# Patient Record
Sex: MALE | Race: ASIAN | NOT HISPANIC OR LATINO | Employment: UNEMPLOYED | URBAN - METROPOLITAN AREA
[De-identification: names, ages, dates, MRNs, and addresses within clinical notes are randomized per-mention and may not be internally consistent; named-entity substitution may affect disease eponyms.]

---

## 2018-09-13 ENCOUNTER — TRANSFERRED RECORDS (OUTPATIENT)
Dept: HEALTH INFORMATION MANAGEMENT | Facility: CLINIC | Age: 58
End: 2018-09-13

## 2018-10-31 ENCOUNTER — RECORDS - HEALTHEAST (OUTPATIENT)
Dept: LAB | Facility: CLINIC | Age: 58
End: 2018-10-31

## 2018-10-31 LAB
ANION GAP SERPL CALCULATED.3IONS-SCNC: 11 MMOL/L (ref 5–18)
BUN SERPL-MCNC: 20 MG/DL (ref 8–22)
CALCIUM SERPL-MCNC: 9.9 MG/DL (ref 8.5–10.5)
CHLORIDE BLD-SCNC: 105 MMOL/L (ref 98–107)
CO2 SERPL-SCNC: 24 MMOL/L (ref 22–31)
CREAT SERPL-MCNC: 0.9 MG/DL (ref 0.7–1.3)
GFR SERPL CREATININE-BSD FRML MDRD: >60 ML/MIN/1.73M2
GLUCOSE BLD-MCNC: 115 MG/DL (ref 70–125)
POTASSIUM BLD-SCNC: 3.8 MMOL/L (ref 3.5–5)
SODIUM SERPL-SCNC: 140 MMOL/L (ref 136–145)
URATE SERPL-MCNC: 8.9 MG/DL (ref 3–8)

## 2019-01-07 ENCOUNTER — RECORDS - HEALTHEAST (OUTPATIENT)
Dept: ADMINISTRATIVE | Facility: OTHER | Age: 59
End: 2019-01-07

## 2019-01-07 ENCOUNTER — AMBULATORY - HEALTHEAST (OUTPATIENT)
Dept: CARDIOLOGY | Facility: CLINIC | Age: 59
End: 2019-01-07

## 2019-01-08 ENCOUNTER — RECORDS - HEALTHEAST (OUTPATIENT)
Dept: ADMINISTRATIVE | Facility: OTHER | Age: 59
End: 2019-01-08

## 2019-01-11 ENCOUNTER — OFFICE VISIT - HEALTHEAST (OUTPATIENT)
Dept: CARDIOLOGY | Facility: CLINIC | Age: 59
End: 2019-01-11

## 2019-01-11 DIAGNOSIS — R94.39 ABNORMAL CARDIOVASCULAR STRESS TEST: ICD-10-CM

## 2019-01-11 DIAGNOSIS — R07.2 PRECORDIAL PAIN: ICD-10-CM

## 2019-01-11 DIAGNOSIS — I71.21 THORACIC ASCENDING AORTIC ANEURYSM (H): ICD-10-CM

## 2019-01-11 DIAGNOSIS — I10 ESSENTIAL HYPERTENSION, BENIGN: ICD-10-CM

## 2019-01-11 ASSESSMENT — MIFFLIN-ST. JEOR: SCORE: 1495.06

## 2019-01-18 ENCOUNTER — HOSPITAL ENCOUNTER (OUTPATIENT)
Dept: NUCLEAR MEDICINE | Facility: HOSPITAL | Age: 59
Discharge: HOME | End: 2019-01-18
Attending: INTERNAL MEDICINE

## 2019-01-18 ENCOUNTER — HOSPITAL ENCOUNTER (OUTPATIENT)
Dept: CARDIOLOGY | Facility: HOSPITAL | Age: 59
Discharge: HOME | End: 2019-01-18
Attending: INTERNAL MEDICINE

## 2019-01-18 DIAGNOSIS — R94.39 ABNORMAL CARDIOVASCULAR STRESS TEST: ICD-10-CM

## 2019-01-18 DIAGNOSIS — R07.2 PRECORDIAL PAIN: ICD-10-CM

## 2019-01-18 LAB
CV STRESS CURRENT BP HE: NORMAL
CV STRESS CURRENT HR HE: 63
CV STRESS CURRENT HR HE: 63
CV STRESS CURRENT HR HE: 64
CV STRESS CURRENT HR HE: 70
CV STRESS CURRENT HR HE: 80
CV STRESS CURRENT HR HE: 86
CV STRESS CURRENT HR HE: 87
CV STRESS CURRENT HR HE: 88
CV STRESS CURRENT HR HE: 88
CV STRESS CURRENT HR HE: 90
CV STRESS CURRENT HR HE: 92
CV STRESS CURRENT HR HE: 92
CV STRESS DEVIATION TIME HE: NORMAL
CV STRESS ECHO PERCENT HR HE: NORMAL
CV STRESS EXERCISE STAGE HE: NORMAL
CV STRESS FINAL RESTING BP HE: NORMAL
CV STRESS FINAL RESTING HR HE: 86
CV STRESS MAX HR HE: 93
CV STRESS MAX TREADMILL GRADE HE: 0
CV STRESS MAX TREADMILL SPEED HE: 0
CV STRESS PEAK DIA BP HE: NORMAL
CV STRESS PEAK SYS BP HE: NORMAL
CV STRESS PHASE HE: NORMAL
CV STRESS PROTOCOL HE: NORMAL
CV STRESS RESTING PT POSITION HE: NORMAL
CV STRESS ST DEVIATION AMOUNT HE: NORMAL
CV STRESS ST DEVIATION ELEVATION HE: NORMAL
CV STRESS ST EVELATION AMOUNT HE: NORMAL
CV STRESS TEST TYPE HE: NORMAL
CV STRESS TOTAL STAGE TIME MIN 1 HE: NORMAL
NUC STRESS EJECTION FRACTION: 75 %
STRESS ECHO BASELINE BP: NORMAL
STRESS ECHO BASELINE HR: 63
STRESS ECHO CALCULATED PERCENT HR: 57 %
STRESS ECHO LAST STRESS BP: NORMAL
STRESS ECHO LAST STRESS HR: 92

## 2019-01-21 ENCOUNTER — COMMUNICATION - HEALTHEAST (OUTPATIENT)
Dept: CARDIOLOGY | Facility: CLINIC | Age: 59
End: 2019-01-21

## 2019-02-11 ENCOUNTER — RECORDS - HEALTHEAST (OUTPATIENT)
Dept: LAB | Facility: CLINIC | Age: 59
End: 2019-02-11

## 2019-02-13 LAB — BACTERIA SPEC CULT: NORMAL

## 2019-02-22 ENCOUNTER — AMBULATORY - HEALTHEAST (OUTPATIENT)
Dept: CARDIOLOGY | Facility: CLINIC | Age: 59
End: 2019-02-22

## 2019-02-22 ENCOUNTER — RECORDS - HEALTHEAST (OUTPATIENT)
Dept: ADMINISTRATIVE | Facility: OTHER | Age: 59
End: 2019-02-22

## 2019-03-01 ENCOUNTER — RECORDS - HEALTHEAST (OUTPATIENT)
Dept: ADMINISTRATIVE | Facility: OTHER | Age: 59
End: 2019-03-01

## 2019-03-05 ENCOUNTER — AMBULATORY - HEALTHEAST (OUTPATIENT)
Dept: CARDIOLOGY | Facility: CLINIC | Age: 59
End: 2019-03-05

## 2019-03-06 ENCOUNTER — AMBULATORY - HEALTHEAST (OUTPATIENT)
Dept: CARDIOLOGY | Facility: CLINIC | Age: 59
End: 2019-03-06

## 2019-03-06 ENCOUNTER — SURGERY - HEALTHEAST (OUTPATIENT)
Dept: CARDIOLOGY | Facility: CLINIC | Age: 59
End: 2019-03-06

## 2019-03-06 ENCOUNTER — OFFICE VISIT - HEALTHEAST (OUTPATIENT)
Dept: CARDIOLOGY | Facility: CLINIC | Age: 59
End: 2019-03-06

## 2019-03-06 DIAGNOSIS — I20.0 UNSTABLE ANGINA (H): ICD-10-CM

## 2019-03-06 DIAGNOSIS — R94.39 ABNORMAL CARDIOVASCULAR STRESS TEST: ICD-10-CM

## 2019-03-06 ASSESSMENT — MIFFLIN-ST. JEOR: SCORE: 1509.12

## 2019-03-12 ENCOUNTER — SURGERY - HEALTHEAST (OUTPATIENT)
Dept: CARDIOLOGY | Facility: CLINIC | Age: 59
End: 2019-03-12

## 2019-03-12 ASSESSMENT — MIFFLIN-ST. JEOR: SCORE: 1516.32

## 2019-04-11 ENCOUNTER — OFFICE VISIT - HEALTHEAST (OUTPATIENT)
Dept: CARDIOLOGY | Facility: CLINIC | Age: 59
End: 2019-04-11

## 2019-04-11 DIAGNOSIS — I25.118 CORONARY ARTERY DISEASE OF NATIVE HEART WITH STABLE ANGINA PECTORIS, UNSPECIFIED VESSEL OR LESION TYPE (H): ICD-10-CM

## 2019-04-11 DIAGNOSIS — I71.21 ASCENDING AORTIC ANEURYSM (H): ICD-10-CM

## 2019-04-11 ASSESSMENT — MIFFLIN-ST. JEOR: SCORE: 1532.47

## 2019-04-19 ENCOUNTER — SURGERY - HEALTHEAST (OUTPATIENT)
Dept: CARDIOLOGY | Facility: CLINIC | Age: 59
End: 2019-04-19

## 2019-04-19 DIAGNOSIS — I71.21 ASCENDING AORTIC ANEURYSM (H): ICD-10-CM

## 2019-04-22 ENCOUNTER — HOSPITAL ENCOUNTER (OUTPATIENT)
Dept: RADIOLOGY | Facility: CLINIC | Age: 59
Discharge: HOME | End: 2019-04-22

## 2019-04-22 ENCOUNTER — HOSPITAL ENCOUNTER (OUTPATIENT)
Dept: SURGERY | Facility: CLINIC | Age: 59
Discharge: HOME | End: 2019-04-22
Admitting: REGISTERED NURSE

## 2019-04-22 ENCOUNTER — ANESTHESIA - HEALTHEAST (OUTPATIENT)
Dept: SURGERY | Facility: CLINIC | Age: 59
End: 2019-04-22

## 2019-04-22 DIAGNOSIS — I25.10 CORONARY ARTERY DISEASE INVOLVING NATIVE CORONARY ARTERY OF NATIVE HEART WITHOUT ANGINA PECTORIS: ICD-10-CM

## 2019-04-22 DIAGNOSIS — I71.21 THORACIC ASCENDING AORTIC ANEURYSM (H): ICD-10-CM

## 2019-04-22 DIAGNOSIS — I71.21 ASCENDING AORTIC ANEURYSM (H): ICD-10-CM

## 2019-04-22 LAB
ABO/RH(D): NORMAL
ALBUMIN UR-MCNC: NEGATIVE MG/DL
ANION GAP SERPL CALCULATED.3IONS-SCNC: 8 MMOL/L (ref 5–18)
ANTIBODY SCREEN: NEGATIVE
APPEARANCE UR: CLEAR
ATRIAL RATE - MUSE: 53 BPM
BILIRUB UR QL STRIP: NEGATIVE
BUN SERPL-MCNC: 12 MG/DL (ref 8–22)
CALCIUM SERPL-MCNC: 9.3 MG/DL (ref 8.5–10.5)
CHLORIDE BLD-SCNC: 106 MMOL/L (ref 98–107)
CO2 SERPL-SCNC: 27 MMOL/L (ref 22–31)
COLOR UR AUTO: YELLOW
CREAT SERPL-MCNC: 1.02 MG/DL (ref 0.7–1.3)
DIASTOLIC BLOOD PRESSURE - MUSE: NORMAL MMHG
ERYTHROCYTE [DISTWIDTH] IN BLOOD BY AUTOMATED COUNT: 11.9 % (ref 11–14.5)
GFR SERPL CREATININE-BSD FRML MDRD: >60 ML/MIN/1.73M2
GLUCOSE BLD-MCNC: 188 MG/DL (ref 70–125)
GLUCOSE UR STRIP-MCNC: NEGATIVE MG/DL
HCT VFR BLD AUTO: 40.8 % (ref 40–54)
HGB BLD-MCNC: 14.6 G/DL (ref 14–18)
HGB UR QL STRIP: NEGATIVE
INR PPP: 1.07 (ref 0.9–1.1)
INTERPRETATION ECG - MUSE: NORMAL
KETONES UR STRIP-MCNC: NEGATIVE MG/DL
LEUKOCYTE ESTERASE UR QL STRIP: NEGATIVE
MAGNESIUM SERPL-MCNC: 1.8 MG/DL (ref 1.8–2.6)
MCH RBC QN AUTO: 30.9 PG (ref 27–34)
MCHC RBC AUTO-ENTMCNC: 35.8 G/DL (ref 32–36)
MCV RBC AUTO: 86 FL (ref 80–100)
NITRATE UR QL: NEGATIVE
P AXIS - MUSE: 77 DEGREES
PH UR STRIP: 6 [PH] (ref 4.5–8)
PLATELET # BLD AUTO: 168 THOU/UL (ref 140–440)
PMV BLD AUTO: 9.1 FL (ref 8.5–12.5)
POTASSIUM BLD-SCNC: 3.5 MMOL/L (ref 3.5–5)
PR INTERVAL - MUSE: 246 MS
PREALB SERPL-MCNC: 27.1 MG/DL (ref 19–38)
QRS DURATION - MUSE: 102 MS
QT - MUSE: 440 MS
QTC - MUSE: 412 MS
R AXIS - MUSE: 18 DEGREES
RBC # BLD AUTO: 4.72 MILL/UL (ref 4.4–6.2)
SODIUM SERPL-SCNC: 141 MMOL/L (ref 136–145)
SP GR UR STRIP: 1.01 (ref 1–1.03)
SYSTOLIC BLOOD PRESSURE - MUSE: NORMAL MMHG
T AXIS - MUSE: 68 DEGREES
UROBILINOGEN UR STRIP-ACNC: NORMAL
VENTRICULAR RATE- MUSE: 53 BPM
WBC: 5.3 THOU/UL (ref 4–11)

## 2019-04-22 ASSESSMENT — MIFFLIN-ST. JEOR: SCORE: 1499.82

## 2019-04-23 ENCOUNTER — HOSPITAL ENCOUNTER (INPATIENT)
Dept: INTENSIVE CARE | Facility: CLINIC | Age: 59
Discharge: HOME HEALTH | End: 2019-04-30
Attending: THORACIC SURGERY (CARDIOTHORACIC VASCULAR SURGERY) | Admitting: THORACIC SURGERY (CARDIOTHORACIC VASCULAR SURGERY)
Payer: COMMERCIAL

## 2019-04-23 ENCOUNTER — SURGERY - HEALTHEAST (OUTPATIENT)
Dept: SURGERY | Facility: CLINIC | Age: 59
End: 2019-04-23

## 2019-04-23 DIAGNOSIS — Z95.1 S/P CABG (CORONARY ARTERY BYPASS GRAFT): ICD-10-CM

## 2019-04-23 LAB
ANION GAP SERPL CALCULATED.3IONS-SCNC: 9 MMOL/L (ref 5–18)
APTT PPP: 72 SECONDS (ref 24–37)
ATRIAL RATE - MUSE: 77 BPM
BACTERIA SPEC CULT: NORMAL
BASE EXCESS BLDA CALC-SCNC: -1.8 MMOL/L
BASE EXCESS BLDA CALC-SCNC: 0.5 MMOL/L
BASE EXCESS BLDA CALC-SCNC: 0.5 MMOL/L
BASE EXCESS BLDV CALC-SCNC: -2 MMOL/L
BASE EXCESS BLDV CALC-SCNC: -2 MMOL/L
BASE EXCESS BLDV CALC-SCNC: 0 MMOL/L
BASE EXCESS BLDV CALC-SCNC: 0 MMOL/L
BASE EXCESS BLDV CALC-SCNC: 5 MMOL/L
BASE EXCESS BLDV CALC-SCNC: 6 MMOL/L
BASOPHILS # BLD AUTO: 0 THOU/UL (ref 0–0.2)
BASOPHILS NFR BLD AUTO: 0 % (ref 0–2)
BUN SERPL-MCNC: 14 MG/DL (ref 8–22)
CA-I BLD-MCNC: 0.92 MMOL/L (ref 1.11–1.3)
CA-I BLD-MCNC: 0.95 MMOL/L (ref 1.11–1.3)
CA-I BLD-MCNC: 0.95 MMOL/L (ref 1.11–1.3)
CA-I BLD-MCNC: 1.18 MMOL/L (ref 1.11–1.3)
CALCIUM SERPL-MCNC: 7.9 MG/DL (ref 8.5–10.5)
CALCIUM, IONIZED MEASURED: 0.96 MMOL/L (ref 1.11–1.3)
CHLORIDE BLD-SCNC: 111 MMOL/L (ref 98–107)
CO2 BLD-SCNC: 24 MMOL/L
CO2 BLD-SCNC: 26 MMOL/L
CO2 BLD-SCNC: 27 MMOL/L
CO2 BLD-SCNC: 29 MMOL/L
CO2 BLD-SCNC: 31 MMOL/L
CO2 BLD-SCNC: 32 MMOL/L
CO2 SERPL-SCNC: 25 MMOL/L (ref 22–31)
COHGB MFR BLD: 99.3 % (ref 96–97)
COHGB MFR BLD: 99.8 % (ref 96–97)
COHGB MFR BLD: 99.9 % (ref 96–97)
CREAT SERPL-MCNC: 0.96 MG/DL (ref 0.7–1.3)
DIASTOLIC BLOOD PRESSURE - MUSE: NORMAL MMHG
EOSINOPHIL # BLD AUTO: 0 THOU/UL (ref 0–0.4)
EOSINOPHIL NFR BLD AUTO: 0 % (ref 0–6)
ERYTHROCYTE [DISTWIDTH] IN BLOOD BY AUTOMATED COUNT: 11.8 % (ref 11–14.5)
ERYTHROCYTE [DISTWIDTH] IN BLOOD BY AUTOMATED COUNT: 11.9 % (ref 11–14.5)
FIBRINOGEN PPP-MCNC: 157 MG/DL (ref 170–410)
GFR SERPL CREATININE-BSD FRML MDRD: >60 ML/MIN/1.73M2
GLUCOSE BLD-MCNC: 112 MG/DL (ref 70–125)
GLUCOSE BLD-MCNC: 124 MG/DL (ref 70–125)
GLUCOSE BLD-MCNC: 130 MG/DL (ref 70–125)
GLUCOSE BLD-MCNC: 135 MG/DL (ref 70–125)
GLUCOSE BLD-MCNC: 138 MG/DL (ref 70–125)
GLUCOSE BLD-MCNC: 151 MG/DL (ref 70–125)
GLUCOSE BLD-MCNC: 166 MG/DL (ref 70–125)
GLUCOSE BLDC GLUCOMTR-MCNC: 105 MG/DL (ref 70–139)
GLUCOSE BLDC GLUCOMTR-MCNC: 113 MG/DL (ref 70–139)
GLUCOSE BLDC GLUCOMTR-MCNC: 114 MG/DL (ref 70–139)
GLUCOSE BLDC GLUCOMTR-MCNC: 116 MG/DL (ref 70–139)
GLUCOSE BLDC GLUCOMTR-MCNC: 131 MG/DL (ref 70–139)
GLUCOSE BLDC GLUCOMTR-MCNC: 139 MG/DL (ref 70–139)
GLUCOSE BLDC GLUCOMTR-MCNC: 139 MG/DL (ref 70–139)
GLUCOSE BLDC GLUCOMTR-MCNC: 94 MG/DL (ref 70–139)
HCO3 BLDA-SCNC: 22.6 MMOL/L (ref 23–29)
HCO3 BLDA-SCNC: 24.7 MMOL/L (ref 23–29)
HCO3 BLDA-SCNC: 25.8 MMOL/L (ref 23–29)
HCO3 BLDA-SCNC: 29.5 MMOL/L (ref 23–29)
HCO3 BLDA-SCNC: 30.5 MMOL/L (ref 23–29)
HCO3 BLDV-SCNC: 27.2 MMOL/L (ref 24–30)
HCO3, ARTERIAL CALC - HISTORICAL: 23.5 MMOL/L (ref 23–29)
HCO3, ARTERIAL CALC - HISTORICAL: 25.3 MMOL/L (ref 23–29)
HCO3, ARTERIAL CALC - HISTORICAL: 25.3 MMOL/L (ref 23–29)
HCT VFR BLD AUTO: 27.1 % (ref 40–54)
HCT VFR BLD AUTO: 32.2 % (ref 40–54)
HCT VFR BLD CALC: 27 % (ref 40–54)
HCT VFR BLD CALC: 28 % (ref 40–54)
HCT VFR BLD CALC: 30 % (ref 40–54)
HCT VFR BLD CALC: 33 % (ref 40–54)
HCT VFR BLD CALC: 40 % (ref 40–54)
HCT VFR BLD CALC: 43 % (ref 40–54)
HGB BLD-MCNC: 10.2 G/DL (ref 14–18)
HGB BLD-MCNC: 11.2 G/DL (ref 14–18)
HGB BLD-MCNC: 11.8 G/DL (ref 14–18)
HGB BLD-MCNC: 13.6 G/DL (ref 14–18)
HGB BLD-MCNC: 14.6 G/DL (ref 14–18)
HGB BLD-MCNC: 9.2 G/DL (ref 14–18)
HGB BLD-MCNC: 9.5 G/DL (ref 14–18)
HGB BLD-MCNC: 9.8 G/DL (ref 14–18)
INR PPP: 1.47 (ref 0.9–1.1)
INR PPP: 1.61 (ref 0.9–1.1)
INTERPRETATION ECG - MUSE: NORMAL
ION CA PH 7.4: 0.96 MMOL/L (ref 1.11–1.3)
LYMPHOCYTES # BLD AUTO: 1.2 THOU/UL (ref 0.8–4.4)
LYMPHOCYTES NFR BLD AUTO: 15 % (ref 20–40)
MAGNESIUM SERPL-MCNC: 3.9 MG/DL (ref 1.8–2.6)
MCH RBC QN AUTO: 31 PG (ref 27–34)
MCH RBC QN AUTO: 31.1 PG (ref 27–34)
MCHC RBC AUTO-ENTMCNC: 36.2 G/DL (ref 32–36)
MCHC RBC AUTO-ENTMCNC: 36.6 G/DL (ref 32–36)
MCV RBC AUTO: 85 FL (ref 80–100)
MCV RBC AUTO: 86 FL (ref 80–100)
MONOCYTES # BLD AUTO: 0.3 THOU/UL (ref 0–0.9)
MONOCYTES NFR BLD AUTO: 4 % (ref 2–10)
NEUTROPHILS # BLD AUTO: 6.4 THOU/UL (ref 2–7.7)
NEUTROPHILS NFR BLD AUTO: 80 % (ref 50–70)
O2/TOTAL GAS SETTING VFR VENT: 0.35 %
O2/TOTAL GAS SETTING VFR VENT: 100 %
O2/TOTAL GAS SETTING VFR VENT: 30 %
OXYHEMOGLOBIN - HISTORICAL: 96.4 % (ref 96–97)
OXYHEMOGLOBIN - HISTORICAL: 96.6 % (ref 96–97)
OXYHEMOGLOBIN - HISTORICAL: 96.8 % (ref 96–97)
P AXIS - MUSE: 62 DEGREES
PCO2 BLD: 36 MM HG (ref 35–45)
PCO2 BLD: 39 MM HG (ref 35–45)
PCO2 BLD: 41 MM HG (ref 35–45)
PCO2 BLDA: 35.6 MMHG (ref 35–45)
PCO2 BLDA: 45.5 MMHG (ref 35–45)
PCO2 BLDA: 47.7 MMHG (ref 35–45)
PCO2 BLDA: 49.9 MMHG (ref 35–45)
PCO2 BLDA: 53.8 MMHG (ref 35–45)
PCO2 BLDV: 58.6 MMHG (ref 35–50)
PEEP: 5 CM H2O
PH BLD: 7.38 [PH] (ref 7.37–7.44)
PH BLD: 7.4 [PH] (ref 7.37–7.44)
PH BLD: 7.44 [PH] (ref 7.37–7.44)
PH BLDA: 7.27 [PH] (ref 7.37–7.44)
PH BLDA: 7.34 [PH] (ref 7.37–7.44)
PH BLDA: 7.39 [PH] (ref 7.37–7.44)
PH BLDA: 7.41 [PH] (ref 7.37–7.44)
PH BLDA: 7.42 [PH] (ref 7.37–7.44)
PH BLDV: 7.27 [PH] (ref 7.35–7.45)
PH: 7.4 (ref 7.35–7.45)
PLATELET # BLD AUTO: 93 THOU/UL (ref 140–440)
PLATELET # BLD AUTO: 98 THOU/UL (ref 140–440)
PMV BLD AUTO: 9.2 FL (ref 8.5–12.5)
PMV BLD AUTO: 9.5 FL (ref 8.5–12.5)
PO2 BLD: 115 MM HG (ref 80–90)
PO2 BLD: 291 MM HG (ref 80–90)
PO2 BLD: 96 MM HG (ref 80–90)
PO2 BLDA: 119 MMHG (ref 80–90)
PO2 BLDA: 119 MMHG (ref 80–90)
PO2 BLDA: 182 MMHG (ref 80–90)
PO2 BLDA: 311 MMHG (ref 80–90)
PO2 BLDA: 385 MMHG (ref 80–90)
PO2 BLDV: 47 MMHG (ref 25–47)
POTASSIUM BLD-SCNC: 3.4 MMOL/L (ref 3.5–5)
POTASSIUM BLD-SCNC: 4.3 MMOL/L (ref 3.5–5)
POTASSIUM BLD-SCNC: 4.4 MMOL/L (ref 3.5–5)
POTASSIUM BLD-SCNC: 5.1 MMOL/L (ref 3.5–5)
POTASSIUM BLD-SCNC: 5.2 MMOL/L (ref 3.5–5)
POTASSIUM BLD-SCNC: 5.5 MMOL/L (ref 3.5–5)
POTASSIUM BLD-SCNC: 6.1 MMOL/L (ref 3.5–5)
PR INTERVAL - MUSE: 190 MS
QRS DURATION - MUSE: 90 MS
QT - MUSE: 444 MS
QTC - MUSE: 502 MS
R AXIS - MUSE: 64 DEGREES
RATE: 16 RR/MIN
RBC # BLD AUTO: 3.16 MILL/UL (ref 4.4–6.2)
RBC # BLD AUTO: 3.8 MILL/UL (ref 4.4–6.2)
SAO2 % BLDV: 76 % (ref 70–75)
SAT POCT - HISTORICAL: 100 % (ref 96–97)
SAT POCT - HISTORICAL: 98 % (ref 96–97)
SAT POCT - HISTORICAL: 99 % (ref 96–97)
SODIUM BLD-SCNC: 137 MMOL/L (ref 136–145)
SODIUM BLD-SCNC: 137 MMOL/L (ref 136–145)
SODIUM BLD-SCNC: 138 MMOL/L (ref 136–145)
SODIUM BLD-SCNC: 139 MMOL/L (ref 136–145)
SODIUM BLD-SCNC: 140 MMOL/L (ref 136–145)
SODIUM BLD-SCNC: 141 MMOL/L (ref 136–145)
SODIUM SERPL-SCNC: 145 MMOL/L (ref 136–145)
SYSTOLIC BLOOD PRESSURE - MUSE: NORMAL MMHG
T AXIS - MUSE: 70 DEGREES
TEMPERATURE: 37 DEGREES C
VENTILATION MODE: ABNORMAL
VENTILATOR TIDAL VOLUME: 450 ML
VENTRICULAR RATE- MUSE: 77 BPM
WBC: 10.5 THOU/UL (ref 4–11)
WBC: 8.1 THOU/UL (ref 4–11)

## 2019-04-24 LAB
ANION GAP SERPL CALCULATED.3IONS-SCNC: 10 MMOL/L (ref 5–18)
ATRIAL RATE - MUSE: 73 BPM
BASE EXCESS BLDA CALC-SCNC: 1.2 MMOL/L
BASE EXCESS BLDA CALC-SCNC: 2.5 MMOL/L
BASE EXCESS BLDA CALC-SCNC: 2.6 MMOL/L
BASE EXCESS BLDV CALC-SCNC: 1.7 MMOL/L
BASOPHILS # BLD AUTO: 0 THOU/UL (ref 0–0.2)
BASOPHILS NFR BLD AUTO: 0 % (ref 0–2)
BUN SERPL-MCNC: 18 MG/DL (ref 8–22)
CALCIUM SERPL-MCNC: 8.1 MG/DL (ref 8.5–10.5)
CALCIUM, IONIZED MEASURED: 0.98 MMOL/L (ref 1.11–1.3)
CHLORIDE BLD-SCNC: 112 MMOL/L (ref 98–107)
CO2 SERPL-SCNC: 23 MMOL/L (ref 22–31)
COHGB MFR BLD: 100 % (ref 96–97)
COHGB MFR BLD: 99.7 % (ref 96–97)
COHGB MFR BLD: 99.8 % (ref 96–97)
COMPONENT (HISTORICAL CONVERSION): NORMAL
CREAT SERPL-MCNC: 1.11 MG/DL (ref 0.7–1.3)
DIASTOLIC BLOOD PRESSURE - MUSE: NORMAL MMHG
EOSINOPHIL # BLD AUTO: 0 THOU/UL (ref 0–0.4)
EOSINOPHIL NFR BLD AUTO: 0 % (ref 0–6)
ERYTHROCYTE [DISTWIDTH] IN BLOOD BY AUTOMATED COUNT: 12.5 % (ref 11–14.5)
FLOW: 3 LPM
GFR SERPL CREATININE-BSD FRML MDRD: >60 ML/MIN/1.73M2
GLUCOSE BLD-MCNC: 146 MG/DL (ref 70–125)
GLUCOSE BLDC GLUCOMTR-MCNC: 124 MG/DL (ref 70–139)
GLUCOSE BLDC GLUCOMTR-MCNC: 130 MG/DL (ref 70–139)
GLUCOSE BLDC GLUCOMTR-MCNC: 138 MG/DL (ref 70–139)
GLUCOSE BLDC GLUCOMTR-MCNC: 139 MG/DL (ref 70–139)
GLUCOSE BLDC GLUCOMTR-MCNC: 155 MG/DL (ref 70–139)
HCO3, ARTERIAL CALC - HISTORICAL: 25.9 MMOL/L (ref 23–29)
HCO3, ARTERIAL CALC - HISTORICAL: 26.9 MMOL/L (ref 23–29)
HCO3, ARTERIAL CALC - HISTORICAL: 27 MMOL/L (ref 23–29)
HCO3, VENOUS, CALC - HISTORICAL: 25.8 MMOL/L (ref 24–30)
HCT VFR BLD AUTO: 25.5 % (ref 40–54)
HGB BLD-MCNC: 9 G/DL (ref 14–18)
INR PPP: 1.4 (ref 0.9–1.1)
INTERPRETATION ECG - MUSE: NORMAL
ION CA PH 7.4: 1.01 MMOL/L (ref 1.11–1.3)
LAB AP CHARGES (HE HISTORICAL CONVERSION): NORMAL
LYMPHOCYTES # BLD AUTO: 1.6 THOU/UL (ref 0.8–4.4)
LYMPHOCYTES NFR BLD AUTO: 16 % (ref 20–40)
MAGNESIUM SERPL-MCNC: 2.6 MG/DL (ref 1.8–2.6)
MCH RBC QN AUTO: 30.6 PG (ref 27–34)
MCHC RBC AUTO-ENTMCNC: 35.3 G/DL (ref 32–36)
MCV RBC AUTO: 87 FL (ref 80–100)
MONOCYTES # BLD AUTO: 1.3 THOU/UL (ref 0–0.9)
MONOCYTES NFR BLD AUTO: 13 % (ref 2–10)
NEUTROPHILS # BLD AUTO: 6.9 THOU/UL (ref 2–7.7)
NEUTROPHILS NFR BLD AUTO: 70 % (ref 50–70)
O2/TOTAL GAS SETTING VFR VENT: 30 %
O2/TOTAL GAS SETTING VFR VENT: 30 %
OXYHEMOGLOBIN (VBG) - HISTORICAL: 70.7 % (ref 70–75)
OXYHEMOGLOBIN - HISTORICAL: 96.7 % (ref 96–97)
OXYHEMOGLOBIN - HISTORICAL: 96.8 % (ref 96–97)
OXYHEMOGLOBIN - HISTORICAL: 97 % (ref 96–97)
OXYHGB MFR BLDV: 73.2 % (ref 70–75)
P AXIS - MUSE: 31 DEGREES
PATH REPORT.COMMENTS IMP SPEC: NORMAL
PATH REPORT.FINAL DX SPEC: NORMAL
PATH REPORT.GROSS SPEC: NORMAL
PATH REPORT.MICROSCOPIC SPEC OTHER STN: NORMAL
PATH REPORT.RELEVANT HX SPEC: NORMAL
PCO2 BLD: 34 MM HG (ref 35–45)
PCO2 BLD: 42 MM HG (ref 35–45)
PCO2 BLD: 45 MM HG (ref 35–45)
PCO2 BLDV: 46 MM HG (ref 35–50)
PEEP: 5 CM H2O
PEEP: 5 CM H2O
PH BLD: 7.38 [PH] (ref 7.37–7.44)
PH BLD: 7.42 [PH] (ref 7.37–7.44)
PH BLD: 7.49 [PH] (ref 7.37–7.44)
PH BLDV: 7.38 [PH] (ref 7.35–7.45)
PH: 7.47 (ref 7.35–7.45)
PLATELET # BLD AUTO: 108 THOU/UL (ref 140–440)
PMV BLD AUTO: 9.2 FL (ref 8.5–12.5)
PO2 BLD: 114 MM HG (ref 80–90)
PO2 BLD: 116 MM HG (ref 80–90)
PO2 BLD: 163 MM HG (ref 80–90)
PO2 BLDV: 37 MM HG (ref 25–47)
POTASSIUM BLD-SCNC: 4 MMOL/L (ref 3.5–5)
PR INTERVAL - MUSE: 176 MS
PSV (LAB BLOOD GAS): 5 CM H2O
QRS DURATION - MUSE: 90 MS
QT - MUSE: 460 MS
QTC - MUSE: 506 MS
R AXIS - MUSE: 44 DEGREES
RATE: 16 RR/MIN
RBC # BLD AUTO: 2.94 MILL/UL (ref 4.4–6.2)
RESULT FLAG (HE HISTORICAL CONVERSION): NORMAL
SODIUM SERPL-SCNC: 145 MMOL/L (ref 136–145)
STATUS (HISTORICAL CONVERSION): NORMAL
SYSTOLIC BLOOD PRESSURE - MUSE: NORMAL MMHG
T AXIS - MUSE: 49 DEGREES
TEMPERATURE: 37 DEGREES C
VENTILATION MODE: ABNORMAL
VENTILATOR TIDAL VOLUME: 450 ML
VENTRICULAR RATE- MUSE: 73 BPM
WBC: 9.9 THOU/UL (ref 4–11)

## 2019-04-24 ASSESSMENT — MIFFLIN-ST. JEOR
SCORE: 1498.13
SCORE: 1498.13

## 2019-04-25 LAB
ANION GAP SERPL CALCULATED.3IONS-SCNC: 8 MMOL/L (ref 5–18)
BUN SERPL-MCNC: 21 MG/DL (ref 8–22)
CALCIUM SERPL-MCNC: 9 MG/DL (ref 8.5–10.5)
CHLORIDE BLD-SCNC: 105 MMOL/L (ref 98–107)
CO2 SERPL-SCNC: 26 MMOL/L (ref 22–31)
CREAT SERPL-MCNC: 1.23 MG/DL (ref 0.7–1.3)
GFR SERPL CREATININE-BSD FRML MDRD: 60 ML/MIN/1.73M2
GLUCOSE BLD-MCNC: 221 MG/DL (ref 70–125)
GLUCOSE BLDC GLUCOMTR-MCNC: 124 MG/DL (ref 70–139)
GLUCOSE BLDC GLUCOMTR-MCNC: 141 MG/DL (ref 70–139)
GLUCOSE BLDC GLUCOMTR-MCNC: 141 MG/DL (ref 70–139)
GLUCOSE BLDC GLUCOMTR-MCNC: 152 MG/DL (ref 70–139)
GLUCOSE BLDC GLUCOMTR-MCNC: 161 MG/DL (ref 70–139)
HBA1C MFR BLD: 5.2 % (ref 4.2–6.1)
HGB BLD-MCNC: 8.8 G/DL (ref 14–18)
MAGNESIUM SERPL-MCNC: 2.2 MG/DL (ref 1.8–2.6)
PLATELET # BLD AUTO: 109 THOU/UL (ref 140–440)
POTASSIUM BLD-SCNC: 3.7 MMOL/L (ref 3.5–5)
POTASSIUM BLD-SCNC: 4 MMOL/L (ref 3.5–5)
PROCALCITONIN SERPL-MCNC: 0.63 NG/ML (ref 0–0.49)
SODIUM SERPL-SCNC: 139 MMOL/L (ref 136–145)
WBC: 11.2 THOU/UL (ref 4–11)

## 2019-04-25 ASSESSMENT — MIFFLIN-ST. JEOR
SCORE: 1504.35
SCORE: 1504.35
SCORE: 1499.82
SCORE: 1499.82

## 2019-04-26 LAB
ANION GAP SERPL CALCULATED.3IONS-SCNC: 7 MMOL/L (ref 5–18)
BASOPHILS # BLD AUTO: 0.1 THOU/UL (ref 0–0.2)
BASOPHILS NFR BLD AUTO: 0 % (ref 0–2)
BLD PROD TYP BPU: NORMAL
BLD PROD TYP BPU: NORMAL
BLOOD EXPIRATION DATE: NORMAL
BLOOD EXPIRATION DATE: NORMAL
BLOOD TYPE: 6200
BLOOD TYPE: 6200
BUN SERPL-MCNC: 22 MG/DL (ref 8–22)
CALCIUM SERPL-MCNC: 9.3 MG/DL (ref 8.5–10.5)
CHLORIDE BLD-SCNC: 101 MMOL/L (ref 98–107)
CO2 SERPL-SCNC: 33 MMOL/L (ref 22–31)
CODING SYSTEM: NORMAL
CODING SYSTEM: NORMAL
COMPONENT (HISTORICAL CONVERSION): NORMAL
COMPONENT (HISTORICAL CONVERSION): NORMAL
CREAT SERPL-MCNC: 1.14 MG/DL (ref 0.7–1.3)
CROSSMATCH: NORMAL
CROSSMATCH: NORMAL
EOSINOPHIL # BLD AUTO: 0.1 THOU/UL (ref 0–0.4)
EOSINOPHIL NFR BLD AUTO: 1 % (ref 0–6)
ERYTHROCYTE [DISTWIDTH] IN BLOOD BY AUTOMATED COUNT: 12.5 % (ref 11–14.5)
GFR SERPL CREATININE-BSD FRML MDRD: >60 ML/MIN/1.73M2
GLUCOSE BLD-MCNC: 115 MG/DL (ref 70–125)
GLUCOSE BLDC GLUCOMTR-MCNC: 106 MG/DL (ref 70–139)
GLUCOSE BLDC GLUCOMTR-MCNC: 123 MG/DL (ref 70–139)
GLUCOSE BLDC GLUCOMTR-MCNC: 136 MG/DL (ref 70–139)
GLUCOSE BLDC GLUCOMTR-MCNC: 167 MG/DL (ref 70–139)
HCT VFR BLD AUTO: 25.1 % (ref 40–54)
HGB BLD-MCNC: 8.3 G/DL (ref 14–18)
LYMPHOCYTES # BLD AUTO: 2.4 THOU/UL (ref 0.8–4.4)
LYMPHOCYTES NFR BLD AUTO: 21 % (ref 20–40)
MAGNESIUM SERPL-MCNC: 2.1 MG/DL (ref 1.8–2.6)
MCH RBC QN AUTO: 30.5 PG (ref 27–34)
MCHC RBC AUTO-ENTMCNC: 33.1 G/DL (ref 32–36)
MCV RBC AUTO: 92 FL (ref 80–100)
MONOCYTES # BLD AUTO: 1.2 THOU/UL (ref 0–0.9)
MONOCYTES NFR BLD AUTO: 11 % (ref 2–10)
NEUTROPHILS # BLD AUTO: 7.8 THOU/UL (ref 2–7.7)
NEUTROPHILS NFR BLD AUTO: 67 % (ref 50–70)
PLATELET # BLD AUTO: 122 THOU/UL (ref 140–440)
PMV BLD AUTO: 9.7 FL (ref 8.5–12.5)
POTASSIUM BLD-SCNC: 3.8 MMOL/L (ref 3.5–5)
RBC # BLD AUTO: 2.72 MILL/UL (ref 4.4–6.2)
SODIUM SERPL-SCNC: 141 MMOL/L (ref 136–145)
STATUS (HISTORICAL CONVERSION): NORMAL
STATUS (HISTORICAL CONVERSION): NORMAL
UNIT ABO/RH (HISTORICAL CONVERSION): NORMAL
UNIT ABO/RH (HISTORICAL CONVERSION): NORMAL
UNIT NUMBER: NORMAL
UNIT NUMBER: NORMAL
WBC: 11.7 THOU/UL (ref 4–11)

## 2019-04-26 ASSESSMENT — MIFFLIN-ST. JEOR
SCORE: 1502.08
SCORE: 1502.08

## 2019-04-27 LAB
BACTERIA SPEC CULT: NORMAL
GLUCOSE BLDC GLUCOMTR-MCNC: 119 MG/DL (ref 70–139)
GLUCOSE BLDC GLUCOMTR-MCNC: 126 MG/DL (ref 70–139)
GLUCOSE BLDC GLUCOMTR-MCNC: 131 MG/DL (ref 70–139)
GLUCOSE BLDC GLUCOMTR-MCNC: 154 MG/DL (ref 70–139)
GRAM STAIN RESULT: NORMAL
MAGNESIUM SERPL-MCNC: 2.1 MG/DL (ref 1.8–2.6)
PLATELET # BLD AUTO: 170 THOU/UL (ref 140–440)
POTASSIUM BLD-SCNC: 3.6 MMOL/L (ref 3.5–5)

## 2019-04-28 LAB
GLUCOSE BLDC GLUCOMTR-MCNC: 118 MG/DL (ref 70–139)
GLUCOSE BLDC GLUCOMTR-MCNC: 126 MG/DL (ref 70–139)
GLUCOSE BLDC GLUCOMTR-MCNC: 137 MG/DL (ref 70–139)
GLUCOSE BLDC GLUCOMTR-MCNC: 184 MG/DL (ref 70–139)
MAGNESIUM SERPL-MCNC: 2.1 MG/DL (ref 1.8–2.6)
POTASSIUM BLD-SCNC: 3.5 MMOL/L (ref 3.5–5)

## 2019-04-28 ASSESSMENT — MIFFLIN-ST. JEOR
SCORE: 1507.53
SCORE: 1507.53

## 2019-04-29 LAB
ANION GAP SERPL CALCULATED.3IONS-SCNC: 11 MMOL/L (ref 5–18)
BUN SERPL-MCNC: 29 MG/DL (ref 8–22)
CALCIUM SERPL-MCNC: 9.7 MG/DL (ref 8.5–10.5)
CHLORIDE BLD-SCNC: 101 MMOL/L (ref 98–107)
CO2 SERPL-SCNC: 26 MMOL/L (ref 22–31)
CREAT SERPL-MCNC: 1.06 MG/DL (ref 0.7–1.3)
ERYTHROCYTE [DISTWIDTH] IN BLOOD BY AUTOMATED COUNT: 12.9 % (ref 11–14.5)
GFR SERPL CREATININE-BSD FRML MDRD: >60 ML/MIN/1.73M2
GLUCOSE BLD-MCNC: 108 MG/DL (ref 70–125)
GLUCOSE BLDC GLUCOMTR-MCNC: 107 MG/DL (ref 70–139)
GLUCOSE BLDC GLUCOMTR-MCNC: 119 MG/DL (ref 70–139)
GLUCOSE BLDC GLUCOMTR-MCNC: 161 MG/DL (ref 70–139)
GLUCOSE BLDC GLUCOMTR-MCNC: 173 MG/DL (ref 70–139)
HCT VFR BLD AUTO: 28.2 % (ref 40–54)
HGB BLD-MCNC: 9.5 G/DL (ref 14–18)
MAGNESIUM SERPL-MCNC: 2 MG/DL (ref 1.8–2.6)
MCH RBC QN AUTO: 30.7 PG (ref 27–34)
MCHC RBC AUTO-ENTMCNC: 33.7 G/DL (ref 32–36)
MCV RBC AUTO: 91 FL (ref 80–100)
PLATELET # BLD AUTO: 252 THOU/UL (ref 140–440)
PMV BLD AUTO: 9.1 FL (ref 8.5–12.5)
POTASSIUM BLD-SCNC: 3.7 MMOL/L (ref 3.5–5)
RBC # BLD AUTO: 3.09 MILL/UL (ref 4.4–6.2)
SODIUM SERPL-SCNC: 138 MMOL/L (ref 136–145)
WBC: 12.2 THOU/UL (ref 4–11)

## 2019-04-29 ASSESSMENT — MIFFLIN-ST. JEOR
SCORE: 1462.17
SCORE: 1462.17

## 2019-04-30 ENCOUNTER — HOME CARE/HOSPICE - HEALTHEAST (OUTPATIENT)
Dept: HOME HEALTH SERVICES | Facility: HOME HEALTH | Age: 59
End: 2019-04-30

## 2019-04-30 LAB
GLUCOSE BLDC GLUCOMTR-MCNC: 102 MG/DL (ref 70–139)
GLUCOSE BLDC GLUCOMTR-MCNC: 149 MG/DL (ref 70–139)
GLUCOSE BLDC GLUCOMTR-MCNC: 176 MG/DL (ref 70–139)
GLUCOSE BLDC GLUCOMTR-MCNC: 184 MG/DL (ref 70–139)
POTASSIUM BLD-SCNC: 3.8 MMOL/L (ref 3.5–5)

## 2019-04-30 ASSESSMENT — MIFFLIN-ST. JEOR
SCORE: 1473.96
SCORE: 1473.96

## 2019-05-02 ENCOUNTER — HOME CARE/HOSPICE - HEALTHEAST (OUTPATIENT)
Dept: HOME HEALTH SERVICES | Facility: HOME HEALTH | Age: 59
End: 2019-05-02

## 2019-05-04 ENCOUNTER — RECORDS - HEALTHEAST (OUTPATIENT)
Dept: ADMINISTRATIVE | Facility: OTHER | Age: 59
End: 2019-05-04

## 2019-05-04 ENCOUNTER — HOME CARE/HOSPICE - HEALTHEAST (OUTPATIENT)
Dept: HOME HEALTH SERVICES | Facility: HOME HEALTH | Age: 59
End: 2019-05-04

## 2019-05-06 ENCOUNTER — HOME CARE/HOSPICE - HEALTHEAST (OUTPATIENT)
Dept: HOME HEALTH SERVICES | Facility: HOME HEALTH | Age: 59
End: 2019-05-06

## 2019-05-07 ENCOUNTER — HOME CARE/HOSPICE - HEALTHEAST (OUTPATIENT)
Dept: HOME HEALTH SERVICES | Facility: HOME HEALTH | Age: 59
End: 2019-05-07

## 2019-05-08 ENCOUNTER — HOME CARE/HOSPICE - HEALTHEAST (OUTPATIENT)
Dept: HOME HEALTH SERVICES | Facility: HOME HEALTH | Age: 59
End: 2019-05-08

## 2019-05-08 ENCOUNTER — RECORDS - HEALTHEAST (OUTPATIENT)
Dept: LAB | Facility: CLINIC | Age: 59
End: 2019-05-08

## 2019-05-08 LAB
ERYTHROCYTE [DISTWIDTH] IN BLOOD BY AUTOMATED COUNT: 14.1 % (ref 11–14.5)
HCT VFR BLD AUTO: 35.1 % (ref 40–54)
HGB BLD-MCNC: 11.5 G/DL (ref 14–18)
MCH RBC QN AUTO: 29.9 PG (ref 27–34)
MCHC RBC AUTO-ENTMCNC: 32.8 G/DL (ref 32–36)
MCV RBC AUTO: 91 FL (ref 80–100)
PLATELET # BLD AUTO: 419 THOU/UL (ref 140–440)
PMV BLD AUTO: 9.1 FL (ref 8.5–12.5)
RBC # BLD AUTO: 3.85 MILL/UL (ref 4.4–6.2)
WBC: 10.2 THOU/UL (ref 4–11)

## 2019-05-10 ENCOUNTER — HOME CARE/HOSPICE - HEALTHEAST (OUTPATIENT)
Dept: HOME HEALTH SERVICES | Facility: HOME HEALTH | Age: 59
End: 2019-05-10

## 2019-05-14 ENCOUNTER — HOME CARE/HOSPICE - HEALTHEAST (OUTPATIENT)
Dept: HOME HEALTH SERVICES | Facility: HOME HEALTH | Age: 59
End: 2019-05-14

## 2019-05-14 ENCOUNTER — ALLIED HEALTH/NURSE VISIT (OUTPATIENT)
Dept: PHARMACY | Facility: PHYSICIAN GROUP | Age: 59
End: 2019-05-14
Payer: COMMERCIAL

## 2019-05-14 DIAGNOSIS — I10 BENIGN ESSENTIAL HYPERTENSION: ICD-10-CM

## 2019-05-14 DIAGNOSIS — I20.89 STABLE ANGINA (H): ICD-10-CM

## 2019-05-14 DIAGNOSIS — M10.9 GOUT, UNSPECIFIED CAUSE, UNSPECIFIED CHRONICITY, UNSPECIFIED SITE: ICD-10-CM

## 2019-05-14 DIAGNOSIS — E78.5 HYPERLIPIDEMIA LDL GOAL <100: ICD-10-CM

## 2019-05-14 DIAGNOSIS — F32.A DEPRESSION, UNSPECIFIED DEPRESSION TYPE: ICD-10-CM

## 2019-05-14 DIAGNOSIS — I82.4Y9 DEEP VEIN THROMBOSIS (DVT) OF PROXIMAL LOWER EXTREMITY, UNSPECIFIED CHRONICITY, UNSPECIFIED LATERALITY (H): ICD-10-CM

## 2019-05-14 DIAGNOSIS — G47.00 INSOMNIA, UNSPECIFIED TYPE: ICD-10-CM

## 2019-05-14 DIAGNOSIS — I26.99 OTHER PULMONARY EMBOLISM WITHOUT ACUTE COR PULMONALE, UNSPECIFIED CHRONICITY (H): Primary | ICD-10-CM

## 2019-05-14 PROCEDURE — 99605 MTMS BY PHARM NP 15 MIN: CPT | Performed by: PHARMACIST

## 2019-05-14 RX ORDER — FLUOXETINE 40 MG/1
40 CAPSULE ORAL DAILY
COMMUNITY
End: 2023-09-13

## 2019-05-14 RX ORDER — ALLOPURINOL 300 MG/1
300 TABLET ORAL DAILY
Status: ON HOLD | COMMUNITY
End: 2023-12-04

## 2019-05-14 RX ORDER — ASPIRIN 81 MG/1
81 TABLET ORAL DAILY
Status: ON HOLD | COMMUNITY
End: 2023-09-14

## 2019-05-14 RX ORDER — TRAZODONE HYDROCHLORIDE 50 MG/1
50 TABLET, FILM COATED ORAL
COMMUNITY
End: 2022-06-17

## 2019-05-14 RX ORDER — METOPROLOL TARTRATE 25 MG/1
12.5 TABLET, FILM COATED ORAL 2 TIMES DAILY
COMMUNITY
End: 2022-06-17

## 2019-05-14 RX ORDER — NITROGLYCERIN 0.4 MG/1
0.4 TABLET SUBLINGUAL EVERY 5 MIN PRN
COMMUNITY
End: 2022-06-17

## 2019-05-14 RX ORDER — PREDNISONE 20 MG/1
20 TABLET ORAL DAILY PRN
COMMUNITY
End: 2022-06-17

## 2019-05-14 RX ORDER — ATORVASTATIN CALCIUM 80 MG/1
80 TABLET, FILM COATED ORAL DAILY
COMMUNITY
End: 2021-07-20

## 2019-05-14 RX ORDER — ACETAMINOPHEN 500 MG
1000 TABLET ORAL 2 TIMES DAILY PRN
Status: ON HOLD | COMMUNITY
End: 2023-09-15

## 2019-05-14 NOTE — PROGRESS NOTES
SUBJECTIVE/OBJECTIVE:                Douglas Herrera is a 59 year old male called for a transitions of care visit.  He was discharged from [facility] on 5/10 for PE/DVT.     PCP:  Nafisa Mitchell, has f/u apt 5/16    Chief Complaint:  Medication review.    Allergies/ADRs: None  Tobacco: No tobacco use   Alcohol: not currently using  Caffeine: no caffeine    Medication Adherence/Access:  no issues reported    PE/DVT:    Patient currently taking Xarelto 15mg BID, and after 21 days will decrease to 20 mg daily.  Patient denies bruising/bleeding.  Is still having some leg pain/stiffness, but says it is improving.  Has Tramadol 50mg, TID PRN from discharge, but pt wasn't sure which pain med he was using when we spoke (tramadol or APAP).    Hypertension/Stable Angina:   Current medications include Metoprolol tart 12.5mg BID.  Patient does not self-monitor BP.  Patient denies orthostasis.  He also has nitroglycerin 0.4mg tablets, but hasn't needed recently.  Last Vitals (5/8/19):  /64 mmHg  HR 74    Hyperlipidemia:   Current therapy includes Atorvastatin 80mg once daily.  Pt reports no significant myalgias or other side effects.      Gout:   Currently taking Allopurinol 300mg daily, and Prednisone 20mg PRN for pain. Pt reports increased pain -  big toe and foot. Says he has gout pain that will last 2-3 days, it comes and goes.  Pt is experiencing the following medication side effects: none.     URIC ACID - 10/31/2018      NAME VALUE REFERENCE RANGE    URIC ACID 8.9 H 3.0-8.0 (mg/dL)       Depression/Insomnia:    Current medications include: Fluoxetine 40mg once daily. Pt reports that depression symptoms are well controlled.  Denies depressive symptoms.  He takes trazodone 50 mg HS PRN, and finds this effective.  He isn't using frequently.      BASIC METABOLIC PROFILE - 10/31/2018      NAME VALUE REFERENCE RANGE    SODIUM 140 136-145 (mmol/L)    POTASSIUM 3.8 3.5-5.0 (mmol/L)    CHLORIDE 105  (mmol/L)    CO2 24 22-31  (mmol/L)    ANION GAP, CALCULATION 11 5-18 (mmol/L)    GLUCOSE 115  (mg/dL)    CALCIUM 9.9 8.5-10.5 (mg/dL)    BLOOD UREA NITROGEN 20 8-22 (mg/dL)    CREATININE 0.90 0.70-1.30 (mg/dL)    GFR MDRD AF AMER >60 >60 (mL/min/1.73m2)    GFR MDRD NON AF AMER >60 >60 (mL/min/1.73m2)       CELLDYN-CBC withOUT DIFF - 05/08/2019      NAME VALUE REFERENCE RANGE LAB   F WBC 11.4 H 4.0-11.0 (K/uL) 3   F RBC 3.76 L 4.40-6.20 (M/uL) 3   F  3.81 L 4.40-6.20 (M/uL) 3   F HGB 11.7 L 14.0-18.0 (g/dL) 3   F  11.6 L 14.0-18.0 (g/dL) 3   F HCT 34.5 L 40.0-54.0 (%) 3   F  34.9 L 40.0-54.0 (%) 3   F MCV 91.8 80.0-100.0 (fL) 3   F  91.6 80.0-100.0 (fL) 3   F MCH 31.1 27.0-34.0 (pg) 3   F  30.4 27.0-34.0 (pg) 3   F MCHC 33.9 32.0-36.0 (g/dL) 3   F  33.2 32.0-36.0 (g/dL) 3   F RDW 14.2 11.0-14.5 (%) 3   F  13.1 11.0-14.5 (%) 3   F .0 H 140.0-440.0 (K/uL) 3   F  471.0 H 140.0-440.0 (K/uL) 3         Today's Vitals: There were no vitals taken for this visit. - telephone enocunter.    ASSESSMENT:                 Current medications were reviewed today.      Medication Adherence: good, no issues identified    PE/DVT:   Improving.  On recommended dose of Xarelto.    Hypertension/Stable Angina:   Stable. Patient is meeting BP goal of < 140/90mmHg.     Hyperlipidemia:   Stable. Pt is on high intensity statin which is indicated based on 2013 ACC/AHA guidelines for lipid management.      Gout:   Needs improvement.  Pt is at goal of uric acid <6mg/dl, from labs done in October 2018  .He may benefit from rechecking uric acid levels, and consider increasing allopurinol.  Could discuss at follow up with PCP.        Depression/Insomnia:    Stable    PLAN:                  Post Discharge Medication Reconciliation Status: discharge medications reconciled, continue medications without change.    1)  Continue current medications.    2) Consider checking uric acid levels again, and increasing allopurinol if needed.    I spent 20 minutes with this  patient today. I offer these suggestions for consideration by Nafisa Mitchell. A copy of the visit note was provided to the patient's primary care provider.    No follow up needed with [facility] pharmacist.    The patient declined a summary of these recommendations as an after visit summary.    Nikki Donahue PharmD  Medication Therapy Management Pharmacist  Pager: [phone]

## 2019-05-16 ENCOUNTER — RECORDS - HEALTHEAST (OUTPATIENT)
Dept: LAB | Facility: CLINIC | Age: 59
End: 2019-05-16

## 2019-05-16 LAB
ANION GAP SERPL CALCULATED.3IONS-SCNC: 11 MMOL/L (ref 5–18)
BUN SERPL-MCNC: 13 MG/DL (ref 8–22)
CALCIUM SERPL-MCNC: 9.4 MG/DL (ref 8.5–10.5)
CHLORIDE BLD-SCNC: 104 MMOL/L (ref 98–107)
CO2 SERPL-SCNC: 23 MMOL/L (ref 22–31)
CREAT SERPL-MCNC: 0.91 MG/DL (ref 0.7–1.3)
GFR SERPL CREATININE-BSD FRML MDRD: >60 ML/MIN/1.73M2
GLUCOSE BLD-MCNC: 119 MG/DL (ref 70–125)
POTASSIUM BLD-SCNC: 3.8 MMOL/L (ref 3.5–5)
SODIUM SERPL-SCNC: 138 MMOL/L (ref 136–145)
URATE SERPL-MCNC: 7.8 MG/DL (ref 3–8)

## 2019-05-17 ENCOUNTER — HOME CARE/HOSPICE - HEALTHEAST (OUTPATIENT)
Dept: HOME HEALTH SERVICES | Facility: HOME HEALTH | Age: 59
End: 2019-05-17

## 2019-05-20 ENCOUNTER — HOME CARE/HOSPICE - HEALTHEAST (OUTPATIENT)
Dept: HOME HEALTH SERVICES | Facility: HOME HEALTH | Age: 59
End: 2019-05-20

## 2019-05-21 ENCOUNTER — OFFICE VISIT - HEALTHEAST (OUTPATIENT)
Dept: CARDIOLOGY | Facility: CLINIC | Age: 59
End: 2019-05-21

## 2019-05-21 ENCOUNTER — COMMUNICATION - HEALTHEAST (OUTPATIENT)
Dept: HOME HEALTH SERVICES | Facility: HOME HEALTH | Age: 59
End: 2019-05-21

## 2019-05-21 DIAGNOSIS — Z86.79 S/P ASCENDING AORTIC ANEURYSM REPAIR: ICD-10-CM

## 2019-05-21 DIAGNOSIS — Z98.890 S/P ASCENDING AORTIC ANEURYSM REPAIR: ICD-10-CM

## 2019-05-21 DIAGNOSIS — L03.818 CELLULITIS OF OTHER SPECIFIED SITE: ICD-10-CM

## 2019-05-21 DIAGNOSIS — Z95.1 S/P CABG X 3: ICD-10-CM

## 2019-05-21 ASSESSMENT — MIFFLIN-ST. JEOR: SCORE: 1456.72

## 2019-05-22 ENCOUNTER — HOME CARE/HOSPICE - HEALTHEAST (OUTPATIENT)
Dept: HOME HEALTH SERVICES | Facility: HOME HEALTH | Age: 59
End: 2019-05-22

## 2019-05-23 ENCOUNTER — HOME CARE/HOSPICE - HEALTHEAST (OUTPATIENT)
Dept: HOME HEALTH SERVICES | Facility: HOME HEALTH | Age: 59
End: 2019-05-23

## 2019-05-23 LAB
BACTERIA SPEC CULT: NORMAL
GRAM STAIN RESULT: NORMAL
GRAM STAIN RESULT: NORMAL

## 2019-05-27 ENCOUNTER — HOME CARE/HOSPICE - HEALTHEAST (OUTPATIENT)
Dept: HOME HEALTH SERVICES | Facility: HOME HEALTH | Age: 59
End: 2019-05-27

## 2019-05-31 ENCOUNTER — AMBULATORY - HEALTHEAST (OUTPATIENT)
Dept: CARDIAC REHAB | Facility: HOSPITAL | Age: 59
End: 2019-05-31

## 2019-05-31 DIAGNOSIS — Z95.1 S/P CABG (CORONARY ARTERY BYPASS GRAFT): ICD-10-CM

## 2019-06-03 ENCOUNTER — AMBULATORY - HEALTHEAST (OUTPATIENT)
Dept: CARDIAC REHAB | Facility: HOSPITAL | Age: 59
End: 2019-06-03

## 2019-06-03 DIAGNOSIS — Z95.1 S/P CABG (CORONARY ARTERY BYPASS GRAFT): ICD-10-CM

## 2019-06-05 ENCOUNTER — AMBULATORY - HEALTHEAST (OUTPATIENT)
Dept: CARDIOLOGY | Facility: CLINIC | Age: 59
End: 2019-06-05

## 2019-06-05 ENCOUNTER — RECORDS - HEALTHEAST (OUTPATIENT)
Dept: ADMINISTRATIVE | Facility: OTHER | Age: 59
End: 2019-06-05

## 2019-06-07 ENCOUNTER — AMBULATORY - HEALTHEAST (OUTPATIENT)
Dept: CARDIAC REHAB | Facility: HOSPITAL | Age: 59
End: 2019-06-07

## 2019-06-07 ENCOUNTER — OFFICE VISIT - HEALTHEAST (OUTPATIENT)
Dept: CARDIOLOGY | Facility: CLINIC | Age: 59
End: 2019-06-07

## 2019-06-07 DIAGNOSIS — Z95.1 S/P CABG (CORONARY ARTERY BYPASS GRAFT): ICD-10-CM

## 2019-06-07 DIAGNOSIS — I25.10 CORONARY ARTERY DISEASE INVOLVING NATIVE CORONARY ARTERY: ICD-10-CM

## 2019-06-07 ASSESSMENT — MIFFLIN-ST. JEOR: SCORE: 1450.83

## 2019-06-12 ENCOUNTER — AMBULATORY - HEALTHEAST (OUTPATIENT)
Dept: CARDIAC REHAB | Facility: HOSPITAL | Age: 59
End: 2019-06-12

## 2019-06-12 DIAGNOSIS — Z95.1 S/P CABG (CORONARY ARTERY BYPASS GRAFT): ICD-10-CM

## 2019-06-13 ENCOUNTER — COMMUNICATION - HEALTHEAST (OUTPATIENT)
Dept: CARDIOLOGY | Facility: CLINIC | Age: 59
End: 2019-06-13

## 2019-06-24 ENCOUNTER — AMBULATORY - HEALTHEAST (OUTPATIENT)
Dept: CARDIAC REHAB | Facility: HOSPITAL | Age: 59
End: 2019-06-24

## 2019-06-24 DIAGNOSIS — Z95.1 S/P CABG (CORONARY ARTERY BYPASS GRAFT): ICD-10-CM

## 2019-06-28 ENCOUNTER — AMBULATORY - HEALTHEAST (OUTPATIENT)
Dept: CARDIAC REHAB | Facility: HOSPITAL | Age: 59
End: 2019-06-28

## 2019-06-28 DIAGNOSIS — Z95.1 S/P CABG (CORONARY ARTERY BYPASS GRAFT): ICD-10-CM

## 2019-07-01 ENCOUNTER — AMBULATORY - HEALTHEAST (OUTPATIENT)
Dept: CARDIAC REHAB | Facility: HOSPITAL | Age: 59
End: 2019-07-01

## 2019-07-01 DIAGNOSIS — Z95.1 S/P CABG (CORONARY ARTERY BYPASS GRAFT): ICD-10-CM

## 2019-07-02 ENCOUNTER — AMBULATORY - HEALTHEAST (OUTPATIENT)
Dept: CARDIAC REHAB | Facility: HOSPITAL | Age: 59
End: 2019-07-02

## 2019-07-02 DIAGNOSIS — Z95.1 S/P CABG (CORONARY ARTERY BYPASS GRAFT): ICD-10-CM

## 2019-07-22 ENCOUNTER — AMBULATORY - HEALTHEAST (OUTPATIENT)
Dept: CARDIAC REHAB | Facility: HOSPITAL | Age: 59
End: 2019-07-22

## 2019-07-22 DIAGNOSIS — Z95.1 S/P CABG (CORONARY ARTERY BYPASS GRAFT): ICD-10-CM

## 2019-07-24 ENCOUNTER — AMBULATORY - HEALTHEAST (OUTPATIENT)
Dept: CARDIAC REHAB | Facility: HOSPITAL | Age: 59
End: 2019-07-24

## 2019-07-24 DIAGNOSIS — Z95.1 S/P CABG (CORONARY ARTERY BYPASS GRAFT): ICD-10-CM

## 2019-07-29 ENCOUNTER — AMBULATORY - HEALTHEAST (OUTPATIENT)
Dept: CARDIAC REHAB | Facility: HOSPITAL | Age: 59
End: 2019-07-29

## 2019-07-29 DIAGNOSIS — Z95.1 S/P CABG (CORONARY ARTERY BYPASS GRAFT): ICD-10-CM

## 2019-07-31 ENCOUNTER — AMBULATORY - HEALTHEAST (OUTPATIENT)
Dept: CARDIAC REHAB | Facility: HOSPITAL | Age: 59
End: 2019-07-31

## 2019-07-31 DIAGNOSIS — Z95.1 S/P CABG (CORONARY ARTERY BYPASS GRAFT): ICD-10-CM

## 2019-08-05 ENCOUNTER — AMBULATORY - HEALTHEAST (OUTPATIENT)
Dept: CARDIAC REHAB | Facility: HOSPITAL | Age: 59
End: 2019-08-05

## 2019-08-05 DIAGNOSIS — Z95.1 S/P CABG (CORONARY ARTERY BYPASS GRAFT): ICD-10-CM

## 2019-08-07 ENCOUNTER — AMBULATORY - HEALTHEAST (OUTPATIENT)
Dept: CARDIAC REHAB | Facility: HOSPITAL | Age: 59
End: 2019-08-07

## 2019-08-07 DIAGNOSIS — Z95.1 S/P CABG X 3: ICD-10-CM

## 2019-08-14 ENCOUNTER — AMBULATORY - HEALTHEAST (OUTPATIENT)
Dept: CARDIAC REHAB | Facility: HOSPITAL | Age: 59
End: 2019-08-14

## 2019-08-14 DIAGNOSIS — Z95.1 S/P CABG X 3: ICD-10-CM

## 2019-08-15 ENCOUNTER — AMBULATORY - HEALTHEAST (OUTPATIENT)
Dept: CARDIAC REHAB | Facility: HOSPITAL | Age: 59
End: 2019-08-15

## 2019-08-15 DIAGNOSIS — Z95.1 S/P CABG X 3: ICD-10-CM

## 2019-08-19 ENCOUNTER — AMBULATORY - HEALTHEAST (OUTPATIENT)
Dept: CARDIAC REHAB | Facility: HOSPITAL | Age: 59
End: 2019-08-19

## 2019-08-19 DIAGNOSIS — Z95.1 S/P CABG X 3: ICD-10-CM

## 2019-08-21 ENCOUNTER — AMBULATORY - HEALTHEAST (OUTPATIENT)
Dept: CARDIAC REHAB | Facility: HOSPITAL | Age: 59
End: 2019-08-21

## 2019-08-21 DIAGNOSIS — Z95.1 S/P CABG X 3: ICD-10-CM

## 2020-03-04 ENCOUNTER — COMMUNICATION - HEALTHEAST (OUTPATIENT)
Dept: CARDIOLOGY | Facility: CLINIC | Age: 60
End: 2020-03-04

## 2020-03-11 ENCOUNTER — COMMUNICATION - HEALTHEAST (OUTPATIENT)
Dept: CARDIOLOGY | Facility: CLINIC | Age: 60
End: 2020-03-11

## 2020-03-11 DIAGNOSIS — I20.0 UNSTABLE ANGINA (H): ICD-10-CM

## 2020-05-29 ENCOUNTER — RECORDS - HEALTHEAST (OUTPATIENT)
Dept: ADMINISTRATIVE | Facility: OTHER | Age: 60
End: 2020-05-29

## 2020-06-01 ENCOUNTER — OFFICE VISIT - HEALTHEAST (OUTPATIENT)
Dept: CARDIOLOGY | Facility: CLINIC | Age: 60
End: 2020-06-01

## 2020-06-01 DIAGNOSIS — Z95.1 S/P CABG (CORONARY ARTERY BYPASS GRAFT): ICD-10-CM

## 2020-06-01 DIAGNOSIS — I10 IDIOPATHIC HYPERTENSION: ICD-10-CM

## 2020-06-01 DIAGNOSIS — E78.2 MIXED HYPERLIPIDEMIA: ICD-10-CM

## 2020-06-01 DIAGNOSIS — I25.10 CORONARY ARTERY DISEASE INVOLVING NATIVE CORONARY ARTERY: ICD-10-CM

## 2020-06-08 ENCOUNTER — COMMUNICATION - HEALTHEAST (OUTPATIENT)
Dept: CARDIOLOGY | Facility: CLINIC | Age: 60
End: 2020-06-08

## 2020-06-09 ENCOUNTER — AMBULATORY - HEALTHEAST (OUTPATIENT)
Dept: CARDIOLOGY | Facility: CLINIC | Age: 60
End: 2020-06-09

## 2020-06-09 DIAGNOSIS — E78.2 MIXED HYPERLIPIDEMIA: ICD-10-CM

## 2020-06-09 DIAGNOSIS — Z95.1 S/P CABG (CORONARY ARTERY BYPASS GRAFT): ICD-10-CM

## 2020-06-09 LAB
CHOLEST SERPL-MCNC: 102 MG/DL
FASTING STATUS PATIENT QL REPORTED: YES
HDLC SERPL-MCNC: 34 MG/DL
LDLC SERPL CALC-MCNC: 48 MG/DL
TRIGL SERPL-MCNC: 101 MG/DL

## 2020-09-10 ENCOUNTER — COMMUNICATION - HEALTHEAST (OUTPATIENT)
Dept: CARDIOLOGY | Facility: CLINIC | Age: 60
End: 2020-09-10

## 2020-09-24 ENCOUNTER — COMMUNICATION - HEALTHEAST (OUTPATIENT)
Dept: CARDIOLOGY | Facility: CLINIC | Age: 60
End: 2020-09-24

## 2020-09-24 DIAGNOSIS — I20.0 UNSTABLE ANGINA (H): ICD-10-CM

## 2020-09-28 ENCOUNTER — COMMUNICATION - HEALTHEAST (OUTPATIENT)
Dept: CARDIOLOGY | Facility: CLINIC | Age: 60
End: 2020-09-28

## 2020-11-06 ENCOUNTER — TRANSFERRED RECORDS (OUTPATIENT)
Dept: HEALTH INFORMATION MANAGEMENT | Facility: CLINIC | Age: 60
End: 2020-11-06

## 2020-11-12 ENCOUNTER — TRANSFERRED RECORDS (OUTPATIENT)
Dept: HEALTH INFORMATION MANAGEMENT | Facility: CLINIC | Age: 60
End: 2020-11-12

## 2021-04-13 ENCOUNTER — COMMUNICATION - HEALTHEAST (OUTPATIENT)
Dept: CARDIOLOGY | Facility: CLINIC | Age: 61
End: 2021-04-13

## 2021-04-13 DIAGNOSIS — I20.0 UNSTABLE ANGINA (H): ICD-10-CM

## 2021-05-03 ENCOUNTER — TRANSFERRED RECORDS (OUTPATIENT)
Dept: HEALTH INFORMATION MANAGEMENT | Facility: CLINIC | Age: 61
End: 2021-05-03

## 2021-05-12 ENCOUNTER — RECORDS - HEALTHEAST (OUTPATIENT)
Dept: ADMINISTRATIVE | Facility: OTHER | Age: 61
End: 2021-05-12

## 2021-05-14 ENCOUNTER — OFFICE VISIT - HEALTHEAST (OUTPATIENT)
Dept: CARDIOLOGY | Facility: CLINIC | Age: 61
End: 2021-05-14

## 2021-05-14 DIAGNOSIS — Z95.1 S/P CABG (CORONARY ARTERY BYPASS GRAFT): ICD-10-CM

## 2021-05-14 DIAGNOSIS — I10 IDIOPATHIC HYPERTENSION: ICD-10-CM

## 2021-05-14 DIAGNOSIS — E78.2 MIXED HYPERLIPIDEMIA: ICD-10-CM

## 2021-05-14 ASSESSMENT — MIFFLIN-ST. JEOR: SCORE: 1478.04

## 2021-05-27 VITALS — DIASTOLIC BLOOD PRESSURE: 70 MMHG | SYSTOLIC BLOOD PRESSURE: 135 MMHG

## 2021-05-27 VITALS
HEIGHT: 64 IN | HEART RATE: 53 BPM | BODY MASS INDEX: 28.68 KG/M2 | RESPIRATION RATE: 10 BRPM | WEIGHT: 168 LBS | DIASTOLIC BLOOD PRESSURE: 70 MMHG | SYSTOLIC BLOOD PRESSURE: 128 MMHG

## 2021-05-27 NOTE — CONSULTS
DATE OF SERVICE: 04/11/2019    REQUESTING PHYSICIAN AND REASON FOR CONSULTATION:  Asked to evaluate this patient  for coronary artery bypass grafting by Dr. Sheba Garcia.    HISTORY OF PRESENT ILLNESS:  Mr. Herrera is a 58-year-old gentleman who has  hyperlipidemia and a dilated ascending aorta.  He was seen by Dr. Hernandez in January  for symptoms of chest burning discomfort and dyspnea on exertion.  A nuclear stress  test showed a large area of anterior ischemia with a preserved left ventricular  function.  Subsequent coronary angiography was performed which revealed severe  triple vessel coronary artery disease.    PAST SURGICAL HISTORY:  No surgical procedures.      PAST MEDICAL HISTORY:  1.  Enlarged ascending aortic aneurysm.  2.  Hyperlipidemia.  3.  Coronary artery disease.    ALLERGIES:  NONE KNOWN.    CURRENT MEDICATIONS:  See chart.    FAMILY HISTORY:  Positive for a stroke in the mother.  There is no family history of  premature coronary disease.    SOCIAL HISTORY:  The patient is retired.  He travels broadly.  He is accompanied by  his wife and son who speaks excellent English.  He has never smoked and he does not  ingest alcohol.    REVIEW OF SYSTEMS:  A 10-system review of systems was performed and is negative  other than the above stated.    PHYSICAL EXAMINATION:  APPEARANCE:  Stocky, middle-aged gentleman in no acute distress.    VITAL SIGNS:  Height is 5 feet 4 inches, weight is 80 kg.  VITAL SIGNS:  Temperature afebrile, respiratory rate 16, heart rate 72, blood  pressure 100/60.  SKIN:  No malignant appearing lesions.  LYMPH NODES:  No palpable lymphadenopathy.  HEENT:  Normocephalic.  PERRL.  EOMs intact.  ENT unremarkable.  NECK:  Supple, without carotid bruits.  CHEST:  Without deformity.  LUNGS:  Clear to auscultation.  HEART:  Regular rate and rhythm without murmur, gallops or rubs.  Pulses 2+ and  symmetrical.  ABDOMEN:  Soft, nontender, without palpable organomegaly.  Normoactive bowel  sounds.  GENITOURINARY AND RECTAL:  Deferred.  NEUROLOGIC:  Grossly intact.  BACK:  Without deformity.  EXTREMITIES:  Full range of motion without clubbing, cyanosis or edema.    LABORATORY:  Remarkable for a creatinine of 0.9.    ASSESSMENT AND RECOMMENDATIONS:  This gentleman has an enlarged ascending aorta.  It  measures 4.8 cm in its greatest dimension.  Therefore, I believe he would benefit  from having a segment of his ascending aorta resected and replaced with a Dacron  tube graft.  Additionally, he has 3-vessel coronary artery disease and would benefit  from a coronary artery bypass grafting procedure.  For conduit, we will use the left  internal mammary artery and reversed saphenous vein graft.  He understands that the  risks for these 2 procedures include: bleeding, infection, stroke, sternal  dehiscence, myocardial infarction, arrhythmias, the need for a pacemaker, prolonged  ventilation, pneumonia, liver/renal failure, aortic dissection, and an operative  mortality of 2 to 4%.  He accepts these risks and has been given a tentative  surgical date of 04/23.      Thank you very much for this referral.      ALPHONSO ROB MD  ln  D 04/11/2019 13:15:00  T 04/11/2019 13:38:54  R 04/11/2019 13:38:54  [id number]        cc: ALPHONSO ROB MD

## 2021-05-28 NOTE — PROGRESS NOTES
Spiritual Care Note    Spiritual Assessment:  made a post surgery follow up visit this morning; patient remains intubated and is currently weaning. Patient's son and wife are present. Son shares he is seeing his father for the first time and is hoping that things are going well.  offered reassurance and encouraged family to ask patient's nurse as many questions as he needs to that will provide comfort for him.  notes no immediate concerns.     Care Provided: Listening and support provided.     Plan of Care: Spiritual care will continue to follow as part of patient's care team.    CINDY Menjivar, BCC

## 2021-05-28 NOTE — PROGRESS NOTES
"  RESPIRATORY CARE NOTE   /57   Pulse 92   Temp (!) 101.1  F (38.4  C) (Oral)   Resp 18   Ht 5' 3\" (1.6 m)   Wt 175 lb 14.8 oz (79.8 kg)   SpO2 97%   BMI 31.16 kg/m      BS clear/diminished, gave flutter valve treatment x1. Pt achieved 500 ml on IS. Patient tolerated well. Will continue to monitor.       TONYA Irving      "

## 2021-05-28 NOTE — PROGRESS NOTES
Patient to be discharged at this time. Incisions CDI without signs of infection. He showered on day shift per report. Stable at time of discharge. All paperwork was gone through with the patient and his family member using the language line. They did not state they had any questions and stated understanding of all paperwork. Tramadol supply given to the patient from [facility]. Stable at time of discharge.

## 2021-05-28 NOTE — PROGRESS NOTES
Hospitalist Progress Note    Assessment/Plan    A: Patient is a 59 y/o man who has hypertension and hyperlipidemia. Patient has undergone CABG for coronary artery disease as well as surgical intervention on an ascending aortic aneurysm. Patient has some pain in both feet that appears to be from acute gouty arthropathy.     P:  1.) Coronary artery disease s/p CABG: Patient on aspirin, metoprolol and atorvastatin for secondary prevention.  2.) Ascending aortic aneurysm s/p surgical intervention: Monitoring for additional symptoms.  3.) Hypoxemia, improved: Discussed with Cardiothoracic Surgery. Will stop antibiotics.  4.) Acute gouty arthropathy, great toe of right foot and both ankles: Will stop prednisone. Trial of colchicine. Will hold allopurinol for now.   5.) Hypertension: Controlled on current dose of metoprolol. Monitoring for changes.           Principal Problem:    S/P CABG (coronary artery bypass graft)  Active Problems:    ARF (acute respiratory failure) (H)    Coronary artery disease involving native coronary artery    Ascending aorta dilatation (H)    Anxiety    Hospital-acquired pneumonia    Hypoxemia    Acute idiopathic gout of right foot    Idiopathic hypertension        Subjective  Patient was communicated with through a [facility] .     Patient notes still having pain in his right ankle. Patient also reportedly had pain in his left knee. Patient notes no dyspnea today.    Objective    Vital signs in last 24 hours  Temp:  [97.7  F (36.5  C)-99.4  F (37.4  C)] 97.8  F (36.6  C)  Heart Rate:  [70-81] 72  Resp:  [16-18] 16  BP: (100-117)/(60-71) 104/66 90% O2 Device: None (Room air) O2 Flow Rate (L/min): 2 L/min  Weight:   164 lb 8 oz (74.6 kg) Weight change: -10 lb (-4.536 kg)    Intake/Output last 3 shifts  I/O last 3 completed shifts:  In: 480 [P.O.:480]  Out: 1850 [Urine:1850]  Body mass index is 28.24 kg/m .    Physical Exam    General:     Patient comfortable, NAD.   HEENT:     No scleral  icterus or conjunctival injection.   Heart:    RRR, S1 S2 w/o murmurs.   Lungs:     Breath sounds present. No overt crackles/wheezes heard.   Abdomen:  Extremities:   Soft, nontender.    Left knee appears slightly swollen but there is no erythema,   no warmth and no tenderness to palpation.     Pertinent Labs   Lab Results: personally reviewed.   Results from last 7 days   Lab Units 04/29/19  0614 04/28/19  0547 04/27/19  0637 04/26/19  0600 04/25/19  0944   LN-SODIUM mmol/L 138  --   --  141 139   LN-POTASSIUM mmol/L 3.7 3.5 3.6 3.8 3.7   LN-CHLORIDE mmol/L 101  --   --  101 105   LN-CO2 mmol/L 26  --   --  33* 26   LN-BLOOD UREA NITROGEN mg/dL 29*  --   --  22 21   LN-CREATININE mg/dL 1.06  --   --  1.14 1.23   LN-CALCIUM mg/dL 9.7  --   --  9.3 9.0   LN-MAGNESIUM mg/dL 2.0 2.1 2.1 2.1  --      Results from last 7 days   Lab Units 04/29/19  0614 04/27/19  0637 04/26/19  0600 04/25/19  0944  04/24/19  0410 04/23/19  1238   LN-WHITE BLOOD CELL COUNT thou/uL 12.2*  --  11.7* 11.2*  --  9.9 8.1   LN-HEMOGLOBIN g/dL 9.5*  --  8.3* 8.8*  --  9.0* 11.8*   LN-HEMATOCRIT % 28.2*  --  25.1*  --   --  25.5* 32.2*   LN-PLATELET COUNT thou/uL 252 170 122*  --    < > 108* 98*   LN-NEUTROPHILS RELATIVE PERCENT %  --   --  67  --   --  70 80*   LN-MONOCYTES RELATIVE PERCENT %  --   --  11*  --   --  13* 4    < > = values in this interval not displayed.         Results from last 7 days   Lab Units 04/29/19  0736 04/28/19  2019 04/28/19  1641 04/28/19  1111 04/28/19  0825 04/27/19  2035 04/27/19  1706 04/27/19  1117 04/27/19  0818 04/26/19  2130   LN-POC GLUCOSE FINGERSTICK- HE mg/dL 107 126 118 184* 137 126 131 154* 119 123       Medications  Scheduled Meds:    acetaminophen  650 mg Oral Q6H    Or     acetaminophen  650 mg Rectal Q6H     aspirin  81 mg Oral DAILY     atorvastatin  80 mg Oral QHS     colchicine  0.6 mg Oral BID     docusate sodium  100 mg Oral BID     FLUoxetine  40 mg Oral DAILY     furosemide  40 mg Intravenous  BID - diuretic     heparin (PF)  5,000 Units Subcutaneous Q8H FIXED TIMES     insulin aspart (NovoLOG) injection   Subcutaneous TID with meals     insulin aspart (NovoLOG) injection   Subcutaneous QHS     magnesium hydroxide  30 mL Oral DAILY     melatonin  3 mg Oral QHS     metoprolol tartrate  12.5 mg Oral BID     omeprazole  20 mg Oral QAM AC     piperacillin-tazobactam  3.375 g Intravenous Q8H     polyethylene glycol  17 g Oral DAILY     Continuous Infusions:  PRN Meds:.acetaminophen, aluminum-magnesium hydroxide-simethicone, benzocaine-menthol, bisacodyl, bisacodyl, dextrose 50 % (D50W), glucagon (human recombinant), magnesium hydroxide, naloxone **OR** naloxone, ondansetron, sodium chloride, sodium phosphates 133 mL, traMADol, traZODone

## 2021-05-28 NOTE — PROGRESS NOTES
CVTS Daily Progress Note   4/29/2019   POD#6 s/p CABGx3, ascending aorta replacement   Attending: Prerna   LOS: 6 days     SUBJECTIVE/INTERVAL EVENTS:    No acute events overnight. Gout pain improving. Normotensive. On room air. Ambulating with therapy. Tolerating diet without nausea. +BM. No chest pain. On Zosyn for presumed PNA. UOP adequate. Pain well controlled. Patient has no questions today.     OBJECTIVE:    Temp:  [97.7  F (36.5  C)-99.4  F (37.4  C)] 98.4  F (36.9  C)  Heart Rate:  [70-84] 84  Resp:  [16-18] 16  BP: (100-122)/(60-71) 122/60  Wt Readings from Last 2 Encounters:   04/29/19 0613 164 lb 8 oz (74.6 kg)   04/28/19 0359 174 lb 8 oz (79.2 kg)   04/26/19 0642 173 lb 4.8 oz (78.6 kg)   04/25/19 1643 173 lb 12.8 oz (78.8 kg)   04/25/19 0600 176 lb 4.8 oz (80 kg)   04/24/19 0400 175 lb 14.8 oz (79.8 kg)   04/23/19 0600 172 lb 6.4 oz (78.2 kg)   04/22/19 0951 176 lb 4.8 oz (80 kg)       Current Medications:    Scheduled Meds:    acetaminophen  650 mg Oral Q6H    Or     acetaminophen  650 mg Rectal Q6H     aspirin  81 mg Oral DAILY     atorvastatin  80 mg Oral QHS     colchicine  0.6 mg Oral BID     docusate sodium  100 mg Oral BID     FLUoxetine  40 mg Oral DAILY     furosemide  40 mg Intravenous BID - diuretic     heparin (PF)  5,000 Units Subcutaneous Q8H FIXED TIMES     insulin aspart (NovoLOG) injection   Subcutaneous TID with meals     insulin aspart (NovoLOG) injection   Subcutaneous QHS     magnesium hydroxide  30 mL Oral DAILY     melatonin  3 mg Oral QHS     metoprolol tartrate  12.5 mg Oral BID     omeprazole  20 mg Oral QAM AC     polyethylene glycol  17 g Oral DAILY     Continuous Infusions:    PRN Meds:.acetaminophen, aluminum-magnesium hydroxide-simethicone, benzocaine-menthol, bisacodyl, bisacodyl, dextrose 50 % (D50W), glucagon (human recombinant), magnesium hydroxide, naloxone **OR** naloxone, ondansetron, sodium chloride, sodium phosphates 133 mL, traMADol,  traZODone    Cardiographics:    Telemetry monitoring demonstrates NSR with rates in the 70s per my personal review.    Imaging:    No results found.    Lab Results (most recent, reviewed):    Hemoglobin (g/dL)   Date Value   04/29/2019 9.5 (L)     WBC (thou/uL)   Date Value   04/29/2019 12.2 (H)     Potassium (mmol/L)   Date Value   04/29/2019 3.7     Creatinine (mg/dL)   Date Value   04/29/2019 1.06     Hemoglobin A1c (%)   Date Value   04/25/2019 5.2     Magnesium (mg/dL)   Date Value   04/29/2019 2.0     Calcium (mg/dL)   Date Value   04/29/2019 9.7       I/O:    Intake/Output Summary (Last 24 hours) at 4/29/2019 1123  Last data filed at 4/29/2019 0904  Gross per 24 hour   Intake 560 ml   Output 1700 ml   Net -1140 ml          Physical Exam:    General: Patient seen up in chair. NAD.   CV: RRR on monitor. 2+ peripheral pulses in all extremities. No appreciable edema.   Pulm: Non-labored effort on room air. Incision and previous chest tube sites C/D/I.  Abd: Soft, NT, ND  Ext: Mild pedal edema, SCDs in place, warm, distal pulses intact  Neuro: CNs grossly intact.    ASSESSMENT/PLAN:    Douglas Herrera is a 58 y.o. male with a history of CAD and ascending aortic aneurysm who is POD#6 s/p CABGx3 and ascending aorta replacement.    Principal Problem:    S/P CABG (coronary artery bypass graft)  Active Problems:    ARF (acute respiratory failure) (H)    Coronary artery disease involving native coronary artery    Ascending aorta dilatation (H)    Anxiety    Hospital-acquired pneumonia    Hypoxemia    Acute idiopathic gout of right foot    Idiopathic hypertension        NEURO:   - Scheduled Tylenol and PRN Tramadol for pain  - PTA Prozac  - Melatonin QHS    CV:   - Normotensive  - Metoprolol 12.5mg two times a day  - ASA 81mg  - Atorvastatin 80mg daily    PULM:   - Maintaining oxygen saturations on room air  - Encourage pulmonary toilet     FEN/GI:  - Continue electrolyte replacement protocol  - Diet: Cardiac, ADAT  - Bowel  regimen    RENAL:  - Adequate UOP/hr. Continue to monitor  - Diuresis with 40 mg IV Lasix two times a day     HEME:  - Acute blood loss anemia post-op.   - No bleeding concerns. Hep SQ, [id number]    ID:  - Tejal op ppx complete, afebrile.   - Zosyn for presumed PNA. D/C today    ENDO:   - SSI   - Colchicine for gout    PPx:   - DVT: SCDs, SQ heparin TID  - GI: Omeprazole     DISPO:   - General telemetry floor; discharge to TCU when bed available.          _______  Carrol Saravia PA-C  Cardiothoracic Surgery  [phone]

## 2021-05-28 NOTE — PLAN OF CARE
Problem: Pain  Goal: Patient's pain/discomfort is manageable  Outcome: Progressing     Problem: Safety  Goal: Patient will be injury free during hospitalization  Outcome: Progressing     Problem: Daily Care  Goal: Daily care needs are met  Outcome: Progressing     Problem: Psychosocial Needs  Goal: Demonstrates ability to cope with hospitalization/illness  Outcome: Progressing  Goal: Collaborate with patient/family/caregiver to identify patient specific goals for this hospitalization  Outcome: Progressing     Problem: Discharge Barriers  Goal: Patient's discharge needs are met  Outcome: Progressing     Problem: Inadequate Airway Clearance  Goal: Patient will maintain patent airway  Outcome: Progressing     Problem: Knowledge Deficit  Goal: Patient/family/caregiver demonstrates understanding of disease process, treatment plan, medications, and discharge instructions  Outcome: Progressing     Problem: Potential for Falls  Goal: Patient will remain free of falls  Outcome: Progressing     Problem: Risk of Injury Due to Unsafe Behavior  Goal: Patient will remain safe while in restraint; physical/psychological needs will be met  Outcome: Progressing  Goal: Alternatives to restraint will be continually assessed with use of least restrictive device and discontinuation as soon as possible  Outcome: Progressing  Goal: Patient/Family will be able to communicate reason for restraint and steps for restraint application and removal  Outcome: Progressing     Problem: Potential for Compromised Skin Integrity  Goal: Skin integrity is maintained or improved  Outcome: Progressing  Goal: Nutritional status is improving  Outcome: Progressing     Problem: Urinary Incontinence  Goal: Perineal skin integrity is maintained or improved  Outcome: Progressing     Problem: Urinary tract infection  Goal: Increased Knowledge  Outcome: Progressing  Goal: Improve urinary elimination patterns as evidenced by reduction in frequency, urgency,  burning, and hesitancy  Outcome: Progressing  Goal: Prevention of catheter associated urinary tract infection (CAUTI)  Outcome: Progressing     Problem: Glucose Imbalance  Goal: Achieve optimal glucose control  Outcome: Progressing     Problem: POD 3  Goal: Patient/Family/Caregiver demonstrates understanding of incisional care and daily weights  Outcome: Progressing  Goal: Continued pain/discomfort management  Outcome: Progressing  Goal: POD 3 Mobility/activity advancement  Outcome: Progressing  Goal: Patients nutritional intake continues to be adequate  Outcome: Progressing  Goal: POD 3 Patient will understand meds  Outcome: Progressing     Problem: POD 4  Goal: Patient/Family/Caregiver demonstrates understanding of discharge plan of care  Outcome: Progressing  Goal: Discharge pain/discomfort management  Outcome: Progressing  Goal: Post discharge mobility/activity  Outcome: Progressing  Goal: Patients nutritional intake is adequate upon discharge  Outcome: Progressing  Goal: Patient will understand discharge meds  Outcome: Progressing      Patient Name: Douglas Herrera   MRN: [id number]   Date of Admission: 4/23/2019    Procedure: CORONARY ARTERY BYPASS x 3, LEFT LEG ENDOSCOPIC VEIN HARVEST, LEFT INTERNAL MAMMARY, EPIAORTIC ULTRASOUND, ANESTHESIA TRANSESOPHAGEAL ECHOCARDIOGRAM, WITH ASCENDING AORTA REPLACEMENT    Post Op day #:3    Subjective (Patient focus/Primary Problem for shift): pain management, right ankle gout flare up          Pain Goal 3 Pain Rating 4           Pain Medication/ Regime effective to reduce patient pain Scheduled tylenol, ice, and lavender oil    Objective (Physical assessment):           Rhythm: normal sinus rhythm            Bowel Activity: yes if Yes indicate when: 4/26/19          Bowel Medications: yes            Incision: healing well          Incentive Spirometry Q 1-2 hour when awake:  yes Volume: 750          Epicardial Pacing Wires:  no            Patient Activity:           Up to chair  for meals: yes          Ambulation with RN x2 (Not including CR): no            Is patient in home clothes:no             Chest Tubes   Pleural: no Draining: not applicable               Suction: not applicable              Mediastinal: not applicable Draining: not applicable               Suction: no   Dressing Change Daily:no If No, why? Tubes pulled this morning, dressing will be changed tomorrow.                      Urinary Catheter: no           Preventative WOC consult (need MD order): no       Assessment (Nursing primary shift focus): Pt weaned off O2 successfully. He is sating at 92-93% on room air. Does get [place]. Pt also has quite a bit of ankle pain in the right ankle due to a gout flare-up. This is inhibiting his ability to ambulate, although he has been up to BR frequently today with loose stools.     Plan (Patient Care Plan/focus): Continue to encourage IS use, continue CABg education, continue to encourage activity as he is able to tolerate.       Patti Quach   4/26/2019   6:56 PM

## 2021-05-28 NOTE — PROGRESS NOTES
Pt insturucted through  on Resp Therapys role in recovery. IS, heart pillow, aerobika, BIPAP.  All questions answered

## 2021-05-28 NOTE — PROGRESS NOTES
CVTS Daily Progress Note   4/25/2019   POD#2 s/p CABGx3, ascending aorta replacement   Attending: Prerna   LOS: 2 days     SUBJECTIVE/INTERVAL EVENTS:    No acute events overnight. Patient extubated and weaned from pressors yesterday. Febrile yesterday and started on Zosyn for presumed PNA. Maintaining oxygen saturations on 2L NC. Tolerating diet without nausea. -BM/flatus. UOP adequate. Chest tube output appropriate. Pain well controlled. Patient has no questions today.     OBJECTIVE:    Temp:  [99.9  F (37.7  C)-101.7  F (38.7  C)] 99.9  F (37.7  C)  Heart Rate:  [] 90  Resp:  [18-30] 18  BP: ()/(55-72) 109/62  Arterial Line BP: ()/(47-59) 108/51  FiO2 (%):  [30 %] 30 %  Wt Readings from Last 2 Encounters:   04/25/19 0600 176 lb 4.8 oz (80 kg)   04/24/19 0400 175 lb 14.8 oz (79.8 kg)   04/23/19 0600 172 lb 6.4 oz (78.2 kg)   04/22/19 0951 176 lb 4.8 oz (80 kg)       Current Medications:    Scheduled Meds:    acetaminophen  650 mg Oral Q6H    Or     acetaminophen  650 mg Rectal Q6H     aspirin  81 mg Oral DAILY     atorvastatin  80 mg Oral QHS     [START ON 4/26/2019] bisacodyl  10 mg Rectal Once     docusate sodium  100 mg Oral BID     FLUoxetine  40 mg Oral DAILY     furosemide  20 mg Intravenous TID     heparin (PF)  5,000 Units Subcutaneous Q8H FIXED TIMES     insulin aspart (NovoLOG) injection   Subcutaneous TID with meals     insulin aspart (NovoLOG) injection   Subcutaneous QHS     magnesium hydroxide  30 mL Oral DAILY     melatonin  3 mg Oral QHS     metoprolol tartrate  12.5 mg Oral BID     omeprazole  20 mg Oral QAM AC     piperacillin-tazobactam  3.375 g Intravenous Q8H     polyethylene glycol  17 g Oral DAILY     Continuous Infusions:    DOPamine       epinephrine       niCARdipine Stopped (04/23/19 1736)     norepinephrine IV infusion in D5W       phenylephrine IV infusion in NS 15 mcg/min (04/24/19 1013)     sodium chloride 0.9% 50 mL/hr (04/23/19 1300)     sodium chloride 0.9% 25  mL/hr (04/23/19 1300)     vasopressin       PRN Meds:.acetaminophen, acetaminophen, albumin human, aluminum-magnesium hydroxide-simethicone, bisacodyl, [START ON 4/26/2019] bisacodyl, dextrose 50 % (D50W), DOPamine, epinephrine, glucagon (human recombinant), HYDROmorphone, magnesium hydroxide, naloxone **OR** naloxone, niCARdipine, norepinephrine IV infusion in D5W, ondansetron, oxyCODONE, sodium chloride, sodium phosphates 133 mL, traZODone, vasopressin    Cardiographics:    Telemetry monitoring demonstrates NSR with rates in the 90s per my personal review.    Imaging:    Xr Chest 1 View Portable    Result Date: 4/25/2019  EXAM: XR CHEST 1 VIEW PORTABLE LOCATION: [facility] DATE/TIME: 4/25/2019 12:20 AM INDICATION: shortness of breath COMPARISON: 04/24/2019 FINDINGS: ET tube and central line removed. Other tubes unchanged. Remainder stable.]      Lab Results (most recent, reviewed):    Hemoglobin (g/dL)   Date Value   04/24/2019 9.0 (L)     WBC (thou/uL)   Date Value   04/24/2019 9.9     Potassium (mmol/L)   Date Value   04/25/2019 4.0     Creatinine (mg/dL)   Date Value   04/24/2019 1.11     No results found for: HGBA1C  Magnesium (mg/dL)   Date Value   04/25/2019 2.2     Calcium (mg/dL)   Date Value   04/24/2019 8.1 (L)       I/O:    Intake/Output Summary (Last 24 hours) at 4/25/2019 0802  Last data filed at 4/25/2019 0700  Gross per 24 hour   Intake 835 ml   Output 1460 ml   Net -625 ml        UOP: 1.1L    CT: 400cc    Physical Exam:    General: Patient seen uo in chair. NAD.   CV: RRR on monitor. 2+ peripheral pulses in all extremities. Mild edema.   Pulm: Non-labored effort on nasal cannula. Chest tubes in place, serosanguinous output, no air leak. Incision  C/D/I.  Abd: Soft, NT, ND  : Rico with jorge urine  Ext: Mild pedal edema, SCDs in place, warm, distal pulses intact  Neuro: CNs grossly intact.    ASSESSMENT/PLAN:    Douglas Herrera is a 58 y.o. male with a history of CAD and ascending aortic  aneurysm who is POD#2 s/p CABGx3 and ascending aorta replacement.    Principal Problem:    S/P CABG (coronary artery bypass graft)  Active Problems:    ARF (acute respiratory failure) (H)    Coronary artery disease involving native coronary artery    Ascending aorta dilatation (H)    Anxiety    Hospital-acquired pneumonia        NEURO:   - Scheduled Tylenol and PRN oxycodone/dilaudid for pain  - Melatonin QHS    CV:   - Normotensive  - Metoprolol 12.5mg two times a day to begin today  - ASA 81mg  - Atorvastatin 80mg daily  - Chest tubes to remain today    PULM:   - Maintaining oxygen saturations on room air  - Encourage pulmonary toilet     FEN/GI:  - Continue electrolyte replacement protocol  - Diet: Cardiac, ADAT  - Bowel regimen    RENAL:  - Adequate UOP/hr. Continue to monitor closely via cruz.   - D/C Cruz  - Diuresis with 20 mg IV Lasix three times a day     HEME:  - Acute blood loss anemia post-op.   - No bleeding concerns. Hep SQ, [id number]    ID:  - Tejal op ppx complete, afebrile.   - Zosyn started yesterday for presumed PNA. Procalcitonin pending today. Cultures pending.     ENDO:   - SSI    PPx:   - DVT: SCDs, SQ heparin TID  - GI: Omeprazole     DISPO:   - Transfer to general telemetry floor.      Patient discussed with staff.      _______  Carrol Saravia PA-C  Cardiothoracic Surgery  [phone]

## 2021-05-28 NOTE — PLAN OF CARE
Problem: Inadequate Airway Clearance  Goal: Patient will maintain patent airway  Outcome: Progressing     Problem: Mechanical Ventilation  Goal: Patient will maintain patent airway  Outcome: Progressing  Goal: Respiratory status - ventilation  Description  Movement of air in and out of the lungs.    Liberate from ventilator  Outcome: Progressing  Goal: ET tube will be managed safely  Outcome: Progressing     TONYA Irving

## 2021-05-28 NOTE — ANESTHESIA PROCEDURE NOTES
DESTINY    Patient location during procedure: OR  Start time: 4/23/2019 7:40 AM  Staffing:  Performing  Anesthesiologist: Avani Borges MD  DESTINY:  Type/Reason: Diagnostic DESTINY probe placement only  Technique: blind insertion  Difficulty: easy

## 2021-05-28 NOTE — PROGRESS NOTES
Patient arrived to ICU from OR and placed on vent without incident.  Vent Mode: VCV  FiO2 (%):  [100 %] 100 %  S RR:  [16] 16  S VT:  [450 mL] 450 mL  PEEP/CPAP (cm H2O):  [5 cm H2O] 5 cm H2O  Minute Ventilation (L/min):  [8.3 L/min] 8.3 L/min  PIP:  [31 cm H2O] 31 cm H2O  MAP (cm H2O):  [8] 8

## 2021-05-28 NOTE — PLAN OF CARE
Patient Name: Douglas Herrera   MRN: [id number]   Date of Admission: 4/23/2019    Procedure: CORONARY ARTERY BYPASS x 3, LEFT LEG ENDOSCOPIC VEIN HARVEST, LEFT INTERNAL MAMMARY, EPIAORTIC ULTRASOUND, ANESTHESIA TRANSESOPHAGEAL ECHOCARDIOGRAM, WITH ASCENDING AORTA REPLACEMENT    Post Op day #:5    Subjective (Patient focus/Primary Problem for shift): pain           Pain Goal 0 Pain Rating  3          Pain Medication/ Regime effective to reduce patient pain - scheduled tylenol- c/o R ankle pain d/to gout- affecting mobility. Started colchicine today.    Objective (Physical assessment):           Rhythm: normal sinus rhythm            Bowel Activity: yes if Yes indicate when: 4/26/19          Bowel Medications: yes            Incision: healing well          Incentive Spirometry Q 1-2 hour when awake:  yes Volume: 750          Epicardial Pacing Wires:  no            Patient Activity:           Up to chair for meals: yes          Ambulation with RN x2 (Not including CR): yes            Is patient in home clothes:no             Chest Tubes   Pleural: no Draining: no               Suction: no              Mediastinal: no Draining: no               Suction: no   Dressing Change Daily:no If No, why?ROLY                     Urinary Catheter: no           Preventative WOC consult (need MD order): no       Assessment (Nursing primary shift focus):  Pain management, pulmonary toileting, and increase activity.    Plan (Patient Care Plan/focus): Continue IS use. Continue scheduled and prn medications. Continue encouraging activity.      Carol De   4/28/2019   6:18 PM

## 2021-05-28 NOTE — PROGRESS NOTES
CVTS Daily Progress Note   4/24/2019   POD#1 s/p CABGx3, ascending aorta replacement   Attending: Prerna   LOS: 1 day     SUBJECTIVE/INTERVAL EVENTS:    Patient arrived to ICU from OR yesterday afternoon. He remains intubated on minimal settings; has failed pressure support trials due to tachypnea. Remains on small amount matthew. CI 2.7 Moves all extremities. NAD. UOP adequate. Chest tube output appropriate. Hgb 9.0. Cr 1.11.    OBJECTIVE:    Temp:  [96.1  F (35.6  C)-102.6  F (39.2  C)] 101.1  F (38.4  C)  Heart Rate:  [63-95] 67  Resp:  [16-24] 21  BP: ()/(50-68) 100/57  Arterial Line BP: ()/(45-60) 111/50  FiO2 (%):  [30 %-100 %] 30 %  Wt Readings from Last 2 Encounters:   04/24/19 0400 175 lb 14.8 oz (79.8 kg)   04/23/19 0600 172 lb 6.4 oz (78.2 kg)   04/22/19 0951 176 lb 4.8 oz (80 kg)       Current Medications:    Scheduled Meds:    acetaminophen  650 mg Oral Q6H    Or     acetaminophen  650 mg Rectal Q6H     aspirin  81 mg Oral DAILY     atorvastatin  80 mg Oral QHS     bisacodyl  10 mg Oral Once     [START ON 4/26/2019] bisacodyl  10 mg Rectal Once     chlorhexidine  15 mL Topical Q12H     docusate sodium  100 mg Oral BID     FLUoxetine  40 mg Oral DAILY     heparin (PF)  5,000 Units Subcutaneous Q8H FIXED TIMES     [START ON 4/25/2019] magnesium hydroxide  30 mL Oral DAILY     melatonin  3 mg Oral QHS     omeprazole  20 mg Oral QAM AC     polyethylene glycol  17 g Oral DAILY     Continuous Infusions:    dexmedetomidine 400 mcg/100 mL in NS (PRECEDEX) (4mcg/mL) Stopped (04/23/19 1506)     DOPamine       epinephrine       insulin infusion (1 unit/mL) Stopped (04/23/19 1640)     niCARdipine Stopped (04/23/19 1736)     norepinephrine IV infusion in D5W       phenylephrine IV infusion in NS 15 mcg/min (04/24/19 0750)     sodium chloride 0.9% 50 mL/hr (04/23/19 1300)     sodium chloride 0.9% 25 mL/hr (04/23/19 1300)     vasopressin       PRN Meds:.acetaminophen, acetaminophen, albumin human,  aluminum-magnesium hydroxide-simethicone, [START ON 4/25/2019] bisacodyl, [START ON 4/26/2019] bisacodyl, dexmedetomidine 400 mcg/100 mL in NS (PRECEDEX) (4mcg/mL), dextrose 50 % (D50W), DOPamine, epinephrine, glucagon (human recombinant), HYDROmorphone, [START ON 4/25/2019] magnesium hydroxide, naloxone **OR** naloxone, niCARdipine, norepinephrine IV infusion in D5W, ondansetron, oxyCODONE, sodium chloride, sodium phosphates 133 mL, traZODone, vasopressin    Cardiographics:    Telemetry monitoring demonstrates NSR with rates in the 60s per my personal review.    Imaging:    Xr Chest 1 View Portable    Result Date: 4/24/2019  EXAM: XR CHEST 1 VIEW PORTABLE LOCATION: [facility] DATE/TIME: 04/24/2019, 5:22 AM INDICATION: ICU patient, stable with no clinical status changes, postop cardiac surgery. COMPARISON: 04/23/2019, 04/22/2019 and 03/12/2019. FINDINGS: Sternotomy. Mediastinal clips. Mediastinal drain and left chest tube. Endotracheal tube tip in good position. Right IJ [place]-B[place]i catheter tip main right pulmonary artery. Epicardial pacer leads. Minimal atelectasis basilar segments left lower lobe. Heart size stable. Chest otherwise negative.     Xr Chest 1 View Portable    Result Date: 4/23/2019  EXAM: XR CHEST 1 VIEW PORTABLE LOCATION: [facility] DATE/TIME: 4/23/2019 1:28 PM INDICATION: ICU pt, stable with no clinical status changes post op cardiac surgery. COMPARISON: 04/22/2019. FINDINGS: Interval sternotomy. Mediastinal clips. Mediastinal drain and left chest tube. Endotracheal tube tip in good position. Right IJ [place]-Bambi catheter tip main right pulmonary artery. Epicardial pacer leads. Minimal atelectasis basilar segments left lower lobe. Chest otherwise negative.      Lab Results (most recent, reviewed):    Hemoglobin (g/dL)   Date Value   04/24/2019 9.0 (L)     WBC (thou/uL)   Date Value   04/24/2019 9.9     Potassium (mmol/L)   Date Value   04/24/2019 4.0     Creatinine (mg/dL)   Date  Value   04/24/2019 1.11     No results found for: HGBA1C  Magnesium (mg/dL)   Date Value   04/24/2019 2.6     Calcium (mg/dL)   Date Value   04/24/2019 8.1 (L)       I/O:    Intake/Output Summary (Last 24 hours) at 4/24/2019 0900  Last data filed at 4/24/2019 0700  Gross per 24 hour   Intake 5497.9 ml   Output 5796 ml   Net -298.1 ml        UOP: 4.7L    CT: 610cc    Physical Exam:    General: Patient seen in bed. Intubated/sedated, NAD. Moves all extremities and follows commands.  CV: RRR on monitor. 2+ peripheral pulses in all extremities. Mild edema.   Pulm: Non-labored effort on CMV. Chest tubes in place, serosanguinous output, no air leak. Incision  C/D/I.  Abd: Soft, NT, ND  : Cruz with jorge urine  Ext: Mild pedal edema, SCDs in place, warm, distal pulses intact  Neuro: CNs grossly intact.    ASSESSMENT/PLAN:    Douglas Herrera is a 58 y.o. male with a history of CAD and ascending aortic aneurysm who is POD#1 s/p CABGx3 and ascending aorta replacement.    Principal Problem:    S/P CABG (coronary artery bypass graft)  Active Problems:    ARF (acute respiratory failure) (H)    Coronary artery disease involving native coronary artery    Ascending aorta dilatation (H)        NEURO:   - Scheduled Tylenol and PRN oxycodone/dilaudid for pain  - Add Precedex to assist with extubation  - Melatonin QHS    CV:   - Normotensive on matthew  - ASA 81mg  - Atorvastatin 80mg daily  - Chest tubes to remain today  - Beta blocker later if weaned from pressors    PULM:   - Vent weaning as able, hopeful to extubate later  - Maintaining oxygen saturations on CMV  - Encourage pulmonary toilet when extubated    FEN/GI:  - NPO, MIVF while intubated  - Continue electrolyte replacement protocol  - Diet: NPO while intubated  - Bowel regimen    RENAL:  - Adequate UOP/hr. Continue to monitor closely via cruz.   - Cr 1.11  - Cruz to remain in for close monitoring of I/O and during period of diuresis/relative immobility.   - Diuresis pending  weaning from pressors.    HEME:  - Acute blood loss anemia post-op.   - Hgb stable, no bleeding concerns. Hep SQ, [id number]    ID:  - Tejal op ppx complete, afebrile. No concerns for infection    ENDO:   - Transition to SSI    PPx:   - DVT: SCDs, SQ heparin TID  - GI: Omeprazole     DISPO:   - Continue critical care in ICU      Patient discussed with staff.      _______  Carrol Saravia PA-C  Cardiothoracic Surgery  [phone]

## 2021-05-28 NOTE — PLAN OF CARE
Problem: Pain  Goal: Patient's pain/discomfort is manageable  4/27/2019 1712 by Dia Hendricks RN  Outcome: Progressing  4/27/2019 1249 by Dia Hendricks RN  Outcome: Progressing     Problem: Safety  Goal: Patient will be injury free during hospitalization  4/27/2019 1712 by Dia Hendricks RN  Outcome: Progressing  4/27/2019 1249 by Dia Hendricks RN  Outcome: Progressing     Problem: Daily Care  Goal: Daily care needs are met  4/27/2019 1712 by Dia Hendricks RN  Outcome: Progressing  4/27/2019 1249 by Dia Hendricks RN  Outcome: Progressing     Problem: Psychosocial Needs  Goal: Demonstrates ability to cope with hospitalization/illness  4/27/2019 1712 by Dia Hendricks RN  Outcome: Progressing  4/27/2019 1249 by Dia Hendricks RN  Outcome: Progressing  Goal: Collaborate with patient/family/caregiver to identify patient specific goals for this hospitalization  4/27/2019 1712 by Dia Hendricks RN  Outcome: Progressing  4/27/2019 1249 by Dia Hendricks RN  Outcome: Progressing     Problem: Discharge Barriers  Goal: Patient's discharge needs are met  4/27/2019 1712 by Dia Hendricks RN  Outcome: Progressing  4/27/2019 1249 by Dia Hendricks RN  Outcome: Progressing     Problem: Inadequate Airway Clearance  Goal: Patient will maintain patent airway  4/27/2019 1712 by Dia Hendricks RN  Outcome: Progressing  4/27/2019 1249 by Dia Hendricks RN  Outcome: Progressing     Problem: Knowledge Deficit  Goal: Patient/family/caregiver demonstrates understanding of disease process, treatment plan, medications, and discharge instructions  4/27/2019 1712 by Dia Hendricks RN  Outcome: Progressing  4/27/2019 1249 by Dia Hendricks RN  Outcome: Progressing     Problem: Potential for Falls  Goal: Patient will remain free of falls  4/27/2019 1712 by Dia Hendricks RN  Outcome: Progressing  4/27/2019 1249 by Dia Hendricks  RN  Outcome: Progressing     Problem: Risk of Injury Due to Unsafe Behavior  Goal: Patient will remain safe while in restraint; physical/psychological needs will be met  4/27/2019 1712 by Dia Hendricks RN  Outcome: Progressing  4/27/2019 1249 by Dia Hendricks RN  Outcome: Progressing  Goal: Alternatives to restraint will be continually assessed with use of least restrictive device and discontinuation as soon as possible  4/27/2019 1712 by Dia Hendricks RN  Outcome: Progressing  4/27/2019 1249 by Dia Hendricks RN  Outcome: Progressing  Goal: Patient/Family will be able to communicate reason for restraint and steps for restraint application and removal  4/27/2019 1712 by Dia Hendricks RN  Outcome: Progressing  4/27/2019 1249 by Dia Hendricks RN  Outcome: Progressing     Problem: Potential for Compromised Skin Integrity  Goal: Skin integrity is maintained or improved  4/27/2019 1712 by Dia Hendricks RN  Outcome: Progressing  4/27/2019 1249 by Dia Hendricks RN  Outcome: Progressing  Goal: Nutritional status is improving  4/27/2019 1712 by Dia Hendricks RN  Outcome: Progressing  4/27/2019 1249 by Dia Hendricks RN  Outcome: Progressing     Problem: Urinary Incontinence  Goal: Perineal skin integrity is maintained or improved  4/27/2019 1712 by Dia Hendricks RN  Outcome: Progressing  4/27/2019 1249 by Dia Hendricks RN  Outcome: Progressing     Problem: Urinary tract infection  Goal: Increased Knowledge  4/27/2019 1712 by Dia Hendricks RN  Outcome: Progressing  4/27/2019 1249 by Dia Hendricks RN  Outcome: Progressing  Goal: Improve urinary elimination patterns as evidenced by reduction in frequency, urgency, burning, and hesitancy  4/27/2019 1712 by Dia Hendricks RN  Outcome: Progressing  4/27/2019 1249 by Dia Hendricks RN  Outcome: Progressing  Goal: Prevention of catheter associated urinary tract infection  (CAUTI)  4/27/2019 1712 by Dia Hendricks RN  Outcome: Progressing  4/27/2019 1249 by Dia Hendricks RN  Outcome: Progressing     Problem: Glucose Imbalance  Goal: Achieve optimal glucose control  4/27/2019 1712 by Dia Hendricks RN  Outcome: Progressing  4/27/2019 1249 by Dia Hendricks RN  Outcome: Progressing     Problem: POD 3  Goal: Patient/Family/Caregiver demonstrates understanding of incisional care and daily weights  4/27/2019 1712 by Dia Hendricks RN  Outcome: Progressing  4/27/2019 1249 by Dia Hendricks RN  Outcome: Progressing  Goal: Continued pain/discomfort management  4/27/2019 1712 by Dia Hendricks RN  Outcome: Progressing  4/27/2019 1249 by Dia Hendricks RN  Outcome: Progressing  Goal: POD 3 Mobility/activity advancement  4/27/2019 1712 by Dia Hendricks RN  Outcome: Progressing  4/27/2019 1249 by Dia Hendricks RN  Outcome: Progressing  Goal: Patients nutritional intake continues to be adequate  4/27/2019 1712 by Dia Hendricks RN  Outcome: Progressing  4/27/2019 1249 by Dia Hendricks RN  Outcome: Progressing  Goal: POD 3 Patient will understand meds  4/27/2019 1712 by Dia Hendricks RN  Outcome: Progressing  4/27/2019 1249 by Dia Hendricks RN  Outcome: Progressing     Problem: POD 4  Goal: Patient/Family/Caregiver demonstrates understanding of discharge plan of care  4/27/2019 1712 by Dia Hendricks RN  Outcome: Progressing  4/27/2019 1249 by Dia Hendricks RN  Outcome: Progressing  Goal: Discharge pain/discomfort management  4/27/2019 1712 by Dia Hendricks RN  Outcome: Progressing  4/27/2019 1249 by Dia Hendricks RN  Outcome: Progressing  Goal: Post discharge mobility/activity  4/27/2019 1712 by Dia Hendricks RN  Outcome: Progressing  4/27/2019 1249 by Dia Hendricks, RN  Outcome: Progressing  Goal: Patients nutritional intake is adequate upon discharge  4/27/2019 4847  by Dia Hendricks RN  Outcome: Progressing  4/27/2019 1249 by Dia Hendricks RN  Outcome: Progressing  Goal: Patient will understand discharge meds  4/27/2019 1712 by Dia Hendricks RN  Outcome: Progressing  4/27/2019 1249 by Dia Hendricks RN  Outcome: Progressing

## 2021-05-28 NOTE — ANESTHESIA CARE TRANSFER NOTE
TX 5140 on monitor with ambu report to RN attached to vent.     Last vitals:   Vitals:    04/23/19 1245   BP: 110/68   Pulse: 80   Resp: 16   Temp:    SpO2: 100%     Patient's level of consciousness is unresponsive  Spontaneous respirations: no: vented  Maintains airway independently: no: vented  Dentition unchanged: yes  Oropharynx: oropharynx clear of all foreign objects    QCDR Measures:  ASA# 20 - Surgical Safety Checklist: WHO surgical safety checklist completed prior to induction    PQRS# 430 - Adult PONV Prevention: NA - Not adult patient, not GA or 3 or more risk factors NOT present  ASA# 8 - Peds PONV Prevention: NA - Not pediatric patient, not GA or 2 or more risk factors NOT present  PQRS# 424 - Tejal-op Temp Management: 4559F - At least one body temp DOCUMENTED => 35.5C or 95.9F within required timeframe  PQRS# 426 - PACU Transfer Protocol: - Transfer of care checklist used  ASA# 14 - Acute Post-op Pain: ASA14B - Patient did NOT experience pain >= 7 out of 10

## 2021-05-28 NOTE — PROGRESS NOTES
Patient extubated at 1126, placed on 3 lpm nasal cannula. BBS clear. No complications at this time.   Will monitor.    SAMUEL ValenzuelaT

## 2021-05-28 NOTE — PROGRESS NOTES
04/23/19 1540   Weaning Assessment    Wean Start Time  1536   Wean End Time 1540   Vent Wean Time Calculation (min) 4 min   Total RSBI 222   RSBI <105 N     1606: First wean attempted, patient started breathing in the high 30's and taking shallow breaths (100-200mL's). RSBI increased to 222. Will attempt another wean shortly.    Update at 1710: Patient just weaned for the second time for 11 mins. Wean ended again for increased RSBI (250), shallow breaths and high rate. NIF= -6. Tried coaching patient through the wean without success.    RT following.    SAMUEL BarrosT

## 2021-05-28 NOTE — PROGRESS NOTES
Hospitalist Progress Note    Assessment/Plan    A: Patient is a 57 y/o man who has hypertension and hyperlipidemia. Patient has undergone CABG for coronary artery disease as well as surgical intervention on an ascending aortic aneurysm. Patient has some pain in both feet that appears to be from acute gouty arthropathy.    P:  1.) Coronary artery disease s/p CABG: Patient on aspirin, metoprolol and atorvastatin for secondary prevention.  2.) Ascending aortic aneurysm s/p surgical intervention: Monitoring for additional symptoms.  3.) Hypoxemia: Patient on zosyn empirically for possible pnuemonia.  4.) Acute gouty arthropathy, great toe of right foot and both ankles: Will stop prednisone. Trial of colchicine. Will hold allopurinol for now.   5.) Hypertension: Controlled on current dose of metoprolol. Monitoring for changes.       Principal Problem:    S/P CABG (coronary artery bypass graft)  Active Problems:    ARF (acute respiratory failure) (H)    Coronary artery disease involving native coronary artery    Ascending aorta dilatation (H)    Anxiety    Hospital-acquired pneumonia      Subjective  Patient notes pain in great toe of right foot and pain in both ankles. Patient notes no other problems at this point in time,    Objective    Vital signs in last 24 hours  Temp:  [97.8  F (36.6  C)-99.5  F (37.5  C)] 97.8  F (36.6  C)  Heart Rate:  [68-78] 73  Resp:  [16-24] 16  BP: ()/(55-63) 105/63 93% O2 Device: None (Room air) O2 Flow Rate (L/min): 2 L/min  Weight:   174 lb 8 oz (79.2 kg) Weight change:     Intake/Output last 3 shifts  I/O last 3 completed shifts:  In: 560 [P.O.:560]  Out: 2025 [Urine:2025]  Body mass index is 29.95 kg/m .    Physical Exam    General:     Patient comfortable, NAD.   HEENT:     No scleral icterus or conjunctival injection.   Heart:    RRR, S1 S2 w/o murmurs.   Lungs:     Breath sounds present. No crackles/wheezes heard.   Abdomen:  Extremities:   Soft, nontender.    Slight erythema  and warmth at base of great toe of right foot.     Discomfort with palpation of both ankles.     Pertinent Labs   Lab Results: personally reviewed.   Results from last 7 days   Lab Units 04/28/19  0547 04/27/19  0637 04/26/19 0600 04/25/19  0944  04/24/19  0410   LN-SODIUM mmol/L  --   --  141 139  --  145   LN-POTASSIUM mmol/L 3.5 3.6 3.8 3.7   < > 4.0   LN-CHLORIDE mmol/L  --   --  101 105  --  112*   LN-CO2 mmol/L  --   --  33* 26  --  23   LN-BLOOD UREA NITROGEN mg/dL  --   --  22 21  --  18   LN-CREATININE mg/dL  --   --  1.14 1.23  --  1.11   LN-CALCIUM mg/dL  --   --  9.3 9.0  --  8.1*   LN-MAGNESIUM mg/dL 2.1 2.1 2.1  --    < > 2.6    < > = values in this interval not displayed.     Results from last 7 days   Lab Units 04/27/19  0637 04/26/19  0600 04/25/19  0944 04/25/19  0423 04/24/19  0410 04/23/19  1238   LN-WHITE BLOOD CELL COUNT thou/uL  --  11.7* 11.2*  --  9.9 8.1   LN-HEMOGLOBIN g/dL  --  8.3* 8.8*  --  9.0* 11.8*   LN-HEMATOCRIT %  --  25.1*  --   --  25.5* 32.2*   LN-PLATELET COUNT thou/uL 170 122*  --  109* 108* 98*   LN-NEUTROPHILS RELATIVE PERCENT %  --  67  --   --  70 80*   LN-MONOCYTES RELATIVE PERCENT %  --  11*  --   --  13* 4         Results from last 7 days   Lab Units 04/28/19  0825 04/27/19 2035 04/27/19  1706 04/27/19  1117 04/27/19  0818 04/26/19  2130 04/26/19  1653 04/26/19  1209 04/26/19  0800 04/25/19 2057   LN-POC GLUCOSE FINGERSTICK- HE mg/dL 137 126 131 154* 119 123 106 167* 136 124       Medications  Scheduled Meds:    acetaminophen  650 mg Oral Q6H    Or     acetaminophen  650 mg Rectal Q6H     aspirin  81 mg Oral DAILY     atorvastatin  80 mg Oral QHS     bisacodyl  10 mg Rectal Once     colchicine  0.6 mg Oral Once     [START ON 4/29/2019] colchicine  0.6 mg Oral BID     colchicine  1.2 mg Oral Once     docusate sodium  100 mg Oral BID     FLUoxetine  40 mg Oral DAILY     furosemide  40 mg Intravenous BID - diuretic     heparin (PF)  5,000 Units Subcutaneous Q8H FIXED  TIMES     insulin aspart (NovoLOG) injection   Subcutaneous TID with meals     insulin aspart (NovoLOG) injection   Subcutaneous QHS     magnesium hydroxide  30 mL Oral DAILY     melatonin  3 mg Oral QHS     metoprolol tartrate  12.5 mg Oral BID     omeprazole  20 mg Oral QAM AC     piperacillin-tazobactam  3.375 g Intravenous Q8H     polyethylene glycol  17 g Oral DAILY     potassium chloride ER  30 mEq Oral Q4H     Continuous Infusions:  PRN Meds:.acetaminophen, aluminum-magnesium hydroxide-simethicone, benzocaine-menthol, bisacodyl, bisacodyl, dextrose 50 % (D50W), glucagon (human recombinant), magnesium hydroxide, naloxone **OR** naloxone, ondansetron, sodium chloride, sodium phosphates 133 mL, traMADol, traZODone    Imaging:    CXR: Left chest tube has been removed. No pneumothorax. Left pleural reaction. Left lower lobe atelectasis. Right lung clear. Heart size upper limits of normal. Postop median sternotomy.

## 2021-05-28 NOTE — CONSULTS
PULMONARY / CRITICAL CARE CONSULTATION NOTE      Consultation - Pulmonary/Critical Care Medicine  Douglas Herrera,  1960, MRN [id number]  Date / Time of Admission:  2019  5:34 AM    Admitting Dx: CAD (coronary artery disease) [I25.10]  Ascending aortic aneurysm (H) [I71.2]    PCP: Mira Leung, CNP, [phone]  Consulting physician:  Darlene Graff MD   Code status:  Full Code       Extended Emergency Contact Information  Primary Emergency Contact: PRECIOUS HERRERA  Mobile Phone: [phone]  Relation: Child  Secondary Emergency Contact: PRISCILA HERRERA  Mobile Phone: [phone]  Relation: Child       ID:  Douglas Herrera is a 58 y.o. male with HLD, dilated ascending Aorta, severe multivessel CAD who was admitted today for OHS by DR Graff     ASSESSMENT   1. Acute respiratory failure secondary to GA for today's surgery.  To ICU on full ventilator support  2. Severe multivessel CAD.  S/p CABG x 3 today bu Dr Graff  3. Dilated ascending aorta.  S/p Ascending aorta replacement today  4. Acute blood lossanemia.   ml  5. intraop hyperglycemia.  On insulin drip on arrival from OR     PLAN   Systems to Assess:     Pulmonary: Wean supplemental O2 as tolerated; goal O2 sat > 92%.  HOB > 30 degrees to limit aspiration risk.      Check ABG and adjust support as indicated; avoid acidotic state.     Check CXR    Once hemodynamically stable, plan to proceed to wake, wean, and extubate, per CVS pathway     Continuous EtCO2 while intubated    Ok for fast track SBT if ABGs ok    Cardiovascular: Cardiac monitoring.     SBP goal < 110mmHg,    Goal CI 2-3     Goal PAD teens    Vasoactive gtts per CV-surgery.     Temporary pacing wires present; will use in setting of symptomatic arrhythmia.     Neurological: Neuro checks per ICU protocol.     Sedate with precedex until ready to wean and extubate.     Pain control: PRN    GI/:     NPO until extubated and fully awake.     Rico catheter in place for accurate  measurement of I/O.     GI prophylaxis:  Omeprazole    Renal: Monitor I/O's.  Electrolyte repletion PRN.  Avoid/limit nephrotoxic agents.     IVFs: per CV-surgery    Heme/Coag: Monitor H/H.     Transfuse per CV-surgery.     Monitor chest tube output hourly; notify CV-surgery for excessive output or abrupt stop in output.     DVT prophylaxis:  SubQ heparin    Infectious disease: General precautions.     Remove lines and drains once no longer required.     Endocrine:     RHI gtt per ICU protocol.     Musculoskeletal:     Bedrest; initiate mobility protocol.     Lines: A-line, Day# 1, Central line, Day# 1 or [place] Bambi, Day# 1      Code Status:  full    Thank you for this interesting consultation.  Please call if any questions or concerns.      This patient is considered critically ill and requires ICU level of care due to immediate post CV-surgical procedure. High risk for acute hemodynamic collapse and death.     Total Critical Care time, not including separate billable procedure time:  40 minutes.    Laly Small [facility] Pulmonary/Critical Care      Chief Complaint Severe multivessel CAD, dilated ascending  Aorta       HPI    Douglas Herrera is a 58 y.o. male who had CABG x 3 and ascending aortic replacement by Dr Graff today     History is provided by: chart review and bedside handoff by Dr Graff and [facility].  Intubation was easy, Methadone 20 mg given preop, EF 60%, PAPs in the 30s pre op. No issues coming off extracorporeal circulation. V wires present, not paced presently..  Goal SBP < 110 per Dr Graff.  To ICU on full ventilator support, on precedex, insulin and nicardipine drips     Review of Systems   Review of systems not obtained due to inability to communicate with the patient.      Active Problem List   Patient Active Problem List    Diagnosis Date Noted     S/P CABG (coronary artery bypass graft) 04/23/2019     Unstable angina (H) 03/06/2019     Abnormal cardiovascular stress test 01/11/2019      Thoracic ascending aortic aneurysm (H) 01/11/2019     Precordial pain 01/11/2019         Medical/Surgical History   Past Medical History:   Diagnosis Date     Ascending aortic aneurysm (H)      Coronary artery disease      Depression      Gout      History of anesthesia complications      Hyperlipemia      Hypertension      PONV (postoperative nausea and vomiting)      Past Surgical History:   Procedure Laterality Date     CV CORONARY ANGIOGRAM N/A 3/12/2019    Procedure: Coronary Angiogram;  Surgeon: Hamilton Lucero MD;  Location: [facility] Cath Lab;  Service: Cardiology     GALLBLADDER SURGERY           Allergies   No Known Allergies      Medications:  OUTpatient medications   Prior to Admission medications    Medication Sig Start Date End Date Taking? Authorizing Provider   allopurinol (ZYLOPRIM) 300 MG tablet Take 300 mg by mouth daily as needed.        11/29/18  Yes PROVIDER, HISTORICAL   amLODIPine (NORVASC) 5 MG tablet Take 1 tablet by mouth daily. 3/17/19  Yes PROVIDER, HISTORICAL   ASPIR-LOW 81 mg EC tablet Take 2 tablets (162 mg total) by mouth daily. 3/12/19  Yes Hamilton Lucero MD   atorvastatin (LIPITOR) 80 MG tablet Take 1 tablet (80 mg total) by mouth at bedtime. 3/12/19 3/11/20 Yes Hamilton Lucero MD   celecoxib (CELEBREX) 200 MG capsule Take 200 mg by mouth daily as needed. 8/7/18  Yes PROVIDER, HISTORICAL   FLUoxetine (PROZAC) 40 MG capsule Take 40 mg by mouth daily.   Yes PROVIDER, HISTORICAL   indomethacin (INDOCIN) 50 MG capsule Take 50 mg by mouth 2 (two) times a day as needed (gout pain).   Yes PROVIDER, HISTORICAL   meloxicam (MOBIC) 15 MG tablet Take 15 mg by mouth daily as needed. 1/8/19  Yes PROVIDER, HISTORICAL   metoprolol tartrate (LOPRESSOR) 25 MG tablet Take 1.5 tablets (37.5 mg total) by mouth 2 (two) times a day. 3/12/19 3/11/20 Yes Hamilton Lucero MD   naproxen (NAPROSYN) 500 MG tablet Take 500 mg by mouth 2 (two) times a day as needed.  11/29/18  Yes PROVIDER, HISTORICAL   nitroglycerin (NITROSTAT) 0.4 MG SL tablet Place 1 tablet (0.4 mg total) under the tongue every 5 (five) minutes as needed for chest pain. 3/6/19  Yes Sheba Garcia MD   prazosin (MINIPRESS) 1 MG capsule Take 1 mg by mouth at bedtime.        3/4/19  Yes PROVIDER, HISTORICAL   predniSONE (DELTASONE) 20 MG tablet Take 20 mg by mouth daily as needed (for gout flares).   Yes PROVIDER, HISTORICAL   traZODone (DESYREL) 50 MG tablet Take 50 mg by mouth at bedtime.        1/8/19  Yes PROVIDER, HISTORICAL   metoprolol tartrate (LOPRESSOR) 25 MG tablet Take 1 tablet (25 mg total) by mouth 2 (two) times a day. 4/22/19 4/23/19  Patrick Olea PA-C           Medications:  INpatient medications     acetaminophen  650 mg Oral Q6H    Or     acetaminophen  650 mg Rectal Q6H     [START ON 4/24/2019] aspirin  81 mg Oral DAILY     atorvastatin  80 mg Oral QHS     [START ON 4/24/2019] bisacodyl  10 mg Oral Once     [START ON 4/26/2019] bisacodyl  10 mg Rectal Once     ceFAZolin (ANCEF) IV  1 g Intravenous Q8H     chlorhexidine  15 mL Topical Q12H     [START ON 4/24/2019] docusate sodium  100 mg Oral BID     FLUoxetine  40 mg Oral DAILY     [START ON 4/24/2019] heparin (PF)  5,000 Units Subcutaneous Q8H FIXED TIMES     [START ON 4/25/2019] magnesium hydroxide  30 mL Oral DAILY     melatonin  3 mg Oral QHS     [START ON 4/24/2019] omeprazole  20 mg Oral QAM AC     [START ON 4/24/2019] polyethylene glycol  17 g Oral DAILY           Family History  Social History     Reviewed  Family History   Problem Relation Age of Onset     Stroke Mother 90     Acute Myocardial Infarction Neg Hx        Reviewed  Social History     Socioeconomic History     Marital status:      Spouse name: Not on file     Number of children: Not on file     Years of education: Not on file     Highest education level: Not on file   Occupational History     Not on file   Social Needs     Financial resource strain: Not on  file     Food insecurity:     Worry: Not on file     Inability: Not on file     Transportation needs:     Medical: Not on file     Non-medical: Not on file   Tobacco Use     Smoking status: Former Smoker     Types: Cigarettes     Smokeless tobacco: Never Used     Tobacco comment: every 3-4 months, rare   Substance and Sexual Activity     Alcohol use: Not Currently     Frequency: Never     Drug use: No     Sexual activity: Not on file   Lifestyle     Physical activity:     Days per week: Not on file     Minutes per session: Not on file     Stress: Not on file   Relationships     Social connections:     Talks on phone: Not on file     Gets together: Not on file     Attends Gnosticist service: Not on file     Active member of club or organization: Not on file     Attends meetings of clubs or organizations: Not on file     Relationship status: Not on file     Intimate partner violence:     Fear of current or ex partner: Not on file     Emotionally abused: Not on file     Physically abused: Not on file     Forced sexual activity: Not on file   Other Topics Concern     Not on file   Social History Narrative     Not on file      Psychosocial Needs  Social History     Social History Narrative     Not on file     Additional psychosocial needs reviewed per nursing assessment.      Physical Exam   VITALS:  /84   Pulse 60   Temp 97.5  F (36.4  C) (Oral)   Resp 20   Wt 172 lb 6.4 oz (78.2 kg)   SpO2 95%   BMI 30.54 kg/m    Temp:  [97.5  F (36.4  C)] 97.5  F (36.4  C)  Heart Rate:  [60] 60  Resp:  [20] 20  BP: (133)/(84) 133/84  SpO2:  [95 %] 95 %    PHYSICAL EXAM:   GEN: in bed, sedated on full vent support  HEENT:  NC PERRL OETT  NECK: Supple.  Trachea midlone  PULM:  CTA, CTs w/o air leak  CVS:   RRR  ABDOMEN:   soft  EXTREMITIES/SKIN:    No e/c/c  Left leg wrapped in ace wrap  NEURO:  .  Unknown, pt sedated and on full vent support    I&O:      Intake/Output Summary (Last 24 hours) at 4/23/2019 1243  Last data  filed at 4/23/2019 1203  Gross per 24 hour   Intake 1310 ml   Output 2900 ml   Net -1590 ml        Pertinent Labs   Lab Results: personally reviewed.     Serum Glucose range:No results for input(s): POCGLU in the last 72 hours.    No results for input(s): PHART, [id number], PO2ART, OXYHB, BEARTCALC, CARBOXYHGB, METHGB, POCPEEP, TEMP, 72707, POCRATE, POCFLOW, PSV in the last 72 hours.    Invalid input(s): [id number], 02SAT, VENTTIVOL  CBC:  Results from last 7 days   Lab Units 04/23/19  1130 04/22/19  0914   LN-WHITE BLOOD CELL COUNT thou/uL 10.5 5.3   LN-HEMOGLOBIN g/dL 9.8* 14.6   LN-HEMATOCRIT % 27.1* 40.8   LN-PLATELET COUNT thou/uL 93* 168     Chemistry:  Results from last 7 days   Lab Units 04/22/19  0914   LN-SODIUM mmol/L 141   LN-POTASSIUM mmol/L 3.5   LN-CHLORIDE mmol/L 106   LN-CO2 mmol/L 27   LN-BLOOD UREA NITROGEN mg/dL 12   LN-CREATININE mg/dL 1.02   LN-CALCIUM mg/dL 9.3   LN-MAGNESIUM mg/dL 1.8     Coags:  Lab Results   Component Value Date    INR 1.61 (H) 04/23/2019    INR 1.07 04/22/2019     Cardiac Markers:        Microbiology:    NA     Cardiac/Radiology Studies   Cardiac:  EKG:   Postop pending  Radiology:    Chest X-Ray: postop pending       PROVIDER RESTRAINT FOR NON-VIOLENT BEHAVIOR FACE TO FACE EVALUATION    Patient's Immediate Situation:  Patient demonstrated the following behaviors: Pulling/tugging at invasive lines or tubes and does not respond to verbal/non-verbal redirection    Patient's Reaction to the intervention:  Does patient understand the reason for restraint/seclusion? Unable to Express    Medical Condition:  Is there any evidence of compromise of Skin integrity, Respiratory, Cardiovascular, Musculoskeletal, Hydration? No    Behavioral Condition:  In consultation with the RN, is there a need to continue this restraint or seclusion? Yes    See Restraint Flowsheet for complete restraint documentation and assessment.    Laly Small, CNP

## 2021-05-28 NOTE — H&P (VIEW-ONLY)
DATE OF SERVICE: 04/11/2019    REQUESTING PHYSICIAN AND REASON FOR CONSULTATION:  Asked to evaluate this patient  for coronary artery bypass grafting by Dr. Sheba Garcia.    HISTORY OF PRESENT ILLNESS:  Mr. Herrera is a 58-year-old gentleman who has  hyperlipidemia and a dilated ascending aorta.  He was seen by Dr. Hernandez in January  for symptoms of chest burning discomfort and dyspnea on exertion.  A nuclear stress  test showed a large area of anterior ischemia with a preserved left ventricular  function.  Subsequent coronary angiography was performed which revealed severe  triple vessel coronary artery disease.    PAST SURGICAL HISTORY:  No surgical procedures.      PAST MEDICAL HISTORY:  1.  Enlarged ascending aortic aneurysm.  2.  Hyperlipidemia.  3.  Coronary artery disease.    ALLERGIES:  NONE KNOWN.    CURRENT MEDICATIONS:  See chart.    FAMILY HISTORY:  Positive for a stroke in the mother.  There is no family history of  premature coronary disease.    SOCIAL HISTORY:  The patient is retired.  He travels broadly.  He is accompanied by  his wife and son who speaks excellent English.  He has never smoked and he does not  ingest alcohol.    REVIEW OF SYSTEMS:  A 10-system review of systems was performed and is negative  other than the above stated.    PHYSICAL EXAMINATION:  APPEARANCE:  Stocky, middle-aged gentleman in no acute distress.    VITAL SIGNS:  Height is 5 feet 4 inches, weight is 80 kg.  VITAL SIGNS:  Temperature afebrile, respiratory rate 16, heart rate 72, blood  pressure 100/60.  SKIN:  No malignant appearing lesions.  LYMPH NODES:  No palpable lymphadenopathy.  HEENT:  Normocephalic.  PERRL.  EOMs intact.  ENT unremarkable.  NECK:  Supple, without carotid bruits.  CHEST:  Without deformity.  LUNGS:  Clear to auscultation.  HEART:  Regular rate and rhythm without murmur, gallops or rubs.  Pulses 2+ and  symmetrical.  ABDOMEN:  Soft, nontender, without palpable organomegaly.  Normoactive bowel  sounds.  GENITOURINARY AND RECTAL:  Deferred.  NEUROLOGIC:  Grossly intact.  BACK:  Without deformity.  EXTREMITIES:  Full range of motion without clubbing, cyanosis or edema.    LABORATORY:  Remarkable for a creatinine of 0.9.    ASSESSMENT AND RECOMMENDATIONS:  This gentleman has an enlarged ascending aorta.  It  measures 4.8 cm in its greatest dimension.  Therefore, I believe he would benefit  from having a segment of his ascending aorta resected and replaced with a Dacron  tube graft.  Additionally, he has 3-vessel coronary artery disease and would benefit  from a coronary artery bypass grafting procedure.  For conduit, we will use the left  internal mammary artery and reversed saphenous vein graft.  He understands that the  risks for these 2 procedures include: bleeding, infection, stroke, sternal  dehiscence, myocardial infarction, arrhythmias, the need for a pacemaker, prolonged  ventilation, pneumonia, liver/renal failure, aortic dissection, and an operative  mortality of 2 to 4%.  He accepts these risks and has been given a tentative  surgical date of 04/23.      Thank you very much for this referral.      ALPHONSO ROB MD  ln  D 04/11/2019 13:15:00  T 04/11/2019 13:38:54  R 04/11/2019 13:38:54  [id number]        cc: ALPHONSO ROB MD

## 2021-05-28 NOTE — PLAN OF CARE
Stanley titrated to keep SBP , MAP greater than 60 per verbal perimeters. CI 2.5-3.5 throughout shift. Pt failed 3 wean attempts overnight, will wait to reattempt until next shift per intensivist. Needs encouragement to take pain medication, IV dilaudid administered q 2 hours. Will hold off giving 0600 dose unless pt requesting to see if that allows for better weaning later. Scheduled and PRN Tylenol administered as ordered. Repositioned q 2 hours and PRN. Average 20-30 mL output from chest tubes each hour. Adequate urine output, cruz catheter cares completed q 8 hours. Spouse present at bedside throughout night. Oral cares completed q 2 hours and PRN. Will continue to monitor.

## 2021-05-28 NOTE — PROGRESS NOTES
"Critical Care Progress Note     Admit Date: 4/23/2019  ICU Day: 1     Code Status: full code    CC: severe CAD    HPI: 58 y.o. male who had CABG x 3 and ascending aortic replacement by Dr Graff on 4/23/19     Major events over the last 24 hours: failed SBT x 3 due to anxiety, tachypnea-these were done off sedation-precedex was stopped    Subjective: in bed now on low dose precedex, calm, following commands  ROS: unable to do    Drips: precedex, matthew    Ventilator: Mechanical Ventilation Day: 2        Settings: 16/450/30    BP 91/55   Pulse 67   Temp (!) 101.1  F (38.4  C) (Bladder)   Resp 21   Ht 5' 3\" (1.6 m)   Wt 175 lb 14.8 oz (79.8 kg)   SpO2 99%   BMI 31.16 kg/m    PAP: (23-41)/(7-17) 23/7  CVP:  [3 mmHg-17 mmHg] 7 mmHg  I/O last 3 completed shifts:  In: 5497.9 [I.V.:2427.9; Blood:560; IV Piggyback:2510]  Out: 5796 [Urine:4736; Blood:450; Chest Tube:610]  Weight change: 3 lb 8.4 oz (1.6 kg)  Vent Mode: VCV  FiO2 (%):  [30 %-100 %] 30 %  S RR:  [16] 16  S VT:  [450 mL] 450 mL  PEEP/CPAP (cm H2O):  [5 cm H2O-8 cm H2O] 5 cm H2O  Minute Ventilation (L/min):  [5.6 L/min-12.1 L/min] 11.3 L/min  PIP:  [10 cm H2O-38 cm H2O] 33 cm H2O  WI SUP:  [5 cm H20-10 cm H20] 10 cm H20  MAP (cm H2O):  [6-14] 12      EXAM:   Mental status: in bed lightly sedated  HEENT: NC PERRL OETT  Resp: cta  Cardiovascular: RRR  Abdominal: soft  Extremeties: no e/c/c  Neurology: lauren    Labs Personally reviewed: yes  Results from last 7 days   Lab Units 04/24/19  0410 04/23/19  1238 04/23/19  1130   LN-WHITE BLOOD CELL COUNT thou/uL 9.9 8.1 10.5   LN-HEMOGLOBIN g/dL 9.0* 11.8* 9.8*   LN-HEMATOCRIT % 25.5* 32.2* 27.1*   LN-PLATELET COUNT thou/uL 108* 98* 93*   LN-NEUTROPHILS RELATIVE PERCENT % 70 80*  --    LN-MONOCYTES RELATIVE PERCENT % 13* 4  --      Results from last 7 days   Lab Units 04/24/19  0410 04/23/19  1238 04/22/19  0914   LN-SODIUM mmol/L 145 145 141   LN-POTASSIUM mmol/L 4.0 4.4 3.5   LN-CHLORIDE mmol/L 112* 111* 106 "   LN-CO2 mmol/L 23 25 27   LN-BLOOD UREA NITROGEN mg/dL 18 14 12   LN-CREATININE mg/dL 1.11 0.96 1.02   LN-CALCIUM mg/dL 8.1* 7.9* 9.3     Results from last 7 days   Lab Units 04/24/19  0410 04/23/19  1238 04/23/19  1130   LN-INR  1.40* 1.47* 1.61*   LN-PARTIAL THROMBOPLASTIN TIME seconds  --   --  72*       Last ABG:  PH 7.49  PCO2 34 PAO2 114 Bicarb 27 SAT 96%    Microbiology: NA  Imaging (all imaging is personally reviewed):     PCXR 4/24/19-Sternotomy. Mediastinal clips. Mediastinal drain and left chest tube. Endotracheal tube tip in good position. Right IJ [place]-Bambi catheter tip main right pulmonary artery. Epicardial pacer leads. Minimal atelectasis basilar segments left lower lobe. Heart size stable. Chest otherwise negative.    Impression:  1. Acute hypoxic respiratory failure  2. Severe multivessel CAD.    POD# 1  CABG x 3   3. Dilated ascending aorta.          POD# 1 S/p Ascending aorta replacement   4. Acute blood lossanemia.     ml  5. intraop hyperglycemia.          On insulin drip on arrival from OR  6. Anxiety     PLAN:   1. Add precedex drip  2. Do another SBT on precedex  3. CV Surgery managing pressors  4. folllow H/H     ICU DAILY CHECKLIST                           Can patient transfer out of ICU? no    FAST HUG:    Feeding:  Feeding: No.  Patient is receiving NPO    Rico:Yes  Analgesia/Sedation:Yes precedex  Thromboembolic prophylaxis: yes; Mode:  SCDs  HOB>30:  Yes  Stress Ulcer Protocol Active: yes; Mode: PPI  Glycemic Control: Any glucose > 180 yes; Mode of Insulin Therapy: Insulin gtt    INTUBATED:  Can patient have daily waking:  yes  Can patient have spontaneous breathing trial:  yes    Restraints? yes    PHYSICAL THERAPY AND MOBILITY:  Can patient have PT and mobility trial: no  Activity: Bed Rest    transfer/discharge plans: stays in ICU    The patient is critically ill with impairment in organ system and high risk of life threatening deterioration.     Total CCT spent 35 minutes  thus far today.       Laly FOWLER, [phone]  [facility]   negative...

## 2021-05-28 NOTE — PLAN OF CARE
Patient Name: Douglas Herrera   MRN: [id number]   Date of Admission: 4/23/2019    Procedure: CORONARY ARTERY BYPASS x 3, LEFT LEG ENDOSCOPIC VEIN HARVEST, LEFT INTERNAL MAMMARY, EPIAORTIC ULTRASOUND, ANESTHESIA TRANSESOPHAGEAL ECHOCARDIOGRAM, WITH ASCENDING AORTA REPLACEMENT    Post Op day #: 4    Subjective (Patient focus/Primary Problem for shift): C/o headache, Gout pain, and sore throat.  Remains in bed throughout shift as Gout pain to great for wgt bearing.          Pain Goal 3 Pain Rating 0-8/10, Gout, Headache, throat, and incision site pain.            Pain Medication/ Regime effective to reduce patient pain.  Scheduled and PRN Tylenol.    Objective (Physical assessment):           Rhythm: normal sinus rhythm and BBB with occasional prolonged QT interval.             Bowel Activity: no if Yes indicate when: NA.          Bowel Medications: no            Incision: healing well          Incentive Spirometry Q 1-2 hour when awake:  yes Volume: 750          Epicardial Pacing Wires:  not applicable            Patient Activity:           Up to chair for meals: yes          Ambulation with RN x2 (Not including CR): No, Gout pain too great for wgt bearing.             Is patient in home clothes:no            Preventative WOC consult (need MD order): no       Assessment (Nursing primary shift focus):  VSS.  Remains in bed throughout HS/NOC shift, citing excessive Gout pain.  Wound sites CDI.  Resp status wnl.    Plan (Patient Care Plan/focus):  POD #4 care plan.       Cesar Vaughn   4/27/2019   5:38 AM

## 2021-05-28 NOTE — DISCHARGE SUMMARY
Cardiovascular Surgery Discharge Summary    Primary Care Physician:  Mira Leung CNP  Discharge Provider: Carrol Saravia   Admission Date: 4/23/2019  Admission Diagnoses: CAD (coronary artery disease) [I25.10]  Ascending aortic aneurysm (H) [I71.2]  Discharge Date: 4/30/2019  Disposition: Home  Condition at Discharge: Good  Code Status: Full Code     Principal Diagnosis:   S/P CABG (coronary artery bypass graft)x3    Discharge Diagnoses:    Principal Problem:    S/P CABG (coronary artery bypass graft)  Active Problems:      Patient Active Problem List   Diagnosis     Abnormal cardiovascular stress test     Thoracic ascending aortic aneurysm (H)     Precordial pain     Unstable angina (H)     S/P CABG (coronary artery bypass graft)     ARF (acute respiratory failure) (H)     Coronary artery disease involving native coronary artery     Ascending aorta dilatation (H)     Anxiety     Hospital-acquired pneumonia     Hypoxemia     Acute idiopathic gout of right foot     Idiopathic hypertension         Consult/s: Dietary, critical care medicine    Surgery:   4/23/3019 with Dr. Graff  1.  Replacement of an ascending aortic aneurysm using a 30 mm Gelweave interposition  tube graft.  2.  Triple vessel coronary artery bypass grafting procedures; left internal mammary  artery to left anterior descending coronary artery and separate reversed saphenous  vein grafts to the left anterior descending diagonal branch and the right posterior  descending coronary arteries.  3.  Endoscopic vein procurement from the left lower extremity.      Discharge Medications:      Medication List      START taking these medications    acetaminophen 325 MG tablet  Commonly known as:  TYLENOL  Take 2 tablets (650 mg total) by mouth every 4 (four) hours as needed.     docusate sodium 100 MG capsule  Commonly known as:  COLACE  Take 1 capsule (100 mg total) by mouth 2 (two) times a day as needed for constipation.     traMADol 50 mg tablet  Commonly  known as:  ULTRAM  Take 0.5-1 tablets (25-50 mg total) by mouth every 6 (six) hours as needed.        CHANGE how you take these medications    metoprolol tartrate 25 MG tablet  Commonly known as:  LOPRESSOR  Take 0.5 tablets (12.5 mg total) by mouth 2 (two) times a day.  What changed:  how much to take        CONTINUE taking these medications    allopurinol 300 MG tablet  Commonly known as:  ZYLOPRIM  Take 300 mg by mouth daily as needed.      ASPIR-LOW 81 MG EC tablet  Generic drug:  aspirin  Take 2 tablets (162 mg total) by mouth daily.     atorvastatin 80 MG tablet  Commonly known as:  LIPITOR  Take 1 tablet (80 mg total) by mouth at bedtime.     FLUoxetine 40 MG capsule  Commonly known as:  PROzac  Take 40 mg by mouth daily.     nitroglycerin 0.4 MG SL tablet  Commonly known as:  NITROSTAT  Place 1 tablet (0.4 mg total) under the tongue every 5 (five) minutes as needed for chest pain.     predniSONE 20 MG tablet  Commonly known as:  DELTASONE  Take 20 mg by mouth daily as needed (for gout flares).     traZODone 50 MG tablet  Commonly known as:  DESYREL  Take 50 mg by mouth at bedtime.         STOP taking these medications    amLODIPine 5 MG tablet  Commonly known as:  NORVASC     celecoxib 200 MG capsule  Commonly known as:  CeleBREX     indomethacin 50 MG capsule  Commonly known as:  INDOCIN     meloxicam 15 MG tablet  Commonly known as:  MOBIC     naproxen 500 MG tablet  Commonly known as:  NAPROSYN     prazosin 1 MG capsule  Commonly known as:  MINIPRESS            Discharge Instructions:    Follow up appointment with Primary Care Physician: Mira Leung CNP within 7 days of discharge.  Follow up appointment with Specialist:    Follow with CV Surgery as scheduled.   Follow-up with cardiology as scheduled    Diet: Cardiac    Activity/Restrictions: As tolerated with sternal precautions in mind (see below). No driving for 4 weeks or while on pain medication.     - Shower and wash your incisions daily with  "soap and water. No tub baths/hot tubs for 4 weeks. An antibacterial soap such as Dial or Safeguard is recommended.    - Check your incisions every day. If you notice any redness, drainage, or anything unusual, please call the surgeons office.    - No driving for 4 weeks after surgery or while on pain medication     - Do not lift anything more than 10 pounds for 6 weeks after surgery. After 6 weeks, advance lifting is tolerated.    - You may have watery drainage from your chest tube site for 2-3 weeks after surgery. Your may cover with a Band-Aid to protect your clothing. Remove the Band-Aid every day and wash the site.    - If you have a leg lesion, you may have swelling for 2-3 months. Elevate your leg any time you are not walking.    - If you feel any \"popping\" or \"clicking\" sensations in your chest, your arms are out too far or you are putting too much weight into arm movements. Do not reach over your head or out to the side to pull something. Do not do any arm exercises or use any exercise equipment that involves arm movement. If you feel your sternum moving, call the surgeon's office.    - Increase your daily activity as explained by Cardiac Rehab. You are encouraged to enroll in an Outpatient Cardiac Rehab Program.    - No active sports using your upper arms for 3 months. This includes fishing, hunting, bowling, swimming, tennis or golf.    - No physical activity such as cutting the grass, raking, vacuuming, changing sheets on your bed, snow shoveling, or using a  for 3 months.    - Use incentive spirometer 6-8 times per day for 2 weeks.       Hospital Summary:   Douglas Herrera is a 58 y.o. male who was admitted to [facility] on 4/23/2019 following coronary angiogram demonstrated severe multi-vessel coronary artery disease. Patient also had ascending aortic aneurysm (4.8cm). He was referred to CV surgery for evaluation for possible coronary artery revascularization.     Patient was deemed a " candidate for above procedure and was taken to the operating room on 4/23/2019. Surgery was uneventful and patient was brought to the ICU post-operatively. He was extubated on POD#1 and weaned from pressors. He was treated for presumed pneumonia with Zosyn for 5 days. Patient was awake and alert, afebrile, and with stable vitals. Insulin drip was discontinued and he was transitioned to a sliding scale. He was transferred to the general telemetry floor on POD#2 where patient has had return of bowel function, is maintaining oxygen saturations on room air, had his chest tubes removed, and has no complaints of chest pain or shortness of breath. On 04/30/19, patient was stable enough to be discharged to home.    Of note, patient had gout flare in the hospital which has been treated with allopurinol, prednisone, and colchicine. This is improving at discharge but did hamper his rehab early post-op.    Vital Signs in last 24 hours:    Temp:  [98.1  F (36.7  C)-99.3  F (37.4  C)] 98.5  F (36.9  C)  Heart Rate:  [73-84] 75  Resp:  [16-18] 18  BP: (103-124)/(62-74) 115/71  SpO2:  [94 %-97 %] 97 %    Physical Exam:    Pertinent exam findings on day of discharge include:  Gen: Seen up in chair. NAD. Pleasant and conversant.  CV: RRR on monitor. No edema.  Pulm: Non-labored breathing on room air.  Abd: Soft, non-tender, non-distended  Neuro: CNs grossly intact  Inc: C/D/I    _______  Carrol Saravia PA-C  Cardiothoracic Surgery  [phone]

## 2021-05-28 NOTE — PROGRESS NOTES
Patient Name: Douglas Herrera   MRN: [id number]   Date of Admission: 4/23/2019    Procedure: CORONARY ARTERY BYPASS x 3, LEFT LEG ENDOSCOPIC VEIN HARVEST, LEFT INTERNAL MAMMARY, EPIAORTIC ULTRASOUND, ANESTHESIA TRANSESOPHAGEAL ECHOCARDIOGRAM, WITH ASCENDING AORTA REPLACEMENT    Post Op day #: 6    Subjective (Patient focus/Primary Problem for shift): Pain with ambulation          Pain Goal3 Pain Rating 7/10 with weight bearin           Pain Medication/ Regime effective to reduce patient pain - tylenol scheduled, colchicine for gout    Objective (Physical assessment):           Rhythm: normal sinus rhythm            Bowel Activity: yes if Yes indicate when: 4/29/19          Bowel Medications: yes            Incision: healing well          Incentive Spirometry Q 1-2 hour when awake:  yes Volume: 750          Epicardial Pacing Wires:  no            Patient Activity:           Up to chair for meals: yes          Ambulation with RN x2 (Not including CR): yes            Is patient in home clothes:no             Chest Tubes   Pleural: no Draining: no               Suction: no              Mediastinal: no Draining: not applicable               Suction: not applicable   Dressing Change Daily:not applicable If No, why? ROLY                     Urinary Catheter: no           Preventative WOC consult (need MD order): no       Assessment (Nursing primary shift focus): Patient continues to have pain of right ankle with ambulation, otherwise states no pain at rest. He is improving gradually. He needs encouragement to ambulate and to perform pulmonary toileting. Using IS and reaching 750. Surgical incisions CDI. Had BM today, voiding well. Lung sounds clear, maintaining 02 sats in the upper 90's.    Plan (Patient Care Plan/focus): Continue encouraging activity, IS use while awake, monitor for pain management and treat with pain meds as needed.         Carol De   4/29/2019   6:13 PM

## 2021-05-28 NOTE — PROGRESS NOTES
Patient Name: Douglas Herrera   MRN: [id number]   Date of Admission: 4/23/2019    Procedure: CORONARY ARTERY BYPASS x 3, LEFT LEG ENDOSCOPIC VEIN HARVEST, LEFT INTERNAL MAMMARY, EPIAORTIC ULTRASOUND, ANESTHESIA TRANSESOPHAGEAL ECHOCARDIOGRAM, WITH ASCENDING AORTA REPLACEMENT    Post Op day #:7    Subjective (Patient focus/Primary Problem for shift): Encourage/Improve Respiratory          Pain Goal0 Pain Rating denies           Pain Medication/ Regime effective to reduce patient pain scheduled tylenol and colchicine    Objective (Physical assessment):           Rhythm: normal sinus rhythm            Bowel Activity: yes if Yes indicate when: 4/29 afternoon          Bowel Medications: no            Incision: healing well          Incentive Spirometry Q 1-2 hour when awake:  yes Volume: 750          Epicardial Pacing Wires:  no            Patient Activity:           Up to chair for meals: yes          Ambulation with RN x2 (Not including CR): not applicable            Is patient in home clothes:no             Chest Tubes   Pleural: no Draining: not applicable               Suction: not applicable              Mediastinal: no Draining: not applicable               Suction: not applicable   Dressing Change Daily:no If No, why? Open to air                     Urinary Catheter: no           Preventative WOC consult (need MD order): no       Assessment (Nursing primary shift focus): Improve pt's breathing and encourage IS/flutter valve use    Plan (Patient Care Plan/focus): Continue to encourage pt to use IS and Flutter valve and other therapies.      Jeremy Ruiz   4/30/2019   5:53 AM

## 2021-05-28 NOTE — ANESTHESIA PROCEDURE NOTES
Central line    Start time: 4/23/2019 7:11 AM  End time: 4/23/2019 7:27 AMIndications: central pressure monitoring and vascular access  Performing Anesthesiologist: Avani Borges MD  Pre-procedure Checklist  Completed: patient identified, site marked, risks, benefits, and alternatives discussed, timeout performed, consent obtained, hand hygiene performed, all elements of maximal sterile barriers used including cap, mask, gown, sterile gloves, and large sheet and skin prep agent completely dried prior to procedure    Procedure Details:  Preparation: 2% chlorhexidine  Location details: right internal jugular  Catheter type: Introducer with [place]-Bambi  Introducer type: MAC  Lumens:quad lumenNumber of attempts: 1  Ultrasound evaluation of access site: yes  Vessel patent by US exam  Concurrent real time visualization of needle entry  ultrasound permanent image saved    Post-procedure:   line sutured and Antimicrobial disks with CHG applied  Assessment: blood return through all ports and free fluid flow  Complications: none

## 2021-05-28 NOTE — PROGRESS NOTES
RESPIRATORY CARE NOTE      Arterial Blood Gas result:  pH 7.44; pCO2 36; pO2 115; HCO3 25.3, %O2 Sat 99.8.    Vent Mode: VCV  FiO2 (%):  [30 %-100 %] 30 %  S RR:  [16] 16  S VT:  [450 mL] 450 mL  PEEP/CPAP (cm H2O):  [5 cm H2O-8 cm H2O] 5 cm H2O  Minute Ventilation (L/min):  [5.6 L/min-12.1 L/min] 12.1 L/min  PIP:  [10 cm H2O-38 cm H2O] 31 cm H2O  IL SUP:  [5 cm H20-8 cm H20] 8 cm H20  MAP (cm H2O):  [6-14] 12    Weaned for 6 and 8 min on 8/5, changed back to full support due to increased RR, RSBI, etCO2 and low volumes. Spoke with MD, will let pt rest and try again in the am. BS coarse, sxn x1 for small amount of thick yellow secretions. Pt tolerated well. Will continue to follow.       TONYA Irving

## 2021-05-28 NOTE — PROGRESS NOTES
Patient declined IS and Flutter Valve stating that he does them both on his own several times/day by himself. Will check back with patient latter.     Domenic Trivedi, LRT

## 2021-05-28 NOTE — PROGRESS NOTES
Pharmacy Note - Admission Medication History  Pertinent Provider Information: He has been only taking his metoprolol once daily, not two times a day as prescribed. States he fill start taking two times a day moving forward.  Aspirin increased this past month from 81 mg daily to 162 mg daily.   ______________________________________________________________________  Prior To Admission (PTA) med list completed and updated in EMR.     PTA Med List   Medication Sig Note Last Dose     allopurinol (ZYLOPRIM) 300 MG tablet Take 300 mg by mouth daily as needed.         Past Week     amLODIPine (NORVASC) 5 MG tablet Take 1 tablet by mouth daily.  4/22/2019     ASPIR-LOW 81 mg EC tablet Take 2 tablets (162 mg total) by mouth daily. 4/22/2019: Increased to 2 tablets daily on April 12th 2019 4/22/2019     atorvastatin (LIPITOR) 80 MG tablet Take 1 tablet (80 mg total) by mouth at bedtime.  4/21/2019     celecoxib (CELEBREX) 200 MG capsule Take 200 mg by mouth daily as needed.  More than a month     FLUoxetine (PROZAC) 40 MG capsule Take 40 mg by mouth daily.  4/22/2019     indomethacin (INDOCIN) 50 MG capsule Take 50 mg by mouth 2 (two) times a day as needed (gout pain).  Past Week     meloxicam (MOBIC) 15 MG tablet Take 15 mg by mouth daily as needed.  More than a month     metoprolol tartrate (LOPRESSOR) 25 MG tablet Take 1.5 tablets (37.5 mg total) by mouth 2 (two) times a day. 4/22/2019: Has only been taking once daily 4/22/2019     naproxen (NAPROSYN) 500 MG tablet Take 500 mg by mouth 2 (two) times a day as needed.  More than a month     nitroglycerin (NITROSTAT) 0.4 MG SL tablet Place 1 tablet (0.4 mg total) under the tongue every 5 (five) minutes as needed for chest pain.  Not Taking     prazosin (MINIPRESS) 1 MG capsule Take 1 mg by mouth at bedtime.         4/21/2019     predniSONE (DELTASONE) 20 MG tablet Take 20 mg by mouth daily as needed (for gout flares).  More than a month     traZODone (DESYREL) 50 MG tablet  Take 50 mg by mouth at bedtime.         4/21/2019       Information source(s): Patient and Prescription bottles  Patient was asked about OTC/herbal products specifically.  PTA med list reflects this.  Based on the pharmacist s assessment, the PTA med list information appears reliable  Allergies were reviewed, assessed, and updated with the patient.    Patient does not use any multi-dose medications prior to admission.   Thank you for the opportunity to participate in the care of this patient.    Jeremy Ballard, PharmD     4/22/2019     10:08 AM

## 2021-05-28 NOTE — OP NOTE
DATE OF SERVICE: 04/23/2019    OPERATING AREA:  Room 2    TITLE OF PROCEDURES:  1.  Replacement of an ascending aortic aneurysm using a 30 mm Gelweave interposition  tube graft.  2.  Triple vessel coronary artery bypass grafting procedures; left internal mammary  artery to left anterior descending coronary artery and separate reversed saphenous  vein grafts to the left anterior descending diagonal branch and the right posterior  descending coronary arteries.  3.  Endoscopic vein procurement from the left lower extremity.    ATTENDING SURGEON:  Darlene Graff MD    FIRST ASSISTANT:  ST VITALIY Eckert.    RESIDENT SURGEON:  CLEMENCIA Dawn.    PHYSICIAN'S ASSISTANT:  Carrol Saravia PA-C.    ANESTHESIA:  General endotracheal.    SKIN PREP:  Betadine and DuraPrep.    INCISIONS:  Median sternotomy and a skip incision along the course of the greater  saphenous vein, left upper leg.    DRAINS:  One 32-[place] mediastinal, one 24-Croatian Roman, left chest.    CULTURE:  None.    SPECIMEN:  Segment of the ascending aorta.    CLOSURE:  Routine.    PREOPERATIVE DIAGNOSES:  1.  Unstable angina.  2.  Severe double vessel coronary artery disease.  3.  Ascending aortic aneurysm measuring 4.8 cm.  4.  Left ventricular hypertrophy.    POSTOPERATIVE DIAGNOSES:  1.  Unstable angina.  2.  Severe double vessel coronary artery disease.  3.  Ascending aortic aneurysm measuring 4.8 centimeters.  4.  Left ventricular hypertrophy.    BRIEF HISTORY:  Mr. Herrera is a 58-year-old gentleman who has had progressive angina  and was judged to be unstable at the time of his workup.  A coronary angiography  revealed severe double vessel coronary artery disease and the patient also has had a  known ascending aortic aneurysm measuring 4.8 cm.  The above procedure was planned.    FINDINGS AT OPERATION:  The patient's ascending aortic aneurysm tapered nicely at  approximately 1 cm distal to the left coronary ostium and again distally it  tapered  approximately 2 cm proximal to the takeoff of the innominate artery.  It measured  approximately 4.8 cm in its greatest dimension.  It was remarkably free of  atherosclerosis.  The patient's aortic valve was competent, and the left and right  coronary ostia were well visualized.  The tapered segments of the ascending aorta  measured to a 30 mm Gelweave interposition graft.  The patient's left internal  mammary artery was a 2 mm conduit with excellent flow.  The reversed saphenous vein  graft measured 4 mm in diameter and was of good quality.  The left anterior  descending coronary artery had plaque present in it, but it was soft and it was   a 2 mm target vessel, an excellent vessel for bypass grafting.  The left anterior  descending diagonal branch was a 1 mm target vessel, only a fair vessel for bypass  grafting and the right posterior descending coronary artery in its distal half was a  2 mm target vessel, an excellent vessel for bypass grafting.  The patient did  have left ventricular hypertrophy.  His pulmonary artery pressures were normal.    PROCEDURE:  The patient arrived in the operating room and an arterial line was  placed.  Satisfactory general endotracheal anesthesia was induced.  An OG tube was  inserted followed by a transesophageal echo probe, a [place]-Bambi catheter and a Rico  catheter.  The patient's neck, chest, abdomen, both groins and lower extremities  were prepped and draped in a standard sterile fashion.  A complete median sternotomy  was made.  Using the Rultract retractor, the left internal mammary artery was taken  down from the subclavian vein to the xiphoid process.  At the same time, a skip  incision was made along the course of the greater saphenous vein in the left upper  leg by Carrol Saravia.  Approximately 7 cm of the vein was exposed circumferentially.   The endoscope was then passed proximally and distally along the course of the  greater saphenous vein.  The vein was  mobilized circumferentially, its branches were  clipped or cauterized, and it was clipped proximally and distally and extracted.  It  was  then cannulated and distended, its branches controlled with Ligaclips.  The leg  wound was rendered hemostatic and then closed in layers using running 2-0 and 3-0  Vicryl.    Heparin was administered.  The ascending aorta was mobilized circumferentially.   Pursestring sutures were placed in the transverse arch and right atrium for  cannulation.  When the ACT was appropriate, cannulation was performed.  A retrograde  cardioplegia catheter was inserted via the right atrium into the coronary sinus.   Cardiopulmonary bypass was established.  The patient was cooled to 32 degrees  centigrade.  A pulmonary artery vent was inserted.  The aorta was cross clamped,  flush with the takeoff of the innominate artery.  Then, 700 mL of cold blood  cardioplegic solution was administered antegrade and an additional 500 mL of cold  blood cardioplegic solution was administered retrograde.  A good arrest was  achieved.  Cold topical saline and slush were applied to the heart intermittently  during the period of aortic crossclamp.    Continuous CO2 was administered into the pericardial space.  The aortic aneurysm was  then resected with a 2 cm cuff proximal to the aortic crossclamp and the proximal  portion of the resection was 1 cm distal to the left coronary ostium.  The aorta  measured to a 30 mm Gelweave interposition graft.  This was brought on to the  operative field and appropriately trimmed.  Both ends of the aorta were reinforced  with bovine pericardium placed outside the aorta and secured in place using 4  horizontal mattress sutures of 4-0 Prolene.  The proximal anastomosis was then  performed end-to-end using running 4-0 Prolene and then the graft was measured to  the distal extent of the aortic resection.  The distal anastomosis was then  performed end-to-end again using running 4-0  Prolene.  Once the graft was in place,  a root vent was placed in the graft itself and secured in place with pledgeted 4-0  Prolene.    Attention was then turned to performing the coronary artery bypass grafting portion  of the procedure.  Throughout this period of time, approximately every 20 minutes,  the patient received a bolus dose of cold blood cardioplegia in a retrograde  fashion.  The right posterior descending coronary artery was visualized and the  disease in its midportion noted.  The vessel was dissected out more distally for a  distance of 1 cm and then opened for a distance of 8 mm.  It was bypassed in an  end-to-side fashion using reversed saphenous vein graft and running 7-0 Prolene.   Upon completion of this 1st distal anastomosis, the vein graft was flushed with cold  blood cardioplegic solution and sized to the graft and an additional 300  mL of cold blood cardioplegic solution was administered in a retrograde fashion.   Next, attention was turned to the left anterior descending coronary artery in the  interventricular groove.  This vessel was dissected out at the junction between its  mid and distal thirds for a distance of 1 cm and then opened for a distance of 1 cm.   Soft plaque was present in this area of the left anterior descending coronary  artery.  Then, attention was turned to the left anterior descending diagonal branch.   The one diagonal that could be located was smaller than anticipated.  It was  dissected out for a distance of 1 cm and then opened for a distance of 8 mm.  It was  bypassed in an end-to-side fashion using reversed saphenous vein graft and running  7-0 Prolene.  Upon completion of this 2nd distal anastomosis, the vein graft was  flushed with cold blood cardioplegic solution and sized to the ascending aorta and  an additional 350 mL of cold blood cardioplegic solution was administered in a  retrograde fashion.  Finally, a pleural rent was created for passage of the  left  internal mammary artery and then the 3rd and last distal anastomosis was performed  between the left internal mammary artery and the left anterior descending coronary  artery in an end-to-side fashion using running 7-0 Prolene.  As this 3rd and last  distal anastomosis was being performed, gentle warming was begun.  The grey occluder  was released from the left internal mammary artery and good flow was seen down the  distribution of the left anterior descending coronary artery.  The grey occluder was  once more applied to the left internal mammary artery and the patient received a  final dose of cold blood cardioplegia in a retrograde fashion.  It had been decided  to perform the 2 proximal graft saphenous anastomoses with the aortic crossclamp in  place; therefore, 2 defects were created in the graft using eye cautery.  The 2 vein  grafts were then sewn to the graft in an end-to-side fashion using running 6-0  Prolene.  The grey occluder was released from the left internal mammary artery and a  hot shot was delivered in a retrograde fashion.  De-airing maneuvers were performed and  the aortic crossclamp was released. The aortic crossclamp time was an hour and 55 minutes.  The patient did not require defibrillation.  The proximal and distal anastomoses were examined and were  hemostatic except for the proximal to the right posterior descending coronary  artery.  This required additional 6-0 Prolenes to render it hemostatic.  The aortic  suture lines also required a few pledgeted 4-0 Prolene sutures to render them  hemostatic.  The retrograde cardioplegic catheter was removed and that site repaired  with two 4-0 Prolene sutures.  The pulmonary artery vent was removed and that site  repaired in 2 layers using running 4-0 Prolene.  A period of reperfusion and  deairing ensued.  Ventricular pacing wires were applied and for a while the patient  was ventricularly paced at a rate of 90.  The left pleural space and  pericardial  spaces were drained of any accumulated blood and gentle ventilation was once more  begun.  After a while, it was clear that adequate de-airing had been performed and  after a while, hemostasis was satisfactory.  The root vent was removed and that site  repaired with 2 sets of plegetted 4-0 prolene.   The patient was placed on  low-dose Cardene.  When he was warm, the heart was allowed to fill and eject and the  patient  from cardiopulmonary bypass without difficulty.  Total time on  cardiopulmonary bypass was 2 hours and 19 minutes.  The patient was decannulated and  the cannulation sites oversewn with 4-0 Prolene.  Protamine was administered to  reverse the heparin.  Hemostasis was satisfactory.  The mediastinum was drained with  a 32-[place] chest tube and the left pleural space was drained with a  24-Tel[place] Roman drain.  The sternum was approximated with a combination of #6  stainless steel sternal wires as well as double wires.  The sternotomy wound was  irrigated with warm antibiotic containing solution.  It was closed in 4 layers using  2 layers of 0 Vicryl, an additional layer of 2-0 Vicryl and a subcuticular stitch of  4-0 Monocryl.  Dermabond was applied to the skin.  The sponge, needle and instrument  counts were reported as correct.  The estimated blood loss was 500 mL.  Mr. Herrera was  brought to the intensive care unit in critical but stable condition.      ALPHONSO ROB MD  nn  D 04/23/2019 14:27:30  T 04/23/2019 15:29:39  R 04/23/2019 15:29:39  [id number]        cc: ALPHONSO ROB MD

## 2021-05-28 NOTE — PROGRESS NOTES
Patient seen by my colleague earlier in the day.  Full note to follow tomorrow.    Brief history: 58-year-old man with history of coronary artery disease status post CABG and ascending aorta replacement on April 23 with postoperative complication of respiratory failure with intubation and medicine was consulted for fevers.    Subjective: Patient was seen sitting up in bed in no apparent distress.  Provides no current complaints.  He has had no more fevers since midnight.  Denies any shortness of breath.    Objective:   Vitals reviewed  General: Well-developed, obese man seen resting in bed comfortably no apparent distress.  Respiratory: Coarse breath sounds bilaterally.  No wheezing.  No cough.  On nasal cannula, saturating well.  Cardio: Regular rate and rhythm.  No murmur.  GI: Soft, nontender.  Bowel sounds present.  Neuro: Alert and oriented.  Cranial nerves II through XII grossly intact.    Assessment and plan:  58M w/pmhx of CAD s/p CABG and ascendig aorta replacement on 4/23/19 with suspected VAP. Leukocytosis, fever, Procalcitonin elevated with possible left lower lobe infiltrate on CXR. Will continue treatment for VAP, patient does not have any clear risk factors for MDR will treat with zosyn q8. F/u sputum culture. Would draw blood cultures if febrile again, unfortunately they were not drawn during his febrile episode yesterday.

## 2021-05-28 NOTE — PLAN OF CARE
Problem: Pain  Goal: Patient's pain/discomfort is manageable  Outcome: Progressing     Problem: Safety  Goal: Patient will be injury free during hospitalization  Outcome: Progressing     Problem: Daily Care  Goal: Daily care needs are met  Outcome: Progressing     Problem: Knowledge Deficit  Goal: Patient/family/caregiver demonstrates understanding of disease process, treatment plan, medications, and discharge instructions  Outcome: Progressing     Problem: Potential for Falls  Goal: Patient will remain free of falls  Outcome: Progressing   A/oX4. C/o incisional pain while coughing, scheduled pain meds given, declined PRN. VSS, tele NSR. Lung sounds diminished, few crackles in the right lung field, O2 sat mid 90s on 2L of O2, pt gets IS to 500 mL. Incision site CDI, healing well, chest tube site covered, CT to suction, dressing CDI, pacer wires are capped. Voiding, no BM since after surgery, passing gas, bowl medications given. Refused to ambulate. Call light within reach, calls appropriately. Will continue to monitor.

## 2021-05-28 NOTE — PROGRESS NOTES
Respiratory Care Note    Pt currently on RA, sats 95%, BBS diminished. Pulls 600 ml on IS and is able to utilize flutter valve therapy fairly well. Pt explains that he is having pain from surgery. RT encouraged use of the heart pillow when coughing and during deep breathing exercises. Cough is weak and non-productive. RT will continue to follow and re-eval in the morning.     SAMUEL LizamaT

## 2021-05-28 NOTE — PROGRESS NOTES
"Progress Note    Assessment/Plan  S/P CABG X 3/Ascending Aorta replacement POD # 4  Awake and alert, not in bed at time of evaluation, with no apparent distress  AVSS, normal sinus rhythm per monitor  Patient has a history of gouty arthritis which is flared up.,  Started on prednisone therapy yesterday  Postop respiratory insufficiency, requiring supplemental oxygen 2 L nasal cannula, saturation 97%  Poor inspiratory effort, I S 750, encouraged to increase effort  Acute postop blood loss anemia, last Hgb 8.3, started iron therapy and will monitor closely  Postop thrombocytopenia resolved, Plt 170 up from 122 yesterday  Incisions are clean dry intact, lungs sound diminished in the bases  Urine output marginal to adequate, adequate BM  With 78.6 kg, preop weight 78.2 kg  Lasix 20 mg twice a day  Mobilize and ambulate in the hallway 3-4 times daily as tolerated  Continue pulmonary toilet  Discharge disposition is tentatively home in 24 to 48 hours unless unable due to gouty arthritis flareup      Subjective  Denies incisional chest pain but complains of lower extremity guarding arthritis  Objective  Awake and alert, sitting up in bed at time of evaluation, with no apparent distress  Vital signs in last 24 hours  Temp:  [98.1  F (36.7  C)-100.4  F (38  C)] 98.6  F (37  C)  Heart Rate:  [75-90] 75  Resp:  [16-20] 16  BP: ()/(59-63) 109/63  Weight:   173 lb 4.8 oz (78.6 kg)    Intake/Output last 3 shifts  I/O last 3 completed shifts:  In: 1740 [P.O.:1740]  Out: 701 [Urine:700; Stool:1]  Intake/Output this shift:  No intake/output data recorded.    Review of Systems   A 12 point comprehensive review of systems was negative except as noted.    Physical Exam  /63 (Patient Position: Semi-trevino)   Pulse 75   Temp 98.6  F (37  C) (Oral)   Resp 16   Ht 5' 4\" (1.626 m)   Wt 173 lb 4.8 oz (78.6 kg)   SpO2 97%   BMI 29.75 kg/m    HRR, lungs sound diminished in the bases left worse than right, incisions are clean " dry intact  Abdomen soft and nontender  Mild bilateral lower extremity edema    Pertinent Labs   Lab Results: personally reviewed.   Lab Results   Component Value Date     04/26/2019    K 3.6 04/27/2019     04/26/2019    CO2 33 (H) 04/26/2019    BUN 22 04/26/2019    CREATININE 1.14 04/26/2019    CALCIUM 9.3 04/26/2019     Lab Results   Component Value Date    WBC 11.7 (H) 04/26/2019    HGB 8.3 (L) 04/26/2019    HCT 25.1 (L) 04/26/2019    MCV 92 04/26/2019     04/27/2019       Pertinent Radiology   Radiology Results: Personally reviewed image/s, Personally reviewed impression/s and With persistent mild pulmonary congestion and left basilar atelectasis  EKG Results: personally reviewed.  and Sinus rhythm per monitor    Patrick Olea

## 2021-05-28 NOTE — PROGRESS NOTES
RESPIRATORY CARE NOTE  BS clear but decreased, flutter valve and IS done x2 with patient.  He achieves 700-790 on his IS, he has remained steady at this.  Dont' expect to improve until able to move around regularly.  Has good cough, most often dry.  Will continue to follow and RCAT per protocol.    Salma Melgar, LRT

## 2021-05-28 NOTE — PLAN OF CARE
Patient Name: Douglas Herrera   MRN: [id number]   Date of Admission: 4/23/2019    Procedure: CORONARY ARTERY BYPASS x 3, LEFT LEG ENDOSCOPIC VEIN HARVEST, LEFT INTERNAL MAMMARY, EPIAORTIC ULTRASOUND, ANESTHESIA TRANSESOPHAGEAL ECHOCARDIOGRAM, WITH ASCENDING AORTA REPLACEMENT    Post Op day #: 3    Subjective (Patient focus/Primary Problem for shift): Pain control, mobility and breathing exercises          Pain Goal 0 Pain Rating 0          Pain Medication/ Regime effective to reduce patient pain Scheduled tylenol.    Objective (Physical assessment):           Rhythm: NSR            Bowel Activity: No if Yes indicate when: NA          Bowel Medications: NA            Incision: Clean, dry approximated          Incentive Spirometry Q 1-2 hour when awake:  Yes Volume: *500          Epicardial Pacing Wires:  Yes, capped            Patient Activity:           Up to chair for meals: Yes          Ambulation with RN x2 (Not including CR): NA           Is patient in home clothes: No            Chest Tubes   Pleural: Yes Draining: Yes               Suction: Yes              Mediastinal: Yes Draining: Yes               Suction: Yes   Dressing Change Daily:NA  If No, why?                     Urinary Catheter: No           Preventative WOC consult (need MD order): NA      Assessment (Nursing primary shift focus): All incisions clean, dry and approximated.  2L NC.  CLD.  Voiding well into urinal. Mild pain when coughing.    Plan (Patient Care Plan/focus): Continue to encourage breathing exercises and activity.  Monitor chest tube output.  Assist of 2 and implementing sternal precautions.  Will continue to monitor.      Dulce Maria Jacobsen   4/26/2019   3:16 AM

## 2021-05-28 NOTE — PROGRESS NOTES
Hospitalist Progress Note    Assessment/Plan    A: Patient is a 59 y/o man who has hypertension and hyperlipidemia. Patient has undergone CABG for coronary artery disease as well as surgical intervention on an ascending aortic aneurysm. Patient has some pain in both feet that appears to be from acute gouty arthropathy. This is resolving.     P:  1.) Coronary artery disease s/p CABG: Patient on aspirin, metoprolol and atorvastatin for secondary prevention.  2.) Ascending aortic aneurysm s/p surgical intervention: Monitoring for additional symptoms.  3.) Hypoxemia, resolved: Monitoring for recurrence.  4.) Acute gouty arthropathy, great toe of right foot and both ankles, resolving: Colchicine for 1 more day. Anticipate that allopurinol will be resumed in a few weeks time.   5.) Hypertension: Controlled on current dose of metoprolol. Monitoring for changes.        Principal Problem:    S/P CABG (coronary artery bypass graft)  Active Problems:    ARF (acute respiratory failure) (H)    Coronary artery disease involving native coronary artery    Ascending aorta dilatation (H)    Anxiety    Hospital-acquired pneumonia    Hypoxemia    Acute idiopathic gout of right foot    Idiopathic hypertension      Barriers to Discharge:  Placement      Subjective  Patient was communicated with through a Electron Database .     Patient indicated feeling well, notes ankle and knee pain improved, notes no new problems.    Objective    Vital signs in last 24 hours  Temp:  [98.1  F (36.7  C)-99.3  F (37.4  C)] 98.3  F (36.8  C)  Heart Rate:  [73-84] 84  Resp:  [16-18] 18  BP: (103-124)/(60-74) 124/74 95% O2 Device: None (Room air) O2 Flow Rate (L/min): 2 L/min  Weight:   167 lb 1.6 oz (75.8 kg) Weight change: 2 lb 9.6 oz (1.179 kg)    Intake/Output last 3 shifts  I/O last 3 completed shifts:  In: 692 [P.O.:692]  Out: 750 [Urine:750]  Body mass index is 28.68 kg/m .    Physical Exam    General:     Patient comfortable, NAD.   HEENT:     No  scleral icterus or conjunctival injection.   Heart:    RRR, S1 S2 w/o murmurs.   Lungs:     Breath sounds present. No crackles/wheezeing heard.    Abdomen:   Soft, nontender.     Pertinent Labs   Lab Results: personally reviewed.   Results from last 7 days   Lab Units 04/30/19  0535 04/29/19  0614 04/28/19  0547 04/27/19  0637 04/26/19  0600 04/25/19  0944   LN-SODIUM mmol/L  --  138  --   --  141 139   LN-POTASSIUM mmol/L 3.8 3.7 3.5 3.6 3.8 3.7   LN-CHLORIDE mmol/L  --  101  --   --  101 105   LN-CO2 mmol/L  --  26  --   --  33* 26   LN-BLOOD UREA NITROGEN mg/dL  --  29*  --   --  22 21   LN-CREATININE mg/dL  --  1.06  --   --  1.14 1.23   LN-CALCIUM mg/dL  --  9.7  --   --  9.3 9.0   LN-MAGNESIUM mg/dL  --  2.0 2.1 2.1 2.1  --      Results from last 7 days   Lab Units 04/29/19  0614 04/27/19  0637 04/26/19  0600 04/25/19  0944  04/24/19  0410 04/23/19  1238   LN-WHITE BLOOD CELL COUNT thou/uL 12.2*  --  11.7* 11.2*  --  9.9 8.1   LN-HEMOGLOBIN g/dL 9.5*  --  8.3* 8.8*  --  9.0* 11.8*   LN-HEMATOCRIT % 28.2*  --  25.1*  --   --  25.5* 32.2*   LN-PLATELET COUNT thou/uL 252 170 122*  --    < > 108* 98*   LN-NEUTROPHILS RELATIVE PERCENT %  --   --  67  --   --  70 80*   LN-MONOCYTES RELATIVE PERCENT %  --   --  11*  --   --  13* 4    < > = values in this interval not displayed.         Results from last 7 days   Lab Units 04/30/19  0814 04/30/19  0751 04/29/19  2103 04/29/19  1651 04/29/19  1149 04/29/19  0736 04/28/19  2019 04/28/19  1641 04/28/19  1111 04/28/19  0825   LN-POC GLUCOSE FINGERSTICK- HE mg/dL 176* 149* 161* 119 173* 107 126 118 184* 137       Medications  Scheduled Meds:    acetaminophen  650 mg Oral Q6H    Or     acetaminophen  650 mg Rectal Q6H     aspirin  81 mg Oral DAILY     atorvastatin  80 mg Oral QHS     colchicine  0.6 mg Oral BID     docusate sodium  100 mg Oral BID     FLUoxetine  40 mg Oral DAILY     furosemide  40 mg Intravenous BID - diuretic     heparin (PF)  5,000 Units Subcutaneous Q8H  FIXED TIMES     insulin aspart (NovoLOG) injection   Subcutaneous TID with meals     insulin aspart (NovoLOG) injection   Subcutaneous QHS     magnesium hydroxide  30 mL Oral DAILY     melatonin  3 mg Oral QHS     metoprolol tartrate  12.5 mg Oral BID     omeprazole  20 mg Oral QAM AC     polyethylene glycol  17 g Oral DAILY     Continuous Infusions:  PRN Meds:.acetaminophen, aluminum-magnesium hydroxide-simethicone, benzocaine-menthol, bisacodyl, bisacodyl, dextrose 50 % (D50W), glucagon (human recombinant), magnesium hydroxide, naloxone **OR** naloxone, ondansetron, sodium chloride, sodium phosphates 133 mL, traMADol, traZODone

## 2021-05-28 NOTE — PLAN OF CARE
Problem: Pain  Goal: Patient's pain/discomfort is manageable  Outcome: Progressing     Problem: Inadequate Airway Clearance  Goal: Patient will maintain patent airway  Outcome: Progressing     Problem: Potential for Falls  Goal: Patient will remain free of falls  Outcome: Progressing

## 2021-05-28 NOTE — TREATMENT PLAN
RCAT Treatment Plan      Patient Score: 8  Patient Acutiy: 2    Clinical Indication for Therapy: atelectasis    Therapy Ordered: flutter valve and IS prn    Assessment Summary: pt on room air, sats 94%, BBS diminished/clear. Pulls 1000 ml on IS, utilizes flutter valve therapy well. He is independent with both devices. Family is supportive in pts recovery. Cough is strong and non-productive. RT encouraged use of heart pillow when performing volume expansion exercises and bronchial hygiene therapy. No concerns at this time. RT will follow as needed.          04/29/19 1535   Reason for Assessment (RCAT)   RCAT Assesment Re-eval   Assessment Reason  Thoracic surgery   $ RCAT Eval Time 15 min.  Yes   Chart Assessment (RCAT)   Pulmonary Status  0   Surgical Status  3   Chest Xray  2   Patient Assessment (RCAT)    Respiratory Pattern  0   Mental Status  0   Breath Sounds  2   Cough Effectiveness  0   Level of Activity  1   O2 Required SpO2 >= 92%  0   Chart + Pt. Assessment Total Points  8   RCAT Acuity Score 8 (Acuity 2)   Clinical Indications (RCAT)   RCAT Order Placed  TONYA Munoz

## 2021-05-28 NOTE — ANESTHESIA PROCEDURE NOTES
Arterial Line  Reason for Procedure: hemodynamic monitoring and multiple ABGs  Patient location during procedure: OR pre-induction  Start time: 4/23/2019 6:54 AM  End time: 4/23/2019 7:04 AM  Staffing:  Performing  Anesthesiologist: Avani Borges MD  Performing CRNA: Arie Reed CRNA  Sterile Precautions:  sterile barriers used during insertion: cap, mask, sterile gloves, large sheet, and hand hygiene used.  Arterial Line:   Immediately prior to procedure a time out was called to verify the correct patient, procedure, equipment, support staff and site/side marked as required  Laterality: right  Location: brachial  Prepped with: ChloroPrep    Needle gauge: 20 G  Number of Attempts: 1  Secured with: tape, transparent dressing and pressure dressing  Flushed with: saline  1% lidocaine local anesthesia used for skin prep.   See MAR for additional medications given.

## 2021-05-28 NOTE — PLAN OF CARE
Problem: Discharge Barriers  Goal: Patient s discharge needs will be met  Outcome: Progressing  Care Plan-Care Management    Care Management Goals of the Day:   Progression of Care    Care Progression to be reviewed with MD and RN CM: Updates to be provided to Charge RN, CMSW, HUC,therapy and other disciplines as indicated.    Barriers to Discharge:  Medical Management, POD 4 CABG x 3, IV antibiotics, IV lasix    Discharge Disposition:   Home     Expected Discharge Date:  4/29/19    Transportation:  Family     Care Coordination Narrative:   Pt from home with spouse. Per previous note pt needed some assistance at home r/t gout in his foot. Pt will likely need some assistance at d/c. Families goal is for pt to return home. Therapies support a home discharge with family support. . Family can likely transport. CM to follow.

## 2021-05-28 NOTE — PLAN OF CARE
Problem: Discharge Barriers  Goal: Patient's discharge needs are met  Outcome: Progressing     Care Plan-Care Management    Care Management Goals of the Day: Progression of care, DC planning    Care Progression Reviewed With: Charge RN, MD, HUC, RNCM, CMSW    Barriers to Discharge: ongoing post CABG care    Discharge Disposition: home with OP CR    Expected Discharge Date: 4/29/19    Transportation: family    Care Coordination Narrative: Pt is from home with family. Plan is DC back to home with family; OP CR. CM to f/u with therapy rec closer to DC date. CM to follow.       Angela Gentile RN CM

## 2021-05-28 NOTE — PROGRESS NOTES
"  Patient Name: Douglas Herrera   MRN: [id number]   Date of Admission: 4/23/2019    Procedure: CORONARY ARTERY BYPASS x 3, LEFT LEG ENDOSCOPIC VEIN HARVEST, LEFT INTERNAL MAMMARY, EPIAORTIC ULTRASOUND, ANESTHESIA TRANSESOPHAGEAL ECHOCARDIOGRAM, WITH ASCENDING AORTA REPLACEMENT    Post Op day #:2    Subjective (Patient focus/Primary Problem for shift): \" I'm tired, I want to rest\"          Pain Goal0 Pain Rating0           Pain Medication/ Regime effective to reduce patient pain Scheduled pain meds given, c/o incisional discomfort     Objective (Physical assessment):           Rhythm: normal sinus rhythm            Bowel Activity: no if Yes indicate when: NA           Bowel Medications: yes            Incision: healing well          Incentive Spirometry Q 1-2 hour when awake:  yes Volume: 500          Epicardial Pacing Wires:  yes            Patient Activity:           Up to chair for meals: pt refused          Ambulation with RN x2 (Not including CR): Pt refused            Is patient in home clothes:no             Chest Tubes   Pleural: yes Draining: yes               Suction: yes              Mediastinal: yes Draining: yes               Suction: yes   Dressing Change Daily:no If No, why? Changed in ICU                     Urinary Catheter: no           Preventative WOC consult (need MD order): no       Assessment (Nursing primary shift focus):   A/oX4. C/o incisional pain while coughing, scheduled pain meds given, declined PRN. VSS, tele NSR. Lung sounds diminished, few crackles in the right lung field, O2 sat mid 90s on 2L of O2, pt gets IS to 500 mL. Incision site CDI, healing well, chest tube site covered, CT to suction, dressing CDI, pacer wires are capped. Voiding, no BM since after surgery, passing gas, bowl medications given. Refused to ambulate. Call light within reach, calls appropriately. Will continue to monitor.                       Plan (Patient Care Plan/focus): rest, increase activity, encourage IS use, " monitor VS      Dia Hendricks   4/25/2019   10:33 PM

## 2021-05-28 NOTE — BRIEF OP NOTE
[facility]    Brief Operative Note    Pre-operative diagnosis: CAD (coronary artery disease) [I25.10]  Ascending aortic aneurysm (H) [I71.2]  Post-operative diagnosis Same  Procedure: CORONARY ARTERY BYPASS x 3, LEFT LEG ENDOSCOPIC VEIN HARVEST, LEFT INTERNAL MAMMARY, EPIAORTIC ULTRASOUND, ANESTHESIA TRANSESOPHAGEAL ECHOCARDIOGRAM, WITH ASCENDING AORTA REPLACEMENT  Surgeon: Surgeon(s) and Role:     * Darlene Graff MD - Primary   CLEMENCIA Dawn - Fellow   Eliza Zepeda - Surg Tech/First Assist   Carrol Garrett PA-C  Anesthesia: General   Estimated blood loss: 450 mL from 4/23/2019  6:51 AM to 4/23/2019 12:10 PM  Drains:     Chest Tube 1 Left Pleural 24 Fr. (Active)       Chest Tube 2 Mediastinal 28 Fr. (Active)       Urethral Catheter Non-latex;Temperature probe 16 Fr. (Active)     Specimens:   ID Type Source Tests Collected by Time   A : Ascending Aorta Tissue Aorta SURGICAL PATHOLOGY EXAM Darlene Graff MD 4/23/2019 0927     Findings:   LIMA to LAD, GSV to Diagonal and RPDA, Ascending aorta replaced with 30mm Gelweave tube graft.  Complications: None.  Implants:           Implant Name Type Inv. Item Serial No.  Lot No. LRB No. Used Action   LEAD ARTIRIAL PACING TEMPORARY 53CM [id number] - [id number] Lead LEAD ARTIRIAL PACING TEMPORARY 53CM [id number]  [employer] [id number] N/A 2 Implanted   PLEDGET PTFE SOFT PREMIPATCH 9X5MM [id number] - [id number] Graft PLEDGET PTFE SOFT PREMIPATCH 9X5MM [id number]  [employer] [id number] N/A 3 Implanted   MAYO STERNAL WIRE SINGLE #6 046-032 - [id number] Wire MAYO STERNAL WIRE SINGLE #6 046-032  [employer] [id number] N/A 1 Implanted   GUARD ROMEO 8X14 HEARTS ONLY [id number] - [id number] Graft GUARD ROMEO 8X14 HEARTS ONLY [id number]  [employer] [id number] N/A 1 Implanted   GRAFT VASCULAR GEL WEAVE ST 30MM - [id number] Graft GRAFT VASCULAR GEL WEAVE ST 30MM [id number] VASCUTEK [id number] N/A 1 Implanted   MAYO MYOSTERNAL WIRE - [id number]  Wire MAYO MYOSTERNAL WIRE  [employer] [id number] N/A 1 Implanted

## 2021-05-28 NOTE — PLAN OF CARE
Problem: Occupational Therapy  Goal: OT Goals  Description  Patient will demonstrate the following by 4/30/2019, in order to maximize independence with ADL/IADL performance:   -Be able to get in/out of bed and up/down from chairs/toilet with sup assist while adhering to sternal precautions.   -Be able to complete functional mobility in halls for a distance of 600ft without symptoms in order to return to previous living situation or transfer to appropriate next level of care, safely.   -Be able to complete 4-8 stairs in order to do the same at home without symptoms independently.   -Be able to maintain vitals within normal limits during activity to ensure safe discharge.   -Be able to verbalize his/her understanding of the importance of daily home exercise program and be given the appropriate walking program given their performance to encourage daily exercise at home before discharge.   -Be able to verbalize and demonstrate understanding of precautions with ADL/home activity before discharge.   -Be able to verbalize understanding of cardiac warning signs and symptoms and emergency plan before discharge.   Goals entered on 4/25/2019 by ERI Edgar/L at 12:08pm     Patient will demonstrate the following by 5/3/2019, in order to maximize independence with ADL/IADL performance:   -Be able to get in/out of bed and up/down from chairs/toilet with sup assist while adhering to sternal precautions.   -Be able to complete functional mobility in halls for a distance of 400ft without symptoms in order to return to previous living situation or transfer to appropriate next level of care, safely.   -Be able to complete 4-8 stairs in order to do the same at home without symptoms independently.   -Be able to maintain vitals within normal limits during activity to ensure safe discharge.   -Be able to verbalize his/her understanding of the importance of daily home exercise program and be given the appropriate walking  program given their performance to encourage daily exercise at home before discharge.   -Be able to verbalize and demonstrate understanding of precautions with ADL/home activity before discharge.   -Be able to verbalize understanding of cardiac warning signs and symptoms and emergency plan before discharge.   Goals entered on 4/30/2019 by Luz Ruiz OTR/L at 9:55am      Outcome: Completed  Occupational Therapy Discharge Summary    Date of OT Discharge: 4/30/2019  Refer to daily doc flowsheet for equipment issued and current functional status.  Discharge Destination: Home  Discharge Comments: Phase II [facility]      4/30/2019 by Luz Ruiz, OT

## 2021-05-28 NOTE — PLAN OF CARE
Problem: Pain  Goal: Patient's pain/discomfort is manageable  4/25/2019 0738 by Lavern Ayoub, RN  Outcome: Progressing  4/25/2019 0735 by Lavern Ayoub, RN  Outcome: Progressing     Problem: Inadequate Airway Clearance  Goal: Patient will maintain patent airway  Outcome: Progressing     Problem: Glucose Imbalance  Goal: Achieve optimal glucose control  Outcome: Progressing

## 2021-05-28 NOTE — PROGRESS NOTES
"Clinical Nutrition Therapy Reassessment Note     Subjective: Met with patient and wife. Unfortunately, no  present, but both patient and wife do speak some English. Pt in a fairly significant amount of pain d/t gout flare up. Pt's wife also notes that their sons speak and read English and will assist them in reading through some of the materials provided. Plan to follow up on Monday and also at cardiac rehab with use of .     Education: Brief intro of overall heart healthy diet including limiting of saturated fats, sodium, and incorporation of fruits, vegetables. Pt's wife notes she likes salty foods, but pt does not.    Current Nutrition Prescription:   Diet: Cardiac, no caffeine (advanced this morning from clear liquids)      Current Nutrition Intake:  Patient's diet advanced this morning. Has not been meeting needs since he's been on clear liquids. Consumed 100% of clear liquids this morning.     Anthropometrics:  Height: 5' 4\" (1.626 m)  Admission weight: 172 lb  Most recent weight: 173 lb 4.8 oz (78.6 kg)  BMI:Body mass index is 29.75 kg/m .  BMI indication: 25-29.9 overweight  Ideal body weight: 130 lb +/- 10%  Weight History:   Wt Readings from Last 10 Encounters:   04/26/19 173 lb 4.8 oz (78.6 kg)   04/22/19 176 lb 4.8 oz (80 kg)   04/11/19 180 lb (81.6 kg)   03/12/19 176 lb 7 oz (80 kg)   03/06/19 177 lb (80.3 kg)   01/11/19 177 lb (80.3 kg)       RD Nutrition Focused Physical Exam:  The patient has the following physical signs which could indicate malnutrition: assessed 4/24, no s/s of muscle/fat wasting noted     GI Status/Output:   GI symptoms include:Constipation per nurse  Bowel Sounds present per nurse  Last BM: 4/21/19 per nurse      Skin/Wound:  Gurinder score: 20    Surgical incisions s/p CABG noted    Medications:  Lipitor  Dulcolax  Colace  Lasix  SSI  MOM  Prilosec  Zosyn  Miralax  K+ protocol  Prednisone    Labs:  Results from last 7 days   Lab Units 04/26/19  0600 "   LN-SODIUM mmol/L 141   LN-POTASSIUM mmol/L 3.8   LN-CHLORIDE mmol/L 101   LN-CO2 mmol/L 33*   LN-BLOOD UREA NITROGEN mg/dL 22   LN-CREATININE mg/dL 1.14   LN-CALCIUM mg/dL 9.3         Results from last 7 days   Lab Units 04/26/19  0600   LN-MAGNESIUM mg/dL 2.1         Lab Results   Component Value Date    POCGLUFGR 167 (H) 04/26/2019    POCGLUFGR 136 04/26/2019    POCGLUFGR 124 04/25/2019    POCGLUFGR 152 (H) 04/25/2019    POCGLUFGR 141 (H) 04/25/2019    POCGLUFGR 161 (H) 04/25/2019    POCGLUFGR 141 (H) 04/25/2019    POCGLUFGR 155 (H) 04/24/2019    POCGLUFGR 138 04/24/2019    POCGLUFGR 130 04/24/2019      Hemoglobin A1c   Date Value Ref Range Status   04/25/2019 5.2 4.2 - 6.1 % Final       Assessed Nutritional Needs:  Assessment weight is 59 kg, with a weight source of ideal.    Estimated Energy Needs: 9022-4516 kcals (25-30 kcal/kg)    Estimated Protein Needs: 71-89 gm (1.2-1.5 gm/kg)    Estimated Fluid Needs: 2599-7560 mls (25-30 mls/kg)    Malnutrition: Not noted     Nutrition Risk Level: low risk     Nutrition DX: Nutrition knowledge deficit r/t new DX evidenced by surgery     Goals:  Participate in diet ed- progressing     Education needs:  Cardiac diet     Plan:  Diet ed before d/c     Monitor:   Diet advance, education readiness    See Care Plan for further Problems, Goals, and Interventions    Electronically signed by:  Claudia Pardo RD

## 2021-05-28 NOTE — PROGRESS NOTES
RCAT Treatment Plan      Patient Score: 9  Patient Acutiy: 2    Clinical Indication for Therapy: thoracic surgery    Therapy Ordered: continue current therapy    Assessment Summary: patient is on 2 lpm 02 sating 95%, breath sounds diminished. Patient is reaching 600 ml on IS and ding 3-5 minutes with flutter valve. He does have pain when doing this, instructed patient to continue to squeeze his heart pillow when doing these exercises, patient understood.      (     04/26/19 0817   Reason for Assessment (RCAT)   RCAT Assesment Re-eval   Assessment Reason  Thoracic surgery   $ RCAT Eval Time 15 min.  Yes   Vitals (RCAT)   Heart Rate 83   Resp 18   SpO2 95 %   Chart Assessment (RCAT)   Pulmonary Status  0   Surgical Status  3   Chest Xray  2   Patient Assessment (RCAT)    Respiratory Pattern  0   Mental Status  0   Breath Sounds  2   Cough Effectiveness  0   Level of Activity  1   O2 Required SpO2 >= 92%  1   Chart + Pt. Assessment Total Points  9   RCAT Acuity Score 9 (Acuity 2)   Clinical Indications (RCAT)   Aerosol Hygiene RCAT protocol   RCAT Order Placed  Continue current therapy    insert data here)      SAMUEL CruzT

## 2021-05-28 NOTE — PROGRESS NOTES
Hospitalist Progress Note    Overnight Events/Subjective:    Pain in ankle improved today but right great toe still painful.  Thinks his left knee might be bothering a little bit more today.   No chest pain or shortness of breath.    Assessment/Plan    #Gouty flare -prednisone 20 mg daily x5 days. Would not resume allopurinol setting of an acute flare.  Hold off on NSAID post CABG.  #CAD status post CABG and replacement of ascending aorta -aspirin, statin  #Postoperative fever -fever curve improving possible pneumonia.  On Zosyn.  Plan to transition to Augmentin.      Objective    Vital signs in last 24 hours  Temp:  [98.1  F (36.7  C)-100.4  F (38  C)] 98.6  F (37  C)  Heart Rate:  [75-90] 75  Resp:  [16-20] 16  BP: ()/(58-63) 96/58 93% O2 Device: Nasal cannula O2 Flow Rate (L/min): 2 L/min  Weight:   173 lb 4.8 oz (78.6 kg) Weight change:     Intake/Output last 3 shifts  I/O last 3 completed shifts:  In: 1740 [P.O.:1740]  Out: 701 [Urine:700; Stool:1]  Body mass index is 29.75 kg/m .    Physical Exam  General:  Alert, cooperative, no distress,  Appears stated age  Neurologic:  Oriented, fluent speech, facial symmetry preserved, spontaneous moving extremities  HEENT:  Anicteric, MMM  CV: RRR no MRG, normal S1 and S2, no edema.  Sternal incision clean dry intact  Lungs: CTAB.  Easy respirations  Abd: soft, NT, normoactive BS,  Extremities: Right ankle and great toe mild tenderness to palpation.  Left knee without significant effusion.        Labs: Labs reviewed and pertinent findings discussed in A/P  Medications: medication list reviewed.  Pertinent changes discussed in A/P    Discussed with: Cardiac Sx.  present.    Dahiana Hicks MD  Internal Medicine Hospitalist  4/27/2019

## 2021-05-28 NOTE — PLAN OF CARE
Problem: Inadequate Airway Clearance  Goal: Patient will maintain patent airway  Outcome: Progressing     TONYA Lizama

## 2021-05-28 NOTE — PROGRESS NOTES
Spiritual Care Note    Spiritual Assessment:   made a follow up visit with patient this morning. Patient up in his chair; friend and interpretor are present. Patient seems tired and admits his recovery as been difficult for him.  validates his struggle and reminded him of the big surgery he had. Patient welcomed prayer from the .     Care Provided: Listening, support, and prayer provided.     Plan of Care: Spiritual care will continue to follow as part of patient's care team.     CINDY Hendricks, BCC

## 2021-05-28 NOTE — PLAN OF CARE
"  Problem: Pain  Goal: Patient's pain/discomfort is manageable  Outcome: Progressing     Problem: Safety  Goal: Patient will be injury free during hospitalization  Outcome: Progressing     Problem: Daily Care  Goal: Daily care needs are met  Outcome: Progressing     Problem: Psychosocial Needs  Goal: Demonstrates ability to cope with hospitalization/illness  Outcome: Progressing  Goal: Collaborate with patient/family/caregiver to identify patient specific goals for this hospitalization  Outcome: Progressing     Problem: Discharge Barriers  Goal: Patient's discharge needs are met  Outcome: Progressing     Problem: Inadequate Airway Clearance  Goal: Patient will maintain patent airway  Outcome: Progressing     Problem: Knowledge Deficit  Goal: Patient/family/caregiver demonstrates understanding of disease process, treatment plan, medications, and discharge instructions  Outcome: Progressing     Problem: Potential for Falls  Goal: Patient will remain free of falls  Outcome: Progressing     /66 (Patient Position: Lying)   Pulse 73   Temp 98.5  F (36.9  C) (Oral)   Resp 16   Ht 5' 4\" (1.626 m)   Wt 164 lb 8 oz (74.6 kg)   SpO2 96%   BMI 28.24 kg/m      NSR on tele and on RA  CABGx3 POD7, incisions c/d/I  IS-750    Jeremy Ruiz RN  5:59 AM  4/30/2019    "

## 2021-05-28 NOTE — TREATMENT PLAN
" RCAT Treatment Plan      Patient Score: 9  Patient Acutiy: 2    Clinical Indication for Therapy: Post CAB    Therapy Ordered: IS and Flutter valve QID    Assessment Summary: /64   Pulse 85   Temp (!) 100.6  F (38.1  C) (Bladder)   Resp 20   Ht 5' 3\" (1.6 m)   Wt 175 lb 14.8 oz (79.8 kg)   SpO2 93%   BMI 31.16 kg/m    Patient is on 1 LPM via nasal cannula. Breath sounds clear.  No pulmonary hx. Wife states that he has never smoked. Patient still has chest tubes in. Pain issues are making it difficult for him to do well on Incentive spirometer.  He is achieving 500 on IS.  Will continue to  with IS and flutter valve. Re-eval RCAT ordered for tomorrow.         04/24/19 1546   Reason for Assessment (RCAT)   RCAT Assesment Initial   Assessment Reason  Thoracic surgery   $ RCAT Eval Time 30 min.  Yes   Vitals (RCAT)   Heart Rate 85   Resp 20   SpO2 93 %   FiO2 (%)   (1 LPM)   Chart Assessment (RCAT)   Pulmonary Status  0   Surgical Status  3   Chest Xray  2   Patient Assessment (RCAT)    Respiratory Pattern  0   Mental Status  0   Breath Sounds  0   Cough Effectiveness  2   Level of Activity  1   O2 Required SpO2 >= 92%  1   Chart + Pt. Assessment Total Points  9   RCAT Acuity Score 9 (Acuity 2)   Clinical Indications (RCAT)   Aerosol Hygiene   (no hx bronchspasm)   Broncho-Pulmonary Therapy Pt. unable to deep breath and cough spontaneously  (pain issues post cab, flutter valve order)   Volume Expansion Therapy Prevent atelectasis  (IS)   RCAT Order Placed  Yes;Continue current therapy         SAMUEL NagyT    "

## 2021-05-28 NOTE — PLAN OF CARE
"  Problem: Psychosocial Needs  Goal: Demonstrates ability to cope with hospitalization/illness  Outcome: Not Progressing     Problem: Pain  Goal: Patient's pain/discomfort is manageable  Outcome: Not Progressing   /63 (Patient Position: Lying)   Pulse 68   Temp 98.7  F (37.1  C) (Oral)   Resp 16   Ht 5' 4\" (1.626 m)   Wt 173 lb 4.8 oz (78.6 kg)   SpO2 93%   BMI 29.75 kg/m        Patient Name: Douglas Herrera   MRN: [id number]   Date of Admission: 4/23/2019    Procedure: CORONARY ARTERY BYPASS x 3, LEFT LEG ENDOSCOPIC VEIN HARVEST, LEFT INTERNAL MAMMARY, EPIAORTIC ULTRASOUND, ANESTHESIA TRANSESOPHAGEAL ECHOCARDIOGRAM, WITH ASCENDING AORTA REPLACEMENT    Post Op day #:5    Subjective (Patient focus/Primary Problem for shift): States feeling \"better than 24 hrs ago.\"   Says pain is lower.  However, non-weight bearing on affected leg (Gout).  States not doing IS as much as instructed (coached on frequency and goal of IS).          Pain Goal 3  Pain Rating 0          Pain Medication/ Regime effective to reduce patient pain.  Scheduled Tylenol.    Objective (Physical assessment):           Rhythm: normal sinus rhythm            Bowel Activity: no if Yes indicate when:            Bowel Medications: no            Incision: healing well          Incentive Spirometry Q 1-2 hour when awake:  yes Volume: 750            Epicardial Pacing Wires:  no            Patient Activity:           Up to chair for meals: yes          Ambulation with RN x2 (Not including CR): no            Is patient in home clothes:no            Preventative WOC consult (need MD order): no       Assessment (Nursing primary shift focus):  VSS.  Wound site CDI.  R LE remains swollen and inflamed.    Plan (Patient Care Plan/focus):  POD #5.      Cesar Vaughn   4/28/2019   5:26 AM              "

## 2021-05-28 NOTE — CONSULTS
"  Clinical Nutrition Therapy Assessment Note      Reason for Assessment:   Douglas Herrera is a 58 y.o. male assessed by the RD for consult and protocol/pathway order.    Subjective:  Pt extubated today.  Met pt, pt's son and other family members.  Chart reviewed.    Nutrition History:  Information from patient, family/caregiver and chart.  Diet prior to admission:  general.  Recent food/fluid intake: good.   Cultural/Hoahaoism food needs or preferences: ong  Food allergies or intolerances: nkfa    Nutrition Prescription:   Diet: NPO.  To start CL, diabetic SCIP today.  IV dextrose or Fluids:  DOPamine    epinephrine    niCARdipine Last Rate: Stopped (04/23/19 1736)   norepinephrine IV infusion in D5W    phenylephrine IV infusion in NS Last Rate: 15 mcg/min (04/24/19 1013)   sodium chloride 0.9% Last Rate: 50 mL/hr (04/23/19 1300)   sodium chloride 0.9% Last Rate: 25 mL/hr (04/23/19 1300)   vasopressin        Current Nutrition Intake:  Minimal intake since surgery    Anthropometrics:  Height: 5' 3\" (160 cm)  Admission weight: 172#  Weight: 175 lb 14.8 oz (79.8 kg)  BMI (Calculated): 31.2  BMI indication: 30-34.9 obesity (class 1)  Ideal body weight 124#+-10%  Weight History:    Wt Readings from Last 4 Encounters:   04/24/19 175 lb 14.8 oz (79.8 kg)   04/22/19 176 lb 4.8 oz (80 kg)   04/11/19 180 lb (81.6 kg)   03/12/19 176 lb 7 oz (80 kg)   177# 3/6/19.    Physical Findings:  Not found     GI Status/Output:   GI symptoms per nursing:   Last BM recorded: n/a    Skin/Wound:   Gurinder Scale Score: 14    Medications:  Medications reviewed.    Labs:  Glucose 146  Labs reviewed    Estimated Nutrition Needs:  Using ideal weight of 56.5 kg.    Energy Needs: 2169-5625 kcals daily per 25-30 kcal/kg   Protein Needs: 68-85 g daily, 1.2-1.5 g/kg.    Malnutrition: Not noted    Nutrition Risk Level: low risk    Nutrition DX: Nutrition knowledge deficit r/t new DX evidenced by surgery    Goals:  Participate in diet ed    Education " needs:  Cardiac diet    Plan:  Diet ed before d/c    Monitor:   Diet advance, education readiness    See Care Plan for Problems, Goals, and Interventions.

## 2021-05-28 NOTE — INTERVAL H&P NOTE
I have performed an assessment and examined the patient, as necessary, to update the patient's current status that may have changed since the prior History and Physical.    There has been no interval change in history or physical examination.  The patient is ready to undergo CABG and replacement of a segment of his ascending aorta that is  Aneurysmal.  Informed consent has been obtained.

## 2021-05-28 NOTE — PLAN OF CARE
Problem: Inadequate Airway Clearance  Goal: Patient will maintain patent airway  Outcome: Progressing     Problem: Mechanical Ventilation  Goal: Patient will maintain patent airway  Outcome: Completed  Goal: Respiratory status - ventilation  Description  Movement of air in and out of the lungs.    Liberate from ventilator  Outcome: Completed  Goal: ET tube will be managed safely  Outcome: Completed     TONYA Irving

## 2021-05-28 NOTE — PLAN OF CARE
Initiated cardiac diet education. Patient will need follow up with  to answer more complex questions. Pt not ready for full education at this time (in pain d/t gout flare, no  present).

## 2021-05-28 NOTE — PROGRESS NOTES
Vent Mode: VCV  FiO2 (%):  [30 %-100 %] 30 %  S RR:  [16] 16  S VT:  [450 mL] 450 mL  PEEP/CPAP (cm H2O):  [5 cm H2O] 5 cm H2O  Minute Ventilation (L/min):  [5.7 L/min-11.5 L/min] 11.5 L/min  PIP:  [10 cm H2O-38 cm H2O] 33 cm H2O  MS SUP:  [5 cm H20] 5 cm H20  MAP (cm H2O):  [6-14] 12    Patient remains on full vent support with settings listed above. Breath sounds remain clear. Three weans were attempted this shift lasting 3,11 and 11 minutes. As stated before, patient becomes very tachypneic and breathes in the high 30's low 40's. VT's 100-250mL during the wean. I will pass on in report that we need to do another weaning trial. NIF -6 and -8. RT will continue to follow.    SAMUEL BarrosT

## 2021-05-28 NOTE — PROGRESS NOTES
"Pt was seen by Cardiac Rehab Staff for Pre-Op orientation to Inpatient Cardiac Rehab. Reviewed sternal precautions and functional mobility techniques for post-op recovery.   Zoltan Ahumada present and assisted with social history gathering and education.  Plans Phase II at [facility].  Brought to RN's attention patient mentioned he is depressed x3 within pre op.  Reports is seeing a psychologist x 2 per week.  Reports psychologist was looking into some type of supervised living situation like a \"TCU\"  Reports he cooked but his family prefers he does not cook as has left the stove on.  Patient also states was getting outpatient PT from weakness related to a \"cholesterol med.\"  Given this, had patient demonstrate ability to get on/off chair of which he was able to do without using his arms.  Informed patient of benefits of TCU stay after CABG and states he has looked into PCA services at home.  Luz Ruiz, OTR/L 4/22/2019  At 11:11am    "

## 2021-05-28 NOTE — PROGRESS NOTES
RCAT Treatment Plan    Patient Score: 8    Patient Acuity: 2    Clinical Indication for Therapy: Atelectasis    Therapy Ordered:  Flutter valve BID    Assessment Summary: Pt is on room air. Achieving 750 ml on IS. Tolerating flutter valve well. RT will continue Flutter valve two times a day per protocol.      Brigid Gonzalez, LRT  4/28/2019

## 2021-05-28 NOTE — ANESTHESIA PREPROCEDURE EVALUATION
Anesthesia Evaluation      Patient summary reviewed   History of anesthetic complications (ponv)     Airway   Mallampati: III  Neck ROM: full   Pulmonary - normal exam    breath sounds clear to auscultation  (+) a smoker                         Cardiovascular - normal exam  Exercise tolerance: > or = 4 METS  (+) hypertension, angina, , hypercholesterolemia, PVD    ECG reviewed  Rhythm: regular  Rate: normal,         Neuro/Psych    (+) depression,     Endo/Other - negative ROS      GI/Hepatic/Renal - negative ROS           Dental    (+) poor dentition and chipped            echo    Normal left ventricular size with mildly increased wall thickness.    Left ventricle ejection fraction is normal. The calculated left ventricular ejection fraction is 66%.    Normal right ventricular size and systolic function.    No hemodynamically significant valvular heart abnormalities.    No previous study for comparison.       Cath  Angiography via left radial  LM normal  LAD prox 100% with collaterals via PDA; Circ collaterals to diagonal  Circ OM1 mild dz  RCA mid PDA 60-70% with collaterals to LAD     Ascending aorta 4.8cm      Imp/plan  1. Severe 2 vessel dz   2. Ascending aortic aneurysm - 4.8cm      CT surgery consult, beta blocker, statin, carotid US and echocardiogram.      Stress test    The pharmacologic nuclear stress test is abnormal.    Stress nuclear images demonstrate a medium to large area of moderate ischemia involving the mid to distal anteroseptal, anterior and apical walls    The left ventricular ejection fraction is 75% with distal anterior and apical hypokinesis.    There is no prior study available.    The patient is at an intermediate risk of future cardiac ischemic events.    The findings of this examination were communicated to Dr. Hernandez.           Anesthesia Plan  Planned anesthetic: general endotracheal  Slightly higher risk of anesthesia awareness compared to general population discussed with  patient.    Induction:  Slow controlled induction with etomidate, esmolol, shana and methadone.  Will optimize myocardial oxygen demands.        Monitors:  Standard ASA, Arterial line, PAC, Transesophageal echo for monitoring    Lines: Central line with PAC, PIVx2, arterial line    Labs:  Reviewed.  Type and screen done    Postop:  D/w patient about routine post op ventillator support with sedation  ASA 4   Induction: intravenous   Anesthetic plan and risks discussed with: patient and  services used  Anesthesia plan special considerations: antiemetics, CVP line, arterial catheterization, pulmonary artery catheterization, IV therapy two IVs, dexmedetomidine  Post-op plan: extended intubation/vent support and routine recovery

## 2021-05-28 NOTE — PROGRESS NOTES
ICU RN reported no void post cruz removal, obtained bladder protocol, but pt stated he voided in ICU the aid in ICU documented >800 mL, bladder scan showed 500, pt voided 500 and PVR was 500, will rescan bladder in a couple hours if pt does not void.

## 2021-05-28 NOTE — PROGRESS NOTES
"Progress Note    Assessment/Plan  S/P CABG X 3/Ascending Aorta replacement POD # 5  Awake and alert, not in bed at time of evaluation, with no apparent distress  AVSS, normal sinus rhythm per monitor  Patient has a history of gouty arthritis which is flared up, started on prednisone and allopurinol therapy yesterday  Having difficulties bearing weight on the right foot  Postop respiratory insufficiency, requiring supplemental oxygen 2 L nasal cannula, saturation 93%  Poor inspiratory effort, I S 750, encouraged to increase effort  Acute postop blood loss anemia, last Hgb 8.3, started iron therapy and will monitor closely  Postop thrombocytopenia resolved, Plt 170  Incisions are clean dry intact, lungs sound diminished in the bases  Urine output marginal to adequate, adequate BM  Weight 79.2 kg, preop weight 78.2 kg  Lasix 40 mg twice a day  Mobilize and ambulate in the hallway 3-4 times daily as tolerated  Continue pulmonary toilet  Discharge disposition is tentatively home in 24 to 48 hours unless unable due to gouty arthritis flareup  May require TCU convalescence at discharge      Subjective  Denies incisional chest pain and or shortness of breath, but complaint of right heel pain  Objective  Awake and alert, sitting up in the bed at time of evaluation with no apparent distress  Vital signs in last 24 hours  Temp:  [97.8  F (36.6  C)-99.5  F (37.5  C)] 97.8  F (36.6  C)  Heart Rate:  [68-78] 73  Resp:  [16-24] 16  BP: ()/(55-63) 105/63  Weight:   174 lb 8 oz (79.2 kg)  Preop with 75.7 kg  Intake/Output last 3 shifts  I/O last 3 completed shifts:  In: 560 [P.O.:560]  Out: 2025 [Urine:2025]  Intake/Output this shift:  No intake/output data recorded.    Review of Systems   A 12 point comprehensive review of systems was negative except as noted.    Physical Exam  /63 (Patient Position: Semi-trevino)   Pulse 73   Temp 97.8  F (36.6  C) (Oral)   Resp 16   Ht 5' 4\" (1.626 m)   Wt 174 lb 8 oz (79.2 kg)   " SpO2 93%   BMI 29.95 kg/m    HRR, incisions are clean dry and intact, lung sounds diminished bilaterally  Abdomen is soft and nontender  No lower extremity edema appreciated    Pertinent Labs   Lab Results: personally reviewed.   Lab Results   Component Value Date     04/26/2019    K 3.5 04/28/2019     04/26/2019    CO2 33 (H) 04/26/2019    BUN 22 04/26/2019    CREATININE 1.14 04/26/2019    CALCIUM 9.3 04/26/2019     Lab Results   Component Value Date    WBC 11.7 (H) 04/26/2019    HGB 8.3 (L) 04/26/2019    HCT 25.1 (L) 04/26/2019    MCV 92 04/26/2019     04/27/2019       Pertinent Radiology   Radiology Results: Personally reviewed image/s, Personally reviewed impression/s and Persistent basilar atelectasis and pulmonary congestion  EKG Results: personally reviewed.  and Sinus rhythm per monitor    Patrick Olea

## 2021-05-28 NOTE — PLAN OF CARE
Problem: Discharge Barriers  Goal: Patient s discharge needs will be met  Outcome: Progressing  Care Plan-Care Management    Care Management Goals of the Day:   Progression of Care    Care Progression to be reviewed with MD and RN CM: Updates to be provided to Charge RN, CMSW, HUC,therapy and other disciplines as indicated.    Barriers to Discharge:  Medical Management, POD 2 CABG x 3, IV antibiotics, IV lasix    Discharge Disposition:   Home pending     Expected Discharge Date:  4/29/19    Transportation:  Family     Care Coordination Narrative:   Pt from home with spouse. Per previous note pt needed some assistance at home r/t gout in his foot. Pt will likely need some assistance at d/c. Families goal is for pt to return home. Will watch for therapy recommendations. Family can likely transport. CM to follow.    Oma Guillen RNCM  04/25/19

## 2021-05-28 NOTE — ANESTHESIA POSTPROCEDURE EVALUATION
Patient: Douglas Herrera  CORONARY ARTERY BYPASS x 3, LEFT LEG ENDOSCOPIC VEIN HARVEST, LEFT INTERNAL MAMMARY, EPIAORTIC ULTRASOUND, ANESTHESIA TRANSESOPHAGEAL ECHOCARDIOGRAM, WITH ASCENDING AORTA REPLACEMENT  Anesthesia type: general    Patient location: cvicu  Last vitals:   Vitals Value Taken Time   /68 4/23/2019 12:45 PM   Temp 38.9  C (102  F) 4/23/2019  6:00 PM   Pulse 79 4/23/2019  6:38 PM   Resp 16 4/23/2019 12:45 PM   SpO2 98 % 4/23/2019  6:38 PM   Vitals shown include unvalidated device data.  Post vital signs: stable  Level of consciousness: sedated on vent  Post-anesthesia pain: pain controlled  Post-anesthesia nausea and vomiting: no  Pulmonary: sedated on vent  Cardiovascular: stable and blood pressure at baseline  Hydration: adequate  Anesthetic events: no    QCDR Measures:  ASA# 11 - Tejal-op Cardiac Arrest: ASA11B - Patient did NOT experience unanticipated cardiac arrest  ASA# 12 - Tejal-op Mortality Rate: ASA12B - Patient did NOT die  ASA# 13 - PACU Re-Intubation Rate: ASA13X - Exclusion: organ donor or direct ICU transfer  ASA# 10 - Composite Anes Safety: ASA10A - No serious adverse event    Additional Notes:  Patient off all gtts.  But slow to wake.  Slow and controlled vent wean per icu team

## 2021-05-28 NOTE — PROGRESS NOTES
Pt state he feels like he is having a gout flare up in his right ankle. He takes allopurinol at home. Text sent to Dr. Hicks to request medication. Pain meds given as well.     Lalo Stephens RN

## 2021-05-28 NOTE — PLAN OF CARE
CV surgery education completed with patient and family.  Additional written materials in hmong provided in binder

## 2021-05-28 NOTE — CONSULTS
Consultation - Hospital Medicine service  Douglas Herrera,  1960, MRN [id number]    [facility]  CAD (coronary artery disease) [I25.10]  Ascending aortic aneurysm (H) [I71.2]    PCP: Mira Leung CNP, [phone]   Code status:  Full Code       Extended Emergency Contact Information  Primary Emergency Contact: PRECIOUS HERRERA  Mobile Phone: [phone]  Relation: Child  Secondary Emergency Contact: PRISCILA HERRERA  Mobile Phone: [phone]  Relation: Child       Assessment and Plan   Principal Problem:    S/P CABG (coronary artery bypass graft)  Active Problems:    ARF (acute respiratory failure) (H)    Coronary artery disease involving native coronary artery    Ascending aorta dilatation (H)    Anxiety    Hospital-acquired pneumonia      1. Acute hypoxic respiratory failure  2.  Presumed HAP/VAP  Status post extubation .  He continues to require oxygen support currently at 3 L per nasal cannula.  Chest x-ray this morning shows left lower lobe infiltrate overall unchanged.  The patient has developed fevers with T-max of 101.7.  Given recent ventilator use over the past 48 hours at risk for potential associated pneumonia.  He now has increased oral secretions, sputum production and fever will start him on Zosyn.  -Started on penicillin-tazobactam(zosyn) for presumed ventilator associated pneumonia.  -Obtain deep sputum cultures  -Obtain blood cultures  -Bronchodilators duo nebs as needed    2.Severe multivessel CAD.  POD#status post 2 CABG x 3.  -Continue aspirin, atorvastatin.  -CV surgery following the patient.    3. Dilated ascending aorta.   POD# 2 S/p Ascending aorta replacement   -Postoperative cares per CV surgery.    4. Acute blood loss Anemia.   ml, Hgb down from baseline 14-- down to--->9, continue to monitor no indication for transfusion.  -Monitor Hgb  -Transfusion thresholds if Hgb < 7.0    5. Anxiety   -Continue Prozac       Chief Complaint  Fever       Day of Service:  04/25/2019      [facility]   Hospital medicine service have been requested by primary team to evaluate Douglas Herrera for fever who is a 58 y.o. year old male coronary disease status post CABG x3 and ascending aortic replacement by Dr. Graff on 4/23/2019, postoperatively complicated by postoperative failure intubated status post extubation.    Hospital medicine service consulted today the patient noted to have increased oral secretions, fever and cough.  The patient reports shortness of breath evaluation and has been requiring more oxygen throughout the night.  He denies any abdominal pain, nausea or emesis.  No reports of aspiration events.  He denies any chest pain at the time of evaluation.  He denies any diarrhea or loose stools.  Chest x-ray shows left lower lobe infiltrate.  Patient has chest tube and mediastinal tubes in place.  The patient was extubated in the night of 4/24/19.  Last ABG 7.38/45/116/25.9.    Vitals remarkable for temperature with T-max 101.7, blood pressure stable and he is on 3 L of oxygen per nasal cannula.       Medical History  Active Ambulatory (Non-Hospital) Problems    Diagnosis     Unstable angina (H)     Abnormal cardiovascular stress test     Thoracic ascending aortic aneurysm (H)     Precordial pain     Past Medical History:   Diagnosis Date     Ascending aortic aneurysm (H)      Coronary artery disease      Depression      Gout      History of anesthesia complications      Hyperlipemia      Hypertension      PONV (postoperative nausea and vomiting)     Surgical History  He  has a past surgical history that includes Gallbladder surgery; Coronary Angiogram (N/A, 3/12/2019); and Aorta surgery (N/A, 4/23/2019).   Social History  Reviewed, and he  reports that he has quit smoking. His smoking use included cigarettes. He has never used smokeless tobacco. He reports that he drank alcohol. He reports that he does not use drugs.   Allergies  No Known Allergies Family History  Reviewed, and family  history includes Stroke (age of onset: 90) in his mother.   Psychosocial Needs  Social History     Social History Narrative     Not on file     Additional psychosocial needs reviewed per nursing assessment.     Prior to Admission Medications   Medications Prior to Admission   Medication Sig Dispense Refill Last Dose     allopurinol (ZYLOPRIM) 300 MG tablet Take 300 mg by mouth daily as needed.         3 Past Week     amLODIPine (NORVASC) 5 MG tablet Take 1 tablet by mouth daily.  3 4/22/2019     ASPIR-LOW 81 mg EC tablet Take 2 tablets (162 mg total) by mouth daily.  4 4/22/2019 (81 mg) at 2300     atorvastatin (LIPITOR) 80 MG tablet Take 1 tablet (80 mg total) by mouth at bedtime. 30 tablet 11 4/22/2019     celecoxib (CELEBREX) 200 MG capsule Take 200 mg by mouth daily as needed.  0 More than a month     FLUoxetine (PROZAC) 40 MG capsule Take 40 mg by mouth daily.   4/22/2019     indomethacin (INDOCIN) 50 MG capsule Take 50 mg by mouth 2 (two) times a day as needed (gout pain).   Past Week     meloxicam (MOBIC) 15 MG tablet Take 15 mg by mouth daily as needed.  0 More than a month     metoprolol tartrate (LOPRESSOR) 25 MG tablet Take 1.5 tablets (37.5 mg total) by mouth 2 (two) times a day. 60 tablet 11 4/22/2019 at 2300     naproxen (NAPROSYN) 500 MG tablet Take 500 mg by mouth 2 (two) times a day as needed.  0 More than a month     nitroglycerin (NITROSTAT) 0.4 MG SL tablet Place 1 tablet (0.4 mg total) under the tongue every 5 (five) minutes as needed for chest pain. 25 tablet 1 Not Taking     prazosin (MINIPRESS) 1 MG capsule Take 1 mg by mouth at bedtime.         1 4/21/2019     predniSONE (DELTASONE) 20 MG tablet Take 20 mg by mouth daily as needed (for gout flares).   More than a month     traZODone (DESYREL) 50 MG tablet Take 50 mg by mouth at bedtime.         1 4/21/2019          Review of Systems:  Pertinent items are noted in HPI. Physical Exam:  Temp:  [99.9  F (37.7  C)-101.7  F (38.7  C)] 99.9  F  "(37.7  C)  Heart Rate:  [] 100  Resp:  [16-30] 18  BP: ()/(55-72) 126/60  Arterial Line BP: ()/(47-59) 108/51  FiO2 (%):  [30 %] 30 %    /60   Pulse 100   Temp 99.9  F (37.7  C) (Oral)   Resp 18   Ht 5' 3\" (1.6 m)   Wt 175 lb 14.8 oz (79.8 kg)   SpO2 95%   BMI 31.16 kg/m      General Appearance:  57 y/o male appears drowsy and tired.   Head:    Normocephalic, without obvious abnormality, atraumatic   Eyes:    PERRL, conjunctiva/corneas clear, EOM's intact, fundi     benign, both eyes        Ears:    Normal TM's and external ear canals, both ears   Nose:   Nares normal, septum midline, mucosa normal, no drainage    or sinus tenderness   Throat:   Lips, mucosa, and tongue normal; teeth and gums normal   Neck:   Supple, symmetrical, trachea midline, no adenopathy;        thyroid:  No enlargement/tenderness/nodules; no carotid    bruit or JVD   Chest wall:    Midline sternal incision, mediastinal tubes and chest tube in place.   Respiratory:    Lungs with coarse breath sounds, respirations unlabored   Chest wall:    No tenderness or deformity   CVS:    Regular rate and rhythm, S1 and S2 normal, no murmur, rub   or gallop   GI:    Abdomen is soft, non-tender, bowel sounds active all four quadrants, no masses, no organomegaly   Extremities:   Extremities normal, atraumatic, no cyanosis or edema   Pulses:   2+ and symmetric all extremities   Skin:   Skin color, texture, turgor normal, no rashes or lesions   Lymph nodes:   Cervical, supraclavicular, and axillary nodes normal   Neurologic:   CNII-XII intact. Normal strength, sensation and reflexes       throughout          Pertinent Labs  Lab Results: personally reviewed.   Lab Results   Component Value Date     04/24/2019    K 4.0 04/25/2019     (H) 04/24/2019    CO2 23 04/24/2019    BUN 18 04/24/2019    CREATININE 1.11 04/24/2019    CALCIUM 8.1 (L) 04/24/2019     Lab Results   Component Value Date    WBC 9.9 04/24/2019    HGB 9.0 " (L) 04/24/2019    HCT 25.5 (L) 04/24/2019    MCV 87 04/24/2019     (L) 04/25/2019        Pertinent Radiology  Radiology Results: Personally reviewed image/s and Personally reviewed impression/s  EKG Results: personally reviewed.        Jesse Conway DO  Internal Medicine  [facility]

## 2021-05-28 NOTE — PLAN OF CARE
Problem: Discharge Barriers  Goal: Patient s discharge needs will be met  Outcome: Progressing  Care Plan-Care Management    Care Management Goals of the Day:   Progression of Care, DC planning    Care Progression Reviewed With:  RNNICOLE, Hospitalist, RN, CV surgery NP, Charge RN, JUDY      Barriers to Discharge:  Medical clearance S/p CABG x3 4/23/19, increased mobility with pain management for gout, (TCU bed)    Discharge Disposition:   Home (changed to TCU)    Expected Discharge Date:  4/29 or 4/30/19    Transportation:  Family     Care Coordination Narrative: 4/28/19 CM note:  Pt from home with spouse. Per previous note pt needed some assistance at home r/t gout in his foot. Pt will likely need some assistance at d/c. Families goal is for pt to return home. Therapies support a home discharge with family support.     4/29/19 Per RN, patient complaining he's not ready to go home. Chart reviewed and spoke with cardiac rehab. Patient is requiring assist of 1 with walker, sternal precautions, and only walking short distances (less than 50 feet) due to fatigue and pain in foot. Chart reviewed- patient has been participating in therapy so may qualify for TCU. Patient has [employer] [place].     1120 Lengthy discussion with patient and his wife, family via  re: DC plan. Patient in agreement with plan to go to TCU and would like somewhere near [place]. Brought list of TCUs that accept [employer] [place]. After pulling up a map of TCUs near [place], wife requesting referrals be sent to [facility], [facility] and [facility] (done). Explained TCU referral process and the fact, although we aim to meet patient's preferences, TCU bed/ acceptance are based on availability so will come talk with them when CM has news.    Received call from NEAL [facility]ham- patient ready for DC today although patient's family was under impression patient would stay in the hospital until he was better. Explained had just met  with , patient and his family about DC plan and goal is to find TCU bed, either today or tomorrow. Will update NP if find TCU bed for today.    Called and spoke with Suly in admissions @ [facility] re: TCU bed for today. Will review and call back.  Requested return call to [phone].     11:36 AM Called and spoke with eliezer in admissions @ [facility] re: TCU bed for today.  Requested return call to [phone]    11:37 AM Called and spoke with Precious in admissions @ [facility] re: TCU bed for today.  Requested return call to [phone].        CM to follow.

## 2021-05-28 NOTE — PLAN OF CARE
Problem: Discharge Barriers  Goal: Patient s discharge needs will be met  Outcome: Progressing  Care Plan-Care Management    Care Management Goals of the Day:   Progression of Care    Care Progression to be reviewed with MD and RN CM: Updates to be provided to Charge RN, CMSW, HUC,therapy and other disciplines as indicated.    Barriers to Discharge:  Medical Management, gouty flare, pain management    Discharge Disposition:   Home     Expected Discharge Date:  4/29/19    Transportation:  Family     Care Coordination Narrative:   Pt from home with spouse. Per previous note pt needed some assistance at home r/t gout in his foot. Pt will likely need some assistance at d/c. Families goal is for pt to return home. Therapies support a home discharge with family support. . Family can likely transport. CM to follow.

## 2021-05-28 NOTE — PROGRESS NOTES
Pt continuously coughing with desaturation down to 70s. On auscultation, Left lung coarse and diminished. Pt also febrile with last temp 100.0. On 2L NC,  was given tylenol and repositioned with HOB 30 . Hospitalist updated. Chest X-ray ordered.

## 2021-05-28 NOTE — PLAN OF CARE
Problem: Inadequate Airway Clearance  Goal: Patient will maintain patent airway  Outcome: Progressing     Problem: Mechanical Ventilation  Goal: Patient will maintain patent airway  Outcome: Progressing  Goal: Respiratory status - ventilation  Description  Movement of air in and out of the lungs.    Liberate from ventilator  Outcome: Progressing  Goal: ET tube will be managed safely  Outcome: Progressing

## 2021-05-28 NOTE — PROGRESS NOTES
CVTS Daily Progress Note   4/30/2019   POD#7 s/p CABGx3, ascending aorta replacement   Attending: Prerna   LOS: 7 days     SUBJECTIVE/INTERVAL EVENTS:    No acute events overnight. Gout pain improving. Normotensive. On room air. Ambulating with therapy. Tolerating diet without nausea. +BM. No chest pain. UOP adequate. Pain well controlled. Patient has no questions today.     OBJECTIVE:    Temp:  [98.1  F (36.7  C)-99.3  F (37.4  C)] 98.5  F (36.9  C)  Heart Rate:  [73-84] 75  Resp:  [16-18] 18  BP: (103-124)/(62-74) 115/71  Wt Readings from Last 2 Encounters:   04/30/19 0700 167 lb 1.6 oz (75.8 kg)   04/29/19 0613 164 lb 8 oz (74.6 kg)   04/28/19 0359 174 lb 8 oz (79.2 kg)   04/26/19 0642 173 lb 4.8 oz (78.6 kg)   04/25/19 1643 173 lb 12.8 oz (78.8 kg)   04/25/19 0600 176 lb 4.8 oz (80 kg)   04/24/19 0400 175 lb 14.8 oz (79.8 kg)   04/23/19 0600 172 lb 6.4 oz (78.2 kg)   04/22/19 0951 176 lb 4.8 oz (80 kg)       Current Medications:    Scheduled Meds:    acetaminophen  650 mg Oral Q6H    Or     acetaminophen  650 mg Rectal Q6H     aspirin  81 mg Oral DAILY     atorvastatin  80 mg Oral QHS     colchicine  0.6 mg Oral BID     docusate sodium  100 mg Oral BID     FLUoxetine  40 mg Oral DAILY     furosemide  40 mg Intravenous BID - diuretic     heparin (PF)  5,000 Units Subcutaneous Q8H FIXED TIMES     insulin aspart (NovoLOG) injection   Subcutaneous TID with meals     insulin aspart (NovoLOG) injection   Subcutaneous QHS     magnesium hydroxide  30 mL Oral DAILY     melatonin  3 mg Oral QHS     metoprolol tartrate  12.5 mg Oral BID     omeprazole  20 mg Oral QAM AC     polyethylene glycol  17 g Oral DAILY     Continuous Infusions:    PRN Meds:.acetaminophen, aluminum-magnesium hydroxide-simethicone, benzocaine-menthol, bisacodyl, bisacodyl, dextrose 50 % (D50W), glucagon (human recombinant), magnesium hydroxide, naloxone **OR** naloxone, ondansetron, sodium chloride, sodium phosphates 133 mL, traMADol,  traZODone    Cardiographics:    Telemetry monitoring demonstrates NSR with rates in the 70s per my personal review.    Imaging:    No results found.    Lab Results (most recent, reviewed):    Hemoglobin (g/dL)   Date Value   04/29/2019 9.5 (L)     WBC (thou/uL)   Date Value   04/29/2019 12.2 (H)     Potassium (mmol/L)   Date Value   04/30/2019 3.8     Creatinine (mg/dL)   Date Value   04/29/2019 1.06     Hemoglobin A1c (%)   Date Value   04/25/2019 5.2     Magnesium (mg/dL)   Date Value   04/29/2019 2.0     Calcium (mg/dL)   Date Value   04/29/2019 9.7       I/O:    Intake/Output Summary (Last 24 hours) at 4/30/2019 1227  Last data filed at 4/30/2019 0700  Gross per 24 hour   Intake 372 ml   Output 300 ml   Net 72 ml          Physical Exam:    General: Patient seen up in chair. NAD.   CV: RRR on monitor. 2+ peripheral pulses in all extremities. No appreciable edema.   Pulm: Non-labored effort on room air. Incision and previous chest tube sites C/D/I.  Abd: Soft, NT, ND  Ext: No pedal edema, SCDs in place, warm, distal pulses intact  Neuro: CNs grossly intact.    ASSESSMENT/PLAN:    Douglas Herrera is a 58 y.o. male with a history of CAD and ascending aortic aneurysm who is POD#7 s/p CABGx3 and ascending aorta replacement.    Principal Problem:    S/P CABG (coronary artery bypass graft)  Active Problems:    ARF (acute respiratory failure) (H)    Coronary artery disease involving native coronary artery    Ascending aorta dilatation (H)    Anxiety    Hospital-acquired pneumonia    Hypoxemia    Acute idiopathic gout of right foot    Idiopathic hypertension        NEURO:   - Scheduled Tylenol and PRN Tramadol for pain  - PTA Prozac  - Melatonin QHS    CV:   - Normotensive  - Metoprolol 12.5mg two times a day  - ASA 81mg  - Atorvastatin 80mg daily    PULM:   - Maintaining oxygen saturations on room air  - Encourage pulmonary toilet     FEN/GI:  - Continue electrolyte replacement protocol  - Diet: Cardiac, ADAT  - Bowel  regimen    RENAL:  - Adequate UOP/hr. Continue to monitor  - No need for further diuresis    HEME:  - Acute blood loss anemia post-op.   - No bleeding concerns. Hep SQ, [id number]    ID:  - Tejal op ppx complete, afebrile.   - Zosyn for presumed PNA. D/C 4/29    ENDO:   - SSI   - Colchicine for gout    PPx:   - DVT: SCDs, SQ heparin TID  - GI: Omeprazole     DISPO:   - General telemetry floor; discharge to TCU when bed available.          _______  KATHY EscobarC  Cardiothoracic Surgery  [phone]

## 2021-05-28 NOTE — PLAN OF CARE
Problem: Pain  Goal: Patient's pain/discomfort is manageable  Outcome: Progressing     Problem: Safety  Goal: Patient will be injury free during hospitalization  Outcome: Progressing     Problem: Daily Care  Goal: Daily care needs are met  Outcome: Progressing     Problem: Psychosocial Needs  Goal: Demonstrates ability to cope with hospitalization/illness  Outcome: Progressing  Goal: Collaborate with patient/family/caregiver to identify patient specific goals for this hospitalization  Outcome: Progressing     Problem: Discharge Barriers  Goal: Patient's discharge needs are met  Outcome: Progressing     Problem: Inadequate Airway Clearance  Goal: Patient will maintain patent airway  Outcome: Progressing     Problem: Knowledge Deficit  Goal: Patient/family/caregiver demonstrates understanding of disease process, treatment plan, medications, and discharge instructions  Outcome: Progressing     Problem: Potential for Falls  Goal: Patient will remain free of falls  Outcome: Progressing     Problem: Risk of Injury Due to Unsafe Behavior  Goal: Patient will remain safe while in restraint; physical/psychological needs will be met  Outcome: Progressing  Goal: Alternatives to restraint will be continually assessed with use of least restrictive device and discontinuation as soon as possible  Outcome: Progressing  Goal: Patient/Family will be able to communicate reason for restraint and steps for restraint application and removal  Outcome: Progressing     Problem: Potential for Compromised Skin Integrity  Goal: Skin integrity is maintained or improved  Outcome: Progressing  Goal: Nutritional status is improving  Outcome: Progressing     Problem: Urinary Incontinence  Goal: Perineal skin integrity is maintained or improved  Outcome: Progressing     Problem: Urinary tract infection  Goal: Increased Knowledge  Outcome: Progressing  Goal: Improve urinary elimination patterns as evidenced by reduction in frequency, urgency,  burning, and hesitancy  Outcome: Progressing  Goal: Prevention of catheter associated urinary tract infection (CAUTI)  Outcome: Progressing     Problem: Glucose Imbalance  Goal: Achieve optimal glucose control  Outcome: Progressing       Patient Name: Douglas Herrera   MRN: [id number]   Date of Admission: 4/23/2019    Procedure: CORONARY ARTERY BYPASS x 3, LEFT LEG ENDOSCOPIC VEIN HARVEST, LEFT INTERNAL MAMMARY, EPIAORTIC ULTRASOUND, ANESTHESIA TRANSESOPHAGEAL ECHOCARDIOGRAM, WITH ASCENDING AORTA REPLACEMENT    Post Op day #:3    Subjective (Patient focus/Primary Problem for shift): Increasing ambulation as tolerated due to right ankle pain due to gout flare up.          Pain Goal 0 Pain Rating  8          Pain Medication/ Regime effective to reduce patient pain  Pt receiving scheduled tylenol, prn oxycodone, ice pack for ankle, and lavender essential oil.    Objective (Physical assessment):           Rhythm: normal sinus rhythm            Bowel Activity: yes if Yes indicate when 4/26/19          Bowel Medications: yes            Incision: healing well          Incentive Spirometry Q 1-2 hour when awake:  yes Volume: 750          Epicardial Pacing Wires:  no            Patient Activity:           Up to chair for meals: yes          Ambulation with RN x2 (Not including CR): No, patient refusing to ambulate due to gout flare up in right foot.            Is patient in home clothes:no             Chest Tubes   Pleural: no Draining: not applicable               Suction: not applicable              Mediastinal: not applicable Draining: not applicable               Suction: not applicable   Dressing Change Daily:yes                      Urinary Catheter: no           Preventative WOC consult (need MD order): no       Assessment (Nursing primary shift focus): Weaned pt to 1L NC, is currently 94%, crackles noted in lower right lobe.  Pt having loose stools following bowel meds.  Pt doing minimal work with cardiac rehab due to  gout flare, is partially weight bearing on right ankle.  Diet advanced to cardiac, pt tolerating well.    Plan (Patient Care Plan/focus): Continue managing pain, scheduled tylenol, prn oxycodone, and added steroids for gout.  Encourage activity as tolerated.  Encourage IS use.  Continue CABG education.      aLlo Stephens   4/26/2019   2:53 PM

## 2021-05-28 NOTE — PLAN OF CARE
Problem: Discharge Barriers  Goal: Patient s discharge needs will be met  Outcome: Progressing  Care Plan-Care Management    Care Management Goals of the Day:   Progression of Care, DC planning    Care Progression Reviewed With:  RNCM, Hospitalist, RN, CV surgery NP, Charge RN, JUDY      Barriers to Discharge:  Medical clearance S/p CABG x3 4/23/19, increased mobility with pain management for gout (met), TCU bed (met)- then cancelled-see below)    Discharge Disposition:    TCU (home with home care)    Expected Discharge Date:  4/30/19    Transportation:  Family     Care Coordination Narrative: 4/28/19 CM note:  Pt from home with spouse.     4/29/19 Plan changed to TCU (see CM note 4/29/19)    4/30/19 1002 Explained via , awaiting call back from TCU admissions re: bed for today. No further questions.    1348 PAS completed. Updated Charge and HUC.    Spoke with patient and his wife via -  patient would like to go home and have outpatient cardiac rehab at [facility]. Wife in agreement with plan. Explained would contact CV surgery. Son would give ride home after 1700 today, most likely between 1730 and 1800.    Placed call and updated DIA[facility] for CV surgery re: patient's change in DC disposition. Plan to discuss with CV surgeon and call this writer back.    Per Carrol, in agreement with Dc plan to home and will order home care RN, PT and OT. Charge, Hospitalist, HUC and nurse updated.     2:48 PM Called and spoke with Igor in admissions @ [facility] re: patient changed his mind about TCU.      3:03 PM [facility] re: new referral made by Carrol CV surgery. Discharging today to home.

## 2021-05-28 NOTE — PROGRESS NOTES
Hospitalist Progress Note    Overnight Events/Subjective:    Gouty flare in right ankle and right toe.  Limiting his ability to work with PT  No chest pain or shortness of breath.    Assessment/Plan    #Gouty flare -prednisone 20 mg daily x5 days.  Would not resume allopurinol setting of an acute flare.  #CAD status post CABG and replacement of ascending aorta -aspirin, statin  #Postoperative fever -fever curve improving possible pneumonia.  On Zosyn.  Plan to transition to Augmentin.      Objective    Vital signs in last 24 hours  Temp:  [97.9  F (36.6  C)-99.8  F (37.7  C)] 98.4  F (36.9  C)  Heart Rate:  [78-89] 78  Resp:  [18-20] 18  BP: (100-130)/(60-74) 100/62 95% O2 Device: Nasal cannula O2 Flow Rate (L/min): 1 L/min  Weight:   173 lb 4.8 oz (78.6 kg) Weight change: -2 lb 8 oz (-1.134 kg)    Intake/Output last 3 shifts  I/O last 3 completed shifts:  In: 770 [P.O.:720; IV Piggyback:50]  Out: 2416 [Urine:2346; Chest Tube:70]  Body mass index is 29.75 kg/m .    Physical Exam  General:  Alert, cooperative, no distress,  Appears stated age  Neurologic:  Oriented, fluent speech, facial symmetry preserved, spontaneous moving extremities  HEENT:  Anicteric, MMM  CV: RRR no MRG, normal S1 and S2, no edema.  Sternal incision clean dry intact  Lungs: CTAB.  Easy respirations  Abd: soft, NT, normoactive BS,  Extremities: Right ankle and great toe mild swelling with tenderness to palpation.      Labs: Labs reviewed and pertinent findings discussed in A/P  Medications: medication list reviewed.  Pertinent changes discussed in A/P    Discussed with: Cardiac Sx    Dahiana Hicks MD  Internal Medicine Hospitalist  4/26/2019

## 2021-05-28 NOTE — PROGRESS NOTES
Spiritual Care Note    Spiritual Assessment:  referred to patient on his heart testing day; interpretor is present for support with communication. Patient seems to be doing well as he prepares for surgery. Patient shares he is feeling nervous about surgery but feels ready and is trusting that everything will be ok. Patient shares he will have several family members present on day of surgery and that his parish  will be coming by his home tonight to offer blessings prior to surgery. Patient seems ready for surgery and welcomed prayer from the . No concerns noted.     Care Provided:   shared prayer with patient, offered encouragement and support and provided information on  availability.     Plan of Care: Spiritual care will continue to follow as part of patient's care team.    CINDY Menjivar, BCC

## 2021-05-29 NOTE — TELEPHONE ENCOUNTER
Hi.  I saw Mr Herrera yesterday at home.  I just wanted to let you know about a concern I had.   His lung sounds are diminished on the left side.  He is totally asymptomatic with sats at 98%, HR is 64, no shortness of breath of pain.  He just has some edema.  I noticed he isn't on a diuretic and his wt has been pretty stable with 161 to 162 from Sun to Mon..      Please let me know if there are any changes after his visit today.      Thanks!  Ana Borja RN Case Manager, [facility]

## 2021-05-29 NOTE — TELEPHONE ENCOUNTER
----- Message from Lisset Meyer sent at 6/13/2019  8:14 AM CDT -----  Contact: Patient  General phone call:    Caller: Patient  Primary cardiologist: Dr Hernandez  Detailed reason for call: Patient had bypass 6 weeks ago 4/23/19.  He has gout in his right foot and is wondering if it is ok to use crutches.    New or active symptoms? Gout and use of crutches  Best phone number: [phone]  Best time to contact: Anytime he is waiting for call  Ok to leave a detailed message? yes  Device? no    Additional Info:

## 2021-05-29 NOTE — PROGRESS NOTES
ITP ASSESSMENT   Assessment Day: 30 Day    Session Number: 5  Precautions: Sternal, falls    Diagnosis: CAB    Risk Stratification: High    Referring Provider: Mira Leung CNP  EXERCISE  Exercise Assessment: Reassessment       Tolerating 2.9 METs of exercise in rehab, exercise progress is limited by gout. Instructed pt to increase home walking as tolerates                         Exercise Plan  Goals Next 30 days  ADL'S: Grocery shopping with son    Leisure: Walk 2-3x/day 5-10 min    Work: Attend rehab 2x/week consistently      Education Goals: All goals in this section met    Education Goals Met: Patient can state cardiac s/s and appropriate emergency response.;Has system for taking medication.;Medication review.                          Goals Met  Initial ADL's goals met: Pt is not doing cleaning/taking out trash yet - reports he is trying to limit wt on foot (gout) and chest. Reviewed sternal precautions    Initial Leisure goals met: Pt is walking a little as tolerates    Intial Work goals met: Pt is attending CR 1x/week, enc to increase to 2 days per week    Initial Progression: progress is somewhat limited by gout pain      Exercise Prescription  Exercise Mode: Treadmill;Bike;Nustep;Arm Erg.    Frequency: 2x/week    Duration: 40 minutes    Intensity / THR: 20-30 beats above resting heart rate    RPE 11-14  Progression / Met level: 2.9-3.4    Resistive Training?: Yes      Current Exercise (mins/week): 60      Interventions  Home Exercise:  Mode: Walk    Frequency: 2-3x/day    Duration: 5-10 minutes      Education Material : Educational videos;Provide written material;Individual education and counseling;Offer educational classes      Education Completed  Exercise Education Completed: Cardiac Anatomy;Signs and Symptoms;RPE;Medication review;Emergency Plan;Home Exercise;Warm up/cool down;FITT Principles;BP/HR Reponse to exercise;Benefits of Exercise;End point of exercise              Exercise  "Follow-up/Discharge  Follow up/Discharge: Pt's walking is limited by gout. Enc to walk several short bouts daily     NUTRITION  Nutrition Assessment: Reassessment      Nutrition Risk Factors:  Nutrition Risk Factors: Dyslipidemia;Overweight  Cholesterol: Not available   LDL: Not available.  HDL: Not available  Triglycerides: Not available      Nutrition Plan  Interventions  Diet Consult: Completed    Other Nutrition Intervention: Diet Class;Therapist/Pt Discussion;Educational Videos;Provide with Written Material    Initial Rate Your Plate Score: 66      Education Completed  Nutrition Education Completed: Low Saturated fat diet;Risk factor overview;Low sodium diet;Weight management      Goals  Nutrition Goals (Next 30 days): Patient will follow a low sodium diet      Goals Met  Nutrition Goals Met: Patient knows appropriate portion size;Patient can identify their risk factors for CAD;Completed Nutritional Risk Screen;Provided Rate your Plate Survey;Reviewed Dietitian schedule      Height, Weight, and  BMI  Weight: 157 lb (71.2 kg)  Height: 5' 4\" (1.626 m)  BMI: 26.94      Nutrition Follow-up  Follow-up/Discharge: Encouraged pt ask RD questions as needed       Other Risk Factors  Other Risk Factor Assessment: Reassessment      HTN Risk Factor: Hypertension      Pre Exercise BP: 108/66  Post Exercise BP: 108/60      Hypertension Plan  Goals  HTN Goals: Follow low sodium diet;Exercises regularly      Goals Met  HTN Goals Met: Take medication as prescribed      HTN Interventions  HTN Interventions: Diet consult;Therapist/patient discussion;Provide written material;Offer educational videos;Offer educational classes      HTN Education Completed  HTN Education Completed: Low sodium diet;Medication review;Risk factor overview      Tobacco Risk Factor: NA    Risk Factor Follow-up   Follow-up/Discharge: Reviewed plan for risk factor modification     PSYCHOSOCIAL  Psychosocial Assessment: Reassessment       Psychosocial Risk " Factor: Stress      Psychosocial Plan  Interventions  Interventions: Offer Spiritual Care consult;Offer educational videos and classes;Provide written material;Individual education and counseling      Education Completed  Education Completed: Relaxation/Coping Techniques;S/S of depression;Effects of stress on body      Goals  Goals (Next 30 days): Identify stressors;Practicing stress management skills      Goals Met  Goals Met: Identified Support system;Oriented to stress management classes      Psychosocial Follow-up  Follow-up/Discharge: Expresses frustration with healing process, feels he should be further along. Enc pt he is progressing slowly, but still progressing and enc to continue to increase activity as tolerates           Patient involved in Goal setting?: Yes      Signature: _____________________________________________________________    Date: __________________    Time: __________________

## 2021-05-29 NOTE — PROGRESS NOTES
Cardiac Rehab  Phase II Assessment    Assessment Date: 5-31-19    Diagnosis: Severe multivessel disease and ascending aortic aneursym    Procedure: CABG x 3 and replacement of ascending aortic   Date of Onset: 4-23-19  ICD/Pacemaker: No   Post-op Complications: Pneumonia, Anemia, Anxiety, PE's and DVT's on 5-8-19   ECG History: NSR EF%:60-65%  Past Medical History:   Patient Active Problem List   Diagnosis     Abnormal cardiovascular stress test     Thoracic ascending aortic aneurysm (H)     Precordial pain     Unstable angina (H)     S/P CABG (coronary artery bypass graft)     ARF (acute respiratory failure) (H)     Coronary artery disease involving native coronary artery     Ascending aorta dilatation (H)     Anxiety     Hospital-acquired pneumonia     Hypoxemia     Acute idiopathic gout of right foot     Idiopathic hypertension     PE (pulmonary thromboembolism) (H)     Past Medical History:   Diagnosis Date     Ascending aortic aneurysm (H)      Coronary artery disease      Depression      Gout      History of anesthesia complications      Hyperlipemia      Hypertension      PONV (postoperative nausea and vomiting)      Past Surgical History:   Procedure Laterality Date     AORTA SURGERY N/A 4/23/2019    Procedure: WITH ASCENDING AORTA REPLACEMENT;  Surgeon: Darlene Graff MD;  Location: [facility] Main OR;  Service: Cardiovascular     CARDIAC SURGERY       CORONARY ARTERY BYPASS GRAFT       CV CORONARY ANGIOGRAM N/A 3/12/2019    Procedure: Coronary Angiogram;  Surgeon: Hamilton Lucero MD;  Location: [facility] Cath Lab;  Service: Cardiology     GALLBLADDER SURGERY         Physical Assessment  Precautions/ Physical Limitations: Sternal, Falls, Recent PE's and DVT's, Gout  Oxygen: No  O2 Sats: 96-97% Lung Sounds: Diminished Right base- Reviewed deep breathing Edema: +1 lower right leg- Pt is wearing compression stockings  Incisions: Healing, no redness, no drainage  Sleeping Pattern: fair   Appetite:  "fair   Nutrition Risk Screen: no risk at this time    Pain  Location: Gout pain in right ankle(4/10) and \"slight incisional  ' discomfort (2/10) Reviewed medications for pain control.      Psychosocial/ Emotional Health  1. In the past 12 months, have you been in a relationship where you have been abused physically, emotionally, sexually or financially? No  notified: NA  2. Who do you turn to for emotional support?: Children and \"I see a therapist\"  3. Do you have cultural or spiritual needs? No  4. Have there been any major life changes in the past 12 months? Yes \"my depression and this surgery\"    Referral Information  Primary Physician: Mira Leung CNP  Cardiologist: David  Surgeon: Prerna Latham exercise/Equipment: none    Patient's long-term goal(s): none    1. Living Accommodations: Home Steps: Yes      Support people at home: Lives with children   2. Marital Status:   3. Family is able to assist with cares      [employer]ian/Community involvement: yse  4. Recreation/Hobbies: \"I have no interest in hobbies or recreation\"        See Doc Flowsheet  "

## 2021-05-29 NOTE — PROGRESS NOTES
ITP ASSESSMENT   Assessment Day: Initial    Session Number: 1  Precautions: Sternal- Gout- Recent PE and DVT- Falls    Diagnosis: CAB    Risk Stratification: High    Referring Provider: Prerna  EXERCISE  Exercise Assessment: Initial  Pt tolerated 15 min of low level exercise without sx's. Met level was 1.7                          Exercise Plan  Goals Next 30 days  ADL'S: Resume some light cleaning, and taking trash out.    Leisure: Start a walking program    Work: Attend CR x 2 week        Education Goals: All goals in this section met    Education Goals Met: Patient can state cardiac s/s and appropriate emergency response.;Has system for taking medication.;Medication review.      Exercise Prescription  Exercise Mode: Treadmill;Bike;Nustep;Arm Erg.    Frequency: 2 x week    Duration: 30-40 min    Intensity / THR: 20-30 beats above resting heart rate    RPE 11-14  Progression / Met level: 2.3-2.6    Resistive Training?: Yes (When sternal restriction lifted.)      Current Exercise (mins/week): 5      Interventions  Home Exercise:  Mode: Walking    Frequency: Daily    Duration: 5-10 min, repeat 2-3 x's       Education Material : Educational videos;Provide written material;Individual education and counseling;Offer educational classes      Education Completed  Exercise Education Completed: Cardiac Anatomy;Signs and Symptoms;RPE;Medication review;Emergency Plan;Home Exercise;Warm up/cool down;FITT Principles;BP/HR Reponse to exercise;Benefits of Exercise;End point of exercise              Exercise Follow-up/Discharge  Follow up/Discharge: Discussed importance of home walking and how to start a home walking program.   NUTRITION  Nutrition Assessment: Initial      Nutrition Risk Factors:  Nutrition Risk Factors: Dyslipidemia;Overweight  Cholesterol: Not available   LDL: Not available.  HDL: Not available  Triglycerides: Not available      Nutrition Plan  Interventions  Other Nutrition Intervention: Diet Class;Therapist/Pt  "Discussion;Educational Videos;Provide with Written Material      Education Completed  Nutrition Education Completed: Risk factor overview      Goals  Nutrition Goals (Next 30 days): Patient will follow a low sodium diet;Patient will follow a low saturated fat diet;Patient knows appropriate portion size;Patient will lose weight      Goals Met  Nutrition Goals Met: Completed Nutritional Risk Screen;Provided Rate your Plate Survey;Reviewed Dietitian schedule      Height, Weight, and  BMI  Weight: 163 lb (73.9 kg)  Height: 5' 4\" (1.626 m)  BMI: 27.97      Nutrition Follow-up  Follow-up/Discharge: Enc pt to return diet survey and consult with the dietitian         Other Risk Factors  Other Risk Factor Assessment: Initial      HTN Risk Factor: Hypertension      Pre Exercise BP: 108/71  Post Exercise BP: 100/64      Hypertension Plan  Goals  HTN Goals: Follow low sodium diet;Take medication as prescribed;Exercises regularly      Goals Met  HTN Goals Met: Take medication as prescribed      HTN Interventions  HTN Interventions: Diet consult;Therapist/patient discussion;Provide written material;Offer educational videos;Offer educational classes      HTN Education Completed  HTN Education Completed: Medication review;Risk factor overview      Tobacco Risk Factor: NA      Risk Factor Follow-up   Follow-up/Discharge: Reviewed risk factors and enc educational classes     PSYCHOSOCIAL  Psychosocial Assessment: Initial         Psychosocial Risk Factor: Stress      Psychosocial Plan  Interventions    Interventions: Offer Spiritual Care consult;Offer educational videos and classes;Provide written material;Individual education and counseling       Education Completed  Education Completed: Relaxation/Coping Techniques;S/S of depression;Effects of stress on body      Goals  Goals (Next 30 days): Identify stressors;Practicing stress management skills      Goals Met  Goals Met: Identified Support system;Oriented to stress management " classes      Psychosocial Follow-up  Follow-up/Discharge: Pt states he has depression since 2015, and is seeing a therapist and medicated for this.             Patient involved in Goal setting?: Yes      Signature: _____________________________________________________________    Date: __________________    Time: __________________See Doc Flowsheet

## 2021-05-29 NOTE — PROGRESS NOTES
"  Assessment:       1. S/P CABG x 3 with LIMA-LAD, rSVG-Diag, rSVG-PDA, EVH from LLE on 04/23/2019  Wound culture   2. S/P ascending aortic aneurysm repair with 30 mm Gelweave intrapositional graft on 04/23/2019  Wound culture   3. Cellulitis of other specified site  ciprofloxacin HCl (CIPRO) 500 MG tablet        Plan:   S/P CABG x 3 with LIMA-LAD, rSVG-Diag, rSVG-PDA, EVH from LLE on 04/23/2019  S/P ascending aortic aneurysm repair with 30 mm Gelweave intrapositional graft on 04/23/2019  Postop hospital stay complicated with acute on chronic gout exacerbation  Discharged to home on 04/30/2019, readmitted on 05/08/2019 with LLE DVT and right lung segmental PE, currently on Xarelto  Presents today for postop CV surgery follow up with his significant other and    Still has some residual incisional chest pain and no shortness of breath  Incisions are healing well except for an open spot on the top of the sternal incision that is open with no tunneling, lung sounds clear and no sternal instability is appreciated  Appetite is good, bowel movement is regular and voiding without difficulties  Hasn't started outpatient cardiac rehab yet but will start once discharge from home physical therapy but will do outpatient cardiac rehab at [facility]  Saw his PCP, Mira Leung, CNP on 05/16/2019  Scheduled to see Dr Hernandez on 06/07/2019  Should be okay to start driving by 05/23/2019  Suspected cellulitis of the superior sternal wound and given foreign body implant, will start on antibiotics therapy while awaiting wound culture  Cipro 500 mg daily X 7 days  Otherwise doing well and will not need any further CV surgery follow up unless the cellulitis gets worst    BP 98/62 (Patient Site: Left Arm, Patient Position: Sitting, Cuff Size: Adult Regular)   Pulse 72   Temp 98.4  F (36.9  C) (Oral)   Resp 16   Ht 5' 4\" (1.626 m)   Wt 163 lb 4.8 oz (74.1 kg)   BMI 28.03 kg/m      Current Outpatient Medications "   Medication Sig     acetaminophen (TYLENOL) 325 MG tablet Take 2 tablets (650 mg total) by mouth every 4 (four) hours as needed.     allopurinol (ZYLOPRIM) 300 MG tablet Take 300 mg by mouth daily as needed.            aspirin (ASPIR-LOW) 81 MG EC tablet Take 1 tablet (81 mg total) by mouth daily.     atorvastatin (LIPITOR) 80 MG tablet Take 1 tablet (80 mg total) by mouth at bedtime.     docusate sodium (COLACE) 100 MG capsule Take 1 capsule (100 mg total) by mouth 2 (two) times a day as needed for constipation.     FLUoxetine (PROZAC) 40 MG capsule Take 40 mg by mouth daily.     metoprolol tartrate (LOPRESSOR) 25 MG tablet Take 0.5 tablets (12.5 mg total) by mouth 2 (two) times a day.     nitroglycerin (NITROSTAT) 0.4 MG SL tablet Place 1 tablet (0.4 mg total) under the tongue every 5 (five) minutes as needed for chest pain.     predniSONE (DELTASONE) 20 MG tablet Take 20 mg by mouth daily as needed (for gout flares).     rivaroxaban 15 mg (42)- 20 mg (9) DsPk Take 1 tablet (15mg) po bid for 21 days then take 1 tablet (20mg) daily     traMADol (ULTRAM) 50 mg tablet Take 0.5-1 tablets (25-50 mg total) by mouth every 6 (six) hours as needed.     traZODone (DESYREL) 50 MG tablet Take 50 mg by mouth at bedtime.            ciprofloxacin HCl (CIPRO) 500 MG tablet Take 1 tablet (500 mg total) by mouth daily for 7 days.           Subjective:        Patient ID: Douglas Herrera is a 59 y.o. male.    Chief Complaint:  HPI  The following portions of the patient's history were reviewed and updated as appropriate: allergies, current medications, past family history, past medical history, past social history, past surgical history and problem list.  Mr Herrera is a 59 years old male who underwent CABG x 3 with LIMA-LAD, rSVG-Diag, rSVG-PDA, EVH from E, ascending aortic aneurysm repair with 30 mm Gelweave intrapositional graft on 04/23/2019 at [facility] by JAMI Sainz MD, assisted by cardiothoracic surgery fellow  Dr. Good Muñiz MD.    Postop hospital stay was complicated with acute on chronic gout exacerbation. He was discharged to home on 04/30/2019, readmitted on 05/08/2019 with LLE DVT and right lung segmental PE, currently on Xarelto.  Patient presents today for postop CV surgery follow up with his significant other and .   He stated that he still has some residual incisional chest pain and no shortness of breath.  Incisions are healing well except for an open spot on the top of the sternal incision that is open with no tunneling, lung sounds clear and no sternal instability is appreciated.  Patient stated that his appetite is good, bowel movement is regular and voiding without difficulties.  He hasn't started outpatient cardiac rehab yet but will start once discharge from home physical therapy but will do outpatient cardiac rehab at [facility]. He saw his PCP, Mira Leung CNP on 05/16/2019. Scheduled to see Dr Hernandez on 06/07/2019. Should be okay to start driving by 05/23/2019.  Suspected cellulitis of the superior sternal wound and given foreign body implant, will start on antibiotics therapy while awaiting wound culture  Cipro 500 mg daily X 7 days.  Otherwise doing well and will not need any further CV surgery follow up unless the cellulitis gets worst.    Review of Systems   Constitution: Negative.   HENT: Negative.    Eyes: Negative.    Cardiovascular: Negative.    Respiratory: Negative.    Hematologic/Lymphatic: Negative.    Skin: Negative.    Musculoskeletal: Negative.    Gastrointestinal: Negative.    Genitourinary: Negative.    Neurological: Negative.    Psychiatric/Behavioral: Negative.           Objective:     Physical Exam   Constitutional: He appears healthy. No distress.   HENT:   Nose: Nose normal.   Mouth/Throat: Dentition is normal. Oropharynx is clear.   Eyes: Pupils are equal, round, and reactive to light. Conjunctivae are normal.   Neck: Normal range of motion and thyroid  normal. Neck supple.   Pulmonary/Chest: Effort normal and breath sounds normal. He has no wheezes. He has no rales. He exhibits no tenderness.   Abdominal: Soft. Bowel sounds are normal.   Musculoskeletal: Normal range of motion.   Neurological: He is alert and oriented to person, place, and time. He has normal motor skills, normal reflexes and intact cranial nerves. Gait normal.   Skin: Skin is warm and dry.

## 2021-05-29 NOTE — PROGRESS NOTES
[facility] Office Note    Assessment / Plan:    1.  Coronary artery disease, status post bypass revascularization x3.  Should resume aspirin 81 mg daily after he completes his course of Xarelto for pulmonary embolism  2.  Pulmonary embolism, currently treated with Xarelto  3.  Dyslipidemia on treatment  4.  Hypertension with lower blood pressure today.  With normal ejection fraction noted on recent echocardiogram, will discontinue metoprolol.      Follow-up 1 year  ______________________________________________________________________    Subjective:    I had the opportunity to see Douglas Herrera at the [facility]. Douglas Herrera is a 59 y.o. male with a history of coronary artery disease and a sending aortic aneurysm.  I met him earlier this year subsequent evaluation revealed multivessel coronary disease.  He  underwent three-vessel pass revascularization along with ascending thoracic aortic aneurysm repair 4/2019. He developed pulmonary emboli for which he maintains treatment with Xarelto.  He returns today for routine follow-up, accompanied by his wife and a professional .  There seems to be a great deal of difficulty with interpretation today.    He states that since the bypass surgery he is attending cardiac rehab.  His ability to walk is limited by foot pain which he associates with gout, along with left leg pain at sites of vein graft harvest.  He notes continued sternal tenderness which also limits his activities.  He has had no more of the burning sensation he was experiencing prior to bypass surgery.    Currently, he has been instructed to take both Xarelto and aspirin.  We discussed that the aspirin is not necessary, while he is taking the Xarelto.  He should resume the aspirin after stopping Xarelto.    Mr. Herrera denies more than an occasional cigarette used.  He has no history of hypertension or diabetes mellitus.  There is no family history of premature coronary  atherosclerosis    ______________________________________________________________________    Problem List:  Patient Active Problem List   Diagnosis     Abnormal cardiovascular stress test     Thoracic ascending aortic aneurysm (H)     Precordial pain     Unstable angina (H)     S/P CABG (coronary artery bypass graft)     ARF (acute respiratory failure) (H)     Coronary artery disease involving native coronary artery     Ascending aorta dilatation (H)     Anxiety     Hospital-acquired pneumonia     Hypoxemia     Acute idiopathic gout of right foot     Idiopathic hypertension     PE (pulmonary thromboembolism) (H)     Medical History:  Past Medical History:   Diagnosis Date     Ascending aortic aneurysm (H)      Coronary artery disease      Depression      Gout      History of anesthesia complications      Hyperlipemia      Hypertension      PONV (postoperative nausea and vomiting)      Surgical History:  Past Surgical History:   Procedure Laterality Date     AORTA SURGERY N/A 4/23/2019    Procedure: WITH ASCENDING AORTA REPLACEMENT;  Surgeon: Darlene Graff MD;  Location: [facility] OR;  Service: Cardiovascular     CARDIAC SURGERY       CORONARY ARTERY BYPASS GRAFT       CV CORONARY ANGIOGRAM N/A 3/12/2019    Procedure: Coronary Angiogram;  Surgeon: Hamilton Lucero MD;  Location: [facility] Cath Lab;  Service: Cardiology     GALLBLADDER SURGERY       Social History:  Social History     Socioeconomic History     Marital status:      Spouse name: Not on file     Number of children: Not on file     Years of education: Not on file     Highest education level: Not on file   Occupational History     Not on file   Social Needs     Financial resource strain: Not on file     Food insecurity:     Worry: Not on file     Inability: Not on file     Transportation needs:     Medical: Not on file     Non-medical: Not on file   Tobacco Use     Smoking status: Former Smoker     Types: Cigarettes     Smokeless  tobacco: Never Used     Tobacco comment: every 3-4 months, rare   Substance and Sexual Activity     Alcohol use: Not Currently     Frequency: Never     Drug use: No     Sexual activity: Not on file   Lifestyle     Physical activity:     Days per week: Not on file     Minutes per session: Not on file     Stress: Not on file   Relationships     Social connections:     Talks on phone: Not on file     Gets together: Not on file     Attends Jewish service: Not on file     Active member of club or organization: Not on file     Attends meetings of clubs or organizations: Not on file     Relationship status: Not on file     Intimate partner violence:     Fear of current or ex partner: Not on file     Emotionally abused: Not on file     Physically abused: Not on file     Forced sexual activity: Not on file   Other Topics Concern     Not on file   Social History Narrative     Not on file       Exercise History:  Attending cardiac rehab.  Reports pain in his leg after bypass in his foot related to gout limit his activities at this point    Review of Systems:   General: WNL  Eyes: WNL  Ears/Nose/Throat: Nosebleeds  Lungs: Cough, Snoring, Wheezing  Heart: Chest Pain, Leg Swelling  Stomach: WNL  Bladder: WNL  Muscle/Joints: WNL  Skin: WNL  Nervous System: Daytime Sleepiness  Mental Health: Depression, Anxiety     Blood: WNL          Family History:  Family History   Problem Relation Age of Onset     Stroke Mother 90     Acute Myocardial Infarction Neg Hx          Allergies:  No Known Allergies  Medications:  Current Outpatient Medications   Medication Sig Dispense Refill     acetaminophen (TYLENOL) 325 MG tablet Take 2 tablets (650 mg total) by mouth every 4 (four) hours as needed.  0     allopurinol (ZYLOPRIM) 300 MG tablet Take 300 mg by mouth daily as needed.         3     aspirin (ASPIR-LOW) 81 MG EC tablet Take 1 tablet (81 mg total) by mouth daily.  0     atorvastatin (LIPITOR) 80 MG tablet Take 1 tablet (80 mg total) by  "mouth at bedtime. 30 tablet 11     docusate sodium (COLACE) 100 MG capsule Take 1 capsule (100 mg total) by mouth 2 (two) times a day as needed for constipation.  0     FLUoxetine (PROZAC) 40 MG capsule Take 40 mg by mouth daily.       metoprolol tartrate (LOPRESSOR) 25 MG tablet Take 0.5 tablets (12.5 mg total) by mouth 2 (two) times a day. (Patient taking differently: Take 25 mg by mouth 2 (two) times a day.       ) 30 tablet 2     nitroglycerin (NITROSTAT) 0.4 MG SL tablet Place 1 tablet (0.4 mg total) under the tongue every 5 (five) minutes as needed for chest pain. 25 tablet 1     prazosin (MINIPRESS) 1 MG capsule Take 1 capsule by mouth at bedtime.  3     predniSONE (DELTASONE) 20 MG tablet Take 20 mg by mouth daily as needed (for gout flares).       rivaroxaban 15 mg (42)- 20 mg (9) DsPk Take 1 tablet (15mg) po bid for 21 days then take 1 tablet (20mg) daily 51 tablet 0     traMADol (ULTRAM) 50 mg tablet Take 0.5-1 tablets (25-50 mg total) by mouth every 6 (six) hours as needed. 15 tablet 0     traZODone (DESYREL) 50 MG tablet Take 50 mg by mouth at bedtime.         1     No current facility-administered medications for this visit.        Objective:   Wt Readings from Last 3 Encounters:   06/07/19 162 lb (73.5 kg)   06/03/19 165 lb (74.8 kg)   05/31/19 163 lb (73.9 kg)     Vital signs:  /72 (Patient Site: Left Arm, Patient Position: Sitting, Cuff Size: Adult Regular)   Pulse 60   Resp 14   Ht 5' 4\" (1.626 m)   Wt 162 lb (73.5 kg)   BMI 27.81 kg/m        Physical Exam:    GENERAL APPEARANCE: Alert, cooperative and in no acute distress.  Anxious appearing  HEENT: Conjunctivae not injected.  No scleral icterus. No Xanthelasma. Oral mucous membranes pink and moist.  NECK: No JVD.  No Hepatojugular reflux. Thyroid not enlarged.  CHEST: clear to auscultation.  Well-healed midline sternal incision without evidence of instability.  Mild superficial tenderness  CARDIOVASCULAR: Regular S1, S2 without murmur " ,clicks or rubs. Brachial, radial and posterior tibial pulses are intact and symmetric. No carotid bruits noted.  ABDOMEN: Nontender. BS+. No bruits.  EXTREMITIES: No cyanosis, clubbing or edema.  SKIN:  No rash, bruising    Lab Results:  Echocardiogram 5/9/2019:  1. Normal left ventricular size and systolic performance with a visually estimated ejection fraction of 60-65%.   2. No significant valvular heart disease is identified on this study.   3. There is mild right ventricular enlargement with mildly reduced right ventricular systolic performance.  4. There is mild right atrial enlargement.   5. There is mild aortic root enlargement.   6. Right ventricular systolic pressure cannot be directly estimated from TR velocities due to insufficient tricuspid regurgitation.     Coronary angiography 3/2019:  LM normal  LAD prox 100% with collaterals via PDA; Circ collaterals to diagonal  Circ OM1 mild dz  RCA mid PDA 60-70% with collaterals to LAD   Ascending aorta 4.8cm           BRENDA BEAULIEU MD [facility]  [phone]    This note created using Dragon voice recognition software.  Sound alike errors may have escaped editing.

## 2021-05-29 NOTE — PATIENT INSTRUCTIONS - HE
1. Stop metoprolol  2. While you are taking Xarelto, do not take aspirin 81 mg daily.  3. When Xarelto is completed, resume aspirin 81 mg daily  4. Continue to gradually increase your activities and resume full activities without limitations.  Cardiac rehab may help you to achieve this goal.  5. Return in 1 year

## 2021-05-30 NOTE — PROGRESS NOTES
ITP ASSESSMENT   Assessment Day: 60 Day    Session Number: 9  Precautions: Sternal    Diagnosis: CAB    Risk Stratification: High    Referring Provider: Mira Leung CNP  EXERCISE  Exercise Assessment: Reassessment    Pt has been cancelling and no showing for his outpatient cardiac rehab appts. He has been having issues with gout.                           Exercise Plan  Goals Next 30 days  ADL'S: Pt will help around the house.    Leisure: Pt will walk on off cardiac rehab days, 5-10 minutes    Work: Pt will be more consistent with outpatient cardiac rehab attendance.      Education Goals: All goals in this section met    Education Goals Met: Patient can state cardiac s/s and appropriate emergency response.;Has system for taking medication.;Medication review.                          Goals Met  Initial ADL's goals met: not met, he is not grocery shopping nor doing much of anything around the house.    Initial Leisure goals met: Not met, he is not doing much walking.    Intial Work goals met: Not met. he has had very inconsistent attendance in cardiac rehab.    Initial Progression: Progress is slow due to issues with gout, inability to walk and it also appears that he is not very interested in home exercise.      Exercise Prescription  Exercise Mode: Treadmill;Bike;Nustep;Arm Erg.    Frequency: 2x/week    Duration: 40 minutes    Intensity / THR: 20-30 beats above resting heart rate    RPE 11-14  Progression / Met level: 3-4    Resistive Training?: Yes      Current Exercise (mins/week): 0      Interventions  Home Exercise:  Mode: walk    Frequency: 2-3x/week    Duration: 5-10 minutes      Education Material : Educational videos;Provide written material;Individual education and counseling;Offer educational classes      Education Completed  Exercise Education Completed: Cardiac Anatomy;Signs and Symptoms;RPE;Medication review;Emergency Plan;Home Exercise;Warm up/cool down;FITT Principles;BP/HR Reponse to  "exercise;Benefits of Exercise;End point of exercise              Exercise Follow-up/Discharge  Follow up/Discharge: Pt reports that he does very minimal walking.   NUTRITION  Nutrition Assessment: Reassessment      Nutrition Risk Factors:  Nutrition Risk Factors: Dyslipidemia;Overweight  Cholesterol: labs not available.  LDL: labs not available  HDL: labs not available.  Triglycerides: labs not available.      Nutrition Plan  Interventions  Diet Consult: Completed    Other Nutrition Intervention: Diet Class;Therapist/Pt Discussion;Provide with Written Material    Initial Rate Your Plate Score: 66    Education Completed  Nutrition Education Completed: Low Saturated fat diet;Risk factor overview;Low sodium diet;Weight management      Goals  Nutrition Goals (Next 30 days): Patient will follow a low sodium diet      Goals Met  Nutrition Goals Met: Patient knows appropriate portion size;Patient can identify their risk factors for CAD;Completed Nutritional Risk Screen;Provided Rate your Plate Survey;Reviewed Dietitian schedule      Height, Weight, and  BMI  Weight: 165 lb (74.8 kg)  Height: 5' 4\" (1.626 m)  BMI: 28.31      Nutrition Follow-up  Follow-up/Discharge: Pt has met with dietician.          Other Risk Factors  Other Risk Factor Assessment: Reassessment      HTN Risk Factor: Hypertension      Pre Exercise BP: 102/60  Post Exercise BP: 106/60      Hypertension Plan  Goals  HTN Goals: Follow low sodium diet;Take medication as prescribed;Exercises regularly      Goals Met  HTN Goals Met: Take medication as prescribed      HTN Interventions  HTN Interventions: Diet consult;Therapist/patient discussion;Provide written material;Offer educational videos;Offer educational classes      HTN Education Completed  HTN Education Completed: Low sodium diet;Medication review;Risk factor overview      Tobacco Risk Factor: NA        Risk Factor Follow-up   Follow-up/Discharge: discussed his weight gain. He reports increased " appetite, however encouraged more home exercise.     PSYCHOSOCIAL  Psychosocial Assessment: Reassessment    Psychosocial Risk Factor: Stress      Psychosocial Plan  Interventions  Interventions: Offer Spiritual Care consult;Offer educational videos and classes;Provide written material;Individual education and counseling      Education Completed  Education Completed: Relaxation/Coping Techniques;S/S of depression;Effects of stress on body      Goals  Goals (Next 30 days): Identify stressors;Practicing stress management skills      Goals Met  Goals Met: Identified Support system;Oriented to stress management classes      Psychosocial Follow-up  Follow-up/Discharge: Pt still appears a bit frusrated at progress, encouraged more home exercise and activity. He reports not doing much but sit at home.  (Pt not talkative or engaging, even with interpretor. )             Patient involved in Goal setting?: Yes      Signature: _____________________________________________________________    Date: __________________    Time: __________________See Doc Flowsheet

## 2021-05-31 NOTE — PATIENT INSTRUCTIONS - HE
[facility] Home Exercise Program    Exercise Goal:  Modality Duration Intensity   Warm-up 5 Minutes Slow   Stretching As Tolerated    Walk 20-40 Minutes RPE 11-13   Bike 20-40 Minutes RPE 11-13   Cool Down 5 Minutes Slow   Strength As Tolerated RPE 11-13     Target Heart Rate:  BPM    Perceived exertion  (RPE)  Caity Scale   6  7      Very, very light  8  9      Very light  10  11    Fairly light  12  13    Somewhat hard  14  15    Hard  16  17    Very hard  18  19    Very, very hard  20     Special Comments/ Recommendations:  -Lipid Profile with Physician  -Heart Healthy Diet - Low Sodium Diet  -Continue Medications and Exercise as prescribed.    Stop Exercise!!! If any of the following occur:    Angina/chest pain    Dizziness    Excessive perspiration/cold sweats    Abnormal shortness of breath    Changes in heart rate (slow, fast, irregular)    Sudden fatigue or numbness    Nausea   Also...    Avoid extreme temperatures - exercise indoors if necessary    Wait at least 1 hour after a meal before strenuous activity    Do not exercise if you have a fever or are ill    Wear comfortable, supportive athletic clothing

## 2021-05-31 NOTE — PROGRESS NOTES
ITP ASSESSMENT   Assessment Day: 90 Day    Session Number: 16  Precautions: Sternal     Diagnosis: CAB    Risk Stratification: High    Referring Provider: Mira Leung CNP  EXERCISE  Exercise Assessment: Reassessment  Patient is tolerating 40 minutes of exercise in cardiac rehab, 2 times per week at MET level of 3.3-4.1. Encouraged patient to start walking for longer periods of time at home to meet his goal of walking 3 miles each day.                          Exercise Plan  Goals Next 30 days  ADL'S: Patient will help around the house with daily chores.    Leisure: Patient will walk on off CR days for 15-20 minutes     Work: Patient will continue to attend CR 2x/week for another month, then possibly discharge,       Education Goals: All goals in this section met    Education Goals Met: Patient can state cardiac s/s and appropriate emergency response.;Has system for taking medication.;Medication review.                          Goals Met  60 day ADL'S goals met: Goal not met- Patient has not had the strength to help around the house yet.     60 day Leisure goals met: Goal met- Patient is walking on days he is not in CR. Encouraged more walking.    60 day Work goals met: Goal met- Patient has been attending CR at least 2x/week.     60 Day Progression: Patient is making progress. Set new goals for next 30 days.       Initial ADL's goals met: not met, he is not grocery shopping nor doing much of anything around the house.    Initial Leisure goals met: Not met, he is not doing much walking.    Intial Work goals met: Not met. he has had very inconsistent attendance in cardiac rehab.    Initial Progression: Progress is slow due to issues with gout, inability to walk and it also appears that he is not very interested in home exercise.      Exercise Prescription  Exercise Mode: Treadmill;Bike;Nustep;Hallway Walking    Frequency: 2x/week    Duration: 40 minutes    Intensity / THR: 20-30 beats above resting heart  "rate    RPE 11-14  Progression / Met level: 3.3-4.1    Resistive Training?: Yes      Current Exercise (mins/week): 80      Interventions  Home Exercise:  Mode: Walk     Frequency: 2-3days/week    Duration: 15-20 minutes      Education Material : Educational videos;Provide written material;Individual education and counseling;Offer educational classes      Education Completed  Exercise Education Completed: Cardiac Anatomy;Signs and Symptoms;RPE;Medication review;Emergency Plan;Home Exercise;Warm up/cool down;FITT Principles;BP/HR Reponse to exercise;Benefits of Exercise;End point of exercise;Stretching;Strength training              Exercise Follow-up/Discharge  Follow up/Discharge: Patient would like to build more strength. We are doing strength training today and encouraged him to continue with it at home with light weights.    NUTRITION  Nutrition Assessment: Reassessment      Nutrition Risk Factors:  Nutrition Risk Factors: Dyslipidemia;Overweight  Cholesterol: NA  LDL: NA  HDL: NA  Triglycerides: NA      Nutrition Plan  Interventions  Diet Consult: Completed    Other Nutrition Intervention: Diet Class;Therapist/Pt Discussion;Educational Videos;Provide with Written Material    Initial Rate Your Plate Score: 66      Education Completed  Nutrition Education Completed: Low Saturated fat diet;Risk factor overview;Low sodium diet;Weight management      Goals  Nutrition Goals (Next 30 days): Patient will follow a low sodium diet      Goals Met  Nutrition Goals Met: Patient knows appropriate portion size;Patient can identify their risk factors for CAD;Completed Nutritional Risk Screen;Provided Rate your Plate Survey;Reviewed Dietitian schedule      Height, Weight, and  BMI  Weight: 160 lb (72.6 kg)  Height: 5' 4\" (1.626 m)  BMI: 27.45      Nutrition Follow-up  Follow-up/Discharge: Patient met with dietitian. Encouraged him to continue to ask questions as needed.          Other Risk Factors  Other Risk Factor Assessment: " Reassessment      HTN Risk Factor: Hypertension      Pre Exercise BP: 100/62  Post Exercise BP: 104/68      Hypertension Plan  Goals  HTN Goals: Exercises regularly      Goals Met  HTN Goals Met: Follow low sodium diet;Take medication as prescribed      HTN Interventions  HTN Interventions: Diet consult;Therapist/patient discussion;Provide written material;Offer educational videos;Offer educational classes      HTN Education Completed  HTN Education Completed: Low sodium diet;Medication review;Risk factor overview      Tobacco Risk Factor: NA      Risk Factor Follow-up   Follow-up/Discharge: Reviewed risk factors. Encouraged patient to walk longer at home to meet his goal of walking 3 miles/day. Also encouraged him to attend education classes.      PSYCHOSOCIAL  Psychosocial Assessment: Reassessment       Psychosocial Risk Factor: Stress      Psychosocial Plan  Interventions  Interventions: Offer Spiritual Care consult;Offer educational videos and classes;Provide written material;Individual education and counseling      Education Completed  Education Completed: Relaxation/Coping Techniques;S/S of depression;Effects of stress on body      Goals  Goals (Next 30 days): Identify stressors;Practicing stress management skills      Goals Met  Goals Met: Identified Support system;Oriented to stress management classes      Psychosocial Follow-up  Follow-up/Discharge: Patient reports he has a lot of stress at home. He has been meeting with a psychologist 2x/week. He reports that helps. Encourgaed him to attend stress management classes.              Patient involved in Goal setting?: Yes      Signature: _____________________________________________________________    Date: __________________    Time: __________________See Doc Flowsheet

## 2021-05-31 NOTE — PROGRESS NOTES
ITP ASSESSMENT   Assessment Day: 120 Day    Session Number: 18  Precautions: Sternal    Diagnosis: CAB    Risk Stratification: High    Referring Provider: Mira Leung CNP   ITP: Dr. Case  EXERCISE  Exercise Assessment: Discharge    Pt tolerates 40 minutes of exercise at 4.3 mets without any cardiac symptoms. Pt requested to be discharged today.                         Exercise Plan  Goals Next 30 days  ADL'S: Patient will help around the house with daily chores.    Leisure: Patient will walk on off CR days for 15-20 minutes     Work: Patient will continue to attend CR 2x/week for another month, then possibly discharge,     Education Goals: All goals in this section met    Education Goals Met: Patient can state cardiac s/s and appropriate emergency response.;Has system for taking medication.;Medication review.                        Goals Met  90 day ADL'S goals met: Goal Met     90 day Leisure goals met: Goal Met    90 day Work goals met: Goal Met    90 Day Progress: Pt requested to be discharged today.    60 day ADL'S goals met: Goal not met- Patient has not had the strength to help around the house yet.     60 day Leisure goals met: Goal met- Patient is walking on days he is not in CR. Encouraged more walking.    60 day Work goals met: Goal met- Patient has been attending CR at least 2x/week.     60 Day Progression: Patient is making progress. Set new goals for next 30 days.     Initial ADL's goals met: not met, he is not grocery shopping nor doing much of anything around the house.    Initial Leisure goals met: Not met, he is not doing much walking.    Intial Work goals met: Not met. he has had very inconsistent attendance in cardiac rehab.    Initial Progression: Progress is slow due to issues with gout, inability to walk and it also appears that he is not very interested in home exercise.    Exercise Prescription  Exercise Mode: Treadmill;Bike;Nustep;Hallway Walking    Frequency: 2x/week    Duration: 40  "Mintues    Intensity / THR: 20-30 beats above resting heart rate    RPE 11-14  Progression / Met level: 3.3-4.1    Resistive Training?: Yes    Current Exercise (mins/week): 120    Interventions  Home Exercise:  Mode: Walk    Frequency: 4-7x/week    Duration: 30-40 Minutes    Education Material : Educational videos;Provide written material;Individual education and counseling;Offer educational classes    Education Completed  Exercise Education Completed: Cardiac Anatomy;Signs and Symptoms;RPE;Medication review;Emergency Plan;Home Exercise;Warm up/cool down;FITT Principles;BP/HR Reponse to exercise;Benefits of Exercise;End point of exercise;Stretching;Strength training            Exercise Follow-up/Discharge  Follow up/Discharge: Reviewed home exercise program. Encouraged to increase as tolerated.   NUTRITION  Nutrition Assessment: Discharge    Nutrition Risk Factors:  Nutrition Risk Factors: Dyslipidemia;Overweight  Cholesterol: NA  LDL: NA  HDL: NA  Triglycerides: NSA    Nutrition Plan  Interventions  Diet Consult: Completed    Other Nutrition Intervention: Diet Class;Therapist/Pt Discussion;Educational Videos;Provide with Written Material    Initial Rate Your Plate Score: 66    Education Completed  Nutrition Education Completed: Low Saturated fat diet;Risk factor overview;Low sodium diet;Weight management    Goals  Nutrition Goals (Next 30 days): Patient will follow a low sodium diet    Goals Met  Nutrition Goals Met: Patient knows appropriate portion size;Patient can identify their risk factors for CAD;Completed Nutritional Risk Screen;Provided Rate your Plate Survey;Reviewed Dietitian schedule    Height, Weight, and  BMI  Weight: 162 lb (73.5 kg)  Height: 5' 4\" (1.626 m)  BMI: 27.79    Nutrition Follow-up  Follow-up/Discharge: Patient met with dietitian. Encouraged him to continue to ask questions as needed.          Other Risk Factors  Other Risk Factor Assessment: Discharge    HTN Risk Factor: " Hypertension    Pre Exercise BP: 110/64  Post Exercise BP: 104/60    Hypertension Plan  Goals  HTN Goals: Patient demonstrates understanding of HTN, no goals identified for the next 30 days    Goals Met  HTN Goals Met: Follow low sodium diet;Take medication as prescribed;Exercises regularly    HTN Interventions  HTN Interventions: Diet consult;Therapist/patient discussion;Provide written material;Offer educational videos;Offer educational classes    HTN Education Completed  HTN Education Completed: Low sodium diet;Medication review;Risk factor overview    Tobacco Risk Factor: NA    Risk Factor Follow-up   Follow-up/Discharge: Pt states understanding of risk factors.   PSYCHOSOCIAL  Psychosocial Assessment: Discharge    Psychosocial Risk Factor: Stress    Psychosocial Plan  Interventions  Interventions: Offer Spiritual Care consult;Offer educational videos and classes;Provide written material;Individual education and counseling    Education Completed  Education Completed: Relaxation/Coping Techniques;S/S of depression;Effects of stress on body    Goals  Goals (Next 30 days): Identify stressors;Practicing stress management skills    Goals Met  Goals Met: Identified Support system;Oriented to stress management classes    Psychosocial Follow-up  Follow-up/Discharge: Pt states he is working on his stress level.              Patient involved in Goal setting?: Yes      Signature: _____________________________________________________________    Date: __________________    Time: __________________

## 2021-06-02 VITALS — WEIGHT: 176.44 LBS | HEIGHT: 64 IN | BODY MASS INDEX: 30.12 KG/M2

## 2021-06-02 VITALS — HEIGHT: 63 IN | WEIGHT: 176.3 LBS | BODY MASS INDEX: 31.24 KG/M2

## 2021-06-02 VITALS — BODY MASS INDEX: 30.73 KG/M2 | HEIGHT: 64 IN | WEIGHT: 180 LBS

## 2021-06-02 VITALS — HEIGHT: 63 IN | BODY MASS INDEX: 31.36 KG/M2 | WEIGHT: 177 LBS

## 2021-06-02 VITALS — WEIGHT: 177 LBS | HEIGHT: 63 IN | BODY MASS INDEX: 31.36 KG/M2

## 2021-06-03 VITALS — WEIGHT: 165 LBS | BODY MASS INDEX: 28.32 KG/M2

## 2021-06-03 VITALS — BODY MASS INDEX: 27.29 KG/M2 | WEIGHT: 159 LBS

## 2021-06-03 VITALS — WEIGHT: 163 LBS | BODY MASS INDEX: 27.98 KG/M2

## 2021-06-03 VITALS — BODY MASS INDEX: 27.98 KG/M2 | HEIGHT: 64 IN | WEIGHT: 162 LBS | WEIGHT: 163 LBS | BODY MASS INDEX: 27.66 KG/M2

## 2021-06-03 VITALS — BODY MASS INDEX: 26.95 KG/M2 | WEIGHT: 157 LBS

## 2021-06-03 VITALS — BODY MASS INDEX: 27.64 KG/M2 | WEIGHT: 161 LBS

## 2021-06-03 VITALS — BODY MASS INDEX: 27.81 KG/M2 | WEIGHT: 162 LBS

## 2021-06-03 VITALS
HEIGHT: 64 IN | BODY MASS INDEX: 28.53 KG/M2 | BODY MASS INDEX: 28.53 KG/M2 | HEIGHT: 64 IN | WEIGHT: 167.1 LBS | WEIGHT: 167.1 LBS

## 2021-06-03 VITALS — WEIGHT: 162 LBS | BODY MASS INDEX: 27.81 KG/M2

## 2021-06-03 VITALS — BODY MASS INDEX: 27.98 KG/M2 | WEIGHT: 163 LBS

## 2021-06-03 VITALS — WEIGHT: 161 LBS | BODY MASS INDEX: 27.64 KG/M2

## 2021-06-03 VITALS — WEIGHT: 158 LBS | BODY MASS INDEX: 27.12 KG/M2

## 2021-06-03 VITALS — BODY MASS INDEX: 28.32 KG/M2 | WEIGHT: 165 LBS

## 2021-06-03 VITALS — WEIGHT: 161.4 LBS | BODY MASS INDEX: 27.7 KG/M2 | HEIGHT: 64 IN | WEIGHT: 163.3 LBS | BODY MASS INDEX: 27.88 KG/M2

## 2021-06-03 VITALS — BODY MASS INDEX: 27.46 KG/M2 | WEIGHT: 160 LBS

## 2021-06-03 VITALS — WEIGHT: 157 LBS | BODY MASS INDEX: 26.95 KG/M2

## 2021-06-06 NOTE — TELEPHONE ENCOUNTER
Dr. Hernandez,  [facility] is requesting that you please sign a Medical Clearance For Dental Treatment Form for:  Cleaning, filling crowns, bridges, extractions, root canal therapy, local anesthesia with epinephrine  Please advise of any special considerations.    The form is in the  nurses office for you to sign.    Thank you,  Mayela     Follow up planned 6/1/2020.  ----------------------------    Ludmila and/or Graciela Barrett or Lilia was to drop off the form in your office for Dr. Gagnon to sign.  Please let me know if you have not received it.  Thank you!

## 2021-06-08 NOTE — PATIENT INSTRUCTIONS - HE
1. Start taking metoprolol XL 25 mg daily  2. Try to walk for 20 to 30 minutes almost every day  3. Work on cutting back your rice intake to help with weight control  4. We will check a fasting lipid profile test to see if any adjustment in your cholesterol medicine is needed.  5. I look forward to seeing you back in 1 year.

## 2021-06-11 NOTE — TELEPHONE ENCOUNTER
RN cannot approve Refill Request    RN can NOT refill this medication med is not covered by policy/route to provider. Last office visit: Visit date not found Last Physical: Visit date not found Last MTM visit: Visit date not found Last visit same specialty: 6/7/2019 Qamar Hernandez MD.  Next visit within 3 mo: Visit date not found  Next physical within 3 mo: Visit date not found      Corine Arteaga, [facility] Triage/Med Refill 9/27/2020    Requested Prescriptions   Pending Prescriptions Disp Refills     atorvastatin (LIPITOR) 80 MG tablet [Pharmacy Med Name: ATORVASTATIN CALCIUM 80 MG 80 Tablet] 30 tablet 1     Sig: TAKE 1 TABLET (80 MG TOTAL) BY MOUTH AT BEDTIME/ TXHUA HMO NOJ 1 LUB TSHUAJ THAUM MUS PW PAB ZOO NTSHAV MUAJ ROJ       There is no refill protocol information for this order

## 2021-06-11 NOTE — TELEPHONE ENCOUNTER
Reached out to patient with results and recommendations. Patient verbalized understanding and will call with any further questions or concerns. Follow up planned in June 2021 - recall in place.    ----------------------------------------------   Qamar Hernandez MD   6/18/2020  3:15 PM       Results reviewed. No changes in plan. Continue present treatment.  Lipids appear well controlled. cmc         Contains abnormal data  Lipid Profile   Order: [id number]   Status:  Final result   Visible to patient:  No (not released) Next appt:  None Dx:  Mixed hyperlipidemia; S/P CABG (coron...     Ref Range & Units  6/9/20 0856     Triglycerides  <=149 mg/dL  101       Cholesterol  <=199 mg/dL  102       LDL Calculated  <=129 mg/dL  48       HDL Cholesterol  >=40 mg/dL  34Low         Patient Fasting > 8hrs?   Yes     Resulting Agency   [facility]          Specimen Collected: 06/09/20 08:56  Last Resulted: 06/09/20 17:20  Lab Flowsheet Order Details View Encounter Lab and Collection Details Routing Result History          Result Communications

## 2021-06-11 NOTE — TELEPHONE ENCOUNTER
----- Message from Isaias Garner sent at 9/10/2020  9:14 AM CDT -----  Regarding: [facility] PT / CHOLESTEROL BLOODWORK RESULT AND RANGE  General phone call:    Caller: Douglas Sheehan     Primary cardiologist: [facility]     Detailed reason for call: Pt states he completed his cholesterol blood work in June, but has not heard back regarding the results nor where his cholesterol range should be. Please return call.     Best phone number: [phone]    Best time to contact: Anytime     Ok to leave a detailed message? Yes     Device? No     Additional Info:

## 2021-06-16 PROBLEM — I20.0 UNSTABLE ANGINA (H): Status: ACTIVE | Noted: 2019-03-06

## 2021-06-16 PROBLEM — I26.99 PE (PULMONARY THROMBOEMBOLISM) (H): Status: ACTIVE | Noted: 2019-05-08

## 2021-06-16 PROBLEM — E78.2 MIXED HYPERLIPIDEMIA: Status: ACTIVE | Noted: 2020-06-01

## 2021-06-16 PROBLEM — I71.21 THORACIC ASCENDING AORTIC ANEURYSM (H): Status: ACTIVE | Noted: 2019-01-11

## 2021-06-16 PROBLEM — J18.9 HOSPITAL-ACQUIRED PNEUMONIA: Status: ACTIVE | Noted: 2019-04-25

## 2021-06-16 PROBLEM — Z95.1 S/P CABG (CORONARY ARTERY BYPASS GRAFT): Status: ACTIVE | Noted: 2019-04-23

## 2021-06-16 PROBLEM — J96.00 ARF (ACUTE RESPIRATORY FAILURE) (H): Status: ACTIVE | Noted: 2019-04-23

## 2021-06-16 PROBLEM — R94.39 ABNORMAL CARDIOVASCULAR STRESS TEST: Status: ACTIVE | Noted: 2019-01-11

## 2021-06-16 PROBLEM — R07.2 PRECORDIAL PAIN: Status: ACTIVE | Noted: 2019-01-11

## 2021-06-16 PROBLEM — I25.10 CORONARY ARTERY DISEASE INVOLVING NATIVE CORONARY ARTERY: Status: ACTIVE | Noted: 2019-04-23

## 2021-06-16 PROBLEM — I77.810 ASCENDING AORTA DILATATION (H): Status: ACTIVE | Noted: 2019-04-23
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
## 2021-06-17 NOTE — PATIENT INSTRUCTIONS - HE
1. Decrease metoprolol to 12.5 mg daily (1/2 tablet)  2. Continue your walking program.  There is no substitute!  3. I look forward to seeing you back in 1 year.

## 2021-06-23 NOTE — PROGRESS NOTES
Stress test is significantly abnormal.  Please call patient and schedule coronary angiography, left heart cath with possible PCI.  If patient prefers, Please schedule follow-up visit to discuss results.  I tried calling the number listwed; left message to call [employer] answering machine.  [facility]

## 2021-06-23 NOTE — TELEPHONE ENCOUNTER
Results and recommendations reviewed with patient. Patient wishes to discuss in clinic with Dr. Hernandez and [facility]  for medical terminology understanding. Call transferred to  to set up an appointment to discuss result and recommendations.   No further questions or concerns at this time.

## 2021-06-23 NOTE — PATIENT INSTRUCTIONS - HE
"1. Begin amlodipine 5 mg daily to help with blood pressure control  2. We will schedule you for a \"chemical stress test\" to look for evidence of heart artery blockages causing your chest pains  3. Fast for 12 hours prior to the stress test so that we can check your cholesterol.  "

## 2021-06-23 NOTE — TELEPHONE ENCOUNTER
----- Message from Qamar Hernandez MD sent at 1/18/2019  4:47 PM CST -----  Stress test is significantly abnormal.  Please call patient and schedule coronary angiography, left heart cath with possible PCI.  If patient prefers, Please schedule follow-up visit to discuss results.  I tried calling the number listwed; left message to call [employer] answering machine.  [facility]

## 2021-06-23 NOTE — PROGRESS NOTES
"CARDIOLOGY CLINIC CONSULT NOTE     Assessment/Plan:   1.  Chest discomfort.  Occurring with exertion, concerning for angina.  Nondiagnostic stress echo did not reproduce his symptoms though his exercise capacity was limited.  I would favor proceeding with a pharmacologic nuclear stress test to look for evidence of significant coronary disease it could be causing his symptoms.  2.  A sending thoracic aortic aneurysm.  We discussed aggressive control of blood pressure and lipid-lowering to help slow the rate of growth of this aneurysm.  We also discussed the need to monitor closely for enlargement which may warrant proceeding to surgical intervention with a likely recheck in 6 months time.  At this point we will check a fasting lipid profile at the time of his stress test with plans to initiate long-term lipid-lowering therapy  3.  Hypertension.  Borderline elevated today.  Will initiate treatment given his aneurysm with 5 mg of amlodipine daily.    Follow-up dependent upon test results.     History of Present Illness:     It is my pleasure to see Douglas Herrera at the [facility] clinic for evaluation of chest discomfort.    Douglas Herrera is a 58 y.o. [place] man with a past medical history of hyperlipidemia for which he received treatment for a couple of years then reportedly was told he could stop treatment.  Over the last year he has had a \"deep, Pepper burn\" sensation in the substernal region occurring 2-3 times a day lasting 2-3 minutes with spontaneous resolution.  This is not provoked by meals and does not occur at night but can occur with walking around his yard.  There is commonly shortness of breath associated with this.  He has had no prolonged bouts of discomfort.  For evaluation of this discomfort he was set up for a stress echocardiogram which was completed elsewhere.  This was a nondiagnostic study due to failure to achieve 85% of predicted maximal heart rate, but no evidence of ischemia could be " detected by ECG or echo criteria.  He also reportedly had no symptoms with this test.  An incidental finding was dilatation of the proximal ascending aorta.  A follow-up CT scan at an outside facility reportedly showed 5.2 cm dilatation of the ascending aorta.    Mr. Herrera denies more than an occasional cigarette used.  He has no history of hypertension or diabetes mellitus.  There is no family history of premature coronary atherosclerosis    Past Medical History:     Patient Active Problem List   Diagnosis     Abnormal cardiovascular stress test     Thoracic ascending aortic aneurysm (H)     Precordial pain       Past Surgical History:   History reviewed. No pertinent surgical history.    Family History:     Family History   Problem Relation Age of Onset     Stroke Mother 90     Acute Myocardial Infarction Neg Hx      Family history reviewed and is not pertinent to the chief complaint or presenting problem    Social History:    reports that he has been smoking cigarettes.  he has never used smokeless tobacco.    Exercise: Jogging until 2 years ago.  Now notes substernal burning sensation walking around his backyard    Sleep: Restorative    Meds:     Current Outpatient Medications   Medication Sig Dispense Refill     acetaminophen (TYLENOL) 500 MG tablet Take 500 mg by mouth 2 (two) times a day as needed.  0     allopurinol (ZYLOPRIM) 300 MG tablet Take 300 mg by mouth daily.  3     ASPIR-LOW 81 mg EC tablet Take 81 mg by mouth daily.  4     celecoxib (CELEBREX) 200 MG capsule Take 200 mg by mouth daily as needed.  0     FLUoxetine (PROZAC) 20 MG capsule Take 20 mg by mouth daily.  1     indomethacin (INDOCIN) 50 MG capsule Take 50 mg by mouth 3 (three) times a day as needed.  1     meloxicam (MOBIC) 15 MG tablet Take 15 mg by mouth daily as needed.  0     naproxen (NAPROSYN) 500 MG tablet Take 500 mg by mouth 2 (two) times a day as needed.  0     traZODone (DESYREL) 50 MG tablet Take 50 mg by mouth at bedtime as  "needed.  1     amLODIPine (NORVASC) 5 MG tablet Take 1 tablet (5 mg total) by mouth daily. 90 tablet 3     No current facility-administered medications for this visit.        Allergies:   Patient has no known allergies.    Review of Systems:     General: WNL  Eyes: WNL  Ears/Nose/Throat: WNL  Lungs: WNL  Heart: Arm Pain  Stomach: WNL  Bladder: WNL  Muscle/Joints: Muscle Pain  Skin: WNL  Nervous System: WNL  Mental Health: Depression, Confusion     Blood: WNL        Objective:      Physical Exam  177 lb (80.3 kg)  5' 2.5\" (1.588 m)  Body mass index is 31.86 kg/m .  /82 (Patient Site: Left Arm, Patient Position: Sitting, Cuff Size: Adult Regular)   Pulse 60   Resp 16   Ht 5' 2.5\" (1.588 m)   Wt 177 lb (80.3 kg)   BMI 31.86 kg/m        General Appearance : Awake, Alert, No acute distress  HEENT: No Scleral icterus; the mucous membranes were pink and moist.  Conjunctivae not injected  Neck:  No cervical bruits, jugular venous distention, or thyromegaly   Chest: The spine was straight  Lungs: Respirations unlabored; the lungs are clear to auscultation.  No wheezing   Cardiovascular:   Normal point of maximal impulse.  Auscultation reveals normal first and second heart sounds with no murmurs, rubs, or gallops.  Carotid, radial, and dorsalis pedal pulses are intact and symmetric.  Abdomen: No organomegaly, masses, bruits, or tenderness. Bowels sounds are present  Extremities: No edema  Skin: No xanthelasma. Warm, Dry.  Musculoskeletal: No tenderness.  Neurologic: Alert and oriented ×3.      EKG (personally reviewed):  1/7/2019: Sinus rhythm with first-degree AV block at 62 bpm    Cardiac Imaging Studies:  Outside stress echocardiogram was reported to show no evidence of stress-induced ischemia after 6 minutes on a Brijesh protocol achieving 67% of predicted maximal heart rate.  Proximal ascending aorta was reported to be enlarged at 4.8 cm.    Lab Review   Lab Results   Component Value Date     10/31/2018    " K 3.8 10/31/2018     10/31/2018    CO2 24 10/31/2018    BUN 20 10/31/2018    CREATININE 0.90 10/31/2018    CALCIUM 9.9 10/31/2018       Qamar Hernandez MD [facility]    His note created using Dragon voice recognition software. Sound alike errors may have escaped editing.

## 2021-06-24 NOTE — PROGRESS NOTES
[facility]  [address]  [place] [address]  [phone] (home)     Primary cardiologist:  Dr. Hernandez  PCP:  Mira Leung CNP  Procedure:  Cor angio poss PCI  H&P completed by:  Dr. Garcia 3/6/19  Case MD:  Dr. Lucero or Dr. Oden  Admit date and time:  3/12/19  Case start time:  0700  Ordering MD:  Dr. Garcia   Diagnosis:  Abnormal stress test 1/11/2019, chest pain  Anticoagulation: None  CPAP: No  Bypass Grafts: No  Renal Issues: No  Allergies: NKA  Diabetic?: No  Device?: No      Angiogram Teaching    Reason for Visit:  Patient seen for pre-procedure education in preparation for:3/6/2019      Procedure Prep:  EKG results obtained, dated: on admission  Pertinent test results obtained - Viewable in Epic, dated: no previous angiogram or CABG  Hemogram results obtained: on admission  Basic Metabolic Panel results obtained: on admission  Lipid Profile results obtained: 1/11/19    Patient Education  Explained indications/risks for diagnostic evaluation, including one or more of the following:  coronary angiogram  Explained indications/risks for therapeutic interventions, including one or more of the following: PTCA, artherectomy, stent, intravascular ultrasound, valvuloplasty, intraaortic balloon pump, 2% or less risk of MI, less than 1% risk of CVA, emergency heart surgery, death, less than 4 % risk of vascular injury requiring surgical repair or blood transfusion, 20-30% risk of restonosis with a bare metal stent, less than 10% risk of restonosis with a drug-eluting stent, 0.5-1% chance of stent thrombosis and may need clopidogrel (Plavix) form greater than 1 year.  These risks are in addition to baseline risks associated with a Diagnostic Evaluation.  Patient states understanding of procedure and risks and agrees to proceed.    Pre-procedure instructions  Patient instructed to be NPO after midnight.  Patient instructed to arrange for transportation home following procedure.  Patient instructed  to have a responsible adult with them for 24 hours post-procedure.  Post-procedure follow up process.  Conscious sedation discussed.  The patient was sent the pre-procedure letter (If requested) given 3/6    Pre-procedure medication instructions  Patient instructed on antiplatelet medication.  Continue medications as scheduled, with a small amount of water on the day of the procedure unless indicated.  Patient instructed to take 325 mg of Aspirin am of procedure: Yes  Other medication: instructed to take metoprolol, 325 mg aspirin, and prozac the morning of the procedure with sips of water.    *PATIENTS RECORDS AVAILABLE IN [facility] UNLESS OTHERWISE INDICATED*    *Order set was entered on this date: 3/6      Patient Active Problem List   Diagnosis     Abnormal cardiovascular stress test     Thoracic ascending aortic aneurysm (H)     Precordial pain     Unstable angina (H)       Current Outpatient Medications   Medication Sig Dispense Refill     acetaminophen (TYLENOL) 500 MG tablet Take 500 mg by mouth 2 (two) times a day as needed.  0     allopurinol (ZYLOPRIM) 300 MG tablet Take 300 mg by mouth daily.  3     ASPIR-LOW 81 mg EC tablet Take 81 mg by mouth daily.  4     atorvastatin (LIPITOR) 40 MG tablet Take 1 tablet (40 mg total) by mouth at bedtime. 30 tablet 11     celecoxib (CELEBREX) 200 MG capsule Take 200 mg by mouth daily as needed.  0     FLUoxetine (PROZAC) 20 MG capsule Take 20 mg by mouth daily.  1     meloxicam (MOBIC) 15 MG tablet Take 15 mg by mouth daily as needed.  0     metoprolol tartrate (LOPRESSOR) 25 MG tablet Take 1 tablet (25 mg total) by mouth 2 (two) times a day. 60 tablet 11     naproxen (NAPROSYN) 500 MG tablet Take 500 mg by mouth 2 (two) times a day as needed.  0     nitroglycerin (NITROSTAT) 0.4 MG SL tablet Place 1 tablet (0.4 mg total) under the tongue every 5 (five) minutes as needed for chest pain. 25 tablet 1     prazosin (MINIPRESS) 1 MG capsule Take 1 mg by mouth daily.  1      traZODone (DESYREL) 50 MG tablet Take 50 mg by mouth at bedtime as needed.  1     No current facility-administered medications for this visit.        No Known Allergies    Plan  Pt's plans to have one of his kids drive and stay with him during procedure  Patient ready for procedure    RN Nena Cm

## 2021-06-24 NOTE — PROGRESS NOTES
Thank you for asking the [facility] team to see Douglas Herrera in [facility]     Assessment/Plan:   Angina and high risk stress test - we discussed coronary angiography and it's risks and benefits. We discussed possible PCI as well as possible CABG. Will start atorvastatin 40mg daily, continue aspirin 81mg daily and switch amlodipine to metoprolol 25mg two times a day. SL NTG prescribed today as well. When to call 911 was reviewed.     Will work on getting him set up for the next available angiogram.    Plan f/u with Dr. Hernandez post procedure     Current History:   Douglas Herrera is a 58 y.o. overweight man with hyperlipidemia and a dilated ascending aorta who saw Dr. Hernandez in January for symptoms of chest burning discomfort and dyspnea concerning for angina. A subsequent nuclear stress test showed a large area of anterior ischemia with a normal ejection fraction. Dr. Hernandez recommended coronary angiogram with possible intervention but Douglas wanted to talk about it first.     His symptoms are unchanged. He says that he needs to talk to his grown children to decide what to do.     Past Medical History:     Past Medical History:   Diagnosis Date     Ascending aortic aneurysm (H)      Hyperlipemia        Past Surgical History:   History reviewed. No pertinent surgical history.    Family History:     Family History   Problem Relation Age of Onset     Stroke Mother 90     Acute Myocardial Infarction Neg Hx        Social History:    reports that  has never smoked. he has never used smokeless tobacco.    Meds:     Current Outpatient Medications   Medication Sig     acetaminophen (TYLENOL) 500 MG tablet Take 500 mg by mouth 2 (two) times a day as needed.     allopurinol (ZYLOPRIM) 300 MG tablet Take 300 mg by mouth daily.     ASPIR-LOW 81 mg EC tablet Take 81 mg by mouth daily.     celecoxib (CELEBREX) 200 MG capsule Take 200 mg by mouth daily as needed.     FLUoxetine (PROZAC) 20 MG capsule Take 20 mg by mouth daily.      "meloxicam (MOBIC) 15 MG tablet Take 15 mg by mouth daily as needed.     naproxen (NAPROSYN) 500 MG tablet Take 500 mg by mouth 2 (two) times a day as needed.     prazosin (MINIPRESS) 1 MG capsule Take 1 mg by mouth daily.     traZODone (DESYREL) 50 MG tablet Take 50 mg by mouth at bedtime as needed.     atorvastatin (LIPITOR) 40 MG tablet Take 1 tablet (40 mg total) by mouth at bedtime.     metoprolol tartrate (LOPRESSOR) 25 MG tablet Take 1 tablet (25 mg total) by mouth 2 (two) times a day.     nitroglycerin (NITROSTAT) 0.4 MG SL tablet Place 1 tablet (0.4 mg total) under the tongue every 5 (five) minutes as needed for chest pain.       Allergies:   Patient has no known allergies.    Review of Systems:   Review of Systems:   General: WNL  Eyes: WNL  Ears/Nose/Throat: WNL  Lungs: WNL  Heart: WNL  Stomach: WNL  Bladder: WNL  Muscle/Joints: WNL  Skin: WNL  Nervous System: WNL  Mental Health: WNL     Blood: WNL       Objective:      Physical Exam  @LASTENCWT:3@  5' 3.39\" (1.61 m)  @BMI:3@  /66 (Patient Site: Left Arm, Patient Position: Sitting, Cuff Size: Adult Regular)   Pulse 72   Resp 16   Ht 5' 3.39\" (1.61 m)   Wt 177 lb (80.3 kg)   BMI 30.97 kg/m      General Appearance:   Alert, cooperative and in no acute distress.   HEENT:  No scleral icterus; the mucous membranes were pink and moist.   Neck: JVP flat. No thyromegaly. No HJR   Chest: The spine was straight. The chest was symmetric.   Lungs:   Respirations unlabored; the lungs are clear to auscultation.   Cardiovascular:   S1 and S2 normal and without murmur. No clicks or rubs. No carotid bruits noted. Right DP, PT, and radial pulses 2+. Left DP, PT, and radial pulses 2+.   Abdomen:  No organomegaly, masses, bruits, or tenderness. Bowels sounds are present   Extremities: No cyanosis, clubbing, or edema.   Skin: No xanthelasma.   Neurologic: Mood and affect are appropriate.         Lab Review   Lab Results   Component Value Date     10/31/2018    " K 3.8 10/31/2018     10/31/2018    CO2 24 10/31/2018    BUN 20 10/31/2018    CREATININE 0.90 10/31/2018    CALCIUM 9.9 10/31/2018     No results found for: WBC, HGB, HCT, MCV, PLT  No results found for: CHOL, TRIG, HDL, LDL, LDLDIRECT  No results found for: BNP      Sheba Garcia M.D.

## 2021-06-24 NOTE — PATIENT INSTRUCTIONS - HE
- Start taking atorvastatin 40mg daily for cholesterol and to prevent your blockages from getting worse    - Switch amlodipine to metoprolol as it is better for your heart    - Use nitroglycerin as needed for chest pain    - You will need an angiogram to look for blockages and potentially treat them

## 2021-06-29 NOTE — PROGRESS NOTES
"Progress Notes by Qamar Hernandez MD at 6/1/2020  1:50 PM     Author: Qamar Hernandez MD Service: -- Author Type: Physician    Filed: 6/1/2020  2:21 PM Encounter Date: 6/1/2020 Status: Signed    : Qamar Hernandez MD (Physician)       The patient has been notified of following:     \"This telephone visit will be conducted via a call between you and your physician/provider. We have found that certain health care needs can be provided without the need for a physical exam.  This service lets us provide the care you need with a phone conversation.  If a prescription is necessary we can send it directly to your pharmacy.  If lab work is needed we can place an order for that and you can then stop by our lab to have the test done at a later time. If during the course of the call the physician/provider feels a telephone visit is not appropriate, you will not be charged for this service.\"     \"The patient has chosen to have the visit conducted as a telephone visit, to reduce risk of exposure given the current status of Coronavirus in our community. This telephone visit is being conducted via a call between the patient and physician/provider. Health care needs are being provided without a physical exam.\"       HEART CARE PHONE ENCOUNTER        Appointment is being conducted as a telephone visit, to reduce risk of exposure given the current status of Coronovirus in our community. This telephone visit is being conducted via a call between the patient and physician/provider. Health care needs are being provided without a physical exam.     Assessment/Recommendations   Assessment/Plan:    1. Coronary artery disease.  Status post bypass revascularization 1 year ago.  No recurrent anginal symptoms.  Discussed the need for regular, moderate exercise to help with weight control, blood pressure control.  2. Essential hypertension.  Will add metoprolol XL 25 mg daily to his current medical regimen.  3. Hyperlipidemia.  " Will check fasting lipid profile on 80 mg of atorvastatin discuss assess for adequacy of lipid control.      Follow Up Plan: Follow up in 1 year  I have reviewed the note as documented.  This accurately captures the substance of my conversation with the patient.    Total time of call between patient and provider was 11 minutes        History of Present Illness/Subjective    Douglas Herrera is a 60 y.o. male who is being evaluated via a billable telephone visit.      He has a history of coronary artery disease and ascending aortic aneurysm.  He  underwent three-vessel pass revascularization along with ascending thoracic aortic aneurysm repair 4/2019. He developed pulmonary emboli requiring a course of treatment with Xarelto.    He was scheduled for routine follow-up today.  Today's visit was accomplished without an .    Since I saw him last year, he reports feeling well.  He feels that his stamina is significantly improved, though he is still not exercising regularly.  He did complete cardiac rehab and occasionally walks, but mostly exercises doing yard work now.  He has not experienced any recurrent leg pain or swelling and is no longer taking Xarelto for his pulmonary embolism.  His blood pressure has been trending higher.  He does not smoke.  He has had no chest pain.  He reports sleeping well, awakening refreshed.  His weight is climbing, as he reports noticing that when his intake of rice is higher his blood pressure seems to be higher as well.    A brother underwent bypass surgery, also at a young age, and has been switched from a statin to another cholesterol medicine.  He wonders if his cholesterol medicine is adequate.  He has had no recent lipid testing.         I have reviewed and updated the patient's Past Medical History, Social History, Family History and Medication List.     Physical Examination not perform given phone encounter Review of Systems                                                 Medical History  Surgical History Family History Social History   Past Medical History:   Diagnosis Date   ? Ascending aortic aneurysm (H)    ? Coronary artery disease    ? Depression    ? Gout    ? History of anesthesia complications    ? Hyperlipemia    ? Hypertension    ? PONV (postoperative nausea and vomiting)     Past Surgical History:   Procedure Laterality Date   ? AORTA SURGERY N/A 4/23/2019    Procedure: WITH ASCENDING AORTA REPLACEMENT;  Surgeon: Darlene Graff MD;  Location: [facility] Main OR;  Service: Cardiovascular   ? CARDIAC SURGERY     ? CORONARY ARTERY BYPASS GRAFT     ? CV CORONARY ANGIOGRAM N/A 3/12/2019    Procedure: Coronary Angiogram;  Surgeon: Hamilton Lucero MD;  Location: [facility] Cath Lab;  Service: Cardiology   ? GALLBLADDER SURGERY      Family History   Problem Relation Age of Onset   ? Stroke Mother 90   ? Acute Myocardial Infarction Neg Hx     Social History     Socioeconomic History   ? Marital status:      Spouse name: Not on file   ? Number of children: Not on file   ? Years of education: Not on file   ? Highest education level: Not on file   Occupational History   ? Not on file   Social Needs   ? Financial resource strain: Not on file   ? Food insecurity     Worry: Not on file     Inability: Not on file   ? Transportation needs     Medical: Not on file     Non-medical: Not on file   Tobacco Use   ? Smoking status: Former Smoker     Types: Cigarettes   ? Smokeless tobacco: Never Used   ? Tobacco comment: every 3-4 months, rare   Substance and Sexual Activity   ? Alcohol use: Not Currently     Frequency: Never   ? Drug use: No   ? Sexual activity: Not on file   Lifestyle   ? Physical activity     Days per week: Not on file     Minutes per session: Not on file   ? Stress: Not on file   Relationships   ? Social connections     Talks on phone: Not on file     Gets together: Not on file     Attends Sabianist service: Not on file     Active member of club or  organization: Not on file     Attends meetings of clubs or organizations: Not on file     Relationship status: Not on file   ? Intimate partner violence     Fear of current or ex partner: Not on file     Emotionally abused: Not on file     Physically abused: Not on file     Forced sexual activity: Not on file   Other Topics Concern   ? Not on file   Social History Narrative   ? Not on file        Sleep History: Restorative    Exercise History: Yard work, occasional 2 or 3 mile walks without a problem    Medications  Allergies   Current Outpatient Medications   Medication Sig Dispense Refill   ? aspirin (ASPIR-LOW) 81 MG EC tablet Take 1 tablet (81 mg total) by mouth daily.  0   ? atorvastatin (LIPITOR) 80 MG tablet TAKE 1 TABLET (80 MG TOTAL) BY MOUTH AT BEDTIME/ TXHUA HMO NOJ 1 LUB TSHUAJ THAUM MUS PW PAB ZOO NTSHAV MUAJ ROJ 90 tablet 1   ? FLUoxetine (PROZAC) 40 MG capsule Take 40 mg by mouth daily.     ? nitroglycerin (NITROSTAT) 0.4 MG SL tablet Place 1 tablet (0.4 mg total) under the tongue every 5 (five) minutes as needed for chest pain. 25 tablet 1   ? acetaminophen (TYLENOL) 325 MG tablet Take 2 tablets (650 mg total) by mouth every 4 (four) hours as needed.  0   ? allopurinol (ZYLOPRIM) 300 MG tablet Take 300 mg by mouth daily as needed.         3   ? docusate sodium (COLACE) 100 MG capsule Take 1 capsule (100 mg total) by mouth 2 (two) times a day as needed for constipation.  0   ? prazosin (MINIPRESS) 1 MG capsule Take 1 capsule by mouth at bedtime.  3   ? predniSONE (DELTASONE) 20 MG tablet Take 20 mg by mouth daily as needed (for gout flares).     ? rivaroxaban 15 mg (42)- 20 mg (9) DsPk Take 1 tablet (15mg) po bid for 21 days then take 1 tablet (20mg) daily 51 tablet 0   ? traMADol (ULTRAM) 50 mg tablet Take 0.5-1 tablets (25-50 mg total) by mouth every 6 (six) hours as needed. 15 tablet 0   ? traZODone (DESYREL) 50 MG tablet Take 50 mg by mouth at bedtime.         1     No current  facility-administered medications for this visit.     No Known Allergies      Lab Results    Chemistry/lipid CBC Cardiac Enzymes/BNP/TSH/INR   Lab Results   Component Value Date    CREATININE 0.91 05/16/2019    BUN 13 05/16/2019    K 3.8 05/16/2019     05/16/2019     05/16/2019    CO2 23 05/16/2019    Lab Results   Component Value Date    WBC 8.6 05/08/2019    HGB 10.8 (L) 05/08/2019    HCT 32.6 (L) 05/08/2019    MCV 91 05/08/2019     05/08/2019    Lab Results   Component Value Date    TROPONINI 0.01 05/08/2019     (H) 05/08/2019    INR 1.40 (H) 04/24/2019        Qamar Hernandez MD, FACC

## 2021-06-30 NOTE — PROGRESS NOTES
"Progress Notes by Qamar Hernandez MD at 5/14/2021  2:50 PM     Author: Qamar Hernandez MD Service: -- Author Type: Physician    Filed: 5/14/2021  3:37 PM Encounter Date: 5/14/2021 Status: Signed    : Qamar Hernandez MD (Physician)         Cardiology Clinic Office Note    Assessment / Plan:    1.  Coronary artery disease, status post three-vessel bypass revascularization 2019.  Doing well with no anginal symptoms.  Encouraged continued regular exercise.  2.  Hypertension.  Blood pressure well controlled but with slow heart rate will decrease metoprolol to 12.5 mg daily.  3.  Hyper lipidemia tolerating treatment.    Follow-up 1 year    ______________________________________________________________________    Subjective:    I had the opportunity to see Douglas Herrera at the [facility]. Douglas Herrera is a 61 y.o. male with a history of coronary artery disease and ascending aortic aneurysm.  He  underwent three-vessel pass revascularization along with ascending thoracic aortic aneurysm repair 4/2019. He developed pulmonary emboli requiring a course of treatment with Xarelto.    He was scheduled for routine follow-up today.  Today's visit was accomplished without an .    Over the last year, he has been walking fairly regularly.  He walks 3 to 6 miles 1-3 times a week.  He notes that his stamina is gradually improving with this.  He does have some itching along the incision and at times it feels like there is a \"rock inside\" this does not bother him particularly when he walks.  He describes it as not painful and unlike what he was experiencing prior to his bypass surgery.  His weight is up 5 pounds over the last 2 years.  He reports that his sleep is restorative.  He otherwise offers no complaints today.  He denies dizziness or lightheadedness, or palpitations.      ______________________________________________________________________    Problem List:  Patient " Active Problem List   Diagnosis   ? Abnormal cardiovascular stress test   ? Thoracic ascending aortic aneurysm (H)   ? Precordial pain   ? Unstable angina (H)   ? S/P CABG (coronary artery bypass graft)   ? ARF (acute respiratory failure) (H)   ? Coronary artery disease involving native coronary artery   ? Ascending aorta dilatation (H)   ? Anxiety   ? Hospital-acquired pneumonia   ? Hypoxemia   ? Acute idiopathic gout of right foot   ? Idiopathic hypertension   ? PE (pulmonary thromboembolism) (H)   ? Mixed hyperlipidemia     Medical History:  Past Medical History:   Diagnosis Date   ? Ascending aortic aneurysm (H)    ? Coronary artery disease    ? Depression    ? Gout    ? History of anesthesia complications    ? Hyperlipemia    ? Hypertension    ? PONV (postoperative nausea and vomiting)      Surgical History:  Past Surgical History:   Procedure Laterality Date   ? AORTA SURGERY N/A 4/23/2019    Procedure: WITH ASCENDING AORTA REPLACEMENT;  Surgeon: Darlene Graff MD;  Location: [facility] Main OR;  Service: Cardiovascular   ? CARDIAC SURGERY     ? CORONARY ARTERY BYPASS GRAFT     ? CV CORONARY ANGIOGRAM N/A 3/12/2019    Procedure: Coronary Angiogram;  Surgeon: Hamilton Lucero MD;  Location: [facility] Cath Lab;  Service: Cardiology   ? GALLBLADDER SURGERY       Social History:  Social History     Socioeconomic History   ? Marital status:      Spouse name: Not on file   ? Number of children: Not on file   ? Years of education: Not on file   ? Highest education level: Not on file   Occupational History   ? Not on file   Social Needs   ? Financial resource strain: Not on file   ? Food insecurity     Worry: Not on file     Inability: Not on file   ? Transportation needs     Medical: Not on file     Non-medical: Not on file   Tobacco Use   ? Smoking status: Former Smoker     Types: Cigarettes   ? Smokeless tobacco: Never Used   ? Tobacco comment: every 3-4 months, rare   Substance and Sexual  Activity   ? Alcohol use: Not Currently     Frequency: Never   ? Drug use: No   ? Sexual activity: Not on file   Lifestyle   ? Physical activity     Days per week: Not on file     Minutes per session: Not on file   ? Stress: Not on file   Relationships   ? Social connections     Talks on phone: Not on file     Gets together: Not on file     Attends Sikhism service: Not on file     Active member of club or organization: Not on file     Attends meetings of clubs or organizations: Not on file     Relationship status: Not on file   ? Intimate partner violence     Fear of current or ex partner: Not on file     Emotionally abused: Not on file     Physically abused: Not on file     Forced sexual activity: Not on file   Other Topics Concern   ? Not on file   Social History Narrative   ? Not on file     Sleep History:  Restorative  Exercise History:  Walks 3 to 6 miles 1-3 times a week with stable stamina    Review of Systems:   General: WNL  Eyes: WNL  Ears/Nose/Throat: WNL  Lungs: WNL  Heart: WNL  Stomach: WNL  Bladder: WNL  Muscle/Joints: WNL  Skin: WNL  Nervous System: WNL  Mental Health: WNL     Blood: WNL          Family History:  Family History   Problem Relation Age of Onset   ? Stroke Mother 90   ? Acute Myocardial Infarction Neg Hx          Allergies:  No Known Allergies  Medications:  Current Outpatient Medications   Medication Sig Dispense Refill   ? allopurinol (ZYLOPRIM) 300 MG tablet Take 300 mg by mouth daily as needed.         3   ? aspirin (ASPIR-LOW) 81 MG EC tablet Take 1 tablet (81 mg total) by mouth daily.  0   ? atorvastatin (LIPITOR) 80 MG tablet TAKE 1 TABLET (80 MG TOTAL) BY MOUTH AT BEDTIME/ TXHUA O NOJ 1 LUB TSHUAJ THAUM MUS PW PAB ZOO NTSHAV MUAJ ROJ 90 tablet 0   ? FLUoxetine (PROZAC) 40 MG capsule Take 40 mg by mouth daily.     ? metoprolol succinate (TOPROL-XL) 25 MG Take 1 tablet (25 mg total) by mouth daily. 90 tablet 3   ? nitroglycerin (NITROSTAT) 0.4 MG SL tablet Place 1 tablet (0.4  "mg total) under the tongue every 5 (five) minutes as needed for chest pain. 25 tablet 1   ? prazosin (MINIPRESS) 1 MG capsule Take 1 capsule by mouth at bedtime.  3   ? traZODone (DESYREL) 50 MG tablet Take 50 mg by mouth at bedtime.         1   ? acetaminophen (TYLENOL) 325 MG tablet Take 2 tablets (650 mg total) by mouth every 4 (four) hours as needed.  0   ? docusate sodium (COLACE) 100 MG capsule Take 1 capsule (100 mg total) by mouth 2 (two) times a day as needed for constipation.  0   ? predniSONE (DELTASONE) 20 MG tablet Take 20 mg by mouth daily as needed (for gout flares).     ? traMADol (ULTRAM) 50 mg tablet Take 0.5-1 tablets (25-50 mg total) by mouth every 6 (six) hours as needed. 15 tablet 0     No current facility-administered medications for this visit.        Objective:   Wt Readings from Last 3 Encounters:   05/14/21 168 lb (76.2 kg)   08/21/19 162 lb (73.5 kg)   08/19/19 163 lb (73.9 kg)     Vital signs:  /70 (Patient Site: Left Arm, Patient Position: Sitting, Cuff Size: Adult Large)   Pulse (!) 53   Resp 10   Ht 5' 4\" (1.626 m)   Wt 168 lb (76.2 kg)   BMI 28.84 kg/m        Physical Exam:    General Appearance : Awake, Alert, No acute distress  HEENT: No Scleral icterus; the mucous membranes were pink and moist.  Conjunctivae not injected  Neck:  No cervical bruits, jugular venous distention, or thyromegaly   Chest: The spine was straight. Chest wall symmetric.  Midline incision well-healed with hypertrophic scar, no instability  Lungs: Respirations unlabored; the lungs are clear to auscultation.  No wheezing   Cardiovascular:   Normal point of maximal impulse.  Auscultation reveals regular, slow first and second heart sounds with no murmurs, rubs, or gallops.  Carotid, radial, and dorsalis pedal pulses are intact and symmetric.  Abdomen: Rounded.  No organomegaly, masses, bruits, or tenderness. Bowels sounds are present  Extremities:  No clubbing, cyanosis.  No edema  Skin: No rash, " bruising  Musculoskeletal: No tenderness.  Neurologic: Alert and oriented ×3. Speech is fluent.    Lab Results:  LIPIDS:  Lab Results   Component Value Date    CHOL 102 06/09/2020     Lab Results   Component Value Date    HDL 34 (L) 06/09/2020     Lab Results   Component Value Date    LDLCALC 48 06/09/2020     Lab Results   Component Value Date    TRIG 101 06/09/2020       Echocardiogram 5/2019:  1. Normal left ventricular size and systolic performance with a visually estimated ejection fraction of 60-65%.   2. No significant valvular heart disease is identified on this study.   3. There is mild right ventricular enlargement with mildly reduced right ventricular systolic performance.  4. There is mild right atrial enlargement.   5. There is mild aortic root enlargement.   6. Right ventricular systolic pressure cannot be directly estimated from TR velocities due to insufficient tricuspid regurgitation.             BRENDA BEAULIEU MD [facility]  [phone]    This note created using Dragon voice recognition software.  Sound alike errors may have escaped editing.

## 2021-09-05 NOTE — PATIENT INSTRUCTIONS - HE
S/P CABG x 3 with LIMA-LAD, rSVG-Diag, rSVG-PDA, EVH from LLE on 04/23/2019  S/P ascending aortic aneurysm repair with 30 mm Gelweave intrapositional graft on 04/23/2019  Postop hospital stay complicated with acute on chronic gout exacerbation  Discharged to home on 04/30/2019, readmitted on 05/08/2019 with LLE DVT and right lung segmental PE, currently on Xarelto  Presents today for postop CV surgery follow up with his significant other and    Still has some residual incisional chest pain and no shortness of breath  Incisions are healing well except for an open spot on the top of the sternal incision that is open with no tunneling, lung sounds clear and no sternal instability is appreciated  Appetite is good, bowel movement is regular and voiding without difficulties  Hasn't started outpatient cardiac rehab yet but will start once discharge from home physical therapy but will do outpatient cardiac rehab at [facility]  Saw his PCP, Mira Leung, CNP on 05/16/2019  Scheduled to see Dr Hernandez on 06/07/2019  Should be okay to start driving by 05/23/2019  Suspected cellulitis of the superior sternal wound and given foreign body implant, will start on antibiotics therapy while awaiting wound culture  Cipro 500 mg daily X 7 days  Otherwise doing well and will not need any further CV surgery follow up unless the cellulitis gets worst        
Simple: Patient demonstrates quick and easy understanding

## 2021-10-18 ENCOUNTER — TRANSFERRED RECORDS (OUTPATIENT)
Dept: HEALTH INFORMATION MANAGEMENT | Facility: CLINIC | Age: 61
End: 2021-10-18

## 2021-11-16 ENCOUNTER — LAB REQUISITION (OUTPATIENT)
Dept: LAB | Facility: CLINIC | Age: 61
End: 2021-11-16

## 2021-11-16 DIAGNOSIS — I25.118 ATHEROSCLEROTIC HEART DISEASE OF NATIVE CORONARY ARTERY WITH OTHER FORMS OF ANGINA PECTORIS (H): ICD-10-CM

## 2021-11-16 DIAGNOSIS — M1A.0790 IDIOPATHIC CHRONIC GOUT, UNSPECIFIED ANKLE AND FOOT, WITHOUT TOPHUS (TOPHI): ICD-10-CM

## 2021-11-16 LAB
ALBUMIN SERPL-MCNC: 3.8 G/DL (ref 3.5–5)
ALP SERPL-CCNC: 55 U/L (ref 45–120)
ALT SERPL W P-5'-P-CCNC: 38 U/L (ref 0–45)
ANION GAP SERPL CALCULATED.3IONS-SCNC: 14 MMOL/L (ref 5–18)
AST SERPL W P-5'-P-CCNC: 28 U/L (ref 0–40)
BILIRUB SERPL-MCNC: 1.2 MG/DL (ref 0–1)
BUN SERPL-MCNC: 12 MG/DL (ref 8–22)
CALCIUM SERPL-MCNC: 9.7 MG/DL (ref 8.5–10.5)
CHLORIDE BLD-SCNC: 107 MMOL/L (ref 98–107)
CHOLEST SERPL-MCNC: 104 MG/DL
CO2 SERPL-SCNC: 22 MMOL/L (ref 22–31)
CREAT SERPL-MCNC: 1.03 MG/DL (ref 0.7–1.3)
FASTING STATUS PATIENT QL REPORTED: ABNORMAL
GFR SERPL CREATININE-BSD FRML MDRD: 78 ML/MIN/1.73M2
GLUCOSE BLD-MCNC: 87 MG/DL (ref 70–125)
HDLC SERPL-MCNC: 34 MG/DL
LDLC SERPL CALC-MCNC: 42 MG/DL
POTASSIUM BLD-SCNC: 4.2 MMOL/L (ref 3.5–5)
PROT SERPL-MCNC: 7 G/DL (ref 6–8)
SODIUM SERPL-SCNC: 143 MMOL/L (ref 136–145)
TRIGL SERPL-MCNC: 139 MG/DL
URATE SERPL-MCNC: 7.7 MG/DL (ref 3–8)

## 2021-11-16 PROCEDURE — 80061 LIPID PANEL: CPT | Performed by: NURSE PRACTITIONER

## 2021-11-16 PROCEDURE — 84550 ASSAY OF BLOOD/URIC ACID: CPT | Performed by: NURSE PRACTITIONER

## 2021-11-16 PROCEDURE — 82040 ASSAY OF SERUM ALBUMIN: CPT | Performed by: NURSE PRACTITIONER

## 2022-05-04 ENCOUNTER — TRANSFERRED RECORDS (OUTPATIENT)
Dept: HEALTH INFORMATION MANAGEMENT | Facility: CLINIC | Age: 62
End: 2022-05-04

## 2022-05-13 ENCOUNTER — LAB REQUISITION (OUTPATIENT)
Dept: LAB | Facility: CLINIC | Age: 62
End: 2022-05-13

## 2022-05-13 DIAGNOSIS — E78.2 MIXED HYPERLIPIDEMIA: ICD-10-CM

## 2022-05-13 LAB
ALBUMIN SERPL-MCNC: 3.8 G/DL (ref 3.5–5)
ALP SERPL-CCNC: 44 U/L (ref 45–120)
ALT SERPL W P-5'-P-CCNC: 29 U/L (ref 0–45)
ANION GAP SERPL CALCULATED.3IONS-SCNC: 7 MMOL/L (ref 5–18)
AST SERPL W P-5'-P-CCNC: 26 U/L (ref 0–40)
BILIRUB SERPL-MCNC: 1.1 MG/DL (ref 0–1)
BUN SERPL-MCNC: 17 MG/DL (ref 8–22)
CALCIUM SERPL-MCNC: 9.3 MG/DL (ref 8.5–10.5)
CHLORIDE BLD-SCNC: 106 MMOL/L (ref 98–107)
CHOLEST SERPL-MCNC: 101 MG/DL
CO2 SERPL-SCNC: 28 MMOL/L (ref 22–31)
CREAT SERPL-MCNC: 1.01 MG/DL (ref 0.7–1.3)
FASTING STATUS PATIENT QL REPORTED: ABNORMAL
GFR SERPL CREATININE-BSD FRML MDRD: 84 ML/MIN/1.73M2
GLUCOSE BLD-MCNC: 81 MG/DL (ref 70–125)
HDLC SERPL-MCNC: 32 MG/DL
LDLC SERPL CALC-MCNC: 33 MG/DL
POTASSIUM BLD-SCNC: 4.4 MMOL/L (ref 3.5–5)
PROT SERPL-MCNC: 6.9 G/DL (ref 6–8)
SODIUM SERPL-SCNC: 141 MMOL/L (ref 136–145)
TRIGL SERPL-MCNC: 180 MG/DL

## 2022-05-13 PROCEDURE — 82310 ASSAY OF CALCIUM: CPT | Performed by: NURSE PRACTITIONER

## 2022-05-13 PROCEDURE — 80061 LIPID PANEL: CPT | Performed by: NURSE PRACTITIONER

## 2022-06-17 ENCOUNTER — OFFICE VISIT (OUTPATIENT)
Dept: CARDIOLOGY | Facility: CLINIC | Age: 62
End: 2022-06-17
Payer: COMMERCIAL

## 2022-06-17 VITALS
BODY MASS INDEX: 28.51 KG/M2 | WEIGHT: 167 LBS | HEIGHT: 64 IN | DIASTOLIC BLOOD PRESSURE: 62 MMHG | SYSTOLIC BLOOD PRESSURE: 114 MMHG | HEART RATE: 66 BPM | RESPIRATION RATE: 16 BRPM

## 2022-06-17 DIAGNOSIS — Z95.1 S/P CABG (CORONARY ARTERY BYPASS GRAFT): Primary | ICD-10-CM

## 2022-06-17 DIAGNOSIS — I10 IDIOPATHIC HYPERTENSION: ICD-10-CM

## 2022-06-17 PROCEDURE — 99213 OFFICE O/P EST LOW 20 MIN: CPT | Performed by: INTERNAL MEDICINE

## 2022-06-17 RX ORDER — AMLODIPINE BESYLATE 5 MG/1
5 TABLET ORAL EVERY MORNING
Status: ON HOLD | COMMUNITY
End: 2023-12-04

## 2022-06-17 NOTE — LETTER
6/17/2022    Mira Leung NP  [place] [address] [address] [facility] [id number]    RE: Douglas Herrera       Dear Colleague,     I had the pleasure of seeing Douglas Herrera in the [facility].    Cardiology Clinic Office Note    Assessment / Plan:    1.  Coronary artery disease, status post three-vessel bypass vascularization 2019.  Stable with no anginal discomfort  2.  Essential hypertension well-controlled on present medical regimen  3.  Mixed hyperlipidemia on treatment.    Follow-up 2 years    ______________________________________________________________________    Subjective:    I had the opportunity to see Douglas Herrera at the [facility]. Douglas Herrera is a 62 year old male with a history of coronary artery disease and ascending aortic aneurysm.  He  underwent three-vessel pass revascularization along with ascending thoracic aortic aneurysm repair 4/2019. He developed pulmonary emboli requiring a course of treatment with Xarelto.  He is also treated for hypertension, hyperlipidemia, and gout.  He returns today for routine follow-up.    He has had some occasional twinges in his chest unrelated to activities.  He does exercise walking or bicycling on a daily basis.  His activity tolerance is stable.  He has had no awareness of palpitations.  He has noticed that his stamina is improving.  Continues to get some incisional soreness.  We discussed that the constant contact of his necklace on his scar may be contributing to this irritation.    He is considering traveling overseas possibly to [facility] for a couple of years.  I discussed that I find no cardiac reason for him not to  ______________________________________________________________________    Problem List:  Patient Active Problem List   Diagnosis     Abnormal cardiovascular stress test     Thoracic ascending aortic aneurysm (H)     Precordial pain     Unstable angina (H)     S/P  CABG (coronary artery bypass graft)     ARF (acute respiratory failure) (H)     Coronary artery disease involving native coronary artery     Ascending aorta dilatation (H)     Anxiety     Hospital-acquired pneumonia     Hypoxemia     Acute idiopathic gout of right foot     Idiopathic hypertension     PE (pulmonary thromboembolism) (H)     Mixed hyperlipidemia     Medical History:  Past Medical History:   Diagnosis Date     Ascending aortic aneurysm (H)      Coronary artery disease      Depression      Gout      History of anesthesia complications      Hyperlipemia      Hypertension      PONV (postoperative nausea and vomiting)      Surgical History:  Past Surgical History:   Procedure Laterality Date     AORTA SURGERY N/A 4/23/2019    Procedure: WITH ASCENDING AORTA REPLACEMENT;  Surgeon: Darlene Graff MD;  Location: [facility] Main OR;  Service: Cardiovascular     BYPASS GRAFT ARTERY CORONARY       CARDIAC SURGERY       CV CORONARY ANGIOGRAM N/A 3/12/2019    Procedure: Coronary Angiogram;  Surgeon: Hamilton Lucero MD;  Location: [facility] Cath Lab;  Service: Cardiology     GALLBLADDER SURGERY       Social History:  Social History     Socioeconomic History     Marital status:      Spouse name: Not on file     Number of children: Not on file     Years of education: Not on file     Highest education level: Not on file   Occupational History     Not on file   Tobacco Use     Smoking status: Former Smoker     Types: Cigarettes     Smokeless tobacco: Never Used     Tobacco comment: every 3-4 months, rare   Substance and Sexual Activity     Alcohol use: Not Currently     Drug use: No     Sexual activity: Not on file   Other Topics Concern     Not on file   Social History Narrative     Not on file     Social Determinants of Health     Financial Resource Strain: Not on file   Food Insecurity: Not on file   Transportation Needs: Not on file   Physical Activity: Not on file   Stress: Not on file   Social  Connections: Not on file   Intimate Partner Violence: Not on file   Housing Stability: Not on file     Sleep History:  Restart  Exercise History:  Walking, bicycling several days a week    Review of Systems:      12 point review of systems otherwise negative     Please refer above for cardiac ROS details.         Family History:  Family History   Problem Relation Age of Onset     Cerebrovascular Disease Mother 90.00     Acute Myocardial Infarction No family hx of          Allergies:  No Known Allergies  Medications:  Current Outpatient Medications   Medication Sig Dispense Refill     amLODIPine (NORVASC) 5 MG tablet Take 5 mg by mouth daily       aspirin 81 MG EC tablet Take 81 mg by mouth daily       atorvastatin (LIPITOR) 80 MG tablet TAKE 1 TABLET (80 MG TOTAL) BY MOUTH AT BEDTIME/ TXHUA HMO NOJ 1 LUB TSHUAJ THAUM MUS PW PAB ZOO NTSHAV MUAJ ROJ 90 tablet 3     acetaminophen (TYLENOL) 500 MG tablet Take 1,000 mg by mouth 2 times daily as needed for mild pain       allopurinol (ZYLOPRIM) 300 MG tablet Take 300 mg by mouth daily       FLUoxetine (PROZAC) 40 MG capsule Take 40 mg by mouth daily       metoprolol tartrate (LOPRESSOR) 25 MG tablet Take 12.5 mg by mouth 2 times daily       nitroGLYcerin (NITROSTAT) 0.4 MG sublingual tablet Place 0.4 mg under the tongue every 5 minutes as needed for chest pain For chest pain place 1 tablet under the tongue every 5 minutes for 3 doses. If symptoms persist 5 minutes after 1st dose call 911.       predniSONE (DELTASONE) 20 MG tablet Take 20 mg by mouth daily as needed.       rivaroxaban ANTICOAGULANT (XARELTO) 15 MG TABS tablet Take 15 mg by mouth 2 times daily (with meals)       traZODone (DESYREL) 50 MG tablet Take 50 mg by mouth nightly as needed for sleep         Objective:   Wt Readings from Last 3 Encounters:   06/17/22 75.8 kg (167 lb)   05/14/21 76.2 kg (168 lb)   08/21/19 73.5 kg (162 lb)     Vital signs:  /62 (BP Location: Left arm, Patient Position: Sitting,  "Cuff Size: Adult Regular)   Pulse 66   Resp 16   Ht 1.626 m (5' 4\")   Wt 75.8 kg (167 lb)   BMI 28.67 kg/m        Physical Exam:    General Appearance : Awake, Alert, No acute distress  HEENT: No Scleral icterus; the mucous membranes were pink and moist.  Conjunctivae not injected  Neck:  No cervical bruits, jugular venous distention, or thyromegaly   Chest: The spine was straight. Chest wall incision with erythema over a portion of the scar  Lungs: Respirations unlabored; the lungs are clear to auscultation.  No wheezing   Cardiovascular:   Normal point of maximal impulse.  Auscultation reveals normal first and second heart sounds with no murmurs, rubs, or gallops.  Carotid, radial, and dorsalis pedal pulses are intact and symmetric.    Abdomen: No organomegaly, masses, bruits, or tenderness. Bowels sounds are present  Extremities:  No clubbing, cyanosis.  No edema  Skin: No rash, bruising  Musculoskeletal: No tenderness.  Neurologic: Alert and oriented ×3. Speech is fluent.    Lab Results:  LIPIDS:  Lab Results   Component Value Date    CHOL 101 05/13/2022    CHOL 104 11/16/2021    CHOL 102 06/09/2020     Lab Results   Component Value Date    HDL 32 (L) 05/13/2022    HDL 34 (L) 11/16/2021    HDL 34 (L) 06/09/2020     Lab Results   Component Value Date    LDL 33 05/13/2022    LDL 42 11/16/2021    LDL 48 06/09/2020     Lab Results   Component Value Date    TRIG 180 (H) 05/13/2022    TRIG 139 11/16/2021    TRIG 101 06/09/2020       Echocardiogram 5/2019:  1. Normal left ventricular size and systolic performance with a visually estimated ejection fraction of 60-65%.   2. No significant valvular heart disease is identified on this study.   3. There is mild right ventricular enlargement with mildly reduced right ventricular systolic performance.  4. There is mild right atrial enlargement.   5. There is mild aortic root enlargement.   6. Right ventricular systolic pressure cannot be directly estimated from TR " velocities due to insufficient tricuspid regurgitation.               BRENDA BEAULIEU MD [facility]  [phone]    This note created using Dragon voice recognition software.  Sound alike errors may have escaped editing.    Thank you for allowing me to participate in the care of your patient.      Sincerely,     Brenda Beaulieu MD     [facility]  cc:   No referring provider defined for this encounter.

## 2022-06-17 NOTE — PROGRESS NOTES
Cardiology Clinic Office Note    Assessment / Plan:    1.  Coronary artery disease, status post three-vessel bypass vascularization 2019.  Stable with no anginal discomfort  2.  Essential hypertension well-controlled on present medical regimen  3.  Mixed hyperlipidemia on treatment.    Follow-up 2 years    ______________________________________________________________________    Subjective:    I had the opportunity to see Douglas Herrera at the [facility]. Douglas Herrera is a 62 year old male with a history of coronary artery disease and ascending aortic aneurysm.  He  underwent three-vessel pass revascularization along with ascending thoracic aortic aneurysm repair 4/2019. He developed pulmonary emboli requiring a course of treatment with Xarelto.  He is also treated for hypertension, hyperlipidemia, and gout.  He returns today for routine follow-up.    He has had some occasional twinges in his chest unrelated to activities.  He does exercise walking or bicycling on a daily basis.  His activity tolerance is stable.  He has had no awareness of palpitations.  He has noticed that his stamina is improving.  Continues to get some incisional soreness.  We discussed that the constant contact of his necklace on his scar may be contributing to this irritation.    He is considering traveling overseas possibly to [facility] for a couple of years.  I discussed that I find no cardiac reason for him not to  ______________________________________________________________________    Problem List:  Patient Active Problem List   Diagnosis     Abnormal cardiovascular stress test     Thoracic ascending aortic aneurysm (H)     Precordial pain     Unstable angina (H)     S/P CABG (coronary artery bypass graft)     ARF (acute respiratory failure) (H)     Coronary artery disease involving native coronary artery     Ascending aorta dilatation (H)     Anxiety     Hospital-acquired pneumonia     Hypoxemia      Acute idiopathic gout of right foot     Idiopathic hypertension     PE (pulmonary thromboembolism) (H)     Mixed hyperlipidemia     Medical History:  Past Medical History:   Diagnosis Date     Ascending aortic aneurysm (H)      Coronary artery disease      Depression      Gout      History of anesthesia complications      Hyperlipemia      Hypertension      PONV (postoperative nausea and vomiting)      Surgical History:  Past Surgical History:   Procedure Laterality Date     AORTA SURGERY N/A 4/23/2019    Procedure: WITH ASCENDING AORTA REPLACEMENT;  Surgeon: Darlene Graff MD;  Location: [facility] OR;  Service: Cardiovascular     BYPASS GRAFT ARTERY CORONARY       CARDIAC SURGERY       CV CORONARY ANGIOGRAM N/A 3/12/2019    Procedure: Coronary Angiogram;  Surgeon: Hamilton Lucero MD;  Location: [facility] Lab;  Service: Cardiology     GALLBLADDER SURGERY       Social History:  Social History     Socioeconomic History     Marital status:      Spouse name: Not on file     Number of children: Not on file     Years of education: Not on file     Highest education level: Not on file   Occupational History     Not on file   Tobacco Use     Smoking status: Former Smoker     Types: Cigarettes     Smokeless tobacco: Never Used     Tobacco comment: every 3-4 months, rare   Substance and Sexual Activity     Alcohol use: Not Currently     Drug use: No     Sexual activity: Not on file   Other Topics Concern     Not on file   Social History Narrative     Not on file     Social Determinants of Health     Financial Resource Strain: Not on file   Food Insecurity: Not on file   Transportation Needs: Not on file   Physical Activity: Not on file   Stress: Not on file   Social Connections: Not on file   Intimate Partner Violence: Not on file   Housing Stability: Not on file     Sleep History:  Restart  Exercise History:  Walking, bicycling several days a week    Review of Systems:      12 point review of  "systems otherwise negative     Please refer above for cardiac ROS details.         Family History:  Family History   Problem Relation Age of Onset     Cerebrovascular Disease Mother 90.00     Acute Myocardial Infarction No family hx of          Allergies:  No Known Allergies  Medications:  Current Outpatient Medications   Medication Sig Dispense Refill     amLODIPine (NORVASC) 5 MG tablet Take 5 mg by mouth daily       aspirin 81 MG EC tablet Take 81 mg by mouth daily       atorvastatin (LIPITOR) 80 MG tablet TAKE 1 TABLET (80 MG TOTAL) BY MOUTH AT BEDTIME/ TXHUA HMO NOJ 1 LUB TSHUAJ THAUM MUS PW PAB ZOO NTSHAV MUAJ ROJ 90 tablet 3     acetaminophen (TYLENOL) 500 MG tablet Take 1,000 mg by mouth 2 times daily as needed for mild pain       allopurinol (ZYLOPRIM) 300 MG tablet Take 300 mg by mouth daily       FLUoxetine (PROZAC) 40 MG capsule Take 40 mg by mouth daily       metoprolol tartrate (LOPRESSOR) 25 MG tablet Take 12.5 mg by mouth 2 times daily       nitroGLYcerin (NITROSTAT) 0.4 MG sublingual tablet Place 0.4 mg under the tongue every 5 minutes as needed for chest pain For chest pain place 1 tablet under the tongue every 5 minutes for 3 doses. If symptoms persist 5 minutes after 1st dose call 911.       predniSONE (DELTASONE) 20 MG tablet Take 20 mg by mouth daily as needed.       rivaroxaban ANTICOAGULANT (XARELTO) 15 MG TABS tablet Take 15 mg by mouth 2 times daily (with meals)       traZODone (DESYREL) 50 MG tablet Take 50 mg by mouth nightly as needed for sleep         Objective:   Wt Readings from Last 3 Encounters:   06/17/22 75.8 kg (167 lb)   05/14/21 76.2 kg (168 lb)   08/21/19 73.5 kg (162 lb)     Vital signs:  /62 (BP Location: Left arm, Patient Position: Sitting, Cuff Size: Adult Regular)   Pulse 66   Resp 16   Ht 1.626 m (5' 4\")   Wt 75.8 kg (167 lb)   BMI 28.67 kg/m        Physical Exam:    General Appearance : Awake, Alert, No acute distress  HEENT: No Scleral icterus; the mucous " membranes were pink and moist.  Conjunctivae not injected  Neck:  No cervical bruits, jugular venous distention, or thyromegaly   Chest: The spine was straight. Chest wall incision with erythema over a portion of the scar  Lungs: Respirations unlabored; the lungs are clear to auscultation.  No wheezing   Cardiovascular:   Normal point of maximal impulse.  Auscultation reveals normal first and second heart sounds with no murmurs, rubs, or gallops.  Carotid, radial, and dorsalis pedal pulses are intact and symmetric.    Abdomen: No organomegaly, masses, bruits, or tenderness. Bowels sounds are present  Extremities:  No clubbing, cyanosis.  No edema  Skin: No rash, bruising  Musculoskeletal: No tenderness.  Neurologic: Alert and oriented ×3. Speech is fluent.    Lab Results:  LIPIDS:  Lab Results   Component Value Date    CHOL 101 05/13/2022    CHOL 104 11/16/2021    CHOL 102 06/09/2020     Lab Results   Component Value Date    HDL 32 (L) 05/13/2022    HDL 34 (L) 11/16/2021    HDL 34 (L) 06/09/2020     Lab Results   Component Value Date    LDL 33 05/13/2022    LDL 42 11/16/2021    LDL 48 06/09/2020     Lab Results   Component Value Date    TRIG 180 (H) 05/13/2022    TRIG 139 11/16/2021    TRIG 101 06/09/2020       Echocardiogram 5/2019:  1. Normal left ventricular size and systolic performance with a visually estimated ejection fraction of 60-65%.   2. No significant valvular heart disease is identified on this study.   3. There is mild right ventricular enlargement with mildly reduced right ventricular systolic performance.  4. There is mild right atrial enlargement.   5. There is mild aortic root enlargement.   6. Right ventricular systolic pressure cannot be directly estimated from TR velocities due to insufficient tricuspid regurgitation.               BRENDA BEAULIEU MD [facility]  [phone]    This note created using Dragon voice recognition software.  Sound alike errors may have escaped editing.

## 2022-06-17 NOTE — PATIENT INSTRUCTIONS
No medication changes today.  Continue your regular exercise regimen.  There is no substitute!  Look forward to seeing you back in 2 years

## 2022-07-07 NOTE — TELEPHONE ENCOUNTER
Wellness Screening Tool  Symptom Screening:  Do you have one of the following NEW symptoms:    Fever (subjective or >100.0)?  No    A new cough?  No    Shortness of breath?  No     Chills? No     New loss of taste or smell? No     Generalized body aches? No     New persistent headache? No     New sore throat? No     Nausea, vomiting, or diarrhea?  No    Within the past 3 weeks, have you been exposed to someone with a known positive illness below:    COVID-19 (known or suspected)?  No    Chicken pox?  No    Mealses?  No    Pertussis?  No    Patient notified of visitor restrictions: Yes  Pt informed to wear a mask: Yes  Pt notified if they develop any symptoms listed above, prior to their appointment, they are to call the clinic directly at [phone] for further instructions.  Yes  Patient's appointment status: Patient will be seen in clinic as scheduled on 6/9/20       
yes

## 2022-08-26 NOTE — TELEPHONE ENCOUNTER
Patient reports foot pain and is unable to bare weight. He has a history of gout and states this is the same pain. He has an appointment today to see his PCP for foot pain. Encourage him to ask for alternatives to crutches such as a walking boot or knee scooter.  Patient was advised against using crutches as discharge instructions 4/30/19 state:  Minimal overhead reaching and no extreme arm  movements for 2 months.  No active sports using your upper arms for 3 months. This  includes fishing, hunting, bowling, swimming, tennis or golf.  No physical activity such as mowing the lawn, raking,  vacuuming, changing sheets on your bed, snow shoveling, or  using a  for 3 months.    Patient encouraged to keep his appointment today with PCP to evaluate foot pain.    Patient verbalized understanding and has no further questions at this time.   26-Aug-2022 10:37

## 2022-12-30 ENCOUNTER — TRANSFERRED RECORDS (OUTPATIENT)
Dept: HEALTH INFORMATION MANAGEMENT | Facility: CLINIC | Age: 62
End: 2022-12-30

## 2023-01-05 ENCOUNTER — TRANSFERRED RECORDS (OUTPATIENT)
Dept: HEALTH INFORMATION MANAGEMENT | Facility: CLINIC | Age: 63
End: 2023-01-05

## 2023-05-19 ENCOUNTER — NURSE TRIAGE (OUTPATIENT)
Dept: CARDIOLOGY | Facility: CLINIC | Age: 63
End: 2023-05-19
Payer: COMMERCIAL

## 2023-05-19 ENCOUNTER — LAB REQUISITION (OUTPATIENT)
Dept: LAB | Facility: CLINIC | Age: 63
End: 2023-05-19
Payer: MEDICARE

## 2023-05-19 ENCOUNTER — TELEPHONE (OUTPATIENT)
Dept: CARDIOLOGY | Facility: CLINIC | Age: 63
End: 2023-05-19
Payer: COMMERCIAL

## 2023-05-19 PROCEDURE — 87635 SARS-COV-2 COVID-19 AMP PRB: CPT | Mod: ORL | Performed by: PHYSICIAN ASSISTANT

## 2023-05-19 PROCEDURE — 87635 SARS-COV-2 COVID-19 AMP PRB: CPT | Performed by: PHYSICIAN ASSISTANT

## 2023-05-19 NOTE — TELEPHONE ENCOUNTER
"  Reason for Disposition    Information only question and nurse able to answer    Additional Information    Negative: Nursing judgment    Negative: Nursing judgment    Negative: Nursing judgment    Negative: Nursing judgment    Answer Assessment - Initial Assessment Questions  1. REASON FOR CALL or QUESTION: \"What is your reason for calling today?\" or \"How can I best help you?\" or \"What question do you have that I can help answer?\"      Patient saw his family doctor for what he thinks is allergies. He said his nose is plugged on the left side and at night he has trouble breathing. He has another appointment with his family doctor today regarding this because it hasn't improved since he was seen this past week. I asked him if it is pressure in his lungs or if it is chest pain/pressure. He said he thinks it's his breathing. He is not SOB. He does not have any symptoms of chest pain or angina. I told him I think he is correct to follow up with his family doctor for this. It does  Not sound cardiac. He agreed.    Protocols used: INFORMATION ONLY CALL - NO TRIAGE-A-OH    "

## 2023-05-20 LAB — SARS-COV-2 RNA RESP QL NAA+PROBE: NEGATIVE

## 2023-05-30 ENCOUNTER — TRANSFERRED RECORDS (OUTPATIENT)
Dept: HEALTH INFORMATION MANAGEMENT | Facility: CLINIC | Age: 63
End: 2023-05-30

## 2023-06-12 ENCOUNTER — TRANSFERRED RECORDS (OUTPATIENT)
Dept: HEALTH INFORMATION MANAGEMENT | Facility: CLINIC | Age: 63
End: 2023-06-12

## 2023-08-09 ENCOUNTER — TRANSFERRED RECORDS (OUTPATIENT)
Dept: HEALTH INFORMATION MANAGEMENT | Facility: CLINIC | Age: 63
End: 2023-08-09
Payer: COMMERCIAL

## 2023-08-18 ENCOUNTER — TRANSFERRED RECORDS (OUTPATIENT)
Dept: HEALTH INFORMATION MANAGEMENT | Facility: CLINIC | Age: 63
End: 2023-08-18

## 2023-08-23 ENCOUNTER — TRANSFERRED RECORDS (OUTPATIENT)
Dept: HEALTH INFORMATION MANAGEMENT | Facility: CLINIC | Age: 63
End: 2023-08-23
Payer: COMMERCIAL

## 2023-08-29 ENCOUNTER — TRANSFERRED RECORDS (OUTPATIENT)
Dept: HEALTH INFORMATION MANAGEMENT | Facility: CLINIC | Age: 63
End: 2023-08-29
Payer: COMMERCIAL

## 2023-08-30 ENCOUNTER — TRANSCRIBE ORDERS (OUTPATIENT)
Dept: OTHER | Age: 63
End: 2023-08-30

## 2023-08-30 DIAGNOSIS — J34.89 NASAL OBSTRUCTION: Primary | ICD-10-CM

## 2023-09-11 ENCOUNTER — TELEPHONE (OUTPATIENT)
Dept: OTOLARYNGOLOGY | Facility: CLINIC | Age: 63
End: 2023-09-11
Payer: COMMERCIAL

## 2023-09-11 NOTE — TELEPHONE ENCOUNTER
FUTURE VISIT INFORMATION:      FUTURE VISIT INFORMATION:  Date: 10/12/23  Time: 3 pm  Location: [facility]  REFERRAL INFORMATION:  Referring provider: Ana Rodriges MD   Referring providers clinic: [facility]  Reason for visit/diagnosis:  Nasal obstruction [J34.89], Ana Rodriges MD in GENERIC EXTERNAL DATA DEPARTMENT     RECORDS REQUESTED FROM:       Clinic name Comments Records Status Imaging Status   [facility] 23 OV note- Ana Rodriges MD     Image:  CT Sinus 23   USPS Trackin    *additional records received and send to scan on 23 Scanned in [facility] Re 23    Disc received 23   [facility]   [facility] MRN: [id number] MR Maxillofacial 23   Scanned in Epic Req 23  PACS                  2023 at 10:51 AM - called [facility] for MR image pushed over, image will be pushed over shortly. Called [facility] for CT sinus report and faxed USPS label for imaging disc -Ladan  2023 at 7:24 AM - Received imaging disc from [facility] and dropped off at [facility] to upload to PACS -Ladan  2023 at 2:06 PM - image resolved in PACs from [facility]. Reach out to [facility] for imaging report for 23 CT done in house. CB number left -Ladan  2023 at 12:17 PM - Called [facility] again and  x2 for 23 CT imaging report. CB left again -Ladan  *Deepa from [facility] called back, there should be 32 pages send. Informed Deepa that we do not received all the pages. Deepa will resend records. -Ladan  *received missing records and 23 CT imaging report and send to scanning -Ladan

## 2023-09-11 NOTE — TELEPHONE ENCOUNTER
[facility] Call Center    Phone Message    May a detailed message be left on voicemail: yes     Reason for Call: Appointment Intake  Pt current appointment for 10/12/23 Please review file and reach out to pt or his son pedro to schedule.   Referring Provider Name: Ana Rodriges MD in GENERIC EXTERNAL DATA DEPARTMENT  Diagnosis and/or Symptoms: Nasal obstruction [J34.89    Action Taken: Message routed to:  [facility]    Travel Screening: Not Applicable

## 2023-09-12 ENCOUNTER — HOSPITAL ENCOUNTER (OUTPATIENT)
Facility: HOSPITAL | Age: 63
Setting detail: OBSERVATION
Discharge: HOME | End: 2023-09-15
Attending: STUDENT IN AN ORGANIZED HEALTH CARE EDUCATION/TRAINING PROGRAM | Admitting: RADIOLOGY
Payer: COMMERCIAL

## 2023-09-12 DIAGNOSIS — J34.89 MASS OF SINUS: Primary | ICD-10-CM

## 2023-09-12 DIAGNOSIS — R04.0 EPISTAXIS: ICD-10-CM

## 2023-09-12 LAB
ANION GAP SERPL CALCULATED.3IONS-SCNC: 9 MMOL/L (ref 7–15)
APTT PPP: 29 SECONDS (ref 22–38)
BUN SERPL-MCNC: 16.8 MG/DL (ref 8–23)
CALCIUM SERPL-MCNC: 9.8 MG/DL (ref 8.8–10.2)
CHLORIDE SERPL-SCNC: 100 MMOL/L (ref 98–107)
CREAT SERPL-MCNC: 0.92 MG/DL (ref 0.67–1.17)
DEPRECATED HCO3 PLAS-SCNC: 26 MMOL/L (ref 22–29)
EGFRCR SERPLBLD CKD-EPI 2021: >90 ML/MIN/1.73M2
GLUCOSE SERPL-MCNC: 113 MG/DL (ref 70–99)
INR PPP: 1.01 (ref 0.85–1.15)
POTASSIUM SERPL-SCNC: 4.4 MMOL/L (ref 3.4–5.3)
SODIUM SERPL-SCNC: 135 MMOL/L (ref 136–145)

## 2023-09-12 PROCEDURE — 36415 COLL VENOUS BLD VENIPUNCTURE: CPT | Performed by: STUDENT IN AN ORGANIZED HEALTH CARE EDUCATION/TRAINING PROGRAM

## 2023-09-12 PROCEDURE — 85018 HEMOGLOBIN: CPT | Performed by: STUDENT IN AN ORGANIZED HEALTH CARE EDUCATION/TRAINING PROGRAM

## 2023-09-12 PROCEDURE — 80048 BASIC METABOLIC PNL TOTAL CA: CPT | Performed by: STUDENT IN AN ORGANIZED HEALTH CARE EDUCATION/TRAINING PROGRAM

## 2023-09-12 PROCEDURE — 85730 THROMBOPLASTIN TIME PARTIAL: CPT | Performed by: STUDENT IN AN ORGANIZED HEALTH CARE EDUCATION/TRAINING PROGRAM

## 2023-09-12 PROCEDURE — 85610 PROTHROMBIN TIME: CPT | Performed by: STUDENT IN AN ORGANIZED HEALTH CARE EDUCATION/TRAINING PROGRAM

## 2023-09-12 PROCEDURE — 99285 EMERGENCY DEPT VISIT HI MDM: CPT | Mod: 25

## 2023-09-12 PROCEDURE — 30905 CONTROL OF NOSEBLEED: CPT | Mod: LT

## 2023-09-12 ASSESSMENT — ACTIVITIES OF DAILY LIVING (ADL)
ADLS_ACUITY_SCORE: 33
ADLS_ACUITY_SCORE: 35

## 2023-09-13 ENCOUNTER — TELEPHONE (OUTPATIENT)
Dept: OTOLARYNGOLOGY | Facility: CLINIC | Age: 63
End: 2023-09-13

## 2023-09-13 ENCOUNTER — APPOINTMENT (OUTPATIENT)
Dept: INTERVENTIONAL RADIOLOGY/VASCULAR | Facility: HOSPITAL | Age: 63
End: 2023-09-13
Attending: NURSE PRACTITIONER
Payer: COMMERCIAL

## 2023-09-13 ENCOUNTER — APPOINTMENT (OUTPATIENT)
Dept: CT IMAGING | Facility: HOSPITAL | Age: 63
End: 2023-09-13
Attending: STUDENT IN AN ORGANIZED HEALTH CARE EDUCATION/TRAINING PROGRAM
Payer: COMMERCIAL

## 2023-09-13 PROBLEM — R04.0 EPISTAXIS: Status: ACTIVE | Noted: 2023-09-13

## 2023-09-13 PROBLEM — J34.89 MASS OF SINUS: Status: ACTIVE | Noted: 2023-09-13

## 2023-09-13 LAB
BASOPHILS # BLD AUTO: 0 10E3/UL (ref 0–0.2)
BASOPHILS NFR BLD AUTO: 0 %
EOSINOPHIL # BLD AUTO: 0.1 10E3/UL (ref 0–0.7)
EOSINOPHIL NFR BLD AUTO: 1 %
ERYTHROCYTE [DISTWIDTH] IN BLOOD BY AUTOMATED COUNT: 12.2 % (ref 10–15)
ERYTHROCYTE [DISTWIDTH] IN BLOOD BY AUTOMATED COUNT: 12.2 % (ref 10–15)
ERYTHROCYTE [DISTWIDTH] IN BLOOD BY AUTOMATED COUNT: 12.3 % (ref 10–15)
ERYTHROCYTE [DISTWIDTH] IN BLOOD BY AUTOMATED COUNT: 12.4 % (ref 10–15)
ERYTHROCYTE [DISTWIDTH] IN BLOOD BY AUTOMATED COUNT: 12.4 % (ref 10–15)
HCT VFR BLD AUTO: 39.7 % (ref 40–53)
HCT VFR BLD AUTO: 41.6 % (ref 40–53)
HCT VFR BLD AUTO: 42.4 % (ref 40–53)
HCT VFR BLD AUTO: 43.3 % (ref 40–53)
HCT VFR BLD AUTO: 43.9 % (ref 40–53)
HGB BLD-MCNC: 13.7 G/DL (ref 13.3–17.7)
HGB BLD-MCNC: 14 G/DL (ref 13.3–17.7)
HGB BLD-MCNC: 14.3 G/DL (ref 13.3–17.7)
HGB BLD-MCNC: 14.5 G/DL (ref 13.3–17.7)
HGB BLD-MCNC: 15.1 G/DL (ref 13.3–17.7)
IMM GRANULOCYTES # BLD: 0.1 10E3/UL
IMM GRANULOCYTES NFR BLD: 1 %
LYMPHOCYTES # BLD AUTO: 3.2 10E3/UL (ref 0.8–5.3)
LYMPHOCYTES NFR BLD AUTO: 35 %
MCH RBC QN AUTO: 28.6 PG (ref 26.5–33)
MCH RBC QN AUTO: 28.9 PG (ref 26.5–33)
MCH RBC QN AUTO: 28.9 PG (ref 26.5–33)
MCH RBC QN AUTO: 29.2 PG (ref 26.5–33)
MCH RBC QN AUTO: 29.4 PG (ref 26.5–33)
MCHC RBC AUTO-ENTMCNC: 33.5 G/DL (ref 31.5–36.5)
MCHC RBC AUTO-ENTMCNC: 33.7 G/DL (ref 31.5–36.5)
MCHC RBC AUTO-ENTMCNC: 33.7 G/DL (ref 31.5–36.5)
MCHC RBC AUTO-ENTMCNC: 34.4 G/DL (ref 31.5–36.5)
MCHC RBC AUTO-ENTMCNC: 34.5 G/DL (ref 31.5–36.5)
MCV RBC AUTO: 85 FL (ref 78–100)
MCV RBC AUTO: 86 FL (ref 78–100)
MCV RBC AUTO: 86 FL (ref 78–100)
MONOCYTES # BLD AUTO: 0.9 10E3/UL (ref 0–1.3)
MONOCYTES NFR BLD AUTO: 10 %
NEUTROPHILS # BLD AUTO: 4.8 10E3/UL (ref 1.6–8.3)
NEUTROPHILS NFR BLD AUTO: 53 %
NRBC # BLD AUTO: 0 10E3/UL
NRBC BLD AUTO-RTO: 0 /100
PLATELET # BLD AUTO: 206 10E3/UL (ref 150–450)
PLATELET # BLD AUTO: 213 10E3/UL (ref 150–450)
PLATELET # BLD AUTO: 222 10E3/UL (ref 150–450)
PLATELET # BLD AUTO: 223 10E3/UL (ref 150–450)
PLATELET # BLD AUTO: 225 10E3/UL (ref 150–450)
RBC # BLD AUTO: 4.69 10E6/UL (ref 4.4–5.9)
RBC # BLD AUTO: 4.89 10E6/UL (ref 4.4–5.9)
RBC # BLD AUTO: 4.94 10E6/UL (ref 4.4–5.9)
RBC # BLD AUTO: 5.01 10E6/UL (ref 4.4–5.9)
RBC # BLD AUTO: 5.14 10E6/UL (ref 4.4–5.9)
WBC # BLD AUTO: 10 10E3/UL (ref 4–11)
WBC # BLD AUTO: 10.5 10E3/UL (ref 4–11)
WBC # BLD AUTO: 11.7 10E3/UL (ref 4–11)
WBC # BLD AUTO: 9 10E3/UL (ref 4–11)
WBC # BLD AUTO: 9.2 10E3/UL (ref 4–11)

## 2023-09-13 PROCEDURE — 96374 THER/PROPH/DIAG INJ IV PUSH: CPT | Mod: 59

## 2023-09-13 PROCEDURE — 96361 HYDRATE IV INFUSION ADD-ON: CPT

## 2023-09-13 PROCEDURE — 99223 1ST HOSP IP/OBS HIGH 75: CPT | Performed by: INTERNAL MEDICINE

## 2023-09-13 PROCEDURE — 99152 MOD SED SAME PHYS/QHP 5/>YRS: CPT

## 2023-09-13 PROCEDURE — 75894 X-RAYS TRANSCATH THERAPY: CPT

## 2023-09-13 PROCEDURE — 85027 COMPLETE CBC AUTOMATED: CPT | Performed by: INTERNAL MEDICINE

## 2023-09-13 PROCEDURE — 96376 TX/PRO/DX INJ SAME DRUG ADON: CPT

## 2023-09-13 PROCEDURE — 75898 FOLLOW-UP ANGIOGRAPHY: CPT

## 2023-09-13 PROCEDURE — 36415 COLL VENOUS BLD VENIPUNCTURE: CPT | Performed by: INTERNAL MEDICINE

## 2023-09-13 PROCEDURE — G0378 HOSPITAL OBSERVATION PER HR: HCPCS

## 2023-09-13 PROCEDURE — 70498 CT ANGIOGRAPHY NECK: CPT

## 2023-09-13 PROCEDURE — 70496 CT ANGIOGRAPHY HEAD: CPT

## 2023-09-13 PROCEDURE — 250N000013 HC RX MED GY IP 250 OP 250 PS 637: Performed by: INTERNAL MEDICINE

## 2023-09-13 PROCEDURE — C1769 GUIDE WIRE: HCPCS

## 2023-09-13 PROCEDURE — 250N000011 HC RX IP 250 OP 636: Mod: JZ | Performed by: RADIOLOGY

## 2023-09-13 PROCEDURE — 258N000003 HC RX IP 258 OP 636: Performed by: INTERNAL MEDICINE

## 2023-09-13 PROCEDURE — C1889 IMPLANT/INSERT DEVICE, NOC: HCPCS

## 2023-09-13 PROCEDURE — C1760 CLOSURE DEV, VASC: HCPCS

## 2023-09-13 PROCEDURE — 272N000500 HC NEEDLE CR2

## 2023-09-13 PROCEDURE — 272N000566 HC SHEATH CR3

## 2023-09-13 PROCEDURE — 61626 TCAT PERM OCCLS/EMBOL NONCNS: CPT

## 2023-09-13 PROCEDURE — 96375 TX/PRO/DX INJ NEW DRUG ADDON: CPT

## 2023-09-13 PROCEDURE — 36223 PLACE CATH CAROTID/INOM ART: CPT | Mod: XS

## 2023-09-13 PROCEDURE — 99207 PR APP CREDIT; MD BILLING SHARED VISIT: CPT | Performed by: INTERNAL MEDICINE

## 2023-09-13 PROCEDURE — 250N000009 HC RX 250: Performed by: STUDENT IN AN ORGANIZED HEALTH CARE EDUCATION/TRAINING PROGRAM

## 2023-09-13 PROCEDURE — 36227 PLACE CATH XTRNL CAROTID: CPT | Mod: XS

## 2023-09-13 PROCEDURE — 250N000011 HC RX IP 250 OP 636: Mod: JZ | Performed by: STUDENT IN AN ORGANIZED HEALTH CARE EDUCATION/TRAINING PROGRAM

## 2023-09-13 PROCEDURE — 255N000002 HC RX 255 OP 636: Performed by: RADIOLOGY

## 2023-09-13 RX ORDER — FLUMAZENIL 0.1 MG/ML
0.2 INJECTION, SOLUTION INTRAVENOUS
Status: DISCONTINUED | OUTPATIENT
Start: 2023-09-13 | End: 2023-09-15 | Stop reason: HOSPADM

## 2023-09-13 RX ORDER — NALOXONE HYDROCHLORIDE 0.4 MG/ML
0.4 INJECTION, SOLUTION INTRAMUSCULAR; INTRAVENOUS; SUBCUTANEOUS
Status: DISCONTINUED | OUTPATIENT
Start: 2023-09-13 | End: 2023-09-15 | Stop reason: HOSPADM

## 2023-09-13 RX ORDER — SODIUM CHLORIDE 9 MG/ML
INJECTION, SOLUTION INTRAVENOUS CONTINUOUS
Status: DISCONTINUED | OUTPATIENT
Start: 2023-09-13 | End: 2023-09-13

## 2023-09-13 RX ORDER — ONDANSETRON 2 MG/ML
4 INJECTION INTRAMUSCULAR; INTRAVENOUS EVERY 6 HOURS PRN
Status: DISCONTINUED | OUTPATIENT
Start: 2023-09-13 | End: 2023-09-15 | Stop reason: HOSPADM

## 2023-09-13 RX ORDER — AMOXICILLIN 250 MG
2 CAPSULE ORAL 2 TIMES DAILY PRN
Status: DISCONTINUED | OUTPATIENT
Start: 2023-09-13 | End: 2023-09-15 | Stop reason: HOSPADM

## 2023-09-13 RX ORDER — NALOXONE HYDROCHLORIDE 0.4 MG/ML
0.2 INJECTION, SOLUTION INTRAMUSCULAR; INTRAVENOUS; SUBCUTANEOUS
Status: DISCONTINUED | OUTPATIENT
Start: 2023-09-13 | End: 2023-09-15 | Stop reason: HOSPADM

## 2023-09-13 RX ORDER — IOPAMIDOL 755 MG/ML
75 INJECTION, SOLUTION INTRAVASCULAR ONCE
Status: COMPLETED | OUTPATIENT
Start: 2023-09-13 | End: 2023-09-13

## 2023-09-13 RX ORDER — ENTECAVIR 0.5 MG/1
0.5 TABLET, FILM COATED ORAL EVERY MORNING
COMMUNITY

## 2023-09-13 RX ORDER — ENTECAVIR 0.5 MG/1
0.5 TABLET, FILM COATED ORAL
Status: DISCONTINUED | OUTPATIENT
Start: 2023-09-13 | End: 2023-09-15 | Stop reason: HOSPADM

## 2023-09-13 RX ORDER — FENTANYL CITRATE 50 UG/ML
25-50 INJECTION, SOLUTION INTRAMUSCULAR; INTRAVENOUS EVERY 5 MIN PRN
Status: DISCONTINUED | OUTPATIENT
Start: 2023-09-13 | End: 2023-09-13

## 2023-09-13 RX ORDER — ATORVASTATIN CALCIUM 40 MG/1
80 TABLET, FILM COATED ORAL EVERY EVENING
Status: DISCONTINUED | OUTPATIENT
Start: 2023-09-13 | End: 2023-09-15 | Stop reason: HOSPADM

## 2023-09-13 RX ORDER — AMOXICILLIN 250 MG
1 CAPSULE ORAL 2 TIMES DAILY PRN
Status: DISCONTINUED | OUTPATIENT
Start: 2023-09-13 | End: 2023-09-15 | Stop reason: HOSPADM

## 2023-09-13 RX ORDER — ACETAMINOPHEN 325 MG/1
975 TABLET ORAL EVERY 8 HOURS PRN
Status: DISCONTINUED | OUTPATIENT
Start: 2023-09-13 | End: 2023-09-14

## 2023-09-13 RX ORDER — OXYMETAZOLINE HYDROCHLORIDE 0.05 G/100ML
2 SPRAY NASAL ONCE
Status: COMPLETED | OUTPATIENT
Start: 2023-09-13 | End: 2023-09-13

## 2023-09-13 RX ORDER — ONDANSETRON 4 MG/1
4 TABLET, ORALLY DISINTEGRATING ORAL EVERY 6 HOURS PRN
Status: DISCONTINUED | OUTPATIENT
Start: 2023-09-13 | End: 2023-09-15 | Stop reason: HOSPADM

## 2023-09-13 RX ORDER — METOPROLOL SUCCINATE 25 MG/1
12.5 TABLET, EXTENDED RELEASE ORAL EVERY MORNING
Status: ON HOLD | COMMUNITY
End: 2023-12-04

## 2023-09-13 RX ORDER — AMLODIPINE BESYLATE 5 MG/1
5 TABLET ORAL DAILY
Status: DISCONTINUED | OUTPATIENT
Start: 2023-09-13 | End: 2023-09-15 | Stop reason: HOSPADM

## 2023-09-13 RX ADMIN — IOHEXOL 75 ML: 350 INJECTION, SOLUTION INTRAVENOUS at 13:59

## 2023-09-13 RX ADMIN — MIDAZOLAM HYDROCHLORIDE 1 MG: 1 INJECTION, SOLUTION INTRAMUSCULAR; INTRAVENOUS at 13:25

## 2023-09-13 RX ADMIN — FENTANYL CITRATE 50 MCG: 50 INJECTION, SOLUTION INTRAMUSCULAR; INTRAVENOUS at 13:08

## 2023-09-13 RX ADMIN — ATORVASTATIN CALCIUM 80 MG: 40 TABLET, FILM COATED ORAL at 20:07

## 2023-09-13 RX ADMIN — AMLODIPINE BESYLATE 5 MG: 5 TABLET ORAL at 08:49

## 2023-09-13 RX ADMIN — MIDAZOLAM HYDROCHLORIDE 1 MG: 1 INJECTION, SOLUTION INTRAMUSCULAR; INTRAVENOUS at 13:04

## 2023-09-13 RX ADMIN — OXYMETAZOLINE HYDROCHLORIDE 2 SPRAY: 0.05 SPRAY NASAL at 02:12

## 2023-09-13 RX ADMIN — SODIUM CHLORIDE: 9 INJECTION, SOLUTION INTRAVENOUS at 07:11

## 2023-09-13 RX ADMIN — ACETAMINOPHEN 975 MG: 325 TABLET ORAL at 20:07

## 2023-09-13 RX ADMIN — FENTANYL CITRATE 50 MCG: 50 INJECTION, SOLUTION INTRAMUSCULAR; INTRAVENOUS at 13:42

## 2023-09-13 RX ADMIN — ENTECAVIR 0.5 MG: 0.5 TABLET ORAL at 20:08

## 2023-09-13 RX ADMIN — IOPAMIDOL 75 ML: 755 INJECTION, SOLUTION INTRAVENOUS at 02:36

## 2023-09-13 ASSESSMENT — ACTIVITIES OF DAILY LIVING (ADL)
ADLS_ACUITY_SCORE: 35
ADLS_ACUITY_SCORE: 35
ADLS_ACUITY_SCORE: 31
ADLS_ACUITY_SCORE: 35
ADLS_ACUITY_SCORE: 31
ADLS_ACUITY_SCORE: 35
ADLS_ACUITY_SCORE: 35
ADLS_ACUITY_SCORE: 31
ADLS_ACUITY_SCORE: 35
DEPENDENT_IADLS:: CLEANING;COOKING;LAUNDRY;SHOPPING;MEAL PREPARATION;MEDICATION MANAGEMENT
ADLS_ACUITY_SCORE: 35

## 2023-09-13 NOTE — PHARMACY-ADMISSION MEDICATION HISTORY
"Pharmacist Admission Medication History    Admission medication history is complete. The information provided in this note is only as accurate as the sources available at the time of the update.    Medication reconciliation/reorder completed by provider prior to medication history? Yes    Information Source(s): Patient and CareEverywhere/SureScripts via in-person, previously completed med hx by medication scribe this AM    Pertinent Information: re-interviewed patient as pharmacist noticed fill hx within past 90 days for metoprolol succinate and entecavir (not previously on med list). Writer performed brief interview and patient reported taking the metoprolol by name and \"an antiviral\" 1 hr before meals. Added to PTA med list per discussion.     Changes made to PTA medication list:  Added:   Metoprolol succinate 25 mg tablet, take one-half tab (12.5 mg) daily  Entecavir 0.5 mg daily, take 1 hr before a meal or 2 hours after a meal (patient usually takes 1 hr before breakfast)  Deleted: None  Changed: None    Medication Affordability:  Not including over the counter (OTC) medications, was there a time in the past 3 months when you did not take your medications as prescribed because of cost?: No    Allergies reviewed with patient and updates made in EHR: yes    Medication History Completed By: Nicollette McMann, [facility] 9/13/2023 11:43 AM    Prior to Admission medications    Medication Sig Last Dose Taking? Auth Provider Long Term End Date   acetaminophen (TYLENOL) 500 MG tablet Take 1,000 mg by mouth 2 times daily as needed for mild pain Past Week at prn Yes Reported, Patient     allopurinol (ZYLOPRIM) 300 MG tablet Take 300 mg by mouth daily Unknown at Unsure Yes Reported, Patient     amLODIPine (NORVASC) 5 MG tablet Take 5 mg by mouth daily 9/12/2023 at am Yes Reported, Patient Yes    aspirin 81 MG EC tablet Take 81 mg by mouth daily 9/12/2023 at am Yes Reported, Patient     entecavir (BARACLUDE) 0.5 MG tablet Take 0.5 " mg by mouth daily Take 1 hour before a meal or 2 hours after a meal. 9/11/2023 Yes Unknown, Entered By History Yes    metoprolol succinate ER (TOPROL XL) 25 MG 24 hr tablet Take 12.5 mg by mouth daily 9/10/2023 Yes Unknown, Entered By History Yes    atorvastatin (LIPITOR) 80 MG tablet TAKE 1 TABLET (80 MG TOTAL) BY MOUTH AT BEDTIME/ TXHUA O NOJ 1 LUB TSHUAJ THAUM MUS PW PAB ZOO NTSHAV MUAJ ROJ 9/11/2023 at pm  Hamilton Lucero MD Yes

## 2023-09-13 NOTE — ED PROVIDER NOTES
EMERGENCY DEPARTMENT ENCOUNTER       ED Course & Medical Decision Making     9:15PM I met with the patient to gather history and to perform my initial exam. We discussed plans for the ED course, including diagnostic testing and treatment.   9:56 PM I spoke with Dr. Claros, ENT provider.   11:41 PM I attempted to place a rhino rocket in patient's nose but it pushed out immediately.   11:59 PM I spoke with Dr. Schultz from [facility] ENT provider.   1:55 AM I spoke with Dr. Martinez, [facility] ENT provider. Recommended CTA to evaluate for possible embolization.   4:50 AM I spoke with Dr. Jimenez from Neuro IR  4:56 AM I spoke with Dr. Childs, hospitalist, she will talk to ENT to confirm that they are okay accepting patient here    Final Impression  63 year old male presents for evaluation of epistaxis that began at about 8 PM this evening.  They report he has had intermittent spotting and dribbling of blood periodically over the last few days, though began more consistently at about 8 PM tonight.  No nasal trauma.  Patient not on any blood thinners.  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  Labs fairly unremarkable, normal hemoglobin of 14.5, coags normal.  Spoke with local ENT, they recommended trialing a Rhino rocket and talking with [facility] ENT. Attempted placing a Rhino Rocket, though due to the size and nature of the mass this Rhino Rocket was just pushed straight out and could not remain seated despite multiple attempts.  Spoke with [facility] ENT, reviewed case, agreed that for patient's definitive care and planned tumor excision would likely need to be done at [facility], though she felt that temporizing measures for epistaxis could be adequately managed by community/local ENT, though also recommended we reach out to IR after getting a CTA to see if they can potentially do an embolization to limit the bleeding.  CT obtained, reviewed with IR on-call, they will review  images and likely see patient in the morning to see if there is a good candidate for embolization.  Will admit here for further management of epistaxis.  Did trial some Afrin which provided some relief, though given the friable nature of his tissue this is a very short-term solution, patient will likely need embolization or ENT nasal packing before he is stable for discharge for more definitive management by [facility] ENT.    Spoke with hospitalist here to discuss the case, she will reach out to ENT to confirm that they are comfortable keeping patient here.      Medical Decision Making    History:  Supplemental history from: Family member at bedside (son)  External Record(s) reviewed as documented below;  8/23/2023, [facility] radiology MRI max facial with and without contrast report reviewed; left maxillary sinus completely filled with a complex heterogeneously enhancing, destructive soft tissue mass exhibiting some cerebriform components inferiorly and posteriorly.  Erosion to the medial, posterior and lateral maxillary sinus walls with inferior extension into and obstruction of the posterior left maxillary alveolus and pterygoid processes.Left nasal passage completely occluded by enhancing tissue, extension through the left melissa.  Complete effacement of the left nasopharynx, partial effacement of the right nasopharynx.  Superiorly, the lesion remains confined to the ethmoid air cells with no convincing extension into the frontal sinuses.  No invasion of the left orbit.  Work Up:  Chart documentation includes differential considered and any EKGs or imaging independently interpreted by provider, where specified.    External consultation:  Discussion of management with another provider: ENT, Interventional radiology, hospitalist    Complicating factors:  Care impacted by chronic illness: Anxiety, CAD s/p CABG, ascending aortic aneurysm  Care affected by social determinants of health: N/A    Disposition considerations:  Admit.      Medications   oxymetazoline (AFRIN) 0.05 % spray 2 spray (2 sprays Nasal $Given 9/13/23 0212)   iopamidol (ISOVUE-370) solution 75 mL (75 mLs Intravenous $Given 9/13/23 0236)       Final Impression     1. Epistaxis        Chief Complaint     Chief Complaint   Patient presents with    Epistaxis     Pt here with son with uncontrolled nose bleed. Pt states it has been bleeding on and off for 2 days but over the past hour he hasn't been able to control the bleeding. Large clots seen in emesis bag. Bleeding not controlled in triage. Pt reports he has a tumor in his nose that was found on MRI Aug 23 - could not get in with ENT till october HPI     Douglas Herrera is a 63 year old male who presents for evaluation of epistaxis.     Per chart review, patient had an MRI on 8/23/2023. Imaging showed left maxillary sinus completely filled with a complex heterogeneously enhancing, destructive soft tissue mass exhibiting some cerebriform components inferiorly and posteriorly.  Erosion to the medial, posterior and lateral maxillary sinus walls with inferior extension into and obstruction of the posterior left maxillary alveolus and pterygoid processes.Left nasal passage completely occluded by enhancing tissue, extension through the left melissa.  Complete effacement of the left nasopharynx, partial effacement of the right nasopharynx. Superiorly, the lesion remains confined to the ethmoid air cells with no convincing extension into the frontal sinuses.  No invasion of the left orbit.    Per patient's son, patient had a sudden onset of epistaxis at 8:00PM this evening. Blood is primarily coming from the left nostril. Patient and son attempted to pack the nostril but are having trouble controlling the bleeding. Son notes patient often experiences a small amount of intermittent blood when wiping nose. No history of medical packing for epistaxis. No use of blood thinners.     Per patient, he is scheduled for ENT procedure  10/12/23 to evaluate recent finding of his tumor. Of note, patient had a recent skin cancer procedure. No other concerns or complaints.     I, Oma Cristina am serving as a scribe to document services personally performed by Dr. Gary Simmons MD, based on my observation and the provider's statements to me. I, Dr. Gary Simmons MD attest that Oma Cristina is acting in a scribe capacity, has observed my performance of the services and has documented them in accordance with my direction.    Physical Exam     /78   Pulse 63   Temp 98.8  F (37.1  C) (Temporal)   Resp 18   SpO2 97%   Constitutional: Awake, alert, in no acute distress.  Head: Normocephalic, atraumatic.  ENT: Mucous membranes moist.  Right nostril relatively clear appearing.  Left nostril with large visible mass and mild amount of active bleeding visible from this friable mass.  Eyes: Conjunctiva normal.  Respiratory: Respirations even, unlabored, in no acute respiratory distress.  Cardiovascular: Regular rate and rhythm. Good peripheral perfusion.  GI: Abdomen soft, non-tender.  Musculoskeletal: Moves all 4 extremities equally.  Integument: Warm, dry.  Neurologic: Alert & oriented x 3. Normal speech. Grossly normal motor and sensory function. No focal deficits noted.  Psychiatric: Normal mood    Labs & Imaging     Results for orders placed or performed during the hospital encounter of 09/12/23   CTA Head Neck with Contrast    Impression    IMPRESSION:   HEAD CT:  1.  No acute intracranial process.  2.  Large heterogeneously enhancing mass centered in the left maxillary sinus with erosion through the medial and lateral walls of the left maxillary sinus, into the left maxillary alveolus and pterygoid musculature and pterygoid plates, into the left   infratemporal fossa, left nasal cavity and left ethmoid air cells. No definite orbital invasion.    HEAD CTA:   1.  Normal CTA [place].    NECK CTA:  1.  Hypertrophied left  maxillary artery which courses towards the left pterygopalatine fossa and a cluster of small arteries within the posterior/inferior aspect of the above-described mass lesion.  2.  Otherwise unremarkable.   Basic metabolic panel   Result Value Ref Range    Sodium 135 (L) 136 - 145 mmol/L    Potassium 4.4 3.4 - 5.3 mmol/L    Chloride 100 98 - 107 mmol/L    Carbon Dioxide (CO2) 26 22 - 29 mmol/L    Anion Gap 9 7 - 15 mmol/L    Urea Nitrogen 16.8 8.0 - 23.0 mg/dL    Creatinine 0.92 0.67 - 1.17 mg/dL    Calcium 9.8 8.8 - 10.2 mg/dL    Glucose 113 (H) 70 - 99 mg/dL    GFR Estimate >90 >60 mL/min/1.73m2   Result Value Ref Range    INR 1.01 0.85 - 1.15   Partial thromboplastin time   Result Value Ref Range    aPTT 29 22 - 38 Seconds   CBC with platelets and differential   Result Value Ref Range    WBC Count 9.0 4.0 - 11.0 10e3/uL    RBC Count 5.01 4.40 - 5.90 10e6/uL    Hemoglobin 14.5 13.3 - 17.7 g/dL    Hematocrit 43.3 40.0 - 53.0 %    MCV 86 78 - 100 fL    MCH 28.9 26.5 - 33.0 pg    MCHC 33.5 31.5 - 36.5 g/dL    RDW 12.2 10.0 - 15.0 %    Platelet Count 222 150 - 450 10e3/uL    % Neutrophils 53 %    % Lymphocytes 35 %    % Monocytes 10 %    % Eosinophils 1 %    % Basophils 0 %    % Immature Granulocytes 1 %    NRBCs per 100 WBC 0 <1 /100    Absolute Neutrophils 4.8 1.6 - 8.3 10e3/uL    Absolute Lymphocytes 3.2 0.8 - 5.3 10e3/uL    Absolute Monocytes 0.9 0.0 - 1.3 10e3/uL    Absolute Eosinophils 0.1 0.0 - 0.7 10e3/uL    Absolute Basophils 0.0 0.0 - 0.2 10e3/uL    Absolute Immature Granulocytes 0.1 <=0.4 10e3/uL    Absolute NRBCs 0.0 10e3/uL        Gary Simmons MD  09/13/23 0512

## 2023-09-13 NOTE — CONSULTS
Chief Complaint   Patient presents with    Epistaxis     History of Present Illness  Douglas Herrera is a 63 year old male admitted for epistaxis. (See note below).      HOSPITALIST NOTE    History of Present Illness  Douglas Herrera is a 63 year old male with comorbid medical conditions as listed presents to the ER with complaints of epistaxis.  Patient endorses pressure around the left eye also complains of a burning sensation on the left cheek left maxillary area.  He denies any shortness of breath or difficulty breathing.  Patient followed by ENT at the [facility] secondary to this mass.  He is scheduled for surgery however he had started bleeding and hence presentation to the ER.  ER provider had reached out to ENT here and recommendation was to speak with the ENT at the [facility], who had recommended that temporizing measures be set in place to control the bleeding.  ER provider had tried to place a Rhino Rocket unsuccessfully.  A CTA was done per [facility] ENT recommendation for evaluation for possible embolization.  IR service was called from the ER for possible embolization however, ENT here at our facility was not notified that ENT at the [facility] had declined the patient.  Admitting MD reached out to ENT who recommended that the patient would be better served at the [facility]   hospitalist admitting patient for evaluation by IR for possible embolization.    Past Medical History  Patient Active Problem List   Diagnosis    Abnormal cardiovascular stress test    Thoracic ascending aortic aneurysm (H)    Precordial pain    Unstable angina (H)    S/P CABG (coronary artery bypass graft)    ARF (acute respiratory failure) (H)    Coronary artery disease involving native coronary artery    Ascending aorta dilatation (H)    Anxiety    Hospital-acquired pneumonia    Hypoxemia    Acute idiopathic gout of right foot    Idiopathic hypertension    PE (pulmonary thromboembolism)  (H)    Mixed hyperlipidemia    Epistaxis     Current Medications    Current Facility-Administered Medications:     acetaminophen (TYLENOL) tablet 975 mg, 975 mg, Oral, Q8H PRN, Aneudy Childs MD    amLODIPine (NORVASC) tablet 5 mg, 5 mg, Oral, Daily, Carito Morales MD, 5 mg at 09/13/23 0849    atorvastatin (LIPITOR) tablet 80 mg, 80 mg, Oral, QPM, Carito Morales MD    melatonin tablet 1 mg, 1 mg, Oral, At Bedtime PRN, Aneudy Childs MD    ondansetron (ZOFRAN ODT) ODT tab 4 mg, 4 mg, Oral, Q6H PRN **OR** ondansetron (ZOFRAN) injection 4 mg, 4 mg, Intravenous, Q6H PRN, Aneudy Childs MD    senna-docusate (SENOKOT-S/PERICOLACE) 8.6-50 MG per tablet 1 tablet, 1 tablet, Oral, BID PRN **OR** senna-docusate (SENOKOT-S/PERICOLACE) 8.6-50 MG per tablet 2 tablet, 2 tablet, Oral, BID PRN, Aneudy Childs MD    sodium chloride 0.9% infusion, , Intravenous, Continuous, Aneudy Childs MD, Last Rate: 75 mL/hr at 09/13/23 0711, New Bag at 09/13/23 0711    Current Outpatient Medications:     acetaminophen (TYLENOL) 500 MG tablet, Take 1,000 mg by mouth 2 times daily as needed for mild pain, Disp: , Rfl:     allopurinol (ZYLOPRIM) 300 MG tablet, Take 300 mg by mouth daily, Disp: , Rfl:     amLODIPine (NORVASC) 5 MG tablet, Take 5 mg by mouth daily, Disp: , Rfl:     aspirin 81 MG EC tablet, Take 81 mg by mouth daily, Disp: , Rfl:     atorvastatin (LIPITOR) 80 MG tablet, TAKE 1 TABLET (80 MG TOTAL) BY MOUTH AT BEDTIME/ TXHUA O NOJ 1 LUB TSHUAJ THAUM MUS PW PAB ZOO NTSHAV MUAJ ROJ, Disp: 90 tablet, Rfl: 3    Allergies  No Known Allergies    Social History  Social History     Socioeconomic History    Marital status:    Tobacco Use    Smoking status: Former     Types: Cigarettes    Smokeless tobacco: Never    Tobacco comments:     every 3-4 months, rare   Substance and Sexual Activity    Alcohol use: Not Currently    Drug use: No       Family History  Family History   Problem Relation Age of Onset     Cerebrovascular Disease Mother 90.00    Acute Myocardial Infarction No family hx of        Review of Systems  As per HPI and PMHx, otherwise 10 system review including the head and neck, constitutional, eyes, respiratory, GI, skin, neurologic, lymphatic, endocrine, and allergy systems is negative.    Physical Exam  /78   Pulse 69   Temp 97.8  F (36.6  C) (Oral)   Resp 18   SpO2 96%       INDINGS:   NONCONTRAST HEAD CT:   INTRACRANIAL CONTENTS: No intracranial hemorrhage, extraaxial collection, or mass effect.  No CT evidence of acute infarct. Normal parenchymal attenuation. Mild generalized volume loss. No hydrocephalus.      VISUALIZED ORBITS/SINUSES/MASTOIDS: There is a soft tissue mass centered in the left maxillary sinus with heterogeneous enhancement. This mass erodes through the medial and posterior/lateral wall the left maxillary sinus. It erodes to the floor of the   left maxillary sinus where it involves the left maxillary alveolar ridge. There is erosion into the posterior left maxillary alveolus and pterygoid processes. There is extension into the anterior aspect of the left infratemporal fossa and left pterygoid   musculature. There is extension into the left nasal cavity that is opacified as well as extension into the left ethmoid air cells. The left frontal sinus is nearly opacified. There is complete opacification of the left sphenoid locule. No definite   orbital extension. Partial opacification left mastoid air cells.     BONES/SOFT TISSUES: As above.     HEAD CTA:  ANTERIOR CIRCULATION: No stenosis/occlusion, aneurysm, or high flow vascular malformation. Standard [place] anatomy.     POSTERIOR CIRCULATION: No stenosis/occlusion, aneurysm, or high flow vascular malformation. Balanced vertebral arteries supply a normal basilar artery.      DURAL VENOUS SINUSES: Expected enhancement of the major dural venous sinuses.     NECK CTA:  RIGHT CAROTID: No measurable stenosis or  dissection.     LEFT CAROTID: No measurable stenosis or dissection. Prominent maxillary branch off the left EAC extends towards the left pterygopalatine fossa and into a cluster of small arteries in the region of the mass/maxillary alveolus.     VERTEBRAL ARTERIES: No focal stenosis or dissection. Balanced vertebral arteries.     AORTIC ARCH: Classic aortic arch anatomy with no significant stenosis at the origin of the great vessels.     NONVASCULAR STRUCTURES: Unremarkable.                                                                      IMPRESSION:   HEAD CT:  1.  No acute intracranial process.  2.  Large heterogeneously enhancing mass centered in the left maxillary sinus with erosion through the medial and lateral walls of the left maxillary sinus, into the left maxillary alveolus and pterygoid musculature and pterygoid plates, into the left   infratemporal fossa, left nasal cavity and left ethmoid air cells. No definite orbital invasion.     HEAD CTA:   1.  Normal CTA [place] [facility] [place].     NECK CTA:  1.  Hypertrophied left maxillary artery which courses towards the left pterygopalatine fossa and a cluster of small arteries within the posterior/inferior aspect of the above-described mass lesion.  2.  Otherwise unremarkable.      Assessment and Plan     63 year old male admitted overnight with epistaxis and CT finding of large left maxillary sinus neoplasm eroding into the left pterygoid musculature, plates and left infratemporal fossa with no orbital invasion.   Per chart review the left nasal cavity has now been packed.     Agree with consult to intervention radiology. He will need to follow up at the [facility] (which is already scheduled)  for further evaluation and management.     Federico Claros MD  [facility]

## 2023-09-13 NOTE — H&P
[facility]    History and Physical - Hospitalist Service       Date of Admission:  9/12/2023    Assessment & Plan      Douglas Herrera is a 63 year old male admitted on 9/12/2023. He is admitted with left maxillary sinus friable mass and epistaxis.    Epistaxis  -- Keep NPO   -- IR consult  -- Hold aspirin  -- ENT consult  -- Left nares currently packed, no significant bleeding at the time of interview  -- Trend H&H  -- Hemoglobin goal will be 8.  No acute indication for transfusion at this time      Left maxillary mass   -- ENT consulted      History hypertension   -- Monitor blood pressure      history of CAD  -- Holding aspirin       Diet: NPO for Medical/Clinical Reasons Except for: Meds  DVT Prophylaxis: Holding for now   Rico Catheter: Not present  Lines: None     Cardiac Monitoring: ACTIVE order. Indication: Procedural area  Code Status: Full Code    Clinically Significant Risk Factors Present on Admission                  # Drug Induced Platelet Defect: home medication list includes an antiplatelet medication     # Hypertension: Noted on problem list           # History of CABG: noted on surgical history       Disposition Plan      Expected Discharge Date: 09/14/2023                  Aneudy Childs MD  Hospitalist Service  [facility]  Securely message with Vocera (more info)  Text page via [employer] Paging/Directory     ______________________________________________________________________    Chief Complaint   Epistaxis      History is obtained from the patient    History of Present Illness   Douglas Herrera is a 63 year old male with comorbid medical conditions as listed presents to the ER with complaints of epistaxis.  Patient endorses pressure around the left eye also complains of a burning sensation on the left cheek left maxillary area.  He denies any shortness of breath or difficulty breathing.  Patient followed by ENT at the [facility] secondary to  this mass.  He is scheduled for surgery however he had started bleeding and hence presentation to the ER.  ER provider had reached out to ENT here and recommendation was to speak with the ENT at the [facility], who had recommended that temporizing measures be set in place to control the bleeding.  ER provider had tried to place a Rhino Rocket unsuccessfully.  A CTA was done per [facility] ENT recommendation for evaluation for possible embolization.  IR service was called from the ER for possible embolization however, ENT here at our facility was not notified that ENT at the [facility] had declined the patient.  Admitting MD reached out to ENT who recommended that the patient would be better served at the [facility]   hospitalist admitting patient for evaluation by IR for possible embolization.      Past Medical History    Past Medical History:   Diagnosis Date    Ascending aortic aneurysm (H)     Coronary artery disease     Depression     Gout     History of anesthesia complications     Hyperlipemia     Hypertension     PONV (postoperative nausea and vomiting)        Past Surgical History   Past Surgical History:   Procedure Laterality Date    AORTA SURGERY N/A 4/23/2019    Procedure: WITH ASCENDING AORTA REPLACEMENT;  Surgeon: Darlene Graff MD;  Location: [facility] OR;  Service: Cardiovascular    BYPASS GRAFT ARTERY CORONARY      CARDIAC SURGERY      CV CORONARY ANGIOGRAM N/A 3/12/2019    Procedure: Coronary Angiogram;  Surgeon: Hamilton Lucero MD;  Location: [facility];  Service: Cardiology    GALLBLADDER SURGERY         Prior to Admission Medications   Prior to Admission Medications   Prescriptions Last Dose Informant Patient Reported? Taking?   FLUoxetine (PROZAC) 40 MG capsule   Yes No   Sig: Take 40 mg by mouth daily   acetaminophen (TYLENOL) 500 MG tablet   Yes No   Sig: Take 1,000 mg by mouth 2 times daily as needed for mild pain   allopurinol  (ZYLOPRIM) 300 MG tablet   Yes No   Sig: Take 300 mg by mouth daily   amLODIPine (NORVASC) 5 MG tablet   Yes No   Sig: Take 5 mg by mouth daily   aspirin 81 MG EC tablet   Yes No   Sig: Take 81 mg by mouth daily   atorvastatin (LIPITOR) 80 MG tablet   No No   Sig: TAKE 1 TABLET (80 MG TOTAL) BY MOUTH AT BEDTIME/ TXHUA O NOJ 1 LUB TSHUAJ THAUM MUS PW PAB ZOO NTSHAV MUAJ ROJ      Facility-Administered Medications: None        Social History   I have reviewed this patient's social history and updated it with pertinent information if needed.  Social History     Tobacco Use    Smoking status: Former     Types: Cigarettes    Smokeless tobacco: Never    Tobacco comments:     every 3-4 months, rare   Substance Use Topics    Alcohol use: Not Currently    Drug use: No         Family History   I have reviewed this patient's family history and updated it with pertinent information if needed.  Family History   Problem Relation Age of Onset    Cerebrovascular Disease Mother 90.00    Acute Myocardial Infarction No family hx of          Allergies   No Known Allergies     Physical Exam   Vital Signs: Temp: 98.8  F (37.1  C) Temp src: Temporal BP: 133/78 Pulse: 63   Resp: 18 SpO2: 97 % O2 Device: None (Room air)    Weight: 0 lbs 0 oz      General: Awake and alert,   Eyes: Pupils reactive to light  HENT: Atraumatic, oral mucosa moist, packing in the left nares stained by blood  Neck: No masses, or swelling  Pulmonary: Good air entry, clear to auscultation  CVS: Heart sounds 1 and 2 present, regular rhythm, rate, no murmur  GI/ Abdomen: Soft , not tender , not distended, bowel sounds ++  : No cruz   ALEX: Normal inspection, no muscle spasm  Skin: No rash, skin intact  Extremities: Edema none  Neuro: Alert and oriented, follows command, speech normal, no focal weakness  Psych: Normal mood and affect      Medical Decision Making       75 MINUTES SPENT BY ME on the date of service doing chart review, history, exam, documentation &  further activities per the note.      Data     I have personally reviewed the following data over the past 24 hrs:    9.0  \   14.5   / 222     135 (L) 100 16.8 /  113 (H)   4.4 26 0.92 \     INR:  1.01 PTT:  29   D-dimer:  N/A Fibrinogen:  N/A     Imaging results reviewed over the past 24 hrs:   Recent Results (from the past 24 hour(s))   CTA Head Neck with Contrast    Narrative    EXAM: CTA HEAD NECK W CONTRAST  LOCATION: [facility]  DATE: 9/13/2023    INDICATION: Maxillary sinus mass, continued bleeding, looking for an embolization target.  COMPARISON: None.  CONTRAST: ISOVUE-370, 75 mL.  TECHNIQUE: Head and neck CT angiogram with IV contrast. Noncontrast head CT followed by axial helical CT images of the head and neck vessels obtained during the arterial phase of intravenous contrast administration. Axial 2D reconstructed images and   multiplanar 3D MIP reconstructed images of the head and neck vessels were performed by the technologist. Dose reduction techniques were used. All stenosis measurements made according to NASCET criteria unless otherwise specified.    FINDINGS:   NONCONTRAST HEAD CT:   INTRACRANIAL CONTENTS: No intracranial hemorrhage, extraaxial collection, or mass effect.  No CT evidence of acute infarct. Normal parenchymal attenuation. Mild generalized volume loss. No hydrocephalus.     VISUALIZED ORBITS/SINUSES/MASTOIDS: There is a soft tissue mass centered in the left maxillary sinus with heterogeneous enhancement. This mass erodes through the medial and posterior/lateral wall the left maxillary sinus. It erodes to the floor of the   left maxillary sinus where it involves the left maxillary alveolar ridge. There is erosion into the posterior left maxillary alveolus and pterygoid processes. There is extension into the anterior aspect of the left infratemporal fossa and left pterygoid   musculature. There is extension into the left nasal cavity that is opacified as well as  extension into the left ethmoid air cells. The left frontal sinus is nearly opacified. There is complete opacification of the left sphenoid locule. No definite   orbital extension. Partial opacification left mastoid air cells.    BONES/SOFT TISSUES: As above.    HEAD CTA:  ANTERIOR CIRCULATION: No stenosis/occlusion, aneurysm, or high flow vascular malformation. Standard [place] anatomy.    POSTERIOR CIRCULATION: No stenosis/occlusion, aneurysm, or high flow vascular malformation. Balanced vertebral arteries supply a normal basilar artery.     DURAL VENOUS SINUSES: Expected enhancement of the major dural venous sinuses.    NECK CTA:  RIGHT CAROTID: No measurable stenosis or dissection.    LEFT CAROTID: No measurable stenosis or dissection. Prominent maxillary branch off the left EAC extends towards the left pterygopalatine fossa and into a cluster of small arteries in the region of the mass/maxillary alveolus.    VERTEBRAL ARTERIES: No focal stenosis or dissection. Balanced vertebral arteries.    AORTIC ARCH: Classic aortic arch anatomy with no significant stenosis at the origin of the great vessels.    NONVASCULAR STRUCTURES: Unremarkable.      Impression    IMPRESSION:   HEAD CT:  1.  No acute intracranial process.  2.  Large heterogeneously enhancing mass centered in the left maxillary sinus with erosion through the medial and lateral walls of the left maxillary sinus, into the left maxillary alveolus and pterygoid musculature and pterygoid plates, into the left   infratemporal fossa, left nasal cavity and left ethmoid air cells. No definite orbital invasion.    HEAD CTA:   1.  Normal CTA [place].    NECK CTA:  1.  Hypertrophied left maxillary artery which courses towards the left pterygopalatine fossa and a cluster of small arteries within the posterior/inferior aspect of the above-described mass lesion.  2.  Otherwise unremarkable.

## 2023-09-13 NOTE — ED TRIAGE NOTES
Pt here with son with uncontrolled nose bleed. Pt states it has been bleeding on and off for 2 days but over the past hour he hasn't been able to control the bleeding. Large clots seen in emesis bag. Bleeding not controlled in triage. Pt reports he has a tumor in his nose that was found on MRI Aug 23 - could not get in with ENT till october     Triage Assessment       Row Name 09/12/23 2041       Triage Assessment (Adult)    Airway WDL WDL       Respiratory WDL    Respiratory WDL WDL       Cardiac WDL    Cardiac WDL WDL

## 2023-09-13 NOTE — CONSULTS
"Care Management Initial Consult    General Information  Assessment completed with: Douglas Hahn  Type of CM/SW Visit: Initial Assessment    Primary Care Provider verified and updated as needed: Yes   Readmission within the last 30 days: no previous admission in last 30 days      Reason for Consult: discharge planning  Advance Care Planning: Advance Care Planning Reviewed: no concerns identified          Communication Assessment  Patient's communication style: spoken language (English or Bilingual) (speaks English and Hmong)             Cognitive  Cognitive/Neuro/Behavioral: WDL                      Living Environment:   People in home: child(justice), adult (\"with son\")     Current living Arrangements: house      Able to return to prior arrangements: yes       Family/Social Support:  Care provided by: self  Provides care for: no one     Children          Description of Support System: Supportive, Involved    Support Assessment: Adequate family and caregiver support, Adequate social supports, Patient communicates needs well met    Current Resources:   Patient receiving home care services: No     Community Resources: PCA (\"son is PCA for 3.5hr/day\")  Equipment currently used at home: cane, straight, walker, standard (\"I use the cane most of the time. I also have a walker if needed\".)  Supplies currently used at home: Other (\"glasses\")    Employment/Financial:  Employment Status: disabled     Employment/ Comments: \"no  history\"  Financial Concerns:     Referral to Financial Worker: No       Does the patient's insurance plan have a 3 day qualifying hospital stay waiver?  No    Lifestyle & Psychosocial Needs:  Social Determinants of Health     Tobacco Use: Medium Risk (6/17/2022)    Patient History     Smoking Tobacco Use: Former     Smokeless Tobacco Use: Never     Passive Exposure: Not on file   Alcohol Use: Not on file   Financial Resource Strain: Not on file   Food Insecurity: Not on file " "  Transportation Needs: Not on file   Physical Activity: Not on file   Stress: Not on file   Social Connections: Not on file   Intimate Partner Violence: Not on file   Depression: Not on file   Housing Stability: Not on file       Functional Status:  Prior to admission patient needed assistance:   Dependent ADLs:: Ambulation-cane, Ambulation-walker, Bathing, Dressing, Toileting  Dependent IADLs:: Cleaning, Cooking, Laundry, Shopping, Meal Preparation, Medication Management (\"I do drive some and my family can also help\")  Assesssment of Functional Status: At functional baseline    Mental Health Status:          Chemical Dependency Status:                Values/Beliefs:  Spiritual, Cultural Beliefs, Methodist Practices, Values that affect care:                 Additional Information:  Douglas lives in a house with his son. His son is his PCA for 3.5hr/day for some ADLs assistance and all IADLs. \"I do drive some and my family can also help. I use the cane most of the time. I also have a walker if needed\".    Likely home with no new needs.     Family to transport at discharge.    CM to follow for medical progression of care, discharge recommendations, and final discharge plan.    Ashley Mondragon RN    "

## 2023-09-13 NOTE — TELEPHONE ENCOUNTER
Telephone Call    I was called by [facility] provider, Dr. Simmons regarding this patient. He came in with epistaxis and imaging was performed showing a large maxillary sinus mass with bony erosion. The on-call ENT for [facility] was called who recommended the on-call [place] [facility] provider should be contacted. Per Dr. Simmons, a rhino rocket was attempted to be placed but there is continued oozing as the packing just pushed the mass. The bleeding is a slow drip, not a large volume bleed. My recommendation would be for a CTA to see if the mass is eroding any large vessel that needs to be embolized and Afrin topically to slow the oozing. Without direct visualization of the mass it is difficult to know whether cautery could be performed to slow the oozing and I would defer that to the ENT provider covering that hospital. This patient has an appointment with Dr. Iglesias next month for evaluation of the mass.

## 2023-09-13 NOTE — PROGRESS NOTES
[facility]    Medicine Progress Note - Hospitalist Service    Date of Admission:  9/12/2023    Assessment & Plan      Douglas Herrera is a 63 year old male admitted on 9/12/2023. He is admitted with left maxillary sinus friable mass and epistaxis.    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  No acute indication for transfusion at this time    History hypertension   -Monitor blood pressure  -Continue PTA amlodipine, metoprolol    history of CAD  -Holding aspirin  -Continue atorvastatin    Chronic hepatitis B  -Continue PTA Entecavir     Diet: NPO for Medical/Clinical Reasons Except for: Meds    DVT Prophylaxis: Pneumatic Compression Devices  Rico Catheter: Not present  Lines: None     Cardiac Monitoring: ACTIVE order. Indication: Procedural area  Code Status: Full Code      Clinically Significant Risk Factors Present on Admission                # Drug Induced Platelet Defect: home medication list includes an antiplatelet medication   # Hypertension: Noted on problem list           # History of CABG: noted on surgical history       Disposition Plan      Expected Discharge Date: 09/14/2023      Destination: home with family;home with help/services            Carito Morales MD  Hospitalist Service  [facility]  Securely message with [employer] (more info)  Text page via [employer] Paging/Directory   ______________________________________________________________________    Interval History   Mr. Herrera expressed to me that he has been having symptoms since May after  he went on a cruise and noticed that his nasal passage on the left is blocked.  He went to his primary care provider twice and was given medications that did not work.  He then had follow-up with the ENT which was not scheduled until 10/12.  They did come to see him today and want him to continue with that appointment and will not see him sooner.  IR plans to take for embolization today and then monitor overnight.  He is not having any bleeding at this time but does have pressure in his face in the nasal region and in the cheek.    Physical Exam   Vital Signs: Temp: 97.7  F (36.5  C) Temp src: Oral BP: 124/74 Pulse: 64   Resp: 16 SpO2: 96 % O2 Device: None (Room air)    Weight: 0 lbs 0 oz    General Appearance: Awake, alert, in no acute distress  Respiratory: CTAB, no wheeze  Cardiovascular: RRR, no murmur noted  GI: soft, nontender, non distended, normal bowel sounds  Skin: no jaundice, no rash      Medical Decision Making       50 MINUTES SPENT BY ME on the date of service doing chart review, history, exam, documentation & further activities per the note.      Data     I have personally reviewed the following data over the past 24 hrs:    10.0  \   15.1   / 223     135 (L) 100 16.8 /  113 (H)   4.4 26 0.92 \     INR:  1.01 PTT:  29   D-dimer:  N/A Fibrinogen:  N/A       Imaging results reviewed over the past 24 hrs:   Recent Results (from the past 24 hour(s))   CTA Head Neck with Contrast    Narrative    EXAM: CTA HEAD NECK W CONTRAST  LOCATION: [facility]  DATE: 9/13/2023    INDICATION: Maxillary sinus mass, continued bleeding, looking for an embolization target.  COMPARISON: None.  CONTRAST: ISOVUE-370, 75 mL.  TECHNIQUE: Head and neck CT angiogram with IV contrast. Noncontrast head CT followed by axial helical CT images of the head and neck vessels obtained during the arterial phase of intravenous contrast administration. Axial 2D reconstructed images and   multiplanar 3D MIP  reconstructed images of the head and neck vessels were performed by the technologist. Dose reduction techniques were used. All stenosis measurements made according to NASCET criteria unless otherwise specified.    FINDINGS:   NONCONTRAST HEAD CT:   INTRACRANIAL CONTENTS: No intracranial hemorrhage, extraaxial collection, or mass effect.  No CT evidence of acute infarct. Normal parenchymal attenuation. Mild generalized volume loss. No hydrocephalus.     VISUALIZED ORBITS/SINUSES/MASTOIDS: There is a soft tissue mass centered in the left maxillary sinus with heterogeneous enhancement. This mass erodes through the medial and posterior/lateral wall the left maxillary sinus. It erodes to the floor of the   left maxillary sinus where it involves the left maxillary alveolar ridge. There is erosion into the posterior left maxillary alveolus and pterygoid processes. There is extension into the anterior aspect of the left infratemporal fossa and left pterygoid   musculature. There is extension into the left nasal cavity that is opacified as well as extension into the left ethmoid air cells. The left frontal sinus is nearly opacified. There is complete opacification of the left sphenoid locule. No definite   orbital extension. Partial opacification left mastoid air cells.    BONES/SOFT TISSUES: As above.    HEAD CTA:  ANTERIOR CIRCULATION: No stenosis/occlusion, aneurysm, or high flow vascular malformation. Standard [place] anatomy.    POSTERIOR CIRCULATION: No stenosis/occlusion, aneurysm, or high flow vascular malformation. Balanced vertebral arteries supply a normal basilar artery.     DURAL VENOUS SINUSES: Expected enhancement of the major dural venous sinuses.    NECK CTA:  RIGHT CAROTID: No measurable stenosis or dissection.    LEFT CAROTID: No measurable stenosis or dissection. Prominent maxillary branch off the left EAC extends towards the left pterygopalatine fossa and into a cluster of small arteries in the  region of the mass/maxillary alveolus.    VERTEBRAL ARTERIES: No focal stenosis or dissection. Balanced vertebral arteries.    AORTIC ARCH: Classic aortic arch anatomy with no significant stenosis at the origin of the great vessels.    NONVASCULAR STRUCTURES: Unremarkable.      Impression    IMPRESSION:   HEAD CT:  1.  No acute intracranial process.  2.  Large heterogeneously enhancing mass centered in the left maxillary sinus with erosion through the medial and lateral walls of the left maxillary sinus, into the left maxillary alveolus and pterygoid musculature and pterygoid plates, into the left   infratemporal fossa, left nasal cavity and left ethmoid air cells. No definite orbital invasion.    HEAD CTA:   1.  Normal CTA [place].    NECK CTA:  1.  Hypertrophied left maxillary artery which courses towards the left pterygopalatine fossa and a cluster of small arteries within the posterior/inferior aspect of the above-described mass lesion.  2.  Otherwise unremarkable.

## 2023-09-13 NOTE — PROVIDER NOTIFICATION
Dr Lino has returned call regarding patient's removal of packing and dietary direction: This RN discussed stable VS after endovascular embolization procedure and pt's A&O x4 status.  Per Dr Lino, we would not have wanted to remove packing in L nostril, however now that pt has removed it, we will not re-pack.  Pt should not experience bleeding after embolization.  Will continue to monitor.  Patient may resume regular diet at this time.  Attending updated with this information.  Oma Allred RN

## 2023-09-13 NOTE — CONSULTS
[facility]:  Brief/Remote consult note ~    We were asked to provide consultation for endovascular embolization on this 63 year old male with refractory epistaxis.    Imaging:  CTA head and neck ~  HEAD CT:  1.  No acute intracranial process.  2.  Large heterogeneously enhancing mass centered in the left maxillary sinus with erosion through the medial and lateral walls of the left maxillary sinus, into the left maxillary alveolus and pterygoid musculature and pterygoid plates, into the left   infratemporal fossa, left nasal cavity and left ethmoid air cells. No definite orbital invasion.  HEAD CTA:   1.  Normal CTA [place].  NECK CTA:  1.  Hypertrophied left maxillary artery which courses towards the left pterygopalatine fossa and a cluster of small arteries within the posterior/inferior aspect of the above-described mass lesion.  2.  Otherwise unremarkable.    ALLERGIES: No Known Allergies    LABS: Hemoglobin 14.0, Platelet count 213, INR 1.01, Creatinine 0.92    IMPRESSION: Dr. AYANA Lino has reviewed imaging -    This is a 63 year old male with refractory epistaxis in the setting of a large left maxillary sinus neoplasm eroding into the left pterygoid musculature, plates and left infratemporal fossa with no orbital invasion. We were consulted for epistaxis embolization. The patient appears medically stable and appropriately NPO for procedure as planned this afternoon.    PLAN:  Endovascular embolization for recurrent epistaxis with moderate IV sedation today  - keep NPO except for medications  - pt will need to spend one overnight in the hospital s/p embolization    The procedure benefits and risks will be explained to the patient by Dr. Lino prior to procedure for informed consent.    -Thank you for asking us to be involved in the care of this patient.  -Please call our office with questions or concerns [phone]    MALCOLM Villavicencio CNP on 9/13/2023 at 11:19 AM

## 2023-09-13 NOTE — PLAN OF CARE
"PRIMARY DIAGNOSIS: \"GENERIC\" NURSING  OUTPATIENT/OBSERVATION GOALS TO BE MET BEFORE DISCHARGE:  ADLs back to baseline: Yes    Activity and level of assistance: Ambulating independently.    Pain status: Pain free.    Return to near baseline physical activity: Yes     Discharge Planner Nurse   Safe discharge environment identified: Yes  Barriers to discharge: Yes       Entered by: Tomasa Diggs RN 09/13/2023 11:31 AM     Please review provider order for any additional goals.   Nurse to notify provider when observation goals have been met and patient is ready for discharge.Goal Outcome Evaluation:  VSS. Patient alert and orientated, independent in room. Small bleeding from left nose, guaze and tissue used to apply pressure. Patient denies pain, states that the pressure is uncomfortable.                      "

## 2023-09-13 NOTE — PROVIDER NOTIFICATION
"Attending contacted looking for direction re: packing in L nostril and diet order post IR procedure.  Most importantly, patient has removed packing that was in L nostril from ED.  Patient continues to manipulate tissues into his nostril and \"pulling back\" to attempt to clear L side.  Attending reports no direction at this time post angiogram. Advised to contact IR for direction.  JUDY has paged IR at this time.  Waiting for direction, pt is stable   VSS  A&O x4  Patient has repeatedly been directed to NOT manipulate anything inside his L nostril.  Oma Allred RN   "

## 2023-09-13 NOTE — MEDICATION SCRIBE - ADMISSION MEDICATION HISTORY
Medication Scribe Admission Medication History    Admission medication history is complete. The information provided in this note is only as accurate as the sources available at the time of the update.    Medication reconciliation/reorder completed by provider prior to medication history? No    Information Source(s): Patient via in-person    Pertinent Information: None    Changes made to PTA medication list:  Added: None  Deleted: Prozac, Allopurinol  Changed: None    Medication Affordability:  Not including over the counter (OTC) medications, was there a time in the past 3 months when you did not take your medications as prescribed because of cost?: No    Allergies reviewed with patient and updates made in EHR: yes    Medication History Completed By: Twyla Parsons 9/13/2023 6:49 AM    Prior to Admission medications    Medication Sig Last Dose Taking? Auth Provider Long Term End Date   acetaminophen (TYLENOL) 500 MG tablet Take 1,000 mg by mouth 2 times daily as needed for mild pain Past Week at prn Yes Reported, Patient     allopurinol (ZYLOPRIM) 300 MG tablet Take 300 mg by mouth daily Unknown at Unsure Yes Reported, Patient     amLODIPine (NORVASC) 5 MG tablet Take 5 mg by mouth daily 9/12/2023 at am Yes Reported, Patient Yes    aspirin 81 MG EC tablet Take 81 mg by mouth daily 9/12/2023 at am Yes Reported, Patient     atorvastatin (LIPITOR) 80 MG tablet TAKE 1 TABLET (80 MG TOTAL) BY MOUTH AT BEDTIME/ TXHUA O NOJ 1 LUB TSHUAJ THAUM MUS PW PAB MARY KAYO NTSHAV MUAJ ROJ 9/11/2023 at pm  Hamilton Lucero MD Yes

## 2023-09-14 LAB
ANION GAP SERPL CALCULATED.3IONS-SCNC: 9 MMOL/L (ref 7–15)
BUN SERPL-MCNC: 15.7 MG/DL (ref 8–23)
CALCIUM SERPL-MCNC: 9.6 MG/DL (ref 8.8–10.2)
CHLORIDE SERPL-SCNC: 98 MMOL/L (ref 98–107)
CREAT SERPL-MCNC: 0.86 MG/DL (ref 0.67–1.17)
DEPRECATED HCO3 PLAS-SCNC: 27 MMOL/L (ref 22–29)
EGFRCR SERPLBLD CKD-EPI 2021: >90 ML/MIN/1.73M2
ERYTHROCYTE [DISTWIDTH] IN BLOOD BY AUTOMATED COUNT: 12.2 % (ref 10–15)
GLUCOSE SERPL-MCNC: 113 MG/DL (ref 70–99)
HCT VFR BLD AUTO: 42.2 % (ref 40–53)
HGB BLD-MCNC: 14.3 G/DL (ref 13.3–17.7)
MCH RBC QN AUTO: 28.8 PG (ref 26.5–33)
MCHC RBC AUTO-ENTMCNC: 33.9 G/DL (ref 31.5–36.5)
MCV RBC AUTO: 85 FL (ref 78–100)
PLATELET # BLD AUTO: 231 10E3/UL (ref 150–450)
POTASSIUM SERPL-SCNC: 4.2 MMOL/L (ref 3.4–5.3)
RBC # BLD AUTO: 4.97 10E6/UL (ref 4.4–5.9)
SODIUM SERPL-SCNC: 134 MMOL/L (ref 136–145)
WBC # BLD AUTO: 10.2 10E3/UL (ref 4–11)

## 2023-09-14 PROCEDURE — 85027 COMPLETE CBC AUTOMATED: CPT | Performed by: INTERNAL MEDICINE

## 2023-09-14 PROCEDURE — 250N000013 HC RX MED GY IP 250 OP 250 PS 637: Performed by: INTERNAL MEDICINE

## 2023-09-14 PROCEDURE — 250N000013 HC RX MED GY IP 250 OP 250 PS 637: Performed by: CLINICAL NURSE SPECIALIST

## 2023-09-14 PROCEDURE — 36415 COLL VENOUS BLD VENIPUNCTURE: CPT | Performed by: INTERNAL MEDICINE

## 2023-09-14 PROCEDURE — 80048 BASIC METABOLIC PNL TOTAL CA: CPT | Performed by: INTERNAL MEDICINE

## 2023-09-14 PROCEDURE — G0378 HOSPITAL OBSERVATION PER HR: HCPCS

## 2023-09-14 PROCEDURE — 99222 1ST HOSP IP/OBS MODERATE 55: CPT | Performed by: CLINICAL NURSE SPECIALIST

## 2023-09-14 PROCEDURE — 250N000009 HC RX 250: Performed by: INTERNAL MEDICINE

## 2023-09-14 PROCEDURE — 99232 SBSQ HOSP IP/OBS MODERATE 35: CPT | Performed by: INTERNAL MEDICINE

## 2023-09-14 RX ORDER — TRAMADOL HYDROCHLORIDE 50 MG/1
50 TABLET ORAL EVERY 6 HOURS PRN
Status: DISCONTINUED | OUTPATIENT
Start: 2023-09-14 | End: 2023-09-14

## 2023-09-14 RX ORDER — TRAMADOL HYDROCHLORIDE 50 MG/1
50 TABLET ORAL EVERY 6 HOURS PRN
Status: DISCONTINUED | OUTPATIENT
Start: 2023-09-14 | End: 2023-09-15 | Stop reason: HOSPADM

## 2023-09-14 RX ORDER — SENNOSIDES 8.6 MG
2 TABLET ORAL 2 TIMES DAILY
Status: DISCONTINUED | OUTPATIENT
Start: 2023-09-14 | End: 2023-09-15 | Stop reason: HOSPADM

## 2023-09-14 RX ORDER — ACETAMINOPHEN 325 MG/1
975 TABLET ORAL EVERY 8 HOURS
Status: DISCONTINUED | OUTPATIENT
Start: 2023-09-14 | End: 2023-09-15 | Stop reason: HOSPADM

## 2023-09-14 RX ORDER — OXYCODONE HYDROCHLORIDE 5 MG/1
5-10 TABLET ORAL EVERY 4 HOURS PRN
Status: DISCONTINUED | OUTPATIENT
Start: 2023-09-14 | End: 2023-09-14

## 2023-09-14 RX ORDER — OXYMETAZOLINE HYDROCHLORIDE 0.05 G/100ML
2 SPRAY NASAL 3 TIMES DAILY
Status: DISCONTINUED | OUTPATIENT
Start: 2023-09-14 | End: 2023-09-15 | Stop reason: HOSPADM

## 2023-09-14 RX ORDER — GABAPENTIN 300 MG/1
300 CAPSULE ORAL 2 TIMES DAILY
Status: DISCONTINUED | OUTPATIENT
Start: 2023-09-14 | End: 2023-09-15

## 2023-09-14 RX ADMIN — SENNOSIDES 2 TABLET: 8.6 TABLET, FILM COATED ORAL at 20:22

## 2023-09-14 RX ADMIN — GABAPENTIN 300 MG: 300 CAPSULE ORAL at 20:23

## 2023-09-14 RX ADMIN — ATORVASTATIN CALCIUM 80 MG: 40 TABLET, FILM COATED ORAL at 20:23

## 2023-09-14 RX ADMIN — AMLODIPINE BESYLATE 5 MG: 5 TABLET ORAL at 08:35

## 2023-09-14 RX ADMIN — ACETAMINOPHEN 975 MG: 325 TABLET ORAL at 22:20

## 2023-09-14 RX ADMIN — METOPROLOL SUCCINATE 12.5 MG: 25 TABLET, EXTENDED RELEASE ORAL at 08:35

## 2023-09-14 RX ADMIN — ACETAMINOPHEN 975 MG: 325 TABLET ORAL at 14:14

## 2023-09-14 RX ADMIN — TRAMADOL HYDROCHLORIDE 50 MG: 50 TABLET ORAL at 14:55

## 2023-09-14 ASSESSMENT — ACTIVITIES OF DAILY LIVING (ADL)
ADLS_ACUITY_SCORE: 31

## 2023-09-14 NOTE — PLAN OF CARE
PRIMARY DIAGNOSIS: Epistaxis  OUTPATIENT/OBSERVATION GOALS TO BE MET BEFORE DISCHARGE:  ADLs back to baseline: Yes    Activity and level of assistance: Ambulating independently.    Pain status: Pain free.    Return to near baseline physical activity: Yes     Discharge Planner Nurse   Safe discharge environment identified: Yes  Barriers to discharge: No       Entered by: Issac Page RN 09/14/2023 1:48 AM     Please review provider order for any additional goals.   Nurse to notify provider when observation goals have been met and patient is ready for discharge.  Goal Outcome Evaluation:    Patient resting well, no additional bleeding at this time. Will continue to monitor.

## 2023-09-14 NOTE — PLAN OF CARE
PRIMARY DIAGNOSIS: Epistaxis   OUTPATIENT/OBSERVATION GOALS TO BE MET BEFORE DISCHARGE  1. Orthostatic performed: N/A    2. Tolerating PO medications: Yes    3. Return to near baseline physical activity: Yes    4. Cleared for discharge by consultants (if involved): Yes    Discharge Planner Nurse   Safe discharge environment identified: Yes  Barriers to discharge: No       Entered by: Oma Allred RN 09/13/2023 11:26 PM     Please review provider order for any additional goals.   Nurse to notify provider when observation goals have been met and patient is ready for discharge.Goal Outcome Evaluation:       /74 (BP Location: Right arm, Patient Position: Semi-Nielsen's)   Pulse 65   Temp 98  F (36.7  C) (Oral)   Resp 18   SpO2 97%   A&O x4  VSS  R hip procedural site soft without bleeding. Independent in his room, has tolerated regular diet and hydration well.  PRN tylenol given for HA.  Pt reported relief from HA.  See notes on L nostril packing.  Pt has been without bleeding and performed hygiene ADLs on his own before he went to sleep.  Pt is sleeping with his head elevated appearing comfortable at shift change. Oma Allred RN

## 2023-09-14 NOTE — CONSULTS
"[facility] ACUTE PAIN SERVICE    ([facility], [facility], [facility])   Consult Note    Date of Admission:  9/12/2023  Date of Consult: 09/14/23    Physician requesting consult: Singh Meadows DO   Reason for consult: Pain related to sinus mass, hesitant to try pain medications      Assessment/Plan:     Douglas Herrera is a 63 year old male who was admitted on 9/12/2023.   Pain Service is asked to see the patient for pain related to sinus mass, patient hesitant to try pain medications. Admitted for evaluation of epitaxis with known left maxillary sinus friable mass. (Had been bleeding on and off for past 2 days) Patient with known left nasal sinus mass.  MRI 8/23/23. Per chart review patient is scheduled for ENT procedure 10/12/23.  Underwent left IMAX embolization per IR.  Medical history otherwise significant for Ascending aortic aneurysm, CAD, Depression, Gout, Hyperlipidemia, Hypertension, Post operative nausea and vomiting, History of anesthesia complications.   The patient quit smoking, no longer drinks alcohol and denies drug use no chemical dependency history.     Pain worsened over the past 2 weeks, a lot of pressure, and burning pain around eyes, ears feel full.   Currently rates it at a \"7-8\"    No BM for a couple of days, is passing gas.    Difficult to chew mostly eats soft foods.    PLAN:   1) Pain is consistent with acute pain due to nasal sinus mass.     2)Multimodal Medication Therapy  Topical: consider hold for now  NSAID'S: avoid with bleeding risks  CrtCl is 83.7  Muscle Relaxants: not indicated  Adjuvants: tylenol 975 mg tid  Antidepressants/anxiolytics: none  Opioids: oxycodone 5-10 mg every four hours prn   IV Pain medication: none  3)Non-medication interventions  Pharmacy consult- appreciate recommendations   Acupuncture consult- agreeable to acupuncture  Integrative consult - please offer  4)Constipation Prophylaxis  Senna docusate prn   5) Follow up   -Discharge " "Recommendations - We recommend prescribing the following at the time of discharge: Tramadol 50 mg every 6 hours prn  Tylenol 1000 mg tid, gabapentin 300 mg bid            History of Present Illness (HPI):       Douglas Herrera is a 63 year old male with acute pain.  The pain is reported to be acute pain associated with nasal mass.  Denies history of chronic Current pain is rated at \"7\"/10. The patient is anxious to have the surgery to remove the mass.      MN  pulled from system on 09/14/23. Last refill on 6/23/23. This indicated one opioid prescription over this past year.    Oxycodone 10 mg tablets 5 for 2 days.         Medical History  Patient Active Problem List    Diagnosis Date Noted    Epistaxis 09/13/2023     Priority: Medium    Mass of sinus 09/13/2023     Priority: Medium    Mixed hyperlipidemia 06/01/2020     Priority: Medium    PE (pulmonary thromboembolism) (H) 05/08/2019     Priority: Medium    Hypoxemia      Priority: Medium    Acute idiopathic gout of right foot      Priority: Medium    Idiopathic hypertension      Priority: Medium    Hospital-acquired pneumonia 04/25/2019     Priority: Medium    Anxiety      Priority: Medium    S/P CABG (coronary artery bypass graft) 04/23/2019     Priority: Medium    ARF (acute respiratory failure) (H) 04/23/2019     Priority: Medium    Coronary artery disease involving native coronary artery 04/23/2019     Priority: Medium    Ascending aorta dilatation (H) 04/23/2019     Priority: Medium    Unstable angina (H) 03/06/2019     Priority: Medium     Added automatically from request for surgery [id number]        Abnormal cardiovascular stress test 01/11/2019     Priority: Medium    Thoracic ascending aortic aneurysm (H) 01/11/2019     Priority: Medium    Precordial pain 01/11/2019     Priority: Medium        Surgical History  He  has a past surgical history that includes Gallbladder surgery; Cv Coronary Angiogram (N/A, 3/12/2019); Aorta Surgery (N/A, 4/23/2019); Cardiac " surgery; and Bypass graft artery coronary.     Past Surgical History:   Procedure Laterality Date    AORTA SURGERY N/A 4/23/2019    Procedure: WITH ASCENDING AORTA REPLACEMENT;  Surgeon: Darlene Graff MD;  Location: [facility] OR;  Service: Cardiovascular    BYPASS GRAFT ARTERY CORONARY      CARDIAC SURGERY      CV CORONARY ANGIOGRAM N/A 3/12/2019    Procedure: Coronary Angiogram;  Surgeon: Hamilton uLcero MD;  Location: [facility];  Service: Cardiology    GALLBLADDER SURGERY         Allergies  No Known Allergies    Prior to Admission Medications   Medications Prior to Admission   Medication Sig Dispense Refill Last Dose    acetaminophen (TYLENOL) 500 MG tablet Take 1,000 mg by mouth 2 times daily as needed for mild pain   Past Week at prn    allopurinol (ZYLOPRIM) 300 MG tablet Take 300 mg by mouth daily   Unknown at Unsure    amLODIPine (NORVASC) 5 MG tablet Take 5 mg by mouth daily   9/12/2023 at am    entecavir (BARACLUDE) 0.5 MG tablet Take 0.5 mg by mouth daily Take 1 hour before a meal or 2 hours after a meal.   9/11/2023    metoprolol succinate ER (TOPROL XL) 25 MG 24 hr tablet Take 12.5 mg by mouth daily   9/10/2023    atorvastatin (LIPITOR) 80 MG tablet TAKE 1 TABLET (80 MG TOTAL) BY MOUTH AT BEDTIME/ TXHUA HMO NOJ 1 LUB TSHUAJ THAUM MUS PW PAB ZOO NTSHAV MUAJ ROJ 90 tablet 3 9/11/2023 at pm    [DISCONTINUED] aspirin 81 MG EC tablet Take 81 mg by mouth daily   9/12/2023 at am       Social History  Reviewed, and he  reports that he has quit smoking. His smoking use included cigarettes. He has never used smokeless tobacco. He reports that he does not currently use alcohol. He reports that he does not use drugs.  Social History     Tobacco Use    Smoking status: Former     Types: Cigarettes    Smokeless tobacco: Never    Tobacco comments:     every 3-4 months, rare   Substance Use Topics    Alcohol use: Not Currently       Family History  Reviewed, and family history includes  "Cerebrovascular Disease (age of onset: 90.00) in his mother.    Review of Systems  Complete ROS reviewed, unless noted, all other systems reviewed and found to be negative.        Objective:     Physical Exam:  /72 (BP Location: Left arm)   Pulse 78   Temp 98.4  F (36.9  C) (Oral)   Resp 18   Ht 1.626 m (5' 4\")   Wt 67.3 kg (148 lb 6.4 oz)   SpO2 95%   BMI 25.47 kg/m    Weight:   Weight change:   Body mass index is 25.47 kg/m .      General Appearance:  Alert, cooperative, sitting up in bed   Head:  Normocephalic, without obvious abnormality, atraumatic   Eyes:  PERRL, conjunctiva/corneas clear, EOM's intact   ENT/Throat: Swelling below left eye, upper cheek, tender, serous drainage and some clotted blood from left nare   Lymph/Neck: Supple, symmetrical, trachea midline   Lungs:   respirations unlabored   Abdomen:   Soft, non-tender, bowel sounds active all four quadrants,  no masses, no organomegaly   Musculoskeletal: Spine and extremities normal, atraumatic   Skin: no rashes or lesions   Neurologic: coordinated movement   Alert and oriented X 3       Psych: Affect is appropriate to circumstance            Imaging Reviewed   CTA Head Neck with Contrast    Result Date: 9/13/2023  EXAM: CTA HEAD NECK W CONTRAST LOCATION: [facility] DATE: 9/13/2023 INDICATION: Maxillary sinus mass, continued bleeding, looking for an embolization target. COMPARISON: None.    IMPRESSION: HEAD CT: 1.  No acute intracranial process. 2.  Large heterogeneously enhancing mass centered in the left maxillary sinus with erosion through the medial and lateral walls of the left maxillary sinus, into the left maxillary alveolus and pterygoid musculature and pterygoid plates, into the left infratemporal fossa, left nasal cavity and left ethmoid air cells. No definite orbital invasion. HEAD CTA: 1.  Normal CTA [place]. NECK CTA: 1.  Hypertrophied left maxillary artery which courses towards the left " pterygopalatine fossa and a cluster of small arteries within the posterior/inferior aspect of the above-described mass lesion. 2.  Otherwise unremarkable.      Labs Reviewed        MANAGEMENT DISCUSSED with the following over the past 24 hours: RN and MD   NOTE(S)/MEDICAL RECORDS REVIEWED over the past 24 hours: IR, Nursing, Hospital medicine  Medical complexity over the past 24 hours:  - Prescription DRUG MANAGEMENT performed        Thank you for this consultation.      Criselda Cosby APRN, CNS-BC, DNP  Acute Care Pain Management Program  [facility] (Jacob SANDOVAL JNs)   Preference if for [employer] Paging - Alea  Click HERE to page Brynn

## 2023-09-14 NOTE — PLAN OF CARE
"PRIMARY DIAGNOSIS: Epistaxis    OUTPATIENT/OBSERVATION GOALS TO BE MET BEFORE DISCHARGE  1. Orthostatic performed: N/A    2. Tolerating PO medications: Yes    3. Return to near baseline physical activity: No    4. Cleared for discharge by consultants (if involved): N/A    Discharge Planner Nurse   Safe discharge environment identified: No  Barriers to discharge: Yes       Entered by: Norma Lambert RN 09/14/2023 10:28 AM     Please review provider order for any additional goals.   Nurse to notify provider when observation goals have been met and patient is ready for discharge.Goal Outcome Evaluation:         Pt expresses sig pressure on left side of nose that extends under his eye and to his teeth. Left side of face has visible swelling. Pt cont to have bleeding mixed with mucous. Pt states it's because nothing can get past the \"tumor\". Denies having blood that he is swallowing. Pt declined pain medications after explaining the purpose multiple times. States that the pain usually gets worse when he goes to bed and \"maybe I'll take it around 8pm\". Belongings brought up by ED.                "

## 2023-09-14 NOTE — PLAN OF CARE
PRIMARY DIAGNOSIS: epistaxis   OUTPATIENT/OBSERVATION GOALS TO BE MET BEFORE DISCHARGE  1. Orthostatic performed: No    2. Tolerating PO medications: Yes    3. Return to near baseline physical activity: Yes    4. Cleared for discharge by consultants (if involved): Yes    Discharge Planner Nurse   Safe discharge environment identified: Yes  Barriers to discharge: No       Entered by: Oma Allred RN 09/13/2023 11:32 PM     Please review provider order for any additional goals.   Nurse to notify provider when observation goals have been met and patient is ready for discharge.Goal Outcome Evaluation:             Has arrived on P1 A&O x4  VSS  will continue to monitor per post procedural protocol. Oma Allred RN

## 2023-09-14 NOTE — PROGRESS NOTES
"[facility]    Medicine Progress Note - Hospitalist Service    Date of Admission:  9/12/2023    Assessment & Plan   63-year-old male with history of hypertension, CAD, chronic hep B and left maxillary sinus mass presents with epistaxis      Epistaxis: Secondary to left maxillary sinus mass that has eroded through the left maxillary sinus  Epistaxis improved after IR embolization of the left internal maxillary artery 9/13/2023  Patient removed his own packing overnight and has scant amount of ongoing epistaxis from the left nare  -- ENT recommends Afrin spray  -- Patient ultimately needs definitive surgery however this cannot be accomplished as inpatient.  I spoke with [facility] regarding transfer for surgery and ENT stated that inpatient surgical intervention is not warranted.  Patient has already scheduled outpatient ENT follow-up in October  -- Consult pain team to assist with pain management since patient is having ongoing issues with reluctance he to take pain medications  -- Hold home aspirin indefinitely      CAD:  -- Holding home aspirin as above  -- Continue statin and metoprolol      Hypertension:  -- Continue amlodipine and metoprolol      Chronic hep B: Continue Entecavir       Diet: Regular Diet Adult  Room Service  Diet    DVT Prophylaxis: Low Risk/Ambulatory with no VTE prophylaxis indicated  Rico Catheter: Not present  Lines: None     Cardiac Monitoring: None  Code Status: Full Code      Clinically Significant Risk Factors Present on Admission                # Drug Induced Platelet Defect: home medication list includes an antiplatelet medication   # Hypertension: Noted on problem list      # Overweight: Estimated body mass index is 25.47 kg/m  as calculated from the following:    Height as of this encounter: 1.626 m (5' 4\").    Weight as of this encounter: 67.3 kg (148 lb 6.4 oz).        # History of CABG: noted on surgical history       Disposition Plan      Expected Discharge " Date: 09/15/2023,  3:00 PM    Destination: home with family;home with help/services  Discharge Comments: ?[facility]          Singh Meaodws,   Hospitalist Service  [facility]  Securely message with [employer] (more info)  Text page via [employer] Paging/Directory   ______________________________________________________________________    Interval History   NAD. Worried about discharge due to pain and blood loss but also refusing pain meds and afrin for scant bleeding from left nare    Physical Exam   Vital Signs: Temp: 98.4  F (36.9  C) Temp src: Oral BP: 120/72 Pulse: 78   Resp: 18 SpO2: 95 % O2 Device: None (Room air)    Weight: 148 lbs 6.4 oz  General: NAD  RESPIRATORY: Breathing nonlabored  CARDIOVASCULAR: No le edema bilat.   NEUROLOGIC: Motor and sensory intact, speech clear        Medical Decision Making       >45 MINUTES SPENT BY ME on the date of service doing chart review, history, exam, documentation & further activities per the note.      Data

## 2023-09-14 NOTE — PLAN OF CARE
PRIMARY DIAGNOSIS: Epistaxis     OUTPATIENT/OBSERVATION GOALS TO BE MET BEFORE DISCHARGE  1. Orthostatic performed: N/A     2. Tolerating PO medications: Yes     3. Return to near baseline physical activity: No     4. Cleared for discharge by consultants (if involved): Pt states Dr Meadows is going to talk with ENT     Discharge Planner Nurse   Safe discharge environment identified: No  Barriers to discharge: Yes       Entered by: Norma Lambert RN  AM  Please review provider order for any additional goals.   Nurse to notify provider when observation goals have been met and patient is ready for discharge.Goal Outcome Evaluation:        Pt very worried about leaving today. States he has only been able to drink and eat soft foods and the pressure he experiences at night has made it impossible for him to sleep. Drinking protein drink. Continues to have bloody mucous drainage out of his left nares. Pt states it's worse when he's up to the bathroom.

## 2023-09-14 NOTE — PLAN OF CARE
PRIMARY DIAGNOSIS: Epistaxis  OUTPATIENT/OBSERVATION GOALS TO BE MET BEFORE DISCHARGE:  ADLs back to baseline: Yes    Activity and level of assistance: Ambulating independently.    Pain status: Pain free.    Return to near baseline physical activity: Yes     Discharge Planner Nurse   Safe discharge environment identified: Yes  Barriers to discharge: Yes       Entered by: Issac Page RN 09/14/2023 5:19 AM     Please review provider order for any additional goals.   Nurse to notify provider when observation goals have been met and patient is ready for discharge.  Goal Outcome Evaluation:    Patient alert and oriented with VSS on RA. Denies pain, some minor thin bleeding from left nostril. No new concerns.

## 2023-09-14 NOTE — PROGRESS NOTES
Charge [place] [facility] [place] belongings not brought up with pt may be in IR area that cannot be accessed at this time.  In the AM, please contact IR procedure to locate pt belongings.  Oma Allred RN

## 2023-09-14 NOTE — UTILIZATION REVIEW
Admission Status; Secondary Review Determination     Under the authority of the Utilization Management Committee, the utilization review process indicated a secondary review on the above patient.  The review outcome is based on review of the medical records, discussions with staff, and applying clinical experience noted on the date of the review.       (x) Observation Status Appropriate      RATIONALE FOR DETERMINATION/SUMMARY:  63-year-old male with history of hypertension, CAD, chronic hep B and left maxillary sinus mass presents with epistaxis.  Bleeding was secondary to left maxillary sinus mass that has eroded through the left maxillary sinus.  Epistaxis improved after IR embolization of the left internal maxillary artery 9/13/2023.  Patient needs further surgery but it is felt an outpatient procedure and ENT has not recommended transfer to the [place].  Patient improved and probable discharge tomorrow per recent notes if no major new bleeding.      The severity of illness, intensity of service provided, expected LOS and risk for adverse outcome make the care appropriate for observation.     The information on this document is developed by the utilization review team in order for the business office to ensure compliance.  This only denotes the appropriateness of proper admission status and does not reflect the quality of care rendered.       The definitions of Inpatient Status and Observation Status used in making the determination above are those provided in the CMS Coverage Manual, Chapter 1 and Chapter 6, section 70.4.     Sincerely,    Wilmar Ramsey DO, [facility]  Utilization Review  Physician Advisor

## 2023-09-15 VITALS
SYSTOLIC BLOOD PRESSURE: 130 MMHG | HEART RATE: 70 BPM | DIASTOLIC BLOOD PRESSURE: 72 MMHG | HEIGHT: 64 IN | OXYGEN SATURATION: 97 % | RESPIRATION RATE: 18 BRPM | WEIGHT: 148.4 LBS | BODY MASS INDEX: 25.33 KG/M2 | TEMPERATURE: 98 F

## 2023-09-15 PROCEDURE — 250N000013 HC RX MED GY IP 250 OP 250 PS 637: Performed by: INTERNAL MEDICINE

## 2023-09-15 PROCEDURE — 99239 HOSP IP/OBS DSCHRG MGMT >30: CPT | Performed by: INTERNAL MEDICINE

## 2023-09-15 PROCEDURE — 250N000013 HC RX MED GY IP 250 OP 250 PS 637: Performed by: CLINICAL NURSE SPECIALIST

## 2023-09-15 PROCEDURE — G0378 HOSPITAL OBSERVATION PER HR: HCPCS

## 2023-09-15 RX ORDER — GABAPENTIN 300 MG/1
300 CAPSULE ORAL AT BEDTIME
Qty: 30 CAPSULE | Refills: 0 | Status: SHIPPED | OUTPATIENT
Start: 2023-09-15 | End: 2024-01-08

## 2023-09-15 RX ORDER — GABAPENTIN 300 MG/1
300 CAPSULE ORAL AT BEDTIME
Status: DISCONTINUED | OUTPATIENT
Start: 2023-09-15 | End: 2023-09-15 | Stop reason: HOSPADM

## 2023-09-15 RX ORDER — SENNOSIDES 8.6 MG
2 TABLET ORAL 2 TIMES DAILY PRN
Status: ON HOLD | COMMUNITY
Start: 2023-09-15 | End: 2023-12-04

## 2023-09-15 RX ORDER — TRAMADOL HYDROCHLORIDE 50 MG/1
50 TABLET ORAL EVERY 6 HOURS PRN
Qty: 30 TABLET | Refills: 0 | Status: ON HOLD | OUTPATIENT
Start: 2023-09-15 | End: 2023-12-04

## 2023-09-15 RX ORDER — ACETAMINOPHEN 500 MG
1000 TABLET ORAL 3 TIMES DAILY
Status: ON HOLD | COMMUNITY
Start: 2023-09-15 | End: 2023-12-04

## 2023-09-15 RX ADMIN — ENTECAVIR 0.5 MG: 0.5 TABLET ORAL at 09:36

## 2023-09-15 RX ADMIN — METOPROLOL SUCCINATE 12.5 MG: 25 TABLET, EXTENDED RELEASE ORAL at 09:34

## 2023-09-15 RX ADMIN — SENNOSIDES 2 TABLET: 8.6 TABLET, FILM COATED ORAL at 09:31

## 2023-09-15 RX ADMIN — ACETAMINOPHEN 975 MG: 325 TABLET ORAL at 05:17

## 2023-09-15 RX ADMIN — ACETAMINOPHEN 975 MG: 325 TABLET ORAL at 13:41

## 2023-09-15 RX ADMIN — AMLODIPINE BESYLATE 5 MG: 5 TABLET ORAL at 09:32

## 2023-09-15 ASSESSMENT — ACTIVITIES OF DAILY LIVING (ADL)
ADLS_ACUITY_SCORE: 31

## 2023-09-15 NOTE — PLAN OF CARE
"PRIMARY DIAGNOSIS: \"GENERIC\" NURSING  OUTPATIENT/OBSERVATION GOALS TO BE MET BEFORE DISCHARGE:  ADLs back to baseline: Yes    Activity and level of assistance: Ambulating independently.    Pain status: Improved-controlled with oral pain medications.    Return to near baseline physical activity: Yes     Discharge Planner Nurse   Safe discharge environment identified: Yes  Barriers to discharge: Yes       Entered by: Kandi Miranda RN 09/14/2023 11:21 PM     Please review provider order for any additional goals.   Nurse to notify provider when observation goals have been met and patient is ready for discharge.Goal Outcome Evaluation:       Pt continues to have slight bleeding. He reports that pain is adequately managed with the scheduled and prn meds. Independent in room. Able to eat dinner with no problem. Call light and items placed within reach.                 "

## 2023-09-15 NOTE — DISCHARGE SUMMARY
[facility]  Hospitalist Discharge Summary      Date of Admission:  9/12/2023  Date of Discharge:  9/15/2023  Discharging Provider: Singh Meadows,   Discharge Service: Hospitalist Service        Follow-ups Needed After Discharge   Follow-up Appointments     Follow-up and recommended labs and tests       Follow up with primary care provider, Mira Leung, within 7 days for   hospital follow- up.  The following labs/tests are recommended: BMP.  Follow-up with ENT [facility] as directed            Unresulted Labs Ordered in the Past 30 Days of this Admission       No orders found from 8/13/2023 to 9/13/2023.        These results will be followed up by Hospitalist results pool    Discharge Disposition   Discharged to home  Condition at discharge: Stable      Hospital Course   63-year-old male with history of hypertension, CAD, chronic hep B and left maxillary sinus mass presents with epistaxis        Epistaxis: Secondary to left maxillary sinus mass that has eroded through the left maxillary sinus  Epistaxis improved after IR embolization of the left internal maxillary artery 9/13/2023  Patient removed his own packint and has scant amount of ongoing epistaxis from the left nare that ENT re-assures is to be expecte  -- ENT recommends Afrin spray however patient refusing  -- Patient ultimately needs definitive surgery however this cannot be accomplished as inpatient.  I spoke with [facility] regarding transfer for surgery and ENT at [facility] stated that inpatient surgical intervention is not warranted.  -- Patient has already scheduled outpatient ENT follow-up in October however, [facility] is working on closer ENT outpatient follow up to expedite surgical intervention in 1-2 weeks. They will contact the patient with the plan   -- Consulted pain team to assist with pain management since patient is having ongoing issues with reluctance he to take pain medications. He has had improvement with tylenol,  gabapentin and tramadol and these medications will be continued after discharge as ordered below  -- Hold home aspirin indefinitely        CAD:  -- Holding home aspirin as above  -- Continue statin and metoprolol        Hypertension:  -- Continue amlodipine and metoprolol        Chronic hep B: Continue Entecavir           Consultations This Hospital Stay   ENT IP CONSULT  INTERVENTIONAL RADIOLOGY ADULT/PEDS IP CONSULT  CARE MANAGEMENT / SOCIAL WORK IP CONSULT  PAIN MANAGEMENT ADULT IP CONSULT  ACUPUNCTURE IP CONSULT    Code Status   Full Code    Time Spent on this Encounter   I, Singh Meadows DO, personally saw the patient today and spent greater than 30 minutes discharging this patient.       Singh Meadows DO  [address] [address]  Phone: [phone]  Fax: [phone]  ______________________________________________________________________    Physical Exam   Vital Signs: Temp: 97.7  F (36.5  C) Temp src: Oral BP: 138/80 Pulse: 76   Resp: 18 SpO2: 95 % O2 Device: None (Room air)    Weight: 148 lbs 6.4 oz    General: NAD  RESPIRATORY: Respirations nonlabored  CARDIOVASCULAR: No le edema bilat.  NEUROLOGIC: No focal arm or leg weakness, speech is clear    Primary Care Physician   Mira Leung    Discharge Orders      Reason for your hospital stay    Nose bleed     Activity    Your activity upon discharge: activity as tolerated     Follow-up and recommended labs and tests     Follow up with primary care provider, Mira Leung, within 7 days for hospital follow- up.  The following labs/tests are recommended: BMP.  Follow-up with [facility] as directed     Diet    Follow this diet upon discharge: Orders Placed This Encounter      Room Service      Regular Diet Adult     \    Discharge Medications   Current Discharge Medication List        START taking these medications    Details   gabapentin (NEURONTIN) 300 MG capsule Take 1 capsule (300 mg)  by mouth At Bedtime  Qty: 30 capsule, Refills: 0    Associated Diagnoses: Mass of sinus      sennosides (SENOKOT) 8.6 MG tablet Take 2 tablets by mouth 2 times daily as needed for constipation    Associated Diagnoses: Mass of sinus      traMADol (ULTRAM) 50 MG tablet Take 1 tablet (50 mg) by mouth every 6 hours as needed for severe pain or moderate pain  Qty: 30 tablet, Refills: 0    Associated Diagnoses: Mass of sinus           CONTINUE these medications which have CHANGED    Details   acetaminophen (TYLENOL) 500 MG tablet Take 2 tablets (1,000 mg) by mouth 3 times daily    Associated Diagnoses: Mass of sinus           CONTINUE these medications which have NOT CHANGED    Details   allopurinol (ZYLOPRIM) 300 MG tablet Take 300 mg by mouth daily      amLODIPine (NORVASC) 5 MG tablet Take 5 mg by mouth daily      entecavir (BARACLUDE) 0.5 MG tablet Take 0.5 mg by mouth daily Take 1 hour before a meal or 2 hours after a meal.      metoprolol succinate ER (TOPROL XL) 25 MG 24 hr tablet Take 12.5 mg by mouth daily      atorvastatin (LIPITOR) 80 MG tablet TAKE 1 TABLET (80 MG TOTAL) BY MOUTH AT BEDTIME/ TXHUA O NOJ 1 LUB TSHUAJ THAUM MUS PW PAB ZOO NTSHAV MUAJ ROJ  Qty: 90 tablet, Refills: 3    Associated Diagnoses: Hyperlipidemia           STOP taking these medications       aspirin 81 MG EC tablet Comments:   Reason for Stopping:             Allergies   No Known Allergies

## 2023-09-15 NOTE — PLAN OF CARE
"PRIMARY DIAGNOSIS: Epistaxis     OUTPATIENT/OBSERVATION GOALS TO BE MET BEFORE DISCHARGE  1. Orthostatic performed: N/A    2. Tolerating PO medications: Yes    3. Return to near baseline physical activity: Yes    4. Cleared for discharge by consultants (if involved): Yes    Discharge Planner Nurse   Safe discharge environment identified: Yes  Barriers to discharge: No       Entered by: Oma Allred RN 09/15/2023 2:11 PM     Please review provider order for any additional goals.   Nurse to notify provider when observation goals have been met and patient is ready for discharge.Goal Outcome Evaluation:       /72 (BP Location: Left arm, Patient Position: Semi-Nielsen's, Cuff Size: Adult Regular)   Pulse 70   Temp 98  F (36.7  C) (Oral)   Resp 18   Ht 1.626 m (5' 4\")   Wt 67.3 kg (148 lb 6.4 oz)   SpO2 97%   BMI 25.47 kg/m    Patient is set to discharge / has been cleared by Attending.  Anticipating 1400 discharge to home, transportation provided by Family.  All belongings remained with patient, pharmacy of record confirmed to fill new medications.  Pt has no further questions after new pain medications discussed / questions answered.  Oma Allred RN                  "

## 2023-09-15 NOTE — PROGRESS NOTES
Will not see today:  PAIN MANAGEMENT SERVICE CHART CHECK    This patient's chart has been reviewed by the Pain Service. Reviewed with RN.  Patient pain appears under good control with current pain management plan.  Received gabapentin 300 mg last PM and one tramadol 50 mg yesterday afternoon.  He is also receiving scheduled tylenol.  Which patient reports is helping with pain.  Anticipating patient will dismiss from hospital today.    Would recommend continuing the gabapentin at 300 mg every hs  Tylenol 1000 mg tid and tramadol 50 mg tid prn.      Thank you!    Criselda FOWLER, CNS-BC, DNP  Acute Care Pain Management Program  [facility] (Jacob SANDOVAL, Sammi)   Preference if for [facility]  Click HERE to page Brynn      Statement Selected

## 2023-09-15 NOTE — PLAN OF CARE
"PRIMARY DIAGNOSIS: \"GENERIC\" NURSING  OUTPATIENT/OBSERVATION GOALS TO BE MET BEFORE DISCHARGE:  ADLs back to baseline: Yes    Activity and level of assistance: Ambulating independently.    Pain status: Improved-controlled with oral pain medications.    Return to near baseline physical activity: Yes     Discharge Planner Nurse   Safe discharge environment identified: Yes  Barriers to discharge: Yes       Entered by: Ramiro Prajapati RN 09/15/2023 4:47 AM     Please review provider order for any additional goals.   Nurse to notify provider when observation goals have been met and patient is ready for discharge.Goal Outcome Evaluation:                        "

## 2023-09-15 NOTE — PLAN OF CARE
"PRIMARY DIAGNOSIS: \"GENERIC\" NURSING  OUTPATIENT/OBSERVATION GOALS TO BE MET BEFORE DISCHARGE:  ADLs back to baseline: Yes    Activity and level of assistance: Ambulating independently.    Pain status: Improved-controlled with oral pain medications.    Return to near baseline physical activity: Yes     Discharge Planner Nurse   Safe discharge environment identified: Yes  Barriers to discharge: Yes       Entered by: Kandi Miranda RN 09/14/2023 11:25 PM     Please review provider order for any additional goals.   Nurse to notify provider when observation goals have been met and patient is ready for discharge.Goal Outcome Evaluation:       Pt continues to have slight bleeding. He reports that pain is adequately managed with the scheduled and prn meds. Independent in room. Able to eat dinner with no problem. Call light and items placed within reach.                 "

## 2023-09-15 NOTE — PLAN OF CARE
"PRIMARY DIAGNOSIS: Epistaxis      OUTPATIENT/OBSERVATION GOALS TO BE MET BEFORE DISCHARGE  1. Orthostatic performed: N/A    2. Tolerating PO medications: Yes    3. Return to near baseline physical activity: Yes    4. Cleared for discharge by consultants (if involved): Yes    Discharge Planner Nurse   Safe discharge environment identified: Yes  Barriers to discharge: No  Patient is scheduled for discharge today         Entered by: Oma Allred RN 09/15/2023 10:44 AM     Please review provider order for any additional goals.   Nurse to notify provider when observation goals have been met and patient is ready for discharge.Goal Outcome Evaluation:         /80   Pulse 76   Temp 97.7  F (36.5  C) (Oral)   Resp 18   Ht 1.626 m (5' 4\")   Wt 67.3 kg (148 lb 6.4 oz)   SpO2 95%   BMI 25.47 kg/m                   "

## 2023-09-15 NOTE — PLAN OF CARE
"Goal Outcome Evaluation:                      PRIMARY DIAGNOSIS: \"GENERIC\" NURSING  OUTPATIENT/OBSERVATION GOALS TO BE MET BEFORE DISCHARGE:  ADLs back to baseline: Yes    Activity and level of assistance: Ambulating independently.    Pain status: Improved-controlled with oral pain medications.    Return to near baseline physical activity: Yes     Discharge Planner Nurse   Safe discharge environment identified: Yes  Barriers to discharge: Yes       Entered by: Ramiro Prajapati RN 09/15/2023 1:42 AM     Please review provider order for any additional goals.   Nurse to notify provider when observation goals have been met and patient is ready for discharge.  "

## 2023-09-21 ENCOUNTER — LAB REQUISITION (OUTPATIENT)
Dept: LAB | Facility: CLINIC | Age: 63
End: 2023-09-21

## 2023-09-21 LAB
ANION GAP SERPL CALCULATED.3IONS-SCNC: 13 MMOL/L (ref 7–15)
BUN SERPL-MCNC: 15.8 MG/DL (ref 8–23)
CALCIUM SERPL-MCNC: 9.4 MG/DL (ref 8.8–10.2)
CHLORIDE SERPL-SCNC: 104 MMOL/L (ref 98–107)
CREAT SERPL-MCNC: 1.1 MG/DL (ref 0.67–1.17)
DEPRECATED HCO3 PLAS-SCNC: 24 MMOL/L (ref 22–29)
EGFRCR SERPLBLD CKD-EPI 2021: 75 ML/MIN/1.73M2
GLUCOSE SERPL-MCNC: 91 MG/DL (ref 70–99)
POTASSIUM SERPL-SCNC: 4.1 MMOL/L (ref 3.4–5.3)
SODIUM SERPL-SCNC: 141 MMOL/L (ref 136–145)

## 2023-09-21 PROCEDURE — 80048 BASIC METABOLIC PNL TOTAL CA: CPT | Performed by: NURSE PRACTITIONER

## 2023-09-22 ENCOUNTER — APPOINTMENT (OUTPATIENT)
Dept: INTERPRETER SERVICES | Facility: CLINIC | Age: 63
End: 2023-09-22
Payer: COMMERCIAL

## 2023-09-22 ENCOUNTER — TUMOR CONFERENCE (OUTPATIENT)
Dept: ONCOLOGY | Facility: CLINIC | Age: 63
End: 2023-09-22
Payer: COMMERCIAL

## 2023-09-22 NOTE — TUMOR CONFERENCE
Head & Neck Tumor Conference Note   9/22/2023    Status: New  Staff: Dr. Guzman    Tumor Site: Maxillary sinus  Tumor Pathology: Unknown  Tumor Stage: Unknown  Tumor Treatment: TBD    Reason for Review: Review imaging, path, and POC    Brief History: This is a 63 year old male who presented to an outside ED with epistaxis and was found to have a maxillary sinus tumor with bony erosion. Patient not yet evaluated at [facility] but we are discussing to determine possible treatment options.    Pertinent PMH:   Past Medical History:   Diagnosis Date    Ascending aortic aneurysm (H)     Coronary artery disease     Depression     Gout     History of anesthesia complications     Hyperlipemia     Hypertension     PONV (postoperative nausea and vomiting)         Smoking Hx:   Social History     Tobacco Use    Smoking status: Former     Types: Cigarettes    Smokeless tobacco: Never    Tobacco comments:     every 3-4 months, rare   Substance Use Topics    Alcohol use: Not Currently    Drug use: No     Imaging:   IMPRESSION:   HEAD CT:  1.  No acute intracranial process.  2.  Large heterogeneously enhancing mass centered in the left maxillary sinus with erosion through the medial and lateral walls of the left maxillary sinus, into the left maxillary alveolus and pterygoid musculature and pterygoid plates, into the left   infratemporal fossa, left nasal cavity and left ethmoid air cells. No definite orbital invasion.     HEAD CTA:   1.  Normal CTA [place] [facility] [place].     NECK CTA:  1.  Hypertrophied left maxillary artery which courses towards the left pterygopalatine fossa and a cluster of small arteries within the posterior/inferior aspect of the above-described mass lesion.  2.  Otherwise unremarkable.    Pathology:   None available.    Tumor Board Recommendation:   Discussion: In this patient with a new maxillary sinus mass, imaging was discussed. CT demonstrates left maxillary sinus tumor extending into the pterygopalatine fossa and  pterygoid muscles, with bony erosion including complete destruction of the pterygoid plate. The tumor is very vascular, and there is some extent also into the ethmoids (accompanied by post-obstructive changes).    Plan:   - biopsy  - re-evaluate at tumor conference to determine management    Alanis Joya MD   Otolaryngology-Head & Neck Surgery PGY3  Please contact ENT on call with questions    Documentation / Disclaimer Cancer Tumor Board Note  Cancer tumor board recommendations do not override what is determined to be reasonable care and treatment, which is dependent on the circumstances of a patient's case; the patient's medical, social, and personal concerns; and the clinical judgment of the oncologist [physician].

## 2023-09-22 NOTE — TUMOR CONFERENCE
Called patient and son. Reviewed scheduled appointments with Dr. Guzman. Reviewed appointment details. Patient will follow up as scheduled.

## 2023-10-03 NOTE — PROGRESS NOTES
Dear Dr. Claros:    I had the pleasure of meeting Douglas Herrera in consultation today at the [facility] at your request.     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    8/9/2023: Seen by [facility] for nasal congestion. He was found to have a left nasal polyp. Recommended for MRI.    8/9/2023: CT sinus showing opacification of left maxillary and ethmoid sinuses, erosion of the posterior maxillary sinus wall.     8/24/2023: MRI sinus showing left maxillary sinus enhancing mass with destruction of the medial/posterior lateral maxillary sinus wall, partial effacement of let retroantral fat, opacification of left side of the nose and nasopharynx; retained secretions in the frontal and sphenoid sinuses.    9/12/2023: local ER for epistaxis    9/13/2023: CTA head/neck showing soft tissue mass in the left maxillary sinus with erosion of medial/posterior/lateral wall with involvement of left maxillary alveolar ridge, left infratemporal fossa, erosion of pterygoids, left pterygoid musculature; hypertrophied left maxillary artery within the posterior/inferior aspect of the mass    9/13/2023: IR embolization of left IMAX locally for management of epistaxis    9/22/2023: Discussion at tumor board- concern for resectability given the posterior extent of disease into the pterygoids      He says his nose is still plugged and congested on the left side. He is taking some pain medication. He has not had bleeding from the nose since leaving the hospital. He is off his aspirin. He does not think he can smell on the left side. He is eating better recently. He lost about 15-20 lbs since before the hospitalization, gain some back with improved activity. He has some jaw pain, is improved some since the hospitalization. The pain medication helps with the pain considerably. His vision is unchanged. He has no numbness of the face.  Hearing is down on the left side.         He is accompanied by his son who helps translate.       Past medical history: CAD s/p 3 vessel bypass, hypertension, hyperlipidemia, chronic hepatitis B    Past surgical history: CABG, right temple Mohs for SCC    Social history: He is not a smoker. No chewing tobacco. No alcohol currently, quit about 10-15 years ago. Lives with 5 children. On disability. Emigrated from [place] in 80s.     Family history: No H&N cancer history.    MEDICATIONS:     Current Outpatient Medications   Medication Sig Dispense Refill    acetaminophen (TYLENOL) 500 MG tablet Take 2 tablets (1,000 mg) by mouth 3 times daily      allopurinol (ZYLOPRIM) 300 MG tablet Take 300 mg by mouth daily      amLODIPine (NORVASC) 5 MG tablet Take 5 mg by mouth daily      atorvastatin (LIPITOR) 80 MG tablet TAKE 1 TABLET (80 MG TOTAL) BY MOUTH AT BEDTIME/ TXHUA O NOJ 1 LUB TSHUAJ THAUM MUS PW PAB ZOO NTSHAV MUAJ ROJ 90 tablet 3    entecavir (BARACLUDE) 0.5 MG tablet Take 0.5 mg by mouth daily Take 1 hour before a meal or 2 hours after a meal.      gabapentin (NEURONTIN) 300 MG capsule Take 1 capsule (300 mg) by mouth At Bedtime 30 capsule 0    metoprolol succinate ER (TOPROL XL) 25 MG 24 hr tablet Take 12.5 mg by mouth daily      sennosides (SENOKOT) 8.6 MG tablet Take 2 tablets by mouth 2 times daily as needed for constipation      traMADol (ULTRAM) 50 MG tablet Take 1 tablet (50 mg) by mouth every 6 hours as needed for severe pain or moderate pain 30 tablet 0       ALLERGIES:  No Known Allergies    HABITS/SOCIAL HISTORY:   He is not a smoker. No chewing tobacco. No alcohol currently, quit about 10-15 years ago.   Lives with 5 children.   On disability.   Emigrated from [place] in 80s.     Social History     Socioeconomic History    Marital status:      Spouse name: Not on file    Number of children: Not on file    Years of education: Not on file    Highest education level: Not on file   Occupational History     Not on file   Tobacco Use    Smoking status: Former     Types: Cigarettes    Smokeless tobacco: Never    Tobacco comments:     every 3-4 months, rare   Substance and Sexual Activity    Alcohol use: Not Currently    Drug use: No    Sexual activity: Not on file   Other Topics Concern    Not on file   Social History Narrative    Not on file     Social Determinants of Health     Financial Resource Strain: Not on file   Food Insecurity: Not on file   Transportation Needs: Not on file   Physical Activity: Not on file   Stress: Not on file   Social Connections: Not on file   Interpersonal Safety: Not on file   Housing Stability: Not on file       PAST MEDICAL HISTORY:   Past Medical History:   Diagnosis Date    Ascending aortic aneurysm (H)     Coronary artery disease     Depression     Gout     History of anesthesia complications     Hyperlipemia     Hypertension     PONV (postoperative nausea and vomiting)         PAST SURGICAL HISTORY:   Past Surgical History:   Procedure Laterality Date    AORTA SURGERY N/A 4/23/2019    Procedure: WITH ASCENDING AORTA REPLACEMENT;  Surgeon: Darlene Graff MD;  Location: [facility] Main OR;  Service: Cardiovascular    BYPASS GRAFT ARTERY CORONARY      CARDIAC SURGERY      CV CORONARY ANGIOGRAM N/A 3/12/2019    Procedure: Coronary Angiogram;  Surgeon: Hamilton Lucero MD;  Location: [facility] Cath Lab;  Service: Cardiology    GALLBLADDER SURGERY      IR FACIAL EMBOLIZATION LEFT  9/13/2023       FAMILY HISTORY:    Family History   Problem Relation Age of Onset    Cerebrovascular Disease Mother 90.00    Acute Myocardial Infarction No family hx of        REVIEW OF SYSTEMS:  12 point ROS was negative other than the symptoms noted above in the HPI.  Patient Supplied Answers to Review of Systems       No data to display                  PHYSICAL EXAMINATION:   BP (!) 134/92 (BP Location: Right arm, Patient Position: Sitting, Cuff Size: Adult Regular)   Pulse 76   Ht 1.626 m  "(5' 4\")   Wt 69.2 kg (152 lb 8 oz)   SpO2 97%   BMI 26.18 kg/m    Appearance:   normal; NAD, age-appropriate appearance, well-developed, normal habitus   Communication:   normal; communicates verbally, normal voice quality   Head/Face:   inspection -  scar in right temporal region from previous skin cancer excision   Palpation - decreased sensation of right V1   Salivary glands -  Normal size, no tenderness, swelling, or palpable masses   Facial strength -  Normal and symmetric bilaterally with exception of right frontal paralysis secondary to previous skin cancer removal   Skin:  normal, no rash   Ocular Motility:  normal occular movements   Ears:  auricle (AD) -  normal  EAC (AD) -  normal  TM (AD) -  Normal, no effusion  auricle (AS) -  normal  EAC (AS) -  normal  TM (AS) -  effusion  Normal clinical speech reception   Nose:  Ext. inspection -  Normal  Internal Inspection -  polypoid tumor filling the left nasal cavity, right nasal cavity clear   Oral Cavity:  lips -  Normal mucosa, oral competence, and stoma size   Some missing teeth    Left maxillary tuberosity with mucosal changes, no barak exophytic tumor, on palpation the left maxillary tuberosity is soft consistent with bony erosion   Tongue - normal movement, no lesions   Oropharynx:  mucosa -  Normal, no lesions  soft palate -  Normal, no lesions, no asymmetry, normal elevation   Neck: No visible mass or asymmetry   Normal palpation  Normal range of motion   Lymphatic:  no abnormal nodes   Cardiovascular:  warm, pink, well-perfused extremities without swelling, tenderness, or edema   Respiratory:  Normal respiratory effort, no stridor   Neuro/Psych.:  mood/affect -  normal  mental status -  normal       PROCEDURES:   Flexible nasal endoscopy: Scope exam was indicated due to maxillary sinus tumor. Verbal consent was obtained. The nasal cavity was prepped with an aerosolized solution of topical anesthetic and vasoconstrictive agent. The scope was passed " through the anterior nasal cavity and advanced. The right side of the nasal cavity was uninvolved with tumor. The left side of the nasal cavity was filled with tumor with some surrounding secretions, with inability to advance the scope.    Nasal biopsy: The tumor in the left side of the nose was injected with 1% lidocaine with 1:100,000 epinephrine. A true cut was used to take several biopsies of the polypoid mass. Specimen was placed in formalin. Hemostasis was achieved with silver nitrate. Patient tolerated the procedure well with no immediate complications.    RESULTS REVIEWED:   I reviewed CTA neck report, tumor board note, MRI report, note from [facility], ER note, discharge summary    I independently reviewed CT neck images      IMPRESSION AND PLAN:   Douglas Herrera is a 63 year old man with a T4 maxillary sinus tumor.    I explained to the patient and his son that based on the presentation and imaging this is certainly a malignancy.     I discussed with the patient and his son that he needs a tissue diagnosis with a biopsy. A biopsy was performed in the clinic today. We discussed that certainly we may need more tissue obtained depending on the results from the biopsy.    We discussed that following the biopsy we will proceed with a PET scan to complete his staging workup.    His case was reviewed at tumor board recently. Due to concerns for ability to get a negative margin in the pterygoids, recommended against upfront surgical therapy. I explained to them today the concerns about ability to get negative margins with surgery. We discussed that potentially chemotherapy +/- radiation may be used based on PET scan results and re-discussion at tumor board.     Radiation and medical oncology referrals will be placed once final plan made.      Family requests that results be communicated to his son Berhane at [phone].    Appropriate follow-up will be determined pending rest of workup.    Thank you very much for  the opportunity to participate in the care of your patient.      Karoline Guzman MD  Otolaryngology- Head & Neck Surgery      This note was dictated with voice recognition software and then edited. Please excuse any unintentional errors.

## 2023-10-04 ENCOUNTER — OFFICE VISIT (OUTPATIENT)
Dept: OTOLARYNGOLOGY | Facility: CLINIC | Age: 63
End: 2023-10-04
Payer: COMMERCIAL

## 2023-10-04 VITALS
HEART RATE: 76 BPM | SYSTOLIC BLOOD PRESSURE: 134 MMHG | DIASTOLIC BLOOD PRESSURE: 92 MMHG | WEIGHT: 152.5 LBS | HEIGHT: 64 IN | BODY MASS INDEX: 26.03 KG/M2 | OXYGEN SATURATION: 97 %

## 2023-10-04 DIAGNOSIS — C31.0 CARCINOMA OF MAXILLARY SINUS (H): Primary | ICD-10-CM

## 2023-10-04 PROCEDURE — 88305 TISSUE EXAM BY PATHOLOGIST: CPT | Mod: 26 | Performed by: STUDENT IN AN ORGANIZED HEALTH CARE EDUCATION/TRAINING PROGRAM

## 2023-10-04 PROCEDURE — 30100 INTRANASAL BIOPSY: CPT | Mod: 51 | Performed by: OTOLARYNGOLOGY

## 2023-10-04 PROCEDURE — 99204 OFFICE O/P NEW MOD 45 MIN: CPT | Mod: 25 | Performed by: OTOLARYNGOLOGY

## 2023-10-04 PROCEDURE — 31231 NASAL ENDOSCOPY DX: CPT | Performed by: OTOLARYNGOLOGY

## 2023-10-04 PROCEDURE — 88305 TISSUE EXAM BY PATHOLOGIST: CPT | Mod: TC | Performed by: OTOLARYNGOLOGY

## 2023-10-04 ASSESSMENT — PAIN SCALES - GENERAL: PAINLEVEL: EXTREME PAIN (8)

## 2023-10-04 NOTE — LETTER
10/4/2023       RE: Douglas Herrera  [address]  [place] [address]     Dear Colleague,    Thank you for referring your patient, Douglas Herrera, to the [facility] [place] at [facility]. Please see a copy of my visit note below.    Dear Dr. Claros:    I had the pleasure of meeting Douglas Herrera in consultation today at the [facility] at your request.     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    8/9/2023: Seen by [facility] for nasal congestion. He was found to have a left nasal polyp. Recommended for MRI.    8/9/2023: CT sinus showing opacification of left maxillary and ethmoid sinuses, erosion of the posterior maxillary sinus wall.     8/24/2023: MRI sinus showing left maxillary sinus enhancing mass with destruction of the medial/posterior lateral maxillary sinus wall, partial effacement of let retroantral fat, opacification of left side of the nose and nasopharynx; retained secretions in the frontal and sphenoid sinuses.    9/12/2023: local ER for epistaxis    9/13/2023: CTA head/neck showing soft tissue mass in the left maxillary sinus with erosion of medial/posterior/lateral wall with involvement of left maxillary alveolar ridge, left infratemporal fossa, erosion of pterygoids, left pterygoid musculature; hypertrophied left maxillary artery within the posterior/inferior aspect of the mass    9/13/2023: IR embolization of left IMAX locally for management of epistaxis    9/22/2023: Discussion at tumor board- concern for resectability given the posterior extent of disease into the pterygoids      He says his nose is still plugged and congested on the left side. He is taking some pain medication. He has not had bleeding from the nose since leaving the hospital. He is off his aspirin. He does not think he can smell on the  left side. He is eating better recently. He lost about 15-20 lbs since before the hospitalization, gain some back with improved activity. He has some jaw pain, is improved some since the hospitalization. The pain medication helps with the pain considerably. His vision is unchanged. He has no numbness of the face. Hearing is down on the left side.         He is accompanied by his son who helps translate.       Past medical history: CAD s/p 3 vessel bypass, hypertension, hyperlipidemia, chronic hepatitis B    Past surgical history: CABG, right temple Mohs for SCC    Social history: He is not a smoker. No chewing tobacco. No alcohol currently, quit about 10-15 years ago. Lives with 5 children. On disability. Emigrated from [facility] in 80s.     Family history: No H&N cancer history.    MEDICATIONS:     Current Outpatient Medications   Medication Sig Dispense Refill    acetaminophen (TYLENOL) 500 MG tablet Take 2 tablets (1,000 mg) by mouth 3 times daily      allopurinol (ZYLOPRIM) 300 MG tablet Take 300 mg by mouth daily      amLODIPine (NORVASC) 5 MG tablet Take 5 mg by mouth daily      atorvastatin (LIPITOR) 80 MG tablet TAKE 1 TABLET (80 MG TOTAL) BY MOUTH AT BEDTIME/ TXHUA HMO NOJ 1 LUB TSHUAJ THAUM MUS PW PAB ZOO NTSHAV MUAJ ROJ 90 tablet 3    entecavir (BARACLUDE) 0.5 MG tablet Take 0.5 mg by mouth daily Take 1 hour before a meal or 2 hours after a meal.      gabapentin (NEURONTIN) 300 MG capsule Take 1 capsule (300 mg) by mouth At Bedtime 30 capsule 0    metoprolol succinate ER (TOPROL XL) 25 MG 24 hr tablet Take 12.5 mg by mouth daily      sennosides (SENOKOT) 8.6 MG tablet Take 2 tablets by mouth 2 times daily as needed for constipation      traMADol (ULTRAM) 50 MG tablet Take 1 tablet (50 mg) by mouth every 6 hours as needed for severe pain or moderate pain 30 tablet 0       ALLERGIES:  No Known Allergies    HABITS/SOCIAL HISTORY:   He is not a smoker. No chewing tobacco. No alcohol currently, quit about 10-15  years ago.   Lives with 5 children.   On disability.   Emigrated from [employer] [facility] in 80s.     Social History     Socioeconomic History    Marital status:      Spouse name: Not on file    Number of children: Not on file    Years of education: Not on file    Highest education level: Not on file   Occupational History    Not on file   Tobacco Use    Smoking status: Former     Types: Cigarettes    Smokeless tobacco: Never    Tobacco comments:     every 3-4 months, rare   Substance and Sexual Activity    Alcohol use: Not Currently    Drug use: No    Sexual activity: Not on file   Other Topics Concern    Not on file   Social History Narrative    Not on file     Social Determinants of Health     Financial Resource Strain: Not on file   Food Insecurity: Not on file   Transportation Needs: Not on file   Physical Activity: Not on file   Stress: Not on file   Social Connections: Not on file   Interpersonal Safety: Not on file   Housing Stability: Not on file       PAST MEDICAL HISTORY:   Past Medical History:   Diagnosis Date    Ascending aortic aneurysm (H)     Coronary artery disease     Depression     Gout     History of anesthesia complications     Hyperlipemia     Hypertension     PONV (postoperative nausea and vomiting)         PAST SURGICAL HISTORY:   Past Surgical History:   Procedure Laterality Date    AORTA SURGERY N/A 4/23/2019    Procedure: WITH ASCENDING AORTA REPLACEMENT;  Surgeon: Darlene Graff MD;  Location: [facility] Main OR;  Service: Cardiovascular    BYPASS GRAFT ARTERY CORONARY      CARDIAC SURGERY      CV CORONARY ANGIOGRAM N/A 3/12/2019    Procedure: Coronary Angiogram;  Surgeon: Hamilton Lucero MD;  Location: [facility] Cath Lab;  Service: Cardiology    GALLBLADDER SURGERY      IR FACIAL EMBOLIZATION LEFT  9/13/2023       FAMILY HISTORY:    Family History   Problem Relation Age of Onset    Cerebrovascular Disease Mother 90.00    Acute Myocardial Infarction No family hx of        REVIEW OF  "SYSTEMS:  12 point ROS was negative other than the symptoms noted above in the HPI.  Patient Supplied Answers to Review of Systems       No data to display                  PHYSICAL EXAMINATION:   BP (!) 134/92 (BP Location: Right arm, Patient Position: Sitting, Cuff Size: Adult Regular)   Pulse 76   Ht 1.626 m (5' 4\")   Wt 69.2 kg (152 lb 8 oz)   SpO2 97%   BMI 26.18 kg/m    Appearance:   normal; NAD, age-appropriate appearance, well-developed, normal habitus   Communication:   normal; communicates verbally, normal voice quality   Head/Face:   inspection -  scar in right temporal region from previous skin cancer excision   Palpation - decreased sensation of right V1   Salivary glands -  Normal size, no tenderness, swelling, or palpable masses   Facial strength -  Normal and symmetric bilaterally with exception of right frontal paralysis secondary to previous skin cancer removal   Skin:  normal, no rash   Ocular Motility:  normal occular movements   Ears:  auricle (AD) -  normal  EAC (AD) -  normal  TM (AD) -  Normal, no effusion  auricle (AS) -  normal  EAC (AS) -  normal  TM (AS) -  effusion  Normal clinical speech reception   Nose:  Ext. inspection -  Normal  Internal Inspection -  polypoid tumor filling the left nasal cavity, right nasal cavity clear   Oral Cavity:  lips -  Normal mucosa, oral competence, and stoma size   Some missing teeth    Left maxillary tuberosity with mucosal changes, no barak exophytic tumor, on palpation the left maxillary tuberosity is soft consistent with bony erosion   Tongue - normal movement, no lesions   Oropharynx:  mucosa -  Normal, no lesions  soft palate -  Normal, no lesions, no asymmetry, normal elevation   Neck: No visible mass or asymmetry   Normal palpation  Normal range of motion   Lymphatic:  no abnormal nodes   Cardiovascular:  warm, pink, well-perfused extremities without swelling, tenderness, or edema   Respiratory:  Normal respiratory effort, no stridor "   Neuro/Psych.:  mood/affect -  normal  mental status -  normal       PROCEDURES:   Flexible nasal endoscopy: Scope exam was indicated due to maxillary sinus tumor. Verbal consent was obtained. The nasal cavity was prepped with an aerosolized solution of topical anesthetic and vasoconstrictive agent. The scope was passed through the anterior nasal cavity and advanced. The right side of the nasal cavity was uninvolved with tumor. The left side of the nasal cavity was filled with tumor with some surrounding secretions, with inability to advance the scope.    Nasal biopsy: The tumor in the left side of the nose was injected with 1% lidocaine with 1:100,000 epinephrine. A true cut was used to take several biopsies of the polypoid mass. Specimen was placed in formalin. Hemostasis was achieved with silver nitrate. Patient tolerated the procedure well with no immediate complications.    RESULTS REVIEWED:   I reviewed CTA neck report, tumor board note, MRI report, note from [facility], ER note, discharge summary    I independently reviewed CT neck images      IMPRESSION AND PLAN:   Douglas Herrera is a 63 year old man with a T4 maxillary sinus tumor.    I explained to the patient and his son that based on the presentation and imaging this is certainly a malignancy.     I discussed with the patient and his son that he needs a tissue diagnosis with a biopsy. A biopsy was performed in the clinic today. We discussed that certainly we may need more tissue obtained depending on the results from the biopsy.    We discussed that following the biopsy we will proceed with a PET scan to complete his staging workup.    His case was reviewed at tumor board recently. Due to concerns for ability to get a negative margin in the pterygoids, recommended against upfront surgical therapy. I explained to them today the concerns about ability to get negative margins with surgery. We discussed that potentially chemotherapy +/- radiation may be used  based on PET scan results and re-discussion at tumor board.     Radiation and medical oncology referrals will be placed once final plan made.      Family requests that results be communicated to his son Berhane at [phone].    Appropriate follow-up will be determined pending rest of workup.    Thank you very much for the opportunity to participate in the care of your patient.      Karoline Guzman MD  Otolaryngology- Head & Neck Surgery      This note was dictated with voice recognition software and then edited. Please excuse any unintentional errors.

## 2023-10-05 LAB
PATH REPORT.COMMENTS IMP SPEC: NORMAL
PATH REPORT.COMMENTS IMP SPEC: NORMAL
PATH REPORT.FINAL DX SPEC: NORMAL
PATH REPORT.GROSS SPEC: NORMAL
PATH REPORT.MICROSCOPIC SPEC OTHER STN: NORMAL
PATH REPORT.RELEVANT HX SPEC: NORMAL
PHOTO IMAGE: NORMAL

## 2023-10-08 ENCOUNTER — HEALTH MAINTENANCE LETTER (OUTPATIENT)
Age: 63
End: 2023-10-08

## 2023-10-11 NOTE — PATIENT INSTRUCTIONS
1. Plan for another biopsy in the OR.   2. Please call the ENT clinic with any questions,concerns, new or worsening symptoms.    -Clinic number is [phone]   - [facility] direct line (Dr. Iglesias's nurse) [phone]

## 2023-10-12 ENCOUNTER — PRE VISIT (OUTPATIENT)
Dept: OTOLARYNGOLOGY | Facility: CLINIC | Age: 63
End: 2023-10-12
Payer: COMMERCIAL

## 2023-10-12 ENCOUNTER — OFFICE VISIT (OUTPATIENT)
Dept: OTOLARYNGOLOGY | Facility: CLINIC | Age: 63
End: 2023-10-12
Attending: OTOLARYNGOLOGY
Payer: COMMERCIAL

## 2023-10-12 VITALS
TEMPERATURE: 99.7 F | OXYGEN SATURATION: 96 % | DIASTOLIC BLOOD PRESSURE: 75 MMHG | HEART RATE: 72 BPM | WEIGHT: 153 LBS | HEIGHT: 64 IN | BODY MASS INDEX: 26.12 KG/M2 | SYSTOLIC BLOOD PRESSURE: 126 MMHG

## 2023-10-12 DIAGNOSIS — J34.89 NASAL OBSTRUCTION: ICD-10-CM

## 2023-10-12 DIAGNOSIS — C31.0 CARCINOMA OF MAXILLARY SINUS (H): Primary | ICD-10-CM

## 2023-10-12 PROCEDURE — 99214 OFFICE O/P EST MOD 30 MIN: CPT | Mod: 25 | Performed by: OTOLARYNGOLOGY

## 2023-10-12 PROCEDURE — 31231 NASAL ENDOSCOPY DX: CPT | Performed by: OTOLARYNGOLOGY

## 2023-10-12 ASSESSMENT — PAIN SCALES - GENERAL: PAINLEVEL: NO PAIN (1)

## 2023-10-12 NOTE — PROGRESS NOTES
Dx: Left sinonasal mass    Path:  Final Diagnosis   A. 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  The patient states that for the last 4 months he has had nasal congestion nasal obstruction on the left.  He had multiple episodes of epistaxis.  He was embolized in an outside hospital because of severe left-sided epistaxis.  They did embolize the left sphenopalatine artery.  The patient denies any visual changes, no facial numbness.  He was experiencing sinonasal pain and pressure but this has improved after starting gabapentin.  Dr. Karoline Victorja the need endonasal biopsy with the patient was admitted for epistaxis.  The biopsy results show inverted papilloma with high-grade dysplasia and no evidence of invasive carcinoma.    MEDICATIONS:     Current Outpatient Medications   Medication Sig Dispense Refill    acetaminophen (TYLENOL) 500 MG tablet Take 2 tablets (1,000 mg) by mouth 3 times daily      allopurinol (ZYLOPRIM) 300 MG tablet Take 300 mg by mouth daily      amLODIPine (NORVASC) 5 MG tablet Take 5 mg by mouth daily      atorvastatin (LIPITOR) 80 MG tablet TAKE 1 TABLET (80 MG TOTAL) BY MOUTH AT BEDTIME/ TXHUA HMO NOJ 1 LUB TSHUAJ THAUM MUS PW PAB ZOO NTSHAV MUAJ ROJ 90 tablet 3    entecavir (BARACLUDE) 0.5 MG tablet Take 0.5 mg by mouth daily Take 1 hour before a meal or 2 hours after a meal.      gabapentin (NEURONTIN) 300 MG capsule Take 1 capsule (300 mg) by mouth At Bedtime 30 capsule 0    metoprolol succinate ER (TOPROL XL) 25 MG 24 hr tablet Take 12.5 mg by mouth daily      sennosides (SENOKOT) 8.6 MG tablet Take 2 tablets by mouth 2 times daily as needed for constipation      traMADol (ULTRAM) 50 MG tablet Take 1 tablet (50 mg) by mouth every 6 hours as needed for severe pain or moderate pain 30 tablet 0       ALLERGIES:  No Known  Allergies    HABITS/SOCIAL HISTORY: The patient is disabled after having open heart surgery.  He does not smoke does not drink.  He used to work as a .    PAST MEDICAL HISTORY:   Past Medical History:   Diagnosis Date    Ascending aortic aneurysm (H24)     Coronary artery disease     Depression     Gout     History of anesthesia complications     Hyperlipemia     Hypertension     PONV (postoperative nausea and vomiting)         FAMILY HISTORY:    Family History   Problem Relation Age of Onset    Cerebrovascular Disease Mother 90.00    Acute Myocardial Infarction No family hx of        REVIEW OF SYSTEMS:  12 point ROS was negative other than the symptoms noted above in the HPI.    PHYSICAL EXAMINATION:      Constitutional:  The patient was unaccompanied, well-groomed, and in no acute distress.     Skin: Normal:  warm and pink without rash   Neurologic: Alert and oriented x 3.  CN's III-XII within normal limits.  Voice normal.    Psychiatric: The patient's affect was calm, cooperative, and appropriate.     Communication:  Normal; communicates verbally, normal voice quality.   Respiratory: Breathing comfortably without stridor or exertion of accessory muscles.    Head/Face:  Normocephalic and atraumatic.  No lesions or scars. No sinus tenderness.    Salivary glands -  Normal size, no tenderness, swelling, or palpable masses   Eyes: Pupils were equal and reactive.  Extraocular movement intact.         Nose: Sinuses were non-tender.  Anterior rhinoscopy revealed midline septum and absence of purulence or polyps.     Oral Cavity: Normal tongue, floor of mouth, buccal mucosa, and palate.  No lesions or masses on inspection or palpation.     Oropharynx: Normal mucosa, palate symmetric with normal elevation. No abnormal lymph tissue in the oropharynx.             Neck: Supple with normal laryngeal and tracheal landmarks.  The parotid beds were without masses.  No palpable thyroid.  Normal range of motion    Lymphatic: There is no palpable lymphadenopathy in the neck.          Nasal endoscopy consent for nasal endoscopy was obtained.  I confirmed correctness of the procedure and identity of the patient.  Nasal endoscopy was indicated due to left sinonasal mass.  The nose was topically decongested and anesthetized.  The nasal endoscope was passed under endoscopic vision.  On the right side the inferior middle turbinate are within normal limits.  The septum is in the midline.  On the left side septum is in the midline inferior turbinate is normal middle turbinate appears to be normal.  I do see a mass emanating from the middle meatus.         IMPRESSION AND PLAN: 63-year-old male with left-sided sinonasal mass biopsy shows inverted papilloma with high-grade dysplasia no evidence of invasive carcinoma.  I am concerned that the images do not correspond to the findings on the pathology.  The MRI and CT shows quite a bit of bony destruction.  I am going to take the patient to the operating room to do a large biopsy in order to rule out malignancy.  I did explain this to the patient as well as his son over the phone and they agreed with the plan.    Thank you very much for the opportunity to participate in the care of your patient.      Damon Regan MD, M.D.  Otolaryngology- Head & Neck Surgery  [phone]

## 2023-10-12 NOTE — LETTER
10/12/2023       RE: Douglas Herrera  [address] [place] [address]     Dear Colleague,    Thank you for referring your patient, Douglas Herrera, to the [facility]SE [facility] THROAT [facility]IC [place] at [facility]. Please see a copy of my visit note below.    Dx: Left sinonasal mass    Path:  Final Diagnosis   A. 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  The patient states that for the last 4 months he has had nasal congestion nasal obstruction on the left.  He had multiple episodes of epistaxis.  He was embolized in an outside hospital because of severe left-sided epistaxis.  They did embolize the left sphenopalatine artery.  The patient denies any visual changes, no facial numbness.  He was experiencing sinonasal pain and pressure but this has improved after starting gabapentin.  Dr. Karoline Victorja the need endonasal biopsy with the patient was admitted for epistaxis.  The biopsy results show inverted papilloma with high-grade dysplasia and no evidence of invasive carcinoma.    MEDICATIONS:     Current Outpatient Medications   Medication Sig Dispense Refill     acetaminophen (TYLENOL) 500 MG tablet Take 2 tablets (1,000 mg) by mouth 3 times daily       allopurinol (ZYLOPRIM) 300 MG tablet Take 300 mg by mouth daily       amLODIPine (NORVASC) 5 MG tablet Take 5 mg by mouth daily       atorvastatin (LIPITOR) 80 MG tablet TAKE 1 TABLET (80 MG TOTAL) BY MOUTH AT BEDTIME/ TXHUA O NOJ 1 LUB TSHUAJ THAUM MUS PW PAB ZOO NTSHAV MUAJ ROJ 90 tablet 3     entecavir (BARACLUDE) 0.5 MG tablet Take 0.5 mg by mouth daily Take 1 hour before a meal or 2 hours after a meal.       gabapentin (NEURONTIN) 300 MG capsule Take 1 capsule (300 mg) by mouth At Bedtime 30 capsule 0     metoprolol succinate ER  (TOPROL XL) 25 MG 24 hr tablet Take 12.5 mg by mouth daily       sennosides (SENOKOT) 8.6 MG tablet Take 2 tablets by mouth 2 times daily as needed for constipation       traMADol (ULTRAM) 50 MG tablet Take 1 tablet (50 mg) by mouth every 6 hours as needed for severe pain or moderate pain 30 tablet 0       ALLERGIES:  No Known Allergies    HABITS/SOCIAL HISTORY: The patient is disabled after having open heart surgery.  He does not smoke does not drink.  He used to work as a .    PAST MEDICAL HISTORY:   Past Medical History:   Diagnosis Date     Ascending aortic aneurysm (H24)      Coronary artery disease      Depression      Gout      History of anesthesia complications      Hyperlipemia      Hypertension      PONV (postoperative nausea and vomiting)         FAMILY HISTORY:    Family History   Problem Relation Age of Onset     Cerebrovascular Disease Mother 90.00     Acute Myocardial Infarction No family hx of        REVIEW OF SYSTEMS:  12 point ROS was negative other than the symptoms noted above in the HPI.    PHYSICAL EXAMINATION:      Constitutional:  The patient was unaccompanied, well-groomed, and in no acute distress.     Skin: Normal:  warm and pink without rash   Neurologic: Alert and oriented x 3.  CN's III-XII within normal limits.  Voice normal.    Psychiatric: The patient's affect was calm, cooperative, and appropriate.     Communication:  Normal; communicates verbally, normal voice quality.   Respiratory: Breathing comfortably without stridor or exertion of accessory muscles.    Head/Face:  Normocephalic and atraumatic.  No lesions or scars. No sinus tenderness.    Salivary glands -  Normal size, no tenderness, swelling, or palpable masses   Eyes: Pupils were equal and reactive.  Extraocular movement intact.         Nose: Sinuses were non-tender.  Anterior rhinoscopy revealed midline septum and absence of purulence or polyps.     Oral Cavity: Normal tongue, floor of mouth, buccal mucosa, and  palate.  No lesions or masses on inspection or palpation.     Oropharynx: Normal mucosa, palate symmetric with normal elevation. No abnormal lymph tissue in the oropharynx.             Neck: Supple with normal laryngeal and tracheal landmarks.  The parotid beds were without masses.  No palpable thyroid.  Normal range of motion   Lymphatic: There is no palpable lymphadenopathy in the neck.          Nasal endoscopy consent for nasal endoscopy was obtained.  I confirmed correctness of the procedure and identity of the patient.  Nasal endoscopy was indicated due to left sinonasal mass.  The nose was topically decongested and anesthetized.  The nasal endoscope was passed under endoscopic vision.  On the right side the inferior middle turbinate are within normal limits.  The septum is in the midline.  On the left side septum is in the midline inferior turbinate is normal middle turbinate appears to be normal.  I do see a mass emanating from the middle meatus.         IMPRESSION AND PLAN: 63-year-old male with left-sided sinonasal mass biopsy shows inverted papilloma with high-grade dysplasia no evidence of invasive carcinoma.  I am concerned that the images do not correspond to the findings on the pathology.  The MRI and CT shows quite a bit of bony destruction.  I am going to take the patient to the operating room to do a large biopsy in order to rule out malignancy.  I did explain this to the patient as well as his son over the phone and they agreed with the plan.    Thank you very much for the opportunity to participate in the care of your patient.      Damon Regan MD, M.D.  Otolaryngology- Head & Neck Surgery  [phone]           Again, thank you for allowing me to participate in the care of your patient.      Sincerely,    Damon Regan MD

## 2023-10-12 NOTE — NURSING NOTE
"Chief Complaint   Patient presents with    Consult     Nasal obstruction     Blood pressure 126/75, pulse 72, temperature 99.7  F (37.6  C), height 1.626 m (5' 4\"), weight 69.4 kg (153 lb), SpO2 96%.  Jovanny Phipps LPN    "

## 2023-10-13 ENCOUNTER — PREP FOR PROCEDURE (OUTPATIENT)
Dept: OTOLARYNGOLOGY | Facility: CLINIC | Age: 63
End: 2023-10-13
Payer: COMMERCIAL

## 2023-10-13 DIAGNOSIS — C30.0 CARCINOMA OF NASAL CAVITY (H): Primary | ICD-10-CM

## 2023-10-13 RX ORDER — DEXAMETHASONE SODIUM PHOSPHATE 4 MG/ML
10 INJECTION, SOLUTION INTRA-ARTICULAR; INTRALESIONAL; INTRAMUSCULAR; INTRAVENOUS; SOFT TISSUE ONCE
Status: CANCELLED | OUTPATIENT
Start: 2023-10-13 | End: 2023-10-13

## 2023-10-20 ENCOUNTER — TELEPHONE (OUTPATIENT)
Dept: OTOLARYNGOLOGY | Facility: CLINIC | Age: 63
End: 2023-10-20
Payer: COMMERCIAL

## 2023-10-24 NOTE — TELEPHONE ENCOUNTER
Patient is scheduled for surgery with Dr. Iglesias.     Spoke with: Patient    Date of Surgery: 11/10/23    Location: [facility] OR     Informed patient they will need an adult : Yes, patient made aware they are able to drive themselves to appointment but following appointment another  is required to be present.     Pre op with Provider: PIETRO     H&P: Patient will schedule pre op at [facility] on [address]. Informed patient pre op will need to be done within 30 days of surgery date.     Additional imaging/appointments: Per scheduling instructions, patient is not required to schedule post op. Patient will be called with biopsy results.     Surgery packet: Will mail packet, confirmed with patient address in chart works best. Patient made aware arrival time for surgery will not be listed within packet.     Additional comments: Writer informed patient a pre op nurse will call a few days prior to surgery to go over further details/give arrival time.         Patti Tafoya on 10/24/2023 at 11:39 AM

## 2023-11-09 ENCOUNTER — ANESTHESIA EVENT (OUTPATIENT)
Dept: SURGERY | Facility: CLINIC | Age: 63
End: 2023-11-09
Payer: COMMERCIAL

## 2023-11-09 ASSESSMENT — ENCOUNTER SYMPTOMS: DYSRHYTHMIAS: 1

## 2023-11-09 NOTE — ANESTHESIA PREPROCEDURE EVALUATION
Anesthesia Pre-Procedure Evaluation    Patient: Douglas Herrera   MRN: [id number] : 1960        Procedure : Procedure(s):  Transnasal endoscopic approach to biopsy left nasal mass          Past Medical History:   Diagnosis Date    Ascending aortic aneurysm (H24)     Coronary artery disease     Depression     Gout     History of anesthesia complications     Hyperlipemia     Hypertension     PONV (postoperative nausea and vomiting)       Past Surgical History:   Procedure Laterality Date    AORTA SURGERY N/A 2019    Procedure: WITH ASCENDING AORTA REPLACEMENT;  Surgeon: Darlene Graff MD;  Location: [facility] OR;  Service: Cardiovascular    BYPASS GRAFT ARTERY CORONARY      CARDIAC SURGERY      CV CORONARY ANGIOGRAM N/A 3/12/2019    Procedure: Coronary Angiogram;  Surgeon: Hamilton Lucero MD;  Location: [facility] Cath Lab;  Service: Cardiology    GALLBLADDER SURGERY      IR FACIAL EMBOLIZATION LEFT  2023      No Known Allergies   Social History     Tobacco Use    Smoking status: Former     Types: Cigarettes    Smokeless tobacco: Never    Tobacco comments:     every 3-4 months, rare   Substance Use Topics    Alcohol use: Not Currently      Wt Readings from Last 1 Encounters:   10/12/23 69.4 kg (153 lb)        Anesthesia Evaluation   Pt has had prior anesthetic.     History of anesthetic complications  - PONV.      ROS/MED HX  ENT/Pulmonary:     (+)     VIVEK risk factors,  hypertension,                                Neurologic:       Cardiovascular: Comment: Ascending aortic aneurysm     (+)  hypertension- -  CAD angina-  CABG-date: . -                        dysrhythmias, 1st Deg Heart Block,             METS/Exercise Tolerance:     Hematologic:       Musculoskeletal: Comment: Idiopathic gout      GI/Hepatic:     (+)           hepatitis type B,        Renal/Genitourinary:       Endo:  - neg endo ROS     Psychiatric/Substance Use:     (+) psychiatric history depression      "  Infectious Disease:  - neg infectious disease ROS     Malignancy:  - neg malignancy ROS     Other:            Physical Exam    Airway        Mallampati: II   TM distance: > 3 FB   Neck ROM: full   Mouth opening: > 3 cm    Respiratory Devices and Support         Dental       (+) Minor Abnormalities - some fillings, tiny chips      Cardiovascular   cardiovascular exam normal          Pulmonary   pulmonary exam normal                OUTSIDE LABS:  CBC:   Lab Results   Component Value Date    WBC 10.2 09/14/2023    WBC 10.5 09/13/2023    HGB 14.3 09/14/2023    HGB 13.7 09/13/2023    HCT 42.2 09/14/2023    HCT 39.7 (L) 09/13/2023     09/14/2023     09/13/2023     BMP:   Lab Results   Component Value Date     09/21/2023     (L) 09/14/2023    POTASSIUM 4.1 09/21/2023    POTASSIUM 4.2 09/14/2023    CHLORIDE 104 09/21/2023    CHLORIDE 98 09/14/2023    CO2 24 09/21/2023    CO2 27 09/14/2023    BUN 15.8 09/21/2023    BUN 15.7 09/14/2023    CR 1.10 09/21/2023    CR 0.86 09/14/2023    GLC 91 09/21/2023     (H) 09/14/2023     COAGS:   Lab Results   Component Value Date    PTT 29 09/12/2023    INR 1.01 09/12/2023    FIBR 157 (L) 04/23/2019     POC: No results found for: \"BGM\", \"HCG\", \"HCGS\"  HEPATIC:   Lab Results   Component Value Date    ALBUMIN 3.8 05/13/2022    PROTTOTAL 6.9 05/13/2022    ALT 29 05/13/2022    AST 26 05/13/2022    ALKPHOS 44 (L) 05/13/2022    BILITOTAL 1.1 (H) 05/13/2022     OTHER:   Lab Results   Component Value Date    PH 7.38 04/24/2019    A1C 5.2 04/25/2019    FLORENCE 9.4 09/21/2023    MAG 1.8 05/08/2019       Anesthesia Plan    ASA Status:  3    NPO Status:  Will be NPO Appropriate at ... 11/10/2023 5:00 PM   Anesthesia Type: General.     - Airway: ETT   Induction: Intravenous, Propofol.   Maintenance: Balanced.   Techniques and Equipment:     - Lines/Monitors: 2nd IV, BIS     Consents    Anesthesia Plan(s) and associated risks, benefits, and realistic alternatives discussed. " Questions answered and patient/representative(s) expressed understanding.     - Discussed:     - Discussed with:  Patient      - Extended Intubation/Ventilatory Support Discussed: No.      - Patient is DNR/DNI Status: No     Use of blood products discussed: No .     Postoperative Care    Pain management: IV analgesics, Oral pain medications.        Comments:    Other Comments: Patient speaks no english. Interview was conducted via an  of Pashto.            Rosita Davidson MD

## 2023-11-10 ENCOUNTER — HOSPITAL ENCOUNTER (OUTPATIENT)
Facility: CLINIC | Age: 63
Discharge: HOME | End: 2023-11-11
Attending: OTOLARYNGOLOGY | Admitting: OTOLARYNGOLOGY
Payer: COMMERCIAL

## 2023-11-10 ENCOUNTER — ANESTHESIA (OUTPATIENT)
Dept: SURGERY | Facility: CLINIC | Age: 63
End: 2023-11-10
Payer: COMMERCIAL

## 2023-11-10 ENCOUNTER — APPOINTMENT (OUTPATIENT)
Dept: INTERPRETER SERVICES | Facility: CLINIC | Age: 63
End: 2023-11-10
Attending: OTOLARYNGOLOGY
Payer: COMMERCIAL

## 2023-11-10 DIAGNOSIS — J34.89 MASS OF SINUS: Primary | ICD-10-CM

## 2023-11-10 PROCEDURE — 710N000010 HC RECOVERY PHASE 1, LEVEL 2, PER MIN: Performed by: OTOLARYNGOLOGY

## 2023-11-10 PROCEDURE — 272N000001 HC OR GENERAL SUPPLY STERILE: Performed by: OTOLARYNGOLOGY

## 2023-11-10 PROCEDURE — 258N000003 HC RX IP 258 OP 636: Performed by: STUDENT IN AN ORGANIZED HEALTH CARE EDUCATION/TRAINING PROGRAM

## 2023-11-10 PROCEDURE — 250N000009 HC RX 250: Performed by: STUDENT IN AN ORGANIZED HEALTH CARE EDUCATION/TRAINING PROGRAM

## 2023-11-10 PROCEDURE — 31237 NSL/SINS NDSC SURG BX POLYPC: CPT | Mod: LT | Performed by: OTOLARYNGOLOGY

## 2023-11-10 PROCEDURE — 88305 TISSUE EXAM BY PATHOLOGIST: CPT | Mod: TC | Performed by: OTOLARYNGOLOGY

## 2023-11-10 PROCEDURE — 250N000025 HC SEVOFLURANE, PER MIN: Performed by: OTOLARYNGOLOGY

## 2023-11-10 PROCEDURE — 999N000141 HC STATISTIC PRE-PROCEDURE NURSING ASSESSMENT: Performed by: OTOLARYNGOLOGY

## 2023-11-10 PROCEDURE — 250N000013 HC RX MED GY IP 250 OP 250 PS 637: Performed by: STUDENT IN AN ORGANIZED HEALTH CARE EDUCATION/TRAINING PROGRAM

## 2023-11-10 PROCEDURE — 250N000009 HC RX 250: Performed by: OTOLARYNGOLOGY

## 2023-11-10 PROCEDURE — 250N000011 HC RX IP 250 OP 636: Performed by: STUDENT IN AN ORGANIZED HEALTH CARE EDUCATION/TRAINING PROGRAM

## 2023-11-10 PROCEDURE — 360N000076 HC SURGERY LEVEL 3, PER MIN: Performed by: OTOLARYNGOLOGY

## 2023-11-10 PROCEDURE — 88305 TISSUE EXAM BY PATHOLOGIST: CPT | Mod: 26 | Performed by: PATHOLOGY

## 2023-11-10 PROCEDURE — 370N000017 HC ANESTHESIA TECHNICAL FEE, PER MIN: Performed by: OTOLARYNGOLOGY

## 2023-11-10 PROCEDURE — 258N000003 HC RX IP 258 OP 636: Performed by: ANESTHESIOLOGY

## 2023-11-10 RX ORDER — FENTANYL CITRATE 50 UG/ML
INJECTION, SOLUTION INTRAMUSCULAR; INTRAVENOUS PRN
Status: DISCONTINUED | OUTPATIENT
Start: 2023-11-10 | End: 2023-11-10

## 2023-11-10 RX ORDER — OXYCODONE HYDROCHLORIDE 10 MG/1
10 TABLET ORAL
Status: CANCELLED | OUTPATIENT
Start: 2023-11-10

## 2023-11-10 RX ORDER — ONDANSETRON 4 MG/1
4 TABLET, ORALLY DISINTEGRATING ORAL EVERY 30 MIN PRN
Status: CANCELLED | OUTPATIENT
Start: 2023-11-10

## 2023-11-10 RX ORDER — OXYMETAZOLINE HYDROCHLORIDE 0.05 G/100ML
2 SPRAY NASAL 2 TIMES DAILY PRN
Qty: 15 ML | Refills: 0 | Status: SHIPPED | OUTPATIENT
Start: 2023-11-10 | End: 2024-01-08

## 2023-11-10 RX ORDER — SODIUM CHLORIDE, SODIUM LACTATE, POTASSIUM CHLORIDE, CALCIUM CHLORIDE 600; 310; 30; 20 MG/100ML; MG/100ML; MG/100ML; MG/100ML
INJECTION, SOLUTION INTRAVENOUS CONTINUOUS PRN
Status: DISCONTINUED | OUTPATIENT
Start: 2023-11-10 | End: 2023-11-10

## 2023-11-10 RX ORDER — DEXAMETHASONE SODIUM PHOSPHATE 4 MG/ML
INJECTION, SOLUTION INTRA-ARTICULAR; INTRALESIONAL; INTRAMUSCULAR; INTRAVENOUS; SOFT TISSUE PRN
Status: DISCONTINUED | OUTPATIENT
Start: 2023-11-10 | End: 2023-11-10

## 2023-11-10 RX ORDER — SODIUM CHLORIDE, SODIUM LACTATE, POTASSIUM CHLORIDE, CALCIUM CHLORIDE 600; 310; 30; 20 MG/100ML; MG/100ML; MG/100ML; MG/100ML
INJECTION, SOLUTION INTRAVENOUS CONTINUOUS
Status: DISCONTINUED | OUTPATIENT
Start: 2023-11-10 | End: 2023-11-10 | Stop reason: HOSPADM

## 2023-11-10 RX ORDER — PROPOFOL 10 MG/ML
INJECTION, EMULSION INTRAVENOUS PRN
Status: DISCONTINUED | OUTPATIENT
Start: 2023-11-10 | End: 2023-11-10

## 2023-11-10 RX ORDER — ONDANSETRON 2 MG/ML
4 INJECTION INTRAMUSCULAR; INTRAVENOUS EVERY 30 MIN PRN
Status: DISCONTINUED | OUTPATIENT
Start: 2023-11-10 | End: 2023-11-10 | Stop reason: HOSPADM

## 2023-11-10 RX ORDER — FENTANYL CITRATE 50 UG/ML
50 INJECTION, SOLUTION INTRAMUSCULAR; INTRAVENOUS EVERY 5 MIN PRN
Status: DISCONTINUED | OUTPATIENT
Start: 2023-11-10 | End: 2023-11-10 | Stop reason: HOSPADM

## 2023-11-10 RX ORDER — OXYCODONE HYDROCHLORIDE 5 MG/1
5 TABLET ORAL EVERY 6 HOURS PRN
Qty: 6 TABLET | Refills: 0 | Status: SHIPPED | OUTPATIENT
Start: 2023-11-10 | End: 2024-05-30

## 2023-11-10 RX ORDER — ONDANSETRON 2 MG/ML
4 INJECTION INTRAMUSCULAR; INTRAVENOUS EVERY 30 MIN PRN
Status: CANCELLED | OUTPATIENT
Start: 2023-11-10

## 2023-11-10 RX ORDER — GABAPENTIN 300 MG/1
300 CAPSULE ORAL AT BEDTIME
Status: DISCONTINUED | OUTPATIENT
Start: 2023-11-10 | End: 2023-11-11 | Stop reason: HOSPADM

## 2023-11-10 RX ORDER — LIDOCAINE 40 MG/G
CREAM TOPICAL
Status: DISCONTINUED | OUTPATIENT
Start: 2023-11-10 | End: 2023-11-10 | Stop reason: HOSPADM

## 2023-11-10 RX ORDER — DEXAMETHASONE SODIUM PHOSPHATE 10 MG/ML
10 INJECTION, SOLUTION INTRAMUSCULAR; INTRAVENOUS ONCE
Status: DISCONTINUED | OUTPATIENT
Start: 2023-11-10 | End: 2023-11-10 | Stop reason: HOSPADM

## 2023-11-10 RX ORDER — HYDROMORPHONE HCL IN WATER/PF 6 MG/30 ML
0.2 PATIENT CONTROLLED ANALGESIA SYRINGE INTRAVENOUS EVERY 5 MIN PRN
Status: DISCONTINUED | OUTPATIENT
Start: 2023-11-10 | End: 2023-11-10 | Stop reason: HOSPADM

## 2023-11-10 RX ORDER — ONDANSETRON 4 MG/1
4 TABLET, ORALLY DISINTEGRATING ORAL EVERY 30 MIN PRN
Status: DISCONTINUED | OUTPATIENT
Start: 2023-11-10 | End: 2023-11-10 | Stop reason: HOSPADM

## 2023-11-10 RX ORDER — OXYMETAZOLINE HYDROCHLORIDE 0.05 G/100ML
2 SPRAY NASAL 3 TIMES DAILY PRN
Status: DISCONTINUED | OUTPATIENT
Start: 2023-11-10 | End: 2023-11-10 | Stop reason: HOSPADM

## 2023-11-10 RX ORDER — AMOXICILLIN 500 MG/1
500 CAPSULE ORAL EVERY 12 HOURS SCHEDULED
Status: DISCONTINUED | OUTPATIENT
Start: 2023-11-10 | End: 2023-11-10 | Stop reason: HOSPADM

## 2023-11-10 RX ORDER — FENTANYL CITRATE 50 UG/ML
25 INJECTION, SOLUTION INTRAMUSCULAR; INTRAVENOUS EVERY 5 MIN PRN
Status: DISCONTINUED | OUTPATIENT
Start: 2023-11-10 | End: 2023-11-10 | Stop reason: HOSPADM

## 2023-11-10 RX ORDER — OXYCODONE HYDROCHLORIDE 5 MG/1
5 TABLET ORAL
Status: CANCELLED | OUTPATIENT
Start: 2023-11-10

## 2023-11-10 RX ORDER — ACETAMINOPHEN 325 MG/1
975 TABLET ORAL EVERY 8 HOURS
Status: DISCONTINUED | OUTPATIENT
Start: 2023-11-10 | End: 2023-11-11 | Stop reason: HOSPADM

## 2023-11-10 RX ORDER — HYDROMORPHONE HCL IN WATER/PF 6 MG/30 ML
0.4 PATIENT CONTROLLED ANALGESIA SYRINGE INTRAVENOUS EVERY 5 MIN PRN
Status: DISCONTINUED | OUTPATIENT
Start: 2023-11-10 | End: 2023-11-10 | Stop reason: HOSPADM

## 2023-11-10 RX ORDER — ONDANSETRON 2 MG/ML
INJECTION INTRAMUSCULAR; INTRAVENOUS PRN
Status: DISCONTINUED | OUTPATIENT
Start: 2023-11-10 | End: 2023-11-10

## 2023-11-10 RX ORDER — ECHINACEA PURPUREA EXTRACT 125 MG
TABLET ORAL
Qty: 60 ML | Refills: 1 | Status: SHIPPED | OUTPATIENT
Start: 2023-11-10 | End: 2024-02-23

## 2023-11-10 RX ORDER — OXYMETAZOLINE HYDROCHLORIDE 0.05 G/100ML
SPRAY NASAL PRN
Status: DISCONTINUED | OUTPATIENT
Start: 2023-11-10 | End: 2023-11-10 | Stop reason: HOSPADM

## 2023-11-10 RX ADMIN — DEXAMETHASONE SODIUM PHOSPHATE 8 MG: 4 INJECTION, SOLUTION INTRA-ARTICULAR; INTRALESIONAL; INTRAMUSCULAR; INTRAVENOUS; SOFT TISSUE at 18:26

## 2023-11-10 RX ADMIN — Medication 50 MG: at 17:39

## 2023-11-10 RX ADMIN — ONDANSETRON 4 MG: 2 INJECTION INTRAMUSCULAR; INTRAVENOUS at 18:34

## 2023-11-10 RX ADMIN — OXYMETAZOLINE HYDROCHLORIDE 2 SPRAY: 0.05 SPRAY NASAL at 20:45

## 2023-11-10 RX ADMIN — Medication 10 MG: at 18:07

## 2023-11-10 RX ADMIN — SODIUM CHLORIDE, POTASSIUM CHLORIDE, SODIUM LACTATE AND CALCIUM CHLORIDE: 600; 310; 30; 20 INJECTION, SOLUTION INTRAVENOUS at 17:20

## 2023-11-10 RX ADMIN — SUGAMMADEX 200 MG: 100 INJECTION, SOLUTION INTRAVENOUS at 18:45

## 2023-11-10 RX ADMIN — ACETAMINOPHEN 975 MG: 325 TABLET, FILM COATED ORAL at 20:58

## 2023-11-10 RX ADMIN — FENTANYL CITRATE 50 MCG: 50 INJECTION INTRAMUSCULAR; INTRAVENOUS at 18:08

## 2023-11-10 RX ADMIN — PROPOFOL 70 MG: 10 INJECTION, EMULSION INTRAVENOUS at 18:08

## 2023-11-10 RX ADMIN — FENTANYL CITRATE 50 MCG: 50 INJECTION INTRAMUSCULAR; INTRAVENOUS at 17:58

## 2023-11-10 RX ADMIN — PHENYLEPHRINE HYDROCHLORIDE 100 MCG: 10 INJECTION INTRAVENOUS at 18:14

## 2023-11-10 RX ADMIN — GABAPENTIN 300 MG: 300 CAPSULE ORAL at 22:11

## 2023-11-10 RX ADMIN — FENTANYL CITRATE 25 MCG: 50 INJECTION INTRAMUSCULAR; INTRAVENOUS at 18:29

## 2023-11-10 RX ADMIN — PROPOFOL 100 MG: 10 INJECTION, EMULSION INTRAVENOUS at 17:39

## 2023-11-10 RX ADMIN — AMOXICILLIN 500 MG: 500 CAPSULE ORAL at 20:45

## 2023-11-10 RX ADMIN — SUGAMMADEX 200 MG: 100 INJECTION, SOLUTION INTRAVENOUS at 18:36

## 2023-11-10 RX ADMIN — SODIUM CHLORIDE, POTASSIUM CHLORIDE, SODIUM LACTATE AND CALCIUM CHLORIDE: 600; 310; 30; 20 INJECTION, SOLUTION INTRAVENOUS at 20:00

## 2023-11-10 RX ADMIN — SODIUM CHLORIDE, POTASSIUM CHLORIDE, SODIUM LACTATE AND CALCIUM CHLORIDE: 600; 310; 30; 20 INJECTION, SOLUTION INTRAVENOUS at 18:30

## 2023-11-10 RX ADMIN — Medication 20 MG: at 17:58

## 2023-11-10 ASSESSMENT — ACTIVITIES OF DAILY LIVING (ADL)
ADLS_ACUITY_SCORE: 35
ADLS_ACUITY_SCORE: 35
ADLS_ACUITY_SCORE: 22
ADLS_ACUITY_SCORE: 35
ADLS_ACUITY_SCORE: 35

## 2023-11-10 ASSESSMENT — COLUMBIA-SUICIDE SEVERITY RATING SCALE - C-SSRS
6. HAVE YOU EVER DONE ANYTHING, STARTED TO DO ANYTHING, OR PREPARED TO DO ANYTHING TO END YOUR LIFE?: NO
2. HAVE YOU ACTUALLY HAD ANY THOUGHTS OF KILLING YOURSELF IN THE PAST MONTH?: NO
1. IN THE PAST MONTH, HAVE YOU WISHED YOU WERE DEAD OR WISHED YOU COULD GO TO SLEEP AND NOT WAKE UP?: NO

## 2023-11-11 VITALS
DIASTOLIC BLOOD PRESSURE: 70 MMHG | HEIGHT: 64 IN | OXYGEN SATURATION: 98 % | TEMPERATURE: 98.1 F | RESPIRATION RATE: 16 BRPM | SYSTOLIC BLOOD PRESSURE: 119 MMHG | BODY MASS INDEX: 24.55 KG/M2 | HEART RATE: 67 BPM | WEIGHT: 143.8 LBS

## 2023-11-11 LAB
ERYTHROCYTE [DISTWIDTH] IN BLOOD BY AUTOMATED COUNT: 12.6 % (ref 10–15)
HCT VFR BLD AUTO: 39.1 % (ref 40–53)
HGB BLD-MCNC: 13.1 G/DL (ref 13.3–17.7)
MCH RBC QN AUTO: 29 PG (ref 26.5–33)
MCHC RBC AUTO-ENTMCNC: 33.5 G/DL (ref 31.5–36.5)
MCV RBC AUTO: 87 FL (ref 78–100)
PLATELET # BLD AUTO: 234 10E3/UL (ref 150–450)
RBC # BLD AUTO: 4.52 10E6/UL (ref 4.4–5.9)
WBC # BLD AUTO: 6.5 10E3/UL (ref 4–11)

## 2023-11-11 PROCEDURE — 85027 COMPLETE CBC AUTOMATED: CPT | Performed by: OTOLARYNGOLOGY

## 2023-11-11 PROCEDURE — 250N000013 HC RX MED GY IP 250 OP 250 PS 637: Performed by: STUDENT IN AN ORGANIZED HEALTH CARE EDUCATION/TRAINING PROGRAM

## 2023-11-11 PROCEDURE — 36415 COLL VENOUS BLD VENIPUNCTURE: CPT | Performed by: OTOLARYNGOLOGY

## 2023-11-11 RX ORDER — LIDOCAINE 40 MG/G
CREAM TOPICAL
Status: DISCONTINUED | OUTPATIENT
Start: 2023-11-11 | End: 2023-11-11 | Stop reason: HOSPADM

## 2023-11-11 RX ORDER — NALOXONE HYDROCHLORIDE 0.4 MG/ML
0.4 INJECTION, SOLUTION INTRAMUSCULAR; INTRAVENOUS; SUBCUTANEOUS
Status: DISCONTINUED | OUTPATIENT
Start: 2023-11-11 | End: 2023-11-11 | Stop reason: HOSPADM

## 2023-11-11 RX ORDER — BISACODYL 10 MG
10 SUPPOSITORY, RECTAL RECTAL DAILY PRN
Status: DISCONTINUED | OUTPATIENT
Start: 2023-11-11 | End: 2023-11-11 | Stop reason: HOSPADM

## 2023-11-11 RX ORDER — ACETAMINOPHEN 325 MG/1
650 TABLET ORAL EVERY 4 HOURS PRN
Status: DISCONTINUED | OUTPATIENT
Start: 2023-11-13 | End: 2023-11-11 | Stop reason: HOSPADM

## 2023-11-11 RX ORDER — OXYCODONE HYDROCHLORIDE 5 MG/1
5 TABLET ORAL EVERY 4 HOURS PRN
Status: DISCONTINUED | OUTPATIENT
Start: 2023-11-11 | End: 2023-11-11 | Stop reason: HOSPADM

## 2023-11-11 RX ORDER — ONDANSETRON 4 MG/1
4 TABLET, ORALLY DISINTEGRATING ORAL EVERY 6 HOURS PRN
Status: DISCONTINUED | OUTPATIENT
Start: 2023-11-11 | End: 2023-11-11 | Stop reason: HOSPADM

## 2023-11-11 RX ORDER — POLYETHYLENE GLYCOL 3350 17 G/17G
17 POWDER, FOR SOLUTION ORAL DAILY
Status: DISCONTINUED | OUTPATIENT
Start: 2023-11-11 | End: 2023-11-11 | Stop reason: HOSPADM

## 2023-11-11 RX ORDER — ONDANSETRON 2 MG/ML
4 INJECTION INTRAMUSCULAR; INTRAVENOUS EVERY 6 HOURS PRN
Status: DISCONTINUED | OUTPATIENT
Start: 2023-11-11 | End: 2023-11-11 | Stop reason: HOSPADM

## 2023-11-11 RX ORDER — PROCHLORPERAZINE MALEATE 5 MG
10 TABLET ORAL EVERY 6 HOURS PRN
Status: DISCONTINUED | OUTPATIENT
Start: 2023-11-11 | End: 2023-11-11 | Stop reason: HOSPADM

## 2023-11-11 RX ORDER — NALOXONE HYDROCHLORIDE 0.4 MG/ML
0.2 INJECTION, SOLUTION INTRAMUSCULAR; INTRAVENOUS; SUBCUTANEOUS
Status: DISCONTINUED | OUTPATIENT
Start: 2023-11-11 | End: 2023-11-11 | Stop reason: HOSPADM

## 2023-11-11 RX ORDER — OXYCODONE HYDROCHLORIDE 10 MG/1
10 TABLET ORAL EVERY 4 HOURS PRN
Status: DISCONTINUED | OUTPATIENT
Start: 2023-11-11 | End: 2023-11-11 | Stop reason: HOSPADM

## 2023-11-11 RX ORDER — AMOXICILLIN 250 MG
1 CAPSULE ORAL 2 TIMES DAILY
Status: DISCONTINUED | OUTPATIENT
Start: 2023-11-11 | End: 2023-11-11 | Stop reason: HOSPADM

## 2023-11-11 RX ORDER — AMOXICILLIN 250 MG
1 CAPSULE ORAL 2 TIMES DAILY
Qty: 10 TABLET | Refills: 0 | Status: SHIPPED | OUTPATIENT
Start: 2023-11-11 | End: 2024-01-08

## 2023-11-11 RX ADMIN — ACETAMINOPHEN 975 MG: 325 TABLET, FILM COATED ORAL at 05:06

## 2023-11-11 RX ADMIN — SENNOSIDES AND DOCUSATE SODIUM 1 TABLET: 8.6; 5 TABLET ORAL at 09:15

## 2023-11-11 RX ADMIN — POLYETHYLENE GLYCOL 3350 17 G: 17 POWDER, FOR SOLUTION ORAL at 09:15

## 2023-11-11 ASSESSMENT — ACTIVITIES OF DAILY LIVING (ADL)
ADLS_ACUITY_SCORE: 26

## 2023-11-11 NOTE — PROGRESS NOTES
Observation Goals:     Minimal nasal bleeding overnight: Met. Pt able to change dressing independently

## 2023-11-11 NOTE — PROGRESS NOTES
"Otolaryngology Progress Note    S: No acute events overnight. Pain well-controlled. No nausea/vomiting/diarrhea. No acute questions or concerns. Minimal bleeding from the left nostril.     O: /65 (BP Location: Right arm)   Pulse 65   Temp 98.1  F (36.7  C) (Oral)   Resp 16   Ht 1.626 m (5' 4\")   Wt 65.2 kg (143 lb 12.8 oz)   SpO2 97%   BMI 24.68 kg/m    General: Alert and oriented x 3, No acute distress  HEENT: EOMI. No edema or facial swelling. Minimal bloody drainage from the left nostril. No present active bleeding.   Pulmonary: Breathing non-labored, no stridor, no accessory muscle use.      Intake/Output Summary (Last 24 hours) at 11/11/2023 0851  Last data filed at 11/11/2023 0751  Gross per 24 hour   Intake 1540 ml   Output 1200 ml   Net 340 ml       CBC pending    A/P: Douglas Herrera is a 63 w/ HTN, CAD, MDD, PE, gout, Hep B with sinonasal mass s/p bx 11/10. Extremely vascular, admitted overnight for monitoring.     NEURO  Tylenol, oxy prn    HEENT  Nasal saline prn, afrin prn    C/V  Can resume PTA atorva and amlodipine at home  Resume PTA metop    PULM  IS    FEN/GI  Regular diet, zofran, bowel reg prn    ENDO  Can resume PTA allopurinol at home    HEME/ID  CBC POD1  Can resume PTA entecavir at home    Ppx  SCDs    CONSULTS  None    DISPO  Likely home today    -- Patient and above plan discussed with Dr. Kelsie Rodriguez MD  Otolaryngology-Head & Neck Surgery PGY1  Please contact ENT with questions by dialing [phone] and entering job code [phone] when prompted.   "

## 2023-11-11 NOTE — OR NURSING
OR Pharmacy called by this author to request HS medications: Afrin spray, OCEAN spray, AMOX, metoprolol.    Kevin Hunt RN on 11/10/2023 at 8:03 PM

## 2023-11-11 NOTE — BRIEF OP NOTE
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  Complications: None.  Implants: * No implants in log *    ASSESSMENT  Mr. Herrera is a 63 w/ HTN, CAD, MDD, PE, gout, Hep B with sinonasal mass s/p bx 11/10. Extremely vascular, admitted overnight for monitoring.    PLAN  NEURO  Tylenol, oxy prn  HEENT  Nasal saline prn, afrin prn  C/V  Can resume PTA atorva and amlodipine at home  Resume PTA metop  PULM  IS  FEN/GI  Regular diet, zofran, bowel reg prn  ENDO  Can resume PTA allopurinol at home  HEME/ID  CBC POD1  Can resume PTA entecavir at home  Ppx  SCDs  CONSULTS  None  DISPO  Home in AM if no bleeding    Kevin Jack MD

## 2023-11-11 NOTE — PLAN OF CARE
"/58 (BP Location: Right arm)   Pulse 69   Temp 98.5  F (36.9  C) (Oral)   Resp 20   Ht 1.626 m (5' 4\")   Wt 66.4 kg (146 lb 6.2 oz)   SpO2 95%   BMI 25.13 kg/m     Time: 8539-2320   Reason for admission: Mass of sinus  Activity: assist of one person.   Pain: denied pain or discomfort.   Neuro: alert and oriented.   Cardiac: WDL  Resp: denied SOB, patient is on 2L oxygen, lung sounds clear bilaterally, L nasal biopsy site, small amount of drainage, 2X2, dressing changed x1.   GI/: regular diet, voids without difficulties, bowel sounds present x four quadrants.    Lines: PIV, saline locked.   Skin: WDL X  Labs/Imaging: no lab or imaging this shift.   New changes to shift: no change.   Plan: possible discharge today.                         "

## 2023-11-11 NOTE — OR NURSING
ENT paged re: pt's son reports new course of AMOX started this morning for dental infection. Pt would prefer to not miss a dose.     Kevin Hunt RN on 11/10/2023 at 7:34 PM

## 2023-11-11 NOTE — PLAN OF CARE
Admitted/transferred from: PACU  2 RN full  skin assessment completed by Javy Pulliam, ЕКАТЕРИНА and Landen RN  Skin assessment finding: Old incision site on his chest, LE dry skin, and Biopsy of L nose with small amount of bloody drain.   Interventions/actions: Monitor   Will continue to monitor.

## 2023-11-11 NOTE — ANESTHESIA POSTPROCEDURE EVALUATION
Patient: Douglas Herrera    Procedure: Procedure(s):  Transnasal endoscopic approach to biopsy left nasal mass       Anesthesia Type:  General    Note:  Disposition: Admission   Postop Pain Control: Uneventful            Sign Out: Well controlled pain   PONV: No   Neuro/Psych: Uneventful            Sign Out: Acceptable/Baseline neuro status   Airway/Respiratory: Uneventful            Sign Out: Acceptable/Baseline resp. status   CV/Hemodynamics: Uneventful            Sign Out: Acceptable CV status; No obvious hypovolemia; No obvious fluid overload   Other NRE: NONE   DID A NON-ROUTINE EVENT OCCUR? No           Last vitals:  Vitals Value Taken Time   /69 11/10/23 2000   Temp 36.7  C (98  F) 11/10/23 1945   Pulse 72 11/10/23 2045   Resp 23 11/10/23 1945   SpO2 96 % 11/10/23 2045   Vitals shown include unfiled device data.    Electronically Signed By: Marvin Love MD  November 10, 2023  8:47 PM

## 2023-11-11 NOTE — ANESTHESIA CARE TRANSFER NOTE
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    Anesthesia Type:   General     Note:    Oropharynx: oropharynx clear of all foreign objects and spontaneously breathing  Level of Consciousness: drowsy  Oxygen Supplementation: face mask  Level of Supplemental Oxygen (L/min / FiO2): 6  Independent Airway: airway patency satisfactory and stable  Dentition: dentition unchanged  Vital Signs Stable: post-procedure vital signs reviewed and stable  Report to RN Given: handoff report given  Patient transferred to: PACU    Handoff Report: Identifed the Patient, Identified the Reponsible Provider, Reviewed the pertinent medical history, Discussed the surgical course, Reviewed Intra-OP anesthesia mangement and issues during anesthesia, Set expectations for post-procedure period and Allowed opportunity for questions and acknowledgement of understanding      Vitals:  Vitals Value Taken Time   /77 11/10/23 1852   Temp 37.0    Pulse 74 11/10/23 1857   Resp 20    SpO2 100 % 11/10/23 1857   Vitals shown include unfiled device data.    Electronically Signed By: SAMIA MARLEY MD  November 10, 2023  6:58 PM   Patient Instructions by ZACARIAS Figueroa at 06/29/17 09:19 AM     Author:  ZACARIAS Figueroa Service:  (none) Author Type:  Nurse Practitioner     Filed:  06/29/17 09:19 AM Encounter Date:  6/29/2017 Status:  Signed     :  ZACARIAS Figueroa (Nurse Practitioner)              PATIENT INFORMATION  Follow-Up  - Return for your yearly well child visit. 89 years old Health and Safety Tips - The following hyperlinks are available to access via [employer] 98 Years Old Parent Education  [employer] 98 Years Old Child Education    [employer] 7 a 8 aÃ±os de edad (EducaciÃ³n para los padres) - [place]  [employer] 7 a 8 aÃ±os de edad (EducaciÃ³n para los niÃ±os) - [place]    Additional Educational Resources: For additional resources regarding your symptoms, diagnosis, or further health information, please visit the Health Resources section on [email or web address] or the Online Health Resources section in [email or web address].       Revision History        User Key Date/Time User Provider Type Action    > [N/A] 06/29/17 09:19 AM Osiris Torres APN Nurse Practitioner Sign

## 2023-11-11 NOTE — PLAN OF CARE
Pt discharge paperwork printed, reviewed, and signed by pt and bedside RN. Paperwork also reviewed with pt's son. Questions answered. Supplies given to pt found wounds cares. Medications available for pick-up at discharge pharmacy. PIV removed. Pt driven home by son, brought downstairs to the lobby.

## 2023-11-11 NOTE — ANESTHESIA PROCEDURE NOTES
Airway       Patient location during procedure: OR       Procedure Start/Stop Times: 11/10/2023 5:39 PM  Staff -        Anesthesiologist:  Marvin Love MD       Resident/Fellow: Shirley Flores MD       Performed By: resident  Consent for Airway        Urgency: elective  Indications and Patient Condition       Indications for airway management: morgan-procedural       Induction type:intravenous       Mask difficulty assessment: 1 - vent by mask    Final Airway Details       Final airway type: endotracheal airway       Successful airway: Oral and ETT - single  Endotracheal Airway Details        ETT size (mm): 7.0       Cuffed: yes       Successful intubation technique: direct laryngoscopy and video laryngoscopy       DL Blade Type: MAC 4       VL Blade Size: Glidescope 4       Grade View of Cords: 1 (G1V with VL, G2b with DL)       Adjucts: stylet       Position: Right       Measured from: lips       Secured at (cm): 23       Bite block used: None    Post intubation assessment        Placement verified by: capnometry, equal breath sounds and chest rise        Number of attempts at approach: 2 (First DL attempt - esophageal switched to VL)       Secured with: silk tape (with mastisol and tegaderm to reinforce)       Ease of procedure: easy       Dentition: Intact and Unchanged    Medication(s) Administered   Medication Administration Time: 11/10/2023 5:39 PM    Additional Comments       DL attempt with G2b view - with esophageal intubation, switched to VL with G1V and successful intubation. Suctioned stomach via OG tube post intubation.

## 2023-11-11 NOTE — DISCHARGE SUMMARY
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 It was recommended that he undergo operative biopsy of the nasal mass and the patient consented to the above procedure after detailed explanation of the risks and benefits of said procedure. The mass was extremely vascular, and he was admitted overnight for monitoring.     Hospital Course: The patient was admitted to the hospital and underwent the above mentioned procedure. He tolerated the procedure without any intra- or morgan-operative complications. Please see the operative report for full details of the procedure. The patient was admitted for post-operative monitoring due to the vascular nature of the mass and post operating monitoring for bleeding.  His postoperative course was uneventful. At discharge, the patient's pain was well controlled, the patient was voiding on his own, and he was ambulating and tolerating a regular diet. There was minimal bloody discharge.     Discharge Exam:  Vitals:    11/10/23 2100 11/10/23 2136 11/11/23 0507 11/11/23 0754   BP: 119/71 111/58 104/70 104/65   BP Location:  Right arm Right arm Right arm   Pulse: 74 69 64 65   Resp: 20 20 16 16   Temp:  98.5  F (36.9  C) 97.8  F (36.6  C) 98.1  F (36.7  C)   TempSrc:  Oral Oral Oral   SpO2: 97% 95% 98% 97%   Weight:    65.2 kg (143 lb 12.8 oz)   Height:           General: Alert and oriented x 3, No acute distress  HEENT: EOMI. No edema or facial swelling. Minimal bloody drainage from the left nostril. No present active bleeding.   Pulmonary: Breathing non-labored, no stridor, no accessory muscle use.    Discharge  Medications:     Medication List        Started      oxyCODONE 5 MG tablet  Commonly known as: ROXICODONE  5 mg, Oral, EVERY 6 HOURS PRN     oxymetazoline 0.05 % nasal spray  Commonly known as: AFRIN  2 sprays, Both Nostrils, 2 TIMES DAILY PRN     senna-docusate 8.6-50 MG tablet  Commonly known as: SENOKOT-S/PERICOLACE  1 tablet, Oral, 2 TIMES DAILY     sodium chloride 0.65 % nasal spray  Commonly known as: OCEAN  2 sprays each nostril four times a day after surgery until followup              Discharge Procedure Orders   Diet Instructions   Order Comments: Resume pre procedure diet     Discharge Instructions   Order Comments: Patient to follow up with surgeon. Clinic will reach out to schedule     No blowing nose     Wound care   Order Comments: If having nosebleed, spray afrin (oxymetazoline).   Apply pressure to nostrils. Repeat as needed for nosebleed     Reason for your hospital stay   Order Comments: Postoperative monitoring for nasal bleeding after biopsy     Activity   Order Comments: Your activity upon discharge: activity as tolerated     Order Specific Question Answer Comments   Is discharge order? Yes      Adult [facility] Follow-up and recommended labs and tests   Order Comments: There is no current follow up scheduled. Once the results are back from the biopsy we will call you with the next steps.     Diet   Order Comments: Follow this diet upon discharge: Orders Placed This Encounter      Regular Diet Adult     Order Specific Question Answer Comments   Is discharge order? Yes        Dispo: To home in good condition. All of the patient's questions/concerns have been addressed at this time.     Jaci Rodriguez MD   Department of Otolaryngology - Head and Neck Surgery, PGY 1  Please page ENT with questions

## 2023-11-13 ENCOUNTER — PREP FOR PROCEDURE (OUTPATIENT)
Dept: OTOLARYNGOLOGY | Facility: CLINIC | Age: 63
End: 2023-11-13
Payer: COMMERCIAL

## 2023-11-13 DIAGNOSIS — J34.89 MASS OF SINUS: Primary | ICD-10-CM

## 2023-11-13 DIAGNOSIS — J34.89 MASS OF NASAL SINUS: Primary | ICD-10-CM

## 2023-11-13 DIAGNOSIS — C30.0 CARCINOMA OF NASAL CAVITY (H): ICD-10-CM

## 2023-11-13 RX ORDER — CEFTRIAXONE 2 G/1
2 INJECTION, POWDER, FOR SOLUTION INTRAMUSCULAR; INTRAVENOUS
Status: CANCELLED | OUTPATIENT
Start: 2023-11-13

## 2023-11-13 RX ORDER — DEXAMETHASONE SODIUM PHOSPHATE 4 MG/ML
10 INJECTION, SOLUTION INTRA-ARTICULAR; INTRALESIONAL; INTRAMUSCULAR; INTRAVENOUS; SOFT TISSUE ONCE
Status: CANCELLED | OUTPATIENT
Start: 2023-11-13 | End: 2023-11-13

## 2023-11-13 NOTE — OP NOTE
Otolaryngology Operative Report    Procedure Date: November 10, 2023      SURGEON: Daomn Regan MD  ASSISTANT:  Jarad Redd MD     PREOPERATIVE DIAGNOSIS:  Left sinonasal mass  POSTOPERATIVE DIAGNOSIS:  Same     PROCEDURE:    1.  Nasal endoscopy and biopsy    FINDINGS:   Highly vascular, polypoid mass filling the left nasal passage, nasopharynx bilaterally    ANESTHESIA:  General.  ESTIMATED BLOOD LOSS:  100 mL  SPECIMEN:  Left sinonasal mass.  COMPLICATIONS:  None     INDICATIONS FOR PROCEDURE: Douglas Herrera is a 63 year old male with a past medical history of a left sinonasal mass which was preiously biopsied and demonstrating inverting papilloma. Given extensive growth on imaging concerning for malignancy, we opted for an intra-operative biopsy for more tissue in a controlled fashion. Thus he was indicated for the above stated procedures. After a full discussion of risks and benefits of the procedure, informed consent was obtained.     OPERATIVE COURSE:  The patient was brought to the operating room and placed on the operating table supine.  General endotracheal anesthesia was induced.  The patient was prepped and draped in standard fashion.  A timeout was performed to identify the patient and correctness of the procedure. A zero degree endoscope was used to view the mass. It appeared to be a polypoid-appearing lesion filling the left nasal passage, present anteriorly almost to the nasal vestibule. It was also present in the right nasopharynx which was visualized transnasally on the right. A pituitary forceps was used to take several specimens. The lesion was highly vascular and friable, bleeding significantly even at the touch of a suction. Hemostasis was eventually achieved using afrin-soaked pledgets, floseal, and gelfoam. The patient was then turned back to anesthesia where he was awoken without complication and brought to the PACU in stable condition. The patient was admitted post-operatively to  monitor for bleeding.       Dr. Regan was present for the entire case    Jarad Redd MD  Otolaryngology - Head and Neck Surgery    I, Damon Regan MD, was present during the entire procedure between opening and closing.

## 2023-11-14 NOTE — PATIENT INSTRUCTIONS
1. Plan for imaging and surgery   2. Please call the ENT clinic with any questions,concerns, new or worsening symptoms.    -Clinic number is [phone]   - [facility] direct line (Dr. Iglesias's nurse) [phone]

## 2023-11-16 ENCOUNTER — OFFICE VISIT (OUTPATIENT)
Dept: OTOLARYNGOLOGY | Facility: CLINIC | Age: 63
End: 2023-11-16
Payer: COMMERCIAL

## 2023-11-16 VITALS
OXYGEN SATURATION: 95 % | HEIGHT: 64 IN | HEART RATE: 77 BPM | WEIGHT: 143 LBS | BODY MASS INDEX: 24.41 KG/M2 | SYSTOLIC BLOOD PRESSURE: 118 MMHG | TEMPERATURE: 96.9 F | DIASTOLIC BLOOD PRESSURE: 74 MMHG

## 2023-11-16 DIAGNOSIS — J34.89 MASS OF NASAL SINUS: Primary | ICD-10-CM

## 2023-11-16 PROCEDURE — 99212 OFFICE O/P EST SF 10 MIN: CPT | Performed by: OTOLARYNGOLOGY

## 2023-11-16 ASSESSMENT — PAIN SCALES - GENERAL: PAINLEVEL: NO PAIN (0)

## 2023-11-16 NOTE — NURSING NOTE
"Chief Complaint   Patient presents with    RECHECK     1 week post op      .Blood pressure 118/74, pulse 77, temperature 96.9  F (36.1  C), height 1.626 m (5' 4\"), weight 64.9 kg (143 lb), SpO2 95%.    Jovanny Phipps LPN    "

## 2023-11-16 NOTE — PROGRESS NOTES
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  -No definite evidence of invasive malignancy identified       History of Present Illness: Mr. Herrera is a 63-year-old gentleman.  The patient is back to the otolaryngology clinic today for his first postoperative visit after sinonasal biopsy in the operating room.  He did have substantial bleeding during the biopsy.  He is telling me that he is doing well.  He did not have any more bleeding after he left the hospital after the surgery.  He denies any new symptoms.    MEDICATIONS:     Current Outpatient Medications   Medication Sig Dispense Refill     acetaminophen (TYLENOL) 500 MG tablet Take 2 tablets (1,000 mg) by mouth 3 times daily       allopurinol (ZYLOPRIM) 300 MG tablet Take 300 mg by mouth daily       amLODIPine (NORVASC) 5 MG tablet Take 5 mg by mouth daily       atorvastatin (LIPITOR) 80 MG tablet TAKE 1 TABLET (80 MG TOTAL) BY MOUTH AT BEDTIME/ TXHUA HMO NOJ 1 LUB TSHUAJ THAUM MUS PW PAB ZOO NTSHAV MUAJ ROJ 90 tablet 3     entecavir (BARACLUDE) 0.5 MG tablet Take 0.5 mg by mouth daily Take 1 hour before a meal or 2 hours after a meal.       gabapentin (NEURONTIN) 300 MG capsule Take 1 capsule (300 mg) by mouth At Bedtime 30 capsule 0     metoprolol succinate ER (TOPROL XL) 25 MG 24 hr tablet Take 12.5 mg by mouth daily       oxymetazoline (AFRIN) 0.05 % nasal spray Spray 2 sprays into both nostrils 2 times daily as needed for congestion 15 mL 0     senna-docusate (SENOKOT-S/PERICOLACE) 8.6-50 MG tablet Take 1 tablet by mouth 2 times daily 10 tablet 0     sennosides (SENOKOT) 8.6 MG tablet Take 2 tablets by mouth 2 times daily as needed for constipation       sodium chloride (OCEAN) 0.65 % nasal spray 2 sprays each nostril four times a day after surgery until followup 60 mL 1     traMADol (ULTRAM) 50 MG tablet Take 1 tablet (50 mg) by mouth every 6 hours as needed for severe pain or  moderate pain 30 tablet 0       ALLERGIES:  No Known Allergies    PAST MEDICAL HISTORY:   Past Medical History:   Diagnosis Date     Ascending aortic aneurysm (H24)      Coronary artery disease      Depression      Gout      History of anesthesia complications      Hyperlipemia      Hypertension      PONV (postoperative nausea and vomiting)         FAMILY HISTORY:    Family History   Problem Relation Age of Onset     Cerebrovascular Disease Mother 90.00     Acute Myocardial Infarction No family hx of        REVIEW OF SYSTEMS:  12 point ROS was negative other than the symptoms noted above in the HPI.        IMPRESSION AND PLAN: Today we have spent the entirety of the visit discussing the plan for Mr. Herrera.  His son was with him today so they both understand the diagnosis as well as the implications of these tumor.  We are planning to obtain an updated MRI and a CT a Stealth.  I already talked to my colleague Dr. Idris Harper from the neurosurgery department for a preoperative embolization.  We are going to schedule him for transnasal endoscopic resection of these left-sided sinonasal mass in the next few weeks.         Damon Regan MD, M.D.  Otolaryngology- Head & Neck Surgery  [phone]

## 2023-11-16 NOTE — LETTER
11/16/2023       RE: Douglas Herrera  [address]  [place] [address]     Dear Colleague,    Thank you for referring your patient, Douglas Herrera, to the [facility] [place] at [facility]. Please see a copy of my visit note below.        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  -No definite evidence of invasive malignancy identified       History of Present Illness: Mr. Herrera is a 63-year-old gentleman.  The patient is back to the otolaryngology clinic today for his first postoperative visit after sinonasal biopsy in the operating room.  He did have substantial bleeding during the biopsy.  He is telling me that he is doing well.  He did not have any more bleeding after he left the hospital after the surgery.  He denies any new symptoms.    MEDICATIONS:     Current Outpatient Medications   Medication Sig Dispense Refill    acetaminophen (TYLENOL) 500 MG tablet Take 2 tablets (1,000 mg) by mouth 3 times daily      allopurinol (ZYLOPRIM) 300 MG tablet Take 300 mg by mouth daily      amLODIPine (NORVASC) 5 MG tablet Take 5 mg by mouth daily      atorvastatin (LIPITOR) 80 MG tablet TAKE 1 TABLET (80 MG TOTAL) BY MOUTH AT BEDTIME/ TXHUA HMO NOJ 1 LUB TSHUAJ THAUM MUS PW PAB ZOO NTSHAV MUAJ ROJ 90 tablet 3    entecavir (BARACLUDE) 0.5 MG tablet Take 0.5 mg by mouth daily Take 1 hour before a meal or 2 hours after a meal.      gabapentin (NEURONTIN) 300 MG capsule Take 1 capsule (300 mg) by mouth At Bedtime 30 capsule 0    metoprolol succinate ER (TOPROL XL) 25 MG 24 hr tablet Take 12.5 mg by mouth daily      oxymetazoline (AFRIN) 0.05 % nasal spray Spray 2 sprays into both nostrils 2 times daily as needed for congestion 15 mL 0    senna-docusate (SENOKOT-S/PERICOLACE) 8.6-50 MG tablet Take 1 tablet by mouth 2 times daily 10 tablet 0    sennosides (SENOKOT) 8.6 MG  tablet Take 2 tablets by mouth 2 times daily as needed for constipation      sodium chloride (OCEAN) 0.65 % nasal spray 2 sprays each nostril four times a day after surgery until followup 60 mL 1    traMADol (ULTRAM) 50 MG tablet Take 1 tablet (50 mg) by mouth every 6 hours as needed for severe pain or moderate pain 30 tablet 0       ALLERGIES:  No Known Allergies    PAST MEDICAL HISTORY:   Past Medical History:   Diagnosis Date    Ascending aortic aneurysm (H24)     Coronary artery disease     Depression     Gout     History of anesthesia complications     Hyperlipemia     Hypertension     PONV (postoperative nausea and vomiting)         FAMILY HISTORY:    Family History   Problem Relation Age of Onset    Cerebrovascular Disease Mother 90.00    Acute Myocardial Infarction No family hx of        REVIEW OF SYSTEMS:  12 point ROS was negative other than the symptoms noted above in the HPI.        IMPRESSION AND PLAN: Today we have spent the entirety of the visit discussing the plan for Mr. Herrera.  His son was with him today so they both understand the diagnosis as well as the implications of these tumor.  We are planning to obtain an updated MRI and a CT a Stealth.  I already talked to my colleague Dr. Idris Harper from the neurosurgery department for a preoperative embolization.  We are going to schedule him for transnasal endoscopic resection of these left-sided sinonasal mass in the next few weeks.         Damon Regan MD, M.D.  Otolaryngology- Head & Neck Surgery  [phone]

## 2023-11-17 ENCOUNTER — HOSPITAL ENCOUNTER (OUTPATIENT)
Dept: CT IMAGING | Facility: CLINIC | Age: 63
Discharge: HOME | End: 2023-11-17
Attending: OTOLARYNGOLOGY
Payer: COMMERCIAL

## 2023-11-17 ENCOUNTER — HOSPITAL ENCOUNTER (OUTPATIENT)
Dept: MRI IMAGING | Facility: CLINIC | Age: 63
Discharge: HOME | End: 2023-11-17
Attending: OTOLARYNGOLOGY
Payer: COMMERCIAL

## 2023-11-17 DIAGNOSIS — C30.0 CARCINOMA OF NASAL CAVITY (H): ICD-10-CM

## 2023-11-17 DIAGNOSIS — J34.89 MASS OF NASAL SINUS: ICD-10-CM

## 2023-11-17 PROCEDURE — 70543 MRI ORBT/FAC/NCK W/O &W/DYE: CPT | Mod: 26 | Performed by: RADIOLOGY

## 2023-11-17 PROCEDURE — 70486 CT MAXILLOFACIAL W/O DYE: CPT | Mod: 26 | Performed by: STUDENT IN AN ORGANIZED HEALTH CARE EDUCATION/TRAINING PROGRAM

## 2023-11-17 PROCEDURE — 255N000002 HC RX 255 OP 636: Mod: JZ | Performed by: OTOLARYNGOLOGY

## 2023-11-17 PROCEDURE — A9585 GADOBUTROL INJECTION: HCPCS | Mod: JZ | Performed by: OTOLARYNGOLOGY

## 2023-11-17 PROCEDURE — 70486 CT MAXILLOFACIAL W/O DYE: CPT

## 2023-11-17 PROCEDURE — 70543 MRI ORBT/FAC/NCK W/O &W/DYE: CPT

## 2023-11-17 RX ORDER — GADOBUTROL 604.72 MG/ML
7.5 INJECTION INTRAVENOUS ONCE
Status: COMPLETED | OUTPATIENT
Start: 2023-11-17 | End: 2023-11-17

## 2023-11-17 RX ADMIN — GADOBUTROL 7.5 ML: 604.72 INJECTION INTRAVENOUS at 12:11

## 2023-11-21 ENCOUNTER — TELEPHONE (OUTPATIENT)
Dept: OTOLARYNGOLOGY | Facility: CLINIC | Age: 63
End: 2023-11-21
Payer: COMMERCIAL

## 2023-11-21 ENCOUNTER — HOSPITAL ENCOUNTER (INPATIENT)
Facility: CLINIC | Age: 63
Setting detail: SURGERY ADMIT
End: 2023-11-21
Attending: OTOLARYNGOLOGY | Admitting: OTOLARYNGOLOGY
Payer: COMMERCIAL

## 2023-11-21 ENCOUNTER — TELEPHONE (OUTPATIENT)
Dept: NEUROSURGERY | Facility: CLINIC | Age: 63
End: 2023-11-21
Payer: COMMERCIAL

## 2023-11-21 ENCOUNTER — PATIENT OUTREACH (OUTPATIENT)
Dept: NEUROLOGY | Facility: CLINIC | Age: 63
End: 2023-11-21
Payer: COMMERCIAL

## 2023-11-21 NOTE — PATIENT INSTRUCTIONS
You are scheduled for embolization with Dr. Harper on 11/30/23. Arrival Time: 9:00 am. Procedure Time: 11:00 am.    Please follow these instructions:    * You will need a pre-op physical within 30 days prior to your procedure. You are scheduled for an in person appointment on 11/28/23 at 8:00 am (7:45 arrival) with the Pre-Operative Assessment Center (PAC), located at [facility] at [address] [place] [address]. The PAC phone number is [phone].    * Check in at the Surgery Admission Unit (3C), on the third floor, at the [facility]. The address is [address] [place] [address]. The phone number is [phone].     * Nothing to eat for 8 hours prior to arrival time. You may drink clear liquids (includes water, Jell-O, clear broth, apple juice or any non-carbonated beverage that you can see through) up until 2 hours prior to arrival time.     * You may take your medications with a sip of water the morning of the procedure.     * You will stay overnight at the hospital for observation after the procedure and are scheduled for surgery with Dr. Iglesias on 12/1/23.     PLEASE NOTE our COVID-19 visitors policy: For the protection of our patients and visitors, patients are allowed two consistent visitors, 5 years of age or older, per adult patient in the hospital.     All discharge instructions will be given to the  or volunteer. Documentation for the post-operative plan will be given to the patient and . Patients are required to have someone to stay with them for 24 hours after their procedure.    If you have questions regarding your procedure, please contact me at [phone], option 1.    If you need to cancel, reschedule or have procedure scheduling related questions, please call Ayleen at [phone].    Thank you,  Mackenzie Kilgore, RN, CNRN, SCRN  Audrey Grewal, RN, BSN  Stroke & Neuroendovascular Care Coordinator

## 2023-11-21 NOTE — TELEPHONE ENCOUNTER
Patient is scheduled for surgery with Dr. Harper & Kelsie     Spoke with: Son    Date of Surgery: 11/30/23    Location: [facility]    Pre op with Provider: PAC    Additional comments: Patient is aware of date and time of the procedure.        Ayleen Peoples MA on 11/21/2023 at 3:04 PM

## 2023-11-21 NOTE — TELEPHONE ENCOUNTER
Scheduled surgery with Dr. Iglesias on 12/1    Spoke with:  Tulio Herrera (Son)    Surgery is located at [facility]    Patient will be seen for their H&P by:    PAC on 11/28 at 8am - Provided address & floor number. Patient is aware that they will receive their start time for surgery at this appointment    Anesthesia type: General      Requested Imaging required for surgery: CT prior to surgery - had imaging done on 11/17    Patient is scheduled for their 2 week post op on 12/14 at 240p    Patient will receive their packet via [employer] per their preference    Patient is aware they need a  to & from surgery    Additional comments: Tulio will call back if they have any questions or concerns.    Tish Guerrero on 11/21/2023 at 12:09 PM

## 2023-11-22 NOTE — PROGRESS NOTES
KALYAN for patient via [employer] .    Spoke with son, Tulio. Informed of procedure instructions. Confirmed date and time. Verbalized understanding. All questions answered. Patient instructions sent via "[employer],".     Contact information provided and encouraged to call with questions/concerns.    Mackenzie Kilgore RN 11/22/2023 10:39 AM

## 2023-11-22 NOTE — TELEPHONE ENCOUNTER
FUTURE VISIT INFORMATION      SURGERY INFORMATION:  Date: 23  Location: [facility]  Surgeon:  Kenia Harper MD   Anesthesia Type:  general  Procedure: ANESTHESIA OUT OF OR CAROTID CEREBRAL ANGIOGRAM BILATERAL PRESURGICAL EMBOLIZATION@1100     RECORDS REQUESTED FROM:       Primary Care Provider:  Mira Leung NP     Pertinent Medical History: ARF, CAD, Ascending aorta dilation, PE    Most recent EKG+ Tracin/10/23    Most recent ECHO: 19    Most recent Coronary Angiogram: 3/12/19

## 2023-11-28 ENCOUNTER — PRE VISIT (OUTPATIENT)
Dept: SURGERY | Facility: CLINIC | Age: 63
End: 2023-11-28
Payer: COMMERCIAL

## 2023-11-28 ENCOUNTER — LAB (OUTPATIENT)
Dept: LAB | Facility: CLINIC | Age: 63
End: 2023-11-28
Payer: COMMERCIAL

## 2023-11-28 ENCOUNTER — OFFICE VISIT (OUTPATIENT)
Dept: SURGERY | Facility: CLINIC | Age: 63
End: 2023-11-28
Payer: COMMERCIAL

## 2023-11-28 ENCOUNTER — ANESTHESIA EVENT (OUTPATIENT)
Dept: SURGERY | Facility: CLINIC | Age: 63
DRG: 135 | End: 2023-11-28
Payer: COMMERCIAL

## 2023-11-28 VITALS
SYSTOLIC BLOOD PRESSURE: 128 MMHG | DIASTOLIC BLOOD PRESSURE: 83 MMHG | WEIGHT: 140.8 LBS | HEART RATE: 77 BPM | OXYGEN SATURATION: 96 % | HEIGHT: 64 IN | RESPIRATION RATE: 16 BRPM | TEMPERATURE: 97.5 F | BODY MASS INDEX: 24.04 KG/M2

## 2023-11-28 DIAGNOSIS — Z01.818 PRE-OP EVALUATION: ICD-10-CM

## 2023-11-28 DIAGNOSIS — Z01.818 PRE-OP EVALUATION: Primary | ICD-10-CM

## 2023-11-28 LAB
ANION GAP SERPL CALCULATED.3IONS-SCNC: 9 MMOL/L (ref 7–15)
BUN SERPL-MCNC: 13.2 MG/DL (ref 8–23)
CALCIUM SERPL-MCNC: 12.8 MG/DL (ref 8.8–10.2)
CHLORIDE SERPL-SCNC: 103 MMOL/L (ref 98–107)
CREAT SERPL-MCNC: 0.92 MG/DL (ref 0.67–1.17)
DEPRECATED HCO3 PLAS-SCNC: 28 MMOL/L (ref 22–29)
EGFRCR SERPLBLD CKD-EPI 2021: >90 ML/MIN/1.73M2
ERYTHROCYTE [DISTWIDTH] IN BLOOD BY AUTOMATED COUNT: 13 % (ref 10–15)
GLUCOSE SERPL-MCNC: 109 MG/DL (ref 70–99)
HCT VFR BLD AUTO: 41.4 % (ref 40–53)
HGB BLD-MCNC: 13.5 G/DL (ref 13.3–17.7)
MCH RBC QN AUTO: 27.7 PG (ref 26.5–33)
MCHC RBC AUTO-ENTMCNC: 32.6 G/DL (ref 31.5–36.5)
MCV RBC AUTO: 85 FL (ref 78–100)
PLATELET # BLD AUTO: 249 10E3/UL (ref 150–450)
POTASSIUM SERPL-SCNC: 4.5 MMOL/L (ref 3.4–5.3)
RBC # BLD AUTO: 4.87 10E6/UL (ref 4.4–5.9)
SODIUM SERPL-SCNC: 140 MMOL/L (ref 135–145)
WBC # BLD AUTO: 8.7 10E3/UL (ref 4–11)

## 2023-11-28 PROCEDURE — 99205 OFFICE O/P NEW HI 60 MIN: CPT | Performed by: NURSE PRACTITIONER

## 2023-11-28 PROCEDURE — 80048 BASIC METABOLIC PNL TOTAL CA: CPT | Performed by: PATHOLOGY

## 2023-11-28 PROCEDURE — 36415 COLL VENOUS BLD VENIPUNCTURE: CPT | Performed by: PATHOLOGY

## 2023-11-28 PROCEDURE — 85027 COMPLETE CBC AUTOMATED: CPT | Performed by: PATHOLOGY

## 2023-11-28 RX ORDER — NITROGLYCERIN 0.4 MG/1
0.4 TABLET SUBLINGUAL EVERY 5 MIN PRN
COMMUNITY

## 2023-11-28 RX ORDER — TRAZODONE HYDROCHLORIDE 50 MG/1
50 TABLET, FILM COATED ORAL
COMMUNITY

## 2023-11-28 ASSESSMENT — ENCOUNTER SYMPTOMS: ORTHOPNEA: 0

## 2023-11-28 ASSESSMENT — LIFESTYLE VARIABLES: TOBACCO_USE: 1

## 2023-11-28 ASSESSMENT — PAIN SCALES - GENERAL: PAINLEVEL: NO PAIN (0)

## 2023-11-28 NOTE — PATIENT INSTRUCTIONS
Preparing for Your Surgery      Name:  Douglas Herrera   MRN:  [id number]   :  1960   Today's Date:  2023       Arriving for surgery:  Surgery date:  23  Arrival time:  9:00 am  Surgery time: 11:00 am    Please come to:     Please come to:      [facility] [address]  [address] [place]  [address]      The [employer] [facility] Patient /Visitor Ramp is located at [address]. Patients and visitors who self-park will receive the reduced hospital parking rate. If the Patient /Visitor Ramp is full, please follow the signs to the  parking located at the main hospital entrance.     parking is available ( 24 hours/ 7 days a week)    Discounted parking pass options are available for patients and visitors. They can be purchased at the "[employer] desk at the main hospital entrance.    -    Stop at the security desk and they will direct surgery patients to the 3rd floor Surgery Waiting Room. [phone] 3C     -  If you are in need of directions, wheelchair or escort please stop at the Information/security desk in the lobby.       What can I eat or drink?  -  You may eat and drink normally up to 8 hours prior to arrival time. (Until 1:00 am on 23)  -  You may have clear liquids until 2 hours prior to arrival time. (Until 7:00 am on 23)    Examples of clear liquids:  Water  Clear broth  Juices (apple, white grape, white cranberry  and cider) without pulp  Noncarbonated, powder based beverages  (lemonade and Clyde-Aid)  Sodas (Sprite, 7-Up, ginger ale and seltzer)  Coffee or tea (without milk or cream)  Gatorade    -  No Alcohol or cannabis products for at least 24 hours before surgery.     Which medicines can I take?      Hold Ibuprofen (Advil, Motrin) for 1 day(s) before surgery--unless otherwise directed by surgeon.  Hold Naproxen (Aleve) for 4 days before surgery.      -  PLEASE TAKE these medications the  day of surgery:   Acetaminophen (tylenol) as needed    Entecavir (Baraclude)   Gabapentin (Neurontin)   Metoprolol succinate   Afrin nasal spray as needed   Ocean spray as needed   Tramadol (ultram) as needed    How do I prepare myself?  - Please take 2 showers (one the night prior to surgery and one the morning of surgery) using Scrubcare or Hibiclens soap.    Use this soap only from the neck to your toes.     Leave the soap on your skin for one minute--then rinse thoroughly.      You may use your own shampoo and conditioner. No other hair products.   - Please remove all jewelry and body piercings.  - No lotions, deodorants or fragrance.  - Bring your ID and insurance card.    -If you use a CPAP machine, please bring the CPAP machine, tubing, and mask to hospital.    -If you have a Deep Brain Stimulator, Spinal Cord Stimulator, or any Neuro Stimulator device---you must bring the remote control to the hospital.        Covid testing policy as of 12/06/2022  Your surgeon will notify and schedule you for a COVID test if one is needed before surgery--please direct any questions or COVID symptoms to your surgeon      Questions or Concerns:    - For any questions regarding the day of surgery or your hospital stay, please contact the Pre Admission Nursing Office at [phone].       - If you have health changes between today and your surgery, please call your surgeon.       - For questions after surgery, please call your surgeons office.           Current Visitor Guidelines    You may have 2 visitors in the pre op area.    Visiting hours: 8 a.m. to 8:30 p.m.    You may have four visitors during your inpatient hospital stay.    Patients confirmed or suspected to have symptoms of COVID 19 or flu:     No visitors allowed for adult patients.   Children (under age 18) can have 1 named visitor.     People who are sick or showing symptoms of COVID 19 or flu:    Are not allowed to visit patients--we can only make exceptions in  special situations.       Please follow these guidelines for your visit:          Please maintain social distance          Masking is optional--however at times you may be asked to wear a mask for the safety of yourself and others     Clean your hands with alcohol hand . Do this when you arrive at and leave the building and patient room,    And again after you touch your mask or anything in the room.     Go directly to and from the room you are visiting.     Stay in the patient s room during your visit. Limit going to other places in the hospital as much as possible     Leave bags and jackets at home or in the car.     For everyone s health, please don t come and go during your visit. That includes for smoking   during your visit.

## 2023-11-28 NOTE — H&P
Pre-Operative H & P     CC:  Preoperative exam to assess for increased cardiopulmonary risk while undergoing surgery and anesthesia.    Date of Encounter: 11/28/2023  Primary Care Physician:  Mira Leung     Reason for visit: Pre-operative evaluation      HPI  Douglas Herrera is a 63 year old male who presents for pre-operative H & P in preparation for  Procedure Information       Date/Time: 11/30/2023 11:00am     Procedure:   ANESTHESIA OUT OF OR CAROTID CEREBRAL ANGIOGRAM BILATERAL PRESURGICAL EMBOLIZATION@1100   Bilateral      Anesthesia type: General    Pre-op diagnosis: Mass of sinus    Location: [facility]    Providers: Kenia Harper MD               Douglas Herrera is a 63 year old male with PMH significant for, HL, CAD  s/p CABGx3 ,AAA repair (2019), GOUT, chronic Hep B, SCC  and depression. 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 He did have substantial bleeding during the biopsy. He presents today in preparation for the above procedure with Dr. Harper. This is in preparation for his scheduled Stealth assisted transnasal endoscopic approach to excise left sinonasal mass on 12/1/2023 with Dr. Regan.    He has met with Dr. Landrum and presents today in preparation for the above procedure.     He is also undergoing bilateral carotid cerebral angiogram for pre-operative embolization with Dr. Harper on 11/30/2023.     History is obtained from the patient and chart review    Hx of abnormal bleeding or anti-platelet use: no      Past Medical History  Past Medical History:   Diagnosis Date    Ascending aortic aneurysm (H24)     Coronary artery disease     Depression     Gout     History of anesthesia complications     Hyperlipemia     Hypertension     PONV (postoperative nausea and vomiting)        Past Surgical History  Past Surgical  History:   Procedure Laterality Date    AORTA SURGERY N/A 4/23/2019    Procedure: WITH ASCENDING AORTA REPLACEMENT;  Surgeon: Darlene Graff MD;  Location: [facility] OR;  Service: Cardiovascular    BYPASS GRAFT ARTERY CORONARY      CARDIAC SURGERY      CV CORONARY ANGIOGRAM N/A 3/12/2019    Procedure: Coronary Angiogram;  Surgeon: Hamilton Lucero MD;  Location: [facility] Cath Lab;  Service: Cardiology    ENDOSCOPIC SINUS SURGERY Left 11/10/2023    Procedure: Transnasal endoscopic approach to biopsy left nasal mass;  Surgeon: Damon Regan MD;  Location: [facility] OR    GALLBLADDER SURGERY      IR FACIAL EMBOLIZATION LEFT  9/13/2023       Prior to Admission Medications  Current Outpatient Medications   Medication Sig Dispense Refill    acetaminophen (TYLENOL) 500 MG tablet Take 2 tablets (1,000 mg) by mouth 3 times daily (Patient taking differently: Take 1,000 mg by mouth every 6 hours as needed)      amLODIPine (NORVASC) 5 MG tablet Take 5 mg by mouth every morning      atorvastatin (LIPITOR) 80 MG tablet TAKE 1 TABLET (80 MG TOTAL) BY MOUTH AT BEDTIME/ TXHUA HMO NOJ 1 LUB TSHUAJ THAUM MUS PW PAB ZOO NTSHAV MUAJ ROJ 90 tablet 3    entecavir (BARACLUDE) 0.5 MG tablet Take 0.5 mg by mouth every morning Take 1 hour before a meal or 2 hours after a meal.      gabapentin (NEURONTIN) 300 MG capsule Take 1 capsule (300 mg) by mouth At Bedtime (Patient taking differently: Take 300 mg by mouth as needed) 30 capsule 0    metoprolol succinate ER (TOPROL XL) 25 MG 24 hr tablet Take 12.5 mg by mouth every morning      nitroGLYcerin (NITROSTAT) 0.4 MG sublingual tablet Place 0.4 mg under the tongue every 5 minutes as needed      oxymetazoline (AFRIN) 0.05 % nasal spray Spray 2 sprays into both nostrils 2 times daily as needed for congestion 15 mL 0    sodium chloride (OCEAN) 0.65 % nasal spray 2 sprays each nostril four times a day after surgery until followup (Patient taking differently: Spray 2  sprays in nostril daily as needed 2 sprays each nostril four times a day after surgery until followup) 60 mL 1    traMADol (ULTRAM) 50 MG tablet Take 1 tablet (50 mg) by mouth every 6 hours as needed for severe pain or moderate pain 30 tablet 0    traZODone (DESYREL) 50 MG tablet Take 50 mg by mouth nightly as needed      allopurinol (ZYLOPRIM) 300 MG tablet Take 300 mg by mouth daily (Patient not taking: Reported on 11/28/2023)      senna-docusate (SENOKOT-S/PERICOLACE) 8.6-50 MG tablet Take 1 tablet by mouth 2 times daily (Patient not taking: Reported on 11/28/2023) 10 tablet 0    sennosides (SENOKOT) 8.6 MG tablet Take 2 tablets by mouth 2 times daily as needed for constipation (Patient not taking: Reported on 11/28/2023)         Allergies  No Known Allergies    Social History  Social History     Socioeconomic History    Marital status:      Spouse name: Not on file    Number of children: Not on file    Years of education: Not on file    Highest education level: Not on file   Occupational History    Not on file   Tobacco Use    Smoking status: Former     Types: Cigarettes    Smokeless tobacco: Never    Tobacco comments:     every 3-4 months, rare   Substance and Sexual Activity    Alcohol use: Yes     Comment: Occasionally    Drug use: No    Sexual activity: Not on file   Other Topics Concern    Not on file   Social History Narrative    Not on file     Social Determinants of Health     Financial Resource Strain: Not on file   Food Insecurity: Not on file   Transportation Needs: Not on file   Physical Activity: Not on file   Stress: Not on file   Social Connections: Not on file   Interpersonal Safety: Not on file   Housing Stability: Not on file       Family History  Family History   Problem Relation Age of Onset    Cerebrovascular Disease Mother 90.00    Acute Myocardial Infarction No family hx of        Review of Systems  The complete review of systems is negative other than noted in the HPI or here.    Anesthesia Evaluation   Pt has had prior anesthetic. Type: General and MAC.    History of anesthetic complications  - PONV.  small oral opening - left facial tenderness.    ROS/MED HX  ENT/Pulmonary:     (+)     VIVEK risk factors,  hypertension,         tobacco use, Past use,                      Neurologic:  - neg neurologic ROS     Cardiovascular: Comment: AAA repair 2019    (+) Dyslipidemia hypertension- -  CAD -  CABG-date: 2019. -                                 Previous cardiac testing   Echo: Date: 2019 Results:    1. Normal left ventricular size and systolic performance with a visually estimated ejection fraction of 60-65%.  2. No significant valvular heart disease is identified on this study.  3. There is mild right ventricular enlargement with mildly reduced right ventricular systolic performance.  4. There is mild right atrial enlargement.  5. There is mild aortic root enlargement.  6. Right ventricular systolic pressure cannot be directly estimated from TR velocities due to insufficient tricuspid regurgitation.     Stress Test:  Date: Results:    ECG Reviewed:  Date: Results:    Cath:  Date: Results:   (-) angina, taking anticoagulants/antiplatelets, [place], orthopnea/PND and angina   METS/Exercise Tolerance: 4 - Raking leaves, gardening    Hematologic: Comments: He did have substantial bleeding during the biopsy 10/2023  PE post-operative ( cabg 2019)     (+) History of blood clots,    pt is not anticoagulated,           Musculoskeletal:  - neg musculoskeletal ROS     GI/Hepatic:     (+)           hepatitis type B, liver disease,       Renal/Genitourinary:  - neg Renal ROS     Endo: Comment: GOUT      Psychiatric/Substance Use:  - neg psychiatric ROS     Infectious Disease:  - neg infectious disease ROS     Malignancy:   (+) Malignancy, History of Skin.Skin CA Remission status post Surgery.      Other: Comment: Sinus mass           /83 (BP Location: Right arm, Patient Position: Sitting, Cuff Size:  "Adult Regular)   Pulse 77   Temp 97.5  F (36.4  C) (Oral)   Resp 16   Ht 1.626 m (5' 4\")   Wt 63.9 kg (140 lb 12.8 oz)   SpO2 96%   BMI 24.17 kg/m      Physical Exam   Constitutional: Awake, alert, cooperative, no apparent distress, and appears stated age.  Eyes: Pupils equal, round and reactive to light, extra ocular muscles intact, sclera clear, conjunctiva normal.  HENT: Normocephalic, oral pharynx with moist mucus membranes,  No goiter appreciated. Left facial tenderness - small oral opening  Respiratory: Clear to auscultation bilaterally, no crackles or wheezing.  Cardiovascular: Regular rate and rhythm, normal S1 and S2, and no murmur noted.  Carotids +2, no bruits. No edema. Palpable pulses to radial  DP and PT arteries.   GI: Normal bowel sounds, soft, non-distended, non-tender, no masses palpated, no hepatosplenomegaly.  Surgical scars: healed  Lymph/Hematologic: No cervical lymphadenopathy and no supraclavicular lymphadenopathy.  Genitourinary:  deferred  Skin: Warm and dry.  No rashes at anticipated surgical site.   Musculoskeletal: Full ROM of neck. There is no redness, warmth, or swelling of the joints. Gross motor strength is normal.    Neurologic: Awake, alert, oriented to name, place and time. Cranial nerves II-XII are grossly intact. Gait is normal.   Neuropsychiatric: Calm, cooperative. Normal affect.     Prior Labs/Diagnostic Studies   All labs and imaging personally reviewed   Lab Results   Component Value Date    WBC 6.5 11/11/2023     Lab Results   Component Value Date    RBC 4.52 11/11/2023     Lab Results   Component Value Date    HGB 13.1 11/11/2023     Lab Results   Component Value Date    HCT 39.1 11/11/2023     No components found for: \"MCT\"  Lab Results   Component Value Date    MCV 87 11/11/2023     Lab Results   Component Value Date    MCH 29.0 11/11/2023     Lab Results   Component Value Date    MCHC 33.5 11/11/2023     Lab Results   Component Value Date    RDW 12.6 11/11/2023 " "    Lab Results   Component Value Date     11/11/2023         EKG/ stress test - if available please see in ROS above   Echo result w/o MOPS:        The patient's records and results personally reviewed by this provider.     Outside records reviewed from: Care Everywhere    LAB/DIAGNOSTIC STUDIES TODAY:    Lab Results   Component Value Date    WBC 8.7 11/28/2023     Lab Results   Component Value Date    RBC 4.87 11/28/2023     Lab Results   Component Value Date    HGB 13.5 11/28/2023     Lab Results   Component Value Date    HCT 41.4 11/28/2023     No components found for: \"MCT\"  Lab Results   Component Value Date    MCV 85 11/28/2023     Lab Results   Component Value Date    MCH 27.7 11/28/2023     Lab Results   Component Value Date    MCHC 32.6 11/28/2023     Lab Results   Component Value Date    RDW 13.0 11/28/2023     Lab Results   Component Value Date     11/28/2023     Last Comprehensive Metabolic Panel:  Lab Results   Component Value Date     11/28/2023    POTASSIUM 4.5 11/28/2023    CHLORIDE 103 11/28/2023    CO2 28 11/28/2023    ANIONGAP 9 11/28/2023     (H) 11/28/2023    BUN 13.2 11/28/2023    CR 0.92 11/28/2023    GFRESTIMATED >90 11/28/2023    FLORENCE 12.8 (H) 11/28/2023           Assessment    Douglas No MI Sharon is a 63 year old male seen as a PAC referral for risk assessment and optimization for anesthesia.    Plan/Recommendations  Pt will be optimized for the proposed procedure.  See below for details on the assessment, risk, and preoperative recommendations    NEUROLOGY  - No history of TIA, CVA or seizure  -Post Op delirium risk factors:  No risk identified    HEENT  - small oral opening  - left facial tenderness to touch - limiting his ability to open his mouth-  Mallampati: IV  TM: > 3    Sinus Mass- Procedures scheduled as above.      CARDIAC  - No history of Afib  - CAD- CABG x3 and Ascending Thoracic aortic aneurysm repair ( 2019)  C/B PE treated with course of Xarelto. " "  Aspirin on hold x 2 months due to recurrent epistaxis. He did have substantial bleeding during the biopsy 10/2023      Well controlled on home regimen  - Hypertension- taking metoprolol   States he is not taking amlodipine.   Well controlled  - Hyperlipidemia on statins  Well controlled on home regimen  - METS (Metabolic Equivalents)  Patient performs 4 or more METS exercise without symptoms            Total Score: 0      RCRI-Very low risk: Class 1 0.4% complication rate            Total Score: 0        PULMONARY    VIVEK Medium Risk            Total Score: 3    VIVEK: Hypertension    VIVEK: Over 50 ys old    VIVEK: Male      - Denies asthma or inhaler use  - Tobacco History    History   Smoking Status    Former    Types: Cigarettes   Smokeless Tobacco    Never       GI    PONV High Risk  Total Score: 3           1 AN PONV: Patient is not a current smoker    1 AN PONV: Patient has history of PONV    1 AN PONV: Intended Post Op Opioids      Chronic Hep B    /RENAL  - Baseline Creatinine  WNL    ENDOCRINE    - BMI: Estimated body mass index is 24.17 kg/m  as calculated from the following:    Height as of this encounter: 1.626 m (5' 4\").    Weight as of this encounter: 63.9 kg (140 lb 12.8 oz).    - No history of Diabetes Mellitus    HEME/ONC  VTE Medium Risk 1.8%            Total Score: 6    VTE: Greater than 59 yrs old    VTE: Male    VTE: Pt history of VTE          Of note: per Dr. Regan note - He did have substantial bleeding during the biopsy.  Has been holding aspirin since October.     SCC- s/p Mohs procedure.     MSK  Patient is NOT Frail            Total Score: 0              Different anesthesia methods/types have been discussed with the patient, but they are aware that the final plan will be decided by the assigned anesthesia provider on the date of service.    The patient is optimized for their procedure. AVS with information on surgery time/arrival time, meds and NPO status given by nursing staff. " No further diagnostic testing indicated.      On the day of service:     Prep time: 20 minutes  Visit time: 25 minutes  Documentation time: 15 minutes  ------------------------------------------  Total time: 60 minutes      MALCOLM Veliz CNP  Preoperative Assessment Center  [facility]  Clinic and [facility] Center  Phone: [phone]  Fax: [phone]

## 2023-11-30 ENCOUNTER — HOSPITAL ENCOUNTER (INPATIENT)
Facility: CLINIC | Age: 63
LOS: 4 days | Discharge: HOME | DRG: 135 | End: 2023-12-04
Attending: NEUROLOGICAL SURGERY | Admitting: OTOLARYNGOLOGY
Payer: COMMERCIAL

## 2023-11-30 ENCOUNTER — ANESTHESIA (OUTPATIENT)
Dept: SURGERY | Facility: CLINIC | Age: 63
DRG: 135 | End: 2023-11-30
Payer: COMMERCIAL

## 2023-11-30 ENCOUNTER — APPOINTMENT (OUTPATIENT)
Dept: GENERAL RADIOLOGY | Facility: CLINIC | Age: 63
DRG: 135 | End: 2023-11-30
Attending: STUDENT IN AN ORGANIZED HEALTH CARE EDUCATION/TRAINING PROGRAM
Payer: COMMERCIAL

## 2023-11-30 ENCOUNTER — APPOINTMENT (OUTPATIENT)
Dept: INTERVENTIONAL RADIOLOGY/VASCULAR | Facility: CLINIC | Age: 63
DRG: 135 | End: 2023-11-30
Attending: NEUROLOGICAL SURGERY
Payer: COMMERCIAL

## 2023-11-30 ENCOUNTER — ANESTHESIA EVENT (OUTPATIENT)
Dept: SURGERY | Facility: CLINIC | Age: 63
DRG: 135 | End: 2023-11-30
Payer: COMMERCIAL

## 2023-11-30 DIAGNOSIS — J34.89 MASS OF SINUS: ICD-10-CM

## 2023-11-30 LAB
ABO/RH(D): NORMAL
ALBUMIN UR-MCNC: NEGATIVE MG/DL
ANTIBODY SCREEN: NEGATIVE
APPEARANCE UR: CLEAR
BASOPHILS # BLD AUTO: 0 10E3/UL (ref 0–0.2)
BASOPHILS NFR BLD AUTO: 0 %
BILIRUB UR QL STRIP: NEGATIVE
COLOR UR AUTO: ABNORMAL
EOSINOPHIL # BLD AUTO: 0 10E3/UL (ref 0–0.7)
EOSINOPHIL NFR BLD AUTO: 0 %
ERYTHROCYTE [DISTWIDTH] IN BLOOD BY AUTOMATED COUNT: 13 % (ref 10–15)
GLUCOSE BLDC GLUCOMTR-MCNC: 102 MG/DL (ref 70–99)
GLUCOSE BLDC GLUCOMTR-MCNC: 126 MG/DL (ref 70–99)
GLUCOSE BLDC GLUCOMTR-MCNC: 93 MG/DL (ref 70–99)
GLUCOSE UR STRIP-MCNC: NEGATIVE MG/DL
HCT VFR BLD AUTO: 36.7 % (ref 40–53)
HGB BLD-MCNC: 12.3 G/DL (ref 13.3–17.7)
HGB UR QL STRIP: NEGATIVE
IMM GRANULOCYTES # BLD: 0.1 10E3/UL
IMM GRANULOCYTES NFR BLD: 1 %
KETONES UR STRIP-MCNC: NEGATIVE MG/DL
LEUKOCYTE ESTERASE UR QL STRIP: NEGATIVE
LYMPHOCYTES # BLD AUTO: 1.3 10E3/UL (ref 0.8–5.3)
LYMPHOCYTES NFR BLD AUTO: 13 %
MCH RBC QN AUTO: 28.7 PG (ref 26.5–33)
MCHC RBC AUTO-ENTMCNC: 33.5 G/DL (ref 31.5–36.5)
MCV RBC AUTO: 86 FL (ref 78–100)
MONOCYTES # BLD AUTO: 0.6 10E3/UL (ref 0–1.3)
MONOCYTES NFR BLD AUTO: 7 %
MUCOUS THREADS #/AREA URNS LPF: PRESENT /LPF
NEUTROPHILS # BLD AUTO: 7.7 10E3/UL (ref 1.6–8.3)
NEUTROPHILS NFR BLD AUTO: 79 %
NITRATE UR QL: NEGATIVE
NRBC # BLD AUTO: 0 10E3/UL
NRBC BLD AUTO-RTO: 0 /100
PH UR STRIP: 5.5 [PH] (ref 5–7)
PLATELET # BLD AUTO: 182 10E3/UL (ref 150–450)
RBC # BLD AUTO: 4.28 10E6/UL (ref 4.4–5.9)
RBC URINE: 1 /HPF
SP GR UR STRIP: 1.01 (ref 1–1.03)
SPECIMEN EXPIRATION DATE: NORMAL
UROBILINOGEN UR STRIP-MCNC: NORMAL MG/DL
WBC # BLD AUTO: 9.7 10E3/UL (ref 4–11)
WBC URINE: 1 /HPF

## 2023-11-30 PROCEDURE — 710N000011 HC RECOVERY PHASE 1, LEVEL 3, PER MIN

## 2023-11-30 PROCEDURE — 36224 PLACE CATH CAROTD ART: CPT | Mod: LT | Performed by: NEUROLOGICAL SURGERY

## 2023-11-30 PROCEDURE — 76937 US GUIDE VASCULAR ACCESS: CPT | Mod: 26 | Performed by: NEUROLOGICAL SURGERY

## 2023-11-30 PROCEDURE — 03LG3DZ OCCLUSION OF INTRACRANIAL ARTERY WITH INTRALUMINAL DEVICE, PERCUTANEOUS APPROACH: ICD-10-PCS | Performed by: STUDENT IN AN ORGANIZED HEALTH CARE EDUCATION/TRAINING PROGRAM

## 2023-11-30 PROCEDURE — C1889 IMPLANT/INSERT DEVICE, NOC: HCPCS

## 2023-11-30 PROCEDURE — 36226 PLACE CATH VERTEBRAL ART: CPT

## 2023-11-30 PROCEDURE — 258N000003 HC RX IP 258 OP 636: Performed by: NURSE ANESTHETIST, CERTIFIED REGISTERED

## 2023-11-30 PROCEDURE — 36415 COLL VENOUS BLD VENIPUNCTURE: CPT | Performed by: STUDENT IN AN ORGANIZED HEALTH CARE EDUCATION/TRAINING PROGRAM

## 2023-11-30 PROCEDURE — 81003 URINALYSIS AUTO W/O SCOPE: CPT | Performed by: STUDENT IN AN ORGANIZED HEALTH CARE EDUCATION/TRAINING PROGRAM

## 2023-11-30 PROCEDURE — 255N000002 HC RX 255 OP 636: Performed by: NEUROLOGICAL SURGERY

## 2023-11-30 PROCEDURE — 36415 COLL VENOUS BLD VENIPUNCTURE: CPT

## 2023-11-30 PROCEDURE — 272N000506 HC NEEDLE CR6

## 2023-11-30 PROCEDURE — 76937 US GUIDE VASCULAR ACCESS: CPT

## 2023-11-30 PROCEDURE — 272N000566 HC SHEATH CR3

## 2023-11-30 PROCEDURE — 250N000011 HC RX IP 250 OP 636: Mod: JZ | Performed by: STUDENT IN AN ORGANIZED HEALTH CARE EDUCATION/TRAINING PROGRAM

## 2023-11-30 PROCEDURE — 250N000011 HC RX IP 250 OP 636: Performed by: NURSE ANESTHETIST, CERTIFIED REGISTERED

## 2023-11-30 PROCEDURE — 36224 PLACE CATH CAROTD ART: CPT

## 2023-11-30 PROCEDURE — C1887 CATHETER, GUIDING: HCPCS

## 2023-11-30 PROCEDURE — 71046 X-RAY EXAM CHEST 2 VIEWS: CPT

## 2023-11-30 PROCEDURE — 250N000025 HC SEVOFLURANE, PER MIN

## 2023-11-30 PROCEDURE — 36227 PLACE CATH XTRNL CAROTID: CPT | Mod: LT | Performed by: NEUROLOGICAL SURGERY

## 2023-11-30 PROCEDURE — 86923 COMPATIBILITY TEST ELECTRIC: CPT

## 2023-11-30 PROCEDURE — 75894 X-RAYS TRANSCATH THERAPY: CPT

## 2023-11-30 PROCEDURE — 71046 X-RAY EXAM CHEST 2 VIEWS: CPT | Mod: 26 | Performed by: STUDENT IN AN ORGANIZED HEALTH CARE EDUCATION/TRAINING PROGRAM

## 2023-11-30 PROCEDURE — 85025 COMPLETE CBC W/AUTO DIFF WBC: CPT | Performed by: STUDENT IN AN ORGANIZED HEALTH CARE EDUCATION/TRAINING PROGRAM

## 2023-11-30 PROCEDURE — C1769 GUIDE WIRE: HCPCS

## 2023-11-30 PROCEDURE — 75898 FOLLOW-UP ANGIOGRAPHY: CPT

## 2023-11-30 PROCEDURE — 86900 BLOOD TYPING SEROLOGIC ABO: CPT

## 2023-11-30 PROCEDURE — 250N000009 HC RX 250: Performed by: STUDENT IN AN ORGANIZED HEALTH CARE EDUCATION/TRAINING PROGRAM

## 2023-11-30 PROCEDURE — 75898 FOLLOW-UP ANGIOGRAPHY: CPT | Mod: 26 | Performed by: NEUROLOGICAL SURGERY

## 2023-11-30 PROCEDURE — 200N000002 HC R&B ICU UMMC

## 2023-11-30 PROCEDURE — 61626 TCAT PERM OCCLS/EMBOL NONCNS: CPT | Mod: GC | Performed by: NEUROLOGICAL SURGERY

## 2023-11-30 PROCEDURE — 75894 X-RAYS TRANSCATH THERAPY: CPT | Mod: 26 | Performed by: NEUROLOGICAL SURGERY

## 2023-11-30 PROCEDURE — 250N000013 HC RX MED GY IP 250 OP 250 PS 637

## 2023-11-30 PROCEDURE — 370N000017 HC ANESTHESIA TECHNICAL FEE, PER MIN

## 2023-11-30 PROCEDURE — 999N000141 HC STATISTIC PRE-PROCEDURE NURSING ASSESSMENT

## 2023-11-30 PROCEDURE — 250N000009 HC RX 250: Performed by: NURSE ANESTHETIST, CERTIFIED REGISTERED

## 2023-11-30 PROCEDURE — 250N000013 HC RX MED GY IP 250 OP 250 PS 637: Performed by: STUDENT IN AN ORGANIZED HEALTH CARE EDUCATION/TRAINING PROGRAM

## 2023-11-30 PROCEDURE — 272N000192 HC ACCESSORY CR2

## 2023-11-30 PROCEDURE — C1760 CLOSURE DEV, VASC: HCPCS

## 2023-11-30 PROCEDURE — 61626 TCAT PERM OCCLS/EMBOL NONCNS: CPT

## 2023-11-30 RX ORDER — IODIXANOL 320 MG/ML
150 INJECTION, SOLUTION INTRAVASCULAR ONCE
Status: COMPLETED | OUTPATIENT
Start: 2023-11-30 | End: 2023-11-30

## 2023-11-30 RX ORDER — NALOXONE HYDROCHLORIDE 0.4 MG/ML
0.4 INJECTION, SOLUTION INTRAMUSCULAR; INTRAVENOUS; SUBCUTANEOUS
Status: DISCONTINUED | OUTPATIENT
Start: 2023-11-30 | End: 2023-12-04 | Stop reason: HOSPADM

## 2023-11-30 RX ORDER — ACETAMINOPHEN 325 MG/1
975 TABLET ORAL ONCE
Status: COMPLETED | OUTPATIENT
Start: 2023-11-30 | End: 2023-11-30

## 2023-11-30 RX ORDER — ONDANSETRON 2 MG/ML
4 INJECTION INTRAMUSCULAR; INTRAVENOUS EVERY 30 MIN PRN
Status: CANCELLED | OUTPATIENT
Start: 2023-11-30

## 2023-11-30 RX ORDER — ATORVASTATIN CALCIUM 80 MG/1
80 TABLET, FILM COATED ORAL EVERY EVENING
Status: DISCONTINUED | OUTPATIENT
Start: 2023-11-30 | End: 2023-12-04 | Stop reason: HOSPADM

## 2023-11-30 RX ORDER — NALOXONE HYDROCHLORIDE 0.4 MG/ML
0.2 INJECTION, SOLUTION INTRAMUSCULAR; INTRAVENOUS; SUBCUTANEOUS
Status: DISCONTINUED | OUTPATIENT
Start: 2023-11-30 | End: 2023-12-04 | Stop reason: HOSPADM

## 2023-11-30 RX ORDER — OXYCODONE HYDROCHLORIDE 10 MG/1
10 TABLET ORAL
Status: DISCONTINUED | OUTPATIENT
Start: 2023-11-30 | End: 2023-11-30 | Stop reason: HOSPADM

## 2023-11-30 RX ORDER — LIDOCAINE HYDROCHLORIDE 10 MG/ML
1-30 INJECTION, SOLUTION EPIDURAL; INFILTRATION; INTRACAUDAL; PERINEURAL
Status: COMPLETED | OUTPATIENT
Start: 2023-11-30 | End: 2023-11-30

## 2023-11-30 RX ORDER — OXYCODONE HYDROCHLORIDE 5 MG/1
5 TABLET ORAL
Status: DISCONTINUED | OUTPATIENT
Start: 2023-11-30 | End: 2023-11-30 | Stop reason: HOSPADM

## 2023-11-30 RX ORDER — FENTANYL CITRATE 50 UG/ML
50 INJECTION, SOLUTION INTRAMUSCULAR; INTRAVENOUS EVERY 5 MIN PRN
Status: CANCELLED | OUTPATIENT
Start: 2023-11-30

## 2023-11-30 RX ORDER — HEPARIN SODIUM 200 [USP'U]/100ML
1 INJECTION, SOLUTION INTRAVENOUS CONTINUOUS PRN
Status: DISCONTINUED | OUTPATIENT
Start: 2023-11-30 | End: 2023-11-30 | Stop reason: HOSPADM

## 2023-11-30 RX ORDER — CEFAZOLIN SODIUM/WATER 2 G/20 ML
SYRINGE (ML) INTRAVENOUS PRN
Status: DISCONTINUED | OUTPATIENT
Start: 2023-11-30 | End: 2023-11-30

## 2023-11-30 RX ORDER — NICOTINE POLACRILEX 4 MG
15-30 LOZENGE BUCCAL
Status: DISCONTINUED | OUTPATIENT
Start: 2023-11-30 | End: 2023-12-04 | Stop reason: HOSPADM

## 2023-11-30 RX ORDER — HYDROMORPHONE HYDROCHLORIDE 1 MG/ML
0.4 INJECTION, SOLUTION INTRAMUSCULAR; INTRAVENOUS; SUBCUTANEOUS EVERY 5 MIN PRN
Status: CANCELLED | OUTPATIENT
Start: 2023-11-30

## 2023-11-30 RX ORDER — LIDOCAINE 40 MG/G
CREAM TOPICAL
Status: DISCONTINUED | OUTPATIENT
Start: 2023-11-30 | End: 2023-11-30 | Stop reason: HOSPADM

## 2023-11-30 RX ORDER — DEXTROSE MONOHYDRATE 25 G/50ML
25-50 INJECTION, SOLUTION INTRAVENOUS
Status: DISCONTINUED | OUTPATIENT
Start: 2023-11-30 | End: 2023-12-04 | Stop reason: HOSPADM

## 2023-11-30 RX ORDER — ONDANSETRON 4 MG/1
4 TABLET, ORALLY DISINTEGRATING ORAL EVERY 30 MIN PRN
Status: CANCELLED | OUTPATIENT
Start: 2023-11-30

## 2023-11-30 RX ORDER — LIDOCAINE HYDROCHLORIDE 20 MG/ML
INJECTION, SOLUTION INFILTRATION; PERINEURAL PRN
Status: DISCONTINUED | OUTPATIENT
Start: 2023-11-30 | End: 2023-11-30

## 2023-11-30 RX ORDER — ONDANSETRON 4 MG/1
4 TABLET, ORALLY DISINTEGRATING ORAL EVERY 30 MIN PRN
Status: DISCONTINUED | OUTPATIENT
Start: 2023-11-30 | End: 2023-11-30 | Stop reason: HOSPADM

## 2023-11-30 RX ORDER — FENTANYL CITRATE 50 UG/ML
50 INJECTION, SOLUTION INTRAMUSCULAR; INTRAVENOUS EVERY 5 MIN PRN
Status: DISCONTINUED | OUTPATIENT
Start: 2023-11-30 | End: 2023-11-30 | Stop reason: HOSPADM

## 2023-11-30 RX ORDER — HYDROMORPHONE HCL IN WATER/PF 6 MG/30 ML
0.2 PATIENT CONTROLLED ANALGESIA SYRINGE INTRAVENOUS EVERY 5 MIN PRN
Status: DISCONTINUED | OUTPATIENT
Start: 2023-11-30 | End: 2023-11-30 | Stop reason: HOSPADM

## 2023-11-30 RX ORDER — FENTANYL CITRATE 50 UG/ML
25 INJECTION, SOLUTION INTRAMUSCULAR; INTRAVENOUS EVERY 5 MIN PRN
Status: CANCELLED | OUTPATIENT
Start: 2023-11-30

## 2023-11-30 RX ORDER — FENTANYL CITRATE 50 UG/ML
25 INJECTION, SOLUTION INTRAMUSCULAR; INTRAVENOUS EVERY 5 MIN PRN
Status: DISCONTINUED | OUTPATIENT
Start: 2023-11-30 | End: 2023-11-30 | Stop reason: HOSPADM

## 2023-11-30 RX ORDER — SODIUM CHLORIDE, SODIUM LACTATE, POTASSIUM CHLORIDE, CALCIUM CHLORIDE 600; 310; 30; 20 MG/100ML; MG/100ML; MG/100ML; MG/100ML
INJECTION, SOLUTION INTRAVENOUS CONTINUOUS PRN
Status: DISCONTINUED | OUTPATIENT
Start: 2023-11-30 | End: 2023-11-30

## 2023-11-30 RX ORDER — OXYCODONE HYDROCHLORIDE 10 MG/1
10 TABLET ORAL
Status: CANCELLED | OUTPATIENT
Start: 2023-11-30

## 2023-11-30 RX ORDER — ENTECAVIR 0.5 MG/1
0.5 TABLET, FILM COATED ORAL EVERY MORNING
Status: DISCONTINUED | OUTPATIENT
Start: 2023-12-01 | End: 2023-12-04 | Stop reason: HOSPADM

## 2023-11-30 RX ORDER — SCOLOPAMINE TRANSDERMAL SYSTEM 1 MG/1
1 PATCH, EXTENDED RELEASE TRANSDERMAL ONCE
Status: CANCELLED | OUTPATIENT
Start: 2023-11-30 | End: 2023-11-30

## 2023-11-30 RX ORDER — SODIUM CHLORIDE, SODIUM LACTATE, POTASSIUM CHLORIDE, CALCIUM CHLORIDE 600; 310; 30; 20 MG/100ML; MG/100ML; MG/100ML; MG/100ML
INJECTION, SOLUTION INTRAVENOUS CONTINUOUS
Status: DISCONTINUED | OUTPATIENT
Start: 2023-11-30 | End: 2023-11-30 | Stop reason: HOSPADM

## 2023-11-30 RX ORDER — ONDANSETRON 2 MG/ML
4 INJECTION INTRAMUSCULAR; INTRAVENOUS EVERY 30 MIN PRN
Status: DISCONTINUED | OUTPATIENT
Start: 2023-11-30 | End: 2023-11-30 | Stop reason: HOSPADM

## 2023-11-30 RX ORDER — SODIUM CHLORIDE, SODIUM LACTATE, POTASSIUM CHLORIDE, CALCIUM CHLORIDE 600; 310; 30; 20 MG/100ML; MG/100ML; MG/100ML; MG/100ML
INJECTION, SOLUTION INTRAVENOUS CONTINUOUS
Status: CANCELLED | OUTPATIENT
Start: 2023-11-30

## 2023-11-30 RX ORDER — PROPOFOL 10 MG/ML
INJECTION, EMULSION INTRAVENOUS PRN
Status: DISCONTINUED | OUTPATIENT
Start: 2023-11-30 | End: 2023-11-30

## 2023-11-30 RX ORDER — SODIUM CHLORIDE 9 MG/ML
INJECTION, SOLUTION INTRAVENOUS CONTINUOUS
Status: DISCONTINUED | OUTPATIENT
Start: 2023-11-30 | End: 2023-11-30 | Stop reason: HOSPADM

## 2023-11-30 RX ORDER — TRAZODONE HYDROCHLORIDE 50 MG/1
50 TABLET, FILM COATED ORAL
Status: DISCONTINUED | OUTPATIENT
Start: 2023-11-30 | End: 2023-12-04 | Stop reason: HOSPADM

## 2023-11-30 RX ORDER — OXYMETAZOLINE HYDROCHLORIDE 0.05 G/100ML
2 SPRAY NASAL 2 TIMES DAILY PRN
Status: DISCONTINUED | OUTPATIENT
Start: 2023-11-30 | End: 2023-12-01

## 2023-11-30 RX ORDER — ACETAMINOPHEN 500 MG
1000 TABLET ORAL EVERY 6 HOURS PRN
Status: DISCONTINUED | OUTPATIENT
Start: 2023-11-30 | End: 2023-12-04 | Stop reason: HOSPADM

## 2023-11-30 RX ORDER — HYDROMORPHONE HYDROCHLORIDE 1 MG/ML
0.2 INJECTION, SOLUTION INTRAMUSCULAR; INTRAVENOUS; SUBCUTANEOUS EVERY 5 MIN PRN
Status: CANCELLED | OUTPATIENT
Start: 2023-11-30

## 2023-11-30 RX ORDER — OXYCODONE HYDROCHLORIDE 5 MG/1
5 TABLET ORAL
Status: CANCELLED | OUTPATIENT
Start: 2023-11-30

## 2023-11-30 RX ORDER — ONDANSETRON 2 MG/ML
INJECTION INTRAMUSCULAR; INTRAVENOUS PRN
Status: DISCONTINUED | OUTPATIENT
Start: 2023-11-30 | End: 2023-11-30

## 2023-11-30 RX ORDER — FENTANYL CITRATE 50 UG/ML
INJECTION, SOLUTION INTRAMUSCULAR; INTRAVENOUS PRN
Status: DISCONTINUED | OUTPATIENT
Start: 2023-11-30 | End: 2023-11-30

## 2023-11-30 RX ORDER — HEPARIN SODIUM 1000 [USP'U]/ML
INJECTION, SOLUTION INTRAVENOUS; SUBCUTANEOUS PRN
Status: DISCONTINUED | OUTPATIENT
Start: 2023-11-30 | End: 2023-11-30

## 2023-11-30 RX ORDER — HYDROMORPHONE HCL IN WATER/PF 6 MG/30 ML
0.4 PATIENT CONTROLLED ANALGESIA SYRINGE INTRAVENOUS EVERY 5 MIN PRN
Status: DISCONTINUED | OUTPATIENT
Start: 2023-11-30 | End: 2023-11-30 | Stop reason: HOSPADM

## 2023-11-30 RX ADMIN — LIDOCAINE HYDROCHLORIDE 15 ML: 10 INJECTION, SOLUTION EPIDURAL; INFILTRATION; INTRACAUDAL; PERINEURAL at 12:11

## 2023-11-30 RX ADMIN — HEPARIN SODIUM 5 BAG: 200 INJECTION, SOLUTION INTRAVENOUS at 12:27

## 2023-11-30 RX ADMIN — ACETAMINOPHEN 1000 MG: 500 TABLET ORAL at 21:38

## 2023-11-30 RX ADMIN — LIDOCAINE HYDROCHLORIDE 100 MG: 20 INJECTION, SOLUTION INFILTRATION; PERINEURAL at 11:35

## 2023-11-30 RX ADMIN — SODIUM CHLORIDE, POTASSIUM CHLORIDE, SODIUM LACTATE AND CALCIUM CHLORIDE: 600; 310; 30; 20 INJECTION, SOLUTION INTRAVENOUS at 15:25

## 2023-11-30 RX ADMIN — ACETAMINOPHEN 975 MG: 325 TABLET, FILM COATED ORAL at 16:19

## 2023-11-30 RX ADMIN — ONDANSETRON 4 MG: 2 INJECTION INTRAMUSCULAR; INTRAVENOUS at 11:35

## 2023-11-30 RX ADMIN — SODIUM CHLORIDE, POTASSIUM CHLORIDE, SODIUM LACTATE AND CALCIUM CHLORIDE: 600; 310; 30; 20 INJECTION, SOLUTION INTRAVENOUS at 11:27

## 2023-11-30 RX ADMIN — Medication 20 MG: at 13:44

## 2023-11-30 RX ADMIN — TRAMADOL HYDROCHLORIDE 50 MG: 50 TABLET, COATED ORAL at 21:39

## 2023-11-30 RX ADMIN — PHENYLEPHRINE HYDROCHLORIDE 100 MCG: 10 INJECTION INTRAVENOUS at 12:42

## 2023-11-30 RX ADMIN — Medication 50 MG: at 11:35

## 2023-11-30 RX ADMIN — PROPOFOL 50 MG: 10 INJECTION, EMULSION INTRAVENOUS at 11:35

## 2023-11-30 RX ADMIN — FENTANYL CITRATE 50 MCG: 50 INJECTION INTRAMUSCULAR; INTRAVENOUS at 13:41

## 2023-11-30 RX ADMIN — PHENYLEPHRINE HYDROCHLORIDE 100 MCG: 10 INJECTION INTRAVENOUS at 12:19

## 2023-11-30 RX ADMIN — FENTANYL CITRATE 50 MCG: 50 INJECTION INTRAMUSCULAR; INTRAVENOUS at 14:45

## 2023-11-30 RX ADMIN — HEPARIN SODIUM 1000 UNITS: 1000 INJECTION INTRAVENOUS; SUBCUTANEOUS at 13:15

## 2023-11-30 RX ADMIN — HEPARIN SODIUM 1000 UNITS: 1000 INJECTION INTRAVENOUS; SUBCUTANEOUS at 14:13

## 2023-11-30 RX ADMIN — FENTANYL CITRATE 100 MCG: 50 INJECTION INTRAMUSCULAR; INTRAVENOUS at 11:35

## 2023-11-30 RX ADMIN — Medication 2 G: at 11:35

## 2023-11-30 RX ADMIN — ATORVASTATIN CALCIUM 80 MG: 80 TABLET, FILM COATED ORAL at 19:50

## 2023-11-30 RX ADMIN — SUGAMMADEX 200 MG: 100 INJECTION, SOLUTION INTRAVENOUS at 15:02

## 2023-11-30 RX ADMIN — HEPARIN SODIUM 3000 UNITS: 1000 INJECTION INTRAVENOUS; SUBCUTANEOUS at 12:15

## 2023-11-30 RX ADMIN — IODIXANOL 185 ML: 320 INJECTION, SOLUTION INTRAVASCULAR at 15:05

## 2023-11-30 RX ADMIN — MIDAZOLAM 2 MG: 1 INJECTION INTRAMUSCULAR; INTRAVENOUS at 11:27

## 2023-11-30 ASSESSMENT — ACTIVITIES OF DAILY LIVING (ADL)
ADLS_ACUITY_SCORE: 22
DOING_ERRANDS_INDEPENDENTLY_DIFFICULTY: NO
WALKING_OR_CLIMBING_STAIRS_DIFFICULTY: NO
DIFFICULTY_EATING/SWALLOWING: NO
DIFFICULTY_COMMUNICATING: NO
FALL_HISTORY_WITHIN_LAST_SIX_MONTHS: NO
HEARING_DIFFICULTY_OR_DEAF: NO
ADLS_ACUITY_SCORE: 20
CONCENTRATING,_REMEMBERING_OR_MAKING_DECISIONS_DIFFICULTY: NO
TOILETING_ISSUES: NO
DRESSING/BATHING_DIFFICULTY: NO
EQUIPMENT_CURRENTLY_USED_AT_HOME: CANE, STRAIGHT
CHANGE_IN_FUNCTIONAL_STATUS_SINCE_ONSET_OF_CURRENT_ILLNESS/INJURY: NO
ADLS_ACUITY_SCORE: 20
WEAR_GLASSES_OR_BLIND: YES
ADLS_ACUITY_SCORE: 22

## 2023-11-30 NOTE — PROGRESS NOTES
Consent completed on paper with provider at bedside, along with a [facility] [place]  on the phone. Son present for consent completion as well.

## 2023-11-30 NOTE — IR NOTE
Patient Name: Douglas Herrera  Medical Record Number: [id number]  Today's Date: 11/30/2023    Procedure: cervico-cerebral angiogram with facial tumor embolization  Proceduralist: MD ARNULFO Garcia, MD Zach Hauser   Pathology present: N/A  Brief completed: yes - AYANA Pederson ARRTBRITTA CRNA    Procedure Start: 1210  Procedure end: 1510  Sedation medications administered: general anesthesia (BRITTA Craig CRNA)    Report given to: PACU RN  : Xiomara for consent    Femoral site accessed at right groin. Femoral site de-accessed at 1510 using angio-seal (ref [id number]) for closure - flat bedrest for 2 hours, till 1710 11/30/23.    Other Notes: Pt arrived to IR room 03 from . Consent reviewed. Pt denies any questions or concerns regarding procedure. Pt positioned supine and monitored per protocol. 16 Nepalese cruz placed per verbal order by MD ARNULFO Garcia. Pt tolerated procedure without any noted complications. Pt transferred back to PACU.

## 2023-11-30 NOTE — PROGRESS NOTES
[facility]     Endovascular Surgical Neuroradiology Pre-Procedure Note      HPI:  Douglas Herrera is a 63 year old man with history of left sinonasal papilloma with severe dysplasia and history of intractable epistaxis s/p transarterial facial artery embolization who presents for pre-operative embolization prior to his transnasal endoscopic resection tomorrow.    Medical History:  Reviewed    Surgical History:  Reviewed    Family History:  Reviewed    Social History:  Reviewed    Allergies:  No Known Allergies    Is there a contrast allergy?  No    Medications:  I have reviewed this patient's current medications.    ROS:  The 10 point Review of Systems is negative other than noted in the HPI or here.     PHYSICAL EXAMINATION  Vital Signs:  B/P: 122/76,  T: 99.7,  P: 88,  R: 18    Cardio:  RRR  Pulmonary:  no respiratory distress  Abdomen:  soft, non-tender, non-distended    Neurologic  Mental Status:  fully alert, attentive and oriented, follows commands, speech clear and fluent, examined with the assistance of a ong interpretor  Cranial Nerves:   VFF, PERRL, EOMI, left facial fullness, left ear hearing loss, right ear intact, sensation intact, dysphonic  Motor:  strength 5/5 throughout upper and lower extremities  Sensory:  intact/symmetric to light touch and pin prick throughout upper and lower extremities  Coordination:  FNF and HS intact without dysmetria    Pre-procedure National Institutes of Health Stroke Scale:   Not applicable    LABS  (most recent Cr, BUN, GFR, PLT, INR, PTT within the past 7 days):  Recent Labs   Lab 11/28/23  0952   CR 0.92   BUN 13.2   GFRESTIMATED >90           Platelet Function P2Y12 (PRU):  Not applicable      ASSESSMENT: Left sinonasal papilloma    PLAN: Cervicocerebral angiogram, percutaneous and possible transarterial embolization of the left sinonasal papilloma prior to resection tomorrow        PRE-PROCEDURE SEDATION  ASSESSMENT     Per Anesthesia    Patient was discussed with the Attending, Dr. Harper, who agrees with the plan.    Amanda Garcia MD   Pager: [phone]    I have reviewed the history and agree with the fellow's assessment and plan. 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 Preoperative embolization has been requested because of the tumor's high vascularity. Our team discussed the risks of the procedure including death, stroke, blindness, sinonasal mucosal necrosis, need for additional procedures, and he agreed to proceed.  ALIN Harper MD

## 2023-11-30 NOTE — OR NURSING
Notified Dr. Winslow anesthesia resident and Dr. Garcia interventional neurology of patient's temperature of 102.3. Anesthesia and team at bedside to assess patient. See MAR for medications given.

## 2023-11-30 NOTE — ANESTHESIA POSTPROCEDURE EVALUATION
Patient: Douglas Herrera    Procedure: Procedure(s):  ANESTHESIA OUT OF OR CAROTID CEREBRAL ANGIOGRAM BILATERAL PRESURGICAL EMBOLIZATION@1100       Anesthesia Type:  General    Note:  Disposition: Admission   Postop Pain Control: Uneventful            Sign Out: Well controlled pain   PONV: No   Neuro/Psych: Uneventful            Sign Out: Acceptable/Baseline neuro status   Airway/Respiratory: Uneventful            Sign Out: Acceptable/Baseline resp. status   CV/Hemodynamics: Uneventful            Sign Out: Acceptable CV status; No obvious hypovolemia; No obvious fluid overload   Other NRE: NONE   DID A NON-ROUTINE EVENT OCCUR? No    Event details/Postop Comments:  No complications.           Last vitals:  Vitals Value Taken Time   /78 11/30/23 1529   Temp     Pulse 97 11/30/23 1539   Resp 24 11/30/23 1539   SpO2 93 % 11/30/23 1539   Vitals shown include unfiled device data.    Electronically Signed By: Marvin Guerrero MD  November 30, 2023  3:41 PM

## 2023-11-30 NOTE — ANESTHESIA PROCEDURE NOTES
Airway       Patient location during procedure: OR       Procedure Start/Stop Times: 11/30/2023 11:39 AM  Staff -        CRNA: Sabrina Craig APRN CRNA       Performed By: CRNA  Consent for Airway        Urgency: elective  Indications and Patient Condition       Indications for airway management: morgan-procedural       Induction type:intravenous       Mask difficulty assessment: 1 - vent by mask    Final Airway Details       Final airway type: endotracheal airway       Successful airway: ETT - single  Endotracheal Airway Details        Cuffed: yes       Successful intubation technique: direct laryngoscopy       DL Blade Type: MAC 3       Grade View of Cords: 1       Adjucts: bougie       Secured at (cm): 21    Post intubation assessment        Placement verified by: capnometry, equal breath sounds and chest rise        Number of attempts at approach: 2       Ease of procedure: easy       Dentition: Intact    Medication(s) Administered   Medication Administration Time: 11/30/2023 11:39 AM

## 2023-11-30 NOTE — PROCEDURES
[facility]     Endovascular Surgical Neuroradiology Post-Procedure Note    Pre-Procedure Diagnosis:  Left sinonasal papilloma  Post-Procedure Diagnosis:  Left sinonasal papilloma    Procedure(s):   Intra-arterial tumor embolization  Percutaneous tumor embolization    Findings:  Successful 25% n-BCA glue embolization of the left sinonasal papilloma via transnasal percutaneous, transoral percutaneous, and transarterial left internal maxillary artery routes with residual tumor blush in the posterior/inferior/left lateral aspects    Plan:  2 hours flat bedrest, admit to ICU overnight, to OR with ENT tomorrow    Primary Surgeon:  Dr. Kenia Harper  Secondary Surgeon:  Not applicable  Secondary Surgeon Review:  None  Fellow:  Zach Garcia  Additional Assistants:  none    Prior to the start of the procedure and with procedural staff participation, I verbally confirmed: the patient s identity using two indicators, relevant allergies, that the procedure was appropriate and matched the consent or emergent situation, and that the correct equipment/implants were available. Immediately prior to starting the procedure I conducted the Time Out with the procedural staff and re-confirmed the patient s name, procedure, and site/side. (The Joint Commission universal protocol was followed.)  Yes    PRU value: Not applicable    Anesthesia:  Performed by Anesthesia  Medications:   lidocaine 1% 10 ml intradermal, heparin 5000 units IV   Puncture site:  Right Femoral Artery    Fluoroscopy time (minutes):  95.1  Radiation dose (mGy):   5420     Contrast amount (mL):   185      Estimated blood loss (mL):  20    Closure:  Device    Disposition:  Will be followed in hospital by the Neuro Endovascular team.        Sedation Post-Procedure Summary    Per Anesthesia    Amanda Garcia MD  Pager:  [phone]

## 2023-11-30 NOTE — ANESTHESIA CARE TRANSFER NOTE
Patient: Douglas Herrera    Procedure: Procedure(s):  ANESTHESIA OUT OF OR CAROTID CEREBRAL ANGIOGRAM BILATERAL PRESURGICAL EMBOLIZATION@1100       Diagnosis: Mass of sinus [J34.89]  Diagnosis Additional Information: No value filed.    Anesthesia Type:   General     Note:    Oropharynx: spontaneously breathing  Level of Consciousness: drowsy  Oxygen Supplementation: face mask    Independent Airway: airway patency satisfactory and stable  Dentition: dentition unchanged  Vital Signs Stable: post-procedure vital signs reviewed and stable  Report to RN Given: handoff report given  Patient transferred to: PACU    Handoff Report: Identifed the Patient, Identified the Reponsible Provider, Reviewed the pertinent medical history, Discussed the surgical course, Reviewed Intra-OP anesthesia mangement and issues during anesthesia, Set expectations for post-procedure period and Allowed opportunity for questions and acknowledgement of understanding      Vitals:  Vitals Value Taken Time   /78 11/30/23 1529   Temp     Pulse 97 11/30/23 1534   Resp 26 11/30/23 1534   SpO2 99 % 11/30/23 1534   Vitals shown include unfiled device data.    Electronically Signed By: MALCOLM Servin CRNA  November 30, 2023  3:35 PM

## 2023-11-30 NOTE — ANESTHESIA PREPROCEDURE EVALUATION
Anesthesia Pre-Procedure Evaluation    Patient: Douglas Herrera   MRN: [id number] : 1960        Procedure : Procedure(s):  ANESTHESIA OUT OF OR CAROTID CEREBRAL ANGIOGRAM BILATERAL PRESURGICAL EMBOLIZATION@1100          Past Medical History:   Diagnosis Date    Ascending aortic aneurysm (H24)     Coronary artery disease     Depression     Gout     History of anesthesia complications     Hyperlipemia     Hypertension     PONV (postoperative nausea and vomiting)       Past Surgical History:   Procedure Laterality Date    AORTA SURGERY N/A 2019    Procedure: WITH ASCENDING AORTA REPLACEMENT;  Surgeon: Darlene Graff MD;  Location: [facility] OR;  Service: Cardiovascular    BYPASS GRAFT ARTERY CORONARY      CARDIAC SURGERY      CV CORONARY ANGIOGRAM N/A 3/12/2019    Procedure: Coronary Angiogram;  Surgeon: Hamilton Lucero MD;  Location: [facility] Cath Lab;  Service: Cardiology    ENDOSCOPIC SINUS SURGERY Left 11/10/2023    Procedure: Transnasal endoscopic approach to biopsy left nasal mass;  Surgeon: Damon Regan MD;  Location: [facility] OR    GALLBLADDER SURGERY      IR FACIAL EMBOLIZATION LEFT  2023      No Known Allergies   Social History     Tobacco Use    Smoking status: Former     Types: Cigarettes    Smokeless tobacco: Never    Tobacco comments:     every 3-4 months, rare   Substance Use Topics    Alcohol use: Yes     Comment: Occasionally      Wt Readings from Last 1 Encounters:   23 64.1 kg (141 lb 5 oz)        Anesthesia Evaluation   Pt has had prior anesthetic. Type: MAC and General.        ROS/MED HX  ENT/Pulmonary: Comment: Epistaxis.      Neurologic:  - neg neurologic ROS     Cardiovascular:     (+)  hypertension- -  CAD -  CABG-date: patient report in . -                                      METS/Exercise Tolerance:     Hematologic:  - neg hematologic  ROS     Musculoskeletal:  - neg musculoskeletal ROS     GI/Hepatic:  - neg GI/hepatic ROS    "  Renal/Genitourinary:  - neg Renal ROS     Endo:  - neg endo ROS     Psychiatric/Substance Use:  - neg psychiatric ROS     Infectious Disease:  - neg infectious disease ROS     Malignancy:  - neg malignancy ROS     Other:            Physical Exam    Airway        Mallampati: I   TM distance: > 3 FB   Neck ROM: full   Mouth opening: < 3 cm    Respiratory Devices and Support         Dental       (+) Minor Abnormalities - some fillings, tiny chips      Cardiovascular   cardiovascular exam normal       Rhythm and rate: regular and normal     Pulmonary   pulmonary exam normal        breath sounds clear to auscultation           OUTSIDE LABS:  CBC:   Lab Results   Component Value Date    WBC 8.7 11/28/2023    WBC 6.5 11/11/2023    HGB 13.5 11/28/2023    HGB 13.1 (L) 11/11/2023    HCT 41.4 11/28/2023    HCT 39.1 (L) 11/11/2023     11/28/2023     11/11/2023     BMP:   Lab Results   Component Value Date     11/28/2023     09/21/2023    POTASSIUM 4.5 11/28/2023    POTASSIUM 4.1 09/21/2023    CHLORIDE 103 11/28/2023    CHLORIDE 104 09/21/2023    CO2 28 11/28/2023    CO2 24 09/21/2023    BUN 13.2 11/28/2023    BUN 15.8 09/21/2023    CR 0.92 11/28/2023    CR 1.10 09/21/2023     (H) 11/28/2023    GLC 91 09/21/2023     COAGS:   Lab Results   Component Value Date    PTT 29 09/12/2023    INR 1.01 09/12/2023    FIBR 157 (L) 04/23/2019     POC: No results found for: \"BGM\", \"HCG\", \"HCGS\"  HEPATIC:   Lab Results   Component Value Date    ALBUMIN 3.8 05/13/2022    PROTTOTAL 6.9 05/13/2022    ALT 29 05/13/2022    AST 26 05/13/2022    ALKPHOS 44 (L) 05/13/2022    BILITOTAL 1.1 (H) 05/13/2022     OTHER:   Lab Results   Component Value Date    PH 7.38 04/24/2019    A1C 5.2 04/25/2019    FLORENCE 12.8 (H) 11/28/2023    MAG 1.8 05/08/2019       Anesthesia Plan    ASA Status:  3    NPO Status:  NPO Appropriate    Anesthesia Type: General.     - Airway: ETT   Induction: Intravenous.   Maintenance: Inhalation.    "     Consents    Anesthesia Plan(s) and associated risks, benefits, and realistic alternatives discussed. Questions answered and patient/representative(s) expressed understanding.     - Discussed: Risks, Benefits and Alternatives for BOTH SEDATION and the PROCEDURE were discussed     - Discussed with:  Patient      - Extended Intubation/Ventilatory Support Discussed: Yes.      - Patient is DNR/DNI Status: No     Use of blood products discussed: Yes.     - Discussed with: Patient.     Postoperative Care    Pain management: IV analgesics.   PONV prophylaxis: Ondansetron (or other 5HT-3), Dexamethasone or Solumedrol     Comments:    Other Comments: The material risks, benefits, and alternatives were discussed in detail.  The patient agrees to proceed.  The patient has no other complaints at this time.           Marvin Guerrero MD    I have reviewed the pertinent notes and labs in the chart from the past 30 days and (re)examined the patient.  Any updates or changes from those notes are reflected in this note.      # Hypercalcemia: Highest Ca = 12.8 mg/dL in last 2 days, will monitor as appropriate

## 2023-11-30 NOTE — ANESTHESIA PREPROCEDURE EVALUATION
Anesthesia Pre-Procedure Evaluation    Patient: Douglas Herrera   MRN: [id number] : 1960        Procedure : Procedure(s):  Stealth assisted transnasal endoscopic approach to excise left sinonasal mass          Past Medical History:   Diagnosis Date     Ascending aortic aneurysm (H24)      Coronary artery disease      Depression      Gout      History of anesthesia complications      Hyperlipemia      Hypertension      PONV (postoperative nausea and vomiting)       Past Surgical History:   Procedure Laterality Date     AORTA SURGERY N/A 2019    Procedure: WITH ASCENDING AORTA REPLACEMENT;  Surgeon: Darlene Graff MD;  Location: [facility] OR;  Service: Cardiovascular     BYPASS GRAFT ARTERY CORONARY       CARDIAC SURGERY       CV CORONARY ANGIOGRAM N/A 3/12/2019    Procedure: Coronary Angiogram;  Surgeon: Hamilton Lucero MD;  Location: [facility] Cath Lab;  Service: Cardiology     ENDOSCOPIC SINUS SURGERY Left 11/10/2023    Procedure: Transnasal endoscopic approach to biopsy left nasal mass;  Surgeon: Damon Regan MD;  Location: [facility] OR     GALLBLADDER SURGERY       IR FACIAL EMBOLIZATION LEFT  2023      No Known Allergies   Social History     Tobacco Use     Smoking status: Former     Types: Cigarettes     Smokeless tobacco: Never     Tobacco comments:     every 3-4 months, rare   Substance Use Topics     Alcohol use: Yes     Comment: Occasionally      Wt Readings from Last 1 Encounters:   23 64.1 kg (141 lb 5 oz)        Anesthesia Evaluation   Pt has had prior anesthetic.     History of anesthetic complications  - PONV.      ROS/MED HX  ENT/Pulmonary: Comment: Acute resp failure    (+)                             recent URI,          Neurologic: Comment: Sinonasal mass      Cardiovascular: Comment: S/p CABG  Last Echo 2019:  Narrative & Impression  1. Normal left ventricular size and systolic performance with a visually estimated ejection fraction of 60-65%.  "  2. No significant valvular heart disease is identified on this study.   3. There is mild right ventricular enlargement with mildly reduced right ventricular systolic performance.  4. There is mild right atrial enlargement.   5. There is mild aortic root enlargement.   6. Right ventricular systolic pressure cannot be directly estimated from TR velocities due to insufficient tricuspid regurgitation.         (+)  hypertension- -  CAD angina-  - -                                      METS/Exercise Tolerance:     Hematologic:     (+) History of blood clots,               Musculoskeletal:       GI/Hepatic:     (+)           hepatitis type B,        Renal/Genitourinary:       Endo:       Psychiatric/Substance Use:       Infectious Disease:       Malignancy:       Other:            Physical Exam    Airway   unable to assess      TM distance: > 3 FB   Neck ROM: full   Mouth opening: < 3 cm    Respiratory Devices and Support         Dental    unable to assess        Cardiovascular   cardiovascular exam normal       Rhythm and rate: regular and normal     Pulmonary   pulmonary exam normal        breath sounds clear to auscultation         OUTSIDE LABS:  CBC:   Lab Results   Component Value Date    WBC 8.7 11/28/2023    WBC 6.5 11/11/2023    HGB 13.5 11/28/2023    HGB 13.1 (L) 11/11/2023    HCT 41.4 11/28/2023    HCT 39.1 (L) 11/11/2023     11/28/2023     11/11/2023     BMP:   Lab Results   Component Value Date     11/28/2023     09/21/2023    POTASSIUM 4.5 11/28/2023    POTASSIUM 4.1 09/21/2023    CHLORIDE 103 11/28/2023    CHLORIDE 104 09/21/2023    CO2 28 11/28/2023    CO2 24 09/21/2023    BUN 13.2 11/28/2023    BUN 15.8 09/21/2023    CR 0.92 11/28/2023    CR 1.10 09/21/2023    GLC 93 11/30/2023     (H) 11/28/2023     COAGS:   Lab Results   Component Value Date    PTT 29 09/12/2023    INR 1.01 09/12/2023    FIBR 157 (L) 04/23/2019     POC: No results found for: \"BGM\", \"HCG\", " "\"HCGS\"  HEPATIC:   Lab Results   Component Value Date    ALBUMIN 3.8 05/13/2022    PROTTOTAL 6.9 05/13/2022    ALT 29 05/13/2022    AST 26 05/13/2022    ALKPHOS 44 (L) 05/13/2022    BILITOTAL 1.1 (H) 05/13/2022     OTHER:   Lab Results   Component Value Date    PH 7.38 04/24/2019    A1C 5.2 04/25/2019    FLORENCE 12.8 (H) 11/28/2023    MAG 1.8 05/08/2019       Anesthesia Plan    ASA Status:  3    NPO Status:  NPO Appropriate    Anesthesia Type: General.     - Airway: ETT   Induction: Intravenous.   Maintenance: Balanced.   Techniques and Equipment:     - Airway: Video-Laryngoscope     - Lines/Monitors: BIS, 2nd IV, Arterial Line     Consents    Anesthesia Plan(s) and associated risks, benefits, and realistic alternatives discussed. Questions answered and patient/representative(s) expressed understanding.     - Discussed: Risks, Benefits and Alternatives for the PROCEDURE were discussed     - Discussed with:  Patient,       - Specific Concerns: blood to be present in the room prior to induction of anethesia. Discussed with patient through  the possibility of blood transfusion, usage of pressors to maintain hemodynamic stability perioperatively, ICU admission, need for aretrial line, complications such as dental injury, difficult intubation given limited mouth opening. All questions answered and patient admits understaning through . Allowable blood loss is 1.3 L.     - Extended Intubation/Ventilatory Support Discussed: Yes.      - Patient is DNR/DNI Status: No     Use of blood products discussed: Yes.     - Discussed with: Patient (through ).     Postoperative Care    Pain management: IV analgesics, Oral pain medications, Multi-modal analgesia.   PONV prophylaxis: Ondansetron (or other 5HT-3), Promethazine or metoclopramide, Dexamethasone or Solumedrol     Comments:               Ash Lowery MD    I have reviewed the pertinent notes and labs in the chart from the past 30 days and " (re)examined the patient.  Any updates or changes from those notes are reflected in this note.      # Hypercalcemia: Highest Ca = 12.8 mg/dL in last 30 days, will monitor as appropriate

## 2023-12-01 ENCOUNTER — ANESTHESIA (OUTPATIENT)
Dept: SURGERY | Facility: CLINIC | Age: 63
DRG: 135 | End: 2023-12-01
Payer: COMMERCIAL

## 2023-12-01 ENCOUNTER — APPOINTMENT (OUTPATIENT)
Dept: INTERPRETER SERVICES | Facility: CLINIC | Age: 63
End: 2023-12-01
Payer: COMMERCIAL

## 2023-12-01 ENCOUNTER — APPOINTMENT (OUTPATIENT)
Dept: GENERAL RADIOLOGY | Facility: CLINIC | Age: 63
DRG: 135 | End: 2023-12-01
Attending: NEUROLOGICAL SURGERY
Payer: COMMERCIAL

## 2023-12-01 LAB
ANION GAP SERPL CALCULATED.3IONS-SCNC: 10 MMOL/L (ref 7–15)
BASE EXCESS BLDA CALC-SCNC: 2.1 MMOL/L (ref -9.6–2)
BASE EXCESS BLDA CALC-SCNC: 2.4 MMOL/L (ref -9.6–2)
BASE EXCESS BLDA CALC-SCNC: 2.7 MMOL/L (ref -9.6–2)
BASE EXCESS BLDA CALC-SCNC: 2.9 MMOL/L (ref -9.6–2)
BLD PROD TYP BPU: NORMAL
BLD PROD TYP BPU: NORMAL
BLOOD COMPONENT TYPE: NORMAL
BLOOD COMPONENT TYPE: NORMAL
BUN SERPL-MCNC: 12.3 MG/DL (ref 8–23)
CA-I BLD-MCNC: 4.5 MG/DL (ref 4.4–5.2)
CA-I BLD-MCNC: 4.6 MG/DL (ref 4.4–5.2)
CA-I BLD-MCNC: 4.8 MG/DL (ref 4.4–5.2)
CA-I BLD-MCNC: 5.1 MG/DL (ref 4.4–5.2)
CALCIUM SERPL-MCNC: 8.1 MG/DL (ref 8.8–10.2)
CHLORIDE SERPL-SCNC: 102 MMOL/L (ref 98–107)
CODING SYSTEM: NORMAL
CODING SYSTEM: NORMAL
CREAT SERPL-MCNC: 0.72 MG/DL (ref 0.67–1.17)
CROSSMATCH: NORMAL
CROSSMATCH: NORMAL
DEPRECATED HCO3 PLAS-SCNC: 24 MMOL/L (ref 22–29)
EGFRCR SERPLBLD CKD-EPI 2021: >90 ML/MIN/1.73M2
ERYTHROCYTE [DISTWIDTH] IN BLOOD BY AUTOMATED COUNT: 13.5 % (ref 10–15)
GLUCOSE BLD-MCNC: 115 MG/DL (ref 70–99)
GLUCOSE BLD-MCNC: 123 MG/DL (ref 70–99)
GLUCOSE BLD-MCNC: 142 MG/DL (ref 70–99)
GLUCOSE BLD-MCNC: 152 MG/DL (ref 70–99)
GLUCOSE BLDC GLUCOMTR-MCNC: 120 MG/DL (ref 70–99)
GLUCOSE BLDC GLUCOMTR-MCNC: 130 MG/DL (ref 70–99)
GLUCOSE BLDC GLUCOMTR-MCNC: 160 MG/DL (ref 70–99)
GLUCOSE BLDC GLUCOMTR-MCNC: 170 MG/DL (ref 70–99)
GLUCOSE BLDC GLUCOMTR-MCNC: 193 MG/DL (ref 70–99)
GLUCOSE SERPL-MCNC: 170 MG/DL (ref 70–99)
HCO3 BLDA-SCNC: 27 MMOL/L (ref 21–28)
HCT VFR BLD AUTO: 25.8 % (ref 40–53)
HGB BLD-MCNC: 10.1 G/DL (ref 13.3–17.7)
HGB BLD-MCNC: 11.4 G/DL (ref 13.3–17.7)
HGB BLD-MCNC: 7.1 G/DL (ref 13.3–17.7)
HGB BLD-MCNC: 8.5 G/DL (ref 13.3–17.7)
HGB BLD-MCNC: 8.8 G/DL (ref 13.3–17.7)
ISSUE DATE AND TIME: NORMAL
ISSUE DATE AND TIME: NORMAL
LACTATE BLD-SCNC: 1.1 MMOL/L
LACTATE BLD-SCNC: 1.1 MMOL/L
LACTATE BLD-SCNC: 1.3 MMOL/L
LACTATE BLD-SCNC: 1.3 MMOL/L
MCH RBC QN AUTO: 28.3 PG (ref 26.5–33)
MCHC RBC AUTO-ENTMCNC: 34.1 G/DL (ref 31.5–36.5)
MCV RBC AUTO: 83 FL (ref 78–100)
O2/TOTAL GAS SETTING VFR VENT: 42 %
PCO2 BLDA: 39 MM HG (ref 35–45)
PCO2 BLDA: 41 MM HG (ref 35–45)
PCO2 BLDA: 41 MM HG (ref 35–45)
PCO2 BLDA: 43 MM HG (ref 35–45)
PH BLDA: 7.41 [PH] (ref 7.35–7.45)
PH BLDA: 7.43 [PH] (ref 7.35–7.45)
PH BLDA: 7.43 [PH] (ref 7.35–7.45)
PH BLDA: 7.45 [PH] (ref 7.35–7.45)
PLATELET # BLD AUTO: 143 10E3/UL (ref 150–450)
PO2 BLDA: 179 MM HG (ref 80–105)
PO2 BLDA: 188 MM HG (ref 80–105)
PO2 BLDA: 200 MM HG (ref 80–105)
PO2 BLDA: 200 MM HG (ref 80–105)
POTASSIUM BLD-SCNC: 3.6 MMOL/L (ref 3.5–5)
POTASSIUM BLD-SCNC: 3.8 MMOL/L (ref 3.5–5)
POTASSIUM BLD-SCNC: 4 MMOL/L (ref 3.5–5)
POTASSIUM BLD-SCNC: 4.3 MMOL/L (ref 3.5–5)
POTASSIUM SERPL-SCNC: 4.3 MMOL/L (ref 3.4–5.3)
RBC # BLD AUTO: 3.11 10E6/UL (ref 4.4–5.9)
SODIUM BLD-SCNC: 136 MMOL/L (ref 135–145)
SODIUM BLD-SCNC: 137 MMOL/L (ref 135–145)
SODIUM BLD-SCNC: 137 MMOL/L (ref 135–145)
SODIUM BLD-SCNC: 138 MMOL/L (ref 135–145)
SODIUM SERPL-SCNC: 136 MMOL/L (ref 135–145)
UNIT ABO/RH: NORMAL
UNIT ABO/RH: NORMAL
UNIT NUMBER: NORMAL
UNIT NUMBER: NORMAL
UNIT STATUS: NORMAL
UNIT STATUS: NORMAL
UNIT TYPE ISBT: 6200
UNIT TYPE ISBT: 6200
WBC # BLD AUTO: 10 10E3/UL (ref 4–11)

## 2023-12-01 PROCEDURE — 88311 DECALCIFY TISSUE: CPT | Mod: 26 | Performed by: STUDENT IN AN ORGANIZED HEALTH CARE EDUCATION/TRAINING PROGRAM

## 2023-12-01 PROCEDURE — 09BX8ZZ EXCISION OF LEFT SPHENOID SINUS, VIA NATURAL OR ARTIFICIAL OPENING ENDOSCOPIC: ICD-10-PCS | Performed by: OTOLARYNGOLOGY

## 2023-12-01 PROCEDURE — 31257 NSL/SINS NDSC TOT W/SPHENDT: CPT | Mod: LT | Performed by: OTOLARYNGOLOGY

## 2023-12-01 PROCEDURE — 250N000009 HC RX 250: Performed by: OTOLARYNGOLOGY

## 2023-12-01 PROCEDURE — 85027 COMPLETE CBC AUTOMATED: CPT | Performed by: STUDENT IN AN ORGANIZED HEALTH CARE EDUCATION/TRAINING PROGRAM

## 2023-12-01 PROCEDURE — 250N000013 HC RX MED GY IP 250 OP 250 PS 637: Performed by: STUDENT IN AN ORGANIZED HEALTH CARE EDUCATION/TRAINING PROGRAM

## 2023-12-01 PROCEDURE — 30999 UNLISTED PROCEDURE NOSE: CPT | Mod: GC | Performed by: OTOLARYNGOLOGY

## 2023-12-01 PROCEDURE — 88331 PATH CONSLTJ SURG 1 BLK 1SPC: CPT | Mod: 26 | Performed by: STUDENT IN AN ORGANIZED HEALTH CARE EDUCATION/TRAINING PROGRAM

## 2023-12-01 PROCEDURE — 70250 X-RAY EXAM OF SKULL: CPT | Mod: 26 | Performed by: STUDENT IN AN ORGANIZED HEALTH CARE EDUCATION/TRAINING PROGRAM

## 2023-12-01 PROCEDURE — 360N000079 HC SURGERY LEVEL 6, PER MIN: Performed by: OTOLARYNGOLOGY

## 2023-12-01 PROCEDURE — 120N000002 HC R&B MED SURG/OB UMMC

## 2023-12-01 PROCEDURE — P9045 ALBUMIN (HUMAN), 5%, 250 ML: HCPCS | Mod: JZ | Performed by: NURSE ANESTHETIST, CERTIFIED REGISTERED

## 2023-12-01 PROCEDURE — 09BR8ZZ EXCISION OF LEFT MAXILLARY SINUS, VIA NATURAL OR ARTIFICIAL OPENING ENDOSCOPIC: ICD-10-PCS | Performed by: OTOLARYNGOLOGY

## 2023-12-01 PROCEDURE — 61782 SCAN PROC CRANIAL EXTRA: CPT | Mod: GC | Performed by: OTOLARYNGOLOGY

## 2023-12-01 PROCEDURE — 09BV8ZZ EXCISION OF LEFT ETHMOID SINUS, VIA NATURAL OR ARTIFICIAL OPENING ENDOSCOPIC: ICD-10-PCS | Performed by: OTOLARYNGOLOGY

## 2023-12-01 PROCEDURE — 258N000001 HC RX 258: Performed by: STUDENT IN AN ORGANIZED HEALTH CARE EDUCATION/TRAINING PROGRAM

## 2023-12-01 PROCEDURE — 88331 PATH CONSLTJ SURG 1 BLK 1SPC: CPT | Mod: TC | Performed by: OTOLARYNGOLOGY

## 2023-12-01 PROCEDURE — 258N000003 HC RX IP 258 OP 636: Performed by: NURSE ANESTHETIST, CERTIFIED REGISTERED

## 2023-12-01 PROCEDURE — 250N000011 HC RX IP 250 OP 636: Performed by: NURSE ANESTHETIST, CERTIFIED REGISTERED

## 2023-12-01 PROCEDURE — 258N000003 HC RX IP 258 OP 636: Performed by: ANESTHESIOLOGY

## 2023-12-01 PROCEDURE — 250N000009 HC RX 250: Performed by: ANESTHESIOLOGY

## 2023-12-01 PROCEDURE — P9016 RBC LEUKOCYTES REDUCED: HCPCS

## 2023-12-01 PROCEDURE — 999N000141 HC STATISTIC PRE-PROCEDURE NURSING ASSESSMENT: Performed by: OTOLARYNGOLOGY

## 2023-12-01 PROCEDURE — 250N000025 HC SEVOFLURANE, PER MIN: Performed by: OTOLARYNGOLOGY

## 2023-12-01 PROCEDURE — 09BK8ZZ EXCISION OF NASAL MUCOSA AND SOFT TISSUE, VIA NATURAL OR ARTIFICIAL OPENING ENDOSCOPIC: ICD-10-PCS | Performed by: OTOLARYNGOLOGY

## 2023-12-01 PROCEDURE — 88307 TISSUE EXAM BY PATHOLOGIST: CPT | Mod: 26 | Performed by: STUDENT IN AN ORGANIZED HEALTH CARE EDUCATION/TRAINING PROGRAM

## 2023-12-01 PROCEDURE — 82803 BLOOD GASES ANY COMBINATION: CPT

## 2023-12-01 PROCEDURE — 84295 ASSAY OF SERUM SODIUM: CPT

## 2023-12-01 PROCEDURE — 999N000063 XR SKULL PORT 1/3 VIEWS

## 2023-12-01 PROCEDURE — 710N000010 HC RECOVERY PHASE 1, LEVEL 2, PER MIN: Performed by: OTOLARYNGOLOGY

## 2023-12-01 PROCEDURE — 88305 TISSUE EXAM BY PATHOLOGIST: CPT | Mod: 26 | Performed by: STUDENT IN AN ORGANIZED HEALTH CARE EDUCATION/TRAINING PROGRAM

## 2023-12-01 PROCEDURE — 84295 ASSAY OF SERUM SODIUM: CPT | Performed by: STUDENT IN AN ORGANIZED HEALTH CARE EDUCATION/TRAINING PROGRAM

## 2023-12-01 PROCEDURE — 272N000001 HC OR GENERAL SUPPLY STERILE: Performed by: OTOLARYNGOLOGY

## 2023-12-01 PROCEDURE — 250N000009 HC RX 250: Performed by: NURSE ANESTHETIST, CERTIFIED REGISTERED

## 2023-12-01 PROCEDURE — 258N000003 HC RX IP 258 OP 636

## 2023-12-01 PROCEDURE — 250N000011 HC RX IP 250 OP 636: Mod: JZ | Performed by: OTOLARYNGOLOGY

## 2023-12-01 PROCEDURE — 370N000017 HC ANESTHESIA TECHNICAL FEE, PER MIN: Performed by: OTOLARYNGOLOGY

## 2023-12-01 PROCEDURE — 8E09XBZ COMPUTER ASSISTED PROCEDURE OF HEAD AND NECK REGION: ICD-10-PCS | Performed by: OTOLARYNGOLOGY

## 2023-12-01 RX ORDER — HYDROMORPHONE HYDROCHLORIDE 1 MG/ML
0.4 INJECTION, SOLUTION INTRAMUSCULAR; INTRAVENOUS; SUBCUTANEOUS
Status: DISCONTINUED | OUTPATIENT
Start: 2023-12-01 | End: 2023-12-03

## 2023-12-01 RX ORDER — SODIUM CHLORIDE, SODIUM LACTATE, POTASSIUM CHLORIDE, CALCIUM CHLORIDE 600; 310; 30; 20 MG/100ML; MG/100ML; MG/100ML; MG/100ML
INJECTION, SOLUTION INTRAVENOUS CONTINUOUS
Status: DISCONTINUED | OUTPATIENT
Start: 2023-12-01 | End: 2023-12-01 | Stop reason: HOSPADM

## 2023-12-01 RX ORDER — LIDOCAINE 40 MG/G
CREAM TOPICAL
Status: DISCONTINUED | OUTPATIENT
Start: 2023-12-01 | End: 2023-12-04 | Stop reason: HOSPADM

## 2023-12-01 RX ORDER — PROCHLORPERAZINE MALEATE 10 MG
10 TABLET ORAL EVERY 6 HOURS PRN
Status: DISCONTINUED | OUTPATIENT
Start: 2023-12-01 | End: 2023-12-04 | Stop reason: HOSPADM

## 2023-12-01 RX ORDER — SODIUM CHLORIDE, SODIUM LACTATE, POTASSIUM CHLORIDE, CALCIUM CHLORIDE 600; 310; 30; 20 MG/100ML; MG/100ML; MG/100ML; MG/100ML
INJECTION, SOLUTION INTRAVENOUS CONTINUOUS PRN
Status: DISCONTINUED | OUTPATIENT
Start: 2023-12-01 | End: 2023-12-01

## 2023-12-01 RX ORDER — AMOXICILLIN 250 MG
1 CAPSULE ORAL 2 TIMES DAILY
Status: DISCONTINUED | OUTPATIENT
Start: 2023-12-01 | End: 2023-12-04 | Stop reason: HOSPADM

## 2023-12-01 RX ORDER — ACETAMINOPHEN 325 MG/1
650 TABLET ORAL EVERY 4 HOURS PRN
Status: DISCONTINUED | OUTPATIENT
Start: 2023-12-04 | End: 2023-12-04 | Stop reason: HOSPADM

## 2023-12-01 RX ORDER — SCOLOPAMINE TRANSDERMAL SYSTEM 1 MG/1
1 PATCH, EXTENDED RELEASE TRANSDERMAL ONCE
Status: COMPLETED | OUTPATIENT
Start: 2023-12-01 | End: 2023-12-02

## 2023-12-01 RX ORDER — FENTANYL CITRATE 50 UG/ML
50 INJECTION, SOLUTION INTRAMUSCULAR; INTRAVENOUS EVERY 5 MIN PRN
Status: DISCONTINUED | OUTPATIENT
Start: 2023-12-01 | End: 2023-12-01 | Stop reason: HOSPADM

## 2023-12-01 RX ORDER — LIDOCAINE HYDROCHLORIDE AND EPINEPHRINE 10; 10 MG/ML; UG/ML
INJECTION, SOLUTION INFILTRATION; PERINEURAL PRN
Status: DISCONTINUED | OUTPATIENT
Start: 2023-12-01 | End: 2023-12-01 | Stop reason: HOSPADM

## 2023-12-01 RX ORDER — DEXAMETHASONE SODIUM PHOSPHATE 4 MG/ML
INJECTION, SOLUTION INTRA-ARTICULAR; INTRALESIONAL; INTRAMUSCULAR; INTRAVENOUS; SOFT TISSUE PRN
Status: DISCONTINUED | OUTPATIENT
Start: 2023-12-01 | End: 2023-12-01

## 2023-12-01 RX ORDER — ONDANSETRON 2 MG/ML
4 INJECTION INTRAMUSCULAR; INTRAVENOUS EVERY 6 HOURS PRN
Status: DISCONTINUED | OUTPATIENT
Start: 2023-12-01 | End: 2023-12-04 | Stop reason: HOSPADM

## 2023-12-01 RX ORDER — HYDROMORPHONE HCL IN WATER/PF 6 MG/30 ML
0.2 PATIENT CONTROLLED ANALGESIA SYRINGE INTRAVENOUS EVERY 5 MIN PRN
Status: DISCONTINUED | OUTPATIENT
Start: 2023-12-01 | End: 2023-12-01 | Stop reason: HOSPADM

## 2023-12-01 RX ORDER — ONDANSETRON 2 MG/ML
INJECTION INTRAMUSCULAR; INTRAVENOUS PRN
Status: DISCONTINUED | OUTPATIENT
Start: 2023-12-01 | End: 2023-12-01

## 2023-12-01 RX ORDER — FENTANYL CITRATE 50 UG/ML
25 INJECTION, SOLUTION INTRAMUSCULAR; INTRAVENOUS EVERY 5 MIN PRN
Status: DISCONTINUED | OUTPATIENT
Start: 2023-12-01 | End: 2023-12-01 | Stop reason: HOSPADM

## 2023-12-01 RX ORDER — ONDANSETRON 4 MG/1
4 TABLET, ORALLY DISINTEGRATING ORAL EVERY 6 HOURS PRN
Status: DISCONTINUED | OUTPATIENT
Start: 2023-12-01 | End: 2023-12-04 | Stop reason: HOSPADM

## 2023-12-01 RX ORDER — FENTANYL CITRATE 50 UG/ML
INJECTION, SOLUTION INTRAMUSCULAR; INTRAVENOUS PRN
Status: DISCONTINUED | OUTPATIENT
Start: 2023-12-01 | End: 2023-12-01

## 2023-12-01 RX ORDER — ONDANSETRON 4 MG/1
4 TABLET, ORALLY DISINTEGRATING ORAL EVERY 30 MIN PRN
Status: DISCONTINUED | OUTPATIENT
Start: 2023-12-01 | End: 2023-12-01 | Stop reason: HOSPADM

## 2023-12-01 RX ORDER — DEXAMETHASONE SODIUM PHOSPHATE 10 MG/ML
10 INJECTION, SOLUTION INTRAMUSCULAR; INTRAVENOUS ONCE
Status: DISCONTINUED | OUTPATIENT
Start: 2023-12-01 | End: 2023-12-01 | Stop reason: HOSPADM

## 2023-12-01 RX ORDER — SODIUM CHLORIDE, SODIUM GLUCONATE, SODIUM ACETATE, POTASSIUM CHLORIDE AND MAGNESIUM CHLORIDE 526; 502; 368; 37; 30 MG/100ML; MG/100ML; MG/100ML; MG/100ML; MG/100ML
INJECTION, SOLUTION INTRAVENOUS CONTINUOUS PRN
Status: DISCONTINUED | OUTPATIENT
Start: 2023-12-01 | End: 2023-12-01

## 2023-12-01 RX ORDER — DEXTROSE MONOHYDRATE, SODIUM CHLORIDE, AND POTASSIUM CHLORIDE 50; 1.49; 9 G/1000ML; G/1000ML; G/1000ML
INJECTION, SOLUTION INTRAVENOUS CONTINUOUS
Status: DISCONTINUED | OUTPATIENT
Start: 2023-12-01 | End: 2023-12-02

## 2023-12-01 RX ORDER — VASOPRESSIN IN 0.9 % NACL 2 UNIT/2ML
SYRINGE (ML) INTRAVENOUS PRN
Status: DISCONTINUED | OUTPATIENT
Start: 2023-12-01 | End: 2023-12-01

## 2023-12-01 RX ORDER — POLYETHYLENE GLYCOL 3350 17 G/17G
17 POWDER, FOR SOLUTION ORAL DAILY
Status: DISCONTINUED | OUTPATIENT
Start: 2023-12-02 | End: 2023-12-04 | Stop reason: HOSPADM

## 2023-12-01 RX ORDER — PROPOFOL 10 MG/ML
INJECTION, EMULSION INTRAVENOUS PRN
Status: DISCONTINUED | OUTPATIENT
Start: 2023-12-01 | End: 2023-12-01

## 2023-12-01 RX ORDER — HYDROMORPHONE HYDROCHLORIDE 1 MG/ML
0.2 INJECTION, SOLUTION INTRAMUSCULAR; INTRAVENOUS; SUBCUTANEOUS
Status: DISCONTINUED | OUTPATIENT
Start: 2023-12-01 | End: 2023-12-03

## 2023-12-01 RX ORDER — BISACODYL 10 MG
10 SUPPOSITORY, RECTAL RECTAL DAILY PRN
Status: DISCONTINUED | OUTPATIENT
Start: 2023-12-01 | End: 2023-12-04 | Stop reason: HOSPADM

## 2023-12-01 RX ORDER — LIDOCAINE HYDROCHLORIDE 20 MG/ML
INJECTION, SOLUTION INFILTRATION; PERINEURAL PRN
Status: DISCONTINUED | OUTPATIENT
Start: 2023-12-01 | End: 2023-12-01

## 2023-12-01 RX ORDER — OXYMETAZOLINE HYDROCHLORIDE 0.05 G/100ML
2 SPRAY NASAL 3 TIMES DAILY PRN
Status: DISCONTINUED | OUTPATIENT
Start: 2023-12-01 | End: 2023-12-04 | Stop reason: HOSPADM

## 2023-12-01 RX ORDER — ACETAMINOPHEN 325 MG/1
975 TABLET ORAL EVERY 8 HOURS
Qty: 27 TABLET | Refills: 0 | Status: DISCONTINUED | OUTPATIENT
Start: 2023-12-01 | End: 2023-12-04 | Stop reason: HOSPADM

## 2023-12-01 RX ORDER — CEFTRIAXONE 2 G/1
2 INJECTION, POWDER, FOR SOLUTION INTRAMUSCULAR; INTRAVENOUS
Status: COMPLETED | OUTPATIENT
Start: 2023-12-01 | End: 2023-12-01

## 2023-12-01 RX ORDER — ONDANSETRON 2 MG/ML
4 INJECTION INTRAMUSCULAR; INTRAVENOUS EVERY 30 MIN PRN
Status: DISCONTINUED | OUTPATIENT
Start: 2023-12-01 | End: 2023-12-01 | Stop reason: HOSPADM

## 2023-12-01 RX ORDER — HYDROMORPHONE HCL IN WATER/PF 6 MG/30 ML
0.4 PATIENT CONTROLLED ANALGESIA SYRINGE INTRAVENOUS EVERY 5 MIN PRN
Status: DISCONTINUED | OUTPATIENT
Start: 2023-12-01 | End: 2023-12-01 | Stop reason: HOSPADM

## 2023-12-01 RX ORDER — OXYMETAZOLINE HYDROCHLORIDE 0.05 G/100ML
SPRAY NASAL PRN
Status: DISCONTINUED | OUTPATIENT
Start: 2023-12-01 | End: 2023-12-01 | Stop reason: HOSPADM

## 2023-12-01 RX ORDER — OXYCODONE HYDROCHLORIDE 10 MG/1
10 TABLET ORAL EVERY 4 HOURS PRN
Status: DISCONTINUED | OUTPATIENT
Start: 2023-12-01 | End: 2023-12-02

## 2023-12-01 RX ORDER — OXYCODONE HYDROCHLORIDE 5 MG/1
5 TABLET ORAL EVERY 4 HOURS PRN
Status: DISCONTINUED | OUTPATIENT
Start: 2023-12-01 | End: 2023-12-02

## 2023-12-01 RX ADMIN — Medication 10 MG: at 10:03

## 2023-12-01 RX ADMIN — SODIUM CHLORIDE, SODIUM GLUCONATE, SODIUM ACETATE, POTASSIUM CHLORIDE AND MAGNESIUM CHLORIDE: 526; 502; 368; 37; 30 INJECTION, SOLUTION INTRAVENOUS at 12:12

## 2023-12-01 RX ADMIN — ALBUMIN (HUMAN): 12.5 SOLUTION INTRAVENOUS at 11:11

## 2023-12-01 RX ADMIN — MIDAZOLAM 2 MG: 1 INJECTION INTRAMUSCULAR; INTRAVENOUS at 07:39

## 2023-12-01 RX ADMIN — POTASSIUM CHLORIDE, DEXTROSE MONOHYDRATE AND SODIUM CHLORIDE: 150; 5; 900 INJECTION, SOLUTION INTRAVENOUS at 22:38

## 2023-12-01 RX ADMIN — POTASSIUM CHLORIDE, DEXTROSE MONOHYDRATE AND SODIUM CHLORIDE: 150; 5; 900 INJECTION, SOLUTION INTRAVENOUS at 13:00

## 2023-12-01 RX ADMIN — SODIUM CHLORIDE, SODIUM GLUCONATE, SODIUM ACETATE, POTASSIUM CHLORIDE AND MAGNESIUM CHLORIDE: 526; 502; 368; 37; 30 INJECTION, SOLUTION INTRAVENOUS at 11:12

## 2023-12-01 RX ADMIN — FENTANYL CITRATE 50 MCG: 50 INJECTION INTRAMUSCULAR; INTRAVENOUS at 09:14

## 2023-12-01 RX ADMIN — CEFTRIAXONE SODIUM 2 G: 2 INJECTION, POWDER, FOR SOLUTION INTRAMUSCULAR; INTRAVENOUS at 08:00

## 2023-12-01 RX ADMIN — PROPOFOL 130 MG: 10 INJECTION, EMULSION INTRAVENOUS at 07:50

## 2023-12-01 RX ADMIN — ACETAMINOPHEN 1000 MG: 500 TABLET ORAL at 04:54

## 2023-12-01 RX ADMIN — SODIUM CHLORIDE, POTASSIUM CHLORIDE, SODIUM LACTATE AND CALCIUM CHLORIDE 500 ML: 600; 310; 30; 20 INJECTION, SOLUTION INTRAVENOUS at 15:15

## 2023-12-01 RX ADMIN — PHENYLEPHRINE HYDROCHLORIDE 100 MCG: 10 INJECTION INTRAVENOUS at 09:52

## 2023-12-01 RX ADMIN — PHENYLEPHRINE HYDROCHLORIDE 0.3 MCG/KG/MIN: 10 INJECTION INTRAVENOUS at 07:58

## 2023-12-01 RX ADMIN — FENTANYL CITRATE 50 MCG: 50 INJECTION INTRAMUSCULAR; INTRAVENOUS at 08:56

## 2023-12-01 RX ADMIN — Medication 50 MG: at 08:02

## 2023-12-01 RX ADMIN — NOREPINEPHRINE BITARTRATE 12.8 MCG: 1 INJECTION, SOLUTION, CONCENTRATE INTRAVENOUS at 07:55

## 2023-12-01 RX ADMIN — SODIUM CHLORIDE, SODIUM GLUCONATE, SODIUM ACETATE, POTASSIUM CHLORIDE AND MAGNESIUM CHLORIDE: 526; 502; 368; 37; 30 INJECTION, SOLUTION INTRAVENOUS at 08:30

## 2023-12-01 RX ADMIN — ENTECAVIR 0.5 MG: 0.5 TABLET ORAL at 04:54

## 2023-12-01 RX ADMIN — SODIUM CHLORIDE, POTASSIUM CHLORIDE, SODIUM LACTATE AND CALCIUM CHLORIDE 500 ML: 600; 310; 30; 20 INJECTION, SOLUTION INTRAVENOUS at 13:49

## 2023-12-01 RX ADMIN — Medication 0.5 UNITS: at 11:58

## 2023-12-01 RX ADMIN — PHENYLEPHRINE HYDROCHLORIDE 50 MCG: 10 INJECTION INTRAVENOUS at 10:12

## 2023-12-01 RX ADMIN — ACETAMINOPHEN 975 MG: 325 TABLET, FILM COATED ORAL at 21:27

## 2023-12-01 RX ADMIN — Medication 0.5 UNITS: at 11:44

## 2023-12-01 RX ADMIN — SUCCINYLCHOLINE CHLORIDE 70 MG: 20 INJECTION, SOLUTION INTRAMUSCULAR; INTRAVENOUS; PARENTERAL at 07:50

## 2023-12-01 RX ADMIN — FENTANYL CITRATE 50 MCG: 50 INJECTION INTRAMUSCULAR; INTRAVENOUS at 09:36

## 2023-12-01 RX ADMIN — PHENYLEPHRINE HYDROCHLORIDE 50 MCG: 10 INJECTION INTRAVENOUS at 10:18

## 2023-12-01 RX ADMIN — NOREPINEPHRINE BITARTRATE 6.4 MCG: 1 INJECTION, SOLUTION, CONCENTRATE INTRAVENOUS at 11:37

## 2023-12-01 RX ADMIN — LIDOCAINE HYDROCHLORIDE 80 MG: 20 INJECTION, SOLUTION INFILTRATION; PERINEURAL at 07:50

## 2023-12-01 RX ADMIN — SODIUM CHLORIDE, POTASSIUM CHLORIDE, SODIUM LACTATE AND CALCIUM CHLORIDE: 600; 310; 30; 20 INJECTION, SOLUTION INTRAVENOUS at 07:39

## 2023-12-01 RX ADMIN — ALBUMIN (HUMAN): 12.5 SOLUTION INTRAVENOUS at 09:43

## 2023-12-01 RX ADMIN — Medication 12.5 MG: at 04:57

## 2023-12-01 RX ADMIN — SENNOSIDES AND DOCUSATE SODIUM 1 TABLET: 8.6; 5 TABLET ORAL at 21:35

## 2023-12-01 RX ADMIN — Medication 20 MG: at 10:50

## 2023-12-01 RX ADMIN — SCOPALAMINE 1 PATCH: 1 PATCH, EXTENDED RELEASE TRANSDERMAL at 06:25

## 2023-12-01 RX ADMIN — PHENYLEPHRINE HYDROCHLORIDE 200 MCG: 10 INJECTION INTRAVENOUS at 07:52

## 2023-12-01 RX ADMIN — FENTANYL CITRATE 50 MCG: 50 INJECTION INTRAMUSCULAR; INTRAVENOUS at 07:50

## 2023-12-01 RX ADMIN — ONDANSETRON 4 MG: 2 INJECTION INTRAMUSCULAR; INTRAVENOUS at 12:12

## 2023-12-01 RX ADMIN — FENTANYL CITRATE 50 MCG: 50 INJECTION INTRAMUSCULAR; INTRAVENOUS at 07:39

## 2023-12-01 RX ADMIN — PHENYLEPHRINE HYDROCHLORIDE 100 MCG: 10 INJECTION INTRAVENOUS at 11:11

## 2023-12-01 RX ADMIN — SUGAMMADEX 200 MG: 100 INJECTION, SOLUTION INTRAVENOUS at 12:32

## 2023-12-01 RX ADMIN — Medication 20 MG: at 09:14

## 2023-12-01 RX ADMIN — DEXAMETHASONE SODIUM PHOSPHATE 10 MG: 4 INJECTION, SOLUTION INTRA-ARTICULAR; INTRALESIONAL; INTRAMUSCULAR; INTRAVENOUS; SOFT TISSUE at 08:41

## 2023-12-01 ASSESSMENT — ACTIVITIES OF DAILY LIVING (ADL)
ADLS_ACUITY_SCORE: 24
ADLS_ACUITY_SCORE: 28
ADLS_ACUITY_SCORE: 24

## 2023-12-01 NOTE — ANESTHESIA PROCEDURE NOTES
Arterial Line Procedure Note    Pre-Procedure   Staff -        Anesthesiologist:  Ash Lowery MD       Performed By: anesthesiologist       Location: OR       Pre-Anesthestic Checklist: patient identified, IV checked, risks and benefits discussed, informed consent, monitors and equipment checked, pre-op evaluation and at physician/surgeon's request  Timeout:       Correct Patient: Yes        Correct Procedure: Yes        Correct Site: Yes        Correct Position: Yes   Line Placement:   This line was placed Post Induction starting at 12/1/2023 7:48 AM and ending at 12/1/2023 8:01 AM  Procedure   Procedure: arterial line       Laterality: right       Insertion Site: radial.  Sterile Prep        Standard elements of sterile barrier followed       Skin prep: Chloraprep  Insertion/Injection        Technique: ultrasound guided and Seldinger Technique        1. Ultrasound was used to evaluate the access site.       2. Artery evaluated via ultrasound for patency/adequacy.       3. Using real-time ultrasound the needle/catheter was observed entering the artery/vein.  Narrative        Tegaderm and Biopatch dressing used.       Complications: None apparent,        Arterial waveform: Yes        IBP within 10% of NIBP: Yes

## 2023-12-01 NOTE — PLAN OF CARE
Major Shift Events:    Neuro: Intact. Able to make needs known. Speech garbled from nasal sling and guaze.   Cards: Sinus rhythm. BP WDL. Tmax 102.4, UA collected and labs drawn.   Pulm: RA/2L oxymask. Good cough  GI/: cruz patent with good UOP. No BM. BG checks. Clear liquid diet.   Plan: NPO at midnight, surgery in the morning. Q2hr neuro checks.   For vital signs and complete assessments, please see documentation flowsheets.

## 2023-12-01 NOTE — BRIEF OP NOTE
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]  Post-operative diagnosis Same as pre-operative diagnosis    Procedure: Stealth assisted transnasal endoscopic approach to excise left sinonasal mass, Left - Head    Surgeon: Surgeon(s) and Role:     * Damon Regan MD - Primary     * Kevin Jack MD - Resident - Assisting  Anesthesia: General   Estimated Blood Loss: 1L    Drains: None  Specimens:   ID Type Source Tests Collected by Time Destination   1 : Left Sinonasal Mass Tissue Other SURGICAL PATHOLOGY EXAM Damon Regan MD 12/1/2023  9:15 AM    2 : left inferior turbinate Tissue Other SURGICAL PATHOLOGY EXAM Damon Regan MD 12/1/2023  9:31 AM    3 : left maxillary sinus Tissue Other SURGICAL PATHOLOGY EXAM Damon Regan MD 12/1/2023  9:40 AM    4 : left sinonasal mass Tissue Other SURGICAL PATHOLOGY EXAM Damon Regan MD 12/1/2023 10:26 AM    5 : left anterior ethmoid Tissue Other SURGICAL PATHOLOGY EXAM Damon Regan MD 12/1/2023 10:26 AM    6 : left posterior ethmoid Tissue Other SURGICAL PATHOLOGY EXAM Damon Regan MD 12/1/2023 10:28 AM    7 : left maxilla bone Tissue Other SURGICAL PATHOLOGY EXAM Damon Regan MD 12/1/2023 10:58 AM    8 : sinonasal contents Tissue Other SURGICAL PATHOLOGY EXAM Damon Regan MD 12/1/2023 12:15 PM      Findings:   Large amt of tumor removed. Nasal airway restored  Residual tumor in the infratemporal fossa.  Nasal packing w/ surgicel, surgiflo, and nasopore  Complications: None.  Implants: * No implants in log *    PLAN  NEURO  Tylenol and oxy, dilaudid prn  HEENT  Afrin, nasal saline prn  C/V  PTA amlodipine held  PTA atorvastatin continued  PTA metoprolol continued  PULM  IS  FEN/GI  D51/2NS+Kcl @ 125  BMP POD0 and  1  Regular diet  Zofran, bowel reg prn  HEME/ID  POD0 CBC, s/p 1u pRBC in OR  PTA entecavir  ENDO  LIZETTE  G/U  LIZETTE  CONSULTS  None  Ppx  SCDs  DISPO  6A, possible discharge 12/2    Kevin Jack MD  ENT resident

## 2023-12-01 NOTE — OR NURSING
Dr. Lowery at bedside after notifying him about blood pressure being 80s/40s via art line and 80s/50s via blood pressure cuff. Order received for a 500mL bolus of LR, srr MAR for administration. Order to use the blood pressure cuff for readings and to keep MAP greater than 65.

## 2023-12-01 NOTE — SUMMARY OF CARE
Admitted/transferred from: PACU  Reason for admission/transfer: ICU care  2 RN skin assessment: completed by ЕКАТЕРИНА Wolfe and ЕКАТЕРИНА العراقي  Result of skin assessment and interventions/actions: None  Height, weight, drug calc weight: Done  Patient belongings (see Flowsheet)  MDRO education added to care planYes  ?

## 2023-12-01 NOTE — ANESTHESIA POSTPROCEDURE EVALUATION
Patient: Douglas Herrera    Procedure: Procedure(s):  Stealth assisted transnasal endoscopic approach to excise left sinonasal mass       Anesthesia Type:  General    Note:  Disposition: Admission   Postop Pain Control: Uneventful            Sign Out: Well controlled pain   PONV: No   Neuro/Psych: Uneventful            Sign Out: Acceptable/Baseline neuro status   Airway/Respiratory: Uneventful            Sign Out: Acceptable/Baseline resp. status   CV/Hemodynamics: Uneventful            Sign Out: Acceptable CV status; No obvious hypovolemia; No obvious fluid overload   Other NRE: NONE   DID A NON-ROUTINE EVENT OCCUR? No           Last vitals:  Vitals Value Taken Time   BP 90/56 12/01/23 1300   Temp     Pulse 76 12/01/23 1303   Resp 19 12/01/23 1303   SpO2 95 % 12/01/23 1303   Vitals shown include unfiled device data.    Electronically Signed By: Ash Lowery MD  December 1, 2023  1:04 PM

## 2023-12-01 NOTE — ADDENDUM NOTE
Addendum  created 12/01/23 1524 by Ash Lowery MD    Attestation recorded in Intraprocedure, Child order released for a procedure order, Clinical Note Signed, Flowsheet accepted, Intraprocedure Attestations filed, Intraprocedure Blocks edited, SmartForm saved

## 2023-12-01 NOTE — OP NOTE
NAME: Douglas Herrera    MEDICAL RECORD NUMBER: [id number]    YOB: 1960    DATE OF SURGERY: December 1, 2023    SURGEON: Damon Regan MD    ASSISTANT SURGEON: Kevin Jack MD    PREOPERATIVE DIAGNOSIS: Mass of nasal sinus [J34.89]     POSTOPERATIVE DIAGNOSIS: Mass of nasal sinus [J34.89]     PROCEDURE: Stealth assisted transnasal endoscopic approach to excise left sinonasal mass, Left - Head     INDICATIONS: Patient is a 63-year-old male with left-sided sinonasal mass.  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  However clinical and radiologic exam demonstrate significant progression of this mass very concerning for invasive carcinoma.  However, there has been no tissue biopsy to demonstrate this.  Preoperative CT and MRI demonstrated invasion and erosion of the left hard palate as well as the pterygoid plates.  Therefore, complete surgical removal of this mass would be very difficult if not impossible.  Prior biopsy was notable for extreme vascularity of this mass.  Therefore, the patient underwent preoperative embolization by the neurointerventional team the day preceding surgery.  After detailed discussion of the risks, benefits, and alternatives to surgery, patient elected to proceed with the aforementioned procedures.    ANESTHESIA: General; endotracheal intubation    FINDINGS: Large amt of tumor removed. Nasal airway restored  Residual tumor in the infratemporal fossa.  Nasal packing w/ surgicel, surgiflo, and nasopore    EBL: 1000 cc    SPECIMENS:   ID Type Source Tests Collected by Time Destination   1 : Left Sinonasal Mass Tissue Other SURGICAL PATHOLOGY EXAM Damon Regan MD 12/1/2023  9:15 AM     2 : left inferior turbinate Tissue Other SURGICAL PATHOLOGY EXAM Damon Regan MD 12/1/2023  9:31 AM     3 : left maxillary sinus Tissue Other SURGICAL PATHOLOGY EXAM Damon Regan MD 12/1/2023  9:40 AM     4 : left sinonasal  mass Tissue Other SURGICAL PATHOLOGY EXAM Damon Regan MD 12/1/2023 10:26 AM     5 : left anterior ethmoid Tissue Other SURGICAL PATHOLOGY EXAM Damon Regan MD 12/1/2023 10:26 AM     6 : left posterior ethmoid Tissue Other SURGICAL PATHOLOGY EXAM Damon Regan MD 12/1/2023 10:28 AM     7 : left maxilla bone Tissue Other SURGICAL PATHOLOGY EXAM Damon Regan MD 12/1/2023 10:58 AM     8 : sinonasal contents Tissue Other SURGICAL PATHOLOGY EXAM Damon Regan MD 12/1/2023 12:15 PM            COMPLICATIONS: None    DESCRIPTION OF PROCEDURE: The patient was taken the operating room and positioned supine on the operating table.  The patient was induced under general anesthesia and orotracheally intubated.  The bed was turned 180 degrees with the anesthesia team.  The anesthesia team placed intravenous lines and an arterial line.  The arms were tucked.  Afrin pledgets were placed in bilateral nasal cavities.  The patient was prepped and draped in usual sterile fashion.  The image guidance system was registered to a high degree of accuracy.  This was used throughout the case.  The 0 degree rigid nasal endoscope was used for direct vision.  The pledgets were removed and the scope was used to inspect both nasal cavities.  The left nasal cavity was completely obstructed by tumor.  The right was patent except for the tumor extending around the nasopharynx.  The image guided sinus shaver was used to debride the tumor of the left nasal cavity.  This continued posteriorly until the nasopharynx was reached.  Blakesley forceps were used to remove tumor from the right nasopharynx.  This was passed off as frozen section pathology.  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  Debridement continued in the left nasal cavity.  The left middle turbinate was identified.  The left inferior turbinate was taken down with sinus scissors to  provide better visualization.  The left maxillary sinus was identified and cleared of tumor.  Ultimately we were dissecting past the lateral wall of the left maxillary sinus into the infratemporal fossa.  There was clearly still tumor in this area and it was extremely bloody.  Therefore dissection in this area was stopped.  Similarly, there was clearly still tumor inferior to the floor of the left maxillary sinus, extending and eroding the left hard palate.  In order to avoid creating an oronasal fistula, dissection was stopped also in this area.  The orbit did not appear violated by the tumor fortunately.  Overall, the amount of vascularity at this tumor was significantly less than initial biopsies.  However, there was still a large amount of bleeding and likely a total blood loss from the surgery and about 1 L.  An anterior and posterior ethmoidectomy and sphenoidotomy was completed on the left side in order to ensure that there was no tumor in this area.  There was not, however there was significant postobstructive and inflamed sinus cavities.  The frontal sinus outflow tract was also dissected of this area and patent.  Hemostasis was ensured in all areas of dissection with the endoscopic bipolar.  The nasal cavities were suctioned and the stomach was decompressed with an orogastric tube.  Surgicel and Surgiflo were placed in the ethmoid and maxillary sinus cavities.  A NasoPore dressing was placed in these cavities as well.  The drapes were taken down and the patient was turned back towards the anesthesia team.  He was awoken and extubated without issue.  He was taken to PACU in stable condition.    DISPOSITION: Hospital floor    Attending surgeon was present for entire case and completed all critical portions.    Kevin Jack MD  ENT resident

## 2023-12-01 NOTE — PLAN OF CARE
Major Shift Events: Patient A&Ox4. Garbled speech. Makes needs known, uses call light appropriately. Sinus rhythm, afebrile. Pedal pulses 3+, radial pulses 2+. Right Groin site WDL and unchanged. Patient on room air, lung sounds clear bilaterally. Rico in place with adequate UOP, no BM. NPO since midnight. 1 PIV in left wrist. Went down to OR at 0600    Tramadol given x1 at 2140    Plan: Procedure at 0730.     For vital signs and complete assessments, please see documentation flowsheets.

## 2023-12-01 NOTE — ANESTHESIA PROCEDURE NOTES
Airway       Patient location during procedure: OR       Procedure Start/Stop Times: 12/1/2023 7:52 AM  Staff -        CRNA: Prasanth Paez APRN CRNA       Performed By: CRNA  Consent for Airway        Urgency: elective  Indications and Patient Condition       Indications for airway management: morgan-procedural       Induction type:intravenous       Mask difficulty assessment: 0 - not attempted (rsi)    Final Airway Details       Final airway type: endotracheal airway       Successful airway: ETT - single  Endotracheal Airway Details        ETT size (mm): 7.0       Cuffed: yes       Successful intubation technique: video laryngoscopy       VL Blade Size: Glidescope 3 (for rsi)       Grade View of Cords: 1 (with glidescope (previous easy intubation without glidescope))       Adjucts: stylet       Position: Right       Measured from: lips       Secured at (cm): 21       Bite block used: None    Post intubation assessment        Placement verified by: capnometry, equal breath sounds and chest rise        Number of attempts at approach: 1       Number of other approaches attempted: 0       Secured with: tape       Ease of procedure: easy       Dentition: Intact and Unchanged    Medication(s) Administered   Medication Administration Time: 12/1/2023 7:52 AM

## 2023-12-01 NOTE — ANESTHESIA CARE TRANSFER NOTE
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    Anesthesia Type:   General     Note:    Oropharynx: oropharynx clear of all foreign objects  Level of Consciousness: drowsy  Oxygen Supplementation: face mask  Level of Supplemental Oxygen (L/min / FiO2): 6  Independent Airway: airway patency satisfactory and stable  Dentition: dentition unchanged  Vital Signs Stable: post-procedure vital signs reviewed and stable    Patient transferred to: PACU    Handoff Report: Identifed the Patient, Identified the Reponsible Provider, Reviewed the pertinent medical history, Discussed the surgical course, Reviewed Intra-OP anesthesia mangement and issues during anesthesia, Set expectations for post-procedure period and Allowed opportunity for questions and acknowledgement of understanding      Vitals:  Vitals Value Taken Time   BP     Temp     Pulse     Resp     SpO2         Electronically Signed By: MALCOLM Cabrera CRNA  December 1, 2023  12:49 PM

## 2023-12-02 ENCOUNTER — APPOINTMENT (OUTPATIENT)
Dept: GENERAL RADIOLOGY | Facility: CLINIC | Age: 63
DRG: 135 | End: 2023-12-02
Attending: STUDENT IN AN ORGANIZED HEALTH CARE EDUCATION/TRAINING PROGRAM
Payer: COMMERCIAL

## 2023-12-02 LAB
ALBUMIN SERPL BCG-MCNC: 2.4 G/DL (ref 3.5–5.2)
ALBUMIN UR-MCNC: NEGATIVE MG/DL
ANION GAP SERPL CALCULATED.3IONS-SCNC: 4 MMOL/L (ref 7–15)
APPEARANCE UR: CLEAR
BILIRUB UR QL STRIP: NEGATIVE
BLD PROD TYP BPU: NORMAL
BLD PROD TYP BPU: NORMAL
BLOOD COMPONENT TYPE: NORMAL
BLOOD COMPONENT TYPE: NORMAL
BUN SERPL-MCNC: 9.9 MG/DL (ref 8–23)
CALCIUM SERPL-MCNC: 7.8 MG/DL (ref 8.8–10.2)
CHLORIDE SERPL-SCNC: 109 MMOL/L (ref 98–107)
CODING SYSTEM: NORMAL
CODING SYSTEM: NORMAL
COLOR UR AUTO: ABNORMAL
CORTIS SERPL-MCNC: 0.8 UG/DL
CREAT SERPL-MCNC: 0.76 MG/DL (ref 0.67–1.17)
CROSSMATCH: NORMAL
CROSSMATCH: NORMAL
DEPRECATED HCO3 PLAS-SCNC: 24 MMOL/L (ref 22–29)
EGFRCR SERPLBLD CKD-EPI 2021: >90 ML/MIN/1.73M2
ERYTHROCYTE [DISTWIDTH] IN BLOOD BY AUTOMATED COUNT: 13.8 % (ref 10–15)
GLUCOSE BLDC GLUCOMTR-MCNC: 102 MG/DL (ref 70–99)
GLUCOSE BLDC GLUCOMTR-MCNC: 113 MG/DL (ref 70–99)
GLUCOSE BLDC GLUCOMTR-MCNC: 168 MG/DL (ref 70–99)
GLUCOSE BLDC GLUCOMTR-MCNC: 169 MG/DL (ref 70–99)
GLUCOSE SERPL-MCNC: 228 MG/DL (ref 70–99)
GLUCOSE UR STRIP-MCNC: NEGATIVE MG/DL
HCT VFR BLD AUTO: 19.8 % (ref 40–53)
HGB BLD-MCNC: 6.4 G/DL (ref 13.3–17.7)
HGB BLD-MCNC: 9.4 G/DL (ref 13.3–17.7)
HGB UR QL STRIP: ABNORMAL
ISSUE DATE AND TIME: NORMAL
ISSUE DATE AND TIME: NORMAL
KETONES UR STRIP-MCNC: NEGATIVE MG/DL
LEUKOCYTE ESTERASE UR QL STRIP: NEGATIVE
MCH RBC QN AUTO: 28.2 PG (ref 26.5–33)
MCHC RBC AUTO-ENTMCNC: 32.3 G/DL (ref 31.5–36.5)
MCV RBC AUTO: 87 FL (ref 78–100)
MUCOUS THREADS #/AREA URNS LPF: PRESENT /LPF
NITRATE UR QL: NEGATIVE
PH UR STRIP: 6.5 [PH] (ref 5–7)
PLATELET # BLD AUTO: 135 10E3/UL (ref 150–450)
POTASSIUM SERPL-SCNC: 4.4 MMOL/L (ref 3.4–5.3)
PROCALCITONIN SERPL IA-MCNC: 0.2 NG/ML
RBC # BLD AUTO: 2.27 10E6/UL (ref 4.4–5.9)
RBC URINE: 1 /HPF
SODIUM SERPL-SCNC: 137 MMOL/L (ref 135–145)
SP GR UR STRIP: 1.01 (ref 1–1.03)
SQUAMOUS EPITHELIAL: <1 /HPF
TROPONIN T SERPL HS-MCNC: 12 NG/L
TROPONIN T SERPL HS-MCNC: 12 NG/L
TROPONIN T SERPL HS-MCNC: 14 NG/L
TROPONIN T SERPL HS-MCNC: 15 NG/L
UNIT ABO/RH: NORMAL
UNIT ABO/RH: NORMAL
UNIT NUMBER: NORMAL
UNIT NUMBER: NORMAL
UNIT STATUS: NORMAL
UNIT STATUS: NORMAL
UNIT TYPE ISBT: 6200
UNIT TYPE ISBT: 6200
UROBILINOGEN UR STRIP-MCNC: NORMAL MG/DL
WBC # BLD AUTO: 9.4 10E3/UL (ref 4–11)
WBC URINE: 1 /HPF

## 2023-12-02 PROCEDURE — 71046 X-RAY EXAM CHEST 2 VIEWS: CPT | Mod: 26 | Performed by: RADIOLOGY

## 2023-12-02 PROCEDURE — 120N000002 HC R&B MED SURG/OB UMMC

## 2023-12-02 PROCEDURE — 93010 ELECTROCARDIOGRAM REPORT: CPT | Performed by: INTERNAL MEDICINE

## 2023-12-02 PROCEDURE — 85018 HEMOGLOBIN: CPT | Performed by: OTOLARYNGOLOGY

## 2023-12-02 PROCEDURE — 85027 COMPLETE CBC AUTOMATED: CPT | Performed by: STUDENT IN AN ORGANIZED HEALTH CARE EDUCATION/TRAINING PROGRAM

## 2023-12-02 PROCEDURE — 82533 TOTAL CORTISOL: CPT | Performed by: STUDENT IN AN ORGANIZED HEALTH CARE EDUCATION/TRAINING PROGRAM

## 2023-12-02 PROCEDURE — 84484 ASSAY OF TROPONIN QUANT: CPT | Performed by: OTOLARYNGOLOGY

## 2023-12-02 PROCEDURE — P9016 RBC LEUKOCYTES REDUCED: HCPCS

## 2023-12-02 PROCEDURE — 84145 PROCALCITONIN (PCT): CPT | Performed by: STUDENT IN AN ORGANIZED HEALTH CARE EDUCATION/TRAINING PROGRAM

## 2023-12-02 PROCEDURE — 258N000003 HC RX IP 258 OP 636: Performed by: STUDENT IN AN ORGANIZED HEALTH CARE EDUCATION/TRAINING PROGRAM

## 2023-12-02 PROCEDURE — 81003 URINALYSIS AUTO W/O SCOPE: CPT | Performed by: STUDENT IN AN ORGANIZED HEALTH CARE EDUCATION/TRAINING PROGRAM

## 2023-12-02 PROCEDURE — 36415 COLL VENOUS BLD VENIPUNCTURE: CPT | Performed by: STUDENT IN AN ORGANIZED HEALTH CARE EDUCATION/TRAINING PROGRAM

## 2023-12-02 PROCEDURE — 87103 BLOOD FUNGUS CULTURE: CPT | Performed by: STUDENT IN AN ORGANIZED HEALTH CARE EDUCATION/TRAINING PROGRAM

## 2023-12-02 PROCEDURE — 84484 ASSAY OF TROPONIN QUANT: CPT

## 2023-12-02 PROCEDURE — 71046 X-RAY EXAM CHEST 2 VIEWS: CPT

## 2023-12-02 PROCEDURE — 250N000013 HC RX MED GY IP 250 OP 250 PS 637: Performed by: STUDENT IN AN ORGANIZED HEALTH CARE EDUCATION/TRAINING PROGRAM

## 2023-12-02 PROCEDURE — 36415 COLL VENOUS BLD VENIPUNCTURE: CPT | Performed by: OTOLARYNGOLOGY

## 2023-12-02 PROCEDURE — 87449 NOS EACH ORGANISM AG IA: CPT | Performed by: STUDENT IN AN ORGANIZED HEALTH CARE EDUCATION/TRAINING PROGRAM

## 2023-12-02 PROCEDURE — 258N000001 HC RX 258: Performed by: STUDENT IN AN ORGANIZED HEALTH CARE EDUCATION/TRAINING PROGRAM

## 2023-12-02 PROCEDURE — 80048 BASIC METABOLIC PNL TOTAL CA: CPT | Performed by: STUDENT IN AN ORGANIZED HEALTH CARE EDUCATION/TRAINING PROGRAM

## 2023-12-02 PROCEDURE — 82040 ASSAY OF SERUM ALBUMIN: CPT

## 2023-12-02 PROCEDURE — 93005 ELECTROCARDIOGRAM TRACING: CPT

## 2023-12-02 RX ADMIN — OXYCODONE HYDROCHLORIDE 5 MG: 5 TABLET ORAL at 17:30

## 2023-12-02 RX ADMIN — POLYETHYLENE GLYCOL 3350 17 G: 17 POWDER, FOR SOLUTION ORAL at 07:55

## 2023-12-02 RX ADMIN — OXYCODONE HYDROCHLORIDE 5 MG: 5 TABLET ORAL at 22:20

## 2023-12-02 RX ADMIN — SENNOSIDES AND DOCUSATE SODIUM 1 TABLET: 8.6; 5 TABLET ORAL at 07:55

## 2023-12-02 RX ADMIN — POTASSIUM CHLORIDE, DEXTROSE MONOHYDRATE AND SODIUM CHLORIDE: 150; 5; 900 INJECTION, SOLUTION INTRAVENOUS at 06:06

## 2023-12-02 RX ADMIN — SODIUM CHLORIDE, POTASSIUM CHLORIDE, SODIUM LACTATE AND CALCIUM CHLORIDE 500 ML: 600; 310; 30; 20 INJECTION, SOLUTION INTRAVENOUS at 00:11

## 2023-12-02 RX ADMIN — ENTECAVIR 0.5 MG: 0.5 TABLET ORAL at 07:55

## 2023-12-02 RX ADMIN — ACETAMINOPHEN 1000 MG: 500 TABLET ORAL at 15:41

## 2023-12-02 RX ADMIN — ATORVASTATIN CALCIUM 80 MG: 80 TABLET, FILM COATED ORAL at 20:11

## 2023-12-02 RX ADMIN — SENNOSIDES AND DOCUSATE SODIUM 1 TABLET: 8.6; 5 TABLET ORAL at 20:11

## 2023-12-02 RX ADMIN — ACETAMINOPHEN 975 MG: 325 TABLET, FILM COATED ORAL at 04:31

## 2023-12-02 ASSESSMENT — ACTIVITIES OF DAILY LIVING (ADL)
ADLS_ACUITY_SCORE: 28

## 2023-12-02 NOTE — PROVIDER NOTIFICATION
12/02/23 0700   Call Information   Date of Call 12/02/23   Time of Call 0734   Name of person requesting the team Domenic PIMENTEL   Title of person requesting team RN   RRT Arrival time 0735   Time RRT ended 0801   Reason for call   Type of RRT Adult   Primary reason for call Cardiovascular   Cardiovascular SBP less than 90   Was patient transferred from the ED, ICU, or PACU within last 24 hours prior to RRT call? 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 Denies dizziness, lightheadedness. Denies CP, states he is feeling pretty good. Hgb 6.4   Interventions Other (describe)  (2 units PRBC)   Patient Outcome   Patient Outcome Stabilized on unit   RRT Team   Attending/Primary/Covering Physician ENT   Date Attending Physician notified 12/02/23   Time Attending Physician notified 0734   Physician(s) Claudia Sandra, NEAL   Lead RN Dulce Maria Sheth V   RT N/A   Post RRT Intervention Assessment   Post RRT Assessment Stable/Improved   Date Follow Up Done 12/02/23   Time Follow Up Done 1045  (Pt recieving first unit PRBC, SBP 80s. Serial ECGs and labs ordered.)

## 2023-12-02 NOTE — PROGRESS NOTES
DATE/TIME OF CALL RECEIVED FROM LAB:  12/02/23 at 8:03 AM   LAB TEST:  Hemoglobin   LAB VALUE:  6.4  PROVIDER NOTIFIED?: Yes  PROVIDER NAME: Jarad Redd   DATE/TIME LAB VALUE REPORTED TO PROVIDER: 0805  MECHANISM OF PROVIDER NOTIFICATION: Phone Call  PROVIDER RESPONSE: called back and stated they were coming to bedside

## 2023-12-02 NOTE — SIGNIFICANT EVENT
Significant Event Note    Time of event: 10:42 AM December 2, 2023    Description of event:  Was approached by primary team regarding asymptomatic hypotension, Patient did have a rapid earlier, suspect post surgical hypovolemic shock likely, Patient receiving prbc by primary, Will assess for infection with chest x ray and urinalysis, Echocardiogram to assess for reduction in ef or wall motion abnormality, cortisol levels to assess for adrenal insufficiency, no signs of neurogenic shock or obstructive shock currently, Primary team to assess for coronary event as well with serial troponin's and EKG, Would advise to resume aspirin asap    Plan:  Advised primary team to either transfer patient to IMC for further care if blood pressure labile or transfer to ICU if blood pressure of concerns and needing SICU level care     Discussed with: Primary team     Trevor Doty MD

## 2023-12-02 NOTE — PLAN OF CARE
Status: S/p transnasal endoscopic approach for excision of L sinonasal mass. S/p carotid bilateral presurgical embolization. Hx of CAD, CABG x3 2019, gout, chronic Hep B, depression. Nasal precautions maintained.  Vitals: Hypotensive-see note regarding rapid. OVSS on RA  Neuros: A&Ox4. [place] L ear per pt, no hearing aids. Speech is hoarse. BLE 4/5.   IV: 2 PIV. Sl'd at this time.  Labs/Electrolytes: BG checks Q4hrs-team aware has been somewhat elevated.  Resp/trach: WNL  Diet: Regular  Bowel status: LBM PTA, BS+, passing flatus.   : voiding.  Skin: Scarring from CABG on sternum. 2 scars on lower abdomen. Embolization site on R femoral covered with transparent tegaderm. Peeling on bilateral feet.   Pain: C/o pain and discomfort in throat from intubation. Pt uses yanker at bedside for oral secretions.   Activity: Ambulated to bathroom with assist of two.  Plan: Continue with current POC.

## 2023-12-02 NOTE — PROVIDER NOTIFICATION
6A 210 ALIN Herrera pt BP is 84/49. Ze [place] [phone]    Per Dr. Henderson, monitor for now and report any new changes.

## 2023-12-02 NOTE — PROVIDER NOTIFICATION
Paged ENT at [facility]    6210, WONG Herrera- Pt BP 89/56 HR 66- asypt    [facility] [phone]

## 2023-12-02 NOTE — PROGRESS NOTES
Rapid response called 0734    BP 77/52, continuing since overnight.     Serg nurse and teams at bedside, wanted MAP goals.     HGB came back at 6.4. Admin blood at this time. HGB to be rechecked at 1600.     UA sent. CA low. Med consult placed. Pt needing xray two view at this time, will need to go down to have this complete. Serg 3 available once pt done transfusing blood.    Pt has remained asymptomatic throughout the day from hypotensive symptoms. BP now 100s/60s. Bradycardia persists, HR 50-60. 2 units transfused at this time.     Pt up in bed eating. PVR was 206 after cruz removal. Has voided again since. Using urinal. Pt cooperative with cares.

## 2023-12-02 NOTE — PLAN OF CARE
Arrived from: PACU  Belongings/meds: Clothes, phone, no meds  2 RN Skin Assessment Completed by: Theresa MCRAE and Wagner BACK   Non-intact findings documented (yes/no/NA): R femoral embolization incision.    Status: POD#0 transnasal endoscopic approach for excision of L sinonasal mass. POD #1 carotid bilateral presurgical embolization. Hx of CAD, CABGx3 2019, Gout, Chronic Hep B, depression.   Vitals: Soft BPS, MD notified and params adjusted. All other VSS stable. On RA.  Neuros: Aox4. PERRLA. [place] L ear per pt, no hearing aids. Speech is hoarse. 5/5 upper. 4/5 lowers. Denies N/T.   IV: L PIV SL. R  ml/hr 20 meq potassium NS and dextrose.   Labs/Electrolytes: Hgb dropped since yesterday. Glucose monitoring q 4 x48 hours.   Resp/trach: On RA satting well.   Diet: Meant to be on reg   Bowel status: LBM PTA. BS+, passing gas.  : Cruz in, good ouput. To be removed in am.  Skin: Scarring from CABG on sternum. 2x scars on lower abdo form laparoscopy?. Embolization site on R femoral covered with transparent tegaderm. Peeling on feet bilat.   Pain: No nasal pain. C/O pain on throat from intubation. Scheduled PO Tylenol given. Pt uses Yanker at bedside for spit he does not want to follow.   Activity: Did not get OOB. Would say GB SBA.  Social: No family present.   Plan: Remove cruz 06h00  Updates this shift: Continue POC.       Goal Outcome Evaluation:      Plan of Care Reviewed With: patient    Overall Patient Progress: improvingOverall Patient Progress: improving    Outcome Evaluation: Alert, pain well managed

## 2023-12-02 NOTE — PROVIDER NOTIFICATION
Note per ЕКАТЕРИНА Omalley:    ENT resident paged @ [phone]     Douglas prajapati, first trop just collected, just got a call from lab that trop must be collected q4 or q6, not q2. Also, pt has L facial droop that's not noted, just wanted to make sure this was known? thanks! [phone] [address]     Addendum per MD: No facial droop noted. Known right eyebrow movement deficit per patient after a skin cancer excision years ago. Clarified with RN, no acute concerns, no other stroke concerns. Face symmetric on evaluation. Will continue to monitor closely.    Caitlyn Quijano MD  Otolaryngology-Head & Neck Surgery PGY-2

## 2023-12-02 NOTE — PROGRESS NOTES
Notified patient had SBP <90. He has not been tachycardic. He has had adequate urine output. Is seen and evaluated at bedside. He is sitting up comfortably in bed. Reports no lightheadedness or dizziness. Reports no chest pain or shortness of breath. No epistaxis or nasal drainage.     Most recent Echo from 2019 reviewed with normal EF of 60-65%. 500 ml bolus ordered.     MAPs remained >60.

## 2023-12-02 NOTE — CODE/RAPID RESPONSE
Rapid Response Team Note    Assessment   A rapid response was called on Douglas Herrera due to hypotension. Patient has had lower than his baseline blood pressures since surgery. Assessed by primary team overnight. He is s/p 500 ml bolus X1 in PACU and X2 since being on 6a. He denies chest pain, dizziness or any new symptoms. Blood pressure current 94/49.    Discussed with bedside nurse and primary team.    No additional interventions by RRT. Management per primary team    MALCOLM Mandujano CNP  [facility] RRT [facility] Job Code Contact #[phone]  [facility] Paging/Directory    Hospital Course   Brief Summary of events leading to rapid response:   POD 1 Stealth assisted transnasal endoscopic approach to excise left sinonasal mass     Admission Diagnosis:   Mass of sinus [J34.89]    Physical Exam   Temp: 98.7  F (37.1  C) Temp  Min: 98.1  F (36.7  C)  Max: 99.2  F (37.3  C)  Resp: 16 Resp  Min: 12  Max: 22  SpO2: 97 % SpO2  Min: 94 %  Max: 100 %  Pulse: 62 Pulse  Min: 61  Max: 78    No data recorded  BP: 94/49 Systolic (24hrs), Av , Min:77 , Max:104   Diastolic (24hrs), Av, Min:43, Max:65     I/Os: I/O last 3 completed shifts:  In: 6987.92 [P.O.:210; I.V.:5977.92]  Out: 2850 [Urine:1850; Blood:1000]     Exam:   General: in no acute distress  Mental Status: AAOx4.  CV : regular rate and rhythm  Resp: non labored    Significant Results and Procedures   Lactic Acid:   Recent Labs   Lab Test 23  1202 23  1120 23  1002   LACT 1.3 1.1 1.1     CBC:   Recent Labs   Lab Test 23  0658 23  1309 23  1202 23  0904 23  1754   WBC 9.4 10.0  --   --  9.7   HGB 6.4* 8.8* 7.1*   < > 12.3*   HCT 19.8* 25.8*  --   --  36.7*   * 143*  --   --  182    < > = values in this interval not displayed.

## 2023-12-02 NOTE — PLAN OF CARE
Status: S/p transnasal endoscopic approach for excision of L sinonasal mass. S/p carotid bilateral presurgical embolization. Hx of CAD, CABG x3 2019, gout, chronic Hep B, depression.   Vitals: Hypotensive, OVSS on RA  Neuros: A&Ox4. [place] L ear per pt, no hearing aids. Speech is hoarse. BLE 4/5.   IV: 2 PIV, 1 infusing D5 1/2 NS 20 mEq KCL @ 125 mL/hr.   Labs/Electrolytes: BG checks Q4hrs.   Resp/trach: WNL  Diet: Regular  Bowel status: LBM PTA, BS+, passing flatus.   : Removed cruz this morning @ 0615, due to void by 1015.  Skin: Scarring from CABG on sternum. 2 scars on lower abdomen. Embolization site on R femoral covered with transparent tegaderm. Peeling on bilateral feet.   Pain: C/o pain and discomfort in throat from intubation. Pt uses yanker at bedside for oral secretions.   Activity: Not OOB this shift.   Plan: Continue with current POC.

## 2023-12-02 NOTE — PROGRESS NOTES
"Otolaryngology Progress Note  December 2, 2023    S: RRT called on patient following hypotensive episode. Hypotensive this AM. 80s/50s. HR 60s. Hgb low at 6.4 this AM. 2 units PRBC ordered, EKG ordered. Patient denies any SOB or chest pain. Denies any nasal bleeding.     O: BP 94/49   Pulse 62   Temp 98.7  F (37.1  C) (Oral)   Resp 16   Ht 1.626 m (5' 4\")   Wt 60.7 kg (133 lb 13.1 oz)   SpO2 97%   BMI 22.97 kg/m     General: Appears uncomfortable and clammy   HEENT: EOMI. V1-V3 intact. No blood at anterior nares or in oropharynx.    Pulmonary: Breathing non-labored, no stridor, no accessory muscle use.    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    Plan:   - Consulted medicine and discussed case extensively. Plan to gather more information (serial troponins and EKGs). Additionally we will follow up transfusion of 2 units with a repeat Hgb check. The etiology of his hypotension is not fully clear, may be cardiac versus post-procedural fluid shifting. Based on this information, patient may need to be transferred to an IMC bed versus SICU if pressors are needed.   - Holding mIVF for now pending medicine evaluation and recommendations  - Follow up EKGs, troponins, 2 view CXR, UA, medicine formal recs    NEURO  Tylenol and oxy, dilaudid prn  HEENT  Afrin, nasal saline prn  C/V  PTA amlodipine held  PTA atorvastatin continued  PTA metoprolol held  PULM  IS  FEN/GI  Regular diet  Zofran, bowel reg prn  Fluids pending  HEME/ID  Anemia, transfusing 2u PRBC, not currently bleeding, will follow up Hgb  PTA " entecavir  ENDO  LIZETTE  G/U  LIZETTE  CONSULTS  Medicine  Ppx  SCDs  DISPO  Pending convalescence of hypovolemia    -- Patient and above plan discussed with Dr. Kelsie Redd MD  Otolaryngology - Head and Neck Surgery PGY-3

## 2023-12-03 ENCOUNTER — APPOINTMENT (OUTPATIENT)
Dept: PHYSICAL THERAPY | Facility: CLINIC | Age: 63
DRG: 135 | End: 2023-12-03
Attending: NEUROLOGICAL SURGERY
Payer: COMMERCIAL

## 2023-12-03 ENCOUNTER — APPOINTMENT (OUTPATIENT)
Dept: CARDIOLOGY | Facility: CLINIC | Age: 63
DRG: 135 | End: 2023-12-03
Attending: STUDENT IN AN ORGANIZED HEALTH CARE EDUCATION/TRAINING PROGRAM
Payer: COMMERCIAL

## 2023-12-03 LAB
ANION GAP SERPL CALCULATED.3IONS-SCNC: 7 MMOL/L (ref 7–15)
BUN SERPL-MCNC: 9.6 MG/DL (ref 8–23)
CALCIUM SERPL-MCNC: 8.3 MG/DL (ref 8.8–10.2)
CHLORIDE SERPL-SCNC: 107 MMOL/L (ref 98–107)
CREAT SERPL-MCNC: 0.83 MG/DL (ref 0.67–1.17)
DEPRECATED HCO3 PLAS-SCNC: 25 MMOL/L (ref 22–29)
EGFRCR SERPLBLD CKD-EPI 2021: >90 ML/MIN/1.73M2
ERYTHROCYTE [DISTWIDTH] IN BLOOD BY AUTOMATED COUNT: 14.7 % (ref 10–15)
GLUCOSE BLDC GLUCOMTR-MCNC: 90 MG/DL (ref 70–99)
GLUCOSE BLDC GLUCOMTR-MCNC: 95 MG/DL (ref 70–99)
GLUCOSE SERPL-MCNC: 90 MG/DL (ref 70–99)
HCT VFR BLD AUTO: 26 % (ref 40–53)
HGB BLD-MCNC: 8.5 G/DL (ref 13.3–17.7)
LVEF ECHO: NORMAL
MCH RBC QN AUTO: 28.1 PG (ref 26.5–33)
MCHC RBC AUTO-ENTMCNC: 32.7 G/DL (ref 31.5–36.5)
MCV RBC AUTO: 86 FL (ref 78–100)
PLATELET # BLD AUTO: 141 10E3/UL (ref 150–450)
POTASSIUM SERPL-SCNC: 3.6 MMOL/L (ref 3.4–5.3)
RBC # BLD AUTO: 3.02 10E6/UL (ref 4.4–5.9)
SODIUM SERPL-SCNC: 139 MMOL/L (ref 135–145)
WBC # BLD AUTO: 9.5 10E3/UL (ref 4–11)

## 2023-12-03 PROCEDURE — 80048 BASIC METABOLIC PNL TOTAL CA: CPT

## 2023-12-03 PROCEDURE — 250N000013 HC RX MED GY IP 250 OP 250 PS 637

## 2023-12-03 PROCEDURE — 120N000002 HC R&B MED SURG/OB UMMC

## 2023-12-03 PROCEDURE — 85027 COMPLETE CBC AUTOMATED: CPT

## 2023-12-03 PROCEDURE — 97161 PT EVAL LOW COMPLEX 20 MIN: CPT | Mod: GP

## 2023-12-03 PROCEDURE — 93306 TTE W/DOPPLER COMPLETE: CPT | Mod: 26 | Performed by: INTERNAL MEDICINE

## 2023-12-03 PROCEDURE — 93306 TTE W/DOPPLER COMPLETE: CPT

## 2023-12-03 PROCEDURE — 36415 COLL VENOUS BLD VENIPUNCTURE: CPT

## 2023-12-03 PROCEDURE — 250N000013 HC RX MED GY IP 250 OP 250 PS 637: Performed by: STUDENT IN AN ORGANIZED HEALTH CARE EDUCATION/TRAINING PROGRAM

## 2023-12-03 PROCEDURE — 97116 GAIT TRAINING THERAPY: CPT | Mod: GP

## 2023-12-03 RX ORDER — ASPIRIN 81 MG/1
81 TABLET ORAL DAILY
Status: DISCONTINUED | OUTPATIENT
Start: 2023-12-03 | End: 2023-12-04 | Stop reason: HOSPADM

## 2023-12-03 RX ADMIN — ACETAMINOPHEN 975 MG: 325 TABLET, FILM COATED ORAL at 12:57

## 2023-12-03 RX ADMIN — SENNOSIDES AND DOCUSATE SODIUM 1 TABLET: 8.6; 5 TABLET ORAL at 07:57

## 2023-12-03 RX ADMIN — ACETAMINOPHEN 975 MG: 325 TABLET, FILM COATED ORAL at 04:10

## 2023-12-03 RX ADMIN — ATORVASTATIN CALCIUM 80 MG: 80 TABLET, FILM COATED ORAL at 20:29

## 2023-12-03 RX ADMIN — ACETAMINOPHEN 975 MG: 325 TABLET, FILM COATED ORAL at 20:28

## 2023-12-03 RX ADMIN — ENTECAVIR 0.5 MG: 0.5 TABLET ORAL at 07:58

## 2023-12-03 RX ADMIN — ASPIRIN 81 MG: 81 TABLET ORAL at 10:08

## 2023-12-03 RX ADMIN — TRAMADOL HYDROCHLORIDE 50 MG: 50 TABLET, COATED ORAL at 20:29

## 2023-12-03 RX ADMIN — TRAMADOL HYDROCHLORIDE 50 MG: 50 TABLET, COATED ORAL at 12:57

## 2023-12-03 RX ADMIN — SENNOSIDES AND DOCUSATE SODIUM 1 TABLET: 8.6; 5 TABLET ORAL at 20:29

## 2023-12-03 RX ADMIN — TRAMADOL HYDROCHLORIDE 50 MG: 50 TABLET, COATED ORAL at 04:30

## 2023-12-03 ASSESSMENT — ACTIVITIES OF DAILY LIVING (ADL)
ADLS_ACUITY_SCORE: 28

## 2023-12-03 NOTE — PLAN OF CARE
Goal Outcome Evaluation:      Plan of Care Reviewed With: patient    Overall Patient Progress: improvingOverall Patient Progress: improving           Status: Mass of Sinus  Activity: Assist of 1 w/ gait belt.  Neuro: WDL, A&O x4 5/5 throughout  Cardiac: Bradycardic and soft hypotensive BPs  Respiratory: WDL on RA  GI/: Last BM PTA, voiding w/d  Diet: Regular  Skin: (R) groin site w/ Tegaderm, scant red drainage at site.  Lines/Drains: x2 PIV  Pain/Nausea: 4/10 pain controlled w/ PRN Oxy, Tramadol and Tylenol. Denies nausea.  Changes:

## 2023-12-03 NOTE — PROGRESS NOTES
"   12/03/23 1223   Appointment Info   Signing Clinician's Name / Credentials (PT) Nadine Mata, PT, DPT   Living Environment   People in Home child(justice), adult   Current Living Arrangements house   Home Accessibility stairs to enter home;stairs within home   Number of Stairs, Main Entrance 2   Stair Railings, Main Entrance railings safe and in good condition   Number of Stairs, Within Home, Primary two   Stair Railings, Within Home, Primary railings safe and in good condition   Transportation Anticipated family or friend will provide   Living Environment Comments Patient reports he lives in a house with his son who is able to provide assistance as needed. 2 stairs to enter, 2 to upper and 2 to lower floors.   Self-Care   Usual Activity Tolerance good   Current Activity Tolerance good   Equipment Currently Used at Home cane, straight  (Reports he uses it outside of the home)   Fall history within last six months no   Activity/Exercise/Self-Care Comment Patient reports previous modified independence with mobility and ADLs. Son is PCA, assists with cooking, cleaning, and laundry.   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.\"   Existing Precautions/Restrictions no known precautions/restrictions   Cognition   Affect/Mental Status (Cognition) WNL   Orientation Status (Cognition) oriented x 3   Follows Commands (Cognition) WNL   Range of Motion (ROM)   Range of Motion ROM is WFL   Strength (Manual Muscle Testing)   Strength (Manual Muscle Testing) strength is WFL   Bed Mobility   Bed Mobility no " deficits identified   Comment, (Bed Mobility) Independent   Transfers   Transfers sit-stand transfer   Sit-Stand Transfer   Sit-Stand [place] (Transfers) supervision   Assistive Device (Sit-Stand Transfers) other (see comments)  (None)   Gait/Stairs (Locomotion)   [place] Level (Gait) supervision;1 person assist   Assistive Device (Gait) other (see comments)  (None)   Distance in Feet (Gait) 10   Pattern (Gait) step-through   Deviations/Abnormal Patterns (Gait) gait speed decreased;kunal decreased   Negotiation (Stairs) stairs independence;handrail location;number of steps;ascending technique;descending technique   [place] Level (Stairs) supervision;1 person assist   Handrail Location (Stairs) right side (ascending);right side (descending)   Number of Steps (Stairs) 6   Ascending Technique (Stairs) step-over-step   Descending Technique (Stairs) step-over-step   Balance   Balance Comments Generally steady overall during gait   Clinical Impression   Criteria for Skilled Therapeutic Intervention Yes, treatment indicated   PT Diagnosis (PT) Impaired functional mobility   Influenced by the following impairments Deconditioning   Functional limitations due to impairments Gait, stairs   Clinical Presentation (PT Evaluation Complexity) stable   Clinical Presentation Rationale Patient presents as medically diagnosed   Clinical Decision Making (Complexity) low complexity   Planned Therapy Interventions (PT) gait training;transfer training   Risk & Benefits of therapy have been explained evaluation/treatment results reviewed;care plan/treatment goals reviewed;participants voiced agreement with care plan;participants included;patient   PT Total Evaluation Time   PT Eval, Low Complexity Minutes (17218) 8   Physical Therapy Goals   PT Frequency Daily   PT Predicted Duration/Target Date for Goal Attainment 12/11/23   PT Goals Bed Mobility;Transfers;Gait;Stairs   PT: Bed Mobility Independent;Goal Met   PT: Transfers  Modified independent;Sit to/from stand;Bed to/from chair;Assistive device   PT: Gait Modified independent;Assistive device;Greater than 200 feet   PT: Stairs 4 stairs;Supervision/stand-by assist   Interventions   Interventions Quick Adds Gait Training   Gait Training   Gait Training Minutes (24155) 8   Symptoms Noted During/After Treatment (Gait Training) fatigue   Treatment Detail/Skilled Intervention Patient ambulated in hallway and room with close SBA x 1 and no AD. Generally steady, minor LOB noted. Discussed with patient using SEC next session, patient agreeable. Patient supine in bed with call light within reach at end of session.   Distance in Feet 175   [place] Level (Gait Training) stand-by assist   Physical Assistance Level (Gait Training) 1 person assist   PT Discharge Planning   PT Plan 1 more session for gait with SEC and review stairs   PT Discharge Recommendation (DC Rec) home;home with assist   PT Rationale for DC Rec Patient currently is independent with bed mobility, SBA for transfers, gait and stairs. He lives in a house with his son who is his PCA and able to provide assistance as needed. He has 2 stairs to enter his home and 2 stairs to reach both the upper and lower levels of his home; at this time he is able to safely navigate 6 stairs with SBA x 1. Anticipate patient will be safe to discharge home with assist from son once medically cleared to do so.   PT Brief overview of current status SBA for transfers and gait; able to ambulate with nursing in hallway   Total Session Time   Timed Code Treatment Minutes 8   Total Session Time (sum of timed and untimed services) 16     Nadine Mata, PT, DPT

## 2023-12-03 NOTE — PLAN OF CARE
Goal Outcome Evaluation:      Plan of Care Reviewed With: patient    Overall Patient Progress: improvingOverall Patient Progress: improving    Outcome Evaluation: Pain Managed. Hgb 8.5    Status: S/p endoscopic approach for  L sinonasal mass resection 12/1, bilat carotid embolization 11/30  Vitals: VSS  Neuros: A&Ox4, L sided facial swelling (unable to raise R eyebrow baseline), L ear [place]  IV: PIV SL x2  Labs/Electrolytes: Hgb redraw 8.5 this morning   Resp/trach: RA  Diet: Regular  Bowel status: LBM 12/2 per patient   : Voiding  Skin: R groin site covered with tegaderm, dried blood on R side of head. Old surgical scars  Pain: L facial and throat pain controlled with PRN tylenol and tramadol x1   Activity: Up with A1/GB  Plan: Continue POC. Asprin restarted. Metoprolol restarted. Potential discharge tomorrow.   Updates this shift: Orthostatics completed. Worked with therapies.

## 2023-12-03 NOTE — PROGRESS NOTES
"Otolaryngology Progress Note  December 2, 2023    S: No acute events overnight. Patient reports he is feeling better this morning. States he has been ambulating to the bathroom but hasn't walked around on the floor much yet. States he would like to stay in the hospital another night.  Minimal bleeding overnight.     O: /69 (BP Location: Right arm)   Pulse 54   Temp 97.8  F (36.6  C) (Oral)   Resp 14   Ht 1.626 m (5' 4\")   Wt 60.7 kg (133 lb 13.1 oz)   SpO2 100%   BMI 22.97 kg/m     General: Well appearing, laying in bed.    HEENT: EOMI. No movement of the right eyebrow, otherwise facial nerve intact. No blood at anterior nares or in oropharynx.    Pulmonary: Breathing non-labored, no stridor, no accessory muscle use.    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    Plan:   - Restart metoprolol   - Restart aspirin  - PT consult placed for today, encourage ambulation    NEURO  Tylenol and oxy    HEENT  Afrin, nasal saline prn    C/V  PTA amlodipine held  PTA atorvastatin continued  PTA metoprolol restarted  PTA aspirin restarted    PULM  IS    FEN/GI  Regular diet  Zofran, bowel reg prn  Fluids pending    HEME/ID  Anemia - hgb at 8.5 this morning   PTA entecavir  Mild thrombocytopenia noted, consistent with prior lab values - will have him follow up with PCP    ENDO  LIZETTE  G/U  LIZETTE    CONSULTS  Medicine    Ppx  SCDs    DISPO  Likely tomorrow 12/4    -- Patient and above plan discussed with Dr. Kelsie Rodriguez MD  Otolaryngology - Head and Neck Surgery PGY-1  "

## 2023-12-03 NOTE — PLAN OF CARE
Status: S/p endoscopic approach for  L sinonasal mass resection 12/1, bilat carotid embolization 11/30  Vitals: VSS  Neuros: A&Ox4, L sided facial swelling (unable to raise R eyebrow baseline), L ear [place]  IV: PIV SL x2  Labs/Electrolytes: Hgb redraw 9.4  Resp/trach: Clear lungs, RA  Diet: Regular  Bowel status: LBM PTA, took senna  : Voiding  Skin: R groin site covered with tegaderm, dried blood on R side of head. Old surgical scars  Pain: L facial and throat pain controlled with PRN tylenol, oxy x2  Activity: Up with 1, GB  Social: No visitors  Plan/updates: Oxy discontinued and tramadol ordered per pt preference. Continue POC

## 2023-12-04 ENCOUNTER — APPOINTMENT (OUTPATIENT)
Dept: INTERPRETER SERVICES | Facility: CLINIC | Age: 63
DRG: 135 | End: 2023-12-04
Attending: NEUROLOGICAL SURGERY
Payer: COMMERCIAL

## 2023-12-04 ENCOUNTER — APPOINTMENT (OUTPATIENT)
Dept: PHYSICAL THERAPY | Facility: CLINIC | Age: 63
DRG: 135 | End: 2023-12-04
Attending: NEUROLOGICAL SURGERY
Payer: COMMERCIAL

## 2023-12-04 VITALS
WEIGHT: 133.82 LBS | HEIGHT: 64 IN | OXYGEN SATURATION: 100 % | RESPIRATION RATE: 16 BRPM | SYSTOLIC BLOOD PRESSURE: 124 MMHG | BODY MASS INDEX: 22.85 KG/M2 | DIASTOLIC BLOOD PRESSURE: 71 MMHG | HEART RATE: 58 BPM | TEMPERATURE: 97.8 F

## 2023-12-04 LAB
1,3 BETA GLUCAN SER-MCNC: <31 PG/ML
ATRIAL RATE - MUSE: 56 BPM
ATRIAL RATE - MUSE: 59 BPM
CORTIS SERPL-MCNC: 8.3 UG/DL
DIASTOLIC BLOOD PRESSURE - MUSE: NORMAL MMHG
DIASTOLIC BLOOD PRESSURE - MUSE: NORMAL MMHG
ERYTHROCYTE [DISTWIDTH] IN BLOOD BY AUTOMATED COUNT: 14.1 % (ref 10–15)
HCT VFR BLD AUTO: 26.3 % (ref 40–53)
HGB BLD-MCNC: 8.7 G/DL (ref 13.3–17.7)
HOLD SPECIMEN: NORMAL
INTERPRETATION ECG - MUSE: NORMAL
INTERPRETATION ECG - MUSE: NORMAL
MCH RBC QN AUTO: 28.6 PG (ref 26.5–33)
MCHC RBC AUTO-ENTMCNC: 33.1 G/DL (ref 31.5–36.5)
MCV RBC AUTO: 87 FL (ref 78–100)
OBSERVATION IMP: NEGATIVE
P AXIS - MUSE: 65 DEGREES
P AXIS - MUSE: 9 DEGREES
PLATELET # BLD AUTO: 173 10E3/UL (ref 150–450)
PR INTERVAL - MUSE: 160 MS
PR INTERVAL - MUSE: 204 MS
QRS DURATION - MUSE: 90 MS
QRS DURATION - MUSE: 96 MS
QT - MUSE: 434 MS
QT - MUSE: 462 MS
QTC - MUSE: 429 MS
QTC - MUSE: 445 MS
R AXIS - MUSE: 18 DEGREES
R AXIS - MUSE: 22 DEGREES
RBC # BLD AUTO: 3.04 10E6/UL (ref 4.4–5.9)
SYSTOLIC BLOOD PRESSURE - MUSE: NORMAL MMHG
SYSTOLIC BLOOD PRESSURE - MUSE: NORMAL MMHG
T AXIS - MUSE: 45 DEGREES
T AXIS - MUSE: 61 DEGREES
VENTRICULAR RATE- MUSE: 56 BPM
VENTRICULAR RATE- MUSE: 59 BPM
WBC # BLD AUTO: 6.6 10E3/UL (ref 4–11)

## 2023-12-04 PROCEDURE — 97116 GAIT TRAINING THERAPY: CPT | Mod: GP

## 2023-12-04 PROCEDURE — 250N000013 HC RX MED GY IP 250 OP 250 PS 637: Performed by: STUDENT IN AN ORGANIZED HEALTH CARE EDUCATION/TRAINING PROGRAM

## 2023-12-04 PROCEDURE — 85027 COMPLETE CBC AUTOMATED: CPT

## 2023-12-04 PROCEDURE — 36415 COLL VENOUS BLD VENIPUNCTURE: CPT

## 2023-12-04 PROCEDURE — 99254 IP/OBS CNSLTJ NEW/EST MOD 60: CPT | Performed by: PHYSICIAN ASSISTANT

## 2023-12-04 PROCEDURE — 90686 IIV4 VACC NO PRSV 0.5 ML IM: CPT | Performed by: OTOLARYNGOLOGY

## 2023-12-04 PROCEDURE — 250N000011 HC RX IP 250 OP 636: Performed by: OTOLARYNGOLOGY

## 2023-12-04 PROCEDURE — 97530 THERAPEUTIC ACTIVITIES: CPT | Mod: GP

## 2023-12-04 PROCEDURE — 82533 TOTAL CORTISOL: CPT | Performed by: PHYSICIAN ASSISTANT

## 2023-12-04 PROCEDURE — G0008 ADMIN INFLUENZA VIRUS VAC: HCPCS | Performed by: OTOLARYNGOLOGY

## 2023-12-04 PROCEDURE — 250N000013 HC RX MED GY IP 250 OP 250 PS 637

## 2023-12-04 RX ORDER — ASPIRIN 81 MG/1
81 TABLET ORAL DAILY
Qty: 30 TABLET | Refills: 0 | Status: SHIPPED | OUTPATIENT
Start: 2023-12-05 | End: 2024-01-08

## 2023-12-04 RX ORDER — AMOXICILLIN 250 MG
1 CAPSULE ORAL 2 TIMES DAILY PRN
Qty: 30 TABLET | Refills: 0 | Status: SHIPPED | OUTPATIENT
Start: 2023-12-04 | End: 2024-01-23

## 2023-12-04 RX ORDER — ACETAMINOPHEN 325 MG/1
650 TABLET ORAL EVERY 4 HOURS PRN
Qty: 50 TABLET | Refills: 0 | Status: SHIPPED | OUTPATIENT
Start: 2023-12-04

## 2023-12-04 RX ORDER — TRAMADOL HYDROCHLORIDE 50 MG/1
50 TABLET ORAL EVERY 6 HOURS PRN
Qty: 20 TABLET | Refills: 0 | Status: SHIPPED | OUTPATIENT
Start: 2023-12-04 | End: 2024-02-15

## 2023-12-04 RX ADMIN — INFLUENZA A VIRUS A/VICTORIA/4897/2022 IVR-238 (H1N1) ANTIGEN (FORMALDEHYDE INACTIVATED), INFLUENZA A VIRUS A/DARWIN/9/2021 SAN-010 (H3N2) ANTIGEN (FORMALDEHYDE INACTIVATED), INFLUENZA B VIRUS B/PHUKET/3073/2013 ANTIGEN (FORMALDEHYDE INACTIVATED), AND INFLUENZA B VIRUS B/MICHIGAN/01/2021 ANTIGEN (FORMALDEHYDE INACTIVATED) 0.5 ML: 15; 15; 15; 15 INJECTION, SUSPENSION INTRAMUSCULAR at 10:19

## 2023-12-04 RX ADMIN — ACETAMINOPHEN 975 MG: 325 TABLET, FILM COATED ORAL at 04:12

## 2023-12-04 RX ADMIN — ENTECAVIR 0.5 MG: 0.5 TABLET ORAL at 08:51

## 2023-12-04 RX ADMIN — SENNOSIDES AND DOCUSATE SODIUM 1 TABLET: 8.6; 5 TABLET ORAL at 08:50

## 2023-12-04 RX ADMIN — ASPIRIN 81 MG: 81 TABLET ORAL at 08:49

## 2023-12-04 ASSESSMENT — ACTIVITIES OF DAILY LIVING (ADL)
ADLS_ACUITY_SCORE: 28
ADLS_ACUITY_SCORE: 28
ADLS_ACUITY_SCORE: 25
ADLS_ACUITY_SCORE: 28

## 2023-12-04 NOTE — CONSULTS
[facility]  Consult Note - Hospitalist Service  Date of Admission:  11/30/2023  Consult Requested by: ENT  Reason for Consult: Asymptomatic hypotension    Assessment & Plan   Douglas Herrera is a 63 year old male with history of HTN, CAD s/p CABG x3 (2019), ascending aortic aneurysm repair (2019), gout, depression, and recently diagnosed left sinonasal mass who was admitted to ENT service s/p stealth-assisted transnasal endoscopic excision. Medicine was consulted for post-op hypotension, which has now resolved.     # Asymptomatic hypotension:  Systolic BP 80's post-op. Hgb 6.2. Possibly hypovolemic shock from acute blood loss, further complicated by anesthesia. Improved with fluid bolus (1500 mL total) and transfusion PRBC x2. Less likely cardiogenic - EKG and echo negative. No evidence of infectious process to suspect sepsis. Random cortisol 0.8, therefore could be component of adrenal insufficiency (no risk factors), however repeat AM cortisol 8.3 today (normal). BP remains stable at time of discharge off PTA meds.   - Recommend holding PTA amlodipine and metoprolol on discharge  - Follow up with PCP for blood pressure check, at which time can decide on whether to restart anti-hypertnesives    # Sinus bradycardia:  HR 50-60's. Beta blocker held.     # Left sinonasal mass s/p excision (11/30/23):  Management per ENT.    # Acute blood loss anemia:  Baseline Hgb 13.5, down to 6.4 post-op. Received 2 units PRBC. Hgb stable at 8.7 at time of discharge.   - Recommend CBC in 1 week to monitor     # Hx HTN  # Hx CAD s/p CABG (2019):    # Hx Ascending aortic aneurysm repair (2019):    No acute concerns. Hypotensive post-op (see above). Holding BP meds on discharge. Continue ASA if cleared by ENT.      The patient's care was discussed with the Primary team.    Clinically Significant Risk Factors              # Hypoalbuminemia: Lowest albumin = 2.4 g/dL at 12/2/2023  6:58 AM, will  monitor as appropriate     # Hypertension: Noted on problem list             # History of CABG: noted on surgical history       Severino Ellison PA-C  Hospitalist Service  Securely message with [employer] (more info)  Text page via [facility] Paging/Directory   ______________________________________________________________________    Chief Complaint   Hypotension 12/2    History is obtained from the electronic health record    History of Present Illness   Douglas Herrera is a 63 year old male who was admitted to ENT service on 12/1/23 following scheduled stealth-assisted transnasal endoscopic excision of left sinonasal mass by Dr. Regan. He developed asymptomatic hypotension post-op with systolic 80's. Hgb down to 6.2. Responded to IV bolus of 1500 mL and 2 units PRBC.     Medicine contacted and suggestions given to further evaluate. EKG normal. Troponin normal. Echo LVEF 60-65%, normal wall motion, normal diastolic function, normal RV function, no significant valvular disease.  Procal 0.20. Sepsis not suspected. Low serum cortisol 0.8 at 1530 (normal 3-17). Repeat AM cortisol 8.3 today (normal).     ENT planning to discharge home today. /71 this morning. Case discussed with primary team however patient was discharged before medicine team able to physically see patient.     Coronary angio 3/12/19 showed severe 2 vessel disease with 100% stenosis of proximal LAD and 65% stenosis of RPDA; good collaterals noted. CABG x3 4/2019. Ascending aortic repair. PE post-op      Past Medical History    Past Medical History:   Diagnosis Date    Ascending aortic aneurysm (H24)     Coronary artery disease     Depression     Gout     History of anesthesia complications     Hyperlipemia     Hypertension     PONV (postoperative nausea and vomiting)        Past Surgical History   Past Surgical History:   Procedure Laterality Date    AORTA SURGERY N/A 4/23/2019    Procedure: WITH ASCENDING AORTA REPLACEMENT;  Surgeon: Darlene Graff  MD NIKOLE;  Location: [facility] OR;  Service: Cardiovascular    BYPASS GRAFT ARTERY CORONARY      CARDIAC SURGERY      CV CORONARY ANGIOGRAM N/A 3/12/2019    Procedure: Coronary Angiogram;  Surgeon: Hamilton Lucero MD;  Location: [facility] Cath Lab;  Service: Cardiology    ENDOSCOPIC SINUS SURGERY Left 11/10/2023    Procedure: Transnasal endoscopic approach to biopsy left nasal mass;  Surgeon: Damon Regan MD;  Location: [facility] OR    GALLBLADDER SURGERY      IR FACIAL EMBOLIZATION LEFT  9/13/2023       Medications   I have reviewed this patient's current medications       Physical Exam   Vital Signs: Temp: 97.8  F (36.6  C) Temp src: Oral BP: 124/71 Pulse: 58   Resp: 16 SpO2: 100 % O2 Device: None (Room air)    Weight: 133 lbs 13.11 oz      Medical Decision Making       30 MINUTES SPENT BY ME on the date of service doing chart review, history, exam, documentation & further activities per the note.      Data     I have personally reviewed the following data over the past 24 hrs:    6.6  \   8.7 (L)   / 173     N/A N/A N/A /  N/A   N/A N/A N/A \       Imaging results reviewed over the past 24 hrs:   No results found for this or any previous visit (from the past 24 hour(s)).

## 2023-12-04 NOTE — PLAN OF CARE
Status: S/p endoscopic approach for  L sinonasal mass resection 12/1, bilat carotid embolization 11/30  Vitals: VSS  Neuros: A&Ox4, L sided facial swelling (unable to raise R eyebrow baseline), L ear [place]  IV: PIV SL   Labs/Electrolytes: Hgb 8.5  Resp/trach: Clear lungs, RA  Diet: Regular, fair PO  Bowel status: LBM 12/2 per pt, took senna  : Voiding  Skin: R groin site covered with tegaderm, dried blood on R side of head. Old surgical scars. Scant bloody drainage from nose  Pain: L facial and throat pain controlled with tramdol x1, scheduled tylenol  Activity: SBA  Social: No visitors  Plan/updates: Pt walked halls x1. Continue POC

## 2023-12-04 NOTE — DISCHARGE SUMMARY
Discharge Summary  Douglas Herrera  [id number]  1960    Date of Admission: 11/30/2023  Date of Discharge: 12/4/2023    Admission Diagnosis: Mass of sinus [J34.89]  Discharge Diagnosis: Same    Procedures:  Date: 12/1/2023  Procedure(s):  Stealth assisted transnasal endoscopic approach to excise left sinonasal mass    Pathology: pending    HPI: Douglas Herrera is a 63 year old male with history of  left-sided sinonasal mass.  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  However clinical and radiologic exam demonstrate significant progression of this mass very concerning for invasive carcinoma.  However, there has been no tissue biopsy to demonstrate this.  Preoperative CT and MRI demonstrated invasion and erosion of the left hard palate as well as the pterygoid plates. It was recommended that he undergo operative intervention and the patient consented to the above procedure after detailed explanation of the risks and benefits of said procedure.    Hospital Course: The patient was admitted to the hospital and underwent the above mentioned procedure. He tolerated the procedure without any intra- or morgan-operative complications. Please see the operative report for full details of the procedure. The patient was admitted for post-operative monitoring. His postoperative course was complicated by hypotension. The workup - including chest xray, EKG, troponin, UA, which all came back negative as well as a medicine consult were completed. He had acute blood loss anemia. He was given 2 units of pRBC. His hypotension resolved and he was feeling much better on POD2. At discharge, the patient's pain was well controlled, the patient was voiding on his own, and he was ambulating and tolerating a regular diet.     Discharge Exam:  Vitals:    12/03/23 1517 12/03/23 2305 12/04/23 0457 12/04/23 0848   BP: 135/73 109/69  124/71   BP Location: Right arm Left arm  Right arm   Pulse: 59 66 57 58   Resp: 16 15  16   Temp: 98.4  F (36.9   C) 98.7  F (37.1  C)  97.8  F (36.6  C)   TempSrc: Oral Oral  Oral   SpO2: 99% 97% 97% 100%   Weight:       Height:         General: Well appearing, laying in bed.   HEENT: EOMI. No movement of the right eyebrow, otherwise facial nerve intact. No blood at anterior nares or in oropharynx.   Pulmonary: Breathing non-labored, no stridor, no accessory muscle use.    Discharge Medications:     Medication List        Started      aspirin 81 MG EC tablet  81 mg, Oral, DAILY  Start taking on: December 5, 2023            Modified      acetaminophen 325 MG tablet  Commonly known as: TYLENOL  650 mg, Oral, EVERY 4 HOURS PRN  What changed:   medication strength  how much to take  when to take this  reasons to take this     * sodium chloride 0.65 % nasal spray  Commonly known as: OCEAN  2 sprays each nostril four times a day after surgery until followup  What changed:   how much to take  how to take this  when to take this  reasons to take this     * sodium chloride 0.65 % nasal spray  Commonly known as: OCEAN  2 sprays, Nasal, DAILY PRN  What changed: You were already taking a medication with the same name, and this prescription was added. Make sure you understand how and when to take each.     traMADol 50 MG tablet  Commonly known as: ULTRAM  50 mg, Oral, EVERY 6 HOURS PRN  What changed: reasons to take this           * This list has 2 medication(s) that are the same as other medications prescribed for you. Read the directions carefully, and ask your doctor or other care provider to review them with you.                Discontinued      allopurinol 300 MG tablet  Commonly known as: ZYLOPRIM     amLODIPine 5 MG tablet  Commonly known as: NORVASC     metoprolol succinate ER 25 MG 24 hr tablet  Commonly known as: TOPROL XL     sennosides 8.6 MG tablet  Commonly known as: SENOKOT              Discharge Procedure Orders   Reason for your hospital stay   Order Comments: Post-operative care     Activity   Order Comments: Your  "activity upon discharge: No heavy lifting greater than 10 lbs and no strenuous exercise for 2 weeks or until follow up appointment. No driving while taking narcotic pain medications.     Order Specific Question Answer Comments   Is discharge order? Yes      When to contact your care team   Order Comments: Please notify your doctor if you experience wound breakdown, sustained bleeding from the wound site, or increasing redness, swelling, and/or purulent malorodorous discharge from the wound site which may indicate infection. If you feel it is acute, or experience sudden changes in breathing, chest pain, or excessive sleepiness/somnolence please return to the emergency department or call 911. If you have questions or concerns during the day please call ENT clinic and [phone]. If at night you can call [facility] at [phone] and ask for the \"ENT resident on call\".     [facility] Follow-up and recommended labs and tests   Order Comments: Follow up with your primary care provider within 1 week for blood pressure check and management. Continue to hold your amlodipine and metoprolol until follow up.    Follow up in ENT clinic with Dr. Iglesias on 12/14 at 2:40pm. Please call the clinic with questions/concerns: [phone].    Otolaryngology/ENT Clinic:  [facility]  [address] [place] [address]      Appointments on [place] and/or [facility] (with [facility] or [facility] provider or service). Call [phone] if you haven't heard regarding these appointments within 7 days of discharge.     Diet   Order Comments: Follow this diet upon discharge: Regular diet     Order Specific Question Answer Comments   Is discharge order? Yes        Dispo: To home in good condition. All of the patient's questions/concerns have been addressed at this time.     Jaci Rodriguez MD   Department of Otolaryngology - Head and Neck Surgery, PGY 1  Please page " ENT with questions

## 2023-12-04 NOTE — PROGRESS NOTES
"Otolaryngology Progress Note  December 2, 2023    S: No acute events overnight. Patient is doing well, ready to go home today. Minimal bleeding overnight.     O: /69 (BP Location: Left arm)   Pulse 57   Temp 98.7  F (37.1  C) (Oral)   Resp 15   Ht 1.626 m (5' 4\")   Wt 60.7 kg (133 lb 13.1 oz)   SpO2 97%   BMI 22.97 kg/m     General: Well appearing, laying in bed.    HEENT: EOMI. No movement of the right eyebrow, otherwise facial nerve intact. No blood at anterior nares or in oropharynx.    Pulmonary: Breathing non-labored, no stridor, no accessory muscle use.    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    Plan:   - Discharge today    NEURO  Tylenol and oxy    HEENT  Afrin, nasal saline prn    C/V  PTA amlodipine held  PTA atorvastatin continued  PTA metoprolol restarted  PTA aspirin restarted    PULM  IS    FEN/GI  Regular diet  Zofran, bowel reg prn  Fluids pending    HEME/ID  Anemia - hgb at 8.7 (8.5) this morning : Acute blood loss anemia (treated with transfusion of 2 pRBC)  PTA entecavir  Mild thrombocytopenia noted, consistent with prior lab values - will have him follow up with PCP    ENDO  LIZETTE  G/U  LIZETTE    CONSULTS  Medicine    Ppx  SCDs    DISPO  Likely discharge today    -- Patient and above plan discussed with Dr. Kelsie Rodriguez MD  Otolaryngology - Head and Neck Surgery PGY-1  "

## 2023-12-04 NOTE — PLAN OF CARE
Status: S/p endoscopic approach for  L sinonasal mass resection 12/1, bilat carotid embolization 11/30  Vitals: VSS on RA  Neuros: A&Ox4.Unable to raise R eyebrow baseline, L ear [place]  IV: PIV removed  Labs/Electrolytes: WNL  Resp/trach: Scant serosang drainage from L nare; Dried bloody drainage in L. Nostril. Sinus precautions maintained  Diet: Regular, fair PO  Bowel status: LBM 12/3 per pt. Passing gas, scheduled senna given  : Voiding spontaneously w/out difficulty  Skin: R groin site CDI, CMS +  Pain: Denies  Activity: SBA  Plan/updates: Pt discharged to home, see below    Discharge time/date: 1200   12/4/23   Walked or Wheelchair: Wheelchair  PIV removed: Yes  Reviewed AVS with patient: Yes  Medication due times added to AVS in EPIC: Yes  Verbalized understanding of discharge with teachback: Yes  Medications retrieved from pharmacy: Yes, by staff, given to pt  Supplies sent home: None  Belongings from security with patient: N/A

## 2023-12-04 NOTE — PLAN OF CARE
Status: S/p endoscopic approach for  L sinonasal mass resection 12/1, bilat carotid embolization 11/30  Vitals: VSS on RA  Neuros: A&Ox4.Unable to raise R eyebrow baseline, L ear [place]  IV: PIV SL   Labs/Electrolytes: Hgb 8.5  Resp/trach: Continuous pulse ox in mid to high 90's. Dried bloody drainage in L. Nostril. Sinus precautions maintained  Diet: Regular, fair PO  Bowel status: LBM 12/2 per pt. Has scheduled senna and miralax. Passing gas  : Voiding spontaneously via bathroom/urinal   Skin: R groin site covered with tegaderm  Pain: L facial and throat pain controlled with scheduled tylenol   Activity: SBA  Plan/updates: Restart aspirin. PT recommending home with assist, likely today. Continue POC

## 2023-12-05 LAB
ATRIAL RATE - MUSE: 53 BPM
ATRIAL RATE - MUSE: 61 BPM
DIASTOLIC BLOOD PRESSURE - MUSE: NORMAL MMHG
DIASTOLIC BLOOD PRESSURE - MUSE: NORMAL MMHG
INTERPRETATION ECG - MUSE: NORMAL
INTERPRETATION ECG - MUSE: NORMAL
P AXIS - MUSE: 24 DEGREES
P AXIS - MUSE: 77 DEGREES
PR INTERVAL - MUSE: 186 MS
PR INTERVAL - MUSE: 200 MS
QRS DURATION - MUSE: 90 MS
QRS DURATION - MUSE: 98 MS
QT - MUSE: 450 MS
QT - MUSE: 482 MS
QTC - MUSE: 452 MS
QTC - MUSE: 453 MS
R AXIS - MUSE: 10 DEGREES
R AXIS - MUSE: 21 DEGREES
SYSTOLIC BLOOD PRESSURE - MUSE: NORMAL MMHG
SYSTOLIC BLOOD PRESSURE - MUSE: NORMAL MMHG
T AXIS - MUSE: 46 DEGREES
T AXIS - MUSE: 55 DEGREES
VENTRICULAR RATE- MUSE: 53 BPM
VENTRICULAR RATE- MUSE: 61 BPM

## 2023-12-06 ENCOUNTER — PATIENT OUTREACH (OUTPATIENT)
Dept: ONCOLOGY | Facility: CLINIC | Age: 63
End: 2023-12-06
Payer: COMMERCIAL

## 2023-12-06 DIAGNOSIS — C30.0 MALIGNANT NEOPLASM OF NASAL CAVITIES (H): Primary | ICD-10-CM

## 2023-12-07 ENCOUNTER — TELEPHONE (OUTPATIENT)
Dept: OTOLARYNGOLOGY | Facility: CLINIC | Age: 63
End: 2023-12-07
Payer: COMMERCIAL

## 2023-12-07 LAB
PATH REPORT.COMMENTS IMP SPEC: ABNORMAL
PATH REPORT.COMMENTS IMP SPEC: ABNORMAL
PATH REPORT.COMMENTS IMP SPEC: YES
PATH REPORT.FINAL DX SPEC: ABNORMAL
PATH REPORT.GROSS SPEC: ABNORMAL
PATH REPORT.INTRAOP OBS SPEC DOC: ABNORMAL
PATH REPORT.MICROSCOPIC SPEC OTHER STN: ABNORMAL
PATH REPORT.MICROSCOPIC SPEC OTHER STN: ABNORMAL
PATH REPORT.RELEVANT HX SPEC: ABNORMAL
PHOTO IMAGE: ABNORMAL

## 2023-12-07 NOTE — TELEPHONE ENCOUNTER
Author spoke with patients son and confirmed he will complete PET CT on Saturday.    Kia Poole RN on 12/7/2023 at 11:24 AM

## 2023-12-08 ENCOUNTER — TRANSFERRED RECORDS (OUTPATIENT)
Dept: HEALTH INFORMATION MANAGEMENT | Facility: CLINIC | Age: 63
End: 2023-12-08

## 2023-12-08 ENCOUNTER — PATIENT OUTREACH (OUTPATIENT)
Dept: CARE COORDINATION | Facility: CLINIC | Age: 63
End: 2023-12-08
Payer: COMMERCIAL

## 2023-12-08 NOTE — PROGRESS NOTES
Clinic Care Coordination Contact  Care Team Conversations    Patient identified for care management outreach, however Atilio RN staff has already followed up with patient to ensure they are following up with PCP and have needs and resources met. Clinic RN will refer back to JASEN SHANE if needed/appropriate.    Marly Lyman, [facility]  Social Work Care Coordinator - [facility]  Care Coordination  [email or web address]  Cell Phone: [phone]  Gender pronouns: she/her  Employed by [employer]

## 2023-12-09 ENCOUNTER — HOSPITAL ENCOUNTER (OUTPATIENT)
Dept: PET IMAGING | Facility: CLINIC | Age: 63
Discharge: HOME | End: 2023-12-09
Attending: OTOLARYNGOLOGY
Payer: COMMERCIAL

## 2023-12-09 DIAGNOSIS — C30.0 MALIGNANT NEOPLASM OF NASAL CAVITIES (H): ICD-10-CM

## 2023-12-09 PROCEDURE — 78816 PET IMAGE W/CT FULL BODY: CPT | Mod: PI

## 2023-12-09 PROCEDURE — 250N000011 HC RX IP 250 OP 636: Performed by: OTOLARYNGOLOGY

## 2023-12-09 PROCEDURE — 343N000001 HC RX 343: Performed by: OTOLARYNGOLOGY

## 2023-12-09 PROCEDURE — 74177 CT ABD & PELVIS W/CONTRAST: CPT | Mod: 26 | Performed by: RADIOLOGY

## 2023-12-09 PROCEDURE — 70491 CT SOFT TISSUE NECK W/DYE: CPT | Mod: 26 | Performed by: RADIOLOGY

## 2023-12-09 PROCEDURE — 74177 CT ABD & PELVIS W/CONTRAST: CPT

## 2023-12-09 PROCEDURE — 71260 CT THORAX DX C+: CPT | Mod: 26 | Performed by: RADIOLOGY

## 2023-12-09 PROCEDURE — 70491 CT SOFT TISSUE NECK W/DYE: CPT

## 2023-12-09 PROCEDURE — A9552 F18 FDG: HCPCS | Performed by: OTOLARYNGOLOGY

## 2023-12-09 PROCEDURE — 78816 PET IMAGE W/CT FULL BODY: CPT | Mod: 26 | Performed by: RADIOLOGY

## 2023-12-09 RX ORDER — IOPAMIDOL 755 MG/ML
10-135 INJECTION, SOLUTION INTRAVASCULAR ONCE
Status: COMPLETED | OUTPATIENT
Start: 2023-12-09 | End: 2023-12-09

## 2023-12-09 RX ADMIN — IOPAMIDOL 81 ML: 755 INJECTION, SOLUTION INTRAVENOUS at 12:44

## 2023-12-09 RX ADMIN — FLUDEOXYGLUCOSE F-18 10.1 MILLICURIE: 500 INJECTION, SOLUTION INTRAVENOUS at 11:48

## 2023-12-11 ENCOUNTER — TRANSFERRED RECORDS (OUTPATIENT)
Dept: HEALTH INFORMATION MANAGEMENT | Facility: CLINIC | Age: 63
End: 2023-12-11
Payer: COMMERCIAL

## 2023-12-11 NOTE — TELEPHONE ENCOUNTER
RECORDS STATUS - ALL OTHER DIAGNOSIS      RECORDS RECEIVED FROM: Epic   DATE RECEIVED:    NOTES STATUS DETAILS   OFFICE NOTE from referring provider Epic 11/16/23: Dr. Damon Regan   OFFICE NOTE from other specialist [facility] 10/04/23: Dr. Karoline Guzman   DISCHARGE SUMMARY from hospital [facility] 11/30/23, 11/10/23: [facility]  09/12/23: [facility]   OPERATIVE REPORT [facility] 12/01/23: Stealth assisted transnasal endoscopic approach to excise left sinonasal mass     11/30/23: ANESTHESIA OUT OF OR CAROTID CEREBRAL ANGIOGRAM BILATERAL PRESURGICAL EMBOLIZATION@1100     11/10/23: Transnasal endoscopic approach to biopsy left nasal mass    MEDICATION LIST [facility]    LABS     PATHOLOGY REPORTS Reports in Epic 12/01/23: [id number]  11/10/23: [id number]  10/04/23: [id number]   ANYTHING RELATED TO DIAGNOSIS Epic Most recent 12/04/23   IMAGING (NEED IMAGES & REPORT)     CT SCANS PACS 12/09/23: CT CAP  12/09/23: CT Soft Tissue Neck  11/17/23: CT Facial Bones  09/13/23: CTA Head Neck  08/09/23: CT Sinus   MRI PACS 11/17/23: MR Sinonasal  08/23/23: MR Maxillofacal   PET PACS 12/0923: PET Onc Whole Body

## 2023-12-12 ENCOUNTER — TRANSFERRED RECORDS (OUTPATIENT)
Dept: HEALTH INFORMATION MANAGEMENT | Facility: CLINIC | Age: 63
End: 2023-12-12
Payer: COMMERCIAL

## 2023-12-12 NOTE — PATIENT INSTRUCTIONS
1. Please follow-up in clinic once you complete radiation and chemotherapy.    Start NeilMed sinus rinses to keep the sinus cavity clean.  2. Please call the ENT clinic with any questions,concerns, new or worsening symptoms.    -Clinic number is [phone]   - Kia's direct line (Dr. Iglesias's nurse) [phone]

## 2023-12-14 ENCOUNTER — ONCOLOGY VISIT (OUTPATIENT)
Dept: ONCOLOGY | Facility: CLINIC | Age: 63
End: 2023-12-14
Attending: OTOLARYNGOLOGY
Payer: COMMERCIAL

## 2023-12-14 ENCOUNTER — PATIENT OUTREACH (OUTPATIENT)
Dept: ONCOLOGY | Facility: CLINIC | Age: 63
End: 2023-12-14
Payer: COMMERCIAL

## 2023-12-14 ENCOUNTER — PRE VISIT (OUTPATIENT)
Dept: ONCOLOGY | Facility: CLINIC | Age: 63
End: 2023-12-14
Payer: COMMERCIAL

## 2023-12-14 ENCOUNTER — OFFICE VISIT (OUTPATIENT)
Dept: OTOLARYNGOLOGY | Facility: CLINIC | Age: 63
End: 2023-12-14
Payer: COMMERCIAL

## 2023-12-14 VITALS
HEART RATE: 85 BPM | TEMPERATURE: 98.2 F | BODY MASS INDEX: 25.52 KG/M2 | SYSTOLIC BLOOD PRESSURE: 104 MMHG | OXYGEN SATURATION: 97 % | DIASTOLIC BLOOD PRESSURE: 68 MMHG | RESPIRATION RATE: 16 BRPM | HEIGHT: 62 IN | WEIGHT: 138.7 LBS

## 2023-12-14 VITALS — BODY MASS INDEX: 25.23 KG/M2 | WEIGHT: 138 LBS

## 2023-12-14 DIAGNOSIS — Z13.29 SCREENING FOR HYPOTHYROIDISM: ICD-10-CM

## 2023-12-14 DIAGNOSIS — E44.0 MODERATE PROTEIN-CALORIE MALNUTRITION (H): ICD-10-CM

## 2023-12-14 DIAGNOSIS — C30.0 MALIGNANT NEOPLASM OF NASAL CAVITIES (H): ICD-10-CM

## 2023-12-14 DIAGNOSIS — E83.42 HYPOMAGNESEMIA: ICD-10-CM

## 2023-12-14 DIAGNOSIS — H90.3 ASYMMETRICAL SENSORINEURAL HEARING LOSS: ICD-10-CM

## 2023-12-14 DIAGNOSIS — C31.0 SQUAMOUS CELL CARCINOMA OF MAXILLARY SINUS (H): Primary | ICD-10-CM

## 2023-12-14 DIAGNOSIS — C30.0 MALIGNANT NEOPLASM OF NASAL CAVITIES (H): Primary | ICD-10-CM

## 2023-12-14 DIAGNOSIS — B19.10 HEPATITIS B INFECTION WITHOUT DELTA AGENT WITHOUT HEPATIC COMA, UNSPECIFIED CHRONICITY: ICD-10-CM

## 2023-12-14 DIAGNOSIS — Z11.59 NEED FOR HEPATITIS C SCREENING TEST: ICD-10-CM

## 2023-12-14 DIAGNOSIS — Z11.1 SCREENING EXAMINATION FOR PULMONARY TUBERCULOSIS: ICD-10-CM

## 2023-12-14 PROCEDURE — 31237 NSL/SINS NDSC SURG BX POLYPC: CPT | Mod: LT | Performed by: OTOLARYNGOLOGY

## 2023-12-14 PROCEDURE — 99417 PROLNG OP E/M EACH 15 MIN: CPT | Performed by: INTERNAL MEDICINE

## 2023-12-14 PROCEDURE — G0463 HOSPITAL OUTPT CLINIC VISIT: HCPCS | Performed by: INTERNAL MEDICINE

## 2023-12-14 PROCEDURE — 99205 OFFICE O/P NEW HI 60 MIN: CPT | Performed by: INTERNAL MEDICINE

## 2023-12-14 ASSESSMENT — PAIN SCALES - GENERAL: PAINLEVEL: MILD PAIN (2)

## 2023-12-14 NOTE — PROGRESS NOTES
[id number]  INVASIVE NON-KERATINIZING SQUAMOUS CELL CARCINOMA   SQUAMOUS CELL CARCINOMA INVOLVING BONE TISSUE       PROCEDURE: Stealth assisted transnasal endoscopic approach to excise left sinonasal mass on 12/1/2023    History of Present Illness: 63-year-old male here in the otolaryngology clinic for his first postoperative follow-up after transnasal endoscopic excision of sinonasal mass.  The patient he is doing well he is very happy because now he can breathe through his nose with no difficulty.  He has not had any epistaxis since the surgery.  He is eating well.  He continues to lose weight.  The patient was evaluated by our oncologist Dr. Post.  He will be presented in our tumor board for tomorrow.    MEDICATIONS:     Current Outpatient Medications   Medication Sig Dispense Refill     acetaminophen (TYLENOL) 325 MG tablet Take 2 tablets (650 mg) by mouth every 4 hours as needed for other or pain 50 tablet 0     aspirin 81 MG EC tablet Take 1 tablet (81 mg) by mouth daily 30 tablet 0     atorvastatin (LIPITOR) 80 MG tablet TAKE 1 TABLET (80 MG TOTAL) BY MOUTH AT BEDTIME/ TXHUA HMO NOJ 1 LUB TSHUAJ THAUM MUS PW PAB ZOO NTSHAV MUAJ ROJ 90 tablet 3     entecavir (BARACLUDE) 0.5 MG tablet Take 0.5 mg by mouth every morning Take 1 hour before a meal or 2 hours after a meal.       gabapentin (NEURONTIN) 300 MG capsule Take 1 capsule (300 mg) by mouth At Bedtime (Patient taking differently: Take 300 mg by mouth as needed) 30 capsule 0     nitroGLYcerin (NITROSTAT) 0.4 MG sublingual tablet Place 0.4 mg under the tongue every 5 minutes as needed       oxymetazoline (AFRIN) 0.05 % nasal spray Spray 2 sprays into both nostrils 2 times daily as needed for congestion 15 mL 0     senna-docusate (SENOKOT-S/PERICOLACE) 8.6-50 MG tablet Take 1 tablet by mouth 2 times daily as needed for constipation 30 tablet 0     senna-docusate (SENOKOT-S/PERICOLACE) 8.6-50 MG tablet Take 1 tablet by mouth 2 times daily 10 tablet 0     sodium  chloride (OCEAN) 0.65 % nasal spray Spray 2 sprays in nostril daily as needed for congestion 88 mL 3     sodium chloride (OCEAN) 0.65 % nasal spray 2 sprays each nostril four times a day after surgery until followup (Patient taking differently: Spray 2 sprays in nostril daily as needed 2 sprays each nostril four times a day after surgery until followup) 60 mL 1     traMADol (ULTRAM) 50 MG tablet Take 1 tablet (50 mg) by mouth every 6 hours as needed for severe pain 20 tablet 0     traZODone (DESYREL) 50 MG tablet Take 50 mg by mouth nightly as needed         ALLERGIES:  No Known Allergies    PAST MEDICAL HISTORY:   Past Medical History:   Diagnosis Date     Ascending aortic aneurysm (H24)      Coronary artery disease      Depression      Gout      History of anesthesia complications      Hyperlipemia      Hypertension      PONV (postoperative nausea and vomiting)         FAMILY HISTORY:    Family History   Problem Relation Age of Onset     Cerebrovascular Disease Mother 90.00     Acute Myocardial Infarction No family hx of        REVIEW OF SYSTEMS:  12 point ROS was negative other than the symptoms noted above in the HPI.      Nasal endoscopy: Consent for nasal endoscopy was obtained.  I confirmed correctness of the procedure and identity of the patient.  Nasal endoscopy was indicated due to recent transnasal endoscopic approach and resection of sinonasal tumor.  The nose was topically decongested and anesthetized.  The nasal endoscope was passed under endoscopic vision.  On the left side I remove abundant sinonasal crust.  After removing the crust the anterior posterior ethmoid and sphenoid are free of tumor.  The nasopharynx is clean.  There is tumor in the maxillary sinus.  I am quite surprised because after surgery the maxillary sinus was almost empty now there is tumor filling almost the entire maxillary sinus.      IMPRESSION AND PLAN: Discussed with the patient and his son Tulio about the nature of these  disease.  Unfortunately this tumor is unresectable.  He is going to be evaluated by oncology and radiation oncology to initiate either induction chemotherapy versus concurrent chemoradiation therapy.  I will see the patient back in about 2 months when he is finishing radiation therapy.      Damon Regan MD, M.D.  Otolaryngology- Head & Neck Surgery  [phone]

## 2023-12-14 NOTE — LETTER
12/14/2023       RE: Douglas Herrera  [address] [place] [address]     Dear Colleague,    Thank you for referring your patient, Douglas Herrera, to the [facility] EAR NOSE AND THROAT CLINIC [place] at [facility]. Please see a copy of my visit note below.    [id number]  INVASIVE NON-KERATINIZING SQUAMOUS CELL CARCINOMA   SQUAMOUS CELL CARCINOMA INVOLVING BONE TISSUE       PROCEDURE: Stealth assisted transnasal endoscopic approach to excise left sinonasal mass on 12/1/2023    History of Present Illness: 63-year-old male here in the otolaryngology clinic for his first postoperative follow-up after transnasal endoscopic excision of sinonasal mass.  The patient he is doing well he is very happy because now he can breathe through his nose with no difficulty.  He has not had any epistaxis since the surgery.  He is eating well.  He continues to lose weight.  The patient was evaluated by our oncologist Dr. Post.  He will be presented in our tumor board for tomorrow.    MEDICATIONS:     Current Outpatient Medications   Medication Sig Dispense Refill    acetaminophen (TYLENOL) 325 MG tablet Take 2 tablets (650 mg) by mouth every 4 hours as needed for other or pain 50 tablet 0    aspirin 81 MG EC tablet Take 1 tablet (81 mg) by mouth daily 30 tablet 0    atorvastatin (LIPITOR) 80 MG tablet TAKE 1 TABLET (80 MG TOTAL) BY MOUTH AT BEDTIME/ TXHUA O NOJ 1 LUB TSHUAJ THAUM MUS PW PAB ZOO NTSHAV MUAJ ROJ 90 tablet 3    entecavir (BARACLUDE) 0.5 MG tablet Take 0.5 mg by mouth every morning Take 1 hour before a meal or 2 hours after a meal.      gabapentin (NEURONTIN) 300 MG capsule Take 1 capsule (300 mg) by mouth At Bedtime (Patient taking differently: Take 300 mg by mouth as needed) 30 capsule 0    nitroGLYcerin (NITROSTAT) 0.4 MG sublingual tablet Place 0.4 mg under the tongue every 5 minutes as needed      oxymetazoline (AFRIN) 0.05 % nasal spray Spray 2 sprays into both  nostrils 2 times daily as needed for congestion 15 mL 0    senna-docusate (SENOKOT-S/PERICOLACE) 8.6-50 MG tablet Take 1 tablet by mouth 2 times daily as needed for constipation 30 tablet 0    senna-docusate (SENOKOT-S/PERICOLACE) 8.6-50 MG tablet Take 1 tablet by mouth 2 times daily 10 tablet 0    sodium chloride (OCEAN) 0.65 % nasal spray Spray 2 sprays in nostril daily as needed for congestion 88 mL 3    sodium chloride (OCEAN) 0.65 % nasal spray 2 sprays each nostril four times a day after surgery until followup (Patient taking differently: Spray 2 sprays in nostril daily as needed 2 sprays each nostril four times a day after surgery until followup) 60 mL 1    traMADol (ULTRAM) 50 MG tablet Take 1 tablet (50 mg) by mouth every 6 hours as needed for severe pain 20 tablet 0    traZODone (DESYREL) 50 MG tablet Take 50 mg by mouth nightly as needed         ALLERGIES:  No Known Allergies    PAST MEDICAL HISTORY:   Past Medical History:   Diagnosis Date    Ascending aortic aneurysm (H24)     Coronary artery disease     Depression     Gout     History of anesthesia complications     Hyperlipemia     Hypertension     PONV (postoperative nausea and vomiting)         FAMILY HISTORY:    Family History   Problem Relation Age of Onset    Cerebrovascular Disease Mother 90.00    Acute Myocardial Infarction No family hx of        REVIEW OF SYSTEMS:  12 point ROS was negative other than the symptoms noted above in the HPI.      Nasal endoscopy: Consent for nasal endoscopy was obtained.  I confirmed correctness of the procedure and identity of the patient.  Nasal endoscopy was indicated due to recent transnasal endoscopic approach and resection of sinonasal tumor.  The nose was topically decongested and anesthetized.  The nasal endoscope was passed under endoscopic vision.  On the left side I remove abundant sinonasal crust.  After removing the crust the anterior posterior ethmoid and sphenoid are free of tumor.  The nasopharynx  is clean.  There is tumor in the maxillary sinus.  I am quite surprised because after surgery the maxillary sinus was almost empty now there is tumor filling almost the entire maxillary sinus.      IMPRESSION AND PLAN: Discussed with the patient and his son Tulio about the nature of these disease.  Unfortunately this tumor is unresectable.  He is going to be evaluated by oncology and radiation oncology to initiate either induction chemotherapy versus concurrent chemoradiation therapy.  I will see the patient back in about 2 months when he is finishing radiation therapy.      Damon Regan MD, M.D.  Otolaryngology- Head & Neck Surgery  [phone]

## 2023-12-14 NOTE — PROGRESS NOTES
[facility] CANCER [facility]    PATIENT NAME: Douglas Herrera  MRN # [id number]   DATE OF VISIT: December 13, 2023  YOB: 1960     [facility]   Radiation Oncology: Dr. Tomasa Akers  Cardiology: Dr. Qamar Hernandez  PCP: Dr. العراقي   Hepatologist: [facility]     CANCER TYPE: SCC sinonasal   STAGE: [id number] (IVB)  ECOG PS: 1    PD-L1:  NGS: internal panel pending     SUMMARY    8/9/23 CT sinus.   8/24/23 MRI sinus. L maxillary sinus mass  9/12/23 ED for epistaxis.   9/13/23 CTA and embolization of L IMAX   10/4/23 Nasal bx. Path: Inverted papilloma with high grade dysplasia  11/10/23 Nasal endoscopy and bx in the OR. C/b severe bleeding. Path: Inverted sinonasal papilloma with severe dysplasia.  11/17/23 MRI. 7.4 x 4.6 x 6.6 cm L sinonasal mass, destruction of nasal turbinates, L maxillary sinus, hard palate, invasion of L  space, involvement of medial and lateral pterygoids and temporalis muscles. Effacement and invasion ipsilateral pterygopalatine fossa, extends and effaces the nasopharynx.   11/30/23 Carotid angiogram. Direct endonasal and transoral embolization L sinonasal tumor, transaterial embolization of the L sphenopalatine artery feeders to the L sinonasal artery tumor   12/1/23 Stealth assisted transnasal endoscopic approach to excise L sinonasal mass. Pre-operative embolization. Path: SCC involving L maxilla.    12/9/23 PET. Hypermetabolic mass centered along the L maxilla, extending superiorly through the floor of the L maxillary sinus, posterior/laterally to the  space, invasion of the pterygoid muscles, extending cranially along the pterygopalatine fossa and pterygoid plates to the skull base level. Erosion of involved bones including L maxilla, maxillary sinus wall and pterygoid plates. Extension medially to the L lateral nasopharyngeal wall and soft palate. 1 mm fat place between carotid and mass. ~4.9 x 4.0 x 5.4 cm (SUV 24.3). Partially  resected since 11/17/23 MRI. 8 mm L 2A node (SUV 5.9), 7 mm L level 2 node (SUV 5.5)    ASSESSMENT AND PLAN  SCC L maxillary sinus, [id number] (IVB): Reviewed PET results with them. They see Dr. Iglesias later today. I do not think he can tolerate TPF induction, therefore discussed chemoradiation. He meets with Dr. Akers in the coming week or so. Outlined the logistics of 7 weeks of radiation and use of chemotherapy as a radiosensitizer with curative intent. Unlikely to be a candidate for HD cisplatin due to hearing loss and comorbidities (cardiac primarily). Discussed weekly chemo, ideally cisplatin but carboplatin paclitaxel as an alternative should his hearing be poorer than it appears clinically today. Will likely need Gtube. Will discuss tomorrow at tumor board. Anticipate starting second week of Jan.     Definitive vs adjuvant: Definitive  Chemo: Weekly cisplatin  Goal: curative  Dental: Discussed, asked him to see his dentist asap  Speech Path: Video swallow 1/4  Nutrition: Will be consulted  Audiology: ordered. ADDENUDUM: Results reviewed - fairly significant hearing loss. Due to advanced stage of disease, will start weekly cisplatin but monitor very closely for clinical deterioration and would switch to carbo paclitaxel instead.  Labs: prior labs adequate  Gtube: discuss at tumor board tomorrow, discussed briefly today, will likely need with weight loss, difficulty chewing due to palatal involvement  Port: Trudy Shelton will discuss today  Start date: TBD, aiming for second week of Jan, pending timing of rad onc consult     Hepatitis B: Says he met with his Hepatologist earlier this week and went through testing and screening. Doesn't recall his doc's name but is at [facility]. Will get records. On etecavir. If records not avail, etc., will check hepatitis B PCR, etc.     H/o PE/DVT: 5/8/2019 CTA report scanned into [employer], reviewed. Small PEs. Also had LLE DVT, acute, occlusive. Report in EPic and reviewed.  Presented with CP and LLE swelling. Was on rivaroxaban, completed course.     H/o undefined hypothyroidism: Check TFTs next visit.     Screening for TB: Quantiferon gold - I can't find any results in Epic    90 minutes spent by me on the date of the encounter doing extensive chart review, history and exam, documentation and further activities per the note     Kayy Post MD  Associate Professor of Medicine  Hematology, Oncology and Transplantation      SUBJECTIVE  Mr. 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   Accompanied by his son Tulio. Professional  used today. Mr. Herrera speaks and understands conversational english.   Tired. No nosebleeds  Eating is difficult. Soft foods and liquid. Teeth aren't lining up. Hurts  Breathing ok  Weight loss - about 20# in the last few months  Pain - more like pressure. Tramadol didn't do anything. Taking acetaminophen - has been limited by his hepatologist  Hasn't tried oxycodone. Day time is ok - but has trouble sleeping at night due to the pain  No chest pain/pressure  Vision ok  No numbness/tingling hands or feet  Hearing seems to be ok  BMs - some constipation  Urinating ok    PAST MEDICAL HISTORY  Sinonasal carcinoma as above  HTN  ASCVD. CABG x 3 2019  PE and LLE DVT after CABG . Treated with rivaroxaban  Dyslipidemia  Hepatitis B   SCC R temple s/p MOHS  Ascending aortic aneurysm repair 2019  Hearing loss L   Gout - feet  Depression  Cholecystectomy    Son Berhane at [phone]    CURRENT OUTPATIENT MEDICATIONS  Reviewed    ALLERGIES  No Known Allergies     SOCIAL HISTORY: Non smoker. No current ETOH. Lives with his children. Immigrated from [place] in the 1980s. Not working. On disability. Used to be a . Lived in [place]. One daughter and 5 sons. Two grandchildren.      FAMILY HISTORY:   Family History   Problem Relation Age of Onset    Cerebrovascular Disease  "Mother 90.00    Acute Myocardial Infarction No family hx of      PHYSICAL EXAM  /68 (BP Location: Right arm, Patient Position: Sitting, Cuff Size: Adult Regular)   Pulse 85   Temp 98.2  F (36.8  C) (Oral)   Resp 16   Ht 1.575 m (5' 2.01\")   Wt 62.9 kg (138 lb 11.2 oz)   SpO2 97%   BMI 25.36 kg/m    GEN: NAD  HEENT: EOMI, no icterus, injection or pallor. Oropharynx - mild trismus.   EXT: no edema  NEURO: alert    LABORATORY AND IMAGING STUDIES    Labs were independently reviewed and interpreted by me -   12/4 cortisol WNL  12/3/23 BMP - unremarkable, Cr 0.83, ranging from 0.7 to 1.1  12/2/23 Fungitell and fungal blood culture negative  11/28/23 CBC ok hemoglobin 13.5, MCV 85    All path reports reviewed       Imaging was personally reviewed and interpreted by me - MRIs and PET  I don't see any pulm nodules  Tumor crosses midline       "

## 2023-12-14 NOTE — PROGRESS NOTES
"[facility]: Cancer Care Initial Note                                    Discussion with Patient:                                                      Met with Douglas to discuss chemotherapy and resources available at the [facility]. Provided Douglas with \"My Cancer Guidebook\" and [facility] printout on cisplatin. Reviewed administration, side effects and ongoing symptom management by APPs in clinic.  Highlighted steps to expect when getting treatment (check in, labs, KENNEDI visit, pre-medications, infusion). Reviewed chemotherapy safety guidelines.    Reviewed when to contact triage team and provided info sheet on \"When to Call For Help\", as well as triage contact information. Request Douglas not send symptomatic [employer] messages.     Douglas signed Authorization to Discuss paperwork. He additionally signed TOI form for us to obtain records from [facility].    Plan for weekly cisplatin + RT, pending tumor board discussion on Friday, audiology exam, etc. Douglas would like to forgo port placement and proceed with PIVs. He also would like to be seen by our dental clinic for pre-chemoradiation clearance/prep - referral placed.     Answered all Douglas's questions. Encouraged him to reach out with any follow up questions or concerns.      Goals          General     Other (pt-stated)      Notes - Note created  12/14/2023  2:18 PM by Trudy Shelton, RN     Goal Statement: I will use my clinic and care team resources as directed.  Date Goal set: 12/14/2023  Barriers: disease burden  Strengths: support, coping, motivation, health awareness, and involvement with care team  Date to Achieve By: ongoing  Patient expressed understanding of goal: Yes  Action steps to achieve this goal:  I will contact triage with new, worsening or uncontrolled symptoms.   I will contact triage with temperature over 100.4  I will call with difficulties of scheduling and/or transportation.   I will request needed " refills when there are 7 days of medication remaining.   I will not send urgent or symptomatic messages through [employer].   I will contact scheduling to arrange or make changes in my appointments.          Assessment:                                                      Initial  Current living arrangement:: I live in a private home  Type of residence:: Private home - stairs  Informal Support system:: Family;Children  Equipment Currently Used at Home: none  Bed or wheelchair confined:: No  Mobility Status: Independent  Transportation means:: Regular car  Medication adherence problem (GOAL):: No  Knowledgeable about how to use meds:: Yes  Medication side effects suspected:: No  Advanced Care Plans/Directives on file:: No  Patient Reported Pain?: Yes, is currently well-managed  Pain Score: Mild Pain (2)  RASS (Cid Agitation-Sedation Scale): 0-->alert and calm    Plan of Care Education Review:   Assessment completed with:: Patient    Plan of Care Education   Yearly learning assessment completed?: Yes (see Education tab)  Diagnosis:: SCC sinonasal  Does patient understand diagnosis?: Yes  Tx plan/regimen:: likely weekly cisplatin + RT pending tumor board discussion, audiology, etc.  Does patient understand treatment plan/regimen?: Yes  Preparing for treatment:: Reviewed treatment preparation information with patient (vascular access, day of chemo, visitor policy, what to bring, etc.)  Vascular access education provided for:: Peripheral IV  Side effect education:: Diarrhea/Constipation;Fatigue;Infection;Lab value monitoring (anemia, neutropenia, thrombocytopenia);Nausea/Vomiting;Neuropathy  Safety/self care at home reviewed with patient:: Yes  Coping - concerns/fears reviewed with patient:: Yes  Plan of Care:: Lab appointment;KENNEDI follow-up appointment;Imaging;MD follow-up appointment;Treatment schedule  When to call provider:: Bleeding;Uncontrolled diarrhea/constipation;Increased shortness of breath;Uncontrolled  nausea/vomiting;New/worsening pain;Shaking chills;Temperature >100.4F  Reasons for deferring treatment reviewed with patient:: Yes    Evaluation of Learning  Patient Education Provided: Yes  Readiness:: Acceptance  Method:: Literature;Booklet/Handout;Explanation  Response:: Verbalizes understanding         Intervention/Education provided during outreach:                                                       Plan  Tumor board discussion Friday. Likely weekly cisplatin + RT  Dental referral   Audiology referral  Patient to follow up as scheduled at next appt  Patient to call/[employer] message with updates  Medication Change: Discussed medication change with patient  Confirmed patient has clinic and triage numbers    Trudy Shelton, RN, BSN  RN Care Coordinator  [facility]

## 2023-12-14 NOTE — NURSING NOTE
"Oncology Rooming Note    December 14, 2023 12:48 PM   Douglas Herrera is a 63 year old male who presents for:    Chief Complaint   Patient presents with    Oncology Clinic Visit     Squamous cell carcinoma of maxillary sinus; malignant neoplasm of nasal cavities     Initial Vitals: /68 (BP Location: Right arm, Patient Position: Sitting, Cuff Size: Adult Regular)   Pulse 85   Temp 98.2  F (36.8  C) (Oral)   Resp 16   Ht 1.575 m (5' 2.01\")   Wt 62.9 kg (138 lb 11.2 oz)   SpO2 97%   BMI 25.36 kg/m   Estimated body mass index is 25.36 kg/m  as calculated from the following:    Height as of this encounter: 1.575 m (5' 2.01\").    Weight as of this encounter: 62.9 kg (138 lb 11.2 oz). Body surface area is 1.66 meters squared.  Mild Pain (2) Comment: Data Unavailable   No LMP for male patient.  Allergies reviewed: Yes  Medications reviewed: Yes    Medications: Medication refills not needed today.  Pharmacy name entered into [facility]: [facility] - [place], [place] - [facility] [address]    Clinical concerns: None       Deneen Garcia"

## 2023-12-14 NOTE — LETTER
12/14/2023         RE: Douglas Herrera  [address] [place] [address]        Dear Colleague,    Thank you for referring your patient, Douglas Herrera, to the [facility]. Please see a copy of my visit note below.       [facility]    PATIENT NAME: Douglas Herrera  MRN # [id number]   DATE OF VISIT: December 13, 2023  YOB: 1960     [facility]   Radiation Oncology: Dr. Tomasa Akers  Cardiology: Dr. Qamar Hernandez  PCP: Dr. العراقي   Hepatologist: [facility]     CANCER TYPE: SCC sinonasal   STAGE: [id number] (IVB)  ECOG PS: 1    PD-L1:  NGS: internal panel pending     SUMMARY    8/9/23 CT sinus.   8/24/23 MRI sinus. L maxillary sinus mass  9/12/23 ED for epistaxis.   9/13/23 CTA and embolization of L IMAX   10/4/23 Nasal bx. Path: Inverted papilloma with high grade dysplasia  11/10/23 Nasal endoscopy and bx in the OR. C/b severe bleeding. Path: Inverted sinonasal papilloma with severe dysplasia.  11/17/23 MRI. 7.4 x 4.6 x 6.6 cm L sinonasal mass, destruction of nasal turbinates, L maxillary sinus, hard palate, invasion of L  space, involvement of medial and lateral pterygoids and temporalis muscles. Effacement and invasion ipsilateral pterygopalatine fossa, extends and effaces the nasopharynx.   11/30/23 Carotid angiogram. Direct endonasal and transoral embolization L sinonasal tumor, transaterial embolization of the L sphenopalatine artery feeders to the L sinonasal artery tumor   12/1/23 Stealth assisted transnasal endoscopic approach to excise L sinonasal mass. Pre-operative embolization. Path: SCC involving L maxilla.    12/9/23 PET. Hypermetabolic mass centered along the L maxilla, extending superiorly through the floor of the L maxillary sinus, posterior/laterally to the  space, invasion of the pterygoid muscles, extending cranially along the pterygopalatine fossa and pterygoid plates to the skull base level.  Erosion of involved bones including L maxilla, maxillary sinus wall and pterygoid plates. Extension medially to the L lateral nasopharyngeal wall and soft palate. 1 mm fat place between carotid and mass. ~4.9 x 4.0 x 5.4 cm (SUV 24.3). Partially resected since 11/17/23 MRI. 8 mm L 2A node (SUV 5.9), 7 mm L level 2 node (SUV 5.5)    ASSESSMENT AND PLAN  SCC L maxillary sinus, [id number] (IVB): Reviewed PET results with them. They see Dr. Iglesias later today. I do not think he can tolerate TPF induction, therefore discussed chemoradiation. He meets with Dr. Akers in the coming week or so. Outlined the logistics of 7 weeks of radiation and use of chemotherapy as a radiosensitizer with curative intent. Unlikely to be a candidate for HD cisplatin due to hearing loss and comorbidities (cardiac primarily). Discussed weekly chemo, ideally cisplatin but carboplatin paclitaxel as an alternative should his hearing be poorer than it appears clinically today. Will likely need Gtube. Will discuss tomorrow at tumor board. Anticipate starting second week of Jan.     Definitive vs adjuvant: Definitive  Chemo: Weekly cisplatin  Goal: curative  Dental: Discussed, asked him to see his dentist asap  Speech Path: Video swallow 1/4  Nutrition: Will be consulted  Audiology: ordered. ADDENUDUM: Results reviewed - fairly significant hearing loss. Due to advanced stage of disease, will start weekly cisplatin but monitor very closely for clinical deterioration and would switch to carbo paclitaxel instead.  Labs: prior labs adequate  Gtube: discuss at tumor board tomorrow, discussed briefly today, will likely need with weight loss, difficulty chewing due to palatal involvement  Port: Trudy Shelton will discuss today  Start date: TBD, aiming for second week of Jan, pending timing of rad onc consult     Hepatitis B: Says he met with his Hepatologist earlier this week and went through testing and screening. Doesn't recall his doc's name but is at  [facility]. Will get records. On etecavir. If records not avail, etc., will check hepatitis B PCR, etc.     H/o PE/DVT: 5/8/2019 CTA report scanned into [facility], reviewed. Small PEs. Also had LLE DVT, acute, occlusive. Report in EPic and reviewed. Presented with CP and LLE swelling. Was on rivaroxaban, completed course.     H/o undefined hypothyroidism: Check TFTs next visit.     Screening for TB: Quantiferon gold - I can't find any results in Epic    90 minutes spent by me on the date of the encounter doing extensive chart review, history and exam, documentation and further activities per the note     Kayy Post MD  Associate Professor of Medicine  Hematology, Oncology and Transplantation      SUBJECTIVE  Mr. Herrera is a 62 yo male who is here for post-operative management of sinonasal carcinoma in the setting of inverted papilloma.   Accompanied by his son Tulio. Professional  used today. Mr. Herrera speaks and understands conversational english.   Tired. No nosebleeds  Eating is difficult. Soft foods and liquid. Teeth aren't lining up. Hurts  Breathing ok  Weight loss - about 20# in the last few months  Pain - more like pressure. Tramadol didn't do anything. Taking acetaminophen - has been limited by his hepatologist  Hasn't tried oxycodone. Day time is ok - but has trouble sleeping at night due to the pain  No chest pain/pressure  Vision ok  No numbness/tingling hands or feet  Hearing seems to be ok  BMs - some constipation  Urinating ok    PAST MEDICAL HISTORY  Sinonasal carcinoma as above  HTN  ASCVD. CABG x 3 2019  PE and LLE DVT after CABG . Treated with rivaroxaban  Dyslipidemia  Hepatitis B   SCC R temple s/p MOHS  Ascending aortic aneurysm repair 2019  Hearing loss L   Gout - feet  Depression  Cholecystectomy    Son Berhane at [phone]    CURRENT OUTPATIENT MEDICATIONS  Reviewed    ALLERGIES  No Known Allergies     SOCIAL HISTORY: Non smoker. No current ETOH. Lives with his children. Immigrated from  "[place] in the 1980s. Not working. On disability. Used to be a . Lived in [facility]. One daughter and 5 sons. Two grandchildren.      FAMILY HISTORY:   Family History   Problem Relation Age of Onset    Cerebrovascular Disease Mother 90.00    Acute Myocardial Infarction No family hx of      PHYSICAL EXAM  /68 (BP Location: Right arm, Patient Position: Sitting, Cuff Size: Adult Regular)   Pulse 85   Temp 98.2  F (36.8  C) (Oral)   Resp 16   Ht 1.575 m (5' 2.01\")   Wt 62.9 kg (138 lb 11.2 oz)   SpO2 97%   BMI 25.36 kg/m    GEN: NAD  HEENT: EOMI, no icterus, injection or pallor. Oropharynx - mild trismus.   EXT: no edema  NEURO: alert    LABORATORY AND IMAGING STUDIES    Labs were independently reviewed and interpreted by me -   12/4 cortisol WNL  12/3/23 BMP - unremarkable, Cr 0.83, ranging from 0.7 to 1.1  12/2/23 Fungitell and fungal blood culture negative  11/28/23 CBC ok hemoglobin 13.5, MCV 85    All path reports reviewed       Imaging was personally reviewed and interpreted by me - MRIs and PET  I don't see any pulm nodules  Tumor crosses midline           Kayy Post MD  "

## 2023-12-15 ENCOUNTER — TUMOR CONFERENCE (OUTPATIENT)
Dept: ONCOLOGY | Facility: CLINIC | Age: 63
End: 2023-12-15
Payer: COMMERCIAL

## 2023-12-15 ENCOUNTER — PATIENT OUTREACH (OUTPATIENT)
Dept: ONCOLOGY | Facility: CLINIC | Age: 63
End: 2023-12-15
Payer: COMMERCIAL

## 2023-12-15 DIAGNOSIS — C31.9 MALIGNANT NEOPLASM OF PARANASAL SINUS (H): Primary | ICD-10-CM

## 2023-12-15 DIAGNOSIS — C31.9 MALIGNANT NEOPLASM OF PARANASAL SINUS (H): ICD-10-CM

## 2023-12-15 DIAGNOSIS — R13.10 DYSPHAGIA: Primary | ICD-10-CM

## 2023-12-15 LAB
INTERPRETATION: NORMAL
LAB DIRECTOR COMMENTS: NORMAL
LAB DIRECTOR DISCLAIMER: NORMAL
LAB DIRECTOR INTERPRETATION: NORMAL
LAB DIRECTOR METHODOLOGY: NORMAL
LAB DIRECTOR RESULTS: NORMAL
SIGNIFICANT RESULTS: NORMAL
SPECIMEN DESCRIPTION: NORMAL
SPECIMEN DESCRIPTION: NORMAL
TEST DETAILS, MDL: NORMAL

## 2023-12-15 PROCEDURE — G0452 MOLECULAR PATHOLOGY INTERPR: HCPCS | Mod: 26 | Performed by: STUDENT IN AN ORGANIZED HEALTH CARE EDUCATION/TRAINING PROGRAM

## 2023-12-15 PROCEDURE — 81456 SO/HL 51/>GSAP RNA ALYS: CPT | Mod: XU | Performed by: OTOLARYNGOLOGY

## 2023-12-15 PROCEDURE — 81455 SO/HL 51/>GSAP DNA/DNA&RNA: CPT | Performed by: OTOLARYNGOLOGY

## 2023-12-15 NOTE — TUMOR CONFERENCE
Head & Neck Tumor Conference Note   Status: Established    Staff: Dr. Regan    Tumor Site: Left maxillary sinus/sinonasal cavity  Tumor Pathology: Squamous cell carcinoma  Tumor Stage: cT4N?    Tumor Treatment:   - Pending (3 previous biopsies, 10/4, 11/20, 12/1 along with pre-op embolization and extensive debridement)    Reason for Review: Review imaging, path, and POC    Brief History: This is a 63 year old male with a history of a left sinonasal carcinoma. 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 He has had two biopsies prior, the first showing inverted papilloma, the second demonstrating inverted papilloma as well. He was most recently taken back to the OR a third time for a larger resection and to establish a definitive diagnosis. Pre-operative embolization was performed to aid in control of hemostasis. We would now like to discuss his pathology and a plan of care.     Pertinent PMH:   Past Medical History:   Diagnosis Date     Ascending aortic aneurysm (H24)      Coronary artery disease      Depression      Gout      History of anesthesia complications      Hyperlipemia      Hypertension      PONV (postoperative nausea and vomiting)       Smoking Hx:   Social History     Tobacco Use     Smoking status: Former     Types: Cigarettes     Smokeless tobacco: Never     Tobacco comments:     every 3-4 months, rare   Substance Use Topics     Alcohol use: Yes     Comment: Occasionally     Drug use: No       Imaging:   IMPRESSION: F-18 FDG PET CT of the whole body and dedicated neck FDG  PET/CT imaging demonstrates:   Hypermetabolic 4.9 x 4.0 x 5.4 cm mass in the left maxilla with  extension into the  space. This represents recent diagnosis  of squamous cell cancer.   Two left level 2A nonenlarged lymph nodes with moderate FDG metabolism  are indeterminate. If  clinically indicated biopsy can be considered.  No evidence of distant metastasis.  Other findings: Moderately FDG avid atherosclerotic changes along the  ascending aorta and aortic arch.     Pathology:   Final Diagnosis   A. LEFT SINONASAL MASS:  - PREDOMINANTLY SQUAMOUS CELL CARCINOMA IN SITU      B. LEFT INFERIOR TURBINATE:  - Sinonasal type mucosa, negative for carcinoma     C. LEFT MAXILLARY SINUS:  - INVASIVE NON-KERATINIZING SQUAMOUS CELL CARCINOMA     D. LEFT SINONASAL MASS:  - INVASIVE NON-KERATINIZING SQUAMOUS CELL CARCINOMA     E. LEFT ANTERIOR ETHMOID:  - Sinonasal type mucosa, negative for carcinoma     F. LEFT POSTERIOR ETHMOID:  - - Sinonasal type mucosa and bone tissue, negative for carcinoma     G. LEFT MAXILLA BONE:  - SQUAMOUS CELL CARCINOMA INVOLVING BONE TISSUE     H. SINONASAL CONTENTS:  - FRAGMENTS OF INVASIVE SQUAMOUS CELL CARCINOMA            Tumor Board Recommendation:   Discussion:   On review of imaging, there is a mass present in the left sinonasal cavity on MRI.  It appears larger and subsequent images.  The mass expands into the ethmoid cavity as well as the maxillary sinus.  There is even some erosion into the premaxillary region as well as the pterygopalatine fossa.  The postsurgical PET CT shows the residual FDG avid mass in the infratemporal fossa.  This almost completely involves the medial pterygoid with bony destructive changes.  It is superhot on PET/CT.  Inferiorly it extends to the left side of the maxilla.  There are a few mildly avid nodes that are indeterminate, they may represent reactive lymphadenopathy.  They do not appear particularly suspicious, there is 1 level 2 small node with some uptake that is indeterminant.  The orbit is not obviously invaded.  The tumor is mainly in the maxillary sinus.  There is some scar within the chest but there is nothing suspicious for tumor.  The patient already saw medical oncology, and he is going to see radiation oncology soon.  The  question for him will be induction versus concurrent chemo rads.  Additionally the patient should have gene sequencing of his tumor as there may be some good targeted therapy options.  Additionally the patient would benefit from a G-tube, as he has lost 20 pounds and is having some difficulty eating.  Plan:   - Chemoradiation versus induction chemotherapy followed by chemoradiation  - Plan to follow-up with medical oncology and radiation oncology, follow-up gene sequencing  - G-tube placement      Jarad Redd MD  Otolaryngology Head and Neck Surgery Resident, PGY-3    Documentation / Disclaimer Cancer Tumor Board Note: Cancer tumor board recommendations do not override what is determined to be reasonable care and treatment, which is dependent on the circumstances of a patient's case; the patient's medical, social, and personal concerns; and the clinical judgment of the oncologist [physician].

## 2023-12-15 NOTE — TELEPHONE ENCOUNTER
MEDICAL RECORDS REQUEST   Radiation Oncology  [address] [facility] [place] [address]  Fax: [phone]          FUTURE VISIT INFORMATION                                                   Douglas Herrera : 1960 scheduled for future visit at [facility] Radiation Oncology    RECORDS REQUESTED FOR VISIT                                                     HEAD & NECK     OFFICE NOTE from medical oncologist Epic 23: Dr. Kayy Post   OFFICE NOTE from ENT [facility] 23: Dr. Damon Regan    10/04/23: Dr. Karoline Guzman   OPERATIVE/BIOPSY REPORTS [facility] 23: Stealth assisted transnasal endoscopic approach to excise left sinonasal mass      23: ANESTHESIA OUT OF OR CAROTID CEREBRAL ANGIOGRAM BILATERAL PRESURGICAL EMBOLIZATION@1100      11/10/23: Transnasal endoscopic approach to biopsy left nasal mass   MEDICATION LIST [facility]    LABS     PATHOLOGY REPORTS Reports in EPIC 23: [id number]  11/10/23: [id number]  10/04/23: [id number]   ANYTHING RELATED TO DIAGNOSIS Epic Most recent 23   IMAGING (NEED IMAGES & REPORT)     CT SCANS PACS 23: CT CAP  23: CT Soft Tissue Neck  23: CT Facial Bones  23: CTA Head Neck  23: CT Sinus   MRI PACS 23: MR Sinonasal  23: MR Maxillofacal   PET PACS 23: PET Onc Whole Body

## 2023-12-18 ENCOUNTER — OFFICE VISIT (OUTPATIENT)
Dept: AUDIOLOGY | Facility: CLINIC | Age: 63
End: 2023-12-18
Payer: COMMERCIAL

## 2023-12-18 ENCOUNTER — APPOINTMENT (OUTPATIENT)
Dept: INTERPRETER SERVICES | Facility: CLINIC | Age: 63
End: 2023-12-18
Payer: COMMERCIAL

## 2023-12-18 DIAGNOSIS — H90.A21 SENSORINEURAL HEARING LOSS (SNHL) OF RIGHT EAR WITH RESTRICTED HEARING OF LEFT EAR: ICD-10-CM

## 2023-12-18 DIAGNOSIS — H90.A32 MIXED CONDUCTIVE AND SENSORINEURAL HEARING LOSS OF LEFT EAR WITH RESTRICTED HEARING OF RIGHT EAR: Primary | ICD-10-CM

## 2023-12-18 PROCEDURE — 92565 STENGER TEST PURE TONE: CPT | Performed by: AUDIOLOGIST-HEARING AID FITTER

## 2023-12-18 PROCEDURE — 92588 EVOKED AUDITORY TST COMPLETE: CPT | Performed by: AUDIOLOGIST-HEARING AID FITTER

## 2023-12-18 PROCEDURE — 92550 TYMPANOMETRY & REFLEX THRESH: CPT | Mod: 52 | Performed by: AUDIOLOGIST-HEARING AID FITTER

## 2023-12-18 PROCEDURE — 81456 SO/HL 51/>GSAP RNA ALYS: CPT | Performed by: OTOLARYNGOLOGY

## 2023-12-18 PROCEDURE — G0452 MOLECULAR PATHOLOGY INTERPR: HCPCS | Mod: 26 | Performed by: STUDENT IN AN ORGANIZED HEALTH CARE EDUCATION/TRAINING PROGRAM

## 2023-12-18 PROCEDURE — 92557 COMPREHENSIVE HEARING TEST: CPT | Performed by: AUDIOLOGIST-HEARING AID FITTER

## 2023-12-18 NOTE — PROGRESS NOTES
[facility]: Cancer Care Follow-Up Note                                    Discussion with Patient:                                                      Reached out to Douglas utilizing [employer] phone .     Discussed recommendations for G-tube placement per Dr. Post/tumor board discussion. Douglas is in agreement with this plan.    Douglas reports he is otherwise doing fine. He had a visit with audiology today. He reports his son is working on getting imaging sent to our dental clinic, and they are working on getting an appointment.    He had no further needs and will await G tube scheduling. Encouraged him to reach out to me with any needs.    Dates of Treatment:                                                      Infusion given in last 28 days       None            Assessment:                                                      Plan of Care Education Review:   Assessment completed with:: Patient    Plan of Care Education   Yearly learning assessment completed?: Yes (see Education tab)  Diagnosis:: SCC sinonasal  Additional education provided for: : Other (comment) (G tube rational)    Evaluation of Learning  Patient Education Provided: Yes  Readiness:: Acceptance  Method:: Explanation  Response:: Verbalizes understanding           Intervention/Education provided during outreach:                                                       Follow up call in 1-2 weeks  Patient to follow up as scheduled at next appt  Patient to call/MyChart message with updates  Medication Change: Discussed medication change with patient  Confirmed patient has clinic and triage numbers    Trudy Shelton, RN, BSN  RN Care Coordinator  [facility]

## 2023-12-18 NOTE — PROGRESS NOTES
AUDIOLOGY REPORT    SUBJECTIVE:  Douglas Herrera is a 63 year old male who was seen on 12/18/23 in the Audiology Clinic at the [facility] for baseline audiologic evaluation, referred by Kayy Post MD.  The patient also sees Dr. Damon Regan in ENT Clinic.  A ADmantX  was present via phone.        The patient is going to be starting chemotherapy and radiation treatment for left sinonasal mass.      The patient reports that hearing in the left ear is worse than right ear.  He has ear fullness, that is significantly worse in left ear compared to right ear.  The patient denies ear pain but notes pain on left side of face and in jaw.  Douglas denies tinnitus, dizziness, falls, drainage from ears, or history of ear surgery.  He has history of occupational noise exposure with use of hearing protection.      OBJECTIVE:  Abuse Screening:  Do you feel unsafe at home or work/school? No  Do you feel threatened by someone? No  Does anyone try to keep you from having contact with others, or doing things outside of your home? No  Physical signs of abuse present? No    Otoscopic exam indicated ears are clear of cerumen bilaterally.      Pure Tone Thresholds assessed using conventional audiometry with good reliability from 250-8000 Hz bilaterally using insert earphones and circumaural headphones.      RIGHT: Normal sloping to severe primarily sensorineural hearing loss (masking dilemma at 1000 Hz).     LEFT: Moderate to profound mixed hearing loss.    Negative Julien at multiple frequencies.     High frequency audiometry from 9,000-16,000 Hz was performed.     RIGHT: No response at 12.5 kHz and above.  Below age norms from 9-12 kHz, Consistent with age norms at 14-16 kHz.     LEFT: No response at all frequencies.  Below age norms from 9-12 kHz, Consistent with age norms at 14-16 kHz.        Distortion product otoacoustic emissions were performed from 1.5-10 kHz.      RIGHT: Absent at all frequencies, except at 1.5 kHz.     LEFT: Absent at all frequencies.     Tympanogram:    RIGHT: restricted eardrum mobility.      LEFT: flat with normal volume.     Reflexes (reported by stimulus ear):  RIGHT: Ipsilateral: absent at frequencies tested  RIGHT: Contralateral: did not test due to abnormal left tympanogram   LEFT:   Ipsilateral: did not test due to abnormal left tympanogram   LEFT:   Contralateral: absent at frequencies tested    Speech Reception Threshold:    RIGHT: 20 dB HL    LEFT:   55 dB HL  Word Recognition Score:     RIGHT: 96% at 60 dB HL using NU-6 recorded word list.    LEFT: 92% at 100 dB HL using NU-6 recorded word list.  Patient indicated he could complete these tests in English. A [employer]  was not present in person.      ASSESSMENT:   RIGHT: Sensorineural hearing loss with shallow tympanogram.  LEFT: Mixed hearing loss with flat tympanogram (normal volume).      Since today is baseline testing, a grade of the patient's hearing loss on the CTCAE4.03 Scale was not provided today. Today s results were discussed with the patient in detail.     PLAN:    Follow up with Dr. Regan in ENT regarding today's test results including flat left tympanogram and left mixed hearing loss.    Hearing should be monitored during treatment at intervals specific by his Oncology team or sooner if changes are new symptoms are noted.  Follow up with Dr. Post.     The patient expressed understanding and agreement with this plan.     ZACK Avery.  Audiology Doctoral Student  [place] #[id number]    I was present with the patient for the entire Audiology appointment including all procedures/testing performed by the AuD student, and agree with the student s assessment and plan as documented.    Osmar Viramontes, CCC-A, [facility]  Licensed Audiologist  [place] #[id number]    Enclosure: audiogram     Cc Kayy Post MD  Cc Damon Regan MD

## 2023-12-19 DIAGNOSIS — C31.0 SQUAMOUS CELL CARCINOMA OF MAXILLARY SINUS (H): Primary | ICD-10-CM

## 2023-12-19 PROBLEM — E83.42 HYPOMAGNESEMIA: Status: ACTIVE | Noted: 2023-12-19

## 2023-12-19 RX ORDER — LORAZEPAM 2 MG/ML
0.5 INJECTION INTRAMUSCULAR EVERY 4 HOURS PRN
Status: CANCELLED | OUTPATIENT
Start: 2024-01-08

## 2023-12-19 RX ORDER — ALBUTEROL SULFATE 90 UG/1
1-2 AEROSOL, METERED RESPIRATORY (INHALATION)
Status: CANCELLED
Start: 2024-01-08

## 2023-12-19 RX ORDER — DIPHENHYDRAMINE HYDROCHLORIDE 50 MG/ML
50 INJECTION INTRAMUSCULAR; INTRAVENOUS
Status: CANCELLED
Start: 2024-01-08

## 2023-12-19 RX ORDER — EPINEPHRINE 1 MG/ML
0.3 INJECTION, SOLUTION INTRAMUSCULAR; SUBCUTANEOUS EVERY 5 MIN PRN
Status: CANCELLED | OUTPATIENT
Start: 2024-01-08

## 2023-12-19 RX ORDER — PALONOSETRON 0.05 MG/ML
0.25 INJECTION, SOLUTION INTRAVENOUS ONCE
Status: CANCELLED | OUTPATIENT
Start: 2024-01-08

## 2023-12-19 RX ORDER — HEPARIN SODIUM,PORCINE 10 UNIT/ML
5-20 VIAL (ML) INTRAVENOUS DAILY PRN
Status: CANCELLED | OUTPATIENT
Start: 2024-01-08

## 2023-12-19 RX ORDER — MEPERIDINE HYDROCHLORIDE 25 MG/ML
25 INJECTION INTRAMUSCULAR; INTRAVENOUS; SUBCUTANEOUS EVERY 30 MIN PRN
Status: CANCELLED | OUTPATIENT
Start: 2024-01-08

## 2023-12-19 RX ORDER — METHYLPREDNISOLONE SODIUM SUCCINATE 125 MG/2ML
125 INJECTION, POWDER, LYOPHILIZED, FOR SOLUTION INTRAMUSCULAR; INTRAVENOUS
Status: CANCELLED
Start: 2024-01-08

## 2023-12-19 RX ORDER — HEPARIN SODIUM (PORCINE) LOCK FLUSH IV SOLN 100 UNIT/ML 100 UNIT/ML
5 SOLUTION INTRAVENOUS
Status: CANCELLED | OUTPATIENT
Start: 2024-01-08

## 2023-12-19 RX ORDER — ALBUTEROL SULFATE 0.83 MG/ML
2.5 SOLUTION RESPIRATORY (INHALATION)
Status: CANCELLED | OUTPATIENT
Start: 2024-01-08

## 2023-12-19 NOTE — CONSULTS
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  Drains/Tubes          Will this be a management or placement of a drain/tube?  Placement          Placement of Drains/Tubes?  Gastrostomy          Patients clinical information/history?  maxillary sinus cnacer, dysphagia, difficulty eating, needs upfront Gtube prior to chemoradiation          Call Back #  Kayy Sincere [phone]          Is the patient on aspirin, Plavix or blood thinners?  Yes - Patient may be asked to stop taking medications       ===  Procedure Approved for:    Percutaneous placement of gastrostomy tube. No oral contrast necessary.    Case and imaging discussed with IR Dr. Guru Estrada    ===  Pertinent Imaging Reviewed:    CT 12/9/23        ===  Plan:  Procedure order placed.    Request sent to procedural scheduling work queue for appointment.    *Please note the date of procedure is tentative and that the Timing of Procedure is TBD Based on Staffing/Schedule and Triage*    No IR pre-op clinic visit is required.    Tyson De La Rosa PA-C  Interventional Radiology   IR on-call pager: [phone]

## 2023-12-21 NOTE — PROGRESS NOTES
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     MRI Sinus on 23 demonstrated the left maxillary sinus completely filled by a complex, heterogenously enhancing, destructive soft tissue mass exhibiting some cerebriform components inferiorly and posteriorly with erosion through medial, posterior and lateral left maxillary sinus walls, with inferior extension into and destruction of the posterior left maxillary alveolus and pterygoid process. The left retroantral fat was partially effaced. Medially, the soft tissue extended into and nearly filled the left ethmoid air cells.     He presented to the ED with epistaxis on 23 and then underwent CTA and embolization of the left maxillary artery.     He then underwent nasal biopsy on 10/4/23 with pathology demonstrating inverted papilloma with high grade dysplasia. Nasal endoscopy and biopsy on 11/10/23 showed inverted sinonasal papilloma with severe dysplasia.     MRI on 23 demonstrated a 7.4 x 4.6 x 4.6 cm left sinonasal mass with destruction of nasal turbinates, L maxillary sinus, hard palate, invasion of L  space, involvement of  medial and lateral pterygoids and temporalis muscles, effacement and invasion of ipsilateral pterygopalatine fossa, with extension and effacement of the nasopharynx.    He underwent carotid angiogram on 11/30/23 with direct endonasal and transoral embolization of the left sinonasal tumor with transarterial embolization of the left sphenopalatine artery feeders to the left sinonasal artery tumor.     He underwent endoscopic approach for excision of the mass on 12/1/23 with pathology demonstrating squamous cell carcinoma involving the left maxilla.     PET/CT on 12/9/23 demonstrated a 4.9 x 4.0 x 5.4 cm hypermetabolic mass in the left maxilla with extension into the  space. There were two left level 2A nonenlarged lymph nodes with moderate FDG metabolism considered indeterminate. No evidence of distant metastases.     MRI demonstrated the mass in the left sinonasal cavity, expanded into the ethmoid cavity as well as the maxillary sinus. There was some erosion into the premaxillary region as well as the pterygopalatine fossa.     Mr. Herrera's case was discussed in Multidisciplinary H&N Conference with discussion of chemoradiation versus induction chemotherapy followed by chemoradiation. Dr. Post saw the patient and discussed that induction chemotherapy may be a challenging course of therapy for the patient, and would thus recommend chemoradiation upfront.     The patient is seen today with his son, Tulio.  A phone  was available during the duration of this patient encounter.  Patient is having notable pain in the left cheek, mouth, and jaw.  He is currently taking Tylenol, occasional gabapentin, and occasional tramadol to manage his pain.  He did trial 1 oxycodone a couple days ago when the pain was too great to tolerate.  He said the gabapentin is helping with being able to sleep.  His son states that they are trying to have him eat more soft modified diet.  Patient states that he has noticed he  has been salivating more, and is spitting liquid into a boost container during the encounter.  He notes increased hearing loss in the left ear.  The patient denies hoarseness, shortness of breath, or voice change.  He does have occasional headache when he also has these painful symptoms in the cheek and jaw.  He denies dysphagia.    He has undergone audiology evaluation that demonstrated sensorineural hearing loss bilaterally, left greater than right.  Medical Oncology is planning on weekly cisplatin.  The plan to initiate chemotherapy is on January 8, 2024.    PMH:   Past Medical History:   Diagnosis Date     Ascending aortic aneurysm (H24)      Coronary artery disease      Depression      Gout      History of anesthesia complications      Hyperlipemia      Hypertension      PONV (postoperative nausea and vomiting)      ? ASCVD  ? CABG x3 (2019)  ? PE  ? DVT of Left lower extremity after CABG, treated with rivaroxaban  ? Hepatitis B  ? Dyslipidemia  ? 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 L hearing loss  ? Gout (feet)  ? Depression  ? Cholecystectomy  Hypertension    PSH:  Past Surgical History:   Procedure Laterality Date     AORTA SURGERY N/A 4/23/2019    Procedure: WITH ASCENDING AORTA REPLACEMENT;  Surgeon: Darlene Graff MD;  Location: [facility] OR;  Service: Cardiovascular     BYPASS GRAFT ARTERY CORONARY       CARDIAC SURGERY       CV CORONARY ANGIOGRAM N/A 3/12/2019    Procedure: Coronary Angiogram;  Surgeon: Hamilton Lucero MD;  Location: [facility] Cath Lab;  Service: Cardiology     ENDOSCOPIC SINUS SURGERY Left 11/10/2023    Procedure: Transnasal endoscopic approach to biopsy left nasal mass;  Surgeon: Damon Regan MD;  Location: [facility] OR     GALLBLADDER SURGERY       IR CAROTID CEREBRAL ANGIOGRAM BILATERAL  11/30/2023     IR FACIAL EMBOLIZATION LEFT  9/13/2023     OPTICAL TRACKING SYSTEM ENDOSCOPIC SINUS SURGERY Left 12/1/2023    Procedure: Stealth assisted transnasal  endoscopic approach to excise left sinonasal mass;  Surgeon: Damon Regan MD;  Location:  OR     MEDICATIONS:  Current Outpatient Medications   Medication Sig Dispense Refill     acetaminophen (TYLENOL) 325 MG tablet Take 2 tablets (650 mg) by mouth every 4 hours as needed for other or pain 50 tablet 0     aspirin 81 MG EC tablet Take 1 tablet (81 mg) by mouth daily 30 tablet 0     atorvastatin (LIPITOR) 80 MG tablet TAKE 1 TABLET (80 MG TOTAL) BY MOUTH AT BEDTIME/ TXHUA HMO NOJ 1 LUB TSHUAJ THAUM MUS PW PAB ZOO NTSHAV MUAJ ROJ 90 tablet 3     entecavir (BARACLUDE) 0.5 MG tablet Take 0.5 mg by mouth every morning Take 1 hour before a meal or 2 hours after a meal.       gabapentin (NEURONTIN) 300 MG capsule Take 1 capsule (300 mg) by mouth At Bedtime (Patient taking differently: Take 300 mg by mouth as needed) 30 capsule 0     nitroGLYcerin (NITROSTAT) 0.4 MG sublingual tablet Place 0.4 mg under the tongue every 5 minutes as needed       oxymetazoline (AFRIN) 0.05 % nasal spray Spray 2 sprays into both nostrils 2 times daily as needed for congestion 15 mL 0     senna-docusate (SENOKOT-S/PERICOLACE) 8.6-50 MG tablet Take 1 tablet by mouth 2 times daily as needed for constipation 30 tablet 0     senna-docusate (SENOKOT-S/PERICOLACE) 8.6-50 MG tablet Take 1 tablet by mouth 2 times daily 10 tablet 0     sodium chloride (OCEAN) 0.65 % nasal spray Spray 2 sprays in nostril daily as needed for congestion 88 mL 3     sodium chloride (OCEAN) 0.65 % nasal spray 2 sprays each nostril four times a day after surgery until followup (Patient taking differently: Spray 2 sprays in nostril daily as needed 2 sprays each nostril four times a day after surgery until followup) 60 mL 1     traMADol (ULTRAM) 50 MG tablet Take 1 tablet (50 mg) by mouth every 6 hours as needed for severe pain 20 tablet 0     traZODone (DESYREL) 50 MG tablet Take 50 mg by mouth nightly as needed       ALLERGY:  No Known Allergies    FAMILY  "HISTORY:  Family History   Problem Relation Age of Onset     Cerebrovascular Disease Mother 90.00     Acute Myocardial Infarction No family hx of      SOCIAL HISTORY  ? Resides in [facility]  ? Son Berhane involved in care  ? No current alcohol use  ? Immigrated from [facility] in the [place], lives with his children  ? Used to be a , previously lived in [place]  ? 1 daughter, 5 sons. 2 grandchildren  Social History     Tobacco Use     Smoking status: Former     Types: Cigarettes     Smokeless tobacco: Never     Tobacco comments:     every 3-4 months, rare   Substance Use Topics     Alcohol use: Yes     Comment: Occasionally     ECOG PERFORMANCE STATUS: 1    HISTORY OF RADIATION: None    IMPLANTED CARDIAC DEVICE: None    REVIEW OF SYMPTOMS:  A full 10-point review of systems was performed as per nursing note.  Pertinent negatives and positives reviewed.    PHYSICAL EXAMINATION:    General: Alert, oriented, in no acute distress  HEENT: Normocephalic, intraoral exam shows the left palate bulging into the oral cavity with mass effect, no intraoral lesions visualized, trismus  Neck: No palpable adenopathy  Respiratory: Breathing comfortably on room air, no wheezing or increased work of breathing  Cardiovascular: Extremities warm, well-perfused  Abdomen: Nondistended  Extremities: Without cyanosis or edema  Musculoskeletal: Normal muscle bulk and tone  Skin: No visible rash  Neuro: Cranial nerves II through XII are grossly intact, normal gait  Psych: Appropriate affect    LABS:  Lab Results   Component Value Date    WBC 6.6 12/04/2023     Lab Results   Component Value Date    RBC 3.04 12/04/2023     Lab Results   Component Value Date    HGB 8.7 12/04/2023     Lab Results   Component Value Date    HCT 26.3 12/04/2023     No components found for: \"MCT\"  Lab Results   Component Value Date    MCV 87 12/04/2023     Lab Results   Component Value Date    MCH 28.6 12/04/2023     Lab Results   Component Value Date    MCHC 33.1 " 12/04/2023     Lab Results   Component Value Date    RDW 14.1 12/04/2023     Lab Results   Component Value Date     12/04/2023     Last Comprehensive Metabolic Panel:  Sodium   Date Value Ref Range Status   12/03/2023 139 135 - 145 mmol/L Final     Comment:     Reference intervals for this test were updated on 09/26/2023 to more accurately reflect our healthy population. There may be differences in the flagging of prior results with similar values performed with this method. Interpretation of those prior results can be made in the context of the updated reference intervals.      Potassium   Date Value Ref Range Status   12/03/2023 3.6 3.4 - 5.3 mmol/L Final   05/13/2022 4.4 3.5 - 5.0 mmol/L Final     Potassium POCT   Date Value Ref Range Status   12/01/2023 4.3 3.5 - 5.0 mmol/L Final     Chloride   Date Value Ref Range Status   12/03/2023 107 98 - 107 mmol/L Final   05/13/2022 106 98 - 107 mmol/L Final     Carbon Dioxide (CO2)   Date Value Ref Range Status   12/03/2023 25 22 - 29 mmol/L Final   05/13/2022 28 22 - 31 mmol/L Final     Anion Gap   Date Value Ref Range Status   12/03/2023 7 7 - 15 mmol/L Final   05/13/2022 7 5 - 18 mmol/L Final     Glucose   Date Value Ref Range Status   12/03/2023 90 70 - 99 mg/dL Final   05/13/2022 81 70 - 125 mg/dL Final     GLUCOSE BY METER POCT   Date Value Ref Range Status   12/03/2023 95 70 - 99 mg/dL Final     Urea Nitrogen   Date Value Ref Range Status   12/03/2023 9.6 8.0 - 23.0 mg/dL Final   05/13/2022 17 8 - 22 mg/dL Final     Creatinine   Date Value Ref Range Status   12/03/2023 0.83 0.67 - 1.17 mg/dL Final     GFR Estimate   Date Value Ref Range Status   12/03/2023 >90 >60 mL/min/1.73m2 Final   05/16/2019 >60 >60 mL/min/1.73m2 Final     Calcium   Date Value Ref Range Status   12/03/2023 8.3 (L) 8.8 - 10.2 mg/dL Final     Bilirubin Total   Date Value Ref Range Status   05/13/2022 1.1 (H) 0.0 - 1.0 mg/dL Final     Alkaline Phosphatase   Date Value Ref Range Status    05/13/2022 44 (L) 45 - 120 U/L Final     ALT   Date Value Ref Range Status   05/13/2022 29 0 - 45 U/L Final     AST   Date Value Ref Range Status   05/13/2022 26 0 - 40 U/L Final     PATHOLOGY:  12/1/23 [id number]  Specimens:   A) - Other, Left Sinonasal Mass                                                                      B) - Other, left inferior turbinate                                                                  C) - Other, left maxillary sinus                                                                    D) - Other, left sinonasal mass                                                                      E) - Other, left anterior ethmoid                                                                    F) - Other, left posterior ethmoid                                                                  G) - Other, left maxilla bone                                                                        H) - Other, sinonasal contents                                                            Final Diagnosis   A. LEFT SINONASAL MASS:  - PREDOMINANTLY SQUAMOUS CELL CARCINOMA IN SITU      B. LEFT INFERIOR TURBINATE:  - Sinonasal type mucosa, negative for carcinoma     C. LEFT MAXILLARY SINUS:  - INVASIVE NON-KERATINIZING SQUAMOUS CELL CARCINOMA     D. LEFT SINONASAL MASS:  - INVASIVE NON-KERATINIZING SQUAMOUS CELL CARCINOMA     E. LEFT ANTERIOR ETHMOID:  - Sinonasal type mucosa, negative for carcinoma     F. LEFT POSTERIOR ETHMOID:  - - Sinonasal type mucosa and bone tissue, negative for carcinoma     G. LEFT MAXILLA BONE:  - SQUAMOUS CELL CARCINOMA INVOLVING BONE TISSUE     H. SINONASAL CONTENTS:  - FRAGMENTS OF INVASIVE SQUAMOUS CELL CARCINOMA            Comments:   This is an appended report. These results have been appended to a previously preliminary verified report.        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  -No definite evidence of invasive malignancy identified.   Electronically signed by Satish Sevilla MD on 11/15/2023 at  8:14 AM   Clinical Information  [facility]   Procedure:  Transnasal endoscopic approach to biopsy left nasal mass - Left  Pre-op Diagnosis: Carcinoma of nasal cavity (H) [C30.0]  Post-op Diagnosis: C30.0 - Carcinoma of nasal cavity (H) [ICD-10-CM]       10/4/23 [id number]  Specimen:    Nose, Sinus (nasal) biopsy                                                                Final Diagnosis   A. SINONASAL CAVITY, BIOPSY:  - Fragments of inverted papilloma with high grade dysplasia  - No evidence of invasive carcinoma      RADIOLOGY/IMAGING:  ..Combined Report of: PET and CT on 12/9/2023 1:16 PM:     1. PET of the neck, chest, abdomen, and pelvis.  2. PET CT Fusion for Attenuation Correction and Anatomical  Localization.  3. Diagnostic CT of the neck, chest, abdomen and pelvis with  intravenous contrast obtained for diagnostic interpretation.  4. 3D MIP and PET-CT fused images were processed on an independent  workstation and archived to PACS and reviewed by a radiologist.     Technique:   1. PET: The patient received 10.10 mCi of F-18-FDG. The serum glucose  was 110 mg/dL prior to administration. Body weight was 60.7 kg. Images  were evaluated in the axial, sagittal, and coronal planes as well as  the rotational whole body MIP. Images were acquired from the cranial  vertex to the feet.     UPTAKE WAS MEASURED AT 60 MINUTES.      BACKGROUND: Liver SUV max = , Aorta Blood SUV max = .      2. CT: Volumetric acquisition for clinical interpretation of the  chest, abdomen, and pelvis acquired at 3 mm sections. The chest,  abdomen, and pelvis were evaluated at 5 mm sections in bone, soft  tissue, and lung windows. High resolution images of the neck were  obtained with multiple oblique projection reformats.      Contrast and Medications:  IV contrast: 81 mL of Isovue 370  intravenously.  PO contrast: none. Water.  Additional Medications: None.     3. 3D MIP and PET-CT fused images were processed on an independent  workstation and archived to PACS and reviewed by a radiologist.     INDICATION: Malignant neoplasm of nasal cavities (H).     ADDITIONAL INFORMATION OBTAINED FROM EMR: 63 year old?male?with  history of ?left-sided sinonasal mass. ?Prior biopsies have  demonstrated inverted papilloma. ?However clinical and radiologic exam  demonstrate significant progression of this mass very concerning for  invasive carcinoma. Preoperative CT and MRI demonstrated invasion and  erosion of the left hard palate as well as the pterygoid  plates.?Recent surgical biopsy path note shows invasive SCC.   ?  COMPARISON: Multiple prior imaging including 11/17/2023 dated CT and  MRI.      FOLLOW-UP APPOINTMENT: 12/14     FINDINGS:      HEAD/NECK:     Index tumor: There is a hypermetabolic mass centered along the left  side of the maxilla extending superiorly through the floor of the left  maxillary sinus into the sinus, posterior laterally to the   space with invasion of the pterygoid muscles and cranially extending  along the pterygopalatine fossa and pterygoid plates to the skull base  level. The mass on CT demonstrates hypodense/necrotic cystic and  enhancing areas. There is erosion of the involved bones including the  left side of the maxilla, maxillary sinus wall and pterygoid plates.  There is anteriorly and laterally extension lateral to the left  lateral maxillary sinus wall, along the retromaxillary fat pad.  Medially the FDG avid mass extends to left lateral nasopharyngeal wall  and soft palate. Posteriorly it posteriorly displaces the  parapharyngeal fat. The fat between the carotid artery and the mass is  preserved however it is about 1 mm (se 3, image 60). Debris within the  left maxillary sinus, left side of the frontal sinus left sphenoid  locule and posterior ethmoid air  cells. The approximate dimensions of  this irregular infiltrative mass is 4.9 x 4.0 x 5.4 (cc) cm. The max  SUV is 24.3 and metabolic tumor volume is 33.9 cc. Since 11/17/2023  MRI, part of the previously seen mass has been resected particularly  the left nasal cavity and left maxillary sinus component. The foramen  ovale on the left is not enlarged.      The right side of the frontal sinus, right maxillary sinus, right  ethmoid air cells and right sphenoid locule are clear.     Pharyngeal mucosal spaces: The rest of the nasopharyngeal wall is  normal. Tongue base is normal. Ogden tonsils are normal. Laryngeal  and hypopharyngeal structures are normal.      Lymph nodes: 8 mm left level 2A node with moderate FDG uptake, max SUV  of 5.9. Immediately caudally another level 2 node measuring 7 mm with  moderate FDG uptake, max SUV of 5.5. No FDG avid or abnormally  enlarged lymph nodes.     Salivary glands: The major salivary glands are within normal limits.      Thyroid gland: Non FDG avid hypodense subcentimeter nodule in the left  thyroid lobe and a coarse calcification in the right thyroid lobe.     Vascular structures: Left jugular vein close the skull base is  markedly diminutive however it remains patent. The rest of the vessels  are patent.      Brain: No abnormal FDG avid lesion or abnormal enhancement. Complete  opacification of the left mastoid air cells with effusion.     CHEST:     Lymph nodes: No FDG avid or abnormally enlarged lymph nodes.     Lungs: The central tracheobronchial tree is clear. No acute  consolidation.  No pleural effusion or pneumothorax. Bilateral trace  pleural thickening and adjacent atelectatic changes with linear  scarring. No FDG avid pulmonary nodules. A punctate pulmonary nodule  in the periphery of the right middle lobe (series 7, image 50) is  noted.     Heart and great vessels: Heart size is within normal limits. There is  curvilinear increased FDG metabolism along the  anterolateral wall of  the ascending aorta and an another shorter segment of FDG metabolism  along the aortic arch laterally. On CT there are corresponding  calcifications suggestive of atherosclerotic changes. No pericardial  effusion. The thoracic aorta and main pulmonary artery are within  normal limits. The esophagus is unremarkable.      ABDOMEN AND PELVIS:     Liver: No FDG avid lesion. No intrahepatic or extrahepatic biliary  ductal dilatation.      Cholecystectomy changes.      Pancreas: The pancreas is within normal limits. No pancreatic ductal  dilatation.      Spleen: No FDG avid lesion.     Adrenal glands: No FDG avid foci.     Kidneys: No FDG avid lesion. No hydronephrosis. The urinary bladder is  unremarkable.      Reproductive organs are within normal limits. Intense linear FDG  uptake along the surface of the left parascrotal skin  is likely due  to contamination.     Gastrointestinal system: Normal caliber of the small and large bowel.  Long segment FDG metabolism by the descending colon and sigmoid colon  without mass like appearance. Uptake could be due to peristalsis.     Lymph nodes: No FDG avid or abnormally enlarged lymph nodes.     Vascular structures: Normal caliber of the abdominal aorta.  Atherosclerotic calcifications of the descending aorta and bilateral  iliac arteries.     No free air, free fluid, or fluid collection.      EXTREMITIES:      No abnormal FDG uptake in the visualized extremities.     BONES AND SOFT TISSUES:      No abnormal FDG uptake in the skeleton. No abnormal lytic or blastic  osseous lesions.      SPINAL CANAL:      No evident canal compromise. No abnormal FDG avid lesion.                                                                         IMPRESSION: F-18 FDG PET CT of the whole body and dedicated neck FDG  PET/CT imaging demonstrates:      Hypermetabolic 4.9 x 4.0 x 5.4 cm mass in the left maxilla with  extension into the  space. This represents  recent diagnosis  of squamous cell cancer.      Two left level 2A non enlarged lymph nodes with moderate FDG metabolism  are indeterminate. If clinically indicated biopsy can be considered.     No evidence of distant metastasis.     Other findings: Moderately FDG avid atherosclerotic changes along the  ascending aorta and aortic arch.     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  Multidisciplinary conference consensus opinion is towards chemoradiation.  He has had significant hearing loss and thus Oncology has recommended weekly cisplatin with possibility of changing to carboplatin/paclitaxel pending how he tolerates this during treatment.    We discussed the risks, benefits, risks, and alternatives to radiotherapy.  We discussed a tentative dose of 70 Gy in 35 daily fractions for Monday to Friday with concurrent chemotherapy.  We discussed the acute and late form toxicities associated with radiotherapy to the head / neck.  Informed consent was obtained.  He then proceeded to CT simulation aquaplast mask formation.    Thank you for allowing us to participate in this patient's care.  Please feel free to call with any questions or concerns.    The patient was seen and assessed with staff, Dr. Akers, who agrees with the above assessment and plan.       Avani Jean MD PGY5  Department of Radiation Oncology  [phone] Clinic  [phone] Pager    Mr. Herrera was seen and examined by me. Note above by Dr. Jean was reviewed and edited by me and reflects our mutual findings and plan of care.  Mr. Herrera is a 63-year-old man with unresectable (clinical T4b N2b M0) sinonasal carcinoma involving the left maxillary sinus.  He is seen today with his son and an  by phone.  We are planning definitive chemoradiation, initially with weekly cisplatin chemotherapy.  We  reviewed the rationale for recommending definitive chemoradiation as well as the anticipated acute side effects, potential long-term risks and expected outcome from treatment.  He will undergo treatment planning today.    We discussed prescribing gabapentin and to start titrating dose on the first day of radiation as a form of pain management during treatment.    PEG tube has been ordered but not scheduled.    We appreciate the opportunity to participate in Mr. Herrera's care.  Please call with questions.    95 minutes spent by me on the date of the encounter doing chart review, history and exam, documentation and further activities per the note.    Tomasa Akers MD  Department of Radiation Oncology  [facility] [facility]  Patient Care Team:  Mira Leung NP as PCP - Nikki Schulte [facility] as Pharmacist (Pharmacist Ambulatory Care)  Qamar Hernandez MD as Assigned Heart and Vascular Provider  Damon Regan MD as Assigned Surgical Provider  Kayy Post MD as MD (Hematology & Oncology)  Tomasa Akers MD as MD (Radiation Oncology)  Trudy Shelton, ЕКАТЕРИНА as Specialty Care Coordinator (Hematology & Oncology)  DAOMN REGAN    This note was dictated with voice recognition software and then edited. Please excuse any unintentional errors.

## 2023-12-22 ENCOUNTER — OFFICE VISIT (OUTPATIENT)
Dept: RADIATION ONCOLOGY | Facility: CLINIC | Age: 63
End: 2023-12-22
Attending: OTOLARYNGOLOGY
Payer: COMMERCIAL

## 2023-12-22 ENCOUNTER — HOSPITAL ENCOUNTER (OUTPATIENT)
Dept: CT IMAGING | Facility: CLINIC | Age: 63
Discharge: HOME | End: 2023-12-22
Attending: RADIOLOGY | Admitting: RADIOLOGY
Payer: COMMERCIAL

## 2023-12-22 VITALS
BODY MASS INDEX: 25.05 KG/M2 | DIASTOLIC BLOOD PRESSURE: 74 MMHG | WEIGHT: 137 LBS | HEART RATE: 68 BPM | SYSTOLIC BLOOD PRESSURE: 123 MMHG

## 2023-12-22 DIAGNOSIS — C31.0 SQUAMOUS CELL CARCINOMA OF MAXILLARY SINUS (H): Primary | ICD-10-CM

## 2023-12-22 DIAGNOSIS — C31.9 MALIGNANT NEOPLASM OF PARANASAL SINUS (H): ICD-10-CM

## 2023-12-22 PROCEDURE — G0463 HOSPITAL OUTPT CLINIC VISIT: HCPCS | Performed by: RADIOLOGY

## 2023-12-22 PROCEDURE — 999N000248 HC STATISTIC IV INSERT WITH US BY RN

## 2023-12-22 PROCEDURE — 77334 RADIATION TREATMENT AID(S): CPT | Mod: 26 | Performed by: RADIOLOGY

## 2023-12-22 PROCEDURE — 77470 SPECIAL RADIATION TREATMENT: CPT | Performed by: RADIOLOGY

## 2023-12-22 PROCEDURE — 99417 PROLNG OP E/M EACH 15 MIN: CPT | Performed by: RADIOLOGY

## 2023-12-22 PROCEDURE — 77334 RADIATION TREATMENT AID(S): CPT | Performed by: RADIOLOGY

## 2023-12-22 PROCEDURE — 77470 SPECIAL RADIATION TREATMENT: CPT | Mod: 26 | Performed by: RADIOLOGY

## 2023-12-22 PROCEDURE — 99205 OFFICE O/P NEW HI 60 MIN: CPT | Mod: 25 | Performed by: RADIOLOGY

## 2023-12-22 PROCEDURE — 250N000011 HC RX IP 250 OP 636: Performed by: RADIOLOGY

## 2023-12-22 PROCEDURE — 77014 CT THERAPY CORRELATE: CPT | Mod: TC

## 2023-12-22 RX ORDER — GABAPENTIN 300 MG/1
900 CAPSULE ORAL 3 TIMES DAILY
Qty: 270 CAPSULE | Refills: 3 | Status: SHIPPED | OUTPATIENT
Start: 2023-12-22 | End: 2024-05-30

## 2023-12-22 RX ORDER — IOPAMIDOL 755 MG/ML
67 INJECTION, SOLUTION INTRAVASCULAR ONCE
Status: COMPLETED | OUTPATIENT
Start: 2023-12-22 | End: 2023-12-22

## 2023-12-22 RX ADMIN — IOPAMIDOL 67 ML: 755 INJECTION, SOLUTION INTRAVENOUS at 11:55

## 2023-12-22 ASSESSMENT — ENCOUNTER SYMPTOMS
RESPIRATORY NEGATIVE: 1
CARDIOVASCULAR NEGATIVE: 1
NECK PAIN: 1
PSYCHIATRIC NEGATIVE: 1
EYES NEGATIVE: 1
WEIGHT LOSS: 1
NEUROLOGICAL NEGATIVE: 1
GASTROINTESTINAL NEGATIVE: 1

## 2023-12-22 NOTE — PROGRESS NOTES
A radiation therapy treatment planning simulation was performed.  Please see the [employer] record for documentation.    Tomasa Akers MD  Radiation Oncology

## 2023-12-22 NOTE — LETTER
2023         RE: Douglas Herrera  [address]  [place] [address]        Dear Colleague,    Thank you for referring your patient, Douglas Herrera, to the [facility] RADIATION ONCOLOGY. Please see a copy of my visit note below.    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     MRI Sinus on 23 demonstrated the left maxillary sinus completely filled by a complex, heterogenously enhancing, destructive soft tissue mass exhibiting some cerebriform components inferiorly and posteriorly with erosion through medial, posterior and lateral left maxillary sinus walls, with inferior extension into and destruction of the posterior left maxillary alveolus and pterygoid process. The left retroantral fat was partially effaced. Medially, the soft tissue extended into and nearly filled the left ethmoid air cells.     He presented to the ED with epistaxis on 23 and then underwent CTA and embolization of the left maxillary artery.     He then underwent nasal biopsy on 10/4/23 with pathology demonstrating inverted papilloma with high grade dysplasia. Nasal endoscopy and biopsy on 11/10/23  showed inverted sinonasal papilloma with severe dysplasia.     MRI on 11/17/23 demonstrated a 7.4 x 4.6 x 4.6 cm left sinonasal mass with destruction of nasal turbinates, L maxillary sinus, hard palate, invasion of L  space, involvement of medial and lateral pterygoids and temporalis muscles, effacement and invasion of ipsilateral pterygopalatine fossa, with extension and effacement of the nasopharynx.    He underwent carotid angiogram on 11/30/23 with direct endonasal and transoral embolization of the left sinonasal tumor with transarterial embolization of the left sphenopalatine artery feeders to the left sinonasal artery tumor.     He underwent endoscopic approach for excision of the mass on 12/1/23 with pathology demonstrating squamous cell carcinoma involving the left maxilla.     PET/CT on 12/9/23 demonstrated a 4.9 x 4.0 x 5.4 cm hypermetabolic mass in the left maxilla with extension into the  space. There were two left level 2A nonenlarged lymph nodes with moderate FDG metabolism considered indeterminate. No evidence of distant metastases.     MRI demonstrated the mass in the left sinonasal cavity, expanded into the ethmoid cavity as well as the maxillary sinus. There was some erosion into the premaxillary region as well as the pterygopalatine fossa.     Mr. Herrera's case was discussed in Multidisciplinary H&N Conference with discussion of chemoradiation versus induction chemotherapy followed by chemoradiation. Dr. Post saw the patient and discussed that induction chemotherapy may be a challenging course of therapy for the patient, and would thus recommend chemoradiation upfront.     The patient is seen today with his son, Tulio.  A phone  was available during the duration of this patient encounter.  Patient is having notable pain in the left cheek, mouth, and jaw.  He is currently taking Tylenol, occasional gabapentin, and occasional tramadol to manage his pain.  He did trial  1 oxycodone a couple days ago when the pain was too great to tolerate.  He said the gabapentin is helping with being able to sleep.  His son states that they are trying to have him eat more soft modified diet.  Patient states that he has noticed he has been salivating more, and is spitting liquid into a boost container during the encounter.  He notes increased hearing loss in the left ear.  The patient denies hoarseness, shortness of breath, or voice change.  He does have occasional headache when he also has these painful symptoms in the cheek and jaw.  He denies dysphagia.    He has undergone audiology evaluation that demonstrated sensorineural hearing loss bilaterally, left greater than right.  Medical Oncology is planning on weekly cisplatin.  The plan to initiate chemotherapy is on January 8, 2024.    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  L hearing loss  Gout (feet)  Depression  Cholecystectomy  Hypertension    PSH:  Past Surgical History:   Procedure Laterality Date    AORTA SURGERY N/A 4/23/2019    Procedure: WITH ASCENDING AORTA REPLACEMENT;  Surgeon: Darlene Graff MD;  Location: [facility];  Service: Cardiovascular    BYPASS GRAFT ARTERY CORONARY      CARDIAC SURGERY      CV CORONARY ANGIOGRAM N/A 3/12/2019    Procedure: Coronary Angiogram;  Surgeon: Hamilton Lucero MD;  Location: [facility];  Service: Cardiology    ENDOSCOPIC SINUS SURGERY Left 11/10/2023    Procedure: Transnasal endoscopic approach to biopsy left nasal mass;  Surgeon: Damon Regan MD;  Location: [facility]    GALLBLADDER SURGERY       IR CAROTID CEREBRAL ANGIOGRAM BILATERAL  11/30/2023    IR FACIAL EMBOLIZATION LEFT  9/13/2023    OPTICAL TRACKING SYSTEM ENDOSCOPIC SINUS SURGERY Left 12/1/2023    Procedure: Stealth assisted transnasal endoscopic approach to excise left sinonasal mass;  Surgeon: Damon Regan MD;  Location:  OR     MEDICATIONS:  Current Outpatient Medications   Medication Sig Dispense Refill    acetaminophen (TYLENOL) 325 MG tablet Take 2 tablets (650 mg) by mouth every 4 hours as needed for other or pain 50 tablet 0    aspirin 81 MG EC tablet Take 1 tablet (81 mg) by mouth daily 30 tablet 0    atorvastatin (LIPITOR) 80 MG tablet TAKE 1 TABLET (80 MG TOTAL) BY MOUTH AT BEDTIME/ TXHUA HMO NOJ 1 LUB TSHUAJ THAUM MUS PW PAB ZOO NTSHAV MUAJ ROJ 90 tablet 3    entecavir (BARACLUDE) 0.5 MG tablet Take 0.5 mg by mouth every morning Take 1 hour before a meal or 2 hours after a meal.      gabapentin (NEURONTIN) 300 MG capsule Take 1 capsule (300 mg) by mouth At Bedtime (Patient taking differently: Take 300 mg by mouth as needed) 30 capsule 0    nitroGLYcerin (NITROSTAT) 0.4 MG sublingual tablet Place 0.4 mg under the tongue every 5 minutes as needed      oxymetazoline (AFRIN) 0.05 % nasal spray Spray 2 sprays into both nostrils 2 times daily as needed for congestion 15 mL 0    senna-docusate (SENOKOT-S/PERICOLACE) 8.6-50 MG tablet Take 1 tablet by mouth 2 times daily as needed for constipation 30 tablet 0    senna-docusate (SENOKOT-S/PERICOLACE) 8.6-50 MG tablet Take 1 tablet by mouth 2 times daily 10 tablet 0    sodium chloride (OCEAN) 0.65 % nasal spray Spray 2 sprays in nostril daily as needed for congestion 88 mL 3    sodium chloride (OCEAN) 0.65 % nasal spray 2 sprays each nostril four times a day after surgery until followup (Patient taking differently: Spray 2 sprays in nostril daily as needed 2 sprays each nostril four times a day after surgery until followup) 60 mL 1    traMADol (ULTRAM) 50 MG tablet Take 1  tablet (50 mg) by mouth every 6 hours as needed for severe pain 20 tablet 0    traZODone (DESYREL) 50 MG tablet Take 50 mg by mouth nightly as needed       ALLERGY:  No Known Allergies    FAMILY HISTORY:  Family History   Problem Relation Age of Onset    Cerebrovascular Disease Mother 90.00    Acute Myocardial Infarction No family hx of      SOCIAL HISTORY  Resides in [facility]  Son Berhane involved in care  No current alcohol use  Immigrated from [facility] in the [place], lives with his children  Used to be a , previously lived in [place]  1 daughter, 5 sons. 2 grandchildren  Social History     Tobacco Use    Smoking status: Former     Types: Cigarettes    Smokeless tobacco: Never    Tobacco comments:     every 3-4 months, rare   Substance Use Topics    Alcohol use: Yes     Comment: Occasionally     ECOG PERFORMANCE STATUS: 1    HISTORY OF RADIATION: None    IMPLANTED CARDIAC DEVICE: None    REVIEW OF SYMPTOMS:  A full 10-point review of systems was performed as per nursing note.  Pertinent negatives and positives reviewed.    PHYSICAL EXAMINATION:    General: Alert, oriented, in no acute distress  HEENT: Normocephalic, intraoral exam shows the left palate bulging into the oral cavity with mass effect, no intraoral lesions visualized, trismus  Neck: No palpable adenopathy  Respiratory: Breathing comfortably on room air, no wheezing or increased work of breathing  Cardiovascular: Extremities warm, well-perfused  Abdomen: Nondistended  Extremities: Without cyanosis or edema  Musculoskeletal: Normal muscle bulk and tone  Skin: No visible rash  Neuro: Cranial nerves II through XII are grossly intact, normal gait  Psych: Appropriate affect    LABS:  Lab Results   Component Value Date    WBC 6.6 12/04/2023     Lab Results   Component Value Date    RBC 3.04 12/04/2023     Lab Results   Component Value Date    HGB 8.7 12/04/2023     Lab Results   Component Value Date    HCT 26.3 12/04/2023     No components found for:  "\"MCT\"  Lab Results   Component Value Date    MCV 87 12/04/2023     Lab Results   Component Value Date    MCH 28.6 12/04/2023     Lab Results   Component Value Date    MCHC 33.1 12/04/2023     Lab Results   Component Value Date    RDW 14.1 12/04/2023     Lab Results   Component Value Date     12/04/2023     Last Comprehensive Metabolic Panel:  Sodium   Date Value Ref Range Status   12/03/2023 139 135 - 145 mmol/L Final     Comment:     Reference intervals for this test were updated on 09/26/2023 to more accurately reflect our healthy population. There may be differences in the flagging of prior results with similar values performed with this method. Interpretation of those prior results can be made in the context of the updated reference intervals.      Potassium   Date Value Ref Range Status   12/03/2023 3.6 3.4 - 5.3 mmol/L Final   05/13/2022 4.4 3.5 - 5.0 mmol/L Final     Potassium POCT   Date Value Ref Range Status   12/01/2023 4.3 3.5 - 5.0 mmol/L Final     Chloride   Date Value Ref Range Status   12/03/2023 107 98 - 107 mmol/L Final   05/13/2022 106 98 - 107 mmol/L Final     Carbon Dioxide (CO2)   Date Value Ref Range Status   12/03/2023 25 22 - 29 mmol/L Final   05/13/2022 28 22 - 31 mmol/L Final     Anion Gap   Date Value Ref Range Status   12/03/2023 7 7 - 15 mmol/L Final   05/13/2022 7 5 - 18 mmol/L Final     Glucose   Date Value Ref Range Status   12/03/2023 90 70 - 99 mg/dL Final   05/13/2022 81 70 - 125 mg/dL Final     GLUCOSE BY METER POCT   Date Value Ref Range Status   12/03/2023 95 70 - 99 mg/dL Final     Urea Nitrogen   Date Value Ref Range Status   12/03/2023 9.6 8.0 - 23.0 mg/dL Final   05/13/2022 17 8 - 22 mg/dL Final     Creatinine   Date Value Ref Range Status   12/03/2023 0.83 0.67 - 1.17 mg/dL Final     GFR Estimate   Date Value Ref Range Status   12/03/2023 >90 >60 mL/min/1.73m2 Final   05/16/2019 >60 >60 mL/min/1.73m2 Final     Calcium   Date Value Ref Range Status   12/03/2023 8.3 " (L) 8.8 - 10.2 mg/dL Final     Bilirubin Total   Date Value Ref Range Status   05/13/2022 1.1 (H) 0.0 - 1.0 mg/dL Final     Alkaline Phosphatase   Date Value Ref Range Status   05/13/2022 44 (L) 45 - 120 U/L Final     ALT   Date Value Ref Range Status   05/13/2022 29 0 - 45 U/L Final     AST   Date Value Ref Range Status   05/13/2022 26 0 - 40 U/L Final     PATHOLOGY:  12/1/23 [id number]  Specimens:   A) - Other, Left Sinonasal Mass                                                                      B) - Other, left inferior turbinate                                                                  C) - Other, left maxillary sinus                                                                    D) - Other, left sinonasal mass                                                                      E) - Other, left anterior ethmoid                                                                    F) - Other, left posterior ethmoid                                                                  G) - Other, left maxilla bone                                                                        H) - Other, sinonasal contents                                                            Final Diagnosis   A. LEFT SINONASAL MASS:  - PREDOMINANTLY SQUAMOUS CELL CARCINOMA IN SITU      B. LEFT INFERIOR TURBINATE:  - Sinonasal type mucosa, negative for carcinoma     C. LEFT MAXILLARY SINUS:  - INVASIVE NON-KERATINIZING SQUAMOUS CELL CARCINOMA     D. LEFT SINONASAL MASS:  - INVASIVE NON-KERATINIZING SQUAMOUS CELL CARCINOMA     E. LEFT ANTERIOR ETHMOID:  - Sinonasal type mucosa, negative for carcinoma     F. LEFT POSTERIOR ETHMOID:  - - Sinonasal type mucosa and bone tissue, negative for carcinoma     G. LEFT MAXILLA BONE:  - SQUAMOUS CELL CARCINOMA INVOLVING BONE TISSUE     H. SINONASAL CONTENTS:  - FRAGMENTS OF INVASIVE SQUAMOUS CELL CARCINOMA            Comments:   This is an appended report. These results have been appended  to a previously preliminary verified report.        11/10/23 Case: [id number]   Final Diagnosis   LEFT SINONASAL MASS, EXCISION:  -Inverted sinonasal (schneiderian) papilloma with severe dysplasia.  -No definite evidence of invasive malignancy identified.   Electronically signed by Satish Sevilla MD on 11/15/2023 at  8:14 AM   Clinical Information  [facility]   Procedure:  Transnasal endoscopic approach to biopsy left nasal mass - Left  Pre-op Diagnosis: Carcinoma of nasal cavity (H) [C30.0]  Post-op Diagnosis: C30.0 - Carcinoma of nasal cavity (H) [ICD-10-CM]       10/4/23 [id number]  Specimen:    Nose, Sinus (nasal) biopsy                                                                Final Diagnosis   A. SINONASAL CAVITY, BIOPSY:  - Fragments of inverted papilloma with high grade dysplasia  - No evidence of invasive carcinoma      RADIOLOGY/IMAGING:  ..Combined Report of: PET and CT on 12/9/2023 1:16 PM:     1. PET of the neck, chest, abdomen, and pelvis.  2. PET CT Fusion for Attenuation Correction and Anatomical  Localization.  3. Diagnostic CT of the neck, chest, abdomen and pelvis with  intravenous contrast obtained for diagnostic interpretation.  4. 3D MIP and PET-CT fused images were processed on an independent  workstation and archived to PACS and reviewed by a radiologist.     Technique:   1. PET: The patient received 10.10 mCi of F-18-FDG. The serum glucose  was 110 mg/dL prior to administration. Body weight was 60.7 kg. Images  were evaluated in the axial, sagittal, and coronal planes as well as  the rotational whole body MIP. Images were acquired from the cranial  vertex to the feet.     UPTAKE WAS MEASURED AT 60 MINUTES.      BACKGROUND: Liver SUV max = , Aorta Blood SUV max = .      2. CT: Volumetric acquisition for clinical interpretation of the  chest, abdomen, and pelvis acquired at 3 mm sections. The chest,  abdomen, and pelvis were evaluated at 5 mm sections in bone, soft  tissue, and lung  windows. High resolution images of the neck were  obtained with multiple oblique projection reformats.      Contrast and Medications:  IV contrast: 81 mL of Isovue 370 intravenously.  PO contrast: none. Water.  Additional Medications: None.     3. 3D MIP and PET-CT fused images were processed on an independent  workstation and archived to PACS and reviewed by a radiologist.     INDICATION: Malignant neoplasm of nasal cavities (H).     ADDITIONAL INFORMATION OBTAINED FROM EMR: 63 year old?male?with  history of ?left-sided sinonasal mass. ?Prior biopsies have  demonstrated inverted papilloma. ?However clinical and radiologic exam  demonstrate significant progression of this mass very concerning for  invasive carcinoma. Preoperative CT and MRI demonstrated invasion and  erosion of the left hard palate as well as the pterygoid  plates.?Recent surgical biopsy path note shows invasive SCC.   ?  COMPARISON: Multiple prior imaging including 11/17/2023 dated CT and  MRI.      FOLLOW-UP APPOINTMENT: 12/14     FINDINGS:      HEAD/NECK:     Index tumor: There is a hypermetabolic mass centered along the left  side of the maxilla extending superiorly through the floor of the left  maxillary sinus into the sinus, posterior laterally to the   space with invasion of the pterygoid muscles and cranially extending  along the pterygopalatine fossa and pterygoid plates to the skull base  level. The mass on CT demonstrates hypodense/necrotic cystic and  enhancing areas. There is erosion of the involved bones including the  left side of the maxilla, maxillary sinus wall and pterygoid plates.  There is anteriorly and laterally extension lateral to the left  lateral maxillary sinus wall, along the retromaxillary fat pad.  Medially the FDG avid mass extends to left lateral nasopharyngeal wall  and soft palate. Posteriorly it posteriorly displaces the  parapharyngeal fat. The fat between the carotid artery and the mass is  preserved  however it is about 1 mm (se 3, image 60). Debris within the  left maxillary sinus, left side of the frontal sinus left sphenoid  locule and posterior ethmoid air cells. The approximate dimensions of  this irregular infiltrative mass is 4.9 x 4.0 x 5.4 (cc) cm. The max  SUV is 24.3 and metabolic tumor volume is 33.9 cc. Since 11/17/2023  MRI, part of the previously seen mass has been resected particularly  the left nasal cavity and left maxillary sinus component. The foramen  ovale on the left is not enlarged.      The right side of the frontal sinus, right maxillary sinus, right  ethmoid air cells and right sphenoid locule are clear.     Pharyngeal mucosal spaces: The rest of the nasopharyngeal wall is  normal. Tongue base is normal. [place] tonsils are normal. Laryngeal  and hypopharyngeal structures are normal.      Lymph nodes: 8 mm left level 2A node with moderate FDG uptake, max SUV  of 5.9. Immediately caudally another level 2 node measuring 7 mm with  moderate FDG uptake, max SUV of 5.5. No FDG avid or abnormally  enlarged lymph nodes.     Salivary glands: The major salivary glands are within normal limits.      Thyroid gland: Non FDG avid hypodense subcentimeter nodule in the left  thyroid lobe and a coarse calcification in the right thyroid lobe.     Vascular structures: Left jugular vein close the skull base is  markedly diminutive however it remains patent. The rest of the vessels  are patent.      Brain: No abnormal FDG avid lesion or abnormal enhancement. Complete  opacification of the left mastoid air cells with effusion.     CHEST:     Lymph nodes: No FDG avid or abnormally enlarged lymph nodes.     Lungs: The central tracheobronchial tree is clear. No acute  consolidation.  No pleural effusion or pneumothorax. Bilateral trace  pleural thickening and adjacent atelectatic changes with linear  scarring. No FDG avid pulmonary nodules. A punctate pulmonary nodule  in the periphery of the right middle  lobe (series 7, image 50) is  noted.     Heart and great vessels: Heart size is within normal limits. There is  curvilinear increased FDG metabolism along the anterolateral wall of  the ascending aorta and an another shorter segment of FDG metabolism  along the aortic arch laterally. On CT there are corresponding  calcifications suggestive of atherosclerotic changes. No pericardial  effusion. The thoracic aorta and main pulmonary artery are within  normal limits. The esophagus is unremarkable.      ABDOMEN AND PELVIS:     Liver: No FDG avid lesion. No intrahepatic or extrahepatic biliary  ductal dilatation.      Cholecystectomy changes.      Pancreas: The pancreas is within normal limits. No pancreatic ductal  dilatation.      Spleen: No FDG avid lesion.     Adrenal glands: No FDG avid foci.     Kidneys: No FDG avid lesion. No hydronephrosis. The urinary bladder is  unremarkable.      Reproductive organs are within normal limits. Intense linear FDG  uptake along the surface of the left parascrotal skin  is likely due  to contamination.     Gastrointestinal system: Normal caliber of the small and large bowel.  Long segment FDG metabolism by the descending colon and sigmoid colon  without mass like appearance. Uptake could be due to peristalsis.     Lymph nodes: No FDG avid or abnormally enlarged lymph nodes.     Vascular structures: Normal caliber of the abdominal aorta.  Atherosclerotic calcifications of the descending aorta and bilateral  iliac arteries.     No free air, free fluid, or fluid collection.      EXTREMITIES:      No abnormal FDG uptake in the visualized extremities.     BONES AND SOFT TISSUES:      No abnormal FDG uptake in the skeleton. No abnormal lytic or blastic  osseous lesions.      SPINAL CANAL:      No evident canal compromise. No abnormal FDG avid lesion.                                                                         IMPRESSION: F-18 FDG PET CT of the whole body and dedicated neck  FDG  PET/CT imaging demonstrates:      Hypermetabolic 4.9 x 4.0 x 5.4 cm mass in the left maxilla with  extension into the  space. This represents recent diagnosis  of squamous cell cancer.      Two left level 2A non enlarged lymph nodes with moderate FDG metabolism  are indeterminate. If clinically indicated biopsy can be considered.     No evidence of distant metastasis.     Other findings: Moderately FDG avid atherosclerotic changes along the  ascending aorta and aortic arch.     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  Multidisciplinary conference consensus opinion is towards chemoradiation.  He has had significant hearing loss and thus Oncology has recommended weekly cisplatin with possibility of changing to carboplatin/paclitaxel pending how he tolerates this during treatment.    We discussed the risks, benefits, risks, and alternatives to radiotherapy.  We discussed a tentative dose of 70 Gy in 35 daily fractions for Monday to Friday with concurrent chemotherapy.  We discussed the acute and late form toxicities associated with radiotherapy to the head / neck.  Informed consent was obtained.  He then proceeded to CT simulation aquaplast mask formation.    Thank you for allowing us to participate in this patient's care.  Please feel free to call with any questions or concerns.    The patient was seen and assessed with staff, Dr. Akers, who agrees with the above assessment and plan.       Avani Jean MD PGY5  Department of Radiation Oncology  [phone] Clinic  [phone] Pager    Mr. Herrera was seen and examined by me. Note above by Dr. Jean was reviewed and edited by me and reflects our mutual findings and plan of care.  Mr. Herrera is a 63-year-old man with unresectable (clinical T4b N2b M0) sinonasal carcinoma involving the left maxillary sinus.   He is seen today with his son and an  by phone.  We are planning definitive chemoradiation, initially with weekly cisplatin chemotherapy.  We reviewed the rationale for recommending definitive chemoradiation as well as the anticipated acute side effects, potential long-term risks and expected outcome from treatment.  He will undergo treatment planning today.    We discussed prescribing gabapentin and to start titrating dose on the first day of radiation as a form of pain management during treatment.    PEG tube has been ordered but not scheduled.    We appreciate the opportunity to participate in Mr. Herrera's care.  Please call with questions.    95 minutes spent by me on the date of the encounter doing chart review, history and exam, documentation and further activities per the note.    Tomasa Akers MD  Department of Radiation Oncology  [facility]    CC  Patient Care Team:  Mira Leung NP as PCP - Nikki Schulte [facility] as Pharmacist (Pharmacist Ambulatory Care)  Qamar Hernandez MD as Assigned Heart and Vascular Provider  Damon Regan MD as Assigned Surgical Provider  Kayy Post MD as MD (Hematology & Oncology)  Tomasa Akers MD as MD (Radiation Oncology)  Trudy Shelton, ЕКАТЕРИНА as Specialty Care Coordinator (Hematology & Oncology)  DAMON REGAN    This note was dictated with voice recognition software and then edited. Please excuse any unintentional errors.                 HPI    INITIAL PATIENT ASSESSMENT    Diagnosis: Cancer    Prior radiation therapy: None    Prior chemotherapy: None    Prior hormonal therapy:No    Pain Eval:  Denies    Psychosocial  Living arrangements: Lives with family  Fall Risk: independent   referral needs: Not needed    Advanced Directive: No  Implantable Cardiac Device? No      Review of Systems   Constitutional:  Positive for malaise/fatigue and weight loss.   HENT:           Pain to chin, left side   Eyes: Negative.    Respiratory: Negative.     Cardiovascular: Negative.    Gastrointestinal: Negative.    Genitourinary: Negative.    Musculoskeletal:  Positive for neck pain.   Skin: Negative.    Neurological: Negative.    Psychiatric/Behavioral: Negative.             Again, thank you for allowing me to participate in the care of your patient.        Sincerely,        Tomasa Akers MD

## 2023-12-22 NOTE — PROGRESS NOTES
HPI    INITIAL PATIENT ASSESSMENT    Diagnosis: Cancer    Prior radiation therapy: None    Prior chemotherapy: None    Prior hormonal therapy:No    Pain Eval:  Denies    Psychosocial  Living arrangements: Lives with family  Fall Risk: independent   referral needs: Not needed    Advanced Directive: No  Implantable Cardiac Device? No      Review of Systems   Constitutional:  Positive for malaise/fatigue and weight loss.   HENT:          Pain to chin, left side   Eyes: Negative.    Respiratory: Negative.     Cardiovascular: Negative.    Gastrointestinal: Negative.    Genitourinary: Negative.    Musculoskeletal:  Positive for neck pain.   Skin: Negative.    Neurological: Negative.    Psychiatric/Behavioral: Negative.

## 2023-12-22 NOTE — LETTER
12/22/2023         RE: Douglas Herrera  [address] [place] [address]        Dear Colleague,    Thank you for referring your patient, Douglas Herrera, to the [facility] RADIATION ONCOLOGY. Please see a copy of my visit note below.    A radiation therapy treatment planning simulation was performed.  Please see the [employer] record for documentation.    Tomasa Akers MD  Radiation Oncology       Again, thank you for allowing me to participate in the care of your patient.        Sincerely,        Tomasa Akers MD

## 2023-12-27 ENCOUNTER — NURSE TRIAGE (OUTPATIENT)
Dept: ONCOLOGY | Facility: CLINIC | Age: 63
End: 2023-12-27
Payer: COMMERCIAL

## 2023-12-27 ENCOUNTER — APPOINTMENT (OUTPATIENT)
Dept: GENERAL RADIOLOGY | Facility: CLINIC | Age: 63
End: 2023-12-27
Attending: EMERGENCY MEDICINE
Payer: COMMERCIAL

## 2023-12-27 ENCOUNTER — TELEPHONE (OUTPATIENT)
Dept: INTERVENTIONAL RADIOLOGY/VASCULAR | Facility: CLINIC | Age: 63
End: 2023-12-27
Payer: COMMERCIAL

## 2023-12-27 ENCOUNTER — HOSPITAL ENCOUNTER (EMERGENCY)
Facility: CLINIC | Age: 63
Discharge: HOME | End: 2023-12-27
Attending: EMERGENCY MEDICINE | Admitting: EMERGENCY MEDICINE
Payer: COMMERCIAL

## 2023-12-27 ENCOUNTER — HOSPITAL ENCOUNTER (EMERGENCY)
Facility: HOSPITAL | Age: 63
End: 2023-12-27
Payer: COMMERCIAL

## 2023-12-27 ENCOUNTER — APPOINTMENT (OUTPATIENT)
Dept: INTERPRETER SERVICES | Facility: CLINIC | Age: 63
End: 2023-12-27
Payer: COMMERCIAL

## 2023-12-27 VITALS
TEMPERATURE: 99.4 F | DIASTOLIC BLOOD PRESSURE: 76 MMHG | SYSTOLIC BLOOD PRESSURE: 112 MMHG | HEART RATE: 76 BPM | OXYGEN SATURATION: 99 % | RESPIRATION RATE: 20 BRPM

## 2023-12-27 DIAGNOSIS — R50.9 FEVER, UNSPECIFIED FEVER CAUSE: ICD-10-CM

## 2023-12-27 DIAGNOSIS — B33.8 RSV INFECTION: ICD-10-CM

## 2023-12-27 LAB
ALBUMIN SERPL BCG-MCNC: 3.6 G/DL (ref 3.5–5.2)
ALBUMIN UR-MCNC: 50 MG/DL
ALP SERPL-CCNC: 72 U/L (ref 40–150)
ALT SERPL W P-5'-P-CCNC: 80 U/L (ref 0–70)
ANION GAP SERPL CALCULATED.3IONS-SCNC: 11 MMOL/L (ref 7–15)
APPEARANCE UR: CLEAR
AST SERPL W P-5'-P-CCNC: 86 U/L (ref 0–45)
BASOPHILS # BLD AUTO: 0 10E3/UL (ref 0–0.2)
BASOPHILS NFR BLD AUTO: 0 %
BILIRUB SERPL-MCNC: 1.1 MG/DL
BILIRUB UR QL STRIP: NEGATIVE
BUN SERPL-MCNC: 13.9 MG/DL (ref 8–23)
CALCIUM SERPL-MCNC: 10 MG/DL (ref 8.8–10.2)
CHLORIDE SERPL-SCNC: 95 MMOL/L (ref 98–107)
COLOR UR AUTO: ABNORMAL
CREAT SERPL-MCNC: 0.76 MG/DL (ref 0.67–1.17)
DEPRECATED HCO3 PLAS-SCNC: 26 MMOL/L (ref 22–29)
EGFRCR SERPLBLD CKD-EPI 2021: >90 ML/MIN/1.73M2
EOSINOPHIL # BLD AUTO: 0 10E3/UL (ref 0–0.7)
EOSINOPHIL NFR BLD AUTO: 0 %
ERYTHROCYTE [DISTWIDTH] IN BLOOD BY AUTOMATED COUNT: 13.2 % (ref 10–15)
FLUAV RNA SPEC QL NAA+PROBE: NEGATIVE
FLUBV RNA RESP QL NAA+PROBE: NEGATIVE
GLUCOSE SERPL-MCNC: 111 MG/DL (ref 70–99)
GLUCOSE UR STRIP-MCNC: NEGATIVE MG/DL
HCT VFR BLD AUTO: 36 % (ref 40–53)
HGB BLD-MCNC: 11.6 G/DL (ref 13.3–17.7)
HGB UR QL STRIP: NEGATIVE
IMM GRANULOCYTES # BLD: 0 10E3/UL
IMM GRANULOCYTES NFR BLD: 1 %
KETONES UR STRIP-MCNC: NEGATIVE MG/DL
LEUKOCYTE ESTERASE UR QL STRIP: NEGATIVE
LYMPHOCYTES # BLD AUTO: 1.9 10E3/UL (ref 0.8–5.3)
LYMPHOCYTES NFR BLD AUTO: 23 %
MCH RBC QN AUTO: 27.8 PG (ref 26.5–33)
MCHC RBC AUTO-ENTMCNC: 32.2 G/DL (ref 31.5–36.5)
MCV RBC AUTO: 86 FL (ref 78–100)
MONOCYTES # BLD AUTO: 0.8 10E3/UL (ref 0–1.3)
MONOCYTES NFR BLD AUTO: 10 %
MUCOUS THREADS #/AREA URNS LPF: PRESENT /LPF
NEUTROPHILS # BLD AUTO: 5.5 10E3/UL (ref 1.6–8.3)
NEUTROPHILS NFR BLD AUTO: 66 %
NITRATE UR QL: NEGATIVE
NRBC # BLD AUTO: 0 10E3/UL
NRBC BLD AUTO-RTO: 0 /100
PH UR STRIP: 6 [PH] (ref 5–7)
PLATELET # BLD AUTO: 240 10E3/UL (ref 150–450)
POTASSIUM SERPL-SCNC: 4 MMOL/L (ref 3.4–5.3)
PROT SERPL-MCNC: 8.3 G/DL (ref 6.4–8.3)
RBC # BLD AUTO: 4.18 10E6/UL (ref 4.4–5.9)
RBC URINE: <1 /HPF
RSV RNA SPEC NAA+PROBE: POSITIVE
SARS-COV-2 RNA RESP QL NAA+PROBE: NEGATIVE
SODIUM SERPL-SCNC: 132 MMOL/L (ref 135–145)
SP GR UR STRIP: 1.03 (ref 1–1.03)
SQUAMOUS EPITHELIAL: <1 /HPF
UROBILINOGEN UR STRIP-MCNC: NORMAL MG/DL
WBC # BLD AUTO: 8.3 10E3/UL (ref 4–11)
WBC URINE: 1 /HPF

## 2023-12-27 PROCEDURE — 87637 SARSCOV2&INF A&B&RSV AMP PRB: CPT | Performed by: EMERGENCY MEDICINE

## 2023-12-27 PROCEDURE — 85025 COMPLETE CBC W/AUTO DIFF WBC: CPT | Performed by: EMERGENCY MEDICINE

## 2023-12-27 PROCEDURE — 258N000003 HC RX IP 258 OP 636: Performed by: EMERGENCY MEDICINE

## 2023-12-27 PROCEDURE — 250N000013 HC RX MED GY IP 250 OP 250 PS 637: Performed by: EMERGENCY MEDICINE

## 2023-12-27 PROCEDURE — 71046 X-RAY EXAM CHEST 2 VIEWS: CPT | Mod: 26 | Performed by: RADIOLOGY

## 2023-12-27 PROCEDURE — 81001 URINALYSIS AUTO W/SCOPE: CPT | Performed by: EMERGENCY MEDICINE

## 2023-12-27 PROCEDURE — 36415 COLL VENOUS BLD VENIPUNCTURE: CPT | Performed by: EMERGENCY MEDICINE

## 2023-12-27 PROCEDURE — 80053 COMPREHEN METABOLIC PANEL: CPT | Performed by: EMERGENCY MEDICINE

## 2023-12-27 PROCEDURE — 99284 EMERGENCY DEPT VISIT MOD MDM: CPT | Performed by: EMERGENCY MEDICINE

## 2023-12-27 PROCEDURE — 71046 X-RAY EXAM CHEST 2 VIEWS: CPT

## 2023-12-27 PROCEDURE — 99284 EMERGENCY DEPT VISIT MOD MDM: CPT | Mod: 25 | Performed by: EMERGENCY MEDICINE

## 2023-12-27 RX ORDER — ACETAMINOPHEN 325 MG/1
650 TABLET ORAL ONCE
Status: COMPLETED | OUTPATIENT
Start: 2023-12-27 | End: 2023-12-27

## 2023-12-27 RX ORDER — OXYCODONE HYDROCHLORIDE 5 MG/1
5 TABLET ORAL ONCE
Status: COMPLETED | OUTPATIENT
Start: 2023-12-27 | End: 2023-12-27

## 2023-12-27 RX ORDER — OXYCODONE HYDROCHLORIDE 5 MG/1
5 TABLET ORAL EVERY 6 HOURS PRN
Qty: 12 TABLET | Refills: 0 | Status: SHIPPED | OUTPATIENT
Start: 2023-12-27 | End: 2024-01-08

## 2023-12-27 RX ADMIN — SODIUM CHLORIDE 500 ML: 9 INJECTION, SOLUTION INTRAVENOUS at 15:56

## 2023-12-27 RX ADMIN — OXYCODONE HYDROCHLORIDE 5 MG: 5 TABLET ORAL at 14:32

## 2023-12-27 RX ADMIN — ACETAMINOPHEN 650 MG: 325 TABLET, FILM COATED ORAL at 15:54

## 2023-12-27 ASSESSMENT — ACTIVITIES OF DAILY LIVING (ADL): ADLS_ACUITY_SCORE: 35

## 2023-12-27 NOTE — ED NOTES
Expected Patient Referral to ED  11:48 AM    Referring Clinic/Provider:  Triage nurse at [facility]    Reason for referral/Clinical facts:  Pt with squamous cell carcinoma of maxillary sinuses    Temp 100.9 last night and 101 today.  Son reports left face is more swollen and more facial pain then usual for his cancer.     Recommendations provided:  Would consider sending pt to [facility] ER as they have ENT available as our ENT coverage is very limited and suspect that he will need xfer to [facility] is admission is recommended. She will call and advise family.    Caller was informed that this institution does not possess the capabilities and/or resources to provide for patient and should be transferred to another institution.    Based on the information provided, discussed that this patient likely is nota good candidate for direct admission to this institution and that provider could proceed as such.  If however direct admit is sought and any hurdles encountered, this ED would be happy to see the patient and evaluate.    Informed caller that recommendations provided are recommendations based only on the facts provided and that they responsible to accept or reject the advice, or to seek a formal in person consultation as needed and that this ED will see/treat patient should they arrive.      Oma Arriaga MD  Emergency Medicine  [facility] EMERGENCY DEPARTMENT  [facility] [facility] [id number] [phone]          Oma Arriaga MD  12/27/23 5518

## 2023-12-27 NOTE — DISCHARGE INSTRUCTIONS
You have a virus (RSV) that is causing your fever and congestion.     Take tylenol for the fever.     Drink plenty of fluids.     Return to the ER if symptoms worsen.

## 2023-12-27 NOTE — ED PROVIDER NOTES
[facility] EMERGENCY DEPARTMENT ([facility])    12/27/23       ED PROVIDER NOTE       History   No chief complaint on file.    The history is provided by the patient and medical records.     Douglas Herrera is a 63 year old male prior history of squamous cell carcinoma axillary sinus, planning to start chemo and radiation 1/8/24, HL, CAD s/p CABG x 3, AAA repair (2019), gout, chronic hep B, SCC, and depression who presents to the ED with 2-3 days of temp of 100.4, cough, runny nose and facial swelling. He is not on antiplatelet therapy.  He denies difficulty breathing or swallowing.  He moves his neck ROM freely and without pain.  He has been taking Tylenol and Gabapentin without relief.  The left side of his face seems more swollen than it has been.  He lives with his son who has been ill recently with cough and congestion, he tested negative for Covid.  He had a virtual Oncology clinic visit today who instructed him to come to the ED for evaluation.       Past Medical History  Past Medical History:   Diagnosis Date    Ascending aortic aneurysm (H24)     Coronary artery disease     Depression     Gout     History of anesthesia complications     Hyperlipemia     Hypertension     PONV (postoperative nausea and vomiting)      Past Surgical History:   Procedure Laterality Date    AORTA SURGERY N/A 4/23/2019    Procedure: WITH ASCENDING AORTA REPLACEMENT;  Surgeon: Darlene Graff MD;  Location: [facility] OR;  Service: Cardiovascular    BYPASS GRAFT ARTERY CORONARY      CARDIAC SURGERY      CV CORONARY ANGIOGRAM N/A 3/12/2019    Procedure: Coronary Angiogram;  Surgeon: Hamilton Lucero MD;  Location: [facility] Cath Lab;  Service: Cardiology    ENDOSCOPIC SINUS SURGERY Left 11/10/2023    Procedure: Transnasal endoscopic approach to biopsy left nasal mass;  Surgeon: Damon Regan MD;  Location: [facility] OR    GALLBLADDER SURGERY      IR CAROTID CEREBRAL ANGIOGRAM BILATERAL  11/30/2023     IR FACIAL EMBOLIZATION LEFT  9/13/2023    OPTICAL TRACKING SYSTEM ENDOSCOPIC SINUS SURGERY Left 12/1/2023    Procedure: Stealth assisted transnasal endoscopic approach to excise left sinonasal mass;  Surgeon: Damon Regan MD;  Location: [facility] OR     acetaminophen (TYLENOL) 325 MG tablet  aspirin 81 MG EC tablet  atorvastatin (LIPITOR) 80 MG tablet  entecavir (BARACLUDE) 0.5 MG tablet  gabapentin (NEURONTIN) 300 MG capsule  gabapentin (NEURONTIN) 300 MG capsule  nitroGLYcerin (NITROSTAT) 0.4 MG sublingual tablet  oxymetazoline (AFRIN) 0.05 % nasal spray  senna-docusate (SENOKOT-S/PERICOLACE) 8.6-50 MG tablet  senna-docusate (SENOKOT-S/PERICOLACE) 8.6-50 MG tablet  sodium chloride (OCEAN) 0.65 % nasal spray  sodium chloride (OCEAN) 0.65 % nasal spray  traMADol (ULTRAM) 50 MG tablet  traZODone (DESYREL) 50 MG tablet      No Known Allergies  Family History  Family History   Problem Relation Age of Onset    Cerebrovascular Disease Mother 90.00    Acute Myocardial Infarction No family hx of      Social History   Social History     Tobacco Use    Smoking status: Former     Types: Cigarettes    Smokeless tobacco: Never    Tobacco comments:     every 3-4 months, rare   Substance Use Topics    Alcohol use: Yes     Comment: Occasionally    Drug use: No         A complete review of systems was performed with pertinent positives and negatives noted in the HPI, and all other systems negative.    Physical Exam      Physical Exam  Constitutional:       General: He is not in acute distress.     Appearance: He is not toxic-appearing.   HENT:      Head: Normocephalic and atraumatic.      Mouth/Throat:      Comments: Intraoral mass visible on the posterior hard palate.  Palpation with mild tenderness.  Normal airway.  No trismus.  Neck:      Comments: Able to flex and extend the neck.  Pulmonary:      Effort: Pulmonary effort is normal. No respiratory distress.      Breath sounds: Normal breath sounds.   Musculoskeletal:       Cervical back: Neck supple.   Neurological:      General: No focal deficit present.      Mental Status: He is oriented to person, place, and time.   Psychiatric:         Mood and Affect: Mood normal.         Behavior: Behavior normal.         Thought Content: Thought content normal.           ED Course, Procedures, & Data      Procedures                      No results found for any visits on 12/27/23.  Medications - No data to display  Labs Ordered and Resulted from Time of ED Arrival to Time of ED Departure - No data to display  No orders to display          Critical care was not performed.     Medical Decision Making  The patient's presentation was of high complexity (a chronic illness severe exacerbation, progression, or side effect of treatment).    The patient's evaluation involved:  review of external note(s) from 3+ sources (see separate area of note for details)  review of 3+ test result(s) ordered prior to this encounter (see separate area of note for details)  ordering and/or review of 3+ test(s) in this encounter (see separate area of note for details)  discussion of management or test interpretation with another health professional (Hem/onc fellow Dr. Sierra)    The patient's management necessitated moderate risk (prescription drug management including medications given in the ED).    Assessment & Plan    Patient is a very nice 63-year-old male with a history of cancer of the left maxillary sinus who presents to the ER due to 3 days of fever and feeling unwell.  Patient's been having more pain in the left side of his face.  Patient had a low-grade fever of 100.4 here.  Patient had a chest x-ray that was normal.  Patient's labs are stable.  Patient was found to be positive for RSV.  These results were discussed with the oncology team.  The patient has not yet started chemotherapy or radiation they do not recommend that we start Ribavarin at this time.  Recommendation is to do supportive therapy at  home.  Patient will be discharged home with oxycodone which did help with pain management.  Patient's plan of care was discussed with the son.  Patient stable for discharge    I have reviewed the nursing notes. I have reviewed the findings, diagnosis, plan and need for follow up with the patient.    New Prescriptions    No medications on file       Final diagnoses:   RSV infection   Fever, unspecified fever cause       I, Kuldip Palacios, am serving as a trained medical scribe to document services personally performed by Nikki Holder MD based on the provider's statements to me on December 27, 2023.  This document has been checked and approved by the attending provider.    I, Nikki Holder MD, was physically present and have reviewed and verified the accuracy of this note documented by Kuldip Palacios, medical scribe.      Nikki Holder MD   [facility] EMERGENCY DEPARTMENT  12/27/2023     Nikki Holder MD  12/27/23 1618

## 2023-12-27 NOTE — ED TRIAGE NOTES
Squamous cell carinoma maxillary sinus  Temp, cough, runny nose, some facial swelling x 2-3 days  plan to start chemo and radiation next week  Tmax 100.7       Triage Assessment (Adult)       Row Name 12/27/23 1241          Triage Assessment    Airway WDL WDL        Respiratory WDL    Respiratory WDL X;mucous membranes;rhythm/pattern;cough     Rhythm/Pattern, Respiratory shortness of breath     Mucous Membranes dry     Cough Frequency frequent        Skin Circulation/Temperature WDL    Skin Circulation/Temperature WDL WDL        Cardiac WDL    Cardiac WDL WDL        Peripheral/Neurovascular WDL    Peripheral Neurovascular WDL WDL        Cognitive/Neuro/Behavioral WDL    Cognitive/Neuro/Behavioral WDL WDL

## 2023-12-27 NOTE — TELEPHONE ENCOUNTER
Oncology Nurse Triage - Reporting Symptoms  Situation:   Request from RNCC to triage to call Douglas to follow up on pain management/medications wearing off. Used  Services for phone . SonTulio, got on phone midway through to help clarify medications.    Background:   Treating Provider: Dr. Kayy Post    Date of last office visit: 12/14/23    Recent treatments: No- starting radiation and chemo in near future.     Assessment  Pain in jaw, teeth, L side facial pain, occasional headache- reports some swelling in L side face, states was worse when he had fever but has gone down. Constant pain.  Taking Gabapentin 600mg at HS, some times taking 300mg during the day, and Tylenol 2 tabs in AM and PM.   He took Oxycodone 5mg last night and today at 2am, took 2.5mg- seems to be helping more. Confirmed pt stopped Tramadol.  Pt's son got on line during call and said pt was taking Gabapentin just at night, reports pt is running out of Oxycodone- will submit refill encounter.     New cough and congestion- started 2 days ago, tried OTC cough syrup, but this was not helpful. Occasional sputum production- greenish in color. Other family members have had similar cold symptoms.  Denies chills, but reports when his son checked his temperature yesterday was 100.9, did take Nyquil. Reports temperature while on call is 100.1- pt has not taken Tylenol yet today.     Recommendations:   Writer reminded son to monitor Tylenol intake d/t Tylenol is also in Nyquil and Dayquil, reviewed max dose of 3g/day. Raina reviewed previous instructions for gabapentin, informed pt/son will confirm with Dr. Post if he can start taking during the day for pain and how much to start with. Also informed will review symptoms of possible infection and call him back. Son voiced understanding.Okay to call sonTulio, on his number listed in chart.     0020 paged Dr. Roberts to middle line.  2374 return call from Dr. Roberts, reviewed  symptoms having. He recommends pt be seen in person and labs. Due to no KENNEDI availability in person, recommends pt see PCP if he can get in or ED- said ED would be better option for labs and they can also help with pain management.   1140 return call to son, Tulio, advised ED. He states he will bring pt to [facility] ED in [place].  1142 call to [facility] ED, spoke w/ charge nurse and provided info, who then transferred call to ED doc, Dr. Lawton, who states they do not have ENT that comes to that facility and if pt needs to be admitted he would likely need to be transferred, which could cause delay in care.  Call to son to mary, states he will take pt to [facility] ED.   1153 call to [facility] ED, spoke w/ charge nurse, Gilma, to provide warm hand off.

## 2023-12-28 NOTE — TELEPHONE ENCOUNTER
Phone call to Douglas. He reports he has enough pain medications for the moment and will reach out with refill needs in the future. He said he takes his oxycodone sparingly - half to one pill every so often. He says his pain controlled under his current regimen, reporting it at 2/10.

## 2023-12-28 NOTE — TELEPHONE ENCOUNTER
----- Message -----   From: Isadora Goode PA-C   Sent: 12/28/2023   2:21 PM CST   To:  Mikel [facility] Nurse Triage   Subject: RSV                                              Hello,           I was reviewing patient's case for Tuesday 1/2/23.     He just presented to the ED yesterday with fevers and was diagnosed with RSV. Patient should be fever free for 24 hours without medication. He should also be well enough to be able to tolerate the procedure, and he needs to be able to lay flat. If he does not meet any of these criteria, we need to have him reschedule.     Isadora Goode PA-C       ===========================================================      Call placed to patient's son (Tulio) to inform of the above recommendations from IR PA and patient's new dx of RSV. Also reviewed prep instructions that were given to patient yesterday. Tulio verbalized understanding and was provided phone numbers for the IR RNs and IR scheduling if needed.      Shila Knight, RN  Interventional Radiology  [phone]

## 2023-12-29 ENCOUNTER — PRE VISIT (OUTPATIENT)
Dept: RADIATION ONCOLOGY | Facility: CLINIC | Age: 63
End: 2023-12-29
Payer: COMMERCIAL

## 2023-12-29 ENCOUNTER — PATIENT OUTREACH (OUTPATIENT)
Dept: ONCOLOGY | Facility: CLINIC | Age: 63
End: 2023-12-29
Payer: COMMERCIAL

## 2023-12-29 DIAGNOSIS — C31.0 SQUAMOUS CELL CARCINOMA OF MAXILLARY SINUS (H): Primary | ICD-10-CM

## 2023-12-29 NOTE — TELEPHONE ENCOUNTER
[facility]: Cancer Care                                                                                          Phone call from Tulio and Douglas.    They report that Douglas has changed his mind and would like to get a port placed for ease of access while getting chemotherapy. He is scheduled to get a G tube placed on Tuesday 1/2 and start chemoradiation on 1/8.    Ok for peg placement per Dr. Post. Orders placed, message sent to IR team with this update in the hopes that they could possibly place port on the same day as G tube.    Trudy Shelton, RN, BSN  RN Care Coordinator  [facility]

## 2023-12-30 LAB — BACTERIA BLD CULT: NO GROWTH

## 2024-01-02 ENCOUNTER — APPOINTMENT (OUTPATIENT)
Dept: MEDSURG UNIT | Facility: CLINIC | Age: 64
End: 2024-01-02
Attending: INTERNAL MEDICINE
Payer: COMMERCIAL

## 2024-01-02 ENCOUNTER — APPOINTMENT (OUTPATIENT)
Dept: INTERVENTIONAL RADIOLOGY/VASCULAR | Facility: CLINIC | Age: 64
End: 2024-01-02
Attending: INTERNAL MEDICINE
Payer: COMMERCIAL

## 2024-01-02 ENCOUNTER — TELEPHONE (OUTPATIENT)
Dept: SPEECH THERAPY | Facility: CLINIC | Age: 64
End: 2024-01-02

## 2024-01-02 ENCOUNTER — HOSPITAL ENCOUNTER (OUTPATIENT)
Facility: CLINIC | Age: 64
Discharge: HOME | End: 2024-01-02
Attending: INTERNAL MEDICINE | Admitting: INTERNAL MEDICINE
Payer: COMMERCIAL

## 2024-01-02 VITALS
DIASTOLIC BLOOD PRESSURE: 73 MMHG | HEART RATE: 67 BPM | RESPIRATION RATE: 18 BRPM | OXYGEN SATURATION: 98 % | SYSTOLIC BLOOD PRESSURE: 116 MMHG | TEMPERATURE: 98.9 F

## 2024-01-02 DIAGNOSIS — Z51.11 ENCOUNTER FOR ANTINEOPLASTIC CHEMOTHERAPY: ICD-10-CM

## 2024-01-02 DIAGNOSIS — E43 SEVERE PROTEIN-CALORIE MALNUTRITION (H): ICD-10-CM

## 2024-01-02 DIAGNOSIS — C31.0 CARCINOMA OF MAXILLARY SINUS (H): ICD-10-CM

## 2024-01-02 DIAGNOSIS — C31.0 SQUAMOUS CELL CARCINOMA OF MAXILLARY SINUS (H): ICD-10-CM

## 2024-01-02 DIAGNOSIS — Z01.818 PRE-OP EVALUATION: Primary | ICD-10-CM

## 2024-01-02 LAB
ERYTHROCYTE [DISTWIDTH] IN BLOOD BY AUTOMATED COUNT: 13.3 % (ref 10–15)
HCT VFR BLD AUTO: 35.3 % (ref 40–53)
HGB BLD-MCNC: 11.3 G/DL (ref 13.3–17.7)
INR PPP: 1.22 (ref 0.85–1.15)
MCH RBC QN AUTO: 27.5 PG (ref 26.5–33)
MCHC RBC AUTO-ENTMCNC: 32 G/DL (ref 31.5–36.5)
MCV RBC AUTO: 86 FL (ref 78–100)
PLATELET # BLD AUTO: 356 10E3/UL (ref 150–450)
RBC # BLD AUTO: 4.11 10E6/UL (ref 4.4–5.9)
WBC # BLD AUTO: 11.2 10E3/UL (ref 4–11)

## 2024-01-02 PROCEDURE — 250N000011 HC RX IP 250 OP 636: Performed by: PHYSICIAN ASSISTANT

## 2024-01-02 PROCEDURE — 49440 PLACE GASTROSTOMY TUBE PERC: CPT

## 2024-01-02 PROCEDURE — 99152 MOD SED SAME PHYS/QHP 5/>YRS: CPT | Mod: GC | Performed by: RADIOLOGY

## 2024-01-02 PROCEDURE — 250N000011 HC RX IP 250 OP 636: Performed by: RADIOLOGY

## 2024-01-02 PROCEDURE — 250N000011 HC RX IP 250 OP 636: Performed by: INTERNAL MEDICINE

## 2024-01-02 PROCEDURE — 255N000002 HC RX 255 OP 636: Performed by: RADIOLOGY

## 2024-01-02 PROCEDURE — 36415 COLL VENOUS BLD VENIPUNCTURE: CPT | Performed by: INTERNAL MEDICINE

## 2024-01-02 PROCEDURE — C1769 GUIDE WIRE: HCPCS

## 2024-01-02 PROCEDURE — 49440 PLACE GASTROSTOMY TUBE PERC: CPT | Mod: GC | Performed by: RADIOLOGY

## 2024-01-02 PROCEDURE — 999N000142 HC STATISTIC PROCEDURE PREP ONLY

## 2024-01-02 PROCEDURE — 85027 COMPLETE CBC AUTOMATED: CPT | Performed by: INTERNAL MEDICINE

## 2024-01-02 PROCEDURE — 999N000134 HC STATISTIC PP CARE STAGE 3

## 2024-01-02 PROCEDURE — 99153 MOD SED SAME PHYS/QHP EA: CPT

## 2024-01-02 PROCEDURE — 272N000151 HC KIT CR11

## 2024-01-02 PROCEDURE — 272N000569 HC SHEATH CR6

## 2024-01-02 PROCEDURE — 85610 PROTHROMBIN TIME: CPT | Performed by: INTERNAL MEDICINE

## 2024-01-02 PROCEDURE — 250N000009 HC RX 250: Performed by: PHYSICIAN ASSISTANT

## 2024-01-02 RX ORDER — NALOXONE HYDROCHLORIDE 0.4 MG/ML
0.4 INJECTION, SOLUTION INTRAMUSCULAR; INTRAVENOUS; SUBCUTANEOUS
Status: DISCONTINUED | OUTPATIENT
Start: 2024-01-02 | End: 2024-01-02 | Stop reason: HOSPADM

## 2024-01-02 RX ORDER — CEFAZOLIN SODIUM 2 G/100ML
2 INJECTION, SOLUTION INTRAVENOUS
Status: COMPLETED | OUTPATIENT
Start: 2024-01-02 | End: 2024-01-02

## 2024-01-02 RX ORDER — IODIXANOL 320 MG/ML
100 INJECTION, SOLUTION INTRAVASCULAR ONCE
Status: COMPLETED | OUTPATIENT
Start: 2024-01-02 | End: 2024-01-02

## 2024-01-02 RX ORDER — NALOXONE HYDROCHLORIDE 0.4 MG/ML
0.2 INJECTION, SOLUTION INTRAMUSCULAR; INTRAVENOUS; SUBCUTANEOUS
Status: DISCONTINUED | OUTPATIENT
Start: 2024-01-02 | End: 2024-01-02 | Stop reason: HOSPADM

## 2024-01-02 RX ORDER — FENTANYL CITRATE 50 UG/ML
25-50 INJECTION, SOLUTION INTRAMUSCULAR; INTRAVENOUS EVERY 5 MIN PRN
Status: DISCONTINUED | OUTPATIENT
Start: 2024-01-02 | End: 2024-01-02 | Stop reason: HOSPADM

## 2024-01-02 RX ORDER — KETOROLAC TROMETHAMINE 30 MG/ML
15 INJECTION, SOLUTION INTRAMUSCULAR; INTRAVENOUS EVERY 6 HOURS PRN
Status: DISCONTINUED | OUTPATIENT
Start: 2024-01-02 | End: 2024-01-02 | Stop reason: HOSPADM

## 2024-01-02 RX ORDER — LIDOCAINE 40 MG/G
CREAM TOPICAL
Status: DISCONTINUED | OUTPATIENT
Start: 2024-01-02 | End: 2024-01-02 | Stop reason: HOSPADM

## 2024-01-02 RX ORDER — FLUMAZENIL 0.1 MG/ML
0.2 INJECTION, SOLUTION INTRAVENOUS
Status: DISCONTINUED | OUTPATIENT
Start: 2024-01-02 | End: 2024-01-02 | Stop reason: HOSPADM

## 2024-01-02 RX ADMIN — IODIXANOL 40 ML: 320 INJECTION, SOLUTION INTRAVASCULAR at 10:24

## 2024-01-02 RX ADMIN — MIDAZOLAM 1 MG: 1 INJECTION INTRAMUSCULAR; INTRAVENOUS at 10:04

## 2024-01-02 RX ADMIN — Medication 1 MG: at 10:03

## 2024-01-02 RX ADMIN — KETOROLAC TROMETHAMINE 15 MG: 30 INJECTION, SOLUTION INTRAMUSCULAR; INTRAVENOUS at 11:10

## 2024-01-02 RX ADMIN — FENTANYL CITRATE 50 MCG: 50 INJECTION, SOLUTION INTRAMUSCULAR; INTRAVENOUS at 10:04

## 2024-01-02 RX ADMIN — LIDOCAINE HYDROCHLORIDE 10 ML: 10 INJECTION, SOLUTION EPIDURAL; INFILTRATION; INTRACAUDAL; PERINEURAL at 10:04

## 2024-01-02 RX ADMIN — CEFAZOLIN SODIUM 2 G: 2 INJECTION, SOLUTION INTRAVENOUS at 08:29

## 2024-01-02 ASSESSMENT — ACTIVITIES OF DAILY LIVING (ADL)
ADLS_ACUITY_SCORE: 35

## 2024-01-02 NOTE — PROGRESS NOTES
Patient Name: Douglas Herrera  Medical Record Number: [id number]  Today's Date: 1/2/2024    Procedure: Gastrostomy tube placement.  Proceduralist: MD Jake., MD Irvin.  Pathology present: n/a    Procedure Start: 0940  Procedure end: 1015  Sedation medications administered: Fentanyl: 50 mcg Versed:1 mg     Report given to: 2A,RN  : Xiomara, in person.    Other Notes: Pt arrived to IR room 1 from . Consent reviewed. Pt denies any questions or concerns regarding procedure. Pt positioned supine and monitored per protocol. Pt tolerated procedure without any noted complications. Pt transferred back to .    Erica Sanders, RN

## 2024-01-02 NOTE — PRE-PROCEDURE
GENERAL PRE-PROCEDURE:   Procedure:  G tube  Date/Time:  1/2/2024 8:12 AM    Verbal consent obtained?: Yes    Written consent obtained?: Yes    Risks and benefits: Risks, benefits and alternatives were discussed    Consent given by:  Patient  Patient states understanding of procedure being performed: Yes    Patient's understanding of procedure matches consent: Yes    Procedure consent matches procedure scheduled: Yes    Expected level of sedation:  Moderate  Appropriately NPO:  Yes  ASA Class:  3  Mallampati  :  Grade 3- soft palate visible, posterior pharyngeal wall not visible  Lungs:  Other (comment)  Lung exam comment:  Course BS  Heart:  Normal heart sounds and rate  History & Physical reviewed:  History and physical reviewed and no updates needed  Statement of review:  I have reviewed the lab findings, diagnostic data, medications, and the plan for sedation

## 2024-01-02 NOTE — PROGRESS NOTES
Pt back to 2A room 4 post G-tube placement. Pt drowsy, but easily arouses to voice. VSS, denies pain and nausea. G tube site CDI with no bleeding or hematoma. Pt son remains at bedside and will be present for G tube and discharge teaching.  remains at bedside as well.

## 2024-01-02 NOTE — PROGRESS NOTES
Pt prepped for G-tube placement in IR. R AC PIV infusing Cefazolin per order. INR and CBC sent to lab. INR in process, CBC resulted with WBC up to 11.2. Positive  RSV, team aware. Consent has been signed.  in room as pt is Hmong speaking. Son, Salvatore, at bedside and will be ride home.

## 2024-01-02 NOTE — PROGRESS NOTES
Pt discharging post gastric tube placement. Abd site CDI with no bleeding, hematoma, or drainage. Drainage bag removed after 4 hours of NPO per order. Pt son, Salvatore, at bedside. Pt son demonstrated understanding and performed teach back on gastric tube dressing change and flushing. Appropriate discharge information given to pt and pt son.  at bedside throughout teaching. Pt has home health care following up with pt. Beatrice at [facility] will call pt/pt son tomorrow. PIV intact upon removal. Pt ambulated without issue. Pt wheeled to front entrance and son was ride home.

## 2024-01-02 NOTE — PROCEDURES
[facility]    Procedure: IR Procedure Note    Date/Time: 1/2/2024 10:23 AM    Performed by: Pooja Joseph DO  Authorized by: Valentine Bryan MD  IR Fellow Physician: Pooja Joseph      UNIVERSAL PROTOCOL   Site Marked: NA  Prior Images Obtained and Reviewed:  Yes  Required items: Required blood products, implants, devices and special equipment available    Patient identity confirmed:  Verbally with patient, arm band, provided demographic data and hospital-assigned identification number  Patient was reevaluated immediately before administering moderate or deep sedation or anesthesia  Confirmation Checklist:  Patient's identity using two indicators, relevant allergies, procedure was appropriate and matched the consent or emergent situation and correct equipment/implants were available  Time out: Immediately prior to the procedure a time out was called    Universal Protocol: the Joint Commission Universal Protocol was followed    Preparation: Patient was prepped and draped in usual sterile fashion       ANESTHESIA    Anesthesia: Local infiltration  Local Anesthetic:  Lidocaine 1% without epinephrine  Anesthetic Total (mL):  15      SEDATION  Patient Sedated: Yes    Sedation Type:  Moderate (conscious) sedation  Sedation:  Fentanyl and midazolam  Vital signs: Vital signs monitored during sedation    See dictated procedure note for full details.  Findings: 18 Fr G tube placed under fluoroscopy.    Specimens: none    Complications: None    Condition: Stable    Plan: -G tube to gravity drainage for 4 hours.  -After 4 hours, can advance feeds as tolerated.   -1 hour bedrest.      PROCEDURE    Patient Tolerance:  Patient tolerated the procedure well with no immediate complications  Length of time physician/provider present for 1:1 monitoring during sedation: 30

## 2024-01-02 NOTE — DISCHARGE INSTRUCTIONS
Interventional Radiology  Patient Discharge Instructions  Post Tube Placement:  Gastrostomy/Gastro-Jejunostomy    For some patients, the tube puts food and medicine into the body. For others, it drains fluid from the stomach. Your doctor will decide how long you will have the tube.    A disc or balloon inside the body will hold the tube in place. The balloon is filled with water.  You may notice one or two  anchors  (buttons, stitches, T-tacks, or T-fasteners) around your tube/site. These hold the stomach to the inside wall of the belly.    Self-care the first few days  Do not eat or drink for the first four hours. (You may take medicine with small sips of water.)  Stick to liquids for the first day and then advance your diet as tolerated, if you are able to take in oral foods.  If you are starting tube feedings, this should have been prearranged with your clinic.    Check your tube site often. Call your doctor if your side effects of pain, redness or bleeding get worse. It is normal to have some drainage (fluid leaking) around the tube.    Limit heavy activity (lifting, straining or exercise).     If you received IV medicine to make you sleepy: You may feel drowsy, forgetful and unsteady.     No driving until the day after surgery. No alcohol for 24 hours.    Activity   You may resume your normal activities as tolerated, however, most patients have site pain for about a week following placement.  Minimal use of your abdominal muscles will decrease the pain.  Keep the tube secure at all times (you may tape it to your skin or use the Flexi-Trak device) and avoid tugging on it.  Bathing  You may shower 24 hours after the tube is placed.  Remove the dressing before your shower, reapply afterwards.  DO NOT submerge in water of any kind for 3 weeks after placement.  Once the site is healed, which is around 3 weeks, for most patients, you may go in bathtubs, spas and swimming pools where the water is clean or chlorinated.   Be sure the caps are tight.  Your doctor may ask you to cover the tube site with a plastic bandage when swimming.    Do not swim in lakes, oceans or non-chlorinated public [place] for the duration of your tube being in place.  When swimming or any activity where the tube could be inadvertently pulled, wearing a t-shirt or one piece bathing suit (for women) decreases the risk.  Basic tube care  Always wash your hands before touching the tube.    Do not use ointment or hydrogen peroxide near the tube. You may use a thin layer of skin balm, like Desitin around the site after it is clean and dry.     After 3 weeks:  If you have a G-tube:  - Once each day, pull gently on the tube.  It should move in and out slightly (about   to    inch). You may see a slight rounding of the skin as you pull up. If the tube is too tight to move in and out, call your nurse or doctor.  - We may ask you to gently rotate the tube. If so, roll it between your thumb and forefinger.  This breaks up any flaps of tissue that have formed around the tube inside the stomach.  Never rotate a J-tube or a GJ-tube.    Changing the bandage  Wear a bandage until the site has healed and there is no leaking fluid. Change the bandage at least once a day and each time it gets wet or soiled.  1. Gather these supplies:  - Plastic bag  - Gauze or split gauze (4×4 or 2×2)  - Paper tape (1 or 2 inches wide)  2. Clean your work area with household  and water (or rubbing alcohol). Wash your hands.  3. Remove the old bandage. Place it in the bag.  4. Wash your hands again. Clean the tube site.  5. Replace the bandage.   If you have a disk: Slide split gauze under and around the tube (or use two pieces of gauze).  Tape the gauze in place.   If you don t have a disk: Fold one piece of gauze in half, placing it below the tube site.  Fold another piece of gauze in half, placing it over the tube site. Tape the gauze in place.  6. Secure the tube to the skin. If you  don t have a disk, be sure the tube is at a 90-degree angle.   First, place a piece of a tape across the skin. Then, use a second piece of tape to secure the tube over the first piece.  7 Do not attach the tube to clothing, except during feedings.    Flushing Your Tube  Flush the tube with water (clean, drinkable tap water is fine) before and after each feeding, as well as between medications if you choose to use your tube to instill medications.    If you are not using your tube, it needs to be flushed with 20-30 cc of clean tap water twice daily.    Follow-up  Contact the Interventional Radiology Clinic to schedule your suture removal two weeks after tube placement at [phone]  Routine tube changes are recommended every 6-9 months.  After 6 weeks most tubes can be changed to a skin level,  button  type device.  Tube removals are performed in our clinic    The following questions should be first directed to the doctor that referred you for the feeding tube  Which feeding formula to use?  How long will the tube be needed?  Calorie requirement for your specific problem?  How much water you need to give through your tube daily?  Nausea, vomiting, diarrhea, or weight loss problems.    When to call for help  Call your doctor if you have:   A fever that is:  - over 101 F (38.3 C) if taken under the tongue  - over 100 F (37.8 C) if taken under your arm.   Vomiting (throwing up)   Diarrhea (loose, watery stools)   Constipation (hard, dry stools) for more than 24 hours    Call your care coordinator or Interventional Radiology if you have:   Bleeding, drainage or swelling at the tube site.   A painful, bright red rash   Severe pain or pain that does not improve with medicine.   A plugged tube and you cannot flush it.   A tube that has come out.   Fluid leaking around the tube.   Any questions or problems with your tube.  If you need help after business hours or on a holiday, go to Emergency unless you can reach home care  or your care coordinator.  Phone numbers  [] [facility]:   [place]: [phone]      Monday-Friday 7:30 am to 4:00 pm  * After 4pm and on weekends, call the hospital  at [phone] and ask to have the on call Interventional Radiologist paged.  [] Other: _______________________________  Care coordinator: _________________________

## 2024-01-03 ENCOUNTER — TELEPHONE (OUTPATIENT)
Dept: INTERVENTIONAL RADIOLOGY/VASCULAR | Facility: CLINIC | Age: 64
End: 2024-01-03
Payer: COMMERCIAL

## 2024-01-03 ENCOUNTER — APPOINTMENT (OUTPATIENT)
Dept: INTERPRETER SERVICES | Facility: CLINIC | Age: 64
End: 2024-01-03
Payer: COMMERCIAL

## 2024-01-03 NOTE — TELEPHONE ENCOUNTER
Spoke with: Patient with Hmong   Procedure done: G tube placement on 1/2/24  Any pain: some soreness at site  Any fever: No  Any redness/swelling/abnormal drainage around puncture site: No  Were you instructed well enough to take care of yourself at home: Yes  Are you satisfied with the care you received: Yes  Any additional concerns or questions: asked about scheduling port placement. Order is in, provided IR scheduling number and transferred patient and  to get scheduled.        Post call completed.   January 3, 2024 9:58 AM  Shila Knight, RN  Interventional Radiology  [phone]

## 2024-01-04 ENCOUNTER — HOSPITAL ENCOUNTER (OUTPATIENT)
Dept: RADIOLOGY | Facility: CLINIC | Age: 64
Discharge: HOME | End: 2024-01-04
Attending: RADIOLOGY | Admitting: RADIOLOGY
Payer: COMMERCIAL

## 2024-01-04 ENCOUNTER — HOSPITAL ENCOUNTER (OUTPATIENT)
Dept: SPEECH THERAPY | Facility: CLINIC | Age: 64
Discharge: HOME | End: 2024-01-04
Attending: RADIOLOGY | Admitting: RADIOLOGY
Payer: COMMERCIAL

## 2024-01-04 ENCOUNTER — PATIENT OUTREACH (OUTPATIENT)
Dept: ONCOLOGY | Facility: CLINIC | Age: 64
End: 2024-01-04
Payer: COMMERCIAL

## 2024-01-04 DIAGNOSIS — C31.9 MALIGNANT NEOPLASM OF PARANASAL SINUS (H): ICD-10-CM

## 2024-01-04 DIAGNOSIS — R13.10 DYSPHAGIA: ICD-10-CM

## 2024-01-04 PROCEDURE — 92610 EVALUATE SWALLOWING FUNCTION: CPT | Mod: GN

## 2024-01-04 PROCEDURE — 74230 X-RAY XM SWLNG FUNCJ C+: CPT

## 2024-01-04 PROCEDURE — 92611 MOTION FLUOROSCOPY/SWALLOW: CPT | Mod: GN

## 2024-01-04 NOTE — PROGRESS NOTES
[facility]: Cancer Care                                                                                          RN called and spoke to sonTulio. Son verified that patient received his supplies and teaching from [facility]. Son was able to articulate the teach and stated that family feels comfortable with managing the G tube. RN gave son the number to [facility] and also reminded them to call the triage line. Son verbalized understanding.     Annel GASPARN, RN, OCN  Care Coordinator  [facility]

## 2024-01-04 NOTE — PROGRESS NOTES
SPEECH LANGUAGE PATHOLOGY EVALUATION    See electronic medical record for Abuse and Falls Screening details.    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 Patient will be beginning chemo and radiation on 1/8/24.   Dates & types of surgery: None reported.    Prior diagnostic imaging/testing results: CXR 12/27/2023 notable for basilar streaky opacities representative of atelectasis, unchanged. No focal consolidation.   Prior therapy history for the same diagnosis, illness or injury: None.    Objective   SWALLOW EVALUTION  Dysphagia history: Patient with no previous history of dysphagia. Patient with increased secretions, which he now intermittently spits into a water bottle (x1 during evaluation). He appeared to have been managing his secretions well. Patient reports that due to mandibular pain from intraoral mass, he has been eating pureed foods or fruit that has been cut into small pieces that the pt will chew very minimally on R side and then swallow. Patient reports losing 40 lbs across the last 60 months (~160/170 lbs in May 2023 and ~123 lbs in January 2024). Patient had PEG tube placed 1/2/2024. Patient & son reported good healing around PEG site.   Current Diet/Method of Nutritional Intake: thin liquids (level 0), minced & moist (level 5), soft & bite-sized (level 6)        CLINICAL SWALLOW EVALUATION  Oral Motor Function: anomalies present  structural abnormalities: intraoral mass visible on L mid-posterior hard palate  Dentition: natural dentition, adequate dentition  Secretion management: WFL, Patient reports occasionally spitting excessive secretions into water bottle  Mucosal quality:  good  Mandibular function: range of motion impaired  Oral labial function: impaired retraction, impaired pursing  Lingual function: impaired protrusion, impaired retraction, impaired lateralization    Articulators had reduced ROM partially d/t pain    ADDITIONAL EVAL COMPLETED TODAY : yes; rationale: determine objective anatomy and physiology for baseline oropharyngeal functioning prior to chemo and radiation    VIDEOFLUOROSCOPIC SWALLOW STUDY  Radiologist: Dr. Linn  Views Taken: left lateral   Physical location of procedure: [facility]  Patient sitting upright on chair/stool     VFSS textures trialed:   VFSS Eval: Thin Liquids  Mode of Presentation: cup, straw, self-fed   Order of Presentation: 1, 2, 6  Preparatory Phase: WFL  Oral Phase: WFL  Bolus Location When Swallow Initiated: posterior angle of ramus  Pharyngeal Phase: WFL  Rosenbeck's Penetration Aspiration Scale: 2 - contrast enters airway, remains above the vocal cords, no residue remains (penetration)  Diagnostic Statement: No aspiration. Transient shallow penetration. Trace coating of vallecula and pyriforms post-swallow.    VFSS Eval: Purees  Mode of Presentation: spoon, self-fed   Order of Presentation: 3, 4  Preparatory Phase: WFL  Oral Phase: WFL  Bolus Location When Swallow Initiated: posterior angle of ramus  Pharyngeal Phase: WFL  Rosenbeck s Penetration Aspiration Scale: 1 - no aspiration, contrast does not enter airway  Diagnostic Statement: No aspiration or penetration. No residue.    VFSS Eval: Minced & Moist/Soft & Bite Size  Mode of Presentation: fed by clinician   Order of Presentation: 5  Preparatory Phase: WFL, prolonged mastication on R side of small piece d/t pain  Oral Phase: WFL  Bolus Location When Swallow Initiated: valleculae  Pharyngeal Phase: WFL   Rosenbeck s Penetration Aspiration Scale: 1 - no aspiration, contrast does not enter airway  Diagnostic Statement: No aspiration or penetration. No residue.      ESOPHAGEAL PHASE OF SWALLOW  no observed or reported concerns related to esophageal function     SWALLOW ASSESSMENT CLINICAL IMPRESSIONS AND RATIONALE  Diet Consistency Recommendations: thin liquids (level 0), easy to chew (level 7) as tolerated d/t pain and/or discomfort  Recommended Feeding/Eating Techniques: small bolus size, place food on right side of mouth, slow rate of intake, maintain upright sitting position for eating   Medication Administration Recommendations: As tolerated    Assessment & Plan   CLINICAL IMPRESSIONS   Medical Diagnosis: Dysphagia; Malignant neoplasm of paranasal sinus (H)    Treatment Diagnosis: Oropharyngeal Dysphagia   Impression/Assessment: Video Swallow Study completed per MD orders as a baseline swallowing fx prior to chemo and radiation onset. No aspiration with any textures trialed. Patient had transient shallow penetration with sequential straw sips. Oropharyngeal swallow function is WFL. Oral motor functioning notable for overall reduced oral movements d/t pain and limited ROM d/t L intraoral mass on mid-posterior hard palate. Tongue base retraction is WNL. Pharyngeal constriction, hyolaryngeal elevation and excursion were all WNL. Epiglottic inversion is complete. Swallow response is minimally delayed to the vallecula. Mastication is safe and complete. Cricopharyngeal function is WFL. Recommend patient continues his current diet of softer foods and would be appropriate to tolerance Easy to Chew-Minced & Moist solids, as tolerated d/t pain and thin liquids with aspiration precautions (sitting fully upright, small bites/sips, & slow rate). Patient & son trained re: s/s of aspiration, possible aspiration-related complications, impact that chemo and radiation can affect swallowing fx, and safe swallowing precautions. Pt & son reported understanding and agreement.       PLAN OF CARE  Frequency of Treatment: Evaluation only  Duration of Treatment: Evaluation only     Education  Assessment:   Learner/Method: Patient;Family;Listening;Demonstration;Pictures/Video;No Barriers to Learning  Education Comments: Pt & pt's son verbally expressed agreement and understanding of purpose, results, and recommendations from the evaluation. Images and video from the evaluation were provided for visual stimuli along with verbal explanation of anatomy and physiology of swallowing.    Risks and benefits of evaluation/treatment have been explained.   Patient/Family/caregiver agrees with Plan of Care.     Evaluation Time:    SLP Eval: oral/pharyngeal swallow function, clinical minutes (07682): 10  SLP Eval: VideoFluoroscopic Swallow function Minutes (76253): 15     Present: Yes: Language: Hmong via son. Declined vocera .    Signing Clinician: JADIEL Ritter    [facility]                                                                                   OUTPATIENT SPEECH LANGUAGE PATHOLOGY      PLAN OF TREATMENT FOR OUTPATIENT REHABILITATION   Patient's Last Name, First Name, Douglas Gomez    YOB: 1960   Provider's Name   [facility]   Medical Record No.  [id number]     Onset Date: 01/01/24 Start of Care Date: 01/04/24     Medical Diagnosis:  Dysphagia; Malignant neoplasm of paranasal sinus (H)      SLP Treatment Diagnosis: Oropharyngeal Dysphagia  Plan of Treatment  Frequency/Duration: Evaluation only  / Evaluation only     Certification date from 01/04/24   To 01/04/24          See note for plan of treatment details and functional goals     JADIEL Ritter                         I CERTIFY THE NEED FOR THESE SERVICES FURNISHED UNDER        THIS PLAN OF TREATMENT AND WHILE UNDER MY CARE .             Physician Signature               Date    X_____________________________________________________                  Referring Provider:  Tomasa Akers    Initial Assessment  See Epic Evaluation-  01/04/24

## 2024-01-05 ENCOUNTER — TELEPHONE (OUTPATIENT)
Dept: ONCOLOGY | Facility: CLINIC | Age: 64
End: 2024-01-05

## 2024-01-05 ENCOUNTER — VIRTUAL VISIT (OUTPATIENT)
Dept: ONCOLOGY | Facility: CLINIC | Age: 64
End: 2024-01-05
Attending: INTERNAL MEDICINE
Payer: COMMERCIAL

## 2024-01-05 DIAGNOSIS — C31.0 SQUAMOUS CELL CARCINOMA OF MAXILLARY SINUS (H): Primary | ICD-10-CM

## 2024-01-05 RX ORDER — ONDANSETRON 8 MG/1
8 TABLET, FILM COATED ORAL EVERY 8 HOURS PRN
Qty: 30 TABLET | Refills: 2 | Status: SHIPPED | OUTPATIENT
Start: 2024-01-07 | End: 2024-10-03

## 2024-01-05 RX ORDER — PROCHLORPERAZINE MALEATE 10 MG
10 TABLET ORAL EVERY 6 HOURS PRN
Qty: 30 TABLET | Refills: 2 | Status: SHIPPED | OUTPATIENT
Start: 2024-01-07 | End: 2024-10-03

## 2024-01-05 NOTE — PROGRESS NOTES
"The patient has been notified of the following:      \"We have found that certain health care needs can be provided without the need for a face to face visit.  This service lets us provide the care you need with a phone conversation.       I will have full access to your [place] medical record during this entire phone call.   I will be taking notes for your medical record.      Since this is like an office visit, we will bill your insurance company for this service.       There are potential benefits and risks of telephone visits (e.g. limits to patient confidentiality) that differ from in-person visits.?  Confidentiality still applies for telephone services, and nobody will record the visit.  It is important to be in a quiet, private space that is free of distractions (including cell phone or other devices) during the visit.??      If during the course of the call I believe a telephone visit is not appropriate, you will not be charged for this service\"     Consent has been obtained for this service by care team member: Yes       CLINICAL NUTRITION SERVICES - BRIEF NOTE    Douglas Sharon 63 year old referred for MNT related to head/neck cancer     Time Spent: <7 minutes  Visit Type: PHONE   Pt accompanied by: spoke with his sonSalvatore   Referring Physician: Sincere 1/2/24  Z51.11 (ICD-10-CM) - Encounter for antineoplastic chemotherapy   E43 (ICD-10-CM) - Severe protein-calorie malnutrition (H24)   C31.0 (ICD-10-CM) - Carcinoma of maxillary sinus (H)   Additional Information:  nasopharyngeal carcinoma, dysphagia, weight loss, Gtube 1/2/24. Video swallow pending. Please assist with nutritional needs prior to and throughout chemoradiation  .      NUTRITION HISTORY  Factors affecting nutrition intake include:decreased appetite, swallowing difficulties, and difficulty chewing with painful jaw  Current diet/appetite: liquids/soft foods  Chemotherapy: Cisplatin  Radiation: started 1/2/24  Surgery history:   Past " Surgical History:   Procedure Laterality Date    AORTA SURGERY N/A 4/23/2019    Procedure: WITH ASCENDING AORTA REPLACEMENT;  Surgeon: Darlene Graff MD;  Location: [facility] OR;  Service: Cardiovascular    BYPASS GRAFT ARTERY CORONARY      CARDIAC SURGERY      CV CORONARY ANGIOGRAM N/A 3/12/2019    Procedure: Coronary Angiogram;  Surgeon: Hamilton Lucero MD;  Location: [facility];  Service: Cardiology    ENDOSCOPIC SINUS SURGERY Left 11/10/2023    Procedure: Transnasal endoscopic approach to biopsy left nasal mass;  Surgeon: Damon Regan MD;  Location: [facility] OR    GALLBLADDER SURGERY      IR CAROTID CEREBRAL ANGIOGRAM BILATERAL  11/30/2023    IR FACIAL EMBOLIZATION LEFT  9/13/2023    OPTICAL TRACKING SYSTEM ENDOSCOPIC SINUS SURGERY Left 12/1/2023    Procedure: Stealth assisted transnasal endoscopic approach to excise left sinonasal mass;  Surgeon: Damon Regan MD;  Location: [facility] OR       PEG tube: placed 1/2    Diet Recall  Douglas has been taking sips of liquids and bites of soft foods only.    His son tells me that he's getting <500-1000 calories/day.     Patient Medical History  Past Medical History:   Diagnosis Date    Ascending aortic aneurysm (H24)     Coronary artery disease     Depression     Gout     History of anesthesia complications     Hyperlipemia     Hypertension     PONV (postoperative nausea and vomiting)        Current Medications    Current Outpatient Medications:     acetaminophen (TYLENOL) 325 MG tablet, Take 2 tablets (650 mg) by mouth every 4 hours as needed for other or pain, Disp: 50 tablet, Rfl: 0    aspirin 81 MG EC tablet, Take 1 tablet (81 mg) by mouth daily, Disp: 30 tablet, Rfl: 0    atorvastatin (LIPITOR) 80 MG tablet, TAKE 1 TABLET (80 MG TOTAL) BY MOUTH AT BEDTIME/ TXHUA O NOJ 1 LUB TSHUAJ THAUM MUS PW PAB ZOO NTSHAV MUAJ ROJ, Disp: 90 tablet, Rfl: 3    entecavir (BARACLUDE) 0.5 MG tablet, Take 0.5 mg by mouth every  morning Take 1 hour before a meal or 2 hours after a meal., Disp: , Rfl:     gabapentin (NEURONTIN) 300 MG capsule, Take 3 capsules (900 mg) by mouth 3 times daily Taper dose to 900 mg three times daily as per instructions given in Radiation Oncology, Disp: 270 capsule, Rfl: 3    gabapentin (NEURONTIN) 300 MG capsule, Take 1 capsule (300 mg) by mouth At Bedtime (Patient taking differently: Take 300 mg by mouth as needed), Disp: 30 capsule, Rfl: 0    nitroGLYcerin (NITROSTAT) 0.4 MG sublingual tablet, Place 0.4 mg under the tongue every 5 minutes as needed, Disp: , Rfl:     [START ON 1/7/2024] ondansetron (ZOFRAN) 8 MG tablet, Take 1 tablet (8 mg) by mouth every 8 hours as needed for nausea (vomiting), Disp: 30 tablet, Rfl: 2    oxyCODONE (ROXICODONE) 5 MG tablet, Take 1 tablet (5 mg) by mouth every 6 hours as needed for pain, Disp: 12 tablet, Rfl: 0    oxymetazoline (AFRIN) 0.05 % nasal spray, Spray 2 sprays into both nostrils 2 times daily as needed for congestion, Disp: 15 mL, Rfl: 0    [START ON 1/7/2024] prochlorperazine (COMPAZINE) 10 MG tablet, Take 1 tablet (10 mg) by mouth every 6 hours as needed for nausea or vomiting, Disp: 30 tablet, Rfl: 2    senna-docusate (SENOKOT-S/PERICOLACE) 8.6-50 MG tablet, Take 1 tablet by mouth 2 times daily as needed for constipation, Disp: 30 tablet, Rfl: 0    senna-docusate (SENOKOT-S/PERICOLACE) 8.6-50 MG tablet, Take 1 tablet by mouth 2 times daily, Disp: 10 tablet, Rfl: 0    sodium chloride (OCEAN) 0.65 % nasal spray, Spray 2 sprays in nostril daily as needed for congestion, Disp: 88 mL, Rfl: 3    sodium chloride (OCEAN) 0.65 % nasal spray, 2 sprays each nostril four times a day after surgery until followup (Patient taking differently: Spray 2 sprays in nostril daily as needed 2 sprays each nostril four times a day after surgery until followup), Disp: 60 mL, Rfl: 1    traMADol (ULTRAM) 50 MG tablet, Take 1 tablet (50 mg) by mouth every 6 hours as needed for severe pain,  Disp: 20 tablet, Rfl: 0    traZODone (DESYREL) 50 MG tablet, Take 50 mg by mouth nightly as needed, Disp: , Rfl:     Medications have been reviewed.    Pertinent lab results:  Labs:  Electrolytes  Potassium (mmol/L)   Date Value   12/27/2023 4.0   12/03/2023 3.6   12/02/2023 4.4   05/13/2022 4.4   11/16/2021 4.2   05/16/2019 3.8     Potassium POCT (mmol/L)   Date Value   12/01/2023 4.3   12/01/2023 4.0   12/01/2023 3.6    Blood Glucose  Glucose (mg/dL)   Date Value   12/27/2023 111 (H)   12/03/2023 90   12/02/2023 228 (H)   05/13/2022 81   11/16/2021 87   05/16/2019 119   05/08/2019 141 (H)   04/29/2019 108     GLUCOSE BY METER POCT (mg/dL)   Date Value   12/03/2023 95   12/03/2023 90   12/02/2023 113 (H)   12/02/2023 102 (H)   12/02/2023 168 (H)     Hemoglobin A1C (%)   Date Value   04/25/2019 5.2    Inflammatory Markers  WBC Count (10e3/uL)   Date Value   01/02/2024 11.2 (H)   12/27/2023 8.3   12/04/2023 6.6     Albumin (g/dL)   Date Value   12/27/2023 3.6   12/02/2023 2.4 (L)   05/13/2022 3.8   11/16/2021 3.8      Magnesium (mg/dL)   Date Value   05/08/2019 1.8   04/29/2019 2.0   04/28/2019 2.1     Sodium (mmol/L)   Date Value   12/27/2023 132 (L)   12/03/2023 139   12/02/2023 137    Renal  Urea Nitrogen (mg/dL)   Date Value   12/27/2023 13.9   12/03/2023 9.6   12/02/2023 9.9   05/13/2022 17   11/16/2021 12   05/16/2019 13     Creatinine (mg/dL)   Date Value   12/27/2023 0.76   12/03/2023 0.83   12/02/2023 0.76     Additional  Triglycerides (mg/dL)   Date Value   05/13/2022 180 (H)   11/16/2021 139   06/09/2020 101     Ketones Urine (mg/dL)   Date Value   12/27/2023 Negative          Treatment Plan:  Oncology History   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    ANTHROPOMETRICS  Height: 62\"  Weight: 137 lbs/62kg  BMI: 25  Weight Status:  Overweight BMI 25-29.9  IBW: 118 lbs (116%)  Weight History: down 16 lb (11%) x past 2 months  Wt Readings from Last 10 Encounters:   12/22/23 62.1 kg (137 lb)   12/14/23 62.6 kg (138 lb)   12/14/23 62.9 kg (138 lb 11.2 oz)   12/01/23 60.7 kg (133 lb 13.1 oz)   11/28/23 63.9 kg (140 lb 12.8 oz)   11/16/23 64.9 kg (143 lb)   11/11/23 65.2 kg (143 lb 12.8 oz)   10/12/23 69.4 kg (153 lb)   10/04/23 69.2 kg (152 lb 8 oz)   09/14/23 67.3 kg (148 lb 6.4 oz)     Dosing Weight: 62kg    Medications/vitamins/minerals/herbals:   Reviewed    Labs:   Labs reviewed    NUTRITION FOCUSED PHYSICAL ASSESSMENT FOR DIAGNOSING MALNUTRITION:  Consult for education only      ASSESSED NUTRITION NEEDS:  Estimated Energy Needs: 1219-2000 kcals (30-35 Kcal/Kg)  Justification: maintenance and increased needs with CRT  Estimated Protein Needs: 75 grams protein (1.2-1.5 g pro/Kg)  Justification: Repletion and increased needs with CRT  Estimated Fluid Needs: 2200  mL   Justification: increased needs with chemotherapy    MALNUTRITION:  % Weight Loss:  > 7.5% in 3 months (severe malnutrition)  % Intake:  <75% for >/= 3 months (moderate malnutrition)  Subcutaneous Fat Loss:  Not observed  Muscle Loss:  Not observed  Fluid Retention:  Not noted    Malnutrition Diagnosis: Moderate malnutrition  In Context of:  Acute illness or injury    NUTRITION DIAGNOSIS:  Inadequate oral intake related to decreased ability to consume sufficient energy due to side effects of cancer as evidenced by 16 lb (11%) wt loss x past 2 months,  dietary intake <25-50% estimated needs.      INTERVENTIONS  Provided written & verbal education:     - Discussed the role of nutrition during cancer treatment.  Discussed principles of weight maintenance and oral intake recommendations for patients undergoing cancer treatment.  Reviewed the " importance of maintaining current weight (within 5%, not more than 2 lb weight loss each week) during course of treatment  - Discussed nutrition and hydration needs. Encouraged pt to aim for at least 2000kcal and 75g protein, 8 cups non-caffeine containing beverages (water/electrolytes) daily.    - Discussed strategies to help fortify meals and snacks. Encouraged to focus on small, frequent meals.    - Discussed Nutrition shake options:   [employer] Milk: regular whole milk 150 fernando, 13g protein (red jug)  [employer] Nutrition Plan shakes: 150 fernando, 30g protein  Boost Very High Calorie (VHC): 8 oz 530 calories, 22g protein  Boost Plus:  8 oz 350 calories, 15g protein  Ensure Plus: 8 oz 350 calories, 15g protein  Ensure Complete: 11 oz 350 calories, 30g protein  Naked Mass: 24 oz (4 scoops of powder with 3 cups water or milk) 1250 calories, 50g protein  Encouraged utilizing these ONS in home made shakes/smoothies to prevent flavor fatigue.    -Discussed EN Composition, EN Schedule, Feeding Tube Flush.     RECOMMENDATIONS FOR MD/PROVIDER TO ORDER:   Enteral Nutrition   Formula: Osmolite 1.5 fernando  Volume: 6 cartons/day (1422 mL, 1086 ml free water)  Provisions:  2130kcal (35kcal/kg), 90 g protein (1.5g/kg), 288g CHO, 0g fiber      Suggested Tube feeding schedule via gravity bag feedings  Day 1: 1 carton formula TID spread 3-4 hours apart   Day 2: 1 1/2 cartons formula TID spread 3-4 hours apart   Day 3:  2  cartons formula TID spread 3-4 hours apart   Flush with 30-60mL water before and after each feeding  Flush with additional 120 mL water QID for additional hydration and tube patency          Implementation  Collaboration and Referral of Nutrition care - message sent to [facility] with recs and orders indicating that patient is ready to receive formula and supplies.  He will also benefit from in home education with no PLC.     Addendum: Per [facility], coverage has not yet been confirmed and orders have not yet been rounded out due  to awaiting coverage confirmation.  Therefore, formula may not arrive until next business day, Monday, 1/8.   RD explained to Salvatore that he can use Boost shakes or any other high calorie nutriiton shake such as Ensure Plus, Ensure Complete for feeding tube.     Goals  1.  PEG tube to provide 100% estimated nutrition and hydration needs  2. Weight stability/weight gain as able     Follow-Up Plans: Pt has RD contact information for questions.      MONITORING AND EVALUATION:  -Food/beverage intake  -EN intake   -Weight trends    Mayela An RD, , LD  [facility]  [phone]

## 2024-01-05 NOTE — PROGRESS NOTES
[facility] CANCER [facility]    PATIENT NAME: Douglas Herrera  MRN # [id number]   DATE OF VISIT: January 8, 2024  YOB: 1960     [facility]   Radiation Oncology: Dr. Tomasa Akers  Cardiology: Dr. Qamar Hernandez  PCP: Dr. العراقي   Hepatologist: [facility]     CANCER TYPE: SCC sinonasal   STAGE: [id number] (IVB)  ECOG PS: 1    PD-L1:  NGS: internal panel pending     SUMMARY    8/9/23 CT sinus.   8/24/23 MRI sinus. L maxillary sinus mass  9/12/23 ED for epistaxis.   9/13/23 CTA and embolization of L IMAX   10/4/23 Nasal bx. Path: Inverted papilloma with high grade dysplasia  11/10/23 Nasal endoscopy and bx in the OR. C/b severe bleeding. Path: Inverted sinonasal papilloma with severe dysplasia.  11/17/23 MRI. 7.4 x 4.6 x 6.6 cm L sinonasal mass, destruction of nasal turbinates, L maxillary sinus, hard palate, invasion of L  space, involvement of medial and lateral pterygoids and temporalis muscles. Effacement and invasion ipsilateral pterygopalatine fossa, extends and effaces the nasopharynx.   11/30/23 Carotid angiogram. Direct endonasal and transoral embolization L sinonasal tumor, transaterial embolization of the L sphenopalatine artery feeders to the L sinonasal artery tumor   12/1/23 Stealth assisted transnasal endoscopic approach to excise L sinonasal mass. Pre-operative embolization. Path: SCC involving L maxilla.    12/9/23 PET. Hypermetabolic mass centered along the L maxilla, extending superiorly through the floor of the L maxillary sinus, posterior/laterally to the  space, invasion of the pterygoid muscles, extending cranially along the pterygopalatine fossa and pterygoid plates to the skull base level. Erosion of involved bones including L maxilla, maxillary sinus wall and pterygoid plates. Extension medially to the L lateral nasopharyngeal wall and soft palate. 1 mm fat place between carotid and mass. ~4.9 x 4.0 x 5.4 cm (SUV 24.3). Partially resected  since 11/17/23 MRI. 8 mm L 2A node (SUV 5.9), 7 mm L level 2 node (SUV 5.5)    ASSESSMENT AND PLAN  SCC L maxillary sinus, [id number] (IVB): s/p transnasal endoscopic excision of sinonasal mass. He is a poor candidate for TPF induction therefore plan is is for definitive chemoradiation with weekly cisplatin. Note a candidate for HD cisplatin-audiogram 12/18/23 confirms significant bilateral SNHL. Monitor closely for clinical hearing changes in weeks to come, may need to consider alternative carboplatin/paclitaxel if this becomes an issue. Reviewed schedule for chemoradiation and potential side effects of chemotherapy including nausea, hearing changes/tinnitus, renal toxicity and cytopenias. Baseline labs are acceptable to proceed with day 1 cisplatin today.    Risk for malnutrition: G-tube placement 1/2. Working with CARISA Pedro. Anticipate formula may arrive today. Oral intake currently stable with use of pain medication.    RSV: ED 12/27 for fever, cough, facial swelling. RVP positive for RSV. Treated with supportive measures. Symptomatically recovered.    Hypercalcemia: Calcium elevated today at 11. Most likely s/t malignancy. 1L NS prior to infusion. Repeat next week    Hepatitis B: [facility] note from 1/5/23 (attached in chart) reviewed. Unclear if there was a more recent visit Remains on entecavir. Hep B PCR, etc testing pending today.      H/o PE/DVT: 5/8/2019 CTA report scanned into "[employer]", reviewed. Small PEs. Also had LLE DVT, acute, occlusive.  Presented with CP and LLE swelling. Was on rivaroxaban, completed course.     H/o undefined hypothyroidism: TFTs today pending.     Screening for TB: Quantiferon gold needed, did not draw today. Request with next visit.     42 minutes spent on the date of the encounter doing chart review, review of outside records, review of test results, interpretation of tests, patient visit, and documentation     Nishi Michele CNP      SUBJECTIVE  Mr. 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 Professional  used today. Mr. Herrera speaks and understands conversational English.   -RSV symptoms resolved. Denies fever or cough  -Using gabapentin only for pain for last 5 days. With meds, swallowing is bearable. Without medication, pain can be severe  -G-tube placed 1/2. Mild soreness  -Port placement scheduled 1/11  -Denies additional concerns    PAST MEDICAL HISTORY  Sinonasal carcinoma as above  HTN  ASCVD. CABG x 3 2019  PE and LLE DVT after CABG . Treated with rivaroxaban  Dyslipidemia  Hepatitis B   SCC R temple s/p MOHS  Ascending aortic aneurysm repair 2019  Hearing loss L   Gout - feet  Depression  Cholecystectomy    Son Berhane at [phone]    CURRENT OUTPATIENT MEDICATIONS  Current Outpatient Medications   Medication    atorvastatin (LIPITOR) 80 MG tablet    entecavir (BARACLUDE) 0.5 MG tablet    gabapentin (NEURONTIN) 300 MG capsule    sodium chloride (OCEAN) 0.65 % nasal spray    acetaminophen (TYLENOL) 325 MG tablet    aspirin 81 MG EC tablet    gabapentin (NEURONTIN) 300 MG capsule    nitroGLYcerin (NITROSTAT) 0.4 MG sublingual tablet    ondansetron (ZOFRAN) 8 MG tablet    oxyCODONE (ROXICODONE) 5 MG tablet    oxymetazoline (AFRIN) 0.05 % nasal spray    prochlorperazine (COMPAZINE) 10 MG tablet    senna-docusate (SENOKOT-S/PERICOLACE) 8.6-50 MG tablet    senna-docusate (SENOKOT-S/PERICOLACE) 8.6-50 MG tablet    sodium chloride (OCEAN) 0.65 % nasal spray    traMADol (ULTRAM) 50 MG tablet    traZODone (DESYREL) 50 MG tablet     No current facility-administered medications for this visit.     Facility-Administered Medications Ordered in Other Visits   Medication    CISplatin (PLATINOL) 60 mg in sodium chloride 0.9 % 335 mL infusion    fosaprepitant (EMEND) 150 mg, dexAMETHasone (DECADRON) 12 mg in sodium chloride 0.9 % 276.2 mL intermittent infusion     palonosetron (ALOXI) injection 0.25 mg    sodium chloride 0.9% BOLUS 1,000 mL     ALLERGIES  No Known Allergies     SOCIAL HISTORY: Non smoker. No current ETOH. Lives with his children. Immigrated from [place] in the 1980s. Not working. On disability. Used to be a . Lived in [facility] [place]. One daughter and 5 sons. Two grandchildren.      FAMILY HISTORY:   Family History   Problem Relation Age of Onset    Cerebrovascular Disease Mother 90.00    Acute Myocardial Infarction No family hx of      PHYSICAL EXAM  /65 (BP Location: Right arm, Patient Position: Sitting, Cuff Size: Adult Regular)   Pulse 81   Temp 98.8  F (37.1  C) (Oral)   Resp 16   Wt 58.8 kg (129 lb 9.6 oz)   SpO2 97%   BMI 23.70 kg/m      GEN: NAD  HEENT: EOMI, no icterus, injection or pallor. Oropharynx - mild trismus. Visible tumor to R posterior hard palate with area of mucosal erosion.   RESP: cta bilaterally, no wheezes  CV: rrr, no murmur   EXT: no edema  NEURO: alert, oriented, no focal deficits    LABORATORY AND IMAGING STUDIES  Most Recent 3 CBC's:  Recent Labs   Lab Test 01/08/24  0717 01/02/24  0822 12/27/23  1400 12/01/23  0904 11/30/23  1754   WBC 9.3 11.2* 8.3   < > 9.7   HGB 11.6* 11.3* 11.6*   < > 12.3*   MCV 85 86 86   < > 86    356 240   < > 182   ANEUTAUTO 5.8  --  5.5  --  7.7    < > = values in this interval not displayed.    Most Recent 3 BMP's:  Recent Labs   Lab Test 01/08/24  0717 12/27/23  1400 12/03/23  1202 12/03/23  0628 12/02/23  0736 12/02/23  0658 09/12/23  2144 05/13/22  1450    132*  --  139  --  137   < > 141   POTASSIUM 4.1 4.0  --  3.6  --  4.4   < > 4.4   CHLORIDE 104 95*  --  107  --  109*   < > 106   CO2 27 26  --  25  --  24   < > 28   BUN 20.1 13.9  --  9.6  --  9.9   < > 17   CR 0.73 0.76  --  0.83  --  0.76   < > 1.01   ANIONGAP 8 11  --  7  --  4*   < > 7   FLORENCE 11.0* 10.0  --  8.3*  --  7.8*   < > 9.3   * 111* 95 90   < > 228*   < > 81   PROTTOTAL 8.8* 8.3  --   --    --   --   --  6.9   ALBUMIN 3.4* 3.6  --   --   --  2.4*  --  3.8    < > = values in this interval not displayed.    Most Recent 2 LFT's:  Recent Labs   Lab Test 01/08/24 0717 12/27/23  1400   AST 26 86*   ALT 31 80*   ALKPHOS 77 72   BILITOTAL 0.4 1.1    Most Recent TSH and T4:  Recent Labs   Lab Test 01/08/24 0717   TSH 3.35   T4 1.00     Phos/Mag:  Lab Results   Component Value Date    MAG 2.0 01/08/2024    MAG 1.8 05/08/2019    MAG 2.0 04/29/2019      I reviewed the above labs today.

## 2024-01-05 NOTE — LETTER
"    1/5/2024         RE: Douglas Herrera  [address] [place] [address]        Dear Colleague,    Thank you for referring your patient, Douglas Herrera, to the [facility]. Please see a copy of my visit note below.    The patient has been notified of the following:      \"We have found that certain health care needs can be provided without the need for a face to face visit.  This service lets us provide the care you need with a phone conversation.       I will have full access to your [place] medical record during this entire phone call.   I will be taking notes for your medical record.      Since this is like an office visit, we will bill your insurance company for this service.       There are potential benefits and risks of telephone visits (e.g. limits to patient confidentiality) that differ from in-person visits.?  Confidentiality still applies for telephone services, and nobody will record the visit.  It is important to be in a quiet, private space that is free of distractions (including cell phone or other devices) during the visit.??      If during the course of the call I believe a telephone visit is not appropriate, you will not be charged for this service\"     Consent has been obtained for this service by care team member: Yes       CLINICAL NUTRITION SERVICES - BRIEF NOTE    Douglas Herrera 63 year old referred for MNT related to head/neck cancer     Time Spent: <7 minutes  Visit Type: PHONE   Pt accompanied by: spoke with his sonSalvatore   Referring Physician: Sincere 1/2/24  Z51.11 (ICD-10-CM) - Encounter for antineoplastic chemotherapy   E43 (ICD-10-CM) - Severe protein-calorie malnutrition (H24)   C31.0 (ICD-10-CM) - Carcinoma of maxillary sinus (H)   Additional Information:  nasopharyngeal carcinoma, dysphagia, weight loss, Gtube 1/2/24. Video swallow pending. Please assist with nutritional needs prior to and throughout chemoradiation  .      NUTRITION HISTORY  Factors " affecting nutrition intake include:decreased appetite, swallowing difficulties, and difficulty chewing with painful jab  Current diet/appetite: liquids/soft foods  Chemotherapy: Cisplatin  Radiation: started 1/2/24  Surgery history:   Past Surgical History:   Procedure Laterality Date    AORTA SURGERY N/A 4/23/2019    Procedure: WITH ASCENDING AORTA REPLACEMENT;  Surgeon: Darlene Graff MD;  Location: [facility] OR;  Service: Cardiovascular    BYPASS GRAFT ARTERY CORONARY      CARDIAC SURGERY      CV CORONARY ANGIOGRAM N/A 3/12/2019    Procedure: Coronary Angiogram;  Surgeon: Hamilton Lucero MD;  Location: [facility] Cath Lab;  Service: Cardiology    ENDOSCOPIC SINUS SURGERY Left 11/10/2023    Procedure: Transnasal endoscopic approach to biopsy left nasal mass;  Surgeon: Damon Regan MD;  Location: [facility] OR    GALLBLADDER SURGERY      IR CAROTID CEREBRAL ANGIOGRAM BILATERAL  11/30/2023    IR FACIAL EMBOLIZATION LEFT  9/13/2023    OPTICAL TRACKING SYSTEM ENDOSCOPIC SINUS SURGERY Left 12/1/2023    Procedure: Stealth assisted transnasal endoscopic approach to excise left sinonasal mass;  Surgeon: Damon Regan MD;  Location: [facility] OR       PEG tube: placed 1/2    Diet Recall  Douglas has been taking sips of liquids and bites of soft foods only.    His son tells me that he's getting <500-1000 calories/day.     Patient Medical History  Past Medical History:   Diagnosis Date    Ascending aortic aneurysm (H24)     Coronary artery disease     Depression     Gout     History of anesthesia complications     Hyperlipemia     Hypertension     PONV (postoperative nausea and vomiting)        Current Medications    Current Outpatient Medications:     acetaminophen (TYLENOL) 325 MG tablet, Take 2 tablets (650 mg) by mouth every 4 hours as needed for other or pain, Disp: 50 tablet, Rfl: 0    aspirin 81 MG EC tablet, Take 1 tablet (81 mg) by mouth daily, Disp: 30 tablet, Rfl: 0     atorvastatin (LIPITOR) 80 MG tablet, TAKE 1 TABLET (80 MG TOTAL) BY MOUTH AT BEDTIME/ TXHUA O NOJ 1 LUB TSHUAJ THAUM MUS PW PAB ZOO NTSHAV MUAJ ROJ, Disp: 90 tablet, Rfl: 3    entecavir (BARACLUDE) 0.5 MG tablet, Take 0.5 mg by mouth every morning Take 1 hour before a meal or 2 hours after a meal., Disp: , Rfl:     gabapentin (NEURONTIN) 300 MG capsule, Take 3 capsules (900 mg) by mouth 3 times daily Taper dose to 900 mg three times daily as per instructions given in Radiation Oncology, Disp: 270 capsule, Rfl: 3    gabapentin (NEURONTIN) 300 MG capsule, Take 1 capsule (300 mg) by mouth At Bedtime (Patient taking differently: Take 300 mg by mouth as needed), Disp: 30 capsule, Rfl: 0    nitroGLYcerin (NITROSTAT) 0.4 MG sublingual tablet, Place 0.4 mg under the tongue every 5 minutes as needed, Disp: , Rfl:     [START ON 1/7/2024] ondansetron (ZOFRAN) 8 MG tablet, Take 1 tablet (8 mg) by mouth every 8 hours as needed for nausea (vomiting), Disp: 30 tablet, Rfl: 2    oxyCODONE (ROXICODONE) 5 MG tablet, Take 1 tablet (5 mg) by mouth every 6 hours as needed for pain, Disp: 12 tablet, Rfl: 0    oxymetazoline (AFRIN) 0.05 % nasal spray, Spray 2 sprays into both nostrils 2 times daily as needed for congestion, Disp: 15 mL, Rfl: 0    [START ON 1/7/2024] prochlorperazine (COMPAZINE) 10 MG tablet, Take 1 tablet (10 mg) by mouth every 6 hours as needed for nausea or vomiting, Disp: 30 tablet, Rfl: 2    senna-docusate (SENOKOT-S/PERICOLACE) 8.6-50 MG tablet, Take 1 tablet by mouth 2 times daily as needed for constipation, Disp: 30 tablet, Rfl: 0    senna-docusate (SENOKOT-S/PERICOLACE) 8.6-50 MG tablet, Take 1 tablet by mouth 2 times daily, Disp: 10 tablet, Rfl: 0    sodium chloride (OCEAN) 0.65 % nasal spray, Spray 2 sprays in nostril daily as needed for congestion, Disp: 88 mL, Rfl: 3    sodium chloride (OCEAN) 0.65 % nasal spray, 2 sprays each nostril four times a day after surgery until followup (Patient taking differently:  Spray 2 sprays in nostril daily as needed 2 sprays each nostril four times a day after surgery until followup), Disp: 60 mL, Rfl: 1    traMADol (ULTRAM) 50 MG tablet, Take 1 tablet (50 mg) by mouth every 6 hours as needed for severe pain, Disp: 20 tablet, Rfl: 0    traZODone (DESYREL) 50 MG tablet, Take 50 mg by mouth nightly as needed, Disp: , Rfl:     Medications have been reviewed.    Pertinent lab results:  Labs:  Electrolytes  Potassium (mmol/L)   Date Value   12/27/2023 4.0   12/03/2023 3.6   12/02/2023 4.4   05/13/2022 4.4   11/16/2021 4.2   05/16/2019 3.8     Potassium POCT (mmol/L)   Date Value   12/01/2023 4.3   12/01/2023 4.0   12/01/2023 3.6    Blood Glucose  Glucose (mg/dL)   Date Value   12/27/2023 111 (H)   12/03/2023 90   12/02/2023 228 (H)   05/13/2022 81   11/16/2021 87   05/16/2019 119   05/08/2019 141 (H)   04/29/2019 108     GLUCOSE BY METER POCT (mg/dL)   Date Value   12/03/2023 95   12/03/2023 90   12/02/2023 113 (H)   12/02/2023 102 (H)   12/02/2023 168 (H)     Hemoglobin A1C (%)   Date Value   04/25/2019 5.2    Inflammatory Markers  WBC Count (10e3/uL)   Date Value   01/02/2024 11.2 (H)   12/27/2023 8.3   12/04/2023 6.6     Albumin (g/dL)   Date Value   12/27/2023 3.6   12/02/2023 2.4 (L)   05/13/2022 3.8   11/16/2021 3.8      Magnesium (mg/dL)   Date Value   05/08/2019 1.8   04/29/2019 2.0   04/28/2019 2.1     Sodium (mmol/L)   Date Value   12/27/2023 132 (L)   12/03/2023 139   12/02/2023 137    Renal  Urea Nitrogen (mg/dL)   Date Value   12/27/2023 13.9   12/03/2023 9.6   12/02/2023 9.9   05/13/2022 17   11/16/2021 12   05/16/2019 13     Creatinine (mg/dL)   Date Value   12/27/2023 0.76   12/03/2023 0.83   12/02/2023 0.76     Additional  Triglycerides (mg/dL)   Date Value   05/13/2022 180 (H)   11/16/2021 139   06/09/2020 101     Ketones Urine (mg/dL)   Date Value   12/27/2023 Negative          Treatment Plan:  Oncology History   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    ANTHROPOMETRICS  Height: 62\"  Weight: 137 lbs/62kg  BMI: 25  Weight Status:  Overweight BMI 25-29.9  IBW: 118 lbs (116%)  Weight History: down 16 lb (11%) x past 2 months  Wt Readings from Last 10 Encounters:   12/22/23 62.1 kg (137 lb)   12/14/23 62.6 kg (138 lb)   12/14/23 62.9 kg (138 lb 11.2 oz)   12/01/23 60.7 kg (133 lb 13.1 oz)   11/28/23 63.9 kg (140 lb 12.8 oz)   11/16/23 64.9 kg (143 lb)   11/11/23 65.2 kg (143 lb 12.8 oz)   10/12/23 69.4 kg (153 lb)   10/04/23 69.2 kg (152 lb 8 oz)   09/14/23 67.3 kg (148 lb 6.4 oz)     Dosing Weight: 62kg    Medications/vitamins/minerals/herbals:   Reviewed    Labs:   Labs reviewed    NUTRITION FOCUSED PHYSICAL ASSESSMENT FOR DIAGNOSING MALNUTRITION:  Consult for education only      ASSESSED NUTRITION NEEDS:  Estimated Energy Needs: 7960-3987 kcals (30-35 Kcal/Kg)  Justification: maintenance and increased needs with CRT  Estimated Protein Needs: 75 grams protein (1.2-1.5 g pro/Kg)  Justification: Repletion and increased needs with CRT  Estimated Fluid Needs: 2200  mL   Justification: increased needs with chemotherapy    MALNUTRITION:  % Weight Loss:  > 7.5% in 3 months (severe malnutrition)  % Intake:  <75% for >/= 3 months (moderate malnutrition)  Subcutaneous Fat Loss:  Not observed  Muscle Loss:  Not observed  Fluid Retention:  Not noted    Malnutrition Diagnosis: Moderate malnutrition  In Context of:  Acute illness or injury    NUTRITION DIAGNOSIS:  Inadequate oral intake related to decreased ability to consume sufficient energy due to side effects of cancer as evidenced by 16 lb (11%) wt loss x past 2 months,  dietary intake <25-50% estimated needs.  "     INTERVENTIONS  Provided written & verbal education:     - Discussed the role of nutrition during cancer treatment.  Discussed principles of weight maintenance and oral intake recommendations for patients undergoing cancer treatment.  Reviewed the importance of maintaining current weight (within 5%, not more than 2 lb weight loss each week) during course of treatment  - Discussed nutrition and hydration needs. Encouraged pt to aim for at least 2000kcal and 75g protein, 8 cups non-caffeine containing beverages (water/electrolytes) daily.    - Discussed strategies to help fortify meals and snacks. Encouraged to focus on small, frequent meals.    - Discussed Nutrition shake options:   iKONVERSE Milk: regular whole milk 150 fernando, 13g protein (red jug)  iKONVERSE Nutrition Plan shakes: 150 fernando, 30g protein  Boost Very High Calorie (VHC): 8 oz 530 calories, 22g protein  Boost Plus:  8 oz 350 calories, 15g protein  Ensure Plus: 8 oz 350 calories, 15g protein  Ensure Complete: 11 oz 350 calories, 30g protein  Naked Mass: 24 oz (4 scoops of powder with 3 cups water or milk) 1250 calories, 50g protein  Encouraged utilizing these ONS in home made shakes/smoothies to prevent flavor fatigue.    -Discussed EN Composition, EN Schedule, Feeding Tube Flush.     RECOMMENDATIONS FOR MD/PROVIDER TO ORDER:   Enteral Nutrition   Formula: Osmolite 1.5 fernando  Volume: 6 cartons/day (1422 mL, 1086 ml free water)  Provisions:  2130kcal (35kcal/kg), 90 g protein (1.5g/kg), 288g CHO, 0g fiber      Suggested Tube feeding schedule via gravity bag feedings  Day 1: 1 carton formula TID spread 3-4 hours apart   Day 2: 1 1/2 cartons formula TID spread 3-4 hours apart   Day 3:  2  cartons formula TID spread 3-4 hours apart   Flush with 30-60mL water before and after each feeding  Flush with additional 120 mL water QID for additional hydration and tube patency          Implementation  Collaboration and Referral of Nutrition care - message sent to [facility]  RNCC with recs and orders indicating that patient is ready to receive formula and supplies.  He will also benefit from in home education with no PLC.     Goals  1.  PEG tube to provide 100% estimated nutrition and hydration needs  2. Weight stability/weight gain as able     Follow-Up Plans: Pt has RD contact information for questions.      MONITORING AND EVALUATION:  -Food/beverage intake  -EN intake   -Weight trends    Mayela An RD, , LD  [facility]  [phone]

## 2024-01-05 NOTE — TELEPHONE ENCOUNTER
Oncology Nutrition Services:     Called Tulio, patients' son.  Left detailed VM to inform him that [facility] has not yet confirmed benefit coverage for EN formula and supplies, therefore, have not rounded out orders.     Once a benefit check has been confirmed, [facility] will send out formula and supplies within 1-2 business days.     RD explained (via VM as well as during brief consult today) that they can use high calorie shakes such as Boost plus, Ensure plus, Ensure complete etc for his feeding tube. RD explained the difference between Osmolite 1.5 and ONS, which is primarily unflavored and flavored. Encouraged to take 2-3 shakes via PEG tube throughout the weekend prn.     RD has follow-up scheduled for 1/9 in person at the time of his next OTV.   Will review EN formula, Flushes, MOA etc.     Mayela An RD, , LD  [facility]  [phone]

## 2024-01-08 ENCOUNTER — DOCUMENTATION ONLY (OUTPATIENT)
Dept: ONCOLOGY | Facility: CLINIC | Age: 64
End: 2024-01-08

## 2024-01-08 ENCOUNTER — INFUSION THERAPY VISIT (OUTPATIENT)
Dept: ONCOLOGY | Facility: CLINIC | Age: 64
End: 2024-01-08
Attending: INTERNAL MEDICINE
Payer: COMMERCIAL

## 2024-01-08 ENCOUNTER — THERAPY VISIT (OUTPATIENT)
Dept: SPEECH THERAPY | Facility: CLINIC | Age: 64
End: 2024-01-08
Payer: COMMERCIAL

## 2024-01-08 ENCOUNTER — ONCOLOGY VISIT (OUTPATIENT)
Dept: ONCOLOGY | Facility: CLINIC | Age: 64
End: 2024-01-08
Attending: REGISTERED NURSE
Payer: COMMERCIAL

## 2024-01-08 ENCOUNTER — APPOINTMENT (OUTPATIENT)
Dept: LAB | Facility: CLINIC | Age: 64
End: 2024-01-08
Attending: TRANSPLANT SURGERY
Payer: COMMERCIAL

## 2024-01-08 ENCOUNTER — ALLIED HEALTH/NURSE VISIT (OUTPATIENT)
Dept: RADIATION ONCOLOGY | Facility: CLINIC | Age: 64
End: 2024-01-08
Attending: RADIOLOGY
Payer: COMMERCIAL

## 2024-01-08 VITALS
HEART RATE: 81 BPM | WEIGHT: 129.6 LBS | SYSTOLIC BLOOD PRESSURE: 100 MMHG | OXYGEN SATURATION: 97 % | TEMPERATURE: 98.8 F | DIASTOLIC BLOOD PRESSURE: 65 MMHG | BODY MASS INDEX: 23.7 KG/M2 | RESPIRATION RATE: 16 BRPM

## 2024-01-08 VITALS — HEIGHT: 63 IN | BODY MASS INDEX: 23.25 KG/M2

## 2024-01-08 DIAGNOSIS — B19.10 HEPATITIS B INFECTION WITHOUT DELTA AGENT WITHOUT HEPATIC COMA, UNSPECIFIED CHRONICITY: ICD-10-CM

## 2024-01-08 DIAGNOSIS — Z13.29 SCREENING FOR HYPOTHYROIDISM: ICD-10-CM

## 2024-01-08 DIAGNOSIS — R13.12 OROPHARYNGEAL DYSPHAGIA: ICD-10-CM

## 2024-01-08 DIAGNOSIS — E83.42 HYPOMAGNESEMIA: Primary | ICD-10-CM

## 2024-01-08 DIAGNOSIS — H90.3 ASYMMETRICAL SENSORINEURAL HEARING LOSS: ICD-10-CM

## 2024-01-08 DIAGNOSIS — C31.9 MALIGNANT NEOPLASM OF PARANASAL SINUS (H): Primary | ICD-10-CM

## 2024-01-08 DIAGNOSIS — C31.0 SQUAMOUS CELL CARCINOMA OF MAXILLARY SINUS (H): ICD-10-CM

## 2024-01-08 DIAGNOSIS — Z11.59 NEED FOR HEPATITIS C SCREENING TEST: ICD-10-CM

## 2024-01-08 DIAGNOSIS — C31.0 SQUAMOUS CELL CARCINOMA OF MAXILLARY SINUS (H): Primary | ICD-10-CM

## 2024-01-08 DIAGNOSIS — Z91.89 AT RISK FOR MALNUTRITION: ICD-10-CM

## 2024-01-08 DIAGNOSIS — E83.52 HYPERCALCEMIA: ICD-10-CM

## 2024-01-08 LAB
ALBUMIN SERPL BCG-MCNC: 3.4 G/DL (ref 3.5–5.2)
ALP SERPL-CCNC: 77 U/L (ref 40–150)
ALT SERPL W P-5'-P-CCNC: 31 U/L (ref 0–70)
ANION GAP SERPL CALCULATED.3IONS-SCNC: 8 MMOL/L (ref 7–15)
AST SERPL W P-5'-P-CCNC: 26 U/L (ref 0–45)
BASOPHILS # BLD AUTO: 0.1 10E3/UL (ref 0–0.2)
BASOPHILS NFR BLD AUTO: 1 %
BILIRUB SERPL-MCNC: 0.4 MG/DL
BUN SERPL-MCNC: 20.1 MG/DL (ref 8–23)
CALCIUM SERPL-MCNC: 11 MG/DL (ref 8.8–10.2)
CHLORIDE SERPL-SCNC: 104 MMOL/L (ref 98–107)
CREAT SERPL-MCNC: 0.73 MG/DL (ref 0.67–1.17)
DEPRECATED HCO3 PLAS-SCNC: 27 MMOL/L (ref 22–29)
EGFRCR SERPLBLD CKD-EPI 2021: >90 ML/MIN/1.73M2
EOSINOPHIL # BLD AUTO: 0.1 10E3/UL (ref 0–0.7)
EOSINOPHIL NFR BLD AUTO: 1 %
ERYTHROCYTE [DISTWIDTH] IN BLOOD BY AUTOMATED COUNT: 13.8 % (ref 10–15)
GLUCOSE SERPL-MCNC: 112 MG/DL (ref 70–99)
HBV CORE AB SERPL QL IA: REACTIVE
HBV SURFACE AB SERPL IA-ACNC: <3.5 M[IU]/ML
HBV SURFACE AB SERPL IA-ACNC: NONREACTIVE M[IU]/ML
HBV SURFACE AG SERPL QL IA: REACTIVE
HCT VFR BLD AUTO: 36.5 % (ref 40–53)
HCV AB SERPL QL IA: NONREACTIVE
HGB BLD-MCNC: 11.6 G/DL (ref 13.3–17.7)
IMM GRANULOCYTES # BLD: 0.1 10E3/UL
IMM GRANULOCYTES NFR BLD: 1 %
LYMPHOCYTES # BLD AUTO: 2.5 10E3/UL (ref 0.8–5.3)
LYMPHOCYTES NFR BLD AUTO: 27 %
MAGNESIUM SERPL-MCNC: 2 MG/DL (ref 1.7–2.3)
MCH RBC QN AUTO: 27 PG (ref 26.5–33)
MCHC RBC AUTO-ENTMCNC: 31.8 G/DL (ref 31.5–36.5)
MCV RBC AUTO: 85 FL (ref 78–100)
MONOCYTES # BLD AUTO: 0.8 10E3/UL (ref 0–1.3)
MONOCYTES NFR BLD AUTO: 8 %
NEUTROPHILS # BLD AUTO: 5.8 10E3/UL (ref 1.6–8.3)
NEUTROPHILS NFR BLD AUTO: 62 %
NRBC # BLD AUTO: 0 10E3/UL
NRBC BLD AUTO-RTO: 0 /100
PLATELET # BLD AUTO: 345 10E3/UL (ref 150–450)
POTASSIUM SERPL-SCNC: 4.1 MMOL/L (ref 3.4–5.3)
PROT SERPL-MCNC: 8.8 G/DL (ref 6.4–8.3)
PTH-INTACT SERPL-MCNC: 7 PG/ML (ref 15–65)
RBC # BLD AUTO: 4.3 10E6/UL (ref 4.4–5.9)
SODIUM SERPL-SCNC: 139 MMOL/L (ref 135–145)
T4 FREE SERPL-MCNC: 1 NG/DL (ref 0.9–1.7)
TSH SERPL DL<=0.005 MIU/L-ACNC: 3.35 UIU/ML (ref 0.3–4.2)
WBC # BLD AUTO: 9.3 10E3/UL (ref 4–11)

## 2024-01-08 PROCEDURE — 82040 ASSAY OF SERUM ALBUMIN: CPT | Performed by: INTERNAL MEDICINE

## 2024-01-08 PROCEDURE — 96375 TX/PRO/DX INJ NEW DRUG ADDON: CPT

## 2024-01-08 PROCEDURE — 77332 RADIATION TREATMENT AID(S): CPT | Mod: XU | Performed by: RADIOLOGY

## 2024-01-08 PROCEDURE — 85025 COMPLETE CBC W/AUTO DIFF WBC: CPT | Performed by: INTERNAL MEDICINE

## 2024-01-08 PROCEDURE — 258N000003 HC RX IP 258 OP 636: Performed by: INTERNAL MEDICINE

## 2024-01-08 PROCEDURE — 77386 HC IMRT TREATMENT DELIVERY, COMPLEX: CPT | Performed by: RADIOLOGY

## 2024-01-08 PROCEDURE — 250N000011 HC RX IP 250 OP 636: Performed by: INTERNAL MEDICINE

## 2024-01-08 PROCEDURE — 83735 ASSAY OF MAGNESIUM: CPT | Performed by: INTERNAL MEDICINE

## 2024-01-08 PROCEDURE — 84443 ASSAY THYROID STIM HORMONE: CPT | Performed by: REGISTERED NURSE

## 2024-01-08 PROCEDURE — 36415 COLL VENOUS BLD VENIPUNCTURE: CPT

## 2024-01-08 PROCEDURE — 77014 PR CT GUIDE FOR PLACEMENT RADIATION THERAPY FIELDS: CPT | Mod: 26 | Performed by: RADIOLOGY

## 2024-01-08 PROCEDURE — 83970 ASSAY OF PARATHORMONE: CPT

## 2024-01-08 PROCEDURE — 92610 EVALUATE SWALLOWING FUNCTION: CPT | Mod: GN | Performed by: SPEECH-LANGUAGE PATHOLOGIST

## 2024-01-08 PROCEDURE — 86706 HEP B SURFACE ANTIBODY: CPT | Performed by: REGISTERED NURSE

## 2024-01-08 PROCEDURE — 96361 HYDRATE IV INFUSION ADD-ON: CPT

## 2024-01-08 PROCEDURE — 36415 COLL VENOUS BLD VENIPUNCTURE: CPT | Performed by: INTERNAL MEDICINE

## 2024-01-08 PROCEDURE — 77332 RADIATION TREATMENT AID(S): CPT | Mod: 26 | Performed by: RADIOLOGY

## 2024-01-08 PROCEDURE — 99215 OFFICE O/P EST HI 40 MIN: CPT | Performed by: REGISTERED NURSE

## 2024-01-08 PROCEDURE — 96413 CHEMO IV INFUSION 1 HR: CPT

## 2024-01-08 PROCEDURE — 96367 TX/PROPH/DG ADDL SEQ IV INF: CPT

## 2024-01-08 PROCEDURE — 82374 ASSAY BLOOD CARBON DIOXIDE: CPT | Performed by: INTERNAL MEDICINE

## 2024-01-08 PROCEDURE — 86803 HEPATITIS C AB TEST: CPT | Performed by: REGISTERED NURSE

## 2024-01-08 PROCEDURE — 87340 HEPATITIS B SURFACE AG IA: CPT | Performed by: REGISTERED NURSE

## 2024-01-08 PROCEDURE — G0463 HOSPITAL OUTPT CLINIC VISIT: HCPCS | Mod: 25 | Performed by: REGISTERED NURSE

## 2024-01-08 PROCEDURE — 86704 HEP B CORE ANTIBODY TOTAL: CPT | Performed by: REGISTERED NURSE

## 2024-01-08 PROCEDURE — 84439 ASSAY OF FREE THYROXINE: CPT | Performed by: REGISTERED NURSE

## 2024-01-08 RX ORDER — CHLORHEXIDINE GLUCONATE ORAL RINSE 1.2 MG/ML
SOLUTION DENTAL
COMMUNITY
Start: 2024-01-05 | End: 2024-05-30

## 2024-01-08 RX ORDER — CAPSAICIN 0.025 %
CREAM (GRAM) TOPICAL 3 TIMES DAILY PRN
COMMUNITY

## 2024-01-08 RX ORDER — PALONOSETRON 0.05 MG/ML
0.25 INJECTION, SOLUTION INTRAVENOUS ONCE
Status: COMPLETED | OUTPATIENT
Start: 2024-01-08 | End: 2024-01-08

## 2024-01-08 RX ORDER — FLUTICASONE PROPIONATE 50 MCG
2 SPRAY, SUSPENSION (ML) NASAL PRN
COMMUNITY
Start: 2023-07-25

## 2024-01-08 RX ADMIN — DEXAMETHASONE SODIUM PHOSPHATE: 10 INJECTION, SOLUTION INTRAMUSCULAR; INTRAVENOUS at 09:21

## 2024-01-08 RX ADMIN — SODIUM CHLORIDE 1000 ML: 9 INJECTION, SOLUTION INTRAVENOUS at 08:29

## 2024-01-08 RX ADMIN — CISPLATIN 60 MG: 1 INJECTION, SOLUTION INTRAVENOUS at 09:59

## 2024-01-08 RX ADMIN — PALONOSETRON HYDROCHLORIDE 0.25 MG: 0.25 INJECTION INTRAVENOUS at 09:19

## 2024-01-08 ASSESSMENT — PAIN SCALES - GENERAL: PAINLEVEL: NO PAIN (0)

## 2024-01-08 NOTE — NURSING NOTE
"Oncology Rooming Note    January 8, 2024 7:35 AM   Douglas Herrera is a 63 year old male who presents for:    Chief Complaint   Patient presents with    Blood Draw     Labs drawn via PIV placed by Vascular Access Team in lab    Oncology Clinic Visit     Squamous cell carcinoma of maxillary sinus     Initial Vitals: /65 (BP Location: Right arm, Patient Position: Sitting, Cuff Size: Adult Regular)   Pulse 81   Temp 98.8  F (37.1  C) (Oral)   Resp 16   Wt 58.8 kg (129 lb 9.6 oz)   SpO2 97%   BMI 23.70 kg/m   Estimated body mass index is 23.7 kg/m  as calculated from the following:    Height as of 12/14/23: 1.575 m (5' 2.01\").    Weight as of this encounter: 58.8 kg (129 lb 9.6 oz). Body surface area is 1.6 meters squared.  No Pain (0) Comment: Data Unavailable   No LMP for male patient.  Allergies reviewed: Yes  Medications reviewed: Yes    Medications: MEDICATION REFILLS NEEDED TODAY. Provider was notified.  Pharmacy name entered into [facility]: [facility] - [place], [place] - [facility] [address]    Frailty Screening:   Is the patient here for a new oncology consult visit in cancer care? 2. No      Clinical concerns: Refills for senlance.       Deneen Garcia              "

## 2024-01-08 NOTE — NURSING NOTE
Chief Complaint   Patient presents with    Blood Draw     Labs drawn via PIV placed by Vascular Access Team in lab     Labs drawn from PIV placed by Vascular Access Team. Line flushed with saline. Vitals taken. Pt checked in for appointment(s).     Carito Vera RN

## 2024-01-08 NOTE — PATIENT INSTRUCTIONS
Servando Triage and after hours / weekends / holidays:  [phone]    Please call the triage or after hours line if you experience a temperature greater than or equal to 100.4, shaking chills, have uncontrolled nausea, vomiting and/or diarrhea, dizziness, shortness of breath, chest pain, bleeding, unexplained bruising, or if you have any other new/concerning symptoms, questions or concerns.      If you are having any concerning symptoms or wish to speak to a provider before your next infusion visit, please call triage to notify them so we can adequately serve you.     If you need a refill on a narcotic prescription or other medication, please call before your infusion appointment.       Anti-Nausea Medications   Compazine  - You may take this every 6 hours as needed. Side effects can be drowsiness.   Zofran - You may take this every 8 hours as needed. Do not take for 3 days because you received Aloxi today for nausea. Side effects can be constipation and/or headache               January 2024 Sunday Monday Tuesday Wednesday Thursday Friday Saturday        1     2    Admission   6:55 AM   Kayy Post MD   [facility] [address] [place] [address]   (Discharge: 1/2/2024)    PROCEDURE - 7 HR   7:15 AM   (450 min.)   U2A ROOM 16   [facility] [address] [place] [address]    IR G TUBE PERCUTANEOUS PLCMNT   7:30 AM   (120 min.)   [id number]   [facility] Interventional Radiology 3    [place] PLANNING BILLING ONLY   7:00 AM   (30 min.)   Tomasa Akers MD   [facility] Radiation Oncology     PHONE   9:45 AM   (25 min.)   Shilpi Ahumada   [facility]  Services 4    VIDEO SWALLOW STUDY   9:15 AM   (60 min.)   Luba Yang SLP   [facility]    XR VIDEO SWALLOW W SLP OR OT   9:15 AM   (90 min.)   [id number]   [facility] Diagnostic Imaging 5    NEW NUTRITION   1:15 PM   (60 min.)   Mayela Blackwell  CARISA AGUSTIN   [facility] 6       7     8    LAB PERIPHERAL   6:45 AM   (15 min.)   [facility] DRAW   [facility]    RETURN ACTIVE TREATMENT   7:00 AM   (60 min.)   Nishi Michele CNP   [facility]    ONC INFUSION 2.5 HR (150 MIN)   8:30 AM   (150 min.)   [facility] ONC INFUSION NURSE   [facility]    SWALLOW TREATMENT   9:00 AM   (60 min.)   Kayleen Kilgore SLP   [facility]    TREATMENT  11:45 AM   (60 min.)   [facility] RAD ONC ALEX   [facility] Radiation Oncology 9    [facility] NUTRITION VISIT  11:45 AM   (30 min.)   Mayela Blackwell RD   [facility] Radiation Oncology    TREATMENT  11:45 AM   (30 min.)   [facility] RAD ONC ALEX   [facility] Radiation Oncology    OTV  12:15 PM   (30 min.)   Raymon Donovan MD   [facility] Radiation Oncology 10    TREATMENT  11:45 AM   (30 min.)   [facility] RAD ONC ALEX   [facility] Radiation Oncology 11    IR CHEST PORT PLACEMENT >5 YRS   7:30 AM   (60 min.)   [id number]   [facility] [place]    INSERTION, VASCULAR ACCESS PORT   9:00 AM   Tyrel Silvestre MD   [facility]    Outpatient Visit   9:00 AM   [facility]    TREATMENT  11:45 AM   (30 min.)   [facility] RAD ONC ALEX   [facility] Radiation Oncology 12    TREATMENT  11:45 AM   (30 min.)   [facility] RAD ONC ALEX   [facility] Radiation Oncology 13       14     15    LAB PERIPHERAL   6:45 AM   (15 min.)   [facility]   [facility]    RETURN ACTIVE TREATMENT   7:00 AM   (45 min.)   Nishi Michele CNP   [facility]    ONC INFUSION 2.5 HR (150 MIN)   8:30 AM   (150 min.)   UC ONC INFUSION NURSE   [facility]    SWALLOW TREATMENT   9:00 AM   (45 min.)   Kayleen Kilgore SLP   [facility]  [facility]    TREATMENT  11:45 AM   (30 min.)   [facility] RAD ONC ALEX   [facility] Radiation Oncology 16    OTV  11:45 AM   (15 min.)   Harshil Clay MD   [facility] Radiation Oncology    TREATMENT  11:45 AM   (30 min.)   [facility] RAD ONC ALEX   [facility] Radiation Oncology 17    TREATMENT  11:45 AM   (30 min.)   [facility] RAD ONC ALEX   [facility] Radiation Oncology 18    TREATMENT  11:45 AM   (30 min.)   [facility] RAD ONC ALEX   [facility] Radiation Oncology 19    TREATMENT  11:45 AM   (30 min.)   [facility] RAD ONC ALEX   [facility] Radiation Oncology 20       21     22    TREATMENT  11:45 AM   (30 min.)   [facility] RAD ONC ALEX   [facility] Radiation Oncology 23    LAB PERIPHERAL   6:30 AM   (15 min.)   [facility]   [facility]    RETURN ACTIVE TREATMENT   6:45 AM   (45 min.)   Nishi Michele CNP   [facility]    ONC INFUSION 2.5 HR (150 MIN)   8:30 AM   (150 min.)   [facility] ONC INFUSION NURSE   [facility]    SWALLOW TREATMENT   9:00 AM   (45 min.)   Waleska Blankenship SLP   [facility]    OTV  11:45 AM   (30 min.)   Tomasa Akers MD   [facility] Radiation Oncology    TREATMENT  11:45 AM   (30 min.)   [facility] RAD ONC ALEX   [facility] Radiation Oncology 24    TREATMENT  11:45 AM   (30 min.)   [facility] RAD ONC ALEX   [facility] Radiation Oncology 25    TREATMENT  11:45 AM   (30 min.)   [facility] RAD ONC ALEX   [facility] Radiation Oncology 26    TREATMENT  11:45 AM   (30 min.)   [facility] RAD ONC ALEX   [facility] Radiation Oncology 27       28     29    TREATMENT  11:45 AM   (30 min.)   [facility] RAD ONC ALEX   [facility] Radiation Oncology 30    LAB PERIPHERAL   6:30 AM   (15 min.)   [facility]   [facility]    RETURN ACTIVE TREATMENT   6:45 AM   (45 min.)   Nishi Michele CNP   [facility]    ONC INFUSION 2.5 HR (150 MIN)   8:30 AM   (150 min.)   UC ONC INFUSION NURSE   [facility]    OTV  11:45 AM   (30 min.)   Tomasa Akers MD   [facility] Radiation Oncology    TREATMENT  11:45 AM   (30 min.)   [facility] RAD ONC ALEX   [facility] Radiation Oncology 31    TREATMENT  11:45 AM   (30 min.)   [facility] RAD ONC ALEX   [facility] Radiation Oncology                            February 2024 Sunday Monday Tuesday Wednesday Thursday Friday Saturday                       1    TREATMENT  11:45 AM   (30 min.)   [facility] RAD ONC ALEX   [facility] Radiation Oncology 2    TREATMENT  11:45 AM   (30 min.)   [facility] RAD ONC ALEX   [facility] Radiation Oncology 3       4     5    LAB PERIPHERAL   6:45 AM   (15 min.)   [facility]   [facility]    RETURN ACTIVE TREATMENT   7:00 AM   (45 min.)   Nishi Michele CNP   [facility]    ONC INFUSION 2.5 HR (150 MIN)   8:30 AM   (150 min.)   UC ONC INFUSION NURSE   [facility]    SWALLOW TREATMENT   9:00 AM   (45 min.)   Kayleen Kilgore SLP   [facility] [place]    TREATMENT  11:45 AM   (30 min.)   [facility] RAD ONC ALEX   [facility] Radiation Oncology 6    OTV  11:45 AM   (30 min.)   Tomasa Akers MD   [facility] Radiation Oncology    TREATMENT  11:45 AM   (30 min.)   [facility] RAD ONC ALEX   [facility] Radiation Oncology 7    TREATMENT  11:45 AM   (30 min.)   [facility] RAD ONC ALEX   [facility] Radiation Oncology 8    TREATMENT  11:45 AM   (30 min.)   [facility] RAD ONC ALEX   [facility] Radiation Oncology 9    TREATMENT  11:45 AM   (30 min.)   P RAD ONC ALEX   [facility]  [facility] Radiation Oncology 10       11     12    LAB PERIPHERAL   6:45 AM   (15 min.)   [facility]   [facility]    RETURN ACTIVE TREATMENT   7:00 AM   (45 min.)   Nishi Michele CNP   [facility]    ONC INFUSION 2.5 HR (150 MIN)   8:30 AM   (150 min.)   UC ONC INFUSION NURSE   [facility]    SWALLOW TREATMENT   9:00 AM   (45 min.)   Kayleen Kilgore SLP   [facility]    TREATMENT  11:45 AM   (30 min.)   [facility] RAD ONC ALEX   [facility] Radiation Oncology 13    OTV  11:45 AM   (30 min.)   Tomasa Akers MD   [facility] Radiation Oncology    TREATMENT  11:45 AM   (30 min.)   [facility] RAD ONC ALEX   [facility] Radiation Oncology 14    TREATMENT  10:45 AM   (45 min.)   [facility] RAD ONC ALEX   [facility] Radiation Oncology 15    RETURN CCSL  11:00 AM   (30 min.)   Kayy Post MD   [facility]    TREATMENT  11:45 AM   (30 min.)   [facility] RAD ONC ALEX   [facility] Radiation Oncology 16    TREATMENT  11:45 AM   (30 min.)   [facility] RAD ONC ALEX   [facility] Radiation Oncology 17       18     19    TREATMENT  11:45 AM   (30 min.)   [facility] RAD ONC ALEX   [facility] Radiation Oncology 20    OTV  11:45 AM   (30 min.)   Tomasa Akers MD   [facility] Radiation Oncology    TREATMENT  11:45 AM   (30 min.)   [facility] RAD ONC ALEX   [facility] Radiation Oncology 21    TREATMENT  11:45 AM   (30 min.)   [facility] RAD ONC ALEX   [facility] Radiation Oncology 22    TREATMENT  11:45 AM   (30 min.)   [facility] RAD ONC ALEX   [facility] Radiation Oncology 23    TREATMENT  11:45 AM   (30 min.)   [facility] RAD ONC ALEX   [facility] Radiation Oncology 24       25     26     27     28     29                               Lab Results:  Recent Results (from the past  12 hour(s))   Comprehensive metabolic panel    Collection Time: 01/08/24  7:17 AM   Result Value Ref Range    Sodium 139 135 - 145 mmol/L    Potassium 4.1 3.4 - 5.3 mmol/L    Carbon Dioxide (CO2) 27 22 - 29 mmol/L    Anion Gap 8 7 - 15 mmol/L    Urea Nitrogen 20.1 8.0 - 23.0 mg/dL    Creatinine 0.73 0.67 - 1.17 mg/dL    GFR Estimate >90 >60 mL/min/1.73m2    Calcium 11.0 (H) 8.8 - 10.2 mg/dL    Chloride 104 98 - 107 mmol/L    Glucose 112 (H) 70 - 99 mg/dL    Alkaline Phosphatase 77 40 - 150 U/L    AST 26 0 - 45 U/L    ALT 31 0 - 70 U/L    Protein Total 8.8 (H) 6.4 - 8.3 g/dL    Albumin 3.4 (L) 3.5 - 5.2 g/dL    Bilirubin Total 0.4 <=1.2 mg/dL   Magnesium    Collection Time: 01/08/24  7:17 AM   Result Value Ref Range    Magnesium 2.0 1.7 - 2.3 mg/dL   T4 free    Collection Time: 01/08/24  7:17 AM   Result Value Ref Range    Free T4 1.00 0.90 - 1.70 ng/dL   TSH    Collection Time: 01/08/24  7:17 AM   Result Value Ref Range    TSH 3.35 0.30 - 4.20 uIU/mL   CBC with platelets and differential    Collection Time: 01/08/24  7:17 AM   Result Value Ref Range    WBC Count 9.3 4.0 - 11.0 10e3/uL    RBC Count 4.30 (L) 4.40 - 5.90 10e6/uL    Hemoglobin 11.6 (L) 13.3 - 17.7 g/dL    Hematocrit 36.5 (L) 40.0 - 53.0 %    MCV 85 78 - 100 fL    MCH 27.0 26.5 - 33.0 pg    MCHC 31.8 31.5 - 36.5 g/dL    RDW 13.8 10.0 - 15.0 %    Platelet Count 345 150 - 450 10e3/uL    % Neutrophils 62 %    % Lymphocytes 27 %    % Monocytes 8 %    % Eosinophils 1 %    % Basophils 1 %    % Immature Granulocytes 1 %    NRBCs per 100 WBC 0 <1 /100    Absolute Neutrophils 5.8 1.6 - 8.3 10e3/uL    Absolute Lymphocytes 2.5 0.8 - 5.3 10e3/uL    Absolute Monocytes 0.8 0.0 - 1.3 10e3/uL    Absolute Eosinophils 0.1 0.0 - 0.7 10e3/uL    Absolute Basophils 0.1 0.0 - 0.2 10e3/uL    Absolute Immature Granulocytes 0.1 <=0.4 10e3/uL    Absolute NRBCs 0.0 10e3/uL

## 2024-01-08 NOTE — Clinical Note
1/8/2024         RE: Douglas Herrera  [address] [place] [address]        Dear Colleague,    Thank you for referring your patient, Douglas Herrera, to the [facility]. Please see a copy of my visit note below.       [facility]    PATIENT NAME: Douglas Herrera  MRN # [id number]   DATE OF VISIT: January 8, 2024  YOB: 1960     [facility]   Radiation Oncology: Dr. Tomasa Akers  Cardiology: Dr. Qamar Hernandez  PCP: Dr. 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   8/24/23 MRI sinus. L maxillary sinus mass  9/12/23 ED for epistaxis.   9/13/23 CTA and embolization of L IMAX   10/4/23 Nasal bx. Path: Inverted papilloma with high grade dysplasia  11/10/23 Nasal endoscopy and bx in the OR. C/b severe bleeding. Path: Inverted sinonasal papilloma with severe dysplasia.  11/17/23 MRI. 7.4 x 4.6 x 6.6 cm L sinonasal mass, destruction of nasal turbinates, L maxillary sinus, hard palate, invasion of L  space, involvement of medial and lateral pterygoids and temporalis muscles. Effacement and invasion ipsilateral pterygopalatine fossa, extends and effaces the nasopharynx.   11/30/23 Carotid angiogram. Direct endonasal and transoral embolization L sinonasal tumor, transaterial embolization of the L sphenopalatine artery feeders to the L sinonasal artery tumor   12/1/23 Stealth assisted transnasal endoscopic approach to excise L sinonasal mass. Pre-operative embolization. Path: SCC involving L maxilla.    12/9/23 PET. Hypermetabolic mass centered along the L maxilla, extending superiorly through the floor of the L maxillary sinus, posterior/laterally to the  space, invasion of the pterygoid muscles, extending cranially along the pterygopalatine fossa and pterygoid plates to the skull base level. Erosion  of involved bones including L maxilla, maxillary sinus wall and pterygoid plates. Extension medially to the L lateral nasopharyngeal wall and soft palate. 1 mm fat place between carotid and mass. ~4.9 x 4.0 x 5.4 cm (SUV 24.3). Partially resected since 11/17/23 MRI. 8 mm L 2A node (SUV 5.9), 7 mm L level 2 node (SUV 5.5)    ASSESSMENT AND PLAN  SCC L maxillary sinus, [id number] (IVB): s/p transnasal endoscopic excision of sinonasal mass. He is a poor candidate for TPF induction therefore plan is is for definitive chemoradiation with weekly cisplatin. Note a candidate for HD cisplatin-audiogram 12/18/23 confirms significant bilateral SNHL. Monitor closely for clinical hearing changes in weeks to come, may need to consider alternative carboplatin/paclitaxel if this becomes an issue. Reviewed schedule for chemoradiation and potential side effects of chemotherapy including nausea, hearing changes/tinnitus, renal toxicity and cytopenias. Baseline labs are acceptable to proceed with day 1 cisplatin today.    Risk for malnutrition: G-tube placement 1/2. Working with CARISA Pedro. Anticipate formula may arrive today. Oral intake currently stable with use of pain medication.    RSV: ED 12/27 for fever, cough, facial swelling. RVP positive for RSV. Treated with supportive measures. Symptomatically recovered.    Hypercalcemia: Calcium elevated today at 11. Most likely s/t malignancy. 1L NS prior to infusion. Repeat next week    Hepatitis B: [facility] note from 1/5/23 (attached in chart) reviewed. Unclear if there was a more recent visit Remains on entecavir. Hep B PCR, etc testing pending today.      H/o PE/DVT: 5/8/2019 CTA report scanned into [facility], reviewed. Small PEs. Also had LLE DVT, acute, occlusive.  Presented with CP and LLE swelling. Was on rivaroxaban, completed course.     H/o undefined hypothyroidism: TFTs today pending.     Screening for TB: Quantiferon gold needed, did not draw today. Request with next visit.     42  minutes spent on the date of the encounter doing chart review, review of outside records, review of test results, interpretation of tests, patient visit, and documentation     Nishi Michele CNP      SUBJECTIVE  Mr. 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 Professional  used today. Mr. Herrera speaks and understands conversational English.   -RSV symptoms resolved. Denies fever or cough  -Using gabapentin only for pain for last 5 days. With meds, swallowing is bearable. Without medication, pain can be severe  -G-tube placed 1/2. Mild soreness  -Port placement scheduled 1/11  -Denies additional concerns    PAST MEDICAL HISTORY  Sinonasal carcinoma as above  HTN  ASCVD. CABG x 3 2019  PE and LLE DVT after CABG . Treated with rivaroxaban  Dyslipidemia  Hepatitis B   SCC R temple s/p MOHS  Ascending aortic aneurysm repair 2019  Hearing loss L   Gout - feet  Depression  Cholecystectomy    Son Berhane at [phone]    CURRENT OUTPATIENT MEDICATIONS  Current Outpatient Medications   Medication    atorvastatin (LIPITOR) 80 MG tablet    entecavir (BARACLUDE) 0.5 MG tablet    gabapentin (NEURONTIN) 300 MG capsule    sodium chloride (OCEAN) 0.65 % nasal spray    acetaminophen (TYLENOL) 325 MG tablet    aspirin 81 MG EC tablet    gabapentin (NEURONTIN) 300 MG capsule    nitroGLYcerin (NITROSTAT) 0.4 MG sublingual tablet    ondansetron (ZOFRAN) 8 MG tablet    oxyCODONE (ROXICODONE) 5 MG tablet    oxymetazoline (AFRIN) 0.05 % nasal spray    prochlorperazine (COMPAZINE) 10 MG tablet    senna-docusate (SENOKOT-S/PERICOLACE) 8.6-50 MG tablet    senna-docusate (SENOKOT-S/PERICOLACE) 8.6-50 MG tablet    sodium chloride (OCEAN) 0.65 % nasal spray    traMADol (ULTRAM) 50 MG tablet    traZODone (DESYREL) 50 MG tablet     No current facility-administered medications for this visit.      Facility-Administered Medications Ordered in Other Visits   Medication    CISplatin (PLATINOL) 60 mg in sodium chloride 0.9 % 335 mL infusion    fosaprepitant (EMEND) 150 mg, dexAMETHasone (DECADRON) 12 mg in sodium chloride 0.9 % 276.2 mL intermittent infusion    palonosetron (ALOXI) injection 0.25 mg    sodium chloride 0.9% BOLUS 1,000 mL     ALLERGIES  No Known Allergies     SOCIAL HISTORY: Non smoker. No current ETOH. Lives with his children. Immigrated from [place] in the [place]. Not working. On disability. Used to be a . Lived in [place]. One daughter and 5 sons. Two grandchildren.      FAMILY HISTORY:   Family History   Problem Relation Age of Onset    Cerebrovascular Disease Mother 90.00    Acute Myocardial Infarction No family hx of      PHYSICAL EXAM  /65 (BP Location: Right arm, Patient Position: Sitting, Cuff Size: Adult Regular)   Pulse 81   Temp 98.8  F (37.1  C) (Oral)   Resp 16   Wt 58.8 kg (129 lb 9.6 oz)   SpO2 97%   BMI 23.70 kg/m      GEN: NAD  HEENT: EOMI, no icterus, injection or pallor. Oropharynx - mild trismus. Visible tumor to R posterior hard palate with area of mucosal erosion.   RESP: cta bilaterally, no wheezes  CV: rrr, no murmur   EXT: no edema  NEURO: alert, oriented, no focal deficits    LABORATORY AND IMAGING STUDIES  Most Recent 3 CBC's:  Recent Labs   Lab Test 01/08/24  0717 01/02/24  0822 12/27/23  1400 12/01/23  0904 11/30/23  1754   WBC 9.3 11.2* 8.3   < > 9.7   HGB 11.6* 11.3* 11.6*   < > 12.3*   MCV 85 86 86   < > 86    356 240   < > 182   ANEUTAUTO 5.8  --  5.5  --  7.7    < > = values in this interval not displayed.    Most Recent 3 BMP's:  Recent Labs   Lab Test 01/08/24  0717 12/27/23  1400 12/03/23  1202 12/03/23  0628 12/02/23  0736 12/02/23  0658 09/12/23  2144 05/13/22  1450    132*  --  139  --  137   < > 141   POTASSIUM 4.1 4.0  --  3.6  --  4.4   < > 4.4   CHLORIDE 104 95*  --  107  --  109*   < > 106   CO2 27 26  --   25  --  24   < > 28   BUN 20.1 13.9  --  9.6  --  9.9   < > 17   CR 0.73 0.76  --  0.83  --  0.76   < > 1.01   ANIONGAP 8 11  --  7  --  4*   < > 7   FLORENCE 11.0* 10.0  --  8.3*  --  7.8*   < > 9.3   * 111* 95 90   < > 228*   < > 81   PROTTOTAL 8.8* 8.3  --   --   --   --   --  6.9   ALBUMIN 3.4* 3.6  --   --   --  2.4*  --  3.8    < > = values in this interval not displayed.    Most Recent 2 LFT's:  Recent Labs   Lab Test 01/08/24  0717 12/27/23  1400   AST 26 86*   ALT 31 80*   ALKPHOS 77 72   BILITOTAL 0.4 1.1    Most Recent TSH and T4:  Recent Labs   Lab Test 01/08/24 0717   TSH 3.35   T4 1.00     Phos/Mag:  Lab Results   Component Value Date    MAG 2.0 01/08/2024    MAG 1.8 05/08/2019    MAG 2.0 04/29/2019      I reviewed the above labs today.          Again, thank you for allowing me to participate in the care of your patient.        Sincerely,        Nishi Michele, CNP

## 2024-01-08 NOTE — PROGRESS NOTES
"SPEECH LANGUAGE PATHOLOGY EVALUATION    Subjective      Presenting condition or subjective complaint: Pt presents today to establish care with [facility] SLP during chemoXRT. He is accompanied by his son.   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 \"    Living Environment  Social support: Pt's son, present today    Patient goals for therapy: to maintain swallow function during/after chemoXRT    Pain assessment: Pain present     Objective     SWALLOW EVALUTION  Dysphagia history: Pt and his son report he is eating soft solids (rice porridge, mashed potatoes, fish) and thin liquids. He is having trouble managing secretions and intermittently needs to spit into a cup. He denies coughing/throat clearing and feeling of pharyngeal stasis with PO intake. He endorses pain.     Current Diet/Method of Nutritional Intake: thin liquids (level 0), soft & bite-sized (level 6)        CLINICAL SWALLOW EVALUATION  Oral Motor Function:   Dentition: adequate dentition  Secretion management: difficulty swallowing secretions  Mucosal quality: adequate  Mandibular function: range of motion impaired  Oral labial function: impaired retraction, impaired pursing  Lingual function: impaired protrusion, impaired lateralization  Velar function: unable to visualize   Buccal function: intact  Laryngeal function: cough, throat clear, volitional swallow, voicing WFL     Level of assist " required for feeding: no assistance needed   Textures Trialed:   Clinical Swallow Eval: Thin Liquids  Mode of presentation: cup, self-fed   Volume presented: 4oz  Preparatory Phase: WFL  Oral Phase: WFL  Pharyngeal phase of swallow: repeated swallows   Diagnostic statement: No clinical s/sx of penetration/aspiration. Repeated swallows noted     ESOPHAGEAL PHASE OF SWALLOW  no observed or reported concerns related to esophageal function     SWALLOW ASSESSMENT CLINICAL IMPRESSIONS AND RATIONALE  Diet Consistency Recommendations: thin liquids (level 0), soft & bite-sized (level 6), supplement with PEG as needed     Recommended Feeding/Eating Techniques: small bolus size, slow rate of intake, alternate food and liquid intake, monitor for cough or change in vocal quality with intake, maintain upright sitting position for eating, maintain upright posture during/after eating for 30 minutes, minimize distractions during oral intake   Medication Administration Recommendations: orally as tolerated or via PEG  Instrumental Assessment Recommendations: instrumental evaluation not recommended at this time     Assessment & Plan   CLINICAL IMPRESSIONS   Medical Diagnosis: Malignant neoplasm of paranasal sinus    Treatment Diagnosis: Mild oropharyngeal dysphagia expected to worsen to moderately severe during chemoXRT   Impression/Assessment: Pt presents today with mild oropharyngeal dysphagia expected to worsen to moderately severe during chemoXRT. Pt will benefit from ongoing SLP services to maximize swallow function as expected dysphagia will cause difficulty with maintaining oral intake and nutrition.     Trained pt on 5/5 oropharyngeal and jaw strengthening/ROM exercises. Trained pt to complete 10 reps 3x/day. Trained pt on rationale/importance of home exercise program given risk of radiation induced fibrosis. Pt demonstrated acceptable accuracy with all exercises. Handout given. Discussed SLP role and POC and importance of  maintaining some PO during treatment.     PLAN OF CARE  Treatment Interventions: Swallowing dysfunction and/or oral function for feeding    Prognosis to achieve stated therapy goals is fair   Rehab potential is impacted by: family/caregiver support, pending medical intervention, social support    Long Term Goals   SLP Goal 1  Goal Identifier: PO  Goal Description: 1. Pt will tolerate regular, moist textures and thin liquids with no overt clinical s/sx of penetration/aspiration in 100% of PO trials.  Rationale: To maximize safety, ease and/or independence of oral intake  Target Date: 04/06/24  SLP Goal 2  Goal Identifier: Exercise  Goal Description: 2. Pt will maximize swallow function by completing 10 reps of 5/5 oropharyngeal and jaw strengthening/ROM exercises daily with 100% accuracy.  Rationale: To maximize safety, ease and/or independence of oral intake  Target Date: 04/06/24      Frequency of Treatment: 4x/month  Duration of Treatment: 4 months       Education Assessment:   Learner/Method: Patient;Family;Listening;Demonstration;No Barriers to Learning    Risks and benefits of evaluation/treatment have been explained.   Patient/Family/caregiver agrees with Plan of Care.     Evaluation Time:    SLP Eval: oral/pharyngeal swallow function, clinical minutes ([id number]): 20     Present: Yes: Language: Hmong, ID Number/Identifier: Mandie Perez      Signing Clinician: Kyaleen Kilgore, JADIEL    [facility]                                                                                   OUTPATIENT SPEECH LANGUAGE PATHOLOGY      PLAN OF TREATMENT FOR OUTPATIENT REHABILITATION   Patient's Last Name, First Name, Douglas Gomez    YOB: 1960   Provider's Name   [facility]   Medical Record No.  [id number]     Onset Date: 01/08/24 Start of Care Date: 01/08/24     Medical Diagnosis:  Malignant neoplasm of paranasal sinus      SLP Treatment Diagnosis:  Mild oropharyngeal dysphagia expected to worsen to moderately severe during chemoXRT  Plan of Treatment  Frequency/Duration: 4x/month  / 4 months     Certification date from 01/08/24   To 04/06/24          See note for plan of treatment details and functional goals     Kayleen Kilgore, SLP                         I CERTIFY THE NEED FOR THESE SERVICES FURNISHED UNDER        THIS PLAN OF TREATMENT AND WHILE UNDER MY CARE     (Physician attestation of this document indicates review and certification of the therapy plan).              Referring Provider:  YUMIKO ESTEBAN     Initial Assessment  See Epic Evaluation- 01/08/24

## 2024-01-08 NOTE — PROGRESS NOTES
Nutrition Services:     Received message from [facility] intake that patient has EN coverage.  See below.    Writer sent message to [facility] RNCC to inquire about getting patient supplies and formula today.     Email: 1/5/24 4/32pm  Wesley Tucker Kelly J;  DEPT-PHARM-[facility]    Hello,    Sorry for the delay.    Pt has 100% coverage for enteral formula and supplies with [employer] Medicare plan as long as via tube.      Thank you    Wesley Queen/  [facility]  [facility]  [address] [place] [id number]  [email or web address]  [email or web address]  Office: [phone]  Fax: [phone]

## 2024-01-08 NOTE — PROGRESS NOTES
Infusion Nursing Note:  Douglas Herrera presents today for Cycle 1, Day 1 Cisplatin.    Patient seen by provider today: Yes: Nishi Michele CNP   present during visit today: Yes, Language: Hmong via in person .     Note: Pt assessed prior to infusion appointment. Okay to proceed with treatment today. Pt is new to infusion and is receiving Cisplatin today for the first time. Verified with patient that he already received teaching from Trudy Shelton, RNCC on chemotherapy drugs. Reinforced teaching on side effects, symptom management, and when to call triage with patient and pt's son prior to initiating treatment. Pt was given a working thermometer and is aware to call triage with temp >100.4, chills, uncontrolled pain, nausea, vomiting, diarrhea or any other issues or concerns.     Home dexamethasone not ordered/needed per Dr. Post.     Intravenous Access:  Peripheral IV placed.    Treatment Conditions:  Lab Results   Component Value Date    HGB 11.6 (L) 01/08/2024    WBC 9.3 01/08/2024    ANEUTAUTO 5.8 01/08/2024     01/08/2024        Lab Results   Component Value Date     01/08/2024    POTASSIUM 4.1 01/08/2024    MAG 2.0 01/08/2024    CR 0.73 01/08/2024    FLORENCE 11.0 (H) 01/08/2024    BILITOTAL 0.4 01/08/2024    ALBUMIN 3.4 (L) 01/08/2024    ALT 31 01/08/2024    AST 26 01/08/2024     Results reviewed, labs MET treatment parameters, ok to proceed with treatment.  Patient voiding prior initiating Cisplatin treatment.     Post Infusion Assessment:  Patient tolerated infusion without incident.  Blood return noted pre and post infusion.  Site patent and intact, free from redness, edema or discomfort.  No evidence of extravasations.  Access discontinued per protocol.       Discharge Plan:   Prescription refills given for Zofran and Compazine.  Copy of AVS reviewed with patient and/or family.  Patient will return 1/15/24 for next appointment.  Patient discharged in stable condition accompanied  by: shelia.  Departure Mode: Ambulatory.      Oma Todd RN

## 2024-01-09 ENCOUNTER — ALLIED HEALTH/NURSE VISIT (OUTPATIENT)
Dept: RADIATION ONCOLOGY | Facility: CLINIC | Age: 64
End: 2024-01-09
Attending: DIETITIAN, REGISTERED
Payer: COMMERCIAL

## 2024-01-09 ENCOUNTER — OFFICE VISIT (OUTPATIENT)
Dept: RADIATION ONCOLOGY | Facility: CLINIC | Age: 64
End: 2024-01-09
Attending: RADIOLOGY
Payer: COMMERCIAL

## 2024-01-09 VITALS
DIASTOLIC BLOOD PRESSURE: 67 MMHG | WEIGHT: 129 LBS | BODY MASS INDEX: 23.15 KG/M2 | SYSTOLIC BLOOD PRESSURE: 102 MMHG | HEART RATE: 66 BPM

## 2024-01-09 DIAGNOSIS — C31.0 SQUAMOUS CELL CARCINOMA OF MAXILLARY SINUS (H): Primary | ICD-10-CM

## 2024-01-09 PROCEDURE — 77386 HC IMRT TREATMENT DELIVERY, COMPLEX: CPT | Performed by: RADIOLOGY

## 2024-01-09 PROCEDURE — 97803 MED NUTRITION INDIV SUBSEQ: CPT | Mod: XU | Performed by: DIETITIAN, REGISTERED

## 2024-01-09 NOTE — LETTER
2024         RE: Douglas Herrera  [address] [place] [address]        Dear Colleague,    Thank you for referring your patient, Douglas Herrera, to the [facility] RADIATION ONCOLOGY. Please see a copy of my visit note below.    RADIATION ONCOLOGY WEEKLY ON TREATMENT VISIT   Encounter Date: 2024     Patient Name: Douglas Herrera  MRN: [id number]  : 1960     Disease and Stage: T4b N2b M0, stage IVB sinonasal carcinoma  Treatment Site: left sinonasal primary and bilateral neck nodes    Current Dose/Planned Total Dose: 400 / 7000 cGy  Concurrent Chemotherapy: Yes  Drug and Frequency: cisplatin      ED visits/Hospitalizations:  None    Missed Treatments:  None    Subjective: Mr. Herrera presents to clinic today for his weekly on-treatment visit. He reports no immediate concerns related to radiation treatment. He had a G-tube placed 2024.    Nursing ROS:   Nutrition Alteration  Diet Type: Patient's Preference  Skin  Skin Reaction: 0 - No changes  Skin Note: Aquaphor     ENT and Mouth Exam  Mucositis - Current: 0 - None   ENT/Mouth Note: S&S 15-20     Gastrointestinal  Nausea: 0 - None        Pain Assessment  0-10 Pain Scale: 0  Pain Treatment: Gagbapentin      Objective:   /67   Pulse 66   Wt 58.5 kg (129 lb)   BMI 23.15 kg/m     KPS 80  General: Alert and in no acute distress.  HEENT: Normocephalic, atraumatic. No visible scleral icterus.  Neck: Apparent full range of motion.  CV: Appears well-perfused, with no visible cyanosis.  Lungs: Breathing easily on room air, with no difficulty completing full sentences  Extremities:  No visible edema of the upper extremities.   Neuro: Alert and oriented; grossly nonfocal. Normal speech. Moving upper and lower extremities equally.  Gait within normal limits.  Skin: No visible jaundice. No suspicious lesions of the visualized integuments. No visible erythema.   Psych: Mood and affect are appropriate to given situation. Answers questions  appropriately.        Treatment-related toxicities (CTCAE v5.0):  Fatigue: Grade 0: No toxicity  Pain: Grade 0: No toxicity  Dry mouth: Grade 0: No toxicity  Dysphagia: Grade 0: No toxicity  Mucositis: Grade 0: No toxicity    Assessment:    Mr. 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  Multidisciplinary conference consensus opinion is towards chemoradiation.  He has had significant hearing loss and thus Oncology has recommended weekly cisplatin. He is tolerating therapy well to date.    Plan:   Continue with treatment as planned  Nutrition per oral and EN to maintain weight as per recommendations of Mayela An     Mosaiq chart and setup information reviewed  MVCT images reviewed    Medication Review  Med list reviewed with patient?: Yes  Med list printed and given: Offered and declined    Darlene Wright MD, MPH, PhD, covering for Tomasa Akers MD     Department of Radiation Oncology  [facility]         Again, thank you for allowing me to participate in the care of your patient.        Sincerely,        Darlene Wright

## 2024-01-09 NOTE — PROGRESS NOTES
CLINICAL NUTRITION SERVICES - REASSESSMENT NOTE   EVALUATION OF PREVIOUS PLAN OF CARE:   Time spent with patient: 30 minutes.    Pt accompanied by: his son, Tulio in radiation clinic at time of OTV  Referring Physician: Sincere 1/2/24  Z51.11 (ICD-10-CM) - Encounter for antineoplastic chemotherapy   E43 (ICD-10-CM) - Severe protein-calorie malnutrition (H24)   C31.0 (ICD-10-CM) - Carcinoma of maxillary sinus (H)   Additional Information:  nasopharyngeal carcinoma, dysphagia, weight loss, Gtube 1/2/24. Video swallow pending. Please assist with nutritional needs prior to and throughout chemoradiation  Monitoring from previous assessment:   -Food/Fluid intake - Douglas continues to eat oatmeal, porridge and nutrition shakes.  Tulio tells me that his intake and appetite have seemed to improve with improved pain management.    -EN intake- Douglas has been supplementing his intake with 3 cartons of EN per day via gravity bag feedings. He has been supplementing his PO with EN pending appetite and PO intake.  He is striving for at least 2000 fernando and 80g protein between EN and PO.   Enteral Nutrition (goals from 1/5/24): provided by [facility]  Formula: Osmolite 1.5 fernando  Volume: 6 cartons/day (1422 mL, 1086 ml free water)  Provisions:  2130kcal (35kcal/kg), 90 g protein (1.5g/kg), 288g CHO, 0g fiber  -Weight trends - down 8 lb x past 3 weeks.   Wt Readings from Last 10 Encounters:   01/09/24 58.5 kg (129 lb)   01/08/24 58.8 kg (129 lb 9.6 oz)   12/22/23 62.1 kg (137 lb)   12/14/23 62.6 kg (138 lb)   12/14/23 62.9 kg (138 lb 11.2 oz)   12/01/23 60.7 kg (133 lb 13.1 oz)   11/28/23 63.9 kg (140 lb 12.8 oz)   11/16/23 64.9 kg (143 lb)   11/11/23 65.2 kg (143 lb 12.8 oz)   10/12/23 69.4 kg (153 lb)     Previous Goals:   1.  PEG tube to provide 100% estimated nutrition and hydration needs  2. Weight stability/weight gain as able   Evaluation: Not met   Previous Nutrition Diagnosis:   Inadequate oral intake related to decreased ability  to consume sufficient energy due to side effects of cancer as evidenced by 16 lb (11%) wt loss x past 2 months,  dietary intake <25-50% estimated needs.     Evaluation: Improving   NEW FINDINGS:   Initiation of EN via PEG tube - home care provided by [facility]   CURRENT NUTRITION DIAGNOSIS   Inadequate oral intake related to odynophagia as evidenced by pt reliant on EN to meet >50-75% est nutrition needs.    INTERVENTIONS   EN Composition, EN Schedule, Feeding Tube Flush- reviewed current regimen.  Gravity feedings have been going well and they are transitioning up as needed. [facility] care was out yesterday providing education and answering questions.   Composition of Meals and Snacks - reviewed nutrition goals and encouraged to track fernando, prot and fluids to ensure adequacy and need to increase volume of EN  Medical Food Supplement - provided samples and coupons for Ensure Complete.  Goals   PO and EN to meet 100% estimated nutrition needs  Aim for at least 2000 fernando, 80-100g protein and 8-10 cups water/electrolyte fluids/day    Follow up/Monitoring: two week follow up  Food intake, Enteral Nutrition intake, and Weight    Mayela An RD, , LD  [facility]  [phone]

## 2024-01-10 ENCOUNTER — APPOINTMENT (OUTPATIENT)
Dept: RADIATION ONCOLOGY | Facility: CLINIC | Age: 64
End: 2024-01-10
Attending: RADIOLOGY
Payer: COMMERCIAL

## 2024-01-10 ENCOUNTER — ANESTHESIA EVENT (OUTPATIENT)
Dept: SURGERY | Facility: AMBULATORY SURGERY CENTER | Age: 64
End: 2024-01-10
Payer: COMMERCIAL

## 2024-01-10 PROCEDURE — 77386 HC IMRT TREATMENT DELIVERY, COMPLEX: CPT | Performed by: RADIOLOGY

## 2024-01-10 PROCEDURE — 77014 PR CT GUIDE FOR PLACEMENT RADIATION THERAPY FIELDS: CPT | Mod: 26 | Performed by: RADIOLOGY

## 2024-01-11 ENCOUNTER — APPOINTMENT (OUTPATIENT)
Dept: RADIATION ONCOLOGY | Facility: CLINIC | Age: 64
End: 2024-01-11
Attending: RADIOLOGY
Payer: COMMERCIAL

## 2024-01-11 ENCOUNTER — ANCILLARY PROCEDURE (OUTPATIENT)
Dept: RADIOLOGY | Facility: AMBULATORY SURGERY CENTER | Age: 64
End: 2024-01-11
Attending: INTERNAL MEDICINE
Payer: COMMERCIAL

## 2024-01-11 ENCOUNTER — HOSPITAL ENCOUNTER (OUTPATIENT)
Facility: AMBULATORY SURGERY CENTER | Age: 64
Discharge: HOME | End: 2024-01-11
Attending: RADIOLOGY
Payer: COMMERCIAL

## 2024-01-11 ENCOUNTER — APPOINTMENT (OUTPATIENT)
Dept: INTERPRETER SERVICES | Facility: CLINIC | Age: 64
End: 2024-01-11
Payer: COMMERCIAL

## 2024-01-11 ENCOUNTER — ANESTHESIA (OUTPATIENT)
Dept: SURGERY | Facility: AMBULATORY SURGERY CENTER | Age: 64
End: 2024-01-11
Payer: COMMERCIAL

## 2024-01-11 VITALS
SYSTOLIC BLOOD PRESSURE: 121 MMHG | HEIGHT: 64 IN | OXYGEN SATURATION: 97 % | TEMPERATURE: 97.2 F | DIASTOLIC BLOOD PRESSURE: 70 MMHG | RESPIRATION RATE: 16 BRPM | BODY MASS INDEX: 22.2 KG/M2 | WEIGHT: 130 LBS | HEART RATE: 77 BPM

## 2024-01-11 DIAGNOSIS — C31.0 SQUAMOUS CELL CARCINOMA OF MAXILLARY SINUS (H): ICD-10-CM

## 2024-01-11 PROCEDURE — 77001 FLUOROGUIDE FOR VEIN DEVICE: CPT | Mod: 26 | Performed by: RADIOLOGY

## 2024-01-11 PROCEDURE — 36561 INSERT TUNNELED CV CATH: CPT | Performed by: RADIOLOGY

## 2024-01-11 PROCEDURE — 77386 HC IMRT TREATMENT DELIVERY, COMPLEX: CPT | Performed by: RADIOLOGY

## 2024-01-11 PROCEDURE — 36561 INSERT TUNNELED CV CATH: CPT

## 2024-01-11 PROCEDURE — 76937 US GUIDE VASCULAR ACCESS: CPT | Mod: 26 | Performed by: RADIOLOGY

## 2024-01-11 DEVICE — IMPLANTABLE DEVICE: Type: IMPLANTABLE DEVICE | Site: CHEST | Status: FUNCTIONAL

## 2024-01-11 RX ORDER — HEPARIN SODIUM,PORCINE 10 UNIT/ML
5-10 VIAL (ML) INTRAVENOUS EVERY 24 HOURS
Status: DISCONTINUED | OUTPATIENT
Start: 2024-01-11 | End: 2024-01-12 | Stop reason: HOSPADM

## 2024-01-11 RX ORDER — SODIUM CHLORIDE, SODIUM LACTATE, POTASSIUM CHLORIDE, CALCIUM CHLORIDE 600; 310; 30; 20 MG/100ML; MG/100ML; MG/100ML; MG/100ML
INJECTION, SOLUTION INTRAVENOUS CONTINUOUS
Status: DISCONTINUED | OUTPATIENT
Start: 2024-01-11 | End: 2024-01-12 | Stop reason: HOSPADM

## 2024-01-11 RX ORDER — HEPARIN SODIUM,PORCINE 10 UNIT/ML
5-10 VIAL (ML) INTRAVENOUS
Status: DISCONTINUED | OUTPATIENT
Start: 2024-01-11 | End: 2024-01-12 | Stop reason: HOSPADM

## 2024-01-11 RX ORDER — FENTANYL CITRATE 50 UG/ML
25 INJECTION, SOLUTION INTRAMUSCULAR; INTRAVENOUS EVERY 5 MIN PRN
Status: DISCONTINUED | OUTPATIENT
Start: 2024-01-11 | End: 2024-01-12 | Stop reason: HOSPADM

## 2024-01-11 RX ORDER — NICOTINE POLACRILEX 4 MG
15-30 LOZENGE BUCCAL
Status: DISCONTINUED | OUTPATIENT
Start: 2024-01-11 | End: 2024-01-12 | Stop reason: HOSPADM

## 2024-01-11 RX ORDER — HYDROMORPHONE HYDROCHLORIDE 1 MG/ML
0.4 INJECTION, SOLUTION INTRAMUSCULAR; INTRAVENOUS; SUBCUTANEOUS EVERY 5 MIN PRN
Status: DISCONTINUED | OUTPATIENT
Start: 2024-01-11 | End: 2024-01-12 | Stop reason: HOSPADM

## 2024-01-11 RX ORDER — LIDOCAINE 40 MG/G
CREAM TOPICAL
Status: DISCONTINUED | OUTPATIENT
Start: 2024-01-11 | End: 2024-01-12 | Stop reason: HOSPADM

## 2024-01-11 RX ORDER — ONDANSETRON 4 MG/1
4 TABLET, ORALLY DISINTEGRATING ORAL EVERY 30 MIN PRN
Status: DISCONTINUED | OUTPATIENT
Start: 2024-01-11 | End: 2024-01-12 | Stop reason: HOSPADM

## 2024-01-11 RX ORDER — ONDANSETRON 2 MG/ML
4 INJECTION INTRAMUSCULAR; INTRAVENOUS EVERY 30 MIN PRN
Status: DISCONTINUED | OUTPATIENT
Start: 2024-01-11 | End: 2024-01-12 | Stop reason: HOSPADM

## 2024-01-11 RX ORDER — FENTANYL CITRATE 50 UG/ML
50 INJECTION, SOLUTION INTRAMUSCULAR; INTRAVENOUS EVERY 5 MIN PRN
Status: DISCONTINUED | OUTPATIENT
Start: 2024-01-11 | End: 2024-01-12 | Stop reason: HOSPADM

## 2024-01-11 RX ORDER — HYDROMORPHONE HYDROCHLORIDE 1 MG/ML
0.2 INJECTION, SOLUTION INTRAMUSCULAR; INTRAVENOUS; SUBCUTANEOUS EVERY 5 MIN PRN
Status: DISCONTINUED | OUTPATIENT
Start: 2024-01-11 | End: 2024-01-12 | Stop reason: HOSPADM

## 2024-01-11 RX ORDER — HEPARIN SODIUM (PORCINE) LOCK FLUSH IV SOLN 100 UNIT/ML 100 UNIT/ML
5-10 SOLUTION INTRAVENOUS
Status: DISCONTINUED | OUTPATIENT
Start: 2024-01-11 | End: 2024-01-12 | Stop reason: HOSPADM

## 2024-01-11 RX ORDER — DEXTROSE MONOHYDRATE 25 G/50ML
25-50 INJECTION, SOLUTION INTRAVENOUS
Status: DISCONTINUED | OUTPATIENT
Start: 2024-01-11 | End: 2024-01-12 | Stop reason: HOSPADM

## 2024-01-11 RX ORDER — OXYCODONE HYDROCHLORIDE 5 MG/1
5 TABLET ORAL
Status: DISCONTINUED | OUTPATIENT
Start: 2024-01-11 | End: 2024-01-12 | Stop reason: HOSPADM

## 2024-01-11 RX ORDER — HEPARIN SODIUM (PORCINE) LOCK FLUSH IV SOLN 100 UNIT/ML 100 UNIT/ML
SOLUTION INTRAVENOUS DAILY PRN
Status: DISCONTINUED | OUTPATIENT
Start: 2024-01-11 | End: 2024-01-11 | Stop reason: HOSPADM

## 2024-01-11 RX ORDER — CEFAZOLIN SODIUM 2 G/50ML
2 SOLUTION INTRAVENOUS
Status: COMPLETED | OUTPATIENT
Start: 2024-01-11 | End: 2024-01-11

## 2024-01-11 RX ORDER — OXYCODONE HYDROCHLORIDE 5 MG/1
10 TABLET ORAL
Status: DISCONTINUED | OUTPATIENT
Start: 2024-01-11 | End: 2024-01-12 | Stop reason: HOSPADM

## 2024-01-11 RX ORDER — ACETAMINOPHEN 325 MG/1
975 TABLET ORAL ONCE
Status: COMPLETED | OUTPATIENT
Start: 2024-01-11 | End: 2024-01-11

## 2024-01-11 RX ORDER — SODIUM CHLORIDE 9 MG/ML
INJECTION, SOLUTION INTRAVENOUS CONTINUOUS
Status: DISCONTINUED | OUTPATIENT
Start: 2024-01-11 | End: 2024-01-12 | Stop reason: HOSPADM

## 2024-01-11 RX ORDER — LIDOCAINE HYDROCHLORIDE 10 MG/ML
INJECTION, SOLUTION EPIDURAL; INFILTRATION; INTRACAUDAL; PERINEURAL DAILY PRN
Status: DISCONTINUED | OUTPATIENT
Start: 2024-01-11 | End: 2024-01-11 | Stop reason: HOSPADM

## 2024-01-11 RX ADMIN — CEFAZOLIN SODIUM 2 G: 2 SOLUTION INTRAVENOUS at 09:08

## 2024-01-11 RX ADMIN — SODIUM CHLORIDE, SODIUM LACTATE, POTASSIUM CHLORIDE, CALCIUM CHLORIDE: 600; 310; 30; 20 INJECTION, SOLUTION INTRAVENOUS at 08:55

## 2024-01-11 RX ADMIN — ACETAMINOPHEN 975 MG: 325 TABLET ORAL at 07:56

## 2024-01-11 ASSESSMENT — LIFESTYLE VARIABLES: TOBACCO_USE: 0

## 2024-01-11 ASSESSMENT — COPD QUESTIONNAIRES: COPD: 0

## 2024-01-11 ASSESSMENT — ENCOUNTER SYMPTOMS: ORTHOPNEA: 0

## 2024-01-11 NOTE — ANESTHESIA POSTPROCEDURE EVALUATION
Patient: Douglas Herrera    Procedure: Procedure(s):  Insert port vascular access       Anesthesia Type:  MAC    Note:  Disposition: Outpatient   Postop Pain Control: Uneventful            Sign Out: Well controlled pain   PONV: No   Neuro/Psych: Uneventful            Sign Out: Acceptable/Baseline neuro status   Airway/Respiratory: Uneventful            Sign Out: Acceptable/Baseline resp. status   CV/Hemodynamics: Uneventful            Sign Out: Acceptable CV status; No obvious hypovolemia; No obvious fluid overload   Other NRE:    DID A NON-ROUTINE EVENT OCCUR?            Last vitals:  Vitals Value Taken Time   /70 01/11/24 1022   Temp 36.2  C (97.2  F) 01/11/24 1022   Pulse     Resp 16 01/11/24 1022   SpO2 97 % 01/11/24 1022       Electronically Signed By: Suly Lucas MD  January 11, 2024  1:32 PM

## 2024-01-11 NOTE — DISCHARGE INSTRUCTIONS
[facility]  Home Care Following Anesthesia  For 24 hours after surgery:  Get plenty of rest.  A responsible adult must stay with you for at least 24 hours after you leave the surgery center.  Do not drive or use heavy equipment.  If you have weakness or tingling, don't drive or use heavy equipment until this feeling goes away.   Do not drink alcohol.   Avoid strenuous or risky activities.  Ask for help when climbing stairs.  You may feel lightheaded.  IF so, sit for a few minutes before standing.  Have someone help you get up.   If you have nausea (feel sick to your stomach): Drink only clear liquids such as apple juice, ginger ale, broth or 7-Up.  Rest may also help.  Be sure to drink enough fluids.  Move to a regular diet as you feel able.   You may have a slight fever.  Call the doctor if your fever is over 100 F (37.7 C) (taken under the tongue) or lasts longer than 24 hours.  You may have a dry mouth, a sore throat, muscle aches or trouble sleeping. These should go away after 24 hours.  Do not make important or legal decisions.   It is recommended to avoid smoking.               Tips for taking pain medications  To get the best pain relief possible, remember these points:  Take pain medications as directed, before pain becomes severe.  Pain medication can upset your stomach: taking it with food may help.  Constipation is a common side effect of pain medication. Drink plenty of  fluids.  Eat foods high in fiber. Take a stool softener if recommended by your doctor or pharmacist.  Do not drink alcohol, drive or operate machinery while taking pain medications.  Ask about other ways to control pain, such as with heat, ice or relaxation.    Tylenol/Acetaminophen Consumption    If you feel your pain relief is insufficient, you may take Tylenol/Acetaminophen in addition to your narcotic pain medication.   Be careful not to exceed 4,000 mg of Tylenol/Acetaminophen in a 24 hour  period from all sources.  If you are taking extra strength Tylenol/acetaminophen (500 mg), the maximum dose is 8 tablets in 24 hours.  If you are taking regular strength acetaminophen (325 mg), the maximum dose is 12 tablets in 24 hours.    Call a doctor for any of the following:  Signs of infection (fever, growing tenderness at the surgery site, a large amount of drainage or bleeding, severe pain, foul-smelling drainage, redness, swelling).  It has been over 8 to 10 hours since surgery and you are still not able to urinate (pass water).  Headache for over 24 hours.  Numbness, tingling or weakness the day after surgery (if you had spinal anesthesia).  Signs of Covid-19 infection (temperature over 100 degrees, shortness of breath, cough, loss of taste/smell, generalized body aches, persistent headache, chills, sore throat, nausea/vomiting/diarrhea)  Your doctor is:       Dr. Tyrel Silvestre, IR               Or dial [phone] and ask for the resident on call for:  Interventional Radiology  For emergency care, call the:  [facility]:  [phone] (TTY for hearing impaired: [phone])      A collaboration between [facility] and [facility]  Experts in minimally invasive, targeted treatments performed using imaging guidance    Venous Access Device,  Port Catheter   Today you had a procedure today to install a venous access device: subcutaneous port catheter.    After you go home:  Drink plenty of fluids.  Generally 6-8 (8 ounce) glasses a day is recommended.  Resume your regular diet unless otherwise ordered by a medical provider.  Keep any applied tape/gauze dressings clean and dry.  Change tape/gauze dressings if they get wet or soiled.  You may shower the following day after procedure, however cover and protect from moisture any tape/gauze dressings.  You may let water hit and run over dried skin glue, but do not scrub.  Pat the area dry  after showering.  Port placement incisions are closed with absorbable suture, meaning they do not need to be removed at a later date, and a topical skin adhesive (skin glue).  This glue will wear off in 7-14 days.  Do not remove before this time.  If 14 days have passed and residual glue is present, you may gently remove it.  Do not apply gels, lotions, or ointments to the glue site for the first 10 days as this may cause the glue to prematurely soften and fail.  Do not perform strenuous activities or lift greater than 10 pounds for the next three days.  If there is bleeding or oozing from the procedure site, apply firm pressure to the area for 5-10 minutes.  If the bleeding continues seek medical advice at the numbers below.  Mild procedure site discomfort can be treated with an ice pack and over-the-counter pain relievers.        For 24 hours after any sedation used:  Relax and take it easy.  No strenuous activities.  Do not drive or operate machines at home or at work.  No alcohol consumption.  Do not make any important or legal decisions.    Call our Interventional Radiology (IR) service if:  If you start bleeding from the procedure site.  If you do start to bleed from the site, lie down and hold some pressure on the site.  Our radiology provider can help you decide if you need to return to the hospital.  If you have new or worsening pain related to the procedure.  If you have concerning swelling at the procedure site.  If you develop persistent nausea or vomiting.  If you develop hives or a rash or any unexplained itching.  If you have a fever of greater than 100.5  F and chills in the first 5 days after procedure.  Any other concerns related to your procedure.      [facility]  Interventional Radiology (IR)  [address] Waiting Room  [place] [address]    Contact Number:  [phone]  (IR Nurse Triage)  Monday - Friday 7am - 4pm    After hours for urgent  concerns:  [phone]  After 4pm Monday - Friday, Weekends and Holidays.   Ask for Interventional Radiology on-call.  Someone is available 24 hours a day.  [facility] toll free number:  [phone]

## 2024-01-11 NOTE — ANESTHESIA CARE TRANSFER NOTE
Patient: Douglas Herrera    Procedure: Procedure(s):  Insert port vascular access       Diagnosis: Squamous cell carcinoma of maxillary sinus (H) [C31.0]  Diagnosis Additional Information: No value filed.    Anesthesia Type:   MAC     Note:    Oropharynx: oropharynx clear of all foreign objects and spontaneously breathing  Level of Consciousness: awake  Oxygen Supplementation: room air    Independent Airway: airway patency satisfactory and stable  Dentition: dentition unchanged  Vital Signs Stable: post-procedure vital signs reviewed and stable  Report to RN Given: handoff report given  Patient transferred to: Phase II    Handoff Report: Identifed the Patient, Identified the Reponsible Provider, Reviewed the pertinent medical history, Discussed the surgical course, Reviewed Intra-OP anesthesia mangement and issues during anesthesia, Set expectations for post-procedure period and Allowed opportunity for questions and acknowledgement of understanding      Vitals:  Vitals Value Taken Time   /75 01/11/24 0953   Temp 36.3  C (97.3  F) 01/11/24 0953   Pulse 63    Resp 16 01/11/24 0953   SpO2 97 % 01/11/24 0953       Electronically Signed By: LORENA LOVELL  January 11, 2024  9:57 AM

## 2024-01-11 NOTE — ANESTHESIA PREPROCEDURE EVALUATION
Anesthesia Pre-Procedure Evaluation    Patient: Douglas Herrera   MRN: [id number] : 1960        Procedure : Procedure(s):  Insert port vascular access          Past Medical History:   Diagnosis Date    Ascending aortic aneurysm (H24)     Coronary artery disease     Depression     Gout     History of anesthesia complications     Hyperlipemia     Hypertension     PONV (postoperative nausea and vomiting)       Past Surgical History:   Procedure Laterality Date    AORTA SURGERY N/A 2019    Procedure: WITH ASCENDING AORTA REPLACEMENT;  Surgeon: Darlene Graff MD;  Location: [facility] OR;  Service: Cardiovascular    BYPASS GRAFT ARTERY CORONARY      CARDIAC SURGERY      CV CORONARY ANGIOGRAM N/A 3/12/2019    Procedure: Coronary Angiogram;  Surgeon: Hamilton Lucero MD;  Location: [facility] Cath Lab;  Service: Cardiology    ENDOSCOPIC SINUS SURGERY Left 11/10/2023    Procedure: Transnasal endoscopic approach to biopsy left nasal mass;  Surgeon: Damon Regan MD;  Location: [facility] OR    GALLBLADDER SURGERY      IR CAROTID CEREBRAL ANGIOGRAM BILATERAL  2023    IR FACIAL EMBOLIZATION LEFT  2023    OPTICAL TRACKING SYSTEM ENDOSCOPIC SINUS SURGERY Left 2023    Procedure: Stealth assisted transnasal endoscopic approach to excise left sinonasal mass;  Surgeon: Damon Regan MD;  Location: [facility] OR      No Known Allergies   Social History     Tobacco Use    Smoking status: Former     Types: Cigarettes    Smokeless tobacco: Never    Tobacco comments:     every 3-4 months, rare   Substance Use Topics    Alcohol use: Yes     Comment: Occasionally      Wt Readings from Last 1 Encounters:   24 59 kg (130 lb)        Anesthesia Evaluation   Pt has had prior anesthetic. Type: General.    No history of anesthetic complications       ROS/MED HX  ENT/Pulmonary:     (+)                              recent URI (RSV : cough, fever. Symptoms have resolved.),  "resolved,      (-) tobacco use, asthma and COPD   Neurologic: Comment: Sinonasal mass      Cardiovascular: Comment: S/p CABG and AAA in 2019        (+)  - -  CAD -  CABG (2019)- -                                 Previous cardiac testing   Echo: Date: 11/2023 Results:  \"Interpretation Summary  Global and regional left ventricular function is normal with an EF of 60-65%.  Global right ventricular function is normal.  Mild tricuspid insufficiency is present.  Pulmonary artery systolic pressure is normal.  Estimated mean right atrial pressure is normal.  No pericardial effusion is present.\"  Stress Test:  Date: Results:    ECG Reviewed:  Date: Results:    Cath:  Date: Results:   (-) angina, [place], orthopnea/PND, syncope and angina   METS/Exercise Tolerance: >4 METS Comment: Regularly goes up a flight of stairs without concerning exertional symptoms   Hematologic:     (+) History of blood clots (PE/DVT 2019),               Musculoskeletal:       GI/Hepatic: Comment: No zofran or compazine use recently.   G tube - receiving feeds in addition to oral intake. Last tube feed was yesterday    (+)           hepatitis type B,     (-) GERD   Renal/Genitourinary:       Endo:       Psychiatric/Substance Use:       Infectious Disease:       Malignancy:   (+) Malignancy (SCC L maxillary sinus),     Other:            Physical Exam    Airway      Comment: Left lateral palate/gum line appears enlarged. Remainder of palate appears normal. Able to fit 2 fingerbreadth between teeth.     Mallampati: III   TM distance: > 3 FB   Neck ROM: full   Mouth opening: < 3 cm    Respiratory Devices and Support         Dental     Comment: Missing several teeth      B=Bridge, C=Chipped, L=Loose, M=Missing    Cardiovascular          Rhythm and rate: regular and normal     Pulmonary           breath sounds clear to auscultation           OUTSIDE LABS:  CBC:   Lab Results   Component Value Date    WBC 9.3 01/08/2024    WBC 11.2 (H) 01/02/2024    HGB 11.6 " "(L) 01/08/2024    HGB 11.3 (L) 01/02/2024    HCT 36.5 (L) 01/08/2024    HCT 35.3 (L) 01/02/2024     01/08/2024     01/02/2024     BMP:   Lab Results   Component Value Date     01/08/2024     (L) 12/27/2023    POTASSIUM 4.1 01/08/2024    POTASSIUM 4.0 12/27/2023    CHLORIDE 104 01/08/2024    CHLORIDE 95 (L) 12/27/2023    CO2 27 01/08/2024    CO2 26 12/27/2023    BUN 20.1 01/08/2024    BUN 13.9 12/27/2023    CR 0.73 01/08/2024    CR 0.76 12/27/2023     (H) 01/08/2024     (H) 12/27/2023     COAGS:   Lab Results   Component Value Date    PTT 29 09/12/2023    INR 1.22 (H) 01/02/2024    FIBR 157 (L) 04/23/2019     POC: No results found for: \"BGM\", \"HCG\", \"HCGS\"  HEPATIC:   Lab Results   Component Value Date    ALBUMIN 3.4 (L) 01/08/2024    PROTTOTAL 8.8 (H) 01/08/2024    ALT 31 01/08/2024    AST 26 01/08/2024    ALKPHOS 77 01/08/2024    BILITOTAL 0.4 01/08/2024     OTHER:   Lab Results   Component Value Date    PH 7.43 12/01/2023    LACT 1.3 12/01/2023    A1C 5.2 04/25/2019    FLORENCE 11.0 (H) 01/08/2024    MAG 2.0 01/08/2024    TSH 3.35 01/08/2024    T4 1.00 01/08/2024       Anesthesia Plan    ASA Status:  3    NPO Status:  NPO Appropriate    Anesthesia Type: MAC.     - Reason for MAC: straight local not clinically adequate              Consents    Anesthesia Plan(s) and associated risks, benefits, and realistic alternatives discussed. Questions answered and patient/representative(s) expressed understanding.     - Discussed:     - Discussed with:  Patient            Postoperative Care       PONV prophylaxis: Ondansetron (or other 5HT-3)     Comments:    Other Comments: In person ong  throughout. Discussed limited mouth opening with patient and son. They state this is unchanged compared to last month when he had general anesthesia (RSI, glide 3 grade 1 view). Even so, I discussed minimal/no sedation with local for port placement. Patient requests light sedation to tolerate " the procedure. I think this is reasonable, and he understands that it will be very minimal sedation. In person  will accompany him during procedure.     Discussed plan for MAC, including possibility of awareness, risk of aspiration pneumonia, risk of hypoxia/low oxygen/airway obstruction requiring additional airway support/devices. Discussed alternatives. Discussed backup plan of general anesthesia and risks of general anesthesia, including sore throat/hoarse voice, abrasions/damage to lips/tongue/teeth, nausea, difficult intubation with hypoxia and end organ damage. Ensured understanding, invited questions and all questions were answered.               Suly Lucas MD    I have reviewed the pertinent notes and labs in the chart from the past 30 days and (re)examined the patient.  Any updates or changes from those notes are reflected in this note.     # Hyponatremia: Lowest Na = 132 mmol/L in last 30 days, will monitor as appropriate  # Hypercalcemia: Highest Ca = 11 mg/dL in last 30 days, will monitor as appropriate     # Hypoalbuminemia: Lowest albumin = 3.4 g/dL in the past 30 days , will monitor as appropriate   # Coagulation Defect: INR = 1.22 (Ref range: 0.85 - 1.15) and/or PTT = N/A, will monitor for bleeding

## 2024-01-11 NOTE — BRIEF OP NOTE
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  Complications: None.  Implants:   Implant Name Type Inv. Item Serial No.  Lot No. LRB No. Used Action   SMART PORT PLASTIC Port   [employer] [id number] Right 1 Implanted

## 2024-01-12 ENCOUNTER — APPOINTMENT (OUTPATIENT)
Dept: RADIATION ONCOLOGY | Facility: CLINIC | Age: 64
End: 2024-01-12
Attending: RADIOLOGY
Payer: COMMERCIAL

## 2024-01-12 PROCEDURE — 77427 RADIATION TX MANAGEMENT X5: CPT | Performed by: RADIOLOGY

## 2024-01-12 PROCEDURE — 77336 RADIATION PHYSICS CONSULT: CPT | Performed by: RADIOLOGY

## 2024-01-12 PROCEDURE — 77386 HC IMRT TREATMENT DELIVERY, COMPLEX: CPT | Performed by: RADIOLOGY

## 2024-01-12 PROCEDURE — 77014 PR CT GUIDE FOR PLACEMENT RADIATION THERAPY FIELDS: CPT | Mod: 26 | Performed by: RADIOLOGY

## 2024-01-12 NOTE — PROGRESS NOTES
[facility] CANCER [facility]    PATIENT NAME: Douglas Herrera  MRN # [id number]   DATE OF VISIT: January 15, 2024  YOB: 1960     [facility]   Radiation Oncology: Dr. Tomasa Akers  Cardiology: Dr. Qamar Hernandez  PCP: Dr. العراقي   Hepatologist: [facility]     CANCER TYPE: SCC sinonasal   STAGE: [id number] (IVB)  ECOG PS: 1    PD-L1:  NGS: internal panel pending     SUMMARY    8/9/23 CT sinus.   8/24/23 MRI sinus. L maxillary sinus mass  9/12/23 ED for epistaxis.   9/13/23 CTA and embolization of L IMAX   10/4/23 Nasal bx. Path: Inverted papilloma with high grade dysplasia  11/10/23 Nasal endoscopy and bx in the OR. C/b severe bleeding. Path: Inverted sinonasal papilloma with severe dysplasia.  11/17/23 MRI. 7.4 x 4.6 x 6.6 cm L sinonasal mass, destruction of nasal turbinates, L maxillary sinus, hard palate, invasion of L  space, involvement of medial and lateral pterygoids and temporalis muscles. Effacement and invasion ipsilateral pterygopalatine fossa, extends and effaces the nasopharynx.   11/30/23 Carotid angiogram. Direct endonasal and transoral embolization L sinonasal tumor, transaterial embolization of the L sphenopalatine artery feeders to the L sinonasal artery tumor   12/1/23 Stealth assisted transnasal endoscopic approach to excise L sinonasal mass. Pre-operative embolization. Path: SCC involving L maxilla.    12/9/23 PET. Hypermetabolic mass centered along the L maxilla, extending superiorly through the floor of the L maxillary sinus, posterior/laterally to the  space, invasion of the pterygoid muscles, extending cranially along the pterygopalatine fossa and pterygoid plates to the skull base level. Erosion of involved bones including L maxilla, maxillary sinus wall and pterygoid plates. Extension medially to the L lateral nasopharyngeal wall and soft palate. 1 mm fat place between carotid and mass. ~4.9 x 4.0 x 5.4 cm (SUV 24.3). Partially  resected since 11/17/23 MRI. 8 mm L 2A node (SUV 5.9), 7 mm L level 2 node (SUV 5.5)    ASSESSMENT AND PLAN  SCC L maxillary sinus, [id number] (IVB): s/p transnasal endoscopic excision of sinonasal mass. He is a poor candidate for TPF induction therefore plan is is for definitive chemoradiation with weekly cisplatin. Not a candidate for HD cisplatin-audiogram 12/18/23 confirms significant bilateral SNHL. Tolerated day 1 cisplatin well without side effects. No hearing changes but weill need to watch closely and consider change to carboplatin/paclitaxel if this becomes an issues. Labs and exam acceptable for treatment today.  -Proceed with day 8 cisplatin  -RTC weekly for labs/KENNEDI/infusion    Risk for malnutrition: G-tube placement 1/2. Working with CARISA Pedro. Oral intake currently stable with use of pain medication. Goal is 6 cartons/day, currently able to tolerated 3 cartons/day and increasing slowly    Vascular access: port placed 1/11/24, no concerns    RSV: ED 12/27 for fever, cough, facial swelling. RVP positive for RSV. Treated with supportive measures. Symptomatically recovered.    Hypercalcemia: Calcium 11 last week, treated with IVF only, improved today to 9.7. Most likely s/t malignancy.     Hepatitis B: [facility] note from 1/5/23 (attached in chart) reviewed. Unclear if there was a more recent visit Remains on entecavir.     H/o PE/DVT: 5/8/2019 CTA report scanned into [employer], reviewed. Small PEs. Also had LLE DVT, acute, occlusive.  Presented with CP and LLE swelling. Was on rivaroxaban, completed course.     H/o undefined hypothyroidism: TFTs 1/8/24 normal.    Screening for TB: Quantiferon gold needed, not drawn again today. Requested again next visit (appt notes)    30 minutes spent on the date of the encounter doing chart review, review of test results, interpretation of tests, patient visit, documentation, and discussion with family     Nishi Michele CNP      SUBJECTIVE  Mr. 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 Declined professional  today, family assisted. Mr. Herrera speaks and understands conversational English.   -Tolerated week 1 cisplatin well without side effect  -No nausea  -No tinnitus or hearing changes  -No numbness/tingling in extremities  -Port placed 1/11, some tenderness but getting better  -Eating more by mouth and using g-tube for nutrition. Goal is 6 carton of formula. Able to get in about 3 before feeling full/bloated. Also having some reflux after tube feedings.    PAST MEDICAL HISTORY  Sinonasal carcinoma as above  HTN  ASCVD. CABG x 3 2019  PE and LLE DVT after CABG . Treated with rivaroxaban  Dyslipidemia  Hepatitis B   SCC R temple s/p MOHS  Ascending aortic aneurysm repair 2019  Hearing loss L   Gout - feet  Depression  Cholecystectomy    Son Berhane at [phone]    CURRENT OUTPATIENT MEDICATIONS  Current Outpatient Medications   Medication    acetaminophen (TYLENOL) 325 MG tablet    atorvastatin (LIPITOR) 80 MG tablet    capsaicin (ZOSTRIX) 0.025 % external cream    chlorhexidine (PERIDEX) 0.12 % solution    entecavir (BARACLUDE) 0.5 MG tablet    fluticasone (FLONASE) 50 MCG/ACT nasal spray    gabapentin (NEURONTIN) 300 MG capsule    nitroGLYcerin (NITROSTAT) 0.4 MG sublingual tablet    ondansetron (ZOFRAN) 8 MG tablet    prochlorperazine (COMPAZINE) 10 MG tablet    senna-docusate (SENOKOT-S/PERICOLACE) 8.6-50 MG tablet    sodium chloride (OCEAN) 0.65 % nasal spray    sodium chloride (OCEAN) 0.65 % nasal spray    traMADol (ULTRAM) 50 MG tablet    traZODone (DESYREL) 50 MG tablet     No current facility-administered medications for this visit.     Facility-Administered Medications Ordered in Other Visits   Medication    CISplatin (PLATINOL) 60 mg in sodium chloride 0.9 % 335 mL infusion    fosaprepitant (EMEND) 150 mg, dexAMETHasone (DECADRON) 12 mg in sodium chloride 0.9 %  276.2 mL intermittent infusion    heparin 100 unit/mL injection 5 mL    palonosetron (ALOXI) injection 0.25 mg    sodium chloride (PF) 0.9% PF flush 3-20 mL    sodium chloride 0.9 % 1,000 mL with magnesium sulfate 2 g *See DOSE to administer in MAR details (order question)    sodium chloride 0.9% BOLUS 1,000 mL     ALLERGIES  No Known Allergies     SOCIAL HISTORY: Non smoker. No current ETOH. Lives with his children. Immigrated from [place] [facility] [place] in the [place]. Not working. On disability. Used to be a . Lived in [facility] [place], [facility]. One daughter and 5 sons. Two grandchildren.      FAMILY HISTORY:   Family History   Problem Relation Age of Onset    Cerebrovascular Disease Mother 90.00    Acute Myocardial Infarction No family hx of      PHYSICAL EXAM  /77 (BP Location: Right arm, Patient Position: Sitting, Cuff Size: Adult Regular)   Pulse 81   Temp 98.4  F (36.9  C) (Oral)   Wt 58.7 kg (129 lb 4.8 oz)   SpO2 98%   BMI 22.19 kg/m      GEN: NAD  HEENT: EOMI, no icterus, injection or pallor. Oropharynx - mild trismus. Visible tumor to R posterior hard palate with area of mucosal erosion, slightly smaller than last week.  RESP: cta bilaterally, no wheezes  CV: rrr, no murmur   EXT: no edema  NEURO: alert, oriented, no focal deficits  SKIN: R chest port accessed, no edema/erythema. Incisions intact.    LABORATORY AND IMAGING STUDIES  Most Recent 3 CBC's:  Recent Labs   Lab Test 01/15/24  0647 01/08/24  0717 01/02/24  0822 12/27/23  1400   WBC 7.1 9.3 11.2* 8.3   HGB 11.7* 11.6* 11.3* 11.6*   MCV 83 85 86 86    345 356 240   ANEUTAUTO 4.5 5.8  --  5.5    Most Recent 3 BMP's:  Recent Labs   Lab Test 01/15/24  0647 01/08/24  0717 12/27/23  1400    139 132*   POTASSIUM 4.2 4.1 4.0   CHLORIDE 100 104 95*   CO2 28 27 26   BUN 15.0 20.1 13.9   CR 0.68 0.73 0.76   ANIONGAP 9 8 11   FLORENCE 9.7 11.0* 10.0   * 112* 111*   PROTTOTAL 8.1 8.8* 8.3   ALBUMIN 3.6 3.4* 3.6    Most Recent 2 LFT's:  Recent Labs    Lab Test 01/15/24  0647 01/08/24  0717   AST 39 26   ALT 45 31   ALKPHOS 88 77   BILITOTAL 0.5 0.4    Most Recent TSH and T4:  Recent Labs   Lab Test 01/08/24  0717   TSH 3.35   T4 1.00     Phos/Mag:  Lab Results   Component Value Date    MAG 1.9 01/15/2024    MAG 2.0 01/08/2024    MAG 1.8 05/08/2019      I reviewed the above labs today.

## 2024-01-15 ENCOUNTER — APPOINTMENT (OUTPATIENT)
Dept: LAB | Facility: CLINIC | Age: 64
End: 2024-01-15
Attending: INTERNAL MEDICINE
Payer: COMMERCIAL

## 2024-01-15 ENCOUNTER — INFUSION THERAPY VISIT (OUTPATIENT)
Dept: ONCOLOGY | Facility: CLINIC | Age: 64
End: 2024-01-15
Attending: INTERNAL MEDICINE
Payer: COMMERCIAL

## 2024-01-15 ENCOUNTER — APPOINTMENT (OUTPATIENT)
Dept: RADIATION ONCOLOGY | Facility: CLINIC | Age: 64
End: 2024-01-15
Attending: RADIOLOGY
Payer: COMMERCIAL

## 2024-01-15 ENCOUNTER — ONCOLOGY VISIT (OUTPATIENT)
Dept: ONCOLOGY | Facility: CLINIC | Age: 64
End: 2024-01-15
Attending: REGISTERED NURSE
Payer: COMMERCIAL

## 2024-01-15 ENCOUNTER — THERAPY VISIT (OUTPATIENT)
Dept: SPEECH THERAPY | Facility: CLINIC | Age: 64
End: 2024-01-15
Payer: COMMERCIAL

## 2024-01-15 VITALS
TEMPERATURE: 98.4 F | DIASTOLIC BLOOD PRESSURE: 77 MMHG | SYSTOLIC BLOOD PRESSURE: 115 MMHG | BODY MASS INDEX: 22.19 KG/M2 | WEIGHT: 129.3 LBS | HEART RATE: 81 BPM | OXYGEN SATURATION: 98 %

## 2024-01-15 VITALS — DIASTOLIC BLOOD PRESSURE: 61 MMHG | SYSTOLIC BLOOD PRESSURE: 93 MMHG | HEART RATE: 83 BPM

## 2024-01-15 DIAGNOSIS — E83.42 HYPOMAGNESEMIA: Primary | ICD-10-CM

## 2024-01-15 DIAGNOSIS — B19.10 HEPATITIS B INFECTION WITHOUT DELTA AGENT WITHOUT HEPATIC COMA, UNSPECIFIED CHRONICITY: ICD-10-CM

## 2024-01-15 DIAGNOSIS — H90.3 ASYMMETRICAL SENSORINEURAL HEARING LOSS: ICD-10-CM

## 2024-01-15 DIAGNOSIS — R13.12 OROPHARYNGEAL DYSPHAGIA: Primary | ICD-10-CM

## 2024-01-15 DIAGNOSIS — C31.9 MALIGNANT NEOPLASM OF PARANASAL SINUS (H): ICD-10-CM

## 2024-01-15 DIAGNOSIS — C31.0 SQUAMOUS CELL CARCINOMA OF MAXILLARY SINUS (H): ICD-10-CM

## 2024-01-15 DIAGNOSIS — E83.42 HYPOMAGNESEMIA: ICD-10-CM

## 2024-01-15 DIAGNOSIS — Z91.89 AT RISK FOR MALNUTRITION: ICD-10-CM

## 2024-01-15 DIAGNOSIS — C31.0 SQUAMOUS CELL CARCINOMA OF MAXILLARY SINUS (H): Primary | ICD-10-CM

## 2024-01-15 LAB
ALBUMIN SERPL BCG-MCNC: 3.6 G/DL (ref 3.5–5.2)
ALP SERPL-CCNC: 88 U/L (ref 40–150)
ALT SERPL W P-5'-P-CCNC: 45 U/L (ref 0–70)
ANION GAP SERPL CALCULATED.3IONS-SCNC: 9 MMOL/L (ref 7–15)
AST SERPL W P-5'-P-CCNC: 39 U/L (ref 0–45)
BASOPHILS # BLD AUTO: 0 10E3/UL (ref 0–0.2)
BASOPHILS NFR BLD AUTO: 1 %
BILIRUB SERPL-MCNC: 0.5 MG/DL
BUN SERPL-MCNC: 15 MG/DL (ref 8–23)
CALCIUM SERPL-MCNC: 9.7 MG/DL (ref 8.8–10.2)
CHLORIDE SERPL-SCNC: 100 MMOL/L (ref 98–107)
CREAT SERPL-MCNC: 0.68 MG/DL (ref 0.67–1.17)
DEPRECATED HCO3 PLAS-SCNC: 28 MMOL/L (ref 22–29)
EGFRCR SERPLBLD CKD-EPI 2021: >90 ML/MIN/1.73M2
EOSINOPHIL # BLD AUTO: 0.1 10E3/UL (ref 0–0.7)
EOSINOPHIL NFR BLD AUTO: 1 %
ERYTHROCYTE [DISTWIDTH] IN BLOOD BY AUTOMATED COUNT: 14.2 % (ref 10–15)
GLUCOSE SERPL-MCNC: 101 MG/DL (ref 70–99)
HCT VFR BLD AUTO: 35.2 % (ref 40–53)
HGB BLD-MCNC: 11.7 G/DL (ref 13.3–17.7)
IMM GRANULOCYTES # BLD: 0.1 10E3/UL
IMM GRANULOCYTES NFR BLD: 1 %
LYMPHOCYTES # BLD AUTO: 1.7 10E3/UL (ref 0.8–5.3)
LYMPHOCYTES NFR BLD AUTO: 24 %
MAGNESIUM SERPL-MCNC: 1.9 MG/DL (ref 1.7–2.3)
MCH RBC QN AUTO: 27.7 PG (ref 26.5–33)
MCHC RBC AUTO-ENTMCNC: 33.2 G/DL (ref 31.5–36.5)
MCV RBC AUTO: 83 FL (ref 78–100)
MONOCYTES # BLD AUTO: 0.7 10E3/UL (ref 0–1.3)
MONOCYTES NFR BLD AUTO: 10 %
NEUTROPHILS # BLD AUTO: 4.5 10E3/UL (ref 1.6–8.3)
NEUTROPHILS NFR BLD AUTO: 63 %
NRBC # BLD AUTO: 0 10E3/UL
NRBC BLD AUTO-RTO: 0 /100
PLATELET # BLD AUTO: 253 10E3/UL (ref 150–450)
POTASSIUM SERPL-SCNC: 4.2 MMOL/L (ref 3.4–5.3)
PROT SERPL-MCNC: 8.1 G/DL (ref 6.4–8.3)
RBC # BLD AUTO: 4.22 10E6/UL (ref 4.4–5.9)
SODIUM SERPL-SCNC: 137 MMOL/L (ref 135–145)
WBC # BLD AUTO: 7.1 10E3/UL (ref 4–11)

## 2024-01-15 PROCEDURE — 77386 HC IMRT TREATMENT DELIVERY, COMPLEX: CPT | Performed by: RADIOLOGY

## 2024-01-15 PROCEDURE — 85025 COMPLETE CBC W/AUTO DIFF WBC: CPT | Performed by: REGISTERED NURSE

## 2024-01-15 PROCEDURE — 83735 ASSAY OF MAGNESIUM: CPT | Performed by: REGISTERED NURSE

## 2024-01-15 PROCEDURE — 96367 TX/PROPH/DG ADDL SEQ IV INF: CPT

## 2024-01-15 PROCEDURE — 92526 ORAL FUNCTION THERAPY: CPT | Mod: GN | Performed by: SPEECH-LANGUAGE PATHOLOGIST

## 2024-01-15 PROCEDURE — 96375 TX/PRO/DX INJ NEW DRUG ADDON: CPT

## 2024-01-15 PROCEDURE — 80053 COMPREHEN METABOLIC PANEL: CPT | Performed by: REGISTERED NURSE

## 2024-01-15 PROCEDURE — 36591 DRAW BLOOD OFF VENOUS DEVICE: CPT | Performed by: REGISTERED NURSE

## 2024-01-15 PROCEDURE — G0463 HOSPITAL OUTPT CLINIC VISIT: HCPCS | Performed by: REGISTERED NURSE

## 2024-01-15 PROCEDURE — 96413 CHEMO IV INFUSION 1 HR: CPT

## 2024-01-15 PROCEDURE — 250N000011 HC RX IP 250 OP 636: Performed by: REGISTERED NURSE

## 2024-01-15 PROCEDURE — 258N000003 HC RX IP 258 OP 636: Performed by: REGISTERED NURSE

## 2024-01-15 PROCEDURE — 99214 OFFICE O/P EST MOD 30 MIN: CPT | Mod: 24 | Performed by: REGISTERED NURSE

## 2024-01-15 RX ORDER — PALONOSETRON 0.05 MG/ML
0.25 INJECTION, SOLUTION INTRAVENOUS ONCE
Status: CANCELLED | OUTPATIENT
Start: 2024-01-15

## 2024-01-15 RX ORDER — EPINEPHRINE 1 MG/ML
0.3 INJECTION, SOLUTION INTRAMUSCULAR; SUBCUTANEOUS EVERY 5 MIN PRN
Status: CANCELLED | OUTPATIENT
Start: 2024-01-15

## 2024-01-15 RX ORDER — LORAZEPAM 2 MG/ML
0.5 INJECTION INTRAMUSCULAR EVERY 4 HOURS PRN
Status: CANCELLED | OUTPATIENT
Start: 2024-01-15

## 2024-01-15 RX ORDER — HEPARIN SODIUM (PORCINE) LOCK FLUSH IV SOLN 100 UNIT/ML 100 UNIT/ML
5 SOLUTION INTRAVENOUS ONCE
Status: COMPLETED | OUTPATIENT
Start: 2024-01-15 | End: 2024-01-15

## 2024-01-15 RX ORDER — PALONOSETRON 0.05 MG/ML
0.25 INJECTION, SOLUTION INTRAVENOUS ONCE
Status: COMPLETED | OUTPATIENT
Start: 2024-01-15 | End: 2024-01-15

## 2024-01-15 RX ORDER — ALBUTEROL SULFATE 90 UG/1
1-2 AEROSOL, METERED RESPIRATORY (INHALATION)
Status: CANCELLED
Start: 2024-01-15

## 2024-01-15 RX ORDER — MEPERIDINE HYDROCHLORIDE 25 MG/ML
25 INJECTION INTRAMUSCULAR; INTRAVENOUS; SUBCUTANEOUS EVERY 30 MIN PRN
Status: CANCELLED | OUTPATIENT
Start: 2024-01-15

## 2024-01-15 RX ORDER — DIPHENHYDRAMINE HYDROCHLORIDE 50 MG/ML
50 INJECTION INTRAMUSCULAR; INTRAVENOUS
Status: CANCELLED
Start: 2024-01-15

## 2024-01-15 RX ORDER — HEPARIN SODIUM,PORCINE 10 UNIT/ML
5-20 VIAL (ML) INTRAVENOUS DAILY PRN
Status: CANCELLED | OUTPATIENT
Start: 2024-01-15

## 2024-01-15 RX ORDER — METHYLPREDNISOLONE SODIUM SUCCINATE 125 MG/2ML
125 INJECTION, POWDER, LYOPHILIZED, FOR SOLUTION INTRAMUSCULAR; INTRAVENOUS
Status: CANCELLED
Start: 2024-01-15

## 2024-01-15 RX ORDER — ALBUTEROL SULFATE 0.83 MG/ML
2.5 SOLUTION RESPIRATORY (INHALATION)
Status: CANCELLED | OUTPATIENT
Start: 2024-01-15

## 2024-01-15 RX ORDER — HEPARIN SODIUM (PORCINE) LOCK FLUSH IV SOLN 100 UNIT/ML 100 UNIT/ML
5 SOLUTION INTRAVENOUS
Status: CANCELLED | OUTPATIENT
Start: 2024-01-15

## 2024-01-15 RX ORDER — HEPARIN SODIUM (PORCINE) LOCK FLUSH IV SOLN 100 UNIT/ML 100 UNIT/ML
5 SOLUTION INTRAVENOUS
Status: DISCONTINUED | OUTPATIENT
Start: 2024-01-15 | End: 2024-01-15 | Stop reason: HOSPADM

## 2024-01-15 RX ADMIN — CISPLATIN 60 MG: 1 INJECTION, SOLUTION INTRAVENOUS at 09:22

## 2024-01-15 RX ADMIN — SODIUM CHLORIDE 1000 ML: 9 INJECTION, SOLUTION INTRAVENOUS at 07:40

## 2024-01-15 RX ADMIN — DEXAMETHASONE SODIUM PHOSPHATE: 10 INJECTION, SOLUTION INTRAMUSCULAR; INTRAVENOUS at 08:10

## 2024-01-15 RX ADMIN — PALONOSETRON HYDROCHLORIDE 0.25 MG: 0.25 INJECTION INTRAVENOUS at 08:10

## 2024-01-15 RX ADMIN — Medication 5 ML: at 06:40

## 2024-01-15 RX ADMIN — MAGNESIUM SULFATE HEPTAHYDRATE: 500 INJECTION, SOLUTION INTRAMUSCULAR; INTRAVENOUS at 08:37

## 2024-01-15 RX ADMIN — Medication 5 ML: at 10:27

## 2024-01-15 ASSESSMENT — PAIN SCALES - GENERAL: PAINLEVEL: NO PAIN (0)

## 2024-01-15 NOTE — Clinical Note
1/15/2024         RE: Douglas Herrera  [address] [place] [address]        Dear Colleague,    Thank you for referring your patient, Douglas Herrera, to the [facility]. Please see a copy of my visit note below.       [facility]    PATIENT NAME: Douglas Herrera  MRN # [id number]   DATE OF VISIT: January 15, 2024  YOB: 1960     [facility]   Radiation Oncology: Dr. Tomasa Akers  Cardiology: Dr. Qamar Hernandez  PCP: Dr. العراقي   Hepatologist: [facility]     CANCER TYPE: SCC sinonasal   STAGE: [id number] (IVB)  ECOG PS: 1    PD-L1:  NGS: internal panel pending     SUMMARY    8/9/23 CT sinus.   8/24/23 MRI sinus. L maxillary sinus mass  9/12/23 ED for epistaxis.   9/13/23 CTA and embolization of L IMAX   10/4/23 Nasal bx. Path: Inverted papilloma with high grade dysplasia  11/10/23 Nasal endoscopy and bx in the OR. C/b severe bleeding. Path: Inverted sinonasal papilloma with severe dysplasia.  11/17/23 MRI. 7.4 x 4.6 x 6.6 cm L sinonasal mass, destruction of nasal turbinates, L maxillary sinus, hard palate, invasion of L  space, involvement of medial and lateral pterygoids and temporalis muscles. Effacement and invasion ipsilateral pterygopalatine fossa, extends and effaces the nasopharynx.   11/30/23 Carotid angiogram. Direct endonasal and transoral embolization L sinonasal tumor, transaterial embolization of the L sphenopalatine artery feeders to the L sinonasal artery tumor   12/1/23 Stealth assisted transnasal endoscopic approach to excise L sinonasal mass. Pre-operative embolization. Path: SCC involving L maxilla.    12/9/23 PET. Hypermetabolic mass centered along the L maxilla, extending superiorly through the floor of the L maxillary sinus, posterior/laterally to the  space, invasion of the pterygoid muscles, extending cranially along the pterygopalatine fossa and pterygoid plates to the skull base level. Erosion  of involved bones including L maxilla, maxillary sinus wall and pterygoid plates. Extension medially to the L lateral nasopharyngeal wall and soft palate. 1 mm fat place between carotid and mass. ~4.9 x 4.0 x 5.4 cm (SUV 24.3). Partially resected since 11/17/23 MRI. 8 mm L 2A node (SUV 5.9), 7 mm L level 2 node (SUV 5.5)    ASSESSMENT AND PLAN  SCC L maxillary sinus, [id number] (IVB): s/p transnasal endoscopic excision of sinonasal mass. He is a poor candidate for TPF induction therefore plan is is for definitive chemoradiation with weekly cisplatin. Note a candidate for HD cisplatin-audiogram 12/18/23 confirms significant bilateral SNHL. Monitor closely for clinical hearing changes in weeks to come, may need to consider alternative carboplatin/paclitaxel if this becomes an issue. Reviewed schedule for chemoradiation and potential side effects of chemotherapy including nausea, hearing changes/tinnitus, renal toxicity and cytopenias. Baseline labs are acceptable to proceed with day 1 cisplatin today.    Risk for malnutrition: G-tube placement 1/2. Working with CARISA Pedro. Anticipate formula may arrive today. Oral intake currently stable with use of pain medication.    Vascular access: port placed 1/11/24    RSV: ED 12/27 for fever, cough, facial swelling. RVP positive for RSV. Treated with supportive measures. Symptomatically recovered.    Hypercalcemia: Calcium elevated today at 11. Most likely s/t malignancy. 1L NS prior to infusion. Repeat next week    Hepatitis B: [facility] note from 1/5/23 (attached in chart) reviewed. Unclear if there was a more recent visit Remains on entecavir. Hep B PCR, etc testing pending today.      H/o PE/DVT: 5/8/2019 CTA report scanned into [facility], reviewed. Small PEs. Also had LLE DVT, acute, occlusive.  Presented with CP and LLE swelling. Was on rivaroxaban, completed course.     H/o undefined hypothyroidism: TFTs today pending.     Screening for TB: Quantiferon gold needed, did not draw  today. Request with next visit.     *** minutes spent on the date of the encounter doing {2021 E&M time in:798229}     Nishi Michele CNP      SUBJECTIVE  Mr. 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 Professional  used today. Mr. Herrera speaks and understands conversational English.     ***    PAST MEDICAL HISTORY  Sinonasal carcinoma as above  HTN  ASCVD. CABG x 3 2019  PE and LLE DVT after CABG . Treated with rivaroxaban  Dyslipidemia  Hepatitis B   SCC R temple s/p MOHS  Ascending aortic aneurysm repair 2019  Hearing loss L   Gout - feet  Depression  Cholecystectomy    Son Berhane at [phone]    CURRENT OUTPATIENT MEDICATIONS  Current Outpatient Medications   Medication    acetaminophen (TYLENOL) 325 MG tablet    atorvastatin (LIPITOR) 80 MG tablet    capsaicin (ZOSTRIX) 0.025 % external cream    chlorhexidine (PERIDEX) 0.12 % solution    entecavir (BARACLUDE) 0.5 MG tablet    fluticasone (FLONASE) 50 MCG/ACT nasal spray    gabapentin (NEURONTIN) 300 MG capsule    nitroGLYcerin (NITROSTAT) 0.4 MG sublingual tablet    ondansetron (ZOFRAN) 8 MG tablet    prochlorperazine (COMPAZINE) 10 MG tablet    senna-docusate (SENOKOT-S/PERICOLACE) 8.6-50 MG tablet    sodium chloride (OCEAN) 0.65 % nasal spray    sodium chloride (OCEAN) 0.65 % nasal spray    traMADol (ULTRAM) 50 MG tablet    traZODone (DESYREL) 50 MG tablet     No current facility-administered medications for this visit.     ALLERGIES  No Known Allergies     SOCIAL HISTORY: Non smoker. No current ETOH. Lives with his children. Immigrated from [place] in the 1980s. Not working. On disability. Used to be a . Lived in [facility], [facility]. One daughter and 5 sons. Two grandchildren.      FAMILY HISTORY:   Family History   Problem Relation Age of Onset    Cerebrovascular Disease Mother 90.00    Acute Myocardial Infarction No family hx of       PHYSICAL EXAM  There were no vitals taken for this visit.    GEN: NAD  HEENT: EOMI, no icterus, injection or pallor. Oropharynx - mild trismus. Visible tumor to R posterior hard palate with area of mucosal erosion.   RESP: cta bilaterally, no wheezes  CV: rrr, no murmur   EXT: no edema  NEURO: alert, oriented, no focal deficits    LABORATORY AND IMAGING STUDIES  Most Recent 3 CBC's:  Recent Labs   Lab Test 01/08/24  0717 01/02/24  0822 12/27/23  1400 12/01/23  0904 11/30/23  1754   WBC 9.3 11.2* 8.3   < > 9.7   HGB 11.6* 11.3* 11.6*   < > 12.3*   MCV 85 86 86   < > 86    356 240   < > 182   ANEUTAUTO 5.8  --  5.5  --  7.7    < > = values in this interval not displayed.    Most Recent 3 BMP's:  Recent Labs   Lab Test 01/08/24  0717 12/27/23  1400 12/03/23  1202 12/03/23  0628 12/02/23  0736 12/02/23  0658 09/12/23  2144 05/13/22  1450    132*  --  139  --  137   < > 141   POTASSIUM 4.1 4.0  --  3.6  --  4.4   < > 4.4   CHLORIDE 104 95*  --  107  --  109*   < > 106   CO2 27 26  --  25  --  24   < > 28   BUN 20.1 13.9  --  9.6  --  9.9   < > 17   CR 0.73 0.76  --  0.83  --  0.76   < > 1.01   ANIONGAP 8 11  --  7  --  4*   < > 7   FLORENCE 11.0* 10.0  --  8.3*  --  7.8*   < > 9.3   * 111* 95 90   < > 228*   < > 81   PROTTOTAL 8.8* 8.3  --   --   --   --   --  6.9   ALBUMIN 3.4* 3.6  --   --   --  2.4*  --  3.8    < > = values in this interval not displayed.    Most Recent 2 LFT's:  Recent Labs   Lab Test 01/08/24  0717 12/27/23  1400   AST 26 86*   ALT 31 80*   ALKPHOS 77 72   BILITOTAL 0.4 1.1    Most Recent TSH and T4:  Recent Labs   Lab Test 01/08/24  0717   TSH 3.35   T4 1.00     Phos/Mag:  Lab Results   Component Value Date    MAG 2.0 01/08/2024    MAG 1.8 05/08/2019    MAG 2.0 04/29/2019      I reviewed the above labs today.           [facility]    PATIENT NAME: Douglas Herrera  MRN # [id number]   DATE OF VISIT: January 15, 2024  YOB: 1960     Otolaryngology: Dr. Jose  [facility]   Radiation Oncology: Dr. Tomasa Akers  Cardiology: Dr. Qamar Hernandez  PCP: Dr. العراقي   Hepatologist: [facility]     CANCER TYPE: SCC sinonasal   STAGE: [id number] (IVB)  ECOG PS: 1    PD-L1:  NGS: internal panel pending     SUMMARY    8/9/23 CT sinus.   8/24/23 MRI sinus. L maxillary sinus mass  9/12/23 ED for epistaxis.   9/13/23 CTA and embolization of L IMAX   10/4/23 Nasal bx. Path: Inverted papilloma with high grade dysplasia  11/10/23 Nasal endoscopy and bx in the OR. C/b severe bleeding. Path: Inverted sinonasal papilloma with severe dysplasia.  11/17/23 MRI. 7.4 x 4.6 x 6.6 cm L sinonasal mass, destruction of nasal turbinates, L maxillary sinus, hard palate, invasion of L  space, involvement of medial and lateral pterygoids and temporalis muscles. Effacement and invasion ipsilateral pterygopalatine fossa, extends and effaces the nasopharynx.   11/30/23 Carotid angiogram. Direct endonasal and transoral embolization L sinonasal tumor, transaterial embolization of the L sphenopalatine artery feeders to the L sinonasal artery tumor   12/1/23 Stealth assisted transnasal endoscopic approach to excise L sinonasal mass. Pre-operative embolization. Path: SCC involving L maxilla.    12/9/23 PET. Hypermetabolic mass centered along the L maxilla, extending superiorly through the floor of the L maxillary sinus, posterior/laterally to the  space, invasion of the pterygoid muscles, extending cranially along the pterygopalatine fossa and pterygoid plates to the skull base level. Erosion of involved bones including L maxilla, maxillary sinus wall and pterygoid plates. Extension medially to the L lateral nasopharyngeal wall and soft palate. 1 mm fat place between carotid and mass. ~4.9 x 4.0 x 5.4 cm (SUV 24.3). Partially resected since 11/17/23 MRI. 8 mm L 2A node (SUV 5.9), 7 mm L level 2 node (SUV 5.5)    ASSESSMENT AND PLAN  SCC L maxillary sinus, [id number] (IVB): s/p transnasal  endoscopic excision of sinonasal mass. He is a poor candidate for TPF induction therefore plan is is for definitive chemoradiation with weekly cisplatin. Not a candidate for HD cisplatin-audiogram 12/18/23 confirms significant bilateral SNHL. Tolerated day 1 cisplatin well without side effects. No hearing changes but weill need to watch closely and consider change to carboplatin/paclitaxel if this becomes an issues. Labs and exam acceptable for treatment today.  -Proceed with day 8 cisplatin  -RTC weekly for labs/KENNEDI/infusion    Risk for malnutrition: G-tube placement 1/2. Working with CARISA Pedro. Oral intake currently stable with use of pain medication. Goal is 6 cartons/day, currently able to tolerated 3 cartons/day and increasing slowly    Vascular access: port placed 1/11/24, no concerns    RSV: ED 12/27 for fever, cough, facial swelling. RVP positive for RSV. Treated with supportive measures. Symptomatically recovered.    Hypercalcemia: Calcium 11 last week, treated with IVF only, improved today to 9.7. Most likely s/t malignancy.     Hepatitis B: [facility] note from 1/5/23 (attached in chart) reviewed. Unclear if there was a more recent visit Remains on entecavir.     H/o PE/DVT: 5/8/2019 CTA report scanned into [employer], reviewed. Small PEs. Also had LLE DVT, acute, occlusive.  Presented with CP and LLE swelling. Was on rivaroxaban, completed course.     H/o undefined hypothyroidism: TFTs 1/8/24 normal.    Screening for TB: Quantiferon gold needed, not drawn again today. Requested again next visit (appt notes)    *** minutes spent on the date of the encounter doing {2021 E&M time in:470815}     Nishi Michele, DIANA      SUBJECTIVE  Mr. 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 Declined professional  today, family assisted. Mr. Herrera speaks and understands conversational English.   -Tolerated week 1 cisplatin  well without side effect  -No nausea  -No tinnitus or hearing changes  -No numbness/tingling in extremities  -Port placed 1/11, some tenderness but getting better  -Eating more by mouth and using g-tube for nutrition. Goal is 6 carton of formula. Able to get in about 3 before feeling full/bloated. Also having some reflux after tube feedings.    PAST MEDICAL HISTORY  Sinonasal carcinoma as above  HTN  ASCVD. CABG x 3 2019  PE and LLE DVT after CABG . Treated with rivaroxaban  Dyslipidemia  Hepatitis B   SCC R temple s/p MOHS  Ascending aortic aneurysm repair 2019  Hearing loss L   Gout - feet  Depression  Cholecystectomy    Son Berhane at [phone]    CURRENT OUTPATIENT MEDICATIONS  Current Outpatient Medications   Medication     acetaminophen (TYLENOL) 325 MG tablet     atorvastatin (LIPITOR) 80 MG tablet     capsaicin (ZOSTRIX) 0.025 % external cream     chlorhexidine (PERIDEX) 0.12 % solution     entecavir (BARACLUDE) 0.5 MG tablet     fluticasone (FLONASE) 50 MCG/ACT nasal spray     gabapentin (NEURONTIN) 300 MG capsule     nitroGLYcerin (NITROSTAT) 0.4 MG sublingual tablet     ondansetron (ZOFRAN) 8 MG tablet     prochlorperazine (COMPAZINE) 10 MG tablet     senna-docusate (SENOKOT-S/PERICOLACE) 8.6-50 MG tablet     sodium chloride (OCEAN) 0.65 % nasal spray     sodium chloride (OCEAN) 0.65 % nasal spray     traMADol (ULTRAM) 50 MG tablet     traZODone (DESYREL) 50 MG tablet     No current facility-administered medications for this visit.     Facility-Administered Medications Ordered in Other Visits   Medication     CISplatin (PLATINOL) 60 mg in sodium chloride 0.9 % 335 mL infusion     fosaprepitant (EMEND) 150 mg, dexAMETHasone (DECADRON) 12 mg in sodium chloride 0.9 % 276.2 mL intermittent infusion     heparin 100 unit/mL injection 5 mL     palonosetron (ALOXI) injection 0.25 mg     sodium chloride (PF) 0.9% PF flush 3-20 mL     sodium chloride 0.9 % 1,000 mL with magnesium sulfate 2 g *See DOSE to  administer in MAR details (order question)     sodium chloride 0.9% BOLUS 1,000 mL     ALLERGIES  No Known Allergies     SOCIAL HISTORY: Non smoker. No current ETOH. Lives with his children. Immigrated from [facility] in the 1980s. Not working. On disability. Used to be a . Lived in [facility]. One daughter and 5 sons. Two grandchildren.      FAMILY HISTORY:   Family History   Problem Relation Age of Onset     Cerebrovascular Disease Mother 90.00     Acute Myocardial Infarction No family hx of      PHYSICAL EXAM  /77 (BP Location: Right arm, Patient Position: Sitting, Cuff Size: Adult Regular)   Pulse 81   Temp 98.4  F (36.9  C) (Oral)   Wt 58.7 kg (129 lb 4.8 oz)   SpO2 98%   BMI 22.19 kg/m      GEN: NAD  HEENT: EOMI, no icterus, injection or pallor. Oropharynx - mild trismus. Visible tumor to R posterior hard palate with area of mucosal erosion, slightly smaller than last week.  RESP: cta bilaterally, no wheezes  CV: rrr, no murmur   EXT: no edema  NEURO: alert, oriented, no focal deficits  SKIN: R chest port accessed, no edema/erythema. Incisions intact.    LABORATORY AND IMAGING STUDIES  Most Recent 3 CBC's:  Recent Labs   Lab Test 01/15/24  0647 01/08/24  0717 01/02/24  0822 12/27/23  1400   WBC 7.1 9.3 11.2* 8.3   HGB 11.7* 11.6* 11.3* 11.6*   MCV 83 85 86 86    345 356 240   ANEUTAUTO 4.5 5.8  --  5.5    Most Recent 3 BMP's:  Recent Labs   Lab Test 01/15/24  0647 01/08/24  0717 12/27/23  1400    139 132*   POTASSIUM 4.2 4.1 4.0   CHLORIDE 100 104 95*   CO2 28 27 26   BUN 15.0 20.1 13.9   CR 0.68 0.73 0.76   ANIONGAP 9 8 11   FLORENCE 9.7 11.0* 10.0   * 112* 111*   PROTTOTAL 8.1 8.8* 8.3   ALBUMIN 3.6 3.4* 3.6    Most Recent 2 LFT's:  Recent Labs   Lab Test 01/15/24  0647 01/08/24  0717   AST 39 26   ALT 45 31   ALKPHOS 88 77   BILITOTAL 0.5 0.4    Most Recent TSH and T4:  Recent Labs   Lab Test 01/08/24  0717   TSH 3.35   T4 1.00     Phos/Mag:  Lab Results   Component  Value Date    MAG 1.9 01/15/2024    MAG 2.0 01/08/2024    MAG 1.8 05/08/2019      I reviewed the above labs today.          Again, thank you for allowing me to participate in the care of your patient.        Sincerely,        Nishi Michele, CNP

## 2024-01-15 NOTE — PROGRESS NOTES
Infusion Nursing Note:  Douglas Herrera presents today for C1D8 Cisplatin.    Patient seen by provider today: Yes: Nishi Michele CNP prior to infusion   present during visit today: Yes, Language: Hmong via phone .     Note: Douglas denied any questions or concerns following his visit with the provider prior to infusion today.    Upon arrival to infusion, patient reported feeling dizzy. Sitting /78 and 93/61 upon standing. Patient denied having much to drink this morning and did not have anything to eat. Provided apple juice and apple sauce. IV fluids started per treatment plan. Patient denied any similar symptoms at home.    Patient voided prior to cisplatin therapy. Patient denied any lightheaded or dizziness when up using the restroom.    Per previous RN note, patient is not taking decadron at home per Dr. Post.    Intravenous Access:  Implanted Port.    Treatment Conditions:   Latest Reference Range & Units 01/15/24 06:47   Sodium 135 - 145 mmol/L 137   Potassium 3.4 - 5.3 mmol/L 4.2   Chloride 98 - 107 mmol/L 100   Carbon Dioxide (CO2) 22 - 29 mmol/L 28   Urea Nitrogen 8.0 - 23.0 mg/dL 15.0   Creatinine 0.67 - 1.17 mg/dL 0.68   GFR Estimate >60 mL/min/1.73m2 >90   Calcium 8.8 - 10.2 mg/dL 9.7   Anion Gap 7 - 15 mmol/L 9   Magnesium 1.7 - 2.3 mg/dL 1.9   Albumin 3.5 - 5.2 g/dL 3.6   Protein Total 6.4 - 8.3 g/dL 8.1   Alkaline Phosphatase 40 - 150 U/L 88   ALT 0 - 70 U/L 45   AST 0 - 45 U/L 39   Bilirubin Total <=1.2 mg/dL 0.5   Glucose 70 - 99 mg/dL 101 (H)   WBC 4.0 - 11.0 10e3/uL 7.1   Hemoglobin 13.3 - 17.7 g/dL 11.7 (L)   Hematocrit 40.0 - 53.0 % 35.2 (L)   Platelet Count 150 - 450 10e3/uL 253   RBC Count 4.40 - 5.90 10e6/uL 4.22 (L)   MCV 78 - 100 fL 83   MCH 26.5 - 33.0 pg 27.7   MCHC 31.5 - 36.5 g/dL 33.2   RDW 10.0 - 15.0 % 14.2   % Neutrophils % 63   % Lymphocytes % 24   % Monocytes % 10   % Eosinophils % 1   % Basophils % 1   Absolute Basophils 0.0 - 0.2 10e3/uL 0.0   Absolute  Eosinophils 0.0 - 0.7 10e3/uL 0.1   Absolute Immature Granulocytes <=0.4 10e3/uL 0.1   Absolute Lymphocytes 0.8 - 5.3 10e3/uL 1.7   Absolute Monocytes 0.0 - 1.3 10e3/uL 0.7   % Immature Granulocytes % 1   Absolute Neutrophils 1.6 - 8.3 10e3/uL 4.5   Absolute NRBCs 10e3/uL 0.0   NRBCs per 100 WBC <1 /100 0     Results reviewed, labs MET treatment parameters, ok to proceed with treatment.      Post Infusion Assessment:  Patient tolerated infusion without incident.  Blood return noted pre and post infusion.  Site patent and intact, free from redness, edema or discomfort.  No evidence of extravasations.  Access discontinued per protocol.       Discharge Plan:   Discharge instructions reviewed with: Patient and family.  Patient and/or family verbalized understanding of discharge instructions and all questions answered.  AVS to patient via [employer].  Patient will return 1/23 for next appointment.   Patient discharged in stable condition accompanied by: self and son.  Departure Mode: Ambulatory.      Maria Del Carmen Corado RN

## 2024-01-15 NOTE — NURSING NOTE
Chief Complaint   Patient presents with    Port Draw     Labs drawn via port by RN in lab. VS taken.      Port accessed and labs drawn by RN. Pt tolerated well.  VS taken.  Pt checked in for next appt.    Ashley Talamantes RN

## 2024-01-15 NOTE — PATIENT INSTRUCTIONS
[facility] Triage and after hours / weekends / holidays:  [phone] option 5, option 2    Please call the triage or after hours line if you experience a temperature greater than or equal to 100.4, shaking chills, have uncontrolled nausea, vomiting and/or diarrhea, dizziness, shortness of breath, chest pain, bleeding, unexplained bruising, or if you have any other new/concerning symptoms, questions or concerns.      If you are having any concerning symptoms or wish to speak to a provider before your next infusion visit, please call triage to notify your care team so we can adequately serve you.     If you need a refill on a narcotic prescription or other medication, please call before your infusion appointment.

## 2024-01-16 ENCOUNTER — APPOINTMENT (OUTPATIENT)
Dept: RADIATION ONCOLOGY | Facility: CLINIC | Age: 64
End: 2024-01-16
Attending: RADIOLOGY
Payer: COMMERCIAL

## 2024-01-16 VITALS
WEIGHT: 129 LBS | SYSTOLIC BLOOD PRESSURE: 110 MMHG | HEART RATE: 82 BPM | BODY MASS INDEX: 22.14 KG/M2 | DIASTOLIC BLOOD PRESSURE: 74 MMHG

## 2024-01-16 DIAGNOSIS — C31.0 SQUAMOUS CELL CARCINOMA OF MAXILLARY SINUS (H): Primary | ICD-10-CM

## 2024-01-16 PROCEDURE — 77386 HC IMRT TREATMENT DELIVERY, COMPLEX: CPT | Performed by: RADIOLOGY

## 2024-01-16 NOTE — LETTER
2024         RE: Douglas Herrera  [address] [place] [address]        Dear Colleague,    Thank you for referring your patient, Douglas Herrera, to the [facility] RADIATION [facility]. Please see a copy of my visit note below.    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 Herrera presents to clinic today for his weekly on-treatment visit. He continues to reports no immediate concerns related to radiation treatment. He had a G-tube placed 2024.    Nursing ROS:   Nutrition Alteration  Diet Type: Patient's Preference  Skin  Skin Reaction: 0 - No changes  Skin Note: Aquaphor     ENT and Mouth Exam  Mucositis - Current: 0 - None   ENT/Mouth Note: S&S 15-20     Gastrointestinal  Nausea: 0 - None        Pain Assessment  0-10 Pain Scale: 0  Pain Treatment: Gagbapentin      Objective:   /74   Pulse 82   Wt 58.5 kg (129 lb)   BMI 22.14 kg/m     KPS 80  General: Alert and in no acute distress.  HEENT: Normocephalic, atraumatic. No visible scleral icterus.  Neck: Apparent full range of motion.  CV: Appears well-perfused, with no visible cyanosis.  Lungs: Breathing easily on room air, with no difficulty completing full sentences  Extremities:  No visible edema of the upper extremities.   Neuro: Alert and oriented; grossly nonfocal. Normal speech. Moving upper and lower extremities equally.  Gait within normal limits.  Skin: No visible jaundice. No suspicious lesions of the visualized integuments. No visible erythema.   Psych: Mood and affect are appropriate to given situation. Answers  questions appropriately.        Treatment-related toxicities (CTCAE v5.0):  Fatigue: Grade 0: No toxicity  Pain: Grade 0: No toxicity  Dry mouth: Grade 0: No toxicity  Mucositis: Grade 0: No toxicity    Assessment:    Mr. 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  Multidisciplinary conference consensus opinion is towards chemoradiation.  He has had significant hearing loss and thus Oncology has recommended weekly cisplatin. He is tolerating therapy well to date.    Plan:   Continue with treatment as planned  Nutrition per oral and EN to maintain weight as per recommendations of Mayela An     Mosaiq chart and setup information reviewed  MVCT images reviewed    Medication Review  Med list reviewed with patient?: Yes  Med list printed and given: Offered and declined    Darlene Wright MD, MPH, PhD, covering for Tomasa Akers MD     Department of Radiation Oncology  [facility]

## 2024-01-17 ENCOUNTER — APPOINTMENT (OUTPATIENT)
Dept: RADIATION ONCOLOGY | Facility: CLINIC | Age: 64
End: 2024-01-17
Attending: RADIOLOGY
Payer: COMMERCIAL

## 2024-01-17 DIAGNOSIS — C31.0 SQUAMOUS CELL CARCINOMA OF MAXILLARY SINUS (H): ICD-10-CM

## 2024-01-17 DIAGNOSIS — B19.10 HEPATITIS B INFECTION WITHOUT DELTA AGENT WITHOUT HEPATIC COMA, UNSPECIFIED CHRONICITY: Primary | ICD-10-CM

## 2024-01-17 PROCEDURE — 77014 PR CT GUIDE FOR PLACEMENT RADIATION THERAPY FIELDS: CPT | Mod: 26 | Performed by: RADIOLOGY

## 2024-01-17 PROCEDURE — 77386 HC IMRT TREATMENT DELIVERY, COMPLEX: CPT | Performed by: RADIOLOGY

## 2024-01-17 NOTE — PROGRESS NOTES
RADIATION ONCOLOGY WEEKLY ON TREATMENT VISIT   Encounter Date: 2024     Patient Name: Douglas Herrera  MRN: [id number]  : 1960     Disease and Stage: T4b N2b M0, stage IVB sinonasal carcinoma  Treatment Site: left sinonasal primary and bilateral neck nodes    Current Dose/Planned Total Dose: 400 / 7000 cGy  Concurrent Chemotherapy: Yes  Drug and Frequency: cisplatin      ED visits/Hospitalizations:  None    Missed Treatments:  None    Subjective: Mr. Herrera presents to clinic today for his weekly on-treatment visit. He reports no immediate concerns related to radiation treatment. He had a G-tube placed 2024.    Nursing ROS:   Nutrition Alteration  Diet Type: Patient's Preference  Skin  Skin Reaction: 0 - No changes  Skin Note: Aquaphor     ENT and Mouth Exam  Mucositis - Current: 0 - None   ENT/Mouth Note: S&S 15-20     Gastrointestinal  Nausea: 0 - None        Pain Assessment  0-10 Pain Scale: 0  Pain Treatment: Gagbapentin      Objective:   /67   Pulse 66   Wt 58.5 kg (129 lb)   BMI 23.15 kg/m     KPS 80  General: Alert and in no acute distress.  HEENT: Normocephalic, atraumatic. No visible scleral icterus.  Neck: Apparent full range of motion.  CV: Appears well-perfused, with no visible cyanosis.  Lungs: Breathing easily on room air, with no difficulty completing full sentences  Extremities:  No visible edema of the upper extremities.   Neuro: Alert and oriented; grossly nonfocal. Normal speech. Moving upper and lower extremities equally.  Gait within normal limits.  Skin: No visible jaundice. No suspicious lesions of the visualized integuments. No visible erythema.   Psych: Mood and affect are appropriate to given situation. Answers questions appropriately.        Treatment-related toxicities (CTCAE v5.0):  Fatigue: Grade 0: No toxicity  Pain: Grade 0: No toxicity  Dry mouth: Grade 0: No toxicity  Dysphagia: Grade 0: No toxicity  Mucositis: Grade 0: No toxicity    Assessment:    Mr. 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  Multidisciplinary conference consensus opinion is towards chemoradiation.  He has had significant hearing loss and thus Oncology has recommended weekly cisplatin. He is tolerating therapy well to date.    Plan:   Continue with treatment as planned  Nutrition per oral and EN to maintain weight as per recommendations of Mayela An     Mosaiq chart and setup information reviewed  MVCT images reviewed    Medication Review  Med list reviewed with patient?: Yes  Med list printed and given: Offered and declined    Darlene Wright MD, MPH, PhD, covering for Tomasa Akers MD     Department of Radiation Oncology  [facility]

## 2024-01-18 ENCOUNTER — APPOINTMENT (OUTPATIENT)
Dept: RADIATION ONCOLOGY | Facility: CLINIC | Age: 64
End: 2024-01-18
Attending: RADIOLOGY
Payer: COMMERCIAL

## 2024-01-18 PROCEDURE — 77386 HC IMRT TREATMENT DELIVERY, COMPLEX: CPT | Performed by: RADIOLOGY

## 2024-01-18 PROCEDURE — 77014 PR CT GUIDE FOR PLACEMENT RADIATION THERAPY FIELDS: CPT | Mod: 26 | Performed by: STUDENT IN AN ORGANIZED HEALTH CARE EDUCATION/TRAINING PROGRAM

## 2024-01-19 ENCOUNTER — APPOINTMENT (OUTPATIENT)
Dept: RADIATION ONCOLOGY | Facility: CLINIC | Age: 64
End: 2024-01-19
Attending: RADIOLOGY
Payer: COMMERCIAL

## 2024-01-19 PROCEDURE — 77427 RADIATION TX MANAGEMENT X5: CPT | Performed by: RADIOLOGY

## 2024-01-19 PROCEDURE — 77386 HC IMRT TREATMENT DELIVERY, COMPLEX: CPT | Performed by: RADIOLOGY

## 2024-01-19 PROCEDURE — 77336 RADIATION PHYSICS CONSULT: CPT | Performed by: RADIOLOGY

## 2024-01-22 NOTE — PROGRESS NOTES
RADIATION ONCOLOGY WEEKLY ON TREATMENT VISIT   Encounter Date: 2024     Patient Name: Douglas Herrera  MRN: [id number]  : 1960     Disease and Stage: T4b N2b M0, stage IVB sinonasal carcinoma  Treatment Site: left sinonasal primary and bilateral neck nodes    Current Dose/Planned Total Dose: 1400 / 7000 cGy  Concurrent Chemotherapy: Yes  Drug and Frequency: cisplatin      ED visits/Hospitalizations:  None    Missed Treatments:  None    Subjective: Mr. Herrera presents to clinic today for his weekly on-treatment visit. He continues to reports no immediate concerns related to radiation treatment. He had a G-tube placed 2024.    Nursing ROS:   Nutrition Alteration  Diet Type: Patient's Preference  Skin  Skin Reaction: 0 - No changes  Skin Note: Aquaphor     ENT and Mouth Exam  Mucositis - Current: 0 - None   ENT/Mouth Note: S&S 15-20     Gastrointestinal  Nausea: 0 - None        Pain Assessment  0-10 Pain Scale: 0  Pain Treatment: Gagbapentin      Objective:   /74   Pulse 82   Wt 58.5 kg (129 lb)   BMI 22.14 kg/m     KPS 80  General: Alert and in no acute distress.  HEENT: Normocephalic, atraumatic. No visible scleral icterus.  Neck: Apparent full range of motion.  CV: Appears well-perfused, with no visible cyanosis.  Lungs: Breathing easily on room air, with no difficulty completing full sentences  Extremities:  No visible edema of the upper extremities.   Neuro: Alert and oriented; grossly nonfocal. Normal speech. Moving upper and lower extremities equally.  Gait within normal limits.  Skin: No visible jaundice. No suspicious lesions of the visualized integuments. No visible erythema.   Psych: Mood and affect are appropriate to given situation. Answers questions appropriately.        Treatment-related toxicities (CTCAE v5.0):  1. Fatigue: Grade 0: No toxicity  2. Pain: Grade 0: No toxicity  3. Dry mouth: Grade 0: No toxicity  4. Mucositis: Grade 0: No toxicity    Assessment:    Mr. 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  Multidisciplinary conference consensus opinion is towards chemoradiation.  He has had significant hearing loss and thus Oncology has recommended weekly cisplatin. He is tolerating therapy well to date.    Plan:   1. Continue with treatment as planned  2. Nutrition per oral and EN to maintain weight as per recommendations of Mayela An     Mosaiq chart and setup information reviewed  MVCT images reviewed    Medication Review  Med list reviewed with patient?: Yes  Med list printed and given: Offered and declined    Darlene Wright MD, MPH, PhD, covering for Tomasa Akers MD     Department of Radiation Oncology  [facility]

## 2024-01-22 NOTE — PROGRESS NOTES
[facility] CANCER [facility]    PATIENT NAME: Douglas Herrera  MRN # [id number]   DATE OF VISIT: January 23, 2024  YOB: 1960     [facility]   Radiation Oncology: Dr. Tomasa Akers  Cardiology: Dr. Qamar Hernandez  PCP: Dr. العراقي   Hepatologist: [facility]     CANCER TYPE: SCC sinonasal   STAGE: [id number] (IVB)  ECOG PS: 1    PD-L1:  NGS: internal panel pending     SUMMARY    8/9/23 CT sinus.   8/24/23 MRI sinus. L maxillary sinus mass  9/12/23 ED for epistaxis.   9/13/23 CTA and embolization of L IMAX   10/4/23 Nasal bx. Path: Inverted papilloma with high grade dysplasia  11/10/23 Nasal endoscopy and bx in the OR. C/b severe bleeding. Path: Inverted sinonasal papilloma with severe dysplasia.  11/17/23 MRI. 7.4 x 4.6 x 6.6 cm L sinonasal mass, destruction of nasal turbinates, L maxillary sinus, hard palate, invasion of L  space, involvement of medial and lateral pterygoids and temporalis muscles. Effacement and invasion ipsilateral pterygopalatine fossa, extends and effaces the nasopharynx.   11/30/23 Carotid angiogram. Direct endonasal and transoral embolization L sinonasal tumor, transaterial embolization of the L sphenopalatine artery feeders to the L sinonasal artery tumor   12/1/23 Stealth assisted transnasal endoscopic approach to excise L sinonasal mass. Pre-operative embolization. Path: SCC involving L maxilla.    12/9/23 PET. Hypermetabolic mass centered along the L maxilla, extending superiorly through the floor of the L maxillary sinus, posterior/laterally to the  space, invasion of the pterygoid muscles, extending cranially along the pterygopalatine fossa and pterygoid plates to the skull base level. Erosion of involved bones including L maxilla, maxillary sinus wall and pterygoid plates. Extension medially to the L lateral nasopharyngeal wall and soft palate. 1 mm fat place between carotid and mass. ~4.9 x 4.0 x 5.4 cm (SUV 24.3). Partially  resected since 11/17/23 MRI. 8 mm L 2A node (SUV 5.9), 7 mm L level 2 node (SUV 5.5)    ASSESSMENT AND PLAN  SCC L maxillary sinus, [id number] (IVB): s/p transnasal endoscopic excision of sinonasal mass. He is a poor candidate for TPF induction therefore plan is is for definitive chemoradiation with weekly cisplatin. Not a candidate for HD cisplatin-audiogram 12/18/23 confirms significant bilateral SNHL. Tolerating cisplatin well without significant side effects or lab abnormalities. No hearing changes but will need to watch closely and consider change to carboplatin/paclitaxel if this becomes an issue.  -Proceed with day 15 cisplatin  -RTC weekly for labs/KENNEDI/infusion    Risk for malnutrition: G-tube placement 1/2. Working with CARISA Pedro-sees today. Oral intake decreased and having difficulty increasing to TF goal of 6 cartons/day. Weight down ~3lb, discussed goal for stability rather than drastic weight gain.    Constipation: Remains an issue with daily Miralax. Add Senna 1-2 tab BID PRN.    Vascular access: port placed 1/11/24, no concerns    RSV: ED 12/27 for fever, cough, facial swelling. RVP positive for RSV. Treated with supportive measures. Symptomatically recovered.    Hypercalcemia: Resolved    Hepatitis B: [facility] note from 1/5/23 (attached in chart) reviewed. Unclear if there was a more recent visit Remains on entecavir.     H/o PE/DVT: 5/8/2019 CTA report scanned into [employer], reviewed. Small PEs. Also had LLE DVT, acute, occlusive.  Presented with CP and LLE swelling. Was on rivaroxaban, completed course.     H/o undefined hypothyroidism: TFTs 1/8/24 normal.    Screening for TB: Quantiferon gold drawn today, pending.    46 minutes spent on the date of the encounter doing chart review, review of test results, interpretation of tests, patient visit, and documentation     Nishi Michele CNP    SUBJECTIVE  Mr. Herrera is a 64 yo male who is here for day 15 weekly cisplatin given concurrently with RT for sinonasal  carcinoma in the setting of inverted papilloma. Professional ong  used via video for visit today. Mr. Herrera speaks and understands conversational English.   -Sputum is thicker, more sticky  -Appetite decreased  -No taste  -Not able to eat much by mouth  -Only getting in about 3 cartons of TF daily. Feels too full  -Constipated, taking Miralax. Went 5 days without BM. Thinks this is also contributing to poor intake  -No change in hearing      PAST MEDICAL HISTORY  Sinonasal carcinoma as above  HTN  ASCVD. CABG x 3 2019  PE and LLE DVT after CABG . Treated with rivaroxaban  Dyslipidemia  Hepatitis B   SCC R temple s/p MOHS  Ascending aortic aneurysm repair 2019  Hearing loss L   Gout - feet  Depression  Cholecystectomy    Son Berhane at [phone]    CURRENT OUTPATIENT MEDICATIONS  Current Outpatient Medications   Medication    chlorhexidine (PERIDEX) 0.12 % solution    gabapentin (NEURONTIN) 300 MG capsule    ondansetron (ZOFRAN) 8 MG tablet    prochlorperazine (COMPAZINE) 10 MG tablet    senna-docusate (SENNA S) 8.6-50 MG tablet    acetaminophen (TYLENOL) 325 MG tablet    atorvastatin (LIPITOR) 80 MG tablet    capsaicin (ZOSTRIX) 0.025 % external cream    entecavir (BARACLUDE) 0.5 MG tablet    fluticasone (FLONASE) 50 MCG/ACT nasal spray    nitroGLYcerin (NITROSTAT) 0.4 MG sublingual tablet    sodium chloride (OCEAN) 0.65 % nasal spray    sodium chloride (OCEAN) 0.65 % nasal spray    traMADol (ULTRAM) 50 MG tablet    traZODone (DESYREL) 50 MG tablet     No current facility-administered medications for this visit.     Facility-Administered Medications Ordered in Other Visits   Medication    CISplatin (PLATINOL) 60 mg in sodium chloride 0.9 % 335 mL infusion    fosaprepitant (EMEND) 150 mg, dexAMETHasone (DECADRON) 12 mg in sodium chloride 0.9 % 276.2 mL intermittent infusion    heparin 100 unit/mL injection 5 mL    sodium chloride (PF) 0.9% PF flush 3-20 mL    sodium chloride 0.9 % 1,000 mL with magnesium  sulfate 2 g *See DOSE to administer in MAR details (order question)    sodium chloride 0.9% BOLUS 1,000 mL     ALLERGIES  No Known Allergies     SOCIAL HISTORY: Non smoker. No current ETOH. Lives with his children. Immigrated from [place] in the 1980s. Not working. On disability. Used to be a . Lived in [facility], [facility]. One daughter and 5 sons. Two grandchildren.      FAMILY HISTORY:   Family History   Problem Relation Age of Onset    Cerebrovascular Disease Mother 90.00    Acute Myocardial Infarction No family hx of      PHYSICAL EXAM  /77 (BP Location: Right arm, Patient Position: Sitting, Cuff Size: Adult Regular)   Pulse 89   Temp 98.3  F (36.8  C) (Oral)   Resp 18   Wt 57.4 kg (126 lb 9.6 oz)   SpO2 97%   BMI 21.73 kg/m      GEN: NAD  HEENT: EOMI, no icterus, injection or pallor. Oropharynx - mild trismus. Visible tumor to R posterior hard palate with overlying erythema and mucositis, appears smaller.  RESP: cta bilaterally, no wheezes  CV: rrr, no murmur   GI: abd soft/nt, g-tube without surrounding erythema or drainage to insertion site  EXT: no edema  NEURO: alert, oriented, no focal deficits  SKIN: R chest port accessed, no edema/erythema. Incisions intact.    LABORATORY AND IMAGING STUDIES  Most Recent 3 CBC's:  Recent Labs   Lab Test 01/23/24  0656 01/15/24  0647 01/08/24  0717   WBC 6.5 7.1 9.3   HGB 11.2* 11.7* 11.6*   MCV 83 83 85    253 345   ANEUTAUTO 4.3 4.5 5.8    Most Recent 3 BMP's:  Recent Labs   Lab Test 01/23/24  0656 01/15/24  0647 01/08/24  0717    137 139   POTASSIUM 4.5 4.2 4.1   CHLORIDE 98 100 104   CO2 27 28 27   BUN 19.0 15.0 20.1   CR 0.75 0.68 0.73   ANIONGAP 10 9 8   FLORENCE 9.5 9.7 11.0*   * 101* 112*   PROTTOTAL 8.0 8.1 8.8*   ALBUMIN 3.7 3.6 3.4*    Most Recent 2 LFT's:  Recent Labs   Lab Test 01/23/24  0656 01/15/24  0647   AST 40 39   ALT 48 45   ALKPHOS 97 88   BILITOTAL 0.5 0.5    Most Recent TSH and T4:  Recent Labs   Lab Test  01/08/24  0717   TSH 3.35   T4 1.00     Phos/Mag:  Lab Results   Component Value Date    MAG 1.9 01/23/2024    MAG 1.9 01/15/2024    MAG 2.0 01/08/2024      I reviewed the above labs today.

## 2024-01-23 ENCOUNTER — THERAPY VISIT (OUTPATIENT)
Dept: SPEECH THERAPY | Facility: CLINIC | Age: 64
End: 2024-01-23
Payer: COMMERCIAL

## 2024-01-23 ENCOUNTER — APPOINTMENT (OUTPATIENT)
Dept: LAB | Facility: CLINIC | Age: 64
End: 2024-01-23
Attending: INTERNAL MEDICINE
Payer: COMMERCIAL

## 2024-01-23 ENCOUNTER — ONCOLOGY VISIT (OUTPATIENT)
Dept: ONCOLOGY | Facility: CLINIC | Age: 64
End: 2024-01-23
Attending: REGISTERED NURSE
Payer: COMMERCIAL

## 2024-01-23 ENCOUNTER — INFUSION THERAPY VISIT (OUTPATIENT)
Dept: ONCOLOGY | Facility: CLINIC | Age: 64
End: 2024-01-23
Attending: INTERNAL MEDICINE
Payer: COMMERCIAL

## 2024-01-23 VITALS
TEMPERATURE: 98.3 F | HEART RATE: 89 BPM | DIASTOLIC BLOOD PRESSURE: 77 MMHG | SYSTOLIC BLOOD PRESSURE: 113 MMHG | RESPIRATION RATE: 18 BRPM | OXYGEN SATURATION: 97 % | WEIGHT: 126.6 LBS | BODY MASS INDEX: 21.73 KG/M2

## 2024-01-23 DIAGNOSIS — C31.0 SQUAMOUS CELL CARCINOMA OF MAXILLARY SINUS (H): ICD-10-CM

## 2024-01-23 DIAGNOSIS — Z91.89 AT RISK FOR MALNUTRITION: ICD-10-CM

## 2024-01-23 DIAGNOSIS — C31.9 MALIGNANT NEOPLASM OF PARANASAL SINUS (H): ICD-10-CM

## 2024-01-23 DIAGNOSIS — K59.00 CONSTIPATION, UNSPECIFIED CONSTIPATION TYPE: ICD-10-CM

## 2024-01-23 DIAGNOSIS — Z11.1 SCREENING EXAMINATION FOR PULMONARY TUBERCULOSIS: ICD-10-CM

## 2024-01-23 DIAGNOSIS — C31.0 SQUAMOUS CELL CARCINOMA OF MAXILLARY SINUS (H): Primary | ICD-10-CM

## 2024-01-23 DIAGNOSIS — E83.42 HYPOMAGNESEMIA: Primary | ICD-10-CM

## 2024-01-23 DIAGNOSIS — B19.10 HEPATITIS B INFECTION WITHOUT DELTA AGENT WITHOUT HEPATIC COMA, UNSPECIFIED CHRONICITY: ICD-10-CM

## 2024-01-23 DIAGNOSIS — R13.12 DYSPHAGIA, OROPHARYNGEAL PHASE: Primary | ICD-10-CM

## 2024-01-23 DIAGNOSIS — E83.42 HYPOMAGNESEMIA: ICD-10-CM

## 2024-01-23 LAB
ALBUMIN SERPL BCG-MCNC: 3.7 G/DL (ref 3.5–5.2)
ALP SERPL-CCNC: 97 U/L (ref 40–150)
ALT SERPL W P-5'-P-CCNC: 48 U/L (ref 0–70)
ANION GAP SERPL CALCULATED.3IONS-SCNC: 10 MMOL/L (ref 7–15)
AST SERPL W P-5'-P-CCNC: 40 U/L (ref 0–45)
BASOPHILS # BLD AUTO: 0 10E3/UL (ref 0–0.2)
BASOPHILS NFR BLD AUTO: 1 %
BILIRUB SERPL-MCNC: 0.5 MG/DL
BUN SERPL-MCNC: 19 MG/DL (ref 8–23)
CALCIUM SERPL-MCNC: 9.5 MG/DL (ref 8.8–10.2)
CHLORIDE SERPL-SCNC: 98 MMOL/L (ref 98–107)
CREAT SERPL-MCNC: 0.75 MG/DL (ref 0.67–1.17)
DEPRECATED HCO3 PLAS-SCNC: 27 MMOL/L (ref 22–29)
EGFRCR SERPLBLD CKD-EPI 2021: >90 ML/MIN/1.73M2
EOSINOPHIL # BLD AUTO: 0.1 10E3/UL (ref 0–0.7)
EOSINOPHIL NFR BLD AUTO: 1 %
ERYTHROCYTE [DISTWIDTH] IN BLOOD BY AUTOMATED COUNT: 15 % (ref 10–15)
GLUCOSE SERPL-MCNC: 104 MG/DL (ref 70–99)
HBV DNA SERPL NAA+PROBE-ACNC: NOT DETECTED IU/ML
HCT VFR BLD AUTO: 34.2 % (ref 40–53)
HGB BLD-MCNC: 11.2 G/DL (ref 13.3–17.7)
IMM GRANULOCYTES # BLD: 0 10E3/UL
IMM GRANULOCYTES NFR BLD: 1 %
LYMPHOCYTES # BLD AUTO: 1.3 10E3/UL (ref 0.8–5.3)
LYMPHOCYTES NFR BLD AUTO: 19 %
MAGNESIUM SERPL-MCNC: 1.9 MG/DL (ref 1.7–2.3)
MCH RBC QN AUTO: 27.1 PG (ref 26.5–33)
MCHC RBC AUTO-ENTMCNC: 32.7 G/DL (ref 31.5–36.5)
MCV RBC AUTO: 83 FL (ref 78–100)
MONOCYTES # BLD AUTO: 0.8 10E3/UL (ref 0–1.3)
MONOCYTES NFR BLD AUTO: 12 %
NEUTROPHILS # BLD AUTO: 4.3 10E3/UL (ref 1.6–8.3)
NEUTROPHILS NFR BLD AUTO: 66 %
NRBC # BLD AUTO: 0 10E3/UL
NRBC BLD AUTO-RTO: 0 /100
PLATELET # BLD AUTO: 193 10E3/UL (ref 150–450)
POTASSIUM SERPL-SCNC: 4.5 MMOL/L (ref 3.4–5.3)
PROT SERPL-MCNC: 8 G/DL (ref 6.4–8.3)
RBC # BLD AUTO: 4.13 10E6/UL (ref 4.4–5.9)
SODIUM SERPL-SCNC: 135 MMOL/L (ref 135–145)
WBC # BLD AUTO: 6.5 10E3/UL (ref 4–11)

## 2024-01-23 PROCEDURE — 80053 COMPREHEN METABOLIC PANEL: CPT | Performed by: INTERNAL MEDICINE

## 2024-01-23 PROCEDURE — 258N000003 HC RX IP 258 OP 636: Performed by: REGISTERED NURSE

## 2024-01-23 PROCEDURE — 250N000011 HC RX IP 250 OP 636: Performed by: REGISTERED NURSE

## 2024-01-23 PROCEDURE — 83735 ASSAY OF MAGNESIUM: CPT | Performed by: INTERNAL MEDICINE

## 2024-01-23 PROCEDURE — 85025 COMPLETE CBC W/AUTO DIFF WBC: CPT | Performed by: INTERNAL MEDICINE

## 2024-01-23 PROCEDURE — 36591 DRAW BLOOD OFF VENOUS DEVICE: CPT | Performed by: REGISTERED NURSE

## 2024-01-23 PROCEDURE — 87517 HEPATITIS B DNA QUANT: CPT | Performed by: REGISTERED NURSE

## 2024-01-23 PROCEDURE — 99215 OFFICE O/P EST HI 40 MIN: CPT | Performed by: REGISTERED NURSE

## 2024-01-23 PROCEDURE — 92526 ORAL FUNCTION THERAPY: CPT | Mod: GN | Performed by: SPEECH-LANGUAGE PATHOLOGIST

## 2024-01-23 PROCEDURE — 86481 TB AG RESPONSE T-CELL SUSP: CPT | Performed by: REGISTERED NURSE

## 2024-01-23 PROCEDURE — 96413 CHEMO IV INFUSION 1 HR: CPT

## 2024-01-23 PROCEDURE — 96375 TX/PRO/DX INJ NEW DRUG ADDON: CPT

## 2024-01-23 PROCEDURE — 96367 TX/PROPH/DG ADDL SEQ IV INF: CPT

## 2024-01-23 PROCEDURE — G0463 HOSPITAL OUTPT CLINIC VISIT: HCPCS | Mod: 25 | Performed by: REGISTERED NURSE

## 2024-01-23 RX ORDER — LORAZEPAM 2 MG/ML
0.5 INJECTION INTRAMUSCULAR EVERY 4 HOURS PRN
Status: CANCELLED | OUTPATIENT
Start: 2024-01-23

## 2024-01-23 RX ORDER — HEPARIN SODIUM (PORCINE) LOCK FLUSH IV SOLN 100 UNIT/ML 100 UNIT/ML
5 SOLUTION INTRAVENOUS
Status: DISCONTINUED | OUTPATIENT
Start: 2024-01-23 | End: 2024-01-23 | Stop reason: HOSPADM

## 2024-01-23 RX ORDER — METHYLPREDNISOLONE SODIUM SUCCINATE 125 MG/2ML
125 INJECTION, POWDER, LYOPHILIZED, FOR SOLUTION INTRAMUSCULAR; INTRAVENOUS
Status: CANCELLED
Start: 2024-01-23

## 2024-01-23 RX ORDER — ALBUTEROL SULFATE 90 UG/1
1-2 AEROSOL, METERED RESPIRATORY (INHALATION)
Status: CANCELLED
Start: 2024-01-23

## 2024-01-23 RX ORDER — ALBUTEROL SULFATE 0.83 MG/ML
2.5 SOLUTION RESPIRATORY (INHALATION)
Status: CANCELLED | OUTPATIENT
Start: 2024-01-23

## 2024-01-23 RX ORDER — HEPARIN SODIUM,PORCINE 10 UNIT/ML
5-20 VIAL (ML) INTRAVENOUS DAILY PRN
Status: CANCELLED | OUTPATIENT
Start: 2024-01-23

## 2024-01-23 RX ORDER — DIPHENHYDRAMINE HYDROCHLORIDE 50 MG/ML
50 INJECTION INTRAMUSCULAR; INTRAVENOUS
Status: CANCELLED
Start: 2024-01-23

## 2024-01-23 RX ORDER — PALONOSETRON 0.05 MG/ML
0.25 INJECTION, SOLUTION INTRAVENOUS ONCE
Status: COMPLETED | OUTPATIENT
Start: 2024-01-23 | End: 2024-01-23

## 2024-01-23 RX ORDER — MEPERIDINE HYDROCHLORIDE 25 MG/ML
25 INJECTION INTRAMUSCULAR; INTRAVENOUS; SUBCUTANEOUS EVERY 30 MIN PRN
Status: CANCELLED | OUTPATIENT
Start: 2024-01-23

## 2024-01-23 RX ORDER — EPINEPHRINE 1 MG/ML
0.3 INJECTION, SOLUTION INTRAMUSCULAR; SUBCUTANEOUS EVERY 5 MIN PRN
Status: CANCELLED | OUTPATIENT
Start: 2024-01-23

## 2024-01-23 RX ORDER — HEPARIN SODIUM (PORCINE) LOCK FLUSH IV SOLN 100 UNIT/ML 100 UNIT/ML
5 SOLUTION INTRAVENOUS
Status: CANCELLED | OUTPATIENT
Start: 2024-01-23

## 2024-01-23 RX ORDER — AMOXICILLIN 250 MG
1 CAPSULE ORAL 2 TIMES DAILY PRN
Qty: 30 TABLET | Refills: 1 | Status: SHIPPED | OUTPATIENT
Start: 2024-01-23

## 2024-01-23 RX ORDER — HEPARIN SODIUM (PORCINE) LOCK FLUSH IV SOLN 100 UNIT/ML 100 UNIT/ML
5 SOLUTION INTRAVENOUS ONCE
Status: COMPLETED | OUTPATIENT
Start: 2024-01-23 | End: 2024-01-23

## 2024-01-23 RX ORDER — PALONOSETRON 0.05 MG/ML
0.25 INJECTION, SOLUTION INTRAVENOUS ONCE
Status: CANCELLED | OUTPATIENT
Start: 2024-01-23

## 2024-01-23 RX ADMIN — FOSAPREPITANT: 150 INJECTION, POWDER, LYOPHILIZED, FOR SOLUTION INTRAVENOUS at 08:46

## 2024-01-23 RX ADMIN — SODIUM CHLORIDE 1000 ML: 9 INJECTION, SOLUTION INTRAVENOUS at 08:24

## 2024-01-23 RX ADMIN — PALONOSETRON HYDROCHLORIDE 0.25 MG: 0.25 INJECTION INTRAVENOUS at 08:46

## 2024-01-23 RX ADMIN — Medication 5 ML: at 06:57

## 2024-01-23 RX ADMIN — CISPLATIN 60 MG: 1 INJECTION, SOLUTION INTRAVENOUS at 09:14

## 2024-01-23 RX ADMIN — Medication 5 ML: at 11:33

## 2024-01-23 RX ADMIN — MAGNESIUM SULFATE HEPTAHYDRATE: 500 INJECTION, SOLUTION INTRAMUSCULAR; INTRAVENOUS at 10:23

## 2024-01-23 ASSESSMENT — PAIN SCALES - GENERAL: PAINLEVEL: MILD PAIN (2)

## 2024-01-23 NOTE — NURSING NOTE
"Oncology Rooming Note    January 23, 2024 7:13 AM   Douglas Herrera is a 63 year old male who presents for:    Chief Complaint   Patient presents with    Oncology Clinic Visit     Squamous cell carcinoma of maxillary sinus     Port Draw     Labs drawn via port by RN. Port accessed with 20g 3/4\" power needle. Vitals taken. Flushed with saline and heparin. Pt tolerated well. Patient checked into next appointment.       Initial Vitals: /77 (BP Location: Right arm, Patient Position: Sitting, Cuff Size: Adult Regular)   Pulse 89   Temp 98.3  F (36.8  C) (Oral)   Resp 18   Wt 57.4 kg (126 lb 9.6 oz)   SpO2 97%   BMI 21.73 kg/m   Estimated body mass index is 21.73 kg/m  as calculated from the following:    Height as of 1/11/24: 1.626 m (5' 4\").    Weight as of this encounter: 57.4 kg (126 lb 9.6 oz). Body surface area is 1.61 meters squared.  Mild Pain (2) Comment: Data Unavailable   No LMP for male patient.  Allergies reviewed: Yes  Medications reviewed: Yes    Medications: Medication refills not needed today.  Pharmacy name entered into [employer]: [facility] - [place], [place] - [facility] GEORGE JUARES    Frailty Screening:   Is the patient here for a new oncology consult visit in cancer care? 2. No      Clinical concerns: no other complaints      Kash Galindo"

## 2024-01-23 NOTE — Clinical Note
1/23/2024         RE: Douglas Herrera  [address] [place] [address]        Dear Colleague,    Thank you for referring your patient, Douglas Herrera, to the [facility]. Please see a copy of my visit note below.       [facility]    PATIENT NAME: Douglas Herrera  MRN # [id number]   DATE OF VISIT: January 23, 2024  YOB: 1960     [facility]   Radiation Oncology: Dr. Tomasa Akers  Cardiology: Dr. Qamar Hernandez  PCP: Dr. العراقي   Hepatologist: [facility]     CANCER TYPE: SCC sinonasal   STAGE: [id number] (IVB)  ECOG PS: 1    PD-L1:  NGS: internal panel pending     SUMMARY    8/9/23 CT sinus.   8/24/23 MRI sinus. L maxillary sinus mass  9/12/23 ED for epistaxis.   9/13/23 CTA and embolization of L IMAX   10/4/23 Nasal bx. Path: Inverted papilloma with high grade dysplasia  11/10/23 Nasal endoscopy and bx in the OR. C/b severe bleeding. Path: Inverted sinonasal papilloma with severe dysplasia.  11/17/23 MRI. 7.4 x 4.6 x 6.6 cm L sinonasal mass, destruction of nasal turbinates, L maxillary sinus, hard palate, invasion of L  space, involvement of medial and lateral pterygoids and temporalis muscles. Effacement and invasion ipsilateral pterygopalatine fossa, extends and effaces the nasopharynx.   11/30/23 Carotid angiogram. Direct endonasal and transoral embolization L sinonasal tumor, transaterial embolization of the L sphenopalatine artery feeders to the L sinonasal artery tumor   12/1/23 Stealth assisted transnasal endoscopic approach to excise L sinonasal mass. Pre-operative embolization. Path: SCC involving L maxilla.    12/9/23 PET. Hypermetabolic mass centered along the L maxilla, extending superiorly through the floor of the L maxillary sinus, posterior/laterally to the  space, invasion of the pterygoid muscles, extending cranially along the pterygopalatine fossa and pterygoid plates to the skull base level. Erosion  of involved bones including L maxilla, maxillary sinus wall and pterygoid plates. Extension medially to the L lateral nasopharyngeal wall and soft palate. 1 mm fat place between carotid and mass. ~4.9 x 4.0 x 5.4 cm (SUV 24.3). Partially resected since 11/17/23 MRI. 8 mm L 2A node (SUV 5.9), 7 mm L level 2 node (SUV 5.5)    ASSESSMENT AND PLAN  SCC L maxillary sinus, [id number] (IVB): s/p transnasal endoscopic excision of sinonasal mass. He is a poor candidate for TPF induction therefore plan is is for definitive chemoradiation with weekly cisplatin. Not a candidate for HD cisplatin-audiogram 12/18/23 confirms significant bilateral SNHL. Tolerating cisplatin well without significant side effects or lab abnormalities. No hearing changes but will need to watch closely and consider change to carboplatin/paclitaxel if this becomes an issue.  -Proceed with day 15 cisplatin  -RTC weekly for labs/KENNEDI/infusion    Risk for malnutrition: G-tube placement 1/2. Working with CARISA Pedro-sees today. Oral intake decreased and having difficulty increasing to TF goal of 6 cartons/day. Weight down ~3lb, discussed goal for stability rather than drastic weight gain.    Constipation: Remains an issue with daily Miralax. Add Senna 1-2 tab BID PRN.    Vascular access: port placed 1/11/24, no concerns    RSV: ED 12/27 for fever, cough, facial swelling. RVP positive for RSV. Treated with supportive measures. Symptomatically recovered.    Hypercalcemia: Resolved    Hepatitis B: [facility] note from 1/5/23 (attached in chart) reviewed. Unclear if there was a more recent visit Remains on entecavir.     H/o PE/DVT: 5/8/2019 CTA report scanned into [employer], reviewed. Small PEs. Also had LLE DVT, acute, occlusive.  Presented with CP and LLE swelling. Was on rivaroxaban, completed course.     H/o undefined hypothyroidism: TFTs 1/8/24 normal.    Screening for TB: Quantiferon gold drawn today, pending.    46 minutes spent on the date of the encounter doing  chart review, review of test results, interpretation of tests, patient visit, and documentation     Nishi Michele, DIANA    SUBJECTIVE  Mr. Herrera is a 62 yo male who is here for day 15 weekly cisplatin given concurrently with RT for sinonasal carcinoma in the setting of inverted papilloma. Professional [employer]  used via video for visit today. Mr. Herrera speaks and understands conversational English.   -Sputum is thicker, more sticky  -Appetite decreased  -No taste  -Not able to eat much by mouth  -Only getting in about 3 cartons of TF daily. Feels too full  -Constipated, taking Miralax. Went 5 days without BM. Thinks this is also contributing to poor intake  -No change in hearing      PAST MEDICAL HISTORY  Sinonasal carcinoma as above  HTN  ASCVD. CABG x 3 2019  PE and LLE DVT after CABG . Treated with rivaroxaban  Dyslipidemia  Hepatitis B   SCC R temple s/p MOHS  Ascending aortic aneurysm repair 2019  Hearing loss L   Gout - feet  Depression  Cholecystectomy    Son Berhane at [phone]    CURRENT OUTPATIENT MEDICATIONS  Current Outpatient Medications   Medication    chlorhexidine (PERIDEX) 0.12 % solution    gabapentin (NEURONTIN) 300 MG capsule    ondansetron (ZOFRAN) 8 MG tablet    prochlorperazine (COMPAZINE) 10 MG tablet    senna-docusate (SENNA S) 8.6-50 MG tablet    acetaminophen (TYLENOL) 325 MG tablet    atorvastatin (LIPITOR) 80 MG tablet    capsaicin (ZOSTRIX) 0.025 % external cream    entecavir (BARACLUDE) 0.5 MG tablet    fluticasone (FLONASE) 50 MCG/ACT nasal spray    nitroGLYcerin (NITROSTAT) 0.4 MG sublingual tablet    sodium chloride (OCEAN) 0.65 % nasal spray    sodium chloride (OCEAN) 0.65 % nasal spray    traMADol (ULTRAM) 50 MG tablet    traZODone (DESYREL) 50 MG tablet     No current facility-administered medications for this visit.     Facility-Administered Medications Ordered in Other Visits   Medication    CISplatin (PLATINOL) 60 mg in sodium chloride 0.9 % 335 mL infusion     fosaprepitant (EMEND) 150 mg, dexAMETHasone (DECADRON) 12 mg in sodium chloride 0.9 % 276.2 mL intermittent infusion    heparin 100 unit/mL injection 5 mL    sodium chloride (PF) 0.9% PF flush 3-20 mL    sodium chloride 0.9 % 1,000 mL with magnesium sulfate 2 g *See DOSE to administer in MAR details (order question)    sodium chloride 0.9% BOLUS 1,000 mL     ALLERGIES  No Known Allergies     SOCIAL HISTORY: Non smoker. No current ETOH. Lives with his children. Immigrated from [facility] in the [place]. Not working. On disability. Used to be a . Lived in [facility], [facility]. One daughter and 5 sons. Two grandchildren.      FAMILY HISTORY:   Family History   Problem Relation Age of Onset    Cerebrovascular Disease Mother 90.00    Acute Myocardial Infarction No family hx of      PHYSICAL EXAM  /77 (BP Location: Right arm, Patient Position: Sitting, Cuff Size: Adult Regular)   Pulse 89   Temp 98.3  F (36.8  C) (Oral)   Resp 18   Wt 57.4 kg (126 lb 9.6 oz)   SpO2 97%   BMI 21.73 kg/m      GEN: NAD  HEENT: EOMI, no icterus, injection or pallor. Oropharynx - mild trismus. Visible tumor to R posterior hard palate with overlying erythema and mucositis, appears smaller.  RESP: cta bilaterally, no wheezes  CV: rrr, no murmur   GI: abd soft/nt, g-tube without surrounding erythema or drainage to insertion site  EXT: no edema  NEURO: alert, oriented, no focal deficits  SKIN: R chest port accessed, no edema/erythema. Incisions intact.    LABORATORY AND IMAGING STUDIES  Most Recent 3 CBC's:  Recent Labs   Lab Test 01/23/24  0656 01/15/24  0647 01/08/24  0717   WBC 6.5 7.1 9.3   HGB 11.2* 11.7* 11.6*   MCV 83 83 85    253 345   ANEUTAUTO 4.3 4.5 5.8    Most Recent 3 BMP's:  Recent Labs   Lab Test 01/23/24  0656 01/15/24  0647 01/08/24  0717    137 139   POTASSIUM 4.5 4.2 4.1   CHLORIDE 98 100 104   CO2 27 28 27   BUN 19.0 15.0 20.1   CR 0.75 0.68 0.73   ANIONGAP 10 9 8   FLORENCE 9.5 9.7 11.0*   * 101*  112*   PROTTOTAL 8.0 8.1 8.8*   ALBUMIN 3.7 3.6 3.4*    Most Recent 2 LFT's:  Recent Labs   Lab Test 01/23/24  0656 01/15/24  0647   AST 40 39   ALT 48 45   ALKPHOS 97 88   BILITOTAL 0.5 0.5    Most Recent TSH and T4:  Recent Labs   Lab Test 01/08/24  0717   TSH 3.35   T4 1.00     Phos/Mag:  Lab Results   Component Value Date    MAG 1.9 01/23/2024    MAG 1.9 01/15/2024    MAG 2.0 01/08/2024      I reviewed the above labs today.          Again, thank you for allowing me to participate in the care of your patient.        Sincerely,        Nishi Michele, CNP

## 2024-01-23 NOTE — PATIENT INSTRUCTIONS
[facility] Triage and after hours / weekends / holidays:  [phone]    Please call the triage or after hours line if you experience a temperature greater than or equal to 100.4, shaking chills, have uncontrolled nausea, vomiting and/or diarrhea, dizziness, shortness of breath, chest pain, bleeding, unexplained bruising, or if you have any other new/concerning symptoms, questions, or concerns.      If you are having any concerning symptoms or wish to speak to a provider before your next infusion visit, please call your care coordinator or triage to notify them so we can adequately serve you.     If you need a refill on a narcotic prescription or other medication, please call before your infusion appointment.

## 2024-01-23 NOTE — NURSING NOTE
"Chief Complaint   Patient presents with    Oncology Clinic Visit     Squamous cell carcinoma of maxillary sinus     Port Draw     Labs drawn via port by RN. Port accessed with 20g 3/4\" power needle. Vitals taken. Flushed with saline and heparin. Pt tolerated well. Patient checked into next appointment.       Erica Gray RN    "

## 2024-01-23 NOTE — PROGRESS NOTES
Infusion Nursing Note:  Douglas Herrera presents today for C1D15 Cisplatin.    Patient seen by provider today: No   present during visit today: Yes, Language: Hmong.     Note: Patient arrives in infusion post provider visit. No new complaints or concerns. Patient reports mild pain at level 2 in mouth. Patient declines interventions for pain.    Radiation called to report their radiation machine is broken. Patient to skip radiation today and call radiation before appointment tomorrow for an update. Patient and family verbalized understanding.      Intravenous Access:  Implanted Port.    Treatment Conditions:  Lab Results   Component Value Date    HGB 11.2 (L) 01/23/2024    WBC 6.5 01/23/2024    ANEUTAUTO 4.3 01/23/2024     01/23/2024        Lab Results   Component Value Date     01/23/2024    POTASSIUM 4.5 01/23/2024    MAG 1.9 01/23/2024    CR 0.75 01/23/2024    FLORENCE 9.5 01/23/2024    BILITOTAL 0.5 01/23/2024    ALBUMIN 3.7 01/23/2024    ALT 48 01/23/2024    AST 40 01/23/2024       Results reviewed, labs MET treatment parameters, ok to proceed with treatment.      Post Infusion Assessment:  Patient tolerated infusion without incident.  Blood return noted pre and post infusion.  Site patent and intact, free from redness, edema or discomfort.  No evidence of extravasations.  Access discontinued per protocol.       Discharge Plan:   Prescription refills given for Senna-docusate.  Discharge instructions reviewed with: Patient and Family.  Patient and/or family verbalized understanding of discharge instructions and all questions answered.  AVS to patient via [employer].  Patient will return 1/30/2024 for next appointment.   Patient discharged in stable condition accompanied by: self and son.  Departure Mode: Ambulatory.      Pranav Jorge RN

## 2024-01-24 LAB
GAMMA INTERFERON BACKGROUND BLD IA-ACNC: 0.83 IU/ML
M TB IFN-G BLD-IMP: NEGATIVE
M TB IFN-G CD4+ BCKGRND COR BLD-ACNC: 8.26 IU/ML
MITOGEN IGNF BCKGRD COR BLD-ACNC: -0.14 IU/ML
MITOGEN IGNF BCKGRD COR BLD-ACNC: -0.17 IU/ML
QUANTIFERON MITOGEN: 9.09 IU/ML
QUANTIFERON NIL TUBE: 0.83 IU/ML
QUANTIFERON TB1 TUBE: 0.66 IU/ML
QUANTIFERON TB2 TUBE: 0.69

## 2024-01-25 ENCOUNTER — APPOINTMENT (OUTPATIENT)
Dept: RADIATION ONCOLOGY | Facility: CLINIC | Age: 64
End: 2024-01-25
Attending: RADIOLOGY
Payer: COMMERCIAL

## 2024-01-25 PROCEDURE — 77014 PR CT GUIDE FOR PLACEMENT RADIATION THERAPY FIELDS: CPT | Mod: 26 | Performed by: RADIOLOGY

## 2024-01-25 PROCEDURE — 77386 HC IMRT TREATMENT DELIVERY, COMPLEX: CPT | Performed by: RADIOLOGY

## 2024-01-25 NOTE — PROGRESS NOTES
[facility] CANCER [facility]    PATIENT NAME: Douglas Herrera  MRN # [id number]   DATE OF VISIT: January 30, 2024  YOB: 1960     [facility]   Radiation Oncology: Dr. Tomasa Akers  Cardiology: Dr. Qamar Hernandez  PCP: Dr. العراقي   Hepatologist: [facility]     CANCER TYPE: SCC sinonasal   STAGE: [id number] (IVB)  ECOG PS: 1    PD-L1:  NGS: internal panel pending     SUMMARY    8/9/23 CT sinus.   8/24/23 MRI sinus. L maxillary sinus mass  9/12/23 ED for epistaxis.   9/13/23 CTA and embolization of L IMAX   10/4/23 Nasal bx. Path: Inverted papilloma with high grade dysplasia  11/10/23 Nasal endoscopy and bx in the OR. C/b severe bleeding. Path: Inverted sinonasal papilloma with severe dysplasia.  11/17/23 MRI. 7.4 x 4.6 x 6.6 cm L sinonasal mass, destruction of nasal turbinates, L maxillary sinus, hard palate, invasion of L  space, involvement of medial and lateral pterygoids and temporalis muscles. Effacement and invasion ipsilateral pterygopalatine fossa, extends and effaces the nasopharynx.   11/30/23 Carotid angiogram. Direct endonasal and transoral embolization L sinonasal tumor, transaterial embolization of the L sphenopalatine artery feeders to the L sinonasal artery tumor   12/1/23 Stealth assisted transnasal endoscopic approach to excise L sinonasal mass. Pre-operative embolization. Path: SCC involving L maxilla.    12/9/23 PET. Hypermetabolic mass centered along the L maxilla, extending superiorly through the floor of the L maxillary sinus, posterior/laterally to the  space, invasion of the pterygoid muscles, extending cranially along the pterygopalatine fossa and pterygoid plates to the skull base level. Erosion of involved bones including L maxilla, maxillary sinus wall and pterygoid plates. Extension medially to the L lateral nasopharyngeal wall and soft palate. 1 mm fat place between carotid and mass. ~4.9 x 4.0 x 5.4 cm (SUV 24.3). Partially  resected since 11/17/23 MRI. 8 mm L 2A node (SUV 5.9), 7 mm L level 2 node (SUV 5.5)    ASSESSMENT AND PLAN  SCC L maxillary sinus, [id number] (IVB): s/p transnasal endoscopic excision of sinonasal mass. He is a poor candidate for TPF induction therefore plan is is for definitive chemoradiation with weekly cisplatin. Not a candidate for HD cisplatin-audiogram 12/18/23 confirms significant bilateral SNHL. Tolerating cisplatin well without side effects. No hearing changes but will continue to watch closely and consider change to carboplatin/paclitaxel if this becomes an issue.  -Proceed with day 22 cisplatin  -RTC weekly for labs/KENNEDI/infusion    Risk for malnutrition: G-tube placement 1/2. Working with CARISA Pedro. Oral intake continues to decrease. Now up to 4 cartons formula/day. Advised continuing to slowly increase intake to avoid reflux symptoms.Weight stable. Reviewed need to keep G-tube in for weeks/months following completion of chemoRT as he is able to slowly increase oral intake.    Odynophagia: Increasing as expected. Encouraged s/s rinses    Constipation: Well controlled with Senna 1-2 tab BID PRN.    Vascular access: port placed 1/11/24, no concerns    RSV: ED 12/27 for fever, cough, facial swelling. RVP positive for RSV. Treated with supportive measures. Symptomatically recovered.    Hypercalcemia: Resolved    Hepatitis B: [facility] note from 1/5/23 (attached in chart) reviewed. Unclear if there was a more recent visit Remains on entecavir.     H/o PE/DVT: 5/8/2019 CTA report scanned into [employer], reviewed. Small PEs. Also had LLE DVT, acute, occlusive.  Presented with CP and LLE swelling. Was on rivaroxaban, completed course.     H/o undefined hypothyroidism: TFTs 1/8/24 normal.    Screening for TB: Quantiferon gold negative on 1/23/24    30 minutes spent on the date of the encounter doing chart review, review of test results, interpretation of tests, patient visit, and documentation     Nishi Michele  CNP    SUBJECTIVE  Mr. 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 [employer]  used via video for visit today. Mr. Herrera speaks and understands conversational English.   -Throat pain increasing, tolerable  -Using salt/soda rinses 2-3 times/day  -No change in hearing  -No n/t in extremities  -No nausea  -Experiences reflux symptoms with > 4 cartons formula/day or fast administration  -Constipation controlled with Senna-S, has room to titrate    PAST MEDICAL HISTORY  Sinonasal carcinoma as above  HTN  ASCVD. CABG x 3 2019  PE and LLE DVT after CABG . Treated with rivaroxaban  Dyslipidemia  Hepatitis B   SCC R temple s/p MOHS  Ascending aortic aneurysm repair 2019  Hearing loss L   Gout - feet  Depression  Cholecystectomy    Son Berhane at [phone]    CURRENT OUTPATIENT MEDICATIONS  Current Outpatient Medications   Medication    chlorhexidine (PERIDEX) 0.12 % solution    gabapentin (NEURONTIN) 300 MG capsule    senna-docusate (SENNA S) 8.6-50 MG tablet    acetaminophen (TYLENOL) 325 MG tablet    atorvastatin (LIPITOR) 80 MG tablet    capsaicin (ZOSTRIX) 0.025 % external cream    entecavir (BARACLUDE) 0.5 MG tablet    fluticasone (FLONASE) 50 MCG/ACT nasal spray    nitroGLYcerin (NITROSTAT) 0.4 MG sublingual tablet    ondansetron (ZOFRAN) 8 MG tablet    prochlorperazine (COMPAZINE) 10 MG tablet    sodium chloride (OCEAN) 0.65 % nasal spray    sodium chloride (OCEAN) 0.65 % nasal spray    traMADol (ULTRAM) 50 MG tablet    traZODone (DESYREL) 50 MG tablet     Current Facility-Administered Medications   Medication    heparin 100 unit/mL injection 5 mL    heparin lock flush 10 UNIT/ML injection 5 mL     Facility-Administered Medications Ordered in Other Visits   Medication    CISplatin (PLATINOL) 60 mg in sodium chloride 0.9 % 335 mL infusion    fosaprepitant (EMEND) 150 mg, dexAMETHasone (DECADRON)  12 mg in sodium chloride 0.9 % 276.2 mL intermittent infusion    heparin 100 unit/mL injection 5 mL    palonosetron (ALOXI) injection 0.25 mg    sodium chloride 0.9% BOLUS 1,000 mL     ALLERGIES  No Known Allergies     SOCIAL HISTORY: Non smoker. No current ETOH. Lives with his children. Immigrated from [place] in the 1980s. Not working. On disability. Used to be a . Lived in [facility], [facility]. One daughter and 5 sons. Two grandchildren.      FAMILY HISTORY:   Family History   Problem Relation Age of Onset    Cerebrovascular Disease Mother 90.00    Acute Myocardial Infarction No family hx of      PHYSICAL EXAM  /72   Pulse 84   Temp 98.3  F (36.8  C) (Oral)   Resp 16   Wt 57.2 kg (126 lb)   SpO2 99%   BMI 21.63 kg/m      GEN: NAD  HEENT: EOMI, no icterus, injection or pallor. Oropharynx - mild trismus. Visible tumor to R posterior hard palate with overlying erythema, decreasing in size   RESP: cta bilaterally, no wheezes  CV: rrr, no murmur   GI: abd soft/nt, g-tube without surrounding erythema or drainage to insertion site  EXT: no edema  NEURO: alert, oriented, no focal deficits  SKIN: R chest port accessed, no edema/erythema.     LABORATORY AND IMAGING STUDIES  Most Recent 3 CBC's:  Recent Labs   Lab Test 01/30/24  0635 01/23/24  0656 01/15/24  0647   WBC 4.3 6.5 7.1   HGB 11.0* 11.2* 11.7*   MCV 84 83 83    193 253   ANEUTAUTO 2.8 4.3 4.5    Most Recent 3 BMP's:  Recent Labs   Lab Test 01/30/24  0635 01/23/24  0656 01/15/24  0647   * 135 137   POTASSIUM 4.1 4.5 4.2   CHLORIDE 97* 98 100   CO2 26 27 28   BUN 17.2 19.0 15.0   CR 0.73 0.75 0.68   ANIONGAP 11 10 9   FLORENCE 9.4 9.5 9.7   * 104* 101*   PROTTOTAL 7.5 8.0 8.1   ALBUMIN 3.7 3.7 3.6    Most Recent 2 LFT's:  Recent Labs   Lab Test 01/30/24  0635 01/23/24  0656   AST 31 40   ALT 39 48   ALKPHOS 93 97   BILITOTAL 0.4 0.5    Most Recent TSH and T4:  Recent Labs   Lab Test 01/08/24  0717   TSH 3.35   T4 1.00      Phos/Mag:  Lab Results   Component Value Date    MAG 1.8 01/30/2024    MAG 1.9 01/23/2024    MAG 1.9 01/15/2024      I reviewed the above labs today.

## 2024-01-26 ENCOUNTER — OFFICE VISIT (OUTPATIENT)
Dept: RADIATION ONCOLOGY | Facility: CLINIC | Age: 64
End: 2024-01-26
Attending: RADIOLOGY
Payer: COMMERCIAL

## 2024-01-26 VITALS
DIASTOLIC BLOOD PRESSURE: 74 MMHG | BODY MASS INDEX: 21.82 KG/M2 | SYSTOLIC BLOOD PRESSURE: 118 MMHG | WEIGHT: 127.1 LBS | HEART RATE: 82 BPM

## 2024-01-26 DIAGNOSIS — C31.0 SQUAMOUS CELL CARCINOMA OF MAXILLARY SINUS (H): Primary | ICD-10-CM

## 2024-01-26 PROCEDURE — 77386 HC IMRT TREATMENT DELIVERY, COMPLEX: CPT | Performed by: RADIOLOGY

## 2024-01-26 RX ORDER — DIPHENHYDRAMINE HYDROCHLORIDE 50 MG/ML
50 INJECTION INTRAMUSCULAR; INTRAVENOUS
Status: CANCELLED
Start: 2024-01-26

## 2024-01-26 RX ORDER — HEPARIN SODIUM (PORCINE) LOCK FLUSH IV SOLN 100 UNIT/ML 100 UNIT/ML
5 SOLUTION INTRAVENOUS
Status: CANCELLED | OUTPATIENT
Start: 2024-01-26

## 2024-01-26 RX ORDER — ALBUTEROL SULFATE 90 UG/1
1-2 AEROSOL, METERED RESPIRATORY (INHALATION)
Status: CANCELLED
Start: 2024-01-26

## 2024-01-26 RX ORDER — ALBUTEROL SULFATE 0.83 MG/ML
2.5 SOLUTION RESPIRATORY (INHALATION)
Status: CANCELLED | OUTPATIENT
Start: 2024-01-26

## 2024-01-26 RX ORDER — METHYLPREDNISOLONE SODIUM SUCCINATE 125 MG/2ML
125 INJECTION, POWDER, LYOPHILIZED, FOR SOLUTION INTRAMUSCULAR; INTRAVENOUS
Status: CANCELLED
Start: 2024-01-26

## 2024-01-26 RX ORDER — HEPARIN SODIUM,PORCINE 10 UNIT/ML
5-20 VIAL (ML) INTRAVENOUS DAILY PRN
Status: CANCELLED | OUTPATIENT
Start: 2024-01-26

## 2024-01-26 RX ORDER — EPINEPHRINE 1 MG/ML
0.3 INJECTION, SOLUTION, CONCENTRATE INTRAVENOUS EVERY 5 MIN PRN
Status: CANCELLED | OUTPATIENT
Start: 2024-01-26

## 2024-01-26 RX ORDER — MEPERIDINE HYDROCHLORIDE 25 MG/ML
25 INJECTION INTRAMUSCULAR; INTRAVENOUS; SUBCUTANEOUS EVERY 30 MIN PRN
Status: CANCELLED | OUTPATIENT
Start: 2024-01-26

## 2024-01-26 NOTE — LETTER
2024         RE: Douglas Herrera  [address] [place] [address]        Dear Colleague,    Thank you for referring your patient, Douglas Herrera, to the [facility] RADIATION ONCOLOGY. Please see a copy of my visit note below.    RADIATION ONCOLOGY WEEKLY ON TREATMENT VISIT   Encounter Date: 2024     Patient Name: Douglas Herrera  MRN: [id number]  : 1960     Disease and Stage: T4b N2b M0, stage IVB sinonasal carcinoma    Treatment Site: Left sinonasal primary and bilateral neck nodes        Current Dose/Planned Total Dose: 2400 cGy / 7000 cGy    Concurrent Chemotherapy: Yes  Drug and Frequency: Weekly cisplatin    ED visits/Hospitalizations: None    Missed Treatments:  2024 - 2024: Machine downtime    Subjective: Mr. Herrera presents to clinic today for his weekly on-treatment visit. He continues to reports no immediate concerns related to radiation treatment. He had a G-tube placed 2024.    Nursing ROS:   Nutrition Alteration  Diet Type: Patient's Preference  Skin  Skin Reaction: 1 - Faint erythema or dry desquamation  Skin Note: Aquaphor     ENT and Mouth Exam  Mucositis - Current: 0 - None   ENT/Mouth Note: S&S 15-20     Gastrointestinal  Nausea: 0 - None  GI Note: PEG 4 cans a day     Pain Assessment  0-10 Pain Scale: 0  Pain Treatment: Gagbapentin    PEG tube: Placed 2024  Pacemaker: None    Objective:   /74   Pulse 82   Wt 57.7 kg (127 lb 1.6 oz)   BMI 21.82 kg/m     KPS 80  General: Alert, oriented, in no acute distress  HEENT: No mucositis, no thrush  Skin: No erythema      OnTreatment-related toxicities (CTCAE v5.0):  Anorexia: Grade 2: Oral intake altered without significant weight loss or malnutrition; oral nutritional supplements indicated  Fatigue: Grade 1: Fatigue relieved by rest  Nausea: Grade 0: No toxicity  Pain: Grade 1: Mild pain  Dry mouth: Grade 0: No toxicity  Dysphagia: Grade 0: No toxicity  Mucositis: Grade 0: No toxicity  Dermatitis:  Grade 0: No toxicity     Assessment:    Mr. 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  Multidisciplinary conference consensus opinion is towards chemoradiation.  He has had significant hearing loss and thus Oncology has recommended weekly cisplatin. He is tolerating therapy well to date.    Plan:   Continue with treatment as planned  Nutrition per oral and PEG to maintain weight as per recommendations of Mayela An     Mosaaditya chart and setup information reviewed  MVCT images reviewed    Medication Review  Med list reviewed with patient?: Yes  Med list printed and given: Offered and declined    Tomasa Akers MD     Department of Radiation Oncology  [facility]

## 2024-01-26 NOTE — PROGRESS NOTES
RADIATION ONCOLOGY WEEKLY ON TREATMENT VISIT   Encounter Date: 2024     Patient Name: Douglas Herrera  MRN: [id number]  : 1960     Disease and Stage: T4b N2b M0, stage IVB sinonasal carcinoma    Treatment Site: Left sinonasal primary and bilateral neck nodes        Current Dose/Planned Total Dose: 2400 cGy / 7000 cGy    Concurrent Chemotherapy: Yes  Drug and Frequency: Weekly cisplatin    ED visits/Hospitalizations: None    Missed Treatments:  2024 - 2024: Machine downtime    Subjective: Mr. Herrera presents to clinic today for his weekly on-treatment visit. He continues to reports no immediate concerns related to radiation treatment. He had a G-tube placed 2024.    Nursing ROS:   Nutrition Alteration  Diet Type: Patient's Preference  Skin  Skin Reaction: 1 - Faint erythema or dry desquamation  Skin Note: Aquaphor     ENT and Mouth Exam  Mucositis - Current: 0 - None   ENT/Mouth Note: S&S 15-20     Gastrointestinal  Nausea: 0 - None  GI Note: PEG 4 cans a day     Pain Assessment  0-10 Pain Scale: 0  Pain Treatment: Gagbapentin    PEG tube: Placed 2024  Pacemaker: None    Objective:   /74   Pulse 82   Wt 57.7 kg (127 lb 1.6 oz)   BMI 21.82 kg/m     KPS 80  General: Alert, oriented, in no acute distress  HEENT: No mucositis, no thrush  Skin: No erythema      OnTreatment-related toxicities (CTCAE v5.0):  Anorexia: Grade 2: Oral intake altered without significant weight loss or malnutrition; oral nutritional supplements indicated  Fatigue: Grade 1: Fatigue relieved by rest  Nausea: Grade 0: No toxicity  Pain: Grade 1: Mild pain  Dry mouth: Grade 0: No toxicity  Dysphagia: Grade 0: No toxicity  Mucositis: Grade 0: No toxicity  Dermatitis: Grade 0: No toxicity     Assessment:    Mr. 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  Multidisciplinary conference consensus opinion is  towards chemoradiation.  He has had significant hearing loss and thus Oncology has recommended weekly cisplatin. He is tolerating therapy well to date.    Plan:   Continue with treatment as planned  Nutrition per oral and PEG to maintain weight as per recommendations of Mayela An     Mosaiq chart and setup information reviewed  MVCT images reviewed    Medication Review  Med list reviewed with patient?: Yes  Med list printed and given: Offered and declined    Tomasa Akers MD     Department of Radiation Oncology  [facility]

## 2024-01-27 ENCOUNTER — APPOINTMENT (OUTPATIENT)
Dept: RADIATION ONCOLOGY | Facility: CLINIC | Age: 64
End: 2024-01-27
Attending: RADIOLOGY
Payer: COMMERCIAL

## 2024-01-27 PROCEDURE — 77014 PR CT GUIDE FOR PLACEMENT RADIATION THERAPY FIELDS: CPT | Mod: 26 | Performed by: STUDENT IN AN ORGANIZED HEALTH CARE EDUCATION/TRAINING PROGRAM

## 2024-01-27 PROCEDURE — 77386 HC IMRT TREATMENT DELIVERY, COMPLEX: CPT | Performed by: RADIOLOGY

## 2024-01-29 ENCOUNTER — APPOINTMENT (OUTPATIENT)
Dept: RADIATION ONCOLOGY | Facility: CLINIC | Age: 64
End: 2024-01-29
Attending: RADIOLOGY
Payer: COMMERCIAL

## 2024-01-29 PROCEDURE — 77014 PR CT GUIDE FOR PLACEMENT RADIATION THERAPY FIELDS: CPT | Mod: 26 | Performed by: RADIOLOGY

## 2024-01-29 PROCEDURE — 77386 HC IMRT TREATMENT DELIVERY, COMPLEX: CPT | Performed by: RADIOLOGY

## 2024-01-30 ENCOUNTER — ALLIED HEALTH/NURSE VISIT (OUTPATIENT)
Dept: RADIATION ONCOLOGY | Facility: CLINIC | Age: 64
End: 2024-01-30
Attending: RADIOLOGY
Payer: COMMERCIAL

## 2024-01-30 ENCOUNTER — ONCOLOGY VISIT (OUTPATIENT)
Dept: ONCOLOGY | Facility: CLINIC | Age: 64
End: 2024-01-30
Attending: REGISTERED NURSE
Payer: COMMERCIAL

## 2024-01-30 ENCOUNTER — APPOINTMENT (OUTPATIENT)
Dept: LAB | Facility: CLINIC | Age: 64
End: 2024-01-30
Attending: INTERNAL MEDICINE
Payer: COMMERCIAL

## 2024-01-30 ENCOUNTER — THERAPY VISIT (OUTPATIENT)
Dept: SPEECH THERAPY | Facility: CLINIC | Age: 64
End: 2024-01-30
Payer: COMMERCIAL

## 2024-01-30 ENCOUNTER — INFUSION THERAPY VISIT (OUTPATIENT)
Dept: ONCOLOGY | Facility: CLINIC | Age: 64
End: 2024-01-30
Attending: INTERNAL MEDICINE
Payer: COMMERCIAL

## 2024-01-30 VITALS
WEIGHT: 126 LBS | DIASTOLIC BLOOD PRESSURE: 72 MMHG | RESPIRATION RATE: 16 BRPM | HEART RATE: 84 BPM | SYSTOLIC BLOOD PRESSURE: 110 MMHG | BODY MASS INDEX: 21.63 KG/M2 | OXYGEN SATURATION: 99 % | TEMPERATURE: 98.3 F

## 2024-01-30 VITALS
WEIGHT: 130.5 LBS | HEART RATE: 64 BPM | SYSTOLIC BLOOD PRESSURE: 126 MMHG | BODY MASS INDEX: 22.4 KG/M2 | DIASTOLIC BLOOD PRESSURE: 72 MMHG

## 2024-01-30 DIAGNOSIS — E83.42 HYPOMAGNESEMIA: ICD-10-CM

## 2024-01-30 DIAGNOSIS — C31.0 SQUAMOUS CELL CARCINOMA OF MAXILLARY SINUS (H): Primary | ICD-10-CM

## 2024-01-30 DIAGNOSIS — Z91.89 AT RISK FOR MALNUTRITION: ICD-10-CM

## 2024-01-30 DIAGNOSIS — K59.00 CONSTIPATION, UNSPECIFIED CONSTIPATION TYPE: ICD-10-CM

## 2024-01-30 DIAGNOSIS — C31.0 SQUAMOUS CELL CARCINOMA OF MAXILLARY SINUS (H): ICD-10-CM

## 2024-01-30 DIAGNOSIS — R13.12 DYSPHAGIA, OROPHARYNGEAL PHASE: Primary | ICD-10-CM

## 2024-01-30 DIAGNOSIS — C31.9 MALIGNANT NEOPLASM OF PARANASAL SINUS (H): ICD-10-CM

## 2024-01-30 DIAGNOSIS — E83.42 HYPOMAGNESEMIA: Primary | ICD-10-CM

## 2024-01-30 DIAGNOSIS — R13.10 ODYNOPHAGIA: ICD-10-CM

## 2024-01-30 LAB
ALBUMIN SERPL BCG-MCNC: 3.7 G/DL (ref 3.5–5.2)
ALP SERPL-CCNC: 93 U/L (ref 40–150)
ALT SERPL W P-5'-P-CCNC: 39 U/L (ref 0–70)
ANION GAP SERPL CALCULATED.3IONS-SCNC: 11 MMOL/L (ref 7–15)
AST SERPL W P-5'-P-CCNC: 31 U/L (ref 0–45)
BASOPHILS # BLD AUTO: 0 10E3/UL (ref 0–0.2)
BASOPHILS NFR BLD AUTO: 1 %
BILIRUB SERPL-MCNC: 0.4 MG/DL
BUN SERPL-MCNC: 17.2 MG/DL (ref 8–23)
CALCIUM SERPL-MCNC: 9.4 MG/DL (ref 8.8–10.2)
CHLORIDE SERPL-SCNC: 97 MMOL/L (ref 98–107)
CREAT SERPL-MCNC: 0.73 MG/DL (ref 0.67–1.17)
DEPRECATED HCO3 PLAS-SCNC: 26 MMOL/L (ref 22–29)
EGFRCR SERPLBLD CKD-EPI 2021: >90 ML/MIN/1.73M2
EOSINOPHIL # BLD AUTO: 0 10E3/UL (ref 0–0.7)
EOSINOPHIL NFR BLD AUTO: 1 %
ERYTHROCYTE [DISTWIDTH] IN BLOOD BY AUTOMATED COUNT: 15.2 % (ref 10–15)
GLUCOSE SERPL-MCNC: 115 MG/DL (ref 70–99)
HCT VFR BLD AUTO: 33.5 % (ref 40–53)
HGB BLD-MCNC: 11 G/DL (ref 13.3–17.7)
IMM GRANULOCYTES # BLD: 0 10E3/UL
IMM GRANULOCYTES NFR BLD: 1 %
LYMPHOCYTES # BLD AUTO: 0.9 10E3/UL (ref 0.8–5.3)
LYMPHOCYTES NFR BLD AUTO: 21 %
MAGNESIUM SERPL-MCNC: 1.8 MG/DL (ref 1.7–2.3)
MCH RBC QN AUTO: 27.4 PG (ref 26.5–33)
MCHC RBC AUTO-ENTMCNC: 32.8 G/DL (ref 31.5–36.5)
MCV RBC AUTO: 84 FL (ref 78–100)
MONOCYTES # BLD AUTO: 0.5 10E3/UL (ref 0–1.3)
MONOCYTES NFR BLD AUTO: 12 %
NEUTROPHILS # BLD AUTO: 2.8 10E3/UL (ref 1.6–8.3)
NEUTROPHILS NFR BLD AUTO: 64 %
NRBC # BLD AUTO: 0 10E3/UL
NRBC BLD AUTO-RTO: 0 /100
PLATELET # BLD AUTO: 167 10E3/UL (ref 150–450)
POTASSIUM SERPL-SCNC: 4.1 MMOL/L (ref 3.4–5.3)
PROT SERPL-MCNC: 7.5 G/DL (ref 6.4–8.3)
RBC # BLD AUTO: 4.01 10E6/UL (ref 4.4–5.9)
SODIUM SERPL-SCNC: 134 MMOL/L (ref 135–145)
WBC # BLD AUTO: 4.3 10E3/UL (ref 4–11)

## 2024-01-30 PROCEDURE — 92526 ORAL FUNCTION THERAPY: CPT | Mod: GN | Performed by: SPEECH-LANGUAGE PATHOLOGIST

## 2024-01-30 PROCEDURE — 250N000011 HC RX IP 250 OP 636: Performed by: REGISTERED NURSE

## 2024-01-30 PROCEDURE — 258N000003 HC RX IP 258 OP 636: Performed by: REGISTERED NURSE

## 2024-01-30 PROCEDURE — 77336 RADIATION PHYSICS CONSULT: CPT | Performed by: RADIOLOGY

## 2024-01-30 PROCEDURE — 96367 TX/PROPH/DG ADDL SEQ IV INF: CPT

## 2024-01-30 PROCEDURE — 77427 RADIATION TX MANAGEMENT X5: CPT | Performed by: RADIOLOGY

## 2024-01-30 PROCEDURE — 85025 COMPLETE CBC W/AUTO DIFF WBC: CPT | Performed by: REGISTERED NURSE

## 2024-01-30 PROCEDURE — 36591 DRAW BLOOD OFF VENOUS DEVICE: CPT | Performed by: REGISTERED NURSE

## 2024-01-30 PROCEDURE — 96361 HYDRATE IV INFUSION ADD-ON: CPT

## 2024-01-30 PROCEDURE — 97803 MED NUTRITION INDIV SUBSEQ: CPT | Mod: XU | Performed by: DIETITIAN, REGISTERED

## 2024-01-30 PROCEDURE — G0463 HOSPITAL OUTPT CLINIC VISIT: HCPCS | Performed by: REGISTERED NURSE

## 2024-01-30 PROCEDURE — 82040 ASSAY OF SERUM ALBUMIN: CPT | Performed by: REGISTERED NURSE

## 2024-01-30 PROCEDURE — 83735 ASSAY OF MAGNESIUM: CPT | Performed by: REGISTERED NURSE

## 2024-01-30 PROCEDURE — 96375 TX/PRO/DX INJ NEW DRUG ADDON: CPT

## 2024-01-30 PROCEDURE — 96413 CHEMO IV INFUSION 1 HR: CPT

## 2024-01-30 PROCEDURE — 77386 HC IMRT TREATMENT DELIVERY, COMPLEX: CPT | Performed by: RADIOLOGY

## 2024-01-30 PROCEDURE — 99214 OFFICE O/P EST MOD 30 MIN: CPT | Performed by: REGISTERED NURSE

## 2024-01-30 RX ORDER — PALONOSETRON 0.05 MG/ML
0.25 INJECTION, SOLUTION INTRAVENOUS ONCE
Status: COMPLETED | OUTPATIENT
Start: 2024-01-30 | End: 2024-01-30

## 2024-01-30 RX ORDER — EPINEPHRINE 1 MG/ML
0.3 INJECTION, SOLUTION INTRAMUSCULAR; SUBCUTANEOUS EVERY 5 MIN PRN
Status: CANCELLED | OUTPATIENT
Start: 2024-01-30

## 2024-01-30 RX ORDER — PALONOSETRON 0.05 MG/ML
0.25 INJECTION, SOLUTION INTRAVENOUS ONCE
Status: CANCELLED | OUTPATIENT
Start: 2024-01-30

## 2024-01-30 RX ORDER — HEPARIN SODIUM (PORCINE) LOCK FLUSH IV SOLN 100 UNIT/ML 100 UNIT/ML
5 SOLUTION INTRAVENOUS DAILY PRN
Status: DISCONTINUED | OUTPATIENT
Start: 2024-01-30 | End: 2024-01-30 | Stop reason: HOSPADM

## 2024-01-30 RX ORDER — DIPHENHYDRAMINE HYDROCHLORIDE 50 MG/ML
50 INJECTION INTRAMUSCULAR; INTRAVENOUS
Status: CANCELLED
Start: 2024-01-30

## 2024-01-30 RX ORDER — HEPARIN SODIUM (PORCINE) LOCK FLUSH IV SOLN 100 UNIT/ML 100 UNIT/ML
5 SOLUTION INTRAVENOUS
Status: CANCELLED | OUTPATIENT
Start: 2024-01-30

## 2024-01-30 RX ORDER — LORAZEPAM 2 MG/ML
0.5 INJECTION INTRAMUSCULAR EVERY 4 HOURS PRN
Status: CANCELLED | OUTPATIENT
Start: 2024-01-30

## 2024-01-30 RX ORDER — HEPARIN SODIUM,PORCINE 10 UNIT/ML
5 VIAL (ML) INTRAVENOUS
Status: DISCONTINUED | OUTPATIENT
Start: 2024-01-30 | End: 2024-01-30 | Stop reason: HOSPADM

## 2024-01-30 RX ORDER — METHYLPREDNISOLONE SODIUM SUCCINATE 125 MG/2ML
125 INJECTION, POWDER, LYOPHILIZED, FOR SOLUTION INTRAMUSCULAR; INTRAVENOUS
Status: CANCELLED
Start: 2024-01-30

## 2024-01-30 RX ORDER — HEPARIN SODIUM,PORCINE 10 UNIT/ML
5-20 VIAL (ML) INTRAVENOUS DAILY PRN
Status: CANCELLED | OUTPATIENT
Start: 2024-01-30

## 2024-01-30 RX ORDER — HEPARIN SODIUM (PORCINE) LOCK FLUSH IV SOLN 100 UNIT/ML 100 UNIT/ML
5 SOLUTION INTRAVENOUS
Status: DISCONTINUED | OUTPATIENT
Start: 2024-01-30 | End: 2024-01-30 | Stop reason: HOSPADM

## 2024-01-30 RX ORDER — MEPERIDINE HYDROCHLORIDE 25 MG/ML
25 INJECTION INTRAMUSCULAR; INTRAVENOUS; SUBCUTANEOUS EVERY 30 MIN PRN
Status: CANCELLED | OUTPATIENT
Start: 2024-01-30

## 2024-01-30 RX ORDER — ALBUTEROL SULFATE 0.83 MG/ML
2.5 SOLUTION RESPIRATORY (INHALATION)
Status: CANCELLED | OUTPATIENT
Start: 2024-01-30

## 2024-01-30 RX ORDER — ALBUTEROL SULFATE 90 UG/1
1-2 AEROSOL, METERED RESPIRATORY (INHALATION)
Status: CANCELLED
Start: 2024-01-30

## 2024-01-30 RX ADMIN — CISPLATIN 60 MG: 1 INJECTION, SOLUTION INTRAVENOUS at 10:38

## 2024-01-30 RX ADMIN — SODIUM CHLORIDE 1000 ML: 9 INJECTION, SOLUTION INTRAVENOUS at 09:08

## 2024-01-30 RX ADMIN — PALONOSETRON HYDROCHLORIDE 0.25 MG: 0.25 INJECTION INTRAVENOUS at 09:46

## 2024-01-30 RX ADMIN — DEXAMETHASONE SODIUM PHOSPHATE: 10 INJECTION, SOLUTION INTRAMUSCULAR; INTRAVENOUS at 09:46

## 2024-01-30 RX ADMIN — SODIUM CHLORIDE 1000 ML: 9 INJECTION, SOLUTION INTRAVENOUS at 11:46

## 2024-01-30 RX ADMIN — Medication 5 ML: at 06:32

## 2024-01-30 RX ADMIN — Medication 5 ML: at 12:56

## 2024-01-30 ASSESSMENT — PAIN SCALES - GENERAL: PAINLEVEL: NO PAIN (0)

## 2024-01-30 NOTE — PROGRESS NOTES
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 Herrera presents to clinic today for his weekly on-treatment visit. He continues to report no immediate concerns related to radiation treatment. He had a G-tube placed 2024 and is using 4 cans daily.  Oral intake is limited due to lack of taste.  Gabapentin dose is 900 mg in the morning and evening.  He is sleeping approximately 12 to 13 hours per 24-hour period.    Nursing ROS:   Nutrition Alteration  Diet Type: Patient's Preference  Skin  Skin Reaction: 1 - Faint erythema or dry desquamation  Skin Note: Aquaphor     ENT and Mouth Exam  Mucositis - Current: 0 - None   ENT/Mouth Note: S&S 15-20     Gastrointestinal  Nausea: 0 - None  GI Note: PEG 4 cans a day     Pain Assessment  0-10 Pain Scale: 0  Pain Treatment: Gagbapentin    PEG tube: Placed 2024  Pacemaker: None    Objective:   /72   Pulse 64   Wt 59.2 kg (130 lb 8 oz)   BMI 22.40 kg/m     KPS 80  General: Alert, oriented, in no acute distress  HEENT: No mucositis, no thrush  Skin: No erythema      OnTreatment-related toxicities (CTCAE v5.0):  Anorexia: Grade 2: Oral intake altered without significant weight loss or malnutrition; oral nutritional supplements indicated  Fatigue: Grade 2: Fatigue not relieved by rest; limiting instrumental ADL  Nausea: Grade 0: No toxicity  Pain: Grade 1: Mild pain  Dry mouth: Grade 0: No toxicity  Dysphagia: Grade 0: No toxicity  Mucositis: Grade 0: No toxicity  Dermatitis: Grade 0: No toxicity      Assessment:    Mr. 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  Multidisciplinary conference consensus opinion is towards chemoradiation.  He has had significant hearing loss and thus Oncology has recommended weekly cisplatin. He is tolerating therapy well to date.    Plan:   Continue with treatment as planned  Nutrition per oral and PEG to maintain weight as per recommendations of Mayela An   Discussed increasing gabapentin dose to 900 mg 3 times daily.    Mosaiq chart and setup information reviewed  MVCT images reviewed    Medication Review  Med list reviewed with patient?: Yes  Med list printed and given: Offered and declined    Tomasa Akers MD     Department of Radiation Oncology  [facility]

## 2024-01-30 NOTE — PATIENT INSTRUCTIONS
[facility] Triage and after hours / weekends / holidays:  [phone]    Please call the [facility] Triage line if you experience a temperature greater than or equal to 100.4, shaking chills, have uncontrolled nausea, vomiting and/or diarrhea, dizziness, shortness of breath, chest pain, bleeding, unexplained bruising, or if you have any other new/concerning symptoms, questions or concerns.     If you wish to speak to a provider before your next infusion visit, please call your care coordinator to notify them so we can adequately serve you.    If you need a refill on a narcotic prescription or other medication, please call before your infusion appointment.      January 2024 Sunday Monday Tuesday Wednesday Thursday Friday Saturday        1     2    Admission   6:55 AM   Kayy Post MD   [facility] [address] [place] [address]   (Discharge: 1/2/2024)    PROCEDURE - 7 HR   7:15 AM   (450 min.)   U2A ROOM 16   [facility] [address] [place] [address]    IR G TUBE PERCUTANEOUS PLCMNT   7:30 AM   (120 min.)   [id number]   [facility] Interventional Radiology 3    TX PLANNING BILLING ONLY   7:00 AM   (30 min.)   Tomasa Akers MD   [facility] Radiation Oncology     PHONE   9:45 AM   (25 min.)   Shilpi Ahumada   [facility]  Services 4    VIDEO SWALLOW STUDY   9:15 AM   (60 min.)   uLba Yang SLP   [facility]    XR VIDEO SWALLOW W SLP OR OT   9:15 AM   (90 min.)   [id number]   [facility] Diagnostic Imaging 5    NEW NUTRITION   1:15 PM   (60 min.)   Mayela Blackwell RD   [facility] 6       7     8    LAB PERIPHERAL   6:45 AM   (15 min.)    [facility] LAB DRAW   [facility]    RETURN ACTIVE TREATMENT   7:00 AM   (60 min.)   Nishi Michele CNP   [facility]    ONC INFUSION 2.5 HR (150 MIN)   8:30 AM   (150  min.)   UC ONC INFUSION NURSE   [facility]    SWALLOW TREATMENT   9:00 AM   (60 min.)   Kayleen Kilgore, SLP   [facility] [place]    TREATMENT  11:45 AM   (90 min.)   [facility] RAD ONC ALEX   [facility] Radiation Oncology 9    UM NUTRITION VISIT  11:45 AM   (30 min.)   Mayela Blackwell RD   [facility] Radiation Oncology    TREATMENT  11:45 AM   (30 min.)   [facility] RAD ONC ALEX   [facility] Radiation Oncology    OTV  12:45 PM   (30 min.)   Darlene Wright   [facility] Radiation Oncology 10    TREATMENT  11:45 AM   (30 min.)   [facility] RAD ONC ALEX   [facility] Radiation Oncology 11    Outpatient Visit   7:03 AM   [facility]    IR CHEST PORT PLACEMENT >5 YRS   7:30 AM   (60 min.)   [id number]   [facility] [place]     PHONE   7:40 AM   (20 min.)   Siva Levi   [facility]  Services    [facility]   8:00 AM   (240 min.)   Cristo Merchant   [facility]  Services    INSERTION, VASCULAR ACCESS PORT   9:00 AM   Tyrel Silvestre MD   [facility] OR    TREATMENT  11:45 AM   (30 min.)   [facility] RAD ONC ALEX   [facility] Radiation Oncology 12    TREATMENT  11:45 AM   (30 min.)   [facility] RAD ONC ALEX   [facility] Radiation Oncology 13       14     15    LAB PERIPHERAL   6:45 AM   (15 min.)   [facility] DRAW   [facility]    RETURN ACTIVE TREATMENT   7:00 AM   (45 min.)   Nishi Michele CNP   [facility]    ONC INFUSION 2.5 HR (150 MIN)   8:30 AM   (150 min.)   UC ONC INFUSION NURSE   [facility]     PHONE   8:40 AM   (20 min.)   Murtaza Ahumada   [facility]  Services    SWALLOW TREATMENT   9:00 AM   (45 min.)   Kayleen Kilgore, SLP   [facility]  [place]    TREATMENT  11:45 AM   (30 min.)   UMP RAD ONC ALEX   [facility] Radiation Oncology 16    TREATMENT  11:45 AM   (30 min.)   UMP RAD ONC ALEX   [facility] Radiation Oncology    OTV  12:00 PM   (30 min.)   Darlene Wright   [facility] Radiation Oncology 17    TREATMENT  11:45 AM   (30 min.)   UMP RAD ONC ALEX   [facility] Radiation Oncology 18    TREATMENT  11:45 AM   (30 min.)   UMP RAD ONC ALEX   [facility] Radiation Oncology 19    UNIVERSITY INPATIENT  11:15 AM   (60 min.)   VIDEO,    [facility]  Services    TREATMENT  11:15 AM   (45 min.)   UMP RAD ONC ALEX   [facility] Radiation Oncology 20       21     22    [place] INPATIENT  11:15 AM   (60 min.)   VIDEO,    [facility]  Services 23    LAB PERIPHERAL   6:30 AM   (15 min.)    [facility] DRAW   [facility]    RETURN ACTIVE TREATMENT   6:45 AM   (45 min.)   Nishi Michele CNP   [facility]     PHONE   8:15 AM   (5 min.)   Alicja Perez   [facility]  Services    ONC INFUSION 2.5 HR (150 MIN)   8:30 AM   (150 min.)    ONC INFUSION NURSE   [facility]    SWALLOW TREATMENT   9:00 AM   (45 min.)   Nhi Ann   [facility] Rehabilitation Services [facility] 24    [place] OUTPATIENT  11:15 AM   (45 min.)   PHONE,    [facility]  Services 25    TREATMENT  11:45 AM   (45 min.)   UMP RAD ONC ALEX   [facility] Radiation Oncology 26    OTV  11:15 AM   (15 min.)   Tomasa Akers MD   [facility] Radiation Oncology    TREATMENT  11:15 AM   (45 min.)   UMP RAD ONC ALEX   [facility] Radiation Oncology 27    TREATMENT  10:30 AM   (45 min.)   UMP RAD ONC ALEX   [facility] Radiation Oncology   28     29    TREATMENT  10:30 AM   (45  min.)   UMP RAD ONC ALEX   [facility] Radiation Oncology 30    LAB PERIPHERAL   6:30 AM   (15 min.)   [facility] DRAW   [facility]    RETURN ACTIVE TREATMENT   6:45 AM   (45 min.)   Nishi Michele CNP   [facility]    ONC INFUSION 2.5 HR (150 MIN)   8:30 AM   (150 min.)   UC ONC INFUSION NURSE   [facility]    SWALLOW TREATMENT  10:15 AM   (45 min.)   Waleska Blankenship SLP   [facility] OUTPATIENT  11:15 AM   (75 min.)   VIDEO,    [facility]  Services    [facility] NUTRITION VISIT  11:15 AM   (30 min.)   Mayela Blackwell RD   [facility] Radiation Oncology    TREATMENT  11:15 AM   (45 min.)   [facility] RAD ONC ALEX   [facility] Radiation Oncology    OTV  11:45 AM   (30 min.)   Tomasa Akers MD   [facility] Radiation Oncology 31    TREATMENT  10:30 AM   (45 min.)   P RAD ONC ALEX   [facility] Radiation Oncology                            February 2024 Sunday Monday Tuesday Wednesday Thursday Friday Saturday                       1    TREATMENT  11:15 AM   (45 min.)   P RAD ONC ALEX   [facility] Radiation Oncology 2    TREATMENT  11:45 AM   (45 min.)   P RAD ONC ALEX   [facility] Radiation Oncology 3       4     5    LAB PERIPHERAL   6:45 AM   (15 min.)   [facility] DRAW   [facility]    RETURN ACTIVE TREATMENT   7:00 AM   (45 min.)   Nishi Michele CNP   [facility]    ONC INFUSION 2.5 HR (150 MIN)   8:30 AM   (150 min.)   UC ONC INFUSION NURSE   [facility]    SWALLOW TREATMENT   9:00 AM   (45 min.)   Kayleen Kilgore SLP   [facility]    TREATMENT  11:15 AM   (45 min.)   P RAD ONC ALEX     [facility] Radiation Oncology 6    TREATMENT  11:15 AM   (45 min.)   UMP RAD ONC ALEX   [facility] Radiation Oncology    OTV  11:45 AM   (30 min.)   Tomasa Akers MD   [facility] Radiation Oncology 7    TREATMENT  11:15 AM   (45 min.)   UMP RAD ONC ALEX   [facility] Radiation Oncology 8    TREATMENT  11:15 AM   (45 min.)   UMP RAD ONC ALEX   [facility] Radiation Oncology 9    TREATMENT  11:15 AM   (45 min.)   UMP RAD ONC ALEX   [facility] Radiation Oncology 10       11     12    LAB PERIPHERAL   6:45 AM   (15 min.)   [facility] DRAW   [facility]    RETURN ACTIVE TREATMENT   7:00 AM   (45 min.)   Nishi Michele CNP   [facility]    ONC INFUSION 2.5 HR (150 MIN)   8:30 AM   (150 min.)   UC ONC INFUSION NURSE   [facility]    SWALLOW TREATMENT   9:00 AM   (45 min.)   Kayleen Kilgore SLP   [facility]    TREATMENT  11:15 AM   (45 min.)   UMP RAD ONC ALEX   [facility] Radiation Oncology 13    TREATMENT  11:15 AM   (45 min.)   UMP RAD ONC ALEX   [facility] Radiation Oncology    OTV  11:45 AM   (30 min.)   Tomasa Akers MD   [facility] Radiation Oncology 14    TREATMENT  11:15 AM   (45 min.)   UMP RAD ONC ALEX   [facility] Radiation Oncology 15    RETURN CCSL  11:00 AM   (30 min.)   Kayy Post MD   [facility] 16    TREATMENT  11:15 AM   (45 min.)   UMP RAD ONC ALEX   [facility] Radiation Oncology    ONC INFUSION 2 HR (120 MIN)   2:30 PM   (120 min.)   UC ONC INFUSION NURSE   [facility] 17       18     19    TREATMENT  11:15 AM   (45 min.)   [facility] RAD ONC ALEX   [facility] Radiation Oncology 20    TREATMENT  11:15 AM   (45 min.)   UMP RAD ONC ALEX   [facility]  [facility] Radiation Oncology    OTV  11:45 AM   (30 min.)   Tomasa Akers MD   [facility] Radiation Oncology 21    TREATMENT  11:15 AM   (45 min.)   P RAD ONC ALEX   [facility] Radiation Oncology 22    TREATMENT  11:15 AM   (45 min.)   P RAD ONC ALEX   [facility] Radiation Oncology 23    TREATMENT  11:15 AM   (45 min.)   [facility] RAD ONC ALEX   [facility] Radiation Oncology    RETURN ACTIVE TREATMENT  12:45 PM   (45 min.)   Nishi Michele CNP   [facility]    ONC INFUSION 2 HR (120 MIN)   2:00 PM   (120 min.)    ONC INFUSION NURSE   [facility] 24       25     26    TREATMENT  11:15 AM   (45 min.)   [facility] RAD ONC ALEX   [facility] Radiation Oncology 27    TREATMENT  11:15 AM   (45 min.)   [facility] RAD ONC ALEX   [facility] Radiation Oncology 28     29                              Recent Results (from the past 24 hour(s))   Comprehensive metabolic panel    Collection Time: 01/30/24  6:35 AM   Result Value Ref Range    Sodium 134 (L) 135 - 145 mmol/L    Potassium 4.1 3.4 - 5.3 mmol/L    Carbon Dioxide (CO2) 26 22 - 29 mmol/L    Anion Gap 11 7 - 15 mmol/L    Urea Nitrogen 17.2 8.0 - 23.0 mg/dL    Creatinine 0.73 0.67 - 1.17 mg/dL    GFR Estimate >90 >60 mL/min/1.73m2    Calcium 9.4 8.8 - 10.2 mg/dL    Chloride 97 (L) 98 - 107 mmol/L    Glucose 115 (H) 70 - 99 mg/dL    Alkaline Phosphatase 93 40 - 150 U/L    AST 31 0 - 45 U/L    ALT 39 0 - 70 U/L    Protein Total 7.5 6.4 - 8.3 g/dL    Albumin 3.7 3.5 - 5.2 g/dL    Bilirubin Total 0.4 <=1.2 mg/dL   Magnesium    Collection Time: 01/30/24  6:35 AM   Result Value Ref Range    Magnesium 1.8 1.7 - 2.3 mg/dL   CBC with platelets and differential    Collection Time: 01/30/24  6:35 AM   Result Value Ref Range    WBC Count 4.3 4.0 - 11.0 10e3/uL    RBC Count 4.01 (L) 4.40 - 5.90 10e6/uL    Hemoglobin 11.0 (L) 13.3 - 17.7 g/dL    Hematocrit 33.5 (L) 40.0 -  53.0 %    MCV 84 78 - 100 fL    MCH 27.4 26.5 - 33.0 pg    MCHC 32.8 31.5 - 36.5 g/dL    RDW 15.2 (H) 10.0 - 15.0 %    Platelet Count 167 150 - 450 10e3/uL    % Neutrophils 64 %    % Lymphocytes 21 %    % Monocytes 12 %    % Eosinophils 1 %    % Basophils 1 %    % Immature Granulocytes 1 %    NRBCs per 100 WBC 0 <1 /100    Absolute Neutrophils 2.8 1.6 - 8.3 10e3/uL    Absolute Lymphocytes 0.9 0.8 - 5.3 10e3/uL    Absolute Monocytes 0.5 0.0 - 1.3 10e3/uL    Absolute Eosinophils 0.0 0.0 - 0.7 10e3/uL    Absolute Basophils 0.0 0.0 - 0.2 10e3/uL    Absolute Immature Granulocytes 0.0 <=0.4 10e3/uL    Absolute NRBCs 0.0 10e3/uL

## 2024-01-30 NOTE — LETTER
2024         RE: Douglas Herrera  [address] [place] [address]        Dear Colleague,    Thank you for referring your patient, Douglas Herrera, to the [facility] RADIATION ONCOLOGY. Please see a copy of my visit note below.    RADIATION ONCOLOGY WEEKLY ON TREATMENT VISIT   Encounter Date: 2024     Patient Name: Douglas Herrera  MRN: [id number]  : 1960     Disease and Stage: T4b N2b M0, stage IVB sinonasal carcinoma    Treatment Site: Left sinonasal primary and bilateral neck nodes        Current Dose/Planned Total Dose: 2400 cGy / 7000 cGy    Concurrent Chemotherapy: Yes  Drug and Frequency: Weekly cisplatin    ED visits/Hospitalizations: None    Missed Treatments:  2024 - 2024: Machine downtime    Subjective: Mr. Herrera presents to clinic today for his weekly on-treatment visit. He continues to report no immediate concerns related to radiation treatment. He had a G-tube placed 2024 and is using 4 cans daily.  Oral intake is limited due to lack of taste.  Gabapentin dose is 900 mg in the morning and evening.  He is sleeping approximately 12 to 13 hours per 24-hour period.    Nursing ROS:   Nutrition Alteration  Diet Type: Patient's Preference  Skin  Skin Reaction: 1 - Faint erythema or dry desquamation  Skin Note: Aquaphor     ENT and Mouth Exam  Mucositis - Current: 0 - None   ENT/Mouth Note: S&S 15-20     Gastrointestinal  Nausea: 0 - None  GI Note: PEG 4 cans a day     Pain Assessment  0-10 Pain Scale: 0  Pain Treatment: Gagbapentin    PEG tube: Placed 2024  Pacemaker: None    Objective:   /72   Pulse 64   Wt 59.2 kg (130 lb 8 oz)   BMI 22.40 kg/m     KPS 80  General: Alert, oriented, in no acute distress  HEENT: No mucositis, no thrush  Skin: No erythema      OnTreatment-related toxicities (CTCAE v5.0):  Anorexia: Grade 2: Oral intake altered without significant weight loss or malnutrition; oral nutritional supplements indicated  Fatigue: Grade 2: Fatigue  not relieved by rest; limiting instrumental ADL  Nausea: Grade 0: No toxicity  Pain: Grade 1: Mild pain  Dry mouth: Grade 0: No toxicity  Dysphagia: Grade 0: No toxicity  Mucositis: Grade 0: No toxicity  Dermatitis: Grade 0: No toxicity     Assessment:    Mr. 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  Multidisciplinary conference consensus opinion is towards chemoradiation.  He has had significant hearing loss and thus Oncology has recommended weekly cisplatin. He is tolerating therapy well to date.    Plan:   Continue with treatment as planned  Nutrition per oral and PEG to maintain weight as per recommendations of Mayela An   Discussed increasing gabapentin dose to 900 mg 3 times daily.    Mosaiq chart and setup information reviewed  MVCT images reviewed    Medication Review  Med list reviewed with patient?: Yes  Med list printed and given: Offered and declined    Tomasa Akers MD     Department of Radiation Oncology  [facility]       Again, thank you for allowing me to participate in the care of your patient.        Sincerely,        Tomasa Akers MD

## 2024-01-30 NOTE — NURSING NOTE
"Oncology Rooming Note    January 30, 2024 6:55 AM   Douglas Herrera is a 63 year old male who presents for:    Chief Complaint   Patient presents with    Port Draw     Labs drawn via port by RN in lab. VS taken.     Oncology Clinic Visit     Squamous cell carcinoma of maxillary sinus     Initial Vitals: /72   Pulse 84   Temp 98.3  F (36.8  C) (Oral)   Resp 16   Wt 57.2 kg (126 lb)   SpO2 99%   BMI 21.63 kg/m   Estimated body mass index is 21.63 kg/m  as calculated from the following:    Height as of 1/11/24: 1.626 m (5' 4\").    Weight as of this encounter: 57.2 kg (126 lb). Body surface area is 1.61 meters squared.  No Pain (0) Comment: Data Unavailable   No LMP for male patient.  Allergies reviewed: Yes  Medications reviewed: Yes    Medications: Medication refills not needed today.  Pharmacy name entered into [employer]: [facility] - [place], [place] - [address]    Frailty Screening:   Is the patient here for a new oncology consult visit in cancer care? 2. No      Clinical concerns: none      Fifi Avalos, EMT  1/30/2024            "

## 2024-01-30 NOTE — NURSING NOTE
Chief Complaint   Patient presents with    Port Draw     Labs drawn via port by RN in lab. VS taken.      Labs drawn via Port accessed using 20g flat needle. Line flushed and Heparin locked. Vital signs taken. Checked into next appointment.     Trudy Yu RN

## 2024-01-30 NOTE — PROGRESS NOTES
Infusion Nursing Note:  Douglas Herrera presents today for Cycle 1 Day 22 Cisplatin (dose # 4).    Patient seen by provider today: Yes: Nishi Michele CNP   present during visit today: No, pt prefers to use his son who is with him today     Note: Patient presents to the infusion center today after his provider appt.    Per secure chat with Nishi Michele CNP/Bhakti Barrett RN 1/30/24 0900  -patient only needs 1L 0.9 over 1hr prior to his Cisplatin today not two; no IV Mag needed    Patient requesting 2L IVFs today as he feels his intake is low. Denies any dizziness or lightheadedness.    Per secure chat with Nishi Michele CNP/Bhakti Barrett RN 1/30/24 1140  -ok to give 2L IVFs today; 2nd liter 0.9 over 1hr    Intravenous Access:  Implanted Port.    Treatment Conditions:   Latest Reference Range & Units 01/30/24 06:35   Sodium 135 - 145 mmol/L 134 (L)   Potassium 3.4 - 5.3 mmol/L 4.1   Chloride 98 - 107 mmol/L 97 (L)   Carbon Dioxide (CO2) 22 - 29 mmol/L 26   Urea Nitrogen 8.0 - 23.0 mg/dL 17.2   Creatinine 0.67 - 1.17 mg/dL 0.73   GFR Estimate >60 mL/min/1.73m2 >90   Calcium 8.8 - 10.2 mg/dL 9.4   Anion Gap 7 - 15 mmol/L 11   Magnesium 1.7 - 2.3 mg/dL 1.8   Albumin 3.5 - 5.2 g/dL 3.7   Protein Total 6.4 - 8.3 g/dL 7.5   Alkaline Phosphatase 40 - 150 U/L 93   ALT 0 - 70 U/L 39   AST 0 - 45 U/L 31   Bilirubin Total <=1.2 mg/dL 0.4   Glucose 70 - 99 mg/dL 115 (H)   WBC 4.0 - 11.0 10e3/uL 4.3   Hemoglobin 13.3 - 17.7 g/dL 11.0 (L)   Hematocrit 40.0 - 53.0 % 33.5 (L)   Platelet Count 150 - 450 10e3/uL 167   RBC Count 4.40 - 5.90 10e6/uL 4.01 (L)   MCV 78 - 100 fL 84   MCH 26.5 - 33.0 pg 27.4   MCHC 31.5 - 36.5 g/dL 32.8   RDW 10.0 - 15.0 % 15.2 (H)   % Neutrophils % 64   % Lymphocytes % 21   % Monocytes % 12   % Eosinophils % 1   % Basophils % 1   Absolute Basophils 0.0 - 0.2 10e3/uL 0.0   Absolute Eosinophils 0.0 - 0.7 10e3/uL 0.0   Absolute Immature Granulocytes <=0.4 10e3/uL 0.0   Absolute Lymphocytes 0.8 - 5.3  10e3/uL 0.9   Absolute Monocytes 0.0 - 1.3 10e3/uL 0.5   % Immature Granulocytes % 1   Absolute Neutrophils 1.6 - 8.3 10e3/uL 2.8   Absolute NRBCs 10e3/uL 0.0   NRBCs per 100 WBC <1 /100 0     Results reviewed, labs MET treatment parameters, ok to proceed with treatment.    Post Infusion Assessment:  Patient tolerated infusion without incident.  Blood return noted pre and post infusion.  No evidence of extravasations.  Access discontinued per protocol.     Discharge Plan:   Patient declined prescription refills.  Discharge instructions reviewed with: Patient and Family.  Patient and/or family verbalized understanding of discharge instructions and all questions answered.  Copy of AVS reviewed with patient and/or family.  Patient will return 2/5 for next appointment.  AVS to patient via [employer].  Patient will return 2/5 for next appointment.   Patient discharged in stable condition accompanied by: taty Barrett RN

## 2024-01-30 NOTE — Clinical Note
1/30/2024         RE: Douglas Herrera  [address] [place] [address]        Dear Colleague,    Thank you for referring your patient, Douglas Herrera, to the [facility]. Please see a copy of my visit note below.       [facility]    PATIENT NAME: Douglas Herrera  MRN # [id number]   DATE OF VISIT: January 30, 2024  YOB: 1960     [facility]   Radiation Oncology: Dr. Tomasa Akers  Cardiology: Dr. Qamar Hernandez  PCP: Dr. العراقي   Hepatologist: [facility]     CANCER TYPE: SCC sinonasal   STAGE: [id number] (IVB)  ECOG PS: 1    PD-L1:  NGS: internal panel pending     SUMMARY    8/9/23 CT sinus.   8/24/23 MRI sinus. L maxillary sinus mass  9/12/23 ED for epistaxis.   9/13/23 CTA and embolization of L IMAX   10/4/23 Nasal bx. Path: Inverted papilloma with high grade dysplasia  11/10/23 Nasal endoscopy and bx in the OR. C/b severe bleeding. Path: Inverted sinonasal papilloma with severe dysplasia.  11/17/23 MRI. 7.4 x 4.6 x 6.6 cm L sinonasal mass, destruction of nasal turbinates, L maxillary sinus, hard palate, invasion of L  space, involvement of medial and lateral pterygoids and temporalis muscles. Effacement and invasion ipsilateral pterygopalatine fossa, extends and effaces the nasopharynx.   11/30/23 Carotid angiogram. Direct endonasal and transoral embolization L sinonasal tumor, transaterial embolization of the L sphenopalatine artery feeders to the L sinonasal artery tumor   12/1/23 Stealth assisted transnasal endoscopic approach to excise L sinonasal mass. Pre-operative embolization. Path: SCC involving L maxilla.    12/9/23 PET. Hypermetabolic mass centered along the L maxilla, extending superiorly through the floor of the L maxillary sinus, posterior/laterally to the  space, invasion of the pterygoid muscles, extending cranially along the pterygopalatine fossa and pterygoid plates to the skull base level. Erosion  of involved bones including L maxilla, maxillary sinus wall and pterygoid plates. Extension medially to the L lateral nasopharyngeal wall and soft palate. 1 mm fat place between carotid and mass. ~4.9 x 4.0 x 5.4 cm (SUV 24.3). Partially resected since 11/17/23 MRI. 8 mm L 2A node (SUV 5.9), 7 mm L level 2 node (SUV 5.5)    ASSESSMENT AND PLAN  SCC L maxillary sinus, [id number] (IVB): s/p transnasal endoscopic excision of sinonasal mass. He is a poor candidate for TPF induction therefore plan is is for definitive chemoradiation with weekly cisplatin. Not a candidate for HD cisplatin-audiogram 12/18/23 confirms significant bilateral SNHL. Tolerating cisplatin well without side effects. No hearing changes but will continue to watch closely and consider change to carboplatin/paclitaxel if this becomes an issue.  -Proceed with day 22 cisplatin  -RTC weekly for labs/KENNEDI/infusion    Risk for malnutrition: G-tube placement 1/2. Working with CARISA Pedro. Oral intake continues to decrease. Now up to 4 cartons formula/day. Advised continuing to slowly increase intake to avoid reflux symptoms.    Odynophagia:     Constipation: Well controlled with Senna 1-2 tab BID PRN.    Vascular access: port placed 1/11/24, no concerns    RSV: ED 12/27 for fever, cough, facial swelling. RVP positive for RSV. Treated with supportive measures. Symptomatically recovered.    Hypercalcemia: Resolved    Hepatitis B: [facility] note from 1/5/23 (attached in chart) reviewed. Unclear if there was a more recent visit Remains on entecavir.     H/o PE/DVT: 5/8/2019 CTA report scanned into [employer], reviewed. Small PEs. Also had LLE DVT, acute, occlusive.  Presented with CP and LLE swelling. Was on rivaroxaban, completed course.     H/o undefined hypothyroidism: TFTs 1/8/24 normal.    Screening for TB: Quantiferon gold negative on 1/23/24    *** minutes spent on the date of the encounter doing {2021 E&M time in:574381}     Nishi Michele CNP    SUBJECTIVE  Mr. 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 Professional [employer]  used via video for visit today. Mr. Herrera speaks and understands conversational English.     ***    PAST MEDICAL HISTORY  Sinonasal carcinoma as above  HTN  ASCVD. CABG x 3 2019  PE and LLE DVT after CABG . Treated with rivaroxaban  Dyslipidemia  Hepatitis B   SCC R temple s/p MOHS  Ascending aortic aneurysm repair 2019  Hearing loss L   Gout - feet  Depression  Cholecystectomy    Son Berhane at [phone]    CURRENT OUTPATIENT MEDICATIONS  Current Outpatient Medications   Medication    chlorhexidine (PERIDEX) 0.12 % solution    gabapentin (NEURONTIN) 300 MG capsule    senna-docusate (SENNA S) 8.6-50 MG tablet    acetaminophen (TYLENOL) 325 MG tablet    atorvastatin (LIPITOR) 80 MG tablet    capsaicin (ZOSTRIX) 0.025 % external cream    entecavir (BARACLUDE) 0.5 MG tablet    fluticasone (FLONASE) 50 MCG/ACT nasal spray    nitroGLYcerin (NITROSTAT) 0.4 MG sublingual tablet    ondansetron (ZOFRAN) 8 MG tablet    prochlorperazine (COMPAZINE) 10 MG tablet    sodium chloride (OCEAN) 0.65 % nasal spray    sodium chloride (OCEAN) 0.65 % nasal spray    traMADol (ULTRAM) 50 MG tablet    traZODone (DESYREL) 50 MG tablet     Current Facility-Administered Medications   Medication    heparin 100 unit/mL injection 5 mL    heparin lock flush 10 UNIT/ML injection 5 mL     ALLERGIES  No Known Allergies     SOCIAL HISTORY: Non smoker. No current ETOH. Lives with his children. Immigrated from [facility] in the [place]s. Not working. On disability. Used to be a . Lived in [facility], [place] [facility]. One daughter and 5 sons. Two grandchildren.      FAMILY HISTORY:   Family History   Problem Relation Age of Onset    Cerebrovascular Disease Mother 90.00    Acute Myocardial Infarction No family hx of      PHYSICAL EXAM  /72   Pulse 84   Temp 98.3  F (36.8  C) (Oral)    Resp 16   Wt 57.2 kg (126 lb)   SpO2 99%   BMI 21.63 kg/m      GEN: NAD  HEENT: EOMI, no icterus, injection or pallor. Oropharynx - mild trismus. Visible tumor to R posterior hard palate with overlying erythema  RESP: cta bilaterally, no wheezes  CV: rrr, no murmur   GI: abd soft/nt, g-tube without surrounding erythema or drainage to insertion site  EXT: no edema  NEURO: alert, oriented, no focal deficits  SKIN: R chest port accessed, no edema/erythema. Incisions intact.    LABORATORY AND IMAGING STUDIES  Most Recent 3 CBC's:  Recent Labs   Lab Test 01/30/24  0635 01/23/24  0656 01/15/24  0647   WBC 4.3 6.5 7.1   HGB 11.0* 11.2* 11.7*   MCV 84 83 83    193 253   ANEUTAUTO 2.8 4.3 4.5    Most Recent 3 BMP's:  Recent Labs   Lab Test 01/23/24  0656 01/15/24  0647 01/08/24  0717    137 139   POTASSIUM 4.5 4.2 4.1   CHLORIDE 98 100 104   CO2 27 28 27   BUN 19.0 15.0 20.1   CR 0.75 0.68 0.73   ANIONGAP 10 9 8   FLORENCE 9.5 9.7 11.0*   * 101* 112*   PROTTOTAL 8.0 8.1 8.8*   ALBUMIN 3.7 3.6 3.4*    Most Recent 2 LFT's:  Recent Labs   Lab Test 01/23/24  0656 01/15/24  0647   AST 40 39   ALT 48 45   ALKPHOS 97 88   BILITOTAL 0.5 0.5    Most Recent TSH and T4:  Recent Labs   Lab Test 01/08/24  0717   TSH 3.35   T4 1.00     Phos/Mag:  Lab Results   Component Value Date    MAG 1.9 01/23/2024    MAG 1.9 01/15/2024    MAG 2.0 01/08/2024      I reviewed the above labs today.           [facility]    PATIENT NAME: Douglas Herrera  MRN # [id number]   DATE OF VISIT: January 30, 2024  YOB: 1960     Otolaryngology: Dr. Damon Regan   Radiation Oncology: Dr. Tomasa Akers  Cardiology: Dr. Qamar Hernandez  PCP: Dr. العراقي   Hepatologist: [facility]     CANCER TYPE: SCC sinonasal   STAGE: [id number] (IVB)  ECOG PS: 1    PD-L1:  NGS: internal panel pending     SUMMARY    8/9/23 CT sinus.   8/24/23 MRI sinus. L maxillary sinus mass  9/12/23 ED for epistaxis.   9/13/23 CTA and embolization of L  IMAX   10/4/23 Nasal bx. Path: Inverted papilloma with high grade dysplasia  11/10/23 Nasal endoscopy and bx in the OR. C/b severe bleeding. Path: Inverted sinonasal papilloma with severe dysplasia.  11/17/23 MRI. 7.4 x 4.6 x 6.6 cm L sinonasal mass, destruction of nasal turbinates, L maxillary sinus, hard palate, invasion of L  space, involvement of medial and lateral pterygoids and temporalis muscles. Effacement and invasion ipsilateral pterygopalatine fossa, extends and effaces the nasopharynx.   11/30/23 Carotid angiogram. Direct endonasal and transoral embolization L sinonasal tumor, transaterial embolization of the L sphenopalatine artery feeders to the L sinonasal artery tumor   12/1/23 Stealth assisted transnasal endoscopic approach to excise L sinonasal mass. Pre-operative embolization. Path: SCC involving L maxilla.    12/9/23 PET. Hypermetabolic mass centered along the L maxilla, extending superiorly through the floor of the L maxillary sinus, posterior/laterally to the  space, invasion of the pterygoid muscles, extending cranially along the pterygopalatine fossa and pterygoid plates to the skull base level. Erosion of involved bones including L maxilla, maxillary sinus wall and pterygoid plates. Extension medially to the L lateral nasopharyngeal wall and soft palate. 1 mm fat place between carotid and mass. ~4.9 x 4.0 x 5.4 cm (SUV 24.3). Partially resected since 11/17/23 MRI. 8 mm L 2A node (SUV 5.9), 7 mm L level 2 node (SUV 5.5)    ASSESSMENT AND PLAN  SCC L maxillary sinus, [id number] (IVB): s/p transnasal endoscopic excision of sinonasal mass. He is a poor candidate for TPF induction therefore plan is is for definitive chemoradiation with weekly cisplatin. Not a candidate for HD cisplatin-audiogram 12/18/23 confirms significant bilateral SNHL. Tolerating cisplatin well without side effects. No hearing changes but will continue to watch closely and consider change to  carboplatin/paclitaxel if this becomes an issue.  -Proceed with day 22 cisplatin  -RTC weekly for labs/KENNEDI/infusion    Risk for malnutrition: G-tube placement 1/2. Working with CARISA Pedro. Oral intake continues to decrease. Now up to 4 cartons formula/day. Advised continuing to slowly increase intake to avoid reflux symptoms.Weight stable. Reviewed need to keep G-tube in for weeks/months following completion of chemoRT as he is able to slowly increase oral intake.    Odynophagia: Increasing as expected. Encouraged s/s rinses    Constipation: Well controlled with Senna 1-2 tab BID PRN.    Vascular access: port placed 1/11/24, no concerns    RSV: ED 12/27 for fever, cough, facial swelling. RVP positive for RSV. Treated with supportive measures. Symptomatically recovered.    Hypercalcemia: Resolved    Hepatitis B: [facility] note from 1/5/23 (attached in chart) reviewed. Unclear if there was a more recent visit Remains on entecavir.     H/o PE/DVT: 5/8/2019 CTA report scanned into [employer], reviewed. Small PEs. Also had LLE DVT, acute, occlusive.  Presented with CP and LLE swelling. Was on rivaroxaban, completed course.     H/o undefined hypothyroidism: TFTs 1/8/24 normal.    Screening for TB: Quantiferon gold negative on 1/23/24    30 minutes spent on the date of the encounter doing chart review, review of test results, interpretation of tests, patient visit, and documentation     Nishi Michele CNP    SUBJECTIVE  Mr. 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 [employer]  used via video for visit today. Mr. Herrera speaks and understands conversational English.   -Throat pain increasing, tolerable  -Using salt/soda rinses 2-3 times/day  -No change in hearing  -No n/t in extremities  -No nausea  -Experiences reflux symptoms with > 4 cartons formula/day or fast administration  -Constipation controlled with  Senna-S, has room to titrate    PAST MEDICAL HISTORY  Sinonasal carcinoma as above  HTN  ASCVD. CABG x 3 2019  PE and LLE DVT after CABG . Treated with rivaroxaban  Dyslipidemia  Hepatitis B   SCC R temple s/p MOHS  Ascending aortic aneurysm repair 2019  Hearing loss L   Gout - feet  Depression  Cholecystectomy    Son Berhane at [phone]    CURRENT OUTPATIENT MEDICATIONS  Current Outpatient Medications   Medication     chlorhexidine (PERIDEX) 0.12 % solution     gabapentin (NEURONTIN) 300 MG capsule     senna-docusate (SENNA S) 8.6-50 MG tablet     acetaminophen (TYLENOL) 325 MG tablet     atorvastatin (LIPITOR) 80 MG tablet     capsaicin (ZOSTRIX) 0.025 % external cream     entecavir (BARACLUDE) 0.5 MG tablet     fluticasone (FLONASE) 50 MCG/ACT nasal spray     nitroGLYcerin (NITROSTAT) 0.4 MG sublingual tablet     ondansetron (ZOFRAN) 8 MG tablet     prochlorperazine (COMPAZINE) 10 MG tablet     sodium chloride (OCEAN) 0.65 % nasal spray     sodium chloride (OCEAN) 0.65 % nasal spray     traMADol (ULTRAM) 50 MG tablet     traZODone (DESYREL) 50 MG tablet     Current Facility-Administered Medications   Medication     heparin 100 unit/mL injection 5 mL     heparin lock flush 10 UNIT/ML injection 5 mL     Facility-Administered Medications Ordered in Other Visits   Medication     CISplatin (PLATINOL) 60 mg in sodium chloride 0.9 % 335 mL infusion     fosaprepitant (EMEND) 150 mg, dexAMETHasone (DECADRON) 12 mg in sodium chloride 0.9 % 276.2 mL intermittent infusion     heparin 100 unit/mL injection 5 mL     palonosetron (ALOXI) injection 0.25 mg     sodium chloride 0.9% BOLUS 1,000 mL     ALLERGIES  No Known Allergies     SOCIAL HISTORY: Non smoker. No current ETOH. Lives with his children. Immigrated from [place] in the 1980s. Not working. On disability. Used to be a . Lived in [facility]. One daughter and 5 sons. Two grandchildren.      FAMILY HISTORY:   Family History   Problem Relation Age of  Onset     Cerebrovascular Disease Mother 90.00     Acute Myocardial Infarction No family hx of      PHYSICAL EXAM  /72   Pulse 84   Temp 98.3  F (36.8  C) (Oral)   Resp 16   Wt 57.2 kg (126 lb)   SpO2 99%   BMI 21.63 kg/m      GEN: NAD  HEENT: EOMI, no icterus, injection or pallor. Oropharynx - mild trismus. Visible tumor to R posterior hard palate with overlying erythema, decreasing in size   RESP: cta bilaterally, no wheezes  CV: rrr, no murmur   GI: abd soft/nt, g-tube without surrounding erythema or drainage to insertion site  EXT: no edema  NEURO: alert, oriented, no focal deficits  SKIN: R chest port accessed, no edema/erythema.     LABORATORY AND IMAGING STUDIES  Most Recent 3 CBC's:  Recent Labs   Lab Test 01/30/24  0635 01/23/24  0656 01/15/24  0647   WBC 4.3 6.5 7.1   HGB 11.0* 11.2* 11.7*   MCV 84 83 83    193 253   ANEUTAUTO 2.8 4.3 4.5    Most Recent 3 BMP's:  Recent Labs   Lab Test 01/30/24  0635 01/23/24  0656 01/15/24  0647   * 135 137   POTASSIUM 4.1 4.5 4.2   CHLORIDE 97* 98 100   CO2 26 27 28   BUN 17.2 19.0 15.0   CR 0.73 0.75 0.68   ANIONGAP 11 10 9   FLORENCE 9.4 9.5 9.7   * 104* 101*   PROTTOTAL 7.5 8.0 8.1   ALBUMIN 3.7 3.7 3.6    Most Recent 2 LFT's:  Recent Labs   Lab Test 01/30/24  0635 01/23/24  0656   AST 31 40   ALT 39 48   ALKPHOS 93 97   BILITOTAL 0.4 0.5    Most Recent TSH and T4:  Recent Labs   Lab Test 01/08/24  0717   TSH 3.35   T4 1.00     Phos/Mag:  Lab Results   Component Value Date    MAG 1.8 01/30/2024    MAG 1.9 01/23/2024    MAG 1.9 01/15/2024      I reviewed the above labs today.          Again, thank you for allowing me to participate in the care of your patient.        Sincerely,        Nishi Michele, DIANA

## 2024-01-30 NOTE — PROGRESS NOTES
CLINICAL NUTRITION SERVICES - REASSESSMENT NOTE   EVALUATION OF PREVIOUS PLAN OF CARE:   Time spent with patient: 15 minutes.    Pt accompanied by: his son, Tulio in radiation clinic at time of OTV  Referring Physician: Sincere 1/2/24  Z51.11 (ICD-10-CM) - Encounter for antineoplastic chemotherapy   E43 (ICD-10-CM) - Severe protein-calorie malnutrition (H24)   C31.0 (ICD-10-CM) - Carcinoma of maxillary sinus (H)   Additional Information:  nasopharyngeal carcinoma, dysphagia, weight loss, Gtube 1/2/24. Video swallow pending. Please assist with nutritional needs prior to and throughout chemoradiation    Monitoring from previous assessment:   -Food/Fluid intake - Douglas continues to eat bites of oatmeal, porridge and sips of nutrition shakes.    -EN intake- Douglas has been supplementing his intake with 4 cartons of EN per day via gravity bag feedings. He has been taking 2 cartons of tube feeding for breakfast and 2 for dinner.   Formula: Osmolite 1.5 fernando  Volume: 4 cartons/day (948 mL, 720 ml free water)  Provisions:  1420kcal (23kcal/kg), 60 g protein (1.0g/kg), 176g CHO, 0g fiber  -Weight trends - stable   Wt Readings from Last 7 Encounters:   01/30/24 59.2 kg (130 lb 8 oz)   01/30/24 57.2 kg (126 lb)   01/26/24 57.7 kg (127 lb 1.6 oz)   01/23/24 57.4 kg (126 lb 9.6 oz)   01/16/24 58.5 kg (129 lb)   01/15/24 58.7 kg (129 lb 4.8 oz)   01/11/24 59 kg (130 lb)     Previous Goals:   PO and EN to meet 100% estimated nutrition needs  Aim for at least 2000 fernando, 80-100g protein and 8-10 cups water/electrolyte fluids/day  Evaluation: Not met   Previous Nutrition Diagnosis:   Inadequate oral intake related to odynophagia as evidenced by pt reliant on EN to meet >50-75% est nutrition needs.    Evaluation: No change   NEW FINDINGS:   No new findings   CURRENT NUTRITION DIAGNOSIS   Inadequate oral intake related to odynophagia as evidenced by pt reliant on EN to meet >50-75% est nutrition needs.    INTERVENTIONS   EN  Composition, EN Schedule, Feeding Tube Flush- reviewed current regimen.  Encouraged to increase formula towards 5 cartons/day.  Reviewed schedule and volume options.  Encouraged to increase water/electrolyte intake to a total of 6 cups per day as able.  Encouraged sipping PO and flushing feeding tube throughout the day.  Reviewed soft food options for PO and encouraged to continue to exercise swallow.   Goals   PO and EN to meet 100% estimated nutrition needs  Aim for at least 2000 fernando, 80-100g protein and 8-10 cups water/electrolyte fluids/day     Follow up/Monitoring:  one week follow up  Food intake, Enteral Nutrition intake, and Weight    Mayela An RD, , LD  [facility]  [phone]

## 2024-02-01 NOTE — PROGRESS NOTES
[facility]    PATIENT NAME: Douglas Herrera  MRN # [id number]   DATE OF VISIT: February 5, 2024  YOB: 1960     [facility]   Radiation Oncology: Dr. Tomasa Akers  Cardiology: Dr. Qamar Hernandez  PCP: Dr. العراقي   Hepatologist: [facility]     CANCER TYPE: SCC sinonasal   STAGE: [id number] (IVB)  ECOG PS: 1    PD-L1:  NGS: internal panel pending     SUMMARY    8/9/23 CT sinus.   8/24/23 MRI sinus. L maxillary sinus mass  9/12/23 ED for epistaxis.   9/13/23 CTA and embolization of L IMAX   10/4/23 Nasal bx. Path: Inverted papilloma with high grade dysplasia  11/10/23 Nasal endoscopy and bx in the OR. C/b severe bleeding. Path: Inverted sinonasal papilloma with severe dysplasia.  11/17/23 MRI. 7.4 x 4.6 x 6.6 cm L sinonasal mass, destruction of nasal turbinates, L maxillary sinus, hard palate, invasion of L  space, involvement of medial and lateral pterygoids and temporalis muscles. Effacement and invasion ipsilateral pterygopalatine fossa, extends and effaces the nasopharynx.   11/30/23 Carotid angiogram. Direct endonasal and transoral embolization L sinonasal tumor, transaterial embolization of the L sphenopalatine artery feeders to the L sinonasal artery tumor   12/1/23 Stealth assisted transnasal endoscopic approach to excise L sinonasal mass. Pre-operative embolization. Path: SCC involving L maxilla.    12/9/23 PET. Hypermetabolic mass centered along the L maxilla, extending superiorly through the floor of the L maxillary sinus, posterior/laterally to the  space, invasion of the pterygoid muscles, extending cranially along the pterygopalatine fossa and pterygoid plates to the skull base level. Erosion of involved bones including L maxilla, maxillary sinus wall and pterygoid plates. Extension medially to the L lateral nasopharyngeal wall and soft palate. 1 mm fat place between carotid and mass. ~4.9 x 4.0 x 5.4 cm (SUV 24.3). Partially  resected since 11/17/23 MRI. 8 mm L 2A node (SUV 5.9), 7 mm L level 2 node (SUV 5.5)    ASSESSMENT AND PLAN  SCC L maxillary sinus, [id number] (IVB): s/p transnasal endoscopic excision of sinonasal mass. He is a poor candidate for TPF induction therefore plan is is for definitive chemoradiation with weekly cisplatin. Not a candidate for HD cisplatin-audiogram 12/18/23 confirms significant bilateral SNHL. Tolerating cisplatin well without side effects. No hearing changes. Labs today meet parameters for treatment.  -Proceed with day 29 cisplatin  -Scheduled for additional IVF Sat 2/10  -RTC weekly for labs/KENNEDI/infusion. Reviewed appts following completion of chemo including follow-up with Dr. Post and KENNEDI/tentative fluids appts    Risk for malnutrition: G-tube placement 1/2. Working with CARISA Pedro. Oral intake continues to decrease. Up to 4 cartons formula/day, trying to increase to 5 cartons/day. Weight stable. We again reviewed the expected timeline for recovery and need to keep G-tube in for weeks/months following completion of chemoRT as he is able to slowly increase oral intake.    Anemia: s/t cisplatin, monitor    Odynophagia: Increasing as expected. S/s rinses.     Constipation: Senna 1-2 tab BID PRN.    Vascular access: port placed 1/11/24, no concerns    Hypercalcemia: Resolved    Hepatitis B: [facility] note from 1/5/23 (attached in chart) reviewed. Unclear if there was a more recent visit Remains on entecavir.     H/o PE/DVT: 5/8/2019 CTA report scanned into [employer], reviewed. Small PEs. Also had LLE DVT, acute, occlusive.  Presented with CP and LLE swelling. Was on rivaroxaban, completed course.     H/o undefined hypothyroidism: TFTs 1/8/24 normal.    Screening for TB: Quantiferon gold negative on 1/23/24    30 minutes spent on the date of the encounter doing chart review, review of test results, interpretation of tests, patient visit, and documentation     Nishi Michele CNP    SUBJECTIVE  Mr. 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 Professional [employer]  used via video for visit today. Mr. Herrera speaks and understands conversational English.   -Still only able to get 4 cartons of formula via FT daily, 2 in AM and 2 in the evening. Plans to try to increase to 5 cartons this week  -Eating by mouth, mainly rice porridge and only small amounts  -Drinking by mouth, at least 3 bottles of water/day  -Constipation controlled   -Throat pain stable    PAST MEDICAL HISTORY  Sinonasal carcinoma as above  HTN  ASCVD. CABG x 3 2019  PE and LLE DVT after CABG . Treated with rivaroxaban  Dyslipidemia  Hepatitis B   SCC R temple s/p MOHS  Ascending aortic aneurysm repair 2019  Hearing loss L   Gout - feet  Depression  Cholecystectomy    Son Berhane at [phone]    CURRENT OUTPATIENT MEDICATIONS  Current Outpatient Medications   Medication    atorvastatin (LIPITOR) 80 MG tablet    chlorhexidine (PERIDEX) 0.12 % solution    entecavir (BARACLUDE) 0.5 MG tablet    gabapentin (NEURONTIN) 300 MG capsule    acetaminophen (TYLENOL) 325 MG tablet    capsaicin (ZOSTRIX) 0.025 % external cream    fluticasone (FLONASE) 50 MCG/ACT nasal spray    nitroGLYcerin (NITROSTAT) 0.4 MG sublingual tablet    ondansetron (ZOFRAN) 8 MG tablet    prochlorperazine (COMPAZINE) 10 MG tablet    senna-docusate (SENNA S) 8.6-50 MG tablet    sodium chloride (OCEAN) 0.65 % nasal spray    sodium chloride (OCEAN) 0.65 % nasal spray    traMADol (ULTRAM) 50 MG tablet    traZODone (DESYREL) 50 MG tablet     No current facility-administered medications for this visit.     Facility-Administered Medications Ordered in Other Visits   Medication    CISplatin (PLATINOL) 60 mg in sodium chloride 0.9 % 335 mL infusion    fosaprepitant (EMEND) 150 mg, dexAMETHasone (DECADRON) 12 mg in sodium chloride 0.9 % 276.2 mL intermittent infusion    palonosetron (ALOXI) injection  0.25 mg    sodium chloride 0.9 % 1,000 mL with magnesium sulfate 2 g *See DOSE to administer in MAR details (order question)     ALLERGIES  No Known Allergies     SOCIAL HISTORY: Non smoker. No current ETOH. Lives with his children. Immigrated from [facility] in the [place]. Not working. On disability. Used to be a . Lived in [facility]. One daughter and 5 sons. Two grandchildren.      FAMILY HISTORY:   Family History   Problem Relation Age of Onset    Cerebrovascular Disease Mother 90.00    Acute Myocardial Infarction No family hx of      PHYSICAL EXAM  /79 (BP Location: Right arm, Patient Position: Sitting, Cuff Size: Adult Regular)   Pulse 84   Temp 98.4  F (36.9  C) (Oral)   Resp 16   Wt 57.2 kg (126 lb 3.2 oz)   SpO2 99%   BMI 21.66 kg/m      GEN: NAD  HEENT: EOMI, no icterus, injection or pallor. Oropharynx - mild trismus.  R posterior hard palate tumor with overlying erythema and mucositis, significantly decreased in size compared to start of treatment.   RESP: cta bilaterally, no wheezes  CV: rrr, no murmur   GI: abd soft/nt, g-tube without surrounding erythema or drainage to insertion site  EXT: no edema  NEURO: alert, oriented, no focal deficits  SKIN: R chest port accessed, no edema/erythema.     LABORATORY AND IMAGING STUDIES  Most Recent 3 CBC's:  Recent Labs   Lab Test 02/05/24  0657 01/30/24  0635 01/23/24  0656   WBC 3.9* 4.3 6.5   HGB 10.6* 11.0* 11.2*   MCV 82 84 83   * 167 193   ANEUTAUTO 2.4 2.8 4.3    Most Recent 3 BMP's:  Recent Labs   Lab Test 02/05/24  0657 01/30/24  0635 01/23/24  0656   * 134* 135   POTASSIUM 4.3 4.1 4.5   CHLORIDE 97* 97* 98   CO2 28 26 27   BUN 17.6 17.2 19.0   CR 0.79 0.73 0.75   ANIONGAP 9 11 10   FLORENCE 9.3 9.4 9.5   * 115* 104*   PROTTOTAL 7.4 7.5 8.0   ALBUMIN 3.6 3.7 3.7    Most Recent 2 LFT's:  Recent Labs   Lab Test 02/05/24  0657 01/30/24  0635   AST 30 31   ALT 30 39   ALKPHOS 94 93   BILITOTAL 0.4 0.4    Most Recent TSH  and T4:  Recent Labs   Lab Test 01/08/24  0717   TSH 3.35   T4 1.00     Phos/Mag:  Lab Results   Component Value Date    MAG 1.7 02/05/2024    MAG 1.8 01/30/2024    MAG 1.9 01/23/2024      I reviewed the above labs today.

## 2024-02-02 ENCOUNTER — APPOINTMENT (OUTPATIENT)
Dept: RADIATION ONCOLOGY | Facility: CLINIC | Age: 64
End: 2024-02-02
Attending: RADIOLOGY
Payer: COMMERCIAL

## 2024-02-02 PROCEDURE — 77386 HC IMRT TREATMENT DELIVERY, COMPLEX: CPT | Performed by: RADIOLOGY

## 2024-02-02 PROCEDURE — 77014 PR CT GUIDE FOR PLACEMENT RADIATION THERAPY FIELDS: CPT | Mod: 26 | Performed by: RADIOLOGY

## 2024-02-03 ENCOUNTER — APPOINTMENT (OUTPATIENT)
Dept: RADIATION ONCOLOGY | Facility: CLINIC | Age: 64
End: 2024-02-03
Attending: RADIOLOGY
Payer: COMMERCIAL

## 2024-02-03 PROCEDURE — 77014 PR CT GUIDE FOR PLACEMENT RADIATION THERAPY FIELDS: CPT | Mod: 26 | Performed by: SURGERY

## 2024-02-03 PROCEDURE — 77386 HC IMRT TREATMENT DELIVERY, COMPLEX: CPT | Performed by: RADIOLOGY

## 2024-02-04 ENCOUNTER — APPOINTMENT (OUTPATIENT)
Dept: RADIATION ONCOLOGY | Facility: CLINIC | Age: 64
End: 2024-02-04
Attending: RADIOLOGY
Payer: COMMERCIAL

## 2024-02-04 PROCEDURE — 77014 PR CT GUIDE FOR PLACEMENT RADIATION THERAPY FIELDS: CPT | Mod: 26 | Performed by: RADIOLOGY

## 2024-02-04 PROCEDURE — 77386 HC IMRT TREATMENT DELIVERY, COMPLEX: CPT | Performed by: RADIOLOGY

## 2024-02-05 ENCOUNTER — OFFICE VISIT (OUTPATIENT)
Dept: RADIATION ONCOLOGY | Facility: CLINIC | Age: 64
End: 2024-02-05
Attending: RADIOLOGY
Payer: COMMERCIAL

## 2024-02-05 ENCOUNTER — ONCOLOGY VISIT (OUTPATIENT)
Dept: ONCOLOGY | Facility: CLINIC | Age: 64
End: 2024-02-05
Attending: REGISTERED NURSE
Payer: COMMERCIAL

## 2024-02-05 ENCOUNTER — THERAPY VISIT (OUTPATIENT)
Dept: SPEECH THERAPY | Facility: CLINIC | Age: 64
End: 2024-02-05
Payer: COMMERCIAL

## 2024-02-05 ENCOUNTER — APPOINTMENT (OUTPATIENT)
Dept: LAB | Facility: CLINIC | Age: 64
End: 2024-02-05
Attending: INTERNAL MEDICINE
Payer: COMMERCIAL

## 2024-02-05 ENCOUNTER — INFUSION THERAPY VISIT (OUTPATIENT)
Dept: ONCOLOGY | Facility: CLINIC | Age: 64
End: 2024-02-05
Attending: INTERNAL MEDICINE
Payer: COMMERCIAL

## 2024-02-05 VITALS
HEART RATE: 84 BPM | WEIGHT: 126.2 LBS | OXYGEN SATURATION: 99 % | TEMPERATURE: 98.4 F | DIASTOLIC BLOOD PRESSURE: 79 MMHG | SYSTOLIC BLOOD PRESSURE: 116 MMHG | BODY MASS INDEX: 21.66 KG/M2 | RESPIRATION RATE: 16 BRPM

## 2024-02-05 VITALS
SYSTOLIC BLOOD PRESSURE: 112 MMHG | BODY MASS INDEX: 21.63 KG/M2 | DIASTOLIC BLOOD PRESSURE: 79 MMHG | WEIGHT: 126 LBS | HEART RATE: 74 BPM

## 2024-02-05 DIAGNOSIS — Z91.89 AT RISK FOR MALNUTRITION: ICD-10-CM

## 2024-02-05 DIAGNOSIS — E83.42 HYPOMAGNESEMIA: ICD-10-CM

## 2024-02-05 DIAGNOSIS — R13.12 DYSPHAGIA, OROPHARYNGEAL PHASE: Primary | ICD-10-CM

## 2024-02-05 DIAGNOSIS — C31.9 MALIGNANT NEOPLASM OF PARANASAL SINUS (H): ICD-10-CM

## 2024-02-05 DIAGNOSIS — C31.0 SQUAMOUS CELL CARCINOMA OF MAXILLARY SINUS (H): Primary | ICD-10-CM

## 2024-02-05 DIAGNOSIS — T45.1X5A ANEMIA DUE TO ANTINEOPLASTIC CHEMOTHERAPY: ICD-10-CM

## 2024-02-05 DIAGNOSIS — R13.10 ODYNOPHAGIA: ICD-10-CM

## 2024-02-05 DIAGNOSIS — D64.81 ANEMIA DUE TO ANTINEOPLASTIC CHEMOTHERAPY: ICD-10-CM

## 2024-02-05 LAB
ALBUMIN SERPL BCG-MCNC: 3.6 G/DL (ref 3.5–5.2)
ALP SERPL-CCNC: 94 U/L (ref 40–150)
ALT SERPL W P-5'-P-CCNC: 30 U/L (ref 0–70)
ANION GAP SERPL CALCULATED.3IONS-SCNC: 9 MMOL/L (ref 7–15)
AST SERPL W P-5'-P-CCNC: 30 U/L (ref 0–45)
BASOPHILS # BLD AUTO: 0 10E3/UL (ref 0–0.2)
BASOPHILS NFR BLD AUTO: 1 %
BILIRUB SERPL-MCNC: 0.4 MG/DL
BUN SERPL-MCNC: 17.6 MG/DL (ref 8–23)
CALCIUM SERPL-MCNC: 9.3 MG/DL (ref 8.8–10.2)
CHLORIDE SERPL-SCNC: 97 MMOL/L (ref 98–107)
CREAT SERPL-MCNC: 0.79 MG/DL (ref 0.67–1.17)
DEPRECATED HCO3 PLAS-SCNC: 28 MMOL/L (ref 22–29)
EGFRCR SERPLBLD CKD-EPI 2021: >90 ML/MIN/1.73M2
EOSINOPHIL # BLD AUTO: 0 10E3/UL (ref 0–0.7)
EOSINOPHIL NFR BLD AUTO: 1 %
ERYTHROCYTE [DISTWIDTH] IN BLOOD BY AUTOMATED COUNT: 15.9 % (ref 10–15)
GLUCOSE SERPL-MCNC: 102 MG/DL (ref 70–99)
HCT VFR BLD AUTO: 32.2 % (ref 40–53)
HGB BLD-MCNC: 10.6 G/DL (ref 13.3–17.7)
IMM GRANULOCYTES # BLD: 0 10E3/UL
IMM GRANULOCYTES NFR BLD: 1 %
LYMPHOCYTES # BLD AUTO: 1 10E3/UL (ref 0.8–5.3)
LYMPHOCYTES NFR BLD AUTO: 27 %
MAGNESIUM SERPL-MCNC: 1.7 MG/DL (ref 1.7–2.3)
MCH RBC QN AUTO: 27 PG (ref 26.5–33)
MCHC RBC AUTO-ENTMCNC: 32.9 G/DL (ref 31.5–36.5)
MCV RBC AUTO: 82 FL (ref 78–100)
MONOCYTES # BLD AUTO: 0.4 10E3/UL (ref 0–1.3)
MONOCYTES NFR BLD AUTO: 10 %
NEUTROPHILS # BLD AUTO: 2.4 10E3/UL (ref 1.6–8.3)
NEUTROPHILS NFR BLD AUTO: 60 %
NRBC # BLD AUTO: 0 10E3/UL
NRBC BLD AUTO-RTO: 0 /100
PLATELET # BLD AUTO: 148 10E3/UL (ref 150–450)
POTASSIUM SERPL-SCNC: 4.3 MMOL/L (ref 3.4–5.3)
PROT SERPL-MCNC: 7.4 G/DL (ref 6.4–8.3)
RBC # BLD AUTO: 3.92 10E6/UL (ref 4.4–5.9)
SODIUM SERPL-SCNC: 134 MMOL/L (ref 135–145)
WBC # BLD AUTO: 3.9 10E3/UL (ref 4–11)

## 2024-02-05 PROCEDURE — 92526 ORAL FUNCTION THERAPY: CPT | Mod: GN | Performed by: SPEECH-LANGUAGE PATHOLOGIST

## 2024-02-05 PROCEDURE — 258N000003 HC RX IP 258 OP 636: Performed by: REGISTERED NURSE

## 2024-02-05 PROCEDURE — 36591 DRAW BLOOD OFF VENOUS DEVICE: CPT | Performed by: INTERNAL MEDICINE

## 2024-02-05 PROCEDURE — 99214 OFFICE O/P EST MOD 30 MIN: CPT | Performed by: REGISTERED NURSE

## 2024-02-05 PROCEDURE — 250N000011 HC RX IP 250 OP 636: Performed by: REGISTERED NURSE

## 2024-02-05 PROCEDURE — 77386 HC IMRT TREATMENT DELIVERY, COMPLEX: CPT | Performed by: RADIOLOGY

## 2024-02-05 PROCEDURE — 96367 TX/PROPH/DG ADDL SEQ IV INF: CPT

## 2024-02-05 PROCEDURE — 83735 ASSAY OF MAGNESIUM: CPT | Performed by: INTERNAL MEDICINE

## 2024-02-05 PROCEDURE — 85025 COMPLETE CBC W/AUTO DIFF WBC: CPT | Performed by: INTERNAL MEDICINE

## 2024-02-05 PROCEDURE — 80053 COMPREHEN METABOLIC PANEL: CPT | Performed by: INTERNAL MEDICINE

## 2024-02-05 PROCEDURE — G0463 HOSPITAL OUTPT CLINIC VISIT: HCPCS | Mod: 25 | Performed by: REGISTERED NURSE

## 2024-02-05 PROCEDURE — 96413 CHEMO IV INFUSION 1 HR: CPT

## 2024-02-05 PROCEDURE — 96375 TX/PRO/DX INJ NEW DRUG ADDON: CPT

## 2024-02-05 RX ORDER — ALBUTEROL SULFATE 0.83 MG/ML
2.5 SOLUTION RESPIRATORY (INHALATION)
Status: CANCELLED | OUTPATIENT
Start: 2024-02-05

## 2024-02-05 RX ORDER — ALBUTEROL SULFATE 90 UG/1
1-2 AEROSOL, METERED RESPIRATORY (INHALATION)
Status: CANCELLED
Start: 2024-02-05

## 2024-02-05 RX ORDER — HEPARIN SODIUM (PORCINE) LOCK FLUSH IV SOLN 100 UNIT/ML 100 UNIT/ML
5 SOLUTION INTRAVENOUS
Status: DISCONTINUED | OUTPATIENT
Start: 2024-02-05 | End: 2024-02-05 | Stop reason: HOSPADM

## 2024-02-05 RX ORDER — METHYLPREDNISOLONE SODIUM SUCCINATE 125 MG/2ML
125 INJECTION, POWDER, LYOPHILIZED, FOR SOLUTION INTRAMUSCULAR; INTRAVENOUS
Status: CANCELLED
Start: 2024-02-05

## 2024-02-05 RX ORDER — PALONOSETRON 0.05 MG/ML
0.25 INJECTION, SOLUTION INTRAVENOUS ONCE
Status: CANCELLED | OUTPATIENT
Start: 2024-02-05

## 2024-02-05 RX ORDER — LORAZEPAM 2 MG/ML
0.5 INJECTION INTRAMUSCULAR EVERY 4 HOURS PRN
Status: CANCELLED | OUTPATIENT
Start: 2024-02-05

## 2024-02-05 RX ORDER — HEPARIN SODIUM (PORCINE) LOCK FLUSH IV SOLN 100 UNIT/ML 100 UNIT/ML
5 SOLUTION INTRAVENOUS ONCE
Status: COMPLETED | OUTPATIENT
Start: 2024-02-05 | End: 2024-02-05

## 2024-02-05 RX ORDER — EPINEPHRINE 1 MG/ML
0.3 INJECTION, SOLUTION INTRAMUSCULAR; SUBCUTANEOUS EVERY 5 MIN PRN
Status: CANCELLED | OUTPATIENT
Start: 2024-02-05

## 2024-02-05 RX ORDER — MEPERIDINE HYDROCHLORIDE 25 MG/ML
25 INJECTION INTRAMUSCULAR; INTRAVENOUS; SUBCUTANEOUS EVERY 30 MIN PRN
Status: CANCELLED | OUTPATIENT
Start: 2024-02-05

## 2024-02-05 RX ORDER — HEPARIN SODIUM,PORCINE 10 UNIT/ML
5-20 VIAL (ML) INTRAVENOUS DAILY PRN
Status: CANCELLED | OUTPATIENT
Start: 2024-02-05

## 2024-02-05 RX ORDER — PALONOSETRON 0.05 MG/ML
0.25 INJECTION, SOLUTION INTRAVENOUS ONCE
Status: COMPLETED | OUTPATIENT
Start: 2024-02-05 | End: 2024-02-05

## 2024-02-05 RX ORDER — HEPARIN SODIUM (PORCINE) LOCK FLUSH IV SOLN 100 UNIT/ML 100 UNIT/ML
5 SOLUTION INTRAVENOUS
Status: CANCELLED | OUTPATIENT
Start: 2024-02-05

## 2024-02-05 RX ORDER — DIPHENHYDRAMINE HYDROCHLORIDE 50 MG/ML
50 INJECTION INTRAMUSCULAR; INTRAVENOUS
Status: CANCELLED
Start: 2024-02-05

## 2024-02-05 RX ADMIN — MAGNESIUM SULFATE HEPTAHYDRATE: 500 INJECTION, SOLUTION INTRAMUSCULAR; INTRAVENOUS at 08:47

## 2024-02-05 RX ADMIN — Medication 5 ML: at 06:49

## 2024-02-05 RX ADMIN — Medication 5 ML: at 10:25

## 2024-02-05 RX ADMIN — CISPLATIN 60 MG: 1 INJECTION, SOLUTION INTRAVENOUS at 09:21

## 2024-02-05 RX ADMIN — PALONOSETRON HYDROCHLORIDE 0.25 MG: 0.25 INJECTION INTRAVENOUS at 08:22

## 2024-02-05 RX ADMIN — FOSAPREPITANT: 150 INJECTION, POWDER, LYOPHILIZED, FOR SOLUTION INTRAVENOUS at 08:23

## 2024-02-05 ASSESSMENT — PAIN SCALES - GENERAL: PAINLEVEL: NO PAIN (0)

## 2024-02-05 NOTE — NURSING NOTE
"Oncology Rooming Note    February 5, 2024 7:09 AM   Douglas Herrera is a 63 year old male who presents for:    Chief Complaint   Patient presents with    Port Draw     Labs drawn via port by RN in lab. VS taken.      Initial Vitals: /79 (BP Location: Right arm, Patient Position: Sitting, Cuff Size: Adult Regular)   Pulse 84   Temp 98.4  F (36.9  C) (Oral)   Resp 16   Wt 57.2 kg (126 lb 3.2 oz)   SpO2 99%   BMI 21.66 kg/m   Estimated body mass index is 21.66 kg/m  as calculated from the following:    Height as of 1/11/24: 1.626 m (5' 4\").    Weight as of this encounter: 57.2 kg (126 lb 3.2 oz). Body surface area is 1.61 meters squared.  No Pain (0) Comment: Data Unavailable   No LMP for male patient.  Allergies reviewed: Yes  Medications reviewed: Yes    Medications: Medication refills not needed today.  Pharmacy name entered into [facility]: [facility] - [place], [place] - [facility] [address]GE JUARES    Frailty Screening:   Is the patient here for a new oncology consult visit in cancer care? 2. No      Clinical concerns:        Susie Tellez CMA              "

## 2024-02-05 NOTE — PROGRESS NOTES
Infusion Nursing Note:  Douglas Herrera presents today for C1D29 Cisplatin.    Patient seen by provider today: Yes: Nishi Michele NP   present during visit today: Patient refused  services and a waiver form has been signed.     Note: No complaints.      Intravenous Access:  Implanted Port.    Treatment Conditions:  Lab Results   Component Value Date    HGB 10.6 (L) 02/05/2024    WBC 3.9 (L) 02/05/2024    ANEUTAUTO 2.4 02/05/2024     (L) 02/05/2024        Lab Results   Component Value Date     (L) 02/05/2024    POTASSIUM 4.3 02/05/2024    MAG 1.7 02/05/2024    CR 0.79 02/05/2024    FLORENCE 9.3 02/05/2024    BILITOTAL 0.4 02/05/2024    ALBUMIN 3.6 02/05/2024    ALT 30 02/05/2024    AST 30 02/05/2024       Results reviewed, labs MET treatment parameters, ok to proceed with treatment.    TORB: 2/5/24/Nishi Michele NP/Rula Phillips RN/Give IV N/S 1000 and Magnesium 2gm today.     Post Infusion Assessment:  Patient tolerated infusion without incident.  Blood return noted pre and post infusion.  Site patent and intact, free from redness, edema or discomfort.  No evidence of extravasations.  Access discontinued per protocol.       Discharge Plan:   Patient declined prescription refills.  Discharge instructions reviewed with: Patient and Family.  Patient and/or family verbalized understanding of discharge instructions and all questions answered.  AVS to patient via [employer].  Patient will return 2/10 for next appointment.   Patient discharged in stable condition accompanied by: self.  Departure Mode: Ambulatory.      KAREN BOSE RN

## 2024-02-05 NOTE — LETTER
2024         RE: Douglas Herrera  [address] [place] [address]        Dear Colleague,    Thank you for referring your patient, Douglas Herrera, to the [facility] RADIATION ONCOLOGY. Please see a copy of my visit note below.    RADIATION ONCOLOGY WEEKLY ON TREATMENT VISIT   Encounter Date: 2024     Patient Name: Douglas Herrera  MRN: [id number]  : 1960     Disease and Stage: T4b N2b M0, stage IVB sinonasal carcinoma    Treatment Site: Left sinonasal primary and bilateral neck nodes        Current Dose/Planned Total Dose: 3800 cGy / 7000 cGy    Concurrent Chemotherapy: Yes  Drug and Frequency: Weekly cisplatin    ED visits/Hospitalizations: None    Missed Treatments:  2024 - 2024: Machine downtime  2024 - 2024: Machine downtime    Subjective: Mr. Herrera presents to clinic today for his weekly on-treatment visit. He continues to report no immediate concerns related to radiation treatment. He had a G-tube placed 2024 and is using 4 cans daily.  Oral intake is limited due to lack of taste.  Gabapentin dose is 900 mg in the morning and evening.  He is sleeping approximately 12 to 13 hours per 24-hour period.  He was scheduled for IV fluids this weekend but did not show for his appointment because he felt he did not need them.      Nursing ROS:   Nutrition Alteration  Diet Type: Patient's Preference  Skin  Skin Reaction: 1 - Faint erythema or dry desquamation  Skin Note: Aquaphor     ENT and Mouth Exam  Mucositis - Current: 1 - Generalized erythema  ENT/Mouth Note: S&S 15-20     Gastrointestinal  Nausea: 0 - None  GI Note: PEG 4 cans a day     Pain Assessment  0-10 Pain Scale: 0  Pain Treatment: Gabapentin 900mg TID     PEG tube: Placed 2024  Pacemaker: None    Objective:   /79   Pulse 74   Wt 57.2 kg (126 lb)   BMI 21.63 kg/m   pretreatment weight: 58.78 kg (129 lb 9 oz)  KPS 80  General: Alert, oriented, in no acute distress  HEENT: Minimal mucositis, no  thrush  Skin: Minimal erythema      OnTreatment-related toxicities (CTCAE v5.0):  Anorexia: Grade 2: Oral intake altered without significant weight loss or malnutrition; oral nutritional supplements indicated  Fatigue: Grade 2: Fatigue not relieved by rest; limiting instrumental ADL  Nausea: Grade 0: No toxicity  Pain: Grade 1: Mild pain  Dry mouth: Grade 1: Symptomatic without significant dietary alteration; unstimulated saliva flow >0.2 mL/min  Dysphagia: Grade 1: Symptomatic, able to eat regular diet  Mucositis: Grade 1: Asymptomatic or mild symptoms; intervention not indicated  Dermatitis: Grade 1: Faint erythema or dry desquamation     Assessment:    Mr. 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  Multidisciplinary conference consensus opinion is towards chemoradiation.  He has had significant hearing loss and thus Oncology has recommended weekly cisplatin. He is tolerating therapy well to date.    Plan:   Continue with treatment as planned  Nutrition per oral and PEG to maintain weight as per recommendations of Mayela An   Continue gabapentin dose to 900 mg 3 times daily.    Mosaiq chart and setup information reviewed  MVCT images reviewed    Medication Review  Med list reviewed with patient?: Yes  Med list printed and given: Offered and declined    Tomasa Akers MD     Department of Radiation Oncology  [facility]

## 2024-02-05 NOTE — Clinical Note
2/5/2024         RE: Douglas Herrera  [address] [place] [address]        Dear Colleague,    Thank you for referring your patient, Douglas Herrera, to the [facility]. Please see a copy of my visit note below.       [facility]    PATIENT NAME: Douglas Herrera  MRN # [id number]   DATE OF VISIT: February 5, 2024  YOB: 1960     [facility]   Radiation Oncology: Dr. Toamsa Akers  Cardiology: Dr. Qamar Hernandez  PCP: Dr. 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   8/24/23 MRI sinus. L maxillary sinus mass  9/12/23 ED for epistaxis.   9/13/23 CTA and embolization of L IMAX   10/4/23 Nasal bx. Path: Inverted papilloma with high grade dysplasia  11/10/23 Nasal endoscopy and bx in the OR. C/b severe bleeding. Path: Inverted sinonasal papilloma with severe dysplasia.  11/17/23 MRI. 7.4 x 4.6 x 6.6 cm L sinonasal mass, destruction of nasal turbinates, L maxillary sinus, hard palate, invasion of L  space, involvement of medial and lateral pterygoids and temporalis muscles. Effacement and invasion ipsilateral pterygopalatine fossa, extends and effaces the nasopharynx.   11/30/23 Carotid angiogram. Direct endonasal and transoral embolization L sinonasal tumor, transaterial embolization of the L sphenopalatine artery feeders to the L sinonasal artery tumor   12/1/23 Stealth assisted transnasal endoscopic approach to excise L sinonasal mass. Pre-operative embolization. Path: SCC involving L maxilla.    12/9/23 PET. Hypermetabolic mass centered along the L maxilla, extending superiorly through the floor of the L maxillary sinus, posterior/laterally to the  space, invasion of the pterygoid muscles, extending cranially along the pterygopalatine fossa and pterygoid plates to the skull base level. Erosion  of involved bones including L maxilla, maxillary sinus wall and pterygoid plates. Extension medially to the L lateral nasopharyngeal wall and soft palate. 1 mm fat place between carotid and mass. ~4.9 x 4.0 x 5.4 cm (SUV 24.3). Partially resected since 11/17/23 MRI. 8 mm L 2A node (SUV 5.9), 7 mm L level 2 node (SUV 5.5)    ASSESSMENT AND PLAN  SCC L maxillary sinus, [id number] (IVB): s/p transnasal endoscopic excision of sinonasal mass. He is a poor candidate for TPF induction therefore plan is is for definitive chemoradiation with weekly cisplatin. Not a candidate for HD cisplatin-audiogram 12/18/23 confirms significant bilateral SNHL. Tolerating cisplatin well without side effects. No hearing changes but will continue to watch closely and consider change to carboplatin/paclitaxel if this becomes an issue.  -Proceed with day 22 cisplatin  -RTC weekly for labs/KENNEDI/infusion    Risk for malnutrition: G-tube placement 1/2. Working with CARISA Pedro. Oral intake continues to decrease. Now up to 4 cartons formula/day. Advised continuing to slowly increase intake to avoid reflux symptoms.Weight stable. Reviewed need to keep G-tube in for weeks/months following completion of chemoRT as he is able to slowly increase oral intake.    Odynophagia: Increasing as expected. Encouraged s/s rinses    Constipation: Well controlled with Senna 1-2 tab BID PRN.    Vascular access: port placed 1/11/24, no concerns    RSV: ED 12/27 for fever, cough, facial swelling. RVP positive for RSV. Treated with supportive measures. Symptomatically recovered.    Hypercalcemia: Resolved    Hepatitis B: [facility] note from 1/5/23 (attached in chart) reviewed. Unclear if there was a more recent visit Remains on entecavir.     H/o PE/DVT: 5/8/2019 CTA report scanned into [facility], reviewed. Small PEs. Also had LLE DVT, acute, occlusive.  Presented with CP and LLE swelling. Was on rivaroxaban, completed course.     H/o undefined hypothyroidism: TFTs 1/8/24  normal.    Screening for TB: Quantiferon gold negative on 1/23/24    *** minutes spent on the date of the encounter doing {2021 E&M time in:128007}     Nishi Michele CNP    SUBJECTIVE  Mr. 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 Professional [employer]  used via video for visit today. Mr. Herrera speaks and understands conversational English.     ***    PAST MEDICAL HISTORY  Sinonasal carcinoma as above  HTN  ASCVD. CABG x 3 2019  PE and LLE DVT after CABG . Treated with rivaroxaban  Dyslipidemia  Hepatitis B   SCC R temple s/p MOHS  Ascending aortic aneurysm repair 2019  Hearing loss L   Gout - feet  Depression  Cholecystectomy    Son Berhane at [phone]    CURRENT OUTPATIENT MEDICATIONS  Current Outpatient Medications   Medication    atorvastatin (LIPITOR) 80 MG tablet    chlorhexidine (PERIDEX) 0.12 % solution    entecavir (BARACLUDE) 0.5 MG tablet    gabapentin (NEURONTIN) 300 MG capsule    acetaminophen (TYLENOL) 325 MG tablet    capsaicin (ZOSTRIX) 0.025 % external cream    fluticasone (FLONASE) 50 MCG/ACT nasal spray    nitroGLYcerin (NITROSTAT) 0.4 MG sublingual tablet    ondansetron (ZOFRAN) 8 MG tablet    prochlorperazine (COMPAZINE) 10 MG tablet    senna-docusate (SENNA S) 8.6-50 MG tablet    sodium chloride (OCEAN) 0.65 % nasal spray    sodium chloride (OCEAN) 0.65 % nasal spray    traMADol (ULTRAM) 50 MG tablet    traZODone (DESYREL) 50 MG tablet     No current facility-administered medications for this visit.     ALLERGIES  No Known Allergies     SOCIAL HISTORY: Non smoker. No current ETOH. Lives with his children. Immigrated from [facility] in the 1980s. Not working. On disability. Used to be a . Lived in [facility]. One daughter and 5 sons. Two grandchildren.      FAMILY HISTORY:   Family History   Problem Relation Age of Onset    Cerebrovascular Disease Mother 90.00    Acute  Myocardial Infarction No family hx of      PHYSICAL EXAM  /79 (BP Location: Right arm, Patient Position: Sitting, Cuff Size: Adult Regular)   Pulse 84   Temp 98.4  F (36.9  C) (Oral)   Resp 16   Wt 57.2 kg (126 lb 3.2 oz)   SpO2 99%   BMI 21.66 kg/m      GEN: NAD  HEENT: EOMI, no icterus, injection or pallor. Oropharynx - mild trismus.  R posterior hard palate tumor, with overlying erythema, decreasing in size   RESP: cta bilaterally, no wheezes  CV: rrr, no murmur   GI: abd soft/nt, g-tube without surrounding erythema or drainage to insertion site  EXT: no edema  NEURO: alert, oriented, no focal deficits  SKIN: R chest port accessed, no edema/erythema.     LABORATORY AND IMAGING STUDIES  Most Recent 3 CBC's:  Recent Labs   Lab Test 02/05/24  0657 01/30/24  0635 01/23/24  0656   WBC 3.9* 4.3 6.5   HGB 10.6* 11.0* 11.2*   MCV 82 84 83   * 167 193   ANEUTAUTO 2.4 2.8 4.3    Most Recent 3 BMP's:  Recent Labs   Lab Test 01/30/24  0635 01/23/24  0656 01/15/24  0647   * 135 137   POTASSIUM 4.1 4.5 4.2   CHLORIDE 97* 98 100   CO2 26 27 28   BUN 17.2 19.0 15.0   CR 0.73 0.75 0.68   ANIONGAP 11 10 9   FLORENCE 9.4 9.5 9.7   * 104* 101*   PROTTOTAL 7.5 8.0 8.1   ALBUMIN 3.7 3.7 3.6    Most Recent 2 LFT's:  Recent Labs   Lab Test 01/30/24  0635 01/23/24  0656   AST 31 40   ALT 39 48   ALKPHOS 93 97   BILITOTAL 0.4 0.5    Most Recent TSH and T4:  Recent Labs   Lab Test 01/08/24  0717   TSH 3.35   T4 1.00     Phos/Mag:  Lab Results   Component Value Date    MAG 1.8 01/30/2024    MAG 1.9 01/23/2024    MAG 1.9 01/15/2024      I reviewed the above labs today.           [facility]    PATIENT NAME: Douglas Herrera  MRN # [id number]   DATE OF VISIT: February 5, 2024  YOB: 1960     Otolaryngology: Dr. Damon Regan   Radiation Oncology: Dr. Tomasa Akers  Cardiology: Dr. Qamar Hernandez  PCP: Dr. العراقي   Hepatologist: [facility]     CANCER TYPE: SCC sinonasal   STAGE: [id number]  (IVB)  ECOG PS: 1    PD-L1:  NGS: internal panel pending     SUMMARY    8/9/23 CT sinus.   8/24/23 MRI sinus. L maxillary sinus mass  9/12/23 ED for epistaxis.   9/13/23 CTA and embolization of L IMAX   10/4/23 Nasal bx. Path: Inverted papilloma with high grade dysplasia  11/10/23 Nasal endoscopy and bx in the OR. C/b severe bleeding. Path: Inverted sinonasal papilloma with severe dysplasia.  11/17/23 MRI. 7.4 x 4.6 x 6.6 cm L sinonasal mass, destruction of nasal turbinates, L maxillary sinus, hard palate, invasion of L  space, involvement of medial and lateral pterygoids and temporalis muscles. Effacement and invasion ipsilateral pterygopalatine fossa, extends and effaces the nasopharynx.   11/30/23 Carotid angiogram. Direct endonasal and transoral embolization L sinonasal tumor, transaterial embolization of the L sphenopalatine artery feeders to the L sinonasal artery tumor   12/1/23 Stealth assisted transnasal endoscopic approach to excise L sinonasal mass. Pre-operative embolization. Path: SCC involving L maxilla.    12/9/23 PET. Hypermetabolic mass centered along the L maxilla, extending superiorly through the floor of the L maxillary sinus, posterior/laterally to the  space, invasion of the pterygoid muscles, extending cranially along the pterygopalatine fossa and pterygoid plates to the skull base level. Erosion of involved bones including L maxilla, maxillary sinus wall and pterygoid plates. Extension medially to the L lateral nasopharyngeal wall and soft palate. 1 mm fat place between carotid and mass. ~4.9 x 4.0 x 5.4 cm (SUV 24.3). Partially resected since 11/17/23 MRI. 8 mm L 2A node (SUV 5.9), 7 mm L level 2 node (SUV 5.5)    ASSESSMENT AND PLAN  SCC L maxillary sinus, [id number] (IVB): s/p transnasal endoscopic excision of sinonasal mass. He is a poor candidate for TPF induction therefore plan is is for definitive chemoradiation with weekly cisplatin. Not a candidate for HD  cisplatin-audiogram 12/18/23 confirms significant bilateral SNHL. Tolerating cisplatin well without side effects. No hearing changes. Labs today meet parameters for treatment.  -Proceed with day 29 cisplatin  -Scheduled for additional IVF Sat 2/10  -RTC weekly for labs/KENNEDI/infusion. Reviewed appts following completion of chemo including follow-up with Dr. Post and KENNEDI/tentative fluids appts    Risk for malnutrition: G-tube placement 1/2. Working with CARISA Pedro. Oral intake continues to decrease. Up to 4 cartons formula/day, trying to increase to 5 cartons/day. Weight stable. We again reviewed the expected timeline for recovery and need to keep G-tube in for weeks/months following completion of chemoRT as he is able to slowly increase oral intake.    Anemia: s/t cisplatin, monitor    Odynophagia: Increasing as expected. S/s rinses.     Constipation: Senna 1-2 tab BID PRN.    Vascular access: port placed 1/11/24, no concerns    Hypercalcemia: Resolved    Hepatitis B: [facility] note from 1/5/23 (attached in chart) reviewed. Unclear if there was a more recent visit Remains on entecavir.     H/o PE/DVT: 5/8/2019 CTA report scanned into [facility], reviewed. Small PEs. Also had LLE DVT, acute, occlusive.  Presented with CP and LLE swelling. Was on rivaroxaban, completed course.     H/o undefined hypothyroidism: TFTs 1/8/24 normal.    Screening for TB: Quantiferon gold negative on 1/23/24    *** minutes spent on the date of the encounter doing {2021 E&M time in:440185}     Nishi Michele CNP    SUBJECTIVE  Mr. 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 Professional [employer]  used via video for visit today. Mr. Herrera speaks and understands conversational English.   -Still only able to get 4 cartons of formula via FT daily, 2 in AM and 2 in the evening. Plans to try to increase to 5 cartons this week  -Eating by mouth,  mainly rice porridge and only small amounts  -Drinking by mouth, at least 3 bottles of water/day  -Constipation controlled   -Throat pain stable    PAST MEDICAL HISTORY  Sinonasal carcinoma as above  HTN  ASCVD. CABG x 3 2019  PE and LLE DVT after CABG . Treated with rivaroxaban  Dyslipidemia  Hepatitis B   SCC R temple s/p MOHS  Ascending aortic aneurysm repair 2019  Hearing loss L   Gout - feet  Depression  Cholecystectomy    Son Berhane at [phone]    CURRENT OUTPATIENT MEDICATIONS  Current Outpatient Medications   Medication     atorvastatin (LIPITOR) 80 MG tablet     chlorhexidine (PERIDEX) 0.12 % solution     entecavir (BARACLUDE) 0.5 MG tablet     gabapentin (NEURONTIN) 300 MG capsule     acetaminophen (TYLENOL) 325 MG tablet     capsaicin (ZOSTRIX) 0.025 % external cream     fluticasone (FLONASE) 50 MCG/ACT nasal spray     nitroGLYcerin (NITROSTAT) 0.4 MG sublingual tablet     ondansetron (ZOFRAN) 8 MG tablet     prochlorperazine (COMPAZINE) 10 MG tablet     senna-docusate (SENNA S) 8.6-50 MG tablet     sodium chloride (OCEAN) 0.65 % nasal spray     sodium chloride (OCEAN) 0.65 % nasal spray     traMADol (ULTRAM) 50 MG tablet     traZODone (DESYREL) 50 MG tablet     No current facility-administered medications for this visit.     Facility-Administered Medications Ordered in Other Visits   Medication     CISplatin (PLATINOL) 60 mg in sodium chloride 0.9 % 335 mL infusion     fosaprepitant (EMEND) 150 mg, dexAMETHasone (DECADRON) 12 mg in sodium chloride 0.9 % 276.2 mL intermittent infusion     palonosetron (ALOXI) injection 0.25 mg     sodium chloride 0.9 % 1,000 mL with magnesium sulfate 2 g *See DOSE to administer in MAR details (order question)     ALLERGIES  No Known Allergies     SOCIAL HISTORY: Non smoker. No current ETOH. Lives with his children. Immigrated from [place] in the 1980s. Not working. On disability. Used to be a . Lived in [facility]. One daughter and 5 sons. Two  grandchildren.      FAMILY HISTORY:   Family History   Problem Relation Age of Onset     Cerebrovascular Disease Mother 90.00     Acute Myocardial Infarction No family hx of      PHYSICAL EXAM  /79 (BP Location: Right arm, Patient Position: Sitting, Cuff Size: Adult Regular)   Pulse 84   Temp 98.4  F (36.9  C) (Oral)   Resp 16   Wt 57.2 kg (126 lb 3.2 oz)   SpO2 99%   BMI 21.66 kg/m      GEN: NAD  HEENT: EOMI, no icterus, injection or pallor. Oropharynx - mild trismus.  R posterior hard palate tumor with overlying erythema and mucositis, significantly decreased in size compared to start of treatment.   RESP: cta bilaterally, no wheezes  CV: rrr, no murmur   GI: abd soft/nt, g-tube without surrounding erythema or drainage to insertion site  EXT: no edema  NEURO: alert, oriented, no focal deficits  SKIN: R chest port accessed, no edema/erythema.     LABORATORY AND IMAGING STUDIES  Most Recent 3 CBC's:  Recent Labs   Lab Test 02/05/24  0657 01/30/24  0635 01/23/24  0656   WBC 3.9* 4.3 6.5   HGB 10.6* 11.0* 11.2*   MCV 82 84 83   * 167 193   ANEUTAUTO 2.4 2.8 4.3    Most Recent 3 BMP's:  Recent Labs   Lab Test 02/05/24  0657 01/30/24  0635 01/23/24  0656   * 134* 135   POTASSIUM 4.3 4.1 4.5   CHLORIDE 97* 97* 98   CO2 28 26 27   BUN 17.6 17.2 19.0   CR 0.79 0.73 0.75   ANIONGAP 9 11 10   FLORENCE 9.3 9.4 9.5   * 115* 104*   PROTTOTAL 7.4 7.5 8.0   ALBUMIN 3.6 3.7 3.7    Most Recent 2 LFT's:  Recent Labs   Lab Test 02/05/24  0657 01/30/24  0635   AST 30 31   ALT 30 39   ALKPHOS 94 93   BILITOTAL 0.4 0.4    Most Recent TSH and T4:  Recent Labs   Lab Test 01/08/24  0717   TSH 3.35   T4 1.00     Phos/Mag:  Lab Results   Component Value Date    MAG 1.7 02/05/2024    MAG 1.8 01/30/2024    MAG 1.9 01/23/2024      I reviewed the above labs today.          Again, thank you for allowing me to participate in the care of your patient.        Sincerely,        Nishi Michele CNP

## 2024-02-05 NOTE — PROGRESS NOTES
RADIATION ONCOLOGY WEEKLY ON TREATMENT VISIT   Encounter Date: 2024     Patient Name: Douglas Herrera  MRN: [id number]  : 1960     Disease and Stage: T4b N2b M0, stage IVB sinonasal carcinoma    Treatment Site: Left sinonasal primary and bilateral neck nodes        Current Dose/Planned Total Dose: 3800 cGy / 7000 cGy    Concurrent Chemotherapy: Yes  Drug and Frequency: Weekly cisplatin    ED visits/Hospitalizations: None    Missed Treatments:  2024 - 2024: Machine downtime  2024 - 2024: Machine downtime    Subjective: Mr. Herrera presents to clinic today for his weekly on-treatment visit. He continues to report no immediate concerns related to radiation treatment. He had a G-tube placed 2024 and is using 4 cans daily.  Oral intake is limited due to lack of taste.  Gabapentin dose is 900 mg in the morning and evening.  He is sleeping approximately 12 to 13 hours per 24-hour period.  He was scheduled for IV fluids this weekend but did not show for his appointment because he felt he did not need them.      Nursing ROS:   Nutrition Alteration  Diet Type: Patient's Preference  Skin  Skin Reaction: 1 - Faint erythema or dry desquamation  Skin Note: Aquaphor     ENT and Mouth Exam  Mucositis - Current: 1 - Generalized erythema  ENT/Mouth Note: S&S 15-20     Gastrointestinal  Nausea: 0 - None  GI Note: PEG 4 cans a day     Pain Assessment  0-10 Pain Scale: 0  Pain Treatment: Gabapentin 900mg TID     PEG tube: Placed 2024  Pacemaker: None    Objective:   /79   Pulse 74   Wt 57.2 kg (126 lb)   BMI 21.63 kg/m   pretreatment weight: 58.78 kg (129 lb 9 oz)  KPS 80  General: Alert, oriented, in no acute distress  HEENT: Minimal mucositis, no thrush  Skin: Minimal erythema      OnTreatment-related toxicities (CTCAE v5.0):  Anorexia: Grade 2: Oral intake altered without significant weight loss or malnutrition; oral nutritional supplements indicated  Fatigue: Grade 2: Fatigue not relieved  by rest; limiting instrumental ADL  Nausea: Grade 0: No toxicity  Pain: Grade 1: Mild pain  Dry mouth: Grade 1: Symptomatic without significant dietary alteration; unstimulated saliva flow >0.2 mL/min  Dysphagia: Grade 1: Symptomatic, able to eat regular diet  Mucositis: Grade 1: Asymptomatic or mild symptoms; intervention not indicated  Dermatitis: Grade 1: Faint erythema or dry desquamation     Assessment:    Mr. 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  Multidisciplinary conference consensus opinion is towards chemoradiation.  He has had significant hearing loss and thus Oncology has recommended weekly cisplatin. He is tolerating therapy well to date.    Plan:   Continue with treatment as planned  Nutrition per oral and PEG to maintain weight as per recommendations of Mayela An   Continue gabapentin dose to 900 mg 3 times daily.    Mosaiq chart and setup information reviewed  MVCT images reviewed    Medication Review  Med list reviewed with patient?: Yes  Med list printed and given: Offered and declined    Tomasa Akers MD     Department of Radiation Oncology  [facility]

## 2024-02-06 ENCOUNTER — APPOINTMENT (OUTPATIENT)
Dept: RADIATION ONCOLOGY | Facility: CLINIC | Age: 64
End: 2024-02-06
Attending: RADIOLOGY
Payer: COMMERCIAL

## 2024-02-06 PROCEDURE — 77427 RADIATION TX MANAGEMENT X5: CPT | Performed by: RADIOLOGY

## 2024-02-06 PROCEDURE — 77336 RADIATION PHYSICS CONSULT: CPT | Performed by: RADIOLOGY

## 2024-02-06 PROCEDURE — 77014 PR CT GUIDE FOR PLACEMENT RADIATION THERAPY FIELDS: CPT | Mod: 26 | Performed by: RADIOLOGY

## 2024-02-06 PROCEDURE — 77386 HC IMRT TREATMENT DELIVERY, COMPLEX: CPT | Performed by: RADIOLOGY

## 2024-02-07 ENCOUNTER — APPOINTMENT (OUTPATIENT)
Dept: RADIATION ONCOLOGY | Facility: CLINIC | Age: 64
End: 2024-02-07
Attending: RADIOLOGY
Payer: COMMERCIAL

## 2024-02-07 PROCEDURE — 77386 HC IMRT TREATMENT DELIVERY, COMPLEX: CPT | Performed by: RADIOLOGY

## 2024-02-07 PROCEDURE — 77014 PR CT GUIDE FOR PLACEMENT RADIATION THERAPY FIELDS: CPT | Mod: 26 | Performed by: RADIOLOGY

## 2024-02-08 ENCOUNTER — APPOINTMENT (OUTPATIENT)
Dept: RADIATION ONCOLOGY | Facility: CLINIC | Age: 64
End: 2024-02-08
Attending: RADIOLOGY
Payer: COMMERCIAL

## 2024-02-08 PROCEDURE — 77386 HC IMRT TREATMENT DELIVERY, COMPLEX: CPT | Performed by: RADIOLOGY

## 2024-02-08 PROCEDURE — 77014 PR CT GUIDE FOR PLACEMENT RADIATION THERAPY FIELDS: CPT | Mod: 26 | Performed by: RADIOLOGY

## 2024-02-09 ENCOUNTER — APPOINTMENT (OUTPATIENT)
Dept: RADIATION ONCOLOGY | Facility: CLINIC | Age: 64
End: 2024-02-09
Attending: RADIOLOGY
Payer: COMMERCIAL

## 2024-02-09 PROCEDURE — 77386 HC IMRT TREATMENT DELIVERY, COMPLEX: CPT | Performed by: RADIOLOGY

## 2024-02-09 PROCEDURE — 77014 PR CT GUIDE FOR PLACEMENT RADIATION THERAPY FIELDS: CPT | Mod: 26 | Performed by: RADIOLOGY

## 2024-02-09 NOTE — PROGRESS NOTES
[facility] CANCER [facility]    PATIENT NAME: Douglas Herrera  MRN # [id number]   DATE OF VISIT: February 12, 2024  YOB: 1960     [facility]   Radiation Oncology: Dr. Tomasa Akers  Cardiology: Dr. Qamar Hernandez  PCP: Dr. العراقي   Hepatologist: [facility]     CANCER TYPE: SCC sinonasal   STAGE: [id number] (IVB)  ECOG PS: 1    PD-L1:  NGS: internal panel pending     SUMMARY    8/9/23 CT sinus.   8/24/23 MRI sinus. L maxillary sinus mass  9/12/23 ED for epistaxis.   9/13/23 CTA and embolization of L IMAX   10/4/23 Nasal bx. Path: Inverted papilloma with high grade dysplasia  11/10/23 Nasal endoscopy and bx in the OR. C/b severe bleeding. Path: Inverted sinonasal papilloma with severe dysplasia.  11/17/23 MRI. 7.4 x 4.6 x 6.6 cm L sinonasal mass, destruction of nasal turbinates, L maxillary sinus, hard palate, invasion of L  space, involvement of medial and lateral pterygoids and temporalis muscles. Effacement and invasion ipsilateral pterygopalatine fossa, extends and effaces the nasopharynx.   11/30/23 Carotid angiogram. Direct endonasal and transoral embolization L sinonasal tumor, transaterial embolization of the L sphenopalatine artery feeders to the L sinonasal artery tumor   12/1/23 Stealth assisted transnasal endoscopic approach to excise L sinonasal mass. Pre-operative embolization. Path: SCC involving L maxilla.    12/9/23 PET. Hypermetabolic mass centered along the L maxilla, extending superiorly through the floor of the L maxillary sinus, posterior/laterally to the  space, invasion of the pterygoid muscles, extending cranially along the pterygopalatine fossa and pterygoid plates to the skull base level. Erosion of involved bones including L maxilla, maxillary sinus wall and pterygoid plates. Extension medially to the L lateral nasopharyngeal wall and soft palate. 1 mm fat place between carotid and mass. ~4.9 x 4.0 x 5.4 cm (SUV 24.3). Partially  resected since 11/17/23 MRI. 8 mm L 2A node (SUV 5.9), 7 mm L level 2 node (SUV 5.5)    ASSESSMENT AND PLAN  SCC L maxillary sinus, [id number] (IVB): s/p transnasal endoscopic excision of sinonasal mass. He is a poor candidate for TPF induction therefore plan is is for definitive chemoradiation with weekly cisplatin. Not a candidate for HD cisplatin-audiogram 12/18/23 confirms significant bilateral SNHL. Tolerating cisplatin well without side effects. Mucositis/odynophagia increasing as expected s/t RT. Briefly reviewed timeline for recovery following completion of chemoRT. Labs today meet parameters for treatment.  -Proceed with day 36 cisplatin  -Follow-up with Dr. Post 2/15  -Scheduled for supplemental IVF 2/16 and 2/23. May need to add appts based on symptoms over the next few weeks.    Risk for malnutrition: G-tube placement 1/2. Working with CARISA Pedro.  Up to 4 cartons formula/day, trying to increase to 5 cartons/day. Weight down a few more pounds. Encouraged consistent use of 5 cartons/day. Reviewed the expected timeline for recovery and need to keep G-tube in for weeks/months following completion of chemoRT as he is able to slowly increase oral intake.    Hypomagnesemia: 2 gm IV mag per IV replacement today. Recheck later this week when back in for IVF and replace pp prn     Anemia: s/t cisplatin, monitor    Odynophagia, mucositis: Increasing as expected. S/s rinses. Add Magic Mouthwash q4h PRN    Constipation: Senna 1-2 tab BID PRN    Vascular access: port placed 1/11/24, no concerns    Hypercalcemia: Resolved    Hepatitis B: [facility] note from 1/5/23 (attached in chart) reviewed. Unclear if there was a more recent visit Remains on entecavir.     H/o PE/DVT: 5/8/2019 CTA report scanned into [employer], reviewed. Small PEs. Also had LLE DVT, acute, occlusive.  Presented with CP and LLE swelling. Was on rivaroxaban, completed course.     H/o undefined hypothyroidism: TFTs 1/8/24 normal.    Screening for TB:  Quantiferon gold negative on 1/23/24    30 minutes spent on the date of the encounter doing chart review, review of test results, interpretation of tests, patient visit, and documentation     Nishi Michele CNP    SUBJECTIVE  Mr. 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 Mr. Herrera speaks and understands conversational English. His son provided assistance with interpretation today.   -Increased pain on to left posterior tongue, constant. Worse with swallowing  -Only taking in liquid by mouth  -4-5 cartons via FT daily  -No issues with PEG, flushing well. Cleaning site often. Buttons are still in place  -Using salt/soda rinses  -Gabapentin for pain control, needs refill  -Denies significant fatigue  -Denies hearing changes      PAST MEDICAL HISTORY  Sinonasal carcinoma as above  HTN  ASCVD. CABG x 3 2019  PE and LLE DVT after CABG . Treated with rivaroxaban  Dyslipidemia  Hepatitis B   SCC R temple s/p MOHS  Ascending aortic aneurysm repair 2019  Hearing loss L   Gout - feet  Depression  Cholecystectomy    Son Berhane at [phone]    CURRENT OUTPATIENT MEDICATIONS  Current Outpatient Medications   Medication    atorvastatin (LIPITOR) 80 MG tablet    chlorhexidine (PERIDEX) 0.12 % solution    entecavir (BARACLUDE) 0.5 MG tablet    gabapentin (NEURONTIN) 300 MG capsule    acetaminophen (TYLENOL) 325 MG tablet    capsaicin (ZOSTRIX) 0.025 % external cream    fluticasone (FLONASE) 50 MCG/ACT nasal spray    nitroGLYcerin (NITROSTAT) 0.4 MG sublingual tablet    ondansetron (ZOFRAN) 8 MG tablet    prochlorperazine (COMPAZINE) 10 MG tablet    senna-docusate (SENNA S) 8.6-50 MG tablet    sodium chloride (OCEAN) 0.65 % nasal spray    sodium chloride (OCEAN) 0.65 % nasal spray    traMADol (ULTRAM) 50 MG tablet    traZODone (DESYREL) 50 MG tablet     Current Facility-Administered Medications   Medication    heparin 100  unit/mL injection 5 mL     ALLERGIES  No Known Allergies     SOCIAL HISTORY: Non smoker. No current ETOH. Lives with his children. Immigrated from [place] in the 1980s. Not working. On disability. Used to be a . Lived in [facility]. One daughter and 5 sons. Two grandchildren.      FAMILY HISTORY:   Family History   Problem Relation Age of Onset    Cerebrovascular Disease Mother 90.00    Acute Myocardial Infarction No family hx of      PHYSICAL EXAM  /73   Pulse 86   Temp 97.9  F (36.6  C) (Oral)   Resp 16   Wt 56.5 kg (124 lb 9.6 oz)   SpO2 99%   BMI 21.39 kg/m      GEN: NAD  HEENT: EOMI, no icterus, injection or pallor. Oropharynx - mild trismus.  L posterior hard palate tumor with overlying erythema and mucositis, continues to decrease in size   RESP: cta bilaterally, no wheezes  CV: rrr, no murmur   GI: abd soft/nt, g-tube without surrounding erythema or drainage to insertion site  EXT: no edema  NEURO: alert, oriented, no focal deficits  SKIN: R chest port accessed, no edema/erythema.     G-tube buttons were removed by me in clinic today.     LABORATORY AND IMAGING STUDIES  Most Recent 3 CBC's:  Recent Labs   Lab Test 02/12/24  0654 02/05/24  0657 01/30/24  0635   WBC 3.2* 3.9* 4.3   HGB 10.2* 10.6* 11.0*   MCV 82 82 84   * 148* 167   ANEUTAUTO 2.1 2.4 2.8    Most Recent 3 BMP's:  Recent Labs   Lab Test 02/05/24  0657 01/30/24  0635 01/23/24  0656   * 134* 135   POTASSIUM 4.3 4.1 4.5   CHLORIDE 97* 97* 98   CO2 28 26 27   BUN 17.6 17.2 19.0   CR 0.79 0.73 0.75   ANIONGAP 9 11 10   FLORENCE 9.3 9.4 9.5   * 115* 104*   PROTTOTAL 7.4 7.5 8.0   ALBUMIN 3.6 3.7 3.7    Most Recent 2 LFT's:  Recent Labs   Lab Test 02/05/24  0657 01/30/24  0635   AST 30 31   ALT 30 39   ALKPHOS 94 93   BILITOTAL 0.4 0.4    Most Recent TSH and T4:  Recent Labs   Lab Test 01/08/24  0717   TSH 3.35   T4 1.00     Phos/Mag:  Lab Results   Component Value Date    MAG 1.7 02/05/2024    MAG 1.8  01/30/2024    MAG 1.9 01/23/2024      I reviewed the above labs today.

## 2024-02-10 ENCOUNTER — INFUSION THERAPY VISIT (OUTPATIENT)
Dept: ONCOLOGY | Facility: CLINIC | Age: 64
End: 2024-02-10
Attending: INTERNAL MEDICINE
Payer: COMMERCIAL

## 2024-02-10 VITALS
OXYGEN SATURATION: 98 % | TEMPERATURE: 98.3 F | RESPIRATION RATE: 16 BRPM | SYSTOLIC BLOOD PRESSURE: 112 MMHG | DIASTOLIC BLOOD PRESSURE: 78 MMHG | HEART RATE: 84 BPM

## 2024-02-10 DIAGNOSIS — C31.0 SQUAMOUS CELL CARCINOMA OF MAXILLARY SINUS (H): Primary | ICD-10-CM

## 2024-02-10 PROCEDURE — 96360 HYDRATION IV INFUSION INIT: CPT

## 2024-02-10 PROCEDURE — 250N000011 HC RX IP 250 OP 636: Performed by: RADIOLOGY

## 2024-02-10 PROCEDURE — 258N000003 HC RX IP 258 OP 636: Performed by: RADIOLOGY

## 2024-02-10 RX ORDER — DIPHENHYDRAMINE HYDROCHLORIDE 50 MG/ML
50 INJECTION INTRAMUSCULAR; INTRAVENOUS
Status: CANCELLED
Start: 2024-02-10

## 2024-02-10 RX ORDER — METHYLPREDNISOLONE SODIUM SUCCINATE 125 MG/2ML
125 INJECTION, POWDER, LYOPHILIZED, FOR SOLUTION INTRAMUSCULAR; INTRAVENOUS
Status: CANCELLED
Start: 2024-02-10

## 2024-02-10 RX ORDER — ALBUTEROL SULFATE 0.83 MG/ML
2.5 SOLUTION RESPIRATORY (INHALATION)
Status: CANCELLED | OUTPATIENT
Start: 2024-02-10

## 2024-02-10 RX ORDER — HEPARIN SODIUM (PORCINE) LOCK FLUSH IV SOLN 100 UNIT/ML 100 UNIT/ML
5 SOLUTION INTRAVENOUS
Status: CANCELLED | OUTPATIENT
Start: 2024-02-10

## 2024-02-10 RX ORDER — HEPARIN SODIUM,PORCINE 10 UNIT/ML
5-20 VIAL (ML) INTRAVENOUS DAILY PRN
Status: CANCELLED | OUTPATIENT
Start: 2024-02-10

## 2024-02-10 RX ORDER — EPINEPHRINE 1 MG/ML
0.3 INJECTION, SOLUTION INTRAMUSCULAR; SUBCUTANEOUS EVERY 5 MIN PRN
Status: CANCELLED | OUTPATIENT
Start: 2024-02-10

## 2024-02-10 RX ORDER — HEPARIN SODIUM (PORCINE) LOCK FLUSH IV SOLN 100 UNIT/ML 100 UNIT/ML
5 SOLUTION INTRAVENOUS
Status: DISCONTINUED | OUTPATIENT
Start: 2024-02-10 | End: 2024-02-10 | Stop reason: HOSPADM

## 2024-02-10 RX ORDER — ALBUTEROL SULFATE 90 UG/1
1-2 AEROSOL, METERED RESPIRATORY (INHALATION)
Status: CANCELLED
Start: 2024-02-10

## 2024-02-10 RX ORDER — MEPERIDINE HYDROCHLORIDE 25 MG/ML
25 INJECTION INTRAMUSCULAR; INTRAVENOUS; SUBCUTANEOUS EVERY 30 MIN PRN
Status: CANCELLED | OUTPATIENT
Start: 2024-02-10

## 2024-02-10 RX ADMIN — Medication 5 ML: at 11:12

## 2024-02-10 RX ADMIN — SODIUM CHLORIDE 1000 ML: 9 INJECTION, SOLUTION INTRAVENOUS at 10:03

## 2024-02-10 NOTE — PATIENT INSTRUCTIONS
[facility] Triage and after hours / weekends / holidays:  [phone]    Please call the triage or after hours line if you experience a temperature greater than or equal to 100.4, shaking chills, have uncontrolled nausea, vomiting and/or diarrhea, dizziness, shortness of breath, chest pain, bleeding, unexplained bruising, or if you have any other new/concerning symptoms, questions or concerns.      If you are having any concerning symptoms or wish to speak to a provider before your next infusion visit, please call triage to notify them so we can adequately serve you.     If you need a refill on a narcotic prescription or other medication, please call before your infusion appointment.                 February 2024 Sunday Monday Tuesday Wednesday Thursday Friday Saturday                       1     2    TREATMENT  11:45 AM   (45 min.)   [facility] RAD ONC ALEX   [facility] Radiation Oncology 3    ONC INFUSION 2 HR (120 MIN)  11:00 AM   (120 min.)    ONC INFUSION NURSE   [facility]    TREATMENT  12:45 PM   (45 min.)   [facility] RAD ONC ALEX   [facility] Radiation Oncology   4    TREATMENT  10:30 AM   (45 min.)   [facility] RAD ONC ALEX   [facility] Radiation Oncology 5    LAB PERIPHERAL   6:45 AM   (15 min.)   [facility] DRAW   [facility]    RETURN ACTIVE TREATMENT   7:00 AM   (45 min.)   Nishi Michele CNP   [facility]    ONC INFUSION 2.5 HR (150 MIN)   8:30 AM   (150 min.)    ONC INFUSION NURSE   [facility]    SWALLOW TREATMENT   9:00 AM   (45 min.)   Kayleen Kilgore, SLP   [facility]    TREATMENT  11:15 AM   (45 min.)   [facility] RAD ONC ALEX   [facility] Radiation Oncology    OTV  11:30 AM   (30 min.)   Tomasa Akers MD   [facility] Radiation Oncology 6    TREATMENT  11:15 AM   (45 min.)   [facility]  RAD ONC ALEX   [facility] Radiation Oncology 7    TREATMENT  11:15 AM   (45 min.)   [facility] RAD ONC ALEX   [facility] Radiation Oncology 8    TREATMENT  11:15 AM   (45 min.)   [facility] RAD ONC ALEX   [facility] Radiation Oncology 9    TREATMENT  11:15 AM   (45 min.)   [facility] RAD ONC ALEX   [facility] Radiation Oncology 10    ONC INFUSION 2 HR (120 MIN)  10:00 AM   (120 min.)   [facility] ONC INFUSION NURSE   [facility]   11     12    LAB PERIPHERAL   6:45 AM   (15 min.)   [facility] DRAW   [facility]    RETURN ACTIVE TREATMENT   7:00 AM   (45 min.)   Nishi Michele CNP   [facility]    ONC INFUSION 2.5 HR (150 MIN)   8:30 AM   (150 min.)   [facility] ONC INFUSION NURSE   [facility]    SWALLOW TREATMENT   9:00 AM   (45 min.)   Kayleen Kilgore SLP   [facility]    TREATMENT  11:15 AM   (45 min.)   [facility] RAD ONC ALEX   [facility] Radiation Oncology    OTV  12:00 PM   (30 min.)   Tomasa Akers MD   [facility] Radiation Oncology 13    [facility] NUTRITION VISIT  10:30 AM   (60 min.)   Mayela Blackwell RD   [facility] Radiation Oncology    TREATMENT  11:15 AM   (45 min.)   [facility] RAD ONC ALEX   [facility] Radiation Oncology 14    TREATMENT  11:15 AM   (45 min.)   [facility] RAD ONC ALEX   [facility] Radiation Oncology 15    RETURN CCSL  11:00 AM   (30 min.)   Kayy Post MD   [facility] 16    TREATMENT  11:15 AM   (45 min.)   [facility] RAD ONC ALEX   [facility] Radiation Oncology    ONC INFUSION 2 HR (120 MIN)   2:30 PM   (120 min.)   [facility] ONC INFUSION NURSE   [facility] 17       18     19    TREATMENT  11:15 AM   (45 min.)   [facility] RAD ONC ALEX   [facility] Radiation Oncology    OTV  12:00 PM   (30  min.)   Tomasa Akers MD   [facility] Radiation Oncology 20    TREATMENT  11:15 AM   (45 min.)   P RAD ONC ALEX   [facility] Radiation Oncology 21    TREATMENT  11:15 AM   (45 min.)   P RAD ONC ALEX   [facility] Radiation Oncology 22    TREATMENT  11:15 AM   (45 min.)   P RAD ONC ALEX   [facility] Radiation Oncology 23    TREATMENT  11:15 AM   (45 min.)   [facility] RAD ONC ALEX   [facility] Radiation Oncology    RETURN ACTIVE TREATMENT  12:45 PM   (45 min.)   Nishi Michele CNP   [facility]    ONC INFUSION 2 HR (120 MIN)   2:00 PM   (120 min.)    ONC INFUSION NURSE   [facility]    SWALLOW TREATMENT   2:30 PM   (45 min.)   Nhi Ann   [facility] 24       25     26    TREATMENT  11:15 AM   (45 min.)   [facility] RAD ONC ALEX   [facility] Radiation Oncology 27    TREATMENT  11:15 AM   (45 min.)   P RAD ONC ALEX   [facility] Radiation Oncology 28     29 March 2024 Sunday Monday Tuesday Wednesday Thursday Friday Saturday                            1     2       3     4     5     6     7     8     9       10     11     12     13     14     15     16       17     18     19     20     21     22     23       24     25     26     27     28     29     30       31                                                   Lab Results:  No results found for this or any previous visit (from the past 12 hour(s)).

## 2024-02-12 ENCOUNTER — ONCOLOGY VISIT (OUTPATIENT)
Dept: ONCOLOGY | Facility: CLINIC | Age: 64
End: 2024-02-12
Attending: REGISTERED NURSE
Payer: COMMERCIAL

## 2024-02-12 ENCOUNTER — APPOINTMENT (OUTPATIENT)
Dept: RADIATION ONCOLOGY | Facility: CLINIC | Age: 64
End: 2024-02-12
Attending: RADIOLOGY
Payer: COMMERCIAL

## 2024-02-12 ENCOUNTER — THERAPY VISIT (OUTPATIENT)
Dept: SPEECH THERAPY | Facility: CLINIC | Age: 64
End: 2024-02-12
Payer: COMMERCIAL

## 2024-02-12 ENCOUNTER — INFUSION THERAPY VISIT (OUTPATIENT)
Dept: ONCOLOGY | Facility: CLINIC | Age: 64
End: 2024-02-12
Attending: INTERNAL MEDICINE
Payer: COMMERCIAL

## 2024-02-12 ENCOUNTER — APPOINTMENT (OUTPATIENT)
Dept: LAB | Facility: CLINIC | Age: 64
End: 2024-02-12
Attending: INTERNAL MEDICINE
Payer: COMMERCIAL

## 2024-02-12 VITALS
RESPIRATION RATE: 16 BRPM | WEIGHT: 124.6 LBS | TEMPERATURE: 97.9 F | BODY MASS INDEX: 21.39 KG/M2 | OXYGEN SATURATION: 99 % | SYSTOLIC BLOOD PRESSURE: 107 MMHG | HEART RATE: 86 BPM | DIASTOLIC BLOOD PRESSURE: 73 MMHG

## 2024-02-12 VITALS
WEIGHT: 124 LBS | DIASTOLIC BLOOD PRESSURE: 79 MMHG | SYSTOLIC BLOOD PRESSURE: 125 MMHG | HEART RATE: 74 BPM | BODY MASS INDEX: 21.28 KG/M2

## 2024-02-12 DIAGNOSIS — T45.1X5A ANEMIA DUE TO ANTINEOPLASTIC CHEMOTHERAPY: ICD-10-CM

## 2024-02-12 DIAGNOSIS — R13.12 DYSPHAGIA, OROPHARYNGEAL PHASE: Primary | ICD-10-CM

## 2024-02-12 DIAGNOSIS — E83.42 HYPOMAGNESEMIA: ICD-10-CM

## 2024-02-12 DIAGNOSIS — D64.81 ANEMIA DUE TO ANTINEOPLASTIC CHEMOTHERAPY: ICD-10-CM

## 2024-02-12 DIAGNOSIS — E83.42 HYPOMAGNESEMIA: Primary | ICD-10-CM

## 2024-02-12 DIAGNOSIS — C31.0 SQUAMOUS CELL CARCINOMA OF MAXILLARY SINUS (H): Primary | ICD-10-CM

## 2024-02-12 DIAGNOSIS — C31.9 MALIGNANT NEOPLASM OF PARANASAL SINUS (H): ICD-10-CM

## 2024-02-12 DIAGNOSIS — K12.30 MUCOSITIS: ICD-10-CM

## 2024-02-12 DIAGNOSIS — C31.0 SQUAMOUS CELL CARCINOMA OF MAXILLARY SINUS (H): ICD-10-CM

## 2024-02-12 DIAGNOSIS — Z91.89 AT RISK FOR MALNUTRITION: ICD-10-CM

## 2024-02-12 DIAGNOSIS — R13.10 ODYNOPHAGIA: ICD-10-CM

## 2024-02-12 LAB
ALBUMIN SERPL BCG-MCNC: 3.8 G/DL (ref 3.5–5.2)
ALP SERPL-CCNC: 92 U/L (ref 40–150)
ALT SERPL W P-5'-P-CCNC: 21 U/L (ref 0–70)
ANION GAP SERPL CALCULATED.3IONS-SCNC: 10 MMOL/L (ref 7–15)
AST SERPL W P-5'-P-CCNC: 28 U/L (ref 0–45)
BASOPHILS # BLD AUTO: 0 10E3/UL (ref 0–0.2)
BASOPHILS NFR BLD AUTO: 0 %
BILIRUB SERPL-MCNC: 0.4 MG/DL
BUN SERPL-MCNC: 17.7 MG/DL (ref 8–23)
CALCIUM SERPL-MCNC: 9.6 MG/DL (ref 8.8–10.2)
CHLORIDE SERPL-SCNC: 98 MMOL/L (ref 98–107)
CREAT SERPL-MCNC: 0.81 MG/DL (ref 0.67–1.17)
DEPRECATED HCO3 PLAS-SCNC: 25 MMOL/L (ref 22–29)
EGFRCR SERPLBLD CKD-EPI 2021: >90 ML/MIN/1.73M2
EOSINOPHIL # BLD AUTO: 0 10E3/UL (ref 0–0.7)
EOSINOPHIL NFR BLD AUTO: 1 %
ERYTHROCYTE [DISTWIDTH] IN BLOOD BY AUTOMATED COUNT: 16.7 % (ref 10–15)
GLUCOSE SERPL-MCNC: 104 MG/DL (ref 70–99)
HCT VFR BLD AUTO: 30.4 % (ref 40–53)
HGB BLD-MCNC: 10.2 G/DL (ref 13.3–17.7)
IMM GRANULOCYTES # BLD: 0 10E3/UL
IMM GRANULOCYTES NFR BLD: 0 %
LYMPHOCYTES # BLD AUTO: 0.7 10E3/UL (ref 0.8–5.3)
LYMPHOCYTES NFR BLD AUTO: 21 %
MAGNESIUM SERPL-MCNC: 1.6 MG/DL (ref 1.7–2.3)
MCH RBC QN AUTO: 27.6 PG (ref 26.5–33)
MCHC RBC AUTO-ENTMCNC: 33.6 G/DL (ref 31.5–36.5)
MCV RBC AUTO: 82 FL (ref 78–100)
MONOCYTES # BLD AUTO: 0.4 10E3/UL (ref 0–1.3)
MONOCYTES NFR BLD AUTO: 13 %
NEUTROPHILS # BLD AUTO: 2.1 10E3/UL (ref 1.6–8.3)
NEUTROPHILS NFR BLD AUTO: 65 %
NRBC # BLD AUTO: 0 10E3/UL
NRBC BLD AUTO-RTO: 0 /100
PLATELET # BLD AUTO: 148 10E3/UL (ref 150–450)
POTASSIUM SERPL-SCNC: 4.7 MMOL/L (ref 3.4–5.3)
PROT SERPL-MCNC: 7.7 G/DL (ref 6.4–8.3)
RBC # BLD AUTO: 3.7 10E6/UL (ref 4.4–5.9)
SODIUM SERPL-SCNC: 133 MMOL/L (ref 135–145)
WBC # BLD AUTO: 3.2 10E3/UL (ref 4–11)

## 2024-02-12 PROCEDURE — 96375 TX/PRO/DX INJ NEW DRUG ADDON: CPT

## 2024-02-12 PROCEDURE — 96413 CHEMO IV INFUSION 1 HR: CPT

## 2024-02-12 PROCEDURE — 36591 DRAW BLOOD OFF VENOUS DEVICE: CPT | Performed by: REGISTERED NURSE

## 2024-02-12 PROCEDURE — 96361 HYDRATE IV INFUSION ADD-ON: CPT

## 2024-02-12 PROCEDURE — 77386 HC IMRT TREATMENT DELIVERY, COMPLEX: CPT | Performed by: RADIOLOGY

## 2024-02-12 PROCEDURE — 250N000011 HC RX IP 250 OP 636: Performed by: REGISTERED NURSE

## 2024-02-12 PROCEDURE — 92526 ORAL FUNCTION THERAPY: CPT | Mod: GN | Performed by: SPEECH-LANGUAGE PATHOLOGIST

## 2024-02-12 PROCEDURE — 83735 ASSAY OF MAGNESIUM: CPT | Performed by: REGISTERED NURSE

## 2024-02-12 PROCEDURE — 258N000003 HC RX IP 258 OP 636: Performed by: REGISTERED NURSE

## 2024-02-12 PROCEDURE — 85025 COMPLETE CBC W/AUTO DIFF WBC: CPT | Performed by: REGISTERED NURSE

## 2024-02-12 PROCEDURE — 80053 COMPREHEN METABOLIC PANEL: CPT | Performed by: REGISTERED NURSE

## 2024-02-12 PROCEDURE — 99214 OFFICE O/P EST MOD 30 MIN: CPT | Performed by: REGISTERED NURSE

## 2024-02-12 PROCEDURE — 96367 TX/PROPH/DG ADDL SEQ IV INF: CPT

## 2024-02-12 PROCEDURE — G0463 HOSPITAL OUTPT CLINIC VISIT: HCPCS | Performed by: REGISTERED NURSE

## 2024-02-12 RX ORDER — MAGNESIUM SULFATE HEPTAHYDRATE 40 MG/ML
2 INJECTION, SOLUTION INTRAVENOUS ONCE
Status: COMPLETED | OUTPATIENT
Start: 2024-02-12 | End: 2024-02-12

## 2024-02-12 RX ORDER — PALONOSETRON 0.05 MG/ML
0.25 INJECTION, SOLUTION INTRAVENOUS ONCE
Status: COMPLETED | OUTPATIENT
Start: 2024-02-12 | End: 2024-02-12

## 2024-02-12 RX ORDER — HEPARIN SODIUM (PORCINE) LOCK FLUSH IV SOLN 100 UNIT/ML 100 UNIT/ML
5 SOLUTION INTRAVENOUS DAILY PRN
Status: DISCONTINUED | OUTPATIENT
Start: 2024-02-12 | End: 2024-02-12 | Stop reason: HOSPADM

## 2024-02-12 RX ORDER — HEPARIN SODIUM,PORCINE 10 UNIT/ML
5-20 VIAL (ML) INTRAVENOUS DAILY PRN
Status: CANCELLED | OUTPATIENT
Start: 2024-02-12

## 2024-02-12 RX ORDER — LORAZEPAM 2 MG/ML
0.5 INJECTION INTRAMUSCULAR EVERY 4 HOURS PRN
Status: CANCELLED | OUTPATIENT
Start: 2024-02-12

## 2024-02-12 RX ORDER — DIPHENHYDRAMINE HYDROCHLORIDE 50 MG/ML
50 INJECTION INTRAMUSCULAR; INTRAVENOUS
Status: CANCELLED
Start: 2024-02-12

## 2024-02-12 RX ORDER — EPINEPHRINE 1 MG/ML
0.3 INJECTION, SOLUTION INTRAMUSCULAR; SUBCUTANEOUS EVERY 5 MIN PRN
Status: CANCELLED | OUTPATIENT
Start: 2024-02-12

## 2024-02-12 RX ORDER — PALONOSETRON 0.05 MG/ML
0.25 INJECTION, SOLUTION INTRAVENOUS ONCE
Status: CANCELLED | OUTPATIENT
Start: 2024-02-12

## 2024-02-12 RX ORDER — ALBUTEROL SULFATE 90 UG/1
1-2 AEROSOL, METERED RESPIRATORY (INHALATION)
Status: CANCELLED
Start: 2024-02-12

## 2024-02-12 RX ORDER — METHYLPREDNISOLONE SODIUM SUCCINATE 125 MG/2ML
125 INJECTION, POWDER, LYOPHILIZED, FOR SOLUTION INTRAMUSCULAR; INTRAVENOUS
Status: CANCELLED
Start: 2024-02-12

## 2024-02-12 RX ORDER — MEPERIDINE HYDROCHLORIDE 25 MG/ML
25 INJECTION INTRAMUSCULAR; INTRAVENOUS; SUBCUTANEOUS EVERY 30 MIN PRN
Status: CANCELLED | OUTPATIENT
Start: 2024-02-12

## 2024-02-12 RX ORDER — HEPARIN SODIUM (PORCINE) LOCK FLUSH IV SOLN 100 UNIT/ML 100 UNIT/ML
5 SOLUTION INTRAVENOUS
Status: DISCONTINUED | OUTPATIENT
Start: 2024-02-12 | End: 2024-02-12 | Stop reason: HOSPADM

## 2024-02-12 RX ORDER — HEPARIN SODIUM (PORCINE) LOCK FLUSH IV SOLN 100 UNIT/ML 100 UNIT/ML
5 SOLUTION INTRAVENOUS
Status: CANCELLED | OUTPATIENT
Start: 2024-02-12

## 2024-02-12 RX ORDER — ALBUTEROL SULFATE 0.83 MG/ML
2.5 SOLUTION RESPIRATORY (INHALATION)
Status: CANCELLED | OUTPATIENT
Start: 2024-02-12

## 2024-02-12 RX ADMIN — CISPLATIN 60 MG: 1 INJECTION, SOLUTION INTRAVENOUS at 09:24

## 2024-02-12 RX ADMIN — SODIUM CHLORIDE 1000 ML: 9 INJECTION, SOLUTION INTRAVENOUS at 07:50

## 2024-02-12 RX ADMIN — DEXAMETHASONE SODIUM PHOSPHATE: 10 INJECTION, SOLUTION INTRAMUSCULAR; INTRAVENOUS at 08:47

## 2024-02-12 RX ADMIN — PALONOSETRON HYDROCHLORIDE 0.25 MG: 0.25 INJECTION INTRAVENOUS at 08:47

## 2024-02-12 RX ADMIN — Medication 5 ML: at 06:51

## 2024-02-12 RX ADMIN — MAGNESIUM SULFATE 2 G: 2 INJECTION INTRAVENOUS at 09:43

## 2024-02-12 RX ADMIN — Medication 5 ML: at 10:48

## 2024-02-12 ASSESSMENT — PAIN SCALES - GENERAL: PAINLEVEL: SEVERE PAIN (6)

## 2024-02-12 NOTE — NURSING NOTE
"Oncology Rooming Note    February 12, 2024 7:04 AM   Douglas Herrera is a 63 year old male who presents for:    Chief Complaint   Patient presents with    Port Draw     Labs drawn via port by RN in lab. VS taken.     Oncology Clinic Visit     Squamous cell carcinoma of maxillary sinus      Initial Vitals: /73   Pulse 86   Temp 97.9  F (36.6  C) (Oral)   Resp 16   Wt 56.5 kg (124 lb 9.6 oz)   SpO2 99%   BMI 21.39 kg/m   Estimated body mass index is 21.39 kg/m  as calculated from the following:    Height as of 1/11/24: 1.626 m (5' 4\").    Weight as of this encounter: 56.5 kg (124 lb 9.6 oz). Body surface area is 1.6 meters squared.  Severe Pain (6) Comment: Data Unavailable   No LMP for male patient.  Allergies reviewed: Yes  Medications reviewed: Yes    Medications: MEDICATION REFILLS NEEDED TODAY. Provider was notified.  Pharmacy name entered into [facility]: [facility] - [place], [place] - [address]    Frailty Screening:   Is the patient here for a new oncology consult visit in cancer care? 2. No      Clinical concerns: Gabapentin refills needed        Becca Newton              "

## 2024-02-12 NOTE — PROGRESS NOTES
Per written communication with Nishi Michele CNP/Zora Corado RN 02/12/24 @ 0920  - recheck BMP and magnesium on Friday 2/16 and replace electrolytes per protocol      Maria Del Carmen Corado RN

## 2024-02-12 NOTE — PROGRESS NOTES
Infusion Nursing Note:  Douglas Herrera presents today for C1D36 Cisplatin.    Patient seen by provider today: Yes: Nishi Michele CNP prior to infusion   present during visit today: Yes, Language: Hmong via son and phone  as needed.     Note: Douglas and his son denied any questions or concerns following his visit with the provider prior to infusion today.    Discussed with patient and son the possibility of receiving IV fluids at home (scheduled for weekly fluids) pending insurance approval. Patient stated that he would be open to receiving fluids at home. IB sent to [facility] to check for coverage and follow up with patient if he has insurance coverage.     Mag 1.6 today. Nishi Michele CNP notified to see if magnesium should be replaced per protocol.    Patient had 1L normal saline and 1L NS with 2g magnesium ordered on treatment plan. After completion of 1L NS, patient reported that he does not normally get 2L of fluids during chemo days.    Per written communication with Nishi Michele CNP/Zora Corado RN 02/12/24 @ 0934  - patient normally does not like to get 2L IV fluids  - okay for patient to only get 1L NS today  - replace magnesium per protocol  - recheck BMP and magnesium on Friday 2/16 and replace electrolytes per protocol    Intravenous Access:  Implanted Port.    Treatment Conditions:   Latest Reference Range & Units 02/12/24 06:54   Sodium 135 - 145 mmol/L 134 (L) (P)   Potassium  See Comment (P)   Chloride 98 - 107 mmol/L 98 (P)   Carbon Dioxide (CO2) 22 - 29 mmol/L 25 (P)   Urea Nitrogen 8.0 - 23.0 mg/dL 17.7 (P)   Creatinine 0.67 - 1.17 mg/dL 0.81 (P)   GFR Estimate >60 mL/min/1.73m2 >90 (P)   Calcium 8.8 - 10.2 mg/dL 9.6 (P)   Anion Gap 7 - 15 mmol/L 11 (P)   Magnesium 1.7 - 2.3 mg/dL 1.6 (L)   Albumin 3.5 - 5.2 g/dL 3.8 (P)   Protein Total 6.4 - 8.3 g/dL 7.7 (P)   Alkaline Phosphatase 40 - 150 U/L 92 (P)   ALT 0 - 70 U/L 21 (P)   AST 0 - 45 U/L 28 (P)   Bilirubin Total <=1.2 mg/dL 0.4  (P)   Glucose 70 - 99 mg/dL 104 (H) (P)   WBC 4.0 - 11.0 10e3/uL 3.2 (L)   Hemoglobin 13.3 - 17.7 g/dL 10.2 (L)   Hematocrit 40.0 - 53.0 % 30.4 (L)   Platelet Count 150 - 450 10e3/uL 148 (L)   RBC Count 4.40 - 5.90 10e6/uL 3.70 (L)   MCV 78 - 100 fL 82   MCH 26.5 - 33.0 pg 27.6   MCHC 31.5 - 36.5 g/dL 33.6   RDW 10.0 - 15.0 % 16.7 (H)   % Neutrophils % 65   % Lymphocytes % 21   % Monocytes % 13   % Eosinophils % 1   % Basophils % 0   Absolute Basophils 0.0 - 0.2 10e3/uL 0.0   Absolute Eosinophils 0.0 - 0.7 10e3/uL 0.0   Absolute Immature Granulocytes <=0.4 10e3/uL 0.0   Absolute Lymphocytes 0.8 - 5.3 10e3/uL 0.7 (L)   Absolute Monocytes 0.0 - 1.3 10e3/uL 0.4   % Immature Granulocytes % 0   Absolute Neutrophils 1.6 - 8.3 10e3/uL 2.1   Absolute NRBCs 10e3/uL 0.0   NRBCs per 100 WBC <1 /100 0     Results reviewed, labs MET treatment parameters, ok to proceed with treatment.      Post Infusion Assessment:  Patient tolerated infusion without incident.  Blood return noted pre and post infusion.  Site patent and intact, free from redness, edema or discomfort.  No evidence of extravasations.  Access discontinued per protocol.       Discharge Plan:   Prescription refills given for gabapentin and magic mouthwash.  Discharge instructions reviewed with: Patient and Family.  Patient and/or family verbalized understanding of discharge instructions and all questions answered.  AVS to patient via [employer].  Patient will return 2/16 for next appointment.   Patient discharged in stable condition accompanied by: self and son.  Departure Mode: Ambulatory.      Maria Del Carmen Corado RN

## 2024-02-12 NOTE — PATIENT INSTRUCTIONS
[facility] Triage and after hours / weekends / holidays:  [phone] option 5, option 2    Please call the triage or after hours line if you experience a temperature greater than or equal to 100.4, shaking chills, have uncontrolled nausea, vomiting and/or diarrhea, dizziness, shortness of breath, chest pain, bleeding, unexplained bruising, or if you have any other new/concerning symptoms, questions or concerns.      If you are having any concerning symptoms or wish to speak to a provider before your next infusion visit, please call triage to notify your care team so we can adequately serve you.     If you need a refill on a narcotic prescription or other medication, please call before your infusion appointment.

## 2024-02-12 NOTE — LETTER
2024         RE: Douglas Herrera  [address] [place] [address]        Dear Colleague,    Thank you for referring your patient, Douglas Herrera, to the [facility] RADIATION ONCOLOGY. Please see a copy of my visit note below.    RADIATION ONCOLOGY WEEKLY ON TREATMENT VISIT   Encounter Date: 2024     Patient Name: Douglas Herrera  MRN: [id number]  : 1960     Disease and Stage: T4b N2b M0, stage IVB sinonasal carcinoma    Treatment Site: Left sinonasal primary and bilateral neck nodes        Current Dose/Planned Total Dose: 4800 cGy / 7000 cGy    Concurrent Chemotherapy: Yes  Drug and Frequency: Weekly cisplatin    ED visits/Hospitalizations: None    Missed Treatments:  2024 - 2024: Machine downtime  2024 - 2024: Machine downtime    Subjective: Mr. Herrera presents to clinic today for his weekly on-treatment visit. He continues to report no immediate concerns related to radiation treatment. He had a G-tube placed 2024 and is using 4 cans daily.  Oral intake is limited due to lack of taste.  Gabapentin dose is 900 mg 3 times daily.  He is sleeping approximately 12 to 13 hours per 24-hour period.  He received IV fluids this weekend as well as magnesium replacement.  He is noticing more intraoral pain.    Nursing ROS:   Nutrition Alteration  Diet Type: Patient's Preference  Skin  Skin Reaction: 1 - Faint erythema or dry desquamation  Skin Note: Aquaphor     ENT and Mouth Exam  Mucositis - Current: 1 - Generalized erythema  ENT/Mouth Note: S&S 15-20     Gastrointestinal  Nausea: 0 - None  GI Note: PEG 4 cans a day     Pain Assessment  0-10 Pain Scale: 4  Pain Treatment: Gabapentin 900mg TID     PEG tube: Placed 2024  Pacemaker: None    Objective:   /79   Pulse 74   Wt 56.2 kg (124 lb)   BMI 21.28 kg/m   pretreatment weight: 58.78 kg (129 lb 9 oz)  KPS 80  General: Alert, oriented, in no acute distress  HEENT: Minimal mucositis, no thrush, slight improvement in  trismus  Skin: Minimal erythema      OnTreatment-related toxicities (CTCAE v5.0):  Anorexia: Grade 2: Oral intake altered without significant weight loss or malnutrition; oral nutritional supplements indicated  Fatigue: Grade 2: Fatigue not relieved by rest; limiting instrumental ADL  Nausea: Grade 0: No toxicity  Pain: Grade 1: Mild pain  Dry mouth: Grade 1: Symptomatic without significant dietary alteration; unstimulated saliva flow >0.2 mL/min  Dysphagia: Grade 1: Symptomatic, able to eat regular diet  Mucositis: Grade 1: Asymptomatic or mild symptoms; intervention not indicated  Dermatitis: Grade 1: Faint erythema or dry desquamation     Assessment:    Mr. 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  Multidisciplinary conference consensus opinion is towards chemoradiation.  He has had significant hearing loss and thus Oncology has recommended weekly cisplatin. He is tolerating therapy well to date.    Plan:   Continue with treatment as planned  Nutrition per oral and PEG to maintain weight as per recommendations of Mayela An   Continue gabapentin dose to 900 mg 3 times daily.    Mosaiq chart and setup information reviewed  MVCT images reviewed    Medication Review  Med list reviewed with patient?: Yes  Med list printed and given: Offered and declined    Tomasa Akers MD     Department of Radiation Oncology  [facility]       Again, thank you for allowing me to participate in the care of your patient.        Sincerely,        Tomasa Akers MD

## 2024-02-12 NOTE — Clinical Note
2/12/2024         RE: Douglas Herrera  [address] [place] [address]        Dear Colleague,    Thank you for referring your patient, Douglas Herrera, to the [facility]CER [facility]. Please see a copy of my visit note below.       [facility]    PATIENT NAME: Douglas Herrera  MRN # [id number]   DATE OF VISIT: February 12, 2024  YOB: 1960     [facility]   Radiation Oncology: Dr. Tomasa Akers  Cardiology: Dr. Qamar Hernandez  PCP: Dr. العراقي   Hepatologist: [facility]     CANCER TYPE: SCC sinonasal   STAGE: [id number] (IVB)  ECOG PS: 1    PD-L1:  NGS: internal panel pending     SUMMARY    8/9/23 CT sinus.   8/24/23 MRI sinus. L maxillary sinus mass  9/12/23 ED for epistaxis.   9/13/23 CTA and embolization of L IMAX   10/4/23 Nasal bx. Path: Inverted papilloma with high grade dysplasia  11/10/23 Nasal endoscopy and bx in the OR. C/b severe bleeding. Path: Inverted sinonasal papilloma with severe dysplasia.  11/17/23 MRI. 7.4 x 4.6 x 6.6 cm L sinonasal mass, destruction of nasal turbinates, L maxillary sinus, hard palate, invasion of L  space, involvement of medial and lateral pterygoids and temporalis muscles. Effacement and invasion ipsilateral pterygopalatine fossa, extends and effaces the nasopharynx.   11/30/23 Carotid angiogram. Direct endonasal and transoral embolization L sinonasal tumor, transaterial embolization of the L sphenopalatine artery feeders to the L sinonasal artery tumor   12/1/23 Stealth assisted transnasal endoscopic approach to excise L sinonasal mass. Pre-operative embolization. Path: SCC involving L maxilla.    12/9/23 PET. Hypermetabolic mass centered along the L maxilla, extending superiorly through the floor of the L maxillary sinus, posterior/laterally to the  space, invasion of the pterygoid muscles, extending cranially along the pterygopalatine fossa and pterygoid plates to the skull base level.  Erosion of involved bones including L maxilla, maxillary sinus wall and pterygoid plates. Extension medially to the L lateral nasopharyngeal wall and soft palate. 1 mm fat place between carotid and mass. ~4.9 x 4.0 x 5.4 cm (SUV 24.3). Partially resected since 11/17/23 MRI. 8 mm L 2A node (SUV 5.9), 7 mm L level 2 node (SUV 5.5)    ASSESSMENT AND PLAN  SCC L maxillary sinus, [id number] (IVB): s/p transnasal endoscopic excision of sinonasal mass. He is a poor candidate for TPF induction therefore plan is is for definitive chemoradiation with weekly cisplatin. Not a candidate for HD cisplatin-audiogram 12/18/23 confirms significant bilateral SNHL. Tolerating cisplatin well without side effects. Mucositis/odynophagia increasing as expected s/t RT. Briefly reviewed timeline for recovery following completion of chemoRT. Labs today meet parameters for treatment.  -Proceed with day 36 cisplatin  -Follow-up with Dr. Post 2/15  -Scheduled for supplemental IVF 2/16 and 2/23. May need to add appts based on symptoms over the next few weeks.    Risk for malnutrition: G-tube placement 1/2. Working with CARISA Pedro.  Up to 4 cartons formula/day, trying to increase to 5 cartons/day. Weight down a few more pounds. Encouraged consistent use of 5 cartons/day. Reviewed the expected timeline for recovery and need to keep G-tube in for weeks/months following completion of chemoRT as he is able to slowly increase oral intake.    Hypomagnesemia: 2 gm IV mag per IV replacement today. Recheck later this week when back in for IVF and replace pp prn     Anemia: s/t cisplatin, monitor    Odynophagia, mucositis: Increasing as expected. S/s rinses. Add Magic Mouthwash q4h PRN    Constipation: Senna 1-2 tab BID PRN    Vascular access: port placed 1/11/24, no concerns    Hypercalcemia: Resolved    Hepatitis B: [facility] note from 1/5/23 (attached in chart) reviewed. Unclear if there was a more recent visit Remains on entecavir.     H/o PE/DVT: 5/8/2019  CTA report scanned into [facility], reviewed. Small PEs. Also had LLE DVT, acute, occlusive.  Presented with CP and LLE swelling. Was on rivaroxaban, completed course.     H/o undefined hypothyroidism: TFTs 1/8/24 normal.    Screening for TB: Quantiferon gold negative on 1/23/24    30 minutes spent on the date of the encounter doing chart review, review of test results, interpretation of tests, patient visit, and documentation     Nishi Michele, DIANA    SUBJECTIVE  Mr. 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 Mr. Herrera speaks and understands conversational English. His son provided assistance with interpretation today.   -Increased pain on to left posterior tongue, constant. Worse with swallowing  -Only taking in liquid by mouth  -4-5 cartons via FT daily  -No issues with PEG, flushing well. Cleaning site often. Buttons are still in place  -Using salt/soda rinses  -Gabapentin for pain control, needs refill  -Denies significant fatigue  -Denies hearing changes      PAST MEDICAL HISTORY  Sinonasal carcinoma as above  HTN  ASCVD. CABG x 3 2019  PE and LLE DVT after CABG . Treated with rivaroxaban  Dyslipidemia  Hepatitis B   SCC R temple s/p MOHS  Ascending aortic aneurysm repair 2019  Hearing loss L   Gout - feet  Depression  Cholecystectomy    Son Berhane at [phone]    CURRENT OUTPATIENT MEDICATIONS  Current Outpatient Medications   Medication    atorvastatin (LIPITOR) 80 MG tablet    chlorhexidine (PERIDEX) 0.12 % solution    entecavir (BARACLUDE) 0.5 MG tablet    gabapentin (NEURONTIN) 300 MG capsule    acetaminophen (TYLENOL) 325 MG tablet    capsaicin (ZOSTRIX) 0.025 % external cream    fluticasone (FLONASE) 50 MCG/ACT nasal spray    nitroGLYcerin (NITROSTAT) 0.4 MG sublingual tablet    ondansetron (ZOFRAN) 8 MG tablet    prochlorperazine (COMPAZINE) 10 MG tablet    senna-docusate (SENNA S) 8.6-50 MG tablet     sodium chloride (OCEAN) 0.65 % nasal spray    sodium chloride (OCEAN) 0.65 % nasal spray    traMADol (ULTRAM) 50 MG tablet    traZODone (DESYREL) 50 MG tablet     Current Facility-Administered Medications   Medication    heparin 100 unit/mL injection 5 mL     ALLERGIES  No Known Allergies     SOCIAL HISTORY: Non smoker. No current ETOH. Lives with his children. Immigrated from [facility] in the [place]s. Not working. On disability. Used to be a . Lived in [place] [facility] [place]. One daughter and 5 sons. Two grandchildren.      FAMILY HISTORY:   Family History   Problem Relation Age of Onset    Cerebrovascular Disease Mother 90.00    Acute Myocardial Infarction No family hx of      PHYSICAL EXAM  /73   Pulse 86   Temp 97.9  F (36.6  C) (Oral)   Resp 16   Wt 56.5 kg (124 lb 9.6 oz)   SpO2 99%   BMI 21.39 kg/m      GEN: NAD  HEENT: EOMI, no icterus, injection or pallor. Oropharynx - mild trismus.  L posterior hard palate tumor with overlying erythema and mucositis, continues to decrease in size   RESP: cta bilaterally, no wheezes  CV: rrr, no murmur   GI: abd soft/nt, g-tube without surrounding erythema or drainage to insertion site  EXT: no edema  NEURO: alert, oriented, no focal deficits  SKIN: R chest port accessed, no edema/erythema.     G-tube buttons were removed by me in clinic today.     LABORATORY AND IMAGING STUDIES  Most Recent 3 CBC's:  Recent Labs   Lab Test 02/12/24  0654 02/05/24  0657 01/30/24  0635   WBC 3.2* 3.9* 4.3   HGB 10.2* 10.6* 11.0*   MCV 82 82 84   * 148* 167   ANEUTAUTO 2.1 2.4 2.8    Most Recent 3 BMP's:  Recent Labs   Lab Test 02/05/24  0657 01/30/24  0635 01/23/24  0656   * 134* 135   POTASSIUM 4.3 4.1 4.5   CHLORIDE 97* 97* 98   CO2 28 26 27   BUN 17.6 17.2 19.0   CR 0.79 0.73 0.75   ANIONGAP 9 11 10   FLORENCE 9.3 9.4 9.5   * 115* 104*   PROTTOTAL 7.4 7.5 8.0   ALBUMIN 3.6 3.7 3.7    Most Recent 2 LFT's:  Recent Labs   Lab Test 02/05/24  0657 01/30/24  0635   AST  30 31   ALT 30 39   ALKPHOS 94 93   BILITOTAL 0.4 0.4    Most Recent TSH and T4:  Recent Labs   Lab Test 01/08/24  0717   TSH 3.35   T4 1.00     Phos/Mag:  Lab Results   Component Value Date    MAG 1.7 02/05/2024    MAG 1.8 01/30/2024    MAG 1.9 01/23/2024      I reviewed the above labs today.          Again, thank you for allowing me to participate in the care of your patient.        Sincerely,        Nishi Michele, CNP

## 2024-02-12 NOTE — PROGRESS NOTES
RADIATION ONCOLOGY WEEKLY ON TREATMENT VISIT   Encounter Date: 2024     Patient Name: Douglas Hererra  MRN: [id number]  : 1960     Disease and Stage: T4b N2b M0, stage IVB sinonasal carcinoma    Treatment Site: Left sinonasal primary and bilateral neck nodes        Current Dose/Planned Total Dose: 4800 cGy / 7000 cGy    Concurrent Chemotherapy: Yes  Drug and Frequency: Weekly cisplatin    ED visits/Hospitalizations: None    Missed Treatments:  2024 - 2024: Machine downtime  2024 - 2024: Machine downtime    Subjective: Mr. Herrera presents to clinic today for his weekly on-treatment visit. He continues to report no immediate concerns related to radiation treatment. He had a G-tube placed 2024 and is using 4 cans daily.  Oral intake is limited due to lack of taste.  Gabapentin dose is 900 mg 3 times daily.  He is sleeping approximately 12 to 13 hours per 24-hour period.  He received IV fluids this weekend as well as magnesium replacement.  He is noticing more intraoral pain.    Nursing ROS:   Nutrition Alteration  Diet Type: Patient's Preference  Skin  Skin Reaction: 1 - Faint erythema or dry desquamation  Skin Note: Aquaphor     ENT and Mouth Exam  Mucositis - Current: 1 - Generalized erythema  ENT/Mouth Note: S&S 15-20     Gastrointestinal  Nausea: 0 - None  GI Note: PEG 4 cans a day     Pain Assessment  0-10 Pain Scale: 4  Pain Treatment: Gabapentin 900mg TID     PEG tube: Placed 2024  Pacemaker: None    Objective:   /79   Pulse 74   Wt 56.2 kg (124 lb)   BMI 21.28 kg/m   pretreatment weight: 58.78 kg (129 lb 9 oz)  KPS 80  General: Alert, oriented, in no acute distress  HEENT: Minimal mucositis, no thrush, slight improvement in trismus  Skin: Minimal erythema      OnTreatment-related toxicities (CTCAE v5.0):  Anorexia: Grade 2: Oral intake altered without significant weight loss or malnutrition; oral nutritional supplements indicated  Fatigue: Grade 2: Fatigue not  relieved by rest; limiting instrumental ADL  Nausea: Grade 0: No toxicity  Pain: Grade 1: Mild pain  Dry mouth: Grade 1: Symptomatic without significant dietary alteration; unstimulated saliva flow >0.2 mL/min  Dysphagia: Grade 1: Symptomatic, able to eat regular diet  Mucositis: Grade 1: Asymptomatic or mild symptoms; intervention not indicated  Dermatitis: Grade 1: Faint erythema or dry desquamation     Assessment:    Mr. 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  Multidisciplinary conference consensus opinion is towards chemoradiation.  He has had significant hearing loss and thus Oncology has recommended weekly cisplatin. He is tolerating therapy well to date.    Plan:   Continue with treatment as planned  Nutrition per oral and PEG to maintain weight as per recommendations of Mayela An   Continue gabapentin dose to 900 mg 3 times daily.    Mosaiq chart and setup information reviewed  MVCT images reviewed    Medication Review  Med list reviewed with patient?: Yes  Med list printed and given: Offered and declined    Tomasa Akers MD     Department of Radiation Oncology  [facility]

## 2024-02-13 ENCOUNTER — APPOINTMENT (OUTPATIENT)
Dept: RADIATION ONCOLOGY | Facility: CLINIC | Age: 64
End: 2024-02-13
Attending: RADIOLOGY
Payer: COMMERCIAL

## 2024-02-13 DIAGNOSIS — C31.0 SQUAMOUS CELL CARCINOMA OF MAXILLARY SINUS (H): Primary | ICD-10-CM

## 2024-02-13 PROCEDURE — 77336 RADIATION PHYSICS CONSULT: CPT | Performed by: RADIOLOGY

## 2024-02-13 PROCEDURE — 77427 RADIATION TX MANAGEMENT X5: CPT | Performed by: RADIOLOGY

## 2024-02-13 PROCEDURE — 77014 PR CT GUIDE FOR PLACEMENT RADIATION THERAPY FIELDS: CPT | Mod: 26 | Performed by: RADIOLOGY

## 2024-02-13 PROCEDURE — 77386 HC IMRT TREATMENT DELIVERY, COMPLEX: CPT | Performed by: RADIOLOGY

## 2024-02-13 PROCEDURE — 97803 MED NUTRITION INDIV SUBSEQ: CPT | Performed by: DIETITIAN, REGISTERED

## 2024-02-13 NOTE — PROGRESS NOTES
CLINICAL NUTRITION SERVICES - REASSESSMENT NOTE   EVALUATION OF PREVIOUS PLAN OF CARE:   Time spent with patient: 15 minutes.  Pt accompanied by: his son, Tulio in radiation clinic at time of OTV  Referring Physician: Sincere 1/2/24  Z51.11 (ICD-10-CM) - Encounter for antineoplastic chemotherapy   E43 (ICD-10-CM) - Severe protein-calorie malnutrition (H24)   C31.0 (ICD-10-CM) - Carcinoma of maxillary sinus (H)   Additional Information:  nasopharyngeal carcinoma, dysphagia, weight loss, Gtube 1/2/24. Video swallow pending. Please assist with nutritional needs prior to and throughout chemoradiation     Monitoring from previous assessment:   -Food/Fluid intake - Douglas has not been eating much PO due to odynophagia. He is taking ~3 cups water/day via PO with his meds.    -EN intake-  4 cartons of EN per day via gravity bag feedings. He has been taking 2 cartons of tube feeding for breakfast and 2 for dinner. His feedings are taking ~90 min.   Formula: Osmolite 1.5 fernando  Volume: 4 cartons/day (948 mL, 720 ml free water)  Provisions:  1420kcal (23kcal/kg), 60 g protein (1.0g/kg), 176g CHO, 0g fiber    -Weight trends - down 2 lb x past one week  Wt Readings from Last 10 Encounters:   02/12/24 56.2 kg (124 lb)   02/12/24 56.5 kg (124 lb 9.6 oz)   02/05/24 57.2 kg (126 lb)   02/05/24 57.2 kg (126 lb 3.2 oz)   01/30/24 59.2 kg (130 lb 8 oz)   01/30/24 57.2 kg (126 lb)   01/26/24 57.7 kg (127 lb 1.6 oz)   01/23/24 57.4 kg (126 lb 9.6 oz)   01/16/24 58.5 kg (129 lb)   01/15/24 58.7 kg (129 lb 4.8 oz)       Previous Goals:   PO and EN to meet 100% estimated nutrition needs  Aim for at least 2000 frenando, 80-100g protein and 8-10 cups water/electrolyte fluids/day    Evaluation: Not met   Previous Nutrition Diagnosis:   Inadequate oral intake related to odynophagia as evidenced by pt reliant on EN to meet >50-75% est nutrition needs.    Evaluation: No change   NEW FINDINGS:   No new findings   CURRENT NUTRITION DIAGNOSIS    Inadequate oral intake related to odynophagia as evidenced by pt reliant on EN to meet >50-75% est nutrition needs.    INTERVENTIONS   EN Composition, EN Schedule, Feeding Tube Flush- reviewed current regimen.  Encouraged to increase formula towards 4 1/2 to 5 cartons/day.  Reviewed schedule and volume options.  Encouraged to increase water/electrolyte intake to a total of 6 cups per day as able.  Encouraged sipping PO and flushing feeding tube throughout the day.    Goals   1.PO and EN to meet 100% estimated nutrition needs  1.Aim for at least 2000 fernando, 80-100g protein and 6 cups water/electrolyte fluids/day     Follow up/Monitoring:  two week follow up, 2/27  Food intake, Enteral Nutrition intake, and Weight     Mayela An RD, , LD  [facility]  [phone]    Mayela An RD, , JAZLYN  [facility]  [phone]

## 2024-02-14 ENCOUNTER — APPOINTMENT (OUTPATIENT)
Dept: RADIATION ONCOLOGY | Facility: CLINIC | Age: 64
End: 2024-02-14
Attending: RADIOLOGY
Payer: COMMERCIAL

## 2024-02-14 PROCEDURE — 77014 PR CT GUIDE FOR PLACEMENT RADIATION THERAPY FIELDS: CPT | Mod: 26 | Performed by: RADIOLOGY

## 2024-02-14 PROCEDURE — 77386 HC IMRT TREATMENT DELIVERY, COMPLEX: CPT | Performed by: RADIOLOGY

## 2024-02-15 ENCOUNTER — APPOINTMENT (OUTPATIENT)
Dept: RADIATION ONCOLOGY | Facility: CLINIC | Age: 64
End: 2024-02-15
Attending: RADIOLOGY
Payer: COMMERCIAL

## 2024-02-15 ENCOUNTER — ONCOLOGY VISIT (OUTPATIENT)
Dept: ONCOLOGY | Facility: CLINIC | Age: 64
End: 2024-02-15
Attending: INTERNAL MEDICINE
Payer: COMMERCIAL

## 2024-02-15 DIAGNOSIS — C31.0 SQUAMOUS CELL CARCINOMA OF MAXILLARY SINUS (H): Primary | ICD-10-CM

## 2024-02-15 DIAGNOSIS — G89.3 CANCER RELATED PAIN: ICD-10-CM

## 2024-02-15 DIAGNOSIS — E83.42 HYPOMAGNESEMIA: ICD-10-CM

## 2024-02-15 DIAGNOSIS — K12.30 MUCOSITIS: ICD-10-CM

## 2024-02-15 DIAGNOSIS — Z13.29 SCREENING FOR HYPOTHYROIDISM: ICD-10-CM

## 2024-02-15 PROCEDURE — 99213 OFFICE O/P EST LOW 20 MIN: CPT | Performed by: INTERNAL MEDICINE

## 2024-02-15 PROCEDURE — G2211 COMPLEX E/M VISIT ADD ON: HCPCS | Performed by: INTERNAL MEDICINE

## 2024-02-15 PROCEDURE — 77386 HC IMRT TREATMENT DELIVERY, COMPLEX: CPT | Performed by: RADIOLOGY

## 2024-02-15 PROCEDURE — 77014 PR CT GUIDE FOR PLACEMENT RADIATION THERAPY FIELDS: CPT | Mod: 26 | Performed by: RADIOLOGY

## 2024-02-15 PROCEDURE — G0463 HOSPITAL OUTPT CLINIC VISIT: HCPCS | Performed by: INTERNAL MEDICINE

## 2024-02-15 RX ORDER — OXYCODONE HYDROCHLORIDE 5 MG/1
5 TABLET ORAL EVERY 6 HOURS PRN
Qty: 20 TABLET | Refills: 0 | Status: SHIPPED | OUTPATIENT
Start: 2024-02-15 | End: 2024-02-20

## 2024-02-15 NOTE — LETTER
2/15/2024         RE: Douglas Herrera  [address] [place] [address]        Dear Colleague,    Thank you for referring your patient, Douglas Herrera, to the [facility]. Please see a copy of my visit note below.       [facility]    PATIENT NAME: Douglas Herrera  MRN # [id number]   DATE OF VISIT: February 15, 2024  YOB: 1960     [facility]   Radiation Oncology: Dr. Tomasa Akers  Cardiology: Dr. Qamar Hernandez  PCP: Dr. العارقي   Hepatologist: [facility]     CANCER TYPE: SCC sinonasal   STAGE: [id number] (IVB)  ECOG PS: 1    PD-L1:  NGS: internal panel. Fusion negative. ARID1A S90fs, EGFR exon 20 insertion, APC [id number]    SUMMARY    8/9/23 CT sinus.   8/24/23 MRI sinus. L maxillary sinus mass  9/12/23 ED for epistaxis.   9/13/23 CTA and embolization of L IMAX   10/4/23 Nasal bx. Path: Inverted papilloma with high grade dysplasia  11/10/23 Nasal endoscopy and bx in the OR. C/b severe bleeding. Path: Inverted sinonasal papilloma with severe dysplasia.  11/17/23 MRI. 7.4 x 4.6 x 6.6 cm L sinonasal mass, destruction of nasal turbinates, L maxillary sinus, hard palate, invasion of L  space, involvement of medial and lateral pterygoids and temporalis muscles. Effacement and invasion ipsilateral pterygopalatine fossa, extends and effaces the nasopharynx.   11/30/23 Carotid angiogram. Direct endonasal and transoral embolization L sinonasal tumor, transaterial embolization of the L sphenopalatine artery feeders to the L sinonasal artery tumor   12/1/23 Stealth assisted transnasal endoscopic approach to excise L sinonasal mass. Pre-operative embolization. Path: SCC involving L maxilla.    12/9/23 PET. Hypermetabolic mass centered along the L maxilla, extending superiorly through the floor of the L maxillary sinus, posterior/laterally to the  space, invasion of the pterygoid muscles, extending cranially along the pterygopalatine  fossa and pterygoid plates to the skull base level. Erosion of involved bones including L maxilla, maxillary sinus wall and pterygoid plates. Extension medially to the L lateral nasopharyngeal wall and soft palate. 1 mm fat place between carotid and mass. ~4.9 x 4.0 x 5.4 cm (SUV 24.3). Partially resected since 11/17/23 MRI. 8 mm L 2A node (SUV 5.9), 7 mm L level 2 node (SUV 5.5)  1/2/24 Gtube (IR)  1/4/24 Video swallow. No aspiration  1/8~2/27/24 Chemoradiation with weekly cisplatin.  1/11/24 Port (IR)    ASSESSMENT AND PLAN    SCC L maxillary sinus, [id number] (IVB), ARID1 mutation,  EGFR exon 20 insertion: Tolerating treatment well. Spent some time today discussing anticipated recovery. Continue chemoradiation. No change in hearing/tinnitus, continue to monitor carefully. I will see him after the 3 month post-treatment PET/CT with labs.     Mucositis, odynophagia: Started using oxycodone today. Monitor for constipation. Continue acetaminophen. Can try dabbing some MM on the ulcer in the left roof of his mouth to minimize generalized numbness if he wants.    Hypomagnesia:  Replace per protocol.    Hypercalcemia: Malignancy. Resolved quickly with starting chemoradiation    Hearing loss: Unchanged as above     Hepatitis B: HBSAby negative, SAg +, cAby +, HBV PCR negative 1/23/24. HCV negative.     H/o PE/DVT: 5/8/2019 CTA report scanned into [facility], reviewed. Small PEs. Also had LLE DVT, acute, occlusive. Report in [facility] and reviewed. Presented with CP and LLE swelling. Was on rivaroxaban, completed course.     H/o undefined hypothyroidism: TFTs 1/8/24 normal     The longitudinal plan of care for the condition(s) below were addressed during this visit. Due to the added complexity in care, I will continue to support Douglas in the subsequent management of this condition(s) and with the ongoing continuity of care of this condition(s):     20 minutes spent by me on the date of the encounter doing chart review, history and  exam, documentation and further activities per the note     Kayy Post MD  Associate Professor of Medicine  Hematology, Oncology and Transplantation      SUBJECTIVE  Mr. Herrera returns for follow up  More mouth pain compared to earlier this week  Some trouble sleeping  Drinking water. Not eating food   No change in hearing, no tinnitus    PAST MEDICAL HISTORY  Sinonasal carcinoma as above  HTN  ASCVD. CABG x 3 2019  PE and LLE DVT after CABG . Treated with rivaroxaban  Dyslipidemia  Hepatitis B   SCC R temple s/p MOHS  Ascending aortic aneurysm repair 2019  Hearing loss L   Gout - feet  Depression  Cholecystectomy    CURRENT OUTPATIENT MEDICATIONS  Reviewed    ALLERGIES  No Known Allergies     PHYSICAL EXAM  There were no vitals taken for this visit.  GEN: NAD  HEENT: EOMI, no icterus, injection or pallor. Oropharynx shows mucositis with some focal mildly ulcerated are on the L posterior hard palate   EXT: no edema  NEURO: alert  SKIN: no rashes    LABORATORY AND IMAGING STUDIES    Labs in the last 28 days were independently reviewed and interpreted by me

## 2024-02-15 NOTE — PROGRESS NOTES
[facility] CANCER [facility]    PATIENT NAME: Douglas Herrera  MRN # [id number]   DATE OF VISIT: February 15, 2024  YOB: 1960     [facility]   Radiation Oncology: Dr. Tomasa Akers  Cardiology: Dr. Qamar Hernandez  PCP: Dr. العراقي   Hepatologist: [facility]     CANCER TYPE: SCC sinonasal   STAGE: [id number] (IVB)  ECOG PS: 1    PD-L1:  NGS: internal panel. Fusion negative. ARID1A S90fs, EGFR exon 20 insertion, APC [id number]    SUMMARY    8/9/23 CT sinus.   8/24/23 MRI sinus. L maxillary sinus mass  9/12/23 ED for epistaxis.   9/13/23 CTA and embolization of L IMAX   10/4/23 Nasal bx. Path: Inverted papilloma with high grade dysplasia  11/10/23 Nasal endoscopy and bx in the OR. C/b severe bleeding. Path: Inverted sinonasal papilloma with severe dysplasia.  11/17/23 MRI. 7.4 x 4.6 x 6.6 cm L sinonasal mass, destruction of nasal turbinates, L maxillary sinus, hard palate, invasion of L  space, involvement of medial and lateral pterygoids and temporalis muscles. Effacement and invasion ipsilateral pterygopalatine fossa, extends and effaces the nasopharynx.   11/30/23 Carotid angiogram. Direct endonasal and transoral embolization L sinonasal tumor, transaterial embolization of the L sphenopalatine artery feeders to the L sinonasal artery tumor   12/1/23 Stealth assisted transnasal endoscopic approach to excise L sinonasal mass. Pre-operative embolization. Path: SCC involving L maxilla.    12/9/23 PET. Hypermetabolic mass centered along the L maxilla, extending superiorly through the floor of the L maxillary sinus, posterior/laterally to the  space, invasion of the pterygoid muscles, extending cranially along the pterygopalatine fossa and pterygoid plates to the skull base level. Erosion of involved bones including L maxilla, maxillary sinus wall and pterygoid plates. Extension medially to the L lateral nasopharyngeal wall and soft palate. 1 mm fat place between carotid  and mass. ~4.9 x 4.0 x 5.4 cm (SUV 24.3). Partially resected since 11/17/23 MRI. 8 mm L 2A node (SUV 5.9), 7 mm L level 2 node (SUV 5.5)  1/2/24 Gtube (IR)  1/4/24 Video swallow. No aspiration  1/8~2/27/24 Chemoradiation with weekly cisplatin.  1/11/24 Port (IR)    ASSESSMENT AND PLAN    SCC L maxillary sinus, [id number] (IVB), ARID1 mutation,  EGFR exon 20 insertion: Tolerating treatment well. Spent some time today discussing anticipated recovery. Continue chemoradiation. No change in hearing/tinnitus, continue to monitor carefully. I will see him after the 3 month post-treatment PET/CT with labs.     Mucositis, odynophagia: Started using oxycodone today. Monitor for constipation. Continue acetaminophen. Can try dabbing some MM on the ulcer in the left roof of his mouth to minimize generalized numbness if he wants.    Hypomagnesia:  Replace per protocol.    Hypercalcemia: Malignancy. Resolved quickly with starting chemoradiation    Hearing loss: Unchanged as above     Hepatitis B: HBSAby negative, SAg +, cAby +, HBV PCR negative 1/23/24. HCV negative.     H/o PE/DVT: 5/8/2019 CTA report scanned into [facility], reviewed. Small PEs. Also had LLE DVT, acute, occlusive. Report in EPic and reviewed. Presented with CP and LLE swelling. Was on rivaroxaban, completed course.     H/o undefined hypothyroidism: TFTs 1/8/24 normal     The longitudinal plan of care for the condition(s) below were addressed during this visit. Due to the added complexity in care, I will continue to support Douglas in the subsequent management of this condition(s) and with the ongoing continuity of care of this condition(s):     20 minutes spent by me on the date of the encounter doing chart review, history and exam, documentation and further activities per the note     Kayy Post MD  Associate Professor of Medicine  Hematology, Oncology and Transplantation      SUBJECTIVE  Mr. Herrera returns for follow up  More mouth pain compared to earlier this  week  Some trouble sleeping  Drinking water. Not eating food   No change in hearing, no tinnitus    PAST MEDICAL HISTORY  Sinonasal carcinoma as above  HTN  ASCVD. CABG x 3 2019  PE and LLE DVT after CABG . Treated with rivaroxaban  Dyslipidemia  Hepatitis B   SCC R temple s/p MOHS  Ascending aortic aneurysm repair 2019  Hearing loss L   Gout - feet  Depression  Cholecystectomy    CURRENT OUTPATIENT MEDICATIONS  Reviewed    ALLERGIES  No Known Allergies     PHYSICAL EXAM  There were no vitals taken for this visit.  GEN: NAD  HEENT: EOMI, no icterus, injection or pallor. Oropharynx shows mucositis with some focal mildly ulcerated are on the L posterior hard palate   EXT: no edema  NEURO: alert  SKIN: no rashes    LABORATORY AND IMAGING STUDIES    Labs in the last 28 days were independently reviewed and interpreted by me

## 2024-02-15 NOTE — PLAN OF CARE
Care Plan  Care Management      Care Management Goals of the Day: Assessment/Discharge Plan    Care Progression Reviewed With: Charge RN, MD, HUC, CMSW    Barriers to Discharge: CABG x's 3 with Ascending Aorta Replacement POD 1, Pulmonary Toilet, Cardiac Monitoring    Discharge Disposition: Family Wants Home    Expected Discharge Date: 4/29/19    Transportation: Family      Care Coordination Narrative: Spoke mostly with patient's wife as patient was very sleepy. She stated that prior to surgery he needed some help at home due to gout in his foot. She then stated they will definitely need help when he is ready to be discharged. Will need to follow up with them when an  is available due to questioning how much she understood.  Continue to follow.     Nutrition Assessment   Reason for Consult/Assessment: Reassessment     Diagnosis and Hx: Reviewed                               Diet Order: Regular, Oral nutrition supplement/snacks         Nutrition supplement/snacks: Chocolate Ensure Plus HP TID - patient reports finishing these whenever he gets them, prefers them very cold.       Diet tolerance: Tolerating with good appetite/intakes recorded, % meal intakes. Only 4 meal intakes documented since admission. Patient reports eating 2-3 meals/day and feels his appetite has improved. Notes today he skipped breakfast because he \"just didn't feel like eating,\" assisted patient with ordering lunch. +BM x1 2/13    Food Allergies: None known    Demographic/Anthropometrics Information  Gender: male  Patient Age: 78 year old  Height:   Ht Readings from Last 1 Encounters:   02/14/24 6' 3\" (1.905 m)      Weight:   Wt Readings from Last 1 Encounters:   02/14/24 103.9 kg      BMI:   BMI Readings from Last 1 Encounters:   02/14/24 28.63 kg/m²        % Weight Change: Weight up 5.8 kg since 2/11, (+) fluid balance.    NFPE  Nutrition Focused Physical Exam  Physical Exam Completed: Yes  Body Fat  Orbital Region: Mild/Moderate  Upper Arm Region (Triceps/biceps): Mild/Moderate  Muscle Mass  Temporal Region (Temporalis Muscle): Severe  Collarbone Region (Clavicle Bone, Pectoralis Major, Trapezius Muscle): Severe  Shoulder Region (Deltoid Muscle): Mild/Moderate  Hand Region: Mild/Moderate  Skin Assessment/Wounds  Wounds Present: Wounds present (stage 3 sacral wound)             TREATMENT PLAN: Monitoring & Interventions   Intervention: Meals and snacks, Nutrition supplement therapy          Meals & snacks: Continue regular diet. Encouraged TID meal intakes as tolerated with emphasis on protein dense foods. Discouraged meal skipping.                                                             Nutrition supplement therapy: Continue chocolate Ensure Plus HP TID.    Nutrition  education: Nutrition Tips for Home handout and Nutrition for Wound Healing handout attached to AVS.    Medications: Recommend continuing Remeron for appetite stimulation.    Goal: Increase oral intake to >/equal 75% of meals and supplements, Maintain or improve weight   Intervention goal status: Goal achieved  Time frame to achieve goal: Ongoing     Dietitian will monitor: Food, beverage, and nutrient intake, Anthropometric Measurements            Nutrition Diagnosis / PES  Nutrition Diagnosis: Malnutrition  Malnutrition in the context of chronic illness: Non-severe (moderate)  Related to: Decreased intake, Increased nutritional demands for healing   As evidenced by: Calculated needs   Malnutrition chronic; non-severe (moderate): Energy intake of <75% of estimated energy requirement for >/equal 1 month, Weight loss of 7.5%/3 months (moderate to severe muscle/fat loss)  Primary Nutrition Diagnosis status: Active nutrition diagnosis

## 2024-02-16 ENCOUNTER — APPOINTMENT (OUTPATIENT)
Dept: RADIATION ONCOLOGY | Facility: CLINIC | Age: 64
End: 2024-02-16
Attending: RADIOLOGY
Payer: COMMERCIAL

## 2024-02-16 ENCOUNTER — APPOINTMENT (OUTPATIENT)
Dept: LAB | Facility: CLINIC | Age: 64
End: 2024-02-16
Attending: INTERNAL MEDICINE
Payer: COMMERCIAL

## 2024-02-16 ENCOUNTER — INFUSION THERAPY VISIT (OUTPATIENT)
Dept: ONCOLOGY | Facility: CLINIC | Age: 64
End: 2024-02-16
Payer: COMMERCIAL

## 2024-02-16 VITALS
TEMPERATURE: 98 F | RESPIRATION RATE: 18 BRPM | BODY MASS INDEX: 21.11 KG/M2 | HEART RATE: 82 BPM | WEIGHT: 123 LBS | DIASTOLIC BLOOD PRESSURE: 68 MMHG | OXYGEN SATURATION: 98 % | SYSTOLIC BLOOD PRESSURE: 106 MMHG

## 2024-02-16 DIAGNOSIS — C31.0 SQUAMOUS CELL CARCINOMA OF MAXILLARY SINUS (H): Primary | ICD-10-CM

## 2024-02-16 DIAGNOSIS — E83.42 HYPOMAGNESEMIA: ICD-10-CM

## 2024-02-16 LAB
ANION GAP SERPL CALCULATED.3IONS-SCNC: 10 MMOL/L (ref 7–15)
BUN SERPL-MCNC: 22.3 MG/DL (ref 8–23)
CALCIUM SERPL-MCNC: 9.7 MG/DL (ref 8.8–10.2)
CHLORIDE SERPL-SCNC: 96 MMOL/L (ref 98–107)
CREAT SERPL-MCNC: 0.93 MG/DL (ref 0.67–1.17)
DEPRECATED HCO3 PLAS-SCNC: 26 MMOL/L (ref 22–29)
EGFRCR SERPLBLD CKD-EPI 2021: >90 ML/MIN/1.73M2
GLUCOSE SERPL-MCNC: 93 MG/DL (ref 70–99)
MAGNESIUM SERPL-MCNC: 1.6 MG/DL (ref 1.7–2.3)
POTASSIUM SERPL-SCNC: 4.5 MMOL/L (ref 3.4–5.3)
SODIUM SERPL-SCNC: 132 MMOL/L (ref 135–145)

## 2024-02-16 PROCEDURE — 258N000003 HC RX IP 258 OP 636: Performed by: RADIOLOGY

## 2024-02-16 PROCEDURE — 250N000011 HC RX IP 250 OP 636: Performed by: RADIOLOGY

## 2024-02-16 PROCEDURE — 77386 HC IMRT TREATMENT DELIVERY, COMPLEX: CPT | Performed by: RADIOLOGY

## 2024-02-16 PROCEDURE — 36591 DRAW BLOOD OFF VENOUS DEVICE: CPT

## 2024-02-16 PROCEDURE — 96360 HYDRATION IV INFUSION INIT: CPT

## 2024-02-16 PROCEDURE — 250N000011 HC RX IP 250 OP 636: Performed by: REGISTERED NURSE

## 2024-02-16 PROCEDURE — 83735 ASSAY OF MAGNESIUM: CPT

## 2024-02-16 PROCEDURE — 80048 BASIC METABOLIC PNL TOTAL CA: CPT

## 2024-02-16 PROCEDURE — 96361 HYDRATE IV INFUSION ADD-ON: CPT

## 2024-02-16 RX ORDER — HEPARIN SODIUM,PORCINE 10 UNIT/ML
5-20 VIAL (ML) INTRAVENOUS DAILY PRN
Status: CANCELLED | OUTPATIENT
Start: 2024-02-16

## 2024-02-16 RX ORDER — MEPERIDINE HYDROCHLORIDE 25 MG/ML
25 INJECTION INTRAMUSCULAR; INTRAVENOUS; SUBCUTANEOUS EVERY 30 MIN PRN
Status: DISCONTINUED | OUTPATIENT
Start: 2024-02-16 | End: 2024-02-16 | Stop reason: HOSPADM

## 2024-02-16 RX ORDER — EPINEPHRINE 1 MG/ML
0.3 INJECTION, SOLUTION, CONCENTRATE INTRAVENOUS EVERY 5 MIN PRN
Status: CANCELLED | OUTPATIENT
Start: 2024-02-16

## 2024-02-16 RX ORDER — HEPARIN SODIUM (PORCINE) LOCK FLUSH IV SOLN 100 UNIT/ML 100 UNIT/ML
5 SOLUTION INTRAVENOUS
Status: CANCELLED | OUTPATIENT
Start: 2024-02-16

## 2024-02-16 RX ORDER — ALBUTEROL SULFATE 90 UG/1
1-2 AEROSOL, METERED RESPIRATORY (INHALATION)
Status: DISCONTINUED | OUTPATIENT
Start: 2024-02-16 | End: 2024-02-16 | Stop reason: HOSPADM

## 2024-02-16 RX ORDER — EPINEPHRINE 1 MG/ML
0.3 INJECTION, SOLUTION, CONCENTRATE INTRAVENOUS EVERY 5 MIN PRN
Status: DISCONTINUED | OUTPATIENT
Start: 2024-02-16 | End: 2024-02-16 | Stop reason: HOSPADM

## 2024-02-16 RX ORDER — METHYLPREDNISOLONE SODIUM SUCCINATE 125 MG/2ML
125 INJECTION, POWDER, LYOPHILIZED, FOR SOLUTION INTRAMUSCULAR; INTRAVENOUS
Status: DISCONTINUED | OUTPATIENT
Start: 2024-02-16 | End: 2024-02-16 | Stop reason: HOSPADM

## 2024-02-16 RX ORDER — DIPHENHYDRAMINE HYDROCHLORIDE 50 MG/ML
50 INJECTION INTRAMUSCULAR; INTRAVENOUS
Status: DISCONTINUED | OUTPATIENT
Start: 2024-02-16 | End: 2024-02-16 | Stop reason: HOSPADM

## 2024-02-16 RX ORDER — ALBUTEROL SULFATE 0.83 MG/ML
2.5 SOLUTION RESPIRATORY (INHALATION)
Status: DISCONTINUED | OUTPATIENT
Start: 2024-02-16 | End: 2024-02-16 | Stop reason: HOSPADM

## 2024-02-16 RX ORDER — DIPHENHYDRAMINE HYDROCHLORIDE 50 MG/ML
50 INJECTION INTRAMUSCULAR; INTRAVENOUS
Status: CANCELLED
Start: 2024-02-16

## 2024-02-16 RX ORDER — HEPARIN SODIUM (PORCINE) LOCK FLUSH IV SOLN 100 UNIT/ML 100 UNIT/ML
5 SOLUTION INTRAVENOUS
Status: DISCONTINUED | OUTPATIENT
Start: 2024-02-16 | End: 2024-02-16 | Stop reason: HOSPADM

## 2024-02-16 RX ORDER — ALBUTEROL SULFATE 90 UG/1
1-2 AEROSOL, METERED RESPIRATORY (INHALATION)
Status: CANCELLED
Start: 2024-02-16

## 2024-02-16 RX ORDER — METHYLPREDNISOLONE SODIUM SUCCINATE 125 MG/2ML
125 INJECTION, POWDER, LYOPHILIZED, FOR SOLUTION INTRAMUSCULAR; INTRAVENOUS
Status: CANCELLED
Start: 2024-02-16

## 2024-02-16 RX ORDER — ALBUTEROL SULFATE 0.83 MG/ML
2.5 SOLUTION RESPIRATORY (INHALATION)
Status: CANCELLED | OUTPATIENT
Start: 2024-02-16

## 2024-02-16 RX ORDER — MAGNESIUM SULFATE HEPTAHYDRATE 40 MG/ML
2 INJECTION, SOLUTION INTRAVENOUS ONCE
Status: COMPLETED | OUTPATIENT
Start: 2024-02-16 | End: 2024-02-16

## 2024-02-16 RX ORDER — MEPERIDINE HYDROCHLORIDE 25 MG/ML
25 INJECTION INTRAMUSCULAR; INTRAVENOUS; SUBCUTANEOUS EVERY 30 MIN PRN
Status: CANCELLED | OUTPATIENT
Start: 2024-02-16

## 2024-02-16 RX ADMIN — SODIUM CHLORIDE 1000 ML: 9 INJECTION, SOLUTION INTRAVENOUS at 13:45

## 2024-02-16 RX ADMIN — Medication 5 ML: at 15:31

## 2024-02-16 RX ADMIN — MAGNESIUM SULFATE 2 G: 2 INJECTION INTRAVENOUS at 14:22

## 2024-02-16 NOTE — PROGRESS NOTES
Infusion Nursing Note:  Douglas Herrera presents today for IV fluids and Magnesium replacement.    Patient seen by provider today: No    Note:   Patient continues to have mouth and throat pain, which make eating difficult.  He denies signs and symptoms of infection including:fever, cough, shortness of breath, diarrhea, vomiting, rash, or pain with urination.     Intravenous Access:  Implanted Port.    Treatment Conditions:   Latest Reference Range & Units 02/16/24 13:32   Sodium 135 - 145 mmol/L 132 (L)   Potassium 3.4 - 5.3 mmol/L 4.5   Chloride 98 - 107 mmol/L 96 (L)   Carbon Dioxide (CO2) 22 - 29 mmol/L 26   Urea Nitrogen 8.0 - 23.0 mg/dL 22.3   Creatinine 0.67 - 1.17 mg/dL 0.93   GFR Estimate >60 mL/min/1.73m2 >90   Calcium 8.8 - 10.2 mg/dL 9.7   Anion Gap 7 - 15 mmol/L 10   Magnesium 1.7 - 2.3 mg/dL 1.6 (L)   Glucose 70 - 99 mg/dL 93   Magnesium replace per TORB    Post Infusion Assessment:  Patient tolerated infusion without incident.  Blood return noted pre and post infusion.  Site patent and intact, free from redness, edema or discomfort.  No evidence of extravasations.  Access discontinued per protocol.       Discharge Plan:   Patient declined prescription refills.  Discharge instructions reviewed with: Patient and Family.  Patient and/or family verbalized understanding of discharge instructions and all questions answered.  AVS to patient via [employer].  Patient will return 2/23/24 for next appointment.   Patient discharged in stable condition accompanied by: son.  Departure Mode: Ambulatory.      Adrianne Nolan RN

## 2024-02-19 ENCOUNTER — APPOINTMENT (OUTPATIENT)
Dept: RADIATION ONCOLOGY | Facility: CLINIC | Age: 64
End: 2024-02-19
Attending: RADIOLOGY
Payer: COMMERCIAL

## 2024-02-19 PROCEDURE — 77014 PR CT GUIDE FOR PLACEMENT RADIATION THERAPY FIELDS: CPT | Mod: 26 | Performed by: RADIOLOGY

## 2024-02-19 PROCEDURE — 77386 HC IMRT TREATMENT DELIVERY, COMPLEX: CPT | Performed by: RADIOLOGY

## 2024-02-20 ENCOUNTER — APPOINTMENT (OUTPATIENT)
Dept: RADIATION ONCOLOGY | Facility: CLINIC | Age: 64
End: 2024-02-20
Attending: RADIOLOGY
Payer: COMMERCIAL

## 2024-02-20 VITALS
BODY MASS INDEX: 21.11 KG/M2 | DIASTOLIC BLOOD PRESSURE: 63 MMHG | HEART RATE: 70 BPM | SYSTOLIC BLOOD PRESSURE: 105 MMHG | WEIGHT: 123 LBS

## 2024-02-20 DIAGNOSIS — C31.0 SQUAMOUS CELL CARCINOMA OF MAXILLARY SINUS (H): Primary | ICD-10-CM

## 2024-02-20 PROCEDURE — 77336 RADIATION PHYSICS CONSULT: CPT | Performed by: RADIOLOGY

## 2024-02-20 PROCEDURE — 77386 HC IMRT TREATMENT DELIVERY, COMPLEX: CPT | Performed by: RADIOLOGY

## 2024-02-20 PROCEDURE — 77427 RADIATION TX MANAGEMENT X5: CPT | Performed by: RADIOLOGY

## 2024-02-20 NOTE — LETTER
2024         RE: Douglas Herrera  [address] [place] [address]        Dear Colleague,    Thank you for referring your patient, Douglas Herrera, to the [facility] RADIATION ONCOLOGY. Please see a copy of my visit note below.    RADIATION ONCOLOGY WEEKLY ON TREATMENT VISIT   Encounter Date: 2024     Patient Name: Douglas Herrera  MRN: [id number]  : 1960     Disease and Stage: T4b N2b M0, stage IVB sinonasal carcinoma    Treatment Site: Left sinonasal primary and bilateral neck nodes        Current Dose/Planned Total Dose: 6000 cGy / 7000 cGy    Concurrent Chemotherapy: Yes  Drug and Frequency: Weekly cisplatin    ED visits/Hospitalizations: None    Missed Treatments:  2024 - 2024: Machine downtime  2024 - 2024: Machine downtime    Subjective: Mr. Herrera presents to clinic today for his weekly on-treatment visit.  He had a G-tube placed 2024 and is using 4 cans daily.  Oral intake is limited due to lack of taste and pain.  Gabapentin dose is 900 mg 3 times daily.  He is sleeping approximately 12 to 13 hours per 24-hour period.  He is receiving IV fluids on a regular basis with electrolyte replacement.  He is noticing more intraoral pain.    Nursing ROS:   Nutrition Alteration  Diet Type: Patient's Preference  Skin  Skin Reaction: 1 - Faint erythema or dry desquamation  Skin Note: Aquaphor     ENT and Mouth Exam  Mucositis - Current: 1 - Generalized erythema  ENT/Mouth Note: S&S 15-20     Gastrointestinal  Nausea: 0 - None  GI Note: PEG 4 cans a day     Pain Assessment  0-10 Pain Scale: 4  Pain Treatment: Gabapentin 900mg TID  Pain Note: Discussed scheduled Oxy     PEG tube: Placed 2024  Pacemaker: None    Objective:   /63   Pulse 70   Wt 55.8 kg (123 lb)   BMI 21.11 kg/m   pretreatment weight: 58.78 kg (129 lb 9 oz)  General: Alert, oriented, in no acute distress  HEENT: Moderate mucositis, no thrush, slight improvement in trismus  Skin: Moderate  erythema, no desquamation      OnTreatment-related toxicities (CTCAE v5.0):  Anorexia: Grade 2: Oral intake altered without significant weight loss or malnutrition; oral nutritional supplements indicated  Fatigue: Grade 2: Fatigue not relieved by rest; limiting instrumental ADL  Nausea: Grade 0: No toxicity  Pain: Grade 1: Mild pain  Dry mouth: Grade 1: Symptomatic without significant dietary alteration; unstimulated saliva flow >0.2 mL/min  Dysphagia: Grade 1: Symptomatic, able to eat regular diet  Mucositis: Grade 1: Asymptomatic or mild symptoms; intervention not indicated  Dermatitis: Grade 1: Faint erythema or dry desquamation     Assessment:    Mr. 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  Multidisciplinary conference consensus opinion is towards chemoradiation.  He has had significant hearing loss and thus Oncology has recommended weekly cisplatin. He is tolerating therapy as expected.    Plan:   Continue with treatment as planned  Nutrition per oral and PEG to maintain weight as per recommendations of Mayela An   Continue gabapentin dose to 900 mg 3 times daily.    Mosaiq chart and setup information reviewed  MVCT images reviewed    Medication Review  Med list reviewed with patient?: Yes  Med list printed and given: Offered and declined          Again, thank you for allowing me to participate in the care of your patient.      Sincerely,    Tomasa Akers MD

## 2024-02-21 ENCOUNTER — APPOINTMENT (OUTPATIENT)
Dept: RADIATION ONCOLOGY | Facility: CLINIC | Age: 64
End: 2024-02-21
Attending: RADIOLOGY
Payer: COMMERCIAL

## 2024-02-21 PROCEDURE — 77386 HC IMRT TREATMENT DELIVERY, COMPLEX: CPT | Performed by: RADIOLOGY

## 2024-02-21 PROCEDURE — 77014 PR CT GUIDE FOR PLACEMENT RADIATION THERAPY FIELDS: CPT | Mod: 26 | Performed by: RADIOLOGY

## 2024-02-22 ENCOUNTER — APPOINTMENT (OUTPATIENT)
Dept: RADIATION ONCOLOGY | Facility: CLINIC | Age: 64
End: 2024-02-22
Attending: RADIOLOGY
Payer: COMMERCIAL

## 2024-02-22 PROCEDURE — 77014 PR CT GUIDE FOR PLACEMENT RADIATION THERAPY FIELDS: CPT | Mod: 26 | Performed by: RADIOLOGY

## 2024-02-22 PROCEDURE — 77386 HC IMRT TREATMENT DELIVERY, COMPLEX: CPT | Performed by: RADIOLOGY

## 2024-02-22 NOTE — PROGRESS NOTES
[facility] CANCER [facility]    PATIENT NAME: Douglas Herrera  MRN # [id number]   DATE OF VISIT: February 23, 2024  YOB: 1960     [facility]   Radiation Oncology: Dr. Tomasa Akers  Cardiology: Dr. Qamar Hernandez  PCP: Dr. العراقي   Hepatologist: [facility]     CANCER TYPE: SCC sinonasal   STAGE: [id number] (IVB)  ECOG PS: 1    PD-L1:  NGS: internal panel. Fusion negative. ARID1A S90fs, EGFR exon 20 insertion, APC [id number]    SUMMARY    8/9/23 CT sinus.   8/24/23 MRI sinus. L maxillary sinus mass  9/12/23 ED for epistaxis.   9/13/23 CTA and embolization of L IMAX   10/4/23 Nasal bx. Path: Inverted papilloma with high grade dysplasia  11/10/23 Nasal endoscopy and bx in the OR. C/b severe bleeding. Path: Inverted sinonasal papilloma with severe dysplasia.  11/17/23 MRI. 7.4 x 4.6 x 6.6 cm L sinonasal mass, destruction of nasal turbinates, L maxillary sinus, hard palate, invasion of L  space, involvement of medial and lateral pterygoids and temporalis muscles. Effacement and invasion ipsilateral pterygopalatine fossa, extends and effaces the nasopharynx.   11/30/23 Carotid angiogram. Direct endonasal and transoral embolization L sinonasal tumor, transaterial embolization of the L sphenopalatine artery feeders to the L sinonasal artery tumor   12/1/23 Stealth assisted transnasal endoscopic approach to excise L sinonasal mass. Pre-operative embolization. Path: SCC involving L maxilla.    12/9/23 PET. Hypermetabolic mass centered along the L maxilla, extending superiorly through the floor of the L maxillary sinus, posterior/laterally to the  space, invasion of the pterygoid muscles, extending cranially along the pterygopalatine fossa and pterygoid plates to the skull base level. Erosion of involved bones including L maxilla, maxillary sinus wall and pterygoid plates. Extension medially to the L lateral nasopharyngeal wall and soft palate. 1 mm fat place between carotid  and mass. ~4.9 x 4.0 x 5.4 cm (SUV 24.3). Partially resected since 11/17/23 MRI. 8 mm L 2A node (SUV 5.9), 7 mm L level 2 node (SUV 5.5)  1/2/24 Gtube (IR)  1/4/24 Video swallow. No aspiration  1/8~2/27/24 Chemoradiation with weekly cisplatin.  1/11/24 Port (IR)    ASSESSMENT AND PLAN  SCC L maxillary sinus, [id number] (IVB), ARID1 mutation,  EGFR exon 20 insertion: Side effects of treatment increasing as expected. Completed cisplatin. Last day of RT is 2/27. Due to difficulty in taking in large volumes through G-tube, continued supplementation of IVF is recommended for at least the next 2 weeks. Will ask [facility] to look into home coverage for infusion. If not possible, plan for IVF 2-3 x/week in clinic. I will see him back in about 10 days (~3/4) for close monitoring and repeat labs. Discussed anticipated recovery in detail today: symptoms expected to get worse for 1-2 weeks after RT before slowly improving. Once pain begins to improve, will plan to taper off of oxycodone first. Reviewed expectation that thick secretions, taste and ease of swallowing improve slowly. Intake through FT should decrease as he is able to resume and increase oral intake. KENNEDI follow-up will likely continue for the next 2-4 weeks. He will see Dr. Post after the 3 month post-treatment PET/CT with labs    Mucositis, odynophagia: Now on oxycodone 5 mg every 6 hours. Prescription refilled today for 30 tabs (replacing 2/15 script from Dr. Post for 20 tablets-anticipate need for increased use over weekend and want to avoid running out). No constipation concerns so far. Continue Tylenol and MMW.      Gout flare: R foot. Acute onset of pain to first metatarsal, similar to previous attack. No focal erythema or concern for systemic infection. Uric acid 3.6. Will treat with prednisone 10 mg daily x 3 days.    Hypomagnesia:  Replace per protocol.    Hypercalcemia: Malignancy. Resolved quickly with starting chemoradiation    Hearing loss: Unchanged  as above     Hepatitis B: HBSAby negative, SAg +, cAby +, HBV PCR negative 1/23/24. HCV negative.     H/o PE/DVT: 5/8/2019 CTA report scanned into [facility], reviewed. Small PEs. Also had LLE DVT, acute, occlusive. Report in EPic and reviewed. Presented with CP and LLE swelling. Was on rivaroxaban, completed course.     H/o undefined hypothyroidism: TFTs 1/8/24 normal     50 minutes spent on the date of the encounter doing chart review, review of test results, interpretation of tests, patient visit, and documentation     Nishi Michele, DIANA    SUBJECTIVE  Douglas is seen for follow-up for ongoing toxicity monitoring. He will finish RT next Tuesday.  -Pain in mouth and throat has increased. He started taking oxycodone from an old supply at home based on his recent conversation with Dr. Post. Has a refill ready in the pharmacy but is concerned he may need more over the weekend if he needs to increase his dose  -Feels he is having a gout flare in his right great toe. Has a history of this but it's been many years since a flare. Having difficulty ambulating due to pain. Isn't wearing a shoe on that foot today  -No cough or shortness of breath  -Clearing secretions from throat frequently  -Using magic mouthwash, doesn't find this very helpful  -Continuing s/s rinses routinely  -Taking in 4 cartons formula via g-tube daily. Isn't able to get in a lot of extra water  -Has felt better after receiving IV fluids-more energy    PAST MEDICAL HISTORY  Sinonasal carcinoma as above  HTN  ASCVD. CABG x 3 2019  PE and LLE DVT after CABG . Treated with rivaroxaban  Dyslipidemia  Hepatitis B   SCC R temple s/p MOHS  Ascending aortic aneurysm repair 2019  Hearing loss L   Gout - feet  Depression  Cholecystectomy    CURRENT OUTPATIENT MEDICATIONS  Current Outpatient Medications   Medication    oxyCODONE (ROXICODONE) 5 MG tablet    predniSONE (DELTASONE) 10 MG tablet    acetaminophen (TYLENOL) 325 MG tablet    atorvastatin (LIPITOR) 80 MG  tablet    capsaicin (ZOSTRIX) 0.025 % external cream    chlorhexidine (PERIDEX) 0.12 % solution    entecavir (BARACLUDE) 0.5 MG tablet    fluticasone (FLONASE) 50 MCG/ACT nasal spray    gabapentin (NEURONTIN) 300 MG capsule    magic mouthwash (ENTER INGREDIENTS IN COMMENTS) suspension    nitroGLYcerin (NITROSTAT) 0.4 MG sublingual tablet    ondansetron (ZOFRAN) 8 MG tablet    prochlorperazine (COMPAZINE) 10 MG tablet    senna-docusate (SENNA S) 8.6-50 MG tablet    sodium chloride (OCEAN) 0.65 % nasal spray    traZODone (DESYREL) 50 MG tablet     No current facility-administered medications for this visit.     Facility-Administered Medications Ordered in Other Visits   Medication    heparin 100 unit/mL injection 5 mL    sodium chloride (PF) 0.9% PF flush 3-20 mL    sodium chloride 0.9% BOLUS 1,000 mL     ALLERGIES  No Known Allergies     PHYSICAL EXAM  /72 (BP Location: Right arm, Patient Position: Sitting, Cuff Size: Adult Large)   Pulse 91   Temp 98.1  F (36.7  C) (Oral)     GEN: NAD  HEENT: EOMI, no icterus, injection or pallor. Oropharynx shows mucositis with some focal mildly ulcerated are on the L posterior hard palate   RESP: cta bilaterally, no wheezing  CV: rrr, no m/g/r  EXT: no edema, mild edema to proximal R first metatarsal, no erythema   NEURO: alert  SKIN: no rashes    LABORATORY AND IMAGING STUDIES  Most Recent 3 CBC's:  Recent Labs   Lab Test 02/23/24  1341 02/12/24  0654 02/05/24  0657   WBC 3.6* 3.2* 3.9*   HGB 8.9* 10.2* 10.6*   MCV 82 82 82    148* 148*   ANEUTAUTO 2.3 2.1 2.4    Most Recent 3 BMP's:  Recent Labs   Lab Test 02/16/24  1332 02/12/24  0654 02/05/24  0657 01/30/24  0635   * 133* 134* 134*   POTASSIUM 4.5 4.7 4.3 4.1   CHLORIDE 96* 98 97* 97*   CO2 26 25 28 26   BUN 22.3 17.7 17.6 17.2   CR 0.93 0.81 0.79 0.73   ANIONGAP 10 10 9 11   FLORENCE 9.7 9.6 9.3 9.4   GLC 93 104* 102* 115*   PROTTOTAL  --  7.7 7.4 7.5   ALBUMIN  --  3.8 3.6 3.7    Most Recent 2 LFT's:  Recent  Labs   Lab Test 02/12/24  0654 02/05/24  0657   AST 28 30   ALT 21 30   ALKPHOS 92 94   BILITOTAL 0.4 0.4    Most Recent TSH and T4:  Recent Labs   Lab Test 01/08/24  0717   TSH 3.35   T4 1.00     Phos/Mag:  Lab Results   Component Value Date    MAG 1.6 (L) 02/16/2024    MAG 1.6 (L) 02/12/2024    MAG 1.7 02/05/2024      I reviewed the above labs today.

## 2024-02-23 ENCOUNTER — APPOINTMENT (OUTPATIENT)
Dept: RADIATION ONCOLOGY | Facility: CLINIC | Age: 64
End: 2024-02-23
Attending: RADIOLOGY
Payer: COMMERCIAL

## 2024-02-23 ENCOUNTER — THERAPY VISIT (OUTPATIENT)
Dept: SPEECH THERAPY | Facility: CLINIC | Age: 64
End: 2024-02-23
Payer: COMMERCIAL

## 2024-02-23 ENCOUNTER — ONCOLOGY VISIT (OUTPATIENT)
Dept: ONCOLOGY | Facility: CLINIC | Age: 64
End: 2024-02-23
Payer: COMMERCIAL

## 2024-02-23 ENCOUNTER — INFUSION THERAPY VISIT (OUTPATIENT)
Dept: ONCOLOGY | Facility: CLINIC | Age: 64
End: 2024-02-23
Payer: COMMERCIAL

## 2024-02-23 VITALS — DIASTOLIC BLOOD PRESSURE: 72 MMHG | SYSTOLIC BLOOD PRESSURE: 109 MMHG | HEART RATE: 91 BPM | TEMPERATURE: 98.1 F

## 2024-02-23 DIAGNOSIS — R13.10 ODYNOPHAGIA: ICD-10-CM

## 2024-02-23 DIAGNOSIS — R13.12 DYSPHAGIA, OROPHARYNGEAL PHASE: Primary | ICD-10-CM

## 2024-02-23 DIAGNOSIS — C31.9 MALIGNANT NEOPLASM OF PARANASAL SINUS (H): ICD-10-CM

## 2024-02-23 DIAGNOSIS — M10.071 ACUTE IDIOPATHIC GOUT OF RIGHT FOOT: ICD-10-CM

## 2024-02-23 DIAGNOSIS — C31.0 SQUAMOUS CELL CARCINOMA OF MAXILLARY SINUS (H): Primary | ICD-10-CM

## 2024-02-23 DIAGNOSIS — C30.0 MALIGNANT NEOPLASM OF NASAL CAVITIES (H): Primary | ICD-10-CM

## 2024-02-23 DIAGNOSIS — K12.30 MUCOSITIS: ICD-10-CM

## 2024-02-23 DIAGNOSIS — G89.3 CANCER RELATED PAIN: ICD-10-CM

## 2024-02-23 LAB
ALBUMIN SERPL BCG-MCNC: 3.4 G/DL (ref 3.5–5.2)
ALP SERPL-CCNC: 83 U/L (ref 40–150)
ALT SERPL W P-5'-P-CCNC: 34 U/L (ref 0–70)
ANION GAP SERPL CALCULATED.3IONS-SCNC: 10 MMOL/L (ref 7–15)
AST SERPL W P-5'-P-CCNC: 44 U/L (ref 0–45)
BASOPHILS # BLD AUTO: 0 10E3/UL (ref 0–0.2)
BASOPHILS NFR BLD AUTO: 0 %
BILIRUB SERPL-MCNC: 0.4 MG/DL
BUN SERPL-MCNC: 24.8 MG/DL (ref 8–23)
CALCIUM SERPL-MCNC: 9 MG/DL (ref 8.8–10.2)
CHLORIDE SERPL-SCNC: 97 MMOL/L (ref 98–107)
CREAT SERPL-MCNC: 0.77 MG/DL (ref 0.67–1.17)
DEPRECATED HCO3 PLAS-SCNC: 27 MMOL/L (ref 22–29)
EGFRCR SERPLBLD CKD-EPI 2021: >90 ML/MIN/1.73M2
EOSINOPHIL # BLD AUTO: 0 10E3/UL (ref 0–0.7)
EOSINOPHIL NFR BLD AUTO: 0 %
ERYTHROCYTE [DISTWIDTH] IN BLOOD BY AUTOMATED COUNT: 17.8 % (ref 10–15)
GLUCOSE SERPL-MCNC: 103 MG/DL (ref 70–99)
HCT VFR BLD AUTO: 26 % (ref 40–53)
HGB BLD-MCNC: 8.9 G/DL (ref 13.3–17.7)
IMM GRANULOCYTES # BLD: 0 10E3/UL
IMM GRANULOCYTES NFR BLD: 0 %
LYMPHOCYTES # BLD AUTO: 0.5 10E3/UL (ref 0.8–5.3)
LYMPHOCYTES NFR BLD AUTO: 14 %
MAGNESIUM SERPL-MCNC: 1.7 MG/DL (ref 1.7–2.3)
MCH RBC QN AUTO: 27.9 PG (ref 26.5–33)
MCHC RBC AUTO-ENTMCNC: 34.2 G/DL (ref 31.5–36.5)
MCV RBC AUTO: 82 FL (ref 78–100)
MONOCYTES # BLD AUTO: 0.7 10E3/UL (ref 0–1.3)
MONOCYTES NFR BLD AUTO: 20 %
NEUTROPHILS # BLD AUTO: 2.3 10E3/UL (ref 1.6–8.3)
NEUTROPHILS NFR BLD AUTO: 66 %
NRBC # BLD AUTO: 0 10E3/UL
NRBC BLD AUTO-RTO: 0 /100
PLATELET # BLD AUTO: 166 10E3/UL (ref 150–450)
POTASSIUM SERPL-SCNC: 4.5 MMOL/L (ref 3.4–5.3)
PROT SERPL-MCNC: 7.3 G/DL (ref 6.4–8.3)
RBC # BLD AUTO: 3.19 10E6/UL (ref 4.4–5.9)
SODIUM SERPL-SCNC: 134 MMOL/L (ref 135–145)
URATE SERPL-MCNC: 3.6 MG/DL (ref 3.4–7)
WBC # BLD AUTO: 3.6 10E3/UL (ref 4–11)

## 2024-02-23 PROCEDURE — 77386 HC IMRT TREATMENT DELIVERY, COMPLEX: CPT | Performed by: RADIOLOGY

## 2024-02-23 PROCEDURE — 36591 DRAW BLOOD OFF VENOUS DEVICE: CPT

## 2024-02-23 PROCEDURE — 258N000003 HC RX IP 258 OP 636: Performed by: RADIOLOGY

## 2024-02-23 PROCEDURE — 92526 ORAL FUNCTION THERAPY: CPT | Mod: GN | Performed by: SPEECH-LANGUAGE PATHOLOGIST

## 2024-02-23 PROCEDURE — 77014 PR CT GUIDE FOR PLACEMENT RADIATION THERAPY FIELDS: CPT | Mod: 26 | Performed by: RADIOLOGY

## 2024-02-23 PROCEDURE — 80053 COMPREHEN METABOLIC PANEL: CPT

## 2024-02-23 PROCEDURE — 96360 HYDRATION IV INFUSION INIT: CPT

## 2024-02-23 PROCEDURE — 99215 OFFICE O/P EST HI 40 MIN: CPT | Performed by: REGISTERED NURSE

## 2024-02-23 PROCEDURE — 84550 ASSAY OF BLOOD/URIC ACID: CPT

## 2024-02-23 PROCEDURE — G0463 HOSPITAL OUTPT CLINIC VISIT: HCPCS | Mod: 25 | Performed by: REGISTERED NURSE

## 2024-02-23 PROCEDURE — 250N000011 HC RX IP 250 OP 636: Performed by: RADIOLOGY

## 2024-02-23 PROCEDURE — 85025 COMPLETE CBC W/AUTO DIFF WBC: CPT

## 2024-02-23 PROCEDURE — 83735 ASSAY OF MAGNESIUM: CPT

## 2024-02-23 RX ORDER — ALBUTEROL SULFATE 90 UG/1
1-2 AEROSOL, METERED RESPIRATORY (INHALATION)
Status: CANCELLED
Start: 2024-02-23

## 2024-02-23 RX ORDER — METHYLPREDNISOLONE SODIUM SUCCINATE 125 MG/2ML
125 INJECTION, POWDER, LYOPHILIZED, FOR SOLUTION INTRAMUSCULAR; INTRAVENOUS
Status: CANCELLED
Start: 2024-02-23

## 2024-02-23 RX ORDER — HEPARIN SODIUM (PORCINE) LOCK FLUSH IV SOLN 100 UNIT/ML 100 UNIT/ML
5 SOLUTION INTRAVENOUS
Status: DISCONTINUED | OUTPATIENT
Start: 2024-02-23 | End: 2024-02-23 | Stop reason: HOSPADM

## 2024-02-23 RX ORDER — DIPHENHYDRAMINE HYDROCHLORIDE 50 MG/ML
50 INJECTION INTRAMUSCULAR; INTRAVENOUS
Status: CANCELLED
Start: 2024-02-23

## 2024-02-23 RX ORDER — HEPARIN SODIUM,PORCINE 10 UNIT/ML
5-20 VIAL (ML) INTRAVENOUS DAILY PRN
Status: CANCELLED | OUTPATIENT
Start: 2024-02-23

## 2024-02-23 RX ORDER — ALBUTEROL SULFATE 0.83 MG/ML
2.5 SOLUTION RESPIRATORY (INHALATION)
Status: CANCELLED | OUTPATIENT
Start: 2024-02-23

## 2024-02-23 RX ORDER — EPINEPHRINE 1 MG/ML
0.3 INJECTION, SOLUTION INTRAMUSCULAR; SUBCUTANEOUS EVERY 5 MIN PRN
Status: CANCELLED | OUTPATIENT
Start: 2024-02-23

## 2024-02-23 RX ORDER — PREDNISONE 10 MG/1
10 TABLET ORAL DAILY
Qty: 3 TABLET | Refills: 0 | Status: SHIPPED | OUTPATIENT
Start: 2024-02-23 | End: 2024-05-30

## 2024-02-23 RX ORDER — HEPARIN SODIUM (PORCINE) LOCK FLUSH IV SOLN 100 UNIT/ML 100 UNIT/ML
5 SOLUTION INTRAVENOUS
Status: CANCELLED | OUTPATIENT
Start: 2024-02-23

## 2024-02-23 RX ORDER — OXYCODONE HYDROCHLORIDE 5 MG/1
5 TABLET ORAL EVERY 6 HOURS PRN
Qty: 30 TABLET | Refills: 0 | Status: SHIPPED | OUTPATIENT
Start: 2024-02-23 | End: 2024-05-30

## 2024-02-23 RX ORDER — MEPERIDINE HYDROCHLORIDE 25 MG/ML
25 INJECTION INTRAMUSCULAR; INTRAVENOUS; SUBCUTANEOUS EVERY 30 MIN PRN
Status: CANCELLED | OUTPATIENT
Start: 2024-02-23

## 2024-02-23 RX ADMIN — Medication 5 ML: at 14:53

## 2024-02-23 RX ADMIN — SODIUM CHLORIDE 1000 ML: 9 INJECTION, SOLUTION INTRAVENOUS at 13:42

## 2024-02-23 NOTE — PROGRESS NOTES
Infusion Nursing Note:  Douglas Herrera presents today for IV Fluids.    Patient seen by provider today: Yes: Nishi Michele NP   present during visit today: Yes, Language: Hmong via son as     Note: Patient presents to infusion today following his visit with Nishi Michele NP. No new questions or concerns at this time.     Intravenous Access:  Implanted Port.    Treatment Conditions:  Lab Results   Component Value Date    HGB 8.9 (L) 02/23/2024    WBC 3.6 (L) 02/23/2024    ANEUTAUTO 2.3 02/23/2024     02/23/2024        Lab Results   Component Value Date     (L) 02/23/2024    POTASSIUM 4.5 02/23/2024    MAG 1.7 02/23/2024    CR 0.77 02/23/2024    FLORENCE 9.0 02/23/2024    BILITOTAL 0.4 02/23/2024    ALBUMIN 3.4 (L) 02/23/2024    ALT 34 02/23/2024    AST 44 02/23/2024     Results reviewed, labs MET treatment parameters, ok to proceed with treatment.    Post Infusion Assessment:  Patient tolerated infusion without incident.  Blood return noted pre and post infusion.  Site patent and intact, free from redness, edema or discomfort.  No evidence of extravasations.  Access discontinued per protocol.     Discharge Plan:   Prescription refills given for Oxycodone and Prednisone.  Discharge instructions reviewed with: Patient.  Patient and/or family verbalized understanding of discharge instructions and all questions answered.  AVS to patient via [employer]ART. Next appointment not yet scheduled at time of discharge. Patient and son aware to watch MYCHART.   Patient discharged in stable condition accompanied by: son.  Departure Mode: Wheelchair.      Laly Jean RN

## 2024-02-23 NOTE — NURSING NOTE
"Oncology Rooming Note    February 23, 2024 1:09 PM   Douglas Herrera is a 63 year old male who presents for:    Chief Complaint   Patient presents with    Oncology Clinic Visit     Squamous Cell Cancer of the Maxillary Sinus     Initial Vitals: /72 (BP Location: Right arm, Patient Position: Sitting, Cuff Size: Adult Large)   Pulse 91   Temp 98.1  F (36.7  C) (Oral)  Estimated body mass index is 21.11 kg/m  as calculated from the following:    Height as of 1/11/24: 1.626 m (5' 4\").    Weight as of 2/20/24: 55.8 kg (123 lb). There is no height or weight on file to calculate BSA.  Data Unavailable Comment: Data Unavailable   No LMP for male patient.  Allergies reviewed: Yes  Medications reviewed: Yes    Medications: MEDICATION REFILLS NEEDED TODAY. Provider was notified.  Pharmacy name entered into [facility]: [facility] - [place], [place] - [facility] [address]    Frailty Screening:   Is the patient here for a new oncology consult visit in cancer care? 2. No      Clinical concerns: Pain from Radiation and Gout.       Shani Carrion LPN  2/23/2024              "

## 2024-02-23 NOTE — PATIENT INSTRUCTIONS
Contact Numbers  [facility]: [phone] (for symptom and scheduling needs)    Please call the [facility] Triage line if you experience a temperature greater than or equal to 100.4, shaking chills, have uncontrolled nausea, vomiting and/or diarrhea, dizziness, shortness of breath, chest pain, bleeding, unexplained bruising, or if you have any other new/concerning symptoms, questions or concerns.     If you are having any concerning symptoms or wish to speak to a provider before your next infusion visit, please call your care coordinator or triage to notify them so we can adequately serve you.     If you need a refill on a narcotic prescription or other medication, please call triage before your infusion appointment.           February 2024 Sunday Monday Tuesday Wednesday Thursday Friday Saturday                       1     2    TREATMENT  11:45 AM   (45 min.)   [facility] RAD ONC ALEX   [facility] Radiation Oncology 3    ONC INFUSION 2 HR (120 MIN)  11:00 AM   (120 min.)    ONC INFUSION NURSE   [facility]    TREATMENT  12:45 PM   (45 min.)   [facility] RAD ONC ALEX   [facility] Radiation Oncology   4    TREATMENT  10:30 AM   (45 min.)   [facility] RAD ONC ALEX   [facility] Radiation Oncology 5    LAB PERIPHERAL   6:45 AM   (15 min.)    [facility] DRAW   [facility]    RETURN ACTIVE TREATMENT   7:00 AM   (45 min.)   Nishi Michele CNP   [facility]    ONC INFUSION 2.5 HR (150 MIN)   8:30 AM   (150 min.)    ONC INFUSION NURSE   [facility]    SWALLOW TREATMENT   9:00 AM   (45 min.)   Kayleen Kilgore, SLP   [facility]    TREATMENT  11:15 AM   (45 min.)   [facility] RAD ONC ALEX   [facility] Radiation Oncology    OTV  11:30 AM   (30 min.)   Tomasa Akers MD   [facility] Radiation Oncology  6    TREATMENT  11:15 AM   (45 min.)   [facility] RAD ONC ALEX   [facility] Radiation Oncology 7    TREATMENT  11:15 AM   (45 min.)   [facility] RAD ONC ALEX   [facility] Radiation Oncology 8    TREATMENT  11:15 AM   (45 min.)   [facility] RAD ONC ALEX   [facility] Radiation Oncology 9    TREATMENT  11:15 AM   (45 min.)   [facility] RAD ONC ALEX   [facility] Radiation Oncology 10    ONC INFUSION 2 HR (120 MIN)  10:00 AM   (120 min.)   [facility] ONC INFUSION NURSE   [facility]   11     12    LAB PERIPHERAL   6:45 AM   (15 min.)   [facility] D[facility]   [facility]    RETURN ACTIVE TREATMENT   7:00 AM   (45 min.)   Nishi Michele CNP   [facility]    ONC INFUSION 2.5 HR (150 MIN)   8:30 AM   (150 min.)   [facility] ONC INFUSION NURSE   [facility]    SWALLOW TREATMENT   9:00 AM   (45 min.)   Kayleen Kilgore SLP   [facility]    TREATMENT  11:15 AM   (45 min.)   P RAD ONC ALEX   [facility] Radiation Oncology    OTV  12:00 PM   (30 min.)   Tomasa Akers MD   [facility] Radiation Oncology 13    [facility] NUTRITION VISIT  10:30 AM   (30 min.)   Mayela Blackwell RD   [facility] Radiation Oncology    TREATMENT  11:15 AM   (45 min.)   [facility] RAD ONC ALEX   [facility] Radiation Oncology 14    TREATMENT  11:15 AM   (45 min.)   P RAD ONC ALEX   [facility] Radiation Oncology 15    RETURN CCSL  11:00 AM   (45 min.)   Kayy Post MD   [facility]    TREATMENT  11:15 AM   (30 min.)   P RAD ONC ALEX   [facility] Radiation Oncology 16    TREATMENT  11:15 AM   (45 min.)   P RAD ONC ALEX   [facility] Radiation Oncology    LAB CENTRAL   1:00 PM   (15 min.)   [facility] DRAW   [facility]    ONC INFUSION 2  HR (120 MIN)   2:30 PM   (120 min.)   [facility] ONC INFUSION NURSE   [facility] 17       18     19    TREATMENT  11:15 AM   (45 min.)   [facility] RAD ONC ALEX   [facility] Radiation Oncology 20    TREATMENT  11:15 AM   (45 min.)   [facility] RAD ONC ALEX   [facility] Radiation Oncology    OTV  12:00 PM   (30 min.)   Tomasa Akers MD   [facility] Radiation Oncology 21    TREATMENT  11:15 AM   (45 min.)   [facility] RAD ONC ALEX   [facility] Radiation Oncology 22    TREATMENT  11:15 AM   (45 min.)   [facility] RAD ONC ALEX   [facility] Radiation Oncology 23    TREATMENT  11:15 AM   (45 min.)   [facility] RAD ONC ALEX   [facility] Radiation Oncology    RETURN ACTIVE TREATMENT  12:45 PM   (45 min.)   Nishi Michele CNP   [facility]    ONC INFUSION 2 HR (120 MIN)   2:00 PM   (120 min.)   [facility] ONC INFUSION NURSE   [facility]    SWALLOW TREATMENT   2:30 PM   (45 min.)   Nhi Ann   [facility] 24       25     26    TREATMENT  11:15 AM   (45 min.)   [facility] RAD ONC ALEX   [facility] Radiation Oncology    OTV  11:30 AM   (30 min.)   Tomasa Akers MD   [facility] Radiation Oncology 27    TREATMENT  11:15 AM   (45 min.)   [facility] RAD ONC ALEX   [facility] Radiation Oncology    [facility] NUTRITION VISIT  11:30 AM   (30 min.)   Mayela Blackwell RD   [facility] Radiation Oncology 28 29 March 2024 Sunday Monday Tuesday Wednesday Thursday Friday Saturday                            1     2       3     4     5     6     7     8     9       10     11     12     13     14     15     16       17     18     19     20     21     22     23       24     25     26     27     28     29     30       31                                                   Lab Results:  Recent Results  (from the past 12 hour(s))   Magnesium    Collection Time: 02/23/24  1:41 PM   Result Value Ref Range    Magnesium 1.7 1.7 - 2.3 mg/dL   Comprehensive metabolic panel    Collection Time: 02/23/24  1:41 PM   Result Value Ref Range    Sodium 134 (L) 135 - 145 mmol/L    Potassium 4.5 3.4 - 5.3 mmol/L    Carbon Dioxide (CO2) 27 22 - 29 mmol/L    Anion Gap 10 7 - 15 mmol/L    Urea Nitrogen 24.8 (H) 8.0 - 23.0 mg/dL    Creatinine 0.77 0.67 - 1.17 mg/dL    GFR Estimate >90 >60 mL/min/1.73m2    Calcium 9.0 8.8 - 10.2 mg/dL    Chloride 97 (L) 98 - 107 mmol/L    Glucose 103 (H) 70 - 99 mg/dL    Alkaline Phosphatase 83 40 - 150 U/L    AST 44 0 - 45 U/L    ALT 34 0 - 70 U/L    Protein Total 7.3 6.4 - 8.3 g/dL    Albumin 3.4 (L) 3.5 - 5.2 g/dL    Bilirubin Total 0.4 <=1.2 mg/dL   CBC with platelets and differential    Collection Time: 02/23/24  1:41 PM   Result Value Ref Range    WBC Count 3.6 (L) 4.0 - 11.0 10e3/uL    RBC Count 3.19 (L) 4.40 - 5.90 10e6/uL    Hemoglobin 8.9 (L) 13.3 - 17.7 g/dL    Hematocrit 26.0 (L) 40.0 - 53.0 %    MCV 82 78 - 100 fL    MCH 27.9 26.5 - 33.0 pg    MCHC 34.2 31.5 - 36.5 g/dL    RDW 17.8 (H) 10.0 - 15.0 %    Platelet Count 166 150 - 450 10e3/uL    % Neutrophils 66 %    % Lymphocytes 14 %    % Monocytes 20 %    % Eosinophils 0 %    % Basophils 0 %    % Immature Granulocytes 0 %    NRBCs per 100 WBC 0 <1 /100    Absolute Neutrophils 2.3 1.6 - 8.3 10e3/uL    Absolute Lymphocytes 0.5 (L) 0.8 - 5.3 10e3/uL    Absolute Monocytes 0.7 0.0 - 1.3 10e3/uL    Absolute Eosinophils 0.0 0.0 - 0.7 10e3/uL    Absolute Basophils 0.0 0.0 - 0.2 10e3/uL    Absolute Immature Granulocytes 0.0 <=0.4 10e3/uL    Absolute NRBCs 0.0 10e3/uL   Uric acid    Collection Time: 02/23/24  1:41 PM   Result Value Ref Range    Uric Acid 3.6 3.4 - 7.0 mg/dL

## 2024-02-26 ENCOUNTER — OFFICE VISIT (OUTPATIENT)
Dept: RADIATION ONCOLOGY | Facility: CLINIC | Age: 64
End: 2024-02-26
Attending: RADIOLOGY
Payer: COMMERCIAL

## 2024-02-26 VITALS
HEART RATE: 74 BPM | SYSTOLIC BLOOD PRESSURE: 114 MMHG | DIASTOLIC BLOOD PRESSURE: 72 MMHG | WEIGHT: 123 LBS | BODY MASS INDEX: 21.11 KG/M2

## 2024-02-26 DIAGNOSIS — C31.0 SQUAMOUS CELL CARCINOMA OF MAXILLARY SINUS (H): Primary | ICD-10-CM

## 2024-02-26 PROCEDURE — 77386 HC IMRT TREATMENT DELIVERY, COMPLEX: CPT | Performed by: RADIOLOGY

## 2024-02-26 NOTE — PROGRESS NOTES
RADIATION ONCOLOGY WEEKLY ON TREATMENT VISIT   Encounter Date: 2024     Patient Name: Douglas Herrera  MRN: [id number]  : 1960     Disease and Stage: T4b N2b M0, stage IVB sinonasal carcinoma    Treatment Site: Left sinonasal primary and bilateral neck nodes        Current Dose/Planned Total Dose: 6000 cGy / 7000 cGy    Concurrent Chemotherapy: Yes  Drug and Frequency: Weekly cisplatin    ED visits/Hospitalizations: None    Missed Treatments:  2024 - 2024: Machine downtime  2024 - 2024: Machine downtime    Subjective: Mr. Herrera presents to clinic today for his weekly on-treatment visit.  He had a G-tube placed 2024 and is using 4 cans daily.  Oral intake is limited due to lack of taste and pain.  Gabapentin dose is 900 mg 3 times daily.  He is sleeping approximately 12 to 13 hours per 24-hour period.  He is receiving IV fluids on a regular basis with electrolyte replacement.  He is noticing more intraoral pain.    Nursing ROS:   Nutrition Alteration  Diet Type: Patient's Preference  Skin  Skin Reaction: 1 - Faint erythema or dry desquamation  Skin Note: Aquaphor     ENT and Mouth Exam  Mucositis - Current: 1 - Generalized erythema  ENT/Mouth Note: S&S 15-20     Gastrointestinal  Nausea: 0 - None  GI Note: PEG 4 cans a day     Pain Assessment  0-10 Pain Scale: 4  Pain Treatment: Gabapentin 900mg TID  Pain Note: Discussed scheduled Oxy     PEG tube: Placed 2024  Pacemaker: None    Objective:   /63   Pulse 70   Wt 55.8 kg (123 lb)   BMI 21.11 kg/m   pretreatment weight: 58.78 kg (129 lb 9 oz)  General: Alert, oriented, in no acute distress  HEENT: Moderate mucositis, no thrush, slight improvement in trismus  Skin: Moderate erythema, no desquamation      OnTreatment-related toxicities (CTCAE v5.0):  Anorexia: Grade 2: Oral intake altered without significant weight loss or malnutrition; oral nutritional supplements indicated  Fatigue: Grade 2: Fatigue not relieved by rest;  limiting instrumental ADL  Nausea: Grade 0: No toxicity  Pain: Grade 1: Mild pain  Dry mouth: Grade 1: Symptomatic without significant dietary alteration; unstimulated saliva flow >0.2 mL/min  Dysphagia: Grade 1: Symptomatic, able to eat regular diet  Mucositis: Grade 1: Asymptomatic or mild symptoms; intervention not indicated  Dermatitis: Grade 1: Faint erythema or dry desquamation     Assessment:    Mr. 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  Multidisciplinary conference consensus opinion is towards chemoradiation.  He has had significant hearing loss and thus Oncology has recommended weekly cisplatin. He is tolerating therapy as expected.    Plan:   Continue with treatment as planned  Nutrition per oral and PEG to maintain weight as per recommendations of Mayela An   Continue gabapentin dose to 900 mg 3 times daily.    Mosaiq chart and setup information reviewed  MVCT images reviewed    Medication Review  Med list reviewed with patient?: Yes  Med list printed and given: Offered and declined    Tomasa Akers MD  Department of Radiation Oncology  [facility]

## 2024-02-26 NOTE — LETTER
2024         RE: Douglas Herrera  [address] [place] [address]        Dear Colleague,    Thank you for referring your patient, Douglas Herrera, to the [facility] RADIATION ONCOLOGY. Please see a copy of my visit note below.    RADIATION ONCOLOGY WEEKLY ON TREATMENT VISIT   Encounter Date: 2024     Patient Name: Douglas Herrera  MRN: [id number]  : 1960     Disease and Stage: T4b N2b M0, stage IVB sinonasal carcinoma    Treatment Site: Left sinonasal primary and bilateral neck nodes        Current Dose/Planned Total Dose: 6800 cGy / 7000 cGy    Concurrent Chemotherapy: Yes  Drug and Frequency: Weekly cisplatin    ED visits/Hospitalizations: None    Missed Treatments:  2024 - 2024: Machine downtime  2024 - 2024: Machine downtime    Subjective: Mr. Herrera presents to clinic today for his weekly on-treatment visit.  He had a G-tube placed 2024 and is using 4 cans daily.  Oral intake is limited due to lack of taste and pain.  Gabapentin dose is 900 mg 3 times daily.  He is sleeping approximately 12 to 13 hours per 24-hour period.  He is receiving IV fluids on a regular basis with electrolyte replacement.  He is noticing more intraoral pain but says he is able to handle it.    Nursing ROS:   Nutrition Alteration  Diet Type: Gastrostomy Tube Feedings  Skin  Skin Reaction: 1 - Faint erythema or dry desquamation  Skin Note: Aquaphor     ENT and Mouth Exam  Mucositis - Current: 1 - Generalized erythema  ENT/Mouth Note: S&S 15-20     Gastrointestinal  Nausea: 0 - None  GI Note: PEG 4 cans a day    Pain Assessment  0-10 Pain Scale: 4  Pain Treatment: Gabapentin 900mg TID  Pain Note: Discussed scheduled Oxy    PEG tube: Placed 2024  Pacemaker: None    Objective:   /72   Pulse 74   Wt 55.8 kg (123 lb)   BMI 21.11 kg/m   pretreatment weight: 58.78 kg (129 lb 9 oz)  General: Alert, oriented, in no acute distress  HEENT: Moderate mucositis, no thrush, slight  improvement in trismus  Skin: Moderate erythema, no desquamation      OnTreatment-related toxicities (CTCAE v5.0):  Anorexia: Grade 2: Oral intake altered without significant weight loss or malnutrition; oral nutritional supplements indicated  Fatigue: Grade 2: Fatigue not relieved by rest; limiting instrumental ADL  Nausea: Grade 0: No toxicity  Pain: Grade 1: Mild pain  Dry mouth: Grade 1: Symptomatic without significant dietary alteration; unstimulated saliva flow >0.2 mL/min  Dysphagia: Grade 1: Symptomatic, able to eat regular diet  Mucositis: Grade 1: Asymptomatic or mild symptoms; intervention not indicated  Dermatitis: Grade 1: Faint erythema or dry desquamation     Assessment:    Mr. 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  Multidisciplinary conference consensus opinion is towards chemoradiation.  He has had significant hearing loss and thus Oncology has recommended weekly cisplatin. He is tolerating therapy as expected.    Plan:   Continue with treatment as planned  Nutrition per oral and PEG to maintain weight as per recommendations of Mayela An   Continue gabapentin dose to 900 mg 3 times daily.  Discussed weaning dose after he completes radiation.    Mosaiq chart and setup information reviewed  MVCT images reviewed    Medication Review  Med list reviewed with patient?: Yes  Med list printed and given: Offered and declined      Again, thank you for allowing me to participate in the care of your patient.      Sincerely,    Tomasa Akers MD

## 2024-02-26 NOTE — PROGRESS NOTES
RADIATION ONCOLOGY WEEKLY ON TREATMENT VISIT   Encounter Date: 2024     Patient Name: Douglas Herrera  MRN: [id number]  : 1960     Disease and Stage: T4b N2b M0, stage IVB sinonasal carcinoma    Treatment Site: Left sinonasal primary and bilateral neck nodes        Current Dose/Planned Total Dose: 6800 cGy / 7000 cGy    Concurrent Chemotherapy: Yes  Drug and Frequency: Weekly cisplatin    ED visits/Hospitalizations: None    Missed Treatments:  2024 - 2024: Machine downtime  2024 - 2024: Machine downtime    Subjective: Mr. Herrera presents to clinic today for his weekly on-treatment visit.  He had a G-tube placed 2024 and is using 4 cans daily.  Oral intake is limited due to lack of taste and pain.  Gabapentin dose is 900 mg 3 times daily.  He is sleeping approximately 12 to 13 hours per 24-hour period.  He is receiving IV fluids on a regular basis with electrolyte replacement.  He is noticing more intraoral pain but says he is able to handle it.    Nursing ROS:   Nutrition Alteration  Diet Type: Gastrostomy Tube Feedings  Skin  Skin Reaction: 1 - Faint erythema or dry desquamation  Skin Note: Aquaphor     ENT and Mouth Exam  Mucositis - Current: 1 - Generalized erythema  ENT/Mouth Note: S&S 15-20     Gastrointestinal  Nausea: 0 - None  GI Note: PEG 4 cans a day    Pain Assessment  0-10 Pain Scale: 4  Pain Treatment: Gabapentin 900mg TID  Pain Note: Discussed scheduled Oxy    PEG tube: Placed 2024  Pacemaker: None    Objective:   /72   Pulse 74   Wt 55.8 kg (123 lb)   BMI 21.11 kg/m   pretreatment weight: 58.78 kg (129 lb 9 oz)  General: Alert, oriented, in no acute distress  HEENT: Moderate mucositis, no thrush, slight improvement in trismus  Skin: Moderate erythema, no desquamation      OnTreatment-related toxicities (CTCAE v5.0):  Anorexia: Grade 2: Oral intake altered without significant weight loss or malnutrition; oral nutritional supplements indicated  Fatigue: Grade  2: Fatigue not relieved by rest; limiting instrumental ADL  Nausea: Grade 0: No toxicity  Pain: Grade 1: Mild pain  Dry mouth: Grade 1: Symptomatic without significant dietary alteration; unstimulated saliva flow >0.2 mL/min  Dysphagia: Grade 1: Symptomatic, able to eat regular diet  Mucositis: Grade 1: Asymptomatic or mild symptoms; intervention not indicated  Dermatitis: Grade 1: Faint erythema or dry desquamation     Assessment:    Mr. 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  Multidisciplinary conference consensus opinion is towards chemoradiation.  He has had significant hearing loss and thus Oncology has recommended weekly cisplatin. He is tolerating therapy as expected.    Plan:   Continue with treatment as planned  Nutrition per oral and PEG to maintain weight as per recommendations of Mayela An   Continue gabapentin dose to 900 mg 3 times daily.  Discussed weaning dose after he completes radiation.    Mosaiq chart and setup information reviewed  MVCT images reviewed    Medication Review  Med list reviewed with patient?: Yes  Med list printed and given: Offered and declined    Tomasa Akers MD  Department of Radiation Oncology  [facility]

## 2024-02-27 ENCOUNTER — APPOINTMENT (OUTPATIENT)
Dept: RADIATION ONCOLOGY | Facility: CLINIC | Age: 64
End: 2024-02-27
Attending: RADIOLOGY
Payer: COMMERCIAL

## 2024-02-27 ENCOUNTER — ALLIED HEALTH/NURSE VISIT (OUTPATIENT)
Dept: RADIATION ONCOLOGY | Facility: CLINIC | Age: 64
End: 2024-02-27
Attending: DIETITIAN, REGISTERED
Payer: COMMERCIAL

## 2024-02-27 DIAGNOSIS — C31.9 MALIGNANT NEOPLASM OF PARANASAL SINUS (H): Primary | ICD-10-CM

## 2024-02-27 PROCEDURE — 77427 RADIATION TX MANAGEMENT X5: CPT | Performed by: RADIOLOGY

## 2024-02-27 PROCEDURE — 77014 PR CT GUIDE FOR PLACEMENT RADIATION THERAPY FIELDS: CPT | Mod: 26 | Performed by: RADIOLOGY

## 2024-02-27 PROCEDURE — 77336 RADIATION PHYSICS CONSULT: CPT | Performed by: RADIOLOGY

## 2024-02-27 PROCEDURE — 77386 HC IMRT TREATMENT DELIVERY, COMPLEX: CPT | Performed by: RADIOLOGY

## 2024-02-27 NOTE — PROGRESS NOTES
CLINICAL NUTRITION SERVICES - BRIEF NOTE   EVALUATION OF PREVIOUS PLAN OF CARE:   Time spent with patient: Brief  Pt accompanied by: his son, Tulio in radiation clinic at time of OTV  Referring Physician: Sincere 1/2/24  Z51.11 (ICD-10-CM) - Encounter for antineoplastic chemotherapy   E43 (ICD-10-CM) - Severe protein-calorie malnutrition (H24)   C31.0 (ICD-10-CM) - Carcinoma of maxillary sinus (H)     Patient has completed treatment today, 2/27     Monitoring from previous assessment:   -Food/Fluid intake - Douglas has not been eating much PO due to odynophagia. His water intake has declined due to pain.  He is going to received IVF 3 days a week, in-home.   -EN intake-  4 cartons of EN per day via gravity bag feedings. He continues to take 2 cartons of tube feeding for breakfast and 2 for dinner. His feedings are taking ~90 min.   Formula: Osmolite 1.5 fernando  Volume: 4 cartons/day (948 mL, 720 ml free water)  Provisions:  1420kcal (23kcal/kg), 60 g protein (1.0g/kg), 176g CHO, 0g fiber     -Weight trends - down 2 lb x past one week      Wt Readings from Last 10 Encounters:   02/12/24 56.2 kg (124 lb)   02/12/24 56.5 kg (124 lb 9.6 oz)   02/05/24 57.2 kg (126 lb)   02/05/24 57.2 kg (126 lb 3.2 oz)   01/30/24 59.2 kg (130 lb 8 oz)   01/30/24 57.2 kg (126 lb)   01/26/24 57.7 kg (127 lb 1.6 oz)   01/23/24 57.4 kg (126 lb 9.6 oz)   01/16/24 58.5 kg (129 lb)   01/15/24 58.7 kg (129 lb 4.8 oz)      Previous Goals:   PO and EN to meet 100% estimated nutrition needs; increase EN to 4 1/2- 5 cartons  Aim for at least 2000 fernando, 80-100g protein and 8-10 cups water/electrolyte fluids/day  Evaluation: Not met     Previous Nutrition Diagnosis:   Inadequate oral intake related to odynophagia as evidenced by pt reliant on EN to meet >50-75% est nutrition needs.    Evaluation: No change     NEW FINDINGS:   No new findings     CURRENT NUTRITION DIAGNOSIS   Inadequate oral intake related to odynophagia as evidenced by pt reliant on EN  to meet >% est nutrition needs.      INTERVENTIONS   EN Composition, EN Schedule, Feeding Tube Flush- reviewed current regimen.  Encouraged to increase formula towards 4 1/2 to 5 cartons/day if weight continues to drop.  Reviewed schedule and volume options.  Encouraged to increase water/electrolyte intake to a total of 4 cups per day as able.  Encouraged sipping PO and flushing feeding tube throughout the day.      Goals   1.PO and EN to meet 100% estimated nutrition needs  2.Aim for at least 2000 fernando, 80-100g protein and 4 cups water/electrolyte fluids/day     Follow up/Monitorin week follow up,  at 11am - phone call to son per patient request  Food intake, Enteral Nutrition intake, and Weight     Mayela An RD, , LD  [facility]  [phone]

## 2024-03-04 ENCOUNTER — APPOINTMENT (OUTPATIENT)
Dept: LAB | Facility: CLINIC | Age: 64
End: 2024-03-04
Attending: NURSE PRACTITIONER
Payer: COMMERCIAL

## 2024-03-04 ENCOUNTER — INFUSION THERAPY VISIT (OUTPATIENT)
Dept: ONCOLOGY | Facility: CLINIC | Age: 64
End: 2024-03-04
Attending: NURSE PRACTITIONER
Payer: COMMERCIAL

## 2024-03-04 ENCOUNTER — THERAPY VISIT (OUTPATIENT)
Dept: SPEECH THERAPY | Facility: CLINIC | Age: 64
End: 2024-03-04
Payer: COMMERCIAL

## 2024-03-04 VITALS
TEMPERATURE: 98.8 F | BODY MASS INDEX: 20.77 KG/M2 | WEIGHT: 121 LBS | HEART RATE: 101 BPM | OXYGEN SATURATION: 100 % | RESPIRATION RATE: 16 BRPM | DIASTOLIC BLOOD PRESSURE: 65 MMHG | SYSTOLIC BLOOD PRESSURE: 98 MMHG

## 2024-03-04 DIAGNOSIS — C31.0 SQUAMOUS CELL CARCINOMA OF MAXILLARY SINUS (H): Primary | ICD-10-CM

## 2024-03-04 DIAGNOSIS — R43.2 DYSGEUSIA: ICD-10-CM

## 2024-03-04 DIAGNOSIS — R13.10 ODYNOPHAGIA: ICD-10-CM

## 2024-03-04 DIAGNOSIS — M25.561 ACUTE BILATERAL KNEE PAIN: ICD-10-CM

## 2024-03-04 DIAGNOSIS — R13.12 DYSPHAGIA, OROPHARYNGEAL PHASE: Primary | ICD-10-CM

## 2024-03-04 DIAGNOSIS — C31.9 MALIGNANT NEOPLASM OF PARANASAL SINUS (H): ICD-10-CM

## 2024-03-04 DIAGNOSIS — M25.562 ACUTE BILATERAL KNEE PAIN: ICD-10-CM

## 2024-03-04 LAB
ALBUMIN SERPL BCG-MCNC: 3.2 G/DL (ref 3.5–5.2)
ALP SERPL-CCNC: 79 U/L (ref 40–150)
ALT SERPL W P-5'-P-CCNC: 33 U/L (ref 0–70)
ANION GAP SERPL CALCULATED.3IONS-SCNC: 12 MMOL/L (ref 7–15)
AST SERPL W P-5'-P-CCNC: 42 U/L (ref 0–45)
BASOPHILS # BLD AUTO: 0 10E3/UL (ref 0–0.2)
BASOPHILS NFR BLD AUTO: 0 %
BILIRUB SERPL-MCNC: 0.4 MG/DL
BUN SERPL-MCNC: 24.8 MG/DL (ref 8–23)
CALCIUM SERPL-MCNC: 9.2 MG/DL (ref 8.8–10.2)
CHLORIDE SERPL-SCNC: 97 MMOL/L (ref 98–107)
CREAT SERPL-MCNC: 0.71 MG/DL (ref 0.67–1.17)
DEPRECATED HCO3 PLAS-SCNC: 25 MMOL/L (ref 22–29)
EGFRCR SERPLBLD CKD-EPI 2021: >90 ML/MIN/1.73M2
EOSINOPHIL # BLD AUTO: 0 10E3/UL (ref 0–0.7)
EOSINOPHIL NFR BLD AUTO: 1 %
ERYTHROCYTE [DISTWIDTH] IN BLOOD BY AUTOMATED COUNT: 18.8 % (ref 10–15)
GLUCOSE SERPL-MCNC: 146 MG/DL (ref 70–99)
HCT VFR BLD AUTO: 26.6 % (ref 40–53)
HGB BLD-MCNC: 9 G/DL (ref 13.3–17.7)
IMM GRANULOCYTES # BLD: 0.1 10E3/UL
IMM GRANULOCYTES NFR BLD: 1 %
LYMPHOCYTES # BLD AUTO: 0.8 10E3/UL (ref 0.8–5.3)
LYMPHOCYTES NFR BLD AUTO: 16 %
MAGNESIUM SERPL-MCNC: 1.7 MG/DL (ref 1.7–2.3)
MCH RBC QN AUTO: 27.4 PG (ref 26.5–33)
MCHC RBC AUTO-ENTMCNC: 33.8 G/DL (ref 31.5–36.5)
MCV RBC AUTO: 81 FL (ref 78–100)
MONOCYTES # BLD AUTO: 0.7 10E3/UL (ref 0–1.3)
MONOCYTES NFR BLD AUTO: 13 %
NEUTROPHILS # BLD AUTO: 3.7 10E3/UL (ref 1.6–8.3)
NEUTROPHILS NFR BLD AUTO: 69 %
NRBC # BLD AUTO: 0 10E3/UL
NRBC BLD AUTO-RTO: 0 /100
PLATELET # BLD AUTO: 306 10E3/UL (ref 150–450)
POTASSIUM SERPL-SCNC: 4.3 MMOL/L (ref 3.4–5.3)
PROT SERPL-MCNC: 7.5 G/DL (ref 6.4–8.3)
RBC # BLD AUTO: 3.28 10E6/UL (ref 4.4–5.9)
SODIUM SERPL-SCNC: 134 MMOL/L (ref 135–145)
WBC # BLD AUTO: 5.3 10E3/UL (ref 4–11)

## 2024-03-04 PROCEDURE — 258N000003 HC RX IP 258 OP 636: Performed by: RADIOLOGY

## 2024-03-04 PROCEDURE — 82247 BILIRUBIN TOTAL: CPT | Performed by: NURSE PRACTITIONER

## 2024-03-04 PROCEDURE — 250N000011 HC RX IP 250 OP 636: Performed by: RADIOLOGY

## 2024-03-04 PROCEDURE — 85025 COMPLETE CBC W/AUTO DIFF WBC: CPT | Performed by: NURSE PRACTITIONER

## 2024-03-04 PROCEDURE — 36591 DRAW BLOOD OFF VENOUS DEVICE: CPT | Performed by: NURSE PRACTITIONER

## 2024-03-04 PROCEDURE — 92526 ORAL FUNCTION THERAPY: CPT | Mod: GN | Performed by: SPEECH-LANGUAGE PATHOLOGIST

## 2024-03-04 PROCEDURE — 83735 ASSAY OF MAGNESIUM: CPT | Performed by: NURSE PRACTITIONER

## 2024-03-04 PROCEDURE — 96360 HYDRATION IV INFUSION INIT: CPT

## 2024-03-04 PROCEDURE — 99215 OFFICE O/P EST HI 40 MIN: CPT | Performed by: NURSE PRACTITIONER

## 2024-03-04 PROCEDURE — G0463 HOSPITAL OUTPT CLINIC VISIT: HCPCS | Mod: 25 | Performed by: NURSE PRACTITIONER

## 2024-03-04 RX ORDER — HEPARIN SODIUM,PORCINE 10 UNIT/ML
5-20 VIAL (ML) INTRAVENOUS DAILY PRN
OUTPATIENT
Start: 2024-03-04

## 2024-03-04 RX ORDER — METHYLPREDNISOLONE SODIUM SUCCINATE 125 MG/2ML
125 INJECTION, POWDER, LYOPHILIZED, FOR SOLUTION INTRAMUSCULAR; INTRAVENOUS
Start: 2024-03-04

## 2024-03-04 RX ORDER — MEPERIDINE HYDROCHLORIDE 25 MG/ML
25 INJECTION INTRAMUSCULAR; INTRAVENOUS; SUBCUTANEOUS EVERY 30 MIN PRN
OUTPATIENT
Start: 2024-03-04

## 2024-03-04 RX ORDER — ALBUTEROL SULFATE 90 UG/1
1-2 AEROSOL, METERED RESPIRATORY (INHALATION)
Start: 2024-03-04

## 2024-03-04 RX ORDER — ALBUTEROL SULFATE 0.83 MG/ML
2.5 SOLUTION RESPIRATORY (INHALATION)
OUTPATIENT
Start: 2024-03-04

## 2024-03-04 RX ORDER — DIPHENHYDRAMINE HYDROCHLORIDE 50 MG/ML
50 INJECTION INTRAMUSCULAR; INTRAVENOUS
Start: 2024-03-04

## 2024-03-04 RX ORDER — HEPARIN SODIUM (PORCINE) LOCK FLUSH IV SOLN 100 UNIT/ML 100 UNIT/ML
5 SOLUTION INTRAVENOUS
Status: DISCONTINUED | OUTPATIENT
Start: 2024-03-04 | End: 2024-03-04 | Stop reason: HOSPADM

## 2024-03-04 RX ORDER — HEPARIN SODIUM (PORCINE) LOCK FLUSH IV SOLN 100 UNIT/ML 100 UNIT/ML
5 SOLUTION INTRAVENOUS
Status: CANCELLED | OUTPATIENT
Start: 2024-03-04

## 2024-03-04 RX ORDER — EPINEPHRINE 1 MG/ML
0.3 INJECTION, SOLUTION INTRAMUSCULAR; SUBCUTANEOUS EVERY 5 MIN PRN
OUTPATIENT
Start: 2024-03-04

## 2024-03-04 RX ADMIN — Medication 5 ML: at 15:50

## 2024-03-04 RX ADMIN — SODIUM CHLORIDE 1000 ML: 9 INJECTION, SOLUTION INTRAVENOUS at 14:38

## 2024-03-04 ASSESSMENT — PAIN SCALES - GENERAL: PAINLEVEL: WORST PAIN (10)

## 2024-03-04 NOTE — NURSING NOTE
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  Vitals not  taken. Pt checked in for appointment(s).   Claudia Gomez RN

## 2024-03-04 NOTE — PROGRESS NOTES
Infusion Nursing Note:  Douglas Herrera presents today for IVF.    Patient seen by provider today: Yes: Trudy Dillard NP   present during visit today: No, family used as      Note: Patient arrives to infusion for IVF.  He has also been getting IVF at home.    Intravenous Access:  Implanted Port.  Port changes managed by [facility], per family port scheduled to be changed tomorrow.    Treatment Conditions:  Lab Results   Component Value Date    HGB 9.0 (L) 03/04/2024    WBC 5.3 03/04/2024    ANEUTAUTO 3.7 03/04/2024     03/04/2024        Lab Results   Component Value Date     (L) 03/04/2024    POTASSIUM 4.3 03/04/2024    MAG 1.7 03/04/2024    CR 0.71 03/04/2024    FLORENCE 9.2 03/04/2024    BILITOTAL 0.4 03/04/2024    ALBUMIN 3.2 (L) 03/04/2024    ALT 33 03/04/2024    AST 42 03/04/2024       Results reviewed, labs MET treatment parameters, ok to proceed with treatment.      Post Infusion Assessment:  Patient tolerated infusion without incident.  Blood return noted pre and post infusion.  Site patent and intact, free from redness, edema or discomfort.  No evidence of extravasations.  Port left in place- receives IVF at home.       Discharge Plan:   Patient declined prescription refills.  Discharge instructions reviewed with: Patient.  Patient and/or family verbalized understanding of discharge instructions and all questions answered.  AVS to patient via [employer].  Patient will return 3/15/24 to see provider and labs for next appointment.   Patient discharged in stable condition accompanied by: family.  Departure Mode: wheelchair.      Erica Ulloa RN

## 2024-03-04 NOTE — LETTER
3/4/2024         RE: Douglas Herrera  [address] [place] [address]        Dear Colleague,    Thank you for referring your patient, Douglas Herrera, to the [facility]. Please see a copy of my visit note below.       [facility]    PATIENT NAME: Douglas Herrera  MRN # [id number]   DATE OF VISIT:  March 4, 2024  YOB: 1960     [facility]   Radiation Oncology: Dr. Tomasa Akers  Cardiology: Dr. Qamar Hernandez  PCP: Dr. العراقي   Hepatologist: [facility]     CANCER TYPE: SCC sinonasal   STAGE: [id number] (IVB)  ECOG PS: 1    PD-L1:  NGS: internal panel. Fusion negative. ARID1A S90fs, EGFR exon 20 insertion, APC [id number]    SUMMARY    8/9/23 CT sinus.   8/24/23 MRI sinus. L maxillary sinus mass  9/12/23 ED for epistaxis.   9/13/23 CTA and embolization of L IMAX   10/4/23 Nasal bx. Path: Inverted papilloma with high grade dysplasia  11/10/23 Nasal endoscopy and bx in the OR. C/b severe bleeding. Path: Inverted sinonasal papilloma with severe dysplasia.  11/17/23 MRI. 7.4 x 4.6 x 6.6 cm L sinonasal mass, destruction of nasal turbinates, L maxillary sinus, hard palate, invasion of L  space, involvement of medial and lateral pterygoids and temporalis muscles. Effacement and invasion ipsilateral pterygopalatine fossa, extends and effaces the nasopharynx.   11/30/23 Carotid angiogram. Direct endonasal and transoral embolization L sinonasal tumor, transaterial embolization of the L sphenopalatine artery feeders to the L sinonasal artery tumor   12/1/23 Stealth assisted transnasal endoscopic approach to excise L sinonasal mass. Pre-operative embolization. Path: SCC involving L maxilla.    12/9/23 PET. Hypermetabolic mass centered along the L maxilla, extending superiorly through the floor of the L maxillary sinus, posterior/laterally to the  space, invasion of the pterygoid muscles, extending cranially along the pterygopalatine  fossa and pterygoid plates to the skull base level. Erosion of involved bones including L maxilla, maxillary sinus wall and pterygoid plates. Extension medially to the L lateral nasopharyngeal wall and soft palate. 1 mm fat place between carotid and mass. ~4.9 x 4.0 x 5.4 cm (SUV 24.3). Partially resected since 11/17/23 MRI. 8 mm L 2A node (SUV 5.9), 7 mm L level 2 node (SUV 5.5)  1/2/24 Gtube (IR)  1/4/24 Video swallow. No aspiration  1/8~2/27/24 Chemoradiation with weekly cisplatin.  1/11/24 Port (IR)      JOSSE Cole is seen for follow-up for ongoing toxicity monitoring. Finished RT last week  Pain in knees bilaterally; started 2-3 days ago. Left more than right. No swelling. Using ice/lido.  Pain increased in mouth and throat, using oxy 5 mg only at bedtime now. Stopped during the day because he felt the pain was better but admits not doing much orally because of pain. Still doing s/s rinses. Bowels moving. IVF 2x at home. Getting in 4 cartons of feedings without issues.     PAST MEDICAL HISTORY  Sinonasal carcinoma as above  HTN  ASCVD. CABG x 3 2019  PE and LLE DVT after CABG . Treated with rivaroxaban  Dyslipidemia  Hepatitis B   SCC R temple s/p MOHS  Ascending aortic aneurysm repair 2019  Hearing loss L   Gout - feet  Depression  Cholecystectomy    CURRENT OUTPATIENT MEDICATIONS  Current Outpatient Medications   Medication    acetaminophen (TYLENOL) 325 MG tablet    atorvastatin (LIPITOR) 80 MG tablet    capsaicin (ZOSTRIX) 0.025 % external cream    chlorhexidine (PERIDEX) 0.12 % solution    entecavir (BARACLUDE) 0.5 MG tablet    fluticasone (FLONASE) 50 MCG/ACT nasal spray    gabapentin (NEURONTIN) 300 MG capsule    magic mouthwash (ENTER INGREDIENTS IN COMMENTS) suspension    nitroGLYcerin (NITROSTAT) 0.4 MG sublingual tablet    ondansetron (ZOFRAN) 8 MG tablet    oxyCODONE (ROXICODONE) 5 MG tablet    predniSONE (DELTASONE) 10 MG tablet    prochlorperazine (COMPAZINE) 10 MG tablet     senna-docusate (SENNA S) 8.6-50 MG tablet    sodium chloride (OCEAN) 0.65 % nasal spray    traZODone (DESYREL) 50 MG tablet     No current facility-administered medications for this visit.     ALLERGIES  No Known Allergies     PHYSICAL EXAM  BP 98/65   Pulse 101   Temp 98.8  F (37.1  C) (Oral)   Resp 16   Wt 54.9 kg (121 lb)   SpO2 100%   BMI 20.77 kg/m      GEN: NAD  HEENT: EOMI, no icterus, injection or pallor. Oropharynx shows mucositis with some focal mildly ulcerated are on the L posterior hard palate   RESP: cta bilaterally, no wheezing  CV: rrr, no m/g/r  EXT: no edema or erythema over knees bilaterally   NEURO: alert  SKIN: no rashes    LABORATORY AND IMAGING STUDIES  Most Recent 3 CBC's:  Recent Labs   Lab Test 02/23/24  1341 02/12/24  0654 02/05/24  0657   WBC 3.6* 3.2* 3.9*   HGB 8.9* 10.2* 10.6*   MCV 82 82 82    148* 148*   ANEUTAUTO 2.3 2.1 2.4    Most Recent 3 BMP's:  Recent Labs   Lab Test 02/23/24  1341 02/16/24  1332 02/12/24  0654 02/05/24  0657   * 132* 133* 134*   POTASSIUM 4.5 4.5 4.7 4.3   CHLORIDE 97* 96* 98 97*   CO2 27 26 25 28   BUN 24.8* 22.3 17.7 17.6   CR 0.77 0.93 0.81 0.79   ANIONGAP 10 10 10 9   FLORENCE 9.0 9.7 9.6 9.3   * 93 104* 102*   PROTTOTAL 7.3  --  7.7 7.4   ALBUMIN 3.4*  --  3.8 3.6    Most Recent 2 LFT's:  Recent Labs   Lab Test 02/23/24  1341 02/12/24  0654   AST 44 28   ALT 34 21   ALKPHOS 83 92   BILITOTAL 0.4 0.4    Most Recent TSH and T4:  Recent Labs   Lab Test 01/08/24  0717   TSH 3.35   T4 1.00     Phos/Mag:  Lab Results   Component Value Date    MAG 1.7 02/23/2024    MAG 1.6 (L) 02/16/2024    MAG 1.6 (L) 02/12/2024      I reviewed the above labs today.    ASSESSMENT AND PLAN  SCC L maxillary sinus, [id number] (IVB), ARID1 mutation,  EGFR exon 20 insertion: Completed cisplatin (2/12) and RT 2/27. Side effects of treatment increasing as expected. Due to difficulty in taking in large volumes through G-tube, continued supplementation of IVF is  recommended for at least the next 2 weeks, I updated [facility] with this order. We will see him back in about 10 days (~3/15) for close monitoring and repeat labs. Reiterated anticipated recovery again today: symptoms expected to get worse for 1-2 weeks after RT before slowly improving.   -Follow up with [facility] late next week, interval short term onc follow up pending his progress between now and then  -He will see Dr. Post after the 3 month post-treatment PET/CT with labs    Mucositis, odynophagia: Was on oxycodone 5 mg every 6 hours, now pulled back to only at HS. I actually recommended he add a dose or two during the day if it helps him work on oral intake during this acute recovery phase.  No constipation concerns so far. Continue Tylenol and MMW, s/s rinses too.      Gout flare: Resolved    Knee pain: no edema or redness today. Continue supportive care with ice, movement, and lidocaine    Hypomagnesia:  normalized today    Hypercalcemia: Malignancy. Resolved quickly with starting chemoradiation    Hearing loss: Unchanged as above     Hepatitis B: HBSAby negative, SAg +, cAby +, HBV PCR negative 1/23/24. HCV negative.     H/o PE/DVT: 5/8/2019 CTA report scanned into [employer], reviewed. Small PEs. Also had LLE DVT, acute, occlusive. Report in EPic and reviewed. Presented with CP and LLE swelling. Was on rivaroxaban, completed course.     H/o undefined hypothyroidism: TFTs 1/8/24 normal     40 minutes spent on the date of the encounter doing chart review, review of test results, interpretation of tests, patient visit, documentation, and discussion with other provider(s)     Trudy Dillard CNP on 3/4/2024 at 2:30 PM

## 2024-03-04 NOTE — PROGRESS NOTES
[facility]    PATIENT NAME: Douglas Herrera  MRN # [id number]   DATE OF VISIT:  March 4, 2024  YOB: 1960     [facility]   Radiation Oncology: Dr. Tomasa Akers  Cardiology: Dr. Qamar Hernandez  PCP: Dr. العراقي   Hepatologist: [facility]     CANCER TYPE: SCC sinonasal   STAGE: [id number] (IVB)  ECOG PS: 1    PD-L1:  NGS: internal panel. Fusion negative. ARID1A S90fs, EGFR exon 20 insertion, APC [id number]    SUMMARY    8/9/23 CT sinus.   8/24/23 MRI sinus. L maxillary sinus mass  9/12/23 ED for epistaxis.   9/13/23 CTA and embolization of L IMAX   10/4/23 Nasal bx. Path: Inverted papilloma with high grade dysplasia  11/10/23 Nasal endoscopy and bx in the OR. C/b severe bleeding. Path: Inverted sinonasal papilloma with severe dysplasia.  11/17/23 MRI. 7.4 x 4.6 x 6.6 cm L sinonasal mass, destruction of nasal turbinates, L maxillary sinus, hard palate, invasion of L  space, involvement of medial and lateral pterygoids and temporalis muscles. Effacement and invasion ipsilateral pterygopalatine fossa, extends and effaces the nasopharynx.   11/30/23 Carotid angiogram. Direct endonasal and transoral embolization L sinonasal tumor, transaterial embolization of the L sphenopalatine artery feeders to the L sinonasal artery tumor   12/1/23 Stealth assisted transnasal endoscopic approach to excise L sinonasal mass. Pre-operative embolization. Path: SCC involving L maxilla.    12/9/23 PET. Hypermetabolic mass centered along the L maxilla, extending superiorly through the floor of the L maxillary sinus, posterior/laterally to the  space, invasion of the pterygoid muscles, extending cranially along the pterygopalatine fossa and pterygoid plates to the skull base level. Erosion of involved bones including L maxilla, maxillary sinus wall and pterygoid plates. Extension medially to the L lateral nasopharyngeal wall and soft palate. 1 mm fat place between carotid  and mass. ~4.9 x 4.0 x 5.4 cm (SUV 24.3). Partially resected since 11/17/23 MRI. 8 mm L 2A node (SUV 5.9), 7 mm L level 2 node (SUV 5.5)  1/2/24 Gtube (IR)  1/4/24 Video swallow. No aspiration  1/8~2/27/24 Chemoradiation with weekly cisplatin.  1/11/24 Port (IR)      JOSSE Cole is seen for follow-up for ongoing toxicity monitoring. Finished RT last week  Pain in knees bilaterally; started 2-3 days ago. Left more than right. No swelling. Using ice/lido.  Pain increased in mouth and throat, using oxy 5 mg only at bedtime now. Stopped during the day because he felt the pain was better but admits not doing much orally because of pain. Still doing s/s rinses. Bowels moving. IVF 2x at home. Getting in 4 cartons of feedings without issues.     PAST MEDICAL HISTORY  Sinonasal carcinoma as above  HTN  ASCVD. CABG x 3 2019  PE and LLE DVT after CABG . Treated with rivaroxaban  Dyslipidemia  Hepatitis B   SCC R temple s/p MOHS  Ascending aortic aneurysm repair 2019  Hearing loss L   Gout - feet  Depression  Cholecystectomy    CURRENT OUTPATIENT MEDICATIONS  Current Outpatient Medications   Medication    acetaminophen (TYLENOL) 325 MG tablet    atorvastatin (LIPITOR) 80 MG tablet    capsaicin (ZOSTRIX) 0.025 % external cream    chlorhexidine (PERIDEX) 0.12 % solution    entecavir (BARACLUDE) 0.5 MG tablet    fluticasone (FLONASE) 50 MCG/ACT nasal spray    gabapentin (NEURONTIN) 300 MG capsule    magic mouthwash (ENTER INGREDIENTS IN COMMENTS) suspension    nitroGLYcerin (NITROSTAT) 0.4 MG sublingual tablet    ondansetron (ZOFRAN) 8 MG tablet    oxyCODONE (ROXICODONE) 5 MG tablet    predniSONE (DELTASONE) 10 MG tablet    prochlorperazine (COMPAZINE) 10 MG tablet    senna-docusate (SENNA S) 8.6-50 MG tablet    sodium chloride (OCEAN) 0.65 % nasal spray    traZODone (DESYREL) 50 MG tablet     No current facility-administered medications for this visit.     ALLERGIES  No Known Allergies     PHYSICAL EXAM  BP 98/65   Pulse  101   Temp 98.8  F (37.1  C) (Oral)   Resp 16   Wt 54.9 kg (121 lb)   SpO2 100%   BMI 20.77 kg/m      GEN: NAD  HEENT: EOMI, no icterus, injection or pallor. Oropharynx shows mucositis with some focal mildly ulcerated are on the L posterior hard palate   RESP: cta bilaterally, no wheezing  CV: rrr, no m/g/r  EXT: no edema or erythema over knees bilaterally   NEURO: alert  SKIN: no rashes    LABORATORY AND IMAGING STUDIES  Most Recent 3 CBC's:  Recent Labs   Lab Test 02/23/24  1341 02/12/24  0654 02/05/24  0657   WBC 3.6* 3.2* 3.9*   HGB 8.9* 10.2* 10.6*   MCV 82 82 82    148* 148*   ANEUTAUTO 2.3 2.1 2.4    Most Recent 3 BMP's:  Recent Labs   Lab Test 02/23/24  1341 02/16/24  1332 02/12/24  0654 02/05/24  0657   * 132* 133* 134*   POTASSIUM 4.5 4.5 4.7 4.3   CHLORIDE 97* 96* 98 97*   CO2 27 26 25 28   BUN 24.8* 22.3 17.7 17.6   CR 0.77 0.93 0.81 0.79   ANIONGAP 10 10 10 9   FLORENCE 9.0 9.7 9.6 9.3   * 93 104* 102*   PROTTOTAL 7.3  --  7.7 7.4   ALBUMIN 3.4*  --  3.8 3.6    Most Recent 2 LFT's:  Recent Labs   Lab Test 02/23/24  1341 02/12/24  0654   AST 44 28   ALT 34 21   ALKPHOS 83 92   BILITOTAL 0.4 0.4    Most Recent TSH and T4:  Recent Labs   Lab Test 01/08/24  0717   TSH 3.35   T4 1.00     Phos/Mag:  Lab Results   Component Value Date    MAG 1.7 02/23/2024    MAG 1.6 (L) 02/16/2024    MAG 1.6 (L) 02/12/2024      I reviewed the above labs today.    ASSESSMENT AND PLAN  SCC L maxillary sinus, [id number] (IVB), ARID1 mutation,  EGFR exon 20 insertion: Completed cisplatin (2/12) and RT 2/27. Side effects of treatment increasing as expected. Due to difficulty in taking in large volumes through G-tube, continued supplementation of IVF is recommended for at least the next 2 weeks, I updated [facility] with this order. We will see him back in about 10 days (~3/15) for close monitoring and repeat labs. Reiterated anticipated recovery again today: symptoms expected to get worse for 1-2 weeks after RT before  slowly improving.   -Follow up with [facility] late next week, interval short term onc follow up pending his progress between now and then  -He will see Dr. Post after the 3 month post-treatment PET/CT with labs    Mucositis, odynophagia: Was on oxycodone 5 mg every 6 hours, now pulled back to only at HS. I actually recommended he add a dose or two during the day if it helps him work on oral intake during this acute recovery phase.  No constipation concerns so far. Continue Tylenol and MMW, s/s rinses too.      Gout flare: Resolved    Knee pain: no edema or redness today. Continue supportive care with ice, movement, and lidocaine    Hypomagnesia:  normalized today    Hypercalcemia: Malignancy. Resolved quickly with starting chemoradiation    Hearing loss: Unchanged as above     Hepatitis B: HBSAby negative, SAg +, cAby +, HBV PCR negative 1/23/24. HCV negative.     H/o PE/DVT: 5/8/2019 CTA report scanned into [facility], reviewed. Small PEs. Also had LLE DVT, acute, occlusive. Report in EPic and reviewed. Presented with CP and LLE swelling. Was on rivaroxaban, completed course.     H/o undefined hypothyroidism: TFTs 1/8/24 normal     40 minutes spent on the date of the encounter doing chart review, review of test results, interpretation of tests, patient visit, documentation, and discussion with other provider(s)     Trudy Dillard CNP on 3/4/2024 at 2:30 PM

## 2024-03-04 NOTE — NURSING NOTE
"Oncology Rooming Note    March 4, 2024 1:46 PM   Douglas Herrera is a 63 year old male who presents for:    Chief Complaint   Patient presents with    Oncology Clinic Visit     RTN for Squamous Cell Carcinoma of Maxillary Sinus    Port Draw     Labs drawn via port by RN in lab     Initial Vitals: BP 98/65   Pulse 101   Temp 98.8  F (37.1  C) (Oral)   Resp 16   Wt 54.9 kg (121 lb)   SpO2 100%   BMI 20.77 kg/m   Estimated body mass index is 20.77 kg/m  as calculated from the following:    Height as of 1/11/24: 1.626 m (5' 4\").    Weight as of this encounter: 54.9 kg (121 lb). Body surface area is 1.57 meters squared.  Worst Pain (10) Comment: Data Unavailable   No LMP for male patient.  Allergies reviewed: Yes  Medications reviewed: Yes    Medications: Medication refills not needed today.  Pharmacy name entered into [employer]: [facility] - [place], [place] - [address]    Frailty Screening:   Is the patient here for a new oncology consult visit in cancer care? 2. No      Clinical concerns: Pt is not eating solid food due to pain in his mouth. So he does tube feedings.  He said he has been having pain in his knees and legs and his mouth for some time. His pain in his knees and legs is worse with movement and he rates a 10 on the pain scale.       Maris Murphy MA             "

## 2024-03-04 NOTE — PATIENT INSTRUCTIONS
[facility] Triage and after hours / weekends / holidays:  [phone]    Please call the triage or after hours line if you experience a temperature greater than or equal to 100.4, shaking chills, have uncontrolled nausea, vomiting and/or diarrhea, dizziness, shortness of breath, chest pain, bleeding, unexplained bruising, or if you have any other new/concerning symptoms, questions or concerns.      If you are having any concerning symptoms or wish to speak to a provider before your next infusion visit, please call triage to notify them so we can adequately serve you.     If you need a refill on a narcotic prescription or other medication, please call before your infusion appointment.                March 2024 Sunday Monday Tuesday Wednesday Thursday Friday Saturday                            1     2       3     4    LAB CENTRAL   1:15 PM   (15 min.)   [facility] LAB DRAW   [facility]    VIDEO VISIT NEW   1:30 PM   (45 min.)   Murtaza Ahumada   [facility]    RETURN ACTIVE TREATMENT   1:30 PM   (45 min.)   Trudy Dillard CNP   [facility]    ONC INFUSION 2 HR (120 MIN)   3:00 PM   (120 min.)    ONC INFUSION NURSE   [facility]    SWALLOW TREATMENT   3:15 PM   (45 min.)   Waleska Blankenship SLP   [facility] 5     6     7     8     9       10     11     12     13     14     15     16       17     18     19     20     21     22     23       24     25    RETURN NUTRITION  10:45 AM   (30 min.)   Mayela Blackwell, CARISA   [facility] 26     27     28     29     30       31                                               April 2024 Sunday Monday Tuesday Wednesday Thursday Friday Saturday        1     2     3     4    RETURN   3:25 PM   (20 min.)   Damon Regan MD   [facility]-ADULT ENT TREATMENT   3:40 PM   (45 min.)   Waleska Blankenship, SLP   [facility] 5     6       7     8     9     10     11     12     13       14     15     16     17     18     19     20       21     22     23     24     25     26     27       28     29     30                                         Lab Results:  Recent Results (from the past 12 hour(s))   Comprehensive metabolic panel    Collection Time: 03/04/24  1:26 PM   Result Value Ref Range    Sodium 134 (L) 135 - 145 mmol/L    Potassium 4.3 3.4 - 5.3 mmol/L    Carbon Dioxide (CO2) 25 22 - 29 mmol/L    Anion Gap 12 7 - 15 mmol/L    Urea Nitrogen 24.8 (H) 8.0 - 23.0 mg/dL    Creatinine 0.71 0.67 - 1.17 mg/dL    GFR Estimate >90 >60 mL/min/1.73m2    Calcium 9.2 8.8 - 10.2 mg/dL    Chloride 97 (L) 98 - 107 mmol/L    Glucose 146 (H) 70 - 99 mg/dL    Alkaline Phosphatase 79 40 - 150 U/L    AST 42 0 - 45 U/L    ALT 33 0 - 70 U/L    Protein Total 7.5 6.4 - 8.3 g/dL    Albumin 3.2 (L) 3.5 - 5.2 g/dL    Bilirubin Total 0.4 <=1.2 mg/dL   CBC with platelets and differential    Collection Time: 03/04/24  1:26 PM   Result Value Ref Range    WBC Count 5.3 4.0 - 11.0 10e3/uL    RBC Count 3.28 (L) 4.40 - 5.90 10e6/uL    Hemoglobin 9.0 (L) 13.3 - 17.7 g/dL    Hematocrit 26.6 (L) 40.0 - 53.0 %    MCV 81 78 - 100 fL    MCH 27.4 26.5 - 33.0 pg    MCHC 33.8 31.5 - 36.5 g/dL    RDW 18.8 (H) 10.0 - 15.0 %    Platelet Count 306 150 - 450 10e3/uL    % Neutrophils 69 %    % Lymphocytes 16 %    % Monocytes 13 %    % Eosinophils 1 %    % Basophils 0 %    % Immature Granulocytes 1 %    NRBCs per 100 WBC 0 <1 /100    Absolute Neutrophils 3.7 1.6 - 8.3 10e3/uL    Absolute Lymphocytes 0.8 0.8 - 5.3 10e3/uL    Absolute Monocytes 0.7 0.0 - 1.3 10e3/uL    Absolute Eosinophils 0.0 0.0 - 0.7 10e3/uL    Absolute Basophils 0.0 0.0 - 0.2 10e3/uL    Absolute Immature Granulocytes 0.1 <=0.4 10e3/uL    Absolute  NRBCs 0.0 10e3/uL   Magnesium    Collection Time: 03/04/24  1:26 PM   Result Value Ref Range    Magnesium 1.7 1.7 - 2.3 mg/dL

## 2024-03-14 NOTE — PROGRESS NOTES
[facility] CANCER [facility]    PATIENT NAME: Douglas Herrera  MRN # [id number]   DATE OF VISIT:  March 15, 2024  YOB: 1960     [facility]   Radiation Oncology: Dr. Tomasa Akers  Cardiology: Dr. Qamar Hernandez  PCP: Dr. العراقي   Hepatologist: [facility]     CANCER TYPE: SCC sinonasal   STAGE: [id number] (IVB)  ECOG PS: 1    PD-L1:  NGS: internal panel. Fusion negative. ARID1A S90fs, EGFR exon 20 insertion, APC [id number]    SUMMARY    8/9/23 CT sinus.   8/24/23 MRI sinus. L maxillary sinus mass  9/12/23 ED for epistaxis.   9/13/23 CTA and embolization of L IMAX   10/4/23 Nasal bx. Path: Inverted papilloma with high grade dysplasia  11/10/23 Nasal endoscopy and bx in the OR. C/b severe bleeding. Path: Inverted sinonasal papilloma with severe dysplasia.  11/17/23 MRI. 7.4 x 4.6 x 6.6 cm L sinonasal mass, destruction of nasal turbinates, L maxillary sinus, hard palate, invasion of L  space, involvement of medial and lateral pterygoids and temporalis muscles. Effacement and invasion ipsilateral pterygopalatine fossa, extends and effaces the nasopharynx.   11/30/23 Carotid angiogram. Direct endonasal and transoral embolization L sinonasal tumor, transaterial embolization of the L sphenopalatine artery feeders to the L sinonasal artery tumor   12/1/23 Stealth assisted transnasal endoscopic approach to excise L sinonasal mass. Pre-operative embolization. Path: SCC involving L maxilla.    12/9/23 PET. Hypermetabolic mass centered along the L maxilla, extending superiorly through the floor of the L maxillary sinus, posterior/laterally to the  space, invasion of the pterygoid muscles, extending cranially along the pterygopalatine fossa and pterygoid plates to the skull base level. Erosion of involved bones including L maxilla, maxillary sinus wall and pterygoid plates. Extension medially to the L lateral nasopharyngeal wall and soft palate. 1 mm fat place between carotid  and mass. ~4.9 x 4.0 x 5.4 cm (SUV 24.3). Partially resected since 11/17/23 MRI. 8 mm L 2A node (SUV 5.9), 7 mm L level 2 node (SUV 5.5)  1/2/24 Gtube (IR)  1/4/24 Video swallow. No aspiration  1/8~2/27/24 Chemoradiation with weekly cisplatin.  1/11/24 Port (IR)      JOSSE  Douglas is seen for follow-up for ongoing toxicity monitoring.   -Throat pain has significantly improved. He still has irritation to his left lateral tongue and buccal mucosa. Rinses with s/s at least 30 times per day. Also using MMW PRN but is currently out. He started tapering off of gabapentin, now on 900/900/600.   -Started swallowing water/liquids by mouth. Has tried a few bites of food but has no desire to eat due to lack of taste  -Consistently gets in 4 cartons of formula via PEG daily  -Receiving IVF with home infusion 3x/week. Using port  -Nose has been both runny and congested, more pronounced over the past week. Using nasal spray and humidifier at home.  -Both pain in foot s/t gout and knee pain have resolved  -No dizziness/lightheadedness    PAST MEDICAL HISTORY  Sinonasal carcinoma as above  HTN  ASCVD. CABG x 3 2019  PE and LLE DVT after CABG . Treated with rivaroxaban  Dyslipidemia  Hepatitis B   SCC R temple s/p MOHS  Ascending aortic aneurysm repair 2019  Hearing loss L   Gout - feet  Depression  Cholecystectomy    CURRENT OUTPATIENT MEDICATIONS  Current Outpatient Medications   Medication    atorvastatin (LIPITOR) 80 MG tablet    entecavir (BARACLUDE) 0.5 MG tablet    fluticasone (FLONASE) 50 MCG/ACT nasal spray    gabapentin (NEURONTIN) 300 MG capsule    magic mouthwash (ENTER INGREDIENTS IN COMMENTS) suspension    ondansetron (ZOFRAN) 8 MG tablet    oxyCODONE (ROXICODONE) 5 MG tablet    prochlorperazine (COMPAZINE) 10 MG tablet    senna-docusate (SENNA S) 8.6-50 MG tablet    sodium chloride (OCEAN) 0.65 % nasal spray    acetaminophen (TYLENOL) 325 MG tablet    capsaicin (ZOSTRIX) 0.025 % external cream    chlorhexidine  (PERIDEX) 0.12 % solution    nitroGLYcerin (NITROSTAT) 0.4 MG sublingual tablet    predniSONE (DELTASONE) 10 MG tablet    traZODone (DESYREL) 50 MG tablet     No current facility-administered medications for this visit.     ALLERGIES  No Known Allergies     PHYSICAL EXAM  /79 (BP Location: Right arm, Patient Position: Sitting, Cuff Size: Adult Regular)   Pulse 93   Temp 98  F (36.7  C) (Oral)   Resp 18   Wt 56 kg (123 lb 8 oz)   SpO2 98%   BMI 21.20 kg/m      GEN: NAD  HEENT: EOMI, no icterus, injection or pallor. Oropharynx pink, moist with notable mucositis to left buccal mucosa. No thrush.  RESP: cta bilaterally, no wheezing  CV: rrr, no m/g/r  GI: PEG intact, no surrounding erythema or drainage  NEURO: alert  SKIN: no rashes    LABORATORY AND IMAGING STUDIES  Most Recent 3 CBC's:  Recent Labs   Lab Test 03/15/24  1206 03/04/24  1326 02/23/24  1341   WBC 8.0 5.3 3.6*   HGB 9.0* 9.0* 8.9*   MCV 84 81 82    306 166   ANEUTAUTO 5.6 3.7 2.3    Most Recent 3 BMP's:  Recent Labs   Lab Test 03/15/24  1206 03/04/24  1326 02/23/24  1341    134* 134*   POTASSIUM 4.2 4.3 4.5   CHLORIDE 101 97* 97*   CO2 26 25 27   BUN 19.0 24.8* 24.8*   CR 0.73 0.71 0.77   ANIONGAP 10 12 10   FLORENCE 9.4 9.2 9.0   * 146* 103*   PROTTOTAL 7.6 7.5 7.3   ALBUMIN 3.3* 3.2* 3.4*    Most Recent 2 LFT's:  Recent Labs   Lab Test 03/15/24  1206 03/04/24  1326   AST 27 42   ALT 19 33   ALKPHOS 90 79   BILITOTAL 0.4 0.4    Most Recent TSH and T4:  Recent Labs   Lab Test 01/08/24  0717   TSH 3.35   T4 1.00     Phos/Mag:  Lab Results   Component Value Date    MAG 1.7 03/15/2024    MAG 1.7 03/04/2024    MAG 1.7 02/23/2024      I reviewed the above labs today.    ASSESSMENT AND PLAN  SCC L maxillary sinus, [id number] (IVB), ARID1 mutation,  EGFR exon 20 insertion: Completed cisplatin (2/12) and RT 2/27. Symptoms are beginning to slowly improve including pain. Encouraged he increase fluids by mouth and introduce soft foods.  Continue TF for now. Close follow-up with SLP and RD arranged. Tapering gabapentin appropriately. Will refill MMW for symptomatic support of healing mucositis to left buccal mucosa. I recommend continuing supplemental IVF for one more week then discontinuing to ensure he does not rely on the IV hydration. He will work on increasing oral fluid intake in the meantime. Due to improvement in symptoms and labs, no need for further KENNEDI follow-up. Encouraged patient and family to reach out with any concerns and reassured them we can add appointments at any time.   -Follow-up with ENT and rad onc as scheduled  -He will see Dr. Post after the 3 month post-treatment PET/CT with labs    Mucositis, odynophagia: Stopped oxycodone. Tapering gabapentin. MMW refilled today. Can also use Tylenol PRN. Continue s/s rinses.    Nasal congestion, rhinorrhea: s/t to RT, anticipate improvement over the next few weeks    Gout flare: Resolved    Knee pain: resolved    Hypomagnesia:  normalized today    Hypercalcemia: Malignancy. Resolved quickly with starting chemoradiation    Hearing loss: Unchanged as above     Hepatitis B: HBSAby negative, SAg +, cAby +, HBV PCR negative 1/23/24. HCV negative.     H/o PE/DVT: 5/8/2019 CTA report scanned into [employer], reviewed. Small PEs. Also had LLE DVT, acute, occlusive. Report in EPic and reviewed. Presented with CP and LLE swelling. Was on rivaroxaban, completed course.     H/o undefined hypothyroidism: TFTs 1/8/24 normal     Nishi Michele CNP  ---  32 minutes spent on the date of the encounter doing chart review, review of test results, interpretation of tests, patient visit, documentation, and discussion with family.

## 2024-03-15 ENCOUNTER — ONCOLOGY VISIT (OUTPATIENT)
Dept: ONCOLOGY | Facility: CLINIC | Age: 64
End: 2024-03-15
Attending: REGISTERED NURSE
Payer: COMMERCIAL

## 2024-03-15 ENCOUNTER — APPOINTMENT (OUTPATIENT)
Dept: LAB | Facility: CLINIC | Age: 64
End: 2024-03-15
Attending: REGISTERED NURSE
Payer: COMMERCIAL

## 2024-03-15 VITALS
OXYGEN SATURATION: 98 % | RESPIRATION RATE: 18 BRPM | TEMPERATURE: 98 F | DIASTOLIC BLOOD PRESSURE: 79 MMHG | BODY MASS INDEX: 21.2 KG/M2 | HEART RATE: 93 BPM | WEIGHT: 123.5 LBS | SYSTOLIC BLOOD PRESSURE: 124 MMHG

## 2024-03-15 DIAGNOSIS — K12.30 MUCOSITIS: ICD-10-CM

## 2024-03-15 DIAGNOSIS — R09.81 NASAL CONGESTION: ICD-10-CM

## 2024-03-15 DIAGNOSIS — R13.10 ODYNOPHAGIA: ICD-10-CM

## 2024-03-15 DIAGNOSIS — C31.0 SQUAMOUS CELL CARCINOMA OF MAXILLARY SINUS (H): Primary | ICD-10-CM

## 2024-03-15 DIAGNOSIS — R43.2 DYSGEUSIA: ICD-10-CM

## 2024-03-15 LAB
ALBUMIN SERPL BCG-MCNC: 3.3 G/DL (ref 3.5–5.2)
ALP SERPL-CCNC: 90 U/L (ref 40–150)
ALT SERPL W P-5'-P-CCNC: 19 U/L (ref 0–70)
ANION GAP SERPL CALCULATED.3IONS-SCNC: 10 MMOL/L (ref 7–15)
AST SERPL W P-5'-P-CCNC: 27 U/L (ref 0–45)
BASOPHILS # BLD AUTO: 0.1 10E3/UL (ref 0–0.2)
BASOPHILS NFR BLD AUTO: 1 %
BILIRUB SERPL-MCNC: 0.4 MG/DL
BUN SERPL-MCNC: 19 MG/DL (ref 8–23)
CALCIUM SERPL-MCNC: 9.4 MG/DL (ref 8.8–10.2)
CHLORIDE SERPL-SCNC: 101 MMOL/L (ref 98–107)
CREAT SERPL-MCNC: 0.73 MG/DL (ref 0.67–1.17)
DEPRECATED HCO3 PLAS-SCNC: 26 MMOL/L (ref 22–29)
EGFRCR SERPLBLD CKD-EPI 2021: >90 ML/MIN/1.73M2
EOSINOPHIL # BLD AUTO: 0.1 10E3/UL (ref 0–0.7)
EOSINOPHIL NFR BLD AUTO: 1 %
ERYTHROCYTE [DISTWIDTH] IN BLOOD BY AUTOMATED COUNT: 19.6 % (ref 10–15)
GLUCOSE SERPL-MCNC: 137 MG/DL (ref 70–99)
HCT VFR BLD AUTO: 27.9 % (ref 40–53)
HGB BLD-MCNC: 9 G/DL (ref 13.3–17.7)
IMM GRANULOCYTES # BLD: 0.2 10E3/UL
IMM GRANULOCYTES NFR BLD: 2 %
LYMPHOCYTES # BLD AUTO: 1.3 10E3/UL (ref 0.8–5.3)
LYMPHOCYTES NFR BLD AUTO: 16 %
MAGNESIUM SERPL-MCNC: 1.7 MG/DL (ref 1.7–2.3)
MCH RBC QN AUTO: 27 PG (ref 26.5–33)
MCHC RBC AUTO-ENTMCNC: 32.3 G/DL (ref 31.5–36.5)
MCV RBC AUTO: 84 FL (ref 78–100)
MONOCYTES # BLD AUTO: 0.8 10E3/UL (ref 0–1.3)
MONOCYTES NFR BLD AUTO: 10 %
NEUTROPHILS # BLD AUTO: 5.6 10E3/UL (ref 1.6–8.3)
NEUTROPHILS NFR BLD AUTO: 70 %
NRBC # BLD AUTO: 0 10E3/UL
NRBC BLD AUTO-RTO: 0 /100
PLATELET # BLD AUTO: 305 10E3/UL (ref 150–450)
POTASSIUM SERPL-SCNC: 4.2 MMOL/L (ref 3.4–5.3)
PROT SERPL-MCNC: 7.6 G/DL (ref 6.4–8.3)
RBC # BLD AUTO: 3.33 10E6/UL (ref 4.4–5.9)
SODIUM SERPL-SCNC: 137 MMOL/L (ref 135–145)
WBC # BLD AUTO: 8 10E3/UL (ref 4–11)

## 2024-03-15 PROCEDURE — 36591 DRAW BLOOD OFF VENOUS DEVICE: CPT | Performed by: REGISTERED NURSE

## 2024-03-15 PROCEDURE — 99215 OFFICE O/P EST HI 40 MIN: CPT | Performed by: REGISTERED NURSE

## 2024-03-15 PROCEDURE — 250N000011 HC RX IP 250 OP 636: Performed by: REGISTERED NURSE

## 2024-03-15 PROCEDURE — G0463 HOSPITAL OUTPT CLINIC VISIT: HCPCS | Performed by: REGISTERED NURSE

## 2024-03-15 PROCEDURE — 84520 ASSAY OF UREA NITROGEN: CPT | Performed by: REGISTERED NURSE

## 2024-03-15 PROCEDURE — 85025 COMPLETE CBC W/AUTO DIFF WBC: CPT | Performed by: REGISTERED NURSE

## 2024-03-15 PROCEDURE — 83735 ASSAY OF MAGNESIUM: CPT | Performed by: REGISTERED NURSE

## 2024-03-15 RX ORDER — HEPARIN SODIUM (PORCINE) LOCK FLUSH IV SOLN 100 UNIT/ML 100 UNIT/ML
5 SOLUTION INTRAVENOUS ONCE
Status: COMPLETED | OUTPATIENT
Start: 2024-03-15 | End: 2024-03-15

## 2024-03-15 RX ADMIN — Medication 5 ML: at 12:11

## 2024-03-15 ASSESSMENT — PAIN SCALES - GENERAL: PAINLEVEL: NO PAIN (0)

## 2024-03-15 NOTE — NURSING NOTE
Chief Complaint   Patient presents with    Blood Draw     Labs drawn via port by RN. VS taken.     Port accessed on 3/12/24 per pt report, labs drawn by RN in lab, labs collected, line flushed with saline and heparin.  Vitals taken. Pt checked in for appointment(s).     Port also left accessed, as pt has home infusions per pt report, FYI message sent to provider appt.    Graciela Treviño RN

## 2024-03-15 NOTE — NURSING NOTE
"Oncology Rooming Note    March 15, 2024 12:48 PM   Douglas Herrera is a 63 year old male who presents for:    Chief Complaint   Patient presents with    Blood Draw     Labs drawn via port by RN. VS taken.    Oncology Clinic Visit     Head and Neck Ca     Initial Vitals: /79 (BP Location: Right arm, Patient Position: Sitting, Cuff Size: Adult Regular)   Pulse 93   Temp 98  F (36.7  C) (Oral)   Resp 18   Wt 56 kg (123 lb 8 oz)   SpO2 98%   BMI 21.20 kg/m   Estimated body mass index is 21.2 kg/m  as calculated from the following:    Height as of 1/11/24: 1.626 m (5' 4\").    Weight as of this encounter: 56 kg (123 lb 8 oz). Body surface area is 1.59 meters squared.  No Pain (0) Comment: Data Unavailable   No LMP for male patient.  Allergies reviewed: Yes  Medications reviewed: Yes    Medications: MEDICATION REFILLS NEEDED TODAY. Provider was notified.  Pharmacy name entered into [facility]: [facility] - [place], [place] - [facility] [address]    Frailty Screening:   Is the patient here for a new oncology consult visit in cancer care? 2. No      Clinical concerns: Refill of magic mouthwash requested.   Nishi was notified.      Susie Tellez CMA              "

## 2024-03-15 NOTE — Clinical Note
3/15/2024         RE: Douglas Herrera  [address] [place] [address]        Dear Colleague,    Thank you for referring your patient, Douglas Herrera, to the [facility] CANCER [facility]. Please see a copy of my visit note below.       [facility] CANCER [facility]    PATIENT NAME: Douglas Herrera  MRN # [id number]   DATE OF VISIT:  March 15, 2024  YOB: 1960     [facility]   Radiation Oncology: Dr. Tomasa Akers  Cardiology: Dr. Qamar Hernandez  PCP: Dr. العراقي   Hepatologist: [facility]     CANCER TYPE: SCC sinonasal   STAGE: [id number] (IVB)  ECOG PS: 1    PD-L1:  NGS: internal panel. Fusion negative. ARID1A S90fs, EGFR exon 20 insertion, APC [id number]    SUMMARY    8/9/23 CT sinus.   8/24/23 MRI sinus. L maxillary sinus mass  9/12/23 ED for epistaxis.   9/13/23 CTA and embolization of L IMAX   10/4/23 Nasal bx. Path: Inverted papilloma with high grade dysplasia  11/10/23 Nasal endoscopy and bx in the OR. C/b severe bleeding. Path: Inverted sinonasal papilloma with severe dysplasia.  11/17/23 MRI. 7.4 x 4.6 x 6.6 cm L sinonasal mass, destruction of nasal turbinates, L maxillary sinus, hard palate, invasion of L  space, involvement of medial and lateral pterygoids and temporalis muscles. Effacement and invasion ipsilateral pterygopalatine fossa, extends and effaces the nasopharynx.   11/30/23 Carotid angiogram. Direct endonasal and transoral embolization L sinonasal tumor, transaterial embolization of the L sphenopalatine artery feeders to the L sinonasal artery tumor   12/1/23 Stealth assisted transnasal endoscopic approach to excise L sinonasal mass. Pre-operative embolization. Path: SCC involving L maxilla.    12/9/23 PET. Hypermetabolic mass centered along the L maxilla, extending superiorly through the floor of the L maxillary sinus, posterior/laterally to the  space, invasion of the pterygoid muscles, extending cranially along the pterygopalatine  fossa and pterygoid plates to the skull base level. Erosion of involved bones including L maxilla, maxillary sinus wall and pterygoid plates. Extension medially to the L lateral nasopharyngeal wall and soft palate. 1 mm fat place between carotid and mass. ~4.9 x 4.0 x 5.4 cm (SUV 24.3). Partially resected since 11/17/23 MRI. 8 mm L 2A node (SUV 5.9), 7 mm L level 2 node (SUV 5.5)  1/2/24 Gtube (IR)  1/4/24 Video swallow. No aspiration  1/8~2/27/24 Chemoradiation with weekly cisplatin.  1/11/24 Port (IR)      JOSSE Cole is seen for follow-up for ongoing toxicity monitoring.   ***    PAST MEDICAL HISTORY  Sinonasal carcinoma as above  HTN  ASCVD. CABG x 3 2019  PE and LLE DVT after CABG . Treated with rivaroxaban  Dyslipidemia  Hepatitis B   SCC R temple s/p MOHS  Ascending aortic aneurysm repair 2019  Hearing loss L   Gout - feet  Depression  Cholecystectomy    CURRENT OUTPATIENT MEDICATIONS  Current Outpatient Medications   Medication    atorvastatin (LIPITOR) 80 MG tablet    entecavir (BARACLUDE) 0.5 MG tablet    fluticasone (FLONASE) 50 MCG/ACT nasal spray    gabapentin (NEURONTIN) 300 MG capsule    ondansetron (ZOFRAN) 8 MG tablet    oxyCODONE (ROXICODONE) 5 MG tablet    prochlorperazine (COMPAZINE) 10 MG tablet    senna-docusate (SENNA S) 8.6-50 MG tablet    sodium chloride (OCEAN) 0.65 % nasal spray    acetaminophen (TYLENOL) 325 MG tablet    capsaicin (ZOSTRIX) 0.025 % external cream    chlorhexidine (PERIDEX) 0.12 % solution    magic mouthwash (ENTER INGREDIENTS IN COMMENTS) suspension    nitroGLYcerin (NITROSTAT) 0.4 MG sublingual tablet    predniSONE (DELTASONE) 10 MG tablet    traZODone (DESYREL) 50 MG tablet     No current facility-administered medications for this visit.     ALLERGIES  No Known Allergies     PHYSICAL EXAM  /79 (BP Location: Right arm, Patient Position: Sitting, Cuff Size: Adult Regular)   Pulse 93   Temp 98  F (36.7  C) (Oral)   Resp 18   Wt 56 kg (123 lb 8 oz)   SpO2  98%   BMI 21.20 kg/m      GEN: NAD  HEENT: EOMI, no icterus, injection or pallor. Oropharynx shows mucositis with some focal mildly ulcerated are on the L posterior hard palate   RESP: cta bilaterally, no wheezing  CV: rrr, no m/g/r  EXT: no edema or erythema over knees bilaterally   NEURO: alert  SKIN: no rashes    LABORATORY AND IMAGING STUDIES  Most Recent 3 CBC's:  Recent Labs   Lab Test 03/15/24  1206 03/04/24  1326 02/23/24  1341   WBC 8.0 5.3 3.6*   HGB 9.0* 9.0* 8.9*   MCV 84 81 82    306 166   ANEUTAUTO 5.6 3.7 2.3    Most Recent 3 BMP's:  Recent Labs   Lab Test 03/15/24  1206 03/04/24  1326 02/23/24  1341    134* 134*   POTASSIUM 4.2 4.3 4.5   CHLORIDE 101 97* 97*   CO2 26 25 27   BUN 19.0 24.8* 24.8*   CR 0.73 0.71 0.77   ANIONGAP 10 12 10   FLORENCE 9.4 9.2 9.0   * 146* 103*   PROTTOTAL 7.6 7.5 7.3   ALBUMIN 3.3* 3.2* 3.4*    Most Recent 2 LFT's:  Recent Labs   Lab Test 03/15/24  1206 03/04/24  1326   AST 27 42   ALT 19 33   ALKPHOS 90 79   BILITOTAL 0.4 0.4    Most Recent TSH and T4:  Recent Labs   Lab Test 01/08/24  0717   TSH 3.35   T4 1.00     Phos/Mag:  Lab Results   Component Value Date    MAG 1.7 03/15/2024    MAG 1.7 03/04/2024    MAG 1.7 02/23/2024      I reviewed the above labs today.    ASSESSMENT AND PLAN  SCC L maxillary sinus, [id number] (IVB), ARID1 mutation,  EGFR exon 20 insertion: Completed cisplatin (2/12) and RT 2/27. Side effects of treatment increasing as expected. Due to difficulty in taking in large volumes through G-tube, continued supplementation of IVF is recommended for at least the next 2 weeks, I updated [facility] with this order. We will see him back in about 10 days (~3/15) for close monitoring and repeat labs. Reiterated anticipated recovery again today: symptoms expected to get worse for 1-2 weeks after RT before slowly improving.   -Follow up with Nishi late next week, interval short term onc follow up pending his progress between now and then  -He will see  Dr. Post after the 3 month post-treatment PET/CT with labs    Mucositis, odynophagia: Was on oxycodone 5 mg every 6 hours, now pulled back to only at HS. I actually recommended he add a dose or two during the day if it helps him work on oral intake during this acute recovery phase.  No constipation concerns so far. Continue Tylenol and MMW, s/s rinses too.      Gout flare: Resolved    Knee pain: no edema or redness today. Continue supportive care with ice, movement, and lidocaine    Hypomagnesia:  normalized today    Hypercalcemia: Malignancy. Resolved quickly with starting chemoradiation    Hearing loss: Unchanged as above     Hepatitis B: HBSAby negative, SAg +, cAby +, HBV PCR negative 1/23/24. HCV negative.     H/o PE/DVT: 5/8/2019 CTA report scanned into [employer], reviewed. Small PEs. Also had LLE DVT, acute, occlusive. Report in EPic and reviewed. Presented with CP and LLE swelling. Was on rivaroxaban, completed course.     H/o undefined hypothyroidism: TFTs 1/8/24 normal     Nishi Michele CNP  ---  *** minutes spent on the date of the encounter doing {2021 E&M time in:820318}.             [facility] CANCER [facility]    PATIENT NAME: Douglas Herrera  MRN # [id number]   DATE OF VISIT:  March 15, 2024  YOB: 1960     [facility]   Radiation Oncology: Dr. Tomasa Akers  Cardiology: Dr. Qamar Hernandez  PCP: Dr. العراقي   Hepatologist: [facility]     CANCER TYPE: SCC sinonasal   STAGE: uB0mV5fP2 (IVB)  ECOG PS: 1    PD-L1:  NGS: internal panel. Fusion negative. ARID1A S90fs, EGFR exon 20 insertion, APC [id number]    SUMMARY    8/9/23 CT sinus.   8/24/23 MRI sinus. L maxillary sinus mass  9/12/23 ED for epistaxis.   9/13/23 CTA and embolization of L IMAX   10/4/23 Nasal bx. Path: Inverted papilloma with high grade dysplasia  11/10/23 Nasal endoscopy and bx in the OR. C/b severe bleeding. Path: Inverted sinonasal papilloma with severe dysplasia.  11/17/23 MRI. 7.4 x 4.6 x 6.6 cm L  sinonasal mass, destruction of nasal turbinates, L maxillary sinus, hard palate, invasion of L  space, involvement of medial and lateral pterygoids and temporalis muscles. Effacement and invasion ipsilateral pterygopalatine fossa, extends and effaces the nasopharynx.   11/30/23 Carotid angiogram. Direct endonasal and transoral embolization L sinonasal tumor, transaterial embolization of the L sphenopalatine artery feeders to the L sinonasal artery tumor   12/1/23 Stealth assisted transnasal endoscopic approach to excise L sinonasal mass. Pre-operative embolization. Path: SCC involving L maxilla.    12/9/23 PET. Hypermetabolic mass centered along the L maxilla, extending superiorly through the floor of the L maxillary sinus, posterior/laterally to the  space, invasion of the pterygoid muscles, extending cranially along the pterygopalatine fossa and pterygoid plates to the skull base level. Erosion of involved bones including L maxilla, maxillary sinus wall and pterygoid plates. Extension medially to the L lateral nasopharyngeal wall and soft palate. 1 mm fat place between carotid and mass. ~4.9 x 4.0 x 5.4 cm (SUV 24.3). Partially resected since 11/17/23 MRI. 8 mm L 2A node (SUV 5.9), 7 mm L level 2 node (SUV 5.5)  1/2/24 Gtube (IR)  1/4/24 Video swallow. No aspiration  1/8~2/27/24 Chemoradiation with weekly cisplatin.  1/11/24 Port (IR)      SUBJECTIVE  Douglas is seen for follow-up for ongoing toxicity monitoring.   -Throat pain has significantly improved. He still has irritation to his left lateral tongue and buccal mucosa. Rinses with s/s at least 30 times per day. Also using MMW PRN but is currently out. He started tapering off of gabapentin, now on 900/900/600.   -Started swallowing water/liquids by mouth. Has tried a few bites of food but has no desire to eat due to lack of taste  -Consistently gets in 4 cartons of formula via PEG daily  -Receiving IVF with home infusion 3x/week. Using  port  -Nose has been both runny and congested, more pronounced over the past week. Using nasal spray and humidifier at home.  -Both pain in foot s/t gout and knee pain have resolved  -No dizziness/lightheadedness    PAST MEDICAL HISTORY  Sinonasal carcinoma as above  HTN  ASCVD. CABG x 3 2019  PE and LLE DVT after CABG . Treated with rivaroxaban  Dyslipidemia  Hepatitis B   SCC R temple s/p MOHS  Ascending aortic aneurysm repair 2019  Hearing loss L   Gout - feet  Depression  Cholecystectomy    CURRENT OUTPATIENT MEDICATIONS  Current Outpatient Medications   Medication     atorvastatin (LIPITOR) 80 MG tablet     entecavir (BARACLUDE) 0.5 MG tablet     fluticasone (FLONASE) 50 MCG/ACT nasal spray     gabapentin (NEURONTIN) 300 MG capsule     magic mouthwash (ENTER INGREDIENTS IN COMMENTS) suspension     ondansetron (ZOFRAN) 8 MG tablet     oxyCODONE (ROXICODONE) 5 MG tablet     prochlorperazine (COMPAZINE) 10 MG tablet     senna-docusate (SENNA S) 8.6-50 MG tablet     sodium chloride (OCEAN) 0.65 % nasal spray     acetaminophen (TYLENOL) 325 MG tablet     capsaicin (ZOSTRIX) 0.025 % external cream     chlorhexidine (PERIDEX) 0.12 % solution     nitroGLYcerin (NITROSTAT) 0.4 MG sublingual tablet     predniSONE (DELTASONE) 10 MG tablet     traZODone (DESYREL) 50 MG tablet     No current facility-administered medications for this visit.     ALLERGIES  No Known Allergies     PHYSICAL EXAM  /79 (BP Location: Right arm, Patient Position: Sitting, Cuff Size: Adult Regular)   Pulse 93   Temp 98  F (36.7  C) (Oral)   Resp 18   Wt 56 kg (123 lb 8 oz)   SpO2 98%   BMI 21.20 kg/m      GEN: NAD  HEENT: EOMI, no icterus, injection or pallor. Oropharynx pink, moist with notable mucositis to left buccal mucosa. No thrush.  RESP: cta bilaterally, no wheezing  CV: rrr, no m/g/r  GI: PEG intact, no surrounding erythema or drainage  NEURO: alert  SKIN: no rashes    LABORATORY AND IMAGING STUDIES  Most Recent 3 CBC's:  Recent  Labs   Lab Test 03/15/24  1206 03/04/24  1326 02/23/24  1341   WBC 8.0 5.3 3.6*   HGB 9.0* 9.0* 8.9*   MCV 84 81 82    306 166   ANEUTAUTO 5.6 3.7 2.3    Most Recent 3 BMP's:  Recent Labs   Lab Test 03/15/24  1206 03/04/24  1326 02/23/24  1341    134* 134*   POTASSIUM 4.2 4.3 4.5   CHLORIDE 101 97* 97*   CO2 26 25 27   BUN 19.0 24.8* 24.8*   CR 0.73 0.71 0.77   ANIONGAP 10 12 10   FLORENCE 9.4 9.2 9.0   * 146* 103*   PROTTOTAL 7.6 7.5 7.3   ALBUMIN 3.3* 3.2* 3.4*    Most Recent 2 LFT's:  Recent Labs   Lab Test 03/15/24  1206 03/04/24  1326   AST 27 42   ALT 19 33   ALKPHOS 90 79   BILITOTAL 0.4 0.4    Most Recent TSH and T4:  Recent Labs   Lab Test 01/08/24  0717   TSH 3.35   T4 1.00     Phos/Mag:  Lab Results   Component Value Date    MAG 1.7 03/15/2024    MAG 1.7 03/04/2024    MAG 1.7 02/23/2024      I reviewed the above labs today.    ASSESSMENT AND PLAN  SCC L maxillary sinus, eL2aA2yV4 (IVB), ARID1 mutation,  EGFR exon 20 insertion: Completed cisplatin (2/12) and RT 2/27. Symptoms are beginning to slowly improve including pain. Encouraged he increase fluids by mouth and introduce soft foods. Continue TF for now. Close follow-up with SLP and RD arranged. Tapering gabapentin appropriately. Will refill MMW for symptomatic support of healing mucositis to left buccal mucosa. I recommend continuing supplemental IVF for one more week then discontinuing to ensure he does not rely on the IV hydration. He will work on increasing oral fluid intake in the meantime. Due to improvement in symptoms and labs, no need for further KENNEDI follow-up. Encouraged patient and family to reach out with any concerns and reassured them we can add appointments at any time.   -Follow-up with ENT and rad onc as scheduled  -He will see Dr. Post after the 3 month post-treatment PET/CT with labs    Mucositis, odynophagia: Stopped oxycodone. Tapering gabapentin. MMW refilled today. Can also use Tylenol PRN. Continue s/s  rinses.    Nasal congestion, rhinorrhea: s/t to RT, anticipate improvement over the next few weeks    Gout flare: Resolved    Knee pain: resolved    Hypomagnesia:  normalized today    Hypercalcemia: Malignancy. Resolved quickly with starting chemoradiation    Hearing loss: Unchanged as above     Hepatitis B: HBSAby negative, SAg +, cAby +, HBV PCR negative 1/23/24. HCV negative.     H/o PE/DVT: 5/8/2019 CTA report scanned into [employer], reviewed. Small PEs. Also had LLE DVT, acute, occlusive. Report in EPic and reviewed. Presented with CP and LLE swelling. Was on rivaroxaban, completed course.     H/o undefined hypothyroidism: TFTs 1/8/24 normal     Nishi Michele CNP  ---  32 minutes spent on the date of the encounter doing chart review, review of test results, interpretation of tests, patient visit, documentation, and discussion with family.          Again, thank you for allowing me to participate in the care of your patient.        Sincerely,        Nishi Michele CNP

## 2024-03-25 ENCOUNTER — VIRTUAL VISIT (OUTPATIENT)
Dept: ONCOLOGY | Facility: CLINIC | Age: 64
End: 2024-03-25
Attending: DIETITIAN, REGISTERED
Payer: COMMERCIAL

## 2024-03-25 DIAGNOSIS — C31.0 SQUAMOUS CELL CARCINOMA OF MAXILLARY SINUS (H): Primary | ICD-10-CM

## 2024-03-25 PROCEDURE — 97803 MED NUTRITION INDIV SUBSEQ: CPT | Mod: TEL | Performed by: DIETITIAN, REGISTERED

## 2024-03-25 NOTE — PROGRESS NOTES
CLINICAL NUTRITION SERVICES - REASSESSMENT NOTE   EVALUATION OF PREVIOUS PLAN OF CARE:   Time spent with patient: 15 minutes  Pt accompanied by: his son, Tulio in radiation clinic at time of OTV  Referring Physician: Sincere 1/2/24  Z51.11 (ICD-10-CM) - Encounter for antineoplastic chemotherapy   E43 (ICD-10-CM) - Severe protein-calorie malnutrition (H24)   C31.0 (ICD-10-CM) - Carcinoma of maxillary sinus (H)     Patient has completed treatment today, 2/27     Monitoring from previous assessment:   -Food/Fluid intake - Douglas has been eating some porridge and taking liquids by mouth. Tulio reports that some days are better than others but definitely improved.   Tulio is encouraged to have his father try taking one Boost/Ensure shake by mouth to work oral muscles and eventually reduce EN by one carton each week to help transition off tube feeding per discussion today.     -EN intake-  4 cartons of EN per day via gravity bag feedings. He continues to take 2 cartons of tube feeding for breakfast and 2 for dinner. His feedings are taking ~90 min.   Formula: Osmolite 1.5 fernando  Volume: 4 cartons/day (948 mL, 720 ml free water)  Provisions:  1420kcal (23kcal/kg), 60 g protein (1.0g/kg), 176g CHO, 0g fiber     -Weight trends - down 1 lb x past one month  Wt Readings from Last 10 Encounters:   03/15/24 56 kg (123 lb 8 oz)   03/04/24 54.9 kg (121 lb)   02/26/24 55.8 kg (123 lb)   02/20/24 55.8 kg (123 lb)   02/16/24 55.8 kg (123 lb)   02/12/24 56.2 kg (124 lb)   02/12/24 56.5 kg (124 lb 9.6 oz)   02/05/24 57.2 kg (126 lb)   02/05/24 57.2 kg (126 lb 3.2 oz)   01/30/24 59.2 kg (130 lb 8 oz)        Previous Goals:   1.PO and EN to meet 100% estimated nutrition needs  2.Aim for at least 2000 fernando, 80-100g protein and 4 cups water/electrolyte fluids/day    Evaluation: Not met     Previous Nutrition Diagnosis:   Inadequate oral intake related to odynophagia as evidenced by pt reliant on EN to meet >% est nutrition needs.       Evaluation: No change     NEW FINDINGS:   No new findings     CURRENT NUTRITION DIAGNOSIS   Inadequate oral intake related to odynophagia as evidenced by pt reliant on EN to meet >% est nutrition needs.      INTERVENTIONS   EN Composition, EN Schedule, Feeding Tube Flush- reviewed current regimen. Encouraged to try replacing one carton of EN with one comparable bottle of Boost or Ensure as able. Reviewed goals for weaning of EN and transition to oral intake, removal of FT, weight trends etc.      Goals   1.PO and EN to meet 100% estimated nutrition needs  2.Aim for at least 2000 fernando, 80-100g protein and 4 cups water/electrolyte fluids/day     Follow up/Monitorin week follow up,  at 10am- phone call to son per patient request  Food intake, Enteral Nutrition intake, and Weight     Mayela An RD, , LD  [facility]  [phone]

## 2024-03-25 NOTE — LETTER
3/25/2024         RE: Douglas Herrera  [address] [place] [address]        Dear Colleague,    Thank you for referring your patient, Douglas Herrera, to the [facility]. Please see a copy of my visit note below.    CLINICAL NUTRITION SERVICES - REASSESSMENT NOTE   EVALUATION OF PREVIOUS PLAN OF CARE:   Time spent with patient: 15 minutes  Pt accompanied by: his son, Tulio in radiation clinic at time of OTV  Referring Physician: Sincere 1/2/24  Z51.11 (ICD-10-CM) - Encounter for antineoplastic chemotherapy   E43 (ICD-10-CM) - Severe protein-calorie malnutrition (H24)   C31.0 (ICD-10-CM) - Carcinoma of maxillary sinus (H)     Patient has completed treatment today, 2/27     Monitoring from previous assessment:   -Food/Fluid intake - Douglas has been eating some porridge and taking liquids by mouth. Tulio reports that some days are better than others but definitely improved.   Tulio is encouraged to have his father try taking one Boost/Ensure shake by mouth to work oral muscles and eventually reduce EN by one carton each week to help transition off tube feeding per discussion today.     -EN intake-  4 cartons of EN per day via gravity bag feedings. He continues to take 2 cartons of tube feeding for breakfast and 2 for dinner. His feedings are taking ~90 min.   Formula: Osmolite 1.5 fernando  Volume: 4 cartons/day (948 mL, 720 ml free water)  Provisions:  1420kcal (23kcal/kg), 60 g protein (1.0g/kg), 176g CHO, 0g fiber     -Weight trends - down 1 lb x past one month  Wt Readings from Last 10 Encounters:   03/15/24 56 kg (123 lb 8 oz)   03/04/24 54.9 kg (121 lb)   02/26/24 55.8 kg (123 lb)   02/20/24 55.8 kg (123 lb)   02/16/24 55.8 kg (123 lb)   02/12/24 56.2 kg (124 lb)   02/12/24 56.5 kg (124 lb 9.6 oz)   02/05/24 57.2 kg (126 lb)   02/05/24 57.2 kg (126 lb 3.2 oz)   01/30/24 59.2 kg (130 lb 8 oz)        Previous Goals:   1.PO and EN to meet 100% estimated nutrition needs  2.Aim for at  least 2000 fernando, 80-100g protein and 4 cups water/electrolyte fluids/day    Evaluation: Not met     Previous Nutrition Diagnosis:   Inadequate oral intake related to odynophagia as evidenced by pt reliant on EN to meet >% est nutrition needs.      Evaluation: No change     NEW FINDINGS:   No new findings     CURRENT NUTRITION DIAGNOSIS   Inadequate oral intake related to odynophagia as evidenced by pt reliant on EN to meet >% est nutrition needs.      INTERVENTIONS   EN Composition, EN Schedule, Feeding Tube Flush- reviewed current regimen. Encouraged to try replacing one carton of EN with one comparable bottle of Boost or Ensure as able. Reviewed goals for weaning of EN and transition to oral intake, removal of FT, weight trends etc.      Goals   1.PO and EN to meet 100% estimated nutrition needs  2.Aim for at least 2000 fernando, 80-100g protein and 4 cups water/electrolyte fluids/day     Follow up/Monitorin week follow up,  at 10am- phone call to son per patient request  Food intake, Enteral Nutrition intake, and Weight     Mayela An RD, , LD  [facility]  [phone]

## 2024-04-04 ENCOUNTER — OFFICE VISIT (OUTPATIENT)
Dept: OTOLARYNGOLOGY | Facility: CLINIC | Age: 64
End: 2024-04-04
Payer: COMMERCIAL

## 2024-04-04 ENCOUNTER — THERAPY VISIT (OUTPATIENT)
Dept: SPEECH THERAPY | Facility: CLINIC | Age: 64
End: 2024-04-04
Payer: COMMERCIAL

## 2024-04-04 VITALS
BODY MASS INDEX: 21.07 KG/M2 | SYSTOLIC BLOOD PRESSURE: 105 MMHG | DIASTOLIC BLOOD PRESSURE: 67 MMHG | WEIGHT: 123.4 LBS | HEIGHT: 64 IN | HEART RATE: 94 BPM | OXYGEN SATURATION: 97 %

## 2024-04-04 DIAGNOSIS — C30.0 MALIGNANT NEOPLASM OF NASAL CAVITIES (H): Primary | ICD-10-CM

## 2024-04-04 DIAGNOSIS — B18.1 HEPATITIS B, CHRONIC (H): ICD-10-CM

## 2024-04-04 DIAGNOSIS — R13.12 DYSPHAGIA, OROPHARYNGEAL PHASE: Primary | ICD-10-CM

## 2024-04-04 DIAGNOSIS — C31.9 MALIGNANT NEOPLASM OF PARANASAL SINUS (H): ICD-10-CM

## 2024-04-04 PROCEDURE — 99214 OFFICE O/P EST MOD 30 MIN: CPT | Mod: 25 | Performed by: OTOLARYNGOLOGY

## 2024-04-04 PROCEDURE — 31237 NSL/SINS NDSC SURG BX POLYPC: CPT | Mod: LT | Performed by: OTOLARYNGOLOGY

## 2024-04-04 PROCEDURE — 92526 ORAL FUNCTION THERAPY: CPT | Mod: GN | Performed by: SPEECH-LANGUAGE PATHOLOGIST

## 2024-04-04 ASSESSMENT — PAIN SCALES - GENERAL: PAINLEVEL: NO PAIN (0)

## 2024-04-04 NOTE — PATIENT INSTRUCTIONS
You were seen in the ENT Clinic today by Dr. Regan. If you have any questions or concerns after your appointment, please contact us (see below)    Please return to clinic end of May after your imaging scheduled on 5/17/24    How to Contact Us:  Send a [email or web address] message to your provider. Our team will respond to you via [email or web address]. Occasionally, we will need to call you to get further information.  For urgent matters (Monday-Friday), call the ENT Clinic: [phone] and speak with a call center team member - they will route your call appropriately.   If you'd like to speak directly with a nurse, please find our contact information below. We do our best to check voicemail frequently throughout the day, and will work to call you back within 1-2 days. For urgent matters, please use the general clinic phone numbers listed above.    Carly CALLEJAS RN, BSN  RN Care Coordinator, ENT Clinic  [facility]  Direct: [phone]

## 2024-04-04 NOTE — PROGRESS NOTES
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       History of Present Illness: Mr. Kaminski is here in the otolaryngology clinic for follow-up.  He completed his concurrent chemoradiation therapy on February 27, 2024.  He has been recovering from his treatment steadily.  He continues to use his gastrostomy tube for feedings.  His main concern today to sinonasal congestion and postnasal drainage.  He denies any pain.  No epistaxis.    MEDICATIONS:     Current Outpatient Medications   Medication Sig Dispense Refill    acetaminophen (TYLENOL) 325 MG tablet Take 2 tablets (650 mg) by mouth every 4 hours as needed for other or pain (Patient not taking: Reported on 3/15/2024) 50 tablet 0    atorvastatin (LIPITOR) 80 MG tablet TAKE 1 TABLET (80 MG TOTAL) BY MOUTH AT BEDTIME/ TXHUA HMO NOJ 1 LUB TSHUAJ THAUM MUS PW PAB ZOO NTSHAV MUAJ ROJ 90 tablet 3    capsaicin (ZOSTRIX) 0.025 % external cream Apply topically 3 times daily as needed (Patient not taking: Reported on 3/15/2024)      chlorhexidine (PERIDEX) 0.12 % solution RINSE WITH 10-15 ML BY MOUTH FOR 30 SECONDS TWO TIMES A DAY. SPIT OUT EXCESS. DO NOT SWALLOW. NOTHING BY MOUTH FOR 30 MINUTES (Patient not taking: Reported on 3/15/2024)      entecavir (BARACLUDE) 0.5 MG tablet Take 0.5 mg by mouth every morning Take 1 hour before a meal or 2 hours after a meal.      fluticasone (FLONASE) 50 MCG/ACT nasal spray Spray 2 sprays into both nostrils as needed      gabapentin (NEURONTIN) 300 MG capsule Take 3 capsules (900 mg) by mouth 3 times daily Taper dose to 900 mg three times daily as per instructions given in Radiation Oncology 270 capsule 3    magic mouthwash (ENTER INGREDIENTS IN COMMENTS) suspension Take 10 mLs  by mouth every 4 hours as needed (mucositis) 240 mL 1    nitroGLYcerin (NITROSTAT) 0.4 MG sublingual tablet Place 0.4 mg under the tongue every 5 minutes as needed (Patient not taking: Reported on 3/15/2024)      ondansetron (ZOFRAN) 8 MG tablet Take 1 tablet (8 mg) by mouth every 8 hours as needed for nausea (vomiting) 30 tablet 2    oxyCODONE (ROXICODONE) 5 MG tablet Take 1 tablet (5 mg) by mouth every 6 hours as needed for pain 30 tablet 0    predniSONE (DELTASONE) 10 MG tablet Take 1 tablet (10 mg) by mouth daily (Patient not taking: Reported on 3/15/2024) 3 tablet 0    prochlorperazine (COMPAZINE) 10 MG tablet Take 1 tablet (10 mg) by mouth every 6 hours as needed for nausea or vomiting 30 tablet 2    senna-docusate (SENNA S) 8.6-50 MG tablet Take 1 tablet by mouth 2 times daily as needed for constipation 30 tablet 1    sodium chloride (OCEAN) 0.65 % nasal spray Spray 2 sprays in nostril daily as needed for congestion 88 mL 3    traZODone (DESYREL) 50 MG tablet Take 50 mg by mouth nightly as needed for sleep (Patient not taking: Reported on 3/15/2024)         ALLERGIES:  No Known Allergies    HABITS/SOCIAL HISTORY:    PAST MEDICAL HISTORY:   Past Medical History:   Diagnosis Date    Ascending aortic aneurysm (H24)     Coronary artery disease     Depression     Gout     History of anesthesia complications     Hyperlipemia     Hypertension     PONV (postoperative nausea and vomiting)         FAMILY HISTORY:    Family History   Problem Relation Age of Onset    Cerebrovascular Disease Mother 90.00    Acute Myocardial Infarction No family hx of        REVIEW OF SYSTEMS:  12 point ROS was negative other than the symptoms noted above in the HPI.    PHYSICAL EXAMINATION:      Constitutional:  The patient was accompanied, well-groomed, and in no acute distress.     Skin: Normal:  warm and pink without rash   Neurologic: Alert and oriented x 3.  CN's III-XII within normal limits.  Voice normal.    Psychiatric: The  patient's affect was calm, cooperative, and appropriate.     Communication:  Normal; communicates verbally, normal voice quality.   Respiratory: Breathing comfortably without stridor or exertion of accessory muscles.    Head/Face:  Normocephalic and atraumatic.  No lesions or scars. No sinus tenderness.    Salivary glands -  Normal size, no tenderness, swelling, or palpable masses   Eyes: Pupils were equal and reactive.  Extraocular movement intact.         Nose: Sinuses were non-tender.  Anterior rhinoscopy revealed midline septum and absence of purulence or polyps.     Oral Cavity: Normal tongue, floor of mouth, buccal mucosa, and palate.  No lesions or masses on inspection or palpation.     Oropharynx: Normal mucosa, palate symmetric with normal elevation. No abnormal lymph tissue in the oropharynx.             Neck: Supple with normal laryngeal and tracheal landmarks.  The parotid beds were without masses.  No palpable thyroid.  Normal range of motion   Lymphatic: There is no palpable lymphadenopathy in the neck.           Nasal endoscopy with debridement: Consent for nasal endoscopy was obtained.  I confirmed correctness of the procedure and identity of the patient.  Nasal endoscopy was indicated due to sinonasal malignancy.  The nose was topically decongested and anesthetized.  The nasal endoscope was passed under endoscopic vision.  On the left side I removed abundant sinonasal crust.  All this crust was removed today using suction as small as sinonasal forceps.  On today's examination I did not see any gross tumor.     IMPRESSION AND PLAN: 63-year-old male status post concurrent chemoradiation therapy for left stage IVb sinonasal carcinoma patient is doing relatively well.  Today I did remove abundant sinonasal crust.  I would like to see the patient back in the after his 3-month PET/CT.  I am encouraging him to use sinonasal rinses to control some of the crusting.         Damon Regan MD,  AYANASTodd  Otolaryngology- Head & Neck Surgery  [phone]

## 2024-04-04 NOTE — LETTER
4/4/2024       RE: Douglas Herrera  [address] [place] [address]     Dear Colleague,    Thank you for referring your patient, Douglas Herrera, to the [facility] [place] [facility]. Please see a copy of my visit note below.    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       History of Present Illness: Mr. Kaminski is here in the otolaryngology clinic for follow-up.  He completed his concurrent chemoradiation therapy on February 27, 2024.  He has been recovering from his treatment steadily.  He continues to use his gastrostomy tube for feedings.  His main concern today to sinonasal congestion and postnasal drainage.  He denies any pain.  No epistaxis.    MEDICATIONS:     Current Outpatient Medications   Medication Sig Dispense Refill     acetaminophen (TYLENOL) 325 MG tablet Take 2 tablets (650 mg) by mouth every 4 hours as needed for other or pain (Patient not taking: Reported on 3/15/2024) 50 tablet 0     atorvastatin (LIPITOR) 80 MG tablet TAKE 1 TABLET (80 MG TOTAL) BY MOUTH AT BEDTIME/ TXHUA HMO NOJ 1 LUB TSHUAJ THAUM MUS PW PAB ZOO NTSHAV MUAJ ROJ 90 tablet 3     capsaicin (ZOSTRIX) 0.025 % external cream Apply topically 3 times daily as needed (Patient not taking: Reported on 3/15/2024)       chlorhexidine (PERIDEX) 0.12 % solution RINSE WITH 10-15 ML BY MOUTH FOR 30 SECONDS TWO TIMES A DAY. SPIT OUT EXCESS. DO NOT SWALLOW. NOTHING BY MOUTH FOR 30 MINUTES (Patient not taking: Reported on 3/15/2024)       entecavir (BARACLUDE) 0.5 MG tablet Take 0.5 mg by mouth every morning Take 1 hour before a meal or 2 hours after a meal.       fluticasone (FLONASE) 50 MCG/ACT  nasal spray Spray 2 sprays into both nostrils as needed       gabapentin (NEURONTIN) 300 MG capsule Take 3 capsules (900 mg) by mouth 3 times daily Taper dose to 900 mg three times daily as per instructions given in Radiation Oncology 270 capsule 3     magic mouthwash (ENTER INGREDIENTS IN COMMENTS) suspension Take 10 mLs by mouth every 4 hours as needed (mucositis) 240 mL 1     nitroGLYcerin (NITROSTAT) 0.4 MG sublingual tablet Place 0.4 mg under the tongue every 5 minutes as needed (Patient not taking: Reported on 3/15/2024)       ondansetron (ZOFRAN) 8 MG tablet Take 1 tablet (8 mg) by mouth every 8 hours as needed for nausea (vomiting) 30 tablet 2     oxyCODONE (ROXICODONE) 5 MG tablet Take 1 tablet (5 mg) by mouth every 6 hours as needed for pain 30 tablet 0     predniSONE (DELTASONE) 10 MG tablet Take 1 tablet (10 mg) by mouth daily (Patient not taking: Reported on 3/15/2024) 3 tablet 0     prochlorperazine (COMPAZINE) 10 MG tablet Take 1 tablet (10 mg) by mouth every 6 hours as needed for nausea or vomiting 30 tablet 2     senna-docusate (SENNA S) 8.6-50 MG tablet Take 1 tablet by mouth 2 times daily as needed for constipation 30 tablet 1     sodium chloride (OCEAN) 0.65 % nasal spray Spray 2 sprays in nostril daily as needed for congestion 88 mL 3     traZODone (DESYREL) 50 MG tablet Take 50 mg by mouth nightly as needed for sleep (Patient not taking: Reported on 3/15/2024)         ALLERGIES:  No Known Allergies    HABITS/SOCIAL HISTORY:    PAST MEDICAL HISTORY:   Past Medical History:   Diagnosis Date     Ascending aortic aneurysm (H24)      Coronary artery disease      Depression      Gout      History of anesthesia complications      Hyperlipemia      Hypertension      PONV (postoperative nausea and vomiting)         FAMILY HISTORY:    Family History   Problem Relation Age of Onset     Cerebrovascular Disease Mother 90.00     Acute Myocardial Infarction No family hx of        REVIEW OF SYSTEMS:  12 point  ROS was negative other than the symptoms noted above in the HPI.    PHYSICAL EXAMINATION:      Constitutional:  The patient was accompanied, well-groomed, and in no acute distress.     Skin: Normal:  warm and pink without rash   Neurologic: Alert and oriented x 3.  CN's III-XII within normal limits.  Voice normal.    Psychiatric: The patient's affect was calm, cooperative, and appropriate.     Communication:  Normal; communicates verbally, normal voice quality.   Respiratory: Breathing comfortably without stridor or exertion of accessory muscles.    Head/Face:  Normocephalic and atraumatic.  No lesions or scars. No sinus tenderness.    Salivary glands -  Normal size, no tenderness, swelling, or palpable masses   Eyes: Pupils were equal and reactive.  Extraocular movement intact.         Nose: Sinuses were non-tender.  Anterior rhinoscopy revealed midline septum and absence of purulence or polyps.     Oral Cavity: Normal tongue, floor of mouth, buccal mucosa, and palate.  No lesions or masses on inspection or palpation.     Oropharynx: Normal mucosa, palate symmetric with normal elevation. No abnormal lymph tissue in the oropharynx.             Neck: Supple with normal laryngeal and tracheal landmarks.  The parotid beds were without masses.  No palpable thyroid.  Normal range of motion   Lymphatic: There is no palpable lymphadenopathy in the neck.           Nasal endoscopy with debridement: Consent for nasal endoscopy was obtained.  I confirmed correctness of the procedure and identity of the patient.  Nasal endoscopy was indicated due to sinonasal malignancy.  The nose was topically decongested and anesthetized.  The nasal endoscope was passed under endoscopic vision.  On the left side I removed abundant sinonasal crust.  All this crust was removed today using suction as small as sinonasal forceps.  On today's examination I did not see any gross tumor.     IMPRESSION AND PLAN: 63-year-old male status post concurrent  chemoradiation therapy for left stage IVb sinonasal carcinoma patient is doing relatively well.  Today I did remove abundant sinonasal crust.  I would like to see the patient back in the after his 3-month PET/CT.  I am encouraging him to use sinonasal rinses to control some of the crusting.         Damon Regan MD, M.S.  Otolaryngology- Head & Neck Surgery  [phone]

## 2024-04-04 NOTE — PROGRESS NOTES
[facility]                                                                                   OUTPATIENT SPEECH LANGUAGE PATHOLOGY    PLAN OF TREATMENT FOR OUTPATIENT REHABILITATION   Patient's Last Name, First Name, Douglas Gomez    YOB: 1960   Provider's Name   [facility]   Medical Record No.  [id number]     Onset Date: 01/08/24 Start of Care Date: 01/08/24     Medical Diagnosis:  Malignant neoplasm of paranasal sinus      SLP Treatment Diagnosis: Mild oropharyngeal dysphagia expected to worsen to moderately severe during chemoXRT  Plan of Treatment  Frequency/Duration: 4x/month  / 4 months     Certification date from 04/04/24   To 07/02/24          See note for plan of treatment details and functional goals     Waleska Blankenship, SLP                         I CERTIFY THE NEED FOR THESE SERVICES FURNISHED UNDER        THIS PLAN OF TREATMENT AND WHILE UNDER MY CARE     (Physician attestation of this document indicates review and certification of the therapy plan).              Referring Provider:  Dr. Regan    Initial Assessment  See Epic Evaluation- 01/08/24            PLAN  Continue therapy per current plan of care.    Beginning/End Dates of Progress Note Reporting Period:    1/8/24 to 04/04/2024    Referring Provider:  Dr. Regan         04/04/24 1545   Appointment Info   Treating Provider Waleska Blankenship MS, CCC-SLP   Visits Used 7   Medical Diagnosis Malignant neoplasm of paranasal sinus   SLP Tx Diagnosis Mild oropharyngeal dysphagia expected to worsen to moderately severe during chemoXRT   Quick Adds Certification   Progress Note/Certification   Start Of Care Date 01/08/24   Onset Of Illness/injury Or Date Of Surgery 01/08/24   Therapy Frequency 4x/month   Predicted Duration 4 months   Certification date from 04/04/24   Certification date to 07/02/24       Present Yes     Language Hmong    ID or First/Last Name Telephone   Subjective Report   Subjective Report Patient with [id number] SCC left maxillary sinus s/p transnasal endoscopic excision and completed chemoXRT 1/8/24-2/27/24. Pt is seen today in conjunction with ENT clinic follow up visit.   Objective Measures   Objective Measures Objective Measure 1   Objective Measure 1   Objective Measure therabite jaw ROM   Details 12 mm   SLP Goals   SLP Goals 1;2   SLP Goal 1   Goal Identifier PO   Goal Description 1. Pt will tolerate regular, moist textures and thin liquids with no overt clinical s/sx of penetration/aspiration in 100% of PO trials.   Rationale To maximize safety, ease and/or independence of oral intake   Goal Progress Pt reports he is having significantly less pain when eating. He still has dysgeusia which is bothersome but he is eating rice poridge and boiled pigs feet which are soft. He likes these foods but notes taste is still very bland. He has hints of salty flavor. Provided training on the trajectory of recovery for taste. He denies any pain when eating. He also demonstrated multiple large sips of thin liquid with no overt clinical s/sx of aspiration/penetration noted. He denies coughing/choking when eating/drinking. He continues to use his PEG for about 80% of his nutrition.   Target Date 07/02/24   SLP Goal 2   Goal Identifier Exercise   Goal Description 2. Pt will maximize swallow function by completing 10 reps of 5/5 oropharyngeal and jaw strengthening/ROM exercises daily with 100% accuracy.   Rationale To maximize safety, ease and/or independence of oral intake   Goal Progress Pt reports he has not been doing his exercises very much. He has been eating more but not doing the exercises. He notes he was able to get two fingers between his teeth in the beginning of treatment but now he can only maybe get 2 after a good stretch. Provided pt with training on stretching his jaw by using tongue depressors  as a lever. He was given 10 and stacked them, completing 4 repetitions of stretching. He reports he can feel a good stretch when doing this. He was trained on the 7-7-7 protocol   Target Date 07/02/24   Treatment Interventions (SLP)   Treatment Interventions Treatment Swallow/Oral dysfunction   Treatment Swallow/Oral dysfunction   Treatment of Swallowing Dysfunction &/or Oral Function for Feeding Minutes (50350) 13 Minutes   Skilled Intervention Provided written and verbal information on diet modifications.;Educated patient on swallowing strategies.;Educated patient on risks of aspiration;Assessed oral intake trials   Patient Response/Progress Pt verbalized understanding of all material provided.   Education   Learner/Method Patient;Family;Listening;Demonstration;No Barriers to Learning   Plan   Plan for next session f/u during ENT clinic follow up visit for ongoing swallowing assessment, dysphagia therapy and exercise completion   Total Session Time   Total Treatment Time (sum of timed and untimed services) 13

## 2024-04-05 ENCOUNTER — TELEPHONE (OUTPATIENT)
Dept: OTOLARYNGOLOGY | Facility: CLINIC | Age: 64
End: 2024-04-05
Payer: COMMERCIAL

## 2024-04-10 ENCOUNTER — APPOINTMENT (OUTPATIENT)
Dept: INTERPRETER SERVICES | Facility: CLINIC | Age: 64
End: 2024-04-10
Payer: COMMERCIAL

## 2024-04-10 ENCOUNTER — TELEPHONE (OUTPATIENT)
Dept: OTOLARYNGOLOGY | Facility: CLINIC | Age: 64
End: 2024-04-10
Payer: COMMERCIAL

## 2024-04-29 ENCOUNTER — VIRTUAL VISIT (OUTPATIENT)
Dept: ONCOLOGY | Facility: CLINIC | Age: 64
End: 2024-04-29
Attending: DIETITIAN, REGISTERED
Payer: COMMERCIAL

## 2024-04-29 DIAGNOSIS — C31.0 SQUAMOUS CELL CARCINOMA OF MAXILLARY SINUS (H): Primary | ICD-10-CM

## 2024-04-29 PROCEDURE — 97803 MED NUTRITION INDIV SUBSEQ: CPT | Mod: 95 | Performed by: DIETITIAN, REGISTERED

## 2024-04-29 NOTE — LETTER
4/29/2024         RE: Douglas Herrera  [address] [place] [address]        Dear Colleague,    Thank you for referring your patient, Douglas Herrera, to the [facility]. Please see a copy of my visit note below.    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 His son reports that he has much more energy and has been feeling better.    He has not started weaning off his feeding tube.  His son thinks he's anxious about doing this even with improved/good PO intake.     -EN intake-  4 cartons of EN per day via gravity bag feedings. He continues to take 2 cartons of tube feeding for breakfast and 2 for dinner. His feedings are taking ~90 min.   Formula: Osmolite 1.5 fernando  Volume: 4 cartons/day (948 mL, 720 ml free water)  Provisions:  1420kcal (23kcal/kg), 60 g protein (1.0g/kg), 176g CHO, 0g fiber     -Weight trends - down 1 lb x past one month  Wt Readings from Last 10 Encounters:   04/04/24 56 kg (123 lb 6.4 oz)   03/15/24 56 kg (123 lb 8 oz)   03/04/24 54.9 kg (121 lb)   02/26/24 55.8 kg (123 lb)   02/20/24 55.8 kg (123 lb)   02/16/24 55.8 kg (123 lb)   02/12/24 56.2 kg (124 lb)   02/12/24 56.5 kg (124 lb 9.6 oz)   02/05/24 57.2 kg (126 lb)   02/05/24 57.2 kg (126 lb 3.2 oz)        Previous Goals:   1.PO and EN to meet 100% estimated nutrition needs  2.Aim for at least 2000 fernando, 80-100g protein and 4 cups water/electrolyte fluids/day    Evaluation: met      Previous Nutrition Diagnosis:   Inadequate oral intake related to odynophagia as evidenced by pt reliant on EN to meet >% est nutrition needs.      Evaluation: improving    NEW FINDINGS:   No new findings     CURRENT NUTRITION DIAGNOSIS   Inadequate oral intake related to odynophagia as evidenced by pt reliant on EN to meet >% est nutrition needs.      INTERVENTIONS   EN Composition, EN Schedule, Feeding Tube Flush- reviewed current regimen. Encouraged to try replacing one carton of EN with 400 fernando, 20g protein or one comparable bottle of Boost or Ensure as able. Reviewed goals for weaning of EN and transition to oral intake, removal of FT, weight trends etc.    Strongly encouraged to start weaning by one carton each week or faster as able.     Goals   Start weaning EN today, reduce one carton each week  2. Aim for a balance of 2000 fernando, 80-100g protein and 4 cups water/electrolyte fluids/day with weaning of EN.     Follow up/Monitoring:  Tulio will reach out to writer with questions regarding PO intake and weaning prn. Message sent to [facility] that writer is signing off.  He can be monitored by [facility] RD prn.     Mayela An RD, , LD  [facility]  [phone]

## 2024-04-29 NOTE — PROGRESS NOTES
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 His son reports that he has much more energy and has been feeling better.    He has not started weaning off his feeding tube.  His son thinks he's anxious about doing this even with improved/good PO intake.     -EN intake-  4 cartons of EN per day via gravity bag feedings. He continues to take 2 cartons of tube feeding for breakfast and 2 for dinner. His feedings are taking ~90 min.   Formula: Osmolite 1.5 fernando  Volume: 4 cartons/day (948 mL, 720 ml free water)  Provisions:  1420kcal (23kcal/kg), 60 g protein (1.0g/kg), 176g CHO, 0g fiber     -Weight trends - down 1 lb x past one month  Wt Readings from Last 10 Encounters:   04/04/24 56 kg (123 lb 6.4 oz)   03/15/24 56 kg (123 lb 8 oz)   03/04/24 54.9 kg (121 lb)   02/26/24 55.8 kg (123 lb)   02/20/24 55.8 kg (123 lb)   02/16/24 55.8 kg (123 lb)   02/12/24 56.2 kg (124 lb)   02/12/24 56.5 kg (124 lb 9.6 oz)   02/05/24 57.2 kg (126 lb)   02/05/24 57.2 kg (126 lb 3.2 oz)        Previous Goals:   1.PO and EN to meet 100% estimated nutrition needs  2.Aim for at least 2000 fernando, 80-100g protein and 4 cups water/electrolyte fluids/day    Evaluation: met     Previous Nutrition Diagnosis:   Inadequate oral intake related to odynophagia as evidenced by pt reliant on EN to meet >% est nutrition needs.      Evaluation: improving    NEW FINDINGS:   No new findings     CURRENT NUTRITION DIAGNOSIS    Inadequate oral intake related to odynophagia as evidenced by pt reliant on EN to meet >% est nutrition needs.      INTERVENTIONS   EN Composition, EN Schedule, Feeding Tube Flush- reviewed current regimen. Encouraged to try replacing one carton of EN with 400 fernando, 20g protein or one comparable bottle of Boost or Ensure as able. Reviewed goals for weaning of EN and transition to oral intake, removal of FT, weight trends etc.    Strongly encouraged to start weaning by one carton each week or faster as able.     Goals   Start weaning EN today, reduce one carton each week  2. Aim for a balance of 2000 fernando, 80-100g protein and 4 cups water/electrolyte fluids/day with weaning of EN.     Follow up/Monitoring:  Tulio will reach out to writer with questions regarding PO intake and weaning prn. Message sent to [facility] that writer is signing off.  He can be monitored by [facility] RD prn.     Mayela An RD, , LD  [facility]  [phone]

## 2024-05-17 ENCOUNTER — HOSPITAL ENCOUNTER (OUTPATIENT)
Dept: PET IMAGING | Facility: CLINIC | Age: 64
Discharge: HOME | End: 2024-05-17
Attending: OTOLARYNGOLOGY
Payer: MEDICARE

## 2024-05-17 ENCOUNTER — HOSPITAL ENCOUNTER (OUTPATIENT)
Dept: MRI IMAGING | Facility: CLINIC | Age: 64
Discharge: HOME | End: 2024-05-17
Attending: OTOLARYNGOLOGY
Payer: MEDICARE

## 2024-05-17 DIAGNOSIS — C30.0 MALIGNANT NEOPLASM OF NASAL CAVITIES (H): ICD-10-CM

## 2024-05-17 PROCEDURE — 71260 CT THORAX DX C+: CPT | Mod: 26 | Performed by: RADIOLOGY

## 2024-05-17 PROCEDURE — 74177 CT ABD & PELVIS W/CONTRAST: CPT | Mod: 26 | Performed by: RADIOLOGY

## 2024-05-17 PROCEDURE — 70543 MRI ORBT/FAC/NCK W/O &W/DYE: CPT | Mod: MG

## 2024-05-17 PROCEDURE — 70491 CT SOFT TISSUE NECK W/DYE: CPT | Mod: 26 | Performed by: RADIOLOGY

## 2024-05-17 PROCEDURE — 71260 CT THORAX DX C+: CPT

## 2024-05-17 PROCEDURE — 343N000001 HC RX 343: Performed by: OTOLARYNGOLOGY

## 2024-05-17 PROCEDURE — G1010 CDSM STANSON: HCPCS | Performed by: RADIOLOGY

## 2024-05-17 PROCEDURE — A9552 F18 FDG: HCPCS | Performed by: OTOLARYNGOLOGY

## 2024-05-17 PROCEDURE — 250N000011 HC RX IP 250 OP 636: Performed by: OTOLARYNGOLOGY

## 2024-05-17 PROCEDURE — A9585 GADOBUTROL INJECTION: HCPCS | Performed by: STUDENT IN AN ORGANIZED HEALTH CARE EDUCATION/TRAINING PROGRAM

## 2024-05-17 PROCEDURE — 78816 PET IMAGE W/CT FULL BODY: CPT | Mod: MG,PS

## 2024-05-17 PROCEDURE — 255N000002 HC RX 255 OP 636: Performed by: STUDENT IN AN ORGANIZED HEALTH CARE EDUCATION/TRAINING PROGRAM

## 2024-05-17 PROCEDURE — 70543 MRI ORBT/FAC/NCK W/O &W/DYE: CPT | Mod: 26 | Performed by: RADIOLOGY

## 2024-05-17 PROCEDURE — 78816 PET IMAGE W/CT FULL BODY: CPT | Mod: 26 | Performed by: RADIOLOGY

## 2024-05-17 PROCEDURE — 70491 CT SOFT TISSUE NECK W/DYE: CPT

## 2024-05-17 RX ORDER — IOPAMIDOL 755 MG/ML
45-135 INJECTION, SOLUTION INTRAVASCULAR ONCE
Status: COMPLETED | OUTPATIENT
Start: 2024-05-17 | End: 2024-05-17

## 2024-05-17 RX ORDER — FLUDEOXYGLUCOSE F 18 200 MCI/ML
10-18 INJECTION, SOLUTION INTRAVENOUS ONCE
Status: COMPLETED | OUTPATIENT
Start: 2024-05-17 | End: 2024-05-17

## 2024-05-17 RX ORDER — GADOBUTROL 604.72 MG/ML
7.5 INJECTION INTRAVENOUS ONCE
Status: COMPLETED | OUTPATIENT
Start: 2024-05-17 | End: 2024-05-17

## 2024-05-17 RX ADMIN — IOPAMIDOL 75 ML: 755 INJECTION, SOLUTION INTRAVENOUS at 14:56

## 2024-05-17 RX ADMIN — GADOBUTROL 7.5 ML: 604.72 INJECTION INTRAVENOUS at 13:00

## 2024-05-17 RX ADMIN — FLUDEOXYGLUCOSE F 18 10.07 MILLICURIE: 200 INJECTION, SOLUTION INTRAVENOUS at 13:56

## 2024-05-21 NOTE — PROGRESS NOTES
RADIATION ONCOLOGY FOLLOW UP  Encounter Date: May 22, 2024     Patient Name: Douglas Herrera  MRN: [id number]  : 1960     Disease and Stage: T4b N2b M0, stage IVB sinonasal carcinoma    Treatment Site: Left sinonasal primary and bilateral neck nodes        Dose: 7000 cGy in 35 fractions completed 24    Concurrent Chemotherapy: Weekly cisplatin    Duration since completion of treatment: 3 months    Subjective: Mr. Herrera presents to clinic today for 3 month follow up.    In brief, he underwent stealth assisted transnasal endoscopic approach to excise left sinonasal mass on 2023 with Dr. Iglesias. He was found to have     During chemoradiation, he developed anorexia due to dysgeusia and oral pain. He used PEG feeds for diet supplementation, 4 cans per day. Pain was managed with gabapentin 900 mg 3 times daily.  For xerostomia he used salt and soda rinses 20 times per day.  He had significant fatigue for which he slept approximately 12 to 13 hours per day.  He had dermatitis for which he used Aquaphor.    He saw Dr. Iglesias (24) who performed nasal endoscopy during which he removed abundant sinonasal sarah using suction and sinonasal forceps. He did not see any gross tumor. He encouraged use of sinonasal rinses to prevent further crusting.    MRI sinonasal (24) showed postsurgical changes of tumor debulking centered in the left sinonasal cavity. The components that were within the sinonasal  cavity appear essentially completely resected. Large cystic component just medial to the mandible has increased, but overall the amount of solidly enhancing tumor outside of the sinonasal cavity appears decreased since 2023.    PET (24) showed almost complete resolution of the previously seen FDG avid left maxillary/ space mass. Residual changes in the left side of the maxilla and maxillary sinus lateral wall on CT with 3 foci of more focal uptake superimposing mild diffuse underlying uptake  on PET     Today Mr. Herrera reports he has dryness of the left nasal cavity and mouth. He has been using saline rinses up to 3 times per day, which is helping. He also carries water for the dry mouth. He also has swelling of the chin and left cheek. He has tearing of the left eye which does not bother him. He has decreased flexibility of opening his mouth for which he is doing home stretching exercises. He continues to use his PEG tube morning and night. His sense of taste remains impaired which is limiting his desire to eat.  He denies facial pain. He has no bleeding.    REVIEW OF SYSTEMS:    General:  No fever/chills, no weight loss, no fatigue, no anorexia  HEENT:  No sore throat, no earache, no rhinitis, no vision changes  Gastrointestinal:  No nausea, no vomiting, no diarrhea, no constipation, no melena, no dysphagia, no odynophagia  Skin:  No rash, no itch, no jaundice  Hematologic:  No palpable lymph nodes, no bruising or bleeding tendencies      PHYSICAL EXAMINATION:    General:  Well-developed, well-appearing, NAD  HEENT:  NCAT, anicteric sclera, moist oral mucosa, submental lymphedema and left cheek edema  Abdomen:  Nontender, nondistended, no palpable hepatosplenomegaly  Skin:  Pink, warm, no rash  Lymph Nodes:  No palpable lymphadenopathy    Imaging:  Per HPI    Oncology Visit on 03/15/2024   Component Date Value Ref Range Status    Magnesium 03/15/2024 1.7  1.7 - 2.3 mg/dL Final    Sodium 03/15/2024 137  135 - 145 mmol/L Final    Reference intervals for this test were updated on 09/26/2023 to more accurately reflect our healthy population. There may be differences in the flagging of prior results with similar values performed with this method. Interpretation of those prior results can be made in the context of the updated reference intervals.     Potassium 03/15/2024 4.2  3.4 - 5.3 mmol/L Final    Carbon Dioxide (CO2) 03/15/2024 26  22 - 29 mmol/L Final    Anion Gap 03/15/2024 10  7 - 15 mmol/L Final     Urea Nitrogen 03/15/2024 19.0  8.0 - 23.0 mg/dL Final    Creatinine 03/15/2024 0.73  0.67 - 1.17 mg/dL Final    GFR Estimate 03/15/2024 >90  >60 mL/min/1.73m2 Final    Calcium 03/15/2024 9.4  8.8 - 10.2 mg/dL Final    Chloride 03/15/2024 101  98 - 107 mmol/L Final    Glucose 03/15/2024 137 (H)  70 - 99 mg/dL Final    Alkaline Phosphatase 03/15/2024 90  40 - 150 U/L Final    Reference intervals for this test were updated on 11/14/2023 to more accurately reflect our healthy population. There may be differences in the flagging of prior results with similar values performed with this method. Interpretation of those prior results can be made in the context of the updated reference intervals.    AST 03/15/2024 27  0 - 45 U/L Final    Reference intervals for this test were updated on 6/12/2023 to more accurately reflect our healthy population. There may be differences in the flagging of prior results with similar values performed with this method. Interpretation of those prior results can be made in the context of the updated reference intervals.    ALT 03/15/2024 19  0 - 70 U/L Final    Reference intervals for this test were updated on 6/12/2023 to more accurately reflect our healthy population. There may be differences in the flagging of prior results with similar values performed with this method. Interpretation of those prior results can be made in the context of the updated reference intervals.      Protein Total 03/15/2024 7.6  6.4 - 8.3 g/dL Final    Albumin 03/15/2024 3.3 (L)  3.5 - 5.2 g/dL Final    Bilirubin Total 03/15/2024 0.4  <=1.2 mg/dL Final    WBC Count 03/15/2024 8.0  4.0 - 11.0 10e3/uL Final    RBC Count 03/15/2024 3.33 (L)  4.40 - 5.90 10e6/uL Final    Hemoglobin 03/15/2024 9.0 (L)  13.3 - 17.7 g/dL Final    Hematocrit 03/15/2024 27.9 (L)  40.0 - 53.0 % Final    MCV 03/15/2024 84  78 - 100 fL Final    MCH 03/15/2024 27.0  26.5 - 33.0 pg Final    MCHC 03/15/2024 32.3  31.5 - 36.5 g/dL Final    RDW  03/15/2024 19.6 (H)  10.0 - 15.0 % Final    Platelet Count 03/15/2024 305  150 - 450 10e3/uL Final    % Neutrophils 03/15/2024 70  % Final    % Lymphocytes 03/15/2024 16  % Final    % Monocytes 03/15/2024 10  % Final    % Eosinophils 03/15/2024 1  % Final    % Basophils 03/15/2024 1  % Final    % Immature Granulocytes 03/15/2024 2  % Final    NRBCs per 100 WBC 03/15/2024 0  <1 /100 Final    Absolute Neutrophils 03/15/2024 5.6  1.6 - 8.3 10e3/uL Final    Absolute Lymphocytes 03/15/2024 1.3  0.8 - 5.3 10e3/uL Final    Absolute Monocytes 03/15/2024 0.8  0.0 - 1.3 10e3/uL Final    Absolute Eosinophils 03/15/2024 0.1  0.0 - 0.7 10e3/uL Final    Absolute Basophils 03/15/2024 0.1  0.0 - 0.2 10e3/uL Final    Absolute Immature Granulocytes 03/15/2024 0.2  <=0.4 10e3/uL Final    Absolute NRBCs 03/15/2024 0.0  10e3/uL Final      TSH   Date Value Ref Range Status   01/08/2024 3.35 0.30 - 4.20 uIU/mL Final        Assessment:    Mr. 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  Multidisciplinary conference consensus opinion is towards chemoradiation.  He has had significant hearing loss and thus Oncology has recommended weekly cisplatin. He is now 3 months s/p treatment and is recovering from acute toxicities as expected.    Plan:   Continue nasal endoscopies with Dr. Iglesias  Xerostomia: Continue hydration as needed  Lymphedema: Referred to lymphedema therapy   Fatigue: Encouraged exercise, counseled patient that energy will improve with time  Dysgeusia: Counseled patient that it can take up to a year for sense of taste to return. Encouraged patient to wean off PEG and increase PO intake as able  Muscular fibrosis: Counseled patient on importance of doing jaw and neck stretching exercises  Dermatitis: Encouraged patient to continue to moisturize with SPF   Thyroid:  Due to repeat TSH this summer    We will discuss his recent  PET findings in tumor conference this week.  Return to clinic in 6 months    Sheba Beach MD PGY4    Physician Attestation  Mr. Herrera was seen and examined by me. I agree with the findings and plan of care as documented in the note. Note above by Dr. Beach was reviewed and edited by me and reflects our mutual findings and plan of care.  I have personally reviewed Mr. Herrera's MRI scan and PET/CT from 5/17/2024 and agree with findings as stated.  I have also discussed his imaging with Dr. Regan and we will review his images in multidisciplinary tumor board this week.  He will likely require short-term interval imaging to monitor the residual cystic area in his tumor bed.    40 minutes were spent on the date of the encounter doing chart review, history and exam, documentation and further activities as noted above.    Please call with questions.    Tomasa Akers MD  Department of Radiation Oncology  [facility]

## 2024-05-22 ENCOUNTER — OFFICE VISIT (OUTPATIENT)
Dept: RADIATION ONCOLOGY | Facility: CLINIC | Age: 64
End: 2024-05-22
Attending: RADIOLOGY
Payer: MEDICARE

## 2024-05-22 VITALS
HEART RATE: 86 BPM | SYSTOLIC BLOOD PRESSURE: 113 MMHG | DIASTOLIC BLOOD PRESSURE: 76 MMHG | WEIGHT: 124 LBS | BODY MASS INDEX: 21.28 KG/M2

## 2024-05-22 DIAGNOSIS — C31.9 MALIGNANT NEOPLASM OF PARANASAL SINUS (H): Primary | ICD-10-CM

## 2024-05-22 DIAGNOSIS — I89.0 LYMPHEDEMA DUE TO RADIATION: ICD-10-CM

## 2024-05-22 PROCEDURE — G0463 HOSPITAL OUTPT CLINIC VISIT: HCPCS | Performed by: RADIOLOGY

## 2024-05-22 PROCEDURE — 99024 POSTOP FOLLOW-UP VISIT: CPT | Mod: GC | Performed by: RADIOLOGY

## 2024-05-22 NOTE — LETTER
2024         RE: Douglas Herrera  [address] [place] [address]        Dear Colleague,    Thank you for referring your patient, Douglas Herrera, to the [facility] RADIATION ONCOLOGY. Please see a copy of my visit note below.    RADIATION ONCOLOGY FOLLOW UP  Encounter Date: May 22, 2024     Patient Name: Douglas Herrera  MRN: [id number]  : 1960     Disease and Stage: T4b N2b M0, stage IVB sinonasal carcinoma    Treatment Site: Left sinonasal primary and bilateral neck nodes        Dose: 7000 cGy in 35 fractions completed 24    Concurrent Chemotherapy: Weekly cisplatin    Duration since completion of treatment: 3 months    Subjective: Mr. Herrera presents to clinic today for 3 month follow up.    In brief, he underwent stealth assisted transnasal endoscopic approach to excise left sinonasal mass on 2023 with Dr. Iglesias. He was found to have     During chemoradiation, he developed anorexia due to dysgeusia and oral pain. He used PEG feeds for diet supplementation, 4 cans per day. Pain was managed with gabapentin 900 mg 3 times daily.  For xerostomia he used salt and soda rinses 20 times per day.  He had significant fatigue for which he slept approximately 12 to 13 hours per day.  He had dermatitis for which he used Aquaphor.    He saw Dr. Iglesias (24) who performed nasal endoscopy during which he removed abundant sinonasal sarah using suction and sinonasal forceps. He did not see any gross tumor. He encouraged use of sinonasal rinses to prevent further crusting.    MRI sinonasal (24) showed postsurgical changes of tumor debulking centered in the left sinonasal cavity. The components that were within the sinonasal  cavity appear essentially completely resected. Large cystic component just medial to the mandible has increased, but overall the amount of solidly enhancing tumor outside of the sinonasal cavity appears decreased since 2023.    PET (24) showed almost complete  resolution of the previously seen FDG avid left maxillary/ space mass. Residual changes in the left side of the maxilla and maxillary sinus lateral wall on CT with 3 foci of more focal uptake superimposing mild diffuse underlying uptake on PET     Today Mr. Herrera reports he has dryness of the left nasal cavity and mouth. He has been using saline rinses up to 3 times per day, which is helping. He also carries water for the dry mouth. He also has swelling of the chin and left cheek. He has tearing of the left eye which does not bother him. He has decreased flexibility of opening his mouth for which he is doing home stretching exercises. He continues to use his PEG tube morning and night. His sense of taste remains impaired which is limiting his desire to eat.  He denies facial pain. He has no bleeding.    REVIEW OF SYSTEMS:    General:  No fever/chills, no weight loss, no fatigue, no anorexia  HEENT:  No sore throat, no earache, no rhinitis, no vision changes  Gastrointestinal:  No nausea, no vomiting, no diarrhea, no constipation, no melena, no dysphagia, no odynophagia  Skin:  No rash, no itch, no jaundice  Hematologic:  No palpable lymph nodes, no bruising or bleeding tendencies      PHYSICAL EXAMINATION:    General:  Well-developed, well-appearing, NAD  HEENT:  NCAT, anicteric sclera, moist oral mucosa, submental lymphedema and left cheek edema  Abdomen:  Nontender, nondistended, no palpable hepatosplenomegaly  Skin:  Pink, warm, no rash  Lymph Nodes:  No palpable lymphadenopathy    Imaging:  Per [facility]    Oncology Visit on 03/15/2024   Component Date Value Ref Range Status     Magnesium 03/15/2024 1.7  1.7 - 2.3 mg/dL Final     Sodium 03/15/2024 137  135 - 145 mmol/L Final    Reference intervals for this test were updated on 09/26/2023 to more accurately reflect our healthy population. There may be differences in the flagging of prior results with similar values performed with this method. Interpretation of  those prior results can be made in the context of the updated reference intervals.      Potassium 03/15/2024 4.2  3.4 - 5.3 mmol/L Final     Carbon Dioxide (CO2) 03/15/2024 26  22 - 29 mmol/L Final     Anion Gap 03/15/2024 10  7 - 15 mmol/L Final     Urea Nitrogen 03/15/2024 19.0  8.0 - 23.0 mg/dL Final     Creatinine 03/15/2024 0.73  0.67 - 1.17 mg/dL Final     GFR Estimate 03/15/2024 >90  >60 mL/min/1.73m2 Final     Calcium 03/15/2024 9.4  8.8 - 10.2 mg/dL Final     Chloride 03/15/2024 101  98 - 107 mmol/L Final     Glucose 03/15/2024 137 (H)  70 - 99 mg/dL Final     Alkaline Phosphatase 03/15/2024 90  40 - 150 U/L Final    Reference intervals for this test were updated on 11/14/2023 to more accurately reflect our healthy population. There may be differences in the flagging of prior results with similar values performed with this method. Interpretation of those prior results can be made in the context of the updated reference intervals.     AST 03/15/2024 27  0 - 45 U/L Final    Reference intervals for this test were updated on 6/12/2023 to more accurately reflect our healthy population. There may be differences in the flagging of prior results with similar values performed with this method. Interpretation of those prior results can be made in the context of the updated reference intervals.     ALT 03/15/2024 19  0 - 70 U/L Final    Reference intervals for this test were updated on 6/12/2023 to more accurately reflect our healthy population. There may be differences in the flagging of prior results with similar values performed with this method. Interpretation of those prior results can be made in the context of the updated reference intervals.       Protein Total 03/15/2024 7.6  6.4 - 8.3 g/dL Final     Albumin 03/15/2024 3.3 (L)  3.5 - 5.2 g/dL Final     Bilirubin Total 03/15/2024 0.4  <=1.2 mg/dL Final     WBC Count 03/15/2024 8.0  4.0 - 11.0 10e3/uL Final     RBC Count 03/15/2024 3.33 (L)  4.40 - 5.90 10e6/uL  Final     Hemoglobin 03/15/2024 9.0 (L)  13.3 - 17.7 g/dL Final     Hematocrit 03/15/2024 27.9 (L)  40.0 - 53.0 % Final     MCV 03/15/2024 84  78 - 100 fL Final     MCH 03/15/2024 27.0  26.5 - 33.0 pg Final     MCHC 03/15/2024 32.3  31.5 - 36.5 g/dL Final     RDW 03/15/2024 19.6 (H)  10.0 - 15.0 % Final     Platelet Count 03/15/2024 305  150 - 450 10e3/uL Final     % Neutrophils 03/15/2024 70  % Final     % Lymphocytes 03/15/2024 16  % Final     % Monocytes 03/15/2024 10  % Final     % Eosinophils 03/15/2024 1  % Final     % Basophils 03/15/2024 1  % Final     % Immature Granulocytes 03/15/2024 2  % Final     NRBCs per 100 WBC 03/15/2024 0  <1 /100 Final     Absolute Neutrophils 03/15/2024 5.6  1.6 - 8.3 10e3/uL Final     Absolute Lymphocytes 03/15/2024 1.3  0.8 - 5.3 10e3/uL Final     Absolute Monocytes 03/15/2024 0.8  0.0 - 1.3 10e3/uL Final     Absolute Eosinophils 03/15/2024 0.1  0.0 - 0.7 10e3/uL Final     Absolute Basophils 03/15/2024 0.1  0.0 - 0.2 10e3/uL Final     Absolute Immature Granulocytes 03/15/2024 0.2  <=0.4 10e3/uL Final     Absolute NRBCs 03/15/2024 0.0  10e3/uL Final      TSH   Date Value Ref Range Status   01/08/2024 3.35 0.30 - 4.20 uIU/mL Final        Assessment:    Mr. 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  Multidisciplinary conference consensus opinion is towards chemoradiation.  He has had significant hearing loss and thus Oncology has recommended weekly cisplatin. He is now 3 months s/p treatment and is recovering from acute toxicities as expected.    Plan:   Continue nasal endoscopies with Dr. Iglesias  Xerostomia: Continue hydration as needed  Lymphedema: Referred to lymphedema therapy   Fatigue: Encouraged exercise, counseled patient that energy will improve with time  Dysgeusia: Counseled patient that it can take up to a year for sense of taste to return. Encouraged patient  to wean off PEG and increase PO intake as able  Muscular fibrosis: Counseled patient on importance of doing jaw and neck stretching exercises  Dermatitis: Encouraged patient to continue to moisturize with SPF   Thyroid:  Due to repeat TSH this summer    We will discuss his recent PET findings in tumor conference this week.  Return to clinic in 6 months    Sheba Beach MD PGY4    Physician Attestation  Mr. Herrera was seen and examined by me. I agree with the findings and plan of care as documented in the note. Note above by Dr. Beach was reviewed and edited by me and reflects our mutual findings and plan of care.  I have personally reviewed Mr. Herrera's MRI scan and PET/CT from 2024 and agree with findings as stated.  I have also discussed his imaging with Dr. Regan and we will review his images in multidisciplinary tumor board this week.  He will likely require short-term interval imaging to monitor the residual cystic area in his tumor bed.    40 minutes were spent on the date of the encounter doing chart review, history and exam, documentation and further activities as noted above.    Please call with questions.    Tomasa Akers MD  Department of Radiation Oncology  [facility]       FOLLOW-UP VISIT    Patient Name: Douglas Herrera      : 1960     Age: 64 year old        ______________________________________________________________________________     Chief Complaint   Patient presents with     Cancer     Follow up:Sinonasal Cancer:H&N 7000 cGy completed 24     /76   Pulse 86   Wt 56.2 kg (124 lb)   BMI 21.28 kg/m       Pain  Denies    Labs  Other Labs: No    Imaging  CT: 24      Dental:   Most Recent Dental Visit: No    Speech/Swallowing:   Most Recent evaluation or testin24  Swallowing Restrictions: No difficulties with swallowing    Trismus/Jaw Exercises: Yes    Nutrition:  PEG Intake 4# cans per day, type of solution  Osmolite  Weight:   Wt Readings from Last 3 Encounters:   05/22/24 56.2 kg (124 lb)   04/04/24 56 kg (123 lb 6.4 oz)   03/15/24 56 kg (123 lb 8 oz)         Oral Symptoms:   Xerostomia:0- None  Dysphagia: 0-None  Mucositis Oral Symptoms: 0-None  Mucositis: 0- None  Esophagitis:0- None    Other Appointments:     MD Name: Dr Lopez Appointment Date: 05/23/24   MD Name: Appointment Date:   MD Name: Appointment Date:   Other Appointment Notes:     Residual Radiation side effect: Encouraged patient to get off PEG, continue exercises, lymphedema referral placed                 Again, thank you for allowing me to participate in the care of your patient.        Sincerely,        Tomasa Akers MD

## 2024-05-22 NOTE — PROGRESS NOTES
FOLLOW-UP VISIT    Patient Name: Douglas Herrera      : 1960     Age: 64 year old        ______________________________________________________________________________     Chief Complaint   Patient presents with    Cancer     Follow up:Sinonasal Cancer:H&N 7000 cGy completed 24     /76   Pulse 86   Wt 56.2 kg (124 lb)   BMI 21.28 kg/m       Pain  Denies    Labs  Other Labs: No    Imaging  CT: 24      Dental:   Most Recent Dental Visit: No    Speech/Swallowing:   Most Recent evaluation or testin24  Swallowing Restrictions: No difficulties with swallowing    Trismus/Jaw Exercises: Yes    Nutrition:  PEG Intake 4# cans per day, type of solution Osmolite  Weight:   Wt Readings from Last 3 Encounters:   24 56.2 kg (124 lb)   24 56 kg (123 lb 6.4 oz)   03/15/24 56 kg (123 lb 8 oz)         Oral Symptoms:   Xerostomia:0- None  Dysphagia: 0-None  Mucositis Oral Symptoms: 0-None  Mucositis: 0- None  Esophagitis:0- None    Other Appointments:     MD Name: Dr Lopez Appointment Date: 24   MD Name: Appointment Date:   MD Name: Appointment Date:   Other Appointment Notes:     Residual Radiation side effect: Encouraged patient to get off PEG, continue exercises, lymphedema referral placed

## 2024-05-22 NOTE — PROGRESS NOTES
Dx: [id number] left maxillary sinus INVASIVE NON-KERATINIZING SQUAMOUS CELL CARCINOMA   SQUAMOUS CELL CARCINOMA INVOLVING BONE TISSUE       PROCEDURE: Stealth assisted transnasal endoscopic approach to excise left sinonasal mass on 12/1/2023     Treatment : 1/8~2/27/24       Chemoradiation with weekly cisplatin.     Sinus MRI 5/17/2024:  Postsurgical changes of tumor debulking centered in the  left sinonasal cavity. The components that were within the sinonasal  cavity appear essentially completely resected. Large cystic component  just medial to the mandible has increased, but overall the amount of  solidly enhancing tumor outside of the sinonasal cavity appears  decreased since 11/17/2023.    PET-CT 5/17/24:  1. Follow-up FDG PET/CT of the neck and whole body following  chemoradiotherapy completed in February 2024, almost complete  resolution of the previously seen FDG avid left maxillary/  space mass. Residual changes in the left side of the maxilla and  maxillary sinus lateral wall on CT with 3 foci of more focal uptake  superimposing mild diffuse underlying uptake on PET, described above.  Continued attention on follow-up is recommended.     2. No residual or recurrent suspicious cervical lymphadenopathy.     3. No evidence of new distant metastasis.     4. Findings suggestive of mucositis of the nasopharyngeal mucosa and  to lesser extent of the tongue base.     5. More focal uptake with associating thicker mucosal enhancement of  the left lateral oropharyngeal wall. Correlation with direct  examination findings is recommended.     6. Several bilateral subpleural punctate nodularities in the lungs.  Attentional follow-up is recommended.     7. New diffuse esophagitis.       History of Present Illness: 64-year-old male here in the otolaryngology clinic for tumor surveillance follow-up and review of his most recent CT scan and PET/CT.  Patient is being recovering really well from concurrent chemoradiation  therapy for his sinonasal carcinoma.  He is telling me that he is more active now.  He is pain-free.  He is eating by mouth most of his nutrition.  He is using his PEG tube maybe twice a day.    MEDICATIONS:     Current Outpatient Medications   Medication Sig Dispense Refill    acetaminophen (TYLENOL) 325 MG tablet Take 2 tablets (650 mg) by mouth every 4 hours as needed for other or pain (Patient not taking: Reported on 3/15/2024) 50 tablet 0    atorvastatin (LIPITOR) 80 MG tablet TAKE 1 TABLET (80 MG TOTAL) BY MOUTH AT BEDTIME/ TXHUA HMO NOJ 1 LUB TSHUAJ THAUM MUS PW PAB ZOO NTSHAV MUAJ ROJ 90 tablet 3    capsaicin (ZOSTRIX) 0.025 % external cream Apply topically 3 times daily as needed (Patient not taking: Reported on 3/15/2024)      chlorhexidine (PERIDEX) 0.12 % solution RINSE WITH 10-15 ML BY MOUTH FOR 30 SECONDS TWO TIMES A DAY. SPIT OUT EXCESS. DO NOT SWALLOW. NOTHING BY MOUTH FOR 30 MINUTES (Patient not taking: Reported on 3/15/2024)      entecavir (BARACLUDE) 0.5 MG tablet Take 0.5 mg by mouth every morning Take 1 hour before a meal or 2 hours after a meal.      fluticasone (FLONASE) 50 MCG/ACT nasal spray Spray 2 sprays into both nostrils as needed      gabapentin (NEURONTIN) 300 MG capsule Take 3 capsules (900 mg) by mouth 3 times daily Taper dose to 900 mg three times daily as per instructions given in Radiation Oncology 270 capsule 3    magic mouthwash (ENTER INGREDIENTS IN COMMENTS) suspension Take 10 mLs by mouth every 4 hours as needed (mucositis) 240 mL 1    nitroGLYcerin (NITROSTAT) 0.4 MG sublingual tablet Place 0.4 mg under the tongue every 5 minutes as needed (Patient not taking: Reported on 3/15/2024)      ondansetron (ZOFRAN) 8 MG tablet Take 1 tablet (8 mg) by mouth every 8 hours as needed for nausea (vomiting) 30 tablet 2    oxyCODONE (ROXICODONE) 5 MG tablet Take 1 tablet (5 mg) by mouth every 6 hours as needed for pain 30 tablet 0    predniSONE (DELTASONE) 10 MG tablet Take 1 tablet (10  mg) by mouth daily (Patient not taking: Reported on 3/15/2024) 3 tablet 0    prochlorperazine (COMPAZINE) 10 MG tablet Take 1 tablet (10 mg) by mouth every 6 hours as needed for nausea or vomiting 30 tablet 2    senna-docusate (SENNA S) 8.6-50 MG tablet Take 1 tablet by mouth 2 times daily as needed for constipation 30 tablet 1    sodium chloride (OCEAN) 0.65 % nasal spray Spray 2 sprays in nostril daily as needed for congestion 88 mL 3    traZODone (DESYREL) 50 MG tablet Take 50 mg by mouth nightly as needed for sleep (Patient not taking: Reported on 3/15/2024)         ALLERGIES:  No Known Allergies    PAST MEDICAL HISTORY:   Past Medical History:   Diagnosis Date    Ascending aortic aneurysm (H24)     Coronary artery disease     Depression     Gout     History of anesthesia complications     Hyperlipemia     Hypertension     PONV (postoperative nausea and vomiting)         FAMILY HISTORY:    Family History   Problem Relation Age of Onset    Cerebrovascular Disease Mother 90.00    Acute Myocardial Infarction No family hx of        REVIEW OF SYSTEMS:  12 point ROS was negative other than the symptoms noted above in the HPI.    PHYSICAL EXAMINATION:      Constitutional:  The patient was accompanied, well-groomed, and in no acute distress.     Skin: Normal:  warm and pink without rash   Neurologic: Alert and oriented x 3.  CN's III-XII within normal limits.  Voice normal.    Psychiatric: The patient's affect was calm, cooperative, and appropriate.     Communication:  Normal; communicates verbally, normal voice quality.   Respiratory: Breathing comfortably without stridor or exertion of accessory muscles.    Head/Face:  Normocephalic and atraumatic.  No lesions or scars. No sinus tenderness.    Salivary glands -  Normal size, no tenderness, swelling, or palpable masses   Eyes: Pupils were equal and reactive.  Extraocular movement intact.         Nose: Sinuses were non-tender.  Anterior rhinoscopy revealed midline  septum and absence of purulence or polyps.     Oral Cavity: Mild-moderate trismus.Normal tongue, floor of mouth, buccal mucosa, and palate.  No lesions or masses on inspection or palpation.     Oropharynx: Normal mucosa, palate symmetric with normal elevation. No abnormal lymph tissue in the oropharynx.             Neck: Supple with normal laryngeal and tracheal landmarks.  The parotid beds were without masses.  No palpable thyroid.  Normal range of motion   Lymphatic: There is no palpable lymphadenopathy in the neck.          Nasal endoscopy: consent for nasal endoscopy was obtained.  I confirmed correctness of the procedure and identity of the patient.  Nasal endoscopy was indicated due to left sinonasal carcinoma.  The nose was topically decongested and anesthetized.  The nasal endoscope was passed under endoscopic scopic vision.  The septum is in the midline.  On the left side there is evidence of very large maxillary antrostomy anterior posterior ethmoidectomy and sphenoidotomy.  There is minimal crusting.  No evidence of masses or lesions that are concerning for recurrence.  The right side shows normal middle and inferior turbinate the nasopharynx is normal.    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  Patient is doing really well.  The MRI and PET/CT shows good response to treatment.  We are going to present him in tumor board tomorrow.  I would like to see him back in 3 months for another clinical follow-up.         Damon Regan MD, M.S.  Otolaryngology- Head & Neck Surgery  [phone]

## 2024-05-23 ENCOUNTER — LAB (OUTPATIENT)
Dept: LAB | Facility: CLINIC | Age: 64
End: 2024-05-23
Attending: INTERNAL MEDICINE
Payer: MEDICARE

## 2024-05-23 ENCOUNTER — TELEPHONE (OUTPATIENT)
Dept: OTOLARYNGOLOGY | Facility: CLINIC | Age: 64
End: 2024-05-23

## 2024-05-23 ENCOUNTER — OFFICE VISIT (OUTPATIENT)
Dept: OTOLARYNGOLOGY | Facility: CLINIC | Age: 64
End: 2024-05-23
Payer: MEDICARE

## 2024-05-23 VITALS
SYSTOLIC BLOOD PRESSURE: 123 MMHG | OXYGEN SATURATION: 97 % | HEIGHT: 64 IN | WEIGHT: 128 LBS | DIASTOLIC BLOOD PRESSURE: 84 MMHG | HEART RATE: 88 BPM | BODY MASS INDEX: 21.85 KG/M2

## 2024-05-23 DIAGNOSIS — C30.0 MALIGNANT NEOPLASM OF NASAL CAVITIES (H): Primary | ICD-10-CM

## 2024-05-23 DIAGNOSIS — C31.0 SQUAMOUS CELL CARCINOMA OF MAXILLARY SINUS (H): ICD-10-CM

## 2024-05-23 DIAGNOSIS — Z13.29 SCREENING FOR HYPOTHYROIDISM: ICD-10-CM

## 2024-05-23 DIAGNOSIS — E83.42 HYPOMAGNESEMIA: ICD-10-CM

## 2024-05-23 LAB
ALBUMIN SERPL BCG-MCNC: 4 G/DL (ref 3.5–5.2)
ALP SERPL-CCNC: 80 U/L (ref 40–150)
ALT SERPL W P-5'-P-CCNC: 25 U/L (ref 0–70)
ANION GAP SERPL CALCULATED.3IONS-SCNC: 12 MMOL/L (ref 7–15)
AST SERPL W P-5'-P-CCNC: 29 U/L (ref 0–45)
BASOPHILS # BLD AUTO: 0 10E3/UL (ref 0–0.2)
BASOPHILS NFR BLD AUTO: 1 %
BILIRUB SERPL-MCNC: 0.8 MG/DL
BUN SERPL-MCNC: 22.7 MG/DL (ref 8–23)
CALCIUM SERPL-MCNC: 9.8 MG/DL (ref 8.8–10.2)
CHLORIDE SERPL-SCNC: 101 MMOL/L (ref 98–107)
CREAT SERPL-MCNC: 0.93 MG/DL (ref 0.67–1.17)
DEPRECATED HCO3 PLAS-SCNC: 25 MMOL/L (ref 22–29)
EGFRCR SERPLBLD CKD-EPI 2021: >90 ML/MIN/1.73M2
EOSINOPHIL # BLD AUTO: 0.1 10E3/UL (ref 0–0.7)
EOSINOPHIL NFR BLD AUTO: 1 %
ERYTHROCYTE [DISTWIDTH] IN BLOOD BY AUTOMATED COUNT: 14.6 % (ref 10–15)
GLUCOSE SERPL-MCNC: 93 MG/DL (ref 70–99)
HCT VFR BLD AUTO: 35.7 % (ref 40–53)
HGB BLD-MCNC: 11.9 G/DL (ref 13.3–17.7)
IMM GRANULOCYTES # BLD: 0 10E3/UL
IMM GRANULOCYTES NFR BLD: 1 %
LYMPHOCYTES # BLD AUTO: 1.9 10E3/UL (ref 0.8–5.3)
LYMPHOCYTES NFR BLD AUTO: 30 %
MAGNESIUM SERPL-MCNC: 1.7 MG/DL (ref 1.7–2.3)
MCH RBC QN AUTO: 29 PG (ref 26.5–33)
MCHC RBC AUTO-ENTMCNC: 33.3 G/DL (ref 31.5–36.5)
MCV RBC AUTO: 87 FL (ref 78–100)
MONOCYTES # BLD AUTO: 0.7 10E3/UL (ref 0–1.3)
MONOCYTES NFR BLD AUTO: 10 %
NEUTROPHILS # BLD AUTO: 3.8 10E3/UL (ref 1.6–8.3)
NEUTROPHILS NFR BLD AUTO: 57 %
NRBC # BLD AUTO: 0 10E3/UL
NRBC BLD AUTO-RTO: 0 /100
PLATELET # BLD AUTO: 202 10E3/UL (ref 150–450)
POTASSIUM SERPL-SCNC: 4.1 MMOL/L (ref 3.4–5.3)
PROT SERPL-MCNC: 8.1 G/DL (ref 6.4–8.3)
RBC # BLD AUTO: 4.1 10E6/UL (ref 4.4–5.9)
SODIUM SERPL-SCNC: 138 MMOL/L (ref 135–145)
T4 FREE SERPL-MCNC: 1 NG/DL (ref 0.9–1.7)
TSH SERPL DL<=0.005 MIU/L-ACNC: 1.85 UIU/ML (ref 0.3–4.2)
WBC # BLD AUTO: 6.5 10E3/UL (ref 4–11)

## 2024-05-23 PROCEDURE — 36591 DRAW BLOOD OFF VENOUS DEVICE: CPT

## 2024-05-23 PROCEDURE — 85025 COMPLETE CBC W/AUTO DIFF WBC: CPT

## 2024-05-23 PROCEDURE — 31231 NASAL ENDOSCOPY DX: CPT | Performed by: OTOLARYNGOLOGY

## 2024-05-23 PROCEDURE — 99214 OFFICE O/P EST MOD 30 MIN: CPT | Mod: 25 | Performed by: OTOLARYNGOLOGY

## 2024-05-23 PROCEDURE — 250N000011 HC RX IP 250 OP 636: Performed by: INTERNAL MEDICINE

## 2024-05-23 PROCEDURE — 84443 ASSAY THYROID STIM HORMONE: CPT

## 2024-05-23 PROCEDURE — 82040 ASSAY OF SERUM ALBUMIN: CPT

## 2024-05-23 PROCEDURE — 83735 ASSAY OF MAGNESIUM: CPT

## 2024-05-23 PROCEDURE — 84439 ASSAY OF FREE THYROXINE: CPT

## 2024-05-23 RX ORDER — HEPARIN SODIUM (PORCINE) LOCK FLUSH IV SOLN 100 UNIT/ML 100 UNIT/ML
5 SOLUTION INTRAVENOUS ONCE
Status: COMPLETED | OUTPATIENT
Start: 2024-05-23 | End: 2024-05-23

## 2024-05-23 RX ADMIN — Medication 5 ML: at 14:37

## 2024-05-23 ASSESSMENT — PAIN SCALES - GENERAL: PAINLEVEL: NO PAIN (0)

## 2024-05-23 NOTE — LETTER
5/23/2024       RE: Douglas Herrera  [address]  [place] [address]     Dear Colleague,    Thank you for referring your patient, Douglas Herrera, to the [facility] [place] at [facility]. Please see a copy of my visit note below.    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     Sinus MRI 5/17/2024:  Postsurgical changes of tumor debulking centered in the  left sinonasal cavity. The components that were within the sinonasal  cavity appear essentially completely resected. Large cystic component  just medial to the mandible has increased, but overall the amount of  solidly enhancing tumor outside of the sinonasal cavity appears  decreased since 11/17/2023.    PET-CT 5/17/24:  1. Follow-up FDG PET/CT of the neck and whole body following  chemoradiotherapy completed in February 2024, almost complete  resolution of the previously seen FDG avid left maxillary/  space mass. Residual changes in the left side of the maxilla and  maxillary sinus lateral wall on CT with 3 foci of more focal uptake  superimposing mild diffuse underlying uptake on PET, described above.  Continued attention on follow-up is recommended.     2. No residual or recurrent suspicious cervical lymphadenopathy.     3. No evidence of new distant metastasis.     4. Findings suggestive of mucositis of the nasopharyngeal mucosa and  to lesser extent of the tongue base.     5. More focal uptake with associating thicker mucosal enhancement of  the left lateral oropharyngeal wall. Correlation with direct  examination findings is recommended.     6. Several bilateral subpleural punctate nodularities in the  lungs.  Attentional follow-up is recommended.     7. New diffuse esophagitis.       History of Present Illness: 64-year-old male here in the otolaryngology clinic for tumor surveillance follow-up and review of his most recent CT scan and PET/CT.  Patient is being recovering really well from concurrent chemoradiation therapy for his sinonasal carcinoma.  He is telling me that he is more active now.  He is pain-free.  He is eating by mouth most of his nutrition.  He is using his PEG tube maybe twice a day.    MEDICATIONS:     Current Outpatient Medications   Medication Sig Dispense Refill     acetaminophen (TYLENOL) 325 MG tablet Take 2 tablets (650 mg) by mouth every 4 hours as needed for other or pain (Patient not taking: Reported on 3/15/2024) 50 tablet 0     atorvastatin (LIPITOR) 80 MG tablet TAKE 1 TABLET (80 MG TOTAL) BY MOUTH AT BEDTIME/ TXHUA HMO NOJ 1 LUB TSHUAJ THAUM MUS PW PAB ZOO NTSHAV MUAJ ROJ 90 tablet 3     capsaicin (ZOSTRIX) 0.025 % external cream Apply topically 3 times daily as needed (Patient not taking: Reported on 3/15/2024)       chlorhexidine (PERIDEX) 0.12 % solution RINSE WITH 10-15 ML BY MOUTH FOR 30 SECONDS TWO TIMES A DAY. SPIT OUT EXCESS. DO NOT SWALLOW. NOTHING BY MOUTH FOR 30 MINUTES (Patient not taking: Reported on 3/15/2024)       entecavir (BARACLUDE) 0.5 MG tablet Take 0.5 mg by mouth every morning Take 1 hour before a meal or 2 hours after a meal.       fluticasone (FLONASE) 50 MCG/ACT nasal spray Spray 2 sprays into both nostrils as needed       gabapentin (NEURONTIN) 300 MG capsule Take 3 capsules (900 mg) by mouth 3 times daily Taper dose to 900 mg three times daily as per instructions given in Radiation Oncology 270 capsule 3     magic mouthwash (ENTER INGREDIENTS IN COMMENTS) suspension Take 10 mLs by mouth every 4 hours as needed (mucositis) 240 mL 1     nitroGLYcerin (NITROSTAT) 0.4 MG sublingual tablet Place 0.4 mg under the tongue every 5 minutes as needed (Patient not  taking: Reported on 3/15/2024)       ondansetron (ZOFRAN) 8 MG tablet Take 1 tablet (8 mg) by mouth every 8 hours as needed for nausea (vomiting) 30 tablet 2     oxyCODONE (ROXICODONE) 5 MG tablet Take 1 tablet (5 mg) by mouth every 6 hours as needed for pain 30 tablet 0     predniSONE (DELTASONE) 10 MG tablet Take 1 tablet (10 mg) by mouth daily (Patient not taking: Reported on 3/15/2024) 3 tablet 0     prochlorperazine (COMPAZINE) 10 MG tablet Take 1 tablet (10 mg) by mouth every 6 hours as needed for nausea or vomiting 30 tablet 2     senna-docusate (SENNA S) 8.6-50 MG tablet Take 1 tablet by mouth 2 times daily as needed for constipation 30 tablet 1     sodium chloride (OCEAN) 0.65 % nasal spray Spray 2 sprays in nostril daily as needed for congestion 88 mL 3     traZODone (DESYREL) 50 MG tablet Take 50 mg by mouth nightly as needed for sleep (Patient not taking: Reported on 3/15/2024)         ALLERGIES:  No Known Allergies    PAST MEDICAL HISTORY:   Past Medical History:   Diagnosis Date     Ascending aortic aneurysm (H24)      Coronary artery disease      Depression      Gout      History of anesthesia complications      Hyperlipemia      Hypertension      PONV (postoperative nausea and vomiting)         FAMILY HISTORY:    Family History   Problem Relation Age of Onset     Cerebrovascular Disease Mother 90.00     Acute Myocardial Infarction No family hx of        REVIEW OF SYSTEMS:  12 point ROS was negative other than the symptoms noted above in the HPI.    PHYSICAL EXAMINATION:      Constitutional:  The patient was accompanied, well-groomed, and in no acute distress.     Skin: Normal:  warm and pink without rash   Neurologic: Alert and oriented x 3.  CN's III-XII within normal limits.  Voice normal.    Psychiatric: The patient's affect was calm, cooperative, and appropriate.     Communication:  Normal; communicates verbally, normal voice quality.   Respiratory: Breathing comfortably without stridor or  exertion of accessory muscles.    Head/Face:  Normocephalic and atraumatic.  No lesions or scars. No sinus tenderness.    Salivary glands -  Normal size, no tenderness, swelling, or palpable masses   Eyes: Pupils were equal and reactive.  Extraocular movement intact.         Nose: Sinuses were non-tender.  Anterior rhinoscopy revealed midline septum and absence of purulence or polyps.     Oral Cavity: Mild-moderate trismus.Normal tongue, floor of mouth, buccal mucosa, and palate.  No lesions or masses on inspection or palpation.     Oropharynx: Normal mucosa, palate symmetric with normal elevation. No abnormal lymph tissue in the oropharynx.             Neck: Supple with normal laryngeal and tracheal landmarks.  The parotid beds were without masses.  No palpable thyroid.  Normal range of motion   Lymphatic: There is no palpable lymphadenopathy in the neck.          Nasal endoscopy: consent for nasal endoscopy was obtained.  I confirmed correctness of the procedure and identity of the patient.  Nasal endoscopy was indicated due to left sinonasal carcinoma.  The nose was topically decongested and anesthetized.  The nasal endoscope was passed under endoscopic scopic vision.  The septum is in the midline.  On the left side there is evidence of very large maxillary antrostomy anterior posterior ethmoidectomy and sphenoidotomy.  There is minimal crusting.  No evidence of masses or lesions that are concerning for recurrence.  The right side shows normal middle and inferior turbinate the nasopharynx is normal.    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  Patient is doing really well.  The MRI and PET/CT shows good response to treatment.  We are going to present him in tumor board tomorrow.  I would like to see him back in 3 months for another clinical follow-up.         Damon Regan MD,  [facility]  Otolaryngology- Head & Neck Surgery  [phone]

## 2024-05-23 NOTE — PATIENT INSTRUCTIONS
You were seen in the ENT Clinic today by Dr. Regan. If you have any questions or concerns after your appointment, please contact us (see below)    The following has been recommended for you based upon your appointment today:  We will call you tomorrow after our tumor board discussion     Please return to clinic in 3 months for follow up with Dr. Iglesias     How to Contact Us:  Send a [employer] message to your provider. Our team will respond to you via [employer]. Occasionally, we will need to call you to get further information.  For urgent matters (Monday-Friday), call the ENT Clinic: [phone] and speak with a call center team member - they will route your call appropriately.   If you'd like to speak directly with a nurse, please find our contact information below. We do our best to check voicemail frequently throughout the day, and will work to call you back within 1-2 days. For urgent matters, please use the general clinic phone numbers listed above.    Carly CALLEJAS RN, BSN  RN Care Coordinator, ENT Clinic  [facility]  Direct: [phone]

## 2024-05-23 NOTE — NURSING NOTE
Chief Complaint   Patient presents with    Port Draw     Labs drawn via port by RN     Leila Butt, RN

## 2024-05-23 NOTE — TELEPHONE ENCOUNTER
Spoke with patient regarding scheduling a Follow up with Dr. Iglesias in 3 months. Scheduled patient accordingly and [facility] Per PT via  call

## 2024-05-24 ENCOUNTER — TUMOR CONFERENCE (OUTPATIENT)
Dept: ONCOLOGY | Facility: CLINIC | Age: 64
End: 2024-05-24

## 2024-05-24 DIAGNOSIS — C31.0 SQUAMOUS CELL CARCINOMA OF MAXILLARY SINUS (H): Primary | ICD-10-CM

## 2024-05-24 NOTE — TUMOR CONFERENCE
Head & Neck Tumor Conference Note   5/24/2024    Status: Established  Staff: Dr. Damon Regan    Tumor Site: Left maxillary sinus/sinonasal cavity  Tumor Pathology: Squamous cell carcinoma  Tumor Stage: [id number]  Tumor Treatment:   - 1/8~2/27/24 Chemoradiation with weekly cisplatin.    Reason for Review: Review imaging and POC     Brief History: This is a 63 year old male with a history of a left sinonasal carcinoma. 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 He has had two biopsies prior, the first showing inverted papilloma, the second demonstrating inverted papilloma as well. He was most recently taken back to the OR a third time for a larger resection and to establish a definitive diagnosis. He subsequently underwent definitive chemoradiation with cisplatin, completed in February 2024. Post-treatment PET-CT and MRI are presented for review today.    Pertinent PMH:   Past Medical History:   Diagnosis Date    Ascending aortic aneurysm (H24)     Coronary artery disease     Depression     Gout     History of anesthesia complications     Hyperlipemia     Hypertension     PONV (postoperative nausea and vomiting)         Social Hx:   Social History     Tobacco Use    Smoking status: Former     Types: Cigarettes     Passive exposure: Past    Smokeless tobacco: Never    Tobacco comments:     every 3-4 months, rare   Substance Use Topics    Alcohol use: Yes     Comment: Occasionally    Drug use: No       Imaging:   Results for orders placed during the hospital encounter of 05/17/24    PET Oncology Whole Body    Narrative  Combined Report of: PET and CT on 5/17/2024 3:26 PM:    1. PET of the neck, chest, abdomen, and pelvis.  2. PET CT Fusion for Attenuation Correction and Anatomical  Localization.  3. Diagnostic CT of the chest, abdomen and pelvis with intravenous  contrast  obtained for diagnostic interpretation.  4. 3D MIP and PET-CT fused images were processed on an independent  workstation and archived to PACS and reviewed by a radiologist.    Technique:    1. PET: The patient received 10.07 mCi of F-18-FDG. The serum glucose  was 88 mg/dL prior to administration. Body weight was 56 kg. Images  were evaluated in the axial, sagittal, and coronal planes as well as  the rotational whole body MIP. Images were acquired from cranial  vertex to feet.    UPTAKE WAS MEASURED AT 60 MINUTES.    2. CT: Volumetric acquisition for clinical interpretation of the  chest, abdomen, and pelvis acquired at 3 mm sections. The chest,  abdomen, and pelvis were evaluated at 5 mm sections in bone, soft  tissue, and lung windows. High resolution images of the neck were  obtained with multiple oblique projection reformats.    Contrast and Medications:  IV contrast: 76 mL of Isovue 370 intravenously.  PO contrast: None.  Additional Medications: None.    3. 3D MIP and PET-CT fused images were processed on an independent  workstation and archived to PACS and reviewed by a radiologist.    INDICATION: Malignant neoplasm of nasal cavities (H).    ADDITIONAL INFORMATION OBTAINED FROM EMR: Stealth assisted transnasal  endoscopic approach to excise left sinonasal mass?on 12/1/2023. He  completed his concurrent chemoradiation therapy on February 27, 2024.  His main concern today to sinonasal congestion and postnasal drainage.      COMPARISON: 12/9/23 PET CT and 5/17/2024 face MRI.    FINDINGS:    BACKGROUND: Liver SUV max = 3.3, Aorta Blood SUV max = 2.5.      HEAD/NECK:    Index tumor: Resolution of previously seen intensely FDG avid mass  centered in the posterior left maxillary sinus, maxilla with extension  into the  space. Currently there are prior postsurgical  changes of left maxillary antrectomy, turbinectomy, partial  ethmoidectomy.    Irregularities with areas of bony discontinuities of the  left  maxillary sinus lateral wall and the pterygoid plate are noted on  today's exam. There is thick mucosal thickening along the residual  left anterior maxillary sinus wall with minimal mucosal thickening  along the lateral maxillary sinus wall. Uptake is mild overall with  superimposed few focal more avid areas of uptake with max SUV  measuring up to 6.2. Heterogeneous partially necrotic component in the  left  space is slightly smaller but there is residual 2.3 cm  necrotic lesion deep to the mandibular ramus within the left medial  pterygoid muscle with no abnormal uptake. There are bony  fragmentations of the left side of the maxilla cranially extending to  involve the lateral wall of the left maxillary sinus. On the same exam  MRI there are corresponding rim enhancing centrally cystic changes..  Anterior inferiorly laterally there is a focal uptake of the maxilla  with max SUV of 5.9.    Regarding the rest of the paranasal sinuses there are inflammatory  mucosal changes of the right maxillary sinus, right ethmoid air cells  and the frontal sinus with no focal FDG uptake.    There is persistent non-FDG avid fluid opacification of the left  mastoid air cells and middle ear cavity. Right mastoid air cells and  middle ear cavity are clear.    Regarding the rest of the pharyngeal mucosal spaces mild new mucosal  FDG avid enhancement of the nasopharynx and tongue base due to  mucositis. However more caudally along the left lateral oropharyngeal  wall there is thicker mucosal enhancement with more focal FDG uptake  with maximum SUV of 7.9. Hypopharynx and larynx are normal.    Lymph nodes: Resolution of left level 2 lymph nodes. 4 mm mildly avid  (max SUV 4.0) right submandibular node is likely reactive. No other  new FDG avid or enlarged nodes.    Salivary glands: Slight atrophy of the submandibular and parotid  glands with no abnormal foci of uptake.    Thyroid gland: Coarse calcification in the right  thyroid lobe.  Subcentimeter hypodense nodule in the left thyroid lobe.    Vascular structures: There is significant thinning of the left jugular  vein at the mid neck to the level of skull base but it remains patent.  The rest of the vessels are open.    Brain: No abnormal FDG avid lesion or abnormal enhancement.    CHEST:  Right-sided chest wall port with the tip of the catheter in the right  atrium.    Lymph nodes: No FDG avid or abnormally enlarged lymph nodes.    Lungs: The central tracheobronchial tree is clear. No acute  consolidation.  No pleural effusion or pneumothorax. Bilateral minimal  pleural thickening at the bases and the subjacent dependent  atelectatic changes. Linear scarring in the left lung base. Punctate  subpleural nodularities in the right middle lobe, right lower lobe and  left lower lobe.    Heart and great vessels: Heart size is within normal limits. No  pericardial effusion. Enlarged ascending aorta. Wall calcifications of  the thoracic aorta. Coronary artery calcifications. New diffuse  mucosal FDG uptake by the esophagus suggesting underlying esophagitis.    ABDOMEN AND PELVIS:    Liver: No FDG avid lesion. No intrahepatic or extrahepatic biliary  ductal dilatation. Cholecystectomy changes.    Pancreas: The pancreas is within normal limits. No pancreatic ductal  dilatation.    Spleen: No FDG avid lesion.    Adrenal glands: No FDG avid foci.    Kidneys: No FDG avid lesion. No hydronephrosis. The urinary bladder is  unremarkable.    Reproductive organs are within normal limits.    Gastrointestinal system: FDG uptake along the gastrostomy is  postprocedural/inflammatory. Long segment FDG uptake the cecum and  ascending colon is likely due to peristalsis Normal caliber of the  small and large bowel.    Lymph nodes: No FDG avid or abnormally enlarged lymph nodes.    Vascular structures: Normal caliber of the abdominal aorta. Aortoiliac  wall calcifications.    No free air, free fluid, or  fluid collection.    EXTREMITIES:    No abnormal FDG uptake in the visualized extremities.    BONES AND SOFT TISSUES:    No abnormal FDG uptake in the skeleton. No abnormal lytic or blastic  osseous lesions.    SPINAL CANAL:    No evident canal compromise. No abnormal FDG avid lesion.    Impression  IMPRESSION:    1. Follow-up FDG PET/CT of the neck and whole body following  chemoradiotherapy completed in February 2024, almost complete  resolution of the previously seen FDG avid left maxillary/  space mass. Residual changes in the left side of the maxilla and  maxillary sinus lateral wall on CT with 3 foci of more focal uptake  superimposing mild diffuse underlying uptake on PET, described above.  Continued attention on follow-up is recommended.    2. No residual or recurrent suspicious cervical lymphadenopathy.    3. No evidence of new distant metastasis.    4. Findings suggestive of mucositis of the nasopharyngeal mucosa and  to lesser extent of the tongue base.    5. More focal uptake with associating thicker mucosal enhancement of  the left lateral oropharyngeal wall. Correlation with direct  examination findings is recommended.    6. Several bilateral subpleural punctate nodularities in the lungs.  Attentional follow-up is recommended.    7. New diffuse esophagitis.    KANCHAN ROSALES MD      SYSTEM ID:  [id number]    No results found for this or any previous visit.      Results for orders placed during the hospital encounter of 05/17/24    CT Soft Tissue Neck w Contrast    Narrative  Combined Report of: PET and CT on 5/17/2024 3:26 PM:    1. PET of the neck, chest, abdomen, and pelvis.  2. PET CT Fusion for Attenuation Correction and Anatomical  Localization.  3. Diagnostic CT of the chest, abdomen and pelvis with intravenous  contrast obtained for diagnostic interpretation.  4. 3D MIP and PET-CT fused images were processed on an independent  workstation and archived to PACS and reviewed by a  radiologist.    Technique:    1. PET: The patient received 10.07 mCi of F-18-FDG. The serum glucose  was 88 mg/dL prior to administration. Body weight was 56 kg. Images  were evaluated in the axial, sagittal, and coronal planes as well as  the rotational whole body MIP. Images were acquired from cranial  vertex to feet.    UPTAKE WAS MEASURED AT 60 MINUTES.    2. CT: Volumetric acquisition for clinical interpretation of the  chest, abdomen, and pelvis acquired at 3 mm sections. The chest,  abdomen, and pelvis were evaluated at 5 mm sections in bone, soft  tissue, and lung windows. High resolution images of the neck were  obtained with multiple oblique projection reformats.    Contrast and Medications:  IV contrast: 76 mL of Isovue 370 intravenously.  PO contrast: None.  Additional Medications: None.    3. 3D MIP and PET-CT fused images were processed on an independent  workstation and archived to PACS and reviewed by a radiologist.    INDICATION: Malignant neoplasm of nasal cavities (H).    ADDITIONAL INFORMATION OBTAINED FROM EMR: Stealth assisted transnasal  endoscopic approach to excise left sinonasal mass?on 12/1/2023. He  completed his concurrent chemoradiation therapy on February 27, 2024.  His main concern today to sinonasal congestion and postnasal drainage.      COMPARISON: 12/9/23 PET CT and 5/17/2024 face MRI.    FINDINGS:    BACKGROUND: Liver SUV max = 3.3, Aorta Blood SUV max = 2.5.      HEAD/NECK:    Index tumor: Resolution of previously seen intensely FDG avid mass  centered in the posterior left maxillary sinus, maxilla with extension  into the  space. Currently there are prior postsurgical  changes of left maxillary antrectomy, turbinectomy, partial  ethmoidectomy.    Irregularities with areas of bony discontinuities of the left  maxillary sinus lateral wall and the pterygoid plate are noted on  today's exam. There is thick mucosal thickening along the residual  left anterior maxillary  sinus wall with minimal mucosal thickening  along the lateral maxillary sinus wall. Uptake is mild overall with  superimposed few focal more avid areas of uptake with max SUV  measuring up to 6.2. Heterogeneous partially necrotic component in the  left  space is slightly smaller but there is residual 2.3 cm  necrotic lesion deep to the mandibular ramus within the left medial  pterygoid muscle with no abnormal uptake. There are bony  fragmentations of the left side of the maxilla cranially extending to  involve the lateral wall of the left maxillary sinus. On the same exam  MRI there are corresponding rim enhancing centrally cystic changes..  Anterior inferiorly laterally there is a focal uptake of the maxilla  with max SUV of 5.9.    Regarding the rest of the paranasal sinuses there are inflammatory  mucosal changes of the right maxillary sinus, right ethmoid air cells  and the frontal sinus with no focal FDG uptake.    There is persistent non-FDG avid fluid opacification of the left  mastoid air cells and middle ear cavity. Right mastoid air cells and  middle ear cavity are clear.    Regarding the rest of the pharyngeal mucosal spaces mild new mucosal  FDG avid enhancement of the nasopharynx and tongue base due to  mucositis. However more caudally along the left lateral oropharyngeal  wall there is thicker mucosal enhancement with more focal FDG uptake  with maximum SUV of 7.9. Hypopharynx and larynx are normal.    Lymph nodes: Resolution of left level 2 lymph nodes. 4 mm mildly avid  (max SUV 4.0) right submandibular node is likely reactive. No other  new FDG avid or enlarged nodes.    Salivary glands: Slight atrophy of the submandibular and parotid  glands with no abnormal foci of uptake.    Thyroid gland: Coarse calcification in the right thyroid lobe.  Subcentimeter hypodense nodule in the left thyroid lobe.    Vascular structures: There is significant thinning of the left jugular  vein at the mid  neck to the level of skull base but it remains patent.  The rest of the vessels are open.    Brain: No abnormal FDG avid lesion or abnormal enhancement.    CHEST:  Right-sided chest wall port with the tip of the catheter in the right  atrium.    Lymph nodes: No FDG avid or abnormally enlarged lymph nodes.    Lungs: The central tracheobronchial tree is clear. No acute  consolidation.  No pleural effusion or pneumothorax. Bilateral minimal  pleural thickening at the bases and the subjacent dependent  atelectatic changes. Linear scarring in the left lung base. Punctate  subpleural nodularities in the right middle lobe, right lower lobe and  left lower lobe.    Heart and great vessels: Heart size is within normal limits. No  pericardial effusion. Enlarged ascending aorta. Wall calcifications of  the thoracic aorta. Coronary artery calcifications. New diffuse  mucosal FDG uptake by the esophagus suggesting underlying esophagitis.    ABDOMEN AND PELVIS:    Liver: No FDG avid lesion. No intrahepatic or extrahepatic biliary  ductal dilatation. Cholecystectomy changes.    Pancreas: The pancreas is within normal limits. No pancreatic ductal  dilatation.    Spleen: No FDG avid lesion.    Adrenal glands: No FDG avid foci.    Kidneys: No FDG avid lesion. No hydronephrosis. The urinary bladder is  unremarkable.    Reproductive organs are within normal limits.    Gastrointestinal system: FDG uptake along the gastrostomy is  postprocedural/inflammatory. Long segment FDG uptake the cecum and  ascending colon is likely due to peristalsis Normal caliber of the  small and large bowel.    Lymph nodes: No FDG avid or abnormally enlarged lymph nodes.    Vascular structures: Normal caliber of the abdominal aorta. Aortoiliac  wall calcifications.    No free air, free fluid, or fluid collection.    EXTREMITIES:    No abnormal FDG uptake in the visualized extremities.    BONES AND SOFT TISSUES:    No abnormal FDG uptake in the skeleton. No  abnormal lytic or blastic  osseous lesions.    SPINAL CANAL:    No evident canal compromise. No abnormal FDG avid lesion.    Impression  IMPRESSION:    1. Follow-up FDG PET/CT of the neck and whole body following  chemoradiotherapy completed in February 2024, almost complete  resolution of the previously seen FDG avid left maxillary/  space mass. Residual changes in the left side of the maxilla and  maxillary sinus lateral wall on CT with 3 foci of more focal uptake  superimposing mild diffuse underlying uptake on PET, described above.  Continued attention on follow-up is recommended.    2. No residual or recurrent suspicious cervical lymphadenopathy.    3. No evidence of new distant metastasis.    4. Findings suggestive of mucositis of the nasopharyngeal mucosa and  to lesser extent of the tongue base.    5. More focal uptake with associating thicker mucosal enhancement of  the left lateral oropharyngeal wall. Correlation with direct  examination findings is recommended.    6. Several bilateral subpleural punctate nodularities in the lungs.  Attentional follow-up is recommended.    7. New diffuse esophagitis.    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 His first set of post-treatment imaging (PET-CT and MRI) are reviewed today. Post-treatment PET-CT from 5/17/2023 shows a few foci of uptake that do not appear concerning. MRI from the same day was also performed, which shows residual necrotic tumor. MRI findings do not correspond with uptake on PET-CT. There is no obvious residual tumor. Findings are clearer on PET-CT.     Plan: Ongoing surveillance; repeat imaging (PET-CT vs. MRI in 3 months)    Jeanette Morris MD, PGY-5  Otolaryngology- Head and Neck Surgery    Documentation /  Disclaimer Cancer Tumor Board Note  Cancer tumor board recommendations do not override what is determined to be reasonable care and treatment, which is dependent on the circumstances of a patient's case; the patient's medical, social, and personal concerns; and the clinical judgment of the oncologist [physician].

## 2024-05-28 ENCOUNTER — APPOINTMENT (OUTPATIENT)
Dept: INTERPRETER SERVICES | Facility: CLINIC | Age: 64
End: 2024-05-28
Payer: MEDICARE

## 2024-05-28 ENCOUNTER — TELEPHONE (OUTPATIENT)
Dept: OTOLARYNGOLOGY | Facility: CLINIC | Age: 64
End: 2024-05-28
Payer: MEDICARE

## 2024-05-28 NOTE — TUMOR CONFERENCE
Dr. Iglesias discussed TB recommendation with patient. MRI sinonasal and PET CT ordered to be completed at the end of August.     Carly Monique, RN, BSN  RN Care Coordinator, [facility]

## 2024-05-28 NOTE — TELEPHONE ENCOUNTER
Patient confirmed scheduled appointment:  Date: 8/5  Time: 12:00pm  Visit type: PET Scan  Location: [place]   Additional notes: MRI Same day

## 2024-05-29 NOTE — PROGRESS NOTES
[facility] CANCER [facility]    PATIENT NAME: Douglas Herrera  MRN # [id number]   DATE OF VISIT: May 30, 2024  YOB: 1960     [facility]   Radiation Oncology: Dr. Tomasa Akers  Cardiology: Dr. Qamar Hernandez  PCP: Dr. العراقي   Hepatologist: [facility]     CANCER TYPE: SCC sinonasal   STAGE: [id number] (IVB)  ECOG PS: 1    PD-L1:  NGS: internal panel. Fusion negative. ARID1A S90fs, EGFR exon 20 insertion, APC [id number]    SUMMARY    8/9/23 CT sinus.   8/24/23 MRI sinus. L maxillary sinus mass  9/12/23 ED for epistaxis.   9/13/23 CTA and embolization of L IMAX   10/4/23 Nasal bx. Path: Inverted papilloma with high grade dysplasia  11/10/23 Nasal endoscopy and bx in the OR. C/b severe bleeding. Path: Inverted sinonasal papilloma with severe dysplasia.  11/17/23 MRI. 7.4 x 4.6 x 6.6 cm L sinonasal mass, destruction of nasal turbinates, L maxillary sinus, hard palate, invasion of L  space, involvement of medial and lateral pterygoids and temporalis muscles. Effacement and invasion ipsilateral pterygopalatine fossa, extends and effaces the nasopharynx.   11/30/23 Carotid angiogram. Direct endonasal and transoral embolization L sinonasal tumor, transaterial embolization of the L sphenopalatine artery feeders to the L sinonasal artery tumor   12/1/23 Stealth assisted transnasal endoscopic approach to excise L sinonasal mass. Pre-operative embolization. Path: SCC involving L maxilla.    12/9/23 PET. Hypermetabolic mass centered along the L maxilla, extending superiorly through the floor of the L maxillary sinus, posterior/laterally to the  space, invasion of the pterygoid muscles, extending cranially along the pterygopalatine fossa and pterygoid plates to the skull base level. Erosion of involved bones including L maxilla, maxillary sinus wall and pterygoid plates. Extension medially to the L lateral nasopharyngeal wall and soft palate. 1 mm fat place between carotid and  mass. ~4.9 x 4.0 x 5.4 cm (SUV 24.3). Partially resected since 11/17/23 MRI. 8 mm L 2A node (SUV 5.9), 7 mm L level 2 node (SUV 5.5)  1/2/24 Gtube (IR)  1/4/24 Video swallow. No aspiration  1/8~2/27/24 Chemoradiation with weekly cisplatin.  1/11/24 Port (IR)    ASSESSMENT AND PLAN  SCC L maxillary sinus, [id number] (IVB), ARID1 mutation,  EGFR exon 20 insertion: Completed chemoradiation 3 months ago. PET/CT and MR 5/17/24 showed near but not complete resolution of primary mass. Discussed at tumor board last week. Dr. Cabrales recommended PET/CT and MRI in 3 months, both necessary to ensure the FDG avidity has resolved. Either one in isolation is not sufficient. Will follow up on those scans but does not need to see me afterward. Will base follow up on those results.     Trismus: Note sent to PT to he'll see in the future to see if this is something she can help with. O/w will send another referral to speech pathologist in ENT clinic. He's doing stretches but I think can benefit from reinforced teaching and more aggressive stretching. I don't think this is tumor related but I asked him to let me and Dr. Leal know if it worsens.     Hypomagnesia: Resolved     Hypercalcemia: Malignancy. Resolved quickly with starting chemoradiation. Resolved.     Port: wait until Aug to remove, schedule flushes until then     Pulm nodules: on my review. Stable    L elbow mass: Gout? Not red though, not terribly painful. Asked him to let me know if it worsens and/or doesn't improve    Hearing loss: Unchanged - still pretty significant on L. Can get audiogram next visit and see if he qualifies for hearing aids.     Hepatitis B: HBSAby negative, SAg +, cAby +, HBV PCR negative 1/23/24. HCV negative.     H/o PE/DVT: 5/8/2019 CTA report scanned into [employer]. Small PEs. Also had LLE DVT, acute, occlusive. Was on rivaroxaban, completed course.     ASCVD: I'm not entirely clear why it was stopped when he started chemoradiation. Resume asa 81mg  daily, ok to hold metoprolol a little longer until he sees PCP     H/o undefined hypothyroidism: TFTs 1/8/24 and current normal. Check again in about 6 months but I suspect this was an erroneous diagnosis that somehow got into his chart.     The longitudinal plan of care for the condition(s) below were addressed during this visit. Due to the added complexity in care, I will continue to support Douglas in the subsequent management of this condition(s) and with the ongoing continuity of care of this condition(s): maxillary sinus cancer     40 minutes spent by me on the date of the encounter doing chart review, review of outside records, review of test results, interpretation of tests, patient visit, documentation, orders, discussion with other provider(s), discussion with family.     Kayy Post MD  Associate Professor of Medicine  Hematology, Oncology and Transplantation      SUBJECTIVE  Mr. Herrera returns for follow up of maxillary sinus cancer, now just over 3 months since completing chemoradiation  Accompanied by his son  Doing ok overall  Trismus - a little worse than last month  Swallowing itself seems ok   Still tinnitus and hearing loss, no better no worse  Wonders about asa - has been off since treatment  Son noticed L elbow was swollen. Minimal pain, not red. Does have h/o gout  Mouth is dry - but largely tolerable       PAST MEDICAL HISTORY  Sinonasal carcinoma as above  HTN  ASCVD. CABG x 3 2019  PE and LLE DVT after CABG . Treated with rivaroxaban  Dyslipidemia  Hepatitis B   SCC R temple s/p MOHS  Ascending aortic aneurysm repair 2019  Hearing loss L   Gout - feet  Depression  Cholecystectomy    CURRENT OUTPATIENT MEDICATIONS  Reviewed    ALLERGIES  No Known Allergies     PHYSICAL EXAM  /76 (BP Location: Right arm, Patient Position: Sitting, Cuff Size: Adult Regular)   Pulse 83   Temp 97.6  F (36.4  C) (Oral)   Resp 16   Wt 58.3 kg (128 lb 8 oz)   SpO2 100%   BMI 22.06 kg/m    GEN:  NAD  HEENT: EOMI, no icterus, injection or pallor. Oropharynx is clear. Trismus - about 3 fingerwidths   EXT: warm, no edema. Hi left elbow definitely is larger than the right, not red, not warm, not tender  NEURO: alert  SKIN: no rashes    LABORATORY AND IMAGING STUDIES    Labs 5/23/24 were independently reviewed and interpreted by me  Hemoglobin 11.9 o/w CBC pd, mg, TSH, FT4, CMP perfect     PET Oncology Whole Body  Narrative: Combined Report of: PET and CT on 5/17/2024 3:26 PM:     1. PET of the neck, chest, abdomen, and pelvis.  2. PET CT Fusion for Attenuation Correction and Anatomical  Localization.  3. Diagnostic CT of the chest, abdomen and pelvis with intravenous  contrast obtained for diagnostic interpretation.  4. 3D MIP and PET-CT fused images were processed on an independent  workstation and archived to PACS and reviewed by a radiologist.    Technique:     1. PET: The patient received 10.07 mCi of F-18-FDG. The serum glucose  was 88 mg/dL prior to administration. Body weight was 56 kg. Images  were evaluated in the axial, sagittal, and coronal planes as well as  the rotational whole body MIP. Images were acquired from cranial  vertex to feet.    UPTAKE WAS MEASURED AT 60 MINUTES.     2. CT: Volumetric acquisition for clinical interpretation of the  chest, abdomen, and pelvis acquired at 3 mm sections. The chest,  abdomen, and pelvis were evaluated at 5 mm sections in bone, soft  tissue, and lung windows. High resolution images of the neck were  obtained with multiple oblique projection reformats.     Contrast and Medications:  IV contrast: 76 mL of Isovue 370 intravenously.  PO contrast: None.  Additional Medications: None.    3. 3D MIP and PET-CT fused images were processed on an independent  workstation and archived to PACS and reviewed by a radiologist.    INDICATION: Malignant neoplasm of nasal cavities (H).    ADDITIONAL INFORMATION OBTAINED FROM EMR: Stealth assisted transnasal  endoscopic  approach to excise left sinonasal mass?on 12/1/2023. He  completed his concurrent chemoradiation therapy on February 27, 2024.  His main concern today to sinonasal congestion and postnasal drainage.       COMPARISON: 12/9/23 PET CT and 5/17/2024 face MRI.     FINDINGS:     BACKGROUND: Liver SUV max = 3.3, Aorta Blood SUV max = 2.5.     HEAD/NECK:    Index tumor: Resolution of previously seen intensely FDG avid mass  centered in the posterior left maxillary sinus, maxilla with extension  into the  space. Currently there are prior postsurgical  changes of left maxillary antrectomy, turbinectomy, partial  ethmoidectomy.     Irregularities with areas of bony discontinuities of the left  maxillary sinus lateral wall and the pterygoid plate are noted on  today's exam. There is thick mucosal thickening along the residual  left anterior maxillary sinus wall with minimal mucosal thickening  along the lateral maxillary sinus wall. Uptake is mild overall with  superimposed few focal more avid areas of uptake with max SUV  measuring up to 6.2. Heterogeneous partially necrotic component in the  left  space is slightly smaller but there is residual 2.3 cm  necrotic lesion deep to the mandibular ramus within the left medial  pterygoid muscle with no abnormal uptake. There are bony  fragmentations of the left side of the maxilla cranially extending to  involve the lateral wall of the left maxillary sinus. On the same exam  MRI there are corresponding rim enhancing centrally cystic changes..  Anterior inferiorly laterally there is a focal uptake of the maxilla  with max SUV of 5.9.    Regarding the rest of the paranasal sinuses there are inflammatory  mucosal changes of the right maxillary sinus, right ethmoid air cells  and the frontal sinus with no focal FDG uptake.    There is persistent non-FDG avid fluid opacification of the left  mastoid air cells and middle ear cavity. Right mastoid air cells and  middle  ear cavity are clear.    Regarding the rest of the pharyngeal mucosal spaces mild new mucosal  FDG avid enhancement of the nasopharynx and tongue base due to  mucositis. However more caudally along the left lateral oropharyngeal  wall there is thicker mucosal enhancement with more focal FDG uptake  with maximum SUV of 7.9. Hypopharynx and larynx are normal.     Lymph nodes: Resolution of left level 2 lymph nodes. 4 mm mildly avid  (max SUV 4.0) right submandibular node is likely reactive. No other  new FDG avid or enlarged nodes.     Salivary glands: Slight atrophy of the submandibular and parotid  glands with no abnormal foci of uptake.     Thyroid gland: Coarse calcification in the right thyroid lobe.  Subcentimeter hypodense nodule in the left thyroid lobe.    Vascular structures: There is significant thinning of the left jugular  vein at the mid neck to the level of skull base but it remains patent.  The rest of the vessels are open.     Brain: No abnormal FDG avid lesion or abnormal enhancement.     CHEST:  Right-sided chest wall port with the tip of the catheter in the right  atrium.    Lymph nodes: No FDG avid or abnormally enlarged lymph nodes.    Lungs: The central tracheobronchial tree is clear. No acute  consolidation.  No pleural effusion or pneumothorax. Bilateral minimal  pleural thickening at the bases and the subjacent dependent  atelectatic changes. Linear scarring in the left lung base. Punctate  subpleural nodularities in the right middle lobe, right lower lobe and  left lower lobe.    Heart and great vessels: Heart size is within normal limits. No  pericardial effusion. Enlarged ascending aorta. Wall calcifications of  the thoracic aorta. Coronary artery calcifications. New diffuse  mucosal FDG uptake by the esophagus suggesting underlying esophagitis.    ABDOMEN AND PELVIS:    Liver: No FDG avid lesion. No intrahepatic or extrahepatic biliary  ductal dilatation. Cholecystectomy changes.      Pancreas: The pancreas is within normal limits. No pancreatic ductal  dilatation.     Spleen: No FDG avid lesion.    Adrenal glands: No FDG avid foci.    Kidneys: No FDG avid lesion. No hydronephrosis. The urinary bladder is  unremarkable.     Reproductive organs are within normal limits.    Gastrointestinal system: FDG uptake along the gastrostomy is  postprocedural/inflammatory. Long segment FDG uptake the cecum and  ascending colon is likely due to peristalsis Normal caliber of the  small and large bowel.     Lymph nodes: No FDG avid or abnormally enlarged lymph nodes.    Vascular structures: Normal caliber of the abdominal aorta. Aortoiliac  wall calcifications.    No free air, free fluid, or fluid collection.     EXTREMITIES:     No abnormal FDG uptake in the visualized extremities.    BONES AND SOFT TISSUES:     No abnormal FDG uptake in the skeleton. No abnormal lytic or blastic  osseous lesions.     SPINAL CANAL:     No evident canal compromise. No abnormal FDG avid lesion.  Impression: IMPRESSION:     1. Follow-up FDG PET/CT of the neck and whole body following  chemoradiotherapy completed in February 2024, almost complete  resolution of the previously seen FDG avid left maxillary/  space mass. Residual changes in the left side of the maxilla and  maxillary sinus lateral wall on CT with 3 foci of more focal uptake  superimposing mild diffuse underlying uptake on PET, described above.  Continued attention on follow-up is recommended.    2. No residual or recurrent suspicious cervical lymphadenopathy.    3. No evidence of new distant metastasis.    4. Findings suggestive of mucositis of the nasopharyngeal mucosa and  to lesser extent of the tongue base.    5. More focal uptake with associating thicker mucosal enhancement of  the left lateral oropharyngeal wall. Correlation with direct  examination findings is recommended.    6. Several bilateral subpleural punctate nodularities in the  lungs.  Attentional follow-up is recommended.    7. New diffuse esophagitis.    KANCHAN ROSALES MD         SYSTEM ID:  [id number]  CT Soft Tissue Neck w Contrast  Narrative: Combined Report of: PET and CT on 5/17/2024 3:26 PM:     1. PET of the neck, chest, abdomen, and pelvis.  2. PET CT Fusion for Attenuation Correction and Anatomical  Localization.  3. Diagnostic CT of the chest, abdomen and pelvis with intravenous  contrast obtained for diagnostic interpretation.  4. 3D MIP and PET-CT fused images were processed on an independent  workstation and archived to PACS and reviewed by a radiologist.    Technique:     1. PET: The patient received 10.07 mCi of F-18-FDG. The serum glucose  was 88 mg/dL prior to administration. Body weight was 56 kg. Images  were evaluated in the axial, sagittal, and coronal planes as well as  the rotational whole body MIP. Images were acquired from cranial  vertex to feet.    UPTAKE WAS MEASURED AT 60 MINUTES.     2. CT: Volumetric acquisition for clinical interpretation of the  chest, abdomen, and pelvis acquired at 3 mm sections. The chest,  abdomen, and pelvis were evaluated at 5 mm sections in bone, soft  tissue, and lung windows. High resolution images of the neck were  obtained with multiple oblique projection reformats.     Contrast and Medications:  IV contrast: 76 mL of Isovue 370 intravenously.  PO contrast: None.  Additional Medications: None.    3. 3D MIP and PET-CT fused images were processed on an independent  workstation and archived to PACS and reviewed by a radiologist.    INDICATION: Malignant neoplasm of nasal cavities (H).    ADDITIONAL INFORMATION OBTAINED FROM EMR: Stealth assisted transnasal  endoscopic approach to excise left sinonasal mass?on 12/1/2023. He  completed his concurrent chemoradiation therapy on February 27, 2024.  His main concern today to sinonasal congestion and postnasal drainage.       COMPARISON: 12/9/23 PET CT and 5/17/2024 face MRI.     FINDINGS:      BACKGROUND: Liver SUV max = 3.3, Aorta Blood SUV max = 2.5.     HEAD/NECK:    Index tumor: Resolution of previously seen intensely FDG avid mass  centered in the posterior left maxillary sinus, maxilla with extension  into the  space. Currently there are prior postsurgical  changes of left maxillary antrectomy, turbinectomy, partial  ethmoidectomy.     Irregularities with areas of bony discontinuities of the left  maxillary sinus lateral wall and the pterygoid plate are noted on  today's exam. There is thick mucosal thickening along the residual  left anterior maxillary sinus wall with minimal mucosal thickening  along the lateral maxillary sinus wall. Uptake is mild overall with  superimposed few focal more avid areas of uptake with max SUV  measuring up to 6.2. Heterogeneous partially necrotic component in the  left  space is slightly smaller but there is residual 2.3 cm  necrotic lesion deep to the mandibular ramus within the left medial  pterygoid muscle with no abnormal uptake. There are bony  fragmentations of the left side of the maxilla cranially extending to  involve the lateral wall of the left maxillary sinus. On the same exam  MRI there are corresponding rim enhancing centrally cystic changes..  Anterior inferiorly laterally there is a focal uptake of the maxilla  with max SUV of 5.9.    Regarding the rest of the paranasal sinuses there are inflammatory  mucosal changes of the right maxillary sinus, right ethmoid air cells  and the frontal sinus with no focal FDG uptake.    There is persistent non-FDG avid fluid opacification of the left  mastoid air cells and middle ear cavity. Right mastoid air cells and  middle ear cavity are clear.    Regarding the rest of the pharyngeal mucosal spaces mild new mucosal  FDG avid enhancement of the nasopharynx and tongue base due to  mucositis. However more caudally along the left lateral oropharyngeal  wall there is thicker mucosal enhancement  with more focal FDG uptake  with maximum SUV of 7.9. Hypopharynx and larynx are normal.     Lymph nodes: Resolution of left level 2 lymph nodes. 4 mm mildly avid  (max SUV 4.0) right submandibular node is likely reactive. No other  new FDG avid or enlarged nodes.     Salivary glands: Slight atrophy of the submandibular and parotid  glands with no abnormal foci of uptake.     Thyroid gland: Coarse calcification in the right thyroid lobe.  Subcentimeter hypodense nodule in the left thyroid lobe.    Vascular structures: There is significant thinning of the left jugular  vein at the mid neck to the level of skull base but it remains patent.  The rest of the vessels are open.     Brain: No abnormal FDG avid lesion or abnormal enhancement.     CHEST:  Right-sided chest wall port with the tip of the catheter in the right  atrium.    Lymph nodes: No FDG avid or abnormally enlarged lymph nodes.    Lungs: The central tracheobronchial tree is clear. No acute  consolidation.  No pleural effusion or pneumothorax. Bilateral minimal  pleural thickening at the bases and the subjacent dependent  atelectatic changes. Linear scarring in the left lung base. Punctate  subpleural nodularities in the right middle lobe, right lower lobe and  left lower lobe.    Heart and great vessels: Heart size is within normal limits. No  pericardial effusion. Enlarged ascending aorta. Wall calcifications of  the thoracic aorta. Coronary artery calcifications. New diffuse  mucosal FDG uptake by the esophagus suggesting underlying esophagitis.    ABDOMEN AND PELVIS:    Liver: No FDG avid lesion. No intrahepatic or extrahepatic biliary  ductal dilatation. Cholecystectomy changes.     Pancreas: The pancreas is within normal limits. No pancreatic ductal  dilatation.     Spleen: No FDG avid lesion.    Adrenal glands: No FDG avid foci.    Kidneys: No FDG avid lesion. No hydronephrosis. The urinary bladder is  unremarkable.     Reproductive organs are within  normal limits.    Gastrointestinal system: FDG uptake along the gastrostomy is  postprocedural/inflammatory. Long segment FDG uptake the cecum and  ascending colon is likely due to peristalsis Normal caliber of the  small and large bowel.     Lymph nodes: No FDG avid or abnormally enlarged lymph nodes.    Vascular structures: Normal caliber of the abdominal aorta. Aortoiliac  wall calcifications.    No free air, free fluid, or fluid collection.     EXTREMITIES:     No abnormal FDG uptake in the visualized extremities.    BONES AND SOFT TISSUES:     No abnormal FDG uptake in the skeleton. No abnormal lytic or blastic  osseous lesions.     SPINAL CANAL:     No evident canal compromise. No abnormal FDG avid lesion.  Impression: IMPRESSION:     1. Follow-up FDG PET/CT of the neck and whole body following  chemoradiotherapy completed in February 2024, almost complete  resolution of the previously seen FDG avid left maxillary/  space mass. Residual changes in the left side of the maxilla and  maxillary sinus lateral wall on CT with 3 foci of more focal uptake  superimposing mild diffuse underlying uptake on PET, described above.  Continued attention on follow-up is recommended.    2. No residual or recurrent suspicious cervical lymphadenopathy.    3. No evidence of new distant metastasis.    4. Findings suggestive of mucositis of the nasopharyngeal mucosa and  to lesser extent of the tongue base.    5. More focal uptake with associating thicker mucosal enhancement of  the left lateral oropharyngeal wall. Correlation with direct  examination findings is recommended.    6. Several bilateral subpleural punctate nodularities in the lungs.  Attentional follow-up is recommended.    7. New diffuse esophagitis.    KANCHAN ROSALES MD         SYSTEM ID:  [id number]  CT Chest/Abdomen/Pelvis w Contrast  Narrative: Combined Report of: PET and CT on 5/17/2024 3:26 PM:     1. PET of the neck, chest, abdomen, and pelvis.  2. PET CT  Fusion for Attenuation Correction and Anatomical  Localization.  3. Diagnostic CT of the chest, abdomen and pelvis with intravenous  contrast obtained for diagnostic interpretation.  4. 3D MIP and PET-CT fused images were processed on an independent  workstation and archived to PACS and reviewed by a radiologist.    Technique:     1. PET: The patient received 10.07 mCi of F-18-FDG. The serum glucose  was 88 mg/dL prior to administration. Body weight was 56 kg. Images  were evaluated in the axial, sagittal, and coronal planes as well as  the rotational whole body MIP. Images were acquired from cranial  vertex to feet.    UPTAKE WAS MEASURED AT 60 MINUTES.     2. CT: Volumetric acquisition for clinical interpretation of the  chest, abdomen, and pelvis acquired at 3 mm sections. The chest,  abdomen, and pelvis were evaluated at 5 mm sections in bone, soft  tissue, and lung windows. High resolution images of the neck were  obtained with multiple oblique projection reformats.     Contrast and Medications:  IV contrast: 76 mL of Isovue 370 intravenously.  PO contrast: None.  Additional Medications: None.    3. 3D MIP and PET-CT fused images were processed on an independent  workstation and archived to PACS and reviewed by a radiologist.    INDICATION: Malignant neoplasm of nasal cavities (H).    ADDITIONAL INFORMATION OBTAINED FROM EMR: Stealth assisted transnasal  endoscopic approach to excise left sinonasal mass?on 12/1/2023. He  completed his concurrent chemoradiation therapy on February 27, 2024.  His main concern today to sinonasal congestion and postnasal drainage.       COMPARISON: 12/9/23 PET CT and 5/17/2024 face MRI.     FINDINGS:     BACKGROUND: Liver SUV max = 3.3, Aorta Blood SUV max = 2.5.     HEAD/NECK:    Index tumor: Resolution of previously seen intensely FDG avid mass  centered in the posterior left maxillary sinus, maxilla with extension  into the  space. Currently there are prior  postsurgical  changes of left maxillary antrectomy, turbinectomy, partial  ethmoidectomy.     Irregularities with areas of bony discontinuities of the left  maxillary sinus lateral wall and the pterygoid plate are noted on  today's exam. There is thick mucosal thickening along the residual  left anterior maxillary sinus wall with minimal mucosal thickening  along the lateral maxillary sinus wall. Uptake is mild overall with  superimposed few focal more avid areas of uptake with max SUV  measuring up to 6.2. Heterogeneous partially necrotic component in the  left  space is slightly smaller but there is residual 2.3 cm  necrotic lesion deep to the mandibular ramus within the left medial  pterygoid muscle with no abnormal uptake. There are bony  fragmentations of the left side of the maxilla cranially extending to  involve the lateral wall of the left maxillary sinus. On the same exam  MRI there are corresponding rim enhancing centrally cystic changes..  Anterior inferiorly laterally there is a focal uptake of the maxilla  with max SUV of 5.9.    Regarding the rest of the paranasal sinuses there are inflammatory  mucosal changes of the right maxillary sinus, right ethmoid air cells  and the frontal sinus with no focal FDG uptake.    There is persistent non-FDG avid fluid opacification of the left  mastoid air cells and middle ear cavity. Right mastoid air cells and  middle ear cavity are clear.    Regarding the rest of the pharyngeal mucosal spaces mild new mucosal  FDG avid enhancement of the nasopharynx and tongue base due to  mucositis. However more caudally along the left lateral oropharyngeal  wall there is thicker mucosal enhancement with more focal FDG uptake  with maximum SUV of 7.9. Hypopharynx and larynx are normal.     Lymph nodes: Resolution of left level 2 lymph nodes. 4 mm mildly avid  (max SUV 4.0) right submandibular node is likely reactive. No other  new FDG avid or enlarged nodes.      Salivary glands: Slight atrophy of the submandibular and parotid  glands with no abnormal foci of uptake.     Thyroid gland: Coarse calcification in the right thyroid lobe.  Subcentimeter hypodense nodule in the left thyroid lobe.    Vascular structures: There is significant thinning of the left jugular  vein at the mid neck to the level of skull base but it remains patent.  The rest of the vessels are open.     Brain: No abnormal FDG avid lesion or abnormal enhancement.     CHEST:  Right-sided chest wall port with the tip of the catheter in the right  atrium.    Lymph nodes: No FDG avid or abnormally enlarged lymph nodes.    Lungs: The central tracheobronchial tree is clear. No acute  consolidation.  No pleural effusion or pneumothorax. Bilateral minimal  pleural thickening at the bases and the subjacent dependent  atelectatic changes. Linear scarring in the left lung base. Punctate  subpleural nodularities in the right middle lobe, right lower lobe and  left lower lobe.    Heart and great vessels: Heart size is within normal limits. No  pericardial effusion. Enlarged ascending aorta. Wall calcifications of  the thoracic aorta. Coronary artery calcifications. New diffuse  mucosal FDG uptake by the esophagus suggesting underlying esophagitis.    ABDOMEN AND PELVIS:    Liver: No FDG avid lesion. No intrahepatic or extrahepatic biliary  ductal dilatation. Cholecystectomy changes.     Pancreas: The pancreas is within normal limits. No pancreatic ductal  dilatation.     Spleen: No FDG avid lesion.    Adrenal glands: No FDG avid foci.    Kidneys: No FDG avid lesion. No hydronephrosis. The urinary bladder is  unremarkable.     Reproductive organs are within normal limits.    Gastrointestinal system: FDG uptake along the gastrostomy is  postprocedural/inflammatory. Long segment FDG uptake the cecum and  ascending colon is likely due to peristalsis Normal caliber of the  small and large bowel.     Lymph nodes: No FDG  avid or abnormally enlarged lymph nodes.    Vascular structures: Normal caliber of the abdominal aorta. Aortoiliac  wall calcifications.    No free air, free fluid, or fluid collection.     EXTREMITIES:     No abnormal FDG uptake in the visualized extremities.    BONES AND SOFT TISSUES:     No abnormal FDG uptake in the skeleton. No abnormal lytic or blastic  osseous lesions.     SPINAL CANAL:     No evident canal compromise. No abnormal FDG avid lesion.  Impression: IMPRESSION:     1. Follow-up FDG PET/CT of the neck and whole body following  chemoradiotherapy completed in February 2024, almost complete  resolution of the previously seen FDG avid left maxillary/  space mass. Residual changes in the left side of the maxilla and  maxillary sinus lateral wall on CT with 3 foci of more focal uptake  superimposing mild diffuse underlying uptake on PET, described above.  Continued attention on follow-up is recommended.    2. No residual or recurrent suspicious cervical lymphadenopathy.    3. No evidence of new distant metastasis.    4. Findings suggestive of mucositis of the nasopharyngeal mucosa and  to lesser extent of the tongue base.    5. More focal uptake with associating thicker mucosal enhancement of  the left lateral oropharyngeal wall. Correlation with direct  examination findings is recommended.    6. Several bilateral subpleural punctate nodularities in the lungs.  Attentional follow-up is recommended.    7. New diffuse esophagitis.    KANCHAN ROSALES MD         SYSTEM ID:  [id number]     Imaging was personally reviewed and interpreted by me  Couple of the more prominent lung nodules were there in Dec  4R node small and looks stable. Not worrisome

## 2024-05-30 ENCOUNTER — ONCOLOGY VISIT (OUTPATIENT)
Dept: ONCOLOGY | Facility: CLINIC | Age: 64
End: 2024-05-30
Attending: INTERNAL MEDICINE
Payer: MEDICARE

## 2024-05-30 VITALS
OXYGEN SATURATION: 100 % | HEART RATE: 83 BPM | RESPIRATION RATE: 16 BRPM | SYSTOLIC BLOOD PRESSURE: 123 MMHG | WEIGHT: 128.5 LBS | TEMPERATURE: 97.6 F | BODY MASS INDEX: 22.06 KG/M2 | DIASTOLIC BLOOD PRESSURE: 76 MMHG

## 2024-05-30 DIAGNOSIS — Z13.29 SCREENING FOR HYPOTHYROIDISM: ICD-10-CM

## 2024-05-30 DIAGNOSIS — J34.89 MASS OF SINUS: ICD-10-CM

## 2024-05-30 DIAGNOSIS — E83.42 HYPOMAGNESEMIA: ICD-10-CM

## 2024-05-30 DIAGNOSIS — C31.0 SQUAMOUS CELL CARCINOMA OF MAXILLARY SINUS (H): Primary | ICD-10-CM

## 2024-05-30 PROCEDURE — 99215 OFFICE O/P EST HI 40 MIN: CPT | Performed by: INTERNAL MEDICINE

## 2024-05-30 PROCEDURE — G2211 COMPLEX E/M VISIT ADD ON: HCPCS | Performed by: INTERNAL MEDICINE

## 2024-05-30 PROCEDURE — G0463 HOSPITAL OUTPT CLINIC VISIT: HCPCS | Performed by: INTERNAL MEDICINE

## 2024-05-30 RX ORDER — ASPIRIN 81 MG/1
81 TABLET ORAL DAILY
Qty: 90 TABLET | Refills: 3 | Status: SHIPPED | OUTPATIENT
Start: 2024-05-30

## 2024-05-30 ASSESSMENT — PAIN SCALES - GENERAL: PAINLEVEL: MODERATE PAIN (5)

## 2024-05-30 NOTE — NURSING NOTE
"Oncology Rooming Note    May 30, 2024 12:17 PM   Douglas Herrera is a 64 year old male who presents for:    Chief Complaint   Patient presents with    Oncology Clinic Visit     Squamous cell carcinoma of maxillary sinus      Initial Vitals: /76 (BP Location: Right arm, Patient Position: Sitting, Cuff Size: Adult Regular)   Pulse 83   Temp 97.6  F (36.4  C) (Oral)   Resp 16   Wt 58.3 kg (128 lb 8 oz)   SpO2 100%   BMI 22.06 kg/m   Estimated body mass index is 22.06 kg/m  as calculated from the following:    Height as of 5/23/24: 1.626 m (5' 4\").    Weight as of this encounter: 58.3 kg (128 lb 8 oz). Body surface area is 1.62 meters squared.  Moderate Pain (5) Comment: Data Unavailable   No LMP for male patient.  Allergies reviewed: Yes  Medications reviewed: Yes    Medications: Medication refills not needed today.  Pharmacy name entered into [facility]: [facility] - [place], [place] - [facility] [address]    Frailty Screening:   Is the patient here for a new oncology consult visit in cancer care? 2. No      Clinical concerns: Left elbow pain (5).      Fifi Avalos, EMT  5/30/2024              "

## 2024-05-30 NOTE — LETTER
5/30/2024         RE: Douglas Herrera  [address] [place] [address]        Dear Colleague,    Thank you for referring your patient, Douglas Herrera, to the [facility]. Please see a copy of my visit note below.       [facility]    PATIENT NAME: Douglas Herrera  MRN # [id number]   DATE OF VISIT: May 30, 2024  YOB: 1960     [facility]   Radiation Oncology: Dr. Tomasa Akers  Cardiology: Dr. Qamar Hernandez  PCP: Dr. 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 Fusion negative. ARID1A S90fs, EGFR exon 20 insertion, APC [id number]    SUMMARY    8/9/23 CT sinus.   8/24/23 MRI sinus. L maxillary sinus mass  9/12/23 ED for epistaxis.   9/13/23 CTA and embolization of L IMAX   10/4/23 Nasal bx. Path: Inverted papilloma with high grade dysplasia  11/10/23 Nasal endoscopy and bx in the OR. C/b severe bleeding. Path: Inverted sinonasal papilloma with severe dysplasia.  11/17/23 MRI. 7.4 x 4.6 x 6.6 cm L sinonasal mass, destruction of nasal turbinates, L maxillary sinus, hard palate, invasion of L  space, involvement of medial and lateral pterygoids and temporalis muscles. Effacement and invasion ipsilateral pterygopalatine fossa, extends and effaces the nasopharynx.   11/30/23 Carotid angiogram. Direct endonasal and transoral embolization L sinonasal tumor, transaterial embolization of the L sphenopalatine artery feeders to the L sinonasal artery tumor   12/1/23 Stealth assisted transnasal endoscopic approach to excise L sinonasal mass. Pre-operative embolization. Path: SCC involving L maxilla.    12/9/23 PET. Hypermetabolic mass centered along the L maxilla, extending superiorly through the floor of the L maxillary sinus, posterior/laterally to the  space, invasion of the pterygoid muscles, extending cranially along the pterygopalatine  fossa and pterygoid plates to the skull base level. Erosion of involved bones including L maxilla, maxillary sinus wall and pterygoid plates. Extension medially to the L lateral nasopharyngeal wall and soft palate. 1 mm fat place between carotid and mass. ~4.9 x 4.0 x 5.4 cm (SUV 24.3). Partially resected since 11/17/23 MRI. 8 mm L 2A node (SUV 5.9), 7 mm L level 2 node (SUV 5.5)  1/2/24 Gtube (IR)  1/4/24 Video swallow. No aspiration  1/8~2/27/24 Chemoradiation with weekly cisplatin.  1/11/24 Port (IR)    ASSESSMENT AND PLAN  SCC L maxillary sinus, [id number] (IVB), ARID1 mutation,  EGFR exon 20 insertion: Completed chemoradiation 3 months ago. PET/CT and MR 5/17/24 showed near but not complete resolution of primary mass. Discussed at tumor board last week. Dr. Cabrales recommended PET/CT and MRI in 3 months, both necessary to ensure the FDG avidity has resolved. Either one in isolation is not sufficient. Will follow up on those scans but does not need to see me afterward. Will base follow up on those results.     Trismus: Note sent to PT to he'll see in the future to see if this is something she can help with. O/w will send another referral to speech pathologist in ENT clinic. He's doing stretches but I think can benefit from reinforced teaching and more aggressive stretching. I don't think this is tumor related but I asked him to let me and Dr. Leal know if it worsens.     Hypomagnesia: Resolved     Hypercalcemia: Malignancy. Resolved quickly with starting chemoradiation. Resolved.     Port: wait until Aug to remove, schedule flushes until then     Pulm nodules: on my review. Stable    L elbow mass: Gout? Not red though, not terribly painful. Asked him to let me know if it worsens and/or doesn't improve    Hearing loss: Unchanged - still pretty significant on L. Can get audiogram next visit and see if he qualifies for hearing aids.     Hepatitis B: HBSAby negative, SAg +, cAby +, HBV PCR negative 1/23/24. HCV  negative.     H/o PE/DVT: 5/8/2019 CTA report scanned into [employer]. Small PEs. Also had LLE DVT, acute, occlusive. Was on rivaroxaban, completed course.     ASCVD: I'm not entirely clear why it was stopped when he started chemoradiation. Resume asa 81mg daily, ok to hold metoprolol a little longer until he sees PCP     H/o undefined hypothyroidism: TFTs 1/8/24 and current normal. Check again in about 6 months but I suspect this was an erroneous diagnosis that somehow got into his chart.     The longitudinal plan of care for the condition(s) below were addressed during this visit. Due to the added complexity in care, I will continue to support Douglas in the subsequent management of this condition(s) and with the ongoing continuity of care of this condition(s): maxillary sinus cancer     40 minutes spent by me on the date of the encounter doing chart review, review of outside records, review of test results, interpretation of tests, patient visit, documentation, orders, discussion with other provider(s), discussion with family.     Kayy Post MD  Associate Professor of Medicine  Hematology, Oncology and Transplantation      SUBJECTIVE  Mr. Herrera returns for follow up of maxillary sinus cancer, now just over 3 months since completing chemoradiation  Accompanied by his son  Doing ok overall  Trismus - a little worse than last month  Swallowing itself seems ok   Still tinnitus and hearing loss, no better no worse  Wonders about asa - has been off since treatment  Son noticed L elbow was swollen. Minimal pain, not red. Does have h/o gout  Mouth is dry - but largely tolerable       PAST MEDICAL HISTORY  Sinonasal carcinoma as above  HTN  ASCVD. CABG x 3 2019  PE and LLE DVT after CABG . Treated with rivaroxaban  Dyslipidemia  Hepatitis B   SCC R temple s/p MOHS  Ascending aortic aneurysm repair 2019  Hearing loss L   Gout - feet  Depression  Cholecystectomy    CURRENT OUTPATIENT MEDICATIONS  Reviewed    ALLERGIES  No Known  Allergies     PHYSICAL EXAM  /76 (BP Location: Right arm, Patient Position: Sitting, Cuff Size: Adult Regular)   Pulse 83   Temp 97.6  F (36.4  C) (Oral)   Resp 16   Wt 58.3 kg (128 lb 8 oz)   SpO2 100%   BMI 22.06 kg/m    GEN: NAD  HEENT: EOMI, no icterus, injection or pallor. Oropharynx is clear. Trismus - about 3 fingerwidths   EXT: warm, no edema. Hi left elbow definitely is larger than the right, not red, not warm, not tender  NEURO: alert  SKIN: no rashes    LABORATORY AND IMAGING STUDIES    Labs 5/23/24 were independently reviewed and interpreted by me  Hemoglobin 11.9 o/w CBC pd, mg, TSH, FT4, CMP perfect     PET Oncology Whole Body  Narrative: Combined Report of: PET and CT on 5/17/2024 3:26 PM:     1. PET of the neck, chest, abdomen, and pelvis.  2. PET CT Fusion for Attenuation Correction and Anatomical  Localization.  3. Diagnostic CT of the chest, abdomen and pelvis with intravenous  contrast obtained for diagnostic interpretation.  4. 3D MIP and PET-CT fused images were processed on an independent  workstation and archived to PACS and reviewed by a radiologist.    Technique:     1. PET: The patient received 10.07 mCi of F-18-FDG. The serum glucose  was 88 mg/dL prior to administration. Body weight was 56 kg. Images  were evaluated in the axial, sagittal, and coronal planes as well as  the rotational whole body MIP. Images were acquired from cranial  vertex to feet.    UPTAKE WAS MEASURED AT 60 MINUTES.     2. CT: Volumetric acquisition for clinical interpretation of the  chest, abdomen, and pelvis acquired at 3 mm sections. The chest,  abdomen, and pelvis were evaluated at 5 mm sections in bone, soft  tissue, and lung windows. High resolution images of the neck were  obtained with multiple oblique projection reformats.     Contrast and Medications:  IV contrast: 76 mL of Isovue 370 intravenously.  PO contrast: None.  Additional Medications: None.    3. 3D MIP and PET-CT fused images were  processed on an independent  workstation and archived to PACS and reviewed by a radiologist.    INDICATION: Malignant neoplasm of nasal cavities (H).    ADDITIONAL INFORMATION OBTAINED FROM EMR: Stealth assisted transnasal  endoscopic approach to excise left sinonasal mass?on 12/1/2023. He  completed his concurrent chemoradiation therapy on February 27, 2024.  His main concern today to sinonasal congestion and postnasal drainage.       COMPARISON: 12/9/23 PET CT and 5/17/2024 face MRI.     FINDINGS:     BACKGROUND: Liver SUV max = 3.3, Aorta Blood SUV max = 2.5.     HEAD/NECK:    Index tumor: Resolution of previously seen intensely FDG avid mass  centered in the posterior left maxillary sinus, maxilla with extension  into the  space. Currently there are prior postsurgical  changes of left maxillary antrectomy, turbinectomy, partial  ethmoidectomy.     Irregularities with areas of bony discontinuities of the left  maxillary sinus lateral wall and the pterygoid plate are noted on  today's exam. There is thick mucosal thickening along the residual  left anterior maxillary sinus wall with minimal mucosal thickening  along the lateral maxillary sinus wall. Uptake is mild overall with  superimposed few focal more avid areas of uptake with max SUV  measuring up to 6.2. Heterogeneous partially necrotic component in the  left  space is slightly smaller but there is residual 2.3 cm  necrotic lesion deep to the mandibular ramus within the left medial  pterygoid muscle with no abnormal uptake. There are bony  fragmentations of the left side of the maxilla cranially extending to  involve the lateral wall of the left maxillary sinus. On the same exam  MRI there are corresponding rim enhancing centrally cystic changes..  Anterior inferiorly laterally there is a focal uptake of the maxilla  with max SUV of 5.9.    Regarding the rest of the paranasal sinuses there are inflammatory  mucosal changes of the right  maxillary sinus, right ethmoid air cells  and the frontal sinus with no focal FDG uptake.    There is persistent non-FDG avid fluid opacification of the left  mastoid air cells and middle ear cavity. Right mastoid air cells and  middle ear cavity are clear.    Regarding the rest of the pharyngeal mucosal spaces mild new mucosal  FDG avid enhancement of the nasopharynx and tongue base due to  mucositis. However more caudally along the left lateral oropharyngeal  wall there is thicker mucosal enhancement with more focal FDG uptake  with maximum SUV of 7.9. Hypopharynx and larynx are normal.     Lymph nodes: Resolution of left level 2 lymph nodes. 4 mm mildly avid  (max SUV 4.0) right submandibular node is likely reactive. No other  new FDG avid or enlarged nodes.     Salivary glands: Slight atrophy of the submandibular and parotid  glands with no abnormal foci of uptake.     Thyroid gland: Coarse calcification in the right thyroid lobe.  Subcentimeter hypodense nodule in the left thyroid lobe.    Vascular structures: There is significant thinning of the left jugular  vein at the mid neck to the level of skull base but it remains patent.  The rest of the vessels are open.     Brain: No abnormal FDG avid lesion or abnormal enhancement.     CHEST:  Right-sided chest wall port with the tip of the catheter in the right  atrium.    Lymph nodes: No FDG avid or abnormally enlarged lymph nodes.    Lungs: The central tracheobronchial tree is clear. No acute  consolidation.  No pleural effusion or pneumothorax. Bilateral minimal  pleural thickening at the bases and the subjacent dependent  atelectatic changes. Linear scarring in the left lung base. Punctate  subpleural nodularities in the right middle lobe, right lower lobe and  left lower lobe.    Heart and great vessels: Heart size is within normal limits. No  pericardial effusion. Enlarged ascending aorta. Wall calcifications of  the thoracic aorta. Coronary artery  calcifications. New diffuse  mucosal FDG uptake by the esophagus suggesting underlying esophagitis.    ABDOMEN AND PELVIS:    Liver: No FDG avid lesion. No intrahepatic or extrahepatic biliary  ductal dilatation. Cholecystectomy changes.     Pancreas: The pancreas is within normal limits. No pancreatic ductal  dilatation.     Spleen: No FDG avid lesion.    Adrenal glands: No FDG avid foci.    Kidneys: No FDG avid lesion. No hydronephrosis. The urinary bladder is  unremarkable.     Reproductive organs are within normal limits.    Gastrointestinal system: FDG uptake along the gastrostomy is  postprocedural/inflammatory. Long segment FDG uptake the cecum and  ascending colon is likely due to peristalsis Normal caliber of the  small and large bowel.     Lymph nodes: No FDG avid or abnormally enlarged lymph nodes.    Vascular structures: Normal caliber of the abdominal aorta. Aortoiliac  wall calcifications.    No free air, free fluid, or fluid collection.     EXTREMITIES:     No abnormal FDG uptake in the visualized extremities.    BONES AND SOFT TISSUES:     No abnormal FDG uptake in the skeleton. No abnormal lytic or blastic  osseous lesions.     SPINAL CANAL:     No evident canal compromise. No abnormal FDG avid lesion.  Impression: IMPRESSION:     1. Follow-up FDG PET/CT of the neck and whole body following  chemoradiotherapy completed in February 2024, almost complete  resolution of the previously seen FDG avid left maxillary/  space mass. Residual changes in the left side of the maxilla and  maxillary sinus lateral wall on CT with 3 foci of more focal uptake  superimposing mild diffuse underlying uptake on PET, described above.  Continued attention on follow-up is recommended.    2. No residual or recurrent suspicious cervical lymphadenopathy.    3. No evidence of new distant metastasis.    4. Findings suggestive of mucositis of the nasopharyngeal mucosa and  to lesser extent of the tongue base.    5.  More focal uptake with associating thicker mucosal enhancement of  the left lateral oropharyngeal wall. Correlation with direct  examination findings is recommended.    6. Several bilateral subpleural punctate nodularities in the lungs.  Attentional follow-up is recommended.    7. New diffuse esophagitis.    KANCHAN ROSALES MD         SYSTEM ID:  [id number]  CT Soft Tissue Neck w Contrast  Narrative: Combined Report of: PET and CT on 5/17/2024 3:26 PM:     1. PET of the neck, chest, abdomen, and pelvis.  2. PET CT Fusion for Attenuation Correction and Anatomical  Localization.  3. Diagnostic CT of the chest, abdomen and pelvis with intravenous  contrast obtained for diagnostic interpretation.  4. 3D MIP and PET-CT fused images were processed on an independent  workstation and archived to PACS and reviewed by a radiologist.    Technique:     1. PET: The patient received 10.07 mCi of F-18-FDG. The serum glucose  was 88 mg/dL prior to administration. Body weight was 56 kg. Images  were evaluated in the axial, sagittal, and coronal planes as well as  the rotational whole body MIP. Images were acquired from cranial  vertex to feet.    UPTAKE WAS MEASURED AT 60 MINUTES.     2. CT: Volumetric acquisition for clinical interpretation of the  chest, abdomen, and pelvis acquired at 3 mm sections. The chest,  abdomen, and pelvis were evaluated at 5 mm sections in bone, soft  tissue, and lung windows. High resolution images of the neck were  obtained with multiple oblique projection reformats.     Contrast and Medications:  IV contrast: 76 mL of Isovue 370 intravenously.  PO contrast: None.  Additional Medications: None.    3. 3D MIP and PET-CT fused images were processed on an independent  workstation and archived to PACS and reviewed by a radiologist.    INDICATION: Malignant neoplasm of nasal cavities (H).    ADDITIONAL INFORMATION OBTAINED FROM EMR: Stealth assisted transnasal  endoscopic approach to excise left sinonasal mass?on  12/1/2023. He  completed his concurrent chemoradiation therapy on February 27, 2024.  His main concern today to sinonasal congestion and postnasal drainage.       COMPARISON: 12/9/23 PET CT and 5/17/2024 face MRI.     FINDINGS:     BACKGROUND: Liver SUV max = 3.3, Aorta Blood SUV max = 2.5.     HEAD/NECK:    Index tumor: Resolution of previously seen intensely FDG avid mass  centered in the posterior left maxillary sinus, maxilla with extension  into the  space. Currently there are prior postsurgical  changes of left maxillary antrectomy, turbinectomy, partial  ethmoidectomy.     Irregularities with areas of bony discontinuities of the left  maxillary sinus lateral wall and the pterygoid plate are noted on  today's exam. There is thick mucosal thickening along the residual  left anterior maxillary sinus wall with minimal mucosal thickening  along the lateral maxillary sinus wall. Uptake is mild overall with  superimposed few focal more avid areas of uptake with max SUV  measuring up to 6.2. Heterogeneous partially necrotic component in the  left  space is slightly smaller but there is residual 2.3 cm  necrotic lesion deep to the mandibular ramus within the left medial  pterygoid muscle with no abnormal uptake. There are bony  fragmentations of the left side of the maxilla cranially extending to  involve the lateral wall of the left maxillary sinus. On the same exam  MRI there are corresponding rim enhancing centrally cystic changes..  Anterior inferiorly laterally there is a focal uptake of the maxilla  with max SUV of 5.9.    Regarding the rest of the paranasal sinuses there are inflammatory  mucosal changes of the right maxillary sinus, right ethmoid air cells  and the frontal sinus with no focal FDG uptake.    There is persistent non-FDG avid fluid opacification of the left  mastoid air cells and middle ear cavity. Right mastoid air cells and  middle ear cavity are clear.    Regarding the rest  of the pharyngeal mucosal spaces mild new mucosal  FDG avid enhancement of the nasopharynx and tongue base due to  mucositis. However more caudally along the left lateral oropharyngeal  wall there is thicker mucosal enhancement with more focal FDG uptake  with maximum SUV of 7.9. Hypopharynx and larynx are normal.     Lymph nodes: Resolution of left level 2 lymph nodes. 4 mm mildly avid  (max SUV 4.0) right submandibular node is likely reactive. No other  new FDG avid or enlarged nodes.     Salivary glands: Slight atrophy of the submandibular and parotid  glands with no abnormal foci of uptake.     Thyroid gland: Coarse calcification in the right thyroid lobe.  Subcentimeter hypodense nodule in the left thyroid lobe.    Vascular structures: There is significant thinning of the left jugular  vein at the mid neck to the level of skull base but it remains patent.  The rest of the vessels are open.     Brain: No abnormal FDG avid lesion or abnormal enhancement.     CHEST:  Right-sided chest wall port with the tip of the catheter in the right  atrium.    Lymph nodes: No FDG avid or abnormally enlarged lymph nodes.    Lungs: The central tracheobronchial tree is clear. No acute  consolidation.  No pleural effusion or pneumothorax. Bilateral minimal  pleural thickening at the bases and the subjacent dependent  atelectatic changes. Linear scarring in the left lung base. Punctate  subpleural nodularities in the right middle lobe, right lower lobe and  left lower lobe.    Heart and great vessels: Heart size is within normal limits. No  pericardial effusion. Enlarged ascending aorta. Wall calcifications of  the thoracic aorta. Coronary artery calcifications. New diffuse  mucosal FDG uptake by the esophagus suggesting underlying esophagitis.    ABDOMEN AND PELVIS:    Liver: No FDG avid lesion. No intrahepatic or extrahepatic biliary  ductal dilatation. Cholecystectomy changes.     Pancreas: The pancreas is within normal limits.  No pancreatic ductal  dilatation.     Spleen: No FDG avid lesion.    Adrenal glands: No FDG avid foci.    Kidneys: No FDG avid lesion. No hydronephrosis. The urinary bladder is  unremarkable.     Reproductive organs are within normal limits.    Gastrointestinal system: FDG uptake along the gastrostomy is  postprocedural/inflammatory. Long segment FDG uptake the cecum and  ascending colon is likely due to peristalsis Normal caliber of the  small and large bowel.     Lymph nodes: No FDG avid or abnormally enlarged lymph nodes.    Vascular structures: Normal caliber of the abdominal aorta. Aortoiliac  wall calcifications.    No free air, free fluid, or fluid collection.     EXTREMITIES:     No abnormal FDG uptake in the visualized extremities.    BONES AND SOFT TISSUES:     No abnormal FDG uptake in the skeleton. No abnormal lytic or blastic  osseous lesions.     SPINAL CANAL:     No evident canal compromise. No abnormal FDG avid lesion.  Impression: IMPRESSION:     1. Follow-up FDG PET/CT of the neck and whole body following  chemoradiotherapy completed in February 2024, almost complete  resolution of the previously seen FDG avid left maxillary/  space mass. Residual changes in the left side of the maxilla and  maxillary sinus lateral wall on CT with 3 foci of more focal uptake  superimposing mild diffuse underlying uptake on PET, described above.  Continued attention on follow-up is recommended.    2. No residual or recurrent suspicious cervical lymphadenopathy.    3. No evidence of new distant metastasis.    4. Findings suggestive of mucositis of the nasopharyngeal mucosa and  to lesser extent of the tongue base.    5. More focal uptake with associating thicker mucosal enhancement of  the left lateral oropharyngeal wall. Correlation with direct  examination findings is recommended.    6. Several bilateral subpleural punctate nodularities in the lungs.  Attentional follow-up is recommended.    7. New  diffuse esophagitis.    KANCHAN ROSALES MD         SYSTEM ID:  [id number]  CT Chest/Abdomen/Pelvis w Contrast  Narrative: Combined Report of: PET and CT on 5/17/2024 3:26 PM:     1. PET of the neck, chest, abdomen, and pelvis.  2. PET CT Fusion for Attenuation Correction and Anatomical  Localization.  3. Diagnostic CT of the chest, abdomen and pelvis with intravenous  contrast obtained for diagnostic interpretation.  4. 3D MIP and PET-CT fused images were processed on an independent  workstation and archived to PACS and reviewed by a radiologist.    Technique:     1. PET: The patient received 10.07 mCi of F-18-FDG. The serum glucose  was 88 mg/dL prior to administration. Body weight was 56 kg. Images  were evaluated in the axial, sagittal, and coronal planes as well as  the rotational whole body MIP. Images were acquired from cranial  vertex to feet.    UPTAKE WAS MEASURED AT 60 MINUTES.     2. CT: Volumetric acquisition for clinical interpretation of the  chest, abdomen, and pelvis acquired at 3 mm sections. The chest,  abdomen, and pelvis were evaluated at 5 mm sections in bone, soft  tissue, and lung windows. High resolution images of the neck were  obtained with multiple oblique projection reformats.     Contrast and Medications:  IV contrast: 76 mL of Isovue 370 intravenously.  PO contrast: None.  Additional Medications: None.    3. 3D MIP and PET-CT fused images were processed on an independent  workstation and archived to PACS and reviewed by a radiologist.    INDICATION: Malignant neoplasm of nasal cavities (H).    ADDITIONAL INFORMATION OBTAINED FROM EMR: Stealth assisted transnasal  endoscopic approach to excise left sinonasal mass?on 12/1/2023. He  completed his concurrent chemoradiation therapy on February 27, 2024.  His main concern today to sinonasal congestion and postnasal drainage.       COMPARISON: 12/9/23 PET CT and 5/17/2024 face MRI.     FINDINGS:     BACKGROUND: Liver SUV max = 3.3, Aorta Blood SUV  max = 2.5.     HEAD/NECK:    Index tumor: Resolution of previously seen intensely FDG avid mass  centered in the posterior left maxillary sinus, maxilla with extension  into the  space. Currently there are prior postsurgical  changes of left maxillary antrectomy, turbinectomy, partial  ethmoidectomy.     Irregularities with areas of bony discontinuities of the left  maxillary sinus lateral wall and the pterygoid plate are noted on  today's exam. There is thick mucosal thickening along the residual  left anterior maxillary sinus wall with minimal mucosal thickening  along the lateral maxillary sinus wall. Uptake is mild overall with  superimposed few focal more avid areas of uptake with max SUV  measuring up to 6.2. Heterogeneous partially necrotic component in the  left  space is slightly smaller but there is residual 2.3 cm  necrotic lesion deep to the mandibular ramus within the left medial  pterygoid muscle with no abnormal uptake. There are bony  fragmentations of the left side of the maxilla cranially extending to  involve the lateral wall of the left maxillary sinus. On the same exam  MRI there are corresponding rim enhancing centrally cystic changes..  Anterior inferiorly laterally there is a focal uptake of the maxilla  with max SUV of 5.9.    Regarding the rest of the paranasal sinuses there are inflammatory  mucosal changes of the right maxillary sinus, right ethmoid air cells  and the frontal sinus with no focal FDG uptake.    There is persistent non-FDG avid fluid opacification of the left  mastoid air cells and middle ear cavity. Right mastoid air cells and  middle ear cavity are clear.    Regarding the rest of the pharyngeal mucosal spaces mild new mucosal  FDG avid enhancement of the nasopharynx and tongue base due to  mucositis. However more caudally along the left lateral oropharyngeal  wall there is thicker mucosal enhancement with more focal FDG uptake  with maximum SUV of 7.9.  Hypopharynx and larynx are normal.     Lymph nodes: Resolution of left level 2 lymph nodes. 4 mm mildly avid  (max SUV 4.0) right submandibular node is likely reactive. No other  new FDG avid or enlarged nodes.     Salivary glands: Slight atrophy of the submandibular and parotid  glands with no abnormal foci of uptake.     Thyroid gland: Coarse calcification in the right thyroid lobe.  Subcentimeter hypodense nodule in the left thyroid lobe.    Vascular structures: There is significant thinning of the left jugular  vein at the mid neck to the level of skull base but it remains patent.  The rest of the vessels are open.     Brain: No abnormal FDG avid lesion or abnormal enhancement.     CHEST:  Right-sided chest wall port with the tip of the catheter in the right  atrium.    Lymph nodes: No FDG avid or abnormally enlarged lymph nodes.    Lungs: The central tracheobronchial tree is clear. No acute  consolidation.  No pleural effusion or pneumothorax. Bilateral minimal  pleural thickening at the bases and the subjacent dependent  atelectatic changes. Linear scarring in the left lung base. Punctate  subpleural nodularities in the right middle lobe, right lower lobe and  left lower lobe.    Heart and great vessels: Heart size is within normal limits. No  pericardial effusion. Enlarged ascending aorta. Wall calcifications of  the thoracic aorta. Coronary artery calcifications. New diffuse  mucosal FDG uptake by the esophagus suggesting underlying esophagitis.    ABDOMEN AND PELVIS:    Liver: No FDG avid lesion. No intrahepatic or extrahepatic biliary  ductal dilatation. Cholecystectomy changes.     Pancreas: The pancreas is within normal limits. No pancreatic ductal  dilatation.     Spleen: No FDG avid lesion.    Adrenal glands: No FDG avid foci.    Kidneys: No FDG avid lesion. No hydronephrosis. The urinary bladder is  unremarkable.     Reproductive organs are within normal limits.    Gastrointestinal system: FDG uptake  along the gastrostomy is  postprocedural/inflammatory. Long segment FDG uptake the cecum and  ascending colon is likely due to peristalsis Normal caliber of the  small and large bowel.     Lymph nodes: No FDG avid or abnormally enlarged lymph nodes.    Vascular structures: Normal caliber of the abdominal aorta. Aortoiliac  wall calcifications.    No free air, free fluid, or fluid collection.     EXTREMITIES:     No abnormal FDG uptake in the visualized extremities.    BONES AND SOFT TISSUES:     No abnormal FDG uptake in the skeleton. No abnormal lytic or blastic  osseous lesions.     SPINAL CANAL:     No evident canal compromise. No abnormal FDG avid lesion.  Impression: IMPRESSION:     1. Follow-up FDG PET/CT of the neck and whole body following  chemoradiotherapy completed in February 2024, almost complete  resolution of the previously seen FDG avid left maxillary/  space mass. Residual changes in the left side of the maxilla and  maxillary sinus lateral wall on CT with 3 foci of more focal uptake  superimposing mild diffuse underlying uptake on PET, described above.  Continued attention on follow-up is recommended.    2. No residual or recurrent suspicious cervical lymphadenopathy.    3. No evidence of new distant metastasis.    4. Findings suggestive of mucositis of the nasopharyngeal mucosa and  to lesser extent of the tongue base.    5. More focal uptake with associating thicker mucosal enhancement of  the left lateral oropharyngeal wall. Correlation with direct  examination findings is recommended.    6. Several bilateral subpleural punctate nodularities in the lungs.  Attentional follow-up is recommended.    7. New diffuse esophagitis.    KANCHAN ROSALES MD         SYSTEM ID:  [id number]     Imaging was personally reviewed and interpreted by me  Couple of the more prominent lung nodules were there in Dec  4R node small and looks stable. Not worrisome       Kayy Post MD

## 2024-06-13 NOTE — PROGRESS NOTES
PHYSICAL THERAPY EVALUATION  Type of Visit: Evaluation       Fall Risk Screen:  Fall screen completed by: PT  Have you fallen 2 or more times in the past year?: No  Have you fallen and had an injury in the past year?: No  Is patient a fall risk?: No    Subjective   Pt is a 64 year-old man with chief complaint facial swelling s/p stage IVB sinonasal carcinoma, excision of left mass on 23, chemotherapy and radiation (now completed). Demonstrating trismus and xerostomia and facial swelling on the left side and into submental area. He reports tightness in the left jaw.       Presenting condition or subjective complaint:  Swelling  Date of onset: 23    Relevant medical history: Arthritis; Cancer   Dates & types of surgery: heart by pass back in 2019    Prior diagnostic imaging/testing results:       Prior therapy history for the same diagnosis, illness or injury: No      Prior Level of Function  Transfers: Independent  Ambulation: Independent  ADL: Independent      Living Environment  Social support: With family members   Type of home: House; 2-story   Stairs to enter the home: Yes 10 Is there a railing: Yes     Ramp: No   Stairs inside the home: Yes 8 Is there a railing: Yes     Help at home: Self Cares (home health aide/personal care attendant, family, etc); Home management tasks (cooking, cleaning); Medication and/or finances; Home and Yard maintenance tasks  Equipment owned: Straight Cane; Walker; Walker with wheels; Bath bench     Employment: No    Hobbies/Interests:      Patient goals for therapy:      Pain assessment: Pain present  Location: left side of the face/jaw/Ratin/10     Objective       EDEMA EVALUATION  Additional history:  Body part affected by edema:  face  If cancer related, treatment:  surgery, chemo, radiation  If not cancer related, problems with veins or cause of swelling:    Distance able to walk: 1miles  Time able to stand: 30  Sensation problems in hands/feet: No    Edema  etiology: cancer, surgery, radiation, chemo        Cognitive Status Examination  Orientation: Oriented to person, place and time   Level of Consciousness: Alert  Follows Commands and Answers Questions: 100% of the time  Personal Safety and Judgement: Intact  Memory: Intact    EDEMA  Skin Condition: Non-pitting, swelling is at left side of lower face and submental region.   Left>right masseter tightness  Scar:   Capillary Refill:   Radial Pulse:   Dorsal Pedal Pulse:   Stemmer Sign:   Ulceration: No    GIRTH MEASUREMENTS: Refer to separate girth measurement flowsheet.       RANGE OF MOTION:   (Degrees) Left AROM Right AROM    Cervical Flexion 22 deg without complaint    Cervical Extension 11 deg without complaints of pain/tightness    Cervical Side bend 10 with left sided tightness 20 deg with left sided tightness    Cervical Rotation 55  62    Cervical Protrusion     Cervical Retraction     Thoracic Flexion     Thoracic Extension     Thoracic Rotation       Left AROM Left PROM Right AROM Right PROM   Shoulder Flexion WNL  WNL    Shoulder Extension       Shoulder Abduction WNL  WNL    Shoulder Adduction       Shoulder IR       Shoulder ER       Shoulder Horiz Abduction       Shoulder Horiz Adduction         STRENGTH:   POSTURE: forward head, slumped trunk. 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 He demonstrates trismus (19mm opening) and facial swelling on the left side and into submental area with tightness in the left masseter muscle and poor upright posture. He is appropriate for skilled PT to address  impairments, instruct in HEP and home management for swelling.     Clinical Decision Making (Complexity):  Clinical Presentation: Stable/Uncomplicated  Clinical Presentation Rationale: based on medical and personal factors listed in PT evaluation  Clinical Decision Making (Complexity): Low complexity    PLAN OF CARE  Treatment Interventions:  Interventions: Manual Therapy, Neuromuscular Re-education, Therapeutic Activity, Therapeutic Exercise, Self-Care/Home Management    Long Term Goals     PT Goal 1  Goal Identifier: Home management  Goal Description: Pt will be independent with home management of swelling including skin care/precautions, exercise, self MLD, positioning and use of compression.  Target Date: 09/11/24  PT Goal 2  Goal Identifier: Trismus  Goal Description: Pt will improve mouth opening from 19mm to 30mm to allow him to eat more comfortably.  Target Date: 09/11/24      Frequency of Treatment: 1-2x/week  Duration of Treatment: 10 visits, up to 90 days         Risks and benefits of evaluation/treatment have been explained.   Patient/Family/caregiver agrees with Plan of Care.     Evaluation Time:     PT Kalani Low Complexity Minutes (77223): 13   Present: Yes: Language: Hmong, ID Number/Identifier: [id number]     Signing Clinician: Silas Lewis, PT        [facility]                                                                                   OUTPATIENT PHYSICAL THERAPY      PLAN OF TREATMENT FOR OUTPATIENT REHABILITATION   Patient's Last Name, First Name, ALISAToddBLADIMIRTodd  Douglas Herrera    YOB: 1960   Provider's Name   [facility]   Medical Record No.  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              Referring Provider:  Tomasa Akers    Initial Assessment  See Epic Evaluation- Start of Care Date: 06/14/24

## 2024-06-14 ENCOUNTER — THERAPY VISIT (OUTPATIENT)
Dept: PHYSICAL THERAPY | Facility: REHABILITATION | Age: 64
End: 2024-06-14
Attending: RADIOLOGY
Payer: MEDICARE

## 2024-06-14 DIAGNOSIS — C31.9 MALIGNANT NEOPLASM OF PARANASAL SINUS (H): ICD-10-CM

## 2024-06-14 DIAGNOSIS — I89.0 LYMPHEDEMA DUE TO RADIATION: ICD-10-CM

## 2024-06-14 PROCEDURE — 97110 THERAPEUTIC EXERCISES: CPT | Mod: GP | Performed by: PHYSICAL THERAPIST

## 2024-06-14 PROCEDURE — 97161 PT EVAL LOW COMPLEX 20 MIN: CPT | Mod: GP | Performed by: PHYSICAL THERAPIST

## 2024-06-14 PROCEDURE — 97140 MANUAL THERAPY 1/> REGIONS: CPT | Mod: GP | Performed by: PHYSICAL THERAPIST

## 2024-06-14 PROCEDURE — 97535 SELF CARE MNGMENT TRAINING: CPT | Mod: GP | Performed by: PHYSICAL THERAPIST

## 2024-06-17 ENCOUNTER — APPOINTMENT (OUTPATIENT)
Dept: INTERPRETER SERVICES | Facility: CLINIC | Age: 64
End: 2024-06-17
Payer: MEDICARE

## 2024-06-28 ENCOUNTER — LAB (OUTPATIENT)
Dept: LAB | Facility: CLINIC | Age: 64
End: 2024-06-28
Attending: INTERNAL MEDICINE
Payer: MEDICARE

## 2024-06-28 DIAGNOSIS — C31.0 SQUAMOUS CELL CARCINOMA OF MAXILLARY SINUS (H): ICD-10-CM

## 2024-06-28 DIAGNOSIS — Z13.29 SCREENING FOR HYPOTHYROIDISM: ICD-10-CM

## 2024-06-28 DIAGNOSIS — E83.42 HYPOMAGNESEMIA: ICD-10-CM

## 2024-06-28 LAB
ALBUMIN SERPL BCG-MCNC: 4.1 G/DL (ref 3.5–5.2)
ALP SERPL-CCNC: 65 U/L (ref 40–150)
ALT SERPL W P-5'-P-CCNC: 24 U/L (ref 0–70)
ANION GAP SERPL CALCULATED.3IONS-SCNC: 9 MMOL/L (ref 7–15)
AST SERPL W P-5'-P-CCNC: 31 U/L (ref 0–45)
BASOPHILS # BLD AUTO: 0 10E3/UL (ref 0–0.2)
BASOPHILS NFR BLD AUTO: 0 %
BILIRUB SERPL-MCNC: 0.7 MG/DL
BUN SERPL-MCNC: 22.2 MG/DL (ref 8–23)
CALCIUM SERPL-MCNC: 9.9 MG/DL (ref 8.8–10.2)
CHLORIDE SERPL-SCNC: 104 MMOL/L (ref 98–107)
CREAT SERPL-MCNC: 0.9 MG/DL (ref 0.67–1.17)
DEPRECATED HCO3 PLAS-SCNC: 27 MMOL/L (ref 22–29)
EGFRCR SERPLBLD CKD-EPI 2021: >90 ML/MIN/1.73M2
EOSINOPHIL # BLD AUTO: 0.1 10E3/UL (ref 0–0.7)
EOSINOPHIL NFR BLD AUTO: 2 %
ERYTHROCYTE [DISTWIDTH] IN BLOOD BY AUTOMATED COUNT: 14.5 % (ref 10–15)
GLUCOSE SERPL-MCNC: 96 MG/DL (ref 70–99)
HCT VFR BLD AUTO: 36.1 % (ref 40–53)
HGB BLD-MCNC: 11.8 G/DL (ref 13.3–17.7)
IMM GRANULOCYTES # BLD: 0 10E3/UL
IMM GRANULOCYTES NFR BLD: 0 %
LYMPHOCYTES # BLD AUTO: 1.5 10E3/UL (ref 0.8–5.3)
LYMPHOCYTES NFR BLD AUTO: 33 %
MAGNESIUM SERPL-MCNC: 1.8 MG/DL (ref 1.7–2.3)
MCH RBC QN AUTO: 28.2 PG (ref 26.5–33)
MCHC RBC AUTO-ENTMCNC: 32.7 G/DL (ref 31.5–36.5)
MCV RBC AUTO: 86 FL (ref 78–100)
MONOCYTES # BLD AUTO: 0.4 10E3/UL (ref 0–1.3)
MONOCYTES NFR BLD AUTO: 9 %
NEUTROPHILS # BLD AUTO: 2.7 10E3/UL (ref 1.6–8.3)
NEUTROPHILS NFR BLD AUTO: 56 %
NRBC # BLD AUTO: 0 10E3/UL
NRBC BLD AUTO-RTO: 0 /100
PLATELET # BLD AUTO: 159 10E3/UL (ref 150–450)
POTASSIUM SERPL-SCNC: 4.2 MMOL/L (ref 3.4–5.3)
PROT SERPL-MCNC: 8 G/DL (ref 6.4–8.3)
RBC # BLD AUTO: 4.19 10E6/UL (ref 4.4–5.9)
SODIUM SERPL-SCNC: 140 MMOL/L (ref 135–145)
T4 FREE SERPL-MCNC: 0.87 NG/DL (ref 0.9–1.7)
TSH SERPL DL<=0.005 MIU/L-ACNC: 2.86 UIU/ML (ref 0.3–4.2)
WBC # BLD AUTO: 4.7 10E3/UL (ref 4–11)

## 2024-06-28 PROCEDURE — 80053 COMPREHEN METABOLIC PANEL: CPT

## 2024-06-28 PROCEDURE — 85025 COMPLETE CBC W/AUTO DIFF WBC: CPT

## 2024-06-28 PROCEDURE — 250N000011 HC RX IP 250 OP 636: Performed by: INTERNAL MEDICINE

## 2024-06-28 PROCEDURE — 84439 ASSAY OF FREE THYROXINE: CPT

## 2024-06-28 PROCEDURE — 36591 DRAW BLOOD OFF VENOUS DEVICE: CPT

## 2024-06-28 PROCEDURE — 84443 ASSAY THYROID STIM HORMONE: CPT

## 2024-06-28 PROCEDURE — 83735 ASSAY OF MAGNESIUM: CPT

## 2024-06-28 RX ORDER — HEPARIN SODIUM (PORCINE) LOCK FLUSH IV SOLN 100 UNIT/ML 100 UNIT/ML
5 SOLUTION INTRAVENOUS ONCE
Status: COMPLETED | OUTPATIENT
Start: 2024-06-28 | End: 2024-06-28

## 2024-06-28 RX ADMIN — Medication 5 ML: at 12:03

## 2024-06-28 NOTE — NURSING NOTE
Chief Complaint   Patient presents with    Port Draw     Labs drawn via port access by lab RN       Port blood draw with heparin flush by lab RN.     Lizet Jimenez RN

## 2024-07-15 DIAGNOSIS — C31.0 SQUAMOUS CELL CARCINOMA OF MAXILLARY SINUS (H): Primary | ICD-10-CM

## 2024-07-15 DIAGNOSIS — Z13.29 SCREENING FOR HYPOTHYROIDISM: ICD-10-CM

## 2024-07-15 DIAGNOSIS — E83.42 HYPOMAGNESEMIA: ICD-10-CM

## 2024-07-16 ENCOUNTER — OFFICE VISIT (OUTPATIENT)
Dept: INTERPRETER SERVICES | Facility: CLINIC | Age: 64
End: 2024-07-16
Payer: MEDICARE

## 2024-08-05 ENCOUNTER — HOSPITAL ENCOUNTER (OUTPATIENT)
Dept: MRI IMAGING | Facility: CLINIC | Age: 64
Discharge: HOME | End: 2024-08-05
Attending: OTOLARYNGOLOGY
Payer: MEDICARE

## 2024-08-05 ENCOUNTER — HOSPITAL ENCOUNTER (OUTPATIENT)
Dept: PET IMAGING | Facility: CLINIC | Age: 64
Discharge: HOME | End: 2024-08-05
Attending: OTOLARYNGOLOGY
Payer: MEDICARE

## 2024-08-05 DIAGNOSIS — E83.42 HYPOMAGNESEMIA: ICD-10-CM

## 2024-08-05 DIAGNOSIS — C31.0 SQUAMOUS CELL CARCINOMA OF MAXILLARY SINUS (H): ICD-10-CM

## 2024-08-05 DIAGNOSIS — Z13.29 SCREENING FOR HYPOTHYROIDISM: ICD-10-CM

## 2024-08-05 LAB
ALBUMIN SERPL BCG-MCNC: 4.3 G/DL (ref 3.5–5.2)
ALP SERPL-CCNC: 64 U/L (ref 40–150)
ALT SERPL W P-5'-P-CCNC: 18 U/L (ref 0–70)
ANION GAP SERPL CALCULATED.3IONS-SCNC: 10 MMOL/L (ref 7–15)
AST SERPL W P-5'-P-CCNC: 27 U/L (ref 0–45)
BASOPHILS # BLD AUTO: 0 10E3/UL (ref 0–0.2)
BASOPHILS NFR BLD AUTO: 0 %
BILIRUB SERPL-MCNC: 1 MG/DL
BUN SERPL-MCNC: 26.6 MG/DL (ref 8–23)
CALCIUM SERPL-MCNC: 9.7 MG/DL (ref 8.8–10.4)
CHLORIDE SERPL-SCNC: 104 MMOL/L (ref 98–107)
CREAT BLD-MCNC: 0.9 MG/DL (ref 0.7–1.3)
CREAT SERPL-MCNC: 0.91 MG/DL (ref 0.67–1.17)
EGFRCR SERPLBLD CKD-EPI 2021: >60 ML/MIN/1.73M2
EGFRCR SERPLBLD CKD-EPI 2021: >90 ML/MIN/1.73M2
EOSINOPHIL # BLD AUTO: 0.2 10E3/UL (ref 0–0.7)
EOSINOPHIL NFR BLD AUTO: 3 %
ERYTHROCYTE [DISTWIDTH] IN BLOOD BY AUTOMATED COUNT: 14 % (ref 10–15)
GLUCOSE SERPL-MCNC: 96 MG/DL (ref 70–99)
HCO3 SERPL-SCNC: 26 MMOL/L (ref 22–29)
HCT VFR BLD AUTO: 37.9 % (ref 40–53)
HGB BLD-MCNC: 12.2 G/DL (ref 13.3–17.7)
IMM GRANULOCYTES # BLD: 0 10E3/UL
IMM GRANULOCYTES NFR BLD: 1 %
LYMPHOCYTES # BLD AUTO: 2 10E3/UL (ref 0.8–5.3)
LYMPHOCYTES NFR BLD AUTO: 34 %
MAGNESIUM SERPL-MCNC: 1.9 MG/DL (ref 1.7–2.3)
MCH RBC QN AUTO: 28.9 PG (ref 26.5–33)
MCHC RBC AUTO-ENTMCNC: 32.2 G/DL (ref 31.5–36.5)
MCV RBC AUTO: 90 FL (ref 78–100)
MONOCYTES # BLD AUTO: 0.5 10E3/UL (ref 0–1.3)
MONOCYTES NFR BLD AUTO: 8 %
NEUTROPHILS # BLD AUTO: 3.3 10E3/UL (ref 1.6–8.3)
NEUTROPHILS NFR BLD AUTO: 54 %
NRBC # BLD AUTO: 0 10E3/UL
NRBC BLD AUTO-RTO: 0 /100
PLATELET # BLD AUTO: 189 10E3/UL (ref 150–450)
POTASSIUM SERPL-SCNC: 4.4 MMOL/L (ref 3.4–5.3)
PROT SERPL-MCNC: 8.1 G/DL (ref 6.4–8.3)
RBC # BLD AUTO: 4.22 10E6/UL (ref 4.4–5.9)
SODIUM SERPL-SCNC: 140 MMOL/L (ref 135–145)
T4 FREE SERPL-MCNC: 0.89 NG/DL (ref 0.9–1.7)
TSH SERPL DL<=0.005 MIU/L-ACNC: 4.07 UIU/ML (ref 0.3–4.2)
WBC # BLD AUTO: 6 10E3/UL (ref 4–11)

## 2024-08-05 PROCEDURE — 74177 CT ABD & PELVIS W/CONTRAST: CPT | Mod: 26 | Performed by: RADIOLOGY

## 2024-08-05 PROCEDURE — 71260 CT THORAX DX C+: CPT

## 2024-08-05 PROCEDURE — 70543 MRI ORBT/FAC/NCK W/O &W/DYE: CPT | Mod: 26 | Performed by: RADIOLOGY

## 2024-08-05 PROCEDURE — 70543 MRI ORBT/FAC/NCK W/O &W/DYE: CPT | Mod: MG

## 2024-08-05 PROCEDURE — 250N000011 HC RX IP 250 OP 636: Performed by: OTOLARYNGOLOGY

## 2024-08-05 PROCEDURE — A9585 GADOBUTROL INJECTION: HCPCS | Performed by: OTOLARYNGOLOGY

## 2024-08-05 PROCEDURE — 82565 ASSAY OF CREATININE: CPT

## 2024-08-05 PROCEDURE — G1010 CDSM STANSON: HCPCS | Performed by: RADIOLOGY

## 2024-08-05 PROCEDURE — 71260 CT THORAX DX C+: CPT | Mod: 26 | Performed by: RADIOLOGY

## 2024-08-05 PROCEDURE — 343N000001 HC RX 343: Performed by: OTOLARYNGOLOGY

## 2024-08-05 PROCEDURE — 70491 CT SOFT TISSUE NECK W/DYE: CPT | Mod: 26 | Performed by: RADIOLOGY

## 2024-08-05 PROCEDURE — 78816 PET IMAGE W/CT FULL BODY: CPT | Mod: 26 | Performed by: RADIOLOGY

## 2024-08-05 PROCEDURE — 85025 COMPLETE CBC W/AUTO DIFF WBC: CPT

## 2024-08-05 PROCEDURE — 78816 PET IMAGE W/CT FULL BODY: CPT | Mod: MG,PS

## 2024-08-05 PROCEDURE — 84443 ASSAY THYROID STIM HORMONE: CPT

## 2024-08-05 PROCEDURE — A9552 F18 FDG: HCPCS | Performed by: OTOLARYNGOLOGY

## 2024-08-05 PROCEDURE — 255N000002 HC RX 255 OP 636: Performed by: OTOLARYNGOLOGY

## 2024-08-05 PROCEDURE — 84439 ASSAY OF FREE THYROXINE: CPT

## 2024-08-05 PROCEDURE — 36415 COLL VENOUS BLD VENIPUNCTURE: CPT

## 2024-08-05 PROCEDURE — 70491 CT SOFT TISSUE NECK W/DYE: CPT

## 2024-08-05 PROCEDURE — 83735 ASSAY OF MAGNESIUM: CPT

## 2024-08-05 PROCEDURE — 80053 COMPREHEN METABOLIC PANEL: CPT

## 2024-08-05 RX ORDER — IOPAMIDOL 755 MG/ML
10-135 INJECTION, SOLUTION INTRAVASCULAR ONCE
Status: COMPLETED | OUTPATIENT
Start: 2024-08-05 | End: 2024-08-05

## 2024-08-05 RX ORDER — HEPARIN SODIUM (PORCINE) LOCK FLUSH IV SOLN 100 UNIT/ML 100 UNIT/ML
500 SOLUTION INTRAVENOUS ONCE
Status: COMPLETED | OUTPATIENT
Start: 2024-08-05 | End: 2024-08-05

## 2024-08-05 RX ORDER — GADOBUTROL 604.72 MG/ML
7.5 INJECTION INTRAVENOUS ONCE
Status: COMPLETED | OUTPATIENT
Start: 2024-08-05 | End: 2024-08-05

## 2024-08-05 RX ORDER — FLUDEOXYGLUCOSE F 18 200 MCI/ML
10-18 INJECTION, SOLUTION INTRAVENOUS ONCE
Status: COMPLETED | OUTPATIENT
Start: 2024-08-05 | End: 2024-08-05

## 2024-08-05 RX ORDER — HEPARIN SODIUM (PORCINE) LOCK FLUSH IV SOLN 100 UNIT/ML 100 UNIT/ML
5 SOLUTION INTRAVENOUS ONCE
Status: COMPLETED | OUTPATIENT
Start: 2024-08-05 | End: 2024-08-05

## 2024-08-05 RX ADMIN — Medication 500 UNITS: at 13:09

## 2024-08-05 RX ADMIN — FLUDEOXYGLUCOSE F 18 10.08 MILLICURIE: 200 INJECTION, SOLUTION INTRAVENOUS at 12:01

## 2024-08-05 RX ADMIN — GADOBUTROL 6 ML: 604.72 INJECTION INTRAVENOUS at 14:40

## 2024-08-05 RX ADMIN — IOPAMIDOL 78 ML: 755 INJECTION, SOLUTION INTRAVENOUS at 13:08

## 2024-08-05 RX ADMIN — Medication 5 ML: at 14:45

## 2024-08-08 NOTE — TUMOR CONFERENCE
Head & Neck Tumor Conference Note   Status: Established    Staff: Dr. Iglesias and Dr. Jiang      Tumor Site: Left maxillary sinus/sinonasal cavity  Tumor Pathology: Squamous cell carcinoma  Tumor Stage: [id number]  Tumor Treatment:   - 1/8-2/27/24 Chemoradiation with weekly cisplatin.    Reason for Review: Review imaging, path, and POC    Brief History: This is a 63 year old male with a history of a left sinonasal carcinoma. 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 He has had two biopsies prior, the first showing inverted papilloma, the second demonstrating inverted papilloma as well. He was most recently taken back to the OR a third time for a larger resection and to establish a definitive diagnosis. He subsequently underwent definitive chemoradiation with cisplatin, completed in February 2024. 3 month post-treatment PET-CT and MRI were stable. He recently underwent repeat PET/CT and MRI. We are here today to review updated imaging and to discuss plan of care.       Pertinent PMH:   Past Medical History:   Diagnosis Date    Ascending aortic aneurysm (H24)     Coronary artery disease     Depression     Gout     History of anesthesia complications     Hyperlipemia     Hypertension     PONV (postoperative nausea and vomiting)       Smoking Hx:   Social History     Tobacco Use    Smoking status: Former     Types: Cigarettes     Passive exposure: Past    Smokeless tobacco: Never    Tobacco comments:     every 3-4 months, rare   Substance Use Topics    Alcohol use: Yes     Comment: Occasionally    Drug use: No       Imaging:     PET Oncology Whole Body    Narrative  Combined Report of: PET and CT on 8/5/2024 1:28 PM:    1. PET of the neck, chest, abdomen, and pelvis.  2. PET CT Fusion for Attenuation Correction and Anatomical  Localization.  3. Diagnostic CT of the chest, abdomen and  pelvis with intravenous  contrast obtained for diagnostic interpretation.  4. 3D MIP and PET-CT fused images were processed on an independent  workstation and archived to PACS and reviewed by a radiologist.    Technique:    1. PET: The patient received 10.08 mCi of F-18-FDG. The serum glucose  was 101 mg/dL prior to administration. Body weight was 58.3 kg. Images  were evaluated in the axial, sagittal, and coronal planes as well as  the rotational whole body MIP. Images were acquired from cranial  vertex to feet.    UPTAKE WAS MEASURED AT 60 MINUTES.    2. CT: Volumetric acquisition for clinical interpretation of the  chest, abdomen, and pelvis acquired at 3 mm sections. The chest,  abdomen, and pelvis were evaluated at 5 mm sections in bone, soft  tissue, and lung windows. High resolution images of the neck were  obtained with multiple oblique projection reformats.    Contrast and Medications:  IV contrast: 78 mL of Isovue 370 intravenously.  PO contrast: 500 mL water  Additional Medications: None.    3. 3D MIP and PET-CT fused images were processed on an independent  workstation and archived to PACS and reviewed by a radiologist.    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  CT head 5/17/2024 demonstrating near resolution of the primary mass.  Status post chemoradiation. Follow-up CT PET.    COMPARISON: CT PET 5/17/2024. Multiple priors.    FINDINGS:    BACKGROUND: Liver SUV max = 6.63, Aorta Blood SUV max = 4.86.    -Right upper chest port catheter tip terminates at the cavoatrial  junction.  -Percutaneous gastrostomy tube with balloon inflated within the  gastric lumen.    HEAD/NECK:    Index tumor site:  -Surgical changes of left maxillary antrostomy, left turbinectomy, and  partial left ethmoidectomy.  -Continued circumferential mucosal thickening  surrounding the left  maxillary cavity, mild uptake.  -Overall increased FDG uptake centered along the posterior inferior  left maxillary sinus involving the  space, left  pterygopalatine fossa, and the left parapharyngeal space. This extends  inferiorly to involve the left side of maxilla. There is increased  soft tissue component within the space, and there is now broad FDG  uptake with maximal SUV 24.97 (1/126), previously 5.75 when measured  similarly.This area measures about 3.3 c m x 2.6 cm.  -Increased focal hypermetabolic activity in the posterior pharyngeal  wall and extending to adjacent left palatine tonsil mucosa, associated  focal mucosal thickening (1/129) max SUV 17.23 previously 7.25.  -Non avid necrotic component centered on the left  space/  medial pterygoid muscle. Laterally, the hypoattenuating non-FDG avid  mass measuring 2.7 x 2.4 cm (1/117) previously 2.1 x 2 cm, and  medially 1.1 x 1.5 cm (1/117) previously 1.1 x 1 cm.  -Continued thick mucosal thickening of the right maxillary sinus and  right ethmoid air cells, not avid.  -New focal area of FDG uptake along the right lateral oropharyngeal  wall (1/144) maximum SUV 9.07.  -Persistent nasopharyngeal mild mucosal uptake: mucositis.    Lymph nodes: Few hypermetabolic right cervical nodes, none  definitively enlarged, however displaying increased metabolic activity  compared to prior, max SUV 8.36 right level 1B, and right level 2A  node max SUV 7.29.  Salivary glands: Slightly atrophic, no mass.  Thyroid gland: The thyroid gland is within normal limits. Stable  coarse calcification of the right thyroid lobe.  Vascular structures: The major vasculature of the neck are patent.  Brain: No abnormal FDG avid lesion or abnormal enhancement.  Orbital cavities: No abnormal FDG avid lesion or abnormal enhancement  of the right orbit, left orbit as per above.      CHEST:  No abnormal FDG uptake within the chest.  -Coarse  calcifications of the aortic arch and multiple branch vessels,  demonstrating mild uptake, benign.    Lymph nodes: No avid nodes.    Lungs: The central tracheobronchial tree is clear. No acute  consolidation.  No pleural effusion or pneumothorax. Mild traction  fibrosis with mild uptake along the medial right apex posterior to  right subclavian artery. Basilar atelectasis/subpleural reticulations.  No suspicious or FDG avid pulmonary lesions. Several sub-6 mm  pulmonary nodules, none FDG avid. For example lingula groundglass  nodule measuring 3.4 x 2.6 mm, and right middle lobe solid nodule  measuring 2.4 x 3.1 mm (10/49).    Heart and great vessels: Heart size is within normal limits. No  pericardial effusion. Dense coronary artery calcifications. The  thoracic aorta and main pulmonary artery are within normal limits.    Mild metabolic uptake along the distal esophagus which may be seen  with reflux disease.    ABDOMEN AND PELVIS:  -No suspicious or abnormal FDG uptake within the abdomen or pelvis.  -Focal hypermetabolic activity surrounding the percutaneous  gastrostomy tube site, increased from prior exam max SUV 23.15. Likely  representing localized inflammation.    Liver: No FDG avid lesion. No intrahepatic or extrahepatic biliary  ductal dilatation. Mild reservoir effect. Gallbladder is surgically  absent.  Pancreas: The pancreas is within normal limits. No pancreatic ductal  dilatation.  Spleen: No FDG avid lesion.  Adrenal glands: No FDG avid foci.  Kidneys: No FDG avid lesion. No hydronephrosis. The urinary bladder is  unremarkable.  Reproductive organs are within normal limits.  Gastrointestinal system: Normal caliber of the small and large bowel.  Percutaneous gastrostomy tube.  Lymph nodes: No FDG avid or abnormally enlarged lymph nodes.  Vascular structures: Normal caliber of the abdominal aorta.  No free air, free fluid, or fluid collection. Calcification within the  low anterior peritoneum (3/497)  likely peritoneal mouse.      EXTREMITIES:  No abnormal FDG uptake in the visualized extremities.      BONES AND SOFT TISSUES:  No abnormal FDG uptake in the skeleton. No abnormal lytic or blastic  osseous lesions. Midline sternotomy. Mild posterior lumbar  spondylosis.      SPINAL CANAL:  No evident canal compromise. No abnormal FDG avid lesion.    Impression  IMPRESSION:  In this patient with a history of squamous cell carcinoma of the head  and neck, there is evidence for disease progression.    1. Increased enhancing soft tissue density with intensely increased  hypermetabolic uptake compared prior CT PET 5/17/2024 centered on the  posterior maxilla involving the  and parapharyngeal space  concerning for recurrent tumor. Primary: NI-RADS 3.    2. Two additional mucosal areas with focal uptake:  -Left palatine tonsil medial/ posterior mucosa extending posterior  pharyngeal wall, associated mucosal thickening. Correlation with  physical exam and biopsy is recommended  -Right lateral oropharyngeal wall. Correlation with physical exam is  recommended.  .  3. Right level 1b node with moderate-high uptake and right level 2  nodes with moderate uptake though not enlarged are worrisome. Neck:  NI-RADS 2.  4. No definitive evidence for metastatic disease within the chest,  abdomen, or pelvis.  5. Focal uptake surrounding the percutaneous gastrostomy tube site,  increased since prior. Likely inflammation, may be related to local  infection, correlate with clinical exam.    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     COMPARISON: 5/17/2024 dated MRI.     Technique: Sagittal and coronal T1-weighted and axial T2-weighted  images with fat saturation of the face and nasopharynx from the roofs  of the  orbits through the base of C2 were obtained without intravenous  contrast. Following intravenous administration of gadolinium, axial  and coronal T1-weighted images with fat saturation of the face were  also obtained.     Contrast: 6mL Gadavist     Findings: .  Surgical changes of left maxillary antrostomy, left turbinectomy, and  partial left ethmoidectomy. Heterogeneous signal soft tissue mass  centered in the residual left maxillary sinus floor and left lateral  maxilla with T1 hypointensity, T2 areas of hypo and hyperintensity and  post gadolinium heterogenous enhancement. There are some areas of T1  hyperintensity within some cystic components suggesting proteinaceous  material. Overall this soft tissue mass extends to the left  retromaxillary fat, left pterygopalatine fossa, inferiorly to involve  the left lateral aspect of the maxilla, left  space  including the left pterygoid muscle and posteriorly extending to the  nasopharyngeal submucosal region, inferior medially extending to the  left posterior soft palate. Some of these areas correspond intense FDG  uptake on the same day PET CT suggestive of recurrent tumor.     Complete opacification of left mastoid is cells and middle ear cavity,  partial opacification of the right mastoid air cells compatible with  inflammatory changes/secretions.      T1 hypointense, T2 hyperintense and enhancing inflammatory changes in  the right maxillary sinus, right ethmoid air cells, right greater than  left sphenoid locules.     Corresponding to 2 foci of uptake along the mucosa, one along the  right lateral oropharyngeal wall and a second one along the left  posterior-lateral oropharyngeal wall extends to adjacent left palatine  tonsillar mucosa there is mucosal enhancement.     Orbital cavity contents are normal.     Meckel's caves are symmetric and normal. Very subtle asymmetric  thickening of the left V2 along the foramen rotundum (series 11, image  20). There  is trace dural enhancement along the base of the left  temporal lobe (series 11, image 21). No brain parenchymal edema.     T2 hyperintensity and enhancement of the left temporalis muscle is  likely due to denervation changes.  Mandible marrow signal is normal.   Hypopharyngeal and laryngeal structures demonstrate no significant  abnormality.   Trace retropharyngeal edema.      Oral cavity contents including tongue is normal.      Parotid glands and submandibular glands are atrophic without mass  lesion. In the right submandibular region there is a 6 mm lymph node.  No other enlarged suspicious nodes.                                                                      Impression:      Prior surgical changes of left maxillary antrectomy, partial  ethmoidectomy, turbinectomy and sinonasal mass resection.  Heterogeneous mass along the base of left maxillary sinus extending to  the maxilla,  space, left pterygopalatine fossa and  parapharyngeal space. Some of these areas demonstrate treated areas  however some of these correspond to the FDG avidity on the same day  PET and highly suspicious tumor recurrence.   Question of early left V2 involvement and thin dural enhancement along  the base of the left temporal lobe.       Pathology:   No new pathology.     Tumor Board Recommendation:   Discussion: 6 month post-treatment imaging reviewed today. PET from 5/17/24 looked good. There was residual soft tissue with involvement of the palate but it was not avid. PET from 8/5 with more intense uptake and FDG avidity in the  soft tissue. MR demonstrating stable size but residual tissue. There is concern for tumor given new avidity. Tumor extends to involve palate. Patient will require biopsy for definitive diagnosis. Might require maxillectomy pending results but would likely be challenging to obtain clear margins.     Plan:   - OR for biopsy   - next gen sequencing     Preethi Rabago MD  Otolaryngology- Head and  Neck Surgery, PGY-3    Documentation / Disclaimer Cancer Tumor Board Note: Cancer tumor board recommendations do not override what is determined to be reasonable care and treatment, which is dependent on the circumstances of a patient's case; the patient's medical, social, and personal concerns; and the clinical judgment of the oncologist [physician].

## 2024-08-09 ENCOUNTER — TUMOR CONFERENCE (OUTPATIENT)
Dept: ONCOLOGY | Facility: CLINIC | Age: 64
End: 2024-08-09

## 2024-08-09 NOTE — TUMOR CONFERENCE
Dr. Iglesias called and spoke to patient's son regarding tumor board recommendations. Scheduled for clinic appointment 8/15/24 to further discuss plan and next steps.     Carly Monique, RN, BSN  RN Care Coordinator, ENT Clinic

## 2024-08-13 ENCOUNTER — TELEPHONE (OUTPATIENT)
Dept: OTOLARYNGOLOGY | Facility: CLINIC | Age: 64
End: 2024-08-13
Payer: MEDICARE

## 2024-08-13 NOTE — TELEPHONE ENCOUNTER
Called and spoke to patient's son (Tulio) regarding appointment with Dr. Iglesias on 8/15/24. Tulio is unavailable to be at this appointment with patient. Dr. Iglesias is out the following week (8/22/24) and moving appointment to 8/29/24 is too far out per son. Patient will keep scheduled appointment on 8/15/24 and myself or Dr. Iglesias will call son after appointment to update him of next steps.  will be provided for patient.     Carly Monique, RN, BSN  RN Care Coordinator, ENT Clinic

## 2024-08-13 NOTE — TELEPHONE ENCOUNTER
[facility] Call Center    Phone Message    May a detailed message be left on voicemail: yes     Reason for Call: Other: Pt has an appointment scheduled with provider on 08/15/24 and his son would like to know if there is anything available for next week. Did check the schedule but unfortunately there wasn't any availability. His son would like to attend appointment. Please call to discuss. Did conform phone number and insurance. Thanks      Action Taken: Message routed to:  [facility] ENT    Travel Screening: Not Applicable     Date of Service:

## 2024-08-14 ENCOUNTER — TRANSFERRED RECORDS (OUTPATIENT)
Dept: HEALTH INFORMATION MANAGEMENT | Facility: CLINIC | Age: 64
End: 2024-08-14

## 2024-08-14 ENCOUNTER — LAB REQUISITION (OUTPATIENT)
Dept: LAB | Facility: CLINIC | Age: 64
End: 2024-08-14
Payer: MEDICARE

## 2024-08-14 DIAGNOSIS — E78.2 MIXED HYPERLIPIDEMIA: ICD-10-CM

## 2024-08-14 DIAGNOSIS — M1A.0790 IDIOPATHIC CHRONIC GOUT, UNSPECIFIED ANKLE AND FOOT, WITHOUT TOPHUS (TOPHI): ICD-10-CM

## 2024-08-14 DIAGNOSIS — Z12.5 ENCOUNTER FOR SCREENING FOR MALIGNANT NEOPLASM OF PROSTATE: ICD-10-CM

## 2024-08-14 PROCEDURE — G0103 PSA SCREENING: HCPCS | Mod: ORL | Performed by: NURSE PRACTITIONER

## 2024-08-14 PROCEDURE — 84550 ASSAY OF BLOOD/URIC ACID: CPT | Mod: ORL | Performed by: NURSE PRACTITIONER

## 2024-08-14 PROCEDURE — 80061 LIPID PANEL: CPT | Mod: ORL | Performed by: NURSE PRACTITIONER

## 2024-08-14 PROCEDURE — 80053 COMPREHEN METABOLIC PANEL: CPT | Mod: ORL | Performed by: NURSE PRACTITIONER

## 2024-08-14 NOTE — TELEPHONE ENCOUNTER
Called and left voicemail for patient's son (Tulio) to let him know we need to cancel appointment tomorrow with Dr. Iglesias. Patient already scheduled for 8/29/24 clinic appointment. Direct call back number left in voicemail.     Carly Monique, RN, BSN  RN Care Coordinator, ENT Clinic

## 2024-08-15 ENCOUNTER — TELEPHONE (OUTPATIENT)
Dept: CARDIOLOGY | Facility: CLINIC | Age: 64
End: 2024-08-15

## 2024-08-15 DIAGNOSIS — I25.10 CORONARY ARTERY DISEASE INVOLVING NATIVE CORONARY ARTERY: ICD-10-CM

## 2024-08-15 DIAGNOSIS — E78.2 MIXED HYPERLIPIDEMIA: Primary | ICD-10-CM

## 2024-08-15 DIAGNOSIS — I71.21 ANEURYSM OF ASCENDING AORTA WITHOUT RUPTURE (H): ICD-10-CM

## 2024-08-15 DIAGNOSIS — R94.39 ABNORMAL CARDIOVASCULAR STRESS TEST: ICD-10-CM

## 2024-08-15 LAB
ALBUMIN SERPL BCG-MCNC: 4.1 G/DL (ref 3.5–5.2)
ALP SERPL-CCNC: 57 U/L (ref 40–150)
ALT SERPL W P-5'-P-CCNC: 18 U/L (ref 0–70)
ANION GAP SERPL CALCULATED.3IONS-SCNC: 11 MMOL/L (ref 7–15)
AST SERPL W P-5'-P-CCNC: 29 U/L (ref 0–45)
BILIRUB SERPL-MCNC: 0.9 MG/DL
BUN SERPL-MCNC: 27.7 MG/DL (ref 8–23)
CALCIUM SERPL-MCNC: 9.6 MG/DL (ref 8.8–10.4)
CHLORIDE SERPL-SCNC: 102 MMOL/L (ref 98–107)
CHOLEST SERPL-MCNC: 113 MG/DL
CREAT SERPL-MCNC: 0.96 MG/DL (ref 0.67–1.17)
EGFRCR SERPLBLD CKD-EPI 2021: 88 ML/MIN/1.73M2
FASTING STATUS PATIENT QL REPORTED: ABNORMAL
FASTING STATUS PATIENT QL REPORTED: NORMAL
GLUCOSE SERPL-MCNC: 114 MG/DL (ref 70–99)
HCO3 SERPL-SCNC: 26 MMOL/L (ref 22–29)
HDLC SERPL-MCNC: 45 MG/DL
LDLC SERPL CALC-MCNC: 46 MG/DL
NONHDLC SERPL-MCNC: 68 MG/DL
POTASSIUM SERPL-SCNC: 4.2 MMOL/L (ref 3.4–5.3)
PROT SERPL-MCNC: 7.6 G/DL (ref 6.4–8.3)
PSA SERPL DL<=0.01 NG/ML-MCNC: 0.41 NG/ML (ref 0–4.5)
SODIUM SERPL-SCNC: 139 MMOL/L (ref 135–145)
TRIGL SERPL-MCNC: 108 MG/DL
URATE SERPL-MCNC: 7 MG/DL (ref 3.4–7)

## 2024-08-15 NOTE — TELEPHONE ENCOUNTER
[facility] Call Center    Phone Message    May a detailed message be left on voicemail: yes     Reason for Call: Other: Patient called requesting to speak with a member of his care team in regards to next steps and his care. Patient does not want to schedule an appt if  feels it is too early for him to be seen. Please call back to further discuss.     Action Taken: Message routed to:  Other: Cardiology    Travel Screening: Not Applicable     Thank you!  [facility]

## 2024-08-15 NOTE — TELEPHONE ENCOUNTER
[employer]ia [email or web address]  #[id number]: Left detailed message with the clinic direct number to call and arrange follow up in November or December as his 2 year follow up is due.

## 2024-08-20 ENCOUNTER — APPOINTMENT (OUTPATIENT)
Dept: INTERPRETER SERVICES | Facility: CLINIC | Age: 64
End: 2024-08-20
Payer: MEDICARE

## 2024-08-20 ENCOUNTER — TELEPHONE (OUTPATIENT)
Dept: OTOLARYNGOLOGY | Facility: CLINIC | Age: 64
End: 2024-08-20
Payer: MEDICARE

## 2024-08-20 NOTE — TELEPHONE ENCOUNTER
Patient confirmed scheduled appointment:  Date: 9/5  Time: 420  Provider: Kelsie  Location: [facility]   Testing/imaging:   Additional notes:

## 2024-08-22 ENCOUNTER — DOCUMENTATION ONLY (OUTPATIENT)
Dept: RADIATION ONCOLOGY | Facility: CLINIC | Age: 64
End: 2024-08-22

## 2024-08-22 DIAGNOSIS — I89.0 LYMPHEDEMA DUE TO RADIATION: Primary | ICD-10-CM

## 2024-08-22 DIAGNOSIS — C31.9 MALIGNANT NEOPLASM OF PARANASAL SINUS (H): ICD-10-CM

## 2024-09-05 ENCOUNTER — OFFICE VISIT (OUTPATIENT)
Dept: OTOLARYNGOLOGY | Facility: CLINIC | Age: 64
End: 2024-09-05
Attending: OTOLARYNGOLOGY
Payer: COMMERCIAL

## 2024-09-05 ENCOUNTER — ENROLLMENT (OUTPATIENT)
Dept: HOME HEALTH SERVICES | Facility: HOME HEALTH | Age: 64
End: 2024-09-05
Payer: COMMERCIAL

## 2024-09-05 VITALS
DIASTOLIC BLOOD PRESSURE: 74 MMHG | WEIGHT: 135.8 LBS | BODY MASS INDEX: 23.18 KG/M2 | OXYGEN SATURATION: 100 % | SYSTOLIC BLOOD PRESSURE: 133 MMHG | HEART RATE: 77 BPM | HEIGHT: 64 IN

## 2024-09-05 DIAGNOSIS — C30.0 MALIGNANT NEOPLASM OF NASAL CAVITIES (H): Primary | ICD-10-CM

## 2024-09-05 PROCEDURE — 99214 OFFICE O/P EST MOD 30 MIN: CPT | Mod: 25 | Performed by: OTOLARYNGOLOGY

## 2024-09-05 PROCEDURE — 31231 NASAL ENDOSCOPY DX: CPT | Performed by: OTOLARYNGOLOGY

## 2024-09-05 ASSESSMENT — PAIN SCALES - GENERAL: PAINLEVEL: NO PAIN (0)

## 2024-09-05 NOTE — PATIENT INSTRUCTIONS
You were seen in the ENT Clinic today by Dr. Regan. If you have any questions or concerns after your appointment, please contact us (see below)    The following has been recommended for you based upon your appointment today:      Please return to clinic     How to Contact Us:  Send a Connectivity message to your provider. Our team will respond to you via Connectivity. Occasionally, we will need to call you to get further information.  For urgent matters (Monday-Friday), call the ENT Clinic: [phone] and speak with a call center team member - they will route your call appropriately.   If you'd like to speak directly with a nurse, please find our contact information below. We do our best to check voicemail frequently throughout the day, and will work to call you back within 1-2 days. For urgent matters, please use the general clinic phone numbers listed above.    Carly CALLEJAS RN, BSN  RN Care Coordinator, ENT Clinic  [facility]  Direct: [phone]

## 2024-09-05 NOTE — LETTER
9/5/2024       RE: Douglas Herrera  [address]  [place] [address]     Dear Colleague,    Thank you for referring your patient, Douglas Herrera, to the [facility] EAR NOSE AND THROAT CLINIC [place] at [facility]. Please see a copy of my visit note below.    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     MRI sinus 8/05/2024   Prior surgical changes of left maxillary antrectomy, partial  ethmoidectomy, turbinectomy and sinonasal mass resection.  Heterogeneous mass along the base of left maxillary sinus extending to  the maxilla,  space, left pterygopalatine fossa and  parapharyngeal space. Some of these areas demonstrate treated areas  however some of these correspond to the FDG avidity on the same day  PET and highly suspicious tumor recurrence.   Question of early left V2 involvement and thin dural enhancement along  the base of the left temporal lobe.     PET-CT 8/5/2024  1. Increased enhancing soft tissue density with intensely increased  hypermetabolic uptake compared prior CT PET 5/17/2024 centered on the  posterior maxilla involving the  and parapharyngeal space  concerning for recurrent tumor. Primary: NI-RADS 3.     2. Two additional mucosal areas with focal uptake:  -Left palatine tonsil medial/ posterior mucosa extending posterior  pharyngeal wall, associated mucosal thickening. Correlation with  physical exam and biopsy is recommended  -Right lateral oropharyngeal wall. Correlation with physical exam is  recommended.  .  3. Right level 1b node with moderate-high uptake and right level 2  nodes with moderate uptake though not enlarged are worrisome. Neck:  NI-RADS 2.    4. No definitive evidence for metastatic disease within the chest,  abdomen, or pelvis.   5. Focal uptake surrounding the percutaneous gastrostomy tube site,  increased since prior. Likely inflammation, may be related to local  infection, correlate with clinical exam.    History of Present Illness: 64-year-old gentleman here in the otolaryngology clinic today for discussion of recent MRI and PET/CT results.  The patient states that he is doing well.  He denies any pain.  He is eating by mouth most of the time.  He denies any pain with chewing.  He is gaining weight.  No epistaxis no nasal obstruction or congestion.    MEDICATIONS:     Current Outpatient Medications   Medication Sig Dispense Refill     acetaminophen (TYLENOL) 325 MG tablet Take 2 tablets (650 mg) by mouth every 4 hours as needed for other or pain 50 tablet 0     aspirin 81 MG EC tablet Take 1 tablet (81 mg) by mouth daily 90 tablet 3     atorvastatin (LIPITOR) 80 MG tablet TAKE 1 TABLET (80 MG TOTAL) BY MOUTH AT BEDTIME/ TXHUA HMO NOJ 1 LUB TSHUAJ THAUM MUS PW PAB ZOO NTSHAV MUAJ ROJ 90 tablet 3     capsaicin (ZOSTRIX) 0.025 % external cream Apply topically 3 times daily as needed       entecavir (BARACLUDE) 0.5 MG tablet Take 0.5 mg by mouth every morning Take 1 hour before a meal or 2 hours after a meal.       fluticasone (FLONASE) 50 MCG/ACT nasal spray Spray 2 sprays into both nostrils as needed       magic mouthwash (ENTER INGREDIENTS IN COMMENTS) suspension Take 10 mLs by mouth every 4 hours as needed (mucositis) 240 mL 1     nitroGLYcerin (NITROSTAT) 0.4 MG sublingual tablet Place 0.4 mg under the tongue every 5 minutes as needed       ondansetron (ZOFRAN) 8 MG tablet Take 1 tablet (8 mg) by mouth every 8 hours as needed for nausea (vomiting) 30 tablet 2     prochlorperazine (COMPAZINE) 10 MG tablet Take 1 tablet (10 mg) by mouth every 6 hours as needed for nausea or vomiting 30 tablet 2     senna-docusate (SENNA S) 8.6-50 MG tablet Take 1  tablet by mouth 2 times daily as needed for constipation 30 tablet 1     sodium chloride (OCEAN) 0.65 % nasal spray Spray 2 sprays in nostril daily as needed for congestion 88 mL 3     traZODone (DESYREL) 50 MG tablet Take 50 mg by mouth nightly as needed for sleep         ALLERGIES:  No Known Allergies    PAST MEDICAL HISTORY:   Past Medical History:   Diagnosis Date     Ascending aortic aneurysm (H24)      Coronary artery disease      Depression      Gout      History of anesthesia complications      Hyperlipemia      Hypertension      PONV (postoperative nausea and vomiting)         FAMILY HISTORY:    Family History   Problem Relation Age of Onset     Cerebrovascular Disease Mother 90.00     Acute Myocardial Infarction No family hx of        REVIEW OF SYSTEMS:  12 point ROS was negative other than the symptoms noted above in the HPI.    PHYSICAL EXAMINATION:      Constitutional:  The patient was accompanied, well-groomed, and in no acute distress.     Skin: Normal:  warm and pink without rash   Neurologic: Alert and oriented x 3.  CN's III-XII within normal limits.  Voice normal.    Psychiatric: The patient's affect was calm, cooperative, and appropriate.     Communication:  Normal; communicates verbally, normal voice quality.   Respiratory: Breathing comfortably without stridor or exertion of accessory muscles.    Head/Face:  Normocephalic and atraumatic.  No lesions or scars. No sinus tenderness.    Salivary glands -  Normal size, no tenderness, swelling, or palpable masses   Eyes: Pupils were equal and reactive.  Extraocular movement intact.         Nose: Sinuses were non-tender.  Anterior rhinoscopy revealed midline septum and absence of purulence or polyps.     Oral Cavity: Severe trismus.  I do see protrusion at the left maxillary tuberosity.  Palpation of this area shows a lump there is covered by mucosa.  It is not painful on palpation.               Neck: Supple with normal laryngeal and tracheal  landmarks.  The parotid beds were without masses.  No palpable thyroid.  Normal range of motion   Lymphatic: There is no palpable lymphadenopathy in the neck.         Nasal endoscopy: Consent for nasal endoscopy was obtained.  I confirmed correctness of the procedure and identity of the patient.  Nasal endoscopy was indicated due to left maxillary sinus carcinoma.  The nose was topically decongested and anesthetized.  The nasal endoscope was passed under endoscopic vision.  On the left side a there is no crusting.  A large maxillary antrostomy.  And nasopharynx looks normal.  I was not able to visualize into the left maxillary sinus.       IMPRESSION AND PLAN: 64-year-old gentleman with very concerning 6 months post treatment MRI and PET/CT.  Physical examination today shows a lump in the left maxillary tuberosity.  This is accessible for biopsy.  I discussed this with the patient and his son.  They want to think about this I will be happy to do this next week if the patient decide to go ahead.    Thank you very much for the opportunity to participate in the care of your patient.      Damon Regan MD, M.S.  Otolaryngology- Head & Neck Surgery  [phone]           Again, thank you for allowing me to participate in the care of your patient.      Sincerely,    Damon Regan MD

## 2024-09-05 NOTE — PROGRESS NOTES
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     MRI sinus 8/05/2024   Prior surgical changes of left maxillary antrectomy, partial  ethmoidectomy, turbinectomy and sinonasal mass resection.  Heterogeneous mass along the base of left maxillary sinus extending to  the maxilla,  space, left pterygopalatine fossa and  parapharyngeal space. Some of these areas demonstrate treated areas  however some of these correspond to the FDG avidity on the same day  PET and highly suspicious tumor recurrence.   Question of early left V2 involvement and thin dural enhancement along  the base of the left temporal lobe.     PET-CT 8/5/2024  1. Increased enhancing soft tissue density with intensely increased  hypermetabolic uptake compared prior CT PET 5/17/2024 centered on the  posterior maxilla involving the  and parapharyngeal space  concerning for recurrent tumor. Primary: NI-RADS 3.     2. Two additional mucosal areas with focal uptake:  -Left palatine tonsil medial/ posterior mucosa extending posterior  pharyngeal wall, associated mucosal thickening. Correlation with  physical exam and biopsy is recommended  -Right lateral oropharyngeal wall. Correlation with physical exam is  recommended.  .  3. Right level 1b node with moderate-high uptake and right level 2  nodes with moderate uptake though not enlarged are worrisome. Neck:  NI-RADS 2.   4. No definitive evidence for metastatic disease within the chest,  abdomen, or pelvis.   5. Focal uptake surrounding the percutaneous gastrostomy tube site,  increased since prior. Likely inflammation, may be related to local  infection, correlate with clinical exam.    History of Present Illness: 64-year-old  gentleman here in the otolaryngology clinic today for discussion of recent MRI and PET/CT results.  The patient states that he is doing well.  He denies any pain.  He is eating by mouth most of the time.  He denies any pain with chewing.  He is gaining weight.  No epistaxis no nasal obstruction or congestion.    MEDICATIONS:     Current Outpatient Medications   Medication Sig Dispense Refill    acetaminophen (TYLENOL) 325 MG tablet Take 2 tablets (650 mg) by mouth every 4 hours as needed for other or pain 50 tablet 0    aspirin 81 MG EC tablet Take 1 tablet (81 mg) by mouth daily 90 tablet 3    atorvastatin (LIPITOR) 80 MG tablet TAKE 1 TABLET (80 MG TOTAL) BY MOUTH AT BEDTIME/ TXHUA HMO NOJ 1 LUB TSHUAJ THAUM MUS PW PAB ZOO NTSHAV MUAJ ROJ 90 tablet 3    capsaicin (ZOSTRIX) 0.025 % external cream Apply topically 3 times daily as needed      entecavir (BARACLUDE) 0.5 MG tablet Take 0.5 mg by mouth every morning Take 1 hour before a meal or 2 hours after a meal.      fluticasone (FLONASE) 50 MCG/ACT nasal spray Spray 2 sprays into both nostrils as needed      magic mouthwash (ENTER INGREDIENTS IN COMMENTS) suspension Take 10 mLs by mouth every 4 hours as needed (mucositis) 240 mL 1    nitroGLYcerin (NITROSTAT) 0.4 MG sublingual tablet Place 0.4 mg under the tongue every 5 minutes as needed      ondansetron (ZOFRAN) 8 MG tablet Take 1 tablet (8 mg) by mouth every 8 hours as needed for nausea (vomiting) 30 tablet 2    prochlorperazine (COMPAZINE) 10 MG tablet Take 1 tablet (10 mg) by mouth every 6 hours as needed for nausea or vomiting 30 tablet 2    senna-docusate (SENNA S) 8.6-50 MG tablet Take 1 tablet by mouth 2 times daily as needed for constipation 30 tablet 1    sodium chloride (OCEAN) 0.65 % nasal spray Spray 2 sprays in nostril daily as needed for congestion 88 mL 3    traZODone (DESYREL) 50 MG tablet Take 50 mg by mouth nightly as needed for sleep         ALLERGIES:  No Known Allergies    PAST MEDICAL  HISTORY:   Past Medical History:   Diagnosis Date    Ascending aortic aneurysm (H24)     Coronary artery disease     Depression     Gout     History of anesthesia complications     Hyperlipemia     Hypertension     PONV (postoperative nausea and vomiting)         FAMILY HISTORY:    Family History   Problem Relation Age of Onset    Cerebrovascular Disease Mother 90.00    Acute Myocardial Infarction No family hx of        REVIEW OF SYSTEMS:  12 point ROS was negative other than the symptoms noted above in the HPI.    PHYSICAL EXAMINATION:      Constitutional:  The patient was accompanied, well-groomed, and in no acute distress.     Skin: Normal:  warm and pink without rash   Neurologic: Alert and oriented x 3.  CN's III-XII within normal limits.  Voice normal.    Psychiatric: The patient's affect was calm, cooperative, and appropriate.     Communication:  Normal; communicates verbally, normal voice quality.   Respiratory: Breathing comfortably without stridor or exertion of accessory muscles.    Head/Face:  Normocephalic and atraumatic.  No lesions or scars. No sinus tenderness.    Salivary glands -  Normal size, no tenderness, swelling, or palpable masses   Eyes: Pupils were equal and reactive.  Extraocular movement intact.         Nose: Sinuses were non-tender.  Anterior rhinoscopy revealed midline septum and absence of purulence or polyps.     Oral Cavity: Severe trismus.  I do see protrusion at the left maxillary tuberosity.  Palpation of this area shows a lump there is covered by mucosa.  It is not painful on palpation.               Neck: Supple with normal laryngeal and tracheal landmarks.  The parotid beds were without masses.  No palpable thyroid.  Normal range of motion   Lymphatic: There is no palpable lymphadenopathy in the neck.         Nasal endoscopy: Consent for nasal endoscopy was obtained.  I confirmed correctness of the procedure and identity of the patient.  Nasal endoscopy was indicated due to  left maxillary sinus carcinoma.  The nose was topically decongested and anesthetized.  The nasal endoscope was passed under endoscopic vision.  On the left side a there is no crusting.  A large maxillary antrostomy.  And nasopharynx looks normal.  I was not able to visualize into the left maxillary sinus.       IMPRESSION AND PLAN: 64-year-old gentleman with very concerning 6 months post treatment MRI and PET/CT.  Physical examination today shows a lump in the left maxillary tuberosity.  This is accessible for biopsy.  I discussed this with the patient and his son.  They want to think about this I will be happy to do this next week if the patient decide to go ahead.    Thank you very much for the opportunity to participate in the care of your patient.      Damon Regan MD, M.S.  Otolaryngology- Head & Neck Surgery  [phone]

## 2024-09-20 ENCOUNTER — OFFICE VISIT (OUTPATIENT)
Dept: OTOLARYNGOLOGY | Facility: CLINIC | Age: 64
End: 2024-09-20
Payer: COMMERCIAL

## 2024-09-20 VITALS
HEIGHT: 64 IN | BODY MASS INDEX: 23.39 KG/M2 | DIASTOLIC BLOOD PRESSURE: 81 MMHG | HEART RATE: 69 BPM | SYSTOLIC BLOOD PRESSURE: 136 MMHG | WEIGHT: 136.99 LBS | OXYGEN SATURATION: 100 %

## 2024-09-20 DIAGNOSIS — C30.0 MALIGNANT NEOPLASM OF NASAL CAVITIES (H): Primary | ICD-10-CM

## 2024-09-20 PROCEDURE — 40808 BIOPSY OF MOUTH LESION: CPT | Performed by: OTOLARYNGOLOGY

## 2024-09-20 PROCEDURE — 88305 TISSUE EXAM BY PATHOLOGIST: CPT | Mod: 26 | Performed by: STUDENT IN AN ORGANIZED HEALTH CARE EDUCATION/TRAINING PROGRAM

## 2024-09-20 PROCEDURE — 88305 TISSUE EXAM BY PATHOLOGIST: CPT | Mod: TC | Performed by: OTOLARYNGOLOGY

## 2024-09-20 NOTE — LETTER
9/20/2024       RE: Douglas Herrera  [address]  [place] [address]     Dear Colleague,    Thank you for referring your patient, Douglas Herrera, to the [facility] EAR NOSE AND THROAT CLINIC [place] at [facility]. Please see a copy of my visit note below.    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      MRI sinus 8/05/2024   Prior surgical changes of left maxillary antrectomy, partial  ethmoidectomy, turbinectomy and sinonasal mass resection.  Heterogeneous mass along the base of left maxillary sinus extending to  the maxilla,  space, left pterygopalatine fossa and  parapharyngeal space. Some of these areas demonstrate treated areas  however some of these correspond to the FDG avidity on the same day  PET and highly suspicious tumor recurrence.   Question of early left V2 involvement and thin dural enhancement along  the base of the left temporal lobe.     PET-CT 8/5/2024  1. Increased enhancing soft tissue density with intensely increased  hypermetabolic uptake compared prior CT PET 5/17/2024 centered on the  posterior maxilla involving the  and parapharyngeal space  concerning for recurrent tumor. Primary: NI-RADS 3.     2. Two additional mucosal areas with focal uptake:  -Left palatine tonsil medial/ posterior mucosa extending posterior  pharyngeal wall, associated mucosal thickening. Correlation with  physical exam and biopsy is recommended  -Right lateral oropharyngeal wall. Correlation with physical exam is  recommended.  .  3. Right level 1b node with moderate-high uptake and right level 2  nodes with moderate uptake though not enlarged are worrisome. Neck:  NI-RADS 2.    4. No definitive evidence for metastatic disease within the chest,  abdomen, or pelvis.   5. Focal uptake surrounding the percutaneous gastrostomy tube site,  increased since prior. Likely inflammation, may be related to local  infection, correlate with clinical exam.      History of Present Illness: Patient here or biopsy of the left superior maxillary alveolus mass.    MEDICATIONS:     Current Outpatient Medications   Medication Sig Dispense Refill     acetaminophen (TYLENOL) 325 MG tablet Take 2 tablets (650 mg) by mouth every 4 hours as needed for other or pain 50 tablet 0     aspirin 81 MG EC tablet Take 1 tablet (81 mg) by mouth daily 90 tablet 3     atorvastatin (LIPITOR) 80 MG tablet TAKE 1 TABLET (80 MG TOTAL) BY MOUTH AT BEDTIME/ TXHUA HMO NOJ 1 LUB TSHUAJ THAUM MUS PW PAB ZOO NTSHAV MUAJ ROJ 90 tablet 3     capsaicin (ZOSTRIX) 0.025 % external cream Apply topically 3 times daily as needed       entecavir (BARACLUDE) 0.5 MG tablet Take 0.5 mg by mouth every morning Take 1 hour before a meal or 2 hours after a meal.       fluticasone (FLONASE) 50 MCG/ACT nasal spray Spray 2 sprays into both nostrils as needed       magic mouthwash (ENTER INGREDIENTS IN COMMENTS) suspension Take 10 mLs by mouth every 4 hours as needed (mucositis) 240 mL 1     nitroGLYcerin (NITROSTAT) 0.4 MG sublingual tablet Place 0.4 mg under the tongue every 5 minutes as needed       ondansetron (ZOFRAN) 8 MG tablet Take 1 tablet (8 mg) by mouth every 8 hours as needed for nausea (vomiting) 30 tablet 2     prochlorperazine (COMPAZINE) 10 MG tablet Take 1 tablet (10 mg) by mouth every 6 hours as needed for nausea or vomiting 30 tablet 2     senna-docusate (SENNA S) 8.6-50 MG tablet Take 1 tablet by mouth 2 times daily as needed for constipation 30 tablet 1     sodium chloride (OCEAN) 0.65 % nasal spray Spray 2 sprays in nostril daily as needed for congestion 88 mL 3     traZODone (DESYREL) 50 MG tablet Take 50 mg by mouth nightly as  needed for sleep         ALLERGIES:  No Known Allergies    PAST MEDICAL HISTORY:   Past Medical History:   Diagnosis Date     Ascending aortic aneurysm (H24)      Coronary artery disease      Depression      Gout      History of anesthesia complications      Hyperlipemia      Hypertension      PONV (postoperative nausea and vomiting)         FAMILY HISTORY:    Family History   Problem Relation Age of Onset     Cerebrovascular Disease Mother 90.00     Acute Myocardial Infarction No family hx of        REVIEW OF SYSTEMS:  12 point ROS was negative other than the symptoms noted above in the HPI.    PROCEDURE:  After obtaining informed consent from Mr. Herrera, we put Mr. Herrera in a comfortable position.  I injected the biopsy site with 1% lidocaine with 1:100,000 epinephrine.  Once anesthesia was ascertained, I used a 15 blade and cup forceps to sample the abnormal tissue.  2 samples were taken and sent for permanent pathology.  Hemostasis was easily assured with pressure and silver nitrate sticks.  The patient tolerated the procedure well.       IMPRESSION AND PLAN: Suspected left maxillary sinus recurrence. Biopsy obtained today.     Thank you very much for the opportunity to participate in the care of your patient.      Damon Regan MD, M.S.  Otolaryngology- Head & Neck Surgery  [phone]           Again, thank you for allowing me to participate in the care of your patient.      Sincerely,    Damon Regan MD

## 2024-09-20 NOTE — PATIENT INSTRUCTIONS
You were seen in the ENT Clinic today by Dr. Regan. If you have any questions or concerns after your appointment, please contact us (see below)    The following has been recommended for you based upon your appointment today:  We will call you with the results of your biopsy from today     Please return to clinic to be determined     How to Contact Us:  Send a [employer] message to your provider. Our team will respond to you via [employer]. Occasionally, we will need to call you to get further information.  For urgent matters (Monday-Friday), call the ENT Clinic: [phone] and speak with a call center team member - they will route your call appropriately.   If you'd like to speak directly with a nurse, please find our contact information below. We do our best to check voicemail frequently throughout the day, and will work to call you back within 1-2 days. For urgent matters, please use the general clinic phone numbers listed above.    Carly CALLEJAS RN, BSN  RN Care Coordinator, ENT Clinic  [facility]  Direct: [phone]

## 2024-09-20 NOTE — PROGRESS NOTES
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      MRI sinus 8/05/2024   Prior surgical changes of left maxillary antrectomy, partial  ethmoidectomy, turbinectomy and sinonasal mass resection.  Heterogeneous mass along the base of left maxillary sinus extending to  the maxilla,  space, left pterygopalatine fossa and  parapharyngeal space. Some of these areas demonstrate treated areas  however some of these correspond to the FDG avidity on the same day  PET and highly suspicious tumor recurrence.   Question of early left V2 involvement and thin dural enhancement along  the base of the left temporal lobe.     PET-CT 8/5/2024  1. Increased enhancing soft tissue density with intensely increased  hypermetabolic uptake compared prior CT PET 5/17/2024 centered on the  posterior maxilla involving the  and parapharyngeal space  concerning for recurrent tumor. Primary: NI-RADS 3.     2. Two additional mucosal areas with focal uptake:  -Left palatine tonsil medial/ posterior mucosa extending posterior  pharyngeal wall, associated mucosal thickening. Correlation with  physical exam and biopsy is recommended  -Right lateral oropharyngeal wall. Correlation with physical exam is  recommended.  .  3. Right level 1b node with moderate-high uptake and right level 2  nodes with moderate uptake though not enlarged are worrisome. Neck:  NI-RADS 2.   4. No definitive evidence for metastatic disease within the chest,  abdomen, or pelvis.   5. Focal uptake surrounding the percutaneous gastrostomy tube site,  increased since prior. Likely inflammation, may be related to local  infection, correlate with clinical exam.      History of Present Illness: Patient  here or biopsy of the left superior maxillary alveolus mass.    MEDICATIONS:     Current Outpatient Medications   Medication Sig Dispense Refill    acetaminophen (TYLENOL) 325 MG tablet Take 2 tablets (650 mg) by mouth every 4 hours as needed for other or pain 50 tablet 0    aspirin 81 MG EC tablet Take 1 tablet (81 mg) by mouth daily 90 tablet 3    atorvastatin (LIPITOR) 80 MG tablet TAKE 1 TABLET (80 MG TOTAL) BY MOUTH AT BEDTIME/ TXHUA HMO NOJ 1 LUB TSHUAJ THAUM MUS PW PAB ZOO NTSHAV MUAJ ROJ 90 tablet 3    capsaicin (ZOSTRIX) 0.025 % external cream Apply topically 3 times daily as needed      entecavir (BARACLUDE) 0.5 MG tablet Take 0.5 mg by mouth every morning Take 1 hour before a meal or 2 hours after a meal.      fluticasone (FLONASE) 50 MCG/ACT nasal spray Spray 2 sprays into both nostrils as needed      magic mouthwash (ENTER INGREDIENTS IN COMMENTS) suspension Take 10 mLs by mouth every 4 hours as needed (mucositis) 240 mL 1    nitroGLYcerin (NITROSTAT) 0.4 MG sublingual tablet Place 0.4 mg under the tongue every 5 minutes as needed      ondansetron (ZOFRAN) 8 MG tablet Take 1 tablet (8 mg) by mouth every 8 hours as needed for nausea (vomiting) 30 tablet 2    prochlorperazine (COMPAZINE) 10 MG tablet Take 1 tablet (10 mg) by mouth every 6 hours as needed for nausea or vomiting 30 tablet 2    senna-docusate (SENNA S) 8.6-50 MG tablet Take 1 tablet by mouth 2 times daily as needed for constipation 30 tablet 1    sodium chloride (OCEAN) 0.65 % nasal spray Spray 2 sprays in nostril daily as needed for congestion 88 mL 3    traZODone (DESYREL) 50 MG tablet Take 50 mg by mouth nightly as needed for sleep         ALLERGIES:  No Known Allergies    PAST MEDICAL HISTORY:   Past Medical History:   Diagnosis Date    Ascending aortic aneurysm (H24)     Coronary artery disease     Depression     Gout     History of anesthesia complications     Hyperlipemia     Hypertension     PONV (postoperative nausea and vomiting)          FAMILY HISTORY:    Family History   Problem Relation Age of Onset    Cerebrovascular Disease Mother 90.00    Acute Myocardial Infarction No family hx of        REVIEW OF SYSTEMS:  12 point ROS was negative other than the symptoms noted above in the HPI.    PROCEDURE:  After obtaining informed consent from Mr. Herrera, we put Mr. Herrera in a comfortable position.  I injected the biopsy site with 1% lidocaine with 1:100,000 epinephrine.  Once anesthesia was ascertained, I used a 15 blade and cup forceps to sample the abnormal tissue.  2 samples were taken and sent for permanent pathology.  Hemostasis was easily assured with pressure and silver nitrate sticks.  The patient tolerated the procedure well.       IMPRESSION AND PLAN: Suspected left maxillary sinus recurrence. Biopsy obtained today.     Thank you very much for the opportunity to participate in the care of your patient.      Damon Regan MD, M.S.  Otolaryngology- Head & Neck Surgery  [phone]

## 2024-09-23 LAB
PATH REPORT.COMMENTS IMP SPEC: ABNORMAL
PATH REPORT.COMMENTS IMP SPEC: YES
PATH REPORT.FINAL DX SPEC: ABNORMAL
PATH REPORT.GROSS SPEC: ABNORMAL
PATH REPORT.MICROSCOPIC SPEC OTHER STN: ABNORMAL
PATH REPORT.RELEVANT HX SPEC: ABNORMAL
PHOTO IMAGE: ABNORMAL

## 2024-09-26 ENCOUNTER — PATIENT OUTREACH (OUTPATIENT)
Dept: OTOLARYNGOLOGY | Facility: CLINIC | Age: 64
End: 2024-09-26
Payer: COMMERCIAL

## 2024-09-26 NOTE — PROGRESS NOTES
Called and spoke to patient's son (Tulio) regarding the following biopsy from 9/20/24:     Final Diagnosis   A. MAXILLA, LEFT SUPERIOR ALVEOLUS, BIOPSY:  - FRAGMENTS OF AT LEAST SQUAMOUS CELL CARCINOMA IN-SITU  - See comment     Informed Tulio that Dr. Iglesias would like to do a biopsy in the OR in order to get a deeper sample. Tulio will discuss with patient and reach back out if they would like to proceed.     Carly Monique, RN, BSN  RN Care Coordinator, [facility]

## 2024-09-29 NOTE — PROGRESS NOTES
01/05/2024 PT HAS COVERAGE FOR ENTERAL AS LONG AS VIA TUBE    CAN BE IVN, HB VS NON HB DOES NOT MATTER WITH THIS PLAN    PT HAS COVERAGE FOR LINE CARE AND HYDRATION  NO COVERAGE FOR TPA. TL  ************************************************************************************************  02/12/2024 - Per [facility] from [facility].  Can you please tell me if patient has coverage for IV fluids at home?  js PT HAS COVERAGE FOR IVF NH

## 2024-10-02 NOTE — PROGRESS NOTES
[facility]  [address] [address]  Phone: [phone]  Fax: [phone]    PATIENT NAME: Douglas Herrera  MRN # [id number]   DATE OF VISIT: October 3, 2024  YOB: 1960     Otolaryngology: Dr. Damon Regan   Radiation Oncology: Dr. Tomasa Akers  Cardiology: Dr. Qamar Hernandez  PCP: Dr. العراقي   Hepatologist: [facility]     CANCER TYPE: SCC sinonasal   STAGE: [id number] (IVB)  ECOG PS: 1    PD-L1:  NGS: internal panel. Fusion negative. ARID1A S90fs, EGFR exon 20 insertion, APC [id number]    SUMMARY    8/9/23 CT sinus.   8/24/23 MRI sinus. L maxillary sinus mass  9/12/23 ED for epistaxis.   9/13/23 CTA and embolization of L IMAX   10/4/23 Nasal bx. Path: Inverted papilloma with high grade dysplasia  11/10/23 Nasal endoscopy and bx in the OR. C/b severe bleeding. Path: Inverted sinonasal papilloma with severe dysplasia.  11/17/23 MRI. 7.4 x 4.6 x 6.6 cm L sinonasal mass, destruction of nasal turbinates, L maxillary sinus, hard palate, invasion of L  space, involvement of medial and lateral pterygoids and temporalis muscles. Effacement and invasion ipsilateral pterygopalatine fossa, extends and effaces the nasopharynx.   11/30/23 Carotid angiogram. Direct endonasal and transoral embolization L sinonasal tumor, transaterial embolization of the L sphenopalatine artery feeders to the L sinonasal artery tumor   12/1/23 Stealth assisted transnasal endoscopic approach to excise L sinonasal mass. Pre-operative embolization. Path: SCC involving L maxilla.    12/9/23 PET. Hypermetabolic mass centered along the L maxilla, extending superiorly through the floor of the L maxillary sinus, posterior/laterally to the  space, invasion of the pterygoid muscles, extending cranially along the pterygopalatine fossa and pterygoid plates to the skull base level. Erosion of involved bones including L maxilla, maxillary sinus wall and pterygoid  plates. Extension medially to the L lateral nasopharyngeal wall and soft palate. 1 mm fat place between carotid and mass. ~4.9 x 4.0 x 5.4 cm (SUV 24.3). Partially resected since 11/17/23 MRI. 8 mm L 2A node (SUV 5.9), 7 mm L level 2 node (SUV 5.5)  1/2/24 Gtube (IR)  1/4/24 Video swallow. No aspiration  1/8~2/27/24 Chemoradiation with weekly cisplatin.  1/11/24 Port (IR)  8/5/24 PET/CT.  Overall increased FDG uptake centered along the posterior inferior left maxillary sinus involving the  space, left pterygopalatine fossa, left parapharyngeal space, extending inferiorly to left maxilla.  Increased soft tissue component within the space, now broad FDG uptake (SUV 24.97), previously 5.75.  Area measures 3.3 x 2.6 cm. Increased focal hypermetabolic activity posterior pharyngeal wall, extending to the left palatine tonsil, associated mucosal thickening (SUV 17.23).  Non-FDG avid mass, 2.7 x 2.4 cm, previously 2.1 x 2.5 cm, another medially, 1.1 x 1.5 cm, previously 1.1 x 1 cm.  New focal area of FDG uptake right lateral oropharyngeal wall (SUV 9.07).  Few hypermetabolic right cervical nodes, none definitively enlarged, however increased activity compared to prior (SUV 7.29-8.36).  Several sub-6 mm pulmonary nodules.  8/5/24 MRI.  Heterogeneous mass along the base of the left maxillary sinus extending to the maxilla,  space, left pterygopalatine fossa, and parapharyngeal space.  Some areas of treated but some corresponding to FDG avidity on same-day PET, highly suspicious for tumor recurrence.  Question early left V2 involvement, thin dural enhancement along the base of the left temporal lobe    9/20/24 L maxillary, L superior alveolus bx in clinic Path: Fragments of at least SCC in situ.     ASSESSMENT AND PLAN  SCC L maxillary sinus, [id number] (IVB), ARID1 mutation,  EGFR exon 20 insertion: Likely local recurrence. Dr. Iglesias is planning a bx in the OR to get a definitive answer. Prior tumor had  an exon 20 insertion mutation; can target that with amivantamab. I will see him after the bx to finalize a plan. Would repeat NGS if bx shows cancer. Douglas wasn't aware that he needed another bx; Carly spoke with Tulio last week about it. Explained the rationale today.    Trismus: Ongoing PT    Nutrition: Still doing some TF - adjusts based on his oral intake from day to day. Tube working ok - will see if annual change is recommended. Will ask Mayela to re-evaluate intake     Hypercalcemia: Malignancy. We should check labs again in the coming weeks - last in Aug    Port: Still in place. Needs ongoing flushes, will arrange.     Pulm nodules: Stable     Hearing loss: Unchanged - still pretty significant on L. Will repeat audiogram in future once bx done, etc.     Hepatitis B: HBSAby negative, SAg +, cAby +, HBV PCR negative 1/23/24. HCV negative.     H/o PE/DVT: 5/8/2019 CTA report scanned into [facility]. Small PEs. Also had LLE DVT, acute, occlusive. Was on rivaroxaban, completed course.     ASCVD: I'm not entirely clear why it was stopped when he started chemoradiation. Resume asa 81mg daily, ok to hold metoprolol a little longer until he sees PCP     H/o undefined hypothyroidism: TFTs 8/5/24 - FT4 borderline low. Check again next draw.     The longitudinal plan of care for the condition(s) below were addressed during this visit. Due to the added complexity in care, I will continue to support Douglas in the subsequent management of this condition(s) and with the ongoing continuity of care of this condition(s): maxillary sinus cancer       30 minutes spent on this phone call. [employer] phone  used throughout    Kayy Post MD  Associate Professor of Medicine  Hematology, Oncology and Transplantation      SUBJECTIVE  Mr. Herrera returns for follow up of maxillary sinus cancer  Completed chemoradiation at the end of Feb 2024.   Unfortunately recent scan was worrisome for recurrence. Bx in clinic showed at least SCC  in situ but wasn't definitive for invasive cancer so going to the OR for bx.   Some residual pain since chemoradiation; hasn't tired anything. More irritation. Trismus continues. Doing exercises  Nose dry - using nasal spray twice daily but still dry  Scheduled for video visit but couldn't do that - switched to phone    PAST MEDICAL HISTORY  Sinonasal carcinoma as above  HTN  ASCVD. CABG x 3 2019  PE and LLE DVT after CABG . Treated with rivaroxaban  Dyslipidemia  Hepatitis B   SCC R temple s/p MOHS  Ascending aortic aneurysm repair 2019  Hearing loss L   Gout - feet  Depression  Cholecystectomy    CURRENT OUTPATIENT MEDICATIONS  Reviewed    ALLERGIES  No Known Allergies      PHYSICAL EXAM  There were no vitals taken for this visit.    Remainder of physical exam deferred due to public health emergency and limitations of  phone visit.    LABORATORY AND IMAGING STUDIES    Labs 8/5/24 were independently reviewed and interpreted by me  FT4 borderline low  TSH ok   Mg ok   CMP ok   CBC pd - hgb 12., acceptable, mcv 90, plt ok, WBC ok     MR Sinonasal/Oral Cavity/Parotid wwo Contrast  Narrative: MR SINONASAL/ORAL CAVITY/PAROTID W/WO CONTRAST 8/5/2024 2:39 PM    History:  SCC nasal cavity; Squamous cell carcinoma of maxillary sinus  (H)  ICD-10: Squamous cell carcinoma of maxillary sinus (H)    Correlation:same day PET/CT.    COMPARISON: 5/17/2024 dated MRI.    Technique: Sagittal and coronal T1-weighted and axial T2-weighted  images with fat saturation of the face and nasopharynx from the roofs  of the orbits through the base of C2 were obtained without intravenous  contrast. Following intravenous administration of gadolinium, axial  and coronal T1-weighted images with fat saturation of the face were  also obtained.    Contrast: 6mL Gadavist    Findings: .  Surgical changes of left maxillary antrostomy, left turbinectomy, and  partial left ethmoidectomy. Heterogeneous signal soft tissue mass  centered in the residual left  maxillary sinus floor and left lateral  maxilla with T1 hypointensity, T2 areas of hypo and hyperintensity and  post gadolinium heterogenous enhancement. There are some areas of T1  hyperintensity within some cystic components suggesting proteinaceous  material. Overall this soft tissue mass extends to the left  retromaxillary fat, left pterygopalatine fossa, inferiorly to involve  the left lateral aspect of the maxilla, left  space  including the left pterygoid muscle and posteriorly extending to the  nasopharyngeal submucosal region, inferior medially extending to the  left posterior soft palate. Some of these areas correspond intense FDG  uptake on the same day PET CT suggestive of recurrent tumor.    Complete opacification of left mastoid is cells and middle ear cavity,  partial opacification of the right mastoid air cells compatible with  inflammatory changes/secretions.     T1 hypointense, T2 hyperintense and enhancing inflammatory changes in  the right maxillary sinus, right ethmoid air cells, right greater than  left sphenoid locules.    Corresponding to 2 foci of uptake along the mucosa, one along the  right lateral oropharyngeal wall and a second one along the left  posterior-lateral oropharyngeal wall extends to adjacent left palatine  tonsillar mucosa there is mucosal enhancement.    Orbital cavity contents are normal.    Meckel's caves are symmetric and normal. Very subtle asymmetric  thickening of the left V2 along the foramen rotundum (series 11, image  20). There is trace dural enhancement along the base of the left  temporal lobe (series 11, image 21). No brain parenchymal edema.    T2 hyperintensity and enhancement of the left temporalis muscle is  likely due to denervation changes.  Mandible marrow signal is normal.   Hypopharyngeal and laryngeal structures demonstrate no significant  abnormality.   Trace retropharyngeal edema.     Oral cavity contents including tongue is normal.      Parotid glands and submandibular glands are atrophic without mass  lesion. In the right submandibular region there is a 6 mm lymph node.  No other enlarged suspicious nodes.  Impression: Impression:     Prior surgical changes of left maxillary antrectomy, partial  ethmoidectomy, turbinectomy and sinonasal mass resection.  Heterogeneous mass along the base of left maxillary sinus extending to  the maxilla,  space, left pterygopalatine fossa and  parapharyngeal space. Some of these areas demonstrate treated areas  however some of these correspond to the FDG avidity on the same day  PET and highly suspicious tumor recurrence.   Question of early left V2 involvement and thin dural enhancement along  the base of the left temporal lobe.    KANCHAN ROSALES MD         SYSTEM ID:  [id number]  CT Chest/Abdomen/Pelvis w Contrast  Narrative: Combined Report of: PET and CT on 8/5/2024 1:28 PM:     1. PET of the neck, chest, abdomen, and pelvis.  2. PET CT Fusion for Attenuation Correction and Anatomical  Localization.  3. Diagnostic CT of the chest, abdomen and pelvis with intravenous  contrast obtained for diagnostic interpretation.  4. 3D MIP and PET-CT fused images were processed on an independent  workstation and archived to PACS and reviewed by a radiologist.    Technique:     1. PET: The patient received 10.08 mCi of F-18-FDG. The serum glucose  was 101 mg/dL prior to administration. Body weight was 58.3 kg. Images  were evaluated in the axial, sagittal, and coronal planes as well as  the rotational whole body MIP. Images were acquired from cranial  vertex to feet.    UPTAKE WAS MEASURED AT 60 MINUTES.     2. CT: Volumetric acquisition for clinical interpretation of the  chest, abdomen, and pelvis acquired at 3 mm sections. The chest,  abdomen, and pelvis were evaluated at 5 mm sections in bone, soft  tissue, and lung windows. High resolution images of the neck were  obtained with multiple oblique projection  reformats.    Contrast and Medications:  IV contrast: 78 mL of Isovue 370 intravenously.  PO contrast: 500 mL water  Additional Medications: None.    3. 3D MIP and PET-CT fused images were processed on an independent  workstation and archived to PACS and reviewed by a radiologist.    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  CT head 5/17/2024 demonstrating near resolution of the primary mass.  Status post chemoradiation. Follow-up CT PET.    COMPARISON: CT PET 5/17/2024. Multiple priors.    FINDINGS:     BACKGROUND: Liver SUV max = 6.63, Aorta Blood SUV max = 4.86.     -Right upper chest port catheter tip terminates at the cavoatrial  junction.  -Percutaneous gastrostomy tube with balloon inflated within the  gastric lumen.    HEAD/NECK:    Index tumor site:   -Surgical changes of left maxillary antrostomy, left turbinectomy, and  partial left ethmoidectomy.  -Continued circumferential mucosal thickening surrounding the left  maxillary cavity, mild uptake.  -Overall increased FDG uptake centered along the posterior inferior  left maxillary sinus involving the  space, left  pterygopalatine fossa, and the left parapharyngeal space. This extends  inferiorly to involve the left side of maxilla. There is increased  soft tissue component within the space, and there is now broad FDG  uptake with maximal SUV 24.97 (1/126), previously 5.75 when measured  similarly.This area measures about 3.3 c m x 2.6 cm.   -Increased focal hypermetabolic activity in the posterior pharyngeal  wall and extending to adjacent left palatine tonsil mucosa, associated  focal mucosal thickening (1/129) max SUV 17.23 previously 7.25.  -Non avid necrotic component centered on the left  space/  medial pterygoid muscle. Laterally, the hypoattenuating non-FDG avid  mass  measuring 2.7 x 2.4 cm (1/117) previously 2.1 x 2 cm, and  medially 1.1 x 1.5 cm (1/117) previously 1.1 x 1 cm.  -Continued thick mucosal thickening of the right maxillary sinus and  right ethmoid air cells, not avid.  -New focal area of FDG uptake along the right lateral oropharyngeal  wall (1/144) maximum SUV 9.07.  -Persistent nasopharyngeal mild mucosal uptake: mucositis.     Lymph nodes: Few hypermetabolic right cervical nodes, none  definitively enlarged, however displaying increased metabolic activity  compared to prior, max SUV 8.36 right level 1B, and right level 2A  node max SUV 7.29.  Salivary glands: Slightly atrophic, no mass.   Thyroid gland: The thyroid gland is within normal limits. Stable  coarse calcification of the right thyroid lobe.  Vascular structures: The major vasculature of the neck are patent.  Brain: No abnormal FDG avid lesion or abnormal enhancement.   Orbital cavities: No abnormal FDG avid lesion or abnormal enhancement  of the right orbit, left orbit as per above.     CHEST:  No abnormal FDG uptake within the chest.  -Coarse calcifications of the aortic arch and multiple branch vessels,  demonstrating mild uptake, benign.     Lymph nodes: No avid nodes.     Lungs: The central tracheobronchial tree is clear. No acute  consolidation.  No pleural effusion or pneumothorax. Mild traction  fibrosis with mild uptake along the medial right apex posterior to  right subclavian artery. Basilar atelectasis/subpleural reticulations.  No suspicious or FDG avid pulmonary lesions. Several sub-6 mm  pulmonary nodules, none FDG avid. For example lingula groundglass  nodule measuring 3.4 x 2.6 mm, and right middle lobe solid nodule  measuring 2.4 x 3.1 mm (10/49).    Heart and great vessels: Heart size is within normal limits. No  pericardial effusion. Dense coronary artery calcifications. The  thoracic aorta and main pulmonary artery are within normal limits.     Mild metabolic uptake along the distal  esophagus which may be seen  with reflux disease.     ABDOMEN AND PELVIS:  -No suspicious or abnormal FDG uptake within the abdomen or pelvis.  -Focal hypermetabolic activity surrounding the percutaneous  gastrostomy tube site, increased from prior exam max SUV 23.15. Likely  representing localized inflammation.    Liver: No FDG avid lesion. No intrahepatic or extrahepatic biliary  ductal dilatation. Mild reservoir effect. Gallbladder is surgically  absent.   Pancreas: The pancreas is within normal limits. No pancreatic ductal  dilatation.   Spleen: No FDG avid lesion.  Adrenal glands: No FDG avid foci.  Kidneys: No FDG avid lesion. No hydronephrosis. The urinary bladder is  unremarkable.  Reproductive organs are within normal limits.  Gastrointestinal system: Normal caliber of the small and large bowel.  Percutaneous gastrostomy tube.  Lymph nodes: No FDG avid or abnormally enlarged lymph nodes.  Vascular structures: Normal caliber of the abdominal aorta.  No free air, free fluid, or fluid collection. Calcification within the  low anterior peritoneum (3/497) likely peritoneal mouse.    EXTREMITIES:   No abnormal FDG uptake in the visualized extremities.    BONES AND SOFT TISSUES:   No abnormal FDG uptake in the skeleton. No abnormal lytic or blastic  osseous lesions. Midline sternotomy. Mild posterior lumbar  spondylosis.    SPINAL CANAL:   No evident canal compromise. No abnormal FDG avid lesion.  Impression: IMPRESSION:   In this patient with a history of squamous cell carcinoma of the head  and neck, there is evidence for disease progression.    1. Increased enhancing soft tissue density with intensely increased  hypermetabolic uptake compared prior CT PET 5/17/2024 centered on the  posterior maxilla involving the  and parapharyngeal space  concerning for recurrent tumor. Primary: NI-RADS 3.    2. Two additional mucosal areas with focal uptake:  -Left palatine tonsil medial/ posterior mucosa extending  posterior  pharyngeal wall, associated mucosal thickening. Correlation with  physical exam and biopsy is recommended  -Right lateral oropharyngeal wall. Correlation with physical exam is  recommended.  .  3. Right level 1b node with moderate-high uptake and right level 2  nodes with moderate uptake though not enlarged are worrisome. Neck:  NI-RADS 2.   4. No definitive evidence for metastatic disease within the chest,  abdomen, or pelvis.   5. Focal uptake surrounding the percutaneous gastrostomy tube site,  increased since prior. Likely inflammation, may be related to local  infection, correlate with clinical exam.    NI-RADS CECT Surveillance Legend:    Primary  1: No evidence of recurrence: routine surveillance  2: Low suspicion    a) Superficial abnormality (skin, mucosal surface): direct visual  inspection    b) Ill-defined deep abnormality: short interval follow-up* or PET  3: High suspicion (new or enlarging discrete nodule/mass): biopsy  4: Definitive recurrence (path proven or clinical progression): no  biopsy needed    Nodes  1: No evidence of recurrence: routine surveillance  2: Low suspicion (ill-defined): short interval follow-up or PET  3: High suspicion (new or enlarging lymph node): biopsy if clinically  needed  4: Definitive recurrence (path proven or clinical progression): no  biopsy needed    *short interval follow-up: 3 months at our institution    I have personally reviewed the examination and initial interpretation  and I agree with the findings.    KANCHAN ROSALES MD         SYSTEM ID:  [id number]  PET Oncology Whole Body  Narrative: Combined Report of: PET and CT on 8/5/2024 1:28 PM:     1. PET of the neck, chest, abdomen, and pelvis.  2. PET CT Fusion for Attenuation Correction and Anatomical  Localization.  3. Diagnostic CT of the chest, abdomen and pelvis with intravenous  contrast obtained for diagnostic interpretation.  4. 3D MIP and PET-CT fused images were processed on an  independent  workstation and archived to PACS and reviewed by a radiologist.    Technique:     1. PET: The patient received 10.08 mCi of F-18-FDG. The serum glucose  was 101 mg/dL prior to administration. Body weight was 58.3 kg. Images  were evaluated in the axial, sagittal, and coronal planes as well as  the rotational whole body MIP. Images were acquired from cranial  vertex to feet.    UPTAKE WAS MEASURED AT 60 MINUTES.     2. CT: Volumetric acquisition for clinical interpretation of the  chest, abdomen, and pelvis acquired at 3 mm sections. The chest,  abdomen, and pelvis were evaluated at 5 mm sections in bone, soft  tissue, and lung windows. High resolution images of the neck were  obtained with multiple oblique projection reformats.    Contrast and Medications:  IV contrast: 78 mL of Isovue 370 intravenously.  PO contrast: 500 mL water  Additional Medications: None.    3. 3D MIP and PET-CT fused images were processed on an independent  workstation and archived to PACS and reviewed by a radiologist.    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  CT head 5/17/2024 demonstrating near resolution of the primary mass.  Status post chemoradiation. Follow-up CT PET.    COMPARISON: CT PET 5/17/2024. Multiple priors.    FINDINGS:     BACKGROUND: Liver SUV max = 6.63, Aorta Blood SUV max = 4.86.     -Right upper chest port catheter tip terminates at the cavoatrial  junction.  -Percutaneous gastrostomy tube with balloon inflated within the  gastric lumen.    HEAD/NECK:    Index tumor site:   -Surgical changes of left maxillary antrostomy, left turbinectomy, and  partial left ethmoidectomy.  -Continued circumferential mucosal thickening surrounding the left  maxillary cavity, mild uptake.  -Overall increased FDG uptake centered along the posterior inferior  left  maxillary sinus involving the  space, left  pterygopalatine fossa, and the left parapharyngeal space. This extends  inferiorly to involve the left side of maxilla. There is increased  soft tissue component within the space, and there is now broad FDG  uptake with maximal SUV 24.97 (1/126), previously 5.75 when measured  similarly.This area measures about 3.3 c m x 2.6 cm.   -Increased focal hypermetabolic activity in the posterior pharyngeal  wall and extending to adjacent left palatine tonsil mucosa, associated  focal mucosal thickening (1/129) max SUV 17.23 previously 7.25.  -Non avid necrotic component centered on the left  space/  medial pterygoid muscle. Laterally, the hypoattenuating non-FDG avid  mass measuring 2.7 x 2.4 cm (1/117) previously 2.1 x 2 cm, and  medially 1.1 x 1.5 cm (1/117) previously 1.1 x 1 cm.  -Continued thick mucosal thickening of the right maxillary sinus and  right ethmoid air cells, not avid.  -New focal area of FDG uptake along the right lateral oropharyngeal  wall (1/144) maximum SUV 9.07.  -Persistent nasopharyngeal mild mucosal uptake: mucositis.     Lymph nodes: Few hypermetabolic right cervical nodes, none  definitively enlarged, however displaying increased metabolic activity  compared to prior, max SUV 8.36 right level 1B, and right level 2A  node max SUV 7.29.  Salivary glands: Slightly atrophic, no mass.   Thyroid gland: The thyroid gland is within normal limits. Stable  coarse calcification of the right thyroid lobe.  Vascular structures: The major vasculature of the neck are patent.  Brain: No abnormal FDG avid lesion or abnormal enhancement.   Orbital cavities: No abnormal FDG avid lesion or abnormal enhancement  of the right orbit, left orbit as per above.     CHEST:  No abnormal FDG uptake within the chest.  -Coarse calcifications of the aortic arch and multiple branch vessels,  demonstrating mild uptake, benign.     Lymph nodes: No avid nodes.      Lungs: The central tracheobronchial tree is clear. No acute  consolidation.  No pleural effusion or pneumothorax. Mild traction  fibrosis with mild uptake along the medial right apex posterior to  right subclavian artery. Basilar atelectasis/subpleural reticulations.  No suspicious or FDG avid pulmonary lesions. Several sub-6 mm  pulmonary nodules, none FDG avid. For example lingula groundglass  nodule measuring 3.4 x 2.6 mm, and right middle lobe solid nodule  measuring 2.4 x 3.1 mm (10/49).    Heart and great vessels: Heart size is within normal limits. No  pericardial effusion. Dense coronary artery calcifications. The  thoracic aorta and main pulmonary artery are within normal limits.     Mild metabolic uptake along the distal esophagus which may be seen  with reflux disease.     ABDOMEN AND PELVIS:  -No suspicious or abnormal FDG uptake within the abdomen or pelvis.  -Focal hypermetabolic activity surrounding the percutaneous  gastrostomy tube site, increased from prior exam max SUV 23.15. Likely  representing localized inflammation.    Liver: No FDG avid lesion. No intrahepatic or extrahepatic biliary  ductal dilatation. Mild reservoir effect. Gallbladder is surgically  absent.   Pancreas: The pancreas is within normal limits. No pancreatic ductal  dilatation.   Spleen: No FDG avid lesion.  Adrenal glands: No FDG avid foci.  Kidneys: No FDG avid lesion. No hydronephrosis. The urinary bladder is  unremarkable.  Reproductive organs are within normal limits.  Gastrointestinal system: Normal caliber of the small and large bowel.  Percutaneous gastrostomy tube.  Lymph nodes: No FDG avid or abnormally enlarged lymph nodes.  Vascular structures: Normal caliber of the abdominal aorta.  No free air, free fluid, or fluid collection. Calcification within the  low anterior peritoneum (3/497) likely peritoneal mouse.    EXTREMITIES:   No abnormal FDG uptake in the visualized extremities.    BONES AND SOFT TISSUES:   No  abnormal FDG uptake in the skeleton. No abnormal lytic or blastic  osseous lesions. Midline sternotomy. Mild posterior lumbar  spondylosis.    SPINAL CANAL:   No evident canal compromise. No abnormal FDG avid lesion.  Impression: IMPRESSION:   In this patient with a history of squamous cell carcinoma of the head  and neck, there is evidence for disease progression.    1. Increased enhancing soft tissue density with intensely increased  hypermetabolic uptake compared prior CT PET 5/17/2024 centered on the  posterior maxilla involving the  and parapharyngeal space  concerning for recurrent tumor. Primary: NI-RADS 3.    2. Two additional mucosal areas with focal uptake:  -Left palatine tonsil medial/ posterior mucosa extending posterior  pharyngeal wall, associated mucosal thickening. Correlation with  physical exam and biopsy is recommended  -Right lateral oropharyngeal wall. Correlation with physical exam is  recommended.  .  3. Right level 1b node with moderate-high uptake and right level 2  nodes with moderate uptake though not enlarged are worrisome. Neck:  NI-RADS 2.   4. No definitive evidence for metastatic disease within the chest,  abdomen, or pelvis.   5. Focal uptake surrounding the percutaneous gastrostomy tube site,  increased since prior. Likely inflammation, may be related to local  infection, correlate with clinical exam.    NI-RADS CECT Surveillance Legend:    Primary  1: No evidence of recurrence: routine surveillance  2: Low suspicion    a) Superficial abnormality (skin, mucosal surface): direct visual  inspection    b) Ill-defined deep abnormality: short interval follow-up* or PET  3: High suspicion (new or enlarging discrete nodule/mass): biopsy  4: Definitive recurrence (path proven or clinical progression): no  biopsy needed    Nodes  1: No evidence of recurrence: routine surveillance  2: Low suspicion (ill-defined): short interval follow-up or PET  3: High suspicion (new or enlarging  lymph node): biopsy if clinically  needed  4: Definitive recurrence (path proven or clinical progression): no  biopsy needed    *short interval follow-up: 3 months at our institution    I have personally reviewed the examination and initial interpretation  and I agree with the findings.    KANCHAN ROSALES MD         SYSTEM ID:  [id number]  CT Soft Tissue Neck w Contrast  Narrative: Combined Report of: PET and CT on 8/5/2024 1:28 PM:     1. PET of the neck, chest, abdomen, and pelvis.  2. PET CT Fusion for Attenuation Correction and Anatomical  Localization.  3. Diagnostic CT of the chest, abdomen and pelvis with intravenous  contrast obtained for diagnostic interpretation.  4. 3D MIP and PET-CT fused images were processed on an independent  workstation and archived to PACS and reviewed by a radiologist.    Technique:     1. PET: The patient received 10.08 mCi of F-18-FDG. The serum glucose  was 101 mg/dL prior to administration. Body weight was 58.3 kg. Images  were evaluated in the axial, sagittal, and coronal planes as well as  the rotational whole body MIP. Images were acquired from cranial  vertex to feet.    UPTAKE WAS MEASURED AT 60 MINUTES.     2. CT: Volumetric acquisition for clinical interpretation of the  chest, abdomen, and pelvis acquired at 3 mm sections. The chest,  abdomen, and pelvis were evaluated at 5 mm sections in bone, soft  tissue, and lung windows. High resolution images of the neck were  obtained with multiple oblique projection reformats.    Contrast and Medications:  IV contrast: 78 mL of Isovue 370 intravenously.  PO contrast: 500 mL water  Additional Medications: None.    3. 3D MIP and PET-CT fused images were processed on an independent  workstation and archived to PACS and reviewed by a radiologist.    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  CT head 5/17/2024 demonstrating near resolution of the primary mass.  Status post chemoradiation. Follow-up CT PET.    COMPARISON: CT PET 5/17/2024. Multiple priors.    FINDINGS:     BACKGROUND: Liver SUV max = 6.63, Aorta Blood SUV max = 4.86.     -Right upper chest port catheter tip terminates at the cavoatrial  junction.  -Percutaneous gastrostomy tube with balloon inflated within the  gastric lumen.    HEAD/NECK:    Index tumor site:   -Surgical changes of left maxillary antrostomy, left turbinectomy, and  partial left ethmoidectomy.  -Continued circumferential mucosal thickening surrounding the left  maxillary cavity, mild uptake.  -Overall increased FDG uptake centered along the posterior inferior  left maxillary sinus involving the  space, left  pterygopalatine fossa, and the left parapharyngeal space. This extends  inferiorly to involve the left side of maxilla. There is increased  soft tissue component within the space, and there is now broad FDG  uptake with maximal SUV 24.97 (1/126), previously 5.75 when measured  similarly.This area measures about 3.3 c m x 2.6 cm.   -Increased focal hypermetabolic activity in the posterior pharyngeal  wall and extending to adjacent left palatine tonsil mucosa, associated  focal mucosal thickening (1/129) max SUV 17.23 previously 7.25.  -Non avid necrotic component centered on the left  space/  medial pterygoid muscle. Laterally, the hypoattenuating non-FDG avid  mass measuring 2.7 x 2.4 cm (1/117) previously 2.1 x 2 cm, and  medially 1.1 x 1.5 cm (1/117) previously 1.1 x 1 cm.  -Continued thick mucosal thickening of the right maxillary sinus and  right ethmoid air cells, not avid.  -New focal area of FDG uptake along the right lateral oropharyngeal  wall (1/144) maximum SUV 9.07.  -Persistent nasopharyngeal mild mucosal uptake: mucositis.     Lymph nodes: Few hypermetabolic right cervical nodes, none  definitively enlarged, however displaying  increased metabolic activity  compared to prior, max SUV 8.36 right level 1B, and right level 2A  node max SUV 7.29.  Salivary glands: Slightly atrophic, no mass.   Thyroid gland: The thyroid gland is within normal limits. Stable  coarse calcification of the right thyroid lobe.  Vascular structures: The major vasculature of the neck are patent.  Brain: No abnormal FDG avid lesion or abnormal enhancement.   Orbital cavities: No abnormal FDG avid lesion or abnormal enhancement  of the right orbit, left orbit as per above.     CHEST:  No abnormal FDG uptake within the chest.  -Coarse calcifications of the aortic arch and multiple branch vessels,  demonstrating mild uptake, benign.     Lymph nodes: No avid nodes.     Lungs: The central tracheobronchial tree is clear. No acute  consolidation.  No pleural effusion or pneumothorax. Mild traction  fibrosis with mild uptake along the medial right apex posterior to  right subclavian artery. Basilar atelectasis/subpleural reticulations.  No suspicious or FDG avid pulmonary lesions. Several sub-6 mm  pulmonary nodules, none FDG avid. For example lingula groundglass  nodule measuring 3.4 x 2.6 mm, and right middle lobe solid nodule  measuring 2.4 x 3.1 mm (10/49).    Heart and great vessels: Heart size is within normal limits. No  pericardial effusion. Dense coronary artery calcifications. The  thoracic aorta and main pulmonary artery are within normal limits.     Mild metabolic uptake along the distal esophagus which may be seen  with reflux disease.     ABDOMEN AND PELVIS:  -No suspicious or abnormal FDG uptake within the abdomen or pelvis.  -Focal hypermetabolic activity surrounding the percutaneous  gastrostomy tube site, increased from prior exam max SUV 23.15. Likely  representing localized inflammation.    Liver: No FDG avid lesion. No intrahepatic or extrahepatic biliary  ductal dilatation. Mild reservoir effect. Gallbladder is surgically  absent.   Pancreas: The  pancreas is within normal limits. No pancreatic ductal  dilatation.   Spleen: No FDG avid lesion.  Adrenal glands: No FDG avid foci.  Kidneys: No FDG avid lesion. No hydronephrosis. The urinary bladder is  unremarkable.  Reproductive organs are within normal limits.  Gastrointestinal system: Normal caliber of the small and large bowel.  Percutaneous gastrostomy tube.  Lymph nodes: No FDG avid or abnormally enlarged lymph nodes.  Vascular structures: Normal caliber of the abdominal aorta.  No free air, free fluid, or fluid collection. Calcification within the  low anterior peritoneum (3/497) likely peritoneal mouse.    EXTREMITIES:   No abnormal FDG uptake in the visualized extremities.    BONES AND SOFT TISSUES:   No abnormal FDG uptake in the skeleton. No abnormal lytic or blastic  osseous lesions. Midline sternotomy. Mild posterior lumbar  spondylosis.    SPINAL CANAL:   No evident canal compromise. No abnormal FDG avid lesion.  Impression: IMPRESSION:   In this patient with a history of squamous cell carcinoma of the head  and neck, there is evidence for disease progression.    1. Increased enhancing soft tissue density with intensely increased  hypermetabolic uptake compared prior CT PET 5/17/2024 centered on the  posterior maxilla involving the  and parapharyngeal space  concerning for recurrent tumor. Primary: NI-RADS 3.    2. Two additional mucosal areas with focal uptake:  -Left palatine tonsil medial/ posterior mucosa extending posterior  pharyngeal wall, associated mucosal thickening. Correlation with  physical exam and biopsy is recommended  -Right lateral oropharyngeal wall. Correlation with physical exam is  recommended.  .  3. Right level 1b node with moderate-high uptake and right level 2  nodes with moderate uptake though not enlarged are worrisome. Neck:  NI-RADS 2.   4. No definitive evidence for metastatic disease within the chest,  abdomen, or pelvis.   5. Focal uptake surrounding the  percutaneous gastrostomy tube site,  increased since prior. Likely inflammation, may be related to local  infection, correlate with clinical exam.    NI-RADS CECT Surveillance Legend:    Primary  1: No evidence of recurrence: routine surveillance  2: Low suspicion    a) Superficial abnormality (skin, mucosal surface): direct visual  inspection    b) Ill-defined deep abnormality: short interval follow-up* or PET  3: High suspicion (new or enlarging discrete nodule/mass): biopsy  4: Definitive recurrence (path proven or clinical progression): no  biopsy needed    Nodes  1: No evidence of recurrence: routine surveillance  2: Low suspicion (ill-defined): short interval follow-up or PET  3: High suspicion (new or enlarging lymph node): biopsy if clinically  needed  4: Definitive recurrence (path proven or clinical progression): no  biopsy needed    *short interval follow-up: 3 months at our institution    I have personally reviewed the examination and initial interpretation  and I agree with the findings.    KANCHAN ROSALES MD         SYSTEM ID:  [id number]     PET/CT and MRI were personally reviewed and interpreted by me. Agree, worrisome for recurrence     Virtual Visit Details  Type of service:  Telephone Visit   Phone call duration: 30 minutes   Originating Location (pt. Location): Home  Distant Location (provider location):  Off-site

## 2024-10-03 ENCOUNTER — VIRTUAL VISIT (OUTPATIENT)
Dept: ONCOLOGY | Facility: CLINIC | Age: 64
End: 2024-10-03
Attending: INTERNAL MEDICINE
Payer: COMMERCIAL

## 2024-10-03 VITALS
DIASTOLIC BLOOD PRESSURE: 70 MMHG | SYSTOLIC BLOOD PRESSURE: 131 MMHG | BODY MASS INDEX: 22.88 KG/M2 | WEIGHT: 134 LBS | HEIGHT: 64 IN

## 2024-10-03 DIAGNOSIS — Z13.29 SCREENING FOR HYPOTHYROIDISM: ICD-10-CM

## 2024-10-03 DIAGNOSIS — E83.42 HYPOMAGNESEMIA: ICD-10-CM

## 2024-10-03 DIAGNOSIS — E03.8 SUBCLINICAL HYPOTHYROIDISM: ICD-10-CM

## 2024-10-03 DIAGNOSIS — C31.0 SQUAMOUS CELL CARCINOMA OF MAXILLARY SINUS (H): Primary | ICD-10-CM

## 2024-10-03 PROCEDURE — 99443 PR PHYSICIAN TELEPHONE EVALUATION 21-30 MIN: CPT | Mod: 93 | Performed by: INTERNAL MEDICINE

## 2024-10-03 ASSESSMENT — PAIN SCALES - GENERAL: PAINLEVEL: NO PAIN (0)

## 2024-10-03 NOTE — NURSING NOTE
Current patient location: [facility] [address] [place] [address]    Is the patient currently in the state of [place]? YES    Visit mode:TELEPHONE    If the visit is dropped, the patient can be reconnected by: TELEPHONE VISIT: Phone number:   Telephone Information:   Mobile [phone]       Will anyone else be joining the visit? NO  (If patient encounters technical issues they should call [phone])    Are changes needed to the allergy or medication list? No    Are refills needed on medications prescribed by this physician? NO    Rooming Documentation:  Unable to complete questionnaire(s) due to time    Reason for visit: RECHECK (Return [facility])    Maria Isabel CARVAJAL

## 2024-10-03 NOTE — LETTER
10/3/2024      Douglas Herrera  [address] [place] [address]      Dear Colleague,    Thank you for referring your patient, Douglas Herrera, to the [facility]. Please see a copy of my visit note below.        [facility]  [address] [address]  Phone: [phone]  Fax: [phone]    PATIENT NAME: Douglas Herrera  MRN # [id number]   DATE OF VISIT: October 3, 2024  YOB: 1960     [facility]   Radiation Oncology: Dr. Tomasa Akers  Cardiology: Dr. Qamar Hernandez  PCP: Dr. العراقي   Hepatologist: [facility]     CANCER TYPE: SCC sinonasal   STAGE: [id number] (IVB)  ECOG PS: 1    PD-L1:  NGS: internal panel. Fusion negative. ARID1A S90fs, EGFR exon 20 insertion, APC [id number]    SUMMARY    8/9/23 CT sinus.   8/24/23 MRI sinus. L maxillary sinus mass  9/12/23 ED for epistaxis.   9/13/23 CTA and embolization of L IMAX   10/4/23 Nasal bx. Path: Inverted papilloma with high grade dysplasia  11/10/23 Nasal endoscopy and bx in the OR. C/b severe bleeding. Path: Inverted sinonasal papilloma with severe dysplasia.  11/17/23 MRI. 7.4 x 4.6 x 6.6 cm L sinonasal mass, destruction of nasal turbinates, L maxillary sinus, hard palate, invasion of L  space, involvement of medial and lateral pterygoids and temporalis muscles. Effacement and invasion ipsilateral pterygopalatine fossa, extends and effaces the nasopharynx.   11/30/23 Carotid angiogram. Direct endonasal and transoral embolization L sinonasal tumor, transaterial embolization of the L sphenopalatine artery feeders to the L sinonasal artery tumor   12/1/23 Stealth assisted transnasal endoscopic approach to excise L sinonasal mass. Pre-operative embolization. Path: SCC involving L maxilla.    12/9/23 PET. Hypermetabolic mass centered along the L maxilla, extending superiorly through the floor of the L maxillary sinus, posterior/laterally to the   space, invasion of the pterygoid muscles, extending cranially along the pterygopalatine fossa and pterygoid plates to the skull base level. Erosion of involved bones including L maxilla, maxillary sinus wall and pterygoid plates. Extension medially to the L lateral nasopharyngeal wall and soft palate. 1 mm fat place between carotid and mass. ~4.9 x 4.0 x 5.4 cm (SUV 24.3). Partially resected since 11/17/23 MRI. 8 mm L 2A node (SUV 5.9), 7 mm L level 2 node (SUV 5.5)  1/2/24 Gtube (IR)  1/4/24 Video swallow. No aspiration  1/8~2/27/24 Chemoradiation with weekly cisplatin.  1/11/24 Port (IR)  8/5/24 PET/CT.  Overall increased FDG uptake centered along the posterior inferior left maxillary sinus involving the  space, left pterygopalatine fossa, left parapharyngeal space, extending inferiorly to left maxilla.  Increased soft tissue component within the space, now broad FDG uptake (SUV 24.97), previously 5.75.  Area measures 3.3 x 2.6 cm. Increased focal hypermetabolic activity posterior pharyngeal wall, extending to the left palatine tonsil, associated mucosal thickening (SUV 17.23).  Non-FDG avid mass, 2.7 x 2.4 cm, previously 2.1 x 2.5 cm, another medially, 1.1 x 1.5 cm, previously 1.1 x 1 cm.  New focal area of FDG uptake right lateral oropharyngeal wall (SUV 9.07).  Few hypermetabolic right cervical nodes, none definitively enlarged, however increased activity compared to prior (SUV 7.29-8.36).  Several sub-6 mm pulmonary nodules.  8/5/24 MRI.  Heterogeneous mass along the base of the left maxillary sinus extending to the maxilla,  space, left pterygopalatine fossa, and parapharyngeal space.  Some areas of treated but some corresponding to FDG avidity on same-day PET, highly suspicious for tumor recurrence.  Question early left V2 involvement, thin dural enhancement along the base of the left temporal lobe    9/20/24 L maxillary, L superior alveolus bx in clinic Path: Fragments of at  least SCC in situ.     ASSESSMENT AND PLAN  SCC L maxillary sinus, [id number] (IVB), ARID1 mutation,  EGFR exon 20 insertion: Likely local recurrence. Dr. Iglesias is planning a bx in the OR to get a definitive answer. Prior tumor had an exon 20 insertion mutation; can target that with amivantamab. I will see him after the bx to finalize a plan. Would repeat NGS if bx shows cancer. Douglas wasn't aware that he needed another bx; Carly spoke with Tulio last week about it. Explained the rationale today.    Trismus: Ongoing PT    Nutrition: Still doing some TF - adjusts based on his oral intake from day to day. Tube working ok - will see if annual change is recommended. Will ask Mayela to re-evaluate intake     Hypercalcemia: Malignancy. We should check labs again in the coming weeks - last in Aug    Port: Still in place. Needs ongoing flushes, will arrange.     Pulm nodules: Stable     Hearing loss: Unchanged - still pretty significant on L. Will repeat audiogram in future once bx done, etc.     Hepatitis B: HBSAby negative, SAg +, cAby +, HBV PCR negative 1/23/24. HCV negative.     H/o PE/DVT: 5/8/2019 CTA report scanned into Backpack. Small PEs. Also had LLE DVT, acute, occlusive. Was on rivaroxaban, completed course.     ASCVD: I'm not entirely clear why it was stopped when he started chemoradiation. Resume asa 81mg daily, ok to hold metoprolol a little longer until he sees PCP     H/o undefined hypothyroidism: TFTs 8/5/24 - FT4 borderline low. Check again next draw.     The longitudinal plan of care for the condition(s) below were addressed during this visit. Due to the added complexity in care, I will continue to support Douglas in the subsequent management of this condition(s) and with the ongoing continuity of care of this condition(s): maxillary sinus cancer       30 minutes spent on this phone call. [facility] phone  used throughout    Kayy Post MD  Associate Professor of Medicine  Hematology, Oncology  and Transplantation      SUBJECTIVE  Mr. Herrera returns for follow up of maxillary sinus cancer  Completed chemoradiation at the end of Feb 2024.   Unfortunately recent scan was worrisome for recurrence. Bx in clinic showed at least SCC in situ but wasn't definitive for invasive cancer so going to the OR for bx.   Some residual pain since chemoradiation; hasn't tired anything. More irritation. Trismus continues. Doing exercises  Nose dry - using nasal spray twice daily but still dry  Scheduled for video visit but couldn't do that - switched to phone    PAST MEDICAL HISTORY  Sinonasal carcinoma as above  HTN  ASCVD. CABG x 3 2019  PE and LLE DVT after CABG . Treated with rivaroxaban  Dyslipidemia  Hepatitis B   SCC R temple s/p MOHS  Ascending aortic aneurysm repair 2019  Hearing loss L   Gout - feet  Depression  Cholecystectomy    CURRENT OUTPATIENT MEDICATIONS  Reviewed    ALLERGIES  No Known Allergies      PHYSICAL EXAM  There were no vitals taken for this visit.    Remainder of physical exam deferred due to public health emergency and limitations of  phone visit.    LABORATORY AND IMAGING STUDIES    Labs 8/5/24 were independently reviewed and interpreted by me  FT4 borderline low  TSH ok   Mg ok   CMP ok   CBC pd - hgb 12., acceptable, mcv 90, plt ok, WBC ok     MR Sinonasal/Oral Cavity/Parotid wwo Contrast  Narrative: MR SINONASAL/ORAL CAVITY/PAROTID W/WO CONTRAST 8/5/2024 2:39 PM    History:  SCC nasal cavity; Squamous cell carcinoma of maxillary sinus  (H)  ICD-10: Squamous cell carcinoma of maxillary sinus (H)    Correlation:same day PET/CT.    COMPARISON: 5/17/2024 dated MRI.    Technique: Sagittal and coronal T1-weighted and axial T2-weighted  images with fat saturation of the face and nasopharynx from the roofs  of the orbits through the base of C2 were obtained without intravenous  contrast. Following intravenous administration of gadolinium, axial  and coronal T1-weighted images with fat saturation of the  face were  also obtained.    Contrast: 6mL Gadavist    Findings: .  Surgical changes of left maxillary antrostomy, left turbinectomy, and  partial left ethmoidectomy. Heterogeneous signal soft tissue mass  centered in the residual left maxillary sinus floor and left lateral  maxilla with T1 hypointensity, T2 areas of hypo and hyperintensity and  post gadolinium heterogenous enhancement. There are some areas of T1  hyperintensity within some cystic components suggesting proteinaceous  material. Overall this soft tissue mass extends to the left  retromaxillary fat, left pterygopalatine fossa, inferiorly to involve  the left lateral aspect of the maxilla, left  space  including the left pterygoid muscle and posteriorly extending to the  nasopharyngeal submucosal region, inferior medially extending to the  left posterior soft palate. Some of these areas correspond intense FDG  uptake on the same day PET CT suggestive of recurrent tumor.    Complete opacification of left mastoid is cells and middle ear cavity,  partial opacification of the right mastoid air cells compatible with  inflammatory changes/secretions.     T1 hypointense, T2 hyperintense and enhancing inflammatory changes in  the right maxillary sinus, right ethmoid air cells, right greater than  left sphenoid locules.    Corresponding to 2 foci of uptake along the mucosa, one along the  right lateral oropharyngeal wall and a second one along the left  posterior-lateral oropharyngeal wall extends to adjacent left palatine  tonsillar mucosa there is mucosal enhancement.    Orbital cavity contents are normal.    Meckel's caves are symmetric and normal. Very subtle asymmetric  thickening of the left V2 along the foramen rotundum (series 11, image  20). There is trace dural enhancement along the base of the left  temporal lobe (series 11, image 21). No brain parenchymal edema.    T2 hyperintensity and enhancement of the left temporalis muscle is  likely  due to denervation changes.  Mandible marrow signal is normal.   Hypopharyngeal and laryngeal structures demonstrate no significant  abnormality.   Trace retropharyngeal edema.     Oral cavity contents including tongue is normal.     Parotid glands and submandibular glands are atrophic without mass  lesion. In the right submandibular region there is a 6 mm lymph node.  No other enlarged suspicious nodes.  Impression: Impression:     Prior surgical changes of left maxillary antrectomy, partial  ethmoidectomy, turbinectomy and sinonasal mass resection.  Heterogeneous mass along the base of left maxillary sinus extending to  the maxilla,  space, left pterygopalatine fossa and  parapharyngeal space. Some of these areas demonstrate treated areas  however some of these correspond to the FDG avidity on the same day  PET and highly suspicious tumor recurrence.   Question of early left V2 involvement and thin dural enhancement along  the base of the left temporal lobe.    KANCHAN ROSALES MD         SYSTEM ID:  [id number]  CT Chest/Abdomen/Pelvis w Contrast  Narrative: Combined Report of: PET and CT on 8/5/2024 1:28 PM:     1. PET of the neck, chest, abdomen, and pelvis.  2. PET CT Fusion for Attenuation Correction and Anatomical  Localization.  3. Diagnostic CT of the chest, abdomen and pelvis with intravenous  contrast obtained for diagnostic interpretation.  4. 3D MIP and PET-CT fused images were processed on an independent  workstation and archived to PACS and reviewed by a radiologist.    Technique:     1. PET: The patient received 10.08 mCi of F-18-FDG. The serum glucose  was 101 mg/dL prior to administration. Body weight was 58.3 kg. Images  were evaluated in the axial, sagittal, and coronal planes as well as  the rotational whole body MIP. Images were acquired from cranial  vertex to feet.    UPTAKE WAS MEASURED AT 60 MINUTES.     2. CT: Volumetric acquisition for clinical interpretation of the  chest, abdomen,  and pelvis acquired at 3 mm sections. The chest,  abdomen, and pelvis were evaluated at 5 mm sections in bone, soft  tissue, and lung windows. High resolution images of the neck were  obtained with multiple oblique projection reformats.    Contrast and Medications:  IV contrast: 78 mL of Isovue 370 intravenously.  PO contrast: 500 mL water  Additional Medications: None.    3. 3D MIP and PET-CT fused images were processed on an independent  workstation and archived to PACS and reviewed by a radiologist.    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  CT head 5/17/2024 demonstrating near resolution of the primary mass.  Status post chemoradiation. Follow-up CT PET.    COMPARISON: CT PET 5/17/2024. Multiple priors.    FINDINGS:     BACKGROUND: Liver SUV max = 6.63, Aorta Blood SUV max = 4.86.     -Right upper chest port catheter tip terminates at the cavoatrial  junction.  -Percutaneous gastrostomy tube with balloon inflated within the  gastric lumen.    HEAD/NECK:    Index tumor site:   -Surgical changes of left maxillary antrostomy, left turbinectomy, and  partial left ethmoidectomy.  -Continued circumferential mucosal thickening surrounding the left  maxillary cavity, mild uptake.  -Overall increased FDG uptake centered along the posterior inferior  left maxillary sinus involving the  space, left  pterygopalatine fossa, and the left parapharyngeal space. This extends  inferiorly to involve the left side of maxilla. There is increased  soft tissue component within the space, and there is now broad FDG  uptake with maximal SUV 24.97 (1/126), previously 5.75 when measured  similarly.This area measures about 3.3 c m x 2.6 cm.   -Increased focal hypermetabolic activity in the posterior pharyngeal  wall and extending to adjacent left palatine tonsil mucosa,  associated  focal mucosal thickening (1/129) max SUV 17.23 previously 7.25.  -Non avid necrotic component centered on the left  space/  medial pterygoid muscle. Laterally, the hypoattenuating non-FDG avid  mass measuring 2.7 x 2.4 cm (1/117) previously 2.1 x 2 cm, and  medially 1.1 x 1.5 cm (1/117) previously 1.1 x 1 cm.  -Continued thick mucosal thickening of the right maxillary sinus and  right ethmoid air cells, not avid.  -New focal area of FDG uptake along the right lateral oropharyngeal  wall (1/144) maximum SUV 9.07.  -Persistent nasopharyngeal mild mucosal uptake: mucositis.     Lymph nodes: Few hypermetabolic right cervical nodes, none  definitively enlarged, however displaying increased metabolic activity  compared to prior, max SUV 8.36 right level 1B, and right level 2A  node max SUV 7.29.  Salivary glands: Slightly atrophic, no mass.   Thyroid gland: The thyroid gland is within normal limits. Stable  coarse calcification of the right thyroid lobe.  Vascular structures: The major vasculature of the neck are patent.  Brain: No abnormal FDG avid lesion or abnormal enhancement.   Orbital cavities: No abnormal FDG avid lesion or abnormal enhancement  of the right orbit, left orbit as per above.     CHEST:  No abnormal FDG uptake within the chest.  -Coarse calcifications of the aortic arch and multiple branch vessels,  demonstrating mild uptake, benign.     Lymph nodes: No avid nodes.     Lungs: The central tracheobronchial tree is clear. No acute  consolidation.  No pleural effusion or pneumothorax. Mild traction  fibrosis with mild uptake along the medial right apex posterior to  right subclavian artery. Basilar atelectasis/subpleural reticulations.  No suspicious or FDG avid pulmonary lesions. Several sub-6 mm  pulmonary nodules, none FDG avid. For example lingula groundglass  nodule measuring 3.4 x 2.6 mm, and right middle lobe solid nodule  measuring 2.4 x 3.1 mm (10/49).    Heart and great  vessels: Heart size is within normal limits. No  pericardial effusion. Dense coronary artery calcifications. The  thoracic aorta and main pulmonary artery are within normal limits.     Mild metabolic uptake along the distal esophagus which may be seen  with reflux disease.     ABDOMEN AND PELVIS:  -No suspicious or abnormal FDG uptake within the abdomen or pelvis.  -Focal hypermetabolic activity surrounding the percutaneous  gastrostomy tube site, increased from prior exam max SUV 23.15. Likely  representing localized inflammation.    Liver: No FDG avid lesion. No intrahepatic or extrahepatic biliary  ductal dilatation. Mild reservoir effect. Gallbladder is surgically  absent.   Pancreas: The pancreas is within normal limits. No pancreatic ductal  dilatation.   Spleen: No FDG avid lesion.  Adrenal glands: No FDG avid foci.  Kidneys: No FDG avid lesion. No hydronephrosis. The urinary bladder is  unremarkable.  Reproductive organs are within normal limits.  Gastrointestinal system: Normal caliber of the small and large bowel.  Percutaneous gastrostomy tube.  Lymph nodes: No FDG avid or abnormally enlarged lymph nodes.  Vascular structures: Normal caliber of the abdominal aorta.  No free air, free fluid, or fluid collection. Calcification within the  low anterior peritoneum (3/497) likely peritoneal mouse.    EXTREMITIES:   No abnormal FDG uptake in the visualized extremities.    BONES AND SOFT TISSUES:   No abnormal FDG uptake in the skeleton. No abnormal lytic or blastic  osseous lesions. Midline sternotomy. Mild posterior lumbar  spondylosis.    SPINAL CANAL:   No evident canal compromise. No abnormal FDG avid lesion.  Impression: IMPRESSION:   In this patient with a history of squamous cell carcinoma of the head  and neck, there is evidence for disease progression.    1. Increased enhancing soft tissue density with intensely increased  hypermetabolic uptake compared prior CT PET 5/17/2024 centered on the  posterior  maxilla involving the  and parapharyngeal space  concerning for recurrent tumor. Primary: NI-RADS 3.    2. Two additional mucosal areas with focal uptake:  -Left palatine tonsil medial/ posterior mucosa extending posterior  pharyngeal wall, associated mucosal thickening. Correlation with  physical exam and biopsy is recommended  -Right lateral oropharyngeal wall. Correlation with physical exam is  recommended.  .  3. Right level 1b node with moderate-high uptake and right level 2  nodes with moderate uptake though not enlarged are worrisome. Neck:  NI-RADS 2.   4. No definitive evidence for metastatic disease within the chest,  abdomen, or pelvis.   5. Focal uptake surrounding the percutaneous gastrostomy tube site,  increased since prior. Likely inflammation, may be related to local  infection, correlate with clinical exam.    NI-RADS CECT Surveillance Legend:    Primary  1: No evidence of recurrence: routine surveillance  2: Low suspicion    a) Superficial abnormality (skin, mucosal surface): direct visual  inspection    b) Ill-defined deep abnormality: short interval follow-up* or PET  3: High suspicion (new or enlarging discrete nodule/mass): biopsy  4: Definitive recurrence (path proven or clinical progression): no  biopsy needed    Nodes  1: No evidence of recurrence: routine surveillance  2: Low suspicion (ill-defined): short interval follow-up or PET  3: High suspicion (new or enlarging lymph node): biopsy if clinically  needed  4: Definitive recurrence (path proven or clinical progression): no  biopsy needed    *short interval follow-up: 3 months at our institution    I have personally reviewed the examination and initial interpretation  and I agree with the findings.    KANCHAN ROSALES MD         SYSTEM ID:  [id number]  PET Oncology Whole Body  Narrative: Combined Report of: PET and CT on 8/5/2024 1:28 PM:     1. PET of the neck, chest, abdomen, and pelvis.  2. PET CT Fusion for Attenuation Correction  and Anatomical  Localization.  3. Diagnostic CT of the chest, abdomen and pelvis with intravenous  contrast obtained for diagnostic interpretation.  4. 3D MIP and PET-CT fused images were processed on an independent  workstation and archived to PACS and reviewed by a radiologist.    Technique:     1. PET: The patient received 10.08 mCi of F-18-FDG. The serum glucose  was 101 mg/dL prior to administration. Body weight was 58.3 kg. Images  were evaluated in the axial, sagittal, and coronal planes as well as  the rotational whole body MIP. Images were acquired from cranial  vertex to feet.    UPTAKE WAS MEASURED AT 60 MINUTES.     2. CT: Volumetric acquisition for clinical interpretation of the  chest, abdomen, and pelvis acquired at 3 mm sections. The chest,  abdomen, and pelvis were evaluated at 5 mm sections in bone, soft  tissue, and lung windows. High resolution images of the neck were  obtained with multiple oblique projection reformats.    Contrast and Medications:  IV contrast: 78 mL of Isovue 370 intravenously.  PO contrast: 500 mL water  Additional Medications: None.    3. 3D MIP and PET-CT fused images were processed on an independent  workstation and archived to PACS and reviewed by a radiologist.    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  CT head 5/17/2024 demonstrating near resolution of the primary mass.  Status post chemoradiation. Follow-up CT PET.    COMPARISON: CT PET 5/17/2024. Multiple priors.    FINDINGS:     BACKGROUND: Liver SUV max = 6.63, Aorta Blood SUV max = 4.86.     -Right upper chest port catheter tip terminates at the cavoatrial  junction.  -Percutaneous gastrostomy tube with balloon inflated within the  gastric lumen.    HEAD/NECK:    Index tumor site:   -Surgical changes of left maxillary antrostomy, left turbinectomy,  and  partial left ethmoidectomy.  -Continued circumferential mucosal thickening surrounding the left  maxillary cavity, mild uptake.  -Overall increased FDG uptake centered along the posterior inferior  left maxillary sinus involving the  space, left  pterygopalatine fossa, and the left parapharyngeal space. This extends  inferiorly to involve the left side of maxilla. There is increased  soft tissue component within the space, and there is now broad FDG  uptake with maximal SUV 24.97 (1/126), previously 5.75 when measured  similarly.This area measures about 3.3 c m x 2.6 cm.   -Increased focal hypermetabolic activity in the posterior pharyngeal  wall and extending to adjacent left palatine tonsil mucosa, associated  focal mucosal thickening (1/129) max SUV 17.23 previously 7.25.  -Non avid necrotic component centered on the left  space/  medial pterygoid muscle. Laterally, the hypoattenuating non-FDG avid  mass measuring 2.7 x 2.4 cm (1/117) previously 2.1 x 2 cm, and  medially 1.1 x 1.5 cm (1/117) previously 1.1 x 1 cm.  -Continued thick mucosal thickening of the right maxillary sinus and  right ethmoid air cells, not avid.  -New focal area of FDG uptake along the right lateral oropharyngeal  wall (1/144) maximum SUV 9.07.  -Persistent nasopharyngeal mild mucosal uptake: mucositis.     Lymph nodes: Few hypermetabolic right cervical nodes, none  definitively enlarged, however displaying increased metabolic activity  compared to prior, max SUV 8.36 right level 1B, and right level 2A  node max SUV 7.29.  Salivary glands: Slightly atrophic, no mass.   Thyroid gland: The thyroid gland is within normal limits. Stable  coarse calcification of the right thyroid lobe.  Vascular structures: The major vasculature of the neck are patent.  Brain: No abnormal FDG avid lesion or abnormal enhancement.   Orbital cavities: No abnormal FDG avid lesion or abnormal enhancement  of the right orbit, left orbit as per  above.     CHEST:  No abnormal FDG uptake within the chest.  -Coarse calcifications of the aortic arch and multiple branch vessels,  demonstrating mild uptake, benign.     Lymph nodes: No avid nodes.     Lungs: The central tracheobronchial tree is clear. No acute  consolidation.  No pleural effusion or pneumothorax. Mild traction  fibrosis with mild uptake along the medial right apex posterior to  right subclavian artery. Basilar atelectasis/subpleural reticulations.  No suspicious or FDG avid pulmonary lesions. Several sub-6 mm  pulmonary nodules, none FDG avid. For example lingula groundglass  nodule measuring 3.4 x 2.6 mm, and right middle lobe solid nodule  measuring 2.4 x 3.1 mm (10/49).    Heart and great vessels: Heart size is within normal limits. No  pericardial effusion. Dense coronary artery calcifications. The  thoracic aorta and main pulmonary artery are within normal limits.     Mild metabolic uptake along the distal esophagus which may be seen  with reflux disease.     ABDOMEN AND PELVIS:  -No suspicious or abnormal FDG uptake within the abdomen or pelvis.  -Focal hypermetabolic activity surrounding the percutaneous  gastrostomy tube site, increased from prior exam max SUV 23.15. Likely  representing localized inflammation.    Liver: No FDG avid lesion. No intrahepatic or extrahepatic biliary  ductal dilatation. Mild reservoir effect. Gallbladder is surgically  absent.   Pancreas: The pancreas is within normal limits. No pancreatic ductal  dilatation.   Spleen: No FDG avid lesion.  Adrenal glands: No FDG avid foci.  Kidneys: No FDG avid lesion. No hydronephrosis. The urinary bladder is  unremarkable.  Reproductive organs are within normal limits.  Gastrointestinal system: Normal caliber of the small and large bowel.  Percutaneous gastrostomy tube.  Lymph nodes: No FDG avid or abnormally enlarged lymph nodes.  Vascular structures: Normal caliber of the abdominal aorta.  No free air, free fluid, or  fluid collection. Calcification within the  low anterior peritoneum (3/497) likely peritoneal mouse.    EXTREMITIES:   No abnormal FDG uptake in the visualized extremities.    BONES AND SOFT TISSUES:   No abnormal FDG uptake in the skeleton. No abnormal lytic or blastic  osseous lesions. Midline sternotomy. Mild posterior lumbar  spondylosis.    SPINAL CANAL:   No evident canal compromise. No abnormal FDG avid lesion.  Impression: IMPRESSION:   In this patient with a history of squamous cell carcinoma of the head  and neck, there is evidence for disease progression.    1. Increased enhancing soft tissue density with intensely increased  hypermetabolic uptake compared prior CT PET 5/17/2024 centered on the  posterior maxilla involving the  and parapharyngeal space  concerning for recurrent tumor. Primary: NI-RADS 3.    2. Two additional mucosal areas with focal uptake:  -Left palatine tonsil medial/ posterior mucosa extending posterior  pharyngeal wall, associated mucosal thickening. Correlation with  physical exam and biopsy is recommended  -Right lateral oropharyngeal wall. Correlation with physical exam is  recommended.  .  3. Right level 1b node with moderate-high uptake and right level 2  nodes with moderate uptake though not enlarged are worrisome. Neck:  NI-RADS 2.   4. No definitive evidence for metastatic disease within the chest,  abdomen, or pelvis.   5. Focal uptake surrounding the percutaneous gastrostomy tube site,  increased since prior. Likely inflammation, may be related to local  infection, correlate with clinical exam.    NI-RADS CECT Surveillance Legend:    Primary  1: No evidence of recurrence: routine surveillance  2: Low suspicion    a) Superficial abnormality (skin, mucosal surface): direct visual  inspection    b) Ill-defined deep abnormality: short interval follow-up* or PET  3: High suspicion (new or enlarging discrete nodule/mass): biopsy  4: Definitive recurrence (path proven or  clinical progression): no  biopsy needed    Nodes  1: No evidence of recurrence: routine surveillance  2: Low suspicion (ill-defined): short interval follow-up or PET  3: High suspicion (new or enlarging lymph node): biopsy if clinically  needed  4: Definitive recurrence (path proven or clinical progression): no  biopsy needed    *short interval follow-up: 3 months at our institution    I have personally reviewed the examination and initial interpretation  and I agree with the findings.    KANCHAN ROSALES MD         SYSTEM ID:  [id number]  CT Soft Tissue Neck w Contrast  Narrative: Combined Report of: PET and CT on 8/5/2024 1:28 PM:     1. PET of the neck, chest, abdomen, and pelvis.  2. PET CT Fusion for Attenuation Correction and Anatomical  Localization.  3. Diagnostic CT of the chest, abdomen and pelvis with intravenous  contrast obtained for diagnostic interpretation.  4. 3D MIP and PET-CT fused images were processed on an independent  workstation and archived to PACS and reviewed by a radiologist.    Technique:     1. PET: The patient received 10.08 mCi of F-18-FDG. The serum glucose  was 101 mg/dL prior to administration. Body weight was 58.3 kg. Images  were evaluated in the axial, sagittal, and coronal planes as well as  the rotational whole body MIP. Images were acquired from cranial  vertex to feet.    UPTAKE WAS MEASURED AT 60 MINUTES.     2. CT: Volumetric acquisition for clinical interpretation of the  chest, abdomen, and pelvis acquired at 3 mm sections. The chest,  abdomen, and pelvis were evaluated at 5 mm sections in bone, soft  tissue, and lung windows. High resolution images of the neck were  obtained with multiple oblique projection reformats.    Contrast and Medications:  IV contrast: 78 mL of Isovue 370 intravenously.  PO contrast: 500 mL water  Additional Medications: None.    3. 3D MIP and PET-CT fused images were processed on an independent  workstation and archived to PACS and reviewed by a  radiologist.    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  CT head 5/17/2024 demonstrating near resolution of the primary mass.  Status post chemoradiation. Follow-up CT PET.    COMPARISON: CT PET 5/17/2024. Multiple priors.    FINDINGS:     BACKGROUND: Liver SUV max = 6.63, Aorta Blood SUV max = 4.86.     -Right upper chest port catheter tip terminates at the cavoatrial  junction.  -Percutaneous gastrostomy tube with balloon inflated within the  gastric lumen.    HEAD/NECK:    Index tumor site:   -Surgical changes of left maxillary antrostomy, left turbinectomy, and  partial left ethmoidectomy.  -Continued circumferential mucosal thickening surrounding the left  maxillary cavity, mild uptake.  -Overall increased FDG uptake centered along the posterior inferior  left maxillary sinus involving the  space, left  pterygopalatine fossa, and the left parapharyngeal space. This extends  inferiorly to involve the left side of maxilla. There is increased  soft tissue component within the space, and there is now broad FDG  uptake with maximal SUV 24.97 (1/126), previously 5.75 when measured  similarly.This area measures about 3.3 c m x 2.6 cm.   -Increased focal hypermetabolic activity in the posterior pharyngeal  wall and extending to adjacent left palatine tonsil mucosa, associated  focal mucosal thickening (1/129) max SUV 17.23 previously 7.25.  -Non avid necrotic component centered on the left  space/  medial pterygoid muscle. Laterally, the hypoattenuating non-FDG avid  mass measuring 2.7 x 2.4 cm (1/117) previously 2.1 x 2 cm, and  medially 1.1 x 1.5 cm (1/117) previously 1.1 x 1 cm.  -Continued thick mucosal thickening of the right maxillary sinus and  right ethmoid air cells, not avid.  -New focal area of FDG uptake along the right lateral  oropharyngeal  wall (1/144) maximum SUV 9.07.  -Persistent nasopharyngeal mild mucosal uptake: mucositis.     Lymph nodes: Few hypermetabolic right cervical nodes, none  definitively enlarged, however displaying increased metabolic activity  compared to prior, max SUV 8.36 right level 1B, and right level 2A  node max SUV 7.29.  Salivary glands: Slightly atrophic, no mass.   Thyroid gland: The thyroid gland is within normal limits. Stable  coarse calcification of the right thyroid lobe.  Vascular structures: The major vasculature of the neck are patent.  Brain: No abnormal FDG avid lesion or abnormal enhancement.   Orbital cavities: No abnormal FDG avid lesion or abnormal enhancement  of the right orbit, left orbit as per above.     CHEST:  No abnormal FDG uptake within the chest.  -Coarse calcifications of the aortic arch and multiple branch vessels,  demonstrating mild uptake, benign.     Lymph nodes: No avid nodes.     Lungs: The central tracheobronchial tree is clear. No acute  consolidation.  No pleural effusion or pneumothorax. Mild traction  fibrosis with mild uptake along the medial right apex posterior to  right subclavian artery. Basilar atelectasis/subpleural reticulations.  No suspicious or FDG avid pulmonary lesions. Several sub-6 mm  pulmonary nodules, none FDG avid. For example lingula groundglass  nodule measuring 3.4 x 2.6 mm, and right middle lobe solid nodule  measuring 2.4 x 3.1 mm (10/49).    Heart and great vessels: Heart size is within normal limits. No  pericardial effusion. Dense coronary artery calcifications. The  thoracic aorta and main pulmonary artery are within normal limits.     Mild metabolic uptake along the distal esophagus which may be seen  with reflux disease.     ABDOMEN AND PELVIS:  -No suspicious or abnormal FDG uptake within the abdomen or pelvis.  -Focal hypermetabolic activity surrounding the percutaneous  gastrostomy tube site, increased from prior exam max SUV 23.15.  Likely  representing localized inflammation.    Liver: No FDG avid lesion. No intrahepatic or extrahepatic biliary  ductal dilatation. Mild reservoir effect. Gallbladder is surgically  absent.   Pancreas: The pancreas is within normal limits. No pancreatic ductal  dilatation.   Spleen: No FDG avid lesion.  Adrenal glands: No FDG avid foci.  Kidneys: No FDG avid lesion. No hydronephrosis. The urinary bladder is  unremarkable.  Reproductive organs are within normal limits.  Gastrointestinal system: Normal caliber of the small and large bowel.  Percutaneous gastrostomy tube.  Lymph nodes: No FDG avid or abnormally enlarged lymph nodes.  Vascular structures: Normal caliber of the abdominal aorta.  No free air, free fluid, or fluid collection. Calcification within the  low anterior peritoneum (3/497) likely peritoneal mouse.    EXTREMITIES:   No abnormal FDG uptake in the visualized extremities.    BONES AND SOFT TISSUES:   No abnormal FDG uptake in the skeleton. No abnormal lytic or blastic  osseous lesions. Midline sternotomy. Mild posterior lumbar  spondylosis.    SPINAL CANAL:   No evident canal compromise. No abnormal FDG avid lesion.  Impression: IMPRESSION:   In this patient with a history of squamous cell carcinoma of the head  and neck, there is evidence for disease progression.    1. Increased enhancing soft tissue density with intensely increased  hypermetabolic uptake compared prior CT PET 5/17/2024 centered on the  posterior maxilla involving the  and parapharyngeal space  concerning for recurrent tumor. Primary: NI-RADS 3.    2. Two additional mucosal areas with focal uptake:  -Left palatine tonsil medial/ posterior mucosa extending posterior  pharyngeal wall, associated mucosal thickening. Correlation with  physical exam and biopsy is recommended  -Right lateral oropharyngeal wall. Correlation with physical exam is  recommended.  .  3. Right level 1b node with moderate-high uptake and right level  2  nodes with moderate uptake though not enlarged are worrisome. Neck:  NI-RADS 2.   4. No definitive evidence for metastatic disease within the chest,  abdomen, or pelvis.   5. Focal uptake surrounding the percutaneous gastrostomy tube site,  increased since prior. Likely inflammation, may be related to local  infection, correlate with clinical exam.    NI-RADS CECT Surveillance Legend:    Primary  1: No evidence of recurrence: routine surveillance  2: Low suspicion    a) Superficial abnormality (skin, mucosal surface): direct visual  inspection    b) Ill-defined deep abnormality: short interval follow-up* or PET  3: High suspicion (new or enlarging discrete nodule/mass): biopsy  4: Definitive recurrence (path proven or clinical progression): no  biopsy needed    Nodes  1: No evidence of recurrence: routine surveillance  2: Low suspicion (ill-defined): short interval follow-up or PET  3: High suspicion (new or enlarging lymph node): biopsy if clinically  needed  4: Definitive recurrence (path proven or clinical progression): no  biopsy needed    *short interval follow-up: 3 months at our institution    I have personally reviewed the examination and initial interpretation  and I agree with the findings.    KANCHAN ROSALES MD         SYSTEM ID:  [id number]     PET/CT and MRI were personally reviewed and interpreted by me. Agree, worrisome for recurrence     Virtual Visit Details  Type of service:  Telephone Visit   Phone call duration: 30 minutes   Originating Location (pt. Location): Home  Distant Location (provider location):  Off-site               Again, thank you for allowing me to participate in the care of your patient.        Sincerely,        Kayy Post MD

## 2024-10-04 ENCOUNTER — PREP FOR PROCEDURE (OUTPATIENT)
Dept: OTOLARYNGOLOGY | Facility: CLINIC | Age: 64
End: 2024-10-04
Payer: COMMERCIAL

## 2024-10-04 DIAGNOSIS — C30.0 MALIGNANT NEOPLASM OF NASAL CAVITIES (H): Primary | ICD-10-CM

## 2024-10-04 NOTE — PROGRESS NOTES
Teaching Flowsheet - ENT  Relevant Diagnosis: SCC nasal cavity   Teaching Topic: Transnasal biopsy of left maxillary sinus tumor   Person(s) involved in teaching: Patient's son (Tulio)      Motivation Level: High  Asks Questions: Yes  Eager to Learn: Yes  Cooperative: Yes  Receptive (willing/able to accept information): Yes  Comments: Reviewed pre-op H and P, NPO prior to  surgery, pre-op scrub (will be mailed by surgery schedulers), reviewed post-op cares, activity and pain.     Patient demonstrates understanding of the following:  Reason for the appointment, diagnosis and treatment plan: Yes  Knowledge of proper use of medications and conditions for which they are ordered (with special attention to potential side effects or drug interactions): stop aspirin products 1 week before surgery Yes  Which situations necessitate calling provider and whom to contact: Yes  Nutritional needs and diet plan: Yes  Pain management techniques: Yes  Patient instructed on hand hygiene: Yes  How and/when to access community resources: Yes     Infection Prevention:  Patient demonstrates understanding of the following:  Surgical procedure site care taught: Yes  Signs and symptoms of infection taught: Yes  Wound care taught: Yes  Instructional Materials Used/Given: verbal instruction    Carly Monique, RN, BSN  RN Care Coordinator, ENT Clinic

## 2024-10-09 ENCOUNTER — TELEPHONE (OUTPATIENT)
Dept: OTOLARYNGOLOGY | Facility: CLINIC | Age: 64
End: 2024-10-09
Payer: COMMERCIAL

## 2024-10-09 NOTE — TELEPHONE ENCOUNTER
Scheduled surgery with Dr. Iglesias on 10/11/2024    Spoke with: Tulio Herrera (Son)     Surgery is located at [facility] [facility]    Patient will be seen for their H&P by Virtual PAC on 10/10/2024 at 9am - Patient is aware they need to be in the state of [place]    Does patient need a consult before upcoming surgery? No    Anesthesia type: General    Requested Imaging required for surgery: No    Patient needs scheduled for their 2 week post op    Patient will receive their surgery packet via [facility] per their preference    Patient was not provided a start time for surgery & is aware they will receive this information at their PAC appointment as well as any pre-op instructions.    Additional comments: Patient was instructed to call back with any further questions or concerns.     Tish Guerrero on 10/9/2024 at 2:04 PM

## 2024-10-09 NOTE — TELEPHONE ENCOUNTER
FUTURE VISIT INFORMATION      SURGERY INFORMATION:  Date: 10/11/24  Location: [facility] or  Surgeon:  Damon Regan MD   Anesthesia Type:  general  Procedure: Transnasal Biopsy of Left Maxillary Sinus Tumor   Consult: ov 24    RECORDS REQUESTED FROM:       Pertinent Medical History: ARF, Epistaxis, thoracic ascending aortic aneurysm, cad, Ascending aorta dilation    Most recent EKG+ Tracin23    Most recent ECHO: 23

## 2024-10-10 ENCOUNTER — VIRTUAL VISIT (OUTPATIENT)
Dept: SURGERY | Facility: CLINIC | Age: 64
End: 2024-10-10
Payer: COMMERCIAL

## 2024-10-10 ENCOUNTER — ANESTHESIA EVENT (OUTPATIENT)
Dept: SURGERY | Facility: CLINIC | Age: 64
End: 2024-10-10
Payer: COMMERCIAL

## 2024-10-10 ENCOUNTER — PRE VISIT (OUTPATIENT)
Dept: SURGERY | Facility: CLINIC | Age: 64
End: 2024-10-10

## 2024-10-10 VITALS — BODY MASS INDEX: 23.22 KG/M2 | HEIGHT: 64 IN | WEIGHT: 136 LBS

## 2024-10-10 DIAGNOSIS — Z01.818 PREOP EXAMINATION: Primary | ICD-10-CM

## 2024-10-10 DIAGNOSIS — C30.0 MALIGNANT NEOPLASM OF NASAL CAVITY (H): ICD-10-CM

## 2024-10-10 PROCEDURE — T1013 SIGN LANG/ORAL INTERPRETER: HCPCS | Mod: U4 | Performed by: CLINICAL NURSE SPECIALIST

## 2024-10-10 PROCEDURE — 99442 PR PHYSICIAN TELEPHONE EVALUATION 11-20 MIN: CPT | Mod: 93 | Performed by: CLINICAL NURSE SPECIALIST

## 2024-10-10 RX ORDER — DEXAMETHASONE SODIUM PHOSPHATE 4 MG/ML
4 INJECTION, SOLUTION INTRA-ARTICULAR; INTRALESIONAL; INTRAMUSCULAR; INTRAVENOUS; SOFT TISSUE
Status: CANCELLED | OUTPATIENT
Start: 2024-10-10

## 2024-10-10 RX ORDER — OXYCODONE HYDROCHLORIDE 5 MG/1
5 TABLET ORAL
Status: CANCELLED | OUTPATIENT
Start: 2024-10-10

## 2024-10-10 RX ORDER — ONDANSETRON 2 MG/ML
4 INJECTION INTRAMUSCULAR; INTRAVENOUS EVERY 30 MIN PRN
Status: CANCELLED | OUTPATIENT
Start: 2024-10-10

## 2024-10-10 RX ORDER — ONDANSETRON 4 MG/1
4 TABLET, ORALLY DISINTEGRATING ORAL EVERY 30 MIN PRN
Status: CANCELLED | OUTPATIENT
Start: 2024-10-10

## 2024-10-10 RX ORDER — NALOXONE HYDROCHLORIDE 0.4 MG/ML
0.1 INJECTION, SOLUTION INTRAMUSCULAR; INTRAVENOUS; SUBCUTANEOUS
Status: CANCELLED | OUTPATIENT
Start: 2024-10-10

## 2024-10-10 RX ORDER — OXYCODONE HYDROCHLORIDE 10 MG/1
10 TABLET ORAL
Status: CANCELLED | OUTPATIENT
Start: 2024-10-10

## 2024-10-10 ASSESSMENT — LIFESTYLE VARIABLES
TOBACCO_USE: 1
TOBACCO_USE: 0

## 2024-10-10 ASSESSMENT — ENCOUNTER SYMPTOMS
DYSRHYTHMIAS: 0
ORTHOPNEA: 0
ORTHOPNEA: 0
SEIZURES: 0

## 2024-10-10 ASSESSMENT — COPD QUESTIONNAIRES: COPD: 0

## 2024-10-10 ASSESSMENT — PAIN SCALES - GENERAL: PAINLEVEL: NO PAIN (0)

## 2024-10-10 NOTE — H&P
Pre-Operative H & P     CC:  Preoperative exam to assess for increased cardiopulmonary risk while undergoing surgery and anesthesia.    Date of Encounter: 10/10/2024  Primary Care Physician:  Mira Leung     Reason for visit:   Encounter Diagnoses   Name Primary?    Preop examination Yes    Malignant neoplasm of nasal cavity (H)        CORBY Herrera is a 64 year old male who presents for pre-operative H & P in preparation for  Procedure Information       Case: [id number] Date/Time: 10/11/24 0730    Procedure: Transnasal Biopsy of Left Maxillary Sinus Tumor (Left: Head)    Anesthesia type: General    Diagnosis: Malignant neoplasm of nasal cavities (H) [C30.0]    Pre-op diagnosis: Malignant neoplasm of nasal cavities (H) [C30.0]    Location:  OR 09 / [facility] OR    Providers: Damon Regan MD          History is obtained from the patient via [email or web address]  and chart review     Patient with history of maxillary sinus cancer status post stealth assisted transnasal endoscopic approach to excise left sinonasal mass on 12/1/2023, by Dr. Regan.  He completed chemoradiation at the end of February 2024.     The patient has continued to follow with medical and radiation oncology.  He had recent surveillance imaging was concerning for recurrence.  He returned to Dr. Kimmie Chavira for evaluation where a biopsy was obtained showing at least SCC in situ but was not definitive for invasive cancer.  He has now been counseled to return to the OR for surgical biopsy.    Patient's history is otherwise significant for hyperlipidemia, hypertension, CAD, s/p CAB X 3, AAA repair 2019, complicated by pulmonary emboli requiring Xarelto, gout, chronic Hep B, and depression.     He is followed by Cardiology every two years     Hx of abnormal bleeding or anti-platelet use: ASA 81 mg daily      Past Medical History  Past Medical History:   Diagnosis Date    Ascending aortic aneurysm (H)     Coronary  artery disease     Depression     Gout     History of anesthesia complications     Hyperlipemia     Hypertension     Malignant neoplasm of nasal cavities (H)     PONV (postoperative nausea and vomiting)        Past Surgical History  Past Surgical History:   Procedure Laterality Date    AORTA SURGERY N/A 4/23/2019    Procedure: WITH ASCENDING AORTA REPLACEMENT;  Surgeon: Darlene Graff MD;  Location: [facility] OR;  Service: Cardiovascular    BYPASS GRAFT ARTERY CORONARY      CARDIAC SURGERY      CV CORONARY ANGIOGRAM N/A 3/12/2019    Procedure: Coronary Angiogram;  Surgeon: Hamilton Lucero MD;  Location: [facility] Cath [facility];  Service: Cardiology    ENDOSCOPIC SINUS SURGERY Left 11/10/2023    Procedure: Transnasal endoscopic approach to biopsy left nasal mass;  Surgeon: Damon Regan MD;  Location: [facility] OR    GALLBLADDER SURGERY      INSERT PORT VASCULAR ACCESS N/A 1/11/2024    Procedure: Insert port vascular access;  Surgeon: Tyrel Silvestre MD;  Location: [facility] OR    IR CAROTID CEREBRAL ANGIOGRAM BILATERAL  11/30/2023    IR CHEST PORT PLACEMENT > 5 YRS OF AGE  1/11/2024    IR FACIAL EMBOLIZATION LEFT  9/13/2023    IR GASTROSTOMY TUBE PERCUTANEOUS PLCMNT  1/2/2024    OPTICAL TRACKING SYSTEM ENDOSCOPIC SINUS SURGERY Left 12/1/2023    Procedure: Stealth assisted transnasal endoscopic approach to excise left sinonasal mass;  Surgeon: Damon Regan MD;  Location: [facility] OR       Prior to Admission Medications  Current Outpatient Medications   Medication Sig Dispense Refill    acetaminophen (TYLENOL) 325 MG tablet Take 2 tablets (650 mg) by mouth every 4 hours as needed for other or pain 50 tablet 0    aspirin 81 MG EC tablet Take 1 tablet (81 mg) by mouth daily (Patient taking differently: Take 81 mg by mouth every evening.) 90 tablet 3    atorvastatin (LIPITOR) 80 MG tablet TAKE 1 TABLET (80 MG TOTAL) BY MOUTH AT BEDTIME/ TXHUA HMO NOJ 1 LUB TSHUAJ THAUM MUS [facility] ZOO  NTSHAV MUAJ ROJ 90 tablet 3    capsaicin (ZOSTRIX) 0.025 % external cream Apply topically 3 times daily as needed      entecavir (BARACLUDE) 0.5 MG tablet Take 0.5 mg by mouth every morning Take 1 hour before a meal or 2 hours after a meal.      fluticasone (FLONASE) 50 MCG/ACT nasal spray Spray 2 sprays into both nostrils as needed      magic mouthwash (ENTER INGREDIENTS IN COMMENTS) suspension Take 10 mLs by mouth every 4 hours as needed (mucositis) 240 mL 1    nitroGLYcerin (NITROSTAT) 0.4 MG sublingual tablet Place 0.4 mg under the tongue every 5 minutes as needed      senna-docusate (SENNA S) 8.6-50 MG tablet Take 1 tablet by mouth 2 times daily as needed for constipation 30 tablet 1    sodium chloride (OCEAN) 0.65 % nasal spray Spray 2 sprays in nostril daily as needed for congestion 88 mL 3    traZODone (DESYREL) 50 MG tablet Take 50 mg by mouth nightly as needed for sleep         Allergies  No Known Allergies    Social History  Social History     Socioeconomic History    Marital status:      Spouse name: Not on file    Number of children: Not on file    Years of education: Not on file    Highest education level: Not on file   Occupational History    Not on file   Tobacco Use    Smoking status: Former     Types: Cigarettes     Passive exposure: Past    Smokeless tobacco: Never   Substance and Sexual Activity    Alcohol use: Not Currently     Comment: Occasionally    Drug use: No    Sexual activity: Not on file   Other Topics Concern    Not on file   Social History Narrative    Not on file     Social Determinants of Health     Financial Resource Strain: Not on file   Food Insecurity: Not on file   Transportation Needs: Not on file   Physical Activity: Not on file   Stress: Not on file   Social Connections: Not on file   Interpersonal Safety: Not on file   Housing Stability: Not on file       Family History  Family History   Problem Relation Age of Onset    Cerebrovascular Disease Mother 90    Acute  Myocardial Infarction No family hx of     Anesthesia Reaction No family hx of        Review of Systems  The complete review of systems is negative other than noted in the HPI or here.   Anesthesia Evaluation   Pt has had prior anesthetic. Type: General and MAC.    History of anesthetic complications  - PONV.  small oral opening - left facial tenderness.    ROS/MED HX  ENT/Pulmonary:     (+)     VIVEK risk factors,  hypertension,         tobacco use, Past use,                    (-) recent URI   Neurologic:  - neg neurologic ROS  (-) no seizures and no CVA   Cardiovascular: Comment: CAD, s/p CAB X 3, AAA repair 2019    (+) Dyslipidemia hypertension-range: 120-130s/70-80s/ Peripheral Vascular Disease-- Other.  CAD -  CABG-date: X 3 2019. -   Taking blood thinners Pt has not received instructions: Instructions Given to patient: Last dose 10/8/24.                            Previous cardiac testing   Echo: Date: 12/3/23 Results:    Stress Test:  Date: Results:    ECG Reviewed:  Date: 12/2/23 Results:  Sinus bradycardia, nonspecific T wave abnormality    Cath:  Date: 2019 Results:   (-) angina, [place], orthopnea/PND, arrhythmias and angina   METS/Exercise Tolerance: >4 METS Comment: Mows his grass, and able to walk 3 miles at his pace   Hematologic: Comments: Post op PE after CAB    (+) History of blood clots,    pt is not anticoagulated,           Musculoskeletal:  - neg musculoskeletal ROS     GI/Hepatic:     (+)           hepatitis type B, liver disease,       Renal/Genitourinary:  - neg Renal ROS     Endo: Comment: GOUT      Psychiatric/Substance Use:  - neg psychiatric ROS  (-) psychiatric history   Infectious Disease:  - neg infectious disease ROS     Malignancy:   (+) Malignancy, History of Skin and Other.Skin CA Remission status post Surgery.  Other CA maxillary sinus cancer status post Surgery, Chemo and Radiation.    Other: Comment: Sinus mass - neg other ROS          Virtual visit -converted to phone  No vitals  were obtained    Physical Exam-limited although I was able to see him face to face briefly  Constitutional: Awake, alert, no apparent distress, and appears stated age.  Respiratory: Non labored breathing; no cough   Neurologic: Oriented to name, place and time.   Neuropsychiatric: Calm, cooperative. Normal affect.      Prior Labs/Diagnostic Studies   All labs and imaging personally reviewed   Last Comprehensive Metabolic Panel:  Sodium   Date Value Ref Range Status   08/14/2024 139 135 - 145 mmol/L Final     Potassium   Date Value Ref Range Status   08/14/2024 4.2 3.4 - 5.3 mmol/L Final   05/13/2022 4.4 3.5 - 5.0 mmol/L Final     Potassium POCT   Date Value Ref Range Status   12/01/2023 4.3 3.5 - 5.0 mmol/L Final     Chloride   Date Value Ref Range Status   08/14/2024 102 98 - 107 mmol/L Final   05/13/2022 106 98 - 107 mmol/L Final     Carbon Dioxide (CO2)   Date Value Ref Range Status   08/14/2024 26 22 - 29 mmol/L Final   05/13/2022 28 22 - 31 mmol/L Final     Anion Gap   Date Value Ref Range Status   08/14/2024 11 7 - 15 mmol/L Final   05/13/2022 7 5 - 18 mmol/L Final     Glucose   Date Value Ref Range Status   08/14/2024 114 (H) 70 - 99 mg/dL Final   05/13/2022 81 70 - 125 mg/dL Final     GLUCOSE BY METER POCT   Date Value Ref Range Status   12/03/2023 95 70 - 99 mg/dL Final     Urea Nitrogen   Date Value Ref Range Status   08/14/2024 27.7 (H) 8.0 - 23.0 mg/dL Final   05/13/2022 17 8 - 22 mg/dL Final     Creatinine   Date Value Ref Range Status   08/14/2024 0.96 0.67 - 1.17 mg/dL Final     GFR Estimate   Date Value Ref Range Status   08/14/2024 88 >60 mL/min/1.73m2 Final     Comment:     eGFR calculated using 2021 CKD-EPI equation.   05/16/2019 >60 >60 mL/min/1.73m2 Final     GFR, ESTIMATED POCT   Date Value Ref Range Status   08/05/2024 >60 >60 mL/min/1.73m2 Final     Calcium   Date Value Ref Range Status   08/14/2024 9.6 8.8 - 10.4 mg/dL Final     Comment:     Reference intervals for this test were updated on  2024 to reflect our healthy population more accurately. There may be differences in the flagging of prior results with similar values performed with this method. Those prior results can be interpreted in the context of the updated reference intervals.     Bilirubin Total   Date Value Ref Range Status   2024 0.9 <=1.2 mg/dL Final     Alkaline Phosphatase   Date Value Ref Range Status   2024 57 40 - 150 U/L Final     ALT   Date Value Ref Range Status   2024 18 0 - 70 U/L Final     AST   Date Value Ref Range Status   2024 29 0 - 45 U/L Final     Lab Results   Component Value Date    WBC 6.0 2024     Lab Results   Component Value Date    RBC 4.22 2024     Lab Results   Component Value Date    HGB 12.2 2024     Lab Results   Component Value Date    HCT 37.9 2024     Lab Results   Component Value Date    MCV 90 2024     Lab Results   Component Value Date    MCH 28.9 2024     Lab Results   Component Value Date    MCHC 32.2 2024     Lab Results   Component Value Date    RDW 14.0 2024     Lab Results   Component Value Date     2024     EK23 Sinus bradycardia, nonspecific T wave abnormality    Echocardiogram 12/3/23   Interpretation Summary  Global and regional left ventricular function is normal with an EF of 60-65%.  Global right ventricular function is normal.  Mild tricuspid insufficiency is present.  Pulmonary artery systolic pressure is normal.  Estimated mean right atrial pressure is normal.  No pericardial effusion is present.    Coronary angiogram 3/12/2019    Ost LAD to Prox LAD lesion is 100% stenosed.    The LM vessel was moderate and is considered normal.    Estimated blood loss was <20 ml.    The left circumflex was moderate.    Ost 1st Mrg lesion is 45% stenosed.    RPDA lesion is 65% stenosed.    Angiography via left radial  LM normal  LAD prox 100% with collaterals via PDA; Circ collaterals to diagonal  Circ OM1  "mild dz  RCA mid PDA 60-70% with collaterals to LAD     Ascending aorta 4.8cm      Imp/plan  1. Severe 2 vessel dz   2. Ascending aortic aneurysm - 4.8cm      CT surgery consult    MR sinonasal 8/5/24                                                         Impression:      Prior surgical changes of left maxillary antrectomy, partial  ethmoidectomy, turbinectomy and sinonasal mass resection.  Heterogeneous mass along the base of left maxillary sinus extending to  the maxilla,  space, left pterygopalatine fossa and  parapharyngeal space. Some of these areas demonstrate treated areas  however some of these correspond to the FDG avidity on the same day  PET and highly suspicious tumor recurrence.   Question of early left V2 involvement and thin dural enhancement along  the base of the left temporal lobe.  The patient's records and results personally reviewed by this provider.     Outside records reviewed from: Care Everywhere      Assessment    Douglas Herrera is a 64 year old male seen as a PAC referral for risk assessment and optimization for anesthesia.    Plan/Recommendations  Pt will be optimized for the proposed procedure.  See below for details on the assessment, risk, and preoperative recommendations    NEUROLOGY  - No history of TIA, CVA or seizure    -Post Op delirium risk factors:  No risk identified    ENT  - Physical exam is concerning for complicated airway.  Previous airway exams have documented small and limited mouth opening.  Last intubation below  Mallampati: Unable to assess  TM: Unable to assess    Last airway notes: 12/1/23  \"Placement Date: 12/01/23; Placement Time: 0752 (created via procedure documentation); Mask Ventilation: 0 (rsi); Induction Type: Intravenous; Ease of Intubation: Easy; Technique: Video laryngoscopy; Tube Size: 7 mm; VL Blade Size: Glide scope 3 (for rsi); Grade View: 1 (with glidescope (previous easy intubation without glidescope)); Adjucts: Stylet; Placement Person: " "CRNA; Attempts: 1\"    CARDIAC  - No history of Afib  - CAD- CABG x3 and Ascending Thoracic aortic aneurysm repair ( 2019)  C/B PE treated with course of Xarelto.   Per notes he did have substantial bleeding during the biopsy 10/2023.   Last dose of ASA 10/8/24 and surgery team was notified     Follows with Cardiology every 2 years, and is working on next appt. Patient was told that if he has surgery in the future, he would need to see Cardiology first for follow up.  History of HYPERTENSION but no meds at this time  HLD. Atorvastatin at HS    Today, denies chest pain, irregular HR, ankle edema or shortness of breath  Able to mow his grass and walk 3 miles at his pace  - METS (Metabolic Equivalents)>4    RCRI: 0.9% risk of serious cardiac events    PULMONARY  Denies asthma or inhaler use. Denies cough   VIVEK Medium Risk             Total Score: 3    VIVEK: Hypertension    VIVEK: Over 50 ys old    VIVEK: Male        - Tobacco History    History   Smoking Status    Former    Types: Cigarettes   Smokeless Tobacco    Never       GI: Denies GERD    PONV High Risk  Total Score: 3           1 AN PONV: Patient is not a current smoker    1 AN PONV: Patient has history of PONV    1 AN PONV: Intended Post Op Opioids      Chronic Hep B. Will take entecavir on DOS    PONV. Significant history. Final decisions regarding prophylaxis by Anesthesia on DOS.    /RENAL  - Baseline Creatinine  0.96    ENDOCRINE    - BMI: Estimated body mass index is 23.34 kg/m  as calculated from the following:    Height as of this encounter: 1.626 m (5' 4\").    Weight as of this encounter: 61.7 kg (136 lb).  Healthy Weight (BMI 18.5-24.9)  - No history of Diabetes Mellitus    Last T4 Free 0.89  TSH 4.07    HEME  VTE Medium Risk 1.8%             Total Score: 6    VTE: Greater than 59 yrs old    VTE: Male    VTE: Pt history of VTE      Patient with personal history of PE after CAB, anticoagulated for a time. No recurrence.   Anemia. Last Hgb: 12.2  History of " blood transfusion   Final decision by Anesthesia regarding DOS type and screen    SCC- s/p Mohs procedure     MSK  Patient is NOT Frail             Total Score: 0      ACCESS: Right port    Will require ong     Different anesthesia methods/types have been discussed with the patient, but they are aware that the final plan will be decided by the assigned anesthesia provider on the date of service.  Patient was discussed with Dr Lucas    The patient is optimized for their procedure. AVS with information on surgery time/arrival time, meds and NPO status given by nursing staff. No further diagnostic testing indicated.    Please refer to the physical examination documented by the anesthesiologist in the anesthesia record on the day of surgery.    Video-Visit Details    Type of service:  Video Visit-Converted to phone    Provider received verbal consent for a Video Visit from the patient? Yes     Originating Location (pt. Location): Home    Distant Location (provider location):  Off-site  Mode of Communication:  Video Conference via [employer], converted to phone due to access and communication issues.   On the day of service:     Prep time: 15 minutes  Attempt to connect with patient: 20  Phone time: 17 minutes  Documentation time: 14 minutes  ------------------------------------------  Total time: 66 minutes      MALCOLM Hollis CNS  Preoperative Assessment Center  [facility]  Phone: [phone]  Fax: [phone]

## 2024-10-10 NOTE — PATIENT INSTRUCTIONS
Preparing for Your Surgery      Name:  Douglas Herrera   MRN:  [id number]   :  1960   Today's Date:  10/10/2024       Arriving for surgery:  Surgery date:  10/11/24  Arrival time:  05:30 am        Please come to:      [facility]    [address] [place]  [address]     The [facility] Patient/Visitor Ramp is at [address]. Patients and visitors who self-park will receive the reduced hospital parking rate. If the Patient /Visitor Ramp is full, please follow the signs to the [employer] car park located at the main hospital entrance.       parking is available (24 hours/ 7 days a week)      Discounted parking pass options are available for patients and visitors. They can be purchased at the [employer] desk at the [facility] hospital entrance.     -    Stop at the security desk and they will direct surgery patients to the Surgery Check in and Family Lounge. [phone]        - If you need directions, a wheelchair or an escort please stop at the Information/security desk in the lobby.     What can I eat or drink?  -  You may eat and drink normally up to 8 hours prior to arrival time.   -  You may have clear liquids until 2 hours prior to arrival time.     Examples of clear liquids:  Water  Clear broth  Juices (apple, white grape, white cranberry  and cider) without pulp  Noncarbonated, powder based beverages  (lemonade and Clyde-Aid)  Sodas (Sprite, 7-Up, ginger ale and seltzer)  Coffee or tea (without milk or cream)  Gatorade    -  No Alcohol or cannabis products for at least 24 hours before surgery.     Which medicines can I take?    Hold Aspirin for 7 days before surgery.   Hold Multivitamins for 7 days before surgery.  Hold Supplements for 7 days before surgery.  Hold Ibuprofen (Advil, Motrin) for 1 day(s) before surgery--unless otherwise directed by surgeon.  Hold Naproxen (Aleve) for 4  days before surgery.    -  DO NOT take these medications the day of surgery:  Flonase, Ocean nasal spray, magic mouthwash, senna.    -  PLEASE TAKE these medications the day of surgery:  Tylenol if needed; take baraclude.    How do I prepare myself?  - Please take 2 showers (one the night prior to surgery and one the morning of surgery) using Scrubcare or Hibiclens soap.    Use this soap only from the neck to your toes.     Leave the soap on your skin for one minute--then rinse thoroughly.      You may use your own shampoo and conditioner. No other hair products.   - Please remove all jewelry and body piercings.  - No lotions, deodorants or fragrance.  - No makeup or fingernail polish.   - Bring your ID and insurance card.    -If you use a CPAP machine, please bring the CPAP machine, tubing, and mask to hospital.    -If you have a Deep Brain Stimulator, Spinal Cord Stimulator, or any Neuro Stimulator device---you must bring the remote control to the hospital.      ALL PATIENTS GOING HOME THE SAME DAY OF SURGERY ARE REQUIRED TO HAVE A RESPONSIBLE ADULT TO DRIVE AND BE IN ATTENDANCE WITH THEM FOR 24 HOURS FOLLOWING SURGERY.    Covid testing policy as of 12/06/2022  Your surgeon will notify and schedule you for a COVID test if one is needed before surgery--please direct any questions or COVID symptoms to your surgeon      Questions or Concerns:    - For any questions regarding the day of surgery or your hospital stay, please contact the Pre Admission Nursing Office at [phone].       - If you have health changes between today and your surgery, please call your surgeon.       - For questions after surgery, please call your surgeons office.           Current Visitor Guidelines    You may have 2 visitors in the pre op area.    Visiting hours: 8 a.m. to 8:30 p.m.    Patients confirmed or suspected to have symptoms of COVID 19 or flu:     No visitors allowed for adult patients.   Children (under age 18) can have 1 named  visitor.     People who are sick or showing symptoms of COVID 19 or flu:    Are not allowed to visit patients--we can only make exceptions in special situations.       Please follow these guidelines for your visit:          Please maintain social distance          Masking is optional--however at times you may be asked to wear a mask for the safety of yourself and others     Clean your hands with alcohol hand . Do this when you arrive at and leave the building and patient room,    And again after you touch your mask or anything in the room.     Go directly to and from the room you are visiting.     Stay in the patient s room during your visit. Limit going to other places in the hospital as much as possible     Leave bags and jackets at home or in the car.     For everyone s health, please don t come and go during your visit. That includes for smoking   during your visit.

## 2024-10-10 NOTE — PROGRESS NOTES
Douglas is a 64 year old who is being evaluated via a billable video visit.    How would you like to obtain your AVS? MyChart  If the video visit is dropped, the invitation should be resent by: Text to cell phone: [phone]    Subjective   Douglas is a 64 year old, presenting for the following health issues:  Pre-Op Exam      HPI           Physical Exam

## 2024-10-10 NOTE — ANESTHESIA PREPROCEDURE EVALUATION
Anesthesia Pre-Procedure Evaluation    Patient: Douglas Herrera   MRN: [id number] : 1960        Procedure : Procedure(s):  Transnasal Biopsy of Left Maxillary Sinus Tumor          Past Medical History:   Diagnosis Date    Ascending aortic aneurysm (H)     Coronary artery disease     Depression     Gout     History of anesthesia complications     Hyperlipemia     Hypertension     Malignant neoplasm of nasal cavities (H)     PONV (postoperative nausea and vomiting)       Past Surgical History:   Procedure Laterality Date    AORTA SURGERY N/A 2019    Procedure: WITH ASCENDING AORTA REPLACEMENT;  Surgeon: Darlene Graff MD;  Location: [facility] OR;  Service: Cardiovascular    BYPASS GRAFT ARTERY CORONARY      CARDIAC SURGERY      CV CORONARY ANGIOGRAM N/A 3/12/2019    Procedure: Coronary Angiogram;  Surgeon: Hamilton Lucero MD;  Location: [facility];  Service: Cardiology    ENDOSCOPIC SINUS SURGERY Left 11/10/2023    Procedure: Transnasal endoscopic approach to biopsy left nasal mass;  Surgeon: Damon Regan MD;  Location: [facility] OR    GALLBLADDER SURGERY      INSERT PORT VASCULAR ACCESS N/A 2024    Procedure: Insert port vascular access;  Surgeon: Tyrel Silvestre MD;  Location: [facility] OR    IR CAROTID CEREBRAL ANGIOGRAM BILATERAL  2023    IR CHEST PORT PLACEMENT > 5 YRS OF AGE  2024    IR FACIAL EMBOLIZATION LEFT  2023    IR GASTROSTOMY TUBE PERCUTANEOUS PLCMNT  2024    OPTICAL TRACKING SYSTEM ENDOSCOPIC SINUS SURGERY Left 2023    Procedure: Stealth assisted transnasal endoscopic approach to excise left sinonasal mass;  Surgeon: Damon Regan MD;  Location: [facility] OR      No Known Allergies   Social History     Tobacco Use    Smoking status: Former     Types: Cigarettes     Passive exposure: Past    Smokeless tobacco: Never    Tobacco comments:     every 3-4 months, rare   Substance Use Topics    Alcohol use: Yes     Comment:  "Occasionally      Wt Readings from Last 1 Encounters:   10/03/24 60.8 kg (134 lb)        Anesthesia Evaluation   Pt has had prior anesthetic. Type: General and MAC.    History of anesthetic complications  - PONV.      ROS/MED HX  ENT/Pulmonary: Comment: - Sinonasal mass     MRI sinus 8/05/2024   Prior surgical changes of left maxillary antrectomy, partial ethmoidectomy, turbinectomy and sinonasal mass resection. Heterogeneous mass along the base of left maxillary sinus extending to the maxilla,  space, left pterygopalatine fossa and parapharyngeal space. Some of these areas demonstrate treated areas however some of these correspond to the FDG avidity on the same day PET and highly suspicious tumor recurrence. Question of early left V2 involvement and thin dural enhancement along the base of the left temporal lobe.     (-) tobacco use, asthma and COPD   Neurologic:  - neg neurologic ROS     Cardiovascular: Comment: S/p CABG and AAA in 2019        (+) Dyslipidemia hypertension- Peripheral Vascular Disease-- Other.  CAD -  CABG (2019)- -                                 Previous cardiac testing   Echo: Date: 11/2023 Results:  \"Interpretation Summary  Global and regional left ventricular function is normal with an EF of 60-65%.  Global right ventricular function is normal.  Mild tricuspid insufficiency is present.  Pulmonary artery systolic pressure is normal.  Estimated mean right atrial pressure is normal.  No pericardial effusion is present.\"  Stress Test:  Date: Results:    ECG Reviewed:  Date: Results:    Cath:  Date: Results:   (-) angina, [place], orthopnea/PND, syncope and angina   METS/Exercise Tolerance: >4 METS Comment: Regularly goes up a flight of stairs without concerning exertional symptoms   Hematologic:     (+) History of blood clots (PE/DVT 2019),               Musculoskeletal:       GI/Hepatic:     (+)           hepatitis type B, liver disease,    (-) GERD   Renal/Genitourinary:  - neg Renal ROS  " "   Endo:       Psychiatric/Substance Use:     (+) psychiatric history anxiety       Infectious Disease:  - neg infectious disease ROS     Malignancy:   (+) Malignancy (SCC L maxillary sinus),     Other: Comment: - Gout           Physical Exam    Airway      Comment: Left lateral palate/gum line appears enlarged. Remainder of palate appears normal. Able to fit 2 fingerbreadth between teeth.     Mallampati: III   TM distance: > 3 FB   Neck ROM: full   Mouth opening: < 3 cm    Respiratory Devices and Support         Dental     Comment: Missing several teeth      B=Bridge, C=Chipped, L=Loose, M=Missing    Cardiovascular          Rhythm and rate: regular and normal     Pulmonary           breath sounds clear to auscultation           OUTSIDE LABS:  CBC:   Lab Results   Component Value Date    WBC 6.0 08/05/2024    WBC 4.7 06/28/2024    HGB 12.2 (L) 08/05/2024    HGB 11.8 (L) 06/28/2024    HCT 37.9 (L) 08/05/2024    HCT 36.1 (L) 06/28/2024     08/05/2024     06/28/2024     BMP:   Lab Results   Component Value Date     08/14/2024     08/05/2024    POTASSIUM 4.2 08/14/2024    POTASSIUM 4.4 08/05/2024    CHLORIDE 102 08/14/2024    CHLORIDE 104 08/05/2024    CO2 26 08/14/2024    CO2 26 08/05/2024    BUN 27.7 (H) 08/14/2024    BUN 26.6 (H) 08/05/2024    CR 0.96 08/14/2024    CR 0.9 08/05/2024     (H) 08/14/2024    GLC 96 08/05/2024     COAGS:   Lab Results   Component Value Date    PTT 29 09/12/2023    INR 1.22 (H) 01/02/2024    FIBR 157 (L) 04/23/2019     POC: No results found for: \"BGM\", \"HCG\", \"HCGS\"  HEPATIC:   Lab Results   Component Value Date    ALBUMIN 4.1 08/14/2024    PROTTOTAL 7.6 08/14/2024    ALT 18 08/14/2024    AST 29 08/14/2024    ALKPHOS 57 08/14/2024    BILITOTAL 0.9 08/14/2024     OTHER:   Lab Results   Component Value Date    PH 7.43 12/01/2023    LACT 1.3 12/01/2023    A1C 5.2 04/25/2019    [place] 9.6 08/14/2024    MAG 1.9 08/05/2024    TSH 4.07 08/05/2024    T4 0.89 (L) " 08/05/2024       Anesthesia Plan    ASA Status:  3       Anesthesia Type: General.     - Airway: ETT   Induction: Intravenous, Propofol.   Maintenance: Balanced.   Techniques and Equipment:     - Airway: Video-Laryngoscope     - Lines/Monitors: BIS, 2nd IV, Arterial Line     Consents    Anesthesia Plan(s) and associated risks, benefits, and realistic alternatives discussed. Questions answered and patient/representative(s) expressed understanding.     - Discussed: Risks, Benefits and Alternatives for the PROCEDURE were discussed     - Discussed with:  Patient      - Extended Intubation/Ventilatory Support Discussed: Yes.      - Patient is DNR/DNI Status: No     Use of blood products discussed: Yes.     - Discussed with: Patient.     - Consented: consented to blood products     Postoperative Care    Pain management: IV analgesics, Oral pain medications, Multi-modal analgesia.   PONV prophylaxis: Ondansetron (or other 5HT-3), Dexamethasone or Solumedrol, Aprepitant, Background Propofol Infusion     Comments:               Anila Giron MD    I have reviewed the pertinent notes and labs in the chart from the past 30 days and (re)examined the patient.  Any updates or changes from those notes are reflected in this note.               # Hypertension: Noted on problem list              # History of CABG: noted on surgical history

## 2024-10-11 ENCOUNTER — ANESTHESIA (OUTPATIENT)
Dept: SURGERY | Facility: CLINIC | Age: 64
End: 2024-10-11
Payer: COMMERCIAL

## 2024-10-11 ENCOUNTER — HOSPITAL ENCOUNTER (OUTPATIENT)
Facility: CLINIC | Age: 64
Discharge: HOME | End: 2024-10-11
Attending: OTOLARYNGOLOGY | Admitting: OTOLARYNGOLOGY
Payer: COMMERCIAL

## 2024-10-11 VITALS
RESPIRATION RATE: 18 BRPM | HEIGHT: 64 IN | HEART RATE: 68 BPM | DIASTOLIC BLOOD PRESSURE: 75 MMHG | OXYGEN SATURATION: 98 % | TEMPERATURE: 98.3 F | BODY MASS INDEX: 23.3 KG/M2 | WEIGHT: 136.47 LBS | SYSTOLIC BLOOD PRESSURE: 123 MMHG

## 2024-10-11 DIAGNOSIS — G89.18 POSTOPERATIVE PAIN: Primary | ICD-10-CM

## 2024-10-11 DIAGNOSIS — C31.0 SQUAMOUS CELL CARCINOMA OF MAXILLARY SINUS (H): ICD-10-CM

## 2024-10-11 PROBLEM — T88.4XXA HARD TO INTUBATE: Status: ACTIVE | Noted: 2024-10-11

## 2024-10-11 PROCEDURE — 88305 TISSUE EXAM BY PATHOLOGIST: CPT | Mod: 26 | Performed by: STUDENT IN AN ORGANIZED HEALTH CARE EDUCATION/TRAINING PROGRAM

## 2024-10-11 PROCEDURE — 370N000017 HC ANESTHESIA TECHNICAL FEE, PER MIN: Performed by: OTOLARYNGOLOGY

## 2024-10-11 PROCEDURE — 272N000001 HC OR GENERAL SUPPLY STERILE: Performed by: OTOLARYNGOLOGY

## 2024-10-11 PROCEDURE — 710N000009 HC RECOVERY PHASE 1, LEVEL 1, PER MIN: Performed by: OTOLARYNGOLOGY

## 2024-10-11 PROCEDURE — 250N000013 HC RX MED GY IP 250 OP 250 PS 637

## 2024-10-11 PROCEDURE — 999N000141 HC STATISTIC PRE-PROCEDURE NURSING ASSESSMENT: Performed by: OTOLARYNGOLOGY

## 2024-10-11 PROCEDURE — 710N000012 HC RECOVERY PHASE 2, PER MINUTE: Performed by: OTOLARYNGOLOGY

## 2024-10-11 PROCEDURE — P9045 ALBUMIN (HUMAN), 5%, 250 ML: HCPCS

## 2024-10-11 PROCEDURE — 250N000012 HC RX MED GY IP 250 OP 636 PS 637

## 2024-10-11 PROCEDURE — 88112 CYTOPATH CELL ENHANCE TECH: CPT | Mod: TC | Performed by: OTOLARYNGOLOGY

## 2024-10-11 PROCEDURE — 250N000011 HC RX IP 250 OP 636

## 2024-10-11 PROCEDURE — 88331 PATH CONSLTJ SURG 1 BLK 1SPC: CPT | Mod: TC | Performed by: OTOLARYNGOLOGY

## 2024-10-11 PROCEDURE — 250N000009 HC RX 250

## 2024-10-11 PROCEDURE — 88331 PATH CONSLTJ SURG 1 BLK 1SPC: CPT | Mod: 26 | Performed by: STUDENT IN AN ORGANIZED HEALTH CARE EDUCATION/TRAINING PROGRAM

## 2024-10-11 PROCEDURE — 250N000025 HC SEVOFLURANE, PER MIN: Performed by: OTOLARYNGOLOGY

## 2024-10-11 PROCEDURE — 88112 CYTOPATH CELL ENHANCE TECH: CPT | Mod: 26 | Performed by: PATHOLOGY

## 2024-10-11 PROCEDURE — 250N000009 HC RX 250: Performed by: OTOLARYNGOLOGY

## 2024-10-11 PROCEDURE — 31237 NSL/SINS NDSC SURG BX POLYPC: CPT | Performed by: ANESTHESIOLOGY

## 2024-10-11 PROCEDURE — 258N000003 HC RX IP 258 OP 636

## 2024-10-11 PROCEDURE — 360N000077 HC SURGERY LEVEL 4, PER MIN: Performed by: OTOLARYNGOLOGY

## 2024-10-11 PROCEDURE — 31237 NSL/SINS NDSC SURG BX POLYPC: CPT | Mod: LT | Performed by: OTOLARYNGOLOGY

## 2024-10-11 PROCEDURE — 88305 TISSUE EXAM BY PATHOLOGIST: CPT | Mod: 26 | Performed by: PATHOLOGY

## 2024-10-11 PROCEDURE — 250N000011 HC RX IP 250 OP 636: Performed by: STUDENT IN AN ORGANIZED HEALTH CARE EDUCATION/TRAINING PROGRAM

## 2024-10-11 RX ORDER — LIDOCAINE HYDROCHLORIDE 20 MG/ML
INJECTION, SOLUTION INFILTRATION; PERINEURAL PRN
Status: DISCONTINUED | OUTPATIENT
Start: 2024-10-11 | End: 2024-10-11

## 2024-10-11 RX ORDER — ACETAMINOPHEN 325 MG/1
975 TABLET ORAL ONCE
Status: COMPLETED | OUTPATIENT
Start: 2024-10-11 | End: 2024-10-11

## 2024-10-11 RX ORDER — HYDROMORPHONE HCL IN WATER/PF 6 MG/30 ML
0.2 PATIENT CONTROLLED ANALGESIA SYRINGE INTRAVENOUS EVERY 5 MIN PRN
Status: DISCONTINUED | OUTPATIENT
Start: 2024-10-11 | End: 2024-10-11 | Stop reason: HOSPADM

## 2024-10-11 RX ORDER — HYDROMORPHONE HCL IN WATER/PF 6 MG/30 ML
0.4 PATIENT CONTROLLED ANALGESIA SYRINGE INTRAVENOUS EVERY 5 MIN PRN
Status: DISCONTINUED | OUTPATIENT
Start: 2024-10-11 | End: 2024-10-11 | Stop reason: HOSPADM

## 2024-10-11 RX ORDER — DEXAMETHASONE SODIUM PHOSPHATE 4 MG/ML
INJECTION, SOLUTION INTRA-ARTICULAR; INTRALESIONAL; INTRAMUSCULAR; INTRAVENOUS; SOFT TISSUE PRN
Status: DISCONTINUED | OUTPATIENT
Start: 2024-10-11 | End: 2024-10-11

## 2024-10-11 RX ORDER — FENTANYL CITRATE 50 UG/ML
25 INJECTION, SOLUTION INTRAMUSCULAR; INTRAVENOUS EVERY 5 MIN PRN
Status: DISCONTINUED | OUTPATIENT
Start: 2024-10-11 | End: 2024-10-11 | Stop reason: HOSPADM

## 2024-10-11 RX ORDER — LIDOCAINE HYDROCHLORIDE 40 MG/ML
SOLUTION TOPICAL PRN
Status: DISCONTINUED | OUTPATIENT
Start: 2024-10-11 | End: 2024-10-11

## 2024-10-11 RX ORDER — FENTANYL CITRATE 50 UG/ML
INJECTION, SOLUTION INTRAMUSCULAR; INTRAVENOUS PRN
Status: DISCONTINUED | OUTPATIENT
Start: 2024-10-11 | End: 2024-10-11

## 2024-10-11 RX ORDER — LIDOCAINE 40 MG/G
CREAM TOPICAL
Status: DISCONTINUED | OUTPATIENT
Start: 2024-10-11 | End: 2024-10-11 | Stop reason: HOSPADM

## 2024-10-11 RX ORDER — SODIUM CHLORIDE, SODIUM LACTATE, POTASSIUM CHLORIDE, CALCIUM CHLORIDE 600; 310; 30; 20 MG/100ML; MG/100ML; MG/100ML; MG/100ML
INJECTION, SOLUTION INTRAVENOUS CONTINUOUS PRN
Status: DISCONTINUED | OUTPATIENT
Start: 2024-10-11 | End: 2024-10-11

## 2024-10-11 RX ORDER — LIDOCAINE 50 MG/G
OINTMENT TOPICAL PRN
Status: DISCONTINUED | OUTPATIENT
Start: 2024-10-11 | End: 2024-10-11

## 2024-10-11 RX ORDER — NALOXONE HYDROCHLORIDE 0.4 MG/ML
0.1 INJECTION, SOLUTION INTRAMUSCULAR; INTRAVENOUS; SUBCUTANEOUS
Status: DISCONTINUED | OUTPATIENT
Start: 2024-10-11 | End: 2024-10-11 | Stop reason: HOSPADM

## 2024-10-11 RX ORDER — ONDANSETRON 4 MG/1
4 TABLET, ORALLY DISINTEGRATING ORAL EVERY 8 HOURS PRN
Qty: 4 TABLET | Refills: 0 | Status: SHIPPED | OUTPATIENT
Start: 2024-10-11

## 2024-10-11 RX ORDER — FENTANYL CITRATE 50 UG/ML
50 INJECTION, SOLUTION INTRAMUSCULAR; INTRAVENOUS EVERY 5 MIN PRN
Status: DISCONTINUED | OUTPATIENT
Start: 2024-10-11 | End: 2024-10-11 | Stop reason: HOSPADM

## 2024-10-11 RX ORDER — PROPOFOL 10 MG/ML
INJECTION, EMULSION INTRAVENOUS PRN
Status: DISCONTINUED | OUTPATIENT
Start: 2024-10-11 | End: 2024-10-11

## 2024-10-11 RX ORDER — HEPARIN SODIUM (PORCINE) LOCK FLUSH IV SOLN 100 UNIT/ML 100 UNIT/ML
5-10 SOLUTION INTRAVENOUS
Status: DISCONTINUED | OUTPATIENT
Start: 2024-10-11 | End: 2024-10-11 | Stop reason: HOSPADM

## 2024-10-11 RX ORDER — HEPARIN SODIUM,PORCINE 10 UNIT/ML
5-10 VIAL (ML) INTRAVENOUS
Status: DISCONTINUED | OUTPATIENT
Start: 2024-10-11 | End: 2024-10-11 | Stop reason: HOSPADM

## 2024-10-11 RX ORDER — ONDANSETRON 4 MG/1
4 TABLET, ORALLY DISINTEGRATING ORAL EVERY 30 MIN PRN
Status: DISCONTINUED | OUTPATIENT
Start: 2024-10-11 | End: 2024-10-11 | Stop reason: HOSPADM

## 2024-10-11 RX ORDER — HEPARIN SODIUM,PORCINE 10 UNIT/ML
5-10 VIAL (ML) INTRAVENOUS EVERY 24 HOURS
Status: DISCONTINUED | OUTPATIENT
Start: 2024-10-11 | End: 2024-10-11 | Stop reason: HOSPADM

## 2024-10-11 RX ORDER — ONDANSETRON 2 MG/ML
4 INJECTION INTRAMUSCULAR; INTRAVENOUS EVERY 30 MIN PRN
Status: DISCONTINUED | OUTPATIENT
Start: 2024-10-11 | End: 2024-10-11 | Stop reason: HOSPADM

## 2024-10-11 RX ORDER — ONDANSETRON 2 MG/ML
INJECTION INTRAMUSCULAR; INTRAVENOUS PRN
Status: DISCONTINUED | OUTPATIENT
Start: 2024-10-11 | End: 2024-10-11

## 2024-10-11 RX ORDER — DEXAMETHASONE SODIUM PHOSPHATE 4 MG/ML
4 INJECTION, SOLUTION INTRA-ARTICULAR; INTRALESIONAL; INTRAMUSCULAR; INTRAVENOUS; SOFT TISSUE
Status: DISCONTINUED | OUTPATIENT
Start: 2024-10-11 | End: 2024-10-11 | Stop reason: HOSPADM

## 2024-10-11 RX ORDER — APREPITANT 40 MG/1
40 CAPSULE ORAL ONCE
Status: COMPLETED | OUTPATIENT
Start: 2024-10-11 | End: 2024-10-11

## 2024-10-11 RX ORDER — CEFAZOLIN SODIUM/WATER 2 G/20 ML
SYRINGE (ML) INTRAVENOUS PRN
Status: DISCONTINUED | OUTPATIENT
Start: 2024-10-11 | End: 2024-10-11

## 2024-10-11 RX ORDER — OXYMETAZOLINE HYDROCHLORIDE 0.05 G/100ML
SPRAY NASAL PRN
Status: DISCONTINUED | OUTPATIENT
Start: 2024-10-11 | End: 2024-10-11 | Stop reason: HOSPADM

## 2024-10-11 RX ORDER — ACETAMINOPHEN 325 MG/1
650 TABLET ORAL EVERY 4 HOURS PRN
Qty: 50 TABLET | Refills: 0 | Status: SHIPPED | OUTPATIENT
Start: 2024-10-11 | End: 2024-10-30

## 2024-10-11 RX ADMIN — MIDAZOLAM 0.5 MG: 1 INJECTION INTRAMUSCULAR; INTRAVENOUS at 07:53

## 2024-10-11 RX ADMIN — ONDANSETRON 4 MG: 2 INJECTION INTRAMUSCULAR; INTRAVENOUS at 09:38

## 2024-10-11 RX ADMIN — DEXMEDETOMIDINE HYDROCHLORIDE 8 MCG: 100 INJECTION, SOLUTION INTRAVENOUS at 08:10

## 2024-10-11 RX ADMIN — PROPOFOL 20 MG: 10 INJECTION, EMULSION INTRAVENOUS at 09:42

## 2024-10-11 RX ADMIN — LIDOCAINE HYDROCHLORIDE 2 ML: 40 SOLUTION TOPICAL at 08:06

## 2024-10-11 RX ADMIN — PROPOFOL 20 MG: 10 INJECTION, EMULSION INTRAVENOUS at 08:10

## 2024-10-11 RX ADMIN — Medication 20 MG: at 08:40

## 2024-10-11 RX ADMIN — PHENYLEPHRINE HYDROCHLORIDE 200 MCG: 10 INJECTION INTRAVENOUS at 08:43

## 2024-10-11 RX ADMIN — APREPITANT 40 MG: 40 CAPSULE ORAL at 06:10

## 2024-10-11 RX ADMIN — Medication 20 MG: at 09:14

## 2024-10-11 RX ADMIN — PHENYLEPHRINE HYDROCHLORIDE 0.2 MCG/KG/MIN: 10 INJECTION INTRAVENOUS at 08:40

## 2024-10-11 RX ADMIN — FENTANYL CITRATE 25 MCG: 50 INJECTION INTRAMUSCULAR; INTRAVENOUS at 09:42

## 2024-10-11 RX ADMIN — Medication 2 G: at 08:38

## 2024-10-11 RX ADMIN — Medication 5 ML: at 12:17

## 2024-10-11 RX ADMIN — DEXMEDETOMIDINE HYDROCHLORIDE 4 MCG: 100 INJECTION, SOLUTION INTRAVENOUS at 08:15

## 2024-10-11 RX ADMIN — SUCCINYLCHOLINE CHLORIDE 80 MG: 20 INJECTION, SOLUTION INTRAMUSCULAR; INTRAVENOUS; PARENTERAL at 08:15

## 2024-10-11 RX ADMIN — ALBUMIN (HUMAN): 12.5 SOLUTION INTRAVENOUS at 08:24

## 2024-10-11 RX ADMIN — LIDOCAINE HYDROCHLORIDE 3 ML: 40 SOLUTION TOPICAL at 07:44

## 2024-10-11 RX ADMIN — Medication 200 MG: at 09:55

## 2024-10-11 RX ADMIN — ALBUMIN (HUMAN): 12.5 SOLUTION INTRAVENOUS at 09:00

## 2024-10-11 RX ADMIN — ACETAMINOPHEN 975 MG: 325 TABLET ORAL at 06:09

## 2024-10-11 RX ADMIN — PHENYLEPHRINE HYDROCHLORIDE 200 MCG: 10 INJECTION INTRAVENOUS at 08:38

## 2024-10-11 RX ADMIN — DEXAMETHASONE SODIUM PHOSPHATE 6 MG: 4 INJECTION, SOLUTION INTRA-ARTICULAR; INTRALESIONAL; INTRAMUSCULAR; INTRAVENOUS; SOFT TISSUE at 09:38

## 2024-10-11 RX ADMIN — SODIUM CHLORIDE, POTASSIUM CHLORIDE, SODIUM LACTATE AND CALCIUM CHLORIDE: 600; 310; 30; 20 INJECTION, SOLUTION INTRAVENOUS at 08:10

## 2024-10-11 RX ADMIN — LIDOCAINE 0.5 INCH: 50 OINTMENT TOPICAL at 07:53

## 2024-10-11 RX ADMIN — LIDOCAINE HYDROCHLORIDE 60 MG: 20 INJECTION, SOLUTION INFILTRATION; PERINEURAL at 08:12

## 2024-10-11 RX ADMIN — PROPOFOL 20 MG: 10 INJECTION, EMULSION INTRAVENOUS at 08:15

## 2024-10-11 ASSESSMENT — ACTIVITIES OF DAILY LIVING (ADL)
ADLS_ACUITY_SCORE: 31
ADLS_ACUITY_SCORE: 31
ADLS_ACUITY_SCORE: 32
ADLS_ACUITY_SCORE: 31

## 2024-10-11 NOTE — OP NOTE
Otolaryngology Operative Report   Date of Operation: 2024  Patient Name: Douglas Herrera  Patient : 1960   Patient MRN: [id number]    Staff Surgeon: Damon Iglesias MD  Resident Surgeon: Phillip Anton MD  Preoperative Diagnosis:   1. Left Nasal Mass  Postoperative Diagnosis: Same  Procedure:   1. Transnasal biopsy of left maxillary sinus tumor  Anesthesia: General  Findings: Significant soft tissue concerning for malignancy along left maxillary sinus; purulence along    EBL: 5 cc  Complications: None  Specimens:   ID Type Source Tests Collected by Time Destination   1 : Lef Maxillary Sinus Tissue Sinus, Maxillary, Left SURGICAL PATHOLOGY EXAM Damon Regan MD 10/11/2024  8:54 AM     2 : Left Maxillary Sinus Tissue Sinus, Maxillary, Left SURGICAL PATHOLOGY EXAM Damon Regan MD 10/11/2024  8:55 AM     3 : Left Lateral Maxillary Sinus Tissue Sinus, Maxillary, Left SURGICAL PATHOLOGY EXAM Damon Regan MD 10/11/2024  8:58 AM     4 : Left Lateral Maxillary Sinus Tissue Sinus, Maxillary, Left SURGICAL PATHOLOGY EXAM Damon Regan MD 10/11/2024  9:00 AM     5 : Sinus contents Left Maxillary Sinus Washings Sinus, Maxillary, Left NON-GYNECOLOGIC CYTOLOGY Damon Regan MD 10/11/2024  9:11 AM          Clinical Indications: Douglas Herrera is a 64 year old male with 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 He was recommended to undergo aforementioned procedure. All risks and benefits were discussed with the consenting party and they elected to proceed with the procedure.  Description of Procedure: The patient was brought to the operating room and placed in supine position on the operating table. General anesthesia was induced and all appropriate monitoring lines were placed. Table was turned 180-degrees and all operating room members were in agreement with a  timeout that was performed. The nose was topically decongested with afrin-soaked pledgits. We began with a 0-degree sinus endoscope; the left maxillary antrostomy was identified and had significant hyperemic inflamed tissue at the posterior strut that was biopsied. The maxillary sinus was full of soft tissue that was biopsied with a 70-degree sinus endoscope and a 90-degree blakesely. Several specimens were obtained for both frozen and permanent pathology. A mucosal pocket along the inferior aspect of the sinus was opened and purulence was encountered; the upper left gingivobuccal fullness that is concerning for tumor and had been previously biopsied was palpated and further purulence was encountered within the maxillary sinus hinting at continuity of the two sites. The mucous was sent for culture and the sinus was irrigated with sterile water. Hemostasis was obtained with surgicel and surgiflo. An orogastric tube was passed to suction out stomach contents.   This concluded our procedure. The patient was then turned over to our anesthesia colleagues and was extubated and taken to the PACU in satisfactory and stable condition.     Dr. Iglesias was present for the procedure  I, Damon Regan MD, was present for the entire procedure between opening and closing.

## 2024-10-11 NOTE — ANESTHESIA POSTPROCEDURE EVALUATION
Patient: Douglas Herrera    Procedure: Procedure(s):  Transnasal Biopsy of Left Maxillary Sinus Tumor,       Anesthesia Type:  General    Note:  Disposition: Outpatient   Postop Pain Control: Uneventful            Sign Out: Well controlled pain   PONV: No   Neuro/Psych: Uneventful            Sign Out: Acceptable/Baseline neuro status   Airway/Respiratory: Uneventful            Sign Out: Acceptable/Baseline resp. status   CV/Hemodynamics: Uneventful            Sign Out: Acceptable CV status; No obvious hypovolemia; No obvious fluid overload   Other NRE: NONE   DID A NON-ROUTINE EVENT OCCUR?            Last vitals:  Vitals Value Taken Time   /74 10/11/24 1030   Temp 36.7  C (98.1  F) 10/11/24 1008   Pulse 67 10/11/24 1034   Resp 17 10/11/24 1034   SpO2 97 % 10/11/24 1034   Vitals shown include unfiled device data.    Electronically Signed By: Sara Perdomo MD  October 11, 2024  10:34 AM

## 2024-10-11 NOTE — BRIEF OP NOTE
[facility]    Brief Operative Note    Pre-operative diagnosis: Malignant neoplasm of nasal cavities (H) [C30.0]  Post-operative diagnosis Same as pre-operative diagnosis    Procedure: Transnasal Biopsy of Left Maxillary Sinus Tumor,, Left - Head    Surgeon: Surgeons and Role:     * Damon Regan MD - Primary  Anesthesia: General   Estimated Blood Loss: Minimal    Drains: None  Specimens:   ID Type Source Tests Collected by Time Destination   1 : Lef Maxillary Sinus Tissue Sinus, Maxillary, Left SURGICAL PATHOLOGY EXAM Damon Regan MD 10/11/2024  8:54 AM    2 : Left Maxillary Sinus Tissue Sinus, Maxillary, Left SURGICAL PATHOLOGY EXAM Damon Regan MD 10/11/2024  8:55 AM    3 : Left Lateral Maxillary Sinus Tissue Sinus, Maxillary, Left SURGICAL PATHOLOGY EXAM Damon Regan MD 10/11/2024  8:58 AM    4 : Left Lateral Maxillary Sinus Tissue Sinus, Maxillary, Left SURGICAL PATHOLOGY EXAM Damon Regan MD 10/11/2024  9:00 AM    5 : Sinus contents Left Maxillary Sinus Washings Sinus, Maxillary, Left NON-GYNECOLOGIC CYTOLOGY Damon Regan MD 10/11/2024  9:11 AM      Findings:   See op note .  Complications: None.  Implants: * No implants in log *

## 2024-10-11 NOTE — ANESTHESIA PROCEDURE NOTES
Airway       Patient location during procedure: OR       Procedure Start/Stop Times: 10/11/2024 8:18 AM  Staff -        Anesthesiologist:  Sarahi Cota MD       Resident/Fellow: Anila Giron MD       Other Anesthesia Staff: Sara Perdomo MD       Performed By: resident, with residents and anesthesiologist       Procedure performed by resident/fellow/CRNA in presence of a teaching physician.  Indications and Patient Condition       Indications for airway management: morgan-procedural       Induction type:awake (Awake fiberoptic intubation performed by ENT and anesthesia teams)       Mask difficulty assessment: 0 - not attempted    Final Airway Details       Final airway type: endotracheal airway       Successful airway: Other (comment) (Jerardo Flex-Tip PFHV 7.0 mm)  Endotracheal Airway Details        Cuffed: yes       Successful intubation technique: flexible bronchoscopy       Grade View of Cords: 1       Intubation device: Flexible bronchoscope.       Position: Right       Measured from: lips       Secured at (cm): 21       Bite block used: None    Post intubation assessment        Placement verified by: capnometry, equal breath sounds and chest rise        Number of attempts at approach: 3       Number of other approaches attempted: 2       Secured with: tape       Ease of procedure: difficult       Dentition: Intact and Unchanged    Medication(s) Administered   Medication Administration Time: 10/11/2024 8:18 AM    Additional Comments       Intubation was limited by the patient's small mouth opening. Patient's airway/mouth opening has changed significantly since previous anesthesia visits secondary to radiation he has received. Patient had previously been able to be successfully intubated with the Glidescope. For this intubation, he was given nebulized 4% lidocaine in pre-op. Following this nebulization, he was given gauze with 5% lidocaine ointment to suck on to help numb up his vocal cords in preparation  for awake fiberoptic intubation. Once in the OR, 2 ml of 4% lidocaine was sprayed in his throat to further numb up the vocal cords. Glidescope was initially used to see if the blade could be advanced into mouth but the patient's mouth opening was so limited that there was no way to advance the blade or any other oral adjuncts. ENT staff (Dr. Regan) then used a bronchoscope to assess the airway through the R nostril (mass in L nostril was being biopsied for this procedure) and 60 mg of 2% lidocaine was given intranasally through the R nostril prior to this.  Grade 1 view was achieved with this. He then used the bronchoscope through the mouth, which had been well anesthetized with the nebulized lidocaine/lidocaine ointment. A grade 1 view was achieved. The Jerardo Flex-Tip PFHV 7.0 mm ETT was then attached to the flexible bronchoscope and a grade 1 view was once  again achieved and the anesthesia team was able to advance the ETT through the vocal cords, inflate the cuff, and confirm with bilateral breath sounds, chest rise, and CO2 detection. While advancing the ETT, the patient was also given succinylcholine and propofol to assist with ETT advancement. Would recommend awake fiberoptic intubation for future intubations.

## 2024-10-11 NOTE — ANESTHESIA CARE TRANSFER NOTE
Patient: Douglas Herrera    Procedure: Procedure(s):  Transnasal Biopsy of Left Maxillary Sinus Tumor,       Diagnosis: Malignant neoplasm of nasal cavities (H) [C30.0]  Diagnosis Additional Information: No value filed.    Anesthesia Type:   General     Note:    Oropharynx: oropharynx clear of all foreign objects and spontaneously breathing  Level of Consciousness: awake and drowsy  Oxygen Supplementation: face mask  Level of Supplemental Oxygen (L/min / FiO2): 6  Independent Airway: airway patency satisfactory and stable  Dentition: dentition unchanged  Vital Signs Stable: post-procedure vital signs reviewed and stable  Report to RN Given: handoff report given  Patient transferred to: PACU    Handoff Report: Identifed the Patient, Identified the Reponsible Provider, Reviewed the pertinent medical history, Discussed the surgical course, Reviewed Intra-OP anesthesia mangement and issues during anesthesia and Allowed opportunity for questions and acknowledgement of understanding      Vitals:  Vitals Value Taken Time   /73 10/11/24 1015   Temp 36.7  C (98.1  F) 10/11/24 1008   Pulse 68 10/11/24 1023   Resp 18 10/11/24 1023   SpO2 100 % 10/11/24 1023   Vitals shown include unfiled device data.    Electronically Signed By: Anila Giron MD  October 11, 2024  10:23 AM

## 2024-10-14 LAB
PATH REPORT.COMMENTS IMP SPEC: ABNORMAL
PATH REPORT.COMMENTS IMP SPEC: YES
PATH REPORT.FINAL DX SPEC: ABNORMAL
PATH REPORT.GROSS SPEC: ABNORMAL
PATH REPORT.MICROSCOPIC SPEC OTHER STN: ABNORMAL
PATH REPORT.RELEVANT HX SPEC: ABNORMAL

## 2024-10-15 NOTE — PROGRESS NOTES
01/05/2024 PT HAS COVERAGE FOR ENTERAL AS LONG AS VIA TUBE    CAN BE IVN, HB VS NON HB DOES NOT MATTER WITH THIS PLAN    PT HAS COVERAGE FOR LINE CARE AND HYDRATION  NO COVERAGE FOR TPA. TL

## 2024-10-18 ENCOUNTER — HOME INFUSION (OUTPATIENT)
Dept: HOME HEALTH SERVICES | Facility: HOME HEALTH | Age: 64
End: 2024-10-18
Payer: COMMERCIAL

## 2024-10-18 ENCOUNTER — TUMOR CONFERENCE (OUTPATIENT)
Dept: ONCOLOGY | Facility: CLINIC | Age: 64
End: 2024-10-18
Payer: COMMERCIAL

## 2024-10-18 DIAGNOSIS — E44.0 MODERATE PROTEIN-CALORIE MALNUTRITION (H): Primary | ICD-10-CM

## 2024-10-18 DIAGNOSIS — E83.42 HYPOMAGNESEMIA: ICD-10-CM

## 2024-10-18 DIAGNOSIS — C31.0 MALIGNANT NEOPLASM OF MAXILLARY SINUS (H): ICD-10-CM

## 2024-10-21 RX ORDER — ELECTROLYTES/DEXTROSE
474 SOLUTION, ORAL ORAL 3 TIMES DAILY
Qty: 42660 ML | Refills: 11 | Status: DISPENSED | OUTPATIENT
Start: 2024-10-23 | End: 2025-10-23

## 2024-10-23 ENCOUNTER — MYC MEDICAL ADVICE (OUTPATIENT)
Dept: OTOLARYNGOLOGY | Facility: CLINIC | Age: 64
End: 2024-10-23
Payer: COMMERCIAL

## 2024-10-23 DIAGNOSIS — C30.0 MALIGNANT NEOPLASM OF NASAL CAVITIES (H): Primary | ICD-10-CM

## 2024-10-23 NOTE — TUMOR CONFERENCE
Head & Neck Tumor Conference Note   Status: Established    Staff: Dr. Iglesias and Dr. Jiang      Tumor Site: Left maxillary sinus/sinonasal cavity  Tumor Pathology: Squamous cell carcinoma  Tumor Stage: [id number]  Tumor Treatment:   - 1/8-2/27/24 Chemoradiation with weekly cisplatin.    Reason for Review: Review imaging, path, and POC    Brief History: This is a 63 year old male with a history of a left sinonasal carcinoma. 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 He has had two biopsies prior, the first showing inverted papilloma, the second demonstrating inverted papilloma as well. He was most recently taken back to the OR a third time for a larger resection and to establish a definitive diagnosis. He subsequently underwent definitive chemoradiation with cisplatin, completed in February 2024. 3 month post-treatment PET-CT and MRI were stable. He recently underwent biopsy of left maxillary sinus on 10/11/2024 due to concern for recurrence on recent PET/CT.      Pertinent PMH:   Past Medical History:   Diagnosis Date    Ascending aortic aneurysm (H)     Coronary artery disease     Depression     Gout     History of anesthesia complications     Hyperlipemia     Hypertension     Malignant neoplasm of nasal cavities (H)     PONV (postoperative nausea and vomiting)       Smoking Hx:   Social History     Tobacco Use    Smoking status: Former     Types: Cigarettes     Passive exposure: Past    Smokeless tobacco: Never   Substance Use Topics    Alcohol use: Not Currently     Comment: Occasionally    Drug use: No       Imaging:     PET Oncology Whole Body    Narrative  Combined Report of: PET and CT on 8/5/2024 1:28 PM:    1. PET of the neck, chest, abdomen, and pelvis.  2. PET CT Fusion for Attenuation Correction and Anatomical  Localization.  3. Diagnostic CT of the chest, abdomen and  pelvis with intravenous  contrast obtained for diagnostic interpretation.  4. 3D MIP and PET-CT fused images were processed on an independent  workstation and archived to PACS and reviewed by a radiologist.    Technique:    1. PET: The patient received 10.08 mCi of F-18-FDG. The serum glucose  was 101 mg/dL prior to administration. Body weight was 58.3 kg. Images  were evaluated in the axial, sagittal, and coronal planes as well as  the rotational whole body MIP. Images were acquired from cranial  vertex to feet.    UPTAKE WAS MEASURED AT 60 MINUTES.    2. CT: Volumetric acquisition for clinical interpretation of the  chest, abdomen, and pelvis acquired at 3 mm sections. The chest,  abdomen, and pelvis were evaluated at 5 mm sections in bone, soft  tissue, and lung windows. High resolution images of the neck were  obtained with multiple oblique projection reformats.    Contrast and Medications:  IV contrast: 78 mL of Isovue 370 intravenously.  PO contrast: 500 mL water  Additional Medications: None.    3. 3D MIP and PET-CT fused images were processed on an independent  workstation and archived to PACS and reviewed by a radiologist.    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  CT head 5/17/2024 demonstrating near resolution of the primary mass.  Status post chemoradiation. Follow-up CT PET.    COMPARISON: CT PET 5/17/2024. Multiple priors.    FINDINGS:    BACKGROUND: Liver SUV max = 6.63, Aorta Blood SUV max = 4.86.    -Right upper chest port catheter tip terminates at the cavoatrial  junction.  -Percutaneous gastrostomy tube with balloon inflated within the  gastric lumen.    HEAD/NECK:    Index tumor site:  -Surgical changes of left maxillary antrostomy, left turbinectomy, and  partial left ethmoidectomy.  -Continued circumferential mucosal thickening  surrounding the left  maxillary cavity, mild uptake.  -Overall increased FDG uptake centered along the posterior inferior  left maxillary sinus involving the  space, left  pterygopalatine fossa, and the left parapharyngeal space. This extends  inferiorly to involve the left side of maxilla. There is increased  soft tissue component within the space, and there is now broad FDG  uptake with maximal SUV 24.97 (1/126), previously 5.75 when measured  similarly.This area measures about 3.3 c m x 2.6 cm.  -Increased focal hypermetabolic activity in the posterior pharyngeal  wall and extending to adjacent left palatine tonsil mucosa, associated  focal mucosal thickening (1/129) max SUV 17.23 previously 7.25.  -Non avid necrotic component centered on the left  space/  medial pterygoid muscle. Laterally, the hypoattenuating non-FDG avid  mass measuring 2.7 x 2.4 cm (1/117) previously 2.1 x 2 cm, and  medially 1.1 x 1.5 cm (1/117) previously 1.1 x 1 cm.  -Continued thick mucosal thickening of the right maxillary sinus and  right ethmoid air cells, not avid.  -New focal area of FDG uptake along the right lateral oropharyngeal  wall (1/144) maximum SUV 9.07.  -Persistent nasopharyngeal mild mucosal uptake: mucositis.    Lymph nodes: Few hypermetabolic right cervical nodes, none  definitively enlarged, however displaying increased metabolic activity  compared to prior, max SUV 8.36 right level 1B, and right level 2A  node max SUV 7.29.  Salivary glands: Slightly atrophic, no mass.  Thyroid gland: The thyroid gland is within normal limits. Stable  coarse calcification of the right thyroid lobe.  Vascular structures: The major vasculature of the neck are patent.  Brain: No abnormal FDG avid lesion or abnormal enhancement.  Orbital cavities: No abnormal FDG avid lesion or abnormal enhancement  of the right orbit, left orbit as per above.      CHEST:  No abnormal FDG uptake within the chest.  -Coarse  calcifications of the aortic arch and multiple branch vessels,  demonstrating mild uptake, benign.    Lymph nodes: No avid nodes.    Lungs: The central tracheobronchial tree is clear. No acute  consolidation.  No pleural effusion or pneumothorax. Mild traction  fibrosis with mild uptake along the medial right apex posterior to  right subclavian artery. Basilar atelectasis/subpleural reticulations.  No suspicious or FDG avid pulmonary lesions. Several sub-6 mm  pulmonary nodules, none FDG avid. For example lingula groundglass  nodule measuring 3.4 x 2.6 mm, and right middle lobe solid nodule  measuring 2.4 x 3.1 mm (10/49).    Heart and great vessels: Heart size is within normal limits. No  pericardial effusion. Dense coronary artery calcifications. The  thoracic aorta and main pulmonary artery are within normal limits.    Mild metabolic uptake along the distal esophagus which may be seen  with reflux disease.    ABDOMEN AND PELVIS:  -No suspicious or abnormal FDG uptake within the abdomen or pelvis.  -Focal hypermetabolic activity surrounding the percutaneous  gastrostomy tube site, increased from prior exam max SUV 23.15. Likely  representing localized inflammation.    Liver: No FDG avid lesion. No intrahepatic or extrahepatic biliary  ductal dilatation. Mild reservoir effect. Gallbladder is surgically  absent.  Pancreas: The pancreas is within normal limits. No pancreatic ductal  dilatation.  Spleen: No FDG avid lesion.  Adrenal glands: No FDG avid foci.  Kidneys: No FDG avid lesion. No hydronephrosis. The urinary bladder is  unremarkable.  Reproductive organs are within normal limits.  Gastrointestinal system: Normal caliber of the small and large bowel.  Percutaneous gastrostomy tube.  Lymph nodes: No FDG avid or abnormally enlarged lymph nodes.  Vascular structures: Normal caliber of the abdominal aorta.  No free air, free fluid, or fluid collection. Calcification within the  low anterior peritoneum (3/497)  likely peritoneal mouse.      EXTREMITIES:  No abnormal FDG uptake in the visualized extremities.      BONES AND SOFT TISSUES:  No abnormal FDG uptake in the skeleton. No abnormal lytic or blastic  osseous lesions. Midline sternotomy. Mild posterior lumbar  spondylosis.      SPINAL CANAL:  No evident canal compromise. No abnormal FDG avid lesion.    Impression  IMPRESSION:  In this patient with a history of squamous cell carcinoma of the head  and neck, there is evidence for disease progression.    1. Increased enhancing soft tissue density with intensely increased  hypermetabolic uptake compared prior CT PET 5/17/2024 centered on the  posterior maxilla involving the  and parapharyngeal space  concerning for recurrent tumor. Primary: NI-RADS 3.    2. Two additional mucosal areas with focal uptake:  -Left palatine tonsil medial/ posterior mucosa extending posterior  pharyngeal wall, associated mucosal thickening. Correlation with  physical exam and biopsy is recommended  -Right lateral oropharyngeal wall. Correlation with physical exam is  recommended.  .  3. Right level 1b node with moderate-high uptake and right level 2  nodes with moderate uptake though not enlarged are worrisome. Neck:  NI-RADS 2.  4. No definitive evidence for metastatic disease within the chest,  abdomen, or pelvis.  5. Focal uptake surrounding the percutaneous gastrostomy tube site,  increased since prior. Likely inflammation, may be related to local  infection, correlate with clinical exam.    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     COMPARISON: 5/17/2024 dated MRI.     Technique: Sagittal and coronal T1-weighted and axial T2-weighted  images with fat saturation of the face and nasopharynx from the roofs  of the  orbits through the base of C2 were obtained without intravenous  contrast. Following intravenous administration of gadolinium, axial  and coronal T1-weighted images with fat saturation of the face were  also obtained.     Contrast: 6mL Gadavist     Findings: .  Surgical changes of left maxillary antrostomy, left turbinectomy, and  partial left ethmoidectomy. Heterogeneous signal soft tissue mass  centered in the residual left maxillary sinus floor and left lateral  maxilla with T1 hypointensity, T2 areas of hypo and hyperintensity and  post gadolinium heterogenous enhancement. There are some areas of T1  hyperintensity within some cystic components suggesting proteinaceous  material. Overall this soft tissue mass extends to the left  retromaxillary fat, left pterygopalatine fossa, inferiorly to involve  the left lateral aspect of the maxilla, left  space  including the left pterygoid muscle and posteriorly extending to the  nasopharyngeal submucosal region, inferior medially extending to the  left posterior soft palate. Some of these areas correspond intense FDG  uptake on the same day PET CT suggestive of recurrent tumor.     Complete opacification of left mastoid is cells and middle ear cavity,  partial opacification of the right mastoid air cells compatible with  inflammatory changes/secretions.      T1 hypointense, T2 hyperintense and enhancing inflammatory changes in  the right maxillary sinus, right ethmoid air cells, right greater than  left sphenoid locules.     Corresponding to 2 foci of uptake along the mucosa, one along the  right lateral oropharyngeal wall and a second one along the left  posterior-lateral oropharyngeal wall extends to adjacent left palatine  tonsillar mucosa there is mucosal enhancement.     Orbital cavity contents are normal.     Meckel's caves are symmetric and normal. Very subtle asymmetric  thickening of the left V2 along the foramen rotundum (series 11, image  20). There  is trace dural enhancement along the base of the left  temporal lobe (series 11, image 21). No brain parenchymal edema.     T2 hyperintensity and enhancement of the left temporalis muscle is  likely due to denervation changes.  Mandible marrow signal is normal.   Hypopharyngeal and laryngeal structures demonstrate no significant  abnormality.   Trace retropharyngeal edema.      Oral cavity contents including tongue is normal.      Parotid glands and submandibular glands are atrophic without mass  lesion. In the right submandibular region there is a 6 mm lymph node.  No other enlarged suspicious nodes.                                                                      Impression:      Prior surgical changes of left maxillary antrectomy, partial  ethmoidectomy, turbinectomy and sinonasal mass resection.  Heterogeneous mass along the base of left maxillary sinus extending to  the maxilla,  space, left pterygopalatine fossa and  parapharyngeal space. Some of these areas demonstrate treated areas  however some of these correspond to the FDG avidity on the same day  PET and highly suspicious tumor recurrence.   Question of early left V2 involvement and thin dural enhancement along  the base of the left temporal lobe.       Pathology:   10/11/2024 OR pathology left maxillary sinus biopsy  Final Diagnosis   A. 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 LEFT MAXILLARY SINUS, BIOPSY:  - SQUAMOUS CELL CARCINOMA  arising from an inverted sinonasal papilloma  - Background of fibrosis and acute and chronic inflammation      C. LEFT LATERAL MAXILLARY SINUS, BIOPSY:  - DETACHED FRAGMENTS OF SQUAMOUS CELL CARCINOMA  - Background of fibrosis and acute and chronic inflammation      D. LEFT LATERAL MAXILLARY SINUS, BIOPSY:  - SQUAMOUS CELL CARCINOMA, SUSPICIOUS FOR INVASION  - Background of fibrosis, granulation tissue, and occasional dystrophic calcification        Tumor Board discussion: We reviewed the PET/CT and MRI which demonstrated FDG avid multicomponent multicystic left maxillary sinus mass.  With her prior hemorrhagic changes.  The FDG avid areas are infiltrative and there is no clear margin.  There are destructive bony changes on CT.  There is tumor in the retromaxillary fat.  There is also FDG avidity in the retropharyngeal and parapharyngeal spaces.  Question the possibility of dissection with free flap reconstruction knowing that there will likely be positive margins.  He is not a candidate for further radiation.  Ultimate decision will be based on updated PET scan    Plan:     Repeat PET/CT    Shawn Goodrich MD  Otolaryngology- Head and Neck Surgery, PGY-3    Documentation / Disclaimer Cancer Tumor Board Note: Cancer tumor board recommendations do not override what is determined to be reasonable care and treatment, which is dependent on the circumstances of a patient's case; the patient's medical, social, and personal concerns; and the clinical judgment of the oncologist [physician].

## 2024-10-25 ENCOUNTER — TELEPHONE (OUTPATIENT)
Dept: OTOLARYNGOLOGY | Facility: CLINIC | Age: 64
End: 2024-10-25

## 2024-10-25 ENCOUNTER — TUMOR CONFERENCE (OUTPATIENT)
Dept: ONCOLOGY | Facility: CLINIC | Age: 64
End: 2024-10-25
Payer: COMMERCIAL

## 2024-10-25 LAB
INTERPRETATION: NORMAL
SIGNIFICANT RESULTS: NORMAL
SPECIMEN DESCRIPTION: NORMAL
TEST DETAILS, MDL: NORMAL

## 2024-10-25 NOTE — TELEPHONE ENCOUNTER
Called and left voicemail for patient's son (Tulio) regarding scheduling updated imaging. Direct callback number and imaging scheduling number left in voicemail.    Carly Monique, RN, BSN  RN Care Coordinator, [facility]

## 2024-10-25 NOTE — TUMOR CONFERENCE
Called and left voicemail for patient's son (Tulio) regarding scheduling updated imaging. Direct callback number left in voicemail.     Carly Monique, RN, BSN  RN Care Coordinator, [facility]

## 2024-10-26 NOTE — TELEPHONE ENCOUNTER
Called Mr Herrera's son Tulio today to discuss tumor board recommendations. I told him that we will be sending the tissue from the last biopsy for NGS and PDL-1. Also, told him about updating the MRI sinus because his lats MRI is from August. I discussed with him the possibility of surgery and the high probability of not getting negative margins. He agrees with the plan.

## 2024-10-28 LAB
PATH REPORT.ADDENDUM SPEC: ABNORMAL
PATH REPORT.COMMENTS IMP SPEC: ABNORMAL
PATH REPORT.COMMENTS IMP SPEC: ABNORMAL
PATH REPORT.COMMENTS IMP SPEC: YES
PATH REPORT.FINAL DX SPEC: ABNORMAL
PATH REPORT.GROSS SPEC: ABNORMAL
PATH REPORT.INTRAOP OBS SPEC DOC: ABNORMAL
PATH REPORT.MICROSCOPIC SPEC OTHER STN: ABNORMAL
PATH REPORT.RELEVANT HX SPEC: ABNORMAL
PHOTO IMAGE: ABNORMAL

## 2024-10-28 PROCEDURE — 88360 TUMOR IMMUNOHISTOCHEM/MANUAL: CPT | Mod: 26 | Performed by: STUDENT IN AN ORGANIZED HEALTH CARE EDUCATION/TRAINING PROGRAM

## 2024-10-28 PROCEDURE — G0452 MOLECULAR PATHOLOGY INTERPR: HCPCS | Mod: 26 | Performed by: PATHOLOGY

## 2024-10-28 NOTE — TELEPHONE ENCOUNTER
Called and spoke to patient's son (Tulio) regarding imaging ordered by Dr. Iglesias. Scheduled for 10/30/24 at [facility] [place]. Tulio aware we will call him with results and next steps.     Carly Monique, RN, BSN  RN Care Coordinator, [facility]

## 2024-10-29 ENCOUNTER — ANCILLARY PROCEDURE (OUTPATIENT)
Dept: CT IMAGING | Facility: CLINIC | Age: 64
End: 2024-10-29
Attending: OTOLARYNGOLOGY
Payer: COMMERCIAL

## 2024-10-29 ENCOUNTER — ANCILLARY PROCEDURE (OUTPATIENT)
Dept: MRI IMAGING | Facility: CLINIC | Age: 64
End: 2024-10-29
Attending: OTOLARYNGOLOGY
Payer: COMMERCIAL

## 2024-10-29 DIAGNOSIS — C30.0 MALIGNANT NEOPLASM OF NASAL CAVITIES (H): ICD-10-CM

## 2024-10-29 PROCEDURE — 70543 MRI ORBT/FAC/NCK W/O &W/DYE: CPT | Mod: GC | Performed by: RADIOLOGY

## 2024-10-29 PROCEDURE — A9585 GADOBUTROL INJECTION: HCPCS | Performed by: RADIOLOGY

## 2024-10-29 PROCEDURE — 71260 CT THORAX DX C+: CPT | Mod: GC | Performed by: RADIOLOGY

## 2024-10-29 RX ORDER — GADOBUTROL 604.72 MG/ML
7.5 INJECTION INTRAVENOUS ONCE
Status: COMPLETED | OUTPATIENT
Start: 2024-10-29 | End: 2024-10-29

## 2024-10-29 RX ORDER — HEPARIN SODIUM (PORCINE) LOCK FLUSH IV SOLN 100 UNIT/ML 100 UNIT/ML
500 SOLUTION INTRAVENOUS ONCE
Status: ACTIVE | OUTPATIENT
Start: 2024-10-29

## 2024-10-29 RX ORDER — IOPAMIDOL 755 MG/ML
67 INJECTION, SOLUTION INTRAVASCULAR ONCE
Status: COMPLETED | OUTPATIENT
Start: 2024-10-29 | End: 2024-10-29

## 2024-10-29 RX ADMIN — GADOBUTROL 6 ML: 604.72 INJECTION INTRAVENOUS at 09:45

## 2024-10-29 RX ADMIN — IOPAMIDOL 67 ML: 755 INJECTION, SOLUTION INTRAVASCULAR at 09:01

## 2024-10-29 NOTE — TUMOR CONFERENCE
Spoke to patient's son to schedule imaging.     Carly Monique, RN, BSN  RN Care Coordinator, [facility]

## 2024-10-29 NOTE — DISCHARGE INSTRUCTIONS

## 2024-10-30 ENCOUNTER — OFFICE VISIT (OUTPATIENT)
Dept: CARDIOLOGY | Facility: CLINIC | Age: 64
End: 2024-10-30
Payer: COMMERCIAL

## 2024-10-30 VITALS
SYSTOLIC BLOOD PRESSURE: 122 MMHG | BODY MASS INDEX: 22.83 KG/M2 | RESPIRATION RATE: 14 BRPM | WEIGHT: 133 LBS | DIASTOLIC BLOOD PRESSURE: 74 MMHG | HEART RATE: 76 BPM

## 2024-10-30 DIAGNOSIS — E78.2 MIXED HYPERLIPIDEMIA: Primary | ICD-10-CM

## 2024-10-30 DIAGNOSIS — Z95.1 S/P CABG (CORONARY ARTERY BYPASS GRAFT): ICD-10-CM

## 2024-10-30 DIAGNOSIS — I25.10 CORONARY ARTERY DISEASE INVOLVING NATIVE CORONARY ARTERY OF NATIVE HEART WITHOUT ANGINA PECTORIS: ICD-10-CM

## 2024-10-30 LAB
ATRIAL RATE - MUSE: 66 BPM
DIASTOLIC BLOOD PRESSURE - MUSE: NORMAL MMHG
INTERPRETATION ECG - MUSE: NORMAL
P AXIS - MUSE: 36 DEGREES
PR INTERVAL - MUSE: 204 MS
QRS DURATION - MUSE: 96 MS
QT - MUSE: 424 MS
QTC - MUSE: 444 MS
R AXIS - MUSE: 66 DEGREES
SYSTOLIC BLOOD PRESSURE - MUSE: NORMAL MMHG
T AXIS - MUSE: 23 DEGREES
VENTRICULAR RATE- MUSE: 66 BPM

## 2024-10-30 PROCEDURE — 93000 ELECTROCARDIOGRAM COMPLETE: CPT | Performed by: INTERNAL MEDICINE

## 2024-10-30 PROCEDURE — G2211 COMPLEX E/M VISIT ADD ON: HCPCS | Performed by: INTERNAL MEDICINE

## 2024-10-30 PROCEDURE — 99214 OFFICE O/P EST MOD 30 MIN: CPT | Performed by: INTERNAL MEDICINE

## 2024-10-30 RX ORDER — NITROGLYCERIN 0.4 MG/1
0.4 TABLET SUBLINGUAL EVERY 5 MIN PRN
Qty: 25 TABLET | Refills: 3 | Status: SHIPPED | OUTPATIENT
Start: 2024-10-30

## 2024-10-30 NOTE — PATIENT INSTRUCTIONS
Continue your regular exercise regimen.  There is no substitute.  Your EKG looks normal today.  No further testing is required prior to planned surgical treatment.

## 2024-10-30 NOTE — PROGRESS NOTES
CARDIOLOGY CLINIC CONSULT NOTE     Assessment/Plan:   1.  Coronary artery disease, status post three-vessel bypass revascularization with ascending aortic aneurysm repair 2019.  ECG normal with no recurrent anginal symptoms, stable exercise capacity.  Low risk surgery from cardiovascular standpoint.  No further testing required prior to surgical procedures at this time.  2.  Mixed hyperlipidemia with excellent control on high-dose atorvastatin.    Follow-up 1 year     History of Present Illness:     It is my pleasure to see Douglas Herrera at the [facility] clinic for evaluation of coronary artery disease, preop assessment.    Douglas Herrera is a 64 year old male with a past medical history of hypertension, hyperlipidemia, coronary artery disease status post three-vessel bypass revascularization along with ascending aortic aneurysm repair 4/2019.  Postoperatively he developed pulmonary emboli requiring a course of treatment with Xarelto.  I last saw him in May 2021 at which time he was doing well with no anginal symptoms.  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 His weight is down 35 pounds from when I saw him.  Echocardiogram in December showed normal left ventricular size and systolic function,  With no significant valvular abnormalities.    He reports that he is eating and his weight is recovering, but still supplements his nutrition using the feeding tube.  He has had no chest pain or shortness of breath.  He is back doing his 3 mile walks without any difficulties at least twice a week.  He did note some fatigue after chemo, in recent months this has resolved.  More surgical debridement is planned.    He has had no palpitations.  His blood pressure has been well-controlled off of medications.  Recent lipid levels showed an LDL of 46.  He has stopped smoking.      Past Medical  History:     Patient Active Problem List   Diagnosis    Abnormal cardiovascular stress test    Thoracic ascending aortic aneurysm (H)    Precordial pain    Unstable angina (H)    S/P CABG (coronary artery bypass graft)    ARF (acute respiratory failure) (H)    Coronary artery disease involving native coronary artery    Ascending aorta dilatation (H)    Anxiety    Hospital-acquired pneumonia    Hypoxemia    Acute idiopathic gout of right foot    Idiopathic hypertension    PE (pulmonary thromboembolism) (H)    Mixed hyperlipidemia    Epistaxis    Mass of sinus    Squamous cell carcinoma of maxillary sinus (H)    Hypomagnesemia    Hepatitis B, chronic (H)    Hard to intubate       Past Surgical History:     Past Surgical History:   Procedure Laterality Date    AORTA SURGERY N/A 4/23/2019    Procedure: WITH ASCENDING AORTA REPLACEMENT;  Surgeon: Darlene Graff MD;  Location: [facility] OR;  Service: Cardiovascular    BYPASS GRAFT ARTERY CORONARY      CARDIAC SURGERY      CV CORONARY ANGIOGRAM N/A 3/12/2019    Procedure: Coronary Angiogram;  Surgeon: Hamilton Lucero MD;  Location: [facility] Cath Lab;  Service: Cardiology    ENDOSCOPIC SINUS SURGERY Left 11/10/2023    Procedure: Transnasal endoscopic approach to biopsy left nasal mass;  Surgeon: Damon Regan MD;  Location: [facility] OR    GALLBLADDER SURGERY      INSERT PORT VASCULAR ACCESS N/A 1/11/2024    Procedure: Insert port vascular access;  Surgeon: Tyrel Silvestre MD;  Location: [facility] OR    IR CAROTID CEREBRAL ANGIOGRAM BILATERAL  11/30/2023    IR CHEST PORT PLACEMENT > 5 YRS OF AGE  1/11/2024    IR FACIAL EMBOLIZATION LEFT  9/13/2023    IR GASTROSTOMY TUBE PERCUTANEOUS PLCMNT  1/2/2024    OPTICAL TRACKING SYSTEM ENDOSCOPIC SINUS SURGERY Left 12/1/2023    Procedure: Stealth assisted transnasal endoscopic approach to excise left sinonasal mass;  Surgeon: Damon Regan MD;  Location: [facility] OR    RESECT TUMOR SINUS Left  10/11/2024    Procedure: Transnasal Biopsy of Left Maxillary Sinus Tumor,;  Surgeon: Damon Regan MD;  Location:  OR       Family History:     Family History   Problem Relation Age of Onset    Cerebrovascular Disease Mother 90    Acute Myocardial Infarction No family hx of     Anesthesia Reaction No family hx of      Family history reviewed and is not pertinent to the chief complaint or presenting problem    Social History:    reports that he has quit smoking. His smoking use included cigarettes. He has been exposed to tobacco smoke. He has never used smokeless tobacco. He reports that he does not currently use alcohol. He reports that he does not use drugs.    Exercise: Walks 3 miles twice a week with stable activity tolerance.  Up-and-down stairs at home without a problem restorative supine    Sleep: Restorative    Meds:     Current Outpatient Medications   Medication Sig Dispense Refill    acetaminophen (TYLENOL) 325 MG tablet Take 2 tablets (650 mg) by mouth every 4 hours as needed for other or pain 50 tablet 0    atorvastatin (LIPITOR) 80 MG tablet TAKE 1 TABLET (80 MG TOTAL) BY MOUTH AT BEDTIME/ TXHUA HMO NOJ 1 LUB TSHUAJ THAUM MUS PW PAB ZOO NTSHAV MUAJ ROJ 90 tablet 3    capsaicin (ZOSTRIX) 0.025 % external cream Apply topically 3 times daily as needed      entecavir (BARACLUDE) 0.5 MG tablet Take 0.5 mg by mouth every morning Take 1 hour before a meal or 2 hours after a meal.      magic mouthwash (ENTER INGREDIENTS IN COMMENTS) suspension Take 10 mLs by mouth every 4 hours as needed (mucositis) 240 mL 1    nitroGLYcerin (NITROSTAT) 0.4 MG sublingual tablet Place 0.4 mg under the tongue every 5 minutes as needed      ondansetron (ZOFRAN ODT) 4 MG ODT tab Take 1 tablet (4 mg) by mouth every 8 hours as needed for nausea. 4 tablet 0    senna-docusate (SENNA S) 8.6-50 MG tablet Take 1 tablet by mouth 2 times daily as needed for constipation 30 tablet 1    sodium chloride (OCEAN) 0.65 % nasal  spray Spray 2 sprays in nostril daily as needed for congestion 88 mL 3    aspirin 81 MG EC tablet Take 1 tablet (81 mg) by mouth daily (Patient not taking: Reported on 10/30/2024) 90 tablet 3    Osmolite 1.5 Migue 237 mL Place 474 mLs into G tube 3 times daily. Infuse via gravity bag. 2 cartons TID spread 3-5 hours apart.   Water flush: 30-60 mL before and after each feeding. + 120 mL 4 times daily for hydration. (Patient not taking: Reported on 10/30/2024) 74641 mL 11    traZODone (DESYREL) 50 MG tablet Take 50 mg by mouth nightly as needed for sleep (Patient not taking: Reported on 10/30/2024)         Allergies:   Patient has no known allergies.    Review of Systems:     Positive for hearing loss, weight loss.  12 point review of systems otherwise negative     Please refer above for cardiac ROS details.       Objective:      Physical Exam  60.3 kg (133 lb)     Body mass index is 22.83 kg/m .  /74 (BP Location: Right arm, Patient Position: Sitting, Cuff Size: Adult Regular)   Pulse 76   Resp 14   Wt 60.3 kg (133 lb)   BMI 22.83 kg/m          General Appearance : Awake, Alert, No acute distress  HEENT: No Scleral icterus; the mucous membranes were pink and moist.  Conjunctivae not injected  Neck:  No cervical bruits, jugular venous distention, or thyromegaly.   erythema noted  Chest: The spine was straight. Chest wall incision well-healed without instability.  Chemo-Port right subclavicular region  Lungs: Respirations unlabored; the lungs are clear to auscultation.  No wheezing   Cardiovascular:   Normal point of maximal impulse.  Auscultation reveals regular first and second heart sounds with no murmurs, rubs, or gallops.  Carotid, radial, and dorsalis pedal pulses are intact and symmetric.  Abdomen: No organomegaly, masses, bruits, or tenderness. Bowels sounds are present.  Feeding tube in place  Extremities: No edema  Skin: No xanthelasma. Warm, Dry.  Musculoskeletal: No tenderness.  Neurologic: Alert and  oriented ×3. Speech is fluent.      EKG (personally reviewed):  12/2/2023 : Sinus rhythm 59 bpm.  Nonspecific anterior T wave changes new versus 2019.  Today: Normal sinus rhythm 66 bpm.  Normal ECG    Cardiac Imaging Studies:  Echocardiogram 12/2023:  Global and regional left ventricular function is normal with an EF of 60-65%.  Global right ventricular function is normal.  Mild tricuspid insufficiency is present.  Pulmonary artery systolic pressure is normal.  Estimated mean right atrial pressure is normal.  No pericardial effusion is present.     Lab Results    Chemistry/lipid CBC Cardiac Enzymes/BNP/TSH/INR   Recent Labs   Lab Test 08/14/24  1325   CHOL 113   HDL 45   LDL 46   TRIG 108     Recent Labs   Lab Test 08/14/24  1325 05/13/22  1450 11/16/21  1009   LDL 46 33 42     Recent Labs   Lab Test 08/14/24  1325      POTASSIUM 4.2   CHLORIDE 102   CO2 26   *   BUN 27.7*   CR 0.96   GFRESTIMATED 88   FLORENCE 9.6     Recent Labs   Lab Test 08/14/24  1325 08/05/24  1201 08/05/24  1157   CR 0.96 0.9 0.91     Recent Labs   Lab Test 04/25/19  0423   A1C 5.2          Recent Labs   Lab Test 08/05/24  1157   WBC 6.0   HGB 12.2*   HCT 37.9*   MCV 90        Recent Labs   Lab Test 08/05/24  1157 06/28/24  1203 05/23/24  1443   HGB 12.2* 11.8* 11.9*    Recent Labs   Lab Test 05/08/19 2001   TROPONINI 0.01     Recent Labs   Lab Test 05/08/19 2001   *     Recent Labs   Lab Test 08/05/24  1157   TSH 4.07     Recent Labs   Lab Test 01/02/24  0822 09/12/23  2144 04/24/19  0410   INR 1.22* 1.01 1.40*          Qamar Hernandez MD, MD FACC      This note created using Dragon voice recognition software. Sound alike errors may have escaped editing.

## 2024-10-30 NOTE — LETTER
10/30/2024    Mira Leung, NP  [address] [place] [address]    RE: Douglas Herrera       Dear Colleague,     I had the pleasure of seeing Douglas Herrera in the [facility].    CARDIOLOGY CLINIC CONSULT NOTE     Assessment/Plan:   1.  Coronary artery disease, status post three-vessel bypass revascularization with ascending aortic aneurysm repair 2019.  ECG normal with no recurrent anginal symptoms, stable exercise capacity.  Low risk surgery from cardiovascular standpoint.  No further testing required prior to surgical procedures at this time.  2.  Mixed hyperlipidemia with excellent control on high-dose atorvastatin.    Follow-up 1 year     History of Present Illness:     It is my pleasure to see Douglas Herrera at the [facility] for evaluation of coronary artery disease, preop assessment.    Douglas Herrera is a 64 year old male with a past medical history of hypertension, hyperlipidemia, coronary artery disease status post three-vessel bypass revascularization along with ascending aortic aneurysm repair 4/2019.  Postoperatively he developed pulmonary emboli requiring a course of treatment with Xarelto.  I last saw him in May 2021 at which time he was doing well with no anginal symptoms.  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 His weight is down 35 pounds from when I saw him.  Echocardiogram in December showed normal left ventricular size and systolic function,  With no significant valvular abnormalities.    He reports that he is eating and his weight is recovering, but still supplements his nutrition using the feeding tube.  He has had no chest pain or shortness of breath.  He is back doing his 3 mile walks without any difficulties at least twice a week.  He did note some fatigue after chemo, in recent months this has resolved.  More surgical debridement  is planned.    He has had no palpitations.  His blood pressure has been well-controlled off of medications.  Recent lipid levels showed an LDL of 46.  He has stopped smoking.      Past Medical History:     Patient Active Problem List   Diagnosis     Abnormal cardiovascular stress test     Thoracic ascending aortic aneurysm (H)     Precordial pain     Unstable angina (H)     S/P CABG (coronary artery bypass graft)     ARF (acute respiratory failure) (H)     Coronary artery disease involving native coronary artery     Ascending aorta dilatation (H)     Anxiety     Hospital-acquired pneumonia     Hypoxemia     Acute idiopathic gout of right foot     Idiopathic hypertension     PE (pulmonary thromboembolism) (H)     Mixed hyperlipidemia     Epistaxis     Mass of sinus     Squamous cell carcinoma of maxillary sinus (H)     Hypomagnesemia     Hepatitis B, chronic (H)     Hard to intubate       Past Surgical History:     Past Surgical History:   Procedure Laterality Date     AORTA SURGERY N/A 4/23/2019    Procedure: WITH ASCENDING AORTA REPLACEMENT;  Surgeon: Darlene Graff MD;  Location: [facility] OR;  Service: Cardiovascular     BYPASS GRAFT ARTERY CORONARY       CARDIAC SURGERY       CV CORONARY ANGIOGRAM N/A 3/12/2019    Procedure: Coronary Angiogram;  Surgeon: Hamilton Lucero MD;  Location: [facility] Cath Lab;  Service: Cardiology     ENDOSCOPIC SINUS SURGERY Left 11/10/2023    Procedure: Transnasal endoscopic approach to biopsy left nasal mass;  Surgeon: Damon Regan MD;  Location: [facility] OR     GALLBLADDER SURGERY       INSERT PORT VASCULAR ACCESS N/A 1/11/2024    Procedure: Insert port vascular access;  Surgeon: Tyrel Silvestre MD;  Location: [facility] OR     IR CAROTID CEREBRAL ANGIOGRAM BILATERAL  11/30/2023     IR CHEST PORT PLACEMENT > 5 YRS OF AGE  1/11/2024     IR FACIAL EMBOLIZATION LEFT  9/13/2023     IR GASTROSTOMY TUBE PERCUTANEOUS PLCMNT  1/2/2024     OPTICAL TRACKING  SYSTEM ENDOSCOPIC SINUS SURGERY Left 12/1/2023    Procedure: Stealth assisted transnasal endoscopic approach to excise left sinonasal mass;  Surgeon: Damon Regan MD;  Location: [facility] OR     RESECT TUMOR SINUS Left 10/11/2024    Procedure: Transnasal Biopsy of Left Maxillary Sinus Tumor,;  Surgeon: Damon Regan MD;  Location: [facility] OR       Family History:     Family History   Problem Relation Age of Onset     Cerebrovascular Disease Mother 90     Acute Myocardial Infarction No family hx of      Anesthesia Reaction No family hx of      Family history reviewed and is not pertinent to the chief complaint or presenting problem    Social History:    reports that he has quit smoking. His smoking use included cigarettes. He has been exposed to tobacco smoke. He has never used smokeless tobacco. He reports that he does not currently use alcohol. He reports that he does not use drugs.    Exercise: Walks 3 miles twice a week with stable activity tolerance.  Up-and-down stairs at home without a problem restorative supine    Sleep: Restorative    Meds:     Current Outpatient Medications   Medication Sig Dispense Refill     acetaminophen (TYLENOL) 325 MG tablet Take 2 tablets (650 mg) by mouth every 4 hours as needed for other or pain 50 tablet 0     atorvastatin (LIPITOR) 80 MG tablet TAKE 1 TABLET (80 MG TOTAL) BY MOUTH AT BEDTIME/ TXHUA HMO NOJ 1 LUB TSHUAJ THAUM MUS PW PAB ZOO NTSHAV MUAJ ROJ 90 tablet 3     capsaicin (ZOSTRIX) 0.025 % external cream Apply topically 3 times daily as needed       entecavir (BARACLUDE) 0.5 MG tablet Take 0.5 mg by mouth every morning Take 1 hour before a meal or 2 hours after a meal.       magic mouthwash (ENTER INGREDIENTS IN COMMENTS) suspension Take 10 mLs by mouth every 4 hours as needed (mucositis) 240 mL 1     nitroGLYcerin (NITROSTAT) 0.4 MG sublingual tablet Place 0.4 mg under the tongue every 5 minutes as needed       ondansetron (ZOFRAN ODT) 4 MG ODT  tab Take 1 tablet (4 mg) by mouth every 8 hours as needed for nausea. 4 tablet 0     senna-docusate (SENNA S) 8.6-50 MG tablet Take 1 tablet by mouth 2 times daily as needed for constipation 30 tablet 1     sodium chloride (OCEAN) 0.65 % nasal spray Spray 2 sprays in nostril daily as needed for congestion 88 mL 3     aspirin 81 MG EC tablet Take 1 tablet (81 mg) by mouth daily (Patient not taking: Reported on 10/30/2024) 90 tablet 3     Osmolite 1.5 Migue 237 mL Place 474 mLs into G tube 3 times daily. Infuse via gravity bag. 2 cartons TID spread 3-5 hours apart.   Water flush: 30-60 mL before and after each feeding. + 120 mL 4 times daily for hydration. (Patient not taking: Reported on 10/30/2024) 13114 mL 11     traZODone (DESYREL) 50 MG tablet Take 50 mg by mouth nightly as needed for sleep (Patient not taking: Reported on 10/30/2024)         Allergies:   Patient has no known allergies.    Review of Systems:     Positive for hearing loss, weight loss.  12 point review of systems otherwise negative     Please refer above for cardiac ROS details.       Objective:      Physical Exam  60.3 kg (133 lb)     Body mass index is 22.83 kg/m .  /74 (BP Location: Right arm, Patient Position: Sitting, Cuff Size: Adult Regular)   Pulse 76   Resp 14   Wt 60.3 kg (133 lb)   BMI 22.83 kg/m          General Appearance : Awake, Alert, No acute distress  HEENT: No Scleral icterus; the mucous membranes were pink and moist.  Conjunctivae not injected  Neck:  No cervical bruits, jugular venous distention, or thyromegaly.   erythema noted  Chest: The spine was straight. Chest wall incision well-healed without instability.  Chemo-Port right subclavicular region  Lungs: Respirations unlabored; the lungs are clear to auscultation.  No wheezing   Cardiovascular:   Normal point of maximal impulse.  Auscultation reveals regular first and second heart sounds with no murmurs, rubs, or gallops.  Carotid, radial, and dorsalis pedal pulses  are intact and symmetric.  Abdomen: No organomegaly, masses, bruits, or tenderness. Bowels sounds are present.  Feeding tube in place  Extremities: No edema  Skin: No xanthelasma. Warm, Dry.  Musculoskeletal: No tenderness.  Neurologic: Alert and oriented ×3. Speech is fluent.      EKG (personally reviewed):  12/2/2023 : Sinus rhythm 59 bpm.  Nonspecific anterior T wave changes new versus 2019.  Today: Normal sinus rhythm 66 bpm.  Normal ECG    Cardiac Imaging Studies:  Echocardiogram 12/2023:  Global and regional left ventricular function is normal with an EF of 60-65%.  Global right ventricular function is normal.  Mild tricuspid insufficiency is present.  Pulmonary artery systolic pressure is normal.  Estimated mean right atrial pressure is normal.  No pericardial effusion is present.     Lab Results    Chemistry/lipid CBC Cardiac Enzymes/BNP/TSH/INR   Recent Labs   Lab Test 08/14/24  1325   CHOL 113   HDL 45   LDL 46   TRIG 108     Recent Labs   Lab Test 08/14/24  1325 05/13/22  1450 11/16/21  1009   LDL 46 33 42     Recent Labs   Lab Test 08/14/24  1325      POTASSIUM 4.2   CHLORIDE 102   CO2 26   *   BUN 27.7*   CR 0.96   GFRESTIMATED 88   FLORENCE 9.6     Recent Labs   Lab Test 08/14/24  1325 08/05/24  1201 08/05/24  1157   CR 0.96 0.9 0.91     Recent Labs   Lab Test 04/25/19  0423   A1C 5.2          Recent Labs   Lab Test 08/05/24  1157   WBC 6.0   HGB 12.2*   HCT 37.9*   MCV 90        Recent Labs   Lab Test 08/05/24  1157 06/28/24  1203 05/23/24  1443   HGB 12.2* 11.8* 11.9*    Recent Labs   Lab Test 05/08/19 2001   TROPONINI 0.01     Recent Labs   Lab Test 05/08/19 2001   *     Recent Labs   Lab Test 08/05/24  1157   TSH 4.07     Recent Labs   Lab Test 01/02/24  0822 09/12/23  2144 04/24/19  0410   INR 1.22* 1.01 1.40*          Qamar Hernandez MD, MD FACC      This note created using Dragon voice recognition software. Sound alike errors may have escaped editing.        Thank you  for allowing me to participate in the care of your patient.      Sincerely,     Qamar Hernandez MD     [facility]  cc:   Qamar Hernandez MD  [address] [address]

## 2024-10-31 ENCOUNTER — ONCOLOGY VISIT (OUTPATIENT)
Dept: ONCOLOGY | Facility: CLINIC | Age: 64
End: 2024-10-31
Attending: INTERNAL MEDICINE
Payer: COMMERCIAL

## 2024-10-31 ENCOUNTER — APPOINTMENT (OUTPATIENT)
Dept: LAB | Facility: CLINIC | Age: 64
End: 2024-10-31
Attending: INTERNAL MEDICINE
Payer: COMMERCIAL

## 2024-10-31 VITALS
OXYGEN SATURATION: 99 % | DIASTOLIC BLOOD PRESSURE: 79 MMHG | RESPIRATION RATE: 18 BRPM | HEART RATE: 71 BPM | TEMPERATURE: 97.7 F | BODY MASS INDEX: 23.14 KG/M2 | SYSTOLIC BLOOD PRESSURE: 122 MMHG | WEIGHT: 134.8 LBS

## 2024-10-31 DIAGNOSIS — C31.0 SQUAMOUS CELL CARCINOMA OF MAXILLARY SINUS (H): ICD-10-CM

## 2024-10-31 DIAGNOSIS — Z93.1 G TUBE FEEDINGS (H): ICD-10-CM

## 2024-10-31 DIAGNOSIS — E03.8 SUBCLINICAL HYPOTHYROIDISM: ICD-10-CM

## 2024-10-31 DIAGNOSIS — R25.2 TRISMUS: ICD-10-CM

## 2024-10-31 DIAGNOSIS — R13.10 DYSPHAGIA, UNSPECIFIED TYPE: Primary | ICD-10-CM

## 2024-10-31 LAB
ALBUMIN SERPL BCG-MCNC: 4 G/DL (ref 3.5–5.2)
ALP SERPL-CCNC: 82 U/L (ref 40–150)
ALT SERPL W P-5'-P-CCNC: 17 U/L (ref 0–70)
ANION GAP SERPL CALCULATED.3IONS-SCNC: 10 MMOL/L (ref 7–15)
AST SERPL W P-5'-P-CCNC: 26 U/L (ref 0–45)
BASOPHILS # BLD AUTO: 0 10E3/UL (ref 0–0.2)
BASOPHILS NFR BLD AUTO: 0 %
BILIRUB SERPL-MCNC: 1 MG/DL
BUN SERPL-MCNC: 18 MG/DL (ref 8–23)
CALCIUM SERPL-MCNC: 9.9 MG/DL (ref 8.8–10.4)
CHLORIDE SERPL-SCNC: 103 MMOL/L (ref 98–107)
CREAT SERPL-MCNC: 0.99 MG/DL (ref 0.67–1.17)
EGFRCR SERPLBLD CKD-EPI 2021: 85 ML/MIN/1.73M2
EOSINOPHIL # BLD AUTO: 0 10E3/UL (ref 0–0.7)
EOSINOPHIL NFR BLD AUTO: 1 %
ERYTHROCYTE [DISTWIDTH] IN BLOOD BY AUTOMATED COUNT: 12.6 % (ref 10–15)
GLUCOSE SERPL-MCNC: 164 MG/DL (ref 70–99)
HCO3 SERPL-SCNC: 26 MMOL/L (ref 22–29)
HCT VFR BLD AUTO: 35 % (ref 40–53)
HGB BLD-MCNC: 12 G/DL (ref 13.3–17.7)
IMM GRANULOCYTES # BLD: 0 10E3/UL
IMM GRANULOCYTES NFR BLD: 1 %
LYMPHOCYTES # BLD AUTO: 1.7 10E3/UL (ref 0.8–5.3)
LYMPHOCYTES NFR BLD AUTO: 33 %
MCH RBC QN AUTO: 29.8 PG (ref 26.5–33)
MCHC RBC AUTO-ENTMCNC: 34.3 G/DL (ref 31.5–36.5)
MCV RBC AUTO: 87 FL (ref 78–100)
MONOCYTES # BLD AUTO: 0.5 10E3/UL (ref 0–1.3)
MONOCYTES NFR BLD AUTO: 9 %
NEUTROPHILS # BLD AUTO: 3 10E3/UL (ref 1.6–8.3)
NEUTROPHILS NFR BLD AUTO: 57 %
NRBC # BLD AUTO: 0 10E3/UL
NRBC BLD AUTO-RTO: 0 /100
PLATELET # BLD AUTO: 200 10E3/UL (ref 150–450)
POTASSIUM SERPL-SCNC: 3.9 MMOL/L (ref 3.4–5.3)
PROT SERPL-MCNC: 7.9 G/DL (ref 6.4–8.3)
RBC # BLD AUTO: 4.03 10E6/UL (ref 4.4–5.9)
SODIUM SERPL-SCNC: 139 MMOL/L (ref 135–145)
T4 FREE SERPL-MCNC: 0.96 NG/DL (ref 0.9–1.7)
TSH SERPL DL<=0.005 MIU/L-ACNC: 3.13 UIU/ML (ref 0.3–4.2)
WBC # BLD AUTO: 5.2 10E3/UL (ref 4–11)

## 2024-10-31 PROCEDURE — 250N000011 HC RX IP 250 OP 636: Performed by: INTERNAL MEDICINE

## 2024-10-31 PROCEDURE — 85004 AUTOMATED DIFF WBC COUNT: CPT | Performed by: INTERNAL MEDICINE

## 2024-10-31 PROCEDURE — 36591 DRAW BLOOD OFF VENOUS DEVICE: CPT | Performed by: INTERNAL MEDICINE

## 2024-10-31 PROCEDURE — 84439 ASSAY OF FREE THYROXINE: CPT | Performed by: INTERNAL MEDICINE

## 2024-10-31 PROCEDURE — G2211 COMPLEX E/M VISIT ADD ON: HCPCS | Performed by: INTERNAL MEDICINE

## 2024-10-31 PROCEDURE — 82947 ASSAY GLUCOSE BLOOD QUANT: CPT | Performed by: INTERNAL MEDICINE

## 2024-10-31 PROCEDURE — 99215 OFFICE O/P EST HI 40 MIN: CPT | Performed by: INTERNAL MEDICINE

## 2024-10-31 PROCEDURE — G0463 HOSPITAL OUTPT CLINIC VISIT: HCPCS | Performed by: INTERNAL MEDICINE

## 2024-10-31 PROCEDURE — 84443 ASSAY THYROID STIM HORMONE: CPT | Performed by: INTERNAL MEDICINE

## 2024-10-31 RX ORDER — HEPARIN SODIUM (PORCINE) LOCK FLUSH IV SOLN 100 UNIT/ML 100 UNIT/ML
5 SOLUTION INTRAVENOUS ONCE
Status: COMPLETED | OUTPATIENT
Start: 2024-10-31 | End: 2024-10-31

## 2024-10-31 RX ADMIN — HEPARIN 5 ML: 100 SYRINGE at 12:12

## 2024-10-31 NOTE — NURSING NOTE
"Chief Complaint   Patient presents with    Port Draw     Labs drawn via port by RN.      Labs drawn via port by RN. Port accessed with 20G 3/4\" power needle. Flushed with NS and heparin. De-accessed. Pt tolerated well. Vitals taken. Pt checked in for next appointment.    Maria Isabel Cagle, RN  "

## 2024-10-31 NOTE — LETTER
10/31/2024      Douglas Herrera  [address] [place] [address]      Dear Colleague,    Thank you for referring your patient, Douglas Herrera, to the [facility]. Please see a copy of my visit note below.        [facility]  [address] [address]  Phone: [phone]  Fax: [phone]    PATIENT NAME: Douglas Herrera  MRN # [id number]   DATE OF VISIT: October 30, 2024  YOB: 1960     [facility]   Radiation Oncology: Dr. Tomasa Akers  Cardiology: Dr. Qamar Hernandez  PCP: Dr. العراقي   Hepatologist: [facility]      CANCER TYPE: SCC sinonasal   STAGE: [id number] (IVB)  ECOG PS: 1     PD-L1: TPS 60-70%, CPS >70% on [id number]  NGS: internal panel. Fusion negative. ARID1A S90fs, EGFR exon 20 insertion, [id number], APC [id number], TMB 7.313    ASSESSMENT AND PLAN  SCC L maxillary sinus, [id number] (IVB), ARID1 mutation,  EGFR exon 20 insertion: Since our last visit, underwent bx, + for SCC recurrence. CT chest looks ok. I worry that the primary recurrence is not resectable although looks minimally changed since last MRI fortunately. Will discuss with Dr. Iglesias. If not resectable, discussed carboplatin + paclitaxel + pembro, and working on getting amivantamab second-line. NGS from repeat bx pending.   - tumor board tomorrow   - if systemic therapy, will start the following week     ADDENDUM 11/1: Discussed with Dr. Iglesias, not resectable. Will call Douglas on Monday.    Pancreatic duct dilation: MRCP      Trismus: Ongoing PT, stretches      Nutrition: TF much less, eating more. Will ask Mayela to re-evaluate.      Hypercalcemia: Ca 10/31/24 ok      Port: Still in place     Screening for hypothyroidism: TFTs 8/5/24 - FT4 borderline low, ok this visit     Pulm nodules: Stable      Hearing loss: Unchanged - still pretty significant on L. Will repeat audiogram in future      Hepatitis B: HBSAby negative,  SAg +, cAby +, HBV PCR negative 1/23/24. HCV negative.     H/o PE/DVT: 5/8/2019 CTA report scanned into [employer]. Small PEs, LLE DVT, acute, occlusive. Was on rivaroxaban, completed course.      ASCVD: I'm not entirely clear why it was stopped when he started chemoradiation. Resumed asa 81mg daily at our last visit. Metoprolol per PCP       The longitudinal plan of care for the condition(s) below were addressed during this visit. Due to the added complexity in care, I will continue to support Douglas in the subsequent management of this condition(s) and with the ongoing continuity of care of this condition(s): maxillary sinus cancer     40 minutes spent by me on the date of the encounter doing chart review, review of outside records, review of test results, interpretation of tests, patient visit, documentation, orders, discussion with other provider(s)    Kayy Post MD  Associate Professor of Medicine  Hematology, Oncology and Transplantation    SUBJECTIVE  Douglas returns for follow up after the bx, which was positive for SCC  Trismus continues, doing stretches but pain in the TMJ bilaterally, L > R   Eating more - doing minimal TF    CANCER SUMMARY  8/9/23                CT sinus.   8/24/23  MRI sinus. L maxillary sinus mass  9/12/23              ED for epistaxis.   9/13/23              CTA and embolization of L IMAX   10/4/23  Nasal bx. Path: Inverted papilloma with high grade dysplasia  11/10/23 Nasal endoscopy and bx in the OR. C/b severe bleeding. Path: Inverted sinonasal papilloma with severe dysplasia.  11/17/23            MRI. 7.4 x 4.6 x 6.6 cm L sinonasal mass, destruction of nasal turbinates, L maxillary sinus, hard palate, invasion of L  space, involvement of medial and lateral pterygoids and temporalis muscles. Effacement and invasion ipsilateral pterygopalatine fossa, extends and effaces the nasopharynx.   11/30/23            Carotid angiogram. Direct endonasal and transoral embolization L  sinonasal tumor, transaterial embolization of the L sphenopalatine artery feeders to the L sinonasal artery tumor   12/1/23  Stealth assisted transnasal endoscopic approach to excise L sinonasal mass. Pre-operative embolization. Path: SCC involving L maxilla.    12/9/23  PET. Hypermetabolic mass centered along the L maxilla, extending superiorly through the floor of the L maxillary sinus, posterior/laterally to the  space, invasion of the pterygoid muscles, extending cranially along the pterygopalatine fossa and pterygoid plates to the skull base level. Erosion of involved bones including L maxilla, maxillary sinus wall and pterygoid plates. Extension medially to the L lateral nasopharyngeal wall and soft palate. 1 mm fat place between carotid and mass. ~4.9 x 4.0 x 5.4 cm (SUV 24.3). Partially resected since 11/17/23 MRI. 8 mm L 2A node (SUV 5.9), 7 mm L level 2 node (SUV 5.5)  1/2/24  Gtube (IR)  1/4/24  Video swallow. No aspiration  1/8~2/27/24 Chemoradiation with weekly cisplatin.  1/11/24  Port (IR)  8/5/24  PET/CT.  Overall increased FDG uptake centered along the posterior inferior left maxillary sinus involving the  space, left pterygopalatine fossa, left parapharyngeal space, extending inferiorly to left maxilla.  Increased soft tissue component within the space, now broad FDG uptake (SUV 24.97), previously 5.75.  Area measures 3.3 x 2.6 cm. Increased focal hypermetabolic activity posterior pharyngeal wall, extending to the left palatine tonsil, associated mucosal thickening (SUV 17.23).  Non-FDG avid mass, 2.7 x 2.4 cm, previously 2.1 x 2.5 cm, another medially, 1.1 x 1.5 cm, previously 1.1 x 1 cm.  New focal area of FDG uptake right lateral oropharyngeal wall (SUV 9.07).  Few hypermetabolic right cervical nodes, none definitively enlarged, however increased activity compared to prior (SUV 7.29-8.36).  Several sub-6 mm pulmonary nodules.  8/5/24  MRI.  Heterogeneous mass along the base  of the left maxillary sinus extending to the maxilla,  space, left pterygopalatine fossa, and parapharyngeal space.  Some areas of treated but some corresponding to FDG avidity on same-day PET, highly suspicious for tumor recurrence.  Question early left V2 involvement, thin dural enhancement along the base of the left temporal lobe     9/20/24  L maxillary, L superior alveolus bx in clinic Path: Fragments of at least SCC in situ.   10/11/24 Transnasal bx L maxillary sinus (Dr. Iglesias). 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  Multiloculated mass at base of L maxilla extending to involve L side of palate, retromaxillary region, extending into the  space, premaxillary region.  Similar to prior MRI 8/5/2024.  Continuous slight abnormal asymmetric thickening L V3 and V2.  Asymmetric thin dural enhancement along the base of the left temporal lobe.   10/29/2024 CT chest.  Scattered <6 mm lung nodules grossly stable compared to 8/5/2024.  Partially visualized common bile duct mildly dilated, 8 mm, possibly related to prior cholecystectomy, recommend MRCP.     PAST MEDICAL HISTORY  Sinonasal carcinoma as above  HTN  ASCVD. CABG x 3 2019  PE and LLE DVT after CABG . Treated with rivaroxaban  Dyslipidemia  Hepatitis B   SCC R temple s/p MOHS  Ascending aortic aneurysm repair 2019  Hearing loss L   Gout - feet  Depression  Cholecystectomy     CURRENT OUTPATIENT MEDICATIONS  Reviewed    ALLERGIES  No Known Allergies     PHYSICAL EXAM  /79   Pulse 71   Temp 97.7  F (36.5  C) (Oral)   Resp 18   Wt 61.1 kg (134 lb 12.8 oz)   SpO2 99%   BMI 23.14 kg/m    GEN: NAD  HEENT: EOMI, no icterus, injection or pallor  EXT: no edema  NEURO: alert    LABORATORY AND IMAGING STUDIES    Labs 10/31/24 were independently reviewed and interpreted by me  TFTs ok   CMP - ca normal; glucose 164 but o/w ok   CBC pd - hgb 12 but acceptable, stable since 5 months ago, mcv 87    MR Sinonasal/Oral  Cavity/Parotid wwo Contrast  Narrative: MR SINONASAL/ORAL CAVITY/PAROTID WWO CONTRAST 10/29/2024 9:58 AM    History:  SCC nasal cavity; Malignant neoplasm of nasal cavities (H)  ICD-10: Malignant neoplasm of nasal cavities (H)  64 year old male with history of T4bN0 M0 left maxillary sinus SCC s/p  prior resection and chemoradiation with concern for recurrence.  10/11/24: Transnasal biopsy of left maxillary sinus tumor. Path: SCC.     Comparison:  Facial and 8/5/2024, 5/17/2024; PET/CT 8/5/2024.    Technique: Sagittal and coronal T1-weighted and axial T2-weighted  images with fat saturation of the face and nasopharynx from the roofs  of the orbits through the base of C2 were obtained without intravenous  contrast. Following intravenous administration of gadolinium, axial  and coronal T1-weighted images with fat saturation of the face were  also obtained.    Contrast: 6 mL Gadavist    Findings:     Redemonstration of prior surgical changes of left maxillary  antrostomy, left turbinectomy, partial left ethmoidectomy.     There is redemonstration of multiloculated mass at the base of the  left maxilla extending to involve the left side of the palate,  retromaxillary region, extending into the  space and the  premaxillary region. These multiloculated changes demonstrates T1  hypointense and hyperintense areas, on the postcontrast there is  enhancement of the rim of the round  foci, and on T2 there is a  hypointense rim of these round foci. These are similar in extent to  prior MRI. They probably continue to reflect combination of tumor and  treated areas.     There is continuous slight abnormal asymmetric thickening of the left  V3 and V2 and an asymmetric thin dural enhancement along the base of  the left temporal lobe.   T2 hyperintensity and enhancement of the left medial pterygoid muscle  at the site of the mandibular ramus insertion site representing  denervation related change.  There is complete T2  hyperintense opacification of the left mastoid  air cells and fluid distention of the left Eustachian tube.    T1 hypointense, T2 hyperintense and enhancing inflammatory changes in  the right maxillary sinus, right ethmoid air cells, right greater than  left sphenoid locules.    Orbital cavity contents are normal.  Brain parenchyma is normal.  Mandible marrow signal is normal.   Tongue base, epiglottis, oral tongue, visualized larynx and  hypopharynx are normal. Parotid glands and submandibular glands are  atrophic without mass lesion. No enlarged suspicious nodes.  Impression: Impression: Overall findings are similar to 8/5/24 dated studies.    Prior surgical changes of left maxillary antrectomy, partial  ethmoidectomy, turbinectomy and sinonasal mass resection.   Heterogeneous mass along the base of left maxillary sinus extending to  the maxilla,  space, left pterygopalatine fossa and  parapharyngeal space. Some of these areas demonstrate treated areas  however some of these correspond to the FDG avidity on previous PET  and remain highly suspicious for tumor recurrence.   Abnormal asymmetric thickening and enhancement of the left V3 and V2  suggestive of stable perineural involvement. Persistent thin dural  enhancement along the base of the left temporal lobe.    I have personally reviewed the examination and initial interpretation  and I agree with the findings.    KANCHAN ROSALES MD         SYSTEM ID:  [id number]    CT chest and MRI were personally reviewed and interpreted by me  I don't see nodules on the chest CT, or mediastinal/hilar adenopathy                Again, thank you for allowing me to participate in the care of your patient.        Sincerely,        Kayy Post MD

## 2024-10-31 NOTE — PROGRESS NOTES
[facility]  [address] [facility] [address]  Phone: [phone]  Fax: [phone]    PATIENT NAME: Douglas Herrera  MRN # [id number]   DATE OF VISIT: October 30, 2024  YOB: 1960     Otolaryngology: Dr. Damon Iglesias-Chavira   Radiation Oncology: Dr. Tomasa Akers  Cardiology: Dr. Qamar Hernandez  PCP: Dr. العراقي   Hepatologist: [facility]      CANCER TYPE: SCC sinonasal   STAGE: [id number] (IVB)  ECOG PS: 1     PD-L1: TPS 60-70%, CPS >70% on [id number]  NGS: internal panel. Fusion negative. ARID1A S90fs, EGFR exon 20 insertion, [id number], APC [id number], TMB 7.313    ASSESSMENT AND PLAN  SCC L maxillary sinus, [id number] (IVB), ARID1 mutation,  EGFR exon 20 insertion: Since our last visit, underwent bx, + for SCC recurrence. CT chest looks ok. I worry that the primary recurrence is not resectable although looks minimally changed since last MRI fortunately. Will discuss with Dr. Iglesias. If not resectable, discussed carboplatin + paclitaxel + pembro, and working on getting amivantamab second-line. NGS from repeat bx pending.   - tumor board tomorrow   - if systemic therapy, will start the following week     ADDENDUM 11/1: Discussed with Dr. Iglesias, not resectable. Will call Douglas on Monday.    Pancreatic duct dilation: MRCP      Trismus: Ongoing PT, stretches      Nutrition: TF much less, eating more. Will ask Mayela to re-evaluate.      Hypercalcemia: Ca 10/31/24 ok      Port: Still in place     Screening for hypothyroidism: TFTs 8/5/24 - FT4 borderline low, ok this visit     Pulm nodules: Stable      Hearing loss: Unchanged - still pretty significant on L. Will repeat audiogram in future      Hepatitis B: HBSAby negative, SAg +, cAby +, HBV PCR negative 1/23/24. HCV negative.     H/o PE/DVT: 5/8/2019 CTA report scanned into [employer] Small PEs, LLE DVT, acute, occlusive. Was on rivaroxaban, completed course.      ASCVD: I'm not entirely clear why it was  stopped when he started chemoradiation. Resumed asa 81mg daily at our last visit. Metoprolol per PCP       The longitudinal plan of care for the condition(s) below were addressed during this visit. Due to the added complexity in care, I will continue to support Douglas in the subsequent management of this condition(s) and with the ongoing continuity of care of this condition(s): maxillary sinus cancer     40 minutes spent by me on the date of the encounter doing chart review, review of outside records, review of test results, interpretation of tests, patient visit, documentation, orders, discussion with other provider(s)    Kayy Post MD  Associate Professor of Medicine  Hematology, Oncology and Transplantation    SUBJECTIVE  Douglas returns for follow up after the bx, which was positive for SCC  Trismus continues, doing stretches but pain in the TMJ bilaterally, L > R   Eating more - doing minimal TF    CANCER SUMMARY  8/9/23                CT sinus.   8/24/23  MRI sinus. L maxillary sinus mass  9/12/23              ED for epistaxis.   9/13/23              CTA and embolization of L IMAX   10/4/23  Nasal bx. Path: Inverted papilloma with high grade dysplasia  11/10/23 Nasal endoscopy and bx in the OR. C/b severe bleeding. Path: Inverted sinonasal papilloma with severe dysplasia.  11/17/23            MRI. 7.4 x 4.6 x 6.6 cm L sinonasal mass, destruction of nasal turbinates, L maxillary sinus, hard palate, invasion of L  space, involvement of medial and lateral pterygoids and temporalis muscles. Effacement and invasion ipsilateral pterygopalatine fossa, extends and effaces the nasopharynx.   11/30/23            Carotid angiogram. Direct endonasal and transoral embolization L sinonasal tumor, transaterial embolization of the L sphenopalatine artery feeders to the L sinonasal artery tumor   12/1/23  Stealth assisted transnasal endoscopic approach to excise L sinonasal mass. Pre-operative embolization.  Path: SCC involving L maxilla.    12/9/23  PET. Hypermetabolic mass centered along the L maxilla, extending superiorly through the floor of the L maxillary sinus, posterior/laterally to the  space, invasion of the pterygoid muscles, extending cranially along the pterygopalatine fossa and pterygoid plates to the skull base level. Erosion of involved bones including L maxilla, maxillary sinus wall and pterygoid plates. Extension medially to the L lateral nasopharyngeal wall and soft palate. 1 mm fat place between carotid and mass. ~4.9 x 4.0 x 5.4 cm (SUV 24.3). Partially resected since 11/17/23 MRI. 8 mm L 2A node (SUV 5.9), 7 mm L level 2 node (SUV 5.5)  1/2/24  Gtube (IR)  1/4/24  Video swallow. No aspiration  1/8~2/27/24 Chemoradiation with weekly cisplatin.  1/11/24  Port (IR)  8/5/24  PET/CT.  Overall increased FDG uptake centered along the posterior inferior left maxillary sinus involving the  space, left pterygopalatine fossa, left parapharyngeal space, extending inferiorly to left maxilla.  Increased soft tissue component within the space, now broad FDG uptake (SUV 24.97), previously 5.75.  Area measures 3.3 x 2.6 cm. Increased focal hypermetabolic activity posterior pharyngeal wall, extending to the left palatine tonsil, associated mucosal thickening (SUV 17.23).  Non-FDG avid mass, 2.7 x 2.4 cm, previously 2.1 x 2.5 cm, another medially, 1.1 x 1.5 cm, previously 1.1 x 1 cm.  New focal area of FDG uptake right lateral oropharyngeal wall (SUV 9.07).  Few hypermetabolic right cervical nodes, none definitively enlarged, however increased activity compared to prior (SUV 7.29-8.36).  Several sub-6 mm pulmonary nodules.  8/5/24  MRI.  Heterogeneous mass along the base of the left maxillary sinus extending to the maxilla,  space, left pterygopalatine fossa, and parapharyngeal space.  Some areas of treated but some corresponding to FDG avidity on same-day PET, highly suspicious for  tumor recurrence.  Question early left V2 involvement, thin dural enhancement along the base of the left temporal lobe     9/20/24  L maxillary, L superior alveolus bx in clinic Path: Fragments of at least SCC in situ.   10/11/24 Transnasal bx L maxillary sinus (Dr. Iglesias). 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  Multiloculated mass at base of L maxilla extending to involve L side of palate, retromaxillary region, extending into the  space, premaxillary region.  Similar to prior MRI 8/5/2024.  Continuous slight abnormal asymmetric thickening L V3 and V2.  Asymmetric thin dural enhancement along the base of the left temporal lobe.   10/29/2024 CT chest.  Scattered <6 mm lung nodules grossly stable compared to 8/5/2024.  Partially visualized common bile duct mildly dilated, 8 mm, possibly related to prior cholecystectomy, recommend MRCP.     PAST MEDICAL HISTORY  Sinonasal carcinoma as above  HTN  ASCVD. CABG x 3 2019  PE and LLE DVT after CABG . Treated with rivaroxaban  Dyslipidemia  Hepatitis B   SCC R temple s/p MOHS  Ascending aortic aneurysm repair 2019  Hearing loss L   Gout - feet  Depression  Cholecystectomy     CURRENT OUTPATIENT MEDICATIONS  Reviewed    ALLERGIES  No Known Allergies     PHYSICAL EXAM  /79   Pulse 71   Temp 97.7  F (36.5  C) (Oral)   Resp 18   Wt 61.1 kg (134 lb 12.8 oz)   SpO2 99%   BMI 23.14 kg/m    GEN: NAD  HEENT: EOMI, no icterus, injection or pallor  EXT: no edema  NEURO: alert    LABORATORY AND IMAGING STUDIES    Labs 10/31/24 were independently reviewed and interpreted by me  TFTs ok   CMP - ca normal; glucose 164 but o/w ok   CBC pd - hgb 12 but acceptable, stable since 5 months ago, mcv 87    MR Sinonasal/Oral Cavity/Parotid wwo Contrast  Narrative: MR SINONASAL/ORAL CAVITY/PAROTID WWO CONTRAST 10/29/2024 9:58 AM    History:  SCC nasal cavity; Malignant neoplasm of nasal cavities (H)  ICD-10: Malignant neoplasm of nasal cavities (H)  64  year old male with history of T4bN0 M0 left maxillary sinus SCC s/p  prior resection and chemoradiation with concern for recurrence.  10/11/24: Transnasal biopsy of left maxillary sinus tumor. Path: SCC.     Comparison:  Facial and 8/5/2024, 5/17/2024; PET/CT 8/5/2024.    Technique: Sagittal and coronal T1-weighted and axial T2-weighted  images with fat saturation of the face and nasopharynx from the roofs  of the orbits through the base of C2 were obtained without intravenous  contrast. Following intravenous administration of gadolinium, axial  and coronal T1-weighted images with fat saturation of the face were  also obtained.    Contrast: 6 mL Gadavist    Findings:     Redemonstration of prior surgical changes of left maxillary  antrostomy, left turbinectomy, partial left ethmoidectomy.     There is redemonstration of multiloculated mass at the base of the  left maxilla extending to involve the left side of the palate,  retromaxillary region, extending into the  space and the  premaxillary region. These multiloculated changes demonstrates T1  hypointense and hyperintense areas, on the postcontrast there is  enhancement of the rim of the round  foci, and on T2 there is a  hypointense rim of these round foci. These are similar in extent to  prior MRI. They probably continue to reflect combination of tumor and  treated areas.     There is continuous slight abnormal asymmetric thickening of the left  V3 and V2 and an asymmetric thin dural enhancement along the base of  the left temporal lobe.   T2 hyperintensity and enhancement of the left medial pterygoid muscle  at the site of the mandibular ramus insertion site representing  denervation related change.  There is complete T2 hyperintense opacification of the left mastoid  air cells and fluid distention of the left Eustachian tube.    T1 hypointense, T2 hyperintense and enhancing inflammatory changes in  the right maxillary sinus, right ethmoid air cells,  right greater than  left sphenoid locules.    Orbital cavity contents are normal.  Brain parenchyma is normal.  Mandible marrow signal is normal.   Tongue base, epiglottis, oral tongue, visualized larynx and  hypopharynx are normal. Parotid glands and submandibular glands are  atrophic without mass lesion. No enlarged suspicious nodes.  Impression: Impression: Overall findings are similar to 8/5/24 dated studies.    Prior surgical changes of left maxillary antrectomy, partial  ethmoidectomy, turbinectomy and sinonasal mass resection.   Heterogeneous mass along the base of left maxillary sinus extending to  the maxilla,  space, left pterygopalatine fossa and  parapharyngeal space. Some of these areas demonstrate treated areas  however some of these correspond to the FDG avidity on previous PET  and remain highly suspicious for tumor recurrence.   Abnormal asymmetric thickening and enhancement of the left V3 and V2  suggestive of stable perineural involvement. Persistent thin dural  enhancement along the base of the left temporal lobe.    I have personally reviewed the examination and initial interpretation  and I agree with the findings.    KANCHAN ROSALES MD         SYSTEM ID:  [id number]    CT chest and MRI were personally reviewed and interpreted by me  I don't see nodules on the chest CT, or mediastinal/hilar adenopathy

## 2024-10-31 NOTE — NURSING NOTE
"Oncology Rooming Note    October 31, 2024 12:21 PM   Douglas Herrera is a 64 year old male who presents for:    Chief Complaint   Patient presents with    Port Draw     Labs drawn via port by RN.     Oncology Clinic Visit     Squamous cell carcinoma of maxillary sinus     Initial Vitals: /79   Pulse 71   Temp 97.7  F (36.5  C) (Oral)   Resp 18   Wt 61.1 kg (134 lb 12.8 oz)   SpO2 99%   BMI 23.14 kg/m   Estimated body mass index is 23.14 kg/m  as calculated from the following:    Height as of 10/11/24: 1.626 m (5' 4\").    Weight as of this encounter: 61.1 kg (134 lb 12.8 oz). Body surface area is 1.66 meters squared.  Data Unavailable Comment: Data Unavailable   No LMP for male patient.  Allergies reviewed: Yes  Medications reviewed: Yes    Medications: Medication refills not needed today.  Pharmacy name entered into [employer]: [facility] - [place], [place] - [address]    Frailty Screening:   Is the patient here for a new oncology consult visit in cancer care? 2. No      Clinical concerns: Patient states no new concerns to discuss with provider.        Иван Kasper EMT            "

## 2024-11-04 ENCOUNTER — APPOINTMENT (OUTPATIENT)
Dept: INTERPRETER SERVICES | Facility: CLINIC | Age: 64
End: 2024-11-04
Payer: COMMERCIAL

## 2024-11-05 NOTE — PROGRESS NOTES
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     Recurrence at left maxilla,  space, pterygopalatine fossa, infratemporal fossa  10/11/2024  RESULT FOR IMMUNOHISTOCHEMICAL VENTANA CLONE  PD-L1 ASSAY  COMBINED POSITIVE SCORE (CPS):  >70  TUMOR PROPORTION SCORE (TPS):  60-70%  INTERPRETATION: HIGH PD-L1 EXPRESSION (TPS >/=50%)    Final Diagnosis   A. 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 LEFT MAXILLARY SINUS, BIOPSY:  - SQUAMOUS CELL CARCINOMA  arising from an inverted sinonasal papilloma  - Background of fibrosis and acute and chronic inflammation      C. LEFT LATERAL MAXILLARY SINUS, BIOPSY:  - DETACHED FRAGMENTS OF SQUAMOUS CELL CARCINOMA  - Background of fibrosis and acute and chronic inflammation      D. LEFT LATERAL MAXILLARY SINUS, BIOPSY:  - SQUAMOUS CELL CARCINOMA, SUSPICIOUS FOR INVASION  - Background of fibrosis, granulation tissue, and occasional dystrophic calcification          History of Present Illness: 64-year-old male.  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  The patient was discussed in tumor board and there was deemed to be an acceptable the patient has perineural invasion  compromising the foramen ovale.  So today the patient is close that he is doing well.  Denies any new symptoms.      MEDICATIONS:     Current Outpatient Medications   Medication Sig Dispense Refill    acetaminophen (TYLENOL) 325 MG tablet Take 2 tablets (650 mg) by mouth every 4 hours as needed for other or pain 50 tablet 0    aspirin 81 MG EC tablet Take 1 tablet (81 mg) by mouth daily (Patient not  taking: Reported on 10/31/2024) 90 tablet 3    atorvastatin (LIPITOR) 80 MG tablet TAKE 1 TABLET (80 MG TOTAL) BY MOUTH AT BEDTIME/ TXHUA HMO NOJ 1 LUB TSHUAJ THAUM MUS PW PAB ZOO NTSHAV MUAJ ROJ 90 tablet 3    capsaicin (ZOSTRIX) 0.025 % external cream Apply topically 3 times daily as needed      entecavir (BARACLUDE) 0.5 MG tablet Take 0.5 mg by mouth every morning Take 1 hour before a meal or 2 hours after a meal.      magic mouthwash (ENTER INGREDIENTS IN COMMENTS) suspension Take 10 mLs by mouth every 4 hours as needed (mucositis) 240 mL 1    nitroGLYcerin (NITROSTAT) 0.4 MG sublingual tablet Place 1 tablet (0.4 mg) under the tongue every 5 minutes as needed. 25 tablet 3    ondansetron (ZOFRAN ODT) 4 MG ODT tab Take 1 tablet (4 mg) by mouth every 8 hours as needed for nausea. 4 tablet 0    Osmolite 1.5 Migue 237 mL Place 474 mLs into G tube 3 times daily. Infuse via gravity bag. 2 cartons TID spread 3-5 hours apart.   Water flush: 30-60 mL before and after each feeding. + 120 mL 4 times daily for hydration. (Patient not taking: Reported on 10/31/2024) 32250 mL 11    senna-docusate (SENNA S) 8.6-50 MG tablet Take 1 tablet by mouth 2 times daily as needed for constipation 30 tablet 1    sodium chloride (OCEAN) 0.65 % nasal spray Spray 2 sprays in nostril daily as needed for congestion 88 mL 3    traZODone (DESYREL) 50 MG tablet Take 50 mg by mouth nightly as needed for sleep (Patient not taking: Reported on 10/31/2024)         ALLERGIES:  No Known Allergies      PAST MEDICAL HISTORY:   Past Medical History:   Diagnosis Date    Ascending aortic aneurysm (H)     Coronary artery disease     Depression     Gout     History of anesthesia complications     Hyperlipemia     Hypertension     Malignant neoplasm of nasal cavities (H)     PONV (postoperative nausea and vomiting)         FAMILY HISTORY:    Family History   Problem Relation Age of Onset    Cerebrovascular Disease Mother 90    Acute Myocardial Infarction No  family hx of     Anesthesia Reaction No family hx of        REVIEW OF SYSTEMS:  12 point ROS was negative other than the symptoms noted above in the HPI.    PHYSICAL EXAMINATION:    Constitutional: The patient is awake alert no acute distress  ENT: Oral cavity shows severe trismus.  There is mass.  The left maxillary tuberosity this mass appears to be the same size as when I saw him last time.  The mass is mapped to the mucosa yet.  Neck: Palpation of the neck shows no evidence of lymphadenopathy or masses.      IMPRESSION AND PLAN: So today I had a long discussion with the patient and his son Tulio.  I did explain to them that any of the tumor is unresectable.  Dr. Post was present for this visit and we discussed the treatment with a chemotherapy plus chemotherapy.  Dr. Post will be explaining to them in more detail about the findings.          Damon Regan MD, M.S.  Otolaryngology- Head & Neck Surgery  [phone]

## 2024-11-07 ENCOUNTER — OFFICE VISIT (OUTPATIENT)
Dept: OTOLARYNGOLOGY | Facility: CLINIC | Age: 64
End: 2024-11-07
Payer: COMMERCIAL

## 2024-11-07 VITALS
HEART RATE: 71 BPM | WEIGHT: 134.5 LBS | BODY MASS INDEX: 22.96 KG/M2 | HEIGHT: 64 IN | OXYGEN SATURATION: 99 % | DIASTOLIC BLOOD PRESSURE: 68 MMHG | SYSTOLIC BLOOD PRESSURE: 127 MMHG

## 2024-11-07 DIAGNOSIS — C30.0 MALIGNANT NEOPLASM OF NASAL CAVITIES (H): Primary | ICD-10-CM

## 2024-11-07 PROCEDURE — 99213 OFFICE O/P EST LOW 20 MIN: CPT | Performed by: OTOLARYNGOLOGY

## 2024-11-07 ASSESSMENT — PAIN SCALES - GENERAL: PAINLEVEL_OUTOF10: NO PAIN (1)

## 2024-11-07 NOTE — NURSING NOTE
"Chief Complaint   Patient presents with    RECHECK   Blood pressure 127/68, pulse 71, height 1.626 m (5' 4\"), weight 61 kg (134 lb 8 oz), SpO2 99%. Nic Luz, EMT    "

## 2024-11-07 NOTE — PATIENT INSTRUCTIONS
You were seen in the ENT Clinic today by Dr. Regan. If you have any questions or concerns after your appointment, please contact us (see below)    The following has been recommended for you based upon your appointment today:  Follow up with medical oncology for chemotherapy     Please return to clinic    How to Contact Us:  Send a [employer] message to your provider. Our team will respond to you via [employer]. Occasionally, we will need to call you to get further information.  For urgent matters (Monday-Friday), call the ENT Clinic: [phone] and speak with a call center team member - they will route your call appropriately.   If you'd like to speak directly with a nurse, please find our contact information below. We do our best to check voicemail frequently throughout the day, and will work to call you back within 1-2 days. For urgent matters, please use the general clinic phone numbers listed above.    Carly CALLEJAS RN, BSN  RN Care Coordinator, ENT Clinic  [facility]  Direct: [phone]

## 2024-11-07 NOTE — LETTER
11/7/2024       RE: Douglas Herrera  [address]  [place] [address]     Dear Colleague,    Thank you for referring your patient, Douglas Herrera, to the [facility] [place] [facility]. Please see a copy of my visit note below.    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     Recurrence at left maxilla,  space, pterygopalatine fossa, infratemporal fossa  10/11/2024  RESULT FOR IMMUNOHISTOCHEMICAL VENTANA CLONE  PD-L1 ASSAY  COMBINED POSITIVE SCORE (CPS):  >70  TUMOR PROPORTION SCORE (TPS):  60-70%  INTERPRETATION: HIGH PD-L1 EXPRESSION (TPS >/=50%)    Final Diagnosis   A. 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 LEFT MAXILLARY SINUS, BIOPSY:  - SQUAMOUS CELL CARCINOMA  arising from an inverted sinonasal papilloma  - Background of fibrosis and acute and chronic inflammation      C. LEFT LATERAL MAXILLARY SINUS, BIOPSY:  - DETACHED FRAGMENTS OF SQUAMOUS CELL CARCINOMA  - Background of fibrosis and acute and chronic inflammation      D. LEFT LATERAL MAXILLARY SINUS, BIOPSY:  - SQUAMOUS CELL CARCINOMA, SUSPICIOUS FOR INVASION  - Background of fibrosis, granulation tissue, and occasional dystrophic calcification          History of Present Illness: 64-year-old male.  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  The patient was discussed in tumor board and there was deemed to be an acceptable the patient has perineural invasion  compromising the foramen ovale.  So today the patient is close that he is doing well.   Denies any new symptoms.      MEDICATIONS:     Current Outpatient Medications   Medication Sig Dispense Refill     acetaminophen (TYLENOL) 325 MG tablet Take 2 tablets (650 mg) by mouth every 4 hours as needed for other or pain 50 tablet 0     aspirin 81 MG EC tablet Take 1 tablet (81 mg) by mouth daily (Patient not taking: Reported on 10/31/2024) 90 tablet 3     atorvastatin (LIPITOR) 80 MG tablet TAKE 1 TABLET (80 MG TOTAL) BY MOUTH AT BEDTIME/ TXHUA HMO NOJ 1 LUB TSHUAJ THAUM MUS PW PAB ZOO NTSHAV MUAJ ROJ 90 tablet 3     capsaicin (ZOSTRIX) 0.025 % external cream Apply topically 3 times daily as needed       entecavir (BARACLUDE) 0.5 MG tablet Take 0.5 mg by mouth every morning Take 1 hour before a meal or 2 hours after a meal.       magic mouthwash (ENTER INGREDIENTS IN COMMENTS) suspension Take 10 mLs by mouth every 4 hours as needed (mucositis) 240 mL 1     nitroGLYcerin (NITROSTAT) 0.4 MG sublingual tablet Place 1 tablet (0.4 mg) under the tongue every 5 minutes as needed. 25 tablet 3     ondansetron (ZOFRAN ODT) 4 MG ODT tab Take 1 tablet (4 mg) by mouth every 8 hours as needed for nausea. 4 tablet 0     Osmolite 1.5 Migue 237 mL Place 474 mLs into G tube 3 times daily. Infuse via gravity bag. 2 cartons TID spread 3-5 hours apart.   Water flush: 30-60 mL before and after each feeding. + 120 mL 4 times daily for hydration. (Patient not taking: Reported on 10/31/2024) 42098 mL 11     senna-docusate (SENNA S) 8.6-50 MG tablet Take 1 tablet by mouth 2 times daily as needed for constipation 30 tablet 1     sodium chloride (OCEAN) 0.65 % nasal spray Spray 2 sprays in nostril daily as needed for congestion 88 mL 3     traZODone (DESYREL) 50 MG tablet Take 50 mg by mouth nightly as needed for sleep (Patient not taking: Reported on 10/31/2024)         ALLERGIES:  No Known Allergies      PAST MEDICAL HISTORY:   Past Medical History:   Diagnosis Date     Ascending aortic aneurysm (H)      Coronary artery disease       Depression      Gout      History of anesthesia complications      Hyperlipemia      Hypertension      Malignant neoplasm of nasal cavities (H)      PONV (postoperative nausea and vomiting)         FAMILY HISTORY:    Family History   Problem Relation Age of Onset     Cerebrovascular Disease Mother 90     Acute Myocardial Infarction No family hx of      Anesthesia Reaction No family hx of        REVIEW OF SYSTEMS:  12 point ROS was negative other than the symptoms noted above in the HPI.    PHYSICAL EXAMINATION:    Constitutional: The patient is awake alert no acute distress  ENT: Oral cavity shows severe trismus.  There is mass.  The left maxillary tuberosity this mass appears to be the same size as when I saw him last time.  The mass is mapped to the mucosa yet.  Neck: Palpation of the neck shows no evidence of lymphadenopathy or masses.      IMPRESSION AND PLAN: So today I had a long discussion with the patient and his son Tulio.  I did explain to them that any of the tumor is unresectable.  Dr. Post was present for this visit and we discussed the treatment with a chemotherapy plus chemotherapy.  Dr. Post will be explaining to them in more detail about the findings.          Damon Regan MD, M.S.  Otolaryngology- Head & Neck Surgery  [phone]           Again, thank you for allowing me to participate in the care of your patient.      Sincerely,    Damon Regan MD

## 2024-11-09 ENCOUNTER — TELEPHONE (OUTPATIENT)
Dept: ONCOLOGY | Facility: CLINIC | Age: 64
End: 2024-11-09
Payer: COMMERCIAL

## 2024-11-09 DIAGNOSIS — C31.0 SQUAMOUS CELL CARCINOMA OF MAXILLARY SINUS (H): Primary | ICD-10-CM

## 2024-11-09 RX ORDER — ALBUTEROL SULFATE 90 UG/1
1-2 INHALANT RESPIRATORY (INHALATION)
Start: 2024-11-13

## 2024-11-09 RX ORDER — DIPHENHYDRAMINE HYDROCHLORIDE 50 MG/ML
50 INJECTION INTRAMUSCULAR; INTRAVENOUS
Start: 2024-11-13

## 2024-11-09 RX ORDER — PALONOSETRON 0.05 MG/ML
0.25 INJECTION, SOLUTION INTRAVENOUS ONCE
OUTPATIENT
Start: 2024-11-13

## 2024-11-09 RX ORDER — DIPHENHYDRAMINE HYDROCHLORIDE 50 MG/ML
25 INJECTION INTRAMUSCULAR; INTRAVENOUS
Start: 2024-11-13

## 2024-11-09 RX ORDER — EPINEPHRINE 1 MG/ML
0.3 INJECTION, SOLUTION INTRAMUSCULAR; SUBCUTANEOUS EVERY 5 MIN PRN
OUTPATIENT
Start: 2024-11-13

## 2024-11-09 RX ORDER — DIPHENHYDRAMINE HCL 25 MG
50 CAPSULE ORAL ONCE
Start: 2024-11-13

## 2024-11-09 RX ORDER — METHYLPREDNISOLONE SODIUM SUCCINATE 40 MG/ML
40 INJECTION INTRAMUSCULAR; INTRAVENOUS
Start: 2024-11-13

## 2024-11-09 RX ORDER — LORAZEPAM 2 MG/ML
0.5 INJECTION INTRAMUSCULAR EVERY 4 HOURS PRN
OUTPATIENT
Start: 2024-11-13

## 2024-11-09 RX ORDER — ALBUTEROL SULFATE 0.83 MG/ML
2.5 SOLUTION RESPIRATORY (INHALATION)
OUTPATIENT
Start: 2024-11-13

## 2024-11-09 RX ORDER — HEPARIN SODIUM (PORCINE) LOCK FLUSH IV SOLN 100 UNIT/ML 100 UNIT/ML
5 SOLUTION INTRAVENOUS
OUTPATIENT
Start: 2024-11-13

## 2024-11-09 RX ORDER — HEPARIN SODIUM,PORCINE 10 UNIT/ML
5-20 VIAL (ML) INTRAVENOUS DAILY PRN
OUTPATIENT
Start: 2024-11-13

## 2024-11-09 RX ORDER — MEPERIDINE HYDROCHLORIDE 25 MG/ML
25 INJECTION INTRAMUSCULAR; INTRAVENOUS; SUBCUTANEOUS
OUTPATIENT
Start: 2024-11-13

## 2024-11-09 NOTE — TELEPHONE ENCOUNTER
Was able to meet with Douglas in ENT clinic Thurs when he was there with Tulio to meet with Dr. Iglesias.     Discussed systemic therapy with carbo + paclitaxel + pembro with palliative intent. Discussed goals - balance QOL, survival, toxicities.    NGS came back afterward - exon 20 insertion still there. Amivantamab would be seocnd line and we'd have to try to get it covered by insurance or through DRP. I'd like to start that process concurrently with first-line therapy, if possible    Will aim to start next week or two    Discussed potential side effects, logistics, including maintenance pembro for up to 2 years in the absence of PD, toxicity, wanting to stop    Kayy Post MD

## 2024-11-11 DIAGNOSIS — C31.0 SQUAMOUS CELL CARCINOMA OF MAXILLARY SINUS (H): Primary | ICD-10-CM

## 2024-11-12 ENCOUNTER — PATIENT OUTREACH (OUTPATIENT)
Dept: ONCOLOGY | Facility: CLINIC | Age: 64
End: 2024-11-12
Payer: COMMERCIAL

## 2024-11-12 ENCOUNTER — HOME INFUSION BILLING (OUTPATIENT)
Dept: HOME HEALTH SERVICES | Facility: HOME HEALTH | Age: 64
End: 2024-11-12
Payer: COMMERCIAL

## 2024-11-12 PROCEDURE — B4152 EF CALORIE DENSE>/=1.5KCAL: HCPCS

## 2024-11-12 PROCEDURE — A6402 STERILE GAUZE <= 16 SQ IN: HCPCS

## 2024-11-12 NOTE — PROGRESS NOTES
[facility] [place]/Voicemail    Clinical Data: Care Coordinator Outreach    Reaching out to Douglas to discuss carbo/taxol/pembro.     Outreach attempted x 1.  Left message on patient's voicemail with call back information and requested return call. I also reached out to son Tulio who confirmed it would be best to call Douglas's phone directly to discuss.    Plan: Care Coordinator will try to reach patient again in 1-2 business days.    Addendum 11/13/24 9:53 AM  Attempt #2. LVM again with Hmong .    Addendum 11/14/24 10:12 AM  Attempt #3 with Hmong . We tried twice but the call went blank both times. Will try again later.    Trudy Shelton, RN, BSN  RN Care Coordinator  [facility]

## 2024-11-13 ENCOUNTER — APPOINTMENT (OUTPATIENT)
Dept: INTERPRETER SERVICES | Facility: CLINIC | Age: 64
End: 2024-11-13
Payer: COMMERCIAL

## 2024-11-15 PROCEDURE — S9341 HIT ENTERAL GRAV DIEM: HCPCS

## 2024-11-16 ENCOUNTER — TELEPHONE (OUTPATIENT)
Dept: HOME HEALTH SERVICES | Facility: HOME HEALTH | Age: 64
End: 2024-11-16
Payer: COMMERCIAL

## 2024-11-16 PROCEDURE — S9341 HIT ENTERAL GRAV DIEM: HCPCS

## 2024-11-16 NOTE — TELEPHONE ENCOUNTER
Triage Note/Care Coordination    Identify the person you spoke with and their relationship to the patient: son    Reason for the call: Access device problem/concern      Enteral-Related Issues    Type of therapy: Patient able to eat and drink    Describe the problem: Leaking tube    Interventions: Other: Pt son Tulio states there is some minimal leakage around connection sites. States pt is receiving most of the formula. Instructed to make sure pt is sitting up and to slow feeding down to see if that helps with the leaking. Explained that if the leaking gets worse son should bring pt into the ED for replacement. Son also states pt is due for a replacement soon and will contact [facility] on Monday to see if they can get in earlier. Son states pt is eating and drinking mostly PO.     Outcomes:     What is the plan for ongoing care of this patient: Problem resolved, patient will remain in home    Was there harm, averted harm, or unexpected death of the patient? No    Does the patient/caregiver verbalize agreement with the plan? YES      Follow-Up Communication    None      Elyse Palencia RN, BSN  [facility]  [phone]  [email or web address]

## 2024-11-17 PROCEDURE — S9341 HIT ENTERAL GRAV DIEM: HCPCS

## 2024-11-18 ENCOUNTER — TELEPHONE (OUTPATIENT)
Dept: VASCULAR SURGERY | Facility: CLINIC | Age: 64
End: 2024-11-18
Payer: COMMERCIAL

## 2024-11-18 PROCEDURE — S9341 HIT ENTERAL GRAV DIEM: HCPCS

## 2024-11-18 NOTE — TELEPHONE ENCOUNTER
[facility] Call Center    Phone Message    May a detailed message be left on voicemail: yes     Reason for Call: Other: Pt's son is noting there is a small cut in G-tube asking if it can be replaced. Best to reach in early afternoon until about 1 pm, asking if he can be reached today or tomorrow     Action Taken: Message routed to:  [facility] Vasc/ IR    Travel Screening: Not Applicable     Date of Service:

## 2024-11-19 ENCOUNTER — APPOINTMENT (OUTPATIENT)
Dept: INTERPRETER SERVICES | Facility: CLINIC | Age: 64
End: 2024-11-19
Payer: COMMERCIAL

## 2024-11-19 DIAGNOSIS — C30.0 MALIGNANT NEOPLASM OF NASAL CAVITIES (H): Primary | ICD-10-CM

## 2024-11-19 PROCEDURE — S9341 HIT ENTERAL GRAV DIEM: HCPCS

## 2024-11-19 RX ORDER — ONDANSETRON 8 MG/1
8 TABLET, FILM COATED ORAL EVERY 8 HOURS PRN
Qty: 30 TABLET | Refills: 2 | Status: SHIPPED | OUTPATIENT
Start: 2024-11-19

## 2024-11-19 RX ORDER — PROCHLORPERAZINE MALEATE 10 MG
10 TABLET ORAL EVERY 6 HOURS PRN
Qty: 30 TABLET | Refills: 2 | Status: SHIPPED | OUTPATIENT
Start: 2024-11-19

## 2024-11-19 NOTE — TELEPHONE ENCOUNTER
G tube placed January 2024 at the . Spoke with Tulio this morning who reports patient still has original tube in from January. The small crack causes some leakage with flushes/feedings but otherwise doesn't cause issue.     IR KENNEDI placed order for g tube exchange and scheduling notified to contact Tulio. Tulio also provided IR scheduling number to call later today as well.      Shila Knight RN  Interventional Radiology  [phone]

## 2024-11-20 ENCOUNTER — INFUSION THERAPY VISIT (OUTPATIENT)
Dept: ONCOLOGY | Facility: CLINIC | Age: 64
End: 2024-11-20
Attending: INTERNAL MEDICINE
Payer: COMMERCIAL

## 2024-11-20 VITALS
WEIGHT: 134.3 LBS | SYSTOLIC BLOOD PRESSURE: 135 MMHG | DIASTOLIC BLOOD PRESSURE: 80 MMHG | HEIGHT: 64 IN | RESPIRATION RATE: 18 BRPM | HEART RATE: 76 BPM | BODY MASS INDEX: 22.93 KG/M2 | OXYGEN SATURATION: 99 % | TEMPERATURE: 98.2 F

## 2024-11-20 DIAGNOSIS — C31.0 SQUAMOUS CELL CARCINOMA OF MAXILLARY SINUS (H): Primary | ICD-10-CM

## 2024-11-20 LAB
ALBUMIN SERPL BCG-MCNC: 3.9 G/DL (ref 3.5–5.2)
ALP SERPL-CCNC: 71 U/L (ref 40–150)
ALT SERPL W P-5'-P-CCNC: 13 U/L (ref 0–70)
ANION GAP SERPL CALCULATED.3IONS-SCNC: 10 MMOL/L (ref 7–15)
AST SERPL W P-5'-P-CCNC: 20 U/L (ref 0–45)
BASOPHILS # BLD AUTO: 0 10E3/UL (ref 0–0.2)
BASOPHILS NFR BLD AUTO: 0 %
BILIRUB SERPL-MCNC: 0.5 MG/DL
BUN SERPL-MCNC: 21.6 MG/DL (ref 8–23)
CALCIUM SERPL-MCNC: 9.8 MG/DL (ref 8.8–10.4)
CHLORIDE SERPL-SCNC: 103 MMOL/L (ref 98–107)
CREAT SERPL-MCNC: 1.03 MG/DL (ref 0.67–1.17)
EGFRCR SERPLBLD CKD-EPI 2021: 81 ML/MIN/1.73M2
EOSINOPHIL # BLD AUTO: 0 10E3/UL (ref 0–0.7)
EOSINOPHIL NFR BLD AUTO: 1 %
ERYTHROCYTE [DISTWIDTH] IN BLOOD BY AUTOMATED COUNT: 12.6 % (ref 10–15)
GLUCOSE SERPL-MCNC: 105 MG/DL (ref 70–99)
HCO3 SERPL-SCNC: 27 MMOL/L (ref 22–29)
HCT VFR BLD AUTO: 36.1 % (ref 40–53)
HGB BLD-MCNC: 12 G/DL (ref 13.3–17.7)
IMM GRANULOCYTES # BLD: 0 10E3/UL
IMM GRANULOCYTES NFR BLD: 0 %
LYMPHOCYTES # BLD AUTO: 2.1 10E3/UL (ref 0.8–5.3)
LYMPHOCYTES NFR BLD AUTO: 31 %
MCH RBC QN AUTO: 29.6 PG (ref 26.5–33)
MCHC RBC AUTO-ENTMCNC: 33.2 G/DL (ref 31.5–36.5)
MCV RBC AUTO: 89 FL (ref 78–100)
MONOCYTES # BLD AUTO: 0.6 10E3/UL (ref 0–1.3)
MONOCYTES NFR BLD AUTO: 10 %
NEUTROPHILS # BLD AUTO: 4 10E3/UL (ref 1.6–8.3)
NEUTROPHILS NFR BLD AUTO: 59 %
NRBC # BLD AUTO: 0 10E3/UL
NRBC BLD AUTO-RTO: 0 /100
PLATELET # BLD AUTO: 160 10E3/UL (ref 150–450)
POTASSIUM SERPL-SCNC: 4.3 MMOL/L (ref 3.4–5.3)
PROT SERPL-MCNC: 7.7 G/DL (ref 6.4–8.3)
RBC # BLD AUTO: 4.05 10E6/UL (ref 4.4–5.9)
SODIUM SERPL-SCNC: 140 MMOL/L (ref 135–145)
T4 FREE SERPL-MCNC: 0.98 NG/DL (ref 0.9–1.7)
TSH SERPL DL<=0.005 MIU/L-ACNC: 6.24 UIU/ML (ref 0.3–4.2)
WBC # BLD AUTO: 6.8 10E3/UL (ref 4–11)

## 2024-11-20 PROCEDURE — 84439 ASSAY OF FREE THYROXINE: CPT | Performed by: INTERNAL MEDICINE

## 2024-11-20 PROCEDURE — 96413 CHEMO IV INFUSION 1 HR: CPT

## 2024-11-20 PROCEDURE — 258N000003 HC RX IP 258 OP 636: Performed by: INTERNAL MEDICINE

## 2024-11-20 PROCEDURE — 250N000013 HC RX MED GY IP 250 OP 250 PS 637: Performed by: INTERNAL MEDICINE

## 2024-11-20 PROCEDURE — 36591 DRAW BLOOD OFF VENOUS DEVICE: CPT | Performed by: INTERNAL MEDICINE

## 2024-11-20 PROCEDURE — 96376 TX/PRO/DX INJ SAME DRUG ADON: CPT

## 2024-11-20 PROCEDURE — 85004 AUTOMATED DIFF WBC COUNT: CPT | Performed by: INTERNAL MEDICINE

## 2024-11-20 PROCEDURE — 250N000011 HC RX IP 250 OP 636: Performed by: INTERNAL MEDICINE

## 2024-11-20 PROCEDURE — 84443 ASSAY THYROID STIM HORMONE: CPT | Performed by: INTERNAL MEDICINE

## 2024-11-20 PROCEDURE — 96417 CHEMO IV INFUS EACH ADDL SEQ: CPT

## 2024-11-20 PROCEDURE — S9341 HIT ENTERAL GRAV DIEM: HCPCS

## 2024-11-20 PROCEDURE — 96367 TX/PROPH/DG ADDL SEQ IV INF: CPT

## 2024-11-20 PROCEDURE — 84155 ASSAY OF PROTEIN SERUM: CPT | Performed by: INTERNAL MEDICINE

## 2024-11-20 PROCEDURE — 96375 TX/PRO/DX INJ NEW DRUG ADDON: CPT

## 2024-11-20 PROCEDURE — 96415 CHEMO IV INFUSION ADDL HR: CPT

## 2024-11-20 PROCEDURE — 84460 ALANINE AMINO (ALT) (SGPT): CPT | Performed by: INTERNAL MEDICINE

## 2024-11-20 PROCEDURE — 85041 AUTOMATED RBC COUNT: CPT | Performed by: INTERNAL MEDICINE

## 2024-11-20 PROCEDURE — 80051 ELECTROLYTE PANEL: CPT | Performed by: INTERNAL MEDICINE

## 2024-11-20 RX ORDER — DIPHENHYDRAMINE HYDROCHLORIDE 50 MG/ML
25 INJECTION INTRAMUSCULAR; INTRAVENOUS
Status: COMPLETED | OUTPATIENT
Start: 2024-11-20 | End: 2024-11-20

## 2024-11-20 RX ORDER — PALONOSETRON 0.05 MG/ML
0.25 INJECTION, SOLUTION INTRAVENOUS ONCE
Status: COMPLETED | OUTPATIENT
Start: 2024-11-20 | End: 2024-11-20

## 2024-11-20 RX ORDER — HEPARIN SODIUM (PORCINE) LOCK FLUSH IV SOLN 100 UNIT/ML 100 UNIT/ML
5 SOLUTION INTRAVENOUS ONCE
Status: COMPLETED | OUTPATIENT
Start: 2024-11-20 | End: 2024-11-20

## 2024-11-20 RX ORDER — DIPHENHYDRAMINE HYDROCHLORIDE 50 MG/ML
50 INJECTION INTRAMUSCULAR; INTRAVENOUS ONCE
Status: DISCONTINUED | OUTPATIENT
Start: 2024-11-20 | End: 2024-11-20

## 2024-11-20 RX ORDER — POLYETHYLENE GLYCOL 3350 17 G/17G
1 POWDER, FOR SOLUTION ORAL DAILY
COMMUNITY

## 2024-11-20 RX ORDER — METHYLPREDNISOLONE SODIUM SUCCINATE 40 MG/ML
40 INJECTION INTRAMUSCULAR; INTRAVENOUS
Status: COMPLETED | OUTPATIENT
Start: 2024-11-20 | End: 2024-11-20

## 2024-11-20 RX ORDER — DIPHENHYDRAMINE HCL 25 MG
50 CAPSULE ORAL ONCE
Status: COMPLETED | OUTPATIENT
Start: 2024-11-20 | End: 2024-11-20

## 2024-11-20 RX ORDER — HEPARIN SODIUM (PORCINE) LOCK FLUSH IV SOLN 100 UNIT/ML 100 UNIT/ML
5 SOLUTION INTRAVENOUS
Status: DISCONTINUED | OUTPATIENT
Start: 2024-11-20 | End: 2024-11-20 | Stop reason: HOSPADM

## 2024-11-20 RX ADMIN — DIPHENHYDRAMINE HYDROCHLORIDE 25 MG: 50 INJECTION INTRAMUSCULAR; INTRAVENOUS at 09:19

## 2024-11-20 RX ADMIN — DIPHENHYDRAMINE HYDROCHLORIDE 50 MG: 25 CAPSULE ORAL at 07:44

## 2024-11-20 RX ADMIN — SODIUM CHLORIDE 200 MG: 9 INJECTION, SOLUTION INTRAVENOUS at 08:28

## 2024-11-20 RX ADMIN — CARBOPLATIN 450 MG: 10 INJECTION, SOLUTION INTRAVENOUS at 13:11

## 2024-11-20 RX ADMIN — FOSAPREPITANT: 150 INJECTION, POWDER, LYOPHILIZED, FOR SOLUTION INTRAVENOUS at 07:50

## 2024-11-20 RX ADMIN — FAMOTIDINE 20 MG: 10 INJECTION INTRAVENOUS at 07:45

## 2024-11-20 RX ADMIN — PACLITAXEL 291 MG: 6 INJECTION, SOLUTION INTRAVENOUS at 09:10

## 2024-11-20 RX ADMIN — PALONOSETRON HYDROCHLORIDE 0.25 MG: 0.25 INJECTION INTRAVENOUS at 07:42

## 2024-11-20 RX ADMIN — HEPARIN SODIUM (PORCINE) LOCK FLUSH IV SOLN 100 UNIT/ML 5 ML: 100 SOLUTION at 13:43

## 2024-11-20 RX ADMIN — SODIUM CHLORIDE 1000 ML: 9 INJECTION, SOLUTION INTRAVENOUS at 09:31

## 2024-11-20 RX ADMIN — Medication 5 ML: at 06:41

## 2024-11-20 RX ADMIN — FAMOTIDINE 20 MG: 10 INJECTION INTRAVENOUS at 09:21

## 2024-11-20 RX ADMIN — SODIUM CHLORIDE 250 ML: 9 INJECTION, SOLUTION INTRAVENOUS at 07:41

## 2024-11-20 RX ADMIN — METHYLPREDNISOLONE SODIUM SUCCINATE 40 MG: 40 INJECTION, POWDER, FOR SOLUTION INTRAMUSCULAR; INTRAVENOUS at 09:33

## 2024-11-20 ASSESSMENT — PAIN SCALES - GENERAL: PAINLEVEL_OUTOF10: MILD PAIN (2)

## 2024-11-20 NOTE — NURSING NOTE
"Chief Complaint   Patient presents with    Port Draw     Labs drawn via port by RN. VS taken.     Port accessed with 20 gauge, 3/4\" power needle by RN, labs collected, line flushed with saline and heparin.  Vitals taken. Pt checked in for appointment(s).     Graciela Treviño RN    "

## 2024-11-20 NOTE — PROGRESS NOTES
"Infusion Nursing Note:  Douglas Herrera presents today for Cycle 1 Day 1 Keytruda, Taxol, Carboplatin.    Patient seen by provider today: No   present during visit today: Yes, Language: Hmong.     Note: Patient arrives with son and Hmong , last had chemo in Jan/Feb 2024. Has received information on new chemo regimen, side effects reviewed and all questions answered.       Intravenous Access:  Implanted Port.    Treatment Conditions:  Lab Results   Component Value Date    HGB 12.0 (L) 11/20/2024    WBC 6.8 11/20/2024    ANEUTAUTO 4.0 11/20/2024     11/20/2024        Lab Results   Component Value Date     11/20/2024    POTASSIUM 4.3 11/20/2024    MAG 1.9 08/05/2024    CR 1.03 11/20/2024    FLORENCE 9.8 11/20/2024    BILITOTAL 0.5 11/20/2024    ALBUMIN 3.9 11/20/2024    ALT 13 11/20/2024    AST 20 11/20/2024       Results reviewed, labs MET treatment parameters, ok to proceed with treatment.      Post Infusion Assessment:  Patient tolerated infusion poorly due to : Hypersensitivity: Did patient have a hypersensitivity reaction? : Yes  Drug or Product name: taxol  Were pre-meds administered?: Yes  What pre-meds were administered?: Diphenhydramine (Benadryl);Dexamethasone (decadron;Famotidine (Pepcid);Fosaprepitant (emend);Palonesetron (aloxi)  First or Subsequent treatment: First time receiving  Rate of infusion when patient had hypersensitivity reaction: 199 ml/hr  Time the hypersensitivity reaction was first recognized: 0916  Symptoms observed or reported (select all that apply): Chest tightness;Flushing  Interventions/treatment following reaction: Infusion stopped;Hypersensitivity medications administered  What hypersensitivity medications were administered?: DiphendydrAMINE (benadryl);Famotidine(Pepcid)Solumedrol given as well  Name of provider notified: Dr Post  Time provider notified: 0927    By 1000, chest pain resolved and patient feeling back to baseline \"normal\", patient feeling " well and would like to re challenge.      11/20/2024 1000 per written communication: Ok to re challenge Taxol today. Ok to titrate slowly. Dr. Sincere BUTTS/Kayy Villar RN    Taxol restarted at 25cc/hr for 5 min, 50cc/hr for 5 min, 100cc/hr for 5 min and 199cc/hr for remainder of infusion.    Tolerated rest of infusion without incident.   Blood return noted pre and post infusion.  Access discontinued per protocol.       Discharge Plan:   Prescription refills given for zofran and compazine.  AVS to patient via [employer].  Patient will return 12/11 for next appointment.   Patient discharged in stable condition accompanied by: son  Departure Mode: Ambulatory.      Kayy Villar, RN

## 2024-11-21 PROCEDURE — S9341 HIT ENTERAL GRAV DIEM: HCPCS

## 2024-11-22 ENCOUNTER — ANCILLARY PROCEDURE (OUTPATIENT)
Dept: INTERVENTIONAL RADIOLOGY/VASCULAR | Facility: CLINIC | Age: 64
End: 2024-11-22
Attending: NURSE PRACTITIONER
Payer: COMMERCIAL

## 2024-11-22 DIAGNOSIS — C30.0 MALIGNANT NEOPLASM OF NASAL CAVITIES (H): ICD-10-CM

## 2024-11-22 PROCEDURE — S9341 HIT ENTERAL GRAV DIEM: HCPCS

## 2024-11-22 PROCEDURE — 43762 RPLC GTUBE NO REVJ TRC: CPT | Performed by: PHYSICIAN ASSISTANT

## 2024-11-22 NOTE — PROCEDURES
Douglas Herrera  [id number]    Completed gastrostomy tube exchange for gastrostomy tube due to existing tube with leak, originally placed 1/2024.  No fluoroscopy used.  A new 18 Tunisian gastrostomy tube ready for immediate use.  Patient should return in 9-12 months for routine exchange or sooner if necessary.  Dx: Gastrostomy status.  Phu

## 2024-11-23 PROCEDURE — S9341 HIT ENTERAL GRAV DIEM: HCPCS

## 2024-11-24 PROCEDURE — S9341 HIT ENTERAL GRAV DIEM: HCPCS

## 2024-11-25 ENCOUNTER — EPISODE UPDATE (OUTPATIENT)
Dept: HOME HEALTH SERVICES | Facility: HOME HEALTH | Age: 64
End: 2024-11-25
Payer: COMMERCIAL

## 2024-11-25 PROCEDURE — S9341 HIT ENTERAL GRAV DIEM: HCPCS

## 2024-11-26 PROCEDURE — S9341 HIT ENTERAL GRAV DIEM: HCPCS

## 2024-11-27 ENCOUNTER — PATIENT OUTREACH (OUTPATIENT)
Dept: ONCOLOGY | Facility: CLINIC | Age: 64
End: 2024-11-27
Payer: COMMERCIAL

## 2024-11-27 PROCEDURE — S9341 HIT ENTERAL GRAV DIEM: HCPCS

## 2024-11-27 NOTE — PROGRESS NOTES
[facility]: Cancer Care                                                                                          Attempted to contact Douglas as he didn't show for his appointment with Trudy CRAMER yesterday. He didn't answer but I was able to connect with Tulio.    Tulio reports Douglas is feeling okay, aside from a bit of fatigue. Denies all other symptoms aside from some constipation that he reports is resolving. I instructed them to call our clinic if any concerns to arise.    They seemed to know about the appointment yesterday but opted to not come. I reviewed the rationale for KENNEDI visits and asked them to call the clinic if they have any further questions regarding appointments. I did confirm that they are aware of his next treatment appointments in December.    Update sent to KAROLINA Dillard.    Trudy Shelton, RN, BSN  RN Care Coordinator  [facility]

## 2024-11-28 PROCEDURE — S9341 HIT ENTERAL GRAV DIEM: HCPCS

## 2024-11-28 NOTE — PROVIDER NOTIFICATION
ENT paged @ [phone] Sharon, R. Pt is still hypotensive (84/57) Do you want to do a bolus? Thanks.   Ludivina -[phone]    MD ordered 500 mL bolus of lactated ringer's.    [facility] provides services at a reduced cost to those who are determined to be eligible through [facility]’s financial assistance program. Information regarding [facility]’s financial assistance program can be found by going to [email or web address] or by calling [phone].

## 2024-11-29 PROCEDURE — S9341 HIT ENTERAL GRAV DIEM: HCPCS

## 2024-11-30 PROCEDURE — S9341 HIT ENTERAL GRAV DIEM: HCPCS

## 2024-12-01 ENCOUNTER — HEALTH MAINTENANCE LETTER (OUTPATIENT)
Age: 64
End: 2024-12-01

## 2024-12-01 PROCEDURE — S9341 HIT ENTERAL GRAV DIEM: HCPCS

## 2024-12-02 PROCEDURE — S9341 HIT ENTERAL GRAV DIEM: HCPCS

## 2024-12-03 PROCEDURE — S9341 HIT ENTERAL GRAV DIEM: HCPCS

## 2024-12-04 PROCEDURE — S9341 HIT ENTERAL GRAV DIEM: HCPCS

## 2024-12-05 PROCEDURE — S9341 HIT ENTERAL GRAV DIEM: HCPCS

## 2024-12-06 PROCEDURE — S9341 HIT ENTERAL GRAV DIEM: HCPCS

## 2024-12-07 PROCEDURE — S9341 HIT ENTERAL GRAV DIEM: HCPCS

## 2024-12-08 PROCEDURE — S9341 HIT ENTERAL GRAV DIEM: HCPCS

## 2024-12-09 PROCEDURE — S9341 HIT ENTERAL GRAV DIEM: HCPCS

## 2024-12-10 PROCEDURE — S9341 HIT ENTERAL GRAV DIEM: HCPCS

## 2024-12-11 ENCOUNTER — APPOINTMENT (OUTPATIENT)
Dept: LAB | Facility: CLINIC | Age: 64
End: 2024-12-11
Attending: INTERNAL MEDICINE
Payer: COMMERCIAL

## 2024-12-11 ENCOUNTER — ONCOLOGY VISIT (OUTPATIENT)
Dept: ONCOLOGY | Facility: CLINIC | Age: 64
End: 2024-12-11
Attending: NURSE PRACTITIONER
Payer: COMMERCIAL

## 2024-12-11 ENCOUNTER — INFUSION THERAPY VISIT (OUTPATIENT)
Dept: ONCOLOGY | Facility: CLINIC | Age: 64
End: 2024-12-11
Attending: INTERNAL MEDICINE
Payer: COMMERCIAL

## 2024-12-11 VITALS
SYSTOLIC BLOOD PRESSURE: 123 MMHG | HEART RATE: 77 BPM | DIASTOLIC BLOOD PRESSURE: 77 MMHG | TEMPERATURE: 97.9 F | RESPIRATION RATE: 16 BRPM | OXYGEN SATURATION: 99 %

## 2024-12-11 VITALS
OXYGEN SATURATION: 99 % | DIASTOLIC BLOOD PRESSURE: 70 MMHG | WEIGHT: 134.4 LBS | BODY MASS INDEX: 23.81 KG/M2 | SYSTOLIC BLOOD PRESSURE: 134 MMHG | HEART RATE: 74 BPM | HEIGHT: 63 IN

## 2024-12-11 DIAGNOSIS — T45.1X5A ANTINEOPLASTIC CHEMOTHERAPY INDUCED ANEMIA: ICD-10-CM

## 2024-12-11 DIAGNOSIS — C31.0 SQUAMOUS CELL CARCINOMA OF MAXILLARY SINUS (H): Primary | ICD-10-CM

## 2024-12-11 DIAGNOSIS — M79.10 MYALGIA: ICD-10-CM

## 2024-12-11 DIAGNOSIS — D64.81 ANTINEOPLASTIC CHEMOTHERAPY INDUCED ANEMIA: ICD-10-CM

## 2024-12-11 DIAGNOSIS — J34.89 MASS OF SINUS: ICD-10-CM

## 2024-12-11 LAB
ALBUMIN SERPL BCG-MCNC: 3.7 G/DL (ref 3.5–5.2)
ALP SERPL-CCNC: 74 U/L (ref 40–150)
ALT SERPL W P-5'-P-CCNC: 20 U/L (ref 0–70)
ANION GAP SERPL CALCULATED.3IONS-SCNC: 10 MMOL/L (ref 7–15)
AST SERPL W P-5'-P-CCNC: 28 U/L (ref 0–45)
BASOPHILS # BLD AUTO: 0 10E3/UL (ref 0–0.2)
BASOPHILS NFR BLD AUTO: 1 %
BILIRUB SERPL-MCNC: 0.4 MG/DL
BUN SERPL-MCNC: 16.1 MG/DL (ref 8–23)
CALCIUM SERPL-MCNC: 9.5 MG/DL (ref 8.8–10.4)
CHLORIDE SERPL-SCNC: 103 MMOL/L (ref 98–107)
CREAT SERPL-MCNC: 0.91 MG/DL (ref 0.67–1.17)
EGFRCR SERPLBLD CKD-EPI 2021: >90 ML/MIN/1.73M2
EOSINOPHIL # BLD AUTO: 0 10E3/UL (ref 0–0.7)
EOSINOPHIL NFR BLD AUTO: 1 %
ERYTHROCYTE [DISTWIDTH] IN BLOOD BY AUTOMATED COUNT: 13.3 % (ref 10–15)
GLUCOSE SERPL-MCNC: 99 MG/DL (ref 70–99)
HCO3 SERPL-SCNC: 25 MMOL/L (ref 22–29)
HCT VFR BLD AUTO: 32.6 % (ref 40–53)
HGB BLD-MCNC: 10.8 G/DL (ref 13.3–17.7)
IMM GRANULOCYTES # BLD: 0.1 10E3/UL
IMM GRANULOCYTES NFR BLD: 1 %
LYMPHOCYTES # BLD AUTO: 1.6 10E3/UL (ref 0.8–5.3)
LYMPHOCYTES NFR BLD AUTO: 24 %
MCH RBC QN AUTO: 29.2 PG (ref 26.5–33)
MCHC RBC AUTO-ENTMCNC: 33.1 G/DL (ref 31.5–36.5)
MCV RBC AUTO: 88 FL (ref 78–100)
MONOCYTES # BLD AUTO: 0.5 10E3/UL (ref 0–1.3)
MONOCYTES NFR BLD AUTO: 7 %
NEUTROPHILS # BLD AUTO: 4.4 10E3/UL (ref 1.6–8.3)
NEUTROPHILS NFR BLD AUTO: 67 %
NRBC # BLD AUTO: 0 10E3/UL
NRBC BLD AUTO-RTO: 0 /100
PLATELET # BLD AUTO: 224 10E3/UL (ref 150–450)
POTASSIUM SERPL-SCNC: 4.4 MMOL/L (ref 3.4–5.3)
PROT SERPL-MCNC: 7.9 G/DL (ref 6.4–8.3)
RBC # BLD AUTO: 3.7 10E6/UL (ref 4.4–5.9)
SODIUM SERPL-SCNC: 138 MMOL/L (ref 135–145)
TSH SERPL DL<=0.005 MIU/L-ACNC: 4.11 UIU/ML (ref 0.3–4.2)
WBC # BLD AUTO: 6.6 10E3/UL (ref 4–11)

## 2024-12-11 PROCEDURE — 84443 ASSAY THYROID STIM HORMONE: CPT | Performed by: NURSE PRACTITIONER

## 2024-12-11 PROCEDURE — 250N000013 HC RX MED GY IP 250 OP 250 PS 637: Performed by: NURSE PRACTITIONER

## 2024-12-11 PROCEDURE — 250N000011 HC RX IP 250 OP 636: Performed by: NURSE PRACTITIONER

## 2024-12-11 PROCEDURE — T1013 SIGN LANG/ORAL INTERPRETER: HCPCS | Mod: U4

## 2024-12-11 PROCEDURE — 258N000003 HC RX IP 258 OP 636: Performed by: NURSE PRACTITIONER

## 2024-12-11 PROCEDURE — S9341 HIT ENTERAL GRAV DIEM: HCPCS

## 2024-12-11 PROCEDURE — 36591 DRAW BLOOD OFF VENOUS DEVICE: CPT | Performed by: NURSE PRACTITIONER

## 2024-12-11 PROCEDURE — 80053 COMPREHEN METABOLIC PANEL: CPT | Performed by: NURSE PRACTITIONER

## 2024-12-11 PROCEDURE — 85004 AUTOMATED DIFF WBC COUNT: CPT | Performed by: NURSE PRACTITIONER

## 2024-12-11 PROCEDURE — G0463 HOSPITAL OUTPT CLINIC VISIT: HCPCS | Performed by: NURSE PRACTITIONER

## 2024-12-11 RX ORDER — EPINEPHRINE 1 MG/ML
0.3 INJECTION, SOLUTION INTRAMUSCULAR; SUBCUTANEOUS EVERY 5 MIN PRN
Status: CANCELLED | OUTPATIENT
Start: 2024-12-11

## 2024-12-11 RX ORDER — PALONOSETRON 0.05 MG/ML
0.25 INJECTION, SOLUTION INTRAVENOUS ONCE
Status: COMPLETED | OUTPATIENT
Start: 2024-12-11 | End: 2024-12-11

## 2024-12-11 RX ORDER — DIPHENHYDRAMINE HYDROCHLORIDE 50 MG/ML
25 INJECTION INTRAMUSCULAR; INTRAVENOUS
Status: CANCELLED
Start: 2024-12-11

## 2024-12-11 RX ORDER — METHYLPREDNISOLONE SODIUM SUCCINATE 40 MG/ML
40 INJECTION INTRAMUSCULAR; INTRAVENOUS
Status: CANCELLED
Start: 2024-12-11

## 2024-12-11 RX ORDER — HEPARIN SODIUM (PORCINE) LOCK FLUSH IV SOLN 100 UNIT/ML 100 UNIT/ML
5 SOLUTION INTRAVENOUS
Status: CANCELLED | OUTPATIENT
Start: 2024-12-11

## 2024-12-11 RX ORDER — HEPARIN SODIUM,PORCINE 10 UNIT/ML
5-20 VIAL (ML) INTRAVENOUS DAILY PRN
Status: CANCELLED | OUTPATIENT
Start: 2024-12-11

## 2024-12-11 RX ORDER — ALBUTEROL SULFATE 0.83 MG/ML
2.5 SOLUTION RESPIRATORY (INHALATION)
Status: CANCELLED | OUTPATIENT
Start: 2024-12-11

## 2024-12-11 RX ORDER — DIPHENHYDRAMINE HYDROCHLORIDE 50 MG/ML
50 INJECTION INTRAMUSCULAR; INTRAVENOUS
Status: CANCELLED
Start: 2024-12-11

## 2024-12-11 RX ORDER — MEPERIDINE HYDROCHLORIDE 25 MG/ML
25 INJECTION INTRAMUSCULAR; INTRAVENOUS; SUBCUTANEOUS
Status: CANCELLED | OUTPATIENT
Start: 2024-12-11

## 2024-12-11 RX ORDER — HEPARIN SODIUM (PORCINE) LOCK FLUSH IV SOLN 100 UNIT/ML 100 UNIT/ML
5 SOLUTION INTRAVENOUS
Status: DISCONTINUED | OUTPATIENT
Start: 2024-12-11 | End: 2024-12-11 | Stop reason: HOSPADM

## 2024-12-11 RX ORDER — DIPHENHYDRAMINE HCL 25 MG
25 CAPSULE ORAL EVERY 6 HOURS PRN
Status: DISCONTINUED | OUTPATIENT
Start: 2024-12-11 | End: 2024-12-11 | Stop reason: HOSPADM

## 2024-12-11 RX ORDER — DIPHENHYDRAMINE HCL 25 MG
25 CAPSULE ORAL ONCE
Status: CANCELLED
Start: 2024-12-11

## 2024-12-11 RX ORDER — DIPHENHYDRAMINE HCL 25 MG
25 CAPSULE ORAL ONCE
Status: COMPLETED | OUTPATIENT
Start: 2024-12-11 | End: 2024-12-11

## 2024-12-11 RX ORDER — ALBUTEROL SULFATE 90 UG/1
1-2 INHALANT RESPIRATORY (INHALATION)
Status: CANCELLED
Start: 2024-12-11

## 2024-12-11 RX ORDER — LORAZEPAM 2 MG/ML
0.5 INJECTION INTRAMUSCULAR EVERY 4 HOURS PRN
Status: CANCELLED | OUTPATIENT
Start: 2024-12-11

## 2024-12-11 RX ORDER — PALONOSETRON 0.05 MG/ML
0.25 INJECTION, SOLUTION INTRAVENOUS ONCE
Status: CANCELLED | OUTPATIENT
Start: 2024-12-11

## 2024-12-11 RX ORDER — DIPHENHYDRAMINE HCL 25 MG
50 CAPSULE ORAL ONCE
Status: CANCELLED
Start: 2024-12-11

## 2024-12-11 RX ORDER — HEPARIN SODIUM (PORCINE) LOCK FLUSH IV SOLN 100 UNIT/ML 100 UNIT/ML
5 SOLUTION INTRAVENOUS DAILY PRN
Status: DISCONTINUED | OUTPATIENT
Start: 2024-12-11 | End: 2024-12-11 | Stop reason: HOSPADM

## 2024-12-11 RX ADMIN — DIPHENHYDRAMINE HYDROCHLORIDE 25 MG: 25 CAPSULE ORAL at 10:47

## 2024-12-11 RX ADMIN — CARBOPLATIN 450 MG: 10 INJECTION, SOLUTION INTRAVENOUS at 15:11

## 2024-12-11 RX ADMIN — SODIUM CHLORIDE 200 MG: 9 INJECTION, SOLUTION INTRAVENOUS at 11:23

## 2024-12-11 RX ADMIN — FOSAPREPITANT: 150 INJECTION, POWDER, LYOPHILIZED, FOR SOLUTION INTRAVENOUS at 11:00

## 2024-12-11 RX ADMIN — DIPHENHYDRAMINE HYDROCHLORIDE 25 MG: 25 CAPSULE ORAL at 10:59

## 2024-12-11 RX ADMIN — Medication 5 ML: at 15:46

## 2024-12-11 RX ADMIN — Medication 3 ML: at 09:13

## 2024-12-11 RX ADMIN — PALONOSETRON HYDROCHLORIDE 0.25 MG: 0.25 INJECTION INTRAVENOUS at 11:01

## 2024-12-11 RX ADMIN — FAMOTIDINE 20 MG: 10 INJECTION INTRAVENOUS at 10:55

## 2024-12-11 RX ADMIN — SODIUM CHLORIDE 100 ML: 9 INJECTION, SOLUTION INTRAVENOUS at 10:50

## 2024-12-11 RX ADMIN — PACLITAXEL 291 MG: 6 INJECTION, SOLUTION INTRAVENOUS at 12:05

## 2024-12-11 ASSESSMENT — PAIN SCALES - GENERAL: PAINLEVEL_OUTOF10: NO PAIN (0)

## 2024-12-11 NOTE — PROGRESS NOTES
[facility]ER CLINIC  [address] [facility] [address]  Phone: [phone]  Fax: [phone]    PATIENT NAME: Douglas Herrera  MRN # [id number]   DATE OF VISIT:  December 11, 2024  YOB: 1960     Otolaryngology: Dr. Damon Regan   Radiation Oncology: Dr. Tomasa Akers  Cardiology: Dr. Qamar Hernandez  PCP: Dr. العراقي   Hepatologist: [facility]     CANCER TYPE: SCC sinonasal   STAGE: [id number] (IVB)  ECOG PS: 1    PD-L1:  NGS: internal panel. Fusion negative. ARID1A S90fs, EGFR exon 20 insertion, APC [id number]    SUMMARY    8/9/23 CT sinus.   8/24/23 MRI sinus. L maxillary sinus mass  9/12/23 ED for epistaxis.   9/13/23 CTA and embolization of L IMAX   10/4/23 Nasal bx. Path: Inverted papilloma with high grade dysplasia  11/10/23 Nasal endoscopy and bx in the OR. C/b severe bleeding. Path: Inverted sinonasal papilloma with severe dysplasia.  11/17/23 MRI. 7.4 x 4.6 x 6.6 cm L sinonasal mass, destruction of nasal turbinates, L maxillary sinus, hard palate, invasion of L  space, involvement of medial and lateral pterygoids and temporalis muscles. Effacement and invasion ipsilateral pterygopalatine fossa, extends and effaces the nasopharynx.   11/30/23 Carotid angiogram. Direct endonasal and transoral embolization L sinonasal tumor, transaterial embolization of the L sphenopalatine artery feeders to the L sinonasal artery tumor   12/1/23 Stealth assisted transnasal endoscopic approach to excise L sinonasal mass. Pre-operative embolization. Path: SCC involving L maxilla.    12/9/23 PET. Hypermetabolic mass centered along the L maxilla, extending superiorly through the floor of the L maxillary sinus, posterior/laterally to the  space, invasion of the pterygoid muscles, extending cranially along the pterygopalatine fossa and pterygoid plates to the skull base level. Erosion of involved bones including L maxilla, maxillary sinus wall and pterygoid  plates. Extension medially to the L lateral nasopharyngeal wall and soft palate. 1 mm fat place between carotid and mass. ~4.9 x 4.0 x 5.4 cm (SUV 24.3). Partially resected since 11/17/23 MRI. 8 mm L 2A node (SUV 5.9), 7 mm L level 2 node (SUV 5.5)  1/2/24 Gtube (IR)  1/4/24 Video swallow. No aspiration  1/8~2/27/24 Chemoradiation with weekly cisplatin.  1/11/24 Port (IR)  8/5/24 PET/CT.  Overall increased FDG uptake centered along the posterior inferior left maxillary sinus involving the  space, left pterygopalatine fossa, left parapharyngeal space, extending inferiorly to left maxilla.  Increased soft tissue component within the space, now broad FDG uptake (SUV 24.97), previously 5.75.  Area measures 3.3 x 2.6 cm. Increased focal hypermetabolic activity posterior pharyngeal wall, extending to the left palatine tonsil, associated mucosal thickening (SUV 17.23).  Non-FDG avid mass, 2.7 x 2.4 cm, previously 2.1 x 2.5 cm, another medially, 1.1 x 1.5 cm, previously 1.1 x 1 cm.  New focal area of FDG uptake right lateral oropharyngeal wall (SUV 9.07).  Few hypermetabolic right cervical nodes, none definitively enlarged, however increased activity compared to prior (SUV 7.29-8.36).  Several sub-6 mm pulmonary nodules.  8/5/24 MRI.  Heterogeneous mass along the base of the left maxillary sinus extending to the maxilla,  space, left pterygopalatine fossa, and parapharyngeal space.  Some areas of treated but some corresponding to FDG avidity on same-day PET, highly suspicious for tumor recurrence.  Question early left V2 involvement, thin dural enhancement along the base of the left temporal lobe    9/20/24 L maxillary, L superior alveolus bx in clinic Path: Fragments of at least SCC in situ.   11/20/24-current: pall intent carbo/taxol/pembro    SUBJECTIVE  Mr. 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 This lasted for ~7 days. He did not use tyelnol or medication for aching. He was able to get around his house in the same capacity. He denies numbness or burning to his hands or feet. He did not have nausea with chemotherapy--continues to do 3-5 cartons of formula daily--adjusted depending on how much he can get PO. Chronic tightness to L maxilla. No bowel or skin changes.       PAST MEDICAL HISTORY  Sinonasal carcinoma as above  HTN  ASCVD. CABG x 3 2019  PE and LLE DVT after CABG . Treated with rivaroxaban  Dyslipidemia  Hepatitis B   SCC R temple s/p MOHS  Ascending aortic aneurysm repair 2019  Hearing loss L   Gout - feet  Depression  Cholecystectomy    CURRENT OUTPATIENT MEDICATIONS  Reviewed    ALLERGIES  No Known Allergies      PHYSICAL EXAM  There were no vitals taken for this visit.    Remainder of physical exam deferred due to public health emergency and limitations of  phone visit.    LABORATORY AND IMAGING STUDIES  Most Recent 3 CBC's:  Recent Labs   Lab Test 12/11/24  0918 11/20/24  0637 10/31/24  1211   WBC 6.6 6.8 5.2   HGB 10.8* 12.0* 12.0*   MCV 88 89 87    160 200   ANEUTAUTO 4.4 4.0 3.0     Most Recent 3 BMP's:  Recent Labs   Lab Test 12/11/24  0918 11/20/24  0637 10/31/24  1211    140 139   POTASSIUM 4.4 4.3 3.9   CHLORIDE 103 103 103   CO2 25 27 26   BUN 16.1 21.6 18.0   CR 0.91 1.03 0.99   ANIONGAP 10 10 10   FLORENCE 9.5 9.8 9.9   GLC 99 105* 164*   PROTTOTAL 7.9 7.7 7.9   ALBUMIN 3.7 3.9 4.0    Most Recent 3 LFT's:  Recent Labs   Lab Test 12/11/24  0918 11/20/24  0637 10/31/24  1211   AST 28 20 26   ALT 20 13 17   ALKPHOS 74 71 82   BILITOTAL 0.4 0.5 1.0    Most Recent 2 TSH and T4:  Recent Labs   Lab Test 11/20/24  0637 10/31/24  1211   TSH 6.24* 3.13   T4 0.98 0.96     I reviewed the above labs today.      IR Gastrostomy Tube Change  Douglas Sharon   [id number]     Completed gastrostomy tube exchange for gastrostomy tube due to  existing tube with leak, originally placed  1/2024. ?No fluoroscopy  used. ?A new 18 French gastrostomy tube ready for immediate use.  ?Patient should return in 9-12 months for routine exchange or sooner  if necessary. ?Dx: Gastrostomy status. ?Phu     TIME:  A total of 10 minutes was spent with the patient in direct  care, counseling, education, and coordination/documentation of care.    -----  [ The physician assistant/associate (PA) who performed this procedure  and signed the above report is licensed to practice in the [facility] pursuant to [place] Statute 147A.09. ]    CJ DE LA ROSA PA-C         SYSTEM ID:  [id number]          ASSESSMENT AND PLAN  SCC L maxillary sinus, [id number] (IVB), ARID1 mutation,  EGFR exon 20 insertion: local recurrence. Dr. Iglesias confirmed with bx 10/11. Began carbo/taxol/pembro with palliative intent--tolerated C1 ok--main SE fatigue and myalgias. Discussed dose reduction of taxol but elected to try again--also could consider short dex taper. C2 today no changes. Sees Nishi prior to C3 and restaging prior to C4.     Fatigue: multifactorial--cancer, chemo, anemia. Will follow and adjust course as needed    Myalgias: ?taxol?. Improved spontaneously so less likely IO. Encouraged tylenol daily up to TID as needed.     Trismus: Ongoing PT    Nutrition: Still doing some TF - adjusts based on his oral intake from day to day. Tube working ok - had recent routine change. Follows with SOFIA YOUNGER. Anticipate he will need continue to need tube feedings for >90 days.    Hypercalcemia: Malignancy. stable    Pulm nodules: Stable     Hearing loss: Unchanged - still pretty significant on L. Can repeat audiogram in future     Hepatitis B: HBSAby negative, SAg +, cAby +, HBV PCR negative 1/23/24. HCV negative.     H/o PE/DVT: 5/8/2019 CTA report scanned into [employer]. Small PEs. Also had LLE DVT, acute, occlusive. Was on rivaroxaban, completed course.     ASCVD: Not entirely clear why it was stopped when he started chemoradiation. Rec'd  to resume asa 81mg daily at last visit--didn't get chance to confirm today, ok to hold metoprolol a little longer until he sees PCP     H/o undefined hypothyroidism: TFTs 8/5/24 - FT4 borderline low. Check again next draw.     The longitudinal plan of care for the condition(s) below were addressed during this visit. Due to the added complexity in care, I will continue to support Douglas in the subsequent management of this condition(s) and with the ongoing continuity of care of this condition(s): maxillary sinus cancer       40 minutes spent on this phone call. [employer] phone  used throughout      Trudy Dillard CNP on 12/11/2024 at 10:10 AM

## 2024-12-11 NOTE — LETTER
12/11/2024      Douglas Herrera  [address] [place] [address]      Dear Colleague,    Thank you for referring your patient, Douglas Herrera, to the [facility]CER [facility]. Please see a copy of my visit note below.        [facility]ER [facility]INIC  [address] [facility] [address]  Phone: [phone]  Fax: [phone]    PATIENT NAME: Douglas Herrera  MRN # [id number]   DATE OF VISIT:  December 11, 2024  YOB: 1960     [facility]   Radiation Oncology: Dr. Tomasa Akers  Cardiology: Dr. Qamar Hernandez  PCP: Dr. العراقي   Hepatologist: [facility]     CANCER TYPE: SCC sinonasal   STAGE: [id number] (IVB)  ECOG PS: 1    PD-L1:  NGS: internal panel. Fusion negative. ARID1A S90fs, EGFR exon 20 insertion, APC [id number]    SUMMARY    8/9/23 CT sinus.   8/24/23 MRI sinus. L maxillary sinus mass  9/12/23 ED for epistaxis.   9/13/23 CTA and embolization of L IMAX   10/4/23 Nasal bx. Path: Inverted papilloma with high grade dysplasia  11/10/23 Nasal endoscopy and bx in the OR. C/b severe bleeding. Path: Inverted sinonasal papilloma with severe dysplasia.  11/17/23 MRI. 7.4 x 4.6 x 6.6 cm L sinonasal mass, destruction of nasal turbinates, L maxillary sinus, hard palate, invasion of L  space, involvement of medial and lateral pterygoids and temporalis muscles. Effacement and invasion ipsilateral pterygopalatine fossa, extends and effaces the nasopharynx.   11/30/23 Carotid angiogram. Direct endonasal and transoral embolization L sinonasal tumor, transaterial embolization of the L sphenopalatine artery feeders to the L sinonasal artery tumor   12/1/23 Stealth assisted transnasal endoscopic approach to excise L sinonasal mass. Pre-operative embolization. Path: SCC involving L maxilla.    12/9/23 PET. Hypermetabolic mass centered along the L maxilla, extending superiorly through the floor of the L maxillary sinus, posterior/laterally to the   space, invasion of the pterygoid muscles, extending cranially along the pterygopalatine fossa and pterygoid plates to the skull base level. Erosion of involved bones including L maxilla, maxillary sinus wall and pterygoid plates. Extension medially to the L lateral nasopharyngeal wall and soft palate. 1 mm fat place between carotid and mass. ~4.9 x 4.0 x 5.4 cm (SUV 24.3). Partially resected since 11/17/23 MRI. 8 mm L 2A node (SUV 5.9), 7 mm L level 2 node (SUV 5.5)  1/2/24 Gtube (IR)  1/4/24 Video swallow. No aspiration  1/8~2/27/24 Chemoradiation with weekly cisplatin.  1/11/24 Port (IR)  8/5/24 PET/CT.  Overall increased FDG uptake centered along the posterior inferior left maxillary sinus involving the  space, left pterygopalatine fossa, left parapharyngeal space, extending inferiorly to left maxilla.  Increased soft tissue component within the space, now broad FDG uptake (SUV 24.97), previously 5.75.  Area measures 3.3 x 2.6 cm. Increased focal hypermetabolic activity posterior pharyngeal wall, extending to the left palatine tonsil, associated mucosal thickening (SUV 17.23).  Non-FDG avid mass, 2.7 x 2.4 cm, previously 2.1 x 2.5 cm, another medially, 1.1 x 1.5 cm, previously 1.1 x 1 cm.  New focal area of FDG uptake right lateral oropharyngeal wall (SUV 9.07).  Few hypermetabolic right cervical nodes, none definitively enlarged, however increased activity compared to prior (SUV 7.29-8.36).  Several sub-6 mm pulmonary nodules.  8/5/24 MRI.  Heterogeneous mass along the base of the left maxillary sinus extending to the maxilla,  space, left pterygopalatine fossa, and parapharyngeal space.  Some areas of treated but some corresponding to FDG avidity on same-day PET, highly suspicious for tumor recurrence.  Question early left V2 involvement, thin dural enhancement along the base of the left temporal lobe    9/20/24 L maxillary, L superior alveolus bx in clinic Path: Fragments of at  least SCC in situ.   11/20/24-current: pall intent carbo/taxol/pembro    SUBJECTIVE  Mr. Herrera returns for follow up of maxillary sinus cancer prior to C2  About ~3-5 days after infusion, he noticed generalized muscle aching and fatigue. This lasted for ~7 days. He did not use tyelnol or medication for aching. He was able to get around his house in the same capacity. He denies numbness or burning to his hands or feet. He did not have nausea with chemotherapy--continues to do 3-5 cartons of formula daily--adjusted depending on how much he can get PO. Chronic tightness to L maxilla. No bowel or skin changes.       PAST MEDICAL HISTORY  Sinonasal carcinoma as above  HTN  ASCVD. CABG x 3 2019  PE and LLE DVT after CABG . Treated with rivaroxaban  Dyslipidemia  Hepatitis B   SCC R temple s/p MOHS  Ascending aortic aneurysm repair 2019  Hearing loss L   Gout - feet  Depression  Cholecystectomy    CURRENT OUTPATIENT MEDICATIONS  Reviewed    ALLERGIES  No Known Allergies      PHYSICAL EXAM  There were no vitals taken for this visit.    Remainder of physical exam deferred due to public health emergency and limitations of  phone visit.    LABORATORY AND IMAGING STUDIES  Most Recent 3 CBC's:  Recent Labs   Lab Test 12/11/24  0918 11/20/24  0637 10/31/24  1211   WBC 6.6 6.8 5.2   HGB 10.8* 12.0* 12.0*   MCV 88 89 87    160 200   ANEUTAUTO 4.4 4.0 3.0     Most Recent 3 BMP's:  Recent Labs   Lab Test 12/11/24  0918 11/20/24  0637 10/31/24  1211    140 139   POTASSIUM 4.4 4.3 3.9   CHLORIDE 103 103 103   CO2 25 27 26   BUN 16.1 21.6 18.0   CR 0.91 1.03 0.99   ANIONGAP 10 10 10   FLORENCE 9.5 9.8 9.9   GLC 99 105* 164*   PROTTOTAL 7.9 7.7 7.9   ALBUMIN 3.7 3.9 4.0    Most Recent 3 LFT's:  Recent Labs   Lab Test 12/11/24  0918 11/20/24  0637 10/31/24  1211   AST 28 20 26   ALT 20 13 17   ALKPHOS 74 71 82   BILITOTAL 0.4 0.5 1.0    Most Recent 2 TSH and T4:  Recent Labs   Lab Test 11/20/24  0637 10/31/24  1211   TSH 6.24* 3.13    T4 0.98 0.96     I reviewed the above labs today.      IR Gastrostomy Tube Change  Douglas Herrera   [id number]     Completed gastrostomy tube exchange for gastrostomy tube due to  existing tube with leak, originally placed 1/2024. ?No fluoroscopy  used. ?A new 18 French gastrostomy tube ready for immediate use.  ?Patient should return in 9-12 months for routine exchange or sooner  if necessary. ?Dx: Gastrostomy status. ?Phu     TIME:  A total of 10 minutes was spent with the patient in direct  care, counseling, education, and coordination/documentation of care.    -----  [ The physician assistant/associate (PA) who performed this procedure  and signed the above report is licensed to practice in the [facility] pursuant to [place] Stat[place] 147A.09. ]    CJ DE LA ROSA PA-C         SYSTEM ID:  [id number]          ASSESSMENT AND PLAN  SCC L maxillary sinus, [id number] (IVB), ARID1 mutation,  EGFR exon 20 insertion: local recurrence. Dr. Iglesias confirmed with bx 10/11. Began carbo/taxol/pembro with palliative intent--tolerated C1 ok--main SE fatigue and myalgias. Discussed dose reduction of taxol but elected to try again--also could consider short dex taper. C2 today no changes. Sees Nishi prior to C3 and restaging prior to C4.     Fatigue: multifactorial--cancer, chemo, anemia. Will follow and adjust course as needed    Myalgias: ?taxol?. Improved spontaneously so less likely IO. Encouraged tylenol daily up to TID as needed.     Trismus: Ongoing PT    Nutrition: Still doing some TF - adjusts based on his oral intake from day to day. Tube working ok - had recent routine change. Follows with [facility]    Hypercalcemia: Malignancy. stable    Port: Still in place. Needs ongoing flushes, will arrange.     Pulm nodules: Stable     Hearing loss: Unchanged - still pretty significant on L. Can repeat audiogram in future     Hepatitis B: HBSAby negative, SAg +, cAby +, HBV PCR negative 1/23/24. HCV negative.     H/o  PE/DVT: 5/8/2019 CTA report scanned into [employer]. Small PEs. Also had LLE DVT, acute, occlusive. Was on rivaroxaban, completed course.     ASCVD: Not entirely clear why it was stopped when he started chemoradiation. Rec'd to resume asa 81mg daily at last visit--didn't get chance to confirm today, ok to hold metoprolol a little longer until he sees PCP     H/o undefined hypothyroidism: TFTs 8/5/24 - FT4 borderline low. Check again next draw.     The longitudinal plan of care for the condition(s) below were addressed during this visit. Due to the added complexity in care, I will continue to support Douglas in the subsequent management of this condition(s) and with the ongoing continuity of care of this condition(s): maxillary sinus cancer       40 minutes spent on this phone call. [employer] phone  used throughout      Trudy Dillard CNP on 12/11/2024 at 10:10 AM      Again, thank you for allowing me to participate in the care of your patient.        Sincerely,        Trudy Dillard CNP

## 2024-12-11 NOTE — PROGRESS NOTES
Infusion Nursing Note:  Douglas Herrera presents today for Day 1 Cycle 2 Keytruda, Taxol, Carboplatin (6 cycles of Cisplatin)   Patient seen by provider today: Yes: Trudy Dillard CNP   present during visit today: Yes, Language: Hmong.     Note: Patient present to infusion feeling ok. Pt denies new acute discomfort and states no acute complaints or concerns not addressed by KENNEDI today.    Patient had chest pain 10 minutes within Taxol initiation during cycle 1-therefore clarified the Benadryl dose reduction from 50mg to 25mg.   Per secure chat. 12/11/24. 1055. Trudy Dillard CNP. Petr Collier RN. OK to give a total of 50mg Benadryl      Intravenous Access:  Implanted Port.    Treatment Conditions:  Lab Results   Component Value Date    HGB 10.8 (L) 12/11/2024    WBC 6.6 12/11/2024    ANEUTAUTO 4.4 12/11/2024     12/11/2024        Lab Results   Component Value Date     12/11/2024    POTASSIUM 4.4 12/11/2024    MAG 1.9 08/05/2024    CR 0.91 12/11/2024    FLORENCE 9.5 12/11/2024    BILITOTAL 0.4 12/11/2024    ALBUMIN 3.7 12/11/2024    ALT 20 12/11/2024    AST 28 12/11/2024   Results reviewed, labs MET treatment parameters, ok to proceed with treatment.      Post Infusion Assessment:  Patient tolerated infusion without incident.  Blood return noted pre and post infusion.  Site patent and intact, free from redness, edema or discomfort.  No evidence of extravasations.  Access discontinued per protocol.       Discharge Plan:   Prescription refills given for Nasal [place].  Discharge instructions reviewed with: Patient.  Patient and/or family verbalized understanding of discharge instructions and all questions answered.  AVS to patient via [employer].  Patient will return 1/3 for next appointment.   Patient discharged in stable condition accompanied by: Family.  Departure Mode: Ambulatory.      Petr Collier RN

## 2024-12-11 NOTE — PATIENT INSTRUCTIONS
[facility] Triage and after hours / weekends / holidays:  [phone]    Please call the triage or after hours line if you experience a temperature greater than or equal to 100.4, shaking chills, have uncontrolled nausea, vomiting and/or diarrhea, dizziness, shortness of breath, chest pain, bleeding, unexplained bruising, or if you have any other new/concerning symptoms, questions or concerns.      If you are having any concerning symptoms or wish to speak to a provider before your next infusion visit, please call triage to notify them so we can adequately serve you.     If you need a refill on a narcotic prescription or other medication, please call before your infusion appointment.                 December 2024 Sunday Monday Tuesday Wednesday Thursday Friday Saturday   1     2     3     4     5     6     7       8     9     10     11    LAB CENTRAL   8:45 AM   (15 min.)   [facility] DRAW   [facility]    RETURN CCSL   9:00 AM   (45 min.)   Trudy Dillard CNP   [facility]    ONC INFUSION 5 HR (300 MIN)  10:40 AM   (320 min.)    ONC INFUSION NURSE   [facility] 12     13     14       15     16     17     18     19     20     21       22     23     24     25     26     27     28       29     30     31                                     January 2025 Sunday Monday Tuesday Wednesday Thursday Friday Saturday                  1     2     3    LAB CENTRAL   8:00 AM   (15 min.)   [facility] DRAW   [facility]    RETURN CCSL   8:15 AM   (45 min.)   Nishi Michele CNP   [facility]    ONC INFUSION 5 HR (300 MIN)  10:00 AM   (300 min.)    ONC INFUSION NURSE   [facility] 4       5     6     7     8     9     10     11       12     13     14     15     16     17     18       19     20    CT CAP NECK W CMB  12:10 PM   (40 min.)    [facility]   [facility] [place] 21     22    RETURN CCSL   2:15 PM   (30 min.)   Kayy Post MD   [facility] Cancer [facility] 23     24    LAB CENTRAL   9:15 AM   (15 min.)   [facility] LAB DRAW   [facility]    ONC INFUSION 5 HR (300 MIN)  10:00 AM   (300 min.)    ONC INFUSION NURSE   [facility] 25       26     27     28     29     30     31                          Lab Results:  Recent Results (from the past 12 hours)   Comprehensive metabolic panel    Collection Time: 12/11/24  9:18 AM   Result Value Ref Range    Sodium 138 135 - 145 mmol/L    Potassium 4.4 3.4 - 5.3 mmol/L    Carbon Dioxide (CO2) 25 22 - 29 mmol/L    Anion Gap 10 7 - 15 mmol/L    Urea Nitrogen 16.1 8.0 - 23.0 mg/dL    Creatinine 0.91 0.67 - 1.17 mg/dL    GFR Estimate >90 >60 mL/min/1.73m2    Calcium 9.5 8.8 - 10.4 mg/dL    Chloride 103 98 - 107 mmol/L    Glucose 99 70 - 99 mg/dL    Alkaline Phosphatase 74 40 - 150 U/L    AST 28 0 - 45 U/L    ALT 20 0 - 70 U/L    Protein Total 7.9 6.4 - 8.3 g/dL    Albumin 3.7 3.5 - 5.2 g/dL    Bilirubin Total 0.4 <=1.2 mg/dL   TSH with free T4 reflex    Collection Time: 12/11/24  9:18 AM   Result Value Ref Range    TSH 4.11 0.30 - 4.20 uIU/mL   CBC with platelets and differential    Collection Time: 12/11/24  9:18 AM   Result Value Ref Range    WBC Count 6.6 4.0 - 11.0 10e3/uL    RBC Count 3.70 (L) 4.40 - 5.90 10e6/uL    Hemoglobin 10.8 (L) 13.3 - 17.7 g/dL    Hematocrit 32.6 (L) 40.0 - 53.0 %    MCV 88 78 - 100 fL    MCH 29.2 26.5 - 33.0 pg    MCHC 33.1 31.5 - 36.5 g/dL    RDW 13.3 10.0 - 15.0 %    Platelet Count 224 150 - 450 10e3/uL    % Neutrophils 67 %    % Lymphocytes 24 %    % Monocytes 7 %    % Eosinophils 1 %    % Basophils 1 %    % Immature Granulocytes 1 %    NRBCs per 100 WBC 0 <1 /100    Absolute Neutrophils 4.4 1.6 - 8.3 10e3/uL    Absolute Lymphocytes 1.6 0.8 - 5.3 10e3/uL    Absolute  Monocytes 0.5 0.0 - 1.3 10e3/uL    Absolute Eosinophils 0.0 0.0 - 0.7 10e3/uL    Absolute Basophils 0.0 0.0 - 0.2 10e3/uL    Absolute Immature Granulocytes 0.1 <=0.4 10e3/uL    Absolute NRBCs 0.0 10e3/uL

## 2024-12-11 NOTE — NURSING NOTE
Chief Complaint   Patient presents with    Port Draw     Labs drawn via port by RN in lab. VS taken.      Labs drawn via Port accessed using 20g flat needle. Patient requested primapore dressing. Line flushed and Heparin locked. Vital signs taken. Checked into next appointment.     Trudy Yu RN

## 2024-12-11 NOTE — NURSING NOTE
"Oncology Rooming Note    December 11, 2024 9:27 AM   Douglas Herrera is a 64 year old male who presents for:    Chief Complaint   Patient presents with    Port Draw     Labs drawn via port by RN in lab. VS taken.     Oncology Clinic Visit     Squamous cell carcinoma of maxillary sinus     Initial Vitals: /77   Pulse 77   Temp 97.9  F (36.6  C) (Oral)   Resp 16   SpO2 99%  Estimated body mass index is 23.21 kg/m  as calculated from the following:    Height as of 11/20/24: 1.62 m (5' 3.78\").    Weight as of 11/20/24: 60.9 kg (134 lb 4.8 oz). There is no height or weight on file to calculate BSA.  No Pain (0) Comment: Data Unavailable   No LMP for male patient.  Allergies reviewed: Yes  Medications reviewed: Yes    Medications: MEDICATION REFILLS NEEDED TODAY. Provider was notified.  Pharmacy name entered into [employer]: [facility] - [place], [place] - [address]    Frailty Screening:   Is the patient here for a new oncology consult visit in cancer care? 2. No      Clinical concerns: Patient has been feeling a lot of fatigue and muscle pain, and he is wondering if that is normal for people undergoing chemotherapy. His condition has improved in the past 2-3 days.        Иван Kasper, EMT            "

## 2024-12-12 PROCEDURE — S9341 HIT ENTERAL GRAV DIEM: HCPCS

## 2024-12-13 PROCEDURE — S9341 HIT ENTERAL GRAV DIEM: HCPCS

## 2024-12-14 PROCEDURE — S9341 HIT ENTERAL GRAV DIEM: HCPCS

## 2024-12-16 ENCOUNTER — NURSE TRIAGE (OUTPATIENT)
Dept: ONCOLOGY | Facility: CLINIC | Age: 64
End: 2024-12-16
Payer: COMMERCIAL

## 2024-12-16 NOTE — TELEPHONE ENCOUNTER
Tulio calling for refill for G-tube feeding. Pt only has a few days left so needs more soon.     Call Tulio back at [phone] to call back to coordinate reorder and delivery for g-tube feeding.     Routed high priority to care team and registered dietician.

## 2024-12-20 ENCOUNTER — HOME INFUSION BILLING (OUTPATIENT)
Dept: HOME HEALTH SERVICES | Facility: HOME HEALTH | Age: 64
End: 2024-12-20
Payer: COMMERCIAL

## 2024-12-20 PROCEDURE — S9341 HIT ENTERAL GRAV DIEM: HCPCS

## 2024-12-20 PROCEDURE — A6402 STERILE GAUZE <= 16 SQ IN: HCPCS

## 2024-12-20 PROCEDURE — B4152 EF CALORIE DENSE>/=1.5KCAL: HCPCS

## 2024-12-21 PROCEDURE — S9341 HIT ENTERAL GRAV DIEM: HCPCS

## 2024-12-22 PROCEDURE — S9341 HIT ENTERAL GRAV DIEM: HCPCS

## 2024-12-23 PROCEDURE — S9341 HIT ENTERAL GRAV DIEM: HCPCS

## 2024-12-24 PROCEDURE — S9341 HIT ENTERAL GRAV DIEM: HCPCS

## 2024-12-25 PROCEDURE — S9341 HIT ENTERAL GRAV DIEM: HCPCS

## 2024-12-26 PROCEDURE — S9341 HIT ENTERAL GRAV DIEM: HCPCS

## 2024-12-27 PROCEDURE — S9341 HIT ENTERAL GRAV DIEM: HCPCS

## 2024-12-28 PROCEDURE — S9341 HIT ENTERAL GRAV DIEM: HCPCS

## 2024-12-29 PROCEDURE — S9341 HIT ENTERAL GRAV DIEM: HCPCS

## 2024-12-30 PROCEDURE — S9341 HIT ENTERAL GRAV DIEM: HCPCS

## 2024-12-31 PROCEDURE — S9341 HIT ENTERAL GRAV DIEM: HCPCS

## 2025-01-01 PROCEDURE — S9341 HIT ENTERAL GRAV DIEM: HCPCS

## 2025-01-02 PROCEDURE — S9341 HIT ENTERAL GRAV DIEM: HCPCS

## 2025-01-03 ENCOUNTER — APPOINTMENT (OUTPATIENT)
Dept: LAB | Facility: CLINIC | Age: 65
End: 2025-01-03
Attending: INTERNAL MEDICINE
Payer: COMMERCIAL

## 2025-01-03 PROCEDURE — S9341 HIT ENTERAL GRAV DIEM: HCPCS

## 2025-01-04 PROCEDURE — S9341 HIT ENTERAL GRAV DIEM: HCPCS

## 2025-01-05 PROCEDURE — S9341 HIT ENTERAL GRAV DIEM: HCPCS

## 2025-01-06 ENCOUNTER — HOME INFUSION (OUTPATIENT)
Dept: HOME HEALTH SERVICES | Facility: HOME HEALTH | Age: 65
End: 2025-01-06
Payer: COMMERCIAL

## 2025-01-06 ENCOUNTER — MEDICAL CORRESPONDENCE (OUTPATIENT)
Dept: HOME HEALTH SERVICES | Facility: HOME HEALTH | Age: 65
End: 2025-01-06
Payer: COMMERCIAL

## 2025-01-06 VITALS — HEIGHT: 63 IN | WEIGHT: 134 LBS | BODY MASS INDEX: 23.74 KG/M2

## 2025-01-06 DIAGNOSIS — C31.0 MALIGNANT NEOPLASM OF MAXILLARY SINUS (H): ICD-10-CM

## 2025-01-06 DIAGNOSIS — E83.42 HYPOMAGNESEMIA: ICD-10-CM

## 2025-01-06 DIAGNOSIS — E44.0 MODERATE PROTEIN-CALORIE MALNUTRITION: ICD-10-CM

## 2025-01-06 PROCEDURE — S9341 HIT ENTERAL GRAV DIEM: HCPCS

## 2025-01-06 RX ORDER — ELECTROLYTES/DEXTROSE
474 SOLUTION, ORAL ORAL 3 TIMES DAILY
Qty: 42660 ML | Refills: 11 | Status: DISPENSED | OUTPATIENT
Start: 2025-01-06 | End: 2026-01-06

## 2025-01-06 NOTE — PROGRESS NOTES
"[facility] Nutrition Annual Care Plan Assessment     Type of therapy: Enteral    Anthropometrics/Weight Goals  Height: 1.59 m (5' 2.6\")  Weight: 60.8 kg (134 lb)  IBW Male (kg): 55.98  BMI (kg): 24.04  FHI Dosing Weight: 60.8 kg (134 lb 0.6 oz)  Weight history / comments:: 04/04/24 56 kg (123 lb 6.4 oz)  03/15/24 56 kg (123 lb 8 oz)  03/04/24 54.9 kg (121 lb)  02/26/24 55.8 kg (123 lb)  02/20/24 55.8 kg (123 lb)  02/16/24 55.8 kg (123 lb)  02/12/24 56.2 kg (124 lb)  02/12/24 56.5 kg (124 lb 9.6 oz)  02/05/24 57.2 kg (126 lb)  02/05/24 57.2 kg (126 lb 3.2 oz)    Nutrition and Medical History  Physical findings from chart: 63 yo M with sinonasal ca s/p chemorad completed 2/27/24  Feeding tube type: G-Tube    Current Nutrition Orders  Oral Diet: As fritz-intake varies per son    Enteral Nutrition Orders  Enteral Formula: Osmolite 1.5, 6 cartons/day via gravity  Rate/Frequency: 6 cartons/day via gravity  Water Flushes: 60ml before and after feedings + 120ml 4x/day  Enteral Nutrition Provides: 1420kcal (23kcal/kg), 60 g protein (1.0g/kg), 176g CHO, 0g fiber + free water and water flushes    Assessed Nutritional Needs  Kcal Needs: 2000kcals  Protein Needs: 80-100gm/day  Fluid Needs: 1ml/kcal fluid  Nutrition Support is meeting what percent of needs: TFs + po intake meets 100% needs    Pertinent Medications  Medications: reviewed    Lab Information  Labs: 1/3/25 reviewed and unremarkable    Implementation  Intervention: Annual Care Plan.  Spoke with pt's son, Tulio.  Pt is infusing 4-6 cartons/day + taking some po diet.  Weight improved.    Plan  Follow up/Recommendations: Annual Care Plan renewed with no changes      Molly Siemens, RD, CNSC, LD  [facility] Dietitian    "

## 2025-01-07 PROCEDURE — S9341 HIT ENTERAL GRAV DIEM: HCPCS

## 2025-01-08 PROCEDURE — S9341 HIT ENTERAL GRAV DIEM: HCPCS

## 2025-01-09 PROCEDURE — S9341 HIT ENTERAL GRAV DIEM: HCPCS

## 2025-01-10 PROCEDURE — S9341 HIT ENTERAL GRAV DIEM: HCPCS

## 2025-01-11 PROCEDURE — S9341 HIT ENTERAL GRAV DIEM: HCPCS

## 2025-01-12 PROCEDURE — S9341 HIT ENTERAL GRAV DIEM: HCPCS

## 2025-01-13 PROCEDURE — S9341 HIT ENTERAL GRAV DIEM: HCPCS

## 2025-01-14 ENCOUNTER — HOME INFUSION BILLING (OUTPATIENT)
Dept: HOME HEALTH SERVICES | Facility: HOME HEALTH | Age: 65
End: 2025-01-14
Payer: COMMERCIAL

## 2025-01-14 PROCEDURE — B4152 EF CALORIE DENSE>/=1.5KCAL: HCPCS

## 2025-01-14 PROCEDURE — S9341 HIT ENTERAL GRAV DIEM: HCPCS

## 2025-01-15 PROCEDURE — S9341 HIT ENTERAL GRAV DIEM: HCPCS

## 2025-01-16 PROCEDURE — S9341 HIT ENTERAL GRAV DIEM: HCPCS

## 2025-01-17 PROCEDURE — S9341 HIT ENTERAL GRAV DIEM: HCPCS

## 2025-01-18 PROCEDURE — S9341 HIT ENTERAL GRAV DIEM: HCPCS

## 2025-01-19 PROCEDURE — S9341 HIT ENTERAL GRAV DIEM: HCPCS

## 2025-01-20 ENCOUNTER — ANCILLARY PROCEDURE (OUTPATIENT)
Dept: CT IMAGING | Facility: CLINIC | Age: 65
End: 2025-01-20
Attending: INTERNAL MEDICINE
Payer: COMMERCIAL

## 2025-01-20 DIAGNOSIS — C31.0 SQUAMOUS CELL CARCINOMA OF MAXILLARY SINUS (H): ICD-10-CM

## 2025-01-20 PROCEDURE — 70491 CT SOFT TISSUE NECK W/DYE: CPT | Mod: GC | Performed by: RADIOLOGY

## 2025-01-20 PROCEDURE — 74177 CT ABD & PELVIS W/CONTRAST: CPT | Performed by: RADIOLOGY

## 2025-01-20 PROCEDURE — S9341 HIT ENTERAL GRAV DIEM: HCPCS

## 2025-01-20 PROCEDURE — 71260 CT THORAX DX C+: CPT | Performed by: RADIOLOGY

## 2025-01-20 RX ORDER — HEPARIN SODIUM (PORCINE) LOCK FLUSH IV SOLN 100 UNIT/ML 100 UNIT/ML
500 SOLUTION INTRAVENOUS ONCE
Status: COMPLETED | OUTPATIENT
Start: 2025-01-20 | End: 2025-01-20

## 2025-01-20 RX ORDER — IOPAMIDOL 755 MG/ML
76 INJECTION, SOLUTION INTRAVASCULAR ONCE
Status: COMPLETED | OUTPATIENT
Start: 2025-01-20 | End: 2025-01-20

## 2025-01-20 RX ADMIN — IOPAMIDOL 76 ML: 755 INJECTION, SOLUTION INTRAVASCULAR at 12:26

## 2025-01-20 RX ADMIN — HEPARIN SODIUM (PORCINE) LOCK FLUSH IV SOLN 100 UNIT/ML 500 UNITS: 100 SOLUTION at 12:36

## 2025-01-20 NOTE — DISCHARGE INSTRUCTIONS

## 2025-01-21 PROCEDURE — S9341 HIT ENTERAL GRAV DIEM: HCPCS

## 2025-01-22 ENCOUNTER — ONCOLOGY VISIT (OUTPATIENT)
Dept: ONCOLOGY | Facility: CLINIC | Age: 65
End: 2025-01-22
Attending: INTERNAL MEDICINE
Payer: COMMERCIAL

## 2025-01-22 VITALS
BODY MASS INDEX: 24.69 KG/M2 | RESPIRATION RATE: 24 BRPM | TEMPERATURE: 98 F | SYSTOLIC BLOOD PRESSURE: 118 MMHG | DIASTOLIC BLOOD PRESSURE: 71 MMHG | HEART RATE: 90 BPM | OXYGEN SATURATION: 97 % | WEIGHT: 137.6 LBS

## 2025-01-22 DIAGNOSIS — C31.0 SQUAMOUS CELL CARCINOMA OF MAXILLARY SINUS (H): Primary | ICD-10-CM

## 2025-01-22 DIAGNOSIS — E03.9 ACQUIRED HYPOTHYROIDISM: ICD-10-CM

## 2025-01-22 PROCEDURE — S9341 HIT ENTERAL GRAV DIEM: HCPCS

## 2025-01-22 PROCEDURE — G0463 HOSPITAL OUTPT CLINIC VISIT: HCPCS | Performed by: INTERNAL MEDICINE

## 2025-01-22 RX ORDER — HEPARIN SODIUM (PORCINE) LOCK FLUSH IV SOLN 100 UNIT/ML 100 UNIT/ML
5 SOLUTION INTRAVENOUS
OUTPATIENT
Start: 2025-01-24

## 2025-01-22 RX ORDER — HEPARIN SODIUM,PORCINE 10 UNIT/ML
5-20 VIAL (ML) INTRAVENOUS DAILY PRN
OUTPATIENT
Start: 2025-01-24

## 2025-01-22 RX ORDER — EPINEPHRINE 1 MG/ML
0.3 INJECTION, SOLUTION INTRAMUSCULAR; SUBCUTANEOUS EVERY 5 MIN PRN
OUTPATIENT
Start: 2025-01-24

## 2025-01-22 RX ORDER — PALONOSETRON 0.05 MG/ML
0.25 INJECTION, SOLUTION INTRAVENOUS ONCE
OUTPATIENT
Start: 2025-01-24

## 2025-01-22 RX ORDER — ALBUTEROL SULFATE 0.83 MG/ML
2.5 SOLUTION RESPIRATORY (INHALATION)
OUTPATIENT
Start: 2025-01-24

## 2025-01-22 RX ORDER — DIPHENHYDRAMINE HYDROCHLORIDE 50 MG/ML
25 INJECTION INTRAMUSCULAR; INTRAVENOUS
Start: 2025-01-24

## 2025-01-22 RX ORDER — ALBUTEROL SULFATE 90 UG/1
1-2 INHALANT RESPIRATORY (INHALATION)
Start: 2025-01-24

## 2025-01-22 RX ORDER — LORAZEPAM 2 MG/ML
0.5 INJECTION INTRAMUSCULAR EVERY 4 HOURS PRN
OUTPATIENT
Start: 2025-01-24

## 2025-01-22 RX ORDER — MEPERIDINE HYDROCHLORIDE 25 MG/ML
25 INJECTION INTRAMUSCULAR; INTRAVENOUS; SUBCUTANEOUS
OUTPATIENT
Start: 2025-01-24

## 2025-01-22 RX ORDER — METHYLPREDNISOLONE SODIUM SUCCINATE 40 MG/ML
40 INJECTION INTRAMUSCULAR; INTRAVENOUS
Start: 2025-01-24

## 2025-01-22 RX ORDER — DIPHENHYDRAMINE HYDROCHLORIDE 50 MG/ML
50 INJECTION INTRAMUSCULAR; INTRAVENOUS
Start: 2025-01-24

## 2025-01-22 ASSESSMENT — PAIN SCALES - GENERAL: PAINLEVEL_OUTOF10: MILD PAIN (2)

## 2025-01-22 NOTE — NURSING NOTE
"Oncology Rooming Note    January 22, 2025 2:27 PM   Douglas Herrera is a 64 year old male who presents for:    Chief Complaint   Patient presents with    Oncology Clinic Visit     Squamous cell carcinoma of maxillary sinus     Initial Vitals: /71 (BP Location: Right arm, Patient Position: Sitting, Cuff Size: Adult Regular)   Pulse 90   Temp 98  F (36.7  C) (Oral)   Resp 24   Wt 62.4 kg (137 lb 9.6 oz)   SpO2 97%   BMI 24.69 kg/m   Estimated body mass index is 24.69 kg/m  as calculated from the following:    Height as of 1/6/25: 1.59 m (5' 2.6\").    Weight as of this encounter: 62.4 kg (137 lb 9.6 oz). Body surface area is 1.66 meters squared.  Mild Pain (2) Comment: Data Unavailable   No LMP for male patient.  Allergies reviewed: Yes  Medications reviewed: Yes    Medications: Medication refills not needed today.  Pharmacy name entered into [facility]: [facility] - [place], [place] - [facility] [address]    Frailty Screening:   Is the patient here for a new oncology consult visit in cancer care? 2. No      Clinical concerns: R: 24      Fifi Avalos, EMT  1/22/2025                "

## 2025-01-22 NOTE — PROGRESS NOTES
[facility]  [address] [address]  Phone: [phone]  Fax: [phone]    PATIENT NAME: Douglas Herrera  MRN # [id number]   DATE OF VISIT: January 22, 2025  YOB: 1960     Otolaryngology: Dr. Damon Iglesias-Chavira   Radiation Oncology: Dr. Tomasa Akers  Cardiology: Dr. Qamar Hernandez  Hepatologist: [facility]      CANCER TYPE: SCC sinonasal   STAGE: [id number] (IVB)  ECOG PS: 1     PD-L1: TPS 60-70%, CPS >70% on [id number]  NGS: internal panel. Fusion negative. ARID1A S90fs, EGFR exon 20 insertion, [id number], APC H7702bc, TMB 7.313    ASSESSMENT AND PLAN  SCC L maxillary sinus, [id number] (IVB), ARID1 mutation,  EGFR exon 20 insertion: Now s/p 3 cycles of carbo paclitaxel pembrolizumab. I think there's response. Size might not be that different and we're comparing different imaging modalities (MRI vs CT) but there's clearly more central necrosis as one might see with immunotherapy and it looks like response as opposed to rapidly progressive necrotic tumor. Symptoms have not progressed and I think the hard palate looks better visually.    - complete C4 chemo + pembro on Fri; discussed, will keep same doses    - maintenance pembrolizumab thereafter. Can try q6 week schedule. He'd like to think about it and let us know   - restage in 2 1/2 months or so    - will start process of seeing if we can get amivantamab second line. Discussed rationale, process, etc.     Pulm nodules: I agree a couple of them are new compared to the CT in Oct. Discussed with Douglas and Tulio that it's hard to know whether they're mets or not. Monitor for now.    Trismus: Continue PT, stretches      Hypothyroidism: TFTs 1/3/25. Discussed. Will wait for Fridays' TFTs to decide whether to start a little levothyroxine 50 mcg daily or wait longer but I favor starting, discussed rationale.    Nutrition: TF twice daily, eating one meal a day, usually rice/some meat, etc.  Gtube exchanged 11/22/24. No changes today      Hearing loss: Unchanged - still pretty significant on L. Audiogram in future      Hepatitis B: HBSAby negative, SAg +, cAby +, HBV PCR negative 1/23/24. HCV negative.     H/o PE/DVT: 5/8/2019 CTA report scanned into [employer]. Small PEs, LLE DVT, acute, occlusive. Was on rivaroxaban, completed course.      ASCVD: Asa 81 mg daily, metoprolol per PCP       The longitudinal plan of care for the condition(s) below were addressed during this visit. Due to the added complexity in care, I will continue to support Douglas in the subsequent management of this condition(s) and with the ongoing continuity of care of this condition(s): maxillary sinus cancer      40 minutes spent by me on the date of the encounter doing chart review, review of outside records, review of test results, interpretation of tests, patient visit, documentation, orders, discussion with other provider(s)    Professional ipad  used throughout the visit    Kayy Post MD  Associate Professor of Medicine  Hematology, Oncology and Transplantation    SUBJECTIVE  Returns for routine follow-up now after 3 cycles of carbo, paclitaxel, pembrolizumab.  Feeling L face coming back a bit  Pain about the same though  Tired and weak after chemo - doesn't want to do anything at all x 3-5 days.  A little better with his most recent cycle  Pressure in L sinus area - has to use quite a bit of effort to clean it out 2-3 times per day.  Does not feel like anything stuck  No increased numbness tingling in hands or feet  Eating once a day.  Using G-tube for feedings twice a day    CANCER SUMMARY  8/9/23                CT sinus.   8/24/23  MRI sinus. L maxillary sinus mass  9/12/23              ED for epistaxis.   9/13/23              CTA and embolization of L IMAX   10/4/23  Nasal bx. Path: Inverted papilloma with high grade dysplasia  11/10/23 Nasal endoscopy and bx in the OR. C/b severe bleeding. Path: Inverted  sinonasal papilloma with severe dysplasia.  11/17/23            MRI. 7.4 x 4.6 x 6.6 cm L sinonasal mass, destruction of nasal turbinates, L maxillary sinus, hard palate, invasion of L  space, involvement of medial and lateral pterygoids and temporalis muscles. Effacement and invasion ipsilateral pterygopalatine fossa, extends and effaces the nasopharynx.   11/30/23            Carotid angiogram. Direct endonasal and transoral embolization L sinonasal tumor, transaterial embolization of the L sphenopalatine artery feeders to the L sinonasal artery tumor   12/1/23  Stealth assisted transnasal endoscopic approach to excise L sinonasal mass. Pre-operative embolization. Path: SCC involving L maxilla.    12/9/23  PET. Hypermetabolic mass centered along the L maxilla, extending superiorly through the floor of the L maxillary sinus, posterior/laterally to the  space, invasion of the pterygoid muscles, extending cranially along the pterygopalatine fossa and pterygoid plates to the skull base level. Erosion of involved bones including L maxilla, maxillary sinus wall and pterygoid plates. Extension medially to the L lateral nasopharyngeal wall and soft palate. 1 mm fat place between carotid and mass. ~4.9 x 4.0 x 5.4 cm (SUV 24.3). Partially resected since 11/17/23 MRI. 8 mm L 2A node (SUV 5.9), 7 mm L level 2 node (SUV 5.5)  1/2/24  Gtube (IR)  1/4/24  Video swallow. No aspiration  1/8~2/27/24 Chemoradiation with weekly cisplatin.  1/11/24  Port (IR)  8/5/24  PET/CT.  Overall increased FDG uptake centered along the posterior inferior left maxillary sinus involving the  space, left pterygopalatine fossa, left parapharyngeal space, extending inferiorly to left maxilla.  Increased soft tissue component within the space, now broad FDG uptake (SUV 24.97), previously 5.75.  Area measures 3.3 x 2.6 cm. Increased focal hypermetabolic activity posterior pharyngeal wall, extending to the left palatine  tonsil, associated mucosal thickening (SUV 17.23).  Non-FDG avid mass, 2.7 x 2.4 cm, previously 2.1 x 2.5 cm, another medially, 1.1 x 1.5 cm, previously 1.1 x 1 cm.  New focal area of FDG uptake right lateral oropharyngeal wall (SUV 9.07).  Few hypermetabolic right cervical nodes, none definitively enlarged, however increased activity compared to prior (SUV 7.29-8.36).  Several sub-6 mm pulmonary nodules.  8/5/24  MRI.  Heterogeneous mass along the base of the left maxillary sinus extending to the maxilla,  space, left pterygopalatine fossa, and parapharyngeal space.  Some areas of treated but some corresponding to FDG avidity on same-day PET, highly suspicious for tumor recurrence.  Question early left V2 involvement, thin dural enhancement along the base of the left temporal lobe     9/20/24  L maxillary, L superior alveolus bx in clinic Path: Fragments of at least SCC in situ.   10/11/24 Transnasal bx L maxillary sinus (Dr. Iglesias). 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  Multiloculated mass at base of L maxilla extending to involve L side of palate, retromaxillary region, extending into the  space, premaxillary region.  Similar to prior MRI 8/5/2024.  Continuous slight abnormal asymmetric thickening L V3 and V2.  Asymmetric thin dural enhancement along the base of the left temporal lobe.   10/29/2024 CT chest.  Scattered <6 mm lung nodules grossly stable compared to 8/5/2024.  Partially visualized common bile duct mildly dilated, 8 mm, possibly related to prior cholecystectomy, recommend MRCP.   1/20/25  CT neck and CAP.  Overall similar multiloculated L maxillary mass extending into the  and parapharyngeal spaces compared to PET/CT 8/5/2024.  No cervical adenopathy.  Stable small 2-5 mm lung nodules.  Increased RUL nodule, 1 mm --> 4 mm (5:139).  New 5 mm LLL nodule.  New somewhat linear 4 mm lingular nodule.  Attention on follow-up  11/20/24~current  Carboplatin  + paclitaxel + pembrolizumab.     PAST MEDICAL HISTORY  Sinonasal carcinoma as above  HTN  ASCVD. CABG x 3 2019  PE and LLE DVT after CABG . Treated with rivaroxaban  Dyslipidemia  Hepatitis B   SCC R temple s/p MOHS  Ascending aortic aneurysm repair 2019  Hearing loss L   Gout - feet  Depression  Cholecystectomy    CURRENT OUTPATIENT MEDICATIONS  Reviewed    ALLERGIES  No Known Allergies     PHYSICAL EXAM  /71 (BP Location: Right arm, Patient Position: Sitting, Cuff Size: Adult Regular)   Pulse 90   Temp 98  F (36.7  C) (Oral)   Resp 24   Wt 62.4 kg (137 lb 9.6 oz)   SpO2 97%   BMI 24.69 kg/m    GEN: NAD  HEENT: EOMI, no icterus, injection or pallor. Oropharynx is clear - I think the visible tumor in the hard palate is better albeit harder to see with the trismus  EXT: no edema  NEURO: alert    LABORATORY AND IMAGING STUDIES       Labs 1/3/2025 were independently reviewed and interpreted by me  Free T4 a little low at 0.87, TSH a little high at 7.11  Hemoglobin steady at 10.3, otherwise CBC pd unremarkable.  CMP fine    CT Chest/Abdomen/Pelvis w Contrast  Narrative: EXAMINATION: CT CHEST/ABDOMEN/PELVIS W CONTRAST 1/20/2025 12:45 PM    TECHNIQUE: Helical CT images from the thoracic inlet through the  symphysis pubis were obtained with contrast. Contrast dose: ISOVUE 370  76 mL    COMPARISON: 10/29/2024, 8/5/2024, 12/9/2023    HISTORY: Restage maxillary sinus SCC on chemo + pembro since Nov 2024.  Prior chemorads. Recurrence Oct 2024    FINDINGS:    Chest:   The right IJ port catheter tip is positioned in the right atrium.  Stable right thyroid calcification and decreased size of a tiny  hypodense left thyroid nodule. The aortic branching pattern, heart  size, pericardium, and esophagus appear normal. Severe coronary artery  calcifications. Postsurgical changes in the ascending aorta. The left  internal mammary artery appears to supply the left anterior descending  coronary artery. The ascending aorta and  main pulmonary artery are not  dilated. No large central pulmonary embolism. No thoracic adenopathy.    The central tracheobronchial tree is patent. Mild bronchial wall  thickening. No pneumothorax or pleural effusion. No airspace  consolidation. Mild dependent bibasilar atelectasis.   - Stable small nodules along the pleura in the lateral right middle  lobe, for example a 3 mm nodule (series 5, image 172), a 4 mm nodule  (series 5, image 186), and a 2 mm nodule (series 5, image 189). Stable  5 mm nodule in the posterior right lower lobe (series 5, image 175).  Stable 3 mm nodule in the anterior right upper lobe (series 5, image  140).  - Increased size of a solid nodule in the lateral right upper lobe  measuring 4 mm compared to 1 mm previously (series 5, image 139).  - New 5 mm nodule in the anterior left lower lobe (series 5, image  216). New somewhat linear 4 mm nodule along the lateral pleura in the  lingula (series 5, image 210).    Abdomen and pelvis:   No focal liver mass. Cholecystectomy with mild associated reservoir  effect. The pancreas and pancreatic duct appear normal. The spleen,  adrenal glands, kidneys, and urinary bladder appear normal. The  prostate is slightly enlarged.    No intra-abdominal free air or free fluid. No abnormally dilated loops  of bowel. The percutaneous gastrostomy tube balloon is positioned in  the gastric antrum near the pylorus (series 4, image 284). The  appendix is normal. Moderate stool burden. Grossly stable position of  calcifications in the anterior pelvic fat. Normal caliber of the  abdominal aorta with moderate atherosclerotic calcifications. The  major abdominal vasculature is patent. No lymphadenopathy in the  abdomen or pelvis.    Bones and soft tissues:   Intact sternotomy wires. Mild multilevel degenerative changes in the  spine and right hip. No acute or worrisome osseous lesions. Minimal  soft tissue thickening about the gastrostomy tube in the abdominal  wall.  No associated fluid collection or significant inflammation.  Impression: IMPRESSION:   1. Numerous sub-6 mm pulmonary nodules, some of which are new or  increased in size. Continued attention on close follow-up is  recommended.  2. No evidence of metastatic disease in the abdomen or pelvis.  3. The percutaneous gastrostomy tube is positioned with the balloon in  the antrum at the pylorus. There is no dilation of the stomach at this  time to suggest gastric outlet obstruction. Consider retraction of the  tube.    SUSAN HENRY MD         SYSTEM ID:  [id number]     CT neck and CAP were personally reviewed and interpreted by me as above

## 2025-01-22 NOTE — LETTER
1/22/2025      Douglas Herrera  [address] [place] [address]      Dear Colleague,    Thank you for referring your patient, Douglas Herrera, to the [facility]. Please see a copy of my visit note below.        [facility]  [address] [facility] [address]  Phone: [phone]  Fax: [phone]    PATIENT NAME: Douglas Herrera  MRN # [id number]   DATE OF VISIT: January 22, 2025  YOB: 1960     Otolaryngology: Dr. Damon BurksChavira   Radiation Oncology: Dr. Tomasa Akers  Cardiology: Dr. Qamar Hernandez  Hepatologist: [facility]      CANCER TYPE: SCC sinonasal   STAGE: [id number] (IVB)  ECOG PS: 1     PD-L1: TPS 60-70%, CPS >70% on [id number]  NGS: internal panel. Fusion negative. ARID1A S90fs, EGFR exon 20 insertion, [id number], APC [id number], TMB 7.313    ASSESSMENT AND PLAN  SCC L maxillary sinus, [id number] (IVB), ARID1 mutation,  EGFR exon 20 insertion: Now s/p 3 cycles of carbo paclitaxel pembrolizumab. I think there's response. Size might not be that different and we're comparing different imaging modalities (MRI vs CT) but there's clearly more central necrosis as one might see with immunotherapy and it looks like response as opposed to rapidly progressive necrotic tumor. Symptoms have not progressed and I think the hard palate looks better visually.    - complete C4 chemo + pembro on Fri; discussed, will keep same doses    - maintenance pembrolizumab thereafter. Can try q6 week schedule. He'd like to think about it and let us know   - restage in 2 1/2 months or so    - will start process of seeing if we can get amivantamab second line. Discussed rationale, process, etc.     Pulm nodules: I agree a couple of them are new compared to the CT in Oct. Discussed with Douglas and Tulio that it's hard to know whether they're mets or not. Monitor for now.    Trismus: Continue PT, stretches      Hypothyroidism: TFTs 1/3/25. Discussed.  Will wait for Fridays' TFTs to decide whether to start a little levothyroxine 50 mcg daily or wait longer but I favor starting, discussed rationale.    Nutrition: TF twice daily, eating one meal a day, usually rice/some meat, etc. Gtube exchanged 11/22/24. No changes today      Hearing loss: Unchanged - still pretty significant on L. Audiogram in future      Hepatitis B: HBSAby negative, SAg +, cAby +, HBV PCR negative 1/23/24. HCV negative.     H/o PE/DVT: 5/8/2019 CTA report scanned into [facility]. Small PEs, LLE DVT, acute, occlusive. Was on rivaroxaban, completed course.      ASCVD: Asa 81 mg daily, metoprolol per PCP       The longitudinal plan of care for the condition(s) below were addressed during this visit. Due to the added complexity in care, I will continue to support Douglas in the subsequent management of this condition(s) and with the ongoing continuity of care of this condition(s): maxillary sinus cancer      40 minutes spent by me on the date of the encounter doing chart review, review of outside records, review of test results, interpretation of tests, patient visit, documentation, orders, discussion with other provider(s)    Professional ipad  used throughout the visit    Kayy Post MD  Associate Professor of Medicine  Hematology, Oncology and Transplantation    SUBJECTIVE  Returns for routine follow-up now after 3 cycles of carbo, paclitaxel, pembrolizumab.  Feeling L face coming back a bit  Pain about the same though  Tired and weak after chemo - doesn't want to do anything at all x 3-5 days.  A little better with his most recent cycle  Pressure in L sinus area - has to use quite a bit of effort to clean it out 2-3 times per day.  Does not feel like anything stuck  No increased numbness tingling in hands or feet  Eating once a day.  Using G-tube for feedings twice a day    CANCER SUMMARY  8/9/23                CT sinus.   8/24/23  MRI sinus. L maxillary sinus mass  9/12/23               ED for epistaxis.   9/13/23              CTA and embolization of L IMAX   10/4/23  Nasal bx. Path: Inverted papilloma with high grade dysplasia  11/10/23 Nasal endoscopy and bx in the OR. C/b severe bleeding. Path: Inverted sinonasal papilloma with severe dysplasia.  11/17/23            MRI. 7.4 x 4.6 x 6.6 cm L sinonasal mass, destruction of nasal turbinates, L maxillary sinus, hard palate, invasion of L  space, involvement of medial and lateral pterygoids and temporalis muscles. Effacement and invasion ipsilateral pterygopalatine fossa, extends and effaces the nasopharynx.   11/30/23            Carotid angiogram. Direct endonasal and transoral embolization L sinonasal tumor, transaterial embolization of the L sphenopalatine artery feeders to the L sinonasal artery tumor   12/1/23  Stealth assisted transnasal endoscopic approach to excise L sinonasal mass. Pre-operative embolization. Path: SCC involving L maxilla.    12/9/23  PET. Hypermetabolic mass centered along the L maxilla, extending superiorly through the floor of the L maxillary sinus, posterior/laterally to the  space, invasion of the pterygoid muscles, extending cranially along the pterygopalatine fossa and pterygoid plates to the skull base level. Erosion of involved bones including L maxilla, maxillary sinus wall and pterygoid plates. Extension medially to the L lateral nasopharyngeal wall and soft palate. 1 mm fat place between carotid and mass. ~4.9 x 4.0 x 5.4 cm (SUV 24.3). Partially resected since 11/17/23 MRI. 8 mm L 2A node (SUV 5.9), 7 mm L level 2 node (SUV 5.5)  1/2/24  Gtube (IR)  1/4/24  Video swallow. No aspiration  1/8~2/27/24 Chemoradiation with weekly cisplatin.  1/11/24  Port (IR)  8/5/24  PET/CT.  Overall increased FDG uptake centered along the posterior inferior left maxillary sinus involving the  space, left pterygopalatine fossa, left parapharyngeal space, extending inferiorly to left maxilla.   Increased soft tissue component within the space, now broad FDG uptake (SUV 24.97), previously 5.75.  Area measures 3.3 x 2.6 cm. Increased focal hypermetabolic activity posterior pharyngeal wall, extending to the left palatine tonsil, associated mucosal thickening (SUV 17.23).  Non-FDG avid mass, 2.7 x 2.4 cm, previously 2.1 x 2.5 cm, another medially, 1.1 x 1.5 cm, previously 1.1 x 1 cm.  New focal area of FDG uptake right lateral oropharyngeal wall (SUV 9.07).  Few hypermetabolic right cervical nodes, none definitively enlarged, however increased activity compared to prior (SUV 7.29-8.36).  Several sub-6 mm pulmonary nodules.  8/5/24  MRI.  Heterogeneous mass along the base of the left maxillary sinus extending to the maxilla,  space, left pterygopalatine fossa, and parapharyngeal space.  Some areas of treated but some corresponding to FDG avidity on same-day PET, highly suspicious for tumor recurrence.  Question early left V2 involvement, thin dural enhancement along the base of the left temporal lobe     9/20/24  L maxillary, L superior alveolus bx in clinic Path: Fragments of at least SCC in situ.   10/11/24 Transnasal bx L maxillary sinus (Dr. Iglesisa). 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  Multiloculated mass at base of L maxilla extending to involve L side of palate, retromaxillary region, extending into the  space, premaxillary region.  Similar to prior MRI 8/5/2024.  Continuous slight abnormal asymmetric thickening L V3 and V2.  Asymmetric thin dural enhancement along the base of the left temporal lobe.   10/29/2024 CT chest.  Scattered <6 mm lung nodules grossly stable compared to 8/5/2024.  Partially visualized common bile duct mildly dilated, 8 mm, possibly related to prior cholecystectomy, recommend MRCP.   1/20/25  CT neck and CAP.  Overall similar multiloculated L maxillary mass extending into the  and parapharyngeal spaces compared to PET/CT  8/5/2024.  No cervical adenopathy.  Stable small 2-5 mm lung nodules.  Increased RUL nodule, 1 mm --> 4 mm (5:139).  New 5 mm LLL nodule.  New somewhat linear 4 mm lingular nodule.  Attention on follow-up  11/20/24~current  Carboplatin + paclitaxel + pembrolizumab.     PAST MEDICAL HISTORY  Sinonasal carcinoma as above  HTN  ASCVD. CABG x 3 2019  PE and LLE DVT after CABG . Treated with rivaroxaban  Dyslipidemia  Hepatitis B   SCC R temple s/p MOHS  Ascending aortic aneurysm repair 2019  Hearing loss L   Gout - feet  Depression  Cholecystectomy    CURRENT OUTPATIENT MEDICATIONS  Reviewed    ALLERGIES  No Known Allergies     PHYSICAL EXAM  /71 (BP Location: Right arm, Patient Position: Sitting, Cuff Size: Adult Regular)   Pulse 90   Temp 98  F (36.7  C) (Oral)   Resp 24   Wt 62.4 kg (137 lb 9.6 oz)   SpO2 97%   BMI 24.69 kg/m    GEN: NAD  HEENT: EOMI, no icterus, injection or pallor. Oropharynx is clear - I think the visible tumor in the hard palate is better albeit harder to see with the trismus  EXT: no edema  NEURO: alert    LABORATORY AND IMAGING STUDIES       Labs 1/3/2025 were independently reviewed and interpreted by me  Free T4 a little low at 0.87, TSH a little high at 7.11  Hemoglobin steady at 10.3, otherwise CBC pd unremarkable.  CMP fine    CT Chest/Abdomen/Pelvis w Contrast  Narrative: EXAMINATION: CT CHEST/ABDOMEN/PELVIS W CONTRAST 1/20/2025 12:45 PM    TECHNIQUE: Helical CT images from the thoracic inlet through the  symphysis pubis were obtained with contrast. Contrast dose: ISOVUE 370  76 mL    COMPARISON: 10/29/2024, 8/5/2024, 12/9/2023    HISTORY: Restage maxillary sinus SCC on chemo + pembro since Nov 2024.  Prior chemorads. Recurrence Oct 2024    FINDINGS:    Chest:   The right IJ port catheter tip is positioned in the right atrium.  Stable right thyroid calcification and decreased size of a tiny  hypodense left thyroid nodule. The aortic branching pattern, heart  size, pericardium,  and esophagus appear normal. Severe coronary artery  calcifications. Postsurgical changes in the ascending aorta. The left  internal mammary artery appears to supply the left anterior descending  coronary artery. The ascending aorta and main pulmonary artery are not  dilated. No large central pulmonary embolism. No thoracic adenopathy.    The central tracheobronchial tree is patent. Mild bronchial wall  thickening. No pneumothorax or pleural effusion. No airspace  consolidation. Mild dependent bibasilar atelectasis.   - Stable small nodules along the pleura in the lateral right middle  lobe, for example a 3 mm nodule (series 5, image 172), a 4 mm nodule  (series 5, image 186), and a 2 mm nodule (series 5, image 189). Stable  5 mm nodule in the posterior right lower lobe (series 5, image 175).  Stable 3 mm nodule in the anterior right upper lobe (series 5, image  140).  - Increased size of a solid nodule in the lateral right upper lobe  measuring 4 mm compared to 1 mm previously (series 5, image 139).  - New 5 mm nodule in the anterior left lower lobe (series 5, image  216). New somewhat linear 4 mm nodule along the lateral pleura in the  lingula (series 5, image 210).    Abdomen and pelvis:   No focal liver mass. Cholecystectomy with mild associated reservoir  effect. The pancreas and pancreatic duct appear normal. The spleen,  adrenal glands, kidneys, and urinary bladder appear normal. The  prostate is slightly enlarged.    No intra-abdominal free air or free fluid. No abnormally dilated loops  of bowel. The percutaneous gastrostomy tube balloon is positioned in  the gastric antrum near the pylorus (series 4, image 284). The  appendix is normal. Moderate stool burden. Grossly stable position of  calcifications in the anterior pelvic fat. Normal caliber of the  abdominal aorta with moderate atherosclerotic calcifications. The  major abdominal vasculature is patent. No lymphadenopathy in the  abdomen or  pelvis.    Bones and soft tissues:   Intact sternotomy wires. Mild multilevel degenerative changes in the  spine and right hip. No acute or worrisome osseous lesions. Minimal  soft tissue thickening about the gastrostomy tube in the abdominal  wall. No associated fluid collection or significant inflammation.  Impression: IMPRESSION:   1. Numerous sub-6 mm pulmonary nodules, some of which are new or  increased in size. Continued attention on close follow-up is  recommended.  2. No evidence of metastatic disease in the abdomen or pelvis.  3. The percutaneous gastrostomy tube is positioned with the balloon in  the antrum at the pylorus. There is no dilation of the stomach at this  time to suggest gastric outlet obstruction. Consider retraction of the  tube.    SUSAN HENRY MD         SYSTEM ID:  [id number]     CT neck and CAP were personally reviewed and interpreted by me as above                Again, thank you for allowing me to participate in the care of your patient.        Sincerely,        Kayy Post MD    Electronically signed

## 2025-01-23 PROCEDURE — S9341 HIT ENTERAL GRAV DIEM: HCPCS

## 2025-01-24 ENCOUNTER — APPOINTMENT (OUTPATIENT)
Dept: LAB | Facility: CLINIC | Age: 65
End: 2025-01-24
Attending: INTERNAL MEDICINE
Payer: COMMERCIAL

## 2025-01-24 PROCEDURE — S9341 HIT ENTERAL GRAV DIEM: HCPCS

## 2025-01-25 PROCEDURE — S9341 HIT ENTERAL GRAV DIEM: HCPCS

## 2025-01-26 PROCEDURE — S9341 HIT ENTERAL GRAV DIEM: HCPCS

## 2025-01-27 PROCEDURE — S9341 HIT ENTERAL GRAV DIEM: HCPCS

## 2025-01-28 PROCEDURE — S9341 HIT ENTERAL GRAV DIEM: HCPCS

## 2025-01-29 PROCEDURE — S9341 HIT ENTERAL GRAV DIEM: HCPCS

## 2025-01-30 PROCEDURE — S9341 HIT ENTERAL GRAV DIEM: HCPCS

## 2025-01-31 PROCEDURE — S9341 HIT ENTERAL GRAV DIEM: HCPCS

## 2025-02-01 PROCEDURE — S9341 HIT ENTERAL GRAV DIEM: HCPCS

## 2025-02-02 PROCEDURE — S9341 HIT ENTERAL GRAV DIEM: HCPCS

## 2025-02-03 PROCEDURE — S9341 HIT ENTERAL GRAV DIEM: HCPCS

## 2025-02-04 PROCEDURE — S9341 HIT ENTERAL GRAV DIEM: HCPCS

## 2025-02-05 PROCEDURE — S9341 HIT ENTERAL GRAV DIEM: HCPCS

## 2025-02-06 PROCEDURE — S9341 HIT ENTERAL GRAV DIEM: HCPCS

## 2025-02-07 PROCEDURE — S9341 HIT ENTERAL GRAV DIEM: HCPCS

## 2025-02-08 PROCEDURE — S9341 HIT ENTERAL GRAV DIEM: HCPCS

## 2025-02-09 PROCEDURE — S9341 HIT ENTERAL GRAV DIEM: HCPCS

## 2025-02-10 ENCOUNTER — THERAPY VISIT (OUTPATIENT)
Dept: SPEECH THERAPY | Facility: REHABILITATION | Age: 65
End: 2025-02-10
Payer: COMMERCIAL

## 2025-02-10 DIAGNOSIS — R13.12 OROPHARYNGEAL DYSPHAGIA: Primary | ICD-10-CM

## 2025-02-10 PROCEDURE — S9341 HIT ENTERAL GRAV DIEM: HCPCS

## 2025-02-10 PROCEDURE — 92526 ORAL FUNCTION THERAPY: CPT | Mod: GN | Performed by: SPEECH-LANGUAGE PATHOLOGIST

## 2025-02-10 PROCEDURE — 92610 EVALUATE SWALLOWING FUNCTION: CPT | Mod: GN | Performed by: SPEECH-LANGUAGE PATHOLOGIST

## 2025-02-10 NOTE — PROGRESS NOTES
"SPEECH LANGUAGE PATHOLOGY EVALUATION       Fall Risk Screen:  Fall screen completed by: SLP  Have you fallen 2 or more times in the past year?: No  Have you fallen and had an injury in the past year?: No  Is patient a fall risk?: No    Subjective      Condition type:  Chronic (continuous duration <3 months)  Cause of current episode:  Unspecified     Nature of treatment:  Rehabilitative  Functional ability:  Moderate functional limitations  Documented POC (choose all that apply):  Measurable short and long term/discharge treatment goals related to physical and functional deficits.;Frequency of treatment visits and treatment activities to address deficit areas.;Patient agrees to program participation including home program  Briefly describe symptoms:  Oral phase dysphagia including trismus, xerostomia, and reduced bolus manipulation s/p SCC & concurrent chemoradiation  How did the symptoms start:  Gradual onset with treatment for SCC and post completion  Average pain/intensity last 24 hours:  4/10  Average pain/intensity past week:  3/10  Frequency of Symptoms:  Occasionally (26-50% of the time)  Symptom impact on ADLs:  Moderately  Condition change since eval:  N/A (first visit)  General health reported by patient:  Good     Presenting condition or subjective complaint: swallow therapy    Patient is a 64 year old male who presents to evaluation to establish care for ongoing dysphagia management.    Per EMR, \"Patient with [id number] SCC left maxillary sinus s/p transnasal endoscopic excision and completed chemoXRT 1/8/24-2/27/24.\"    He did participate in a baseline VFSS on 1/4/24 noting, \"No aspiration with any textures trialed. Patient had transient shallow penetration with sequential straw sips. Oropharyngeal swallow function is WFL. Oral motor functioning notable for overall reduced oral movements d/t pain and limited ROM d/t L intraoral mass on mid-posterior hard palate. Tongue base retraction is WNL. Pharyngeal " "constriction, hyolaryngeal elevation and excursion were all WNL. Epiglottic inversion is complete. Swallow response is minimally delayed to the vallecula. Mastication is safe and complete. Cricopharyngeal function is WFL.\"    He has been seen by speech therapy during his ongoing cancer care, most recently on 4/4/24.    On today's date, he reports the following:  I can eat food orally, but not as much as I used to .  He is eating 1.5 meals orally per day with 5-6 cans of tube feeding per his report.  He endorses fatigue from intake as well as chemotherapy related impacting intake.  He reports food tastes good again.  He has minimal pain, but ongoing concerns with xerostomia.  He eats mostly soft foods, denied concerns with liquids.    Date of onset: 10/31/24 (order date)    Relevant medical history: Cancer; Heart problems; High blood pressure   Dates & types of surgery:      Prior diagnostic imaging/testing results: MRI; CT scan     Prior therapy history for the same diagnosis, illness or injury: Yes may 2024    Living Environment  Social support: With family members   Help at home: [employer] (home health aide/personal care attendant, family, etc)  Equipment owned: [employer]     Employment: Not Applicable    Hobbies/Interests:      Patient goals for therapy:      Pain assessment:  No specific pain reported during the evaluation     Objective     SWALLOW EVALUTION  Dysphagia history: See above  Current Diet/Method of Nutritional Intake: thin liquids (level 0), soft & bite-sized (level 6)        CLINICAL SWALLOW EVALUATION  Oral Motor Function: anomalies present  Dentition: adequate dentition  Secretion management: WFL  Mucosal quality: dry, xerostomia consistent with radiation therapy  Mandibular function: range of motion impaired, 16 mm per [employer] (40mm-60mm considered WNL)  Oral labial function: WFL  Lingual function: impaired protrusion, impaired left lateral movement, impaired right lateral movement, " impaired coordination, able to protrude tongue past     Level of assist required for feeding: no assistance needed   Textures Trialed:   Clinical Swallow Eval: Thin Liquids  Mode of presentation: spoon, self-fed   Volume presented: 6oz  Preparatory Phase: poor bolus control  Oral Phase: WFL  Pharyngeal phase of swallow: intact   Strategies trialed during procedure: ORTIZ SLP CLINICAL EVAL STRATEGIES: NA    Diagnostic statement: No overt s/s aspiration with self administered single sips.  Timely swallow initiation appreciated per laryngeal palpation.  Clear vocal quality post intake.    Clinical Swallow Eval: Purees  Mode of presentation: spoon, self-fed   Volume presented: 1oz  Preparatory Phase: poor bolus control, Difficulty accepting PO on spoon given trismus  Oral Phase: WFL  Pharyngeal phase of swallow: intact   Strategies trialed during procedure: ORTIZ SLP CLINICAL EVAL STRATEGIES: NA    Diagnostic statement: No overt s/s aspiration.  Slow but functional oral phase, patient denied oral stasis.    Clinical Swallow Eval: Soft & Bite Sized  Mode of presentation: spoon, self-fed   Volume presented: 1oz dice fruit, 2 pieces per bite  Preparatory Phase: prolonged bolus preparation, poor bolus control  Oral Phase: impaired AP movement  Pharyngeal phase of swallow: intact   Strategies trialed during procedure: ORTIZ SLP CLINICAL EVAL STRATEGIES: NA    Diagnostic statement: No overt s/s aspiration.  Slow but functional mastication, primarily on his right side.  Patient denied stasis; however, noted this does occur with rice occasionally.    Clinical Swallow Eval: Solids  Mode of presentation: self-fed   Volume presented: 2 bites of mali cracker softened in applesauce  Preparatory Phase: prolonged bolus preparation, poor bolus control  Oral Phase: impaired AP movement  Pharyngeal phase of swallow: intact   Strategies trialed during procedure: ORTIZ SLP CLINICAL EVAL STRATEGIES: liquid wash    Diagnostic statement: No overt  s/s aspiration.  Slow but functional mastication, patient denied stasis.     ADDITIONAL EVAL COMPLETED TODAY : NA    ESOPHAGEAL PHASE OF SWALLOW  no observed or reported concerns related to esophageal function     SWALLOW ASSESSMENT CLINICAL IMPRESSIONS AND RATIONALE  Diet Consistency Recommendations: thin liquids (level 0), soft & bite-sized (level 6)    Recommended Feeding/Eating Techniques: small bolus size, slow rate of intake, alternate food and liquid intake, check mouth for oral residue/pocketing, maintain upright sitting position for eating, maintain upright posture during/after eating for 30 minutes, minimize distractions during oral intake   Medication Administration Recommendations: Per patient preference  Instrumental Assessment Recommendations: instrumental evaluation not recommended at this time     Assessment & Plan   CLINICAL IMPRESSIONS   Medical Diagnosis: Dysphagia, unspecified type (R13.10)  - Primary, Squamous cell carcinoma of maxillary sinus (H) (C31.0), Trismus (R25.2) (8 months post chemoradiation - evaluate swallowing to see if he still needs feeding tube)    Treatment Diagnosis: Oropharygeal dysphagia   Impression/Assessment: Patient referred to speech therapy services to establish ongoing care to manage dysphagia post treatment of head and neck cancer.  Per clinical swallowing evaluation on today's date, patient presents with no s/s aspiration with all textures trialed textures trialed.  Moderate oral phase dysphagia noted due to reduced jaw opening to accept foods from trismus, and slow mastication/bolus manipulation; however, functional with soft solids.  Patient currently receiving nutrition/hydration via tube feeding and oral intake; however, does express preference to discontinue use of tube feeding.  Patient able to recall previously trained exercises, has continued with jaw stretches to some extent.  Reviewed educated on dysphagia as a result of head and neck cancer and importance  of oropharyngeal swallowing exercises to reduce late onset of radiation fibrosis.  Patient is appropriate for ongoing therapy to monitor/manage dysphagia and exercise recommendations as he progresses post chemoradiation therapy, with goal of resuming full oral intake that is safe and efficient.    PLAN OF CARE  Treatment Interventions: Swallowing dysfunction and/or oral function for feeding    Prognosis to achieve stated therapy goals is fair   Rehab potential is impacted by: comorbidities, current level of function, family/caregiver support, patient awareness of deficits, patient motivation, pending medical intervention    Long Term Goals:   SLP Goal 1  Goal Identifier: 1. Diet/Intake Strategies  Goal Description: Patient will learn and demonstrate/report consistent use of recommended diet recommendations and intake strategies by end of POC to increase oral intake from 1.5 meals per day.  Rationale: To maximize safety, ease and/or independence of oral intake  Target Date: 05/11/25  SLP Goal 2  Goal Identifier: 2. Exercises  Goal Description: Patient will complete x3 oropharyngeal swallowing exercises at daily recommended frequency to minimize late onset effects of radiation fibrosis and maintain safe and efficient oral intake.  Rationale: To maximize safety, ease and/or independence of oral intake  Target Date: 05/11/25      Frequency of Treatment: monthly  Duration of Treatment: 90 days     Recommended Referrals to Other Professionals:  Patient would benefit from referral to dietician/nutrition to support nutritional needs and transition to decrease tube feedings as appropriate.  Education Assessment:   Learner/Method: Patient;Listening;Demonstration;Pictures/Video  Education Comments: Swallow structure/function, intake strategies, diet recommendations, oropharyngeal exercises, xerostomia strategies    Risks and benefits of evaluation/treatment have been explained.   Patient/Family/caregiver agrees with Plan of  Care.     Evaluation Time:    SLP Eval: oral/pharyngeal swallow function, clinical minutes (31888): 25     Present: Yes: Language: Xiomara, ID Number/Identifier: [id number].      Signing Clinician: JADIEL Stewart      [facility]                                                                                   OUTPATIENT SPEECH LANGUAGE PATHOLOGY      PLAN OF TREATMENT FOR OUTPATIENT REHABILITATION   Patient's Last Name, First Name, Douglas Gomez    YOB: 1960   Provider's Name   [facility]   Medical Record No.  [id number]     Onset Date: 10/31/24 (order date) Start of Care Date: 02/10/25     Medical Diagnosis:  Dysphagia, unspecified type (R13.10)  - Primary, Squamous cell carcinoma of maxillary sinus (H) (C31.0), Trismus (R25.2) (8 months post chemoradiation - evaluate swallowing to see if he still needs feeding tube)      SLP Treatment Diagnosis: Oropharygeal dysphagia  Plan of Treatment  Frequency/Duration: monthly  / 90 days     Certification date from 02/10/25   To 05/11/25          See note for plan of treatment details and functional goals     Dulce Maria Stein, SLP                         I CERTIFY THE NEED FOR THESE SERVICES FURNISHED UNDER        THIS PLAN OF TREATMENT AND WHILE UNDER MY CARE     (Physician attestation of this document indicates review and certification of the therapy plan).              Referring Provider:  Kayy Post    Initial Assessment  See Epic Evaluation- 02/10/25

## 2025-02-11 PROCEDURE — S9341 HIT ENTERAL GRAV DIEM: HCPCS

## 2025-02-12 PROCEDURE — S9341 HIT ENTERAL GRAV DIEM: HCPCS

## 2025-02-13 ENCOUNTER — HOME INFUSION (OUTPATIENT)
Dept: HOME HEALTH SERVICES | Facility: HOME HEALTH | Age: 65
End: 2025-02-13
Payer: COMMERCIAL

## 2025-02-13 ENCOUNTER — INFUSION THERAPY VISIT (OUTPATIENT)
Dept: ONCOLOGY | Facility: CLINIC | Age: 65
End: 2025-02-13
Attending: INTERNAL MEDICINE
Payer: COMMERCIAL

## 2025-02-13 ENCOUNTER — APPOINTMENT (OUTPATIENT)
Dept: LAB | Facility: CLINIC | Age: 65
End: 2025-02-13
Attending: INTERNAL MEDICINE
Payer: COMMERCIAL

## 2025-02-13 ENCOUNTER — ONCOLOGY VISIT (OUTPATIENT)
Dept: ONCOLOGY | Facility: CLINIC | Age: 65
End: 2025-02-13
Attending: REGISTERED NURSE
Payer: COMMERCIAL

## 2025-02-13 VITALS
RESPIRATION RATE: 16 BRPM | DIASTOLIC BLOOD PRESSURE: 69 MMHG | BODY MASS INDEX: 24.56 KG/M2 | HEART RATE: 85 BPM | TEMPERATURE: 98.1 F | SYSTOLIC BLOOD PRESSURE: 122 MMHG | OXYGEN SATURATION: 98 % | WEIGHT: 136.9 LBS

## 2025-02-13 DIAGNOSIS — R13.10 DYSPHAGIA, UNSPECIFIED TYPE: ICD-10-CM

## 2025-02-13 DIAGNOSIS — H61.22 IMPACTED CERUMEN OF LEFT EAR: ICD-10-CM

## 2025-02-13 DIAGNOSIS — R25.2 TRISMUS: ICD-10-CM

## 2025-02-13 DIAGNOSIS — C31.0 SQUAMOUS CELL CARCINOMA OF MAXILLARY SINUS (H): Primary | ICD-10-CM

## 2025-02-13 DIAGNOSIS — E03.9 ACQUIRED HYPOTHYROIDISM: ICD-10-CM

## 2025-02-13 LAB
ALBUMIN SERPL BCG-MCNC: 3.9 G/DL (ref 3.5–5.2)
ALP SERPL-CCNC: 71 U/L (ref 40–150)
ALT SERPL W P-5'-P-CCNC: 21 U/L (ref 0–70)
ANION GAP SERPL CALCULATED.3IONS-SCNC: 8 MMOL/L (ref 7–15)
AST SERPL W P-5'-P-CCNC: 30 U/L (ref 0–45)
BASOPHILS # BLD AUTO: 0 10E3/UL (ref 0–0.2)
BASOPHILS NFR BLD AUTO: 1 %
BILIRUB SERPL-MCNC: 0.5 MG/DL
BUN SERPL-MCNC: 21 MG/DL (ref 8–23)
CALCIUM SERPL-MCNC: 9.5 MG/DL (ref 8.8–10.4)
CHLORIDE SERPL-SCNC: 103 MMOL/L (ref 98–107)
CREAT SERPL-MCNC: 0.81 MG/DL (ref 0.67–1.17)
EGFRCR SERPLBLD CKD-EPI 2021: >90 ML/MIN/1.73M2
EOSINOPHIL # BLD AUTO: 0 10E3/UL (ref 0–0.7)
EOSINOPHIL NFR BLD AUTO: 0 %
ERYTHROCYTE [DISTWIDTH] IN BLOOD BY AUTOMATED COUNT: 18.8 % (ref 10–15)
GLUCOSE SERPL-MCNC: 97 MG/DL (ref 70–99)
HCO3 SERPL-SCNC: 28 MMOL/L (ref 22–29)
HCT VFR BLD AUTO: 26.1 % (ref 40–53)
HGB BLD-MCNC: 8.5 G/DL (ref 13.3–17.7)
IMM GRANULOCYTES # BLD: 0 10E3/UL
IMM GRANULOCYTES NFR BLD: 1 %
LYMPHOCYTES # BLD AUTO: 1.7 10E3/UL (ref 0.8–5.3)
LYMPHOCYTES NFR BLD AUTO: 35 %
MCH RBC QN AUTO: 29.6 PG (ref 26.5–33)
MCHC RBC AUTO-ENTMCNC: 32.6 G/DL (ref 31.5–36.5)
MCV RBC AUTO: 91 FL (ref 78–100)
MONOCYTES # BLD AUTO: 0.8 10E3/UL (ref 0–1.3)
MONOCYTES NFR BLD AUTO: 16 %
NEUTROPHILS # BLD AUTO: 2.2 10E3/UL (ref 1.6–8.3)
NEUTROPHILS NFR BLD AUTO: 47 %
NRBC # BLD AUTO: 0 10E3/UL
NRBC BLD AUTO-RTO: 0 /100
PLATELET # BLD AUTO: 165 10E3/UL (ref 150–450)
POTASSIUM SERPL-SCNC: 4.2 MMOL/L (ref 3.4–5.3)
PROT SERPL-MCNC: 8 G/DL (ref 6.4–8.3)
RBC # BLD AUTO: 2.87 10E6/UL (ref 4.4–5.9)
SODIUM SERPL-SCNC: 139 MMOL/L (ref 135–145)
TSH SERPL DL<=0.005 MIU/L-ACNC: 4.02 UIU/ML (ref 0.3–4.2)
WBC # BLD AUTO: 4.7 10E3/UL (ref 4–11)

## 2025-02-13 PROCEDURE — 250N000011 HC RX IP 250 OP 636: Performed by: REGISTERED NURSE

## 2025-02-13 PROCEDURE — 85004 AUTOMATED DIFF WBC COUNT: CPT

## 2025-02-13 PROCEDURE — 258N000003 HC RX IP 258 OP 636: Performed by: REGISTERED NURSE

## 2025-02-13 PROCEDURE — G0463 HOSPITAL OUTPT CLINIC VISIT: HCPCS | Performed by: REGISTERED NURSE

## 2025-02-13 PROCEDURE — 36591 DRAW BLOOD OFF VENOUS DEVICE: CPT

## 2025-02-13 PROCEDURE — 80053 COMPREHEN METABOLIC PANEL: CPT | Performed by: REGISTERED NURSE

## 2025-02-13 PROCEDURE — 85041 AUTOMATED RBC COUNT: CPT

## 2025-02-13 PROCEDURE — S9341 HIT ENTERAL GRAV DIEM: HCPCS

## 2025-02-13 PROCEDURE — 84443 ASSAY THYROID STIM HORMONE: CPT | Performed by: REGISTERED NURSE

## 2025-02-13 PROCEDURE — 85018 HEMOGLOBIN: CPT

## 2025-02-13 RX ORDER — ALBUTEROL SULFATE 0.83 MG/ML
2.5 SOLUTION RESPIRATORY (INHALATION)
Status: CANCELLED | OUTPATIENT
Start: 2025-02-14

## 2025-02-13 RX ORDER — LORAZEPAM 2 MG/ML
0.5 INJECTION INTRAMUSCULAR EVERY 4 HOURS PRN
Status: CANCELLED | OUTPATIENT
Start: 2025-02-14

## 2025-02-13 RX ORDER — DIPHENHYDRAMINE HYDROCHLORIDE 50 MG/ML
25 INJECTION INTRAMUSCULAR; INTRAVENOUS
Status: CANCELLED
Start: 2025-02-14

## 2025-02-13 RX ORDER — METHYLPREDNISOLONE SODIUM SUCCINATE 40 MG/ML
40 INJECTION INTRAMUSCULAR; INTRAVENOUS
Status: CANCELLED
Start: 2025-02-14

## 2025-02-13 RX ORDER — DIPHENHYDRAMINE HYDROCHLORIDE 50 MG/ML
50 INJECTION INTRAMUSCULAR; INTRAVENOUS
Status: CANCELLED
Start: 2025-02-14

## 2025-02-13 RX ORDER — HEPARIN SODIUM (PORCINE) LOCK FLUSH IV SOLN 100 UNIT/ML 100 UNIT/ML
5 SOLUTION INTRAVENOUS ONCE
Status: COMPLETED | OUTPATIENT
Start: 2025-02-13 | End: 2025-02-13

## 2025-02-13 RX ORDER — MEPERIDINE HYDROCHLORIDE 25 MG/ML
25 INJECTION INTRAMUSCULAR; INTRAVENOUS; SUBCUTANEOUS
Status: CANCELLED | OUTPATIENT
Start: 2025-02-14

## 2025-02-13 RX ORDER — EPINEPHRINE 1 MG/ML
0.3 INJECTION, SOLUTION INTRAMUSCULAR; SUBCUTANEOUS EVERY 5 MIN PRN
Status: CANCELLED | OUTPATIENT
Start: 2025-02-14

## 2025-02-13 RX ORDER — HEPARIN SODIUM (PORCINE) LOCK FLUSH IV SOLN 100 UNIT/ML 100 UNIT/ML
5 SOLUTION INTRAVENOUS
Status: DISCONTINUED | OUTPATIENT
Start: 2025-02-13 | End: 2025-02-13 | Stop reason: HOSPADM

## 2025-02-13 RX ORDER — ALBUTEROL SULFATE 90 UG/1
1-2 INHALANT RESPIRATORY (INHALATION)
Status: CANCELLED
Start: 2025-02-14

## 2025-02-13 RX ORDER — HEPARIN SODIUM (PORCINE) LOCK FLUSH IV SOLN 100 UNIT/ML 100 UNIT/ML
5 SOLUTION INTRAVENOUS
Status: CANCELLED | OUTPATIENT
Start: 2025-02-14

## 2025-02-13 RX ORDER — HEPARIN SODIUM,PORCINE 10 UNIT/ML
5-20 VIAL (ML) INTRAVENOUS DAILY PRN
Status: CANCELLED | OUTPATIENT
Start: 2025-02-14

## 2025-02-13 RX ADMIN — SODIUM CHLORIDE 200 MG: 9 INJECTION, SOLUTION INTRAVENOUS at 15:48

## 2025-02-13 RX ADMIN — HEPARIN 5 ML: 100 SYRINGE at 14:13

## 2025-02-13 RX ADMIN — SODIUM CHLORIDE 250 ML: 9 INJECTION, SOLUTION INTRAVENOUS at 15:48

## 2025-02-13 RX ADMIN — Medication 5 ML: at 16:20

## 2025-02-13 ASSESSMENT — PAIN SCALES - GENERAL: PAINLEVEL_OUTOF10: NO PAIN (0)

## 2025-02-13 ASSESSMENT — PATIENT HEALTH QUESTIONNAIRE - PHQ9: SUM OF ALL RESPONSES TO PHQ QUESTIONS 1-9: 13

## 2025-02-13 NOTE — PROGRESS NOTES
[facility]  [address] [address]  Phone: [phone]  Fax: [phone]    PATIENT NAME: Douglas Herrera  MRN # [id number]   DATE OF VISIT: February 13, 2025  YOB: 1960     Otolaryngology: Dr. Damon Iglesias-Chavira   Radiation Oncology: Dr. Tomasa Akers  Cardiology: Dr. Qamar Hernandez  Hepatologist: [facility]      CANCER TYPE: SCC sinonasal   STAGE: [id number] (IVB)  ECOG PS: 1     PD-L1: TPS 60-70%, CPS >70% on [id number]  NGS: internal panel. Fusion negative. ARID1A S90fs, EGFR exon 20 insertion, [id number], APC Q8262tz, TMB 7.313    ASSESSMENT AND PLAN  SCC L maxillary sinus, [id number] (IVB), ARID1 mutation,  EGFR exon 20 insertion: Now s/p 4 cycles of carbo paclitaxel pembrolizumab. Transitioning to pembro alone. Visually hard palate tumor improving and more central necrosis on imaging indicating response as opposed to rapidly progressing tumor. Labs stable with exception of anemia s/t chemo. Will monitor for improvement   -Proceed with maintenance pembro. He'd like to start with q3week dosing and transition to q 6 after scan if continuing   - Restage 2 months from last image-scheduled 3/6   - Tentative plan for amivantamab second line if approved     Pulm nodules: a few new nodules on last CT. Monitor with repeat scan. Cannot exclude metastatic disease    Trismus: Continue PT, stretches      Hypothyroidism: TFTs stable. No levothyroxine changes    Nutrition: TF twice daily, eating one meal a day, usually rice/some meat, etc. Gtube exchanged 11/22/24      Hearing loss: Unchanged - still pretty significant on L. Audiogram in future. ENT visit for ear cleaning requested d/t left impacted cerumen     Hepatitis B: HBSAby negative, SAg +, cAby +, HBV PCR negative 1/23/24. HCV negative.     H/o PE/DVT: 5/8/2019 CTA report scanned into [facility]. Small PEs, LLE DVT, acute, occlusive. Was on rivaroxaban, completed course.      ASCVD: Asa 81 mg  daily, metoprolol per PCP       Nisih Michele CNP  ---  32 minutes spent on the date of the encounter doing chart review, review of test results, interpretation of tests, patient visit, and documentation.    The longitudinal plan of care for the diagnosis(es)/condition(s) as documented were addressed during this visit. Due to the added complexity in care, I will continue to support Douglas in the subsequent management and with ongoing continuity of care.      Professional ipad  used throughout the visit      SUBJECTIVE  Returns for routine follow-up after 4 cycles of carbo, paclitaxel, pembrolizumab.  -Pain stable  -Fullness in left ear and sinus persistent. Uses saline nasal rinse 3 x/day  -Less fullness in R ear but some  -Appetite stable. Primarily uses PEG but gets in about 1 meal per day. Administers 4 cartons/day through tube  -Stable fatigue  -Constipation-using Miralax  -No rash    CANCER SUMMARY  8/9/23                CT sinus.   8/24/23  MRI sinus. L maxillary sinus mass  9/12/23              ED for epistaxis.   9/13/23              CTA and embolization of L IMAX   10/4/23  Nasal bx. Path: Inverted papilloma with high grade dysplasia  11/10/23 Nasal endoscopy and bx in the OR. C/b severe bleeding. Path: Inverted sinonasal papilloma with severe dysplasia.  11/17/23            MRI. 7.4 x 4.6 x 6.6 cm L sinonasal mass, destruction of nasal turbinates, L maxillary sinus, hard palate, invasion of L  space, involvement of medial and lateral pterygoids and temporalis muscles. Effacement and invasion ipsilateral pterygopalatine fossa, extends and effaces the nasopharynx.   11/30/23            Carotid angiogram. Direct endonasal and transoral embolization L sinonasal tumor, transaterial embolization of the L sphenopalatine artery feeders to the L sinonasal artery tumor   12/1/23  Stealth assisted transnasal endoscopic approach to excise L sinonasal mass. Pre-operative embolization. Path: SCC  involving L maxilla.    12/9/23  PET. Hypermetabolic mass centered along the L maxilla, extending superiorly through the floor of the L maxillary sinus, posterior/laterally to the  space, invasion of the pterygoid muscles, extending cranially along the pterygopalatine fossa and pterygoid plates to the skull base level. Erosion of involved bones including L maxilla, maxillary sinus wall and pterygoid plates. Extension medially to the L lateral nasopharyngeal wall and soft palate. 1 mm fat place between carotid and mass. ~4.9 x 4.0 x 5.4 cm (SUV 24.3). Partially resected since 11/17/23 MRI. 8 mm L 2A node (SUV 5.9), 7 mm L level 2 node (SUV 5.5)  1/2/24  Gtube (IR)  1/4/24  Video swallow. No aspiration  1/8~2/27/24 Chemoradiation with weekly cisplatin.  1/11/24  Port (IR)  8/5/24  PET/CT.  Overall increased FDG uptake centered along the posterior inferior left maxillary sinus involving the  space, left pterygopalatine fossa, left parapharyngeal space, extending inferiorly to left maxilla.  Increased soft tissue component within the space, now broad FDG uptake (SUV 24.97), previously 5.75.  Area measures 3.3 x 2.6 cm. Increased focal hypermetabolic activity posterior pharyngeal wall, extending to the left palatine tonsil, associated mucosal thickening (SUV 17.23).  Non-FDG avid mass, 2.7 x 2.4 cm, previously 2.1 x 2.5 cm, another medially, 1.1 x 1.5 cm, previously 1.1 x 1 cm.  New focal area of FDG uptake right lateral oropharyngeal wall (SUV 9.07).  Few hypermetabolic right cervical nodes, none definitively enlarged, however increased activity compared to prior (SUV 7.29-8.36).  Several sub-6 mm pulmonary nodules.  8/5/24  MRI.  Heterogeneous mass along the base of the left maxillary sinus extending to the maxilla,  space, left pterygopalatine fossa, and parapharyngeal space.  Some areas of treated but some corresponding to FDG avidity on same-day PET, highly suspicious for tumor  recurrence.  Question early left V2 involvement, thin dural enhancement along the base of the left temporal lobe     9/20/24  L maxillary, L superior alveolus bx in clinic Path: Fragments of at least SCC in situ.   10/11/24 Transnasal bx L maxillary sinus (Dr. Iglesias). 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  Multiloculated mass at base of L maxilla extending to involve L side of palate, retromaxillary region, extending into the  space, premaxillary region.  Similar to prior MRI 8/5/2024.  Continuous slight abnormal asymmetric thickening L V3 and V2.  Asymmetric thin dural enhancement along the base of the left temporal lobe.   10/29/2024 CT chest.  Scattered <6 mm lung nodules grossly stable compared to 8/5/2024.  Partially visualized common bile duct mildly dilated, 8 mm, possibly related to prior cholecystectomy, recommend MRCP.   1/20/25  CT neck and CAP.  Overall similar multiloculated L maxillary mass extending into the  and parapharyngeal spaces compared to PET/CT 8/5/2024.  No cervical adenopathy.  Stable small 2-5 mm lung nodules.  Increased RUL nodule, 1 mm --> 4 mm (5:139).  New 5 mm LLL nodule.  New somewhat linear 4 mm lingular nodule.  Attention on follow-up  11/20/24~current  Carboplatin + paclitaxel + pembrolizumab.     PAST MEDICAL HISTORY  Sinonasal carcinoma as above  HTN  ASCVD. CABG x 3 2019  PE and LLE DVT after CABG . Treated with rivaroxaban  Dyslipidemia  Hepatitis B   SCC R temple s/p MOHS  Ascending aortic aneurysm repair 2019  Hearing loss L   Gout - feet  Depression  Cholecystectomy    CURRENT OUTPATIENT MEDICATIONS  Reviewed    ALLERGIES  No Known Allergies     PHYSICAL EXAM  /69 (BP Location: Right arm, Patient Position: Sitting, Cuff Size: Adult Regular)   Pulse 85   Temp 98.1  F (36.7  C) (Oral)   Resp 16   Wt 62.1 kg (136 lb 14.4 oz)   SpO2 98%   BMI 24.56 kg/m    GEN: NAD  HEENT: EOMI, no icterus, injection or pallor. Oropharynx is  clear, trismus. Impacted cerumen to left ear, partially obstructing in R ear  RESP: CTA bilaterally  CV: rrr, no murmur  EXT: no edema  NEURO: alert    LABORATORY AND IMAGING STUDIES   Most Recent 3 CBC's:  Recent Labs   Lab Test 02/13/25  1419 01/24/25  0919 01/03/25  0826   WBC 4.7 3.9* 5.9   HGB 8.5* 9.2* 10.3*   MCV 91 90 89    157 179   ANEUTAUTO 2.2 2.1 3.5    Most Recent 3 BMP's:  Recent Labs   Lab Test 02/13/25  1419 01/24/25  0919 01/03/25  0826    137 137   POTASSIUM 4.2 3.9 4.3   CHLORIDE 103 100 100   CO2 28 26 25   BUN 21.0 21.2 20.2   CR 0.81 0.92 0.88   ANIONGAP 8 11 12   FLORENCE 9.5 9.8 9.5   GLC 97 140* 95   PROTTOTAL 8.0 8.1 7.8   ALBUMIN 3.9 4.0 3.8    Most Recent 2 LFT's:  Recent Labs   Lab Test 02/13/25  1419 01/24/25  0919   AST 30 29   ALT 21 19   ALKPHOS 71 78   BILITOTAL 0.5 0.6    Most Recent TSH and T4:  Recent Labs   Lab Test 02/13/25  1419 01/24/25  0919   TSH 4.02 6.42*   T4  --  0.98     Phos/Mag:  Lab Results   Component Value Date    MAG 1.9 08/05/2024    MAG 1.8 06/28/2024    MAG 1.7 05/23/2024       I reviewed the above labs today.

## 2025-02-13 NOTE — Clinical Note
2/13/2025      Douglas Herrera  [address] [place] [address]      Dear Colleague,    Thank you for referring your patient, Douglas Herrera, to the [facility]. Please see a copy of my visit note below.        [facility]  [address] [address]  Phone: [phone]  Fax: [phone]    PATIENT NAME: Douglas Herrera  MRN # [id number]   DATE OF VISIT: February 13, 2025  YOB: 1960     Otolaryngology: Dr. Damon BurksChavira   Radiation Oncology: Dr. Tomasa Akers  Cardiology: Dr. Qamar Hernandez  Hepatologist: [facility]      CANCER TYPE: SCC sinonasal   STAGE: [id number] (IVB)  ECOG PS: 1     PD-L1: TPS 60-70%, CPS >70% on [id number]  NGS: internal panel. Fusion negative. ARID1A S90fs, EGFR exon 20 insertion, [id number], APC [id number], TMB 7.313    ASSESSMENT AND PLAN  SCC L maxillary sinus, [id number] (IVB), ARID1 mutation,  EGFR exon 20 insertion: Now s/p 3 cycles of carbo paclitaxel pembrolizumab. I think there's response. Size might not be that different and we're comparing different imaging modalities (MRI vs CT) but there's clearly more central necrosis as one might see with immunotherapy and it looks like response as opposed to rapidly progressive necrotic tumor. Symptoms have not progressed and I think the hard palate looks better visually.    - complete C4 chemo + pembro on Fri; discussed, will keep same doses    - maintenance pembrolizumab thereafter. Can try q6 week schedule. He'd like to think about it and let us know   - restage in 2 1/2 months or so    - will start process of seeing if we can get amivantamab second line. Discussed rationale, process, etc.     Pulm nodules: I agree a couple of them are new compared to the CT in Oct. Discussed with Douglas and Tulio that it's hard to know whether they're mets or not. Monitor for now.    Trismus: Continue PT, stretches      Hypothyroidism: TFTs 1/3/25. Discussed.  Will wait for Fridays' TFTs to decide whether to start a little levothyroxine 50 mcg daily or wait longer but I favor starting, discussed rationale.    Nutrition: TF twice daily, eating one meal a day, usually rice/some meat, etc. Gtube exchanged 11/22/24. No changes today      Hearing loss: Unchanged - still pretty significant on L. Audiogram in future      Hepatitis B: HBSAby negative, SAg +, cAby +, HBV PCR negative 1/23/24. HCV negative.     H/o PE/DVT: 5/8/2019 CTA report scanned into [employer]. Small PEs, LLE DVT, acute, occlusive. Was on rivaroxaban, completed course.      ASCVD: Asa 81 mg daily, metoprolol per PCP       Nishi Michele CNP  ---  *** minutes spent on the date of the encounter doing {2021 E&M time in:222587}.    The longitudinal plan of care for the diagnosis(es)/condition(s) as documented were addressed during this visit. Due to the added complexity in care, I will continue to support Douglas in the subsequent management and with ongoing continuity of care.      Professional ipad  used throughout the visit      SUBJECTIVE  Returns for routine follow-up now after 3 cycles of carbo, paclitaxel, pembrolizumab.  Feeling L face coming back a bit  Pain about the same though  Tired and weak after chemo - doesn't want to do anything at all x 3-5 days.  A little better with his most recent cycle  Pressure in L sinus area - has to use quite a bit of effort to clean it out 2-3 times per day.  Does not feel like anything stuck  No increased numbness tingling in hands or feet  Eating once a day.  Using G-tube for feedings twice a day    CANCER SUMMARY  8/9/23                CT sinus.   8/24/23  MRI sinus. L maxillary sinus mass  9/12/23              ED for epistaxis.   9/13/23              CTA and embolization of L IMAX   10/4/23  Nasal bx. Path: Inverted papilloma with high grade dysplasia  11/10/23 Nasal endoscopy and bx in the OR. C/b severe bleeding. Path: Inverted sinonasal papilloma with severe  dysplasia.  11/17/23            MRI. 7.4 x 4.6 x 6.6 cm L sinonasal mass, destruction of nasal turbinates, L maxillary sinus, hard palate, invasion of L  space, involvement of medial and lateral pterygoids and temporalis muscles. Effacement and invasion ipsilateral pterygopalatine fossa, extends and effaces the nasopharynx.   11/30/23            Carotid angiogram. Direct endonasal and transoral embolization L sinonasal tumor, transaterial embolization of the L sphenopalatine artery feeders to the L sinonasal artery tumor   12/1/23  Stealth assisted transnasal endoscopic approach to excise L sinonasal mass. Pre-operative embolization. Path: SCC involving L maxilla.    12/9/23  PET. Hypermetabolic mass centered along the L maxilla, extending superiorly through the floor of the L maxillary sinus, posterior/laterally to the  space, invasion of the pterygoid muscles, extending cranially along the pterygopalatine fossa and pterygoid plates to the skull base level. Erosion of involved bones including L maxilla, maxillary sinus wall and pterygoid plates. Extension medially to the L lateral nasopharyngeal wall and soft palate. 1 mm fat place between carotid and mass. ~4.9 x 4.0 x 5.4 cm (SUV 24.3). Partially resected since 11/17/23 MRI. 8 mm L 2A node (SUV 5.9), 7 mm L level 2 node (SUV 5.5)  1/2/24  Gtube (IR)  1/4/24  Video swallow. No aspiration  1/8~2/27/24 Chemoradiation with weekly cisplatin.  1/11/24  Port (IR)  8/5/24  PET/CT.  Overall increased FDG uptake centered along the posterior inferior left maxillary sinus involving the  space, left pterygopalatine fossa, left parapharyngeal space, extending inferiorly to left maxilla.  Increased soft tissue component within the space, now broad FDG uptake (SUV 24.97), previously 5.75.  Area measures 3.3 x 2.6 cm. Increased focal hypermetabolic activity posterior pharyngeal wall, extending to the left palatine tonsil, associated mucosal  thickening (SUV 17.23).  Non-FDG avid mass, 2.7 x 2.4 cm, previously 2.1 x 2.5 cm, another medially, 1.1 x 1.5 cm, previously 1.1 x 1 cm.  New focal area of FDG uptake right lateral oropharyngeal wall (SUV 9.07).  Few hypermetabolic right cervical nodes, none definitively enlarged, however increased activity compared to prior (SUV 7.29-8.36).  Several sub-6 mm pulmonary nodules.  8/5/24  MRI.  Heterogeneous mass along the base of the left maxillary sinus extending to the maxilla,  space, left pterygopalatine fossa, and parapharyngeal space.  Some areas of treated but some corresponding to FDG avidity on same-day PET, highly suspicious for tumor recurrence.  Question early left V2 involvement, thin dural enhancement along the base of the left temporal lobe     9/20/24  L maxillary, L superior alveolus bx in clinic Path: Fragments of at least SCC in situ.   10/11/24 Transnasal bx L maxillary sinus (Dr. Iglesias). 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  Multiloculated mass at base of L maxilla extending to involve L side of palate, retromaxillary region, extending into the  space, premaxillary region.  Similar to prior MRI 8/5/2024.  Continuous slight abnormal asymmetric thickening L V3 and V2.  Asymmetric thin dural enhancement along the base of the left temporal lobe.   10/29/2024 CT chest.  Scattered <6 mm lung nodules grossly stable compared to 8/5/2024.  Partially visualized common bile duct mildly dilated, 8 mm, possibly related to prior cholecystectomy, recommend MRCP.   1/20/25  CT neck and CAP.  Overall similar multiloculated L maxillary mass extending into the  and parapharyngeal spaces compared to PET/CT 8/5/2024.  No cervical adenopathy.  Stable small 2-5 mm lung nodules.  Increased RUL nodule, 1 mm --> 4 mm (5:139).  New 5 mm LLL nodule.  New somewhat linear 4 mm lingular nodule.  Attention on follow-up  11/20/24~current  Carboplatin + paclitaxel +  pembrolizumab.     PAST MEDICAL HISTORY  Sinonasal carcinoma as above  HTN  ASCVD. CABG x 3 2019  PE and LLE DVT after CABG . Treated with rivaroxaban  Dyslipidemia  Hepatitis B   SCC R temple s/p MOHS  Ascending aortic aneurysm repair 2019  Hearing loss L   Gout - feet  Depression  Cholecystectomy    CURRENT OUTPATIENT MEDICATIONS  Reviewed    ALLERGIES  No Known Allergies     PHYSICAL EXAM  There were no vitals taken for this visit.  GEN: NAD  HEENT: EOMI, no icterus, injection or pallor. Oropharynx is clear - I think the visible tumor in the hard palate is better albeit harder to see with the trismus  EXT: no edema  NEURO: alert    LABORATORY AND IMAGING STUDIES   Most Recent 3 CBC's:  Recent Labs   Lab Test 01/24/25  0919 01/03/25  0826 12/11/24  0918   WBC 3.9* 5.9 6.6   HGB 9.2* 10.3* 10.8*   MCV 90 89 88    179 224   ANEUTAUTO 2.1 3.5 4.4    Most Recent 3 BMP's:  Recent Labs   Lab Test 01/24/25  0919 01/03/25  0826 12/11/24  0918    137 138   POTASSIUM 3.9 4.3 4.4   CHLORIDE 100 100 103   CO2 26 25 25   BUN 21.2 20.2 16.1   CR 0.92 0.88 0.91   ANIONGAP 11 12 10   FLORENCE 9.8 9.5 9.5   * 95 99   PROTTOTAL 8.1 7.8 7.9   ALBUMIN 4.0 3.8 3.7    Most Recent 2 LFT's:  Recent Labs   Lab Test 01/24/25  0919 01/03/25  0826   AST 29 26   ALT 19 15   ALKPHOS 78 69   BILITOTAL 0.6 0.5    Most Recent TSH and T4:  Recent Labs   Lab Test 01/24/25 0919   TSH 6.42*   T4 0.98     Phos/Mag:  Lab Results   Component Value Date    MAG 1.9 08/05/2024    MAG 1.8 06/28/2024    MAG 1.7 05/23/2024             [facility]  [address] [address]  Phone: [phone]  Fax: [phone]    PATIENT NAME: Douglas Herrera  MRN # [id number]   DATE OF VISIT: February 13, 2025  YOB: 1960     Otolaryngology: Dr. Damon Regan   Radiation Oncology: Dr. Tomasa Akers  Cardiology: Dr. Qamar Hernandez  Hepatologist: [facility]      CANCER TYPE: SCC sinonasal    STAGE: [id number] (IVB)  ECOG PS: 1     PD-L1: TPS 60-70%, CPS >70% on [id number]  NGS: internal panel. Fusion negative. ARID1A S90fs, EGFR exon 20 insertion, [id number], APC [id number], TMB 7.313    ASSESSMENT AND PLAN  SCC L maxillary sinus, [id number] (IVB), ARID1 mutation,  EGFR exon 20 insertion: Now s/p 4 cycles of carbo paclitaxel pembrolizumab. Transitioning to pembro alone. Visually hard palate tumor improving and more central necrosis on imaging indicating response as opposed to rapidly progressing tumor. Labs stable with exception of anemia s/t chemo. Will monitor for improvement   -Proceed with maintenance pembro. He'd like to start with q3week dosing and transition to q 6 after scan if continuing   - Restage 2 months from last image-scheduled 3/6   - Tentative plan for amivantamab second line if approved     Pulm nodules: a few new nodules on last CT. Monitor with repeat scan. Cannot exclude metastatic disease    Trismus: Continue PT, stretches      Hypothyroidism: TFTs stable. No levothyroxine changes    Nutrition: TF twice daily, eating one meal a day, usually rice/some meat, etc. Gtube exchanged 11/22/24      Hearing loss: Unchanged - still pretty significant on L. Audiogram in future. ENT visit for ear cleaning requested d/t left impacted cerumen     Hepatitis B: HBSAby negative, SAg +, cAby +, HBV PCR negative 1/23/24. HCV negative.     H/o PE/DVT: 5/8/2019 CTA report scanned into [employer]. Small PEs, LLE DVT, acute, occlusive. Was on rivaroxaban, completed course.      ASCVD: Asa 81 mg daily, metoprolol per PCP       Nishi Michele CNP  ---  32 minutes spent on the date of the encounter doing chart review, review of test results, interpretation of tests, patient visit, and documentation.    The longitudinal plan of care for the diagnosis(es)/condition(s) as documented were addressed during this visit. Due to the added complexity in care, I will continue to support Douglas in the subsequent management  and with ongoing continuity of care.      Professional ipad  used throughout the visit      SUBJECTIVE  Returns for routine follow-up after 4 cycles of carbo, paclitaxel, pembrolizumab.  -Pain stable  -Fullness in left ear and sinus persistent. Uses saline nasal rinse 3 x/day  -Less fullness in R ear but some  -Appetite stable. Primarily uses PEG but gets in about 1 meal per day. Administers 4 cartons/day through tube  -Stable fatigue  -Constipation-using Miralax  -No rash    CANCER SUMMARY  8/9/23                CT sinus.   8/24/23  MRI sinus. L maxillary sinus mass  9/12/23              ED for epistaxis.   9/13/23              CTA and embolization of L IMAX   10/4/23  Nasal bx. Path: Inverted papilloma with high grade dysplasia  11/10/23 Nasal endoscopy and bx in the OR. C/b severe bleeding. Path: Inverted sinonasal papilloma with severe dysplasia.  11/17/23            MRI. 7.4 x 4.6 x 6.6 cm L sinonasal mass, destruction of nasal turbinates, L maxillary sinus, hard palate, invasion of L  space, involvement of medial and lateral pterygoids and temporalis muscles. Effacement and invasion ipsilateral pterygopalatine fossa, extends and effaces the nasopharynx.   11/30/23            Carotid angiogram. Direct endonasal and transoral embolization L sinonasal tumor, transaterial embolization of the L sphenopalatine artery feeders to the L sinonasal artery tumor   12/1/23  Stealth assisted transnasal endoscopic approach to excise L sinonasal mass. Pre-operative embolization. Path: SCC involving L maxilla.    12/9/23  PET. Hypermetabolic mass centered along the L maxilla, extending superiorly through the floor of the L maxillary sinus, posterior/laterally to the  space, invasion of the pterygoid muscles, extending cranially along the pterygopalatine fossa and pterygoid plates to the skull base level. Erosion of involved bones including L maxilla, maxillary sinus wall and pterygoid plates.  Extension medially to the L lateral nasopharyngeal wall and soft palate. 1 mm fat place between carotid and mass. ~4.9 x 4.0 x 5.4 cm (SUV 24.3). Partially resected since 11/17/23 MRI. 8 mm L 2A node (SUV 5.9), 7 mm L level 2 node (SUV 5.5)  1/2/24  Gtube (IR)  1/4/24  Video swallow. No aspiration  1/8~2/27/24 Chemoradiation with weekly cisplatin.  1/11/24  Port (IR)  8/5/24  PET/CT.  Overall increased FDG uptake centered along the posterior inferior left maxillary sinus involving the  space, left pterygopalatine fossa, left parapharyngeal space, extending inferiorly to left maxilla.  Increased soft tissue component within the space, now broad FDG uptake (SUV 24.97), previously 5.75.  Area measures 3.3 x 2.6 cm. Increased focal hypermetabolic activity posterior pharyngeal wall, extending to the left palatine tonsil, associated mucosal thickening (SUV 17.23).  Non-FDG avid mass, 2.7 x 2.4 cm, previously 2.1 x 2.5 cm, another medially, 1.1 x 1.5 cm, previously 1.1 x 1 cm.  New focal area of FDG uptake right lateral oropharyngeal wall (SUV 9.07).  Few hypermetabolic right cervical nodes, none definitively enlarged, however increased activity compared to prior (SUV 7.29-8.36).  Several sub-6 mm pulmonary nodules.  8/5/24  MRI.  Heterogeneous mass along the base of the left maxillary sinus extending to the maxilla,  space, left pterygopalatine fossa, and parapharyngeal space.  Some areas of treated but some corresponding to FDG avidity on same-day PET, highly suspicious for tumor recurrence.  Question early left V2 involvement, thin dural enhancement along the base of the left temporal lobe     9/20/24  L maxillary, L superior alveolus bx in clinic Path: Fragments of at least SCC in situ.   10/11/24 Transnasal bx L maxillary sinus (Dr. Iglesias). 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  Multiloculated mass at base of L maxilla extending to involve L side of palate, retromaxillary  region, extending into the  space, premaxillary region.  Similar to prior MRI 8/5/2024.  Continuous slight abnormal asymmetric thickening L V3 and V2.  Asymmetric thin dural enhancement along the base of the left temporal lobe.   10/29/2024 CT chest.  Scattered <6 mm lung nodules grossly stable compared to 8/5/2024.  Partially visualized common bile duct mildly dilated, 8 mm, possibly related to prior cholecystectomy, recommend MRCP.   1/20/25  CT neck and CAP.  Overall similar multiloculated L maxillary mass extending into the  and parapharyngeal spaces compared to PET/CT 8/5/2024.  No cervical adenopathy.  Stable small 2-5 mm lung nodules.  Increased RUL nodule, 1 mm --> 4 mm (5:139).  New 5 mm LLL nodule.  New somewhat linear 4 mm lingular nodule.  Attention on follow-up  11/20/24~current  Carboplatin + paclitaxel + pembrolizumab.     PAST MEDICAL HISTORY  Sinonasal carcinoma as above  HTN  ASCVD. CABG x 3 2019  PE and LLE DVT after CABG . Treated with rivaroxaban  Dyslipidemia  Hepatitis B   SCC R temple s/p MOHS  Ascending aortic aneurysm repair 2019  Hearing loss L   Gout - feet  Depression  Cholecystectomy    CURRENT OUTPATIENT MEDICATIONS  Reviewed    ALLERGIES  No Known Allergies     PHYSICAL EXAM  /69 (BP Location: Right arm, Patient Position: Sitting, Cuff Size: Adult Regular)   Pulse 85   Temp 98.1  F (36.7  C) (Oral)   Resp 16   Wt 62.1 kg (136 lb 14.4 oz)   SpO2 98%   BMI 24.56 kg/m    GEN: NAD  HEENT: EOMI, no icterus, injection or pallor. Oropharynx is clear, trismus. Impacted cerumen to left ear, partially obstructing in R ear  RESP: CTA bilaterally  CV: rrr, no murmur  EXT: no edema  NEURO: alert    LABORATORY AND IMAGING STUDIES   Most Recent 3 CBC's:  Recent Labs   Lab Test 02/13/25  1419 01/24/25  0919 01/03/25  0826   WBC 4.7 3.9* 5.9   HGB 8.5* 9.2* 10.3*   MCV 91 90 89    157 179   ANEUTAUTO 2.2 2.1 3.5    Most Recent 3 BMP's:  Recent Labs   Lab Test  02/13/25  1419 01/24/25  0919 01/03/25  0826    137 137   POTASSIUM 4.2 3.9 4.3   CHLORIDE 103 100 100   CO2 28 26 25   BUN 21.0 21.2 20.2   CR 0.81 0.92 0.88   ANIONGAP 8 11 12   FLORENCE 9.5 9.8 9.5   GLC 97 140* 95   PROTTOTAL 8.0 8.1 7.8   ALBUMIN 3.9 4.0 3.8    Most Recent 2 LFT's:  Recent Labs   Lab Test 02/13/25  1419 01/24/25  0919   AST 30 29   ALT 21 19   ALKPHOS 71 78   BILITOTAL 0.5 0.6    Most Recent TSH and T4:  Recent Labs   Lab Test 02/13/25  1419 01/24/25  0919   TSH 4.02 6.42*   T4  --  0.98     Phos/Mag:  Lab Results   Component Value Date    MAG 1.9 08/05/2024    MAG 1.8 06/28/2024    MAG 1.7 05/23/2024       I reviewed the above labs today.      Again, thank you for allowing me to participate in the care of your patient.        Sincerely,        Nishi Michele, CNP    Electronically signed

## 2025-02-13 NOTE — PATIENT INSTRUCTIONS
Contact Numbers    [facility] Main Line/[facility]: [phone]    Call with chills and/or temperature greater than or equal to 100.5, uncontrolled nausea/vomiting, diarrhea, constipation, dizziness, shortness of breath, chest pain, bleeding, unexplained bruising, or any new/concerning symptoms, questions/concerns.     If you are having any concerning symptoms or wish to speak to a provider before your next infusion visit, please call your care coordinator or triage to notify them so we can adequately serve you.       After Hours: [phone]    If after hours, weekends, or holidays, call main hospital  and ask for Oncology doctor on call.      February 2025 Sunday Monday Tuesday Wednesday Thursday Friday Saturday                                 1       2     3     4     5     6     7     8       9     10    SLP CANCER REHAB EVAL   8:00 AM   (90 min.)   Dulce Maria Stein SLP   [facility] [place] 11     12     13    LAB CENTRAL   2:00 PM   (15 min.)   [facility] DRAW   [facility]    RETURN CCSL   2:15 PM   (45 min.)   Nishi Michele CNP   [facility]    ONC INFUSION 1 HR (60 MIN)   3:00 PM   (60 min.)   FENG ONC INFUSION NURSE   [facility] 14     15       16     17     18     19     20     21     22       23     24     25     26     27     28                      March 2025 Sunday Monday Tuesday Wednesday Thursday Friday Saturday                                 1       2     3     4     5     6    LAB CENTRAL   7:30 AM   (15 min.)   [facility]   [facility]    RETURN CCSL   7:45 AM   (45 min.)   Nishi Michele CNP   [facility]    ONC INFUSION 1 HR (60 MIN)   9:00 AM   (60 min.)   [facility] ONC INFUSION NURSE   [facility]    CT CHEST ABD NECK W CMB  10:30 AM   (40 min.)   UUCT1   [facility]  Imaging 7     8       9     10     11     12     13     14    RETURN CCSL   2:45 PM   (30 min.)   Kayy Post MD   [facility] 15       16     17    SLP CANCER REHAB TREATMENT  12:00 PM   (45 min.)   Dulce Maria Stein SLP   [facility] [place] 18     19     20     21     22       23     24     25     26     27    LAB CENTRAL   8:15 AM   (15 min.)   [facility] DRAW   [facility]    RETURN CCSL   8:45 AM   (45 min.)   Nishi Michele CNP   [facility]    ONC INFUSION 1 HR (60 MIN)  10:00 AM   (60 min.)    ONC INFUSION NURSE   [facility] 28     29       30     31                                              Lab Results:  Recent Results (from the past 12 hours)   Comprehensive metabolic panel    Collection Time: 02/13/25  2:19 PM   Result Value Ref Range    Sodium 139 135 - 145 mmol/L    Potassium 4.2 3.4 - 5.3 mmol/L    Carbon Dioxide (CO2) 28 22 - 29 mmol/L    Anion Gap 8 7 - 15 mmol/L    Urea Nitrogen 21.0 8.0 - 23.0 mg/dL    Creatinine 0.81 0.67 - 1.17 mg/dL    GFR Estimate >90 >60 mL/min/1.73m2    Calcium 9.5 8.8 - 10.4 mg/dL    Chloride 103 98 - 107 mmol/L    Glucose 97 70 - 99 mg/dL    Alkaline Phosphatase 71 40 - 150 U/L    AST 30 0 - 45 U/L    ALT 21 0 - 70 U/L    Protein Total 8.0 6.4 - 8.3 g/dL    Albumin 3.9 3.5 - 5.2 g/dL    Bilirubin Total 0.5 <=1.2 mg/dL   TSH with free T4 reflex    Collection Time: 02/13/25  2:19 PM   Result Value Ref Range    TSH 4.02 0.30 - 4.20 uIU/mL   CBC with platelets and differential    Collection Time: 02/13/25  2:19 PM   Result Value Ref Range    WBC Count 4.7 4.0 - 11.0 10e3/uL    RBC Count 2.87 (L) 4.40 - 5.90 10e6/uL    Hemoglobin 8.5 (L) 13.3 - 17.7 g/dL    Hematocrit 26.1 (L) 40.0 - 53.0 %    MCV 91 78 - 100 fL    MCH 29.6 26.5 - 33.0 pg    MCHC 32.6 31.5 - 36.5 g/dL    RDW 18.8 (H) 10.0 - 15.0 %    Platelet Count 165 150 -  450 10e3/uL    % Neutrophils 47 %    % Lymphocytes 35 %    % Monocytes 16 %    % Eosinophils 0 %    % Basophils 1 %    % Immature Granulocytes 1 %    NRBCs per 100 WBC 0 <1 /100    Absolute Neutrophils 2.2 1.6 - 8.3 10e3/uL    Absolute Lymphocytes 1.7 0.8 - 5.3 10e3/uL    Absolute Monocytes 0.8 0.0 - 1.3 10e3/uL    Absolute Eosinophils 0.0 0.0 - 0.7 10e3/uL    Absolute Basophils 0.0 0.0 - 0.2 10e3/uL    Absolute Immature Granulocytes 0.0 <=0.4 10e3/uL    Absolute NRBCs 0.0 10e3/uL

## 2025-02-13 NOTE — PROGRESS NOTES
Infusion Nursing Note:  Douglas Herrera presents today for Cycle 5, day 1 Keytruda.    Patient seen by provider today: Yes: Nishi Michele NP    Note: Patient presents to clinic today feeling well with no questions.  Pt would like to proceed with therapy today.   Pt did not request or require any intervention for pain today.    Intravenous Access:  Implanted Port.    Treatment Conditions:  Lab Results   Component Value Date    HGB 8.5 (L) 02/13/2025    WBC 4.7 02/13/2025    ANEUTAUTO 2.2 02/13/2025     02/13/2025        Lab Results   Component Value Date     02/13/2025    POTASSIUM 4.2 02/13/2025    MAG 1.9 08/05/2024    CR 0.81 02/13/2025    FLORENCE 9.5 02/13/2025    BILITOTAL 0.5 02/13/2025    ALBUMIN 3.9 02/13/2025    ALT 21 02/13/2025    AST 30 02/13/2025     Results reviewed, labs MET treatment parameters, ok to proceed with treatment.    Post Infusion Assessment:  Patient tolerated infusion without incident.  Blood return noted pre and post infusion.  Site patent and intact, free from redness, edema or discomfort.  No evidence of extravasations.  Access discontinued per protocol.    Discharge Plan:   Patient declined prescription refills.  Discharge instructions reviewed with: Patient.  Patient and/or family verbalized understanding of discharge instructions and all questions answered.  AVS to patient via [employer].  Patient will return 3/6/2025 for next appointment.   Patient discharged in stable condition accompanied by: son.  Departure Mode: Ambulatory.    Vale Navas RN

## 2025-02-13 NOTE — NURSING NOTE
"Oncology Rooming Note    February 13, 2025 2:44 PM   Douglas Herrera is a 64 year old male who presents for:    Chief Complaint   Patient presents with    Port Draw     Labs drawn via port by RN, TONNY done    Oncology Clinic Visit     Squamous cell carcinoma of maxillary sinus     Initial Vitals: /69 (BP Location: Right arm, Patient Position: Sitting, Cuff Size: Adult Regular)   Pulse 85   Temp 98.1  F (36.7  C) (Oral)   Resp 16   Wt 62.1 kg (136 lb 14.4 oz)   SpO2 98%   BMI 24.56 kg/m   Estimated body mass index is 24.56 kg/m  as calculated from the following:    Height as of 1/6/25: 1.59 m (5' 2.6\").    Weight as of this encounter: 62.1 kg (136 lb 14.4 oz). Body surface area is 1.66 meters squared.  No Pain (0) Comment: Data Unavailable   No LMP for male patient.  Allergies reviewed: Yes  Medications reviewed: Yes    Medications: Medication refills not needed today.  Pharmacy name entered into [facility]: [facility] - [place], [place] - [facility] [address]    Frailty Screening:   Is the patient here for a new oncology consult visit in cancer care? 2. No    PHQ9:  Did this patient require a PHQ9?: Yes   If the patient required a PHQ9 assessment, did the results require a follow up with the Provider/Nurse?: No      Clinical concerns: Patient is wondering if his medications might be causing overproduction of mucus. He has been having sinus pressure buildup that he is concerned has caused hearing loss. He has had sinus pain in his ears and nose. He is wondering if he should start taking aspirin again.      Cinthia Muse              "

## 2025-02-14 ENCOUNTER — HOME INFUSION BILLING (OUTPATIENT)
Dept: HOME HEALTH SERVICES | Facility: HOME HEALTH | Age: 65
End: 2025-02-14
Payer: COMMERCIAL

## 2025-02-14 PROCEDURE — S9341 HIT ENTERAL GRAV DIEM: HCPCS

## 2025-02-14 PROCEDURE — B4152 EF CALORIE DENSE>/=1.5KCAL: HCPCS

## 2025-02-15 PROCEDURE — S9341 HIT ENTERAL GRAV DIEM: HCPCS

## 2025-02-16 PROCEDURE — S9341 HIT ENTERAL GRAV DIEM: HCPCS

## 2025-02-17 PROCEDURE — S9341 HIT ENTERAL GRAV DIEM: HCPCS

## 2025-02-17 NOTE — TELEPHONE ENCOUNTER
"  FUTURE VISIT INFORMATION      FUTURE VISIT INFORMATION:  Date: 5/29/25  Time: 1:10 PM  Location: [facility]   REFERRAL INFORMATION:  Referring provider:    Referring providers clinic:  Nishi Michele CNP  Reason for visit/diagnosis:  DX H61.22 (ICD-10-CM) - Impacted cerumen of left ear  REF'D by Nishi Michele CNP  SCHEDULED W/ son Tulio   [facility] verified  HMONG  NEEDED     RECORDS REQUESTED FROM      Clinic name Comments Records Status Imaging Status   [facility] 2/13/25 OV- Nishi Michele CNP [facility] 12/18/23 OV- Ida araujo AuD   12/18/23 audiogram [facility] 11/7/24 OV- Damon Regan MD  [facility] 3/6/25 CT neck  1/20/25 CT neck  10/29/24 MR sinonasal  *more images in PACS Epic PACS           \"Please notify/message CSS if patient completed outside imaging prior to scheduled appointment and/or any outside records that might have been missed at pre visit -Thanks\"  "

## 2025-02-18 PROCEDURE — S9341 HIT ENTERAL GRAV DIEM: HCPCS

## 2025-02-19 PROCEDURE — S9341 HIT ENTERAL GRAV DIEM: HCPCS

## 2025-02-20 PROCEDURE — S9341 HIT ENTERAL GRAV DIEM: HCPCS

## 2025-02-21 PROCEDURE — S9341 HIT ENTERAL GRAV DIEM: HCPCS

## 2025-02-22 PROCEDURE — S9341 HIT ENTERAL GRAV DIEM: HCPCS

## 2025-02-23 PROCEDURE — S9341 HIT ENTERAL GRAV DIEM: HCPCS

## 2025-02-24 PROCEDURE — S9341 HIT ENTERAL GRAV DIEM: HCPCS

## 2025-02-25 PROCEDURE — S9341 HIT ENTERAL GRAV DIEM: HCPCS

## 2025-02-26 PROCEDURE — S9341 HIT ENTERAL GRAV DIEM: HCPCS

## 2025-02-27 PROCEDURE — S9341 HIT ENTERAL GRAV DIEM: HCPCS

## 2025-02-28 PROCEDURE — S9341 HIT ENTERAL GRAV DIEM: HCPCS

## 2025-03-01 PROCEDURE — S9341 HIT ENTERAL GRAV DIEM: HCPCS

## 2025-03-02 PROCEDURE — S9341 HIT ENTERAL GRAV DIEM: HCPCS

## 2025-03-03 PROCEDURE — S9341 HIT ENTERAL GRAV DIEM: HCPCS

## 2025-03-04 PROCEDURE — S9341 HIT ENTERAL GRAV DIEM: HCPCS

## 2025-03-04 NOTE — PROGRESS NOTES
[facility]  [address] [address]  Phone: [phone]  Fax: [phone]    PATIENT NAME: Douglas Herrera  MRN # [id number]   DATE OF VISIT: March 6, 2025  YOB: 1960     [facility]   Radiation Oncology: Dr. Tomasa Akers  Cardiology: Dr. Qamar Hernandez  Hepatologist: [facility]      CANCER TYPE: SCC sinonasal   STAGE: [id number] (IVB)  ECOG PS: 1     PD-L1: TPS 60-70%, CPS >70% on [id number]  NGS: internal panel. Fusion negative. ARID1A S90fs, EGFR exon 20 insertion, [id number], APC [id number], TMB 7.313    ASSESSMENT AND PLAN  SCC L maxillary sinus, [id number] (IVB), ARID1 mutation,  EGFR exon 20 insertion: Now s/p 4 cycles of carbo paclitaxel pembrolizumab and on maitenance pembro monotherapy. Labs stable. Anemia improving as we move from chemo. TSH up slightly.    -Proceed with pembrolizumab today   - CT neck and chest/abdomen today following infusion   - RTC with Dr. Post 3/14 as scheduled for imaging review   - Tentative plan for amivantamab second line if approved     Pulm nodules: a few new nodules on last CT. Monitor with repeat scan. Cannot exclude metastatic disease    Trismus: Continue PT, stretches. SLP follow-up in 2 weeks.      Hypothyroidism: TSH up slightly. T4 pending. No levothyroxine changes but monitor trend closely.    Nutrition: TF twice daily, eating one meal a day, usually rice/some meat, etc. Gtube exchanged 11/22/24      Hearing loss: Unchanged - still pretty significant on L. Cermumen present. ENT appt in a few months to watch cerumen.  Audiogram in future.     Hepatitis B: HBSAby negative, SAg +, cAby +, HBV PCR negative 1/23/24. HCV negative.     H/o PE/DVT: 5/8/2019 CTA report scanned into [employer]. Small PEs, LLE DVT, acute, occlusive. Was on rivaroxaban, completed course.      ASCVD: Asa 81 mg daily, metoprolol per PCP       Nishi Kruell, CNP  ---  33 minutes spent on the date of the  encounter doing chart review, review of test results, interpretation of tests, patient visit, and documentation.    The longitudinal plan of care for the diagnosis(es)/condition(s) as documented were addressed during this visit. Due to the added complexity in care, I will continue to support Douglas in the subsequent management and with ongoing continuity of care.    Professional ipad  used throughout the visit      SUBJECTIVE  Douglas is seen today prior to maintenance pembrolizumab infusion.  -No acute issues following last infusion  -Denies increased or limiting fatigue  -Denies diarrhea  -Nutrition stable-able to eat soft foods and rice in addition to using feeding tube  -Trismus progressively worsening. Now unable to easily get toothbrush in mouth. No sudden change since last infusion but more of a slow, progressive change. Still doing stretching exercises throughout the day  -Takes Tylenol at night. Sleeping well but wakes up to clear mucous from throat  -Nose dry. Intermittent tearing from left eye. Left ear fullness and subjective hearing loss    CANCER SUMMARY  8/9/23                CT sinus.   8/24/23  MRI sinus. L maxillary sinus mass  9/12/23              ED for epistaxis.   9/13/23              CTA and embolization of L IMAX   10/4/23  Nasal bx. Path: Inverted papilloma with high grade dysplasia  11/10/23 Nasal endoscopy and bx in the OR. C/b severe bleeding. Path: Inverted sinonasal papilloma with severe dysplasia.  11/17/23            MRI. 7.4 x 4.6 x 6.6 cm L sinonasal mass, destruction of nasal turbinates, L maxillary sinus, hard palate, invasion of L  space, involvement of medial and lateral pterygoids and temporalis muscles. Effacement and invasion ipsilateral pterygopalatine fossa, extends and effaces the nasopharynx.   11/30/23            Carotid angiogram. Direct endonasal and transoral embolization L sinonasal tumor, transaterial embolization of the L sphenopalatine artery  feeders to the L sinonasal artery tumor   12/1/23  Stealth assisted transnasal endoscopic approach to excise L sinonasal mass. Pre-operative embolization. Path: SCC involving L maxilla.    12/9/23  PET. Hypermetabolic mass centered along the L maxilla, extending superiorly through the floor of the L maxillary sinus, posterior/laterally to the  space, invasion of the pterygoid muscles, extending cranially along the pterygopalatine fossa and pterygoid plates to the skull base level. Erosion of involved bones including L maxilla, maxillary sinus wall and pterygoid plates. Extension medially to the L lateral nasopharyngeal wall and soft palate. 1 mm fat place between carotid and mass. ~4.9 x 4.0 x 5.4 cm (SUV 24.3). Partially resected since 11/17/23 MRI. 8 mm L 2A node (SUV 5.9), 7 mm L level 2 node (SUV 5.5)  1/2/24  Gtube (IR)  1/4/24  Video swallow. No aspiration  1/8~2/27/24 Chemoradiation with weekly cisplatin.  1/11/24  Port (IR)  8/5/24  PET/CT.  Overall increased FDG uptake centered along the posterior inferior left maxillary sinus involving the  space, left pterygopalatine fossa, left parapharyngeal space, extending inferiorly to left maxilla.  Increased soft tissue component within the space, now broad FDG uptake (SUV 24.97), previously 5.75.  Area measures 3.3 x 2.6 cm. Increased focal hypermetabolic activity posterior pharyngeal wall, extending to the left palatine tonsil, associated mucosal thickening (SUV 17.23).  Non-FDG avid mass, 2.7 x 2.4 cm, previously 2.1 x 2.5 cm, another medially, 1.1 x 1.5 cm, previously 1.1 x 1 cm.  New focal area of FDG uptake right lateral oropharyngeal wall (SUV 9.07).  Few hypermetabolic right cervical nodes, none definitively enlarged, however increased activity compared to prior (SUV 7.29-8.36).  Several sub-6 mm pulmonary nodules.  8/5/24  MRI.  Heterogeneous mass along the base of the left maxillary sinus extending to the maxilla,  space,  left pterygopalatine fossa, and parapharyngeal space.  Some areas of treated but some corresponding to FDG avidity on same-day PET, highly suspicious for tumor recurrence.  Question early left V2 involvement, thin dural enhancement along the base of the left temporal lobe     9/20/24  L maxillary, L superior alveolus bx in clinic Path: Fragments of at least SCC in situ.   10/11/24 Transnasal bx L maxillary sinus (Dr. Iglesias). 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  Multiloculated mass at base of L maxilla extending to involve L side of palate, retromaxillary region, extending into the  space, premaxillary region.  Similar to prior MRI 8/5/2024.  Continuous slight abnormal asymmetric thickening L V3 and V2.  Asymmetric thin dural enhancement along the base of the left temporal lobe.   10/29/2024 CT chest.  Scattered <6 mm lung nodules grossly stable compared to 8/5/2024.  Partially visualized common bile duct mildly dilated, 8 mm, possibly related to prior cholecystectomy, recommend MRCP.   1/20/25  CT neck and CAP.  Overall similar multiloculated L maxillary mass extending into the  and parapharyngeal spaces compared to PET/CT 8/5/2024.  No cervical adenopathy.  Stable small 2-5 mm lung nodules.  Increased RUL nodule, 1 mm --> 4 mm (5:139).  New 5 mm LLL nodule.  New somewhat linear 4 mm lingular nodule.  Attention on follow-up  11/20/24~current  Carboplatin + paclitaxel + pembrolizumab.     PAST MEDICAL HISTORY  Sinonasal carcinoma as above  HTN  ASCVD. CABG x 3 2019  PE and LLE DVT after CABG . Treated with rivaroxaban  Dyslipidemia  Hepatitis B   SCC R temple s/p MOHS  Ascending aortic aneurysm repair 2019  Hearing loss L   Gout - feet  Depression  Cholecystectomy    CURRENT OUTPATIENT MEDICATIONS  Reviewed    ALLERGIES  No Known Allergies     PHYSICAL EXAM  /78   Pulse 80   Temp 97.9  F (36.6  C) (Oral)   Resp 16   Wt 61.1 kg (134 lb 9.6 oz)   SpO2 99%   BMI  24.15 kg/m      GEN: NAD  HEENT: EOMI, no icterus, injection or pallor. Oropharynx is clear, trismus limiting exam. Impacted cerumen to left ear  RESP: CTA bilaterally  CV: rrr, no murmur  EXT: no edema  NEURO: alert    LABORATORY AND IMAGING STUDIES   Most Recent 3 CBC's:  Recent Labs   Lab Test 03/06/25  0737 02/13/25  1419 01/24/25  0919   WBC 5.6 4.7 3.9*   HGB 9.5* 8.5* 9.2*   MCV 94 91 90    165 157   ANEUTAUTO 3.3 2.2 2.1    Most Recent 3 BMP's:  Recent Labs   Lab Test 03/06/25  0737 02/13/25  1419 01/24/25  0919    139 137   POTASSIUM 4.2 4.2 3.9   CHLORIDE 100 103 100   CO2 25 28 26   BUN 20.5 21.0 21.2   CR 0.91 0.81 0.92   ANIONGAP 12 8 11   FLORENCE 9.8 9.5 9.8   * 97 140*   PROTTOTAL 8.4* 8.0 8.1   ALBUMIN 4.1 3.9 4.0    Most Recent 2 LFT's:  Recent Labs   Lab Test 03/06/25  0737 02/13/25  1419   AST 27 30   ALT 14 21   ALKPHOS 73 71   BILITOTAL 0.6 0.5    Most Recent TSH and T4:  Recent Labs   Lab Test 03/06/25  0737 02/13/25  1419 01/24/25  0919   TSH 6.32*   < > 6.42*   T4  --   --  0.98    < > = values in this interval not displayed.     Phos/Mag:  Lab Results   Component Value Date    MAG 1.9 08/05/2024    MAG 1.8 06/28/2024    MAG 1.7 05/23/2024       I reviewed the above labs today.

## 2025-03-05 PROCEDURE — S9341 HIT ENTERAL GRAV DIEM: HCPCS

## 2025-03-06 ENCOUNTER — INFUSION THERAPY VISIT (OUTPATIENT)
Dept: ONCOLOGY | Facility: CLINIC | Age: 65
End: 2025-03-06
Attending: INTERNAL MEDICINE
Payer: COMMERCIAL

## 2025-03-06 ENCOUNTER — ONCOLOGY VISIT (OUTPATIENT)
Dept: ONCOLOGY | Facility: CLINIC | Age: 65
End: 2025-03-06
Attending: REGISTERED NURSE
Payer: COMMERCIAL

## 2025-03-06 ENCOUNTER — APPOINTMENT (OUTPATIENT)
Dept: LAB | Facility: CLINIC | Age: 65
End: 2025-03-06
Attending: INTERNAL MEDICINE
Payer: COMMERCIAL

## 2025-03-06 VITALS
DIASTOLIC BLOOD PRESSURE: 78 MMHG | HEART RATE: 80 BPM | TEMPERATURE: 97.9 F | WEIGHT: 134.6 LBS | RESPIRATION RATE: 16 BRPM | BODY MASS INDEX: 24.15 KG/M2 | OXYGEN SATURATION: 99 % | SYSTOLIC BLOOD PRESSURE: 126 MMHG

## 2025-03-06 DIAGNOSIS — R13.10 DYSPHAGIA, UNSPECIFIED TYPE: ICD-10-CM

## 2025-03-06 DIAGNOSIS — C31.0 SQUAMOUS CELL CARCINOMA OF MAXILLARY SINUS (H): Primary | ICD-10-CM

## 2025-03-06 DIAGNOSIS — H61.22 IMPACTED CERUMEN OF LEFT EAR: ICD-10-CM

## 2025-03-06 DIAGNOSIS — R25.2 TRISMUS: ICD-10-CM

## 2025-03-06 DIAGNOSIS — E03.9 ACQUIRED HYPOTHYROIDISM: ICD-10-CM

## 2025-03-06 LAB
ALBUMIN SERPL BCG-MCNC: 4.1 G/DL (ref 3.5–5.2)
ALP SERPL-CCNC: 73 U/L (ref 40–150)
ALT SERPL W P-5'-P-CCNC: 14 U/L (ref 0–70)
ANION GAP SERPL CALCULATED.3IONS-SCNC: 12 MMOL/L (ref 7–15)
AST SERPL W P-5'-P-CCNC: 27 U/L (ref 0–45)
BASOPHILS # BLD AUTO: 0 10E3/UL (ref 0–0.2)
BASOPHILS NFR BLD AUTO: 0 %
BILIRUB SERPL-MCNC: 0.6 MG/DL
BUN SERPL-MCNC: 20.5 MG/DL (ref 8–23)
CALCIUM SERPL-MCNC: 9.8 MG/DL (ref 8.8–10.4)
CHLORIDE SERPL-SCNC: 100 MMOL/L (ref 98–107)
CREAT SERPL-MCNC: 0.91 MG/DL (ref 0.67–1.17)
EGFRCR SERPLBLD CKD-EPI 2021: >90 ML/MIN/1.73M2
EOSINOPHIL # BLD AUTO: 0.1 10E3/UL (ref 0–0.7)
EOSINOPHIL NFR BLD AUTO: 1 %
ERYTHROCYTE [DISTWIDTH] IN BLOOD BY AUTOMATED COUNT: 17.8 % (ref 10–15)
GLUCOSE SERPL-MCNC: 105 MG/DL (ref 70–99)
HCO3 SERPL-SCNC: 25 MMOL/L (ref 22–29)
HCT VFR BLD AUTO: 29.1 % (ref 40–53)
HGB BLD-MCNC: 9.5 G/DL (ref 13.3–17.7)
IMM GRANULOCYTES # BLD: 0 10E3/UL
IMM GRANULOCYTES NFR BLD: 0 %
LYMPHOCYTES # BLD AUTO: 1.6 10E3/UL (ref 0.8–5.3)
LYMPHOCYTES NFR BLD AUTO: 28 %
MCH RBC QN AUTO: 30.5 PG (ref 26.5–33)
MCHC RBC AUTO-ENTMCNC: 32.6 G/DL (ref 31.5–36.5)
MCV RBC AUTO: 94 FL (ref 78–100)
MONOCYTES # BLD AUTO: 0.7 10E3/UL (ref 0–1.3)
MONOCYTES NFR BLD AUTO: 12 %
NEUTROPHILS # BLD AUTO: 3.3 10E3/UL (ref 1.6–8.3)
NEUTROPHILS NFR BLD AUTO: 59 %
NRBC # BLD AUTO: 0 10E3/UL
NRBC BLD AUTO-RTO: 0 /100
PLATELET # BLD AUTO: 191 10E3/UL (ref 150–450)
POTASSIUM SERPL-SCNC: 4.2 MMOL/L (ref 3.4–5.3)
PROT SERPL-MCNC: 8.4 G/DL (ref 6.4–8.3)
RBC # BLD AUTO: 3.11 10E6/UL (ref 4.4–5.9)
SODIUM SERPL-SCNC: 137 MMOL/L (ref 135–145)
T4 FREE SERPL-MCNC: 0.92 NG/DL (ref 0.9–1.7)
TSH SERPL DL<=0.005 MIU/L-ACNC: 6.32 UIU/ML (ref 0.3–4.2)
WBC # BLD AUTO: 5.6 10E3/UL (ref 4–11)

## 2025-03-06 PROCEDURE — 85048 AUTOMATED LEUKOCYTE COUNT: CPT

## 2025-03-06 PROCEDURE — G0463 HOSPITAL OUTPT CLINIC VISIT: HCPCS | Performed by: REGISTERED NURSE

## 2025-03-06 PROCEDURE — 36591 DRAW BLOOD OFF VENOUS DEVICE: CPT | Performed by: REGISTERED NURSE

## 2025-03-06 PROCEDURE — 250N000011 HC RX IP 250 OP 636: Mod: JZ | Performed by: REGISTERED NURSE

## 2025-03-06 PROCEDURE — 84443 ASSAY THYROID STIM HORMONE: CPT | Performed by: REGISTERED NURSE

## 2025-03-06 PROCEDURE — 250N000011 HC RX IP 250 OP 636: Performed by: REGISTERED NURSE

## 2025-03-06 PROCEDURE — S9341 HIT ENTERAL GRAV DIEM: HCPCS

## 2025-03-06 PROCEDURE — 258N000003 HC RX IP 258 OP 636: Performed by: REGISTERED NURSE

## 2025-03-06 PROCEDURE — 84439 ASSAY OF FREE THYROXINE: CPT | Performed by: REGISTERED NURSE

## 2025-03-06 PROCEDURE — 82040 ASSAY OF SERUM ALBUMIN: CPT | Performed by: REGISTERED NURSE

## 2025-03-06 PROCEDURE — 85004 AUTOMATED DIFF WBC COUNT: CPT

## 2025-03-06 RX ORDER — EPINEPHRINE 1 MG/ML
0.3 INJECTION, SOLUTION INTRAMUSCULAR; SUBCUTANEOUS EVERY 5 MIN PRN
Status: CANCELLED | OUTPATIENT
Start: 2025-03-07

## 2025-03-06 RX ORDER — ALBUTEROL SULFATE 90 UG/1
1-2 INHALANT RESPIRATORY (INHALATION)
Status: CANCELLED
Start: 2025-03-07

## 2025-03-06 RX ORDER — HEPARIN SODIUM,PORCINE 10 UNIT/ML
5-20 VIAL (ML) INTRAVENOUS DAILY PRN
Status: CANCELLED | OUTPATIENT
Start: 2025-03-07

## 2025-03-06 RX ORDER — MEPERIDINE HYDROCHLORIDE 25 MG/ML
25 INJECTION INTRAMUSCULAR; INTRAVENOUS; SUBCUTANEOUS
Status: CANCELLED | OUTPATIENT
Start: 2025-03-07

## 2025-03-06 RX ORDER — ALBUTEROL SULFATE 0.83 MG/ML
2.5 SOLUTION RESPIRATORY (INHALATION)
Status: CANCELLED | OUTPATIENT
Start: 2025-03-07

## 2025-03-06 RX ORDER — METHYLPREDNISOLONE SODIUM SUCCINATE 40 MG/ML
40 INJECTION INTRAMUSCULAR; INTRAVENOUS
Status: CANCELLED
Start: 2025-03-07

## 2025-03-06 RX ORDER — DIPHENHYDRAMINE HYDROCHLORIDE 50 MG/ML
25 INJECTION, SOLUTION INTRAMUSCULAR; INTRAVENOUS
Status: CANCELLED
Start: 2025-03-07

## 2025-03-06 RX ORDER — DIPHENHYDRAMINE HYDROCHLORIDE 50 MG/ML
50 INJECTION, SOLUTION INTRAMUSCULAR; INTRAVENOUS
Status: CANCELLED
Start: 2025-03-07

## 2025-03-06 RX ORDER — HEPARIN SODIUM (PORCINE) LOCK FLUSH IV SOLN 100 UNIT/ML 100 UNIT/ML
5 SOLUTION INTRAVENOUS ONCE
Status: COMPLETED | OUTPATIENT
Start: 2025-03-06 | End: 2025-03-06

## 2025-03-06 RX ORDER — HEPARIN SODIUM (PORCINE) LOCK FLUSH IV SOLN 100 UNIT/ML 100 UNIT/ML
5 SOLUTION INTRAVENOUS
Status: DISCONTINUED | OUTPATIENT
Start: 2025-03-06 | End: 2025-03-06 | Stop reason: HOSPADM

## 2025-03-06 RX ORDER — LORAZEPAM 2 MG/ML
0.5 INJECTION INTRAMUSCULAR EVERY 4 HOURS PRN
Status: CANCELLED | OUTPATIENT
Start: 2025-03-07

## 2025-03-06 RX ORDER — HEPARIN SODIUM (PORCINE) LOCK FLUSH IV SOLN 100 UNIT/ML 100 UNIT/ML
5 SOLUTION INTRAVENOUS
Status: CANCELLED | OUTPATIENT
Start: 2025-03-07

## 2025-03-06 RX ADMIN — HEPARIN 5 ML: 100 SYRINGE at 07:37

## 2025-03-06 RX ADMIN — HEPARIN 5 ML: 100 SYRINGE at 09:25

## 2025-03-06 RX ADMIN — SODIUM CHLORIDE 200 MG: 9 INJECTION, SOLUTION INTRAVENOUS at 08:48

## 2025-03-06 RX ADMIN — SODIUM CHLORIDE 250 ML: 9 INJECTION, SOLUTION INTRAVENOUS at 08:48

## 2025-03-06 ASSESSMENT — PAIN SCALES - GENERAL: PAINLEVEL_OUTOF10: MILD PAIN (2)

## 2025-03-06 NOTE — Clinical Note
3/6/2025      Douglas Herrera  [address] [place] [address]      Dear Colleague,    Thank you for referring your patient, Douglas Herrera, to the [facility]. Please see a copy of my visit note below.        [facility]  [address] [address]  Phone: [phone]  Fax: [phone]    PATIENT NAME: Douglas Herrera  MRN # [id number]   DATE OF VISIT: March 6, 2025  YOB: 1960     [facility]   Radiation Oncology: Dr. Tomasa Akers  Cardiology: Dr. Qamar Hernandez  Hepatologist: [facility]      CANCER TYPE: SCC sinonasal   STAGE: pQ1oQ3vS5 (IVB)  ECOG PS: 1     PD-L1: TPS 60-70%, CPS >70% on [id number]  NGS: internal panel. Fusion negative. ARID1A S90fs, EGFR exon 20 insertion, [id number], APC [id number], TMB 7.313    ASSESSMENT AND PLAN  SCC L maxillary sinus, [id number] (IVB), ARID1 mutation,  EGFR exon 20 insertion: Now s/p 4 cycles of carbo paclitaxel pembrolizumab and on maitenance pembro monotherapy. Labs stable. Anemia improving as we move from chemo. TSH up slightly.    -Proceed with pembrolizumab today   - CT neck and chest/abdomen today following infusion   - RTC with Dr. Post 3/14 as scheduled for imaging review   - Tentative plan for amivantamab second line if approved     Pulm nodules: a few new nodules on last CT. Monitor with repeat scan. Cannot exclude metastatic disease    Trismus: Continue PT, stretches. SLP follow-up in 2 weeks.      Hypothyroidism: TSH up slightly. T4 pending. No levothyroxine changes but monitor trend closely.    Nutrition: TF twice daily, eating one meal a day, usually rice/some meat, etc. Gtube exchanged 11/22/24      Hearing loss: Unchanged - still pretty significant on L. Cermumen present. ENT appt in a few months to watch cerumen.  Audiogram in future.     Hepatitis B: HBSAby negative, SAg +, cAby +, HBV PCR negative 1/23/24. HCV negative.     H/o PE/DVT:  5/8/2019 CTA report scanned into [employer]. Small PEs, LLE DVT, acute, occlusive. Was on rivaroxaban, completed course.      ASCVD: Asa 81 mg daily, metoprolol per PCP       Nishi Michele CNP  ---  33 minutes spent on the date of the encounter doing chart review, review of test results, interpretation of tests, patient visit, and documentation.    The longitudinal plan of care for the diagnosis(es)/condition(s) as documented were addressed during this visit. Due to the added complexity in care, I will continue to support Douglas in the subsequent management and with ongoing continuity of care.    Professional ipad  used throughout the visit      SUBJECTIVE  Douglas is seen today prior to maintenance pembrolizumab infusion.  -No acute issues following last infusion  -Denies increased or limiting fatigue  -Denies diarrhea  -Nutrition stable-able to eat soft foods and rice in addition to using feeding tube  -Trismus progressively worsening. Now unable to easily get toothbrush in mouth. No sudden change since last infusion but more of a slow, progressive change. Still doing stretching exercises throughout the day  -Takes Tylenol at night. Sleeping well but wakes up to clear mucous from throat  -Nose dry. Intermittent tearing from left eye. Left ear fullness and subjective hearing loss    CANCER SUMMARY  8/9/23                CT sinus.   8/24/23  MRI sinus. L maxillary sinus mass  9/12/23              ED for epistaxis.   9/13/23              CTA and embolization of L IMAX   10/4/23  Nasal bx. Path: Inverted papilloma with high grade dysplasia  11/10/23 Nasal endoscopy and bx in the OR. C/b severe bleeding. Path: Inverted sinonasal papilloma with severe dysplasia.  11/17/23            MRI. 7.4 x 4.6 x 6.6 cm L sinonasal mass, destruction of nasal turbinates, L maxillary sinus, hard palate, invasion of L  space, involvement of medial and lateral pterygoids and temporalis muscles. Effacement and invasion  ipsilateral pterygopalatine fossa, extends and effaces the nasopharynx.   11/30/23            Carotid angiogram. Direct endonasal and transoral embolization L sinonasal tumor, transaterial embolization of the L sphenopalatine artery feeders to the L sinonasal artery tumor   12/1/23  Stealth assisted transnasal endoscopic approach to excise L sinonasal mass. Pre-operative embolization. Path: SCC involving L maxilla.    12/9/23  PET. Hypermetabolic mass centered along the L maxilla, extending superiorly through the floor of the L maxillary sinus, posterior/laterally to the  space, invasion of the pterygoid muscles, extending cranially along the pterygopalatine fossa and pterygoid plates to the skull base level. Erosion of involved bones including L maxilla, maxillary sinus wall and pterygoid plates. Extension medially to the L lateral nasopharyngeal wall and soft palate. 1 mm fat place between carotid and mass. ~4.9 x 4.0 x 5.4 cm (SUV 24.3). Partially resected since 11/17/23 MRI. 8 mm L 2A node (SUV 5.9), 7 mm L level 2 node (SUV 5.5)  1/2/24  Gtube (IR)  1/4/24  Video swallow. No aspiration  1/8~2/27/24 Chemoradiation with weekly cisplatin.  1/11/24  Port (IR)  8/5/24  PET/CT.  Overall increased FDG uptake centered along the posterior inferior left maxillary sinus involving the  space, left pterygopalatine fossa, left parapharyngeal space, extending inferiorly to left maxilla.  Increased soft tissue component within the space, now broad FDG uptake (SUV 24.97), previously 5.75.  Area measures 3.3 x 2.6 cm. Increased focal hypermetabolic activity posterior pharyngeal wall, extending to the left palatine tonsil, associated mucosal thickening (SUV 17.23).  Non-FDG avid mass, 2.7 x 2.4 cm, previously 2.1 x 2.5 cm, another medially, 1.1 x 1.5 cm, previously 1.1 x 1 cm.  New focal area of FDG uptake right lateral oropharyngeal wall (SUV 9.07).  Few hypermetabolic right cervical nodes, none definitively  enlarged, however increased activity compared to prior (SUV 7.29-8.36).  Several sub-6 mm pulmonary nodules.  8/5/24  MRI.  Heterogeneous mass along the base of the left maxillary sinus extending to the maxilla,  space, left pterygopalatine fossa, and parapharyngeal space.  Some areas of treated but some corresponding to FDG avidity on same-day PET, highly suspicious for tumor recurrence.  Question early left V2 involvement, thin dural enhancement along the base of the left temporal lobe     9/20/24  L maxillary, L superior alveolus bx in clinic Path: Fragments of at least SCC in situ.   10/11/24 Transnasal bx L maxillary sinus (Dr. Iglesias). 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  Multiloculated mass at base of L maxilla extending to involve L side of palate, retromaxillary region, extending into the  space, premaxillary region.  Similar to prior MRI 8/5/2024.  Continuous slight abnormal asymmetric thickening L V3 and V2.  Asymmetric thin dural enhancement along the base of the left temporal lobe.   10/29/2024 CT chest.  Scattered <6 mm lung nodules grossly stable compared to 8/5/2024.  Partially visualized common bile duct mildly dilated, 8 mm, possibly related to prior cholecystectomy, recommend MRCP.   1/20/25  CT neck and CAP.  Overall similar multiloculated L maxillary mass extending into the  and parapharyngeal spaces compared to PET/CT 8/5/2024.  No cervical adenopathy.  Stable small 2-5 mm lung nodules.  Increased RUL nodule, 1 mm --> 4 mm (5:139).  New 5 mm LLL nodule.  New somewhat linear 4 mm lingular nodule.  Attention on follow-up  11/20/24~current  Carboplatin + paclitaxel + pembrolizumab.     PAST MEDICAL HISTORY  Sinonasal carcinoma as above  HTN  ASCVD. CABG x 3 2019  PE and LLE DVT after CABG . Treated with rivaroxaban  Dyslipidemia  Hepatitis B   SCC R temple s/p MOHS  Ascending aortic aneurysm repair 2019  Hearing loss L   Gout -  feet  Depression  Cholecystectomy    CURRENT OUTPATIENT MEDICATIONS  Reviewed    ALLERGIES  No Known Allergies     PHYSICAL EXAM  /78   Pulse 80   Temp 97.9  F (36.6  C) (Oral)   Resp 16   Wt 61.1 kg (134 lb 9.6 oz)   SpO2 99%   BMI 24.15 kg/m      GEN: NAD  HEENT: EOMI, no icterus, injection or pallor. Oropharynx is clear, trismus limiting exam. Impacted cerumen to left ear  RESP: CTA bilaterally  CV: rrr, no murmur  EXT: no edema  NEURO: alert    LABORATORY AND IMAGING STUDIES   Most Recent 3 CBC's:  Recent Labs   Lab Test 03/06/25  0737 02/13/25  1419 01/24/25  0919   WBC 5.6 4.7 3.9*   HGB 9.5* 8.5* 9.2*   MCV 94 91 90    165 157   ANEUTAUTO 3.3 2.2 2.1    Most Recent 3 BMP's:  Recent Labs   Lab Test 03/06/25  0737 02/13/25  1419 01/24/25  0919    139 137   POTASSIUM 4.2 4.2 3.9   CHLORIDE 100 103 100   CO2 25 28 26   BUN 20.5 21.0 21.2   CR 0.91 0.81 0.92   ANIONGAP 12 8 11   FLORENCE 9.8 9.5 9.8   * 97 140*   PROTTOTAL 8.4* 8.0 8.1   ALBUMIN 4.1 3.9 4.0    Most Recent 2 LFT's:  Recent Labs   Lab Test 03/06/25  0737 02/13/25  1419   AST 27 30   ALT 14 21   ALKPHOS 73 71   BILITOTAL 0.6 0.5    Most Recent TSH and T4:  Recent Labs   Lab Test 03/06/25  0737 02/13/25  1419 01/24/25  0919   TSH 6.32*   < > 6.42*   T4  --   --  0.98    < > = values in this interval not displayed.     Phos/Mag:  Lab Results   Component Value Date    MAG 1.9 08/05/2024    MAG 1.8 06/28/2024    MAG 1.7 05/23/2024       I reviewed the above labs today.      Again, thank you for allowing me to participate in the care of your patient.        Sincerely,        Nishi Michele, CNP    Electronically signed

## 2025-03-06 NOTE — PROGRESS NOTES
Infusion Nursing Note:  Douglas Herrera presents today for Cycle 6 Day 1 Pembrolizumab.    Patient spoke with provider today: Yes: Nishi Michele NP    Treatment Conditions:  Lab Results   Component Value Date    HGB 9.5 (L) 03/06/2025    WBC 5.6 03/06/2025    ANEUTAUTO 3.3 03/06/2025     03/06/2025        Lab Results   Component Value Date     03/06/2025    POTASSIUM 4.2 03/06/2025    MAG 1.9 08/05/2024    CR 0.91 03/06/2025    FLORENCE 9.8 03/06/2025    BILITOTAL 0.6 03/06/2025    ALBUMIN 4.1 03/06/2025    ALT 14 03/06/2025    AST 27 03/06/2025       Results reviewed, labs MET treatment parameters, ok to proceed with treatment.      Note: No concerns during infusion visit. Declined  (Xiomara), son accompanied pt.    Intravenous Access:  Implanted Port, left accessed for CT at hospital after infusion.     Post Infusion Assessment:  Patient tolerated infusion without incident.  Blood return noted pre and post infusion.      Discharge Plan:   Patient declined prescription refills.  Discharge instructions reviewed with: Patient.  Patient and/or family verbalized understanding of discharge instructions and all questions answered.  AVS to patient via [employer].  Patient will return 3/27/25 for next appointment.   Patient discharged in stable condition accompanied by: son.  Departure Mode: Ambulatory.      Nikki Sanabria RN

## 2025-03-06 NOTE — NURSING NOTE
Chief Complaint   Patient presents with    Blood Draw     Port blood draw by lab RN       Port accessed with blood draw and heparin flush by lab RN. Vitals taken and next appointment arrived.    Nishi Belcher RN

## 2025-03-06 NOTE — NURSING NOTE
"Oncology Rooming Note    March 6, 2025 8:04 AM   Douglas Herrera is a 64 year old male who presents for:    Chief Complaint   Patient presents with    Blood Draw     Port blood draw by lab RN    Oncology Clinic Visit     Squamous Cell Carcinoma of Maxillary Sinus     Initial Vitals: /78   Pulse 80   Temp 97.9  F (36.6  C) (Oral)   Resp 16   Wt 61.1 kg (134 lb 9.6 oz)   SpO2 99%   BMI 24.15 kg/m   Estimated body mass index is 24.15 kg/m  as calculated from the following:    Height as of 1/6/25: 1.59 m (5' 2.6\").    Weight as of this encounter: 61.1 kg (134 lb 9.6 oz). Body surface area is 1.64 meters squared.  Mild Pain (2) Comment: left side of face from radiation   No LMP for male patient.  Allergies reviewed: Yes  Medications reviewed: Yes    Medications: Medication refills not needed today.  Pharmacy name entered into [facility]: [facility] - [place], [place] - [facility] [address]    Frailty Screening:   Is the patient here for a new oncology consult visit in cancer care? 2. No    PHQ9:  Did this patient require a PHQ9?: No      Clinical concerns: None       Shani Carrion LPN  3/6/2025              "

## 2025-03-07 PROCEDURE — S9341 HIT ENTERAL GRAV DIEM: HCPCS

## 2025-03-08 PROCEDURE — S9341 HIT ENTERAL GRAV DIEM: HCPCS

## 2025-03-09 PROCEDURE — S9341 HIT ENTERAL GRAV DIEM: HCPCS

## 2025-03-10 PROCEDURE — S9341 HIT ENTERAL GRAV DIEM: HCPCS

## 2025-03-11 PROCEDURE — S9341 HIT ENTERAL GRAV DIEM: HCPCS

## 2025-03-12 ENCOUNTER — HOME INFUSION (OUTPATIENT)
Dept: HOME HEALTH SERVICES | Facility: HOME HEALTH | Age: 65
End: 2025-03-12
Payer: COMMERCIAL

## 2025-03-12 PROCEDURE — S9341 HIT ENTERAL GRAV DIEM: HCPCS

## 2025-03-13 PROCEDURE — S9341 HIT ENTERAL GRAV DIEM: HCPCS

## 2025-03-14 ENCOUNTER — ONCOLOGY VISIT (OUTPATIENT)
Dept: ONCOLOGY | Facility: CLINIC | Age: 65
End: 2025-03-14
Attending: INTERNAL MEDICINE
Payer: COMMERCIAL

## 2025-03-14 ENCOUNTER — HOME INFUSION BILLING (OUTPATIENT)
Dept: HOME HEALTH SERVICES | Facility: HOME HEALTH | Age: 65
End: 2025-03-14
Payer: COMMERCIAL

## 2025-03-14 VITALS
DIASTOLIC BLOOD PRESSURE: 72 MMHG | BODY MASS INDEX: 24.42 KG/M2 | HEART RATE: 85 BPM | TEMPERATURE: 99 F | WEIGHT: 136.1 LBS | OXYGEN SATURATION: 98 % | SYSTOLIC BLOOD PRESSURE: 123 MMHG | RESPIRATION RATE: 20 BRPM

## 2025-03-14 DIAGNOSIS — R53.83 OTHER FATIGUE: Primary | ICD-10-CM

## 2025-03-14 PROCEDURE — S9341 HIT ENTERAL GRAV DIEM: HCPCS

## 2025-03-14 PROCEDURE — G0463 HOSPITAL OUTPT CLINIC VISIT: HCPCS | Performed by: INTERNAL MEDICINE

## 2025-03-14 PROCEDURE — G2211 COMPLEX E/M VISIT ADD ON: HCPCS | Performed by: INTERNAL MEDICINE

## 2025-03-14 PROCEDURE — 99215 OFFICE O/P EST HI 40 MIN: CPT | Performed by: INTERNAL MEDICINE

## 2025-03-14 ASSESSMENT — PAIN SCALES - GENERAL: PAINLEVEL_OUTOF10: MILD PAIN (3)

## 2025-03-14 NOTE — PROGRESS NOTES
[facility]  [address] [place] [address]  Phone: [phone]  Fax: [phone]    PATIENT NAME: Douglas Herrera  MRN # [id number]   DATE OF VISIT: March 14, 2025  YOB: 1960     Otolaryngology: Dr. Damon Iglesias-Chavira   Radiation Oncology: Dr. Tomasa Akers  Cardiology: Dr. Qamar Hernandez  Hepatologist: [facility]      CANCER TYPE: SCC sinonasal   STAGE: [id number] (IVB)  ECOG PS: 1     PD-L1: TPS 60-70%, CPS >70% on [id number]  NGS: internal panel. Fusion negative. ARID1A S90fs, EGFR exon 20 insertion, [id number], APC S6195qx, TMB 7.313    ASSESSMENT AND PLAN  SCC L maxillary sinus, [id number] (IVB), ARID1 mutation,  EGFR exon 20 insertion: Unfortunately, I see disease progression on the current scan, after 4 doses of carbo paclitaxel pembro and 2 doses of maintenance pembrolizumab, most recently 3/6/25. I agree that the lung nodules are modestly larger. Discussed options. First is to start amivantamab. We looked into coverage after our last visit and it's doable. Reviewed potential side effects, especially high risk of infusion reaction with C1D1 and dramatically less chance with subsequent doses, including C1D2. Option 2 is to give him some time off. The change in the nodules is very modest, and he's getting increasingly tired. More time to recover from all of the events over the last year will be helpful. Most likely nothing would materially change with the cancer. He's discouraged to the point of considering not doing any more treatment. We discussed pros and cons. At the end of our discussion, he'll take a month off and we'll go from there.     Increasing trismus: SLP eval and reinforce exercises, stretching    Ear pressure: Has appt with Maricruz in ENT but not until 5/29. Will see if this can be moved up      Hypothyroidism: TFTs 3/6/25 about the same, c/w subclinical hypothyrodisism. Will start low dose replacement. Might help him feel just a  little bit better.     Nutrition: TF twice daily, eating one meal a day, usually rice/some meat, etc. Gtube exchanged 11/22/24. No changes today      Hearing loss: Unchanged - still pretty significant on L. Audiogram in future, declines now     Hepatitis B: HBSAby negative, SAg +, cAby +, HBV PCR negative 1/23/24. HCV negative.     H/o PE/DVT: 5/8/2019 CTA report scanned into [employer]. Small PEs, LLE DVT, acute, occlusive. Was on rivaroxaban, completed course.      ASCVD: Asa 81 mg daily, metoprolol per PCP       The longitudinal plan of care for the condition(s) below were addressed during this visit. Due to the added complexity in care, I will continue to support Douglas in the subsequent management of this condition(s) and with the ongoing continuity of care of this condition(s): maxillary sinus cancer      40 minutes spent by me on the date of the encounter doing chart review, review of outside records, review of test results, interpretation of tests, patient visit, documentation, orders,    Professional ipad  used throughout the visit    Kayy Post MD  Associate Professor of Medicine  Hematology, Oncology and Transplantation    SUBJECTIVE  Returns for routine follow-up on maintenance pembrolizumab.  Still very tired   L ear - feels very plugged. Some pressure  Message Maricruz to move up appt   Speeech pathologist, +/- PT to help with jaw stretching etc  Could brush teeth before can't anymore    CANCER SUMMARY  8/9/23                CT sinus.   8/24/23  MRI sinus. L maxillary sinus mass  9/12/23              ED for epistaxis.   9/13/23              CTA and embolization of L IMAX   10/4/23  Nasal bx. Path: Inverted papilloma with high grade dysplasia  11/10/23 Nasal endoscopy and bx in the OR. C/b severe bleeding. Path: Inverted sinonasal papilloma with severe dysplasia.  11/17/23            MRI. 7.4 x 4.6 x 6.6 cm L sinonasal mass, destruction of nasal turbinates, L maxillary sinus, hard palate, invasion  of L  space, involvement of medial and lateral pterygoids and temporalis muscles. Effacement and invasion ipsilateral pterygopalatine fossa, extends and effaces the nasopharynx.   11/30/23            Carotid angiogram. Direct endonasal and transoral embolization L sinonasal tumor, transaterial embolization of the L sphenopalatine artery feeders to the L sinonasal artery tumor   12/1/23  Stealth assisted transnasal endoscopic approach to excise L sinonasal mass. Pre-operative embolization. Path: SCC involving L maxilla.    12/9/23  PET. Hypermetabolic mass centered along the L maxilla, extending superiorly through the floor of the L maxillary sinus, posterior/laterally to the  space, invasion of the pterygoid muscles, extending cranially along the pterygopalatine fossa and pterygoid plates to the skull base level. Erosion of involved bones including L maxilla, maxillary sinus wall and pterygoid plates. Extension medially to the L lateral nasopharyngeal wall and soft palate. 1 mm fat place between carotid and mass. ~4.9 x 4.0 x 5.4 cm (SUV 24.3). Partially resected since 11/17/23 MRI. 8 mm L 2A node (SUV 5.9), 7 mm L level 2 node (SUV 5.5)  1/2/24  Gtube (IR)  1/4/24  Video swallow. No aspiration  1/8~2/27/24 Chemoradiation with weekly cisplatin.  1/11/24  Port (IR)  8/5/24  PET/CT.  Overall increased FDG uptake centered along the posterior inferior left maxillary sinus involving the  space, left pterygopalatine fossa, left parapharyngeal space, extending inferiorly to left maxilla.  Increased soft tissue component within the space, now broad FDG uptake (SUV 24.97), previously 5.75.  Area measures 3.3 x 2.6 cm. Increased focal hypermetabolic activity posterior pharyngeal wall, extending to the left palatine tonsil, associated mucosal thickening (SUV 17.23).  Non-FDG avid mass, 2.7 x 2.4 cm, previously 2.1 x 2.5 cm, another medially, 1.1 x 1.5 cm, previously 1.1 x 1 cm.  New focal area of  FDG uptake right lateral oropharyngeal wall (SUV 9.07).  Few hypermetabolic right cervical nodes, none definitively enlarged, however increased activity compared to prior (SUV 7.29-8.36).  Several sub-6 mm pulmonary nodules.  8/5/24  MRI.  Heterogeneous mass along the base of the left maxillary sinus extending to the maxilla,  space, left pterygopalatine fossa, and parapharyngeal space.  Some areas of treated but some corresponding to FDG avidity on same-day PET, highly suspicious for tumor recurrence.  Question early left V2 involvement, thin dural enhancement along the base of the left temporal lobe     9/20/24  L maxillary, L superior alveolus bx in clinic Path: Fragments of at least SCC in situ.   10/11/24 Transnasal bx L maxillary sinus (Dr. Iglesias). 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  Multiloculated mass at base of L maxilla extending to involve L side of palate, retromaxillary region, extending into the  space, premaxillary region.  Similar to prior MRI 8/5/2024.  Continuous slight abnormal asymmetric thickening L V3 and V2.  Asymmetric thin dural enhancement along the base of the left temporal lobe.   10/29/2024 CT chest.  Scattered <6 mm lung nodules grossly stable compared to 8/5/2024.  Partially visualized common bile duct mildly dilated, 8 mm, possibly related to prior cholecystectomy, recommend MRCP.   1/20/25  CT neck and CAP.  Overall similar multiloculated L maxillary mass extending into the  and parapharyngeal spaces compared to PET/CT 8/5/2024.  No cervical adenopathy.  Stable small 2-5 mm lung nodules.  Increased RUL nodule, 1 mm --> 4 mm (5:139).  New 5 mm LLL nodule.  New somewhat linear 4 mm lingular nodule.  Attention on follow-up  11/20/24~1/24/25 Carboplatin + paclitaxel + pembrolizumab.   2/13~3/6/25 Maintenance pembrolizumab.     PAST MEDICAL HISTORY  Sinonasal carcinoma as above  HTN  ASCVD. CABG x 3 2019  PE and LLE DVT after CABG .  Treated with rivaroxaban  Dyslipidemia  Hepatitis B   SCC R temple s/p MOHS  Ascending aortic aneurysm repair 2019  Hearing loss L   Gout - feet  Depression  Cholecystectomy    CURRENT OUTPATIENT MEDICATIONS  Reviewed    ALLERGIES  No Known Allergies     PHYSICAL EXAM  /72 (BP Location: Right arm, Patient Position: Sitting, Cuff Size: Adult Regular)   Pulse 85   Temp 99  F (37.2  C) (Oral)   Resp 20   Wt 61.7 kg (136 lb 1.6 oz)   SpO2 98%   BMI 24.42 kg/m    GEN: NAD  HEENT: EOMI, no icterus, injection or pallor. I don't see as much tumor any more on the palate but trismus is worse. Mouth opening only about 3 cm. No thrush  EXT: no edema  NEURO: alert    LABORATORY AND IMAGING STUDIES    Labs 3/6/25 were independently reviewed and interpreted by me  TFTs as above  CMP ok   Hemoglobin a little better than a month ago at 9.5, still lower than 10 range 2 months ago. Plt and WBC ok    CT Soft Tissue Neck w Contrast  Narrative: CT SOFT TISSUE NECK W CONTRAST 3/6/2025 10:39 AM    History:  Restage recurrent unresectable maxillary sinus cancer s/p 4  cycles of chemo + pembrolizumab, now on pembrolizubmab maintenance;  Squamous cell carcinoma of maxillary sinus (H)  ICD-10: Squamous cell carcinoma of maxillary sinus (H)      Comparison:  CT neck 1/29/2025, PET/CT 8/5/2024, 5/17/2024     Technique: Following intravenous administration of nonionic iodinated  contrast medium, thin section helical CT images were obtained from the  skull base down to the level of the aortic arch.  Axial, coronal and  sagittal reformations were performed with 2-3 mm slice thickness  reconstruction. Images were reviewed in soft tissue, lung and bone  windows.    Contrast: 71 cc [id number]    Findings:   Stable postsurgical changes of left maxillary antrostomy, left  turbinectomy and partial left ethmoidectomy.    Stable appearance of loculated enhancing left maxillary mass extending  into the  and parapharyngeal spaces.  Unchanged largest  loculated component adjacent to the left mandibular ramus measures 3.0  x 2.2 x 2.8  cm (4/54 and 5/36), previously 3.0 x 2.3 x 2.8 cm, with  peripheral calcification. Similar extent of cortical thinning of the  mandible ramus. Unchanged extent of erosion of the left sphenoid bone  and of the left maxillary hard palate resulting in asymmetric soft  tissue thickening of the roof of the mouth. Circumferential mucosal  thickening of the left maxillary cavity unchanged from prior. Similar  asymmetrically increased size of the left soft palate.     Unchanged paranasal sinus mucosal thickening. Complete left and  partial right mastoid air cell opacification unchanged from prior.    No pathologically enlarged, necrotic, or otherwise abnormal lymph  nodes.    The major salivary glands are unremarkable. Unchanged coarse  calcification in the right thyroid lobe, with additional smaller  subcentimeter nodules bilaterally.    The major vascular structures in the neck are unremarkable. Visualized  brain and orbits are unremarkable. Degenerative changes of the  cervical spine greatest at C5-6 are unchanged. Prior without  suspicious osseous lesion. No significant spinal canal stenosis.    Increased size and number of several solid pulmonary nodules  visualized in the lung apices, for example in the subpleural space in  the left lung apex measuring up to 8 mm, previously 4 mm, with  additional new nodules in the lateral left upper lobe apex of the  (6/93) and anteriorly the left upper lobe (6/101). Partially  visualized right chest wall Port-A-Cath. Sternotomy wires.  Atherosclerotic calcifications of the aortic arch  Impression: Impression:  Primary: NI-RADS 4} Postsurgical changes of left maxillary antrostomy,  left turbinectomy and left partial ethmoidectomy with stable  appearance of enhancing multiloculated left maxillary mass consistent  with biopsy-proven recurrent squamous cell carcinoma. Extent of  local  invasion and osseous involvement is stable.  Neck: NI-RADS 1}  No abnormal lymph node.   Other: Increased size of vizualized pulmonary nodules concerning for  disease progression. Please refer to same-day CT chest, abdomen and  pelvis for further details.    NI-RADS CECT Surveillance Legend:    Primary  1: No evidence of recurrence: routine surveillance  2: Low suspicion    a) Superficial abnormality (skin, mucosal surface): direct visual  inspection    b) Ill-defined deep abnormality: short interval follow-up* or PET  3: High suspicion (new or enlarging discrete nodule/mass): biopsy  4: Definitive recurrence (path proven or clinical progression): no  biopsy needed    Nodes  1: No evidence of recurrence: routine surveillance  2: Low suspicion (ill-defined): short interval follow-up or PET  3: High suspicion (new or enlarging lymph node): biopsy if clinically  needed  4: Definitive recurrence (path proven or clinical progression): no  biopsy needed    *short interval follow-up: 3 months at our institution    I have personally reviewed the examination and initial interpretation  and I agree with the findings.    BENNY LANDA MD         SYSTEM ID:  [id number]     CT CAP and CT neck personally reviewed and interpreted by me as above

## 2025-03-14 NOTE — LETTER
3/14/2025      Douglas Herrera  [address] [place] [address]      Dear Colleague,    Thank you for referring your patient, Douglas Herrera, to the [facility]. Please see a copy of my visit note below.        [facility]  [address] [facility] [address]  Phone: [phone]  Fax: [phone]    PATIENT NAME: Douglas Herrera  MRN # [id number]   DATE OF VISIT: March 14, 2025  YOB: 1960     Otolaryngology: Dr. Damon BurksChavira   Radiation Oncology: Dr. Tomasa Akers  Cardiology: Dr. Qamar Hernandez  Hepatologist: [facility]      CANCER TYPE: SCC sinonasal   STAGE: [id number] (IVB)  ECOG PS: 1     PD-L1: TPS 60-70%, CPS >70% on [id number]  NGS: internal panel. Fusion negative. ARID1A S90fs, EGFR exon 20 insertion, [id number], APC [id number], TMB 7.313    ASSESSMENT AND PLAN  SCC L maxillary sinus, [id number] (IVB), ARID1 mutation,  EGFR exon 20 insertion: Unfortunately, I see disease progression on the current scan, after 4 doses of carbo paclitaxel pembro and 2 doses of maintenance pembrolizumab, most recently 3/6/25. I agree that the lung nodules are modestly larger. Discussed options. First is to start amivantamab. We looked into coverage after our last visit and it's doable. Reviewed potential side effects, especially high risk of infusion reaction with C1D1 and dramatically less chance with subsequent doses, including C1D2. Option 2 is to give him some time off. The change in the nodules is very modest, and he's getting increasingly tired. More time to recover from all of the events over the last year will be helpful. Most likely nothing would materially change with the cancer. He's discouraged to the point of considering not doing any more treatment. We discussed pros and cons. At the end of our discussion, he'll take a month off and we'll go from there.     Increasing trismus: SLP eval and reinforce exercises, stretching    Ear  pressure: Has appt with Maricruz in ENT but not until 5/29. Will see if this can be moved up      Hypothyroidism: TFTs 3/6/25 about the same, c/w subclinical hypothyrodisism. Will start low dose replacement. Might help him feel just a little bit better.     Nutrition: TF twice daily, eating one meal a day, usually rice/some meat, etc. Gtube exchanged 11/22/24. No changes today      Hearing loss: Unchanged - still pretty significant on L. Audiogram in future, declines now     Hepatitis B: HBSAby negative, SAg +, cAby +, HBV PCR negative 1/23/24. HCV negative.     H/o PE/DVT: 5/8/2019 CTA report scanned into [employer]. Small PEs, LLE DVT, acute, occlusive. Was on rivaroxaban, completed course.      ASCVD: Asa 81 mg daily, metoprolol per PCP       The longitudinal plan of care for the condition(s) below were addressed during this visit. Due to the added complexity in care, I will continue to support Douglas in the subsequent management of this condition(s) and with the ongoing continuity of care of this condition(s): maxillary sinus cancer      40 minutes spent by me on the date of the encounter doing chart review, review of outside records, review of test results, interpretation of tests, patient visit, documentation, orders,    Professional ipad  used throughout the visit    Kayy Post MD  Associate Professor of Medicine  Hematology, Oncology and Transplantation    SUBJECTIVE  Returns for routine follow-up on maintenance pembrolizumab.  Still very tired   L ear - feels very plugged. Some pressure  Message Maricruz to move up appt   Speeech pathologist, +/- PT to help with jaw stretching etc  Could brush teeth before can't anymore    CANCER SUMMARY  8/9/23                CT sinus.   8/24/23  MRI sinus. L maxillary sinus mass  9/12/23              ED for epistaxis.   9/13/23              CTA and embolization of L IMAX   10/4/23  Nasal bx. Path: Inverted papilloma with high grade dysplasia  11/10/23 Nasal endoscopy  and bx in the OR. C/b severe bleeding. Path: Inverted sinonasal papilloma with severe dysplasia.  11/17/23            MRI. 7.4 x 4.6 x 6.6 cm L sinonasal mass, destruction of nasal turbinates, L maxillary sinus, hard palate, invasion of L  space, involvement of medial and lateral pterygoids and temporalis muscles. Effacement and invasion ipsilateral pterygopalatine fossa, extends and effaces the nasopharynx.   11/30/23            Carotid angiogram. Direct endonasal and transoral embolization L sinonasal tumor, transaterial embolization of the L sphenopalatine artery feeders to the L sinonasal artery tumor   12/1/23  Stealth assisted transnasal endoscopic approach to excise L sinonasal mass. Pre-operative embolization. Path: SCC involving L maxilla.    12/9/23  PET. Hypermetabolic mass centered along the L maxilla, extending superiorly through the floor of the L maxillary sinus, posterior/laterally to the  space, invasion of the pterygoid muscles, extending cranially along the pterygopalatine fossa and pterygoid plates to the skull base level. Erosion of involved bones including L maxilla, maxillary sinus wall and pterygoid plates. Extension medially to the L lateral nasopharyngeal wall and soft palate. 1 mm fat place between carotid and mass. ~4.9 x 4.0 x 5.4 cm (SUV 24.3). Partially resected since 11/17/23 MRI. 8 mm L 2A node (SUV 5.9), 7 mm L level 2 node (SUV 5.5)  1/2/24  Gtube (IR)  1/4/24  Video swallow. No aspiration  1/8~2/27/24 Chemoradiation with weekly cisplatin.  1/11/24  Port (IR)  8/5/24  PET/CT.  Overall increased FDG uptake centered along the posterior inferior left maxillary sinus involving the  space, left pterygopalatine fossa, left parapharyngeal space, extending inferiorly to left maxilla.  Increased soft tissue component within the space, now broad FDG uptake (SUV 24.97), previously 5.75.  Area measures 3.3 x 2.6 cm. Increased focal hypermetabolic activity  posterior pharyngeal wall, extending to the left palatine tonsil, associated mucosal thickening (SUV 17.23).  Non-FDG avid mass, 2.7 x 2.4 cm, previously 2.1 x 2.5 cm, another medially, 1.1 x 1.5 cm, previously 1.1 x 1 cm.  New focal area of FDG uptake right lateral oropharyngeal wall (SUV 9.07).  Few hypermetabolic right cervical nodes, none definitively enlarged, however increased activity compared to prior (SUV 7.29-8.36).  Several sub-6 mm pulmonary nodules.  8/5/24  MRI.  Heterogeneous mass along the base of the left maxillary sinus extending to the maxilla,  space, left pterygopalatine fossa, and parapharyngeal space.  Some areas of treated but some corresponding to FDG avidity on same-day PET, highly suspicious for tumor recurrence.  Question early left V2 involvement, thin dural enhancement along the base of the left temporal lobe     9/20/24  L maxillary, L superior alveolus bx in clinic Path: Fragments of at least SCC in situ.   10/11/24 Transnasal bx L maxillary sinus (Dr. Iglesias). 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  Multiloculated mass at base of L maxilla extending to involve L side of palate, retromaxillary region, extending into the  space, premaxillary region.  Similar to prior MRI 8/5/2024.  Continuous slight abnormal asymmetric thickening L V3 and V2.  Asymmetric thin dural enhancement along the base of the left temporal lobe.   10/29/2024 CT chest.  Scattered <6 mm lung nodules grossly stable compared to 8/5/2024.  Partially visualized common bile duct mildly dilated, 8 mm, possibly related to prior cholecystectomy, recommend MRCP.   1/20/25  CT neck and CAP.  Overall similar multiloculated L maxillary mass extending into the  and parapharyngeal spaces compared to PET/CT 8/5/2024.  No cervical adenopathy.  Stable small 2-5 mm lung nodules.  Increased RUL nodule, 1 mm --> 4 mm (5:139).  New 5 mm LLL nodule.  New somewhat linear 4 mm lingular  nodule.  Attention on follow-up  11/20/24~1/24/25 Carboplatin + paclitaxel + pembrolizumab.   2/13~3/6/25 Maintenance pembrolizumab.     PAST MEDICAL HISTORY  Sinonasal carcinoma as above  HTN  ASCVD. CABG x 3 2019  PE and LLE DVT after CABG . Treated with rivaroxaban  Dyslipidemia  Hepatitis B   SCC R temple s/p MOHS  Ascending aortic aneurysm repair 2019  Hearing loss L   Gout - feet  Depression  Cholecystectomy    CURRENT OUTPATIENT MEDICATIONS  Reviewed    ALLERGIES  No Known Allergies     PHYSICAL EXAM  /72 (BP Location: Right arm, Patient Position: Sitting, Cuff Size: Adult Regular)   Pulse 85   Temp 99  F (37.2  C) (Oral)   Resp 20   Wt 61.7 kg (136 lb 1.6 oz)   SpO2 98%   BMI 24.42 kg/m    GEN: NAD  HEENT: EOMI, no icterus, injection or pallor. I don't see as much tumor any more on the palate but trismus is worse. Mouth opening only about 3 cm. No thrush  EXT: no edema  NEURO: alert    LABORATORY AND IMAGING STUDIES    Labs 3/6/25 were independently reviewed and interpreted by me  TFTs as above  CMP ok   Hemoglobin a little better than a month ago at 9.5, still lower than 10 range 2 months ago. Plt and WBC ok    CT Soft Tissue Neck w Contrast  Narrative: CT SOFT TISSUE NECK W CONTRAST 3/6/2025 10:39 AM    History:  Restage recurrent unresectable maxillary sinus cancer s/p 4  cycles of chemo + pembrolizumab, now on pembrolizubmab maintenance;  Squamous cell carcinoma of maxillary sinus (H)  ICD-10: Squamous cell carcinoma of maxillary sinus (H)      Comparison:  CT neck 1/29/2025, PET/CT 8/5/2024, 5/17/2024     Technique: Following intravenous administration of nonionic iodinated  contrast medium, thin section helical CT images were obtained from the  skull base down to the level of the aortic arch.  Axial, coronal and  sagittal reformations were performed with 2-3 mm slice thickness  reconstruction. Images were reviewed in soft tissue, lung and bone  windows.    Contrast: 71 cc  [id number]    Findings:   Stable postsurgical changes of left maxillary antrostomy, left  turbinectomy and partial left ethmoidectomy.    Stable appearance of loculated enhancing left maxillary mass extending  into the  and parapharyngeal spaces. Unchanged largest  loculated component adjacent to the left mandibular ramus measures 3.0  x 2.2 x 2.8  cm (4/54 and 5/36), previously 3.0 x 2.3 x 2.8 cm, with  peripheral calcification. Similar extent of cortical thinning of the  mandible ramus. Unchanged extent of erosion of the left sphenoid bone  and of the left maxillary hard palate resulting in asymmetric soft  tissue thickening of the roof of the mouth. Circumferential mucosal  thickening of the left maxillary cavity unchanged from prior. Similar  asymmetrically increased size of the left soft palate.     Unchanged paranasal sinus mucosal thickening. Complete left and  partial right mastoid air cell opacification unchanged from prior.    No pathologically enlarged, necrotic, or otherwise abnormal lymph  nodes.    The major salivary glands are unremarkable. Unchanged coarse  calcification in the right thyroid lobe, with additional smaller  subcentimeter nodules bilaterally.    The major vascular structures in the neck are unremarkable. Visualized  brain and orbits are unremarkable. Degenerative changes of the  cervical spine greatest at C5-6 are unchanged. Prior without  suspicious osseous lesion. No significant spinal canal stenosis.    Increased size and number of several solid pulmonary nodules  visualized in the lung apices, for example in the subpleural space in  the left lung apex measuring up to 8 mm, previously 4 mm, with  additional new nodules in the lateral left upper lobe apex of the  (6/93) and anteriorly the left upper lobe (6/101). Partially  visualized right chest wall Port-A-Cath. Sternotomy wires.  Atherosclerotic calcifications of the aortic arch  Impression: Impression:  Primary: NI-RADS  4} Postsurgical changes of left maxillary antrostomy,  left turbinectomy and left partial ethmoidectomy with stable  appearance of enhancing multiloculated left maxillary mass consistent  with biopsy-proven recurrent squamous cell carcinoma. Extent of local  invasion and osseous involvement is stable.  Neck: NI-RADS 1}  No abnormal lymph node.   Other: Increased size of vizualized pulmonary nodules concerning for  disease progression. Please refer to same-day CT chest, abdomen and  pelvis for further details.    NI-RADS CECT Surveillance Legend:    Primary  1: No evidence of recurrence: routine surveillance  2: Low suspicion    a) Superficial abnormality (skin, mucosal surface): direct visual  inspection    b) Ill-defined deep abnormality: short interval follow-up* or PET  3: High suspicion (new or enlarging discrete nodule/mass): biopsy  4: Definitive recurrence (path proven or clinical progression): no  biopsy needed    Nodes  1: No evidence of recurrence: routine surveillance  2: Low suspicion (ill-defined): short interval follow-up or PET  3: High suspicion (new or enlarging lymph node): biopsy if clinically  needed  4: Definitive recurrence (path proven or clinical progression): no  biopsy needed    *short interval follow-up: 3 months at our institution    I have personally reviewed the examination and initial interpretation  and I agree with the findings.    BENNY LANDA MD         SYSTEM ID:  [id number]     CT CAP and CT neck personally reviewed and interpreted by me as above               Again, thank you for allowing me to participate in the care of your patient.        Sincerely,        Kayy Post MD    Electronically signed

## 2025-03-14 NOTE — NURSING NOTE
"Oncology Rooming Note    March 14, 2025 2:59 PM   Douglas Herrera is a 64 year old male who presents for:    Chief Complaint   Patient presents with    Oncology Clinic Visit     Squamous cell carcinoma of maxillary sinus     Initial Vitals: /72 (BP Location: Right arm, Patient Position: Sitting, Cuff Size: Adult Regular)   Pulse 85   Temp 99  F (37.2  C) (Oral)   Resp 20   Wt 61.7 kg (136 lb 1.6 oz)   SpO2 98%   BMI 24.42 kg/m   Estimated body mass index is 24.42 kg/m  as calculated from the following:    Height as of 1/6/25: 1.59 m (5' 2.6\").    Weight as of this encounter: 61.7 kg (136 lb 1.6 oz). Body surface area is 1.65 meters squared.  Mild Pain (3) Comment: not consistent   No LMP for male patient.  Allergies reviewed: Yes  Medications reviewed: Yes    Medications: Medication refills not needed today.  Pharmacy name entered into [facility]: [facility] - [place], [place] - [address]    Frailty Screening:   Is the patient here for a new oncology consult visit in cancer care? 2. No    PHQ9:  Did this patient require a PHQ9?: No      Clinical concerns: Patient states no new concerns to discuss with provider.       Иван Kasper, EMT            "

## 2025-03-14 NOTE — LETTER
March 14, 2025      RE: Douglas Herrera  [address] [place] [address]       To Whom It May Concern,     I strongly advise Mr. Herrera to be excused from jury duty. He is dealing with a serious illness and is undergoing treatment.     Please do not hesitate to contact me with any questions or concerns.       Sincerely,    Kayy Post MD  Associate Professor of Medicine

## 2025-03-15 PROCEDURE — S9341 HIT ENTERAL GRAV DIEM: HCPCS

## 2025-03-16 PROCEDURE — S9341 HIT ENTERAL GRAV DIEM: HCPCS

## 2025-03-17 ENCOUNTER — THERAPY VISIT (OUTPATIENT)
Dept: SPEECH THERAPY | Facility: REHABILITATION | Age: 65
End: 2025-03-17
Payer: COMMERCIAL

## 2025-03-17 DIAGNOSIS — R13.12 OROPHARYNGEAL DYSPHAGIA: ICD-10-CM

## 2025-03-17 DIAGNOSIS — I89.0 LYMPHEDEMA DUE TO RADIATION: Primary | ICD-10-CM

## 2025-03-17 DIAGNOSIS — C31.9 MALIGNANT NEOPLASM OF PARANASAL SINUS (H): ICD-10-CM

## 2025-03-17 PROCEDURE — S9341 HIT ENTERAL GRAV DIEM: HCPCS

## 2025-03-17 PROCEDURE — 92526 ORAL FUNCTION THERAPY: CPT | Mod: GN | Performed by: SPEECH-LANGUAGE PATHOLOGIST

## 2025-03-17 PROCEDURE — B4152 EF CALORIE DENSE>/=1.5KCAL: HCPCS

## 2025-03-18 PROCEDURE — S9341 HIT ENTERAL GRAV DIEM: HCPCS

## 2025-03-19 PROCEDURE — S9341 HIT ENTERAL GRAV DIEM: HCPCS

## 2025-03-20 PROCEDURE — S9341 HIT ENTERAL GRAV DIEM: HCPCS

## 2025-03-21 ENCOUNTER — HOME INFUSION BILLING (OUTPATIENT)
Dept: HOME HEALTH SERVICES | Facility: HOME HEALTH | Age: 65
End: 2025-03-21
Payer: COMMERCIAL

## 2025-03-21 PROCEDURE — B4152 EF CALORIE DENSE>/=1.5KCAL: HCPCS

## 2025-03-21 PROCEDURE — S9341 HIT ENTERAL GRAV DIEM: HCPCS | Mod: SH

## 2025-03-21 PROCEDURE — S9341 HIT ENTERAL GRAV DIEM: HCPCS

## 2025-03-21 PROCEDURE — S9341 HIT ENTERAL GRAV DIEM: HCPCS | Mod: SJ

## 2025-03-22 PROCEDURE — S9341 HIT ENTERAL GRAV DIEM: HCPCS

## 2025-03-22 PROCEDURE — S9341 HIT ENTERAL GRAV DIEM: HCPCS | Mod: SH

## 2025-03-22 PROCEDURE — S9341 HIT ENTERAL GRAV DIEM: HCPCS | Mod: SJ

## 2025-03-23 PROCEDURE — S9341 HIT ENTERAL GRAV DIEM: HCPCS

## 2025-03-23 PROCEDURE — S9341 HIT ENTERAL GRAV DIEM: HCPCS | Mod: SJ

## 2025-03-23 PROCEDURE — S9341 HIT ENTERAL GRAV DIEM: HCPCS | Mod: SH

## 2025-03-24 ENCOUNTER — HOME INFUSION (OUTPATIENT)
Dept: HOME HEALTH SERVICES | Facility: HOME HEALTH | Age: 65
End: 2025-03-24
Payer: COMMERCIAL

## 2025-03-24 PROCEDURE — S9341 HIT ENTERAL GRAV DIEM: HCPCS | Mod: SH

## 2025-03-24 PROCEDURE — S9341 HIT ENTERAL GRAV DIEM: HCPCS

## 2025-03-24 PROCEDURE — S9341 HIT ENTERAL GRAV DIEM: HCPCS | Mod: SJ

## 2025-03-25 PROCEDURE — S9341 HIT ENTERAL GRAV DIEM: HCPCS

## 2025-03-25 PROCEDURE — S9341 HIT ENTERAL GRAV DIEM: HCPCS | Mod: SJ

## 2025-03-25 PROCEDURE — S9341 HIT ENTERAL GRAV DIEM: HCPCS | Mod: SH

## 2025-03-26 PROCEDURE — S9341 HIT ENTERAL GRAV DIEM: HCPCS

## 2025-03-26 PROCEDURE — S9341 HIT ENTERAL GRAV DIEM: HCPCS | Mod: SJ

## 2025-03-26 PROCEDURE — S9341 HIT ENTERAL GRAV DIEM: HCPCS | Mod: SH

## 2025-03-27 PROCEDURE — S9341 HIT ENTERAL GRAV DIEM: HCPCS

## 2025-03-27 PROCEDURE — S9341 HIT ENTERAL GRAV DIEM: HCPCS | Mod: SH

## 2025-03-27 PROCEDURE — S9341 HIT ENTERAL GRAV DIEM: HCPCS | Mod: SJ

## 2025-03-28 PROCEDURE — S9341 HIT ENTERAL GRAV DIEM: HCPCS | Mod: SH

## 2025-03-28 PROCEDURE — S9341 HIT ENTERAL GRAV DIEM: HCPCS

## 2025-03-28 PROCEDURE — S9341 HIT ENTERAL GRAV DIEM: HCPCS | Mod: SJ

## 2025-03-29 PROCEDURE — S9341 HIT ENTERAL GRAV DIEM: HCPCS

## 2025-03-29 PROCEDURE — S9341 HIT ENTERAL GRAV DIEM: HCPCS | Mod: SH

## 2025-03-30 PROCEDURE — S9341 HIT ENTERAL GRAV DIEM: HCPCS | Mod: SH

## 2025-03-30 PROCEDURE — S9341 HIT ENTERAL GRAV DIEM: HCPCS

## 2025-03-31 PROCEDURE — S9341 HIT ENTERAL GRAV DIEM: HCPCS | Mod: SH

## 2025-03-31 PROCEDURE — S9341 HIT ENTERAL GRAV DIEM: HCPCS

## 2025-04-01 ENCOUNTER — TELEPHONE (OUTPATIENT)
Dept: OTOLARYNGOLOGY | Facility: CLINIC | Age: 65
End: 2025-04-01
Payer: COMMERCIAL

## 2025-04-01 PROCEDURE — S9341 HIT ENTERAL GRAV DIEM: HCPCS | Mod: SH

## 2025-04-01 PROCEDURE — S9341 HIT ENTERAL GRAV DIEM: HCPCS

## 2025-04-01 NOTE — TELEPHONE ENCOUNTER
Left Voicemail (1st Attempt) and Sent Mychart (1st Attempt) for the patient to call back and schedule the following:    Appointment Type: new ear P1  Provider: Maricruz Benitez NP - KENNEDI   Appt date:  R/S 5/29 TO a sooner visit   Specialty phone number: [phone]   Additional appointment(s) needed:   AUDIO PRIOR  Additional Notes:

## 2025-04-02 ENCOUNTER — APPOINTMENT (OUTPATIENT)
Dept: INTERPRETER SERVICES | Facility: CLINIC | Age: 65
End: 2025-04-02
Payer: COMMERCIAL

## 2025-04-02 PROCEDURE — S9341 HIT ENTERAL GRAV DIEM: HCPCS

## 2025-04-02 PROCEDURE — S9341 HIT ENTERAL GRAV DIEM: HCPCS | Mod: SH

## 2025-04-03 PROCEDURE — S9341 HIT ENTERAL GRAV DIEM: HCPCS | Mod: SH

## 2025-04-03 PROCEDURE — S9341 HIT ENTERAL GRAV DIEM: HCPCS

## 2025-04-04 PROCEDURE — S9341 HIT ENTERAL GRAV DIEM: HCPCS | Mod: SH

## 2025-04-04 PROCEDURE — S9341 HIT ENTERAL GRAV DIEM: HCPCS

## 2025-04-05 PROCEDURE — S9341 HIT ENTERAL GRAV DIEM: HCPCS

## 2025-04-05 PROCEDURE — S9341 HIT ENTERAL GRAV DIEM: HCPCS | Mod: SH

## 2025-04-06 PROCEDURE — S9341 HIT ENTERAL GRAV DIEM: HCPCS | Mod: SH

## 2025-04-06 PROCEDURE — S9341 HIT ENTERAL GRAV DIEM: HCPCS

## 2025-04-06 NOTE — PROGRESS NOTES
Dx: Initial [id number] left maxillary sinus INVASIVE NON-KERATINIZING SQUAMOUS CELL CARCINOMA   SQUAMOUS CELL CARCINOMA INVOLVING BONE TISSUE      PROCEDURE: Stealth assisted transnasal endoscopic approach to excise left sinonasal mass on 12/1/2023     Treatment : 1/8~2/27/24       Chemoradiation with weekly cisplatin.      Recurrence at left maxilla,  space, pterygopalatine fossa, infratemporal fossa  10/11/2024    RESULT FOR IMMUNOHISTOCHEMICAL VENTANA CLONE  PD-L1 ASSAY  COMBINED POSITIVE SCORE (CPS):  >70  TUMOR PROPORTION SCORE (TPS):  60-70%  INTERPRETATION: HIGH PD-L1 EXPRESSION (TPS >/=50%)     Final Diagnosis   A. 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 LEFT MAXILLARY SINUS, BIOPSY:  - SQUAMOUS CELL CARCINOMA  arising from an inverted sinonasal papilloma  - Background of fibrosis and acute and chronic inflammation      C. LEFT LATERAL MAXILLARY SINUS, BIOPSY:  - DETACHED FRAGMENTS OF SQUAMOUS CELL CARCINOMA  - Background of fibrosis and acute and chronic inflammation      D. LEFT LATERAL MAXILLARY SINUS, BIOPSY:  - SQUAMOUS CELL CARCINOMA, SUSPICIOUS FOR INVASION  - Background of fibrosis, granulation tissue, and occasional dystrophic calcification        Currently [id number] (IVB)  ECOG PS: 1     PD-L1: TPS 60-70%, CPS >70% on [id number]  NGS: internal panel. Fusion negative. ARID1A S90fs, EGFR exon 20 insertion, [id number], APC [id number], TMB 7.313    Completed Chemo+Pembro in January 2025. Plan for maintenance with Pembro with re-staging scans in April    3/06/2025  CT C/A/P  In this patients with squamous cell carcinoma of the  maxillary sinus:   1. Enlarging pulmonary nodules concerning for disease progression.  2. No additional evidence of metastatic disease in the chest, abdomen,  and pelvis.  3. Unchanged position of the G-tube balloon in the gastric antrum.  Given nondistention of the stomach, no immediate concern for  gastric  outlet obstruction.    History of Present Illness: 64-year-old male.  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  The patient completed 4 cycles of chemoimmunotherapy.  Patient continues to have mild pain on the left maxillary sinus.  He is also having difficulty eating because of his trismus.  He is also constantly fatigue.  Unfortunately his restaging scans are not promising.  Even though appears that the maxillary sinus disease is a stable the prolonged disease appears to be progressing.  His main complaint today is bilateral hearing loss worse on the left.    MEDICATIONS:     Current Outpatient Medications   Medication Sig Dispense Refill    acetaminophen (TYLENOL) 325 MG tablet Take 2 tablets (650 mg) by mouth every 4 hours as needed for other or pain 50 tablet 0    aspirin 81 MG EC tablet Take 1 tablet (81 mg) by mouth daily 90 tablet 3    atorvastatin (LIPITOR) 80 MG tablet TAKE 1 TABLET (80 MG TOTAL) BY MOUTH AT BEDTIME/ TXHUA HMO NOJ 1 LUB TSHUAJ THAUM MUS PW PAB ZOO NTSHAV MUAJ ROJ 90 tablet 3    nitroGLYcerin (NITROSTAT) 0.4 MG sublingual tablet Place 1 tablet (0.4 mg) under the tongue every 5 minutes as needed. 25 tablet 3    ondansetron (ZOFRAN ODT) 4 MG ODT tab Take 1 tablet (4 mg) by mouth every 8 hours as needed for nausea. 4 tablet 0    ondansetron (ZOFRAN) 8 MG tablet Take 1 tablet (8 mg) by mouth every 8 hours as needed for nausea (vomiting). 30 tablet 2    Osmolite 1.5 Migue 237 mL Place 474 mLs into G tube 3 times daily. Infuse via gravity bag. 2 cartons TID spread 3-5 hours apart.   Water flush: 30-60 mL before and after each feeding. + 120 mL 4 times daily for hydration. 40038 mL 11    polyethylene glycol (MIRALAX) 17 g packet Take 1 packet by mouth as needed.      prochlorperazine (COMPAZINE) 10 MG tablet Take 1 tablet (10 mg) by mouth every 6 hours as needed for nausea or vomiting. 30 tablet  2    senna-docusate (SENNA S) 8.6-50 MG tablet Take 1 tablet by mouth 2 times daily as needed for constipation 30 tablet 1    sodium chloride (OCEAN) 0.65 % nasal spray Spray 2 sprays in nostril daily as needed for congestion. 88 mL 3       ALLERGIES:  No Known Allergies      PAST MEDICAL HISTORY:   Past Medical History:   Diagnosis Date    Ascending aortic aneurysm     Coronary artery disease     Depression     Gout     History of anesthesia complications     Hyperlipemia     Hypertension     Malignant neoplasm of nasal cavities (H)     PONV (postoperative nausea and vomiting)         FAMILY HISTORY:    Family History   Problem Relation Age of Onset    Cerebrovascular Disease Mother 90    Acute Myocardial Infarction No family hx of     Anesthesia Reaction No family hx of        REVIEW OF SYSTEMS:  12 point ROS was negative other than the symptoms noted above in the HPI.    PHYSICAL EXAMINATION:      Constitutional:  The patient was accompanied, well-groomed, and in no acute distress.     Skin: Normal:  warm and pink without rash   Neurologic: Alert and oriented x 3.  CN's III-XII within normal limits.  Voice normal.    Psychiatric: The patient's affect was calm, cooperative, and appropriate.     Communication:  Normal; communicates verbally, normal voice quality.   Respiratory: Breathing comfortably without stridor or exertion of accessory muscles.    Head/Face:  Normocephalic and atraumatic.  No lesions or scars. No sinus tenderness.    Salivary glands -  Normal size, no tenderness, swelling, or palpable masses   Eyes: Pupils were equal and reactive.  Extraocular movement intact.         Nose: Sinuses were non-tender.  Anterior rhinoscopy revealed midline septum and absence of purulence or polyps.     Oral Cavity: Normal tongue, floor of mouth, buccal mucosa.  The mass on the left maxillary sinus alveolus is stable I think is primarily the same size as before.  There is severe trismus   Oropharynx: Normal mucosa,  palate symmetric with normal elevation. No abnormal lymph tissue in the oropharynx.             Neck: Supple with normal laryngeal and tracheal landmarks.  The parotid beds were without masses.  No palpable thyroid.  Normal range of motion   Lymphatic: There is no palpable lymphadenopathy in the neck.          Nasal endoscopy: Consent for nasal endoscopy was obtained.  I confirmed correctness of the procedure and identity of the patient.  Nasal endoscopy was indicated due to left maxillary sinus squamous cell carcinoma.  The nose was topically decongested and anesthetized.  The nasal endoscope was passed under endoscopic vision.  On the left side there is absence of the inferior turbinate.  There is a large maxillary antrostomy.  I do not see any tumor through the nose.  I think this tumor is more lateral and so that is why I do not see it with endoscope.  The nasopharynx is normal.  The left sphenoethmoidal recess is normal.  The right side shows the septum in the midline.  Normal inferior middle turbinates.  Normal inferior middle meatus.       IMPRESSION AND PLAN: 64-year-old male with recurrent left maxillary sinus squamous cell carcinoma.  Patient completed 4 cycles of chemoimmunotherapy in January 2025.  Restaging scans April 2025 showing progression of disease at his lungs.  It appears that his sinus disease is stable.  He is going to discuss with Dr. Kayy Post about how moving forward from here.  I recommended palliative care consult as well as a hearing test.  Will see the patient back as a as needed basis      Damon Regan MD, M.S.  Otolaryngology- Head & Neck Surgery  [phone]

## 2025-04-07 ENCOUNTER — HOME INFUSION BILLING (OUTPATIENT)
Dept: HOME HEALTH SERVICES | Facility: HOME HEALTH | Age: 65
End: 2025-04-07
Payer: COMMERCIAL

## 2025-04-07 PROCEDURE — B4152 EF CALORIE DENSE>/=1.5KCAL: HCPCS

## 2025-04-07 PROCEDURE — S9341 HIT ENTERAL GRAV DIEM: HCPCS

## 2025-04-07 PROCEDURE — S9341 HIT ENTERAL GRAV DIEM: HCPCS | Mod: SH

## 2025-04-08 PROCEDURE — S9341 HIT ENTERAL GRAV DIEM: HCPCS

## 2025-04-08 PROCEDURE — S9341 HIT ENTERAL GRAV DIEM: HCPCS | Mod: SH

## 2025-04-09 PROCEDURE — S9341 HIT ENTERAL GRAV DIEM: HCPCS | Mod: SH

## 2025-04-09 PROCEDURE — S9341 HIT ENTERAL GRAV DIEM: HCPCS

## 2025-04-09 PROCEDURE — S9341 HIT ENTERAL GRAV DIEM: HCPCS | Mod: SJ

## 2025-04-09 PROCEDURE — A4450 NON-WATERPROOF TAPE: HCPCS

## 2025-04-10 ENCOUNTER — OFFICE VISIT (OUTPATIENT)
Dept: OTOLARYNGOLOGY | Facility: CLINIC | Age: 65
End: 2025-04-10
Payer: COMMERCIAL

## 2025-04-10 VITALS — HEIGHT: 64 IN | BODY MASS INDEX: 23.22 KG/M2 | WEIGHT: 136 LBS

## 2025-04-10 DIAGNOSIS — C31.0 SQUAMOUS CELL CARCINOMA OF MAXILLARY SINUS (H): Primary | ICD-10-CM

## 2025-04-10 DIAGNOSIS — C30.0 MALIGNANT NEOPLASM OF NASAL CAVITIES (H): ICD-10-CM

## 2025-04-10 PROCEDURE — S9341 HIT ENTERAL GRAV DIEM: HCPCS | Mod: SJ

## 2025-04-10 PROCEDURE — 99214 OFFICE O/P EST MOD 30 MIN: CPT | Mod: 25 | Performed by: OTOLARYNGOLOGY

## 2025-04-10 PROCEDURE — S9341 HIT ENTERAL GRAV DIEM: HCPCS | Mod: SH

## 2025-04-10 PROCEDURE — 31231 NASAL ENDOSCOPY DX: CPT | Performed by: OTOLARYNGOLOGY

## 2025-04-10 PROCEDURE — S9341 HIT ENTERAL GRAV DIEM: HCPCS

## 2025-04-10 RX ORDER — ENTECAVIR 0.5 MG/1
TABLET, FILM COATED ORAL
COMMUNITY
Start: 2025-03-21

## 2025-04-10 NOTE — LETTER
4/10/2025       RE: Douglas Herrera  [address]  [place] [address]     Dear Colleague,    Thank you for referring your patient, Douglas Herrera, to the [facility] [place] at [facility]. Please see a copy of my visit note below.    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      Recurrence at left maxilla,  space, pterygopalatine fossa, infratemporal fossa  10/11/2024    RESULT FOR IMMUNOHISTOCHEMICAL VENTANA CLONE  PD-L1 ASSAY  COMBINED POSITIVE SCORE (CPS):  >70  TUMOR PROPORTION SCORE (TPS):  60-70%  INTERPRETATION: HIGH PD-L1 EXPRESSION (TPS >/=50%)     Final Diagnosis   A. 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 LEFT MAXILLARY SINUS, BIOPSY:  - SQUAMOUS CELL CARCINOMA  arising from an inverted sinonasal papilloma  - Background of fibrosis and acute and chronic inflammation      C. LEFT LATERAL MAXILLARY SINUS, BIOPSY:  - DETACHED FRAGMENTS OF SQUAMOUS CELL CARCINOMA  - Background of fibrosis and acute and chronic inflammation      D. LEFT LATERAL MAXILLARY SINUS, BIOPSY:  - SQUAMOUS CELL CARCINOMA, SUSPICIOUS FOR INVASION  - Background of fibrosis, granulation tissue, and occasional dystrophic calcification        Currently [id number] (IVB)  ECOG PS: 1     PD-L1: TPS 60-70%, CPS >70% on [id number]  NGS: internal panel. Fusion negative. ARID1A S90fs, EGFR exon 20 insertion, [id number], APC [id number], TMB 7.313    Completed Chemo+Pembro in January 2025. Plan for maintenance with Pembro with re-staging scans in April    3/06/2025  CT C/A/P  In this patients with squamous cell  carcinoma of the  maxillary sinus:   1. Enlarging pulmonary nodules concerning for disease progression.  2. No additional evidence of metastatic disease in the chest, abdomen,  and pelvis.  3. Unchanged position of the G-tube balloon in the gastric antrum.  Given nondistention of the stomach, no immediate concern for gastric  outlet obstruction.    History of Present Illness: 64-year-old male.  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  The patient completed 4 cycles of chemoimmunotherapy.  Patient continues to have mild pain on the left maxillary sinus.  He is also having difficulty eating because of his trismus.  He is also constantly fatigue.  Unfortunately his restaging scans are not promising.  Even though appears that the maxillary sinus disease is a stable the prolonged disease appears to be progressing.  His main complaint today is bilateral hearing loss worse on the left.    MEDICATIONS:     Current Outpatient Medications   Medication Sig Dispense Refill     acetaminophen (TYLENOL) 325 MG tablet Take 2 tablets (650 mg) by mouth every 4 hours as needed for other or pain 50 tablet 0     aspirin 81 MG EC tablet Take 1 tablet (81 mg) by mouth daily 90 tablet 3     atorvastatin (LIPITOR) 80 MG tablet TAKE 1 TABLET (80 MG TOTAL) BY MOUTH AT BEDTIME/ TXHUA HMO NOJ 1 LUB TSHUAJ THAUM MUS PW PAB ZOO NTSHAV MUAJ ROJ 90 tablet 3     nitroGLYcerin (NITROSTAT) 0.4 MG sublingual tablet Place 1 tablet (0.4 mg) under the tongue every 5 minutes as needed. 25 tablet 3     ondansetron (ZOFRAN ODT) 4 MG ODT tab Take 1 tablet (4 mg) by mouth every 8 hours as needed for nausea. 4 tablet 0     ondansetron (ZOFRAN) 8 MG tablet Take 1 tablet (8 mg) by mouth every 8 hours as needed for nausea (vomiting). 30 tablet 2     Osmolite 1.5 Migue 237 mL Place 474 mLs into G tube 3 times daily. Infuse via gravity bag. 2 cartons TID spread 3-5 hours  apart.   Water flush: 30-60 mL before and after each feeding. + 120 mL 4 times daily for hydration. 21812 mL 11     polyethylene glycol (MIRALAX) 17 g packet Take 1 packet by mouth as needed.       prochlorperazine (COMPAZINE) 10 MG tablet Take 1 tablet (10 mg) by mouth every 6 hours as needed for nausea or vomiting. 30 tablet 2     senna-docusate (SENNA S) 8.6-50 MG tablet Take 1 tablet by mouth 2 times daily as needed for constipation 30 tablet 1     sodium chloride (OCEAN) 0.65 % nasal spray Spray 2 sprays in nostril daily as needed for congestion. 88 mL 3       ALLERGIES:  No Known Allergies      PAST MEDICAL HISTORY:   Past Medical History:   Diagnosis Date     Ascending aortic aneurysm      Coronary artery disease      Depression      Gout      History of anesthesia complications      Hyperlipemia      Hypertension      Malignant neoplasm of nasal cavities (H)      PONV (postoperative nausea and vomiting)         FAMILY HISTORY:    Family History   Problem Relation Age of Onset     Cerebrovascular Disease Mother 90     Acute Myocardial Infarction No family hx of      Anesthesia Reaction No family hx of        REVIEW OF SYSTEMS:  12 point ROS was negative other than the symptoms noted above in the HPI.    PHYSICAL EXAMINATION:      Constitutional:  The patient was accompanied, well-groomed, and in no acute distress.     Skin: Normal:  warm and pink without rash   Neurologic: Alert and oriented x 3.  CN's III-XII within normal limits.  Voice normal.    Psychiatric: The patient's affect was calm, cooperative, and appropriate.     Communication:  Normal; communicates verbally, normal voice quality.   Respiratory: Breathing comfortably without stridor or exertion of accessory muscles.    Head/Face:  Normocephalic and atraumatic.  No lesions or scars. No sinus tenderness.    Salivary glands -  Normal size, no tenderness, swelling, or palpable masses   Eyes: Pupils were equal and reactive.  Extraocular movement  intact.         Nose: Sinuses were non-tender.  Anterior rhinoscopy revealed midline septum and absence of purulence or polyps.     Oral Cavity: Normal tongue, floor of mouth, buccal mucosa.  The mass on the left maxillary sinus alveolus is stable I think is primarily the same size as before.  There is severe trismus   Oropharynx: Normal mucosa, palate symmetric with normal elevation. No abnormal lymph tissue in the oropharynx.             Neck: Supple with normal laryngeal and tracheal landmarks.  The parotid beds were without masses.  No palpable thyroid.  Normal range of motion   Lymphatic: There is no palpable lymphadenopathy in the neck.          Nasal endoscopy: Consent for nasal endoscopy was obtained.  I confirmed correctness of the procedure and identity of the patient.  Nasal endoscopy was indicated due to left maxillary sinus squamous cell carcinoma.  The nose was topically decongested and anesthetized.  The nasal endoscope was passed under endoscopic vision.  On the left side there is absence of the inferior turbinate.  There is a large maxillary antrostomy.  I do not see any tumor through the nose.  I think this tumor is more lateral and so that is why I do not see it with endoscope.  The nasopharynx is normal.  The left sphenoethmoidal recess is normal.  The right side shows the septum in the midline.  Normal inferior middle turbinates.  Normal inferior middle meatus.       IMPRESSION AND PLAN: 64-year-old male with recurrent left maxillary sinus squamous cell carcinoma.  Patient completed 4 cycles of chemoimmunotherapy in January 2025.  Restaging scans April 2025 showing progression of disease at his lungs.  It appears that his sinus disease is stable.  He is going to discuss with Dr. Kayy Post about how moving forward from here.  I recommended palliative care consult as well as a hearing test.  Will see the patient back as a as needed basis      Damon Regan MD,  M.S.  Otolaryngology- Head & Neck Surgery  [phone]           Again, thank you for allowing me to participate in the care of your patient.      Sincerely,    Damon Regan MD

## 2025-04-10 NOTE — PATIENT INSTRUCTIONS
You were seen in the ENT Clinic today by Dr. Regan. If you have any questions or concerns after your appointment, please contact us (see below)    The following has been recommended for you based upon your appointment today:  Hearing test   Palliative care referral     Please return to clinic as needed     How to Contact Us:  Send a [employer] message to your provider. Our team will respond to you via [employer]. Occasionally, we will need to call you to get further information.  For urgent matters (Monday-Friday), call the ENT Clinic: [phone] and speak with a call center team member - they will route your call appropriately.   If you'd like to speak directly with a nurse, please find our contact information below. We do our best to check voicemail frequently throughout the day, and will work to call you back within 1-2 days. For urgent matters, please use the general clinic phone numbers listed above.    Carly CALLEJAS RN, BSN  RN Care Coordinator, ENT Clinic  [facility]  Direct: [phone]

## 2025-04-11 PROCEDURE — S9341 HIT ENTERAL GRAV DIEM: HCPCS

## 2025-04-11 PROCEDURE — S9341 HIT ENTERAL GRAV DIEM: HCPCS | Mod: SJ

## 2025-04-11 PROCEDURE — S9341 HIT ENTERAL GRAV DIEM: HCPCS | Mod: SH

## 2025-04-12 PROCEDURE — S9341 HIT ENTERAL GRAV DIEM: HCPCS

## 2025-04-12 PROCEDURE — S9341 HIT ENTERAL GRAV DIEM: HCPCS | Mod: SJ

## 2025-04-12 PROCEDURE — S9341 HIT ENTERAL GRAV DIEM: HCPCS | Mod: SH

## 2025-04-13 PROCEDURE — S9341 HIT ENTERAL GRAV DIEM: HCPCS | Mod: SH

## 2025-04-13 PROCEDURE — S9341 HIT ENTERAL GRAV DIEM: HCPCS | Mod: SJ

## 2025-04-13 PROCEDURE — S9341 HIT ENTERAL GRAV DIEM: HCPCS

## 2025-04-14 PROCEDURE — S9341 HIT ENTERAL GRAV DIEM: HCPCS

## 2025-04-14 PROCEDURE — S9341 HIT ENTERAL GRAV DIEM: HCPCS | Mod: SJ

## 2025-04-14 PROCEDURE — S9341 HIT ENTERAL GRAV DIEM: HCPCS | Mod: SH

## 2025-04-15 PROCEDURE — S9341 HIT ENTERAL GRAV DIEM: HCPCS | Mod: SJ

## 2025-04-15 PROCEDURE — S9341 HIT ENTERAL GRAV DIEM: HCPCS | Mod: SH

## 2025-04-15 PROCEDURE — S9341 HIT ENTERAL GRAV DIEM: HCPCS

## 2025-04-16 PROCEDURE — S9341 HIT ENTERAL GRAV DIEM: HCPCS

## 2025-04-16 PROCEDURE — S9341 HIT ENTERAL GRAV DIEM: HCPCS | Mod: SJ

## 2025-04-16 PROCEDURE — S9341 HIT ENTERAL GRAV DIEM: HCPCS | Mod: SH

## 2025-04-17 ENCOUNTER — TELEPHONE (OUTPATIENT)
Dept: OTOLARYNGOLOGY | Facility: CLINIC | Age: 65
End: 2025-04-17
Payer: COMMERCIAL

## 2025-04-17 PROCEDURE — S9341 HIT ENTERAL GRAV DIEM: HCPCS | Mod: SJ

## 2025-04-17 PROCEDURE — S9341 HIT ENTERAL GRAV DIEM: HCPCS

## 2025-04-17 PROCEDURE — S9341 HIT ENTERAL GRAV DIEM: HCPCS | Mod: SH

## 2025-04-17 NOTE — TELEPHONE ENCOUNTER
Lvm via interp to add on ent audio prior to Maricruz J appt on 5/29. Gave call center number for questions. Per provider Pt needs audio prior to [facility] appt, even if desiring to reschedule.  Ashley Woodward on 4/17/2025 at 1:37 PM

## 2025-04-18 PROCEDURE — S9341 HIT ENTERAL GRAV DIEM: HCPCS | Mod: SH

## 2025-04-18 PROCEDURE — S9341 HIT ENTERAL GRAV DIEM: HCPCS

## 2025-04-18 PROCEDURE — S9341 HIT ENTERAL GRAV DIEM: HCPCS | Mod: SJ

## 2025-04-19 PROCEDURE — S9341 HIT ENTERAL GRAV DIEM: HCPCS

## 2025-04-20 PROCEDURE — S9341 HIT ENTERAL GRAV DIEM: HCPCS

## 2025-04-21 PROCEDURE — S9341 HIT ENTERAL GRAV DIEM: HCPCS

## 2025-04-22 PROCEDURE — S9341 HIT ENTERAL GRAV DIEM: HCPCS

## 2025-04-23 PROCEDURE — S9341 HIT ENTERAL GRAV DIEM: HCPCS

## 2025-04-24 PROCEDURE — S9341 HIT ENTERAL GRAV DIEM: HCPCS

## 2025-04-25 PROCEDURE — S9341 HIT ENTERAL GRAV DIEM: HCPCS

## 2025-04-26 ENCOUNTER — HOSPITAL ENCOUNTER (EMERGENCY)
Facility: CLINIC | Age: 65
Discharge: HOME | End: 2025-04-26
Attending: EMERGENCY MEDICINE | Admitting: EMERGENCY MEDICINE
Payer: COMMERCIAL

## 2025-04-26 VITALS
RESPIRATION RATE: 18 BRPM | OXYGEN SATURATION: 99 % | SYSTOLIC BLOOD PRESSURE: 115 MMHG | HEART RATE: 89 BPM | DIASTOLIC BLOOD PRESSURE: 80 MMHG | TEMPERATURE: 98.8 F

## 2025-04-26 DIAGNOSIS — R04.0 EPISTAXIS: ICD-10-CM

## 2025-04-26 PROCEDURE — S9341 HIT ENTERAL GRAV DIEM: HCPCS

## 2025-04-26 PROCEDURE — 99284 EMERGENCY DEPT VISIT MOD MDM: CPT | Performed by: EMERGENCY MEDICINE

## 2025-04-26 PROCEDURE — 99283 EMERGENCY DEPT VISIT LOW MDM: CPT | Performed by: EMERGENCY MEDICINE

## 2025-04-26 RX ORDER — OXYMETAZOLINE HYDROCHLORIDE 0.05 G/100ML
2 SPRAY NASAL 2 TIMES DAILY
Status: DISCONTINUED | OUTPATIENT
Start: 2025-04-26 | End: 2025-04-26

## 2025-04-26 RX ORDER — OXYMETAZOLINE HYDROCHLORIDE 0.05 G/100ML
2 SPRAY NASAL ONCE
Status: DISCONTINUED | OUTPATIENT
Start: 2025-04-26 | End: 2025-04-26 | Stop reason: HOSPADM

## 2025-04-26 ASSESSMENT — ACTIVITIES OF DAILY LIVING (ADL): ADLS_ACUITY_SCORE: 56

## 2025-04-26 ASSESSMENT — COLUMBIA-SUICIDE SEVERITY RATING SCALE - C-SSRS
1. IN THE PAST MONTH, HAVE YOU WISHED YOU WERE DEAD OR WISHED YOU COULD GO TO SLEEP AND NOT WAKE UP?: NO
2. HAVE YOU ACTUALLY HAD ANY THOUGHTS OF KILLING YOURSELF IN THE PAST MONTH?: NO
6. HAVE YOU EVER DONE ANYTHING, STARTED TO DO ANYTHING, OR PREPARED TO DO ANYTHING TO END YOUR LIFE?: NO

## 2025-04-26 NOTE — DISCHARGE INSTRUCTIONS
Please follow up with the Ear / Nose / Throat specialists (otolaryngology) early next week. They will call you to schedule an appointment to be seen. If you have recurrent severe bleeding, return to the emergency department for further evaluation.

## 2025-04-26 NOTE — ED TRIAGE NOTES
Pt c/o nose bleed, left nare, starting around 11PM. Pt has hx of squamous cell carcinoma of the maxillary sinus and has had epistaxis in the past that has been difficult to control. Pt takes aspirin, no other blood thinners noted in home medication list. Pt has tissue in left nare controlling bleeding at this time.

## 2025-04-26 NOTE — ED PROVIDER NOTES
ED Provider Note  [facility]      History     Chief Complaint   Patient presents with    Epistaxis     HPI  Douglas Herrera is a 64 year old male who has a history of malignant neoplasm of the nasal cavity s/p Stealth assisted transnasal endoscopic approach to excise left sinonasal mass on December 1, 2023, followed by chemoradiation through February 2024, with recurrence at the left maxilla,  space, pterygopalatine fossa, and infratemporal fossa.  Patient presents this evening with complaint of nosebleed on the left side.  History was provided by the patient, chart review, and the patient's son is with him today.  The son provided  services and they declined a hospital-based .     Patient reports continuous nosebleeding on the left side that is come out of trickle for the past multiple hours, unable to stop with pressure when typically does stop with pressure.  He denies any bleeding down his posterior nasopharynx and is not having difficulty with breathing, is not swallowing significant months of blood, does not have other concerns at this time.  Denies any trauma prior to presentation today, not on blood thinner medications.    Past Medical History  Past Medical History:   Diagnosis Date    Ascending aortic aneurysm     Coronary artery disease     Depression     Gout     History of anesthesia complications     Hyperlipemia     Hypertension     Malignant neoplasm of nasal cavities (H)     PONV (postoperative nausea and vomiting)      Past Surgical History:   Procedure Laterality Date    AORTA SURGERY N/A 4/23/2019    Procedure: WITH ASCENDING AORTA REPLACEMENT;  Surgeon: Darlene Graff MD;  Location: [facility] OR;  Service: Cardiovascular    BYPASS GRAFT ARTERY CORONARY      CARDIAC SURGERY      CV CORONARY ANGIOGRAM N/A 3/12/2019    Procedure: Coronary Angiogram;  Surgeon: Hamilton Lucero MD;  Location: [facility] Cath Lab;  Service:  Cardiology    ENDOSCOPIC SINUS SURGERY Left 11/10/2023    Procedure: Transnasal endoscopic approach to biopsy left nasal mass;  Surgeon: Damon Regan MD;  Location: [facility] OR    GALLBLADDER SURGERY      INSERT PORT VASCULAR ACCESS N/A 1/11/2024    Procedure: Insert port vascular access;  Surgeon: Tyrel Silvestre MD;  Location: [facility] OR    IR CAROTID CEREBRAL ANGIOGRAM BILATERAL  11/30/2023    IR CHEST PORT PLACEMENT > 5 YRS OF AGE  1/11/2024    IR FACIAL EMBOLIZATION LEFT  9/13/2023    IR GASTROSTOMY TUBE CHANGE  11/22/2024    IR GASTROSTOMY TUBE PERCUTANEOUS PLCMNT  1/2/2024    OPTICAL TRACKING SYSTEM ENDOSCOPIC SINUS SURGERY Left 12/1/2023    Procedure: Stealth assisted transnasal endoscopic approach to excise left sinonasal mass;  Surgeon: Damon Regan MD;  Location: [facility] OR    RESECT TUMOR SINUS Left 10/11/2024    Procedure: Transnasal Biopsy of Left Maxillary Sinus Tumor,;  Surgeon: Damon Regan MD;  Location: [facility] OR     acetaminophen (TYLENOL) 325 MG tablet  aspirin 81 MG EC tablet  atorvastatin (LIPITOR) 80 MG tablet  celecoxib (CELEBREX) 5 mg/mL SUSP suspension  entecavir (BARACLUDE) 0.5 MG tablet  gabapentin (NEURONTIN) 250 MG/5ML solution  nitroGLYcerin (NITROSTAT) 0.4 MG sublingual tablet  ondansetron (ZOFRAN ODT) 4 MG ODT tab  ondansetron (ZOFRAN) 8 MG tablet  Osmolite 1.5 Migue 237 mL  polyethylene glycol (MIRALAX) 17 g packet  prochlorperazine (COMPAZINE) 10 MG tablet  senna-docusate (SENNA S) 8.6-50 MG tablet  sodium chloride (OCEAN) 0.65 % nasal spray      No Known Allergies  Family History  Family History   Problem Relation Age of Onset    Cerebrovascular Disease Mother 90    Acute Myocardial Infarction No family hx of     Anesthesia Reaction No family hx of      Social History   Social History     Tobacco Use    Smoking status: Former     Types: Cigarettes     Passive exposure: Past    Smokeless tobacco: Never   Substance Use Topics    Alcohol use:  Not Currently     Comment: Occasionally    Drug use: No      Past medical history, past surgical history, medications, allergies, family history, and social history were reviewed with the patient. No additional pertinent items.   A medically appropriate review of systems was performed with pertinent positives and negatives noted in the HPI, and all other systems negative.    Physical Exam   BP: 115/80  Pulse: 89  Temp: 98.8  F (37.1  C)  Resp: 18  SpO2: 99 %  Physical Exam  Vitals and nursing note reviewed.   Constitutional:       General: He is not in acute distress.     Appearance: Normal appearance. He is not toxic-appearing.   HENT:      Head: Atraumatic.      Nose:      Comments: Packing in left nose with dried blood present, no bleeding in right side of nose;      Mouth/Throat:      Mouth: Mucous membranes are dry.      Pharynx: Oropharynx is clear.      Comments: No posterior oropharyngeal bleeding visible  Eyes:      General: No scleral icterus.     Conjunctiva/sclera: Conjunctivae normal.   Cardiovascular:      Rate and Rhythm: Normal rate.      Heart sounds: Normal heart sounds.   Pulmonary:      Effort: Pulmonary effort is normal. No respiratory distress.      Breath sounds: Normal breath sounds.   Abdominal:      Palpations: Abdomen is soft.      Tenderness: There is no abdominal tenderness.   Musculoskeletal:         General: No deformity.      Cervical back: Neck supple.   Skin:     General: Skin is warm.   Neurological:      General: No focal deficit present.      Mental Status: He is alert and oriented to person, place, and time.   Psychiatric:         Mood and Affect: Mood normal.         Behavior: Behavior normal.           ED Course, Procedures, & Data      Procedures                No results found for any visits on 04/26/25.  Medications   oxymetazoline (AFRIN) 0.05 % spray 2 spray (has no administration in time range)     Labs Ordered and Resulted from Time of ED Arrival to Time of ED Departure  - No data to display  No orders to display          Critical care was not performed.     Medical Decision Making  The patient's presentation was of moderate complexity (a chronic illness mild to moderate exacerbation, progression, or side effect of treatment).    The patient's evaluation involved:  review of external note(s) from 1 sources (see separate area of note for details)  review of 1 test result(s) ordered prior to this encounter (INR, platelets)  discussion of management or test interpretation with another health professional (Otolaryngology)    The patient's management necessitated moderate risk (prescription drug management including medications given in the ED).    Assessment & Plan    Douglas Herrera is a 64 year old male who has a history of malignant neoplasm of the nasal cavity s/p Stealth assisted transnasal endoscopic approach to excise left sinonasal mass on December 1, 2023, followed by chemoradiation through February 2024, with recurrence at the left maxilla,  space, pterygopalatine fossa, and infratemporal fossa.  Patient presents this evening with complaint of nosebleed on the left side.     Patient is nontoxic-appearing on exam, his vital signs within normal limits.  I remove the packing in his left side of his nose and he had a small trickle of red blood that was visualized, pressure was applied, and Afrin was applied with successful arrest of bleeding.  Patient was monitored for 30 minutes without any recurrence of bleeding.  I did discuss the case with otolaryngology given the patient's complex history and they will plan to see him in clinic early next week.  I discussed return precautions with the patient and his son, including recurrence of bleeding or severe bleeding at which point we would plan to pack the nose if he returns.  Patient had reassuring evaluation overall and improved, was discharged with outpatient follow-up plan in place and return precautions.    I have reviewed  the nursing notes. I have reviewed the findings, diagnosis, plan and need for follow up with the patient.    New Prescriptions    No medications on file       Final diagnoses:   Epistaxis       Kash Ballard MD  [facility] EMERGENCY DEPARTMENT  4/26/2025     Kash Ballard MD  04/26/25 0313

## 2025-04-27 PROCEDURE — S9341 HIT ENTERAL GRAV DIEM: HCPCS

## 2025-04-28 PROCEDURE — S9341 HIT ENTERAL GRAV DIEM: HCPCS

## 2025-04-29 ENCOUNTER — HOME INFUSION (OUTPATIENT)
Dept: HOME HEALTH SERVICES | Facility: HOME HEALTH | Age: 65
End: 2025-04-29
Payer: COMMERCIAL

## 2025-04-29 ENCOUNTER — HOME INFUSION BILLING (OUTPATIENT)
Dept: HOME HEALTH SERVICES | Facility: HOME HEALTH | Age: 65
End: 2025-04-29
Payer: COMMERCIAL

## 2025-04-29 PROCEDURE — S9341 HIT ENTERAL GRAV DIEM: HCPCS

## 2025-04-30 ENCOUNTER — ONCOLOGY VISIT (OUTPATIENT)
Dept: ONCOLOGY | Facility: CLINIC | Age: 65
End: 2025-04-30
Attending: INTERNAL MEDICINE
Payer: COMMERCIAL

## 2025-04-30 VITALS
SYSTOLIC BLOOD PRESSURE: 104 MMHG | HEART RATE: 94 BPM | DIASTOLIC BLOOD PRESSURE: 68 MMHG | HEIGHT: 63 IN | WEIGHT: 136 LBS | BODY MASS INDEX: 24.1 KG/M2 | RESPIRATION RATE: 18 BRPM

## 2025-04-30 DIAGNOSIS — E83.42 HYPOMAGNESEMIA: ICD-10-CM

## 2025-04-30 DIAGNOSIS — E83.39 HYPOPHOSPHATASIA: ICD-10-CM

## 2025-04-30 DIAGNOSIS — E03.4 HYPOTHYROIDISM DUE TO ACQUIRED ATROPHY OF THYROID: ICD-10-CM

## 2025-04-30 DIAGNOSIS — H53.8 BLURRED VISION: ICD-10-CM

## 2025-04-30 DIAGNOSIS — C31.0 SQUAMOUS CELL CARCINOMA OF MAXILLARY SINUS (H): Primary | ICD-10-CM

## 2025-04-30 LAB
ALBUMIN SERPL BCG-MCNC: 3.4 G/DL (ref 3.5–5.2)
ALP SERPL-CCNC: 59 U/L (ref 40–150)
ALT SERPL W P-5'-P-CCNC: 38 U/L (ref 0–70)
ANION GAP SERPL CALCULATED.3IONS-SCNC: 9 MMOL/L (ref 7–15)
AST SERPL W P-5'-P-CCNC: 37 U/L (ref 0–45)
BASOPHILS # BLD AUTO: 0 10E3/UL (ref 0–0.2)
BASOPHILS NFR BLD AUTO: 0 %
BILIRUB SERPL-MCNC: 0.5 MG/DL
BUN SERPL-MCNC: 26.3 MG/DL (ref 8–23)
CALCIUM SERPL-MCNC: 9.5 MG/DL (ref 8.8–10.4)
CHLORIDE SERPL-SCNC: 97 MMOL/L (ref 98–107)
CREAT SERPL-MCNC: 0.81 MG/DL (ref 0.67–1.17)
EGFRCR SERPLBLD CKD-EPI 2021: >90 ML/MIN/1.73M2
EOSINOPHIL # BLD AUTO: 0 10E3/UL (ref 0–0.7)
EOSINOPHIL NFR BLD AUTO: 0 %
ERYTHROCYTE [DISTWIDTH] IN BLOOD BY AUTOMATED COUNT: 15.6 % (ref 10–15)
GLUCOSE SERPL-MCNC: 124 MG/DL (ref 70–99)
HCO3 SERPL-SCNC: 28 MMOL/L (ref 22–29)
HCT VFR BLD AUTO: 26.9 % (ref 40–53)
HGB BLD-MCNC: 8.9 G/DL (ref 13.3–17.7)
IMM GRANULOCYTES # BLD: 0.1 10E3/UL
IMM GRANULOCYTES NFR BLD: 1 %
LYMPHOCYTES # BLD AUTO: 2.4 10E3/UL (ref 0.8–5.3)
LYMPHOCYTES NFR BLD AUTO: 24 %
MAGNESIUM SERPL-MCNC: 1.7 MG/DL (ref 1.7–2.3)
MCH RBC QN AUTO: 28.7 PG (ref 26.5–33)
MCHC RBC AUTO-ENTMCNC: 33.1 G/DL (ref 31.5–36.5)
MCV RBC AUTO: 87 FL (ref 78–100)
MONOCYTES # BLD AUTO: 1 10E3/UL (ref 0–1.3)
MONOCYTES NFR BLD AUTO: 10 %
NEUTROPHILS # BLD AUTO: 6.4 10E3/UL (ref 1.6–8.3)
NEUTROPHILS NFR BLD AUTO: 65 %
NRBC # BLD AUTO: 0 10E3/UL
NRBC BLD AUTO-RTO: 0 /100
PLATELET # BLD AUTO: 253 10E3/UL (ref 150–450)
POTASSIUM SERPL-SCNC: 4.2 MMOL/L (ref 3.4–5.3)
PROT SERPL-MCNC: 8.2 G/DL (ref 6.4–8.3)
RBC # BLD AUTO: 3.1 10E6/UL (ref 4.4–5.9)
SODIUM SERPL-SCNC: 134 MMOL/L (ref 135–145)
T4 FREE SERPL-MCNC: 1.02 NG/DL (ref 0.9–1.7)
TSH SERPL DL<=0.005 MIU/L-ACNC: 4.03 UIU/ML (ref 0.3–4.2)
WBC # BLD AUTO: 9.9 10E3/UL (ref 4–11)

## 2025-04-30 PROCEDURE — 80053 COMPREHEN METABOLIC PANEL: CPT | Performed by: INTERNAL MEDICINE

## 2025-04-30 PROCEDURE — 83735 ASSAY OF MAGNESIUM: CPT | Performed by: INTERNAL MEDICINE

## 2025-04-30 PROCEDURE — 84443 ASSAY THYROID STIM HORMONE: CPT | Performed by: INTERNAL MEDICINE

## 2025-04-30 PROCEDURE — 36415 COLL VENOUS BLD VENIPUNCTURE: CPT | Performed by: INTERNAL MEDICINE

## 2025-04-30 PROCEDURE — 85004 AUTOMATED DIFF WBC COUNT: CPT | Performed by: INTERNAL MEDICINE

## 2025-04-30 PROCEDURE — S9341 HIT ENTERAL GRAV DIEM: HCPCS

## 2025-04-30 PROCEDURE — 84439 ASSAY OF FREE THYROXINE: CPT | Performed by: INTERNAL MEDICINE

## 2025-04-30 ASSESSMENT — PAIN SCALES - GENERAL: PAINLEVEL_OUTOF10: SEVERE PAIN (8)

## 2025-04-30 NOTE — PROGRESS NOTES
[facility]  [address] [address]  Phone: [phone]  Fax: [phone]    PATIENT NAME: Douglas Herrera  MRN # [id number]   DATE OF VISIT: April 30, 2025  YOB: 1960     Otolaryngology: Dr. Damon Regan   Radiation Oncology: Dr. Tomasa Akers  Cardiology: Dr. Qamar Hernandez  Hepatologist: [facility]   Palliative Care: Dr. Joshua Banks      CANCER TYPE: SCC sinonasal   STAGE: [id number] (IVB)  ECOG PS: 1     PD-L1: TPS 60-70%, CPS >70% on [id number]  NGS: internal panel. Fusion negative. ARID1A S90fs, EGFR exon 20 insertion, [id number], APC [id number], TMB 7.313    ASSESSMENT AND PLAN  SCC L maxillary sinus, [id number] (IVB), ARID1 mutation,  EGFR exon 20 insertion: Unfortunately, I see disease progression on the current scan, after 4 doses of carbo paclitaxel pembro and 2 doses of maintenance pembrolizumab, most recently 3/6/25. I agree that the lung nodules are modestly larger. Discussed options. First is to start amivantamab. We looked into coverage after our last visit and it's doable. Reviewed potential side effects, especially high risk of infusion reaction with C1D1 and dramatically less chance with subsequent doses, including C1D2. Option 2 is to give him some time off. The change in the nodules is very modest, and he's getting increasingly tired. More time to recover from all of the events over the last year will be helpful. Most likely nothing would materially change with the cancer. He's discouraged to the point of considering not doing any more treatment. We discussed pros and cons. At the end of our discussion, he'll take a month off and we'll go from there.     - home care - nurse, PT    Increasing trismus: SLP eval and reinforce exercises, stretching    Ear pressure: Has appt with Maricruz in ENT but not until 5/29. Will see if this can be moved up      Hypothyroidism: TFTs 3/6/25 about the same, c/w subclinical  hypothyrodisism. Will start low dose replacement. Might help him feel just a little bit better.     Nutrition: TF twice daily, eating one meal a day, usually rice/some meat, etc. Gtube exchanged 11/22/24. No changes today      Hearing loss: Unchanged - still pretty significant on L. Audiogram in future, declines now     Hepatitis B: HBSAby negative, SAg +, cAby +, HBV PCR negative 1/23/24. HCV negative.     H/o PE/DVT: 5/8/2019 CTA report scanned into [employer]. Small PEs, LLE DVT, acute, occlusive. Was on rivaroxaban, completed course.      ASCVD: Asa 81 mg daily, metoprolol per PCP       The longitudinal plan of care for the condition(s) below were addressed during this visit. Due to the added complexity in care, I will continue to support Douglas in the subsequent management of this condition(s) and with the ongoing continuity of care of this condition(s): maxillary sinus cancer      40 minutes spent by me on the date of the encounter doing chart review, review of outside records, review of test results, interpretation of tests, patient visit, documentation, orders,    Professional ipad  used throughout the visit    Kayy Post MD  Associate Professor of Medicine  Hematology, Oncology and Transplantation    SUBJECTIVE  Returns for routine follow-up on maintenance pembrolizumab.  Still very tired   L ear - feels very plugged. Some pressure  Message Maricruz to move up appt   Speeech pathologist, +/- PT to help with jaw stretching etc  Could brush teeth before can't anymore    CANCER SUMMARY  8/9/23                CT sinus.   8/24/23  MRI sinus. L maxillary sinus mass  9/12/23              ED for epistaxis.   9/13/23              CTA and embolization of L IMAX   10/4/23  Nasal bx. Path: Inverted papilloma with high grade dysplasia  11/10/23 Nasal endoscopy and bx in the OR. C/b severe bleeding. Path: Inverted sinonasal papilloma with severe dysplasia.  11/17/23            MRI. 7.4 x 4.6 x 6.6 cm L sinonasal  mass, destruction of nasal turbinates, L maxillary sinus, hard palate, invasion of L  space, involvement of medial and lateral pterygoids and temporalis muscles. Effacement and invasion ipsilateral pterygopalatine fossa, extends and effaces the nasopharynx.   11/30/23            Carotid angiogram. Direct endonasal and transoral embolization L sinonasal tumor, transaterial embolization of the L sphenopalatine artery feeders to the L sinonasal artery tumor   12/1/23  Stealth assisted transnasal endoscopic approach to excise L sinonasal mass. Pre-operative embolization. Path: SCC involving L maxilla.    12/9/23  PET. Hypermetabolic mass centered along the L maxilla, extending superiorly through the floor of the L maxillary sinus, posterior/laterally to the  space, invasion of the pterygoid muscles, extending cranially along the pterygopalatine fossa and pterygoid plates to the skull base level. Erosion of involved bones including L maxilla, maxillary sinus wall and pterygoid plates. Extension medially to the L lateral nasopharyngeal wall and soft palate. 1 mm fat place between carotid and mass. ~4.9 x 4.0 x 5.4 cm (SUV 24.3). Partially resected since 11/17/23 MRI. 8 mm L 2A node (SUV 5.9), 7 mm L level 2 node (SUV 5.5)  1/2/24  Gtube (IR)  1/4/24  Video swallow. No aspiration  1/8~2/27/24 Chemoradiation with weekly cisplatin.  1/11/24  Port (IR)  8/5/24  PET/CT.  Overall increased FDG uptake centered along the posterior inferior left maxillary sinus involving the  space, left pterygopalatine fossa, left parapharyngeal space, extending inferiorly to left maxilla.  Increased soft tissue component within the space, now broad FDG uptake (SUV 24.97), previously 5.75.  Area measures 3.3 x 2.6 cm. Increased focal hypermetabolic activity posterior pharyngeal wall, extending to the left palatine tonsil, associated mucosal thickening (SUV 17.23).  Non-FDG avid mass, 2.7 x 2.4 cm, previously 2.1 x  2.5 cm, another medially, 1.1 x 1.5 cm, previously 1.1 x 1 cm.  New focal area of FDG uptake right lateral oropharyngeal wall (SUV 9.07).  Few hypermetabolic right cervical nodes, none definitively enlarged, however increased activity compared to prior (SUV 7.29-8.36).  Several sub-6 mm pulmonary nodules.  8/5/24  MRI.  Heterogeneous mass along the base of the left maxillary sinus extending to the maxilla,  space, left pterygopalatine fossa, and parapharyngeal space.  Some areas of treated but some corresponding to FDG avidity on same-day PET, highly suspicious for tumor recurrence.  Question early left V2 involvement, thin dural enhancement along the base of the left temporal lobe     9/20/24  L maxillary, L superior alveolus bx in clinic Path: Fragments of at least SCC in situ.   10/11/24 Transnasal bx L maxillary sinus (Dr. Iglesias). 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  Multiloculated mass at base of L maxilla extending to involve L side of palate, retromaxillary region, extending into the  space, premaxillary region.  Similar to prior MRI 8/5/2024.  Continuous slight abnormal asymmetric thickening L V3 and V2.  Asymmetric thin dural enhancement along the base of the left temporal lobe.   10/29/2024 CT chest.  Scattered <6 mm lung nodules grossly stable compared to 8/5/2024.  Partially visualized common bile duct mildly dilated, 8 mm, possibly related to prior cholecystectomy, recommend MRCP.   1/20/25  CT neck and CAP.  Overall similar multiloculated L maxillary mass extending into the  and parapharyngeal spaces compared to PET/CT 8/5/2024.  No cervical adenopathy.  Stable small 2-5 mm lung nodules.  Increased RUL nodule, 1 mm --> 4 mm (5:139).  New 5 mm LLL nodule.  New somewhat linear 4 mm lingular nodule.  Attention on follow-up  11/20/24~1/24/25 Carboplatin + paclitaxel + pembrolizumab.   2/13~3/6/25 Maintenance pembrolizumab.     4/26/25  [facility] for L  nosebleed. Afrin and pressure    PAST MEDICAL HISTORY  Sinonasal carcinoma as above  HTN  ASCVD. CABG x 3 2019  PE and LLE DVT after CABG . Treated with rivaroxaban  Dyslipidemia  Hepatitis B   SCC R temple s/p MOHS  Ascending aortic aneurysm repair 2019  Hearing loss L   Gout - feet  Depression  Cholecystectomy    CURRENT OUTPATIENT MEDICATIONS  Reviewed    ALLERGIES  No Known Allergies     PHYSICAL EXAM  There were no vitals taken for this visit.    GEN: NAD  HEENT: EOMI, no icterus, injection or pallor. Oropharynx is clear.  NECK: no cervical or supraclavicular lymphadenopathy  LUNGS: clear bilaterally  CV: regular, no murmurs, rubs, or gallops  ABDOMEN: soft, non-tender, non-distended, normal bowel sounds  EXT: warm, well perfused, no edema  NEURO: alert  SKIN: no rashes    Remainder of physical exam deferred due to public health emergency and limitations of video visit.    LABORATORY AND IMAGING STUDIES  {Diagnostic Test Results (Optional):343258}     Labs independently reviewed and interpreted by me    CT Soft Tissue Neck w Contrast  Narrative: CT SOFT TISSUE NECK W CONTRAST 3/6/2025 10:39 AM    History:  Restage recurrent unresectable maxillary sinus cancer s/p 4  cycles of chemo + pembrolizumab, now on pembrolizubmab maintenance;  Squamous cell carcinoma of maxillary sinus (H)  ICD-10: Squamous cell carcinoma of maxillary sinus (H)      Comparison:  CT neck 1/29/2025, PET/CT 8/5/2024, 5/17/2024     Technique: Following intravenous administration of nonionic iodinated  contrast medium, thin section helical CT images were obtained from the  skull base down to the level of the aortic arch.  Axial, coronal and  sagittal reformations were performed with 2-3 mm slice thickness  reconstruction. Images were reviewed in soft tissue, lung and bone  windows.    Contrast: 71 cc [id number]    Findings:   Stable postsurgical changes of left maxillary antrostomy, left  turbinectomy and partial left ethmoidectomy.    Stable  appearance of loculated enhancing left maxillary mass extending  into the  and parapharyngeal spaces. Unchanged largest  loculated component adjacent to the left mandibular ramus measures 3.0  x 2.2 x 2.8  cm (4/54 and 5/36), previously 3.0 x 2.3 x 2.8 cm, with  peripheral calcification. Similar extent of cortical thinning of the  mandible ramus. Unchanged extent of erosion of the left sphenoid bone  and of the left maxillary hard palate resulting in asymmetric soft  tissue thickening of the roof of the mouth. Circumferential mucosal  thickening of the left maxillary cavity unchanged from prior. Similar  asymmetrically increased size of the left soft palate.     Unchanged paranasal sinus mucosal thickening. Complete left and  partial right mastoid air cell opacification unchanged from prior.    No pathologically enlarged, necrotic, or otherwise abnormal lymph  nodes.    The major salivary glands are unremarkable. Unchanged coarse  calcification in the right thyroid lobe, with additional smaller  subcentimeter nodules bilaterally.    The major vascular structures in the neck are unremarkable. Visualized  brain and orbits are unremarkable. Degenerative changes of the  cervical spine greatest at C5-6 are unchanged. Prior without  suspicious osseous lesion. No significant spinal canal stenosis.    Increased size and number of several solid pulmonary nodules  visualized in the lung apices, for example in the subpleural space in  the left lung apex measuring up to 8 mm, previously 4 mm, with  additional new nodules in the lateral left upper lobe apex of the  (6/93) and anteriorly the left upper lobe (6/101). Partially  visualized right chest wall Port-A-Cath. Sternotomy wires.  Atherosclerotic calcifications of the aortic arch  Impression: Impression:  Primary: NI-RADS 4} Postsurgical changes of left maxillary antrostomy,  left turbinectomy and left partial ethmoidectomy with stable  appearance of enhancing  multiloculated left maxillary mass consistent  with biopsy-proven recurrent squamous cell carcinoma. Extent of local  invasion and osseous involvement is stable.  Neck: NI-RADS 1}  No abnormal lymph node.   Other: Increased size of vizualized pulmonary nodules concerning for  disease progression. Please refer to same-day CT chest, abdomen and  pelvis for further details.    NI-RADS CECT Surveillance Legend:    Primary  1: No evidence of recurrence: routine surveillance  2: Low suspicion    a) Superficial abnormality (skin, mucosal surface): direct visual  inspection    b) Ill-defined deep abnormality: short interval follow-up* or PET  3: High suspicion (new or enlarging discrete nodule/mass): biopsy  4: Definitive recurrence (path proven or clinical progression): no  biopsy needed    Nodes  1: No evidence of recurrence: routine surveillance  2: Low suspicion (ill-defined): short interval follow-up or PET  3: High suspicion (new or enlarging lymph node): biopsy if clinically  needed  4: Definitive recurrence (path proven or clinical progression): no  biopsy needed    *short interval follow-up: 3 months at our institution    I have personally reviewed the examination and initial interpretation  and I agree with the findings.    BENNY LANDA MD         SYSTEM ID:  [id number]       *** personally reviewed and interpreted by me    {Do not delete. Used for tracking note template use:[id number]}

## 2025-04-30 NOTE — NURSING NOTE
"Oncology Rooming Note    April 30, 2025 3:26 PM   Douglas Herrera is a 64 year old male who presents for:    Chief Complaint   Patient presents with    Oncology Clinic Visit     Squamous cell carcinoma of maxillary      Initial Vitals: There were no vitals taken for this visit. Estimated body mass index is 24.45 kg/m  as calculated from the following:    Height as of 4/25/25: 1.592 m (5' 2.68\").    Weight as of 4/25/25: 62 kg (136 lb 9.6 oz). There is no height or weight on file to calculate BSA.  Data Unavailable Comment: Data Unavailable   No LMP for male patient.  Allergies reviewed: Yes  Medications reviewed: Yes    Medications: Medication refills not needed today.  Pharmacy name entered into [facility]: [facility] - [place], [place] - [address]    Frailty Screening:   Is the patient here for a new oncology consult visit in cancer care? 2. No    PHQ9:  Did this patient require a PHQ9?: No      Clinical concerns:      Left side facial swelling within the last 4 days, swelling has gone down today.    Intermittent nosebleeds within the last 4 days. 4/30/25 4 nosebleeds within the last 2-3 hours today. They occur throughout the whole day.       Antonieta Johns            "

## 2025-04-30 NOTE — Clinical Note
4/30/2025      Douglas Herrera  [address] [place] [address]      Dear Colleague,    Thank you for referring your patient, Douglas Herrera, to the [facility]. Please see a copy of my visit note below.        [facility]  [address] [facility] [address]  Phone: [phone]  Fax: [phone]    PATIENT NAME: Douglas Herrera  MRN # [id number]   DATE OF VISIT: April 30, 2025  YOB: 1960     Otolaryngology: Dr. Damon Regan   Radiation Oncology: Dr. Tomasa Akers  Cardiology: Dr. Qamar Hernandez  Hepatologist: YANN GEORGE   Palliative Care: Dr. Joshua Banks      CANCER TYPE: SCC sinonasal   STAGE: [id number] (IVB)  ECOG PS: 1     PD-L1: TPS 60-70%, CPS >70% on [id number]  NGS: internal panel. Fusion negative. ARID1A S90fs, EGFR exon 20 insertion, [id number], APC Q5613sn, TMB 7.313    ASSESSMENT AND PLAN  SCC L maxillary sinus, [id number] (IVB), ARID1 mutation,  EGFR exon 20 insertion: Unfortunately, I see disease progression on the current scan, after 4 doses of carbo paclitaxel pembro and 2 doses of maintenance pembrolizumab, most recently 3/6/25. I agree that the lung nodules are modestly larger. Discussed options. First is to start amivantamab. We looked into coverage after our last visit and it's doable. Reviewed potential side effects, especially high risk of infusion reaction with C1D1 and dramatically less chance with subsequent doses, including C1D2. Option 2 is to give him some time off. The change in the nodules is very modest, and he's getting increasingly tired. More time to recover from all of the events over the last year will be helpful. Most likely nothing would materially change with the cancer. He's discouraged to the point of considering not doing any more treatment. We discussed pros and cons. At the end of our discussion, he'll take a month off and we'll go from there.     Increasing trismus: SLP charlie and  reinforce exercises, stretching    Ear pressure: Has appt with Maricruz in ENT but not until 5/29. Will see if this can be moved up      Hypothyroidism: TFTs 3/6/25 about the same, c/w subclinical hypothyrodisism. Will start low dose replacement. Might help him feel just a little bit better.     Nutrition: TF twice daily, eating one meal a day, usually rice/some meat, etc. Gtube exchanged 11/22/24. No changes today      Hearing loss: Unchanged - still pretty significant on L. Audiogram in future, declines now     Hepatitis B: HBSAby negative, SAg +, cAby +, HBV PCR negative 1/23/24. HCV negative.     H/o PE/DVT: 5/8/2019 CTA report scanned into [facility]. Small PEs, LLE DVT, acute, occlusive. Was on rivaroxaban, completed course.      ASCVD: Asa 81 mg daily, metoprolol per PCP       The longitudinal plan of care for the condition(s) below were addressed during this visit. Due to the added complexity in care, I will continue to support Douglas in the subsequent management of this condition(s) and with the ongoing continuity of care of this condition(s): maxillary sinus cancer      40 minutes spent by me on the date of the encounter doing chart review, review of outside records, review of test results, interpretation of tests, patient visit, documentation, orders,    Professional ipad  used throughout the visit    Kayy Post MD  Associate Professor of Medicine  Hematology, Oncology and Transplantation    SUBJECTIVE  Returns for routine follow-up on maintenance pembrolizumab.  Still very tired   L ear - feels very plugged. Some pressure  Message Maricruz to move up appt   Speeech pathologist, +/- PT to help with jaw stretching etc  Could brush teeth before can't anymore    CANCER SUMMARY  8/9/23                CT sinus.   8/24/23  MRI sinus. L maxillary sinus mass  9/12/23              ED for epistaxis.   9/13/23              CTA and embolization of L IMAX   10/4/23  Nasal bx. Path: Inverted papilloma with high grade  dysplasia  11/10/23 Nasal endoscopy and bx in the OR. C/b severe bleeding. Path: Inverted sinonasal papilloma with severe dysplasia.  11/17/23            MRI. 7.4 x 4.6 x 6.6 cm L sinonasal mass, destruction of nasal turbinates, L maxillary sinus, hard palate, invasion of L  space, involvement of medial and lateral pterygoids and temporalis muscles. Effacement and invasion ipsilateral pterygopalatine fossa, extends and effaces the nasopharynx.   11/30/23            Carotid angiogram. Direct endonasal and transoral embolization L sinonasal tumor, transaterial embolization of the L sphenopalatine artery feeders to the L sinonasal artery tumor   12/1/23  Stealth assisted transnasal endoscopic approach to excise L sinonasal mass. Pre-operative embolization. Path: SCC involving L maxilla.    12/9/23  PET. Hypermetabolic mass centered along the L maxilla, extending superiorly through the floor of the L maxillary sinus, posterior/laterally to the  space, invasion of the pterygoid muscles, extending cranially along the pterygopalatine fossa and pterygoid plates to the skull base level. Erosion of involved bones including L maxilla, maxillary sinus wall and pterygoid plates. Extension medially to the L lateral nasopharyngeal wall and soft palate. 1 mm fat place between carotid and mass. ~4.9 x 4.0 x 5.4 cm (SUV 24.3). Partially resected since 11/17/23 MRI. 8 mm L 2A node (SUV 5.9), 7 mm L level 2 node (SUV 5.5)  1/2/24  Gtube (IR)  1/4/24  Video swallow. No aspiration  1/8~2/27/24 Chemoradiation with weekly cisplatin.  1/11/24  Port (IR)  8/5/24  PET/CT.  Overall increased FDG uptake centered along the posterior inferior left maxillary sinus involving the  space, left pterygopalatine fossa, left parapharyngeal space, extending inferiorly to left maxilla.  Increased soft tissue component within the space, now broad FDG uptake (SUV 24.97), previously 5.75.  Area measures 3.3 x 2.6 cm. Increased  focal hypermetabolic activity posterior pharyngeal wall, extending to the left palatine tonsil, associated mucosal thickening (SUV 17.23).  Non-FDG avid mass, 2.7 x 2.4 cm, previously 2.1 x 2.5 cm, another medially, 1.1 x 1.5 cm, previously 1.1 x 1 cm.  New focal area of FDG uptake right lateral oropharyngeal wall (SUV 9.07).  Few hypermetabolic right cervical nodes, none definitively enlarged, however increased activity compared to prior (SUV 7.29-8.36).  Several sub-6 mm pulmonary nodules.  8/5/24  MRI.  Heterogeneous mass along the base of the left maxillary sinus extending to the maxilla,  space, left pterygopalatine fossa, and parapharyngeal space.  Some areas of treated but some corresponding to FDG avidity on same-day PET, highly suspicious for tumor recurrence.  Question early left V2 involvement, thin dural enhancement along the base of the left temporal lobe     9/20/24  L maxillary, L superior alveolus bx in clinic Path: Fragments of at least SCC in situ.   10/11/24 Transnasal bx L maxillary sinus (Dr. Iglesias). 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  Multiloculated mass at base of L maxilla extending to involve L side of palate, retromaxillary region, extending into the  space, premaxillary region.  Similar to prior MRI 8/5/2024.  Continuous slight abnormal asymmetric thickening L V3 and V2.  Asymmetric thin dural enhancement along the base of the left temporal lobe.   10/29/2024 CT chest.  Scattered <6 mm lung nodules grossly stable compared to 8/5/2024.  Partially visualized common bile duct mildly dilated, 8 mm, possibly related to prior cholecystectomy, recommend MRCP.   1/20/25  CT neck and CAP.  Overall similar multiloculated L maxillary mass extending into the  and parapharyngeal spaces compared to PET/CT 8/5/2024.  No cervical adenopathy.  Stable small 2-5 mm lung nodules.  Increased RUL nodule, 1 mm --> 4 mm (5:139).  New 5 mm LLL nodule.  New  somewhat linear 4 mm lingular nodule.  Attention on follow-up  11/20/24~1/24/25 Carboplatin + paclitaxel + pembrolizumab.   2/13~3/6/25 Maintenance pembrolizumab.     4/26/25  [facility] for L nosebleed. Afrin and pressure    PAST MEDICAL HISTORY  Sinonasal carcinoma as above  HTN  ASCVD. CABG x 3 2019  PE and LLE DVT after CABG . Treated with rivaroxaban  Dyslipidemia  Hepatitis B   SCC R temple s/p MOHS  Ascending aortic aneurysm repair 2019  Hearing loss L   Gout - feet  Depression  Cholecystectomy    CURRENT OUTPATIENT MEDICATIONS  Reviewed    ALLERGIES  No Known Allergies     PHYSICAL EXAM  There were no vitals taken for this visit.    GEN: NAD  HEENT: EOMI, no icterus, injection or pallor. Oropharynx is clear.  NECK: no cervical or supraclavicular lymphadenopathy  LUNGS: clear bilaterally  CV: regular, no murmurs, rubs, or gallops  ABDOMEN: soft, non-tender, non-distended, normal bowel sounds  EXT: warm, well perfused, no edema  NEURO: alert  SKIN: no rashes    Remainder of physical exam deferred due to public health emergency and limitations of video visit.    LABORATORY AND IMAGING STUDIES  {Diagnostic Test Results (Optional):344076}     Labs independently reviewed and interpreted by me    CT Soft Tissue Neck w Contrast  Narrative: CT SOFT TISSUE NECK W CONTRAST 3/6/2025 10:39 AM    History:  Restage recurrent unresectable maxillary sinus cancer s/p 4  cycles of chemo + pembrolizumab, now on pembrolizubmab maintenance;  Squamous cell carcinoma of maxillary sinus (H)  ICD-10: Squamous cell carcinoma of maxillary sinus (H)      Comparison:  CT neck 1/29/2025, PET/CT 8/5/2024, 5/17/2024     Technique: Following intravenous administration of nonionic iodinated  contrast medium, thin section helical CT images were obtained from the  skull base down to the level of the aortic arch.  Axial, coronal and  sagittal reformations were performed with 2-3 mm slice thickness  reconstruction. Images were reviewed in soft tissue,  lung and bone  windows.    Contrast: 71 cc [id number]    Findings:   Stable postsurgical changes of left maxillary antrostomy, left  turbinectomy and partial left ethmoidectomy.    Stable appearance of loculated enhancing left maxillary mass extending  into the  and parapharyngeal spaces. Unchanged largest  loculated component adjacent to the left mandibular ramus measures 3.0  x 2.2 x 2.8  cm (4/54 and 5/36), previously 3.0 x 2.3 x 2.8 cm, with  peripheral calcification. Similar extent of cortical thinning of the  mandible ramus. Unchanged extent of erosion of the left sphenoid bone  and of the left maxillary hard palate resulting in asymmetric soft  tissue thickening of the roof of the mouth. Circumferential mucosal  thickening of the left maxillary cavity unchanged from prior. Similar  asymmetrically increased size of the left soft palate.     Unchanged paranasal sinus mucosal thickening. Complete left and  partial right mastoid air cell opacification unchanged from prior.    No pathologically enlarged, necrotic, or otherwise abnormal lymph  nodes.    The major salivary glands are unremarkable. Unchanged coarse  calcification in the right thyroid lobe, with additional smaller  subcentimeter nodules bilaterally.    The major vascular structures in the neck are unremarkable. Visualized  brain and orbits are unremarkable. Degenerative changes of the  cervical spine greatest at C5-6 are unchanged. Prior without  suspicious osseous lesion. No significant spinal canal stenosis.    Increased size and number of several solid pulmonary nodules  visualized in the lung apices, for example in the subpleural space in  the left lung apex measuring up to 8 mm, previously 4 mm, with  additional new nodules in the lateral left upper lobe apex of the  (6/93) and anteriorly the left upper lobe (6/101). Partially  visualized right chest wall Port-A-Cath. Sternotomy wires.  Atherosclerotic calcifications of the aortic  arch  Impression: Impression:  Primary: NI-RADS 4} Postsurgical changes of left maxillary antrostomy,  left turbinectomy and left partial ethmoidectomy with stable  appearance of enhancing multiloculated left maxillary mass consistent  with biopsy-proven recurrent squamous cell carcinoma. Extent of local  invasion and osseous involvement is stable.  Neck: NI-RADS 1}  No abnormal lymph node.   Other: Increased size of vizualized pulmonary nodules concerning for  disease progression. Please refer to same-day CT chest, abdomen and  pelvis for further details.    NI-RADS CECT Surveillance Legend:    Primary  1: No evidence of recurrence: routine surveillance  2: Low suspicion    a) Superficial abnormality (skin, mucosal surface): direct visual  inspection    b) Ill-defined deep abnormality: short interval follow-up* or PET  3: High suspicion (new or enlarging discrete nodule/mass): biopsy  4: Definitive recurrence (path proven or clinical progression): no  biopsy needed    Nodes  1: No evidence of recurrence: routine surveillance  2: Low suspicion (ill-defined): short interval follow-up or PET  3: High suspicion (new or enlarging lymph node): biopsy if clinically  needed  4: Definitive recurrence (path proven or clinical progression): no  biopsy needed    *short interval follow-up: 3 months at our institution    I have personally reviewed the examination and initial interpretation  and I agree with the findings.    BENNY LANDA MD         SYSTEM ID:  [id number]       *** personally reviewed and interpreted by me    {Do not delete. Used for tracking note template use:256193}           Again, thank you for allowing me to participate in the care of your patient.        Sincerely,        Kayy Post MD    Electronically signed

## 2025-05-01 ENCOUNTER — TELEPHONE (OUTPATIENT)
Dept: OPHTHALMOLOGY | Facility: CLINIC | Age: 65
End: 2025-05-01
Payer: COMMERCIAL

## 2025-05-01 PROCEDURE — S9341 HIT ENTERAL GRAV DIEM: HCPCS

## 2025-05-01 NOTE — TELEPHONE ENCOUNTER
son Tulio at [phone]     Spoke to son at 1720    Son states pt complaining of seeing two objects at distance for 2-3 days    Son states eyes look normal (no drifting of one eye or other)    No stroke like symptoms    No squinting of one eye or other    Unsure of monocular vs binocular.    Scheduled in acute eye clinic tomorrow afternoon    Son aware of date/time/location/duration/hospital based clinic/parking/ambassadors    Arthur High RN 5:25 PM 05/01/25

## 2025-05-01 NOTE — TELEPHONE ENCOUNTER
Home/mobile [phone]    Called times 2 and left message with direct number at [phone]    Arthur High RN 11:53 AM 05/01/25

## 2025-05-01 NOTE — TELEPHONE ENCOUNTER
Health Call Center    Phone Message    May a detailed message be left on voicemail: yes     Reason for Call: Appointment Intake    Referring Provider Name:     Kayy Post MD in  ONCOLOGY ADULT       Diagnosis and/or Symptoms: Squamous cell carcinoma of maxillary sinus , Blurred vision, evaluate visual acuity, optic nerve, eye exam. Sinonasal cancer, new blurry vision     Please call pt's son Tulio at [phone] for scheduling, Tulio speaks English.  They would like to schedule, this is an urgent referral    Action Taken: Message routed to:  [facility] eye    Travel Screening: Not Applicable     Date of Service:                                                                      ESRD on dialysis

## 2025-05-02 ENCOUNTER — OFFICE VISIT (OUTPATIENT)
Dept: OTOLARYNGOLOGY | Facility: CLINIC | Age: 65
End: 2025-05-02
Payer: COMMERCIAL

## 2025-05-02 VITALS
WEIGHT: 134 LBS | SYSTOLIC BLOOD PRESSURE: 122 MMHG | DIASTOLIC BLOOD PRESSURE: 75 MMHG | OXYGEN SATURATION: 99 % | HEART RATE: 94 BPM | TEMPERATURE: 99.9 F | BODY MASS INDEX: 23.98 KG/M2

## 2025-05-02 DIAGNOSIS — J34.89 NASAL OBSTRUCTION: Primary | ICD-10-CM

## 2025-05-02 DIAGNOSIS — R04.0 EPISTAXIS: ICD-10-CM

## 2025-05-02 PROCEDURE — 3074F SYST BP LT 130 MM HG: CPT | Performed by: PHYSICIAN ASSISTANT

## 2025-05-02 PROCEDURE — 1125F AMNT PAIN NOTED PAIN PRSNT: CPT | Performed by: PHYSICIAN ASSISTANT

## 2025-05-02 PROCEDURE — 31231 NASAL ENDOSCOPY DX: CPT | Performed by: PHYSICIAN ASSISTANT

## 2025-05-02 PROCEDURE — 3078F DIAST BP <80 MM HG: CPT | Performed by: PHYSICIAN ASSISTANT

## 2025-05-02 PROCEDURE — T1013 SIGN LANG/ORAL INTERPRETER: HCPCS | Performed by: INTERPRETER

## 2025-05-02 PROCEDURE — S9341 HIT ENTERAL GRAV DIEM: HCPCS

## 2025-05-02 ASSESSMENT — PAIN SCALES - GENERAL: PAINLEVEL_OUTOF10: SEVERE PAIN (8)

## 2025-05-02 NOTE — LETTER
5/2/2025       RE: Douglas Herrera  [address] [place] [facility] [address]     Dear Colleague,    Thank you for referring your patient, Douglas Herrera, to the [facility] EAR NOSE AND THROAT CLINIC [place] [facility]. Please see a copy of my visit note below.      [facility] NO[facility] AND THROAT [facility]INIC [place]  [address]  4TH FLOOR  [facility] [address]  Phone: [phone]  Fax: [phone]    Patient:  Douglas Herrera, Date of birth 1960  Date of Visit:  May 2, 2025  Referring Provider Referred Self      Nasal obstruction  Epistaxis  Will provide scope result to Dr. Post for further treatment planning      HPI:  Patient is under the care of Dr. Iglesias for malignant SCC of the maxillary sinus. He has recently begun to have nose bleeds and a scope was requested by Oncology to determine if bleeding is coming from the tumor. Son is with patient and he indicates that the bleeding is just annoying not necessarily significant.    Procedure Note  Procedure performed: Rigid nasal endoscopy  Indication: To evaluate for sinonasal pathology not visualized on routine anterior rhinoscopy  Anesthesia: 4% topical lidocaine with 0.05% oxymetazoline  Description of procedure: A 0 degree, 4 mm rigid endoscope was inserted into left nasal cavity and the nasal valves, interior nasal cavities, middle and inferior meati, turbinates, sphenoethmoid recess, and nasopharynx were thoroughly evaluated for evidence of obstruction, edema, purulence, polyps and/or mass/lesion.       Findings:    No active bleeding. Stream of coagulated blood from lateral left maxillary sinus draining into nasopharynx and to nasal vestibule.     The patient tolerated the procedure well without complication.      PHYSICAL EXAM:  /75   Pulse 94   Temp 99.9  F (37.7  C)   Wt 60.8 kg (134 lb)   SpO2 99%   BMI 23.98 kg/m        Tish Gillespie PA-C   Otolaryngology-Head &  Neck Surgery             Again, thank you for allowing me to participate in the care of your patient.      Sincerely,    Tish Gillespie PA-C

## 2025-05-02 NOTE — LETTER
5/2/2025      RE: Douglas Herrera  [address]  [place] [address]         [facility] EAR NOSE AND THROAT CLINIC [address]  [address] [facility] [address]  Phone: [phone]  Fax: [phone]    Patient:  Douglas Herrera, Date of birth 1960  Date of Visit:  May 2, 2025  Referring Provider Referred Self      Nasal obstruction  Epistaxis  Will provide scope result to Dr. Post for further treatment planning      HPI:  Patient is under the care of Dr. Iglesias for malignant SCC of the maxillary sinus. He has recently begun to have nose bleeds and a scope was requested by Oncology to determine if bleeding is coming from the tumor. Son is with patient and he indicates that the bleeding is just annoying not necessarily significant.    Procedure Note  Procedure performed: Rigid nasal endoscopy  Indication: To evaluate for sinonasal pathology not visualized on routine anterior rhinoscopy  Anesthesia: 4% topical lidocaine with 0.05% oxymetazoline  Description of procedure: A 0 degree, 4 mm rigid endoscope was inserted into left nasal cavity and the nasal valves, interior nasal cavities, middle and inferior meati, turbinates, sphenoethmoid recess, and nasopharynx were thoroughly evaluated for evidence of obstruction, edema, purulence, polyps and/or mass/lesion.       Findings:    No active bleeding. Stream of coagulated blood from lateral left maxillary sinus draining into nasopharynx and to nasal vestibule.     The patient tolerated the procedure well without complication.      PHYSICAL EXAM:  /75   Pulse 94   Temp 99.9  F (37.7  C)   Wt 60.8 kg (134 lb)   SpO2 99%   BMI 23.98 kg/m        Tish Gillespie PA-C   Otolaryngology-Head & Neck Surgery             Tish Gillespie PA-C

## 2025-05-02 NOTE — NURSING NOTE
Chief Complaint   Patient presents with    RECHECK     Epitaxis      Blood pressure 122/75, pulse 94, temperature 99.9  F (37.7  C), weight 60.8 kg (134 lb), SpO2 99%.  Jovanny Phipps LPN

## 2025-05-03 PROCEDURE — S9341 HIT ENTERAL GRAV DIEM: HCPCS

## 2025-05-04 ENCOUNTER — HOSPITAL ENCOUNTER (EMERGENCY)
Facility: CLINIC | Age: 65
Discharge: HOME | End: 2025-05-04
Attending: EMERGENCY MEDICINE | Admitting: EMERGENCY MEDICINE
Payer: COMMERCIAL

## 2025-05-04 VITALS
RESPIRATION RATE: 20 BRPM | SYSTOLIC BLOOD PRESSURE: 109 MMHG | HEART RATE: 109 BPM | TEMPERATURE: 98.1 F | OXYGEN SATURATION: 94 % | DIASTOLIC BLOOD PRESSURE: 68 MMHG

## 2025-05-04 DIAGNOSIS — R04.0 EPISTAXIS: ICD-10-CM

## 2025-05-04 LAB
ANION GAP SERPL CALCULATED.3IONS-SCNC: 12 MMOL/L (ref 7–15)
BASOPHILS # BLD AUTO: 0 10E3/UL (ref 0–0.2)
BASOPHILS NFR BLD AUTO: 0 %
BUN SERPL-MCNC: 29.9 MG/DL (ref 8–23)
CALCIUM SERPL-MCNC: 9.3 MG/DL (ref 8.8–10.4)
CHLORIDE SERPL-SCNC: 98 MMOL/L (ref 98–107)
CREAT SERPL-MCNC: 0.89 MG/DL (ref 0.67–1.17)
EGFRCR SERPLBLD CKD-EPI 2021: >90 ML/MIN/1.73M2
EOSINOPHIL # BLD AUTO: 0 10E3/UL (ref 0–0.7)
EOSINOPHIL NFR BLD AUTO: 0 %
ERYTHROCYTE [DISTWIDTH] IN BLOOD BY AUTOMATED COUNT: 15.8 % (ref 10–15)
GLUCOSE SERPL-MCNC: 195 MG/DL (ref 70–99)
HCO3 SERPL-SCNC: 24 MMOL/L (ref 22–29)
HCT VFR BLD AUTO: 25.4 % (ref 40–53)
HGB BLD-MCNC: 8.2 G/DL (ref 13.3–17.7)
HOLD SPECIMEN: NORMAL
IMM GRANULOCYTES # BLD: 0.1 10E3/UL
IMM GRANULOCYTES NFR BLD: 1 %
LYMPHOCYTES # BLD AUTO: 2.5 10E3/UL (ref 0.8–5.3)
LYMPHOCYTES NFR BLD AUTO: 24 %
MCH RBC QN AUTO: 28.4 PG (ref 26.5–33)
MCHC RBC AUTO-ENTMCNC: 32.3 G/DL (ref 31.5–36.5)
MCV RBC AUTO: 88 FL (ref 78–100)
MONOCYTES # BLD AUTO: 0.7 10E3/UL (ref 0–1.3)
MONOCYTES NFR BLD AUTO: 7 %
NEUTROPHILS # BLD AUTO: 7.3 10E3/UL (ref 1.6–8.3)
NEUTROPHILS NFR BLD AUTO: 69 %
NRBC # BLD AUTO: 0 10E3/UL
NRBC BLD AUTO-RTO: 0 /100
PLATELET # BLD AUTO: 276 10E3/UL (ref 150–450)
POTASSIUM SERPL-SCNC: 4.3 MMOL/L (ref 3.4–5.3)
RBC # BLD AUTO: 2.89 10E6/UL (ref 4.4–5.9)
SODIUM SERPL-SCNC: 134 MMOL/L (ref 135–145)
WBC # BLD AUTO: 10.7 10E3/UL (ref 4–11)

## 2025-05-04 PROCEDURE — S9341 HIT ENTERAL GRAV DIEM: HCPCS

## 2025-05-04 PROCEDURE — 80048 BASIC METABOLIC PNL TOTAL CA: CPT | Performed by: EMERGENCY MEDICINE

## 2025-05-04 PROCEDURE — 99283 EMERGENCY DEPT VISIT LOW MDM: CPT | Performed by: EMERGENCY MEDICINE

## 2025-05-04 PROCEDURE — 36415 COLL VENOUS BLD VENIPUNCTURE: CPT | Performed by: EMERGENCY MEDICINE

## 2025-05-04 PROCEDURE — 99284 EMERGENCY DEPT VISIT MOD MDM: CPT | Performed by: EMERGENCY MEDICINE

## 2025-05-04 PROCEDURE — 85004 AUTOMATED DIFF WBC COUNT: CPT | Performed by: EMERGENCY MEDICINE

## 2025-05-04 ASSESSMENT — COLUMBIA-SUICIDE SEVERITY RATING SCALE - C-SSRS
2. HAVE YOU ACTUALLY HAD ANY THOUGHTS OF KILLING YOURSELF IN THE PAST MONTH?: NO
6. HAVE YOU EVER DONE ANYTHING, STARTED TO DO ANYTHING, OR PREPARED TO DO ANYTHING TO END YOUR LIFE?: NO
1. IN THE PAST MONTH, HAVE YOU WISHED YOU WERE DEAD OR WISHED YOU COULD GO TO SLEEP AND NOT WAKE UP?: NO

## 2025-05-04 ASSESSMENT — ACTIVITIES OF DAILY LIVING (ADL)
ADLS_ACUITY_SCORE: 56
ADLS_ACUITY_SCORE: 56

## 2025-05-05 ENCOUNTER — APPOINTMENT (OUTPATIENT)
Dept: INTERPRETER SERVICES | Facility: CLINIC | Age: 65
End: 2025-05-05
Payer: COMMERCIAL

## 2025-05-05 ENCOUNTER — TELEPHONE (OUTPATIENT)
Dept: NEUROSURGERY | Facility: CLINIC | Age: 65
End: 2025-05-05
Payer: COMMERCIAL

## 2025-05-05 PROCEDURE — S9341 HIT ENTERAL GRAV DIEM: HCPCS

## 2025-05-05 NOTE — CONSULTS
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  He presents today with chief complaint of recurrent nasal bleeding.  The bleeding tends to be sudden and brisk and spontaneously remitting.  He was recently seen by our PA in ENT clinic and was found to have some clot in the left nasal passage.  He and his son understand that this bleeding is coming from the tumor which is not amenable for surgical treatment at this time.  His son does share that he previously had had an embolization procedure.  His hemoglobin today is 8.2 which is stable to recent checks.  The bleeding had stopped before he arrived at the hospital today.    Past Medical History:   Diagnosis Date    Ascending aortic aneurysm     Coronary artery disease     Depression     Gout     History of anesthesia complications     Hyperlipemia     Hypertension     Malignant neoplasm of nasal cavities (H)     PONV (postoperative nausea and vomiting)        Past Surgical History:   Procedure Laterality Date    AORTA SURGERY N/A 4/23/2019    Procedure: WITH ASCENDING AORTA REPLACEMENT;  Surgeon: Darlene Graff MD;  Location: [facility] OR;  Service: Cardiovascular    BYPASS GRAFT ARTERY CORONARY      CARDIAC SURGERY      CV CORONARY ANGIOGRAM N/A 3/12/2019    Procedure: Coronary Angiogram;  Surgeon: Hamilton Lucero MD;  Location: [facility];  Service: Cardiology    ENDOSCOPIC SINUS SURGERY Left 11/10/2023    Procedure: Transnasal endoscopic approach to biopsy left nasal mass;  Surgeon: Damon Regan MD;  Location: [facility] OR    GALLBLADDER SURGERY      INSERT PORT VASCULAR ACCESS N/A 1/11/2024     Procedure: Insert port vascular access;  Surgeon: Tyrel Silvestre MD;  Location: [facility] OR    IR CAROTID CEREBRAL ANGIOGRAM BILATERAL  11/30/2023    IR CHEST PORT PLACEMENT > 5 YRS OF AGE  1/11/2024    IR FACIAL EMBOLIZATION LEFT  9/13/2023    IR GASTROSTOMY TUBE CHANGE  11/22/2024    IR GASTROSTOMY TUBE PERCUTANEOUS PLCMNT  1/2/2024    OPTICAL TRACKING SYSTEM ENDOSCOPIC SINUS SURGERY Left 12/1/2023    Procedure: Stealth assisted transnasal endoscopic approach to excise left sinonasal mass;  Surgeon: Damon Regan MD;  Location: [facility] OR    RESECT TUMOR SINUS Left 10/11/2024    Procedure: Transnasal Biopsy of Left Maxillary Sinus Tumor,;  Surgeon: Damon Regan MD;  Location: [facility] OR       Current Outpatient Medications   Medication Sig Dispense Refill    acetaminophen (TYLENOL) 325 MG tablet Take 2 tablets (650 mg) by mouth every 4 hours as needed for other or pain 50 tablet 0    aspirin 81 MG EC tablet Take 1 tablet (81 mg) by mouth daily 90 tablet 3    atorvastatin (LIPITOR) 80 MG tablet TAKE 1 TABLET (80 MG TOTAL) BY MOUTH AT BEDTIME/ TXHUA HMO NOJ 1 LUB TSHUAJ THAUM MUS PW PAB ZOO NTSHAV MUAJ ROJ 90 tablet 3    celecoxib (CELEBREX) 5 mg/mL SUSP suspension Take 20 mLs (100 mg) by mouth 2 times daily. 1200 mL 1    entecavir (BARACLUDE) 0.5 MG tablet TAKE 1 PILL BY MOUTH EVERY DAY 1 HOUR BEFORE A MEAL OR 2 HOURS AFTER A MEAL      gabapentin (NEURONTIN) 250 MG/5ML solution Take 6 mLs (300 mg) by mouth 3 times daily. 540 mL 1    nitroGLYcerin (NITROSTAT) 0.4 MG sublingual tablet Place 1 tablet (0.4 mg) under the tongue every 5 minutes as needed. 25 tablet 3    ondansetron (ZOFRAN ODT) 4 MG ODT tab Take 1 tablet (4 mg) by mouth every 8 hours as needed for nausea. 4 tablet 0    ondansetron (ZOFRAN) 8 MG tablet Take 1 tablet (8 mg) by mouth every 8 hours as needed for nausea (vomiting). 30 tablet 2    Osmolite 1.5 Migue 237 mL Place 474 mLs into G tube 3 times daily. Infuse via gravity bag.  2 cartons TID spread 3-5 hours apart.   Water flush: 30-60 mL before and after each feeding. + 120 mL 4 times daily for hydration. 45115 mL 11    polyethylene glycol (MIRALAX) 17 g packet Take 1 packet by mouth as needed.      prochlorperazine (COMPAZINE) 10 MG tablet Take 1 tablet (10 mg) by mouth every 6 hours as needed for nausea or vomiting. 30 tablet 2    senna-docusate (SENNA S) 8.6-50 MG tablet Take 1 tablet by mouth 2 times daily as needed for constipation 30 tablet 1    sodium chloride (OCEAN) 0.65 % nasal spray Spray 2 sprays in nostril daily as needed for congestion. 88 mL 3        No Known Allergies    Social History     Socioeconomic History    Marital status:      Spouse name: Not on file    Number of children: Not on file    Years of education: Not on file    Highest education level: Not on file   Occupational History    Not on file   Tobacco Use    Smoking status: Former     Types: Cigarettes     Passive exposure: Past    Smokeless tobacco: Never   Substance and Sexual Activity    Alcohol use: Not Currently     Comment: Occasionally    Drug use: No    Sexual activity: Not on file   Other Topics Concern    Not on file   Social History Narrative    Not on file     Social Drivers of Health     Financial Resource Strain: Not on file   Food Insecurity: Not on file   Transportation Needs: Not on file   Physical Activity: Not on file   Stress: Not on file   Social Connections: Not on file   Interpersonal Safety: Low Risk  (10/11/2024)    Interpersonal Safety     Do you feel physically and emotionally safe where you currently live?: Yes     Within the past 12 months, have you been hit, slapped, kicked or otherwise physically hurt by someone?: No     Within the past 12 months, have you been humiliated or emotionally abused in other ways by your partner or ex-partner?: No   Housing Stability: Not on file       Family History   Problem Relation Age of Onset    Cerebrovascular Disease Mother 90    Acute  Myocardial Infarction No family hx of     Anesthesia Reaction No family hx of        ROS: 12 point review of systems is negative unless noted in HPI.    PHYSICAL EXAM:  /68   Pulse 109   Temp 98.1  F (36.7  C)   Resp 20   SpO2 94%   General: Resting in bed, ill-appearing  HEAD: normocephalic, atraumatic  Nose: Anterior gauze/paper towel packing in place bilaterally, dry.  On removal of anterior packing, the right nasal passage has dried blood in it, the left nasal passage has a clot.  Mouth: trismus, there does appear to be possible tumor visible through the maxillary alveolus on the left side, no active oral cavity bleeding  Oropharynx: Not visible secondary to trismus  Respiratory: breathing non-labored on RA, no stridor    ROUTINE IP LABS (Last four results)  BMP  Recent Labs   Lab 05/04/25 2132 04/30/25  1626   * 134*   POTASSIUM 4.3 4.2   CHLORIDE 98 97*   FLORENCE 9.3 9.5   CO2 24 28   BUN 29.9* 26.3*   CR 0.89 0.81   * 124*     CBC  Recent Labs   Lab 05/04/25 2132 04/30/25  1626   WBC 10.7 9.9   RBC 2.89* 3.10*   HGB 8.2* 8.9*   HCT 25.4* 26.9*   MCV 88 87   MCH 28.4 28.7   MCHC 32.3 33.1   RDW 15.8* 15.6*    253     INRNo lab results found in last 7 days.    Imaging:  Results for orders placed or performed during the hospital encounter of 03/06/25   CT Soft Tissue Neck w Contrast    Narrative    CT SOFT TISSUE NECK W CONTRAST 3/6/2025 10:39 AM    History:  Restage recurrent unresectable maxillary sinus cancer s/p 4  cycles of chemo + pembrolizumab, now on pembrolizubmab maintenance;  Squamous cell carcinoma of maxillary sinus (H)  ICD-10: Squamous cell carcinoma of maxillary sinus (H)      Comparison:  CT neck 1/29/2025, PET/CT 8/5/2024, 5/17/2024     Technique: Following intravenous administration of nonionic iodinated  contrast medium, thin section helical CT images were obtained from the  skull base down to the level of the aortic arch.  Axial, coronal and  sagittal reformations  were performed with 2-3 mm slice thickness  reconstruction. Images were reviewed in soft tissue, lung and bone  windows.    Contrast: 71 cc Isovue-370    Findings:   Stable postsurgical changes of left maxillary antrostomy, left  turbinectomy and partial left ethmoidectomy.    Stable appearance of loculated enhancing left maxillary mass extending  into the  and parapharyngeal spaces. Unchanged largest  loculated component adjacent to the left mandibular ramus measures 3.0  x 2.2 x 2.8  cm (4/54 and 5/36), previously 3.0 x 2.3 x 2.8 cm, with  peripheral calcification. Similar extent of cortical thinning of the  mandible ramus. Unchanged extent of erosion of the left sphenoid bone  and of the left maxillary hard palate resulting in asymmetric soft  tissue thickening of the roof of the mouth. Circumferential mucosal  thickening of the left maxillary cavity unchanged from prior. Similar  asymmetrically increased size of the left soft palate.     Unchanged paranasal sinus mucosal thickening. Complete left and  partial right mastoid air cell opacification unchanged from prior.    No pathologically enlarged, necrotic, or otherwise abnormal lymph  nodes.    The major salivary glands are unremarkable. Unchanged coarse  calcification in the right thyroid lobe, with additional smaller  subcentimeter nodules bilaterally.    The major vascular structures in the neck are unremarkable. Visualized  brain and orbits are unremarkable. Degenerative changes of the  cervical spine greatest at C5-6 are unchanged. Prior without  suspicious osseous lesion. No significant spinal canal stenosis.    Increased size and number of several solid pulmonary nodules  visualized in the lung apices, for example in the subpleural space in  the left lung apex measuring up to 8 mm, previously 4 mm, with  additional new nodules in the lateral left upper lobe apex of the  (6/93) and anteriorly the left upper lobe (6/101). Partially  visualized  right chest wall Port-A-Cath. Sternotomy wires.  Atherosclerotic calcifications of the aortic arch      Impression    Impression:  Primary: NI-RADS 4} Postsurgical changes of left maxillary antrostomy,  left turbinectomy and left partial ethmoidectomy with stable  appearance of enhancing multiloculated left maxillary mass consistent  with biopsy-proven recurrent squamous cell carcinoma. Extent of local  invasion and osseous involvement is stable.  Neck: NI-RADS 1}  No abnormal lymph node.   Other: Increased size of vizualized pulmonary nodules concerning for  disease progression. Please refer to same-day CT chest, abdomen and  pelvis for further details.    NI-RADS CECT Surveillance Legend:    Primary  1: No evidence of recurrence: routine surveillance  2: Low suspicion    a) Superficial abnormality (skin, mucosal surface): direct visual  inspection    b) Ill-defined deep abnormality: short interval follow-up* or PET  3: High suspicion (new or enlarging discrete nodule/mass): biopsy  4: Definitive recurrence (path proven or clinical progression): no  biopsy needed    Nodes  1: No evidence of recurrence: routine surveillance  2: Low suspicion (ill-defined): short interval follow-up or PET  3: High suspicion (new or enlarging lymph node): biopsy if clinically  needed  4: Definitive recurrence (path proven or clinical progression): no  biopsy needed    *short interval follow-up: 3 months at our institution    I have personally reviewed the examination and initial interpretation  and I agree with the findings.    BENNY LANDA MD         SYSTEM ID:  [id number]   CT Chest/Abdomen/Pelvis w Contrast    Narrative    EXAMINATION: CT CHEST/ABDOMEN/PELVIS W CONTRAST, 3/6/2025 10:39 AM    INDICATION: restage recurent unresectable maxillary sinus cancer s/p 4  cycles of chemo + pembo, now on pembro maintenance. Lung nodules,  re-evaluate; Squamous cell carcinoma of maxillary sinus (H)    COMPARISON STUDY: CT 1/20/2025, PET/CT  8/5/2024    TECHNIQUE: CT scan of the chest, abdomen and pelvis was performed on  multidetector CT scanner using volumetric acquisition technique and  images were reconstructed in multiple planes with variable thickness  and reviewed on dedicated workstations.     CONTRAST: 71 mL Isovue-370 injected IV without oral contrast    CT scan radiation dose is optimized to minimum requisite dose using  automated dose modulation techniques.    FINDINGS:    Lungs/pleura: No pneumothorax or pleural effusion. No acute pulmonary  opacities. Increased size of multiple pulmonary nodules highly  suspicious for metastatic disease:  -6 mm subpleural nodule in the right apical lung (7/56), previously 1  to 2 mm  -9 mm subpleural nodule in the left apical lung (7/57), previously 3  mm  -7 mm nodule in the left lower lobe (7/176), previously 3 mm  -7 mm subpleural nodule in the left lower lobe by the major fissure  (7/212), previously 5 mm    Mediastinum: Normal cardiac size. No pericardial effusion. Advanced  coronary calcifications. Postoperative changes of CABG. No mediastinal  lymphadenopathy. Unremarkable esophagus.    Liver: No mass. No intrahepatic biliary ductal dilation.    Biliary System: Normal gallbladder. No extrahepatic biliary ductal  dilation.    Pancreas: No mass or pancreatic ductal dilation.    Adrenal glands: No mass or nodules    Spleen: Normal.    Kidneys: No suspicious mass, obstructing calculus or hydronephrosis.    Gastrointestinal tract: Normal appendix. Normal caliber small bowel.   G tube with the balloon in the gastric antrum, similar to prior. Given  no significant gastric distention, no immediate concern for gastric  outlet obstruction.    Mesentery/peritoneum/retroperitoneum: No mass. No free fluid or air.    Lymph nodes: No significant lymphadenopathy.    Vasculature: Patent major abdominal vasculature.  Scattered  atherosclerotic calcification of abdominal aorta with no aneurysmal  dilation.     Pelvis:  Urinary bladder is normal.  Prostate and seminal vesicles are  within normal limits.    Osseous structures: No aggressive or acute osseous lesion.      Soft tissues: Within normal limits.  Right chest wall Port-A-Cath in  place.      Impression    IMPRESSION: In this patients with squamous cell carcinoma of the  maxillary sinus:   1. Enlarging pulmonary nodules concerning for disease progression.  2. No additional evidence of metastatic disease in the chest, abdomen,  and pelvis.  3. Unchanged position of the G-tube balloon in the gastric antrum.  Given nondistention of the stomach, no immediate concern for gastric  outlet obstruction.    I have personally reviewed the examination and initial interpretation  and I agree with the findings.    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  He presents today with chief complaint of recurrent nasal bleeding. This had stopped before his arrival to the ED.  I had a long discussion with him and his son this evening regarding the source of the bleeding most likely being his progressive tumor.  He and his son expressed understanding this.  They understand that the tumor is unresectable and unfortunately progressed during a recent course of chemotherapy and immunotherapy.  The bleeding episodes have deteriorated his quality of life and he is interested in possible interventions including embolization and sclerotherapy that could reduce the frequency or intensity of these episodes.  We did discuss that these interventions would not remove the tumor or significantly delay its progression and their benefit may be temporary.  At this  time it is very much within his goals to pursue interventions that may improve his quality of life by reducing the frequency and intensity of these episodes.    No further ENT interventions available for nasal bleeding from progressive unresectable tumor  Recommend outpatient IR consult on an urgent basis for consideration of sclerotherapy or embolization  ENT available to participate with IR sclerotherapy interventions if indicated    Patient and plan discussed with staff surgeon Dr. Mills.    Shawn Goodrich MD  Otolaryngology-Head & Neck Surgery  Please page ENT with questions by dialing [phone] and entering job code [phone] when prompted.

## 2025-05-05 NOTE — DISCHARGE INSTRUCTIONS
TODAY'S VISIT:  You were seen today for nose bleed  -   - If you had any labs or imaging/radiology tests performed today, you should also discuss these tests with your usual provider.     FOLLOW-UP:  Please make an appointment to follow up with:  - Your Primary Care Provider. If you do not have a PCP, please call the Primary Care Center (phone: [phone] for an appointment  - You should receive a call to set up a follow-up appointment with interventional radiology for possible embolization procedure to treat your recurrent nosebleeds    - Have your provider review the results from today's visit with you again to make sure no further follow-up or additional testing is needed based on those results.     RETURN TO THE EMERGENCY DEPARTMENT  Return to the Emergency Department at any time for any new or worsening symptoms or any concerns.

## 2025-05-05 NOTE — TELEPHONE ENCOUNTER
RECORDS RECEIVED FROM: internal   REASON FOR VISIT: Recurrent epistaxis due to sinonasal tumor    PROVIDER: Dr. Garcia   DATE OF APPT: 5/15/25   NOTES (FOR ALL VISITS) STATUS DETAILS   OFFICE NOTE from referring provider Internal SEE INPATIENT NOTES   DISCHARGE REPORT from the ER Internal [facility]:  5/4/25 4/26/25   MEDICATION LIST Internal    IMAGING  (FOR ALL VISITS)     ULTRASOUND (CAROTID BILAT) *VASCULAR* PACS [facility]:  IR Carotid Cerebral 11/30/23   MRI (HEAD, NECK, SPINE) PACS [facility]:  MRI Sinonasal 10/29/24  MRI Sinonasal 8/5/24  MRI Sinonasal 5/17/24    [facility]:  MRI Maxillofacial 8/23/23   CT (HEAD, NECK, SPINE) PACS [facility]:  CTA Head Neck 9/13/23

## 2025-05-05 NOTE — ED PROVIDER NOTES
History     Chief Complaint   Patient presents with    Epistaxis     HPI  Douglas Herrera is a 64 year old male with a past medical history of nasal cancer, ascending aortic aneurysm, CABG, PE, chronic hepatitis B who presents to the emergency department with a chief complaint of epistaxis.  Started last week.  The patient was seen here in the emergency department on 4/26 and was referred to ENT.  His family reports that he was seen by ENT on Friday and they did not want to irrigate the area due to clot in place.      Per chart review, patient was seen in oncology clinic on 4/30.  The patient was seen in the ENT and ophthalmology clinics on 5/2.    Since then, the patient and his son reports that he has had recurrent episodes of bleeding, some of which have been quite significant.  Bleeding has currently stopped.    The patient is not currently on blood thinners.    I have reviewed the Medications, Allergies, Past Medical and Surgical History, and Social History in the [employer] system.    Past Medical History:   Diagnosis Date    Ascending aortic aneurysm     Coronary artery disease     Depression     Gout     History of anesthesia complications     Hyperlipemia     Hypertension     Malignant neoplasm of nasal cavities (H)     PONV (postoperative nausea and vomiting)      Past Surgical History:   Procedure Laterality Date    AORTA SURGERY N/A 4/23/2019    Procedure: WITH ASCENDING AORTA REPLACEMENT;  Surgeon: Darlene Graff MD;  Location: [facility] OR;  Service: Cardiovascular    BYPASS GRAFT ARTERY CORONARY      CARDIAC SURGERY      CV CORONARY ANGIOGRAM N/A 3/12/2019    Procedure: Coronary Angiogram;  Surgeon: Hamilton Lucero MD;  Location: [facility] Cath Lab;  Service: Cardiology    ENDOSCOPIC SINUS SURGERY Left 11/10/2023    Procedure: Transnasal endoscopic approach to biopsy left nasal mass;  Surgeon: Damon Regan MD;  Location: [facility] OR    GALLBLADDER SURGERY      INSERT PORT  VASCULAR ACCESS N/A 1/11/2024    Procedure: Insert port vascular access;  Surgeon: Tyrel Silvestre MD;  Location: [facility] OR    IR CAROTID CEREBRAL ANGIOGRAM BILATERAL  11/30/2023    IR CHEST PORT PLACEMENT > 5 YRS OF AGE  1/11/2024    IR FACIAL EMBOLIZATION LEFT  9/13/2023    IR GASTROSTOMY TUBE CHANGE  11/22/2024    IR GASTROSTOMY TUBE PERCUTANEOUS PLCMNT  1/2/2024    OPTICAL TRACKING SYSTEM ENDOSCOPIC SINUS SURGERY Left 12/1/2023    Procedure: Stealth assisted transnasal endoscopic approach to excise left sinonasal mass;  Surgeon: Damon Regan MD;  Location: [facility] OR    RESECT TUMOR SINUS Left 10/11/2024    Procedure: Transnasal Biopsy of Left Maxillary Sinus Tumor,;  Surgeon: Damon Regan MD;  Location: [facility] OR     No current facility-administered medications for this encounter.     Current Outpatient Medications   Medication Sig Dispense Refill    acetaminophen (TYLENOL) 325 MG tablet Take 2 tablets (650 mg) by mouth every 4 hours as needed for other or pain 50 tablet 0    aspirin 81 MG EC tablet Take 1 tablet (81 mg) by mouth daily 90 tablet 3    atorvastatin (LIPITOR) 80 MG tablet TAKE 1 TABLET (80 MG TOTAL) BY MOUTH AT BEDTIME/ TXHUA HMO NOJ 1 LUB TSHUAJ THAUM MUS PW PAB ZOO NTSHAV MUAJ ROJ 90 tablet 3    celecoxib (CELEBREX) 5 mg/mL SUSP suspension Take 20 mLs (100 mg) by mouth 2 times daily. 1200 mL 1    entecavir (BARACLUDE) 0.5 MG tablet TAKE 1 PILL BY MOUTH EVERY DAY 1 HOUR BEFORE A MEAL OR 2 HOURS AFTER A MEAL      gabapentin (NEURONTIN) 250 MG/5ML solution Take 6 mLs (300 mg) by mouth 3 times daily. 540 mL 1    nitroGLYcerin (NITROSTAT) 0.4 MG sublingual tablet Place 1 tablet (0.4 mg) under the tongue every 5 minutes as needed. 25 tablet 3    ondansetron (ZOFRAN ODT) 4 MG ODT tab Take 1 tablet (4 mg) by mouth every 8 hours as needed for nausea. 4 tablet 0    ondansetron (ZOFRAN) 8 MG tablet Take 1 tablet (8 mg) by mouth every 8 hours as needed for nausea (vomiting). 30  tablet 2    Osmolite 1.5 Migue 237 mL Place 474 mLs into G tube 3 times daily. Infuse via gravity bag. 2 cartons TID spread 3-5 hours apart.   Water flush: 30-60 mL before and after each feeding. + 120 mL 4 times daily for hydration. 71593 mL 11    polyethylene glycol (MIRALAX) 17 g packet Take 1 packet by mouth as needed.      prochlorperazine (COMPAZINE) 10 MG tablet Take 1 tablet (10 mg) by mouth every 6 hours as needed for nausea or vomiting. 30 tablet 2    senna-docusate (SENNA S) 8.6-50 MG tablet Take 1 tablet by mouth 2 times daily as needed for constipation 30 tablet 1    sodium chloride (OCEAN) 0.65 % nasal spray Spray 2 sprays in nostril daily as needed for congestion. 88 mL 3     Facility-Administered Medications Ordered in Other Encounters   Medication Dose Route Frequency Provider Last Rate Last Admin    heparin lock flush 100 unit/mL injection 500 Units  500 Units Intracatheter Once Damon Regan MD         No Known Allergies  Past medical history, past surgical history, medications, and allergies were reviewed with the patient. Additional pertinent items: None    Social History     Socioeconomic History    Marital status:      Spouse name: Not on file    Number of children: Not on file    Years of education: Not on file    Highest education level: Not on file   Occupational History    Not on file   Tobacco Use    Smoking status: Former     Types: Cigarettes     Passive exposure: Past    Smokeless tobacco: Never   Substance and Sexual Activity    Alcohol use: Not Currently     Comment: Occasionally    Drug use: No    Sexual activity: Not on file   Other Topics Concern    Not on file   Social History Narrative    Not on file     Social Drivers of Health     Financial Resource Strain: Not on file   Food Insecurity: Not on file   Transportation Needs: Not on file   Physical Activity: Not on file   Stress: Not on file   Social Connections: Not on file   Interpersonal Safety: Low Risk   (10/11/2024)    Interpersonal Safety     Do you feel physically and emotionally safe where you currently live?: Yes     Within the past 12 months, have you been hit, slapped, kicked or otherwise physically hurt by someone?: No     Within the past 12 months, have you been humiliated or emotionally abused in other ways by your partner or ex-partner?: No   Housing Stability: Not on file     Social history was reviewed with the patient. Additional pertinent items: None    Review of Systems  A medically appropriate review of systems was performed with pertinent positives and negatives noted in the HPI, and all other systems negative.    Physical Exam   BP: 109/68  Pulse: 109  Temp: 98.1  F (36.7  C)  Resp: 20  SpO2: 94 %      General: Well nourished, well developed, NAD  HEENT: EOMI, anicteric. NCAT, MMM, dried blood bilateral naris, no active bleeding  Neck: no jugular venous distension, supple, nl ROM  Cardiac: Tachycardic rate, extremities well-perfused   pulm: NLB, normal RR  Skin: Warm and dry to the touch.  No rash  Extremities: No LE edema, no cyanosis, w/w/p  Neuro: A&Ox3, no gross focal deficits    ED Course        Procedures                        Labs Ordered and Resulted from Time of ED Arrival to Time of ED Departure   BASIC METABOLIC PANEL - Abnormal       Result Value    Sodium 134 (*)     Potassium 4.3      Chloride 98      Carbon Dioxide (CO2) 24      Anion Gap 12      Urea Nitrogen 29.9 (*)     Creatinine 0.89      GFR Estimate >90      Calcium 9.3      Glucose 195 (*)    CBC WITH PLATELETS AND DIFFERENTIAL - Abnormal    WBC Count 10.7      RBC Count 2.89 (*)     Hemoglobin 8.2 (*)     Hematocrit 25.4 (*)     MCV 88      MCH 28.4      MCHC 32.3      RDW 15.8 (*)     Platelet Count 276      % Neutrophils 69      % Lymphocytes 24      % Monocytes 7      % Eosinophils 0      % Basophils 0      % Immature Granulocytes 1      NRBCs per 100 WBC 0      Absolute Neutrophils 7.3      Absolute Lymphocytes 2.5       Absolute Monocytes 0.7      Absolute Eosinophils 0.0      Absolute Basophils 0.0      Absolute Immature Granulocytes 0.1      Absolute NRBCs 0.0              Results for orders placed or performed during the hospital encounter of 05/04/25 (from the past 24 hours)   Extra Tube ([place] Draw)    Narrative    The following orders were created for panel order Extra Tube ([place] Draw).  Procedure                               Abnormality         Status                     ---------                               -----------         ------                     Extra Blue Top Tube[[id number]]                             In process                 Extra Red Top Tube[[id number]]                              In process                 Extra Green Top ([place]...[[id number]]                      In process                 Extra Purple Top Tube[[id number]]                           In process                 Extra Blood Bank Purple...[[id number]]                      Final result                 Please view results for these tests on the individual orders.   Extra Blood Bank Purple Top Tube   Result Value Ref Range    Hold Specimen [facility]    CBC with platelets differential    Narrative    The following orders were created for panel order CBC with platelets differential.  Procedure                               Abnormality         Status                     ---------                               -----------         ------                     CBC with platelets and ...[[id number]]  Abnormal            Final result                 Please view results for these tests on the individual orders.   Basic metabolic panel   Result Value Ref Range    Sodium 134 (L) 135 - 145 mmol/L    Potassium 4.3 3.4 - 5.3 mmol/L    Chloride 98 98 - 107 mmol/L    Carbon Dioxide (CO2) 24 22 - 29 mmol/L    Anion Gap 12 7 - 15 mmol/L    Urea Nitrogen 29.9 (H) 8.0 - 23.0 mg/dL    Creatinine 0.89 0.67 - 1.17 mg/dL    GFR Estimate >90 >60 mL/min/1.73m2     Calcium 9.3 8.8 - 10.4 mg/dL    Glucose 195 (H) 70 - 99 mg/dL   CBC with platelets and differential   Result Value Ref Range    WBC Count 10.7 4.0 - 11.0 10e3/uL    RBC Count 2.89 (L) 4.40 - 5.90 10e6/uL    Hemoglobin 8.2 (L) 13.3 - 17.7 g/dL    Hematocrit 25.4 (L) 40.0 - 53.0 %    MCV 88 78 - 100 fL    MCH 28.4 26.5 - 33.0 pg    MCHC 32.3 31.5 - 36.5 g/dL    RDW 15.8 (H) 10.0 - 15.0 %    Platelet Count 276 150 - 450 10e3/uL    % Neutrophils 69 %    % Lymphocytes 24 %    % Monocytes 7 %    % Eosinophils 0 %    % Basophils 0 %    % Immature Granulocytes 1 %    NRBCs per 100 WBC 0 <1 /100    Absolute Neutrophils 7.3 1.6 - 8.3 10e3/uL    Absolute Lymphocytes 2.5 0.8 - 5.3 10e3/uL    Absolute Monocytes 0.7 0.0 - 1.3 10e3/uL    Absolute Eosinophils 0.0 0.0 - 0.7 10e3/uL    Absolute Basophils 0.0 0.0 - 0.2 10e3/uL    Absolute Immature Granulocytes 0.1 <=0.4 10e3/uL    Absolute NRBCs 0.0 10e3/uL       Labs, vital signs, and imaging studies were reviewed by me.    Medications - No data to display    Assessments & Plan (with Medical Decision Making)   Douglas Herrera is a 64 year old male who presents to the emergency department with epistaxis.  Labs were ordered to evaluate for dropped hemoglobin due to ongoing bleeding.  No active bleeding, so no epistaxis intervention was performed.  Will consult ENT to come evaluate the patient    Laboratory workup is remarkable for stable hemoglobin at 8.2, white blood cell count within normal limits, BMP is largely unremarkable aside from sodium 134, BUN 29.9, glucose 195.  Creatinine is within normal limits at 0.89.    Patient was discussed with ENT, they have evaluated the patient in the emergency department and patient continues to have no further active bleeding here.  They note the patient is not a surgical candidate for intervention with their team.  Bleeding episodes are related to the patient's tumor which is not resectable.  However, he may be a candidate for follow-up for  embolization with interventional radiology.  An urgent outpatient consult for this was placed.    Critical care was not performed.     Medical Decision Making  The patient's presentation was of moderate complexity (a chronic illness mild to moderate exacerbation, progression, or side effect of treatment).    The patient's evaluation involved:  review of 2 test result(s) ordered prior to this encounter (previous labs for comparison)  ordering and/or review of 2 test(s) in this encounter (see separate area of note for details)  discussion of management or test interpretation with another health professional (ENT)    The patient's management necessitated moderate risk (limitations due to social determinants of health (non-English-speaking patient)).    I have reviewed the nursing notes.    I have reviewed the findings, diagnosis, plan and need for follow up with the patient.    Patient to be discharged home. Advised to follow up with interventional radiology within the next few days.  Referral was placed.  To return to ER immediately with any new/worsening symptoms. Plan of care discussed with patient and his son who expresses understanding and agrees with plan of care.      New Prescriptions    No medications on file       Final diagnoses:   Epistaxis       JOAN RIVERA MD  5/4/2025   [facility] EMERGENCY DEPARTMENT       Joan Rivera MD  05/04/25 [id number]

## 2025-05-05 NOTE — ED TRIAGE NOTES
Arrives ambulatory with nose bleed. Reports it started last week. He was seen here in the ER and referred to ENT. Per family he seen ENT Friday and was told he had clot that they didn't want to irrigate it.     Hx nasal cancer     Triage Assessment (Adult)       Row Name 05/04/25 5085          Triage Assessment    Airway WDL WDL        Respiratory WDL    Respiratory WDL WDL        Skin Circulation/Temperature WDL    Skin Circulation/Temperature WDL WDL        Cardiac WDL    Cardiac WDL X;rhythm     Pulse Rate & Regularity tachycardic        Peripheral/Neurovascular WDL    Peripheral Neurovascular WDL WDL        Cognitive/Neuro/Behavioral WDL    Cognitive/Neuro/Behavioral WDL WDL

## 2025-05-06 PROCEDURE — S9341 HIT ENTERAL GRAV DIEM: HCPCS

## 2025-05-07 PROCEDURE — S9341 HIT ENTERAL GRAV DIEM: HCPCS

## 2025-05-08 PROCEDURE — S9341 HIT ENTERAL GRAV DIEM: HCPCS

## 2025-05-11 ENCOUNTER — ANCILLARY PROCEDURE (OUTPATIENT)
Dept: MRI IMAGING | Facility: CLINIC | Age: 65
End: 2025-05-11
Attending: INTERNAL MEDICINE
Payer: COMMERCIAL

## 2025-05-11 DIAGNOSIS — C31.0 SQUAMOUS CELL CARCINOMA OF MAXILLARY SINUS (H): ICD-10-CM

## 2025-05-11 PROCEDURE — 70543 MRI ORBT/FAC/NCK W/O &W/DYE: CPT | Performed by: RADIOLOGY

## 2025-05-11 PROCEDURE — T1013 SIGN LANG/ORAL INTERPRETER: HCPCS | Performed by: INTERPRETER

## 2025-05-11 PROCEDURE — 70553 MRI BRAIN STEM W/O & W/DYE: CPT | Performed by: RADIOLOGY

## 2025-05-11 PROCEDURE — A9585 GADOBUTROL INJECTION: HCPCS | Performed by: RADIOLOGY

## 2025-05-11 RX ORDER — GADOBUTROL 604.72 MG/ML
7.5 INJECTION INTRAVENOUS ONCE
Status: COMPLETED | OUTPATIENT
Start: 2025-05-11 | End: 2025-05-11

## 2025-05-11 RX ADMIN — GADOBUTROL 6 ML: 604.72 INJECTION INTRAVENOUS at 12:15

## 2025-05-12 DIAGNOSIS — C31.0 SQUAMOUS CELL CARCINOMA OF MAXILLARY SINUS (H): ICD-10-CM

## 2025-05-12 DIAGNOSIS — G89.3 NEOPLASM RELATED PAIN: ICD-10-CM

## 2025-05-12 NOTE — PROGRESS NOTES
[facility] [address] [address]  Phone: [phone]  Fax: [phone]    Patient:  Douglas Herrera, Date of birth 1960  Date of Visit:  May 2, 2025  Referring Provider Referred Self      Nasal obstruction  Epistaxis  Will provide scope result to Dr. Post for further treatment planning      HPI:  Patient is under the care of Dr. Iglesias for malignant SCC of the maxillary sinus. He has recently begun to have nose bleeds and a scope was requested by Oncology to determine if bleeding is coming from the tumor. Son is with patient and he indicates that the bleeding is just annoying not necessarily significant.    Procedure Note  Procedure performed: Rigid nasal endoscopy  Indication: To evaluate for sinonasal pathology not visualized on routine anterior rhinoscopy  Anesthesia: 4% topical lidocaine with 0.05% oxymetazoline  Description of procedure: A 0 degree, 4 mm rigid endoscope was inserted into left nasal cavity and the nasal valves, interior nasal cavities, middle and inferior meati, turbinates, sphenoethmoid recess, and nasopharynx were thoroughly evaluated for evidence of obstruction, edema, purulence, polyps and/or mass/lesion.       Findings:    No active bleeding. Stream of coagulated blood from lateral left maxillary sinus draining into nasopharynx and to nasal vestibule.     The patient tolerated the procedure well without complication.      PHYSICAL EXAM:  /75   Pulse 94   Temp 99.9  F (37.7  C)   Wt 60.8 kg (134 lb)   SpO2 99%   BMI 23.98 kg/m        Tish Gillespie PA-C   Otolaryngology-Head & Neck Surgery

## 2025-05-12 NOTE — TELEPHONE ENCOUNTER
Received [employer] message from patient's son requesting refill of celebrex and gabapentin. He is requesting to change from liquid to capsules for both medications.     Last refill of gabapentin: 4/25/25 (1 bottle was left out > 6 hours and family discarded it)  Last office visit: 4/25/25  Scheduled for follow up 5/30/25     Will route request to MD/DO for review.     Reviewed [place]  Report.

## 2025-05-13 DIAGNOSIS — C31.0 SQUAMOUS CELL CARCINOMA OF MAXILLARY SINUS (H): Primary | ICD-10-CM

## 2025-05-13 RX ORDER — DEXAMETHASONE 4 MG/1
4 TABLET ORAL 2 TIMES DAILY WITH MEALS
Qty: 8 TABLET | Refills: 0 | Status: SHIPPED | OUTPATIENT
Start: 2025-05-11 | End: 2025-05-15

## 2025-05-13 RX ORDER — CELECOXIB 100 MG/1
100 CAPSULE ORAL 2 TIMES DAILY
Qty: 60 CAPSULE | Refills: 2 | Status: SHIPPED | OUTPATIENT
Start: 2025-05-13

## 2025-05-13 RX ORDER — GABAPENTIN 300 MG/1
300 CAPSULE ORAL 3 TIMES DAILY
Qty: 90 CAPSULE | Refills: 2 | Status: SHIPPED | OUTPATIENT
Start: 2025-05-13

## 2025-05-13 NOTE — PROGRESS NOTES
[facility]  [address] [address]  Phone: [phone]  Fax: [phone]    PATIENT NAME: Douglas Herrera  MRN # [id number]   DATE OF VISIT: May 15, 2025  YOB: 1960     Otolaryngology: Dr. Damon Regan   Radiation Oncology: Dr. Tomasa Akers  Cardiology: Dr. Qamar Hernandez  Hepatologist: [facility]   Palliative Care: Dr. Joshua Banks      CANCER TYPE: SCC sinonasal   STAGE: [id number] (IVB)  ECOG PS: 1     PD-L1: TPS 60-70%, CPS >70% on [id number]  NGS: internal panel. Fusion negative. ARID1A S90fs, EGFR exon 20 insertion, [id number], APC [id number], TMB 7.313    ASSESSMENT AND PLAN  SCC L maxillary sinus, [id number] (IVB), ARID1 mutation,  EGFR exon 20 insertion:   Met with Dr. Post on 4/30/25 for further discussion about disease progression and treatment options. Initial recommendation was for amivantamab to target EGFR exon 20 insertion mutation although due to inability to secure coverage through insurance or DRP we are recommending transition to cetuximab (500 mg/m2) + nivolumab (240 mg) every 2 weeks. They received education via Trudy Shelton, RNCC yesterday and we again reviewed rationale for use as well as potential side effects including but not limited to fatal infusion reactions, electrolyte disturbances, dermatologic toxicities including acneiform rash, lung toxicities and immune-mediated toxicities similar to risks with pembrolizumab. Both Douglas and Tulio verbalize understanding and consent to proceed with treatment today. Dr. Post is still pursing compassionate use of amivantamab. We reviewed brain MRI from 5/11 showing enhancement of the tumor in the L sinus. Baseline labs today reviewed. Leukocytosis likely due to recent use of dexamethasone which he was taking in anticipation of amivantamab (confirmed he has now stopped this). Hemoglobin is dropping likely d/t chronic blood loss and epistaxis. Recommended  transfusion now or close monitoring with repeat labs in 1 week.   Plan:  -Proceed with cetuximab today (no nivolumab with C1D1)  -RTC in 2 weeks for C1D15 cetuximab + nivolumab, labs, KENNEDI visit for toxicity monitoring  -Transfuse 1 unit PRBC today or tomorrow  -RTC in 4 weeks for C2D1    Epistaxis: Caroline nitrate in the ED. Repeat ED visit 5/4. Bleeding at home relatively stable and minimal although active bleeding in clinic today. Transfusion support as above.   Packing with gauze. Neurosurg consult this afternoon as well.     Blurry vision: Left side only. Stable. No optic nerve involvement on MRI.    Increasing trismus: SLP eval and reinforce exercises. Trismus is significant and completely limiting oral exam today     Hypothyroidism: TFTs c/w subclinical hypothyroidism, but TSH/FT4 normal today. Continue checking regularly--orders in treatment plan with nivolumab    Nutrition: TF twice daily. Typically only one meal by mouth daily.  Gtube exchanged 11/22/24.    Hearing loss: Stable. Pronounced on left. Considering audiogram in future.     Hepatitis B: HBSAby negative, SAg +, cAby +, HBV PCR negative 1/23/24. HCV negative.     H/o PE/DVT: 5/8/2019 CTA report scanned into nvite. Small PEs, LLE DVT, acute, occlusive. Completed rivaroxaban.     ASCVD: ASA 81 mg daily, metoprolol per PCP. Will try to continue despite nosebleed but of course stopping is also an option, would require risk benefit discussion with PCP as long as bleeding doesn't       Nishi Michele CNP  ---  52 minutes spent on the date of the encounter doing chart review, review of test results, interpretation of tests, patient visit, documentation, discussion with other provider(s), and discussion with family.    The longitudinal plan of care for the diagnosis(es)/condition(s) as documented were addressed during this visit. Due to the added complexity in care, I will continue to support Douglas in the subsequent management and with ongoing  continuity of care.      SUBJECTIVE  Douglas is seen today to start treatment, now planning on cetuximab.  -No significant epistaxis over the past week but having some bleeding this morning when he entered the clinic. Packed with gauze now. Slow bleed  -Coughing occasionally, usually to clear postnasal drip/bleeding  -Mild fatigue. No significant [place]  -No dizziness or lightheadedness  -Neurosurg consult this afternoon    CANCER SUMMARY  8/9/23                CT sinus.   8/24/23  MRI sinus. L maxillary sinus mass  9/12/23              ED for epistaxis.   9/13/23              CTA and embolization of L IMAX   10/4/23  Nasal bx. Path: Inverted papilloma with high grade dysplasia  11/10/23 Nasal endoscopy and bx in the OR. C/b severe bleeding. Path: Inverted sinonasal papilloma with severe dysplasia.  11/17/23            MRI. 7.4 x 4.6 x 6.6 cm L sinonasal mass, destruction of nasal turbinates, L maxillary sinus, hard palate, invasion of L  space, involvement of medial and lateral pterygoids and temporalis muscles. Effacement and invasion ipsilateral pterygopalatine fossa, extends and effaces the nasopharynx.   11/30/23            Carotid angiogram. Direct endonasal and transoral embolization L sinonasal tumor, transaterial embolization of the L sphenopalatine artery feeders to the L sinonasal artery tumor   12/1/23  Stealth assisted transnasal endoscopic approach to excise L sinonasal mass. Pre-operative embolization. Path: SCC involving L maxilla.    12/9/23  PET. Hypermetabolic mass centered along the L maxilla, extending superiorly through the floor of the L maxillary sinus, posterior/laterally to the  space, invasion of the pterygoid muscles, extending cranially along the pterygopalatine fossa and pterygoid plates to the skull base level. Erosion of involved bones including L maxilla, maxillary sinus wall and pterygoid plates. Extension medially to the L lateral nasopharyngeal wall and soft  palate. 1 mm fat place between carotid and mass. ~4.9 x 4.0 x 5.4 cm (SUV 24.3). Partially resected since 11/17/23 MRI. 8 mm L 2A node (SUV 5.9), 7 mm L level 2 node (SUV 5.5)  1/2/24  Gtube (IR)  1/4/24  Video swallow. No aspiration  1/8~2/27/24 Chemoradiation with weekly cisplatin.  1/11/24  Port (IR)  8/5/24  PET/CT.  Overall increased FDG uptake centered along the posterior inferior left maxillary sinus involving the  space, left pterygopalatine fossa, left parapharyngeal space, extending inferiorly to left maxilla.  Increased soft tissue component within the space, now broad FDG uptake (SUV 24.97), previously 5.75.  Area measures 3.3 x 2.6 cm. Increased focal hypermetabolic activity posterior pharyngeal wall, extending to the left palatine tonsil, associated mucosal thickening (SUV 17.23).  Non-FDG avid mass, 2.7 x 2.4 cm, previously 2.1 x 2.5 cm, another medially, 1.1 x 1.5 cm, previously 1.1 x 1 cm.  New focal area of FDG uptake right lateral oropharyngeal wall (SUV 9.07).  Few hypermetabolic right cervical nodes, none definitively enlarged, however increased activity compared to prior (SUV 7.29-8.36).  Several sub-6 mm pulmonary nodules.  8/5/24  MRI.  Heterogeneous mass along the base of the left maxillary sinus extending to the maxilla,  space, left pterygopalatine fossa, and parapharyngeal space.  Some areas of treated but some corresponding to FDG avidity on same-day PET, highly suspicious for tumor recurrence.  Question early left V2 involvement, thin dural enhancement along the base of the left temporal lobe     9/20/24  L maxillary, L superior alveolus bx in clinic Path: Fragments of at least SCC in situ.   10/11/24 Transnasal bx L maxillary sinus (Dr. Iglesias). 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  Multiloculated mass at base of L maxilla extending to involve L side of palate, retromaxillary region, extending into the  space, premaxillary region.   Similar to prior MRI 8/5/2024.  Continuous slight abnormal asymmetric thickening L V3 and V2.  Asymmetric thin dural enhancement along the base of the left temporal lobe.   10/29/2024 CT chest.  Scattered <6 mm lung nodules grossly stable compared to 8/5/2024.  Partially visualized common bile duct mildly dilated, 8 mm, possibly related to prior cholecystectomy, recommend MRCP.   1/20/25  CT neck and CAP.  Overall similar multiloculated L maxillary mass extending into the  and parapharyngeal spaces compared to PET/CT 8/5/2024.  No cervical adenopathy.  Stable small 2-5 mm lung nodules.  Increased RUL nodule, 1 mm --> 4 mm (5:139).  New 5 mm LLL nodule.  New somewhat linear 4 mm lingular nodule.  Attention on follow-up  11/20/24~1/24/25 Carboplatin + paclitaxel + pembrolizumab.   2/13~3/6/25 Maintenance pembrolizumab.   4/26/25  [facility] for L nosebleed. Afrin and pressure    PAST MEDICAL HISTORY  Sinonasal carcinoma as above  HTN  ASCVD. CABG x 3 2019  PE and LLE DVT after CABG . Treated with rivaroxaban  Dyslipidemia  Hepatitis B   SCC R temple s/p MOHS  Ascending aortic aneurysm repair 2019  Hearing loss L   Gout - feet  Depression  Cholecystectomy    CURRENT OUTPATIENT MEDICATIONS  Reviewed    ALLERGIES  No Known Allergies     PHYSICAL EXAM  /70 (BP Location: Right arm, Patient Position: Sitting, Cuff Size: Adult Regular)   Pulse 77   Temp 97.9  F (36.6  C) (Oral)   Resp 16   Wt 61 kg (134 lb 6.4 oz)   SpO2 97%   BMI 24.05 kg/m    GEN: NAD  HEENT: EOMI, no icterus, injection or pallor. Trismus limiting oral exam. Nose bleed from L nares, slow bleed  LUNGS: clear bilaterally  CV: rrr, no murmur  EXT:  no edema  NEURO: alert  SKIN: no rashes    LABORATORY AND IMAGING STUDIES  Most Recent 3 CBC's:  Recent Labs   Lab Test 05/15/25  0635 05/04/25  2132 04/30/25  1626   WBC 15.4* 10.7 9.9   HGB 7.5* 8.2* 8.9*   MCV 88 88 87    276 253    Most Recent 3 BMP's:  Recent Labs   Lab Test  05/15/25  0635 05/04/25  2132 04/30/25  1626 03/06/25  0737    134* 134* 137   POTASSIUM 4.4 4.3 4.2 4.2   CHLORIDE 101 98 97* 100   CO2 26 24 28 25   BUN 37.2* 29.9* 26.3* 20.5   CR 0.88 0.89 0.81 0.91   ANIONGAP 10 12 9 12   FLORENCE 10.4 9.3 9.5 9.8   * 195* 124* 105*   PROTTOTAL 7.9  --  8.2 8.4*   ALBUMIN 3.3*  --  3.4* 4.1    Most Recent 2 LFT's:  Recent Labs   Lab Test 05/15/25  0635 04/30/25  1626   AST 24 37   ALT 24 38   ALKPHOS 67 59   BILITOTAL 0.5 0.5    Most Recent TSH and T4:  Recent Labs   Lab Test 04/30/25  1626   TSH 4.03   T4 1.02     Phos/Mag:  Lab Results   Component Value Date    MAG 1.7 04/30/2025    MAG 1.9 08/05/2024    MAG 1.8 06/28/2024      I reviewed the above labs today.    MR Brain and Orbits w/o & w Contrast  Narrative: EXAM: MR BRAIN AND ORBITS W/O & W CONTRAST  5/11/2025 12:28 PM     HISTORY: maxillary sinus SCC, vision changes, evaluate for perineural  spread/optic nerve involvement; Squamous cell carcinoma of maxillary  sinus (H)       COMPARISON: 3/6/2025, 1/20/2025. 10/29/2024 MRI, 8/6/2024 MRI.    TECHNIQUE: 1. MRI of the Brain: axial turboFLAIR and axial  diffusion-weighted with ADC map images of the brain were obtained  without intravenous contrast.  After intravenous administration of  gadolinium, axial T1-weighted images of the brain were obtained.    2. MRI of the Orbits focused on the orbits/visual pathways:  Axial  T2-weighted with inversion recovery and coronal T1-weighted images  were obtained without intravenous contrast. Axial and coronal  T1-weighted images with fat saturation were obtained after intravenous  gadolinium administration.    CONTRAST: 6 ML Gadavist.    FINDINGS:  Stable postsurgical changes of left partial maxillectomy, maxillary  antrectomy, left turbinectomy and partial left ethmoidectomy.  Lobulated T1 hypointense, T2 intermediate signal and enhancing  ill-defined mass centered along the floor of the left maxillary sinus  and extending to  maxilla and palate, laterally and cranially extending  along the lateral maxillary sinus wall representing tumor. Similar  signal changes extending cranially along the anterior maxillary sinus  wall. A lobular heterogeneously enhancing soft tissue thickening  extends caudally from the palate level along the left oral cavity  buccal mucosa (series 113, image 14). There is evidence of extension  of the lesion into the left retromaxillary region and left  premaxillary region. Left pterygopalatine fossa is infiltrated by this  lesion.   Anterior to the premaxillary extension, there is inflammatory  stranding of the subcutaneous fat and overlying skin thickening and  enhancement. Asymmetric inflammatory thickening, T1 hypointensity T2  intermediate signal and enhancement overlying the left zygomatic arch.    There is abnormal T1 hypointense, enhancing, T2 intermediate signal  changes replacing the greater wing of the left sphenoid bone,  representing malignant marrow replacement. There is slight asymmetric  dural enhancement along the floor of the left middle cranial fossa. No  edema in the subjacent brain parenchyma. Slight irregularity along the  lateral wall of the left cavernous sinus raises the question of early  cavernous sinus extension. Slight asymmetric enhancement of the left  foramen ovale representing perineural spread.     Nodular enhancement along the infraorbital nerve suggesting  inferolateral perineural tumor extension. There is asymmetric thin  extraconal enhancement along the floor of the left orbital cavity. The  left periorbital soft tissues are thick with T2 hyperintensity and  enhancement. No evidence of optic nerve involvement. Extraocular  muscles are symmetric.    Abnormal heterogenous signal changes with heterogenous enhancement of  the left  space again noted. Asymmetric enhancement and T2  hyperintense signal of the left masseter and temporalis muscles. These  changes likely reflect  denervation changes.   Asymmetric enhancement of the left mandibular ramus marrow immediately  lateral to the  space signal changes. Nonspecific enhancing  and T2 hyperintense signal changes also overall extends anterior to  the maxilla and mandible probably inflammatory.     Bilateral parotid glands are fatty replaced. Submandibular glands are  atrophic.   Bilateral mastoid air cells are diffusely opacified. The left anterior  ethmoid air cells, right anterior and posterior ethmoid air cells,  frontal sinus and right maxillary sinuses demonstrate inflammatory  mucosal thickening.   Impression: IMPRESSION:    Redemonstration of lobulated heterogeneous enhancing mass in the left  maxillectomy side extending along the anterior and lateral walls of  the left maxillary sinus, extending into the premaxillary,  retromaxillary regions as well as to left pterygopalatine fossa  representing recurrent tumor.     Evidence of perineural tumor most pronounced along the infraorbital  nerve and as well as V3 of the left trigeminal nerve.     Thin dural enhancement along the floor of the left middle cranial  fossa. Thin abnormal enhancement along the floor of the left orbital  cavity extraconal space. No optic nerve involvement.    I have personally reviewed the examination and initial interpretation  and I agree with the findings.    KANCHAN ROSALES MD         SYSTEM ID:  [id number]     I personally reviewed the above MRI in PACS today.

## 2025-05-14 ENCOUNTER — PATIENT OUTREACH (OUTPATIENT)
Dept: ONCOLOGY | Facility: CLINIC | Age: 65
End: 2025-05-14
Payer: COMMERCIAL

## 2025-05-14 DIAGNOSIS — C31.0 SQUAMOUS CELL CARCINOMA OF MAXILLARY SINUS (H): Primary | ICD-10-CM

## 2025-05-14 NOTE — PROGRESS NOTES
[facility]: Cancer Care                                                                                          Phone call with Douglas/Tulio to discuss change in treatment to cetuximab/nivolumab. We were previously planning on starting amivantimab but it was unfortunately denied by insurance.     [facility] Oncology printouts sent via [employer].  Reviewed administration, side effects, and ongoing symptom management by APPs in clinic. Reminded Douglas to use triage and after hours care for symptom and side effect management.     Trudy Shelton, RN, BSN  RN Care Coordinator  [facility]

## 2025-05-15 ENCOUNTER — MYC MEDICAL ADVICE (OUTPATIENT)
Dept: NEUROSURGERY | Facility: CLINIC | Age: 65
End: 2025-05-15

## 2025-05-15 ENCOUNTER — ONCOLOGY VISIT (OUTPATIENT)
Dept: ONCOLOGY | Facility: CLINIC | Age: 65
End: 2025-05-15
Attending: REGISTERED NURSE
Payer: COMMERCIAL

## 2025-05-15 ENCOUNTER — PRE VISIT (OUTPATIENT)
Dept: NEUROSURGERY | Facility: CLINIC | Age: 65
End: 2025-05-15

## 2025-05-15 ENCOUNTER — TELEPHONE (OUTPATIENT)
Dept: NEUROSURGERY | Facility: CLINIC | Age: 65
End: 2025-05-15

## 2025-05-15 ENCOUNTER — APPOINTMENT (OUTPATIENT)
Dept: LAB | Facility: CLINIC | Age: 65
End: 2025-05-15
Attending: INTERNAL MEDICINE
Payer: COMMERCIAL

## 2025-05-15 VITALS
RESPIRATION RATE: 16 BRPM | BODY MASS INDEX: 24.05 KG/M2 | WEIGHT: 134.4 LBS | DIASTOLIC BLOOD PRESSURE: 70 MMHG | OXYGEN SATURATION: 97 % | HEART RATE: 77 BPM | SYSTOLIC BLOOD PRESSURE: 114 MMHG | TEMPERATURE: 97.9 F

## 2025-05-15 DIAGNOSIS — C31.0 SQUAMOUS CELL CARCINOMA OF MAXILLARY SINUS (H): Primary | ICD-10-CM

## 2025-05-15 DIAGNOSIS — H53.8 BLURRED VISION: ICD-10-CM

## 2025-05-15 DIAGNOSIS — R25.2 TRISMUS: ICD-10-CM

## 2025-05-15 DIAGNOSIS — E03.4 HYPOTHYROIDISM DUE TO ACQUIRED ATROPHY OF THYROID: ICD-10-CM

## 2025-05-15 DIAGNOSIS — D50.0 ANEMIA DUE TO CHRONIC BLOOD LOSS: ICD-10-CM

## 2025-05-15 LAB — GRAM STAIN RESULT: NORMAL

## 2025-05-15 PROCEDURE — 250N000011 HC RX IP 250 OP 636: Performed by: REGISTERED NURSE

## 2025-05-15 PROCEDURE — G0463 HOSPITAL OUTPT CLINIC VISIT: HCPCS | Performed by: REGISTERED NURSE

## 2025-05-15 RX ORDER — MEPERIDINE HYDROCHLORIDE 25 MG/ML
25 INJECTION INTRAMUSCULAR; INTRAVENOUS; SUBCUTANEOUS
OUTPATIENT
Start: 2025-05-15

## 2025-05-15 RX ORDER — EPINEPHRINE 1 MG/ML
0.3 INJECTION, SOLUTION INTRAMUSCULAR; SUBCUTANEOUS EVERY 5 MIN PRN
OUTPATIENT
Start: 2025-05-15

## 2025-05-15 RX ORDER — LORAZEPAM 2 MG/ML
0.5 INJECTION INTRAMUSCULAR EVERY 4 HOURS PRN
OUTPATIENT
Start: 2025-05-15

## 2025-05-15 RX ORDER — DIPHENHYDRAMINE HYDROCHLORIDE 50 MG/ML
50 INJECTION, SOLUTION INTRAMUSCULAR; INTRAVENOUS
Start: 2025-05-15

## 2025-05-15 RX ORDER — METHYLPREDNISOLONE SODIUM SUCCINATE 40 MG/ML
40 INJECTION INTRAMUSCULAR; INTRAVENOUS
Start: 2025-05-15

## 2025-05-15 RX ORDER — DIPHENHYDRAMINE HYDROCHLORIDE 50 MG/ML
25 INJECTION, SOLUTION INTRAMUSCULAR; INTRAVENOUS
Start: 2025-05-15

## 2025-05-15 RX ORDER — HEPARIN SODIUM,PORCINE 10 UNIT/ML
5-20 VIAL (ML) INTRAVENOUS DAILY PRN
OUTPATIENT
Start: 2025-05-15

## 2025-05-15 RX ORDER — HEPARIN SODIUM (PORCINE) LOCK FLUSH IV SOLN 100 UNIT/ML 100 UNIT/ML
5 SOLUTION INTRAVENOUS
Status: COMPLETED | OUTPATIENT
Start: 2025-05-15 | End: 2025-05-15

## 2025-05-15 RX ORDER — HEPARIN SODIUM (PORCINE) LOCK FLUSH IV SOLN 100 UNIT/ML 100 UNIT/ML
5 SOLUTION INTRAVENOUS
Status: CANCELLED | OUTPATIENT
Start: 2025-05-15

## 2025-05-15 RX ORDER — DIPHENHYDRAMINE HYDROCHLORIDE 50 MG/ML
50 INJECTION, SOLUTION INTRAMUSCULAR; INTRAVENOUS ONCE
Status: CANCELLED | OUTPATIENT
Start: 2025-05-15 | End: 2025-05-15

## 2025-05-15 RX ORDER — ALBUTEROL SULFATE 90 UG/1
1-2 INHALANT RESPIRATORY (INHALATION)
Start: 2025-05-15

## 2025-05-15 RX ORDER — ALBUTEROL SULFATE 0.83 MG/ML
2.5 SOLUTION RESPIRATORY (INHALATION)
OUTPATIENT
Start: 2025-05-15

## 2025-05-15 RX ADMIN — Medication 5 ML: at 06:28

## 2025-05-15 ASSESSMENT — PAIN SCALES - GENERAL: PAINLEVEL_OUTOF10: MODERATE PAIN (5)

## 2025-05-15 NOTE — TELEPHONE ENCOUNTER
FUTURE VISIT INFORMATION      Please use iPad or phones [phone] to reach  and follow prompts    RECORDS REQUESTED FROM:       Primary Care Provider: Mira Leung NP - [facility]    Pertinent Medical History: Ascending aortic aneurysm; Coronary artery disease; Gout; Hyperlipemia; Hypertension; Malignant neoplasm of nasal cavities (H); PONV; Hard to intubate; Hypoxemia; Coronary artery disease involving native coronary artery;     Most recent EKG+ Tracing: 10/30/24    Most recent ECHO: 11/30/23    Most recent Cardiac Stress Test: 1/18/19    Most recent Coronary Angiogram: 3/12/19

## 2025-05-15 NOTE — TELEPHONE ENCOUNTER
Called patient son regarding scheduled PAC appoinment and the works of potential procedure date. Let patient son know that a call would be made with a update in the afternoon of 5/16/2025.        Rahel Rooney on 5/15/2025 at 3:39 PM

## 2025-05-15 NOTE — Clinical Note
5/15/2025      Douglas Herrera  [address] [place] [address]      Dear Colleague,    Thank you for referring your patient, Douglas Herrera, to the [facility]. Please see a copy of my visit note below.        [facility]  [address] [facility] [address]  Phone: [phone]  Fax: [phone]    PATIENT NAME: Douglas Herrera  MRN # [id number]   DATE OF VISIT: May 15, 2025  YOB: 1960     Otolaryngology: Dr. Damon Regan   Radiation Oncology: Dr. Tomasa Akers  Cardiology: Dr. Qamar Hernandez  Hepatologist: YANN GEORGE   Palliative Care: Dr. Joshua Banks      CANCER TYPE: SCC sinonasal   STAGE: [id number] (IVB)  ECOG PS: 1     PD-L1: TPS 60-70%, CPS >70% on [id number]  NGS: internal panel. Fusion negative. ARID1A S90fs, EGFR exon 20 insertion, [id number], APC [id number], TMB 7.313    ASSESSMENT AND PLAN  SCC L maxillary sinus, [id number] (IVB), ARID1 mutation,  EGFR exon 20 insertion: Disease progression on last scan but wasn't feeling that great after intense treatment, and fairly low burden of disease, so we decided to take a short break and regroup today with a repeat CT, which shows some minimal changes over the last one in the lungs, and stable disease in the primary by CT, although I acknowledged that it might be more extensive if we were to get an MRI, particularly in light of blurry vision (see below). Reviewed recommendation to consider amivantamab to target the EGFR exon 20 insertion mutation. Unfortunately, he was having nosebleeding today so it was hard to discuss pros and cons, potential side effects thoroughly. We talked about goals and QOL though - goal here is to maintain good QOL while doing our best to control the cancer. It was challenging today to define what those goals look like for him, but certainly among those goals is having more energy and being able to do more things. Of course, not being able to eat  is a huge impact on QOL that isn't easily addressable. We talked about choosing not to treat the cancer any longer. He is quite clear today that he is interested in continuing treatment. We discussed trying to support him with the help of my Palliative Care colleagues, as well as potentially home care - he is homebound, specifically with a nurse to evaluate things like vitals and overall condition, and PT/OT to develop a formal exercise program as he is getting quite deconditioned. He agrees it would be a good idea. I will work on setting up an appt with me to finalize a decision about treatment with anticipation of starting amivantamab shortly thereafter.    Nosebleed: Silver nitrate in the ED, still bleeding. Message to Dr. Iglesias's team to see if anyone can fit him in to take a look. Knows warning signs to warrant trip back to ED. No airway compromise, not bleeding briskly enough to cause issues there. He's been using dry kleenex to stop the bleeding. Showed him how to do pinch his nose and discussed doing that for 10-20 minutes. Gave him some saline moistened gauze for today and encouraged him to continue using saline nose spray and possibly some vaseline with a qtip gently, in case it's coming from inferior enough that it would help. Also evaluate for possibility this is happening due to disease progression that's subtle enough not to be picked up on CT, in which case we'll get MRI to better evaluate.     Blurry vision: Left side only. The eye, optic nerve, etc., seems to be well  from the tumor, but CT does have limitations. Urgent ophthalmology referral. If any worrisome signs or not etiology identified and symptoms persist, will get MRI     Increasing trismus: SLP eval and reinforce exercises, stretching but really quite limited, about 5 mm on my exam today to the point it was not possible to see inside his mouth on exam.      Hypothyroidism: TFTs c/w subclinical hypothyroidism, but TSH/FT4 normal  today. Continue checking regularly     Nutrition: TF twice daily, eating one meal a day, usually rice/some meat, etc. Gtube exchanged 11/22/24. Eating less       Hearing loss: Unchanged - still pretty significant on L. Audiogram in future, declines now     Hepatitis B: HBSAby negative, SAg +, cAby +, HBV PCR negative 1/23/24. HCV negative.     H/o PE/DVT: 5/8/2019 CTA report scanned into [facility]. Small PEs, LLE DVT, acute, occlusive. Was on rivaroxaban, completed course.      ASCVD: Asa 81 mg daily, metoprolol per PCP. Will try to continue despite nosebleed but of course stopping is also an option, would require risk benefit discussion with PCP as long as bleeding doesn't       The longitudinal plan of care for the condition(s) below were addressed during this visit. Due to the added complexity in care, I will continue to support Douglas in the subsequent management of this condition(s) and with the ongoing continuity of care of this condition(s): maxillary sinus cancer      60 minutes spent by me on the date of the encounter doing chart review, review of outside records, review of test results, interpretation of tests, patient visit, documentation, orders,    Professional phone  used throughout the visit    Kayy Post MD  Associate Professor of Medicine  Hematology, Oncology and Transplantation    SUBJECTIVE  Returns for routine follow-up on maintenance pembrolizumab.  Still tired, no real improvement since last month   Nosebleeds - just started. ED trip last week and silver nitrate. Started up again. Not using compression  Not really eating, mostly TF dependent   L eye vision blurry - started a couple of days ago     CANCER SUMMARY  8/9/23                CT sinus.   8/24/23  MRI sinus. L maxillary sinus mass  9/12/23              ED for epistaxis.   9/13/23              CTA and embolization of L IMAX   10/4/23  Nasal bx. Path: Inverted papilloma with high grade dysplasia  11/10/23 Nasal  endoscopy and bx in the OR. C/b severe bleeding. Path: Inverted sinonasal papilloma with severe dysplasia.  11/17/23            MRI. 7.4 x 4.6 x 6.6 cm L sinonasal mass, destruction of nasal turbinates, L maxillary sinus, hard palate, invasion of L  space, involvement of medial and lateral pterygoids and temporalis muscles. Effacement and invasion ipsilateral pterygopalatine fossa, extends and effaces the nasopharynx.   11/30/23            Carotid angiogram. Direct endonasal and transoral embolization L sinonasal tumor, transaterial embolization of the L sphenopalatine artery feeders to the L sinonasal artery tumor   12/1/23  Stealth assisted transnasal endoscopic approach to excise L sinonasal mass. Pre-operative embolization. Path: SCC involving L maxilla.    12/9/23  PET. Hypermetabolic mass centered along the L maxilla, extending superiorly through the floor of the L maxillary sinus, posterior/laterally to the  space, invasion of the pterygoid muscles, extending cranially along the pterygopalatine fossa and pterygoid plates to the skull base level. Erosion of involved bones including L maxilla, maxillary sinus wall and pterygoid plates. Extension medially to the L lateral nasopharyngeal wall and soft palate. 1 mm fat place between carotid and mass. ~4.9 x 4.0 x 5.4 cm (SUV 24.3). Partially resected since 11/17/23 MRI. 8 mm L 2A node (SUV 5.9), 7 mm L level 2 node (SUV 5.5)  1/2/24  Gtube (IR)  1/4/24  Video swallow. No aspiration  1/8~2/27/24 Chemoradiation with weekly cisplatin.  1/11/24  Port (IR)  8/5/24  PET/CT.  Overall increased FDG uptake centered along the posterior inferior left maxillary sinus involving the  space, left pterygopalatine fossa, left parapharyngeal space, extending inferiorly to left maxilla.  Increased soft tissue component within the space, now broad FDG uptake (SUV 24.97), previously 5.75.  Area measures 3.3 x 2.6 cm. Increased focal hypermetabolic  activity posterior pharyngeal wall, extending to the left palatine tonsil, associated mucosal thickening (SUV 17.23).  Non-FDG avid mass, 2.7 x 2.4 cm, previously 2.1 x 2.5 cm, another medially, 1.1 x 1.5 cm, previously 1.1 x 1 cm.  New focal area of FDG uptake right lateral oropharyngeal wall (SUV 9.07).  Few hypermetabolic right cervical nodes, none definitively enlarged, however increased activity compared to prior (SUV 7.29-8.36).  Several sub-6 mm pulmonary nodules.  8/5/24  MRI.  Heterogeneous mass along the base of the left maxillary sinus extending to the maxilla,  space, left pterygopalatine fossa, and parapharyngeal space.  Some areas of treated but some corresponding to FDG avidity on same-day PET, highly suspicious for tumor recurrence.  Question early left V2 involvement, thin dural enhancement along the base of the left temporal lobe     9/20/24  L maxillary, L superior alveolus bx in clinic Path: Fragments of at least SCC in situ.   10/11/24 Transnasal bx L maxillary sinus (Dr. Iglesias). 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  Multiloculated mass at base of L maxilla extending to involve L side of palate, retromaxillary region, extending into the  space, premaxillary region.  Similar to prior MRI 8/5/2024.  Continuous slight abnormal asymmetric thickening L V3 and V2.  Asymmetric thin dural enhancement along the base of the left temporal lobe.   10/29/2024 CT chest.  Scattered <6 mm lung nodules grossly stable compared to 8/5/2024.  Partially visualized common bile duct mildly dilated, 8 mm, possibly related to prior cholecystectomy, recommend MRCP.   1/20/25  CT neck and CAP.  Overall similar multiloculated L maxillary mass extending into the  and parapharyngeal spaces compared to PET/CT 8/5/2024.  No cervical adenopathy.  Stable small 2-5 mm lung nodules.  Increased RUL nodule, 1 mm --> 4 mm (5:139).  New 5 mm LLL nodule.  New somewhat linear 4 mm  lingular nodule.  Attention on follow-up  11/20/24~1/24/25 Carboplatin + paclitaxel + pembrolizumab.   2/13~3/6/25 Maintenance pembrolizumab.   4/26/25  [facility] for L nosebleed. Afrin and pressure    PAST MEDICAL HISTORY  Sinonasal carcinoma as above  HTN  ASCVD. CABG x 3 2019  PE and LLE DVT after CABG . Treated with rivaroxaban  Dyslipidemia  Hepatitis B   SCC R temple s/p MOHS  Ascending aortic aneurysm repair 2019  Hearing loss L   Gout - feet  Depression  Cholecystectomy    CURRENT OUTPATIENT MEDICATIONS  Reviewed    ALLERGIES  No Known Allergies     PHYSICAL EXAM  There were no vitals taken for this visit.  GEN: NAD  HEENT: EOMI, no icterus, injection or pallor. Trismus too severe, can't see inside mouth. Nose bleed from L nares, not from the visible portion, very slow drip  LUNGS: clear bilaterally  EXT:  no edema  NEURO: alert  SKIN: no rashes    LABORATORY AND IMAGING STUDIES    Labs 4/30/25 independently reviewed and interpreted by me  CMP, CBC pd, TSH, FT4 acceptable    MR Brain and Orbits w/o & w Contrast  Narrative: EXAM: MR BRAIN AND ORBITS W/O & W CONTRAST  5/11/2025 12:28 PM     HISTORY: maxillary sinus SCC, vision changes, evaluate for perineural  spread/optic nerve involvement; Squamous cell carcinoma of maxillary  sinus (H)       COMPARISON: 3/6/2025, 1/20/2025. 10/29/2024 MRI, 8/6/2024 MRI.    TECHNIQUE: 1. MRI of the Brain: axial turboFLAIR and axial  diffusion-weighted with ADC map images of the brain were obtained  without intravenous contrast.  After intravenous administration of  gadolinium, axial T1-weighted images of the brain were obtained.    2. MRI of the Orbits focused on the orbits/visual pathways:  Axial  T2-weighted with inversion recovery and coronal T1-weighted images  were obtained without intravenous contrast. Axial and coronal  T1-weighted images with fat saturation were obtained after intravenous  gadolinium administration.    CONTRAST: 6 ML Gadavist.    FINDINGS:  Stable  postsurgical changes of left partial maxillectomy, maxillary  antrectomy, left turbinectomy and partial left ethmoidectomy.  Lobulated T1 hypointense, T2 intermediate signal and enhancing  ill-defined mass centered along the floor of the left maxillary sinus  and extending to maxilla and palate, laterally and cranially extending  along the lateral maxillary sinus wall representing tumor. Similar  signal changes extending cranially along the anterior maxillary sinus  wall. A lobular heterogeneously enhancing soft tissue thickening  extends caudally from the palate level along the left oral cavity  buccal mucosa (series 113, image 14). There is evidence of extension  of the lesion into the left retromaxillary region and left  premaxillary region. Left pterygopalatine fossa is infiltrated by this  lesion.   Anterior to the premaxillary extension, there is inflammatory  stranding of the subcutaneous fat and overlying skin thickening and  enhancement. Asymmetric inflammatory thickening, T1 hypointensity T2  intermediate signal and enhancement overlying the left zygomatic arch.    There is abnormal T1 hypointense, enhancing, T2 intermediate signal  changes replacing the greater wing of the left sphenoid bone,  representing malignant marrow replacement. There is slight asymmetric  dural enhancement along the floor of the left middle cranial fossa. No  edema in the subjacent brain parenchyma. Slight irregularity along the  lateral wall of the left cavernous sinus raises the question of early  cavernous sinus extension. Slight asymmetric enhancement of the left  foramen ovale representing perineural spread.     Nodular enhancement along the infraorbital nerve suggesting  inferolateral perineural tumor extension. There is asymmetric thin  extraconal enhancement along the floor of the left orbital cavity. The  left periorbital soft tissues are thick with T2 hyperintensity and  enhancement. No evidence of optic nerve  involvement. Extraocular  muscles are symmetric.    Abnormal heterogenous signal changes with heterogenous enhancement of  the left  space again noted. Asymmetric enhancement and T2  hyperintense signal of the left masseter and temporalis muscles. These  changes likely reflect denervation changes.   Asymmetric enhancement of the left mandibular ramus marrow immediately  lateral to the  space signal changes. Nonspecific enhancing  and T2 hyperintense signal changes also overall extends anterior to  the maxilla and mandible probably inflammatory.     Bilateral parotid glands are fatty replaced. Submandibular glands are  atrophic.   Bilateral mastoid air cells are diffusely opacified. The left anterior  ethmoid air cells, right anterior and posterior ethmoid air cells,  frontal sinus and right maxillary sinuses demonstrate inflammatory  mucosal thickening.   Impression: IMPRESSION:    Redemonstration of lobulated heterogeneous enhancing mass in the left  maxillectomy side extending along the anterior and lateral walls of  the left maxillary sinus, extending into the premaxillary,  retromaxillary regions as well as to left pterygopalatine fossa  representing recurrent tumor.     Evidence of perineural tumor most pronounced along the infraorbital  nerve and as well as V3 of the left trigeminal nerve.     Thin dural enhancement along the floor of the left middle cranial  fossa. Thin abnormal enhancement along the floor of the left orbital  cavity extraconal space. No optic nerve involvement.    I have personally reviewed the examination and initial interpretation  and I agree with the findings.    KANCHAN ROSALES MD         SYSTEM ID:  [id number]     EXAMINATION: CT CHEST/ABDOMEN/PELVIS W CONTRAST, 3/6/2025 10:39 AM     INDICATION: restage recurent unresectable maxillary sinus cancer s/p 4  cycles of chemo + pembo, now on pembro maintenance. Lung nodules,  re-evaluate; Squamous cell carcinoma of maxillary  sinus (H)     COMPARISON STUDY: CT 1/20/2025, PET/CT 8/5/2024     TECHNIQUE: CT scan of the chest, abdomen and pelvis was performed on  multidetector CT scanner using volumetric acquisition technique and  images were reconstructed in multiple planes with variable thickness  and reviewed on dedicated workstations.      CONTRAST: 71 mL [id number]-370 injected IV without oral contrast     CT scan radiation dose is optimized to minimum requisite dose using  automated dose modulation techniques.     FINDINGS:     Lungs/pleura: No pneumothorax or pleural effusion. No acute pulmonary  opacities. Increased size of multiple pulmonary nodules highly  suspicious for metastatic disease:  -6 mm subpleural nodule in the right apical lung (7/56), previously 1  to 2 mm  -9 mm subpleural nodule in the left apical lung (7/57), previously 3  mm  -7 mm nodule in the left lower lobe (7/176), previously 3 mm  -7 mm subpleural nodule in the left lower lobe by the major fissure  (7/212), previously 5 mm     Mediastinum: Normal cardiac size. No pericardial effusion. Advanced  coronary calcifications. Postoperative changes of CABG. No mediastinal  lymphadenopathy. Unremarkable esophagus.     Liver: No mass. No intrahepatic biliary ductal dilation.     Biliary System: Normal gallbladder. No extrahepatic biliary ductal  dilation.     Pancreas: No mass or pancreatic ductal dilation.     Adrenal glands: No mass or nodules     Spleen: Normal.     Kidneys: No suspicious mass, obstructing calculus or hydronephrosis.     Gastrointestinal tract: Normal appendix. Normal caliber small bowel.   G tube with the balloon in the gastric antrum, similar to prior. Given  no significant gastric distention, no immediate concern for gastric  outlet obstruction.     Mesentery/peritoneum/retroperitoneum: No mass. No free fluid or air.     Lymph nodes: No significant lymphadenopathy.     Vasculature: Patent major abdominal vasculature.  Scattered  atherosclerotic  calcification of abdominal aorta with no aneurysmal  dilation.      Pelvis: Urinary bladder is normal.  Prostate and seminal vesicles are  within normal limits.     Osseous structures: No aggressive or acute osseous lesion.      Soft tissues: Within normal limits.  Right chest wall Port-A-Cath in  place.                                                                      IMPRESSION: In this patients with squamous cell carcinoma of the  maxillary sinus:   1. Enlarging pulmonary nodules concerning for disease progression.  2. No additional evidence of metastatic disease in the chest, abdomen,  and pelvis.  3. Unchanged position of the G-tube balloon in the gastric antrum.  Given nondistention of the stomach, no immediate concern for gastric  outlet obstruction.     I have personally reviewed the examination and initial interpretation  and I agree with the findings.     JEB RESTREPO MD      CT CAP and CT neck personally reviewed and interpreted by me               Again, thank you for allowing me to participate in the care of your patient.        Sincerely,        Nishi Michele CNP    Electronically signed

## 2025-05-15 NOTE — NURSING NOTE
"Oncology Rooming Note    May 15, 2025 7:04 AM   Douglas Herrera is a 65 year old male who presents for:    Chief Complaint   Patient presents with    Port Draw     Labs drawn from port by rn.  VS taken.    Oncology Clinic Visit     Squamous cell carcinoma of maxillary sinus     Initial Vitals: /70 (BP Location: Right arm, Patient Position: Sitting, Cuff Size: Adult Regular)   Pulse 77   Temp 97.9  F (36.6  C) (Oral)   Resp 16   Wt 61 kg (134 lb 6.4 oz)   SpO2 97%   BMI 24.05 kg/m   Estimated body mass index is 24.05 kg/m  as calculated from the following:    Height as of 4/30/25: 1.592 m (5' 2.68\").    Weight as of this encounter: 61 kg (134 lb 6.4 oz). Body surface area is 1.64 meters squared.  Moderate Pain (5) Comment: Data Unavailable   No LMP for male patient.  Allergies reviewed: Yes  Medications reviewed: Yes    Medications: Medication refills not needed today.  Pharmacy name entered into [employer]: [facility] - [place], [place] - [facility] [address]    Frailty Screening:   Is the patient here for a new oncology consult visit in cancer care? 2. No    PHQ9:  Did this patient require a PHQ9?: No      Clinical concerns: none      Becca Newton"

## 2025-05-15 NOTE — NURSING NOTE
"Chief Complaint   Patient presents with    Port Draw     Labs drawn from port by rn.  VS taken.     Port accessed with 20 gauge 3/4\" Power needle and labs drawn by rn.  Port flushed with NS and heparin.  Pt tolerated well.  VS taken.  Pt checked in for next appt.    Imani Chilel RN    "

## 2025-05-16 ENCOUNTER — PRE VISIT (OUTPATIENT)
Dept: SURGERY | Facility: CLINIC | Age: 65
End: 2025-05-16

## 2025-05-19 ENCOUNTER — PATIENT OUTREACH (OUTPATIENT)
Dept: NEUROSURGERY | Facility: CLINIC | Age: 65
End: 2025-05-19
Payer: COMMERCIAL

## 2025-05-19 ENCOUNTER — TELEPHONE (OUTPATIENT)
Dept: CARDIOLOGY | Facility: CLINIC | Age: 65
End: 2025-05-19

## 2025-05-19 ENCOUNTER — APPOINTMENT (OUTPATIENT)
Dept: INTERPRETER SERVICES | Facility: CLINIC | Age: 65
End: 2025-05-19
Payer: COMMERCIAL

## 2025-05-19 DIAGNOSIS — R04.0 EPISTAXIS: Primary | ICD-10-CM

## 2025-05-19 NOTE — TELEPHONE ENCOUNTER
----- Message from Devika Hansen sent at 5/19/2025 12:06 PM CDT -----  That would be terrific, Mayela - he actually didn't come for his visit with us (right, Jocelyn?) so we aren't even sure if he has already stopped his ASA, but if he could be seen and have this difficult question resolved, it would be great.Also, his IR procedure might resolve the bleeding - just don't know.Devika    ----- Message -----  From: Mayela Lee RN  Sent: 5/16/2025   3:26 PM CDT  To: Devika Hansen MD    Hi ,Dr. Hernandez has retired.Do you recommend patient be seen in our Rapid access clinic to discuss?Thank you,Mayela    ----- Message -----  From: Devika Hansen MD  Sent: 5/15/2025   4:48 PM CDT  To: Qamar Hernandez MD; Jocelyn Chandler, APRN#    Qamar - we are seeing Mr. Herrera in preop clinic tomorrow foin preparation for management of his maxillarry sinus squamous cancer. He is on 81 mg ASA, and this may be contributing to his recurring expistaxsis and consequent anemia.This is a difficult one for me - he has a 100% lesion of the ostial to proximal LAD, is status post CABG X3, and apparently continues to have angina. How would you weigh his risks of continuing ASA (continued anemia, bleeding from this sinus tumor) versus stopping (what is the risk of recurrent MI? His LAD is 100% occluded so there might not be risk there, but he also has other 45-60% lesions). We could definitely have him see you prior to his IR embolization to try to stop his epistaxsis. To be truthful, we have not seen him yet, and he might have stopped the ASA (and thus our question is moot), but if he is still on ASA, could he stop this without huge increased risk re recurrent MI? Note, he has multiple nose bleeds a week, and they are truly debilitating. I also don't know if stopping the ASA would help, but interested in your view.J

## 2025-05-19 NOTE — TELEPHONE ENCOUNTER
Msg rec'd 5-19-25 @ 1300:  Devika Hansen MD Gorshe, Maureen, RN; Mayela Lee RN; Jocelyn Chandler APRN CNS Maureen - this is not for risk assessment. The patient has CA stents, and a  of the LAD and is on ASA. He recently has been found to have maxillary sinus tumor and is having heavy bleeding which probably is exacerbated by ASA. I was asking Dr. Hernandez to consider whether or not he could discontinue the ASA at least until the severe epistaxsis is managed.    He did not have the sinus tumor the last time he was seen by Cardiology, so this is a pretty unique question. I was hoping that the embolization could reduce his bleeding, but he has decided against embolization. The bleeding is significant enough to drop his hgb to 7, which is also not good in patient with CAD.    If Dr. Hernandez has retired, I think that he needs to re-establish care with cardiology - and this would be a primary question (can patient stop ASA or is the risk just too great re his coronary disease).    But it doesn't have to be so quickly since the procedure is not going to be done. But he should be followed by Cards, IMO.    Thanks - Devika      ----- Message -----  From: Ludmila Miller RN  Sent: 5/19/2025  12:27 PM CDT  To: Mayela Lee RN; Devika Hansen MD;*    Mayela Corcoran RN  is out of the office this week so I am covering for her.  I reviewed patient's chart and am uncertain as to whether a cardiology consult for risk assessment is still warranted.      Please confirm patient needs to be seen ASAP for surgery and I will have a  contact him.    Thanks,    ЕКАТЕРИНА Keys

## 2025-05-19 NOTE — TELEPHONE ENCOUNTER
Msg sent to sched w/request to arrange first available provider change appt so patient can establish care and address ASA, bleeding issues.  mg

## 2025-05-19 NOTE — TELEPHONE ENCOUNTER
"Spoke with Tulio. States symptoms are better, doesn't really have any, other than fatigue. Nurse on Saturday said vitals are great. Epistaxis have been near daily, but only \"drips\", not as heavy as previously.     Will get patient scheduled for embo and get back in touch with Tulio. Understanding verbalized.    Mackenzie Kilgore RN 5/19/2025 11:49 AM     "

## 2025-05-19 NOTE — PATIENT INSTRUCTIONS
You are scheduled for embolization with Dr. Garcia on 6/2/25. Arrival Time: 10:00 am. Procedure Time: 12:00 pm.    Please follow these instructions:    * You will need a pre-op physical within 30 days prior to your procedure. You are scheduled for 5/23/25 at 10:00 am arrival time with the Pre-Operative Assessment Center (PAC), located at [facility] at [address] [place] [address]. The PAC phone number is [phone].    * Check in at the Surgery Admission Unit on the third floor, at the [facility]. The address is [address] [place] [address]. The phone number is [phone].     * Nothing to eat for 8 hours prior to arrival time. You may drink clear liquids (includes water, Jell-O, clear broth, apple juice or any non-carbonated beverage that you can see through) up until 2 hours prior to arrival time.     * Please note: if you are taking a GLP-1 agonist you must HOLD as follows:   Hold seven (7) days prior for once weekly injectable doses [semaglutide (Ozempic, Wegovy), dulaglutide  (Trulicity), exenatide ER (Bydureon), tirzepatide (Mounjaro)]   Hold the day before and day of for once daily injectable GLP-1 agonists [exenatide (Byetta), liraglutide (Saxenda,  Victoza)]   Hold seven (7) days for oral semaglutide (Rybelsus)     * You may take your other medications with a sip of water the morning of the procedure.     * You will stay overnight at the hospital for observation after the procedure. We aim to discharge you by 11:00 AM the following day. Please have your ride available by 10:00 AM.     COVID-19 visitors policy: Adult surgical and procedural patients can have two visitors throughout the surgery process. The two visitors may include children of any age; please note, children cannot stay in the waiting room alone.    All discharge instructions will be given to the  or volunteer. Documentation for the post-operative plan  will be given to the patient and . Patients are required to have someone to stay with them for 24 hours after their procedure.    If you have questions regarding your procedure, please contact me at [phone], option 1.    If you need to cancel, reschedule or have procedure scheduling related questions, please call [facility] Procedure Scheduling at [phone].    Thank you,  Mackenzie Kilgore, RN, CNRN, SCRN  Audrey Grewal, RN, BSN  Stroke & [facility]end[facility] Care Coordinator

## 2025-05-19 NOTE — PROGRESS NOTES
Informed patients sonTulio, of procedure instructions. Confirmed date and time. Verbalized understanding. All questions answered. Patient instructions sent via [employer]. Contact information provided and encouraged to call with questions/concerns.     Mackenzie Kilgore RN 5/19/2025 4:42 PM

## 2025-05-21 NOTE — CONFIDENTIAL NOTE
FUTURE VISIT INFORMATION      SURGERY INFORMATION:  Date: 6.2.25   Location:   Surgeon:  GENERIC ANESTHESIA PROVIDER / Amanda Garcia MD   Anesthesia Type:  General   Procedure: ANESTHESIA, IN NON-OPERATING ROOM SETTING Cerebral Angiogram with Embolization @1200     RECORDS REQUESTED FROM:       Primary Care Provider:  Mira Leung NP    Pertinent Medical History:  Ascending aortic aneurysm; Coronary artery disease; Gout; Hyperlipemia; Hypertension; Malignant neoplasm of nasal cavities (H); PONV; Hard to intubate; Hypoxemia; Coronary artery disease involving native coronary artery   Most recent EKG+ Tracing:  -10.30.24 lead with muse  -12.8.23   11.30.23 tracing only   Most recent ECHO:  -11.30.23  Most recent Cardiac Stress Test:  -1.18.19  Most recent Coronary Angiogram:  -3.12.19

## 2025-05-22 LAB
BACTERIA BPU CULT: NORMAL
GRAM STAIN RESULT: NORMAL

## 2025-05-23 ENCOUNTER — PRE VISIT (OUTPATIENT)
Dept: SURGERY | Facility: CLINIC | Age: 65
End: 2025-05-23

## 2025-05-23 ENCOUNTER — LAB (OUTPATIENT)
Dept: LAB | Facility: CLINIC | Age: 65
End: 2025-05-23
Attending: NURSE PRACTITIONER
Payer: COMMERCIAL

## 2025-05-23 ENCOUNTER — ANESTHESIA EVENT (OUTPATIENT)
Dept: SURGERY | Facility: CLINIC | Age: 65
End: 2025-05-23
Payer: COMMERCIAL

## 2025-05-23 DIAGNOSIS — Z01.818 PRE-OP EVALUATION: ICD-10-CM

## 2025-05-23 LAB
ERYTHROCYTE [DISTWIDTH] IN BLOOD BY AUTOMATED COUNT: 17.1 % (ref 10–15)
HCT VFR BLD AUTO: 27.6 % (ref 40–53)
HGB BLD-MCNC: 8.6 G/DL (ref 13.3–17.7)
MCH RBC QN AUTO: 27.4 PG (ref 26.5–33)
MCHC RBC AUTO-ENTMCNC: 31.2 G/DL (ref 31.5–36.5)
MCV RBC AUTO: 88 FL (ref 78–100)
PLATELET # BLD AUTO: 308 10E3/UL (ref 150–450)
RBC # BLD AUTO: 3.14 10E6/UL (ref 4.4–5.9)
WBC # BLD AUTO: 11.9 10E3/UL (ref 4–11)

## 2025-05-23 PROCEDURE — 36415 COLL VENOUS BLD VENIPUNCTURE: CPT

## 2025-05-23 PROCEDURE — 85014 HEMATOCRIT: CPT

## 2025-05-27 ENCOUNTER — PATIENT OUTREACH (OUTPATIENT)
Dept: ONCOLOGY | Facility: CLINIC | Age: 65
End: 2025-05-27
Payer: COMMERCIAL

## 2025-05-27 ENCOUNTER — APPOINTMENT (OUTPATIENT)
Dept: INTERPRETER SERVICES | Facility: CLINIC | Age: 65
End: 2025-05-27
Payer: COMMERCIAL

## 2025-05-27 NOTE — PROGRESS NOTES
[facility]/Voicemail    Clinical Data: Care Coordinator Outreach    Checking in on Douglas per Dr. Post's request.    Outreach attempted x 1.  Left message on patient's voicemail with call back information and requested return call.    Plan: Care Coordinator will try to reach patient again in 1-2 business days.    Addendum 05/28/25 1:14 PM   Contacted Douglas via [employer] . He hung up on the .   Contacted and spoke with Douglas's son Tulio.     Tulio reports Douglas is doing about the same. His nose bleeds are happening less frequently. He's having the same amount of pressure/congestion. Same H2O/osmolite intake.    Tulio is wondering if we can move Douglas's infusion immediately after his visit with Nishi tomorrow but before his ENT appointments. Message sent to infusion/scheduling to inquire.    Trudy Shelton, RN, BSN  RN Care Coordinator  [facility]

## 2025-05-27 NOTE — ANESTHESIA PREPROCEDURE EVALUATION
Anesthesia Pre-Procedure Evaluation    Patient: Douglas Herrera   MRN: [id number] : 1960          Procedure : Procedure(s):  ANESTHESIA, IN NON-OPERATING ROOM SETTING Cerebral Angiogram with Embolization @1200         Past Medical History:   Diagnosis Date    Ascending aortic aneurysm     Coronary artery disease     Depression     Gout     History of anesthesia complications     Hyperlipemia     Hypertension     Malignant neoplasm of nasal cavities (H)     PONV (postoperative nausea and vomiting)       Past Surgical History:   Procedure Laterality Date    AORTA SURGERY N/A 2019    Procedure: WITH ASCENDING AORTA REPLACEMENT;  Surgeon: Darlene Graff MD;  Location: [facility] Main OR;  Service: Cardiovascular    BYPASS GRAFT ARTERY CORONARY      CARDIAC SURGERY      CV CORONARY ANGIOGRAM N/A 3/12/2019    Procedure: Coronary Angiogram;  Surgeon: Hamilton Lucero MD;  Location: [facility] Cath Lab;  Service: Cardiology    ENDOSCOPIC SINUS SURGERY Left 11/10/2023    Procedure: Transnasal endoscopic approach to biopsy left nasal mass;  Surgeon: Damon Regan MD;  Location: [facility] OR    GALLBLADDER SURGERY      INSERT PORT VASCULAR ACCESS N/A 2024    Procedure: Insert port vascular access;  Surgeon: Tyrel Silvestre MD;  Location: [facility] OR    IR CAROTID CEREBRAL ANGIOGRAM BILATERAL  2023    IR CHEST PORT PLACEMENT > 5 YRS OF AGE  2024    IR FACIAL EMBOLIZATION LEFT  2023    IR GASTROSTOMY TUBE CHANGE  2024    IR GASTROSTOMY TUBE PERCUTANEOUS PLCMNT  2024    OPTICAL TRACKING SYSTEM ENDOSCOPIC SINUS SURGERY Left 2023    Procedure: Stealth assisted transnasal endoscopic approach to excise left sinonasal mass;  Surgeon: Damon Regan MD;  Location: [facility] OR    RESECT TUMOR SINUS Left 10/11/2024    Procedure: Transnasal Biopsy of Left Maxillary Sinus Tumor,;  Surgeon: Damon Regan MD;  Location: [facility] OR      No Known Allergies    Social History     Tobacco Use    Smoking status: Former     Types: Cigarettes     Passive exposure: Past    Smokeless tobacco: Never   Substance Use Topics    Alcohol use: Not Currently     Comment: Occasionally      Wt Readings from Last 1 Encounters:   05/23/25 60.8 kg (134 lb)        Anesthesia Evaluation    Type: General and MAC.    History of anesthetic complications  - difficult airway.  small oral opening - left facial tenderness.    ROS/MED HX  ENT/Pulmonary: Comment: SCC of L maxillary sinus, currently with intermittent bleeding, requiring transfusions    ENT performed NP scope last week, showing no tumor on right side of nasal cavity    (+)     VIVEK risk factors,  hypertension,         tobacco use, Past use,                    (-) recent URI   Neurologic:  - neg neurologic ROS  (-) no seizures and no CVA   Cardiovascular: Comment: Ascending aortic aneurysm s/p repair 2019  CAD s/p CABG x3 2019    (+) Dyslipidemia hypertension-range: 120-130s/70-80s/ -  CAD -  CABG-date: X 3 2019. -   Taking blood thinners                              Previous cardiac testing   Echo: Date: 12/3/23 Results:  Interpretation Summary  Global and regional left ventricular function is normal with an EF of 60-65%.  Global right ventricular function is normal.  Mild tricuspid insufficiency is present.  Pulmonary artery systolic pressure is normal.  Estimated mean right atrial pressure is normal.  No pericardial effusion is present.    Stress Test:  Date: Results:    ECG Reviewed:  Date: 10/2024 Results:  NSR    Cath:  Date: 2019 Results:    Ost LAD to Prox LAD lesion is 100% stenosed.    The LM vessel was moderate and is considered normal.    Estimated blood loss was <20 ml.    The left circumflex was moderate.    Ost 1st Mrg lesion is 45% stenosed.    RPDA lesion is 65% stenosed.     57 yo male with exertion angina and abn stress imaging with known enlargement of the aorta - (no data available)     Angiography via left  radial  LM normal  LAD prox 100% with collaterals via PDA; Circ collaterals to diagonal  Circ OM1 mild dz  RCA mid PDA 60-70% with collaterals to LAD     Ascending aorta 4.8cm      Imp/plan  1. Severe 2 vessel dz   2. Ascending aortic aneurysm - 4.8cm       METS/Exercise Tolerance:  Comment: Limited activity feels fatigued, sleeping more. Denies CP. Limited for about 5 months    Hematologic: Comments: Post op PE after CABG    (+) History of blood clots,    pt is not anticoagulated,  history of blood transfusion, previous transfusion reaction,        Musculoskeletal:  - neg musculoskeletal ROS     GI/Hepatic: Comment: S/p PEG    (+)           hepatitis type B, liver disease,       Renal/Genitourinary:  - neg Renal ROS     Endo: Comment: GOUT   (-) chronic steroid usage   Psychiatric/Substance Use:  - neg psychiatric ROS  (-) psychiatric history   Infectious Disease:  - neg infectious disease ROS     Malignancy:   (+) Malignancy, History of Skin and Other.Skin CA Remission status post Surgery.  Other CA maxillary sinus cancer status post Surgery, Chemo and Radiation.    Other: Comment: Sinus mass - neg other ROS            Physical Exam  Airway  Mallampati: IV  TM distance: >3 FB  Neck ROM: limited  Mouth opening: < 2 cm    Cardiovascular - normal exam  Rhythm: regular  Rate: normal rate   Comments: Ascending aortic aneurysm s/p repair 2019  CAD s/p CABG x3 2019  Dental     Pulmonary - normal examBreath sounds clear to auscultation        Neurological - normal exam  He appears awake, alert and oriented x3.    Other Findings       OUTSIDE LABS:  CBC:   Lab Results   Component Value Date    WBC 11.9 (H) 05/23/2025    WBC 15.4 (H) 05/15/2025    HGB 8.6 (L) 05/23/2025    HGB 7.5 (L) 05/15/2025    HCT 27.6 (L) 05/23/2025    HCT 23.5 (L) 05/15/2025     05/23/2025     05/15/2025     BMP:   Lab Results   Component Value Date     05/15/2025     (L) 05/04/2025    POTASSIUM 4.4 05/15/2025    POTASSIUM  "4.3 05/04/2025    CHLORIDE 101 05/15/2025    CHLORIDE 98 05/04/2025    CO2 26 05/15/2025    CO2 24 05/04/2025    BUN 37.2 (H) 05/15/2025    BUN 29.9 (H) 05/04/2025    CR 0.88 05/15/2025    CR 0.89 05/04/2025     (H) 05/15/2025     (H) 05/04/2025     COAGS:   Lab Results   Component Value Date    PTT 29 09/12/2023    INR 1.22 (H) 01/02/2024    FIBR 157 (L) 04/23/2019     POC: No results found for: \"BGM\", \"HCG\", \"HCGS\"  HEPATIC:   Lab Results   Component Value Date    ALBUMIN 3.3 (L) 05/15/2025    PROTTOTAL 7.9 05/15/2025    ALT 24 05/15/2025    AST 24 05/15/2025    ALKPHOS 67 05/15/2025    BILITOTAL 0.5 05/15/2025     OTHER:   Lab Results   Component Value Date    PH 7.43 12/01/2023    LACT 1.3 12/01/2023    A1C 5.2 04/25/2019    FLORENCE 10.4 05/15/2025    MAG 1.7 04/30/2025    TSH 4.03 04/30/2025    T4 1.02 04/30/2025       Anesthesia Plan    ASA Status:  3      NPO Status: NPO Appropriate   Anesthesia Type: General.  Airway: nasal.   Techniques and Equipment:     - Airway:  Planned airway equipment includes fiberoptic scope and difficult airway cart requested.     - Monitoring Plan: standard ASA monitoring, train of four monitoring     Consents    Anesthesia Plan(s) and associated risks, benefits, and realistic alternatives discussed. Questions answered and patient/representative(s) expressed understanding.     - Discussed: resident     - Discussed with:  , patient, family        - Pt is DNR/DNI Status: no DNR     Blood Consent:      - Discussed with: , patient, family.     - Consented: consented to blood products     Postoperative Care    Pain management: multimodal analgesia.     Comments:    Other Comments: I had a long discussion with the patient with regards to his airway and the risks of undergoing anesthesia.    I discussed that since his mouth opening has worsened compared to the last anesthetic in late 2024, oral fiberoptic intubation is no longer possible, due to not enough " tooth clearance. I discussed that we would do our best to topicalize his nose and throat with local anesthetic. I did discuss that he would need to be quite awake at the end of the procedure before extubation. I also discussed the need for an incision in the neck and a tube placement (tracheostomy) if significant breathing issues occur during the anesthetic. Duration of such an airway would be unknown if such an event were to occur. I also discussed that if severe respiratory issues could lead to cardiac arrest.    I asked him and his son if there were any questions, which were answered to the best of my ability.    Plan:  Awake nasal fiber optic intubation. Lidocaine 4% atomizer with lidocaine 5% ointment on Jerardo flex tube (6.0 and 5.0 available). Bronchoscopes with SAYGO setup. Difficult airway cart available. Cric kit available.               Marvin Love MD    I have reviewed the pertinent notes and labs in the chart from the past 30 days and (re)examined the patient.  Any updates or changes from those notes are reflected in this note.    Clinically Significant Risk Factors Present on Admission                   # Hypertension: Noted on problem list                # History of CABG: noted on surgical history

## 2025-05-28 LAB
ABO + RH BLD: NORMAL
BLD GP AB SCN SERPL QL: NEGATIVE
SPECIMEN EXP DATE BLD: NORMAL

## 2025-05-28 NOTE — TELEPHONE ENCOUNTER
Noted scheduling LM 5/27 to arrange follow-up with new Cardiologist. Will send follow-up message to make additional attempt if pt does not callback to schedule. LMS

## 2025-05-29 ENCOUNTER — OFFICE VISIT (OUTPATIENT)
Dept: OTOLARYNGOLOGY | Facility: CLINIC | Age: 65
End: 2025-05-29
Attending: REGISTERED NURSE
Payer: COMMERCIAL

## 2025-05-29 ENCOUNTER — PRE VISIT (OUTPATIENT)
Dept: OTOLARYNGOLOGY | Facility: CLINIC | Age: 65
End: 2025-05-29

## 2025-05-29 ENCOUNTER — HOME INFUSION BILLING (OUTPATIENT)
Dept: HOME HEALTH SERVICES | Facility: HOME HEALTH | Age: 65
End: 2025-05-29
Payer: COMMERCIAL

## 2025-05-29 ENCOUNTER — ONCOLOGY VISIT (OUTPATIENT)
Dept: ONCOLOGY | Facility: CLINIC | Age: 65
End: 2025-05-29
Attending: REGISTERED NURSE
Payer: COMMERCIAL

## 2025-05-29 ENCOUNTER — APPOINTMENT (OUTPATIENT)
Dept: LAB | Facility: CLINIC | Age: 65
End: 2025-05-29
Attending: INTERNAL MEDICINE
Payer: COMMERCIAL

## 2025-05-29 ENCOUNTER — INFUSION THERAPY VISIT (OUTPATIENT)
Dept: ONCOLOGY | Facility: CLINIC | Age: 65
End: 2025-05-29
Attending: INTERNAL MEDICINE
Payer: COMMERCIAL

## 2025-05-29 VITALS
SYSTOLIC BLOOD PRESSURE: 119 MMHG | DIASTOLIC BLOOD PRESSURE: 74 MMHG | HEART RATE: 75 BPM | RESPIRATION RATE: 16 BRPM | OXYGEN SATURATION: 97 % | TEMPERATURE: 98.7 F

## 2025-05-29 VITALS
OXYGEN SATURATION: 98 % | TEMPERATURE: 98.1 F | DIASTOLIC BLOOD PRESSURE: 74 MMHG | RESPIRATION RATE: 18 BRPM | HEART RATE: 81 BPM | SYSTOLIC BLOOD PRESSURE: 118 MMHG | BODY MASS INDEX: 24.05 KG/M2 | WEIGHT: 134.4 LBS

## 2025-05-29 VITALS
HEART RATE: 82 BPM | BODY MASS INDEX: 24.48 KG/M2 | WEIGHT: 133 LBS | OXYGEN SATURATION: 96 % | TEMPERATURE: 98.5 F | DIASTOLIC BLOOD PRESSURE: 75 MMHG | HEIGHT: 62 IN | SYSTOLIC BLOOD PRESSURE: 123 MMHG

## 2025-05-29 DIAGNOSIS — H61.22 IMPACTED CERUMEN OF LEFT EAR: ICD-10-CM

## 2025-05-29 DIAGNOSIS — E03.4 HYPOTHYROIDISM DUE TO ACQUIRED ATROPHY OF THYROID: ICD-10-CM

## 2025-05-29 DIAGNOSIS — C31.0 SQUAMOUS CELL CARCINOMA OF MAXILLARY SINUS (H): Primary | ICD-10-CM

## 2025-05-29 DIAGNOSIS — D50.0 ANEMIA DUE TO CHRONIC BLOOD LOSS: ICD-10-CM

## 2025-05-29 DIAGNOSIS — L70.8 ACNEIFORM RASH: ICD-10-CM

## 2025-05-29 DIAGNOSIS — R25.2 TRISMUS: ICD-10-CM

## 2025-05-29 DIAGNOSIS — T88.4XXD DIFFICULT AIRWAY FOR INTUBATION, SUBSEQUENT ENCOUNTER: Primary | ICD-10-CM

## 2025-05-29 LAB
ALBUMIN SERPL BCG-MCNC: 3 G/DL (ref 3.5–5.2)
ALP SERPL-CCNC: 75 U/L (ref 40–150)
ALT SERPL W P-5'-P-CCNC: 64 U/L (ref 0–70)
ANION GAP SERPL CALCULATED.3IONS-SCNC: 10 MMOL/L (ref 7–15)
AST SERPL W P-5'-P-CCNC: 50 U/L (ref 0–45)
BACTERIA BPU CULT: NO GROWTH
BASOPHILS # BLD AUTO: 0 10E3/UL (ref 0–0.2)
BASOPHILS NFR BLD AUTO: 1 %
BILIRUB SERPL-MCNC: 0.6 MG/DL
BLD PROD TYP BPU: NORMAL
BLOOD COMPONENT TYPE: NORMAL
BUN SERPL-MCNC: 28 MG/DL (ref 8–23)
CALCIUM SERPL-MCNC: 9.9 MG/DL (ref 8.8–10.4)
CHLORIDE SERPL-SCNC: 99 MMOL/L (ref 98–107)
CODING SYSTEM: NORMAL
CREAT SERPL-MCNC: 0.78 MG/DL (ref 0.67–1.17)
CROSSMATCH: NORMAL
EGFRCR SERPLBLD CKD-EPI 2021: >90 ML/MIN/1.73M2
EOSINOPHIL # BLD AUTO: 0.1 10E3/UL (ref 0–0.7)
EOSINOPHIL NFR BLD AUTO: 1 %
ERYTHROCYTE [DISTWIDTH] IN BLOOD BY AUTOMATED COUNT: 16.9 % (ref 10–15)
GLUCOSE SERPL-MCNC: 106 MG/DL (ref 70–99)
GRAM STAIN RESULT: NORMAL
HCO3 SERPL-SCNC: 25 MMOL/L (ref 22–29)
HCT VFR BLD AUTO: 25.3 % (ref 40–53)
HGB BLD-MCNC: 7.9 G/DL (ref 13.3–17.7)
IMM GRANULOCYTES # BLD: 0 10E3/UL
IMM GRANULOCYTES NFR BLD: 0 %
ISSUE DATE AND TIME: NORMAL
LYMPHOCYTES # BLD AUTO: 1.3 10E3/UL (ref 0.8–5.3)
LYMPHOCYTES NFR BLD AUTO: 16 %
MAGNESIUM SERPL-MCNC: 1.7 MG/DL (ref 1.7–2.3)
MCH RBC QN AUTO: 26.9 PG (ref 26.5–33)
MCHC RBC AUTO-ENTMCNC: 31.2 G/DL (ref 31.5–36.5)
MCV RBC AUTO: 86 FL (ref 78–100)
MONOCYTES # BLD AUTO: 0.8 10E3/UL (ref 0–1.3)
MONOCYTES NFR BLD AUTO: 10 %
NEUTROPHILS # BLD AUTO: 5.8 10E3/UL (ref 1.6–8.3)
NEUTROPHILS NFR BLD AUTO: 73 %
NRBC # BLD AUTO: 0 10E3/UL
NRBC BLD AUTO-RTO: 0 /100
PLATELET # BLD AUTO: 306 10E3/UL (ref 150–450)
POTASSIUM SERPL-SCNC: 4.4 MMOL/L (ref 3.4–5.3)
PROT SERPL-MCNC: 7.6 G/DL (ref 6.4–8.3)
RBC # BLD AUTO: 2.94 10E6/UL (ref 4.4–5.9)
SODIUM SERPL-SCNC: 134 MMOL/L (ref 135–145)
T4 FREE SERPL-MCNC: 0.94 NG/DL (ref 0.9–1.7)
TSH SERPL DL<=0.005 MIU/L-ACNC: 6.32 UIU/ML (ref 0.3–4.2)
UNIT ABO/RH: NORMAL
UNIT NUMBER: NORMAL
UNIT STATUS: NORMAL
UNIT TYPE ISBT: 6200
WBC # BLD AUTO: 8 10E3/UL (ref 4–11)

## 2025-05-29 PROCEDURE — 86850 RBC ANTIBODY SCREEN: CPT

## 2025-05-29 PROCEDURE — 84439 ASSAY OF FREE THYROXINE: CPT | Performed by: REGISTERED NURSE

## 2025-05-29 PROCEDURE — G0463 HOSPITAL OUTPT CLINIC VISIT: HCPCS | Performed by: REGISTERED NURSE

## 2025-05-29 PROCEDURE — 82565 ASSAY OF CREATININE: CPT | Performed by: REGISTERED NURSE

## 2025-05-29 PROCEDURE — 85018 HEMOGLOBIN: CPT

## 2025-05-29 PROCEDURE — 258N000003 HC RX IP 258 OP 636: Performed by: REGISTERED NURSE

## 2025-05-29 PROCEDURE — 250N000011 HC RX IP 250 OP 636: Performed by: REGISTERED NURSE

## 2025-05-29 PROCEDURE — P9016 RBC LEUKOCYTES REDUCED: HCPCS | Performed by: REGISTERED NURSE

## 2025-05-29 PROCEDURE — 36591 DRAW BLOOD OFF VENOUS DEVICE: CPT

## 2025-05-29 PROCEDURE — 36591 DRAW BLOOD OFF VENOUS DEVICE: CPT | Performed by: REGISTERED NURSE

## 2025-05-29 PROCEDURE — 83735 ASSAY OF MAGNESIUM: CPT | Performed by: REGISTERED NURSE

## 2025-05-29 PROCEDURE — 84443 ASSAY THYROID STIM HORMONE: CPT | Performed by: REGISTERED NURSE

## 2025-05-29 RX ORDER — HEPARIN SODIUM,PORCINE 10 UNIT/ML
5-20 VIAL (ML) INTRAVENOUS DAILY PRN
Status: CANCELLED | OUTPATIENT
Start: 2025-05-29

## 2025-05-29 RX ORDER — DIPHENHYDRAMINE HYDROCHLORIDE 50 MG/ML
25 INJECTION, SOLUTION INTRAMUSCULAR; INTRAVENOUS
Status: CANCELLED
Start: 2025-05-29

## 2025-05-29 RX ORDER — DIPHENHYDRAMINE HYDROCHLORIDE 50 MG/ML
50 INJECTION, SOLUTION INTRAMUSCULAR; INTRAVENOUS
Status: CANCELLED
Start: 2025-05-29

## 2025-05-29 RX ORDER — HEPARIN SODIUM (PORCINE) LOCK FLUSH IV SOLN 100 UNIT/ML 100 UNIT/ML
5 SOLUTION INTRAVENOUS
Status: CANCELLED | OUTPATIENT
Start: 2025-05-29

## 2025-05-29 RX ORDER — LORAZEPAM 2 MG/ML
0.5 INJECTION INTRAMUSCULAR EVERY 4 HOURS PRN
Status: CANCELLED | OUTPATIENT
Start: 2025-05-29

## 2025-05-29 RX ORDER — METHYLPREDNISOLONE SODIUM SUCCINATE 40 MG/ML
40 INJECTION INTRAMUSCULAR; INTRAVENOUS
Status: CANCELLED
Start: 2025-05-29

## 2025-05-29 RX ORDER — HEPARIN SODIUM,PORCINE 10 UNIT/ML
5-20 VIAL (ML) INTRAVENOUS DAILY PRN
OUTPATIENT
Start: 2025-05-29

## 2025-05-29 RX ORDER — ALBUTEROL SULFATE 0.83 MG/ML
2.5 SOLUTION RESPIRATORY (INHALATION)
Status: CANCELLED | OUTPATIENT
Start: 2025-05-29

## 2025-05-29 RX ORDER — HEPARIN SODIUM (PORCINE) LOCK FLUSH IV SOLN 100 UNIT/ML 100 UNIT/ML
5 SOLUTION INTRAVENOUS ONCE
Status: COMPLETED | OUTPATIENT
Start: 2025-05-29 | End: 2025-05-29

## 2025-05-29 RX ORDER — DIPHENHYDRAMINE HYDROCHLORIDE 50 MG/ML
50 INJECTION, SOLUTION INTRAMUSCULAR; INTRAVENOUS
Start: 2025-05-29

## 2025-05-29 RX ORDER — MEPERIDINE HYDROCHLORIDE 25 MG/ML
25 INJECTION INTRAMUSCULAR; INTRAVENOUS; SUBCUTANEOUS
Status: CANCELLED | OUTPATIENT
Start: 2025-05-29

## 2025-05-29 RX ORDER — HEPARIN SODIUM (PORCINE) LOCK FLUSH IV SOLN 100 UNIT/ML 100 UNIT/ML
5 SOLUTION INTRAVENOUS
Status: DISCONTINUED | OUTPATIENT
Start: 2025-05-29 | End: 2025-05-29 | Stop reason: HOSPADM

## 2025-05-29 RX ORDER — EPINEPHRINE 1 MG/ML
0.3 INJECTION, SOLUTION INTRAMUSCULAR; SUBCUTANEOUS EVERY 5 MIN PRN
Status: CANCELLED | OUTPATIENT
Start: 2025-05-29

## 2025-05-29 RX ORDER — HYDROCORTISONE 25 MG/G
CREAM TOPICAL 2 TIMES DAILY PRN
Qty: 30 G | Refills: 0 | Status: SHIPPED | OUTPATIENT
Start: 2025-05-29

## 2025-05-29 RX ORDER — CLINDAMYCIN PHOSPHATE 10 UG/ML
LOTION TOPICAL 2 TIMES DAILY
Qty: 60 ML | Refills: 3 | Status: SHIPPED | OUTPATIENT
Start: 2025-05-29

## 2025-05-29 RX ORDER — EPINEPHRINE 1 MG/ML
0.3 INJECTION, SOLUTION, CONCENTRATE INTRAVENOUS EVERY 5 MIN PRN
OUTPATIENT
Start: 2025-05-29

## 2025-05-29 RX ORDER — HEPARIN SODIUM (PORCINE) LOCK FLUSH IV SOLN 100 UNIT/ML 100 UNIT/ML
5 SOLUTION INTRAVENOUS
OUTPATIENT
Start: 2025-05-29

## 2025-05-29 RX ORDER — ALBUTEROL SULFATE 90 UG/1
1-2 INHALANT RESPIRATORY (INHALATION)
Status: CANCELLED
Start: 2025-05-29

## 2025-05-29 RX ORDER — DIPHENHYDRAMINE HYDROCHLORIDE 50 MG/ML
50 INJECTION, SOLUTION INTRAMUSCULAR; INTRAVENOUS
Status: CANCELLED | OUTPATIENT
Start: 2025-05-29

## 2025-05-29 RX ADMIN — Medication 5 ML: at 06:20

## 2025-05-29 RX ADMIN — Medication 5 ML: at 13:23

## 2025-05-29 RX ADMIN — SODIUM CHLORIDE 250 ML: 0.9 INJECTION, SOLUTION INTRAVENOUS at 11:34

## 2025-05-29 RX ADMIN — SODIUM CHLORIDE 240 MG: 9 INJECTION, SOLUTION INTRAVENOUS at 11:34

## 2025-05-29 ASSESSMENT — PAIN SCALES - GENERAL
PAINLEVEL_OUTOF10: MODERATE PAIN (6)
PAINLEVEL_OUTOF10: MODERATE PAIN (4)

## 2025-05-29 NOTE — PROGRESS NOTES
Infusion Nursing Note:  Douglas Herrera presents today for Cycle 1 Day 15 Erbitux, NEW Nivolumab and 1 unit PRBCs.    Patient seen by provider today: Yes: Nishi Michele CNP   present during visit today: No, pt prefers to use his son to interpret for him    Note: Patient presents to the infusion center today after his provider appt.    TRACIE Michele CNP/Bhakti Barrett RN 5/29/25 0805  -hgb 7.9;  transfuse 1 unit prbcs today     Intravenous Access:  Implanted Port.    Treatment Conditions:   Latest Reference Range & Units 05/29/25 06:28   Sodium 135 - 145 mmol/L 134 (L)   Potassium 3.4 - 5.3 mmol/L 4.4   Chloride 98 - 107 mmol/L 99   Carbon Dioxide (CO2) 22 - 29 mmol/L 25   Urea Nitrogen 8.0 - 23.0 mg/dL 28.0 (H)   Creatinine 0.67 - 1.17 mg/dL 0.78   GFR Estimate >60 mL/min/1.73m2 >90   Calcium 8.8 - 10.4 mg/dL 9.9   Anion Gap 7 - 15 mmol/L 10   Magnesium 1.7 - 2.3 mg/dL 1.7   Albumin 3.5 - 5.2 g/dL 3.0 (L)   Protein Total 6.4 - 8.3 g/dL 7.6   Alkaline Phosphatase 40 - 150 U/L 75   ALT 0 - 70 U/L 64   AST 0 - 45 U/L 50 (H)   Bilirubin Total <=1.2 mg/dL 0.6   Glucose 70 - 99 mg/dL 106 (H)   T4 Free 0.90 - 1.70 ng/dL 0.94   TSH 0.30 - 4.20 uIU/mL 6.32 (H)      Latest Reference Range & Units 05/29/25 07:53   WBC 4.0 - 11.0 10e3/uL 8.0   Hemoglobin 13.3 - 17.7 g/dL 7.9 (L)   Hematocrit 40.0 - 53.0 % 25.3 (L)   Platelet Count 150 - 450 10e3/uL 306   RBC Count 4.40 - 5.90 10e6/uL 2.94 (L)   MCV 78 - 100 fL 86   MCH 26.5 - 33.0 pg 26.9   MCHC 31.5 - 36.5 g/dL 31.2 (L)   RDW 10.0 - 15.0 % 16.9 (H)   % Neutrophils % 73   % Lymphocytes % 16   % Monocytes % 10   % Eosinophils % 1   % Basophils % 1   % Immature Granulocytes % 0   NRBC/W <1 /100 0   Absolute Neutrophil 1.6 - 8.3 10e3/uL 5.8   Absolute Lymphocytes 0.8 - 5.3 10e3/uL 1.3   Absolute Monocytes 0.0 - 1.3 10e3/uL 0.8   Absolute Eosinophils 0.0 - 0.7 10e3/uL 0.1   Absolute Basophils 0.0 - 0.2 10e3/uL 0.0   Absolute Immature Granulocytes <=0.4 10e3/uL 0.0    Absolute NRBCs 10e3/uL 0.0     Blood Consent signed 5/15/25    Results reviewed, labs MET treatment parameters, ok to proceed with treatment.    Post Infusion Assessment:  Patient tolerated infusion without incident.  Patient observed for 30 minutes post Erbitux per protocol.  Blood return noted pre and post infusion.  No evidence of extravasations.  Access discontinued per protocol.     Discharge Plan:   Prescription refills given for Clindamycin and Hydrocortisone cream.  Discharge instructions reviewed with: Patient and Family.  Patient and/or family verbalized understanding of discharge instructions and all questions answered.  AVS to patient via [employer].  Patient will return 6/12 for next appointment.   Patient discharged in stable condition accompanied by: son.  Departure Mode: Ambulatory.      Bhakti Barrett RN

## 2025-05-29 NOTE — NURSING NOTE
"Chief Complaint   Patient presents with    Consult     Impacted cerumen left -ear      Blood pressure 123/75, pulse 82, temperature 98.5  F (36.9  C), height 1.575 m (5' 2\"), weight 60.3 kg (133 lb), SpO2 96%.  Jovanny Phipps LPN    "

## 2025-05-29 NOTE — PROGRESS NOTES
Otolaryngology Clinic  May 29, 2025    Chief Complaint:   Left cerumen impaction       History of Present Illness:   Douglas Herrera is a 65 year old male who presents today for left ear cleaning due to persistent left ear infection. Accompanied by family member. 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  Pretreatment audiogram in December 2023 demonstrated a profound left mixed hearing loss.  Patient was recently noted to have a significant left cerumen impaction and was recommended to have an ear cleaning under microscopy.  Today, patient states that they have intermittent bilateral ear pain.  Continues to have significant hearing loss in the left ear.  Denies any change in hearing in the right ear.  Patient denies any dizziness.    Patient is scheduled for an IR embolization on 6-25 due to chronic epistaxis from the left nasal cavity.  Our clinic was contacted by the preanesthesia clinic due to concern for difficult intubation because of severe trismus.  Patient denies any difficulty breathing but does have significant difficulty opening their mouth.  No difficulty breathing out of the right nasal cavity.  Patient reports left facial pain.    Past Medical History:  Past Medical History:   Diagnosis Date    Ascending aortic aneurysm     Coronary artery disease     Depression     Gout     History of anesthesia complications     Hyperlipemia     Hypertension     Malignant neoplasm of nasal cavities (H)     PONV (postoperative nausea and vomiting)        Past Surgical History:  Past Surgical History:   Procedure Laterality Date    AORTA SURGERY N/A 4/23/2019    Procedure: WITH ASCENDING AORTA REPLACEMENT;  Surgeon: Darlene Graff MD;  Location: [facility] OR;  Service: Cardiovascular    BYPASS GRAFT ARTERY CORONARY      CARDIAC SURGERY      CV CORONARY ANGIOGRAM N/A 3/12/2019    Procedure: Coronary Angiogram;  Surgeon: Hamilton Lucero MD;  Location: [facility] Cath Lab;   Service: Cardiology    ENDOSCOPIC SINUS SURGERY Left 11/10/2023    Procedure: Transnasal endoscopic approach to biopsy left nasal mass;  Surgeon: Damon Regan MD;  Location: [facility] OR    GALLBLADDER SURGERY      INSERT PORT VASCULAR ACCESS N/A 1/11/2024    Procedure: Insert port vascular access;  Surgeon: Tyrel Silvestre MD;  Location: [facility] OR    IR CAROTID CEREBRAL ANGIOGRAM BILATERAL  11/30/2023    IR CHEST PORT PLACEMENT > 5 YRS OF AGE  1/11/2024    IR FACIAL EMBOLIZATION LEFT  9/13/2023    IR GASTROSTOMY TUBE CHANGE  11/22/2024    IR GASTROSTOMY TUBE PERCUTANEOUS PLCMNT  1/2/2024    OPTICAL TRACKING SYSTEM ENDOSCOPIC SINUS SURGERY Left 12/1/2023    Procedure: Stealth assisted transnasal endoscopic approach to excise left sinonasal mass;  Surgeon: Damon Regan MD;  Location: [facility] OR    RESECT TUMOR SINUS Left 10/11/2024    Procedure: Transnasal Biopsy of Left Maxillary Sinus Tumor,;  Surgeon: Damon Regan MD;  Location: [facility] OR       Medications:  Current Outpatient Medications   Medication Sig Dispense Refill    acetaminophen (TYLENOL) 325 MG tablet Take 2 tablets (650 mg) by mouth every 4 hours as needed for other or pain 50 tablet 0    aspirin 81 MG EC tablet Take 1 tablet (81 mg) by mouth daily 90 tablet 3    atorvastatin (LIPITOR) 80 MG tablet TAKE 1 TABLET (80 MG TOTAL) BY MOUTH AT BEDTIME/ TXHUA HMO NOJ 1 LUB TSHUAJ THAUM MUS PW PAB ZOO NTSHAV MUAJ ROJ (Patient taking differently: Take 80 mg by mouth every evening.) 90 tablet 3    celecoxib (CELEBREX) 100 MG capsule Take 1 capsule (100 mg) by mouth 2 times daily. 60 capsule 2    clindamycin (CLEOCIN T) 1 % external lotion Apply topically 2 times daily. 60 mL 3    entecavir (BARACLUDE) 0.5 MG tablet Take 0.5 mg by mouth every evening.      gabapentin (NEURONTIN) 300 MG capsule Take 1 capsule (300 mg) by mouth 3 times daily. 90 capsule 2    hydrocortisone 2.5 % cream Apply topically 2 times daily as needed  "for itching. 30 g 0    nitroGLYcerin (NITROSTAT) 0.4 MG sublingual tablet Place 1 tablet (0.4 mg) under the tongue every 5 minutes as needed. 25 tablet 3    ondansetron (ZOFRAN ODT) 4 MG ODT tab Take 1 tablet (4 mg) by mouth every 8 hours as needed for nausea. 4 tablet 0    ondansetron (ZOFRAN) 8 MG tablet Take 1 tablet (8 mg) by mouth every 8 hours as needed for nausea (vomiting). 30 tablet 2    Osmolite 1.5 Migue 237 mL Place 474 mLs into G tube 3 times daily. Infuse via gravity bag. 2 cartons TID spread 3-5 hours apart.   Water flush: 30-60 mL before and after each feeding. + 120 mL 4 times daily for hydration. 14540 mL 11    polyethylene glycol (MIRALAX) 17 g packet Take 1 packet by mouth as needed.      prochlorperazine (COMPAZINE) 10 MG tablet Take 1 tablet (10 mg) by mouth every 6 hours as needed for nausea or vomiting. 30 tablet 2    senna-docusate (SENNA S) 8.6-50 MG tablet Take 1 tablet by mouth 2 times daily as needed for constipation 30 tablet 1    sodium chloride (OCEAN) 0.65 % nasal spray Spray 2 sprays in nostril daily as needed for congestion. 88 mL 3       Allergies:  No Known Allergies     Social History:  Social History     Tobacco Use    Smoking status: Former     Types: Cigarettes     Passive exposure: Past    Smokeless tobacco: Never   Substance Use Topics    Alcohol use: Not Currently     Comment: Occasionally    Drug use: No       ROS: 10 point ROS neg other than the symptoms noted above in the HPI.    Physical Exam:    /75   Pulse 82   Temp 98.5  F (36.9  C)   Ht 1.575 m (5' 2\")   Wt 60.3 kg (133 lb)   SpO2 96%   BMI 24.33 kg/m       Constitutional:  The patient was well-groomed, and in no acute distress.     Skin: Normal:  warm and pink without rash   Neurologic: Alert and oriented x 3.  Voice normal.   Psychiatric: The patient's affect was calm, cooperative, and appropriate.     Communication:  Normal; communicates verbally, normal voice quality.    Respiratory: Breathing " comfortably without stridor or exertion of accessory muscles.    Ears: Pinnae and tragus non-tender.     Nose: Sinuses were non-tender.  Anterior rhinoscopy revealed midline septum and absence of purulence or polyps.       Flexible fiberoptic laryngoscopy: Scope exam was indicated due to need for airway clearance for upcoming procedure. Verbal consent was obtained. The nasal cavity was prepped with an aerosolized solution of topical anesthetic and vasoconstrictive agent. The scope was passed through the RIGHT anterior nasal cavity and advanced. Inspection of the nasopharynx revealed very dry mucosa and mucus crusting with no gross abnormality. The base of tongue and vallecula are normal. The epiglottis, AE folds, false cords, true cords, arytenoids are normal.  Inspection of the larynx revealed bilaterally mobile vocal cords. Pyriform sinuses are symmetric. The airway is patent. Procedure tolerated well with no immediate complications noted.    Nasopharynx:    Larynx:      Otologic microscope exam:    Right ear was examined under the microscope.  Normal appearing TM, nicely aerated middle ear space.     Left ear was also examined under the microscope. Ear canal is filled with dried, adherent cerumen crusting. This was thoroughly cleaned using right angled hook and alligator forceps. Once crusting was removed, TM visualized under microscopy. Intact TM with anterior retraction. Small amount of bleed at anterior retraction due to adherent cerumen crust. Ciprodex applied.        Assessment and Plan:  1. Impacted cerumen of left ear  With deeply impacted cerumen within the left ear.  This was completely cleaned under microscopy today.  Patient did have an area of bleeding and irritation across the anterior canal and annulus due to adherent impaction.  Will have patient use Ciprodex drops to treat this inflammation twice daily for the next 5 days.  Patient can proceed with audiogram at their convenience.    I would like  to see patient back in 2 months to recheck their ear due to risk of recurrent cerumen impaction from previous radiation treatment.    2. Difficult airway for intubation, subsequent encounter (Primary)  Patient with severe trismus and concern for intubation with their upcoming IR embolization.  Patient does have very limited mouth opening.  I was able to easily pass a scope through the right nasal cavity.  Mucosa was very dry throughout the nasal cavity and nasopharynx but I did not visualize any tumor or areas of concern.  Vocal cords were also mobile bilaterally with a patent airway.  Message sent to preanesthesia team regarding these findings.     Maricruz Benitez DNP, APRN, CNP  Otolaryngology  Head & Neck Surgery  [phone]    30 minutes spent by me on the date of the encounter doing chart review, history and exam, documentation and further activities per the note excluding time spent examining and cleaning ears under microscopy and performing scope exam.

## 2025-05-29 NOTE — NURSING NOTE
"Oncology Rooming Note    May 29, 2025 7:01 AM   Douglas Herrera is a 65 year old male who presents for:    Chief Complaint   Patient presents with    Port Draw     Labs drawn via port by RN    Oncology Clinic Visit     Squamous Cell Carcinoma     Initial Vitals: /74   Pulse 81   Temp 98.1  F (36.7  C) (Oral)   Resp 18   Wt 61 kg (134 lb 6.4 oz)   SpO2 98%   BMI 24.05 kg/m   Estimated body mass index is 24.05 kg/m  as calculated from the following:    Height as of 5/23/25: 1.592 m (5' 2.68\").    Weight as of this encounter: 61 kg (134 lb 6.4 oz). Body surface area is 1.64 meters squared.  Moderate Pain (6) Comment: Data Unavailable   No LMP for male patient.  Allergies reviewed: Yes  Medications reviewed: Yes    Medications: Medication refills not needed today.  Pharmacy name entered into [facility]: [facility] - [place], [place] - [address]    Frailty Screening:   Is the patient here for a new oncology consult visit in cancer care? 2. No    PHQ9:  Did this patient require a PHQ9?: No      Clinical concerns: none      Ammon Felder LPN              "

## 2025-05-29 NOTE — PROGRESS NOTES
[facility]  [address] [address]  Phone: [phone]  Fax: [phone]    PATIENT NAME: Douglas Herrera  MRN # [id number]   DATE OF VISIT: May 29, 2025  YOB: 1960     Otolaryngology: Dr. Damon BurksChavira   Radiation Oncology: Dr. Tomasa Akers  Cardiology: Dr. Qamar Hernandez  Hepatologist: [facility]   Palliative Care: Dr. Joshua Banks      CANCER TYPE: SCC sinonasal   STAGE: [id number] (IVB)  ECOG PS: 1     PD-L1: TPS 60-70%, CPS >70% on [id number]  NGS: internal panel. Fusion negative. ARID1A S90fs, EGFR exon 20 insertion, [id number], APC R9720aq, TMB 7.313    ASSESSMENT AND PLAN  SCC L maxillary sinus, [id number] (IVB), ARID1 mutation,  EGFR exon 20 insertion:   Met with Dr. Post on 4/30/25 for further discussion about disease progression and treatment options. Initial recommendation was for amivantamab to target EGFR exon 20 insertion mutation although due to inability to secure coverage through insurance or DRP we transitioned to cetuximab (500 mg/m2) + nivolumab (240 mg) every 2 weeks (C1D1 5/15). Brain MRI from 5/11 showing enhancement of the tumor in the L sinus. Labs generally stable with slight elevation of TSH and hgb 7.9. Will repeat transfusion in setting of chronic blood loss and epistaxis. Plan to transition to amivantamab in setting of clinical or radiographic progression.    Plan:  -Proceed with cetuximab + nivolumab today  -RTC in 2 weeks for C2D1 cetuximab  -Transfuse 1 unit PRBC today or tomorrow  -Repeat scans after 2 cycles (CT chest/abd, CT neck, MRI sinonasal/orbits)    Anemia: chronic blood loss, epistaxis. Hgb 7.9. Transfuse 1 unit PRBC today and for hgb < 8.    Epistaxis: Silver nitrate in the ED. Repeat ED visit 5/4. Neurosurg consult 5/15. Plan for repeat embolization next week.     Blurry vision: Left side only. Stable. No optic nerve involvement on MRI.    Increasing trismus: SLP eval and reinforce  exercises. Oral exam significantly limited.    Hypothyroidism: TFTs c/w subclinical hypothyroidism. T4 remains normal    Nutrition: TF twice daily. Typically only one meal by mouth daily.  Gtube exchanged 11/22/24.    Hearing loss: Stable. Pronounced on left. Considering audiogram in future.     Hepatitis B: HBSAby negative, SAg +, cAby +, HBV PCR negative 1/23/24. HCV negative.     H/o PE/DVT: 5/8/2019 CTA report scanned into [employer]. Small PEs, LLE DVT, acute, occlusive. Completed rivaroxaban.     ASCVD: ASA 81 mg daily, metoprolol per PCP. Will try to continue despite nosebleed but of course stopping is also an option, would require risk benefit discussion with PCP as long as bleeding doesn't       Nishi Michele CNP  ---  48 minutes spent on the date of the encounter doing chart review, review of test results, interpretation of tests, patient visit, documentation, and discussion with family.    The longitudinal plan of care for the diagnosis(es)/condition(s) as documented were addressed during this visit. Due to the added complexity in care, I will continue to support Douglas in the subsequent management and with ongoing continuity of care.      SUBJECTIVE  Douglas is seen today in clinic prior to cycle 1 day 15 cetuximab/nivolumab.  -Tulio feels that he's had a little more energy lately  -Pain stable. Remains on gabapentin and celecoxib   -Developed rash to scalp, nose and more scattered on forearms and chest. No itching or burning. Applying Aquaphor  -No significant epistaxis over the past week. Nose is dripping with pink tinged drainage by no barak blood. Neuro surg consult 2 weeks ago with plan for embolization next week  -Notes some water is coming out of nose when he tries to drink by mouth  -No skin changes to hands or feet    CANCER SUMMARY  8/9/23                CT sinus.   8/24/23  MRI sinus. L maxillary sinus mass  9/12/23              ED for epistaxis.   9/13/23              CTA and embolization  of L IMAX   10/4/23  Nasal bx. Path: Inverted papilloma with high grade dysplasia  11/10/23 Nasal endoscopy and bx in the OR. C/b severe bleeding. Path: Inverted sinonasal papilloma with severe dysplasia.  11/17/23            MRI. 7.4 x 4.6 x 6.6 cm L sinonasal mass, destruction of nasal turbinates, L maxillary sinus, hard palate, invasion of L  space, involvement of medial and lateral pterygoids and temporalis muscles. Effacement and invasion ipsilateral pterygopalatine fossa, extends and effaces the nasopharynx.   11/30/23            Carotid angiogram. Direct endonasal and transoral embolization L sinonasal tumor, transaterial embolization of the L sphenopalatine artery feeders to the L sinonasal artery tumor   12/1/23  Stealth assisted transnasal endoscopic approach to excise L sinonasal mass. Pre-operative embolization. Path: SCC involving L maxilla.    12/9/23  PET. Hypermetabolic mass centered along the L maxilla, extending superiorly through the floor of the L maxillary sinus, posterior/laterally to the  space, invasion of the pterygoid muscles, extending cranially along the pterygopalatine fossa and pterygoid plates to the skull base level. Erosion of involved bones including L maxilla, maxillary sinus wall and pterygoid plates. Extension medially to the L lateral nasopharyngeal wall and soft palate. 1 mm fat place between carotid and mass. ~4.9 x 4.0 x 5.4 cm (SUV 24.3). Partially resected since 11/17/23 MRI. 8 mm L 2A node (SUV 5.9), 7 mm L level 2 node (SUV 5.5)  1/2/24  Gtube (IR)  1/4/24  Video swallow. No aspiration  1/8~2/27/24 Chemoradiation with weekly cisplatin.  1/11/24  Port (IR)  8/5/24  PET/CT.  Overall increased FDG uptake centered along the posterior inferior left maxillary sinus involving the  space, left pterygopalatine fossa, left parapharyngeal space, extending inferiorly to left maxilla.  Increased soft tissue component within the space, now broad FDG uptake  (SUV 24.97), previously 5.75.  Area measures 3.3 x 2.6 cm. Increased focal hypermetabolic activity posterior pharyngeal wall, extending to the left palatine tonsil, associated mucosal thickening (SUV 17.23).  Non-FDG avid mass, 2.7 x 2.4 cm, previously 2.1 x 2.5 cm, another medially, 1.1 x 1.5 cm, previously 1.1 x 1 cm.  New focal area of FDG uptake right lateral oropharyngeal wall (SUV 9.07).  Few hypermetabolic right cervical nodes, none definitively enlarged, however increased activity compared to prior (SUV 7.29-8.36).  Several sub-6 mm pulmonary nodules.  8/5/24  MRI.  Heterogeneous mass along the base of the left maxillary sinus extending to the maxilla,  space, left pterygopalatine fossa, and parapharyngeal space.  Some areas of treated but some corresponding to FDG avidity on same-day PET, highly suspicious for tumor recurrence.  Question early left V2 involvement, thin dural enhancement along the base of the left temporal lobe     9/20/24  L maxillary, L superior alveolus bx in clinic Path: Fragments of at least SCC in situ.   10/11/24 Transnasal bx L maxillary sinus (Dr. Iglesias). 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  Multiloculated mass at base of L maxilla extending to involve L side of palate, retromaxillary region, extending into the  space, premaxillary region.  Similar to prior MRI 8/5/2024.  Continuous slight abnormal asymmetric thickening L V3 and V2.  Asymmetric thin dural enhancement along the base of the left temporal lobe.   10/29/2024 CT chest.  Scattered <6 mm lung nodules grossly stable compared to 8/5/2024.  Partially visualized common bile duct mildly dilated, 8 mm, possibly related to prior cholecystectomy, recommend MRCP.   1/20/25  CT neck and CAP.  Overall similar multiloculated L maxillary mass extending into the  and parapharyngeal spaces compared to PET/CT 8/5/2024.  No cervical adenopathy.  Stable small 2-5 mm lung nodules.   Increased RUL nodule, 1 mm --> 4 mm (5:139).  New 5 mm LLL nodule.  New somewhat linear 4 mm lingular nodule.  Attention on follow-up  11/20/24~1/24/25 Carboplatin + paclitaxel + pembrolizumab.   2/13~3/6/25 Maintenance pembrolizumab.   4/26/25  [facility] for L nosebleed. Afrin and pressure    PAST MEDICAL HISTORY  Sinonasal carcinoma as above  HTN  ASCVD. CABG x 3 2019  PE and LLE DVT after CABG . Treated with rivaroxaban  Dyslipidemia  Hepatitis B   SCC R temple s/p MOHS  Ascending aortic aneurysm repair 2019  Hearing loss L   Gout - feet  Depression  Cholecystectomy    CURRENT OUTPATIENT MEDICATIONS  Reviewed    ALLERGIES  No Known Allergies     PHYSICAL EXAM  /74   Pulse 81   Temp 98.1  F (36.7  C) (Oral)   Resp 18   Wt 61 kg (134 lb 6.4 oz)   SpO2 98%   BMI 24.05 kg/m      GEN: NAD  HEENT: EOMI, no icterus, injection or pallor. Trismus limiting oral exam. No active epistaxis  LUNGS: clear bilaterally  CV: rrr, no murmur  EXT:  no edema  NEURO: alert  SKIN: acneiform rash to scalp, nose and scattered papules on forearms, posterior neck and chest    LABORATORY AND IMAGING STUDIES  Most Recent 3 CBC's:  Recent Labs   Lab Test 05/29/25  0753 05/23/25  1144 05/15/25  0635   WBC 8.0 11.9* 15.4*   HGB 7.9* 8.6* 7.5*   MCV 86 88 88    308 362    Most Recent 3 BMP's:  Recent Labs   Lab Test 05/29/25  0628 05/15/25  0635 05/04/25  2132 04/30/25  1626   * 137 134* 134*   POTASSIUM 4.4 4.4 4.3 4.2   CHLORIDE 99 101 98 97*   CO2 25 26 24 28   BUN 28.0* 37.2* 29.9* 26.3*   CR 0.78 0.88 0.89 0.81   ANIONGAP 10 10 12 9   FLORENCE 9.9 10.4 9.3 9.5   * 105* 195* 124*   PROTTOTAL 7.6 7.9  --  8.2   ALBUMIN 3.0* 3.3*  --  3.4*    Most Recent 2 LFT's:  Recent Labs   Lab Test 05/29/25  0628 05/15/25  0635   AST 50* 24   ALT 64 24   ALKPHOS 75 67   BILITOTAL 0.6 0.5    Most Recent TSH and T4:  Recent Labs   Lab Test 05/29/25  0628   TSH 6.32*   T4 0.94     Phos/Mag:  Lab Results   Component Value Date    MAG 1.7  05/29/2025    MAG 1.7 04/30/2025    MAG 1.9 08/05/2024      I reviewed the above labs today.

## 2025-05-29 NOTE — LETTER
5/29/2025       RE: Douglas Herrera  [address]  [place] [address]     Dear Colleague,    Thank you for referring your patient, Douglas Herrera, to the [facility] EAR NOSE AND THROAT CLINIC [place] at [facility]. Please see a copy of my visit note below.      Otolaryngology Clinic  May 29, 2025    Chief Complaint:   Left cerumen impaction       History of Present Illness:   Douglas Herrera is a 65 year old male who presents today for left ear cleaning due to persistent left ear infection. Accompanied by family member. 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  Pretreatment audiogram in December 2023 demonstrated a profound left mixed hearing loss.  Patient was recently noted to have a significant left cerumen impaction and was recommended to have an ear cleaning under microscopy.  Today, patient states that they have intermittent bilateral ear pain.  Continues to have significant hearing loss in the left ear.  Denies any change in hearing in the right ear.  Patient denies any dizziness.    Patient is scheduled for an IR embolization on 6-25 due to chronic epistaxis from the left nasal cavity.  Our clinic was contacted by the preanesthesia clinic due to concern for difficult intubation because of severe trismus.  Patient denies any difficulty breathing but does have significant difficulty opening their mouth.  No difficulty breathing out of the right nasal cavity.  Patient reports left facial pain.    Past Medical History:  Past Medical History:   Diagnosis Date     Ascending aortic aneurysm      Coronary artery disease      Depression      Gout      History of anesthesia complications      Hyperlipemia      Hypertension      Malignant neoplasm of nasal cavities (H)      PONV (postoperative nausea and vomiting)        Past Surgical History:  Past Surgical History:   Procedure Laterality Date     AORTA SURGERY N/A 4/23/2019    Procedure: WITH  ASCENDING AORTA REPLACEMENT;  Surgeon: Darlene Graff MD;  Location: [facility] Main OR;  Service: Cardiovascular     BYPASS GRAFT ARTERY CORONARY       CARDIAC SURGERY       CV CORONARY ANGIOGRAM N/A 3/12/2019    Procedure: Coronary Angiogram;  Surgeon: Hamilton Lucero MD;  Location: [facility] Cath Lab;  Service: Cardiology     ENDOSCOPIC SINUS SURGERY Left 11/10/2023    Procedure: Transnasal endoscopic approach to biopsy left nasal mass;  Surgeon: Damon Regan MD;  Location: [facility] OR     GALLBLADDER SURGERY       INSERT PORT VASCULAR ACCESS N/A 1/11/2024    Procedure: Insert port vascular access;  Surgeon: Tyrel Silvestre MD;  Location: [facility] OR     IR CAROTID CEREBRAL ANGIOGRAM BILATERAL  11/30/2023     IR CHEST PORT PLACEMENT > 5 YRS OF AGE  1/11/2024     IR FACIAL EMBOLIZATION LEFT  9/13/2023     IR GASTROSTOMY TUBE CHANGE  11/22/2024     IR GASTROSTOMY TUBE PERCUTANEOUS PLCMNT  1/2/2024     OPTICAL TRACKING SYSTEM ENDOSCOPIC SINUS SURGERY Left 12/1/2023    Procedure: Stealth assisted transnasal endoscopic approach to excise left sinonasal mass;  Surgeon: Damon Regan MD;  Location: [facility] OR     RESECT TUMOR SINUS Left 10/11/2024    Procedure: Transnasal Biopsy of Left Maxillary Sinus Tumor,;  Surgeon: Damon Regan MD;  Location: [facility] OR       Medications:  Current Outpatient Medications   Medication Sig Dispense Refill     acetaminophen (TYLENOL) 325 MG tablet Take 2 tablets (650 mg) by mouth every 4 hours as needed for other or pain 50 tablet 0     aspirin 81 MG EC tablet Take 1 tablet (81 mg) by mouth daily 90 tablet 3     atorvastatin (LIPITOR) 80 MG tablet TAKE 1 TABLET (80 MG TOTAL) BY MOUTH AT BEDTIME/ TXHUA HMO NOJ 1 LUB TSHUAJ THAUM MUS PW PAB ZOO NTSHAV MUAJ ROJ (Patient taking differently: Take 80 mg by mouth every evening.) 90 tablet 3     celecoxib (CELEBREX) 100 MG capsule Take 1 capsule (100 mg) by mouth 2 times daily. 60 capsule 2  "    clindamycin (CLEOCIN T) 1 % external lotion Apply topically 2 times daily. 60 mL 3     entecavir (BARACLUDE) 0.5 MG tablet Take 0.5 mg by mouth every evening.       gabapentin (NEURONTIN) 300 MG capsule Take 1 capsule (300 mg) by mouth 3 times daily. 90 capsule 2     hydrocortisone 2.5 % cream Apply topically 2 times daily as needed for itching. 30 g 0     nitroGLYcerin (NITROSTAT) 0.4 MG sublingual tablet Place 1 tablet (0.4 mg) under the tongue every 5 minutes as needed. 25 tablet 3     ondansetron (ZOFRAN ODT) 4 MG ODT tab Take 1 tablet (4 mg) by mouth every 8 hours as needed for nausea. 4 tablet 0     ondansetron (ZOFRAN) 8 MG tablet Take 1 tablet (8 mg) by mouth every 8 hours as needed for nausea (vomiting). 30 tablet 2     Osmolite 1.5 Migue 237 mL Place 474 mLs into G tube 3 times daily. Infuse via gravity bag. 2 cartons TID spread 3-5 hours apart.   Water flush: 30-60 mL before and after each feeding. + 120 mL 4 times daily for hydration. 62103 mL 11     polyethylene glycol (MIRALAX) 17 g packet Take 1 packet by mouth as needed.       prochlorperazine (COMPAZINE) 10 MG tablet Take 1 tablet (10 mg) by mouth every 6 hours as needed for nausea or vomiting. 30 tablet 2     senna-docusate (SENNA S) 8.6-50 MG tablet Take 1 tablet by mouth 2 times daily as needed for constipation 30 tablet 1     sodium chloride (OCEAN) 0.65 % nasal spray Spray 2 sprays in nostril daily as needed for congestion. 88 mL 3       Allergies:  No Known Allergies     Social History:  Social History     Tobacco Use     Smoking status: Former     Types: Cigarettes     Passive exposure: Past     Smokeless tobacco: Never   Substance Use Topics     Alcohol use: Not Currently     Comment: Occasionally     Drug use: No       ROS: 10 point ROS neg other than the symptoms noted above in the HPI.    Physical Exam:    /75   Pulse 82   Temp 98.5  F (36.9  C)   Ht 1.575 m (5' 2\")   Wt 60.3 kg (133 lb)   SpO2 96%   BMI 24.33 kg/m   "     Constitutional:  The patient was well-groomed, and in no acute distress.     Skin: Normal:  warm and pink without rash   Neurologic: Alert and oriented x 3.  Voice normal.   Psychiatric: The patient's affect was calm, cooperative, and appropriate.     Communication:  Normal; communicates verbally, normal voice quality.    Respiratory: Breathing comfortably without stridor or exertion of accessory muscles.    Ears: Pinnae and tragus non-tender.     Nose: Sinuses were non-tender.  Anterior rhinoscopy revealed midline septum and absence of purulence or polyps.       Flexible fiberoptic laryngoscopy: Scope exam was indicated due to need for airway clearance for upcoming procedure. Verbal consent was obtained. The nasal cavity was prepped with an aerosolized solution of topical anesthetic and vasoconstrictive agent. The scope was passed through the RIGHT anterior nasal cavity and advanced. Inspection of the nasopharynx revealed very dry mucosa and mucus crusting with no gross abnormality. The base of tongue and vallecula are normal. The epiglottis, AE folds, false cords, true cords, arytenoids are normal.  Inspection of the larynx revealed bilaterally mobile vocal cords. Pyriform sinuses are symmetric. The airway is patent. Procedure tolerated well with no immediate complications noted.    Nasopharynx:    Larynx:      Otologic microscope exam:    Right ear was examined under the microscope.  Normal appearing TM, nicely aerated middle ear space.     Left ear was also examined under the microscope. Ear canal is filled with dried, adherent cerumen crusting. This was thoroughly cleaned using right angled hook and alligator forceps. Once crusting was removed, TM visualized under microscopy. Intact TM with anterior retraction. Small amount of bleed at anterior retraction due to adherent cerumen crust. Ciprodex applied.        Assessment and Plan:  1. Impacted cerumen of left ear  With deeply impacted cerumen within the  left ear.  This was completely cleaned under microscopy today.  Patient did have an area of bleeding and irritation across the anterior canal and annulus due to adherent impaction.  Will have patient use Ciprodex drops to treat this inflammation twice daily for the next 5 days.  Patient can proceed with audiogram at their convenience.    I would like to see patient back in 2 months to recheck their ear due to risk of recurrent cerumen impaction from previous radiation treatment.    2. Difficult airway for intubation, subsequent encounter (Primary)  Patient with severe trismus and concern for intubation with their upcoming IR embolization.  Patient does have very limited mouth opening.  I was able to easily pass a scope through the right nasal cavity.  Mucosa was very dry throughout the nasal cavity and nasopharynx but I did not visualize any tumor or areas of concern.  Vocal cords were also mobile bilaterally with a patent airway.  Message sent to preanesthesia team regarding these findings.     Maricruz Benitez DNP, APRN, CNP  Otolaryngology  Head & Neck Surgery  [phone]    30 minutes spent by me on the date of the encounter doing chart review, history and exam, documentation and further activities per the note excluding time spent examining and cleaning ears under microscopy and performing scope exam.      Again, thank you for allowing me to participate in the care of your patient.      Sincerely,    Mariella Benitez NP

## 2025-05-29 NOTE — PATIENT INSTRUCTIONS
[facility] Triage and after hours / weekends / holidays:  [phone]    Please call the [facility] Triage line if you experience a temperature greater than or equal to 100.4, shaking chills, have uncontrolled nausea, vomiting and/or diarrhea, dizziness, shortness of breath, chest pain, bleeding, unexplained bruising, or if you have any other new/concerning symptoms, questions or concerns.     If you wish to speak to a provider before your next infusion visit, please call your care coordinator to notify them so we can adequately serve you.    If you need a refill on a narcotic prescription or other medication, please call before your infusion appointment.      May 2025      [place] Monday Tuesday Wednesday Thursday Friday Saturday                       1     2    NEW GENERAL  12:15 PM   (45 min.)   Wilmer Kwan MD   [facility]    RETURN NOSE BLEED   1:25 PM   (80 min.)   Tish Gillespie PA-C   [facility] Ear Nose and Throat [facility] [place] 3       4    Admission   9:23 PM   Mayra Rivera MD   [facility] Emergency Department   (Discharge: 5/4/2025) 5    Virtual   11:45 AM   (5 min.)   Murtaza Ahumada   [facility]  Services 6     7     8     9     10  Happy Birthday!       11    MR BRAIN & ORBITS W/O & W CONTRAST  11:00 AM   (90 min.)   [id number]   [facility] MRI [place] 12     13     14     15    Lab Central   6:15 AM   (15 min.)    [facility] DRAW   [facility]    Return Patient   6:45 AM   (45 min.)   Nishi Michele CNP   [facility]    Infusion 300   8:00 AM   (300 min.)    ONC INFUSION NURSE   [facility]    New Vascular Neurosurg  12:45 PM   (90 min.)   Amanda Garcia MD   [facility] Neurosurgery Clinic [place] 16    PAC EVALUATION   8:15 AM   (60 min.)   Silke Pacheco APRN CNP   [facility]  [facility] [place]    Lab Central   9:30 AM   (15 min.)   [facility] DRAW   [facility] 17       18     19    Virtual    1:45 PM   (5 min.)   Siva Levi   [facility]  Services 20     21     22     23    PAC EVALUATION  10:00 AM   (60 min.)   Audrey Locke PA-C   [facility] [place]    Lab Central  11:15 AM   (15 min.)   [facility]   [facility] 24       25     26     27    Virtual    8:25 AM   (5 min.)   Alicja Perez [facility]  Services 28     29    Lab Central   6:15 AM   (15 min.)   [facility] LAB DRAW   [facility]    Return Active Treatment   6:45 AM   (45 min.)   Nishi Michele CNP   [facility]    ENT Audio  11:30 AM   (60 min.)   Leni Bear AuD   [facility] EAR  12:55 PM   (90 min.)   Mariella Benitez NP   [facility] Nose [facility] [place]    Infusion 150   2:00 PM   (150 min.)    ONC INFUSION NURSE   [facility] 30    Return Palliative  11:05 AM   (40 min.)   Joshua Banks MD   [facility] 31 June 2025 Sunday Monday Tuesday Wednesday Thursday Friday Saturday   1     2    [facility]  10:00 AM   (120 min.)   Shilpi Ahumada   [facility]  Services    IR CAROTID CEREBRAL ANGIOGRAM BILATERAL  10:00 AM   (180 min.)   [id number]   [facility] Interventional Radiology    ANESTHESIA, IN NON-OPERATING ROOM SETTING  12:00 PM   GENERIC ANESTHESIA PROVIDER   [facility] OR    [facility]   3:00 PM   (120 min.)   [employer]   [facility]  Services    Admission   3:00 PM   Amanda Garcia MD 3     4     5      6     7       8     9     10     11     12    Lab Central   8:30 AM   (15 min.)   [facility] DRAW   [facility]    Return Active Treatment   8:45 AM   (45 min.)   Nishi Michele CNP   [facility]    Infusion 120  12:30 PM   (120 min.)   [facility] ONC INFUSION NURSE   [facility] 13     14       15     16     17     18     19     20     21       22     23     24     25     26    Lab Central   8:15 AM   (15 min.)   [facility] DRAW   [facility]    Return Active Treatment   8:45 AM   (45 min.)   Nishi Michele CNP   [facility]    Infusion 120   9:30 AM   (120 min.)    ONC INFUSION NURSE   [facility] 27     28       29     30                                                 Recent Results (from the past 24 hours)   Comprehensive metabolic panel    Collection Time: 05/29/25  6:28 AM   Result Value Ref Range    Sodium 134 (L) 135 - 145 mmol/L    Potassium 4.4 3.4 - 5.3 mmol/L    Carbon Dioxide (CO2) 25 22 - 29 mmol/L    Anion Gap 10 7 - 15 mmol/L    Urea Nitrogen 28.0 (H) 8.0 - 23.0 mg/dL    Creatinine 0.78 0.67 - 1.17 mg/dL    GFR Estimate >90 >60 mL/min/1.73m2    Calcium 9.9 8.8 - 10.4 mg/dL    Chloride 99 98 - 107 mmol/L    Glucose 106 (H) 70 - 99 mg/dL    Alkaline Phosphatase 75 40 - 150 U/L    AST 50 (H) 0 - 45 U/L    ALT 64 0 - 70 U/L    Protein Total 7.6 6.4 - 8.3 g/dL    Albumin 3.0 (L) 3.5 - 5.2 g/dL    Bilirubin Total 0.6 <=1.2 mg/dL   TSH with free T4 reflex    Collection Time: 05/29/25  6:28 AM   Result Value Ref Range    TSH 6.32 (H) 0.30 - 4.20 uIU/mL   Magnesium    Collection Time: 05/29/25  6:28 AM   Result Value Ref Range    Magnesium 1.7 1.7 - 2.3 mg/dL   T4 free    Collection Time: 05/29/25  6:28 AM   Result Value Ref Range    Free T4 0.94 0.90 - 1.70 ng/dL

## 2025-05-29 NOTE — Clinical Note
5/29/2025      Douglas Herrera  [address] [place] [address]      Dear Colleague,    Thank you for referring your patient, Douglas Herrera, to the [facility]. Please see a copy of my visit note below.        [facility]  [address] [facility] [address]  Phone: [phone]  Fax: [phone]    PATIENT NAME: Douglas Herrera  MRN # [id number]   DATE OF VISIT: May 29, 2025  YOB: 1960     Otolaryngology: Dr. Damon BurksChavira   Radiation Oncology: Dr. Tomasa Akers  Cardiology: Dr. Qamar Hernandez  Hepatologist: [facility]   Palliative Care: Dr. Joshua Banks      CANCER TYPE: SCC sinonasal   STAGE: [id number] (IVB)  ECOG PS: 1     PD-L1: TPS 60-70%, CPS >70% on [id number]  NGS: internal panel. Fusion negative. ARID1A S90fs, EGFR exon 20 insertion, [id number], APC [id number], TMB 7.313    ASSESSMENT AND PLAN  SCC L maxillary sinus, [id number] (IVB), ARID1 mutation,  EGFR exon 20 insertion:   Met with Dr. Post on 4/30/25 for further discussion about disease progression and treatment options. Initial recommendation was for amivantamab to target EGFR exon 20 insertion mutation although due to inability to secure coverage through insurance or DRP we transitioned to cetuximab (500 mg/m2) + nivolumab (240 mg) every 2 weeks (C1D1 5/15). Brain MRI from 5/11 showing enhancement of the tumor in the L sinus. Labs generally stable with slight elevation of TSH and hgb 7.9. Will repeat transfusion in setting of chronic blood loss and epistaxis. Plan to transition to amivantamab in setting of clinical or radiographic progression.    Plan:  -Proceed with cetuximab + nivolumab today  -RTC in 2 weeks for C2D1 cetuximab  -Transfuse 1 unit PRBC today or tomorrow  -Repeat scans after 2 cycles (CT chest/abd, CT neck, MRI sinonasal/orbits)    Anemia: chronic blood loss, epistaxis. Hgb 7.9. Transfuse 1 unit PRBC today and for hgb < 8.    Epistaxis: Silver  nitrate in the ED. Repeat ED visit 5/4. Neurosurg consult 5/15. Plan for repeat embolization next week.     Blurry vision: Left side only. Stable. No optic nerve involvement on MRI.    Increasing trismus: SLP eval and reinforce exercises. Oral exam significantly limited.    Hypothyroidism: TFTs c/w subclinical hypothyroidism. T4 remains normal    Nutrition: TF twice daily. Typically only one meal by mouth daily.  Gtube exchanged 11/22/24.    Hearing loss: Stable. Pronounced on left. Considering audiogram in future.     Hepatitis B: HBSAby negative, SAg +, cAby +, HBV PCR negative 1/23/24. HCV negative.     H/o PE/DVT: 5/8/2019 CTA report scanned into [facility]. Small PEs, LLE DVT, acute, occlusive. Completed rivaroxaban.     ASCVD: ASA 81 mg daily, metoprolol per PCP. Will try to continue despite nosebleed but of course stopping is also an option, would require risk benefit discussion with PCP as long as bleeding doesn't       Nishi Michele CNP  ---  *** minutes spent on the date of the encounter doing {2021 E&M time in:500530}.    The longitudinal plan of care for the diagnosis(es)/condition(s) as documented were addressed during this visit. Due to the added complexity in care, I will continue to support Douglas in the subsequent management and with ongoing continuity of care.      SUBJECTIVE  Douglas is seen today in clinic prior to cycle 1 day 15 cetuximab/nivolumab.  -Tulio feels that he's had a little more energy lately  -Pain stable. Remains on gabapentin and celecoxib   -Developed rash to scalp, nose and more scattered on forearms and chest. No itching or burning. Applying Aquaphor  -No significant epistaxis over the past week. Nose is dripping with pink tinged drainage by no barak blood. Neuro surg consult 2 weeks ago with plan for embolization next week  -Notes some water is coming out of nose when he tries to drink by mouth  -No skin changes to hands or feet    CANCER SUMMARY  8/9/23                CT  sinus.   8/24/23  MRI sinus. L maxillary sinus mass  9/12/23              ED for epistaxis.   9/13/23              CTA and embolization of L IMAX   10/4/23  Nasal bx. Path: Inverted papilloma with high grade dysplasia  11/10/23 Nasal endoscopy and bx in the OR. C/b severe bleeding. Path: Inverted sinonasal papilloma with severe dysplasia.  11/17/23            MRI. 7.4 x 4.6 x 6.6 cm L sinonasal mass, destruction of nasal turbinates, L maxillary sinus, hard palate, invasion of L  space, involvement of medial and lateral pterygoids and temporalis muscles. Effacement and invasion ipsilateral pterygopalatine fossa, extends and effaces the nasopharynx.   11/30/23            Carotid angiogram. Direct endonasal and transoral embolization L sinonasal tumor, transaterial embolization of the L sphenopalatine artery feeders to the L sinonasal artery tumor   12/1/23  Stealth assisted transnasal endoscopic approach to excise L sinonasal mass. Pre-operative embolization. Path: SCC involving L maxilla.    12/9/23  PET. Hypermetabolic mass centered along the L maxilla, extending superiorly through the floor of the L maxillary sinus, posterior/laterally to the  space, invasion of the pterygoid muscles, extending cranially along the pterygopalatine fossa and pterygoid plates to the skull base level. Erosion of involved bones including L maxilla, maxillary sinus wall and pterygoid plates. Extension medially to the L lateral nasopharyngeal wall and soft palate. 1 mm fat place between carotid and mass. ~4.9 x 4.0 x 5.4 cm (SUV 24.3). Partially resected since 11/17/23 MRI. 8 mm L 2A node (SUV 5.9), 7 mm L level 2 node (SUV 5.5)  1/2/24  Gtube (IR)  1/4/24  Video swallow. No aspiration  1/8~2/27/24 Chemoradiation with weekly cisplatin.  1/11/24  Port (IR)  8/5/24  PET/CT.  Overall increased FDG uptake centered along the posterior inferior left maxillary sinus involving the  space, left pterygopalatine fossa,  left parapharyngeal space, extending inferiorly to left maxilla.  Increased soft tissue component within the space, now broad FDG uptake (SUV 24.97), previously 5.75.  Area measures 3.3 x 2.6 cm. Increased focal hypermetabolic activity posterior pharyngeal wall, extending to the left palatine tonsil, associated mucosal thickening (SUV 17.23).  Non-FDG avid mass, 2.7 x 2.4 cm, previously 2.1 x 2.5 cm, another medially, 1.1 x 1.5 cm, previously 1.1 x 1 cm.  New focal area of FDG uptake right lateral oropharyngeal wall (SUV 9.07).  Few hypermetabolic right cervical nodes, none definitively enlarged, however increased activity compared to prior (SUV 7.29-8.36).  Several sub-6 mm pulmonary nodules.  8/5/24  MRI.  Heterogeneous mass along the base of the left maxillary sinus extending to the maxilla,  space, left pterygopalatine fossa, and parapharyngeal space.  Some areas of treated but some corresponding to FDG avidity on same-day PET, highly suspicious for tumor recurrence.  Question early left V2 involvement, thin dural enhancement along the base of the left temporal lobe     9/20/24  L maxillary, L superior alveolus bx in clinic Path: Fragments of at least SCC in situ.   10/11/24 Transnasal bx L maxillary sinus (Dr. Iglesias). 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  Multiloculated mass at base of L maxilla extending to involve L side of palate, retromaxillary region, extending into the  space, premaxillary region.  Similar to prior MRI 8/5/2024.  Continuous slight abnormal asymmetric thickening L V3 and V2.  Asymmetric thin dural enhancement along the base of the left temporal lobe.   10/29/2024 CT chest.  Scattered <6 mm lung nodules grossly stable compared to 8/5/2024.  Partially visualized common bile duct mildly dilated, 8 mm, possibly related to prior cholecystectomy, recommend MRCP.   1/20/25  CT neck and CAP.  Overall similar multiloculated L maxillary mass extending into  the  and parapharyngeal spaces compared to PET/CT 8/5/2024.  No cervical adenopathy.  Stable small 2-5 mm lung nodules.  Increased RUL nodule, 1 mm --> 4 mm (5:139).  New 5 mm LLL nodule.  New somewhat linear 4 mm lingular nodule.  Attention on follow-up  11/20/24~1/24/25 Carboplatin + paclitaxel + pembrolizumab.   2/13~3/6/25 Maintenance pembrolizumab.   4/26/25  [facility] for L nosebleed. Afrin and pressure    PAST MEDICAL HISTORY  Sinonasal carcinoma as above  HTN  ASCVD. CABG x 3 2019  PE and LLE DVT after CABG . Treated with rivaroxaban  Dyslipidemia  Hepatitis B   SCC R temple s/p MOHS  Ascending aortic aneurysm repair 2019  Hearing loss L   Gout - feet  Depression  Cholecystectomy    CURRENT OUTPATIENT MEDICATIONS  Reviewed    ALLERGIES  No Known Allergies     PHYSICAL EXAM  /74   Pulse 81   Temp 98.1  F (36.7  C) (Oral)   Resp 18   Wt 61 kg (134 lb 6.4 oz)   SpO2 98%   BMI 24.05 kg/m      GEN: NAD  HEENT: EOMI, no icterus, injection or pallor. Trismus limiting oral exam. No active epistaxis  LUNGS: clear bilaterally  CV: rrr, no murmur  EXT:  no edema  NEURO: alert  SKIN: acneiform rash to scalp, nose and scattered papules on forearms, posterior neck and chest    LABORATORY AND IMAGING STUDIES  Most Recent 3 CBC's:  Recent Labs   Lab Test 05/29/25  0753 05/23/25  1144 05/15/25  0635   WBC 8.0 11.9* 15.4*   HGB 7.9* 8.6* 7.5*   MCV 86 88 88    308 362    Most Recent 3 BMP's:  Recent Labs   Lab Test 05/29/25  0628 05/15/25  0635 05/04/25  2132 04/30/25  1626   * 137 134* 134*   POTASSIUM 4.4 4.4 4.3 4.2   CHLORIDE 99 101 98 97*   CO2 25 26 24 28   BUN 28.0* 37.2* 29.9* 26.3*   CR 0.78 0.88 0.89 0.81   ANIONGAP 10 10 12 9   FLORENCE 9.9 10.4 9.3 9.5   * 105* 195* 124*   PROTTOTAL 7.6 7.9  --  8.2   ALBUMIN 3.0* 3.3*  --  3.4*    Most Recent 2 LFT's:  Recent Labs   Lab Test 05/29/25  0628 05/15/25  0635   AST 50* 24   ALT 64 24   ALKPHOS 75 67   BILITOTAL 0.6 0.5    Most Recent  TSH and T4:  Recent Labs   Lab Test 05/29/25  0628   TSH 6.32*   T4 0.94     Phos/Mag:  Lab Results   Component Value Date    MAG 1.7 05/29/2025    MAG 1.7 04/30/2025    MAG 1.9 08/05/2024      I reviewed the above labs today.          Again, thank you for allowing me to participate in the care of your patient.        Sincerely,        Nishi Michele CNP    Electronically signed

## 2025-05-29 NOTE — NURSING NOTE
"Chief Complaint   Patient presents with    Port Draw     Labs drawn via port by RN     Labs drawn via port by RN. Port accessed with 20G 3/4\" power needle. Flushed with NS and heparin. Pt tolerated well. Vitals taken. Pt checked in for next appointment.  Maria Isabel Cagle, RN    Maria Isabel Cagle, RN  "

## 2025-05-30 ENCOUNTER — VIRTUAL VISIT (OUTPATIENT)
Dept: PALLIATIVE CARE | Facility: CLINIC | Age: 65
End: 2025-05-30
Attending: INTERNAL MEDICINE
Payer: COMMERCIAL

## 2025-05-30 VITALS
SYSTOLIC BLOOD PRESSURE: 123 MMHG | DIASTOLIC BLOOD PRESSURE: 75 MMHG | BODY MASS INDEX: 24.48 KG/M2 | WEIGHT: 133 LBS | HEART RATE: 82 BPM | HEIGHT: 62 IN

## 2025-05-30 DIAGNOSIS — C31.0 SQUAMOUS CELL CARCINOMA OF MAXILLARY SINUS (H): ICD-10-CM

## 2025-05-30 DIAGNOSIS — G89.3 NEOPLASM RELATED PAIN: Primary | ICD-10-CM

## 2025-05-30 PROCEDURE — 3074F SYST BP LT 130 MM HG: CPT | Mod: 95 | Performed by: INTERNAL MEDICINE

## 2025-05-30 PROCEDURE — 1125F AMNT PAIN NOTED PAIN PRSNT: CPT | Mod: 95 | Performed by: INTERNAL MEDICINE

## 2025-05-30 PROCEDURE — 3078F DIAST BP <80 MM HG: CPT | Mod: 95 | Performed by: INTERNAL MEDICINE

## 2025-05-30 PROCEDURE — B4152 EF CALORIE DENSE>/=1.5KCAL: HCPCS

## 2025-05-30 PROCEDURE — 98007 SYNCH AUDIO-VIDEO EST HI 40: CPT | Performed by: INTERNAL MEDICINE

## 2025-05-30 PROCEDURE — G2211 COMPLEX E/M VISIT ADD ON: HCPCS | Mod: 95 | Performed by: INTERNAL MEDICINE

## 2025-05-30 RX ORDER — GABAPENTIN 250 MG/5ML
600 SOLUTION ORAL 3 TIMES DAILY
Qty: 1080 ML | Refills: 1 | Status: SHIPPED | OUTPATIENT
Start: 2025-05-30

## 2025-05-30 RX ORDER — OXYCODONE HCL 5 MG/5 ML
5 SOLUTION, ORAL ORAL 4 TIMES DAILY PRN
Qty: 100 ML | Refills: 0 | Status: SHIPPED | OUTPATIENT
Start: 2025-05-30

## 2025-05-30 ASSESSMENT — PAIN SCALES - GENERAL: PAINLEVEL_OUTOF10: MODERATE PAIN (4)

## 2025-05-30 NOTE — PROGRESS NOTES
Is the patient currently in the state of [place]? YES    Visit mode: VIDEO    If the visit is dropped, the patient can be reconnected by:VIDEO VISIT: Send to e-mail at: [email or web address]    Will anyone else be joining the visit? NO  (If patient encounters technical issues they should call [phone]    Are changes needed to the allergy or medication list? No    Are refills needed on medications prescribed by this physician? NO    Rooming Documentation:  Questionnaire(s) completed    Reason for visit: No chief complaint on file.    Yuni SANDOVALF

## 2025-05-30 NOTE — PROGRESS NOTES
"Palliative Care Outpatient Clinic      Patient ID:  Medical - He has L maxillary sinus SCC s/p resection 12/2023 then chemoradiotherapy thru 2/2024; L maxillary recurrence 10/2024-->chemoimmunotherapy  3/2025 enlarging pulmonary nodules c/w metastases & progression; Dr Post discussed amivantamab vs treatment break and he took the break  5/2025 severe epistaxis, seen in ED; starts cetuximab + nivolumab     He has CAD s/p CABG; hx of PE; G-tube     Social - Single. Lives with 3 sons.     Care Planning - Discussed surrogate decision making and HCD completion 4/2025.      History:  History gathered today from: patient, family/loved ones, medical chart; Tulio gives most of the history    Last visit I started gabapentin 300 mg tid; continue 500 mg APAP PRN; started 100 mg bid celecoxib.     Since then he's had recurrent epistaxis and has IR embolization scheduled for 6/2. He met with med onc and wanted to resume therapy and is now on cetuximab with nivo starting this week too. Dr Post discussed not treating the cancer actively; he wanted to. We dsicussed this today a little but he's quite tired.     Pain  Kind of the same  Gabapentin--reduces some of the burning a little. No side effects.  He tolerates pills ok sometimes. But the last few days it's been more difficult.   Overall prefers to have liquid gabapentin.  Celecoxib--unclear how much it's helped, prefers liquid.  Tylenol PRN.  We discussed opioids; he didn't want any last time but now is open to them. Did fine on oxycodone a while back during RT.    He is quite tired, exhausted today; had chemo yesterday; usually is more active and energetic.  Getting PT; going well    PE: /75   Pulse 82   Ht 1.575 m (5' 2\")   Wt 60.3 kg (133 lb)   BMI 24.33 kg/m     Wt Readings from Last 3 Encounters:   05/30/25 60.3 kg (133 lb)   05/29/25 60.3 kg (133 lb)   05/29/25 61 kg (134 lb 6.4 oz)     Alert, but tired, lying down      Data reviewed:  I reviewed recent " labs and imaging, my comments:  Na 134  Cr 0.78  Plt 306  Hgb 7.9    MRI  5.11  Redemonstration of lobulated heterogeneous enhancing mass in the left  maxillectomy side extending along the anterior and lateral walls of  the left maxillary sinus, extending into the premaxillary,  retromaxillary regions as well as to left pterygopalatine fossa  representing recurrent tumor.      Evidence of perineural tumor most pronounced along the infraorbital  nerve and as well as V3 of the left trigeminal nerve.      Thin dural enhancement along the floor of the left middle cranial  fossa. Thin abnormal enhancement along the floor of the left orbital  cavity extraconal space. No optic nerve involvement.       [facility] database reviewed: Y      Impression & Recommendations:  66 yo with recurrent & metastatic L maxillary sinus SCC, now on cetuximab-nivolumab    Intractable cancer related pain  Increase gabap to 600 mg tid  Continue celecoxib 100 mg bid  Continue acetaminophen prn  Oxycodone 5 mg qid prn  D/w them side effects of these including oxycodone; he did well with it during radiation, notably.    Cancer  Back on chemo and immunotherapy. Exhausted today and didn't interact much. Tulio observes he seems to be happy to be back on treatment. He is receiving PT    Follow-up 1 mo    40 minutes spent on the date of the encounter doing chart review, history and exam, patient education & counseling, documentation and other activities as noted above. The longitudinal plan of care for the diagnosis(es)/condition(s) as documented were addressed during this visit. Due to the added complexity in care, I will continue to support Douglas in the subsequent management and with ongoing continuity of care.    Thank you for involving us in the patient's care.   Joshua Banks MD / Palliative Medicine / Text me via [employer]  This note may have been composed with voice recognition software and there may be mistranscriptions.        Video-Visit  Details  Video Start Time: 1120  Video End Time:11:37 AM  Originating Location (pt. Location): Home  Distant Location (provider location):  On-site  Platform used for Video Visit: [employer]

## 2025-05-30 NOTE — LETTER
5/30/2025       RE: Douglas Herrera  [address]  [place] [address]     Dear Colleague,    Thank you for [facility] your patient, [facility] Herrera[facility]. Please see a copy of my visit note below.        Is the patient currently in the state of [place]? YES    Visit mode: VIDEO    If the visit is dropped, the patient can be reconnected by:VIDEO VISIT: Send to e-mail at: [email or web address]    Will anyone else be joining the visit? NO  (If patient encounters technical issues they should call [phone])    Are changes needed to the allergy or medication list? No    Are refills needed on medications prescribed by this physician? NO    Rooming Documentation:  Questionnaire(s) completed    Reason for visit: No chief complaint on file.    Yuni Chris [facility]       Palliative Care Outpatient Clinic      Patient ID:  Medical - He has L maxillary sinus SCC s/p resection 12/2023 then chemoradiotherapy thru 2/2024; L maxillary recurrence 10/2024-->chemoimmunotherapy  3/2025 enlarging pulmonary nodules c/w metastases & progression; Dr Post discussed amivantamab vs treatment break and he took the break  5/2025 severe epistaxis, seen in ED; starts cetuximab + nivolumab     He has CAD s/p CABG; hx of PE; G-tube     Social - Single. Lives with 3 sons.     Care Planning - Discussed surrogate decision making and HCD completion 4/2025.      History:  History gathered today from: patient, family/loved ones, medical chart; Tulio gives most of the history    Last visit I started gabapentin 300 mg tid; continue 500 mg APAP PRN; started 100 mg bid celecoxib.     Since then he's had recurrent epistaxis and has IR embolization scheduled for 6/2. He met with med onc and wanted to resume therapy and is now on cetuximab with nivo starting this week too. Dr Post discussed not treating the cancer actively; he wanted to. We dsicussed  "this today a little but he's quite tired.     Pain  Kind of the same  Gabapentin--reduces some of the burning a little. No side effects.  He tolerates pills ok sometimes. But the last few days it's been more difficult.   Overall prefers to have liquid gabapentin.  Celecoxib--unclear how much it's helped, prefers liquid.  Tylenol PRN.  We discussed opioids; he didn't want any last time but now is open to them. Did fine on oxycodone a while back during RT.    He is quite tired, exhausted today; had chemo yesterday; usually is more active and energetic.  Getting PT; going well    PE: /75   Pulse 82   Ht 1.575 m (5' 2\")   Wt 60.3 kg (133 lb)   BMI 24.33 kg/m     Wt Readings from Last 3 Encounters:   05/30/25 60.3 kg (133 lb)   05/29/25 60.3 kg (133 lb)   05/29/25 61 kg (134 lb 6.4 oz)     Alert, but tired, lying down      Data reviewed:  I reviewed recent labs and imaging, my comments:  Na 134  Cr 0.78  Plt 306  Hgb 7.9    MRI  5.11  Redemonstration of lobulated heterogeneous enhancing mass in the left  maxillectomy side extending along the anterior and lateral walls of  the left maxillary sinus, extending into the premaxillary,  retromaxillary regions as well as to left pterygopalatine fossa  representing recurrent tumor.      Evidence of perineural tumor most pronounced along the infraorbital  nerve and as well as V3 of the left trigeminal nerve.      Thin dural enhancement along the floor of the left middle cranial  fossa. Thin abnormal enhancement along the floor of the left orbital  cavity extraconal space. No optic nerve involvement.       [facility] database reviewed: Y      Impression & Recommendations:  66 yo with recurrent & metastatic L maxillary sinus SCC, now on cetuximab-nivolumab    Intractable cancer related pain  Increase gabap to 600 mg tid  Continue celecoxib 100 mg bid  Continue acetaminophen prn  Oxycodone 5 mg qid prn  D/w them side effects of these including oxycodone; he did well with it " during radiation, notably.    Cancer  Back on chemo and immunotherapy. Exhausted today and didn't interact much. Tulio observes he seems to be happy to be back on treatment. He is receiving PT    Follow-up 1 mo    40 minutes spent on the date of the encounter doing chart review, history and exam, patient education & counseling, documentation and other activities as noted above. The longitudinal plan of care for the diagnosis(es)/condition(s) as documented were addressed during this visit. Due to the added complexity in care, I will continue to support Douglas in the subsequent management and with ongoing continuity of care.    Thank you for involving us in the patient's care.   Joshua Banks MD / Palliative Medicine / Text me via [employer]  This note may have been composed with voice recognition software and there may be mistranscriptions.        Video-Visit Details  Video Start Time: 1120  Video End Time:11:37 AM  Originating Location (pt. Location): Home  Distant Location (provider location):  On-site  Platform used for Video Visit: [employer]            Again, thank you for allowing me to participate in the care of your patient.      Sincerely,    Joshua Banks MD

## 2025-06-01 ASSESSMENT — LIFESTYLE VARIABLES: TOBACCO_USE: 1

## 2025-06-01 ASSESSMENT — ENCOUNTER SYMPTOMS: SEIZURES: 0

## 2025-06-02 ENCOUNTER — HOSPITAL ENCOUNTER (INPATIENT)
Facility: CLINIC | Age: 65
LOS: 3 days | Discharge: HOME | End: 2025-06-05
Attending: STUDENT IN AN ORGANIZED HEALTH CARE EDUCATION/TRAINING PROGRAM | Admitting: STUDENT IN AN ORGANIZED HEALTH CARE EDUCATION/TRAINING PROGRAM
Payer: COMMERCIAL

## 2025-06-02 ENCOUNTER — ANESTHESIA (OUTPATIENT)
Dept: SURGERY | Facility: CLINIC | Age: 65
End: 2025-06-02
Payer: COMMERCIAL

## 2025-06-02 ENCOUNTER — OFFICE VISIT (OUTPATIENT)
Dept: INTERPRETER SERVICES | Facility: CLINIC | Age: 65
End: 2025-06-02
Attending: STUDENT IN AN ORGANIZED HEALTH CARE EDUCATION/TRAINING PROGRAM
Payer: COMMERCIAL

## 2025-06-02 ENCOUNTER — APPOINTMENT (OUTPATIENT)
Dept: INTERVENTIONAL RADIOLOGY/VASCULAR | Facility: CLINIC | Age: 65
End: 2025-06-02
Attending: STUDENT IN AN ORGANIZED HEALTH CARE EDUCATION/TRAINING PROGRAM
Payer: COMMERCIAL

## 2025-06-02 DIAGNOSIS — G89.18 POSTOPERATIVE PAIN: ICD-10-CM

## 2025-06-02 DIAGNOSIS — G89.3 NEOPLASM RELATED PAIN: ICD-10-CM

## 2025-06-02 DIAGNOSIS — C31.0 SQUAMOUS CELL CARCINOMA OF MAXILLARY SINUS (H): ICD-10-CM

## 2025-06-02 DIAGNOSIS — R04.0 EPISTAXIS: ICD-10-CM

## 2025-06-02 LAB
ABO + RH BLD: NORMAL
ANION GAP SERPL CALCULATED.3IONS-SCNC: 11 MMOL/L (ref 7–15)
APTT PPP: 28 SECONDS (ref 22–38)
BLD GP AB SCN SERPL QL: NEGATIVE
BUN SERPL-MCNC: 22.3 MG/DL (ref 8–23)
CALCIUM SERPL-MCNC: 10.1 MG/DL (ref 8.8–10.4)
CHLORIDE SERPL-SCNC: 99 MMOL/L (ref 98–107)
CREAT SERPL-MCNC: 0.74 MG/DL (ref 0.67–1.17)
EGFRCR SERPLBLD CKD-EPI 2021: >90 ML/MIN/1.73M2
ERYTHROCYTE [DISTWIDTH] IN BLOOD BY AUTOMATED COUNT: 16.5 % (ref 10–15)
GLUCOSE BLDC GLUCOMTR-MCNC: 106 MG/DL (ref 70–99)
GLUCOSE SERPL-MCNC: 96 MG/DL (ref 70–99)
HCO3 SERPL-SCNC: 25 MMOL/L (ref 22–29)
HCT VFR BLD AUTO: 29.1 % (ref 40–53)
HGB BLD-MCNC: 9.3 G/DL (ref 13.3–17.7)
INR PPP: 1.09 (ref 0.85–1.15)
MCH RBC QN AUTO: 27.4 PG (ref 26.5–33)
MCHC RBC AUTO-ENTMCNC: 32 G/DL (ref 31.5–36.5)
MCV RBC AUTO: 86 FL (ref 78–100)
PLATELET # BLD AUTO: 296 10E3/UL (ref 150–450)
POTASSIUM SERPL-SCNC: 4.2 MMOL/L (ref 3.4–5.3)
PROTHROMBIN TIME: 14.4 SECONDS (ref 11.8–14.8)
RBC # BLD AUTO: 3.39 10E6/UL (ref 4.4–5.9)
SODIUM SERPL-SCNC: 135 MMOL/L (ref 135–145)
SPECIMEN EXP DATE BLD: NORMAL
WBC # BLD AUTO: 7 10E3/UL (ref 4–11)

## 2025-06-02 PROCEDURE — C1887 CATHETER, GUIDING: HCPCS

## 2025-06-02 PROCEDURE — 250N000009 HC RX 250: Performed by: STUDENT IN AN ORGANIZED HEALTH CARE EDUCATION/TRAINING PROGRAM

## 2025-06-02 PROCEDURE — 250N000013 HC RX MED GY IP 250 OP 250 PS 637: Performed by: STUDENT IN AN ORGANIZED HEALTH CARE EDUCATION/TRAINING PROGRAM

## 2025-06-02 PROCEDURE — C1769 GUIDE WIRE: HCPCS

## 2025-06-02 PROCEDURE — 272N000192 HC ACCESSORY CR2

## 2025-06-02 PROCEDURE — 258N000003 HC RX IP 258 OP 636: Performed by: ANESTHESIOLOGY

## 2025-06-02 PROCEDURE — 255N000002 HC RX 255 OP 636: Performed by: STUDENT IN AN ORGANIZED HEALTH CARE EDUCATION/TRAINING PROGRAM

## 2025-06-02 PROCEDURE — 86901 BLOOD TYPING SEROLOGIC RH(D): CPT | Performed by: ANESTHESIOLOGY

## 2025-06-02 PROCEDURE — 710N000011 HC RECOVERY PHASE 1, LEVEL 3, PER MIN

## 2025-06-02 PROCEDURE — 200N000002 HC R&B ICU UMMC

## 2025-06-02 PROCEDURE — 250N000009 HC RX 250: Performed by: NURSE ANESTHETIST, CERTIFIED REGISTERED

## 2025-06-02 PROCEDURE — 86900 BLOOD TYPING SEROLOGIC ABO: CPT | Performed by: ANESTHESIOLOGY

## 2025-06-02 PROCEDURE — 250N000013 HC RX MED GY IP 250 OP 250 PS 637: Performed by: ANESTHESIOLOGY

## 2025-06-02 PROCEDURE — 272N000506 HC NEEDLE CR6

## 2025-06-02 PROCEDURE — 250N000011 HC RX IP 250 OP 636: Performed by: STUDENT IN AN ORGANIZED HEALTH CARE EDUCATION/TRAINING PROGRAM

## 2025-06-02 PROCEDURE — 82435 ASSAY OF BLOOD CHLORIDE: CPT | Performed by: STUDENT IN AN ORGANIZED HEALTH CARE EDUCATION/TRAINING PROGRAM

## 2025-06-02 PROCEDURE — 85730 THROMBOPLASTIN TIME PARTIAL: CPT | Performed by: STUDENT IN AN ORGANIZED HEALTH CARE EDUCATION/TRAINING PROGRAM

## 2025-06-02 PROCEDURE — T1013 SIGN LANG/ORAL INTERPRETER: HCPCS

## 2025-06-02 PROCEDURE — 85610 PROTHROMBIN TIME: CPT | Performed by: STUDENT IN AN ORGANIZED HEALTH CARE EDUCATION/TRAINING PROGRAM

## 2025-06-02 PROCEDURE — 258N000003 HC RX IP 258 OP 636: Performed by: STUDENT IN AN ORGANIZED HEALTH CARE EDUCATION/TRAINING PROGRAM

## 2025-06-02 PROCEDURE — C1760 CLOSURE DEV, VASC: HCPCS

## 2025-06-02 PROCEDURE — 36227 PLACE CATH XTRNL CAROTID: CPT | Mod: LT | Performed by: STUDENT IN AN ORGANIZED HEALTH CARE EDUCATION/TRAINING PROGRAM

## 2025-06-02 PROCEDURE — 250N000025 HC SEVOFLURANE, PER MIN

## 2025-06-02 PROCEDURE — 36223 PLACE CATH CAROTID/INOM ART: CPT | Mod: LT | Performed by: STUDENT IN AN ORGANIZED HEALTH CARE EDUCATION/TRAINING PROGRAM

## 2025-06-02 PROCEDURE — C1889 IMPLANT/INSERT DEVICE, NOC: HCPCS

## 2025-06-02 PROCEDURE — 999N000141 HC STATISTIC PRE-PROCEDURE NURSING ASSESSMENT

## 2025-06-02 PROCEDURE — 250N000011 HC RX IP 250 OP 636: Performed by: NURSE ANESTHETIST, CERTIFIED REGISTERED

## 2025-06-02 PROCEDURE — 258N000003 HC RX IP 258 OP 636: Performed by: NURSE ANESTHETIST, CERTIFIED REGISTERED

## 2025-06-02 PROCEDURE — 250N000011 HC RX IP 250 OP 636: Performed by: ANESTHESIOLOGY

## 2025-06-02 PROCEDURE — 37244 VASC EMBOLIZE/OCCLUDE BLEED: CPT | Mod: GC | Performed by: STUDENT IN AN ORGANIZED HEALTH CARE EDUCATION/TRAINING PROGRAM

## 2025-06-02 PROCEDURE — 250N000013 HC RX MED GY IP 250 OP 250 PS 637: Performed by: INTERNAL MEDICINE

## 2025-06-02 PROCEDURE — 272N000566 HC SHEATH CR3

## 2025-06-02 PROCEDURE — 370N000017 HC ANESTHESIA TECHNICAL FEE, PER MIN

## 2025-06-02 PROCEDURE — 36415 COLL VENOUS BLD VENIPUNCTURE: CPT | Performed by: ANESTHESIOLOGY

## 2025-06-02 PROCEDURE — 85027 COMPLETE CBC AUTOMATED: CPT | Performed by: STUDENT IN AN ORGANIZED HEALTH CARE EDUCATION/TRAINING PROGRAM

## 2025-06-02 RX ORDER — LIDOCAINE 40 MG/G
CREAM TOPICAL
Status: DISCONTINUED | OUTPATIENT
Start: 2025-06-02 | End: 2025-06-02 | Stop reason: HOSPADM

## 2025-06-02 RX ORDER — DEXAMETHASONE SODIUM PHOSPHATE 4 MG/ML
4 INJECTION, SOLUTION INTRA-ARTICULAR; INTRALESIONAL; INTRAMUSCULAR; INTRAVENOUS; SOFT TISSUE
Status: DISCONTINUED | OUTPATIENT
Start: 2025-06-02 | End: 2025-06-02

## 2025-06-02 RX ORDER — ACETAMINOPHEN 325 MG/1
650 TABLET ORAL EVERY 4 HOURS PRN
Status: DISCONTINUED | OUTPATIENT
Start: 2025-06-02 | End: 2025-06-05 | Stop reason: HOSPADM

## 2025-06-02 RX ORDER — ELECTROLYTES/DEXTROSE
474 SOLUTION, ORAL ORAL 3 TIMES DAILY
Status: DISCONTINUED | OUTPATIENT
Start: 2025-06-02 | End: 2025-06-02

## 2025-06-02 RX ORDER — POLYETHYLENE GLYCOL 3350 17 G/17G
17 POWDER, FOR SOLUTION ORAL DAILY PRN
Status: DISCONTINUED | OUTPATIENT
Start: 2025-06-02 | End: 2025-06-05 | Stop reason: HOSPADM

## 2025-06-02 RX ORDER — OXYCODONE HYDROCHLORIDE 10 MG/1
10 TABLET ORAL
Status: DISCONTINUED | OUTPATIENT
Start: 2025-06-02 | End: 2025-06-02

## 2025-06-02 RX ORDER — ACETAMINOPHEN 650 MG/1
650 SUPPOSITORY RECTAL EVERY 4 HOURS PRN
Status: DISCONTINUED | OUTPATIENT
Start: 2025-06-02 | End: 2025-06-05 | Stop reason: HOSPADM

## 2025-06-02 RX ORDER — ASPIRIN 81 MG/1
81 TABLET ORAL DAILY
Status: DISCONTINUED | OUTPATIENT
Start: 2025-06-03 | End: 2025-06-02

## 2025-06-02 RX ORDER — HYDROCORTISONE 25 MG/G
CREAM TOPICAL 2 TIMES DAILY PRN
Status: DISCONTINUED | OUTPATIENT
Start: 2025-06-02 | End: 2025-06-05 | Stop reason: HOSPADM

## 2025-06-02 RX ORDER — OXYCODONE HYDROCHLORIDE 5 MG/1
5 TABLET ORAL
Status: DISCONTINUED | OUTPATIENT
Start: 2025-06-02 | End: 2025-06-02

## 2025-06-02 RX ORDER — ASPIRIN 81 MG/1
81 TABLET, CHEWABLE ORAL DAILY
Status: DISCONTINUED | OUTPATIENT
Start: 2025-06-03 | End: 2025-06-05 | Stop reason: HOSPADM

## 2025-06-02 RX ORDER — HYDRALAZINE HYDROCHLORIDE 20 MG/ML
10 INJECTION INTRAMUSCULAR; INTRAVENOUS EVERY 4 HOURS PRN
Status: DISCONTINUED | OUTPATIENT
Start: 2025-06-02 | End: 2025-06-05 | Stop reason: HOSPADM

## 2025-06-02 RX ORDER — IODIXANOL 320 MG/ML
150 INJECTION, SOLUTION INTRAVASCULAR ONCE
Status: COMPLETED | OUTPATIENT
Start: 2025-06-02 | End: 2025-06-02

## 2025-06-02 RX ORDER — ONDANSETRON 2 MG/ML
INJECTION INTRAMUSCULAR; INTRAVENOUS PRN
Status: DISCONTINUED | OUTPATIENT
Start: 2025-06-02 | End: 2025-06-02

## 2025-06-02 RX ORDER — PROTAMINE SULFATE 10 MG/ML
INJECTION, SOLUTION INTRAVENOUS PRN
Status: DISCONTINUED | OUTPATIENT
Start: 2025-06-02 | End: 2025-06-02

## 2025-06-02 RX ORDER — SODIUM CHLORIDE, SODIUM LACTATE, POTASSIUM CHLORIDE, CALCIUM CHLORIDE 600; 310; 30; 20 MG/100ML; MG/100ML; MG/100ML; MG/100ML
INJECTION, SOLUTION INTRAVENOUS CONTINUOUS
Status: DISCONTINUED | OUTPATIENT
Start: 2025-06-02 | End: 2025-06-04

## 2025-06-02 RX ORDER — LIDOCAINE 50 MG/G
OINTMENT TOPICAL PRN
Status: DISCONTINUED | OUTPATIENT
Start: 2025-06-02 | End: 2025-06-02

## 2025-06-02 RX ORDER — HEPARIN SODIUM 1000 [USP'U]/ML
INJECTION, SOLUTION INTRAVENOUS; SUBCUTANEOUS PRN
Status: DISCONTINUED | OUTPATIENT
Start: 2025-06-02 | End: 2025-06-02

## 2025-06-02 RX ORDER — ONDANSETRON 4 MG/1
4 TABLET, ORALLY DISINTEGRATING ORAL EVERY 8 HOURS PRN
Status: DISCONTINUED | OUTPATIENT
Start: 2025-06-02 | End: 2025-06-05 | Stop reason: HOSPADM

## 2025-06-02 RX ORDER — ACETAMINOPHEN 325 MG/1
650 TABLET ORAL EVERY 4 HOURS PRN
Status: DISCONTINUED | OUTPATIENT
Start: 2025-06-02 | End: 2025-06-02

## 2025-06-02 RX ORDER — NALOXONE HYDROCHLORIDE 0.4 MG/ML
0.2 INJECTION, SOLUTION INTRAMUSCULAR; INTRAVENOUS; SUBCUTANEOUS
Status: DISCONTINUED | OUTPATIENT
Start: 2025-06-02 | End: 2025-06-05 | Stop reason: HOSPADM

## 2025-06-02 RX ORDER — ENTECAVIR 0.5 MG/1
0.5 TABLET, FILM COATED ORAL EVERY EVENING
Status: DISCONTINUED | OUTPATIENT
Start: 2025-06-03 | End: 2025-06-02

## 2025-06-02 RX ORDER — ONDANSETRON 2 MG/ML
4 INJECTION INTRAMUSCULAR; INTRAVENOUS EVERY 30 MIN PRN
Status: DISCONTINUED | OUTPATIENT
Start: 2025-06-02 | End: 2025-06-02

## 2025-06-02 RX ORDER — FLUMAZENIL 0.1 MG/ML
0.2 INJECTION, SOLUTION INTRAVENOUS
Status: DISCONTINUED | OUTPATIENT
Start: 2025-06-02 | End: 2025-06-05 | Stop reason: HOSPADM

## 2025-06-02 RX ORDER — NALOXONE HYDROCHLORIDE 0.4 MG/ML
0.1 INJECTION, SOLUTION INTRAMUSCULAR; INTRAVENOUS; SUBCUTANEOUS
Status: DISCONTINUED | OUTPATIENT
Start: 2025-06-02 | End: 2025-06-02

## 2025-06-02 RX ORDER — SODIUM CHLORIDE, SODIUM LACTATE, POTASSIUM CHLORIDE, CALCIUM CHLORIDE 600; 310; 30; 20 MG/100ML; MG/100ML; MG/100ML; MG/100ML
INJECTION, SOLUTION INTRAVENOUS CONTINUOUS
Status: DISCONTINUED | OUTPATIENT
Start: 2025-06-02 | End: 2025-06-02 | Stop reason: HOSPADM

## 2025-06-02 RX ORDER — ACETAMINOPHEN 650 MG/1
650 SUPPOSITORY RECTAL EVERY 4 HOURS PRN
Status: DISCONTINUED | OUTPATIENT
Start: 2025-06-02 | End: 2025-06-02

## 2025-06-02 RX ORDER — ATORVASTATIN CALCIUM 80 MG/1
80 TABLET, FILM COATED ORAL EVERY EVENING
Status: DISCONTINUED | OUTPATIENT
Start: 2025-06-02 | End: 2025-06-05 | Stop reason: HOSPADM

## 2025-06-02 RX ORDER — AMOXICILLIN 250 MG
1 CAPSULE ORAL 2 TIMES DAILY PRN
Status: DISCONTINUED | OUTPATIENT
Start: 2025-06-02 | End: 2025-06-05 | Stop reason: HOSPADM

## 2025-06-02 RX ORDER — SODIUM CHLORIDE 9 MG/ML
INJECTION, SOLUTION INTRAVENOUS CONTINUOUS
Status: CANCELLED | OUTPATIENT
Start: 2025-06-02

## 2025-06-02 RX ORDER — ONDANSETRON 8 MG/1
8 TABLET, FILM COATED ORAL EVERY 8 HOURS PRN
Status: DISCONTINUED | OUTPATIENT
Start: 2025-06-02 | End: 2025-06-02 | Stop reason: DRUGHIGH

## 2025-06-02 RX ORDER — CELECOXIB 100 MG/1
100 CAPSULE ORAL 2 TIMES DAILY
Status: DISCONTINUED | OUTPATIENT
Start: 2025-06-02 | End: 2025-06-05 | Stop reason: HOSPADM

## 2025-06-02 RX ORDER — ONDANSETRON 4 MG/1
4 TABLET, ORALLY DISINTEGRATING ORAL EVERY 30 MIN PRN
Status: DISCONTINUED | OUTPATIENT
Start: 2025-06-02 | End: 2025-06-02

## 2025-06-02 RX ORDER — GABAPENTIN 300 MG/1
300 CAPSULE ORAL 3 TIMES DAILY
Status: DISCONTINUED | OUTPATIENT
Start: 2025-06-02 | End: 2025-06-02 | Stop reason: DRUGHIGH

## 2025-06-02 RX ORDER — FENTANYL CITRATE 50 UG/ML
50 INJECTION, SOLUTION INTRAMUSCULAR; INTRAVENOUS EVERY 5 MIN PRN
Status: DISCONTINUED | OUTPATIENT
Start: 2025-06-02 | End: 2025-06-02

## 2025-06-02 RX ORDER — NALOXONE HYDROCHLORIDE 0.4 MG/ML
0.4 INJECTION, SOLUTION INTRAMUSCULAR; INTRAVENOUS; SUBCUTANEOUS
Status: DISCONTINUED | OUTPATIENT
Start: 2025-06-02 | End: 2025-06-05 | Stop reason: HOSPADM

## 2025-06-02 RX ORDER — CALCIUM CARBONATE 500 MG/1
1000 TABLET, CHEWABLE ORAL 4 TIMES DAILY PRN
Status: DISCONTINUED | OUTPATIENT
Start: 2025-06-02 | End: 2025-06-05 | Stop reason: HOSPADM

## 2025-06-02 RX ORDER — ACETAMINOPHEN 325 MG/10.15ML
650 LIQUID ORAL ONCE
Status: COMPLETED | OUTPATIENT
Start: 2025-06-02 | End: 2025-06-02

## 2025-06-02 RX ORDER — NITROGLYCERIN 0.4 MG/1
0.4 TABLET SUBLINGUAL EVERY 5 MIN PRN
Status: DISCONTINUED | OUTPATIENT
Start: 2025-06-02 | End: 2025-06-05 | Stop reason: HOSPADM

## 2025-06-02 RX ORDER — OXYCODONE HCL 5 MG/5 ML
5 SOLUTION, ORAL ORAL 4 TIMES DAILY PRN
Status: DISCONTINUED | OUTPATIENT
Start: 2025-06-02 | End: 2025-06-05 | Stop reason: HOSPADM

## 2025-06-02 RX ORDER — PROPOFOL 10 MG/ML
INJECTION, EMULSION INTRAVENOUS PRN
Status: DISCONTINUED | OUTPATIENT
Start: 2025-06-02 | End: 2025-06-02

## 2025-06-02 RX ORDER — GLYCOPYRROLATE 0.2 MG/ML
INJECTION, SOLUTION INTRAMUSCULAR; INTRAVENOUS PRN
Status: DISCONTINUED | OUTPATIENT
Start: 2025-06-02 | End: 2025-06-02

## 2025-06-02 RX ORDER — LIDOCAINE HYDROCHLORIDE 40 MG/ML
INJECTION, SOLUTION RETROBULBAR PRN
Status: DISCONTINUED | OUTPATIENT
Start: 2025-06-02 | End: 2025-06-02

## 2025-06-02 RX ORDER — LIDOCAINE 40 MG/G
CREAM TOPICAL
Status: DISCONTINUED | OUTPATIENT
Start: 2025-06-02 | End: 2025-06-02

## 2025-06-02 RX ORDER — AMOXICILLIN 250 MG
1 CAPSULE ORAL 2 TIMES DAILY PRN
Status: DISCONTINUED | OUTPATIENT
Start: 2025-06-02 | End: 2025-06-02

## 2025-06-02 RX ORDER — HYDROMORPHONE HCL IN WATER/PF 6 MG/30 ML
0.4 PATIENT CONTROLLED ANALGESIA SYRINGE INTRAVENOUS EVERY 5 MIN PRN
Status: DISCONTINUED | OUTPATIENT
Start: 2025-06-02 | End: 2025-06-02

## 2025-06-02 RX ORDER — NOREPINEPHRINE BITARTRATE 0.02 MG/ML
INJECTION, SOLUTION INTRAVENOUS CONTINUOUS PRN
Status: DISCONTINUED | OUTPATIENT
Start: 2025-06-02 | End: 2025-06-02

## 2025-06-02 RX ORDER — GABAPENTIN 250 MG/5ML
600 SOLUTION ORAL 3 TIMES DAILY
Status: DISCONTINUED | OUTPATIENT
Start: 2025-06-02 | End: 2025-06-05 | Stop reason: HOSPADM

## 2025-06-02 RX ORDER — SODIUM CHLORIDE, SODIUM LACTATE, POTASSIUM CHLORIDE, CALCIUM CHLORIDE 600; 310; 30; 20 MG/100ML; MG/100ML; MG/100ML; MG/100ML
INJECTION, SOLUTION INTRAVENOUS CONTINUOUS PRN
Status: DISCONTINUED | OUTPATIENT
Start: 2025-06-02 | End: 2025-06-02

## 2025-06-02 RX ORDER — HYDROMORPHONE HCL IN WATER/PF 6 MG/30 ML
0.2 PATIENT CONTROLLED ANALGESIA SYRINGE INTRAVENOUS
Status: DISCONTINUED | OUTPATIENT
Start: 2025-06-02 | End: 2025-06-05 | Stop reason: HOSPADM

## 2025-06-02 RX ORDER — AMOXICILLIN 250 MG
2 CAPSULE ORAL 2 TIMES DAILY PRN
Status: DISCONTINUED | OUTPATIENT
Start: 2025-06-02 | End: 2025-06-05 | Stop reason: HOSPADM

## 2025-06-02 RX ORDER — PROCHLORPERAZINE MALEATE 10 MG
10 TABLET ORAL EVERY 6 HOURS PRN
Status: DISCONTINUED | OUTPATIENT
Start: 2025-06-02 | End: 2025-06-05 | Stop reason: HOSPADM

## 2025-06-02 RX ORDER — HYDRALAZINE HYDROCHLORIDE 10 MG/1
10 TABLET, FILM COATED ORAL EVERY 4 HOURS PRN
Status: DISCONTINUED | OUTPATIENT
Start: 2025-06-02 | End: 2025-06-05 | Stop reason: HOSPADM

## 2025-06-02 RX ORDER — FENTANYL CITRATE 50 UG/ML
25 INJECTION, SOLUTION INTRAMUSCULAR; INTRAVENOUS EVERY 5 MIN PRN
Status: DISCONTINUED | OUTPATIENT
Start: 2025-06-02 | End: 2025-06-02

## 2025-06-02 RX ORDER — PROPOFOL 10 MG/ML
INJECTION, EMULSION INTRAVENOUS CONTINUOUS PRN
Status: DISCONTINUED | OUTPATIENT
Start: 2025-06-02 | End: 2025-06-02

## 2025-06-02 RX ORDER — CEFAZOLIN SODIUM/WATER 2 G/20 ML
2 SYRINGE (ML) INTRAVENOUS ONCE
Status: COMPLETED | OUTPATIENT
Start: 2025-06-02 | End: 2025-06-02

## 2025-06-02 RX ORDER — FENTANYL CITRATE 50 UG/ML
25-50 INJECTION, SOLUTION INTRAMUSCULAR; INTRAVENOUS EVERY 5 MIN PRN
Status: DISCONTINUED | OUTPATIENT
Start: 2025-06-02 | End: 2025-06-02

## 2025-06-02 RX ORDER — LIDOCAINE 40 MG/G
CREAM TOPICAL
Status: DISCONTINUED | OUTPATIENT
Start: 2025-06-02 | End: 2025-06-05 | Stop reason: HOSPADM

## 2025-06-02 RX ORDER — HYDROMORPHONE HCL IN WATER/PF 6 MG/30 ML
0.2 PATIENT CONTROLLED ANALGESIA SYRINGE INTRAVENOUS EVERY 5 MIN PRN
Status: DISCONTINUED | OUTPATIENT
Start: 2025-06-02 | End: 2025-06-02

## 2025-06-02 RX ORDER — ASPIRIN 81 MG/1
81 TABLET, CHEWABLE ORAL DAILY
Status: DISCONTINUED | OUTPATIENT
Start: 2025-06-03 | End: 2025-06-02

## 2025-06-02 RX ORDER — HYDROMORPHONE HCL IN WATER/PF 6 MG/30 ML
0.4 PATIENT CONTROLLED ANALGESIA SYRINGE INTRAVENOUS
Status: DISCONTINUED | OUTPATIENT
Start: 2025-06-02 | End: 2025-06-05 | Stop reason: HOSPADM

## 2025-06-02 RX ADMIN — HEPARIN SODIUM 3000 UNITS: 1000 INJECTION INTRAVENOUS; SUBCUTANEOUS at 15:11

## 2025-06-02 RX ADMIN — HEPARIN SODIUM 1000 UNITS: 1000 INJECTION INTRAVENOUS; SUBCUTANEOUS at 16:10

## 2025-06-02 RX ADMIN — SODIUM CHLORIDE, SODIUM LACTATE, POTASSIUM CHLORIDE, AND CALCIUM CHLORIDE: .6; .31; .03; .02 INJECTION, SOLUTION INTRAVENOUS at 20:15

## 2025-06-02 RX ADMIN — ACETAMINOPHEN 650 MG: 325 SOLUTION ORAL at 12:27

## 2025-06-02 RX ADMIN — PHENYLEPHRINE HYDROCHLORIDE 200 MCG: 10 INJECTION INTRAVENOUS at 15:09

## 2025-06-02 RX ADMIN — PROPOFOL 50 MG: 10 INJECTION, EMULSION INTRAVENOUS at 14:16

## 2025-06-02 RX ADMIN — ACETAMINOPHEN 650 MG: 325 TABLET ORAL at 21:35

## 2025-06-02 RX ADMIN — LIDOCAINE HYDROCHLORIDE 4 ML: 10 INJECTION, SOLUTION EPIDURAL; INFILTRATION; INTRACAUDAL; PERINEURAL at 15:13

## 2025-06-02 RX ADMIN — NOREPINEPHRINE BITARTRATE 0.03 MCG/KG/MIN: 0.02 INJECTION, SOLUTION INTRAVENOUS at 15:18

## 2025-06-02 RX ADMIN — LIDOCAINE 1 INCH: 50 OINTMENT TOPICAL at 13:59

## 2025-06-02 RX ADMIN — PHENYLEPHRINE HYDROCHLORIDE 100 MCG: 10 INJECTION INTRAVENOUS at 15:31

## 2025-06-02 RX ADMIN — LIDOCAINE HYDROCHLORIDE 3 ML: 40 INJECTION, SOLUTION RETROBULBAR; TOPICAL at 14:02

## 2025-06-02 RX ADMIN — ONDANSETRON 4 MG: 2 INJECTION INTRAMUSCULAR; INTRAVENOUS at 16:38

## 2025-06-02 RX ADMIN — ENTECAVIR 0.5 MG: 0.05 SOLUTION ORAL at 21:35

## 2025-06-02 RX ADMIN — ATORVASTATIN CALCIUM 80 MG: 80 TABLET, FILM COATED ORAL at 21:35

## 2025-06-02 RX ADMIN — SODIUM CHLORIDE, SODIUM LACTATE, POTASSIUM CHLORIDE, AND CALCIUM CHLORIDE: .6; .31; .03; .02 INJECTION, SOLUTION INTRAVENOUS at 14:27

## 2025-06-02 RX ADMIN — GLYCOPYRROLATE 0.2 MG: 0.2 INJECTION, SOLUTION INTRAMUSCULAR; INTRAVENOUS at 13:47

## 2025-06-02 RX ADMIN — LIDOCAINE HYDROCHLORIDE 3 ML: 40 INJECTION, SOLUTION RETROBULBAR; TOPICAL at 14:11

## 2025-06-02 RX ADMIN — PROPOFOL 125 MCG/KG/MIN: 10 INJECTION, EMULSION INTRAVENOUS at 14:26

## 2025-06-02 RX ADMIN — FOSAPREPITANT 150 MG: 150 INJECTION, POWDER, LYOPHILIZED, FOR SOLUTION INTRAVENOUS at 11:32

## 2025-06-02 RX ADMIN — PHENYLEPHRINE HYDROCHLORIDE 200 MCG: 10 INJECTION INTRAVENOUS at 15:15

## 2025-06-02 RX ADMIN — Medication 2 G: at 14:53

## 2025-06-02 RX ADMIN — FOSAPREPITANT 250 ML: 150 INJECTION, POWDER, LYOPHILIZED, FOR SOLUTION INTRAVENOUS at 14:55

## 2025-06-02 RX ADMIN — GABAPENTIN 600 MG: 250 SOLUTION ORAL at 21:35

## 2025-06-02 RX ADMIN — PHENYLEPHRINE HYDROCHLORIDE 200 MCG: 10 INJECTION INTRAVENOUS at 14:43

## 2025-06-02 RX ADMIN — IODIXANOL 70 ML: 320 INJECTION, SOLUTION INTRAVASCULAR at 16:34

## 2025-06-02 RX ADMIN — PHENYLEPHRINE HYDROCHLORIDE 200 MCG: 10 INJECTION INTRAVENOUS at 14:56

## 2025-06-02 RX ADMIN — PROTAMINE SULFATE 25 MG: 10 INJECTION, SOLUTION INTRAVENOUS at 16:20

## 2025-06-02 RX ADMIN — Medication 50 MG: at 14:19

## 2025-06-02 RX ADMIN — Medication 200 MG: at 16:45

## 2025-06-02 ASSESSMENT — ACTIVITIES OF DAILY LIVING (ADL)
ADLS_ACUITY_SCORE: 66
ADLS_ACUITY_SCORE: 60
ADLS_ACUITY_SCORE: 49
ADLS_ACUITY_SCORE: 66
ADLS_ACUITY_SCORE: 66
ADLS_ACUITY_SCORE: 49
ADLS_ACUITY_SCORE: 47
ADLS_ACUITY_SCORE: 51
ADLS_ACUITY_SCORE: 47
ADLS_ACUITY_SCORE: 60
ADLS_ACUITY_SCORE: 49
ADLS_ACUITY_SCORE: 49

## 2025-06-02 NOTE — LETTER
Douglas Herrera MRN# [id number]   YOB: 1960 Age: 65 year old     Date of Admission:  6/2/25  Date of Discharge:  6/5/2025 11:52 AM  Admitting Physician:  Amanda Garcia MD    Primary Care Provider: Mira Leung     To Whom it May Concern:            You have been identified as the Primary Care Provider for Douglas Herrera, who was recently admitted to the [facility].  Thank you for the referral to our hospital.  It is our goal to provide the highest quality of care for our patients, including planning for seamless continuity of care by providing you with timely, accurate and concise information.  After reviewing the following combined discharge summary and final progress note, please contact us if you have any remaining questions.  The Discharging Physician will be the best informed, with their contact information listed above.  If unable to reach them, or if you have received this letter in error, please call [phone] and someone will try to help you.     Electronically signed

## 2025-06-02 NOTE — PROGRESS NOTES
Patient Name: Douglas Herrera  Medical Record Number: [id number]  Today's Date: 6/2/2025    Procedure: Diagnostic bilateral cerebral angiogram with epistaxis intervention    Proceduralist: Dr Jose BUTTS, Dr Lisa BUTTS  Pathology present: N/A    Procedure Start: 1500  Procedure end: 1620  Sedation medications administered: General anesthesthia     Report given to:  PACU RN  : Xiomara cortez    Other Notes: Pt arrived to IR room 3 from PACU. Unable to perform neuro checks as patient was paralyzed and intubated prior to arrival.  Consent reviewed. Pt denies any questions or concerns regarding procedure. Pt positioned supine and monitored per protocol.     Anigoseal closure device to right femoral artery at 1618.  Bedrest for 2 hours until 1830.     Pt tolerated procedure without any noted complications. Pt transferred back to PACU.

## 2025-06-02 NOTE — TELEPHONE ENCOUNTER
[facility] does not do compounding.  Requesting Celebrex prescription be sent to a compounding pharmacy.    Will send to [facility].

## 2025-06-02 NOTE — ANESTHESIA PROCEDURE NOTES
Airway       Patient location during procedure: OR       Procedure Start/Stop Times: 6/2/2025 2:05 PM and 6/2/2025 2:15 PM  Staff -        Anesthesiologist:  Marvin Love MD       Resident/Fellow: Steve Francis MD       Performed By: resident  Consent for Airway        Urgency: elective  Indications and Patient Condition       Indications for airway management: morgan-procedural and airway protection       Induction type:awake       Mask difficulty assessment: 0 - not attempted    Final Airway Details       Final airway type: endotracheal airway       Successful airway: ETT - single  Endotracheal Airway Details        ETT size (mm): 6.0       Cuffed: yes       Successful intubation technique: flexible bronchoscopy       Grade View of Cords: 1       Placement verification comments: (Bronchoscopy verification)       Intubation device: bronchoscope.       Position: Right       Measured from: nares       Secured at (cm): 29       Bite block used: None    Post intubation assessment        Placement verified by: capnometry, equal breath sounds and chest rise        Number of attempts at approach: 1       Number of other approaches attempted: 0       Secured with: ties, sutures and tape       Ease of procedure: easy       Dentition: Intact    Medication(s) Administered   Medication Administration Time: 6/2/2025 2:05 PM    Additional Comments       Patient initially received 0.2mg glycopyrrolate and Afrin through right nare. He was also sprayed with lidocaine 5% solution with a atomizer through the right nare for topicalization. Bronchoscopy was also outfitted with an epidural catheter for SAYGO. 6.0 Jerardo flex tube was also coated with lidocaine 5% ointment. Bronchoscope was advanced through the right nare. Grade 1 view of cords. No tumor seen in right nasopharynx, oropharynx, or hypopharynx. Bronchoscope passed easily through glottis. Tube easily passed through glottis over bronchoscope. EtCO2 was verified before  induction of anesthesia. Patient tolerated awake FOI with some discomfort, but overall well. Position verification of the moustapha distal to the ETT tip also performed. ETT was sutured to nasal septum with 0-0 silk, twill tie for second layer of airway securement for transport. Twill tie was swapped for mastisol and tape in IR per request.

## 2025-06-02 NOTE — ANESTHESIA POSTPROCEDURE EVALUATION
Patient: Douglas Herrera    Procedure: Procedure(s):  ANESTHESIA, IN NON-OPERATING ROOM SETTING Cerebral Angiogram with Embolization @1200       Anesthesia Type:  No value filed.    Note:  Disposition: ICU            ICU Sign Out: Anesthesiologist/ICU physician sign out WAS performed   Postop Pain Control: Uneventful            Sign Out: Well controlled pain   PONV: No   Neuro/Psych: Uneventful            Sign Out: Acceptable/Baseline neuro status   Airway/Respiratory: Uneventful            Sign Out: Acceptable/Baseline resp. status   CV/Hemodynamics: Uneventful            Sign Out: Acceptable CV status; No obvious hypovolemia; No obvious fluid overload   Other NRE: NONE   DID A NON-ROUTINE EVENT OCCUR? No           Last vitals:  Vitals Value Taken Time   /71 06/02/25 17:45   Temp     Pulse 80 06/02/25 17:46   Resp 19 06/02/25 17:46   SpO2 95 % 06/02/25 17:46   Vitals shown include unfiled device data.    Electronically Signed By: Suzanna Yi MD  June 2, 2025  5:46 PM

## 2025-06-02 NOTE — ANESTHESIA CARE TRANSFER NOTE
Patient: Douglas Herrera    Procedure: Procedure(s):  ANESTHESIA, IN NON-OPERATING ROOM SETTING Cerebral Angiogram with Embolization @1200       Diagnosis: Epistaxis [R04.0]  Diagnosis Additional Information: No value filed.    Anesthesia Type:   No value filed.     Note:    Oropharynx: oropharynx clear of all foreign objects, spontaneously breathing and nasal airway in place  Level of Consciousness: awake  Oxygen Supplementation: face mask  Level of Supplemental Oxygen (L/min / FiO2): 6  Independent Airway: airway patency satisfactory and stable  Dentition: dentition unchanged  Vital Signs Stable: post-procedure vital signs reviewed and stable  Report to RN Given: handoff report given  Patient transferred to: PACU    Handoff Report: Identifed the Patient, Identified the Reponsible Provider, Reviewed the pertinent medical history, Discussed the surgical course, Reviewed Intra-OP anesthesia mangement and issues during anesthesia, Set expectations for post-procedure period and Allowed opportunity for questions and acknowledgement of understanding    Vitals:  Vitals Value Taken Time   /68 06/02/25 17:00   Temp     Pulse 76 06/02/25 17:03   Resp 15 06/02/25 17:03   SpO2 100 % 06/02/25 17:03   Vitals shown include unfiled device data.    Electronically Signed By: MALCOLM Mckinney CRNA  June 2, 2025  5:04 PM

## 2025-06-02 NOTE — PROCEDURES
[facility]     Endovascular Surgical Neuroradiology Post-Procedure Note    Pre-Procedure Diagnosis: SCC left maxillary sinus  Post-Procedure Diagnosis: same    Procedure:   Percutaneous left axillary sinus SCC tumour embolization    Reason for delay:  Not applicable    Findings: Successful percutaneous embolization of left maxillary sinus SCC with 70% reduction of tumor blush seen on subsequent imaging. Attempted trans nasal access route but was able to trans maxillary percutaneous approach    Plan:    - 2 hour bed rest  - Can extubate once medically stable  -Admit to ICU and observe over night        Primary Surgeon: Dr. Amanda Garcia  Secondary Surgeon: Not applicable  Secondary Surgeon Review: None  Fellow: Lisa and Qaryouti  Additional Assistants: none    Prior to the start of the procedure and with procedural staff participation, I verbally confirmed: the patient s identity using two indicators, relevant allergies, that the procedure was appropriate and matched the consent or emergent situation, and that the correct equipment/implants were available. Immediately prior to starting the procedure I conducted the Time Out with the procedural staff and re-confirmed the patient s name, procedure, and site/side. (The Joint Commission universal protocol was followed.)  Yes    PRU value: Not applicable    Anesthesia: Performed by Anesthesia  Medications: Heparin 4000 IU  Puncture site: Right Femoral Artery    Fluoroscopy time (minutes):   Radiation dose (mGy):   Contrast amount (mL): 70    Estimated blood loss (mL): 30    Closure: 6 F Angio-seal        Disposition: Will be followed in hospital by the Neuro Endovascular team.        Sedation Post-Procedure Summary    The patient was reevaluated immediately before administering moderate or deep sedation or anesthesia.    Sedatives: See GA note    Vital signs and pulse oximetry were monitored and remained stable throughout the  procedure, and sedation was maintained until the procedure was complete.  The patient was monitored by staff until sedation discharge criteria were met.    Patient tolerance: Patient tolerated the procedure well with no immediate complications.        Jenelle Serrano MD    Use "[employer] to contact: Neuro IR Fellow ([place])  Fellow's personal cell phone number provided to bedside RN.

## 2025-06-02 NOTE — PROGRESS NOTES
[facility]     Endovascular Surgical Neuroradiology Pre-Procedure Note      HPI:  Douglas Herrera is a 65 year old male with SCC of the left maxillary sinus who has had episodes of recurrent epistaxis. He has previously undergone particle embolization in 2023, he also had pre-operative embolization for resection. He has started to have more pinkish discharge/drainage from his nose in April 2025. He has had multiple episodes and has resulted in blood loss anemia. He is on palliative chemotherapy and presents now for palliative transarterial or percutaneous tumor embolization under general anesthesia.     Medical History:  Past Medical History:   Diagnosis Date    Ascending aortic aneurysm     Coronary artery disease     Depression     Gout     History of anesthesia complications     Hyperlipemia     Hypertension     Malignant neoplasm of nasal cavities (H)     PONV (postoperative nausea and vomiting)        Surgical History:  Past Surgical History:   Procedure Laterality Date    AORTA SURGERY N/A 4/23/2019    Procedure: WITH ASCENDING AORTA REPLACEMENT;  Surgeon: Darlene Graff MD;  Location: [facility] OR;  Service: Cardiovascular    BYPASS GRAFT ARTERY CORONARY      CARDIAC SURGERY      CV CORONARY ANGIOGRAM N/A 3/12/2019    Procedure: Coronary Angiogram;  Surgeon: Hamilton Lucero MD;  Location: [facility];  Service: Cardiology    ENDOSCOPIC SINUS SURGERY Left 11/10/2023    Procedure: Transnasal endoscopic approach to biopsy left nasal mass;  Surgeon: Damon Regan MD;  Location: [facility] OR    GALLBLADDER SURGERY      INSERT PORT VASCULAR ACCESS N/A 1/11/2024    Procedure: Insert port vascular access;  Surgeon: Tyrel Silvestre MD;  Location: [facility]    IR CAROTID CEREBRAL ANGIOGRAM BILATERAL  11/30/2023    IR CHEST PORT PLACEMENT > 5 YRS OF AGE  1/11/2024    IR FACIAL EMBOLIZATION LEFT  9/13/2023    IR GASTROSTOMY TUBE CHANGE   11/22/2024    IR GASTROSTOMY TUBE PERCUTANEOUS PLCMNT  1/2/2024    OPTICAL TRACKING SYSTEM ENDOSCOPIC SINUS SURGERY Left 12/1/2023    Procedure: Stealth assisted transnasal endoscopic approach to excise left sinonasal mass;  Surgeon: Damon Regan MD;  Location: [facility] OR    RESECT TUMOR SINUS Left 10/11/2024    Procedure: Transnasal Biopsy of Left Maxillary Sinus Tumor,;  Surgeon: Damon Regan MD;  Location: [facility] OR       Family History:  Family History   Problem Relation Age of Onset    Cerebrovascular Disease Mother 90    Acute Myocardial Infarction No family hx of     Anesthesia Reaction No family hx of        Social History:  Social History     Socioeconomic History    Marital status:      Spouse name: Not on file    Number of children: Not on file    Years of education: Not on file    Highest education level: Not on file   Occupational History    Not on file   Tobacco Use    Smoking status: Former     Types: Cigarettes     Passive exposure: Past    Smokeless tobacco: Never   Vaping Use    Vaping status: Never Used   Substance and Sexual Activity    Alcohol use: Not Currently     Comment: Occasionally    Drug use: No    Sexual activity: Not on file   Other Topics Concern    Not on file   Social History Narrative    Not on file     Social Drivers of Health     Financial Resource Strain: Not on file   Food Insecurity: Not on file   Transportation Needs: Not on file   Physical Activity: Not on file   Stress: Not on file   Social Connections: Not on file   Interpersonal Safety: Low Risk  (6/2/2025)    Interpersonal Safety     Do you feel physically and emotionally safe where you currently live?: Yes     Within the past 12 months, have you been hit, slapped, kicked or otherwise physically hurt by someone?: No     Within the past 12 months, have you been humiliated or emotionally abused in other ways by your partner or ex-partner?: No   Housing Stability: Not on file        Allergies:  No Known Allergies    Is there a contrast allergy?  No    Medications:  Medications Prior to Admission   Medication Sig Dispense Refill Last Dose/Taking    acetaminophen (TYLENOL) 325 MG tablet Take 2 tablets (650 mg) by mouth every 4 hours as needed for other or pain 50 tablet 0 Past Week    aspirin 81 MG EC tablet Take 1 tablet (81 mg) by mouth daily 90 tablet 3 More than a month    atorvastatin (LIPITOR) 80 MG tablet TAKE 1 TABLET (80 MG TOTAL) BY MOUTH AT BEDTIME/ TXHUA HMO NOJ 1 LUB TSHUAJ THAUM MUS PW PAB ZOO NTSHAV MUAJ ROJ (Patient taking differently: Take 80 mg by mouth every evening.) 90 tablet 3 6/1/2025    clindamycin (CLEOCIN T) 1 % external lotion Apply topically 2 times daily. 60 mL 3 6/1/2025    entecavir (BARACLUDE) 0.5 MG tablet Take 0.5 mg by mouth every evening.   6/2/2025 at  8:00 AM    gabapentin (NEURONTIN) 250 MG/5ML solution Take 12 mLs (600 mg) by mouth 3 times daily. 1080 mL 1 6/1/2025 Bedtime    ondansetron (ZOFRAN ODT) 4 MG ODT tab Take 1 tablet (4 mg) by mouth every 8 hours as needed for nausea. 4 tablet 0 More than a month    ondansetron (ZOFRAN) 8 MG tablet Take 1 tablet (8 mg) by mouth every 8 hours as needed for nausea (vomiting). 30 tablet 2 More than a month    Osmolite 1.5 Migue 237 mL Place 474 mLs into G tube 3 times daily. Infuse via gravity bag. 2 cartons TID spread 3-5 hours apart.   Water flush: 30-60 mL before and after each feeding. + 120 mL 4 times daily for hydration. 49870 mL 11 6/1/2025 Bedtime    oxyCODONE (ROXICODONE) 5 MG/5ML solution Take 5 mLs (5 mg) by mouth 4 times daily as needed for severe pain. 100 mL 0 More than a month    polyethylene glycol (MIRALAX) 17 g packet Take 1 packet by mouth as needed.   More than a month    prochlorperazine (COMPAZINE) 10 MG tablet Take 1 tablet (10 mg) by mouth every 6 hours as needed for nausea or vomiting. 30 tablet 2 More than a month    senna-docusate (SENNA S) 8.6-50 MG tablet Take 1 tablet by mouth 2  times daily as needed for constipation 30 tablet 1 More than a month    sodium chloride (OCEAN) 0.65 % nasal spray Spray 2 sprays in nostril daily as needed for congestion. 88 mL 3 6/2/2025    celecoxib (CELEBREX) 100 MG capsule Take 1 capsule (100 mg) by mouth 2 times daily. 60 capsule 2 5/30/2025    celecoxib (CELEBREX) 5 mg/mL SUSP suspension Take 20 mLs (100 mg) by mouth 2 times daily. 1200 mL 1 5/30/2025    gabapentin (NEURONTIN) 300 MG capsule Take 1 capsule (300 mg) by mouth 3 times daily. 90 capsule 2     hydrocortisone 2.5 % cream Apply topically 2 times daily as needed for itching. 30 g 0 5/31/2025    nitroGLYcerin (NITROSTAT) 0.4 MG sublingual tablet Place 1 tablet (0.4 mg) under the tongue every 5 minutes as needed. 25 tablet 3 Unknown   .    ROS:  The 5 point Review of Systems is negative other than noted in the HPI or here.     PHYSICAL EXAMINATION  Vital Signs:  B/P: 136/73,  T: 98.5,  P: 85,  R: 16    Cardio:  RRR  Pulmonary:  no respiratory distress  Abdomen:  non-distended    Neurologic  Mental Status:  fully alert, attentive and oriented, follows commands, dysarthria  Cranial Nerves:  visual fields intact, EOMI with normal smooth pursuit, hearing not formally tested but intact to conversation, shoulder shrug strong bilaterally, left face asymmetry due to underlying lesion, decreased sensation on eft  Motor:  no abnormal movements, normal tone throughout, normal muscle bulk, 5/5 in bilateral uppers, 4/5 in bilateral lowers  Sensory:  intact to light touch in uppers and lowers, no extinction  Coordination:  FNF and HS intact without dysmetria    Pre-procedure National Institutes of Health Stroke Scale:   Not applicable    LABS  (most recent Cr, BUN, GFR, PLT, INR, PTT within the past 7 days):  Recent Labs   Lab 05/29/25  0753 05/29/25  0628   CR  --  0.78   BUN  --  28.0*   GFRESTIMATED  --  >90     --         Platelet Function P2Y12 (PRU):  Not applicable      ASSESSMENT: 65M who presents  for percutaneous and trans-arterial palliative tumor embolization of the left maxillary sinus due to recurrent epistaxis.     PLAN:   Trans-arterial and percutaneous tumor embolization     PRE-PROCEDURE SEDATION ASSESSMENT     Pre-Procedure Sedation Assessment done at 1110.    Expected Level:  GA    Indication:  Sedation is required to allow for neurointerventional procedure.    Consent obtained from patient after discussing the risks, benefits and alternatives.     PO Intake:  Appropriately NPO for procedure    ASA Class:  Class 3 - SEVERE SYSTEMIC DISEASE, DEFINITE FUNCTIONAL LIMITATIONS.    Mallampati:  Grade 4:  Soft palate obscured by base of tongue    History and physical reviewed and no updates needed. I have reviewed the lab findings, diagnostic data, medications, and the plan for sedation. I have determined this patient to be an appropriate candidate for the planned sedation and procedure and have reassessed the patient IMMEDIATELY PRIOR to sedation and procedure.    Patient was discussed with the Attending, Dr. Garcia, who agrees with the plan.  Nica Gonzalez MD  Endovascular Surgical Neuroradiology Fellow  [facility]  [phone]    Endovascular Surgical Neuroradiology staff is Dr. Garcia.

## 2025-06-03 ENCOUNTER — APPOINTMENT (OUTPATIENT)
Dept: SPEECH THERAPY | Facility: CLINIC | Age: 65
End: 2025-06-03
Attending: STUDENT IN AN ORGANIZED HEALTH CARE EDUCATION/TRAINING PROGRAM
Payer: COMMERCIAL

## 2025-06-03 LAB
ANION GAP SERPL CALCULATED.3IONS-SCNC: 10 MMOL/L (ref 7–15)
ANION GAP SERPL CALCULATED.3IONS-SCNC: 8 MMOL/L (ref 7–15)
BASOPHILS # BLD AUTO: 0 10E3/UL (ref 0–0.2)
BASOPHILS NFR BLD AUTO: 1 %
BUN SERPL-MCNC: 14.6 MG/DL (ref 8–23)
BUN SERPL-MCNC: 21 MG/DL (ref 8–23)
CALCIUM SERPL-MCNC: 8.6 MG/DL (ref 8.8–10.4)
CALCIUM SERPL-MCNC: 9 MG/DL (ref 8.8–10.4)
CHLORIDE SERPL-SCNC: 105 MMOL/L (ref 98–107)
CHLORIDE SERPL-SCNC: 106 MMOL/L (ref 98–107)
CREAT SERPL-MCNC: 0.68 MG/DL (ref 0.67–1.17)
CREAT SERPL-MCNC: 0.8 MG/DL (ref 0.67–1.17)
EGFRCR SERPLBLD CKD-EPI 2021: >90 ML/MIN/1.73M2
EGFRCR SERPLBLD CKD-EPI 2021: >90 ML/MIN/1.73M2
EOSINOPHIL # BLD AUTO: 0.1 10E3/UL (ref 0–0.7)
EOSINOPHIL NFR BLD AUTO: 1 %
ERYTHROCYTE [DISTWIDTH] IN BLOOD BY AUTOMATED COUNT: 16.8 % (ref 10–15)
ERYTHROCYTE [DISTWIDTH] IN BLOOD BY AUTOMATED COUNT: 16.8 % (ref 10–15)
GLUCOSE BLDC GLUCOMTR-MCNC: 122 MG/DL (ref 70–99)
GLUCOSE SERPL-MCNC: 103 MG/DL (ref 70–99)
GLUCOSE SERPL-MCNC: 128 MG/DL (ref 70–99)
HCO3 SERPL-SCNC: 24 MMOL/L (ref 22–29)
HCO3 SERPL-SCNC: 25 MMOL/L (ref 22–29)
HCT VFR BLD AUTO: 24.7 % (ref 40–53)
HCT VFR BLD AUTO: 24.8 % (ref 40–53)
HGB BLD-MCNC: 7.7 G/DL (ref 13.3–17.7)
HGB BLD-MCNC: 7.9 G/DL (ref 13.3–17.7)
HGB BLD-MCNC: 7.9 G/DL (ref 13.3–17.7)
HGB BLD-MCNC: 8.1 G/DL (ref 13.3–17.7)
IMM GRANULOCYTES # BLD: 0 10E3/UL
IMM GRANULOCYTES NFR BLD: 1 %
LYMPHOCYTES # BLD AUTO: 0.7 10E3/UL (ref 0.8–5.3)
LYMPHOCYTES NFR BLD AUTO: 11 %
MAGNESIUM SERPL-MCNC: 1.3 MG/DL (ref 1.7–2.3)
MAGNESIUM SERPL-MCNC: 2.4 MG/DL (ref 1.7–2.3)
MCH RBC QN AUTO: 27 PG (ref 26.5–33)
MCH RBC QN AUTO: 27.2 PG (ref 26.5–33)
MCHC RBC AUTO-ENTMCNC: 31 G/DL (ref 31.5–36.5)
MCHC RBC AUTO-ENTMCNC: 32 G/DL (ref 31.5–36.5)
MCV RBC AUTO: 85 FL (ref 78–100)
MCV RBC AUTO: 87 FL (ref 78–100)
MCV RBC AUTO: 87 FL (ref 78–100)
MCV RBC AUTO: 88 FL (ref 78–100)
MONOCYTES # BLD AUTO: 0.5 10E3/UL (ref 0–1.3)
MONOCYTES NFR BLD AUTO: 7 %
NEUTROPHILS # BLD AUTO: 5.2 10E3/UL (ref 1.6–8.3)
NEUTROPHILS NFR BLD AUTO: 80 %
NRBC # BLD AUTO: 0 10E3/UL
NRBC BLD AUTO-RTO: 0 /100
PLATELET # BLD AUTO: 243 10E3/UL (ref 150–450)
PLATELET # BLD AUTO: 247 10E3/UL (ref 150–450)
POTASSIUM SERPL-SCNC: 3.8 MMOL/L (ref 3.4–5.3)
POTASSIUM SERPL-SCNC: 4.4 MMOL/L (ref 3.4–5.3)
RBC # BLD AUTO: 2.85 10E6/UL (ref 4.4–5.9)
RBC # BLD AUTO: 2.9 10E6/UL (ref 4.4–5.9)
SODIUM SERPL-SCNC: 139 MMOL/L (ref 135–145)
SODIUM SERPL-SCNC: 139 MMOL/L (ref 135–145)
WBC # BLD AUTO: 6.4 10E3/UL (ref 4–11)
WBC # BLD AUTO: 6.6 10E3/UL (ref 4–11)

## 2025-06-03 PROCEDURE — 258N000003 HC RX IP 258 OP 636: Performed by: STUDENT IN AN ORGANIZED HEALTH CARE EDUCATION/TRAINING PROGRAM

## 2025-06-03 PROCEDURE — 85018 HEMOGLOBIN: CPT | Performed by: STUDENT IN AN ORGANIZED HEALTH CARE EDUCATION/TRAINING PROGRAM

## 2025-06-03 PROCEDURE — 250N000013 HC RX MED GY IP 250 OP 250 PS 637: Performed by: INTERNAL MEDICINE

## 2025-06-03 PROCEDURE — A4450 NON-WATERPROOF TAPE: HCPCS

## 2025-06-03 PROCEDURE — 92610 EVALUATE SWALLOWING FUNCTION: CPT | Mod: GN

## 2025-06-03 PROCEDURE — 250N000011 HC RX IP 250 OP 636: Performed by: STUDENT IN AN ORGANIZED HEALTH CARE EDUCATION/TRAINING PROGRAM

## 2025-06-03 PROCEDURE — 250N000013 HC RX MED GY IP 250 OP 250 PS 637: Performed by: STUDENT IN AN ORGANIZED HEALTH CARE EDUCATION/TRAINING PROGRAM

## 2025-06-03 PROCEDURE — 83735 ASSAY OF MAGNESIUM: CPT | Performed by: STUDENT IN AN ORGANIZED HEALTH CARE EDUCATION/TRAINING PROGRAM

## 2025-06-03 PROCEDURE — 85025 COMPLETE CBC W/AUTO DIFF WBC: CPT | Performed by: INTERNAL MEDICINE

## 2025-06-03 PROCEDURE — 82435 ASSAY OF BLOOD CHLORIDE: CPT | Performed by: INTERNAL MEDICINE

## 2025-06-03 PROCEDURE — 250N000009 HC RX 250: Performed by: STUDENT IN AN ORGANIZED HEALTH CARE EDUCATION/TRAINING PROGRAM

## 2025-06-03 PROCEDURE — 999N000248 HC STATISTIC IV INSERT WITH US BY RN

## 2025-06-03 PROCEDURE — 200N000002 HC R&B ICU UMMC

## 2025-06-03 PROCEDURE — 258N000003 HC RX IP 258 OP 636

## 2025-06-03 PROCEDURE — 250N000013 HC RX MED GY IP 250 OP 250 PS 637

## 2025-06-03 PROCEDURE — 82310 ASSAY OF CALCIUM: CPT

## 2025-06-03 PROCEDURE — 82310 ASSAY OF CALCIUM: CPT | Performed by: INTERNAL MEDICINE

## 2025-06-03 PROCEDURE — 85018 HEMOGLOBIN: CPT

## 2025-06-03 PROCEDURE — 85027 COMPLETE CBC AUTOMATED: CPT | Performed by: INTERNAL MEDICINE

## 2025-06-03 RX ORDER — MAGNESIUM SULFATE HEPTAHYDRATE 40 MG/ML
4 INJECTION, SOLUTION INTRAVENOUS ONCE
Status: COMPLETED | OUTPATIENT
Start: 2025-06-03 | End: 2025-06-03

## 2025-06-03 RX ORDER — ROPIVACAINE IN 0.9% SOD CHL/PF 0.1 %
.01-.2 PLASTIC BAG, INJECTION (ML) EPIDURAL CONTINUOUS
Status: DISCONTINUED | OUTPATIENT
Start: 2025-06-03 | End: 2025-06-05 | Stop reason: HOSPADM

## 2025-06-03 RX ADMIN — SODIUM CHLORIDE 1000 ML: 0.9 INJECTION, SOLUTION INTRAVENOUS at 01:13

## 2025-06-03 RX ADMIN — CELECOXIB 100 MG: 100 CAPSULE ORAL at 08:25

## 2025-06-03 RX ADMIN — SODIUM CHLORIDE 1000 ML: 0.9 INJECTION, SOLUTION INTRAVENOUS at 17:56

## 2025-06-03 RX ADMIN — ATORVASTATIN CALCIUM 80 MG: 80 TABLET, FILM COATED ORAL at 19:48

## 2025-06-03 RX ADMIN — ACETAMINOPHEN 650 MG: 325 TABLET ORAL at 02:20

## 2025-06-03 RX ADMIN — ACETAMINOPHEN 650 MG: 325 TABLET ORAL at 19:57

## 2025-06-03 RX ADMIN — GABAPENTIN 600 MG: 250 SOLUTION ORAL at 14:07

## 2025-06-03 RX ADMIN — CELECOXIB 100 MG: 100 CAPSULE ORAL at 19:48

## 2025-06-03 RX ADMIN — GABAPENTIN 600 MG: 250 SOLUTION ORAL at 08:25

## 2025-06-03 RX ADMIN — GABAPENTIN 600 MG: 250 SOLUTION ORAL at 19:47

## 2025-06-03 RX ADMIN — MAGNESIUM SULFATE HEPTAHYDRATE 4 G: 40 INJECTION, SOLUTION INTRAVENOUS at 04:39

## 2025-06-03 RX ADMIN — NOREPINEPHRINE BITARTRATE 0.03 MCG/KG/MIN: 0.02 INJECTION, SOLUTION INTRAVENOUS at 03:42

## 2025-06-03 RX ADMIN — ENTECAVIR 0.5 MG: 0.05 SOLUTION ORAL at 19:48

## 2025-06-03 RX ADMIN — ASPIRIN 81 MG CHEWABLE TABLET 81 MG: 81 TABLET CHEWABLE at 08:25

## 2025-06-03 RX ADMIN — SODIUM CHLORIDE 1000 ML: 0.9 INJECTION, SOLUTION INTRAVENOUS at 12:15

## 2025-06-03 ASSESSMENT — ACTIVITIES OF DAILY LIVING (ADL)
ADLS_ACUITY_SCORE: 39
ADLS_ACUITY_SCORE: 60
ADLS_ACUITY_SCORE: 39
ADLS_ACUITY_SCORE: 56
ADLS_ACUITY_SCORE: 60
ADLS_ACUITY_SCORE: 39
ADLS_ACUITY_SCORE: 60
ADLS_ACUITY_SCORE: 39
ADLS_ACUITY_SCORE: 60
ADLS_ACUITY_SCORE: 40
ADLS_ACUITY_SCORE: 39
ADLS_ACUITY_SCORE: 56
ADLS_ACUITY_SCORE: 39

## 2025-06-03 NOTE — PROGRESS NOTES
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 He has previously undergone particle embolization in 2023, he also had pre-operative embolization for resection. He has started to have more pinkish discharge/drainage from his nose in April 2025. He has had multiple episodes and has resulted in blood loss anemia. He is on palliative chemotherapy. He underwent left maxillary percutaneous embolization with 25% n-BCA glue yesterday with significant reduction of tumour blush on angiographic imaging. Overnight, he developed hypotension and repeat Hb this morning slight improvement from 7.9 to 8.1  Still on pressors low dose of NE. Swallow evaluation ordered per MD.   General Observations The pt is fatigued but agreeable to evaluation. No family present.       Present yes   Language Hmong   Type of Evaluation   Type of Evaluation Swallow Evaluation   Oral Motor   Oral Musculature anomalies present   Structural Abnormalities present   Mucosal Quality ulcerated;sticky   Dentition (Oral Motor)   Dentition (Oral Motor) natural dentition  (unable to fully visualize)   Facial Symmetry (Oral Motor)   Facial Symmetry (Oral Motor) left side impairment   Left Side Facial Asymmetry moderate impairment   Lip Function (Oral Motor)   Lip Range of Motion (Oral Motor) protrusion impairment;retraction impairment   Protrusion, Lip Range of Motion bilateral;moderate impairment   Retraction, Lip Range of Motion bilateral;moderate impairment   Lip Coordination (Oral Motor) bilateral;severe impairment   Tongue Function (Oral Motor)   Tongue  ROM (Oral Motor) lateralization is impaired;protrusion is impaired;elevation is impaired;depression is impaired   Depression, Tongue ROM Impairment (Oral Motor) moderate impairment;bilateral   Elevation, Tongue ROM Impairment (Oral Motor) moderate impairment;bilateral   Protrusion, Tongue ROM Impairment (Oral Motor) moderate impairment;bilateral   Lateralization, Tongue ROM Impairment (Oral Motor) severe impairment;bilateral   Tongue Strength (Oral Motor) strength decreased;bilateral;moderate impairment;severe impairment   Jaw Function (Oral Motor)   Jaw Function (Oral Motor) range of motion impairment   Jaw Range of Motion Impairment bilateral;moderate impairment;severe impairment   Facial Sensation   Facial Sensation cheek impairment;jaw impairment   Cheek left side   Jaw left side   Cough/Swallow/Gag Reflex (Oral Motor)   Volitional Throat Clear/Cough (Oral Motor) impaired;reduced strength   Volitional Swallow (Oral Motor) weak;effortful   Vocal Quality/Secretion Management (Oral Motor)   Vocal Quality (Oral Motor) dysphonic   Secretion Management (Oral Motor) difficulty swallowing secretions;awareness of wet vocal quality   General Swallowing Observations   Current Diet/Method of Nutritional Intake (General Swallowing Observations, NIS) NPO;gastrostomy tube (PEG)   Respiratory Support nasal cannula  (1 lpm)   Past History of Dysphagia Pt is familiar to SLP department. Pt had clinical swallow evaluation on 2/10/25 as outpatient d/t inability to have as much PO as prior. In February, pt only eating 1.5 meals a day with 5-6 cans of tube feeding. OP SLP recommended a soft & bite-sized solid diet with thin liquids. Moderate oral phase dysphagia was noted d/t trismus. Pt was also encouraged to continue jaw stretches to prevent disuse atrophy as pt underwent chemoradiation. Over past few months, pt had increased fatigue and chemotherapy which has impacted PO intake, as well as difficulty with swallowing. Pt reports he  "has not had anything to eat/drink orally in last 2 months d/t increased severity of dysphagia. Today, pt also having difficulty with managing secretions and expectorating blood-tinged phlegm. Of note, pt had OP VFSS prior to initiation of chemoradiation tx which revealed: \"No aspiration with any textures trialed. Patient had transient shallow penetration with sequential straw sips. Oropharyngeal swallow function is WFL. Oral motor functioning notable for overall reduced oral movements d/t pain and limited ROM d/t L intraoral mass on mid-posterior hard palate. Tongue base retraction is WNL. Pharyngeal constriction, hyolaryngeal elevation and excursion were all WNL. Epiglottic inversion is complete. Swallow response is minimally delayed to the vallecula. Mastication is safe and complete. Cricopharyngeal function is WFL.\"   Swallowing Evaluation Clinical swallow evaluation   Clinical Swallow Evaluation   Feeding Assistance dependent   Clinical Swallow Evaluation Textures Trialed thin liquids   Clinical Swallow Eval: Thin Liquid Texture Trial   Mode of Presentation, Thin Liquids spoon;fed by clinician   Volume of Liquid or Food Presented ice chip x1   Oral Phase of Swallow delayed AP movement;effortful AP movement;residue in oral cavity;premature pharyngeal entry   Pharyngeal Phase of Swallow impaired;coughing/choking;feeling of something stuck in throat;reduction in laryngeal movement;repeated swallows;throat clearing;wet vocal quality after swallow   Diagnostic Statement Overt s/s of aspiration including coughing, throat clearing, subtle wet voicing, and multiple swallows. Pt reported it was hard to control ice chip and felt very difficult to swallow   Esophageal Phase of Swallow   Patient reports or presents with symptoms of esophageal dysphagia No   Swallowing Recommendations   Diet Consistency Recommendations NPO   Medication Administration Recommendations, Swallowing (SLP) via PEG   Instrumental Assessment " "Recommendations VFSS (videofluoroscopic swallowing study)  (when pt progresses medically post chemoradiation, likely as OP)   General Therapy Interventions   Planned Therapy Interventions Dysphagia Treatment   Dysphagia treatment Oropharyngeal exercise training;Modified diet education   Clinical Impression   Criteria for Skilled Therapeutic Interventions Met (SLP Kalani) Yes, treatment indicated   SLP Diagnosis acute on chronic orophayrngeal dysphagia   Risks & Benefits of therapy have been explained evaluation/treatment results reviewed;care plan/treatment goals reviewed;risks/benefits reviewed;current/potential barriers reviewed;participants voiced agreement with care plan;participants included;patient   Clinical Impression Comments Clinical swallow evaluation completed per MD order. The pt presents with acute on chronic severe oropharyngeal dysphagia s/t SCC of left maxillary sinus s/p transnasal endoscopic excision and completed chemoXRT Jan/Feb 2024. See above for extensive dyspahgia hx. Pt currently on palliative chemotherapy. Oral mech significant for left-sided facial weakness and swelling, reduced labial movement (bilaterally, more pronounced on left), severely reduced mandibular ROM/strength, significant oral secretions including bloody mucous, moderate dysarthria d/t reduced articulatory precision, and reduced secretions management. Given severity of deficits and pt reporting he has not been able to tolerate much if any PO, SLP provided only swab and ice chip. Oral cares returned moderate bloody secretions, however unable to clean tongue d/t trismus. Oral phase significant for reduced labial seal around spoon, severely impaired bolus propulsion, bilateral residuals from melting ice chip, and suspect premature spillage to pharynx. Pt did have swallow initiation, however it was delayed and weak, and pt reported he felt the liquid already \"went down menezes\". Pt with overt s/s of aspiration including throat " clearing, coughing, and subtle wet voicing. No further PO provided d/t difficulty. Given high aspiration risk, recommend NPO with oral cares as tolerated given trismus. Please use PEG for all nutrition/hydration/medication. Pt would benefit from ongoing oropharyngeal and mandibular exercises as tolerated, however oral phase deficits do appear much more severe than documented per SLP session in February. SLP will follow at reduced frequency for dysphagia, however anticipate pt will benefit from OP SLP pending goals of care.   SLP Total Evaluation Time   Eval: oral/pharyngeal swallow function, clinical swallow Minutes (10373) 18   SLP: Goal 1 Pt will complete mandibular and oropharyngeal swallow exercises to prevent disuse atrophy with overall mild cues with 90% accuracy across 2 sessions.   SLP Discharge Planning   SLP Plan PO readiness (likely as OP), trial swabs/ice chips and oropharyngeal/mandibular exercises   SLP Discharge Recommendation home with outpatient therapy services   SLP Rationale for DC Rec Acute on chronic oropharyngeal dysphagia; pt is significantly below baseline and would benefit from ongoing SLP services to return to PO diet   SLP Brief overview of current status  Given high aspiration risk, recommend NPO with oral cares as tolerated given trismus. Please use PEG for all nutrition/hydration/medication. Pt would benefit from ongoing oropharyngeal and mandibular exercises as tolerated, however oral phase deficits do appear much more severe than documented per SLP session in February. SLP will follow at reduced frequency for dysphagia, however anticipate pt will benefit from OP SLP pending goals of care.   SLP Time and Intention   Total Session Time (sum of timed and untimed services) 18

## 2025-06-03 NOTE — PHARMACY-ADMISSION MEDICATION HISTORY
Pharmacist Admission Medication History    Admission medication history is complete. The information provided in this note is only as accurate as the sources available at the time of the update.    Information Source(s): Family member (son), dispense history, and [facility] records via phone    Pertinent Information:   - Conducted interview with patient's son, Tulio, who helps manage patient's meds. Tulio was an excellent historian and was able to answer all questions asked.  - Celecoxib: Confirmed with Tulio that patient is using celecoxib liquid.  - Entecavir: Per Tulio, patient is able to swallow entecavir tablets at home  - [facility]apentin: [facility] patient picked up both gabapentin capsules and liquid within the month of May. Per Tulio, the patient had tried to gabapentin capsules but was unable to swallow. The family picked up the liquid but is currently using the capsules (opening and sprinkling contents into water). Per Tulio, they plan on switching back to the liquid once capsules run out.    Changes made to PTA medication list:  Added: None  Deleted: Duplicate celecoxib and ondansetron entries  Changed: None    Allergies reviewed with patient and updates made in EHR: yes    Medication History Completed By: Abby Kan [facility] 6/3/2025 10:15 AM    PTA Med List   Medication Sig Last Dose/Taking    acetaminophen (TYLENOL) 325 MG tablet Take 2 tablets (650 mg) by mouth every 4 hours as needed for other or pain Past Week    aspirin 81 MG EC tablet Take 1 tablet (81 mg) by mouth daily More than a month    atorvastatin (LIPITOR) 80 MG tablet TAKE 1 TABLET (80 MG TOTAL) BY MOUTH AT BEDTIME/ TXHUA O NOJ 1 LUB TSHUAJ THAUM MUS PW PAB ZOO NTSHAV MUAJ ROJ (Patient taking differently: Take 80 mg by mouth every evening.) 6/1/2025    clindamycin (CLEOCIN T) 1 % external lotion Apply topically 2 times daily. 6/1/2025    entecavir (BARACLUDE) 0.5 MG tablet Take 0.5 mg by mouth every evening.  6/2/2025 at  8:00 AM    gabapentin (NEURONTIN) 250 MG/5ML solution Take 12 mLs (600 mg) by mouth 3 times daily. Past Month    gabapentin (NEURONTIN) 300 MG capsule Take 1 capsule (300 mg) by mouth 3 times daily. 6/1/2025 Bedtime    hydrocortisone 2.5 % cream Apply topically 2 times daily as needed for itching. 5/31/2025    ondansetron (ZOFRAN) 8 MG tablet Take 1 tablet (8 mg) by mouth every 8 hours as needed for nausea (vomiting). More than a month    Osmolite 1.5 Migue 237 mL Place 474 mLs into G tube 3 times daily. Infuse via gravity bag. 2 cartons TID spread 3-5 hours apart.   Water flush: 30-60 mL before and after each feeding. + 120 mL 4 times daily for hydration. 6/1/2025 Bedtime    oxyCODONE (ROXICODONE) 5 MG/5ML solution Take 5 mLs (5 mg) by mouth 4 times daily as needed for severe pain. More than a month    polyethylene glycol (MIRALAX) 17 g packet Take 1 packet by mouth as needed. More than a month    prochlorperazine (COMPAZINE) 10 MG tablet Take 1 tablet (10 mg) by mouth every 6 hours as needed for nausea or vomiting. More than a month    senna-docusate (SENNA S) 8.6-50 MG tablet Take 1 tablet by mouth 2 times daily as needed for constipation More than a month    sodium chloride (OCEAN) 0.65 % nasal spray Spray 2 sprays in nostril daily as needed for congestion. 6/2/2025    celecoxib (CELEBREX) 5 mg/mL SUSP suspension Take 20 mLs (100 mg) by mouth 2 times daily. 5/30/2025

## 2025-06-03 NOTE — PROGRESS NOTES
Neuro interventional Progress Notes    24 hour events:  Hypotension overnight needing fluids, still requiring norepinephrine 0.03 this morning        HPI:    Douglas Herrera is a 65 year old male with SCC of the left maxillary sinus who has had episodes of recurrent epistaxis. He has previously undergone particle embolization in 2023, he also had pre-operative embolization for resection. He has started to have more pinkish discharge/drainage from his nose in April 2025. He has had multiple episodes and has resulted in blood loss anemia. He is on palliative chemotherapy. He underwent left maxillary percutaneous embolization with 25% n-BCA glue yesterday with significant reduction of tumour blush on angiographic imaging.     Overnight, he developed hypotension and repeat Hb this morning slight improvement from 7.9 to 8.1  Still on pressors low dose of NE.    His neurological exam is at baseline. See the following      Assessment and Plan:  -Percutaneous embolization of the left maxillary sinus SCC with epistaxis. With mild hypotension    Plan:  -IV fluid hydration and attempts to wean off pressors with early mobilization to help improve blood pressure  -We can observe for today , encourage PT OT once pressors are off and plan on discharge then.    Jenelle Serrano MD   ESN Fellow  Pager: [phone]

## 2025-06-03 NOTE — PLAN OF CARE
Goal Outcome Evaluation:      Plan of Care Reviewed With: patient    Overall Patient Progress: decliningOverall Patient Progress: declining    Outcome Evaluation: Neuro unchanged. Hypotensive through the night, 1L NS bolus given, now peripheral Levo for map > 65. Patient remains mostly asymptomatic. Patient has moderate amount of blood secretions in mouth. Frequent Oral cares performed. Impaired swallow, Speech eval ordered, NPO, Meds in PEG tube.      Major Shift Events:  Hypotension overnight, 1L NS bolus administered, now low dose levo to keep map > 65.   Neuro: A&O4, Hmong speaking. Pupils E/R. L facial asymmetry, L facial Numbness. Follows commands. Moves all extremities.   CV: SR/ST 80's-100. SBP < 160, Map > 65. Peripheral Levo. Afebrile.   Resp: 2L NC. LS Clear/Dim. Bloody oral secretions - expected with surgical procedure.   GI: Impaired swallow, speech eval ordered this AM. PEG tube for medications.   : Voiding adequately. Most recent PVR < 100cc.   Pain: Tylenol, hot/cold packs.   Skin/Drains: R cheek incision. Rash on chest/arms/abdomen. R groin site.   Lines/Drips: R chest portacath.   Plan: Wean Levo, O2. Recheck Hgb. Possible discharge later in the day.  For vital signs and complete assessments, please see documentation flowsheets.

## 2025-06-03 NOTE — PROGRESS NOTES
CLINICAL NUTRITION SERVICES - ASSESSMENT NOTE    RECOMMENDATIONS FOR MDs/PROVIDERS TO ORDER:  - Total daily fluids/adjustments per MD  - Bowel regimen as appro   - Consult RD for TF orders via long-term access if indicated; noted possible discharge. Please consult Nutrition Services Adult IP Consult with reason Registered Dietitian to Order TF per Medical Nutrition Therapy Guidelines if EN becomes POC.     Registered Dietitian Interventions:  - None currently while nutrition POc pending     Future/Additional Recommendations:  - SLP/diet vs EN via gastrostomy    - If EN started via long term access: recommend Osmolite 1.5 Migue (or equivalent) @ goal of  50ml/hr  (1200ml/day) provides: 1800 kcals, 75 g PRO, 914 ml free H20, 244 g CHO, and 0 g fiber daily.   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 He has previously undergone particle embolization in 2023, he also had pre-operative embolization for resection. He is on palliative chemotherapy and presents now for palliative transarterial or percutaneous tumor embolization under general anesthesia.     INFORMATION OBTAINED  Assessed patient in room.  Pt resting soundly - did not wake. No family at bedside     NUTRITION HISTORY  Impaired swallow per chart notes; SLP ordered   Home nutrition support plan:     Osmolite 1.5 Migue 237 mL Place 474 mLs into G tube 3 times daily. Infuse via gravity bag. 2 cartons TID spread 3-5 hours apart.   Water flush: 30-60 mL before and after each feeding. + 120 mL 4 times daily for hydration.       5/29/25: TF twice daily. Typically only one meal by mouth daily. Gtube exchanged 11/22/24. PEG placed 1/2/24 per chart review.    CURRENT NUTRITION ORDERS  Diet: NPO  Enteral  "access: gastrostomy     CURRENT INTAKE/TOLERANCE  NA - NPO     LABS  Nutrition-relevant labs:   Mg+ 1.3 (L)  AST: 50 (H)    MEDICATIONS  Nutrition-relevant medications:   Lipitor   Lactated Ringers IVF @ 100 ml (130 mEq Na+)   Norepi - off     ANTHROPOMETRICS  Height: 162.6 cm (5' 4\") 64\"   Admission Weight: 60.3 kg (132 lb 15 oz) (06/02/25 1000)   Most Recent Weight: 60.3 kg (132 lb 15 oz) (06/02/25 1830)  IBW: 59 kg  BMI: Body mass index is 22.82 kg/m .   Weight History:   Wt Readings from Last 9 Encounters:   06/02/25 60.3 kg (132 lb 15 oz)   05/30/25 60.3 kg (133 lb)   05/29/25 60.3 kg (133 lb)   05/29/25 61 kg (134 lb 6.4 oz)   05/23/25 60.8 kg (134 lb)   05/15/25 61 kg (134 lb 6.4 oz)   05/02/25 60.8 kg (134 lb)   04/30/25 61.7 kg (136 lb)   04/25/25 62 kg (136 lb 9.6 oz)     Dosing Weight: 60 kg, based on admission wt    ASSESSED NUTRITION NEEDS  Estimated Energy Needs: 3563-9395+ kcals/day (25 - 30+ kcals/kg)  Justification: Increased needs and Maintenance  Estimated Protein Needs:  grams protein/day (1.5 - 2 grams of pro/kg)  Justification: Increased needs  Estimated Fluid Needs: (1 mL/kcal)  Justification: Maintenance and Per provider pending fluid status    SYSTEM AND PHYSICAL FINDINGS    From chart  GI: Impaired swallow, speech eval ordered this AM. PEG tube for medications.   Skin: R cheek incision. Rash on chest/arms/abdomen. R groin site.     MALNUTRITION  % Intake: Decreased intake does not meet criteria vs unable to fully assess nutrition history PTA   % Weight Loss: Weight loss does not meet criteria   Subcutaneous Fat Loss: None observed  Muscle Loss: None observed  *none obvious from superficial observation  Fluid Accumulation/Edema: None noted  Malnutrition Diagnosis: unable to fully assess   Malnutrition Present on Admission: Unable to assess - unable to fully assess nutrition history PTA    NUTRITION DIAGNOSIS  Inadequate oral intake related to diet status as evidenced by NPO and meeting " "0% nutrition needs currently     INTERVENTIONS  Enteral nutrition management - monitor for EN needs   SLP/diet   Weight trends     GOALS  Diet adv v nutrition support within 24-48 hours of admission to ICU.     MONITORING/EVALUATION  Progress toward goals will be monitored and evaluated per policy.    Nickolas Shaffer RD, LD, [facility]  Neuro ICU Dietitian; 6A Neuro  Vocera \"4E Clinical Dietitian\"  Weekend/holiday \"Weekend Clinical Dietitian\"      "

## 2025-06-03 NOTE — PROGRESS NOTES
Admitted/transferred from: PACU  Reason for admission/transfer: Neuro IR procedure.  2 RN skin assessment: completed by Mylene Raza  Result of skin assessment and interventions/actions: Groin site, L cheek incision, chest and abdominal rash.   Height, weight, drug calc weight: Done  Patient belongings (see Flowsheet)   MDRO education added to care plan N/A  ?

## 2025-06-03 NOTE — PLAN OF CARE
Cardiac: NSR to sinus denise. Off and on peripheral Levo. X1 bolus given 1 liter NS. Cuff pressures. Afebrile.  Neuro: A&Ox4. Pupils equal reactive. Makes needs known. Moves all extremities. L-sided facial droop and swelling. Q1hr neuro checks remain in place.   Respiratory: Ra LS clear. Strong productive cough. Using yonker independently.   GI: NPO. PEG tube in place. Failed swallow eval. Speech will continue to follow.  : Voiding using urinal. Good urine  output.  Skin: L-face [place] in place from surgical access site.    Sig Events: Walked in halls. Tolerated well. Continue to refrain from pressor as able.     Nic Hurtado RN SICU Neuro-ICU

## 2025-06-04 ENCOUNTER — APPOINTMENT (OUTPATIENT)
Dept: PHYSICAL THERAPY | Facility: CLINIC | Age: 65
End: 2025-06-04
Attending: STUDENT IN AN ORGANIZED HEALTH CARE EDUCATION/TRAINING PROGRAM
Payer: COMMERCIAL

## 2025-06-04 LAB — GLUCOSE BLDC GLUCOMTR-MCNC: 117 MG/DL (ref 70–99)

## 2025-06-04 PROCEDURE — 97162 PT EVAL MOD COMPLEX 30 MIN: CPT | Mod: GP

## 2025-06-04 PROCEDURE — 97116 GAIT TRAINING THERAPY: CPT | Mod: GP

## 2025-06-04 PROCEDURE — 258N000003 HC RX IP 258 OP 636

## 2025-06-04 PROCEDURE — 250N000013 HC RX MED GY IP 250 OP 250 PS 637

## 2025-06-04 PROCEDURE — 97530 THERAPEUTIC ACTIVITIES: CPT | Mod: GP

## 2025-06-04 PROCEDURE — 250N000013 HC RX MED GY IP 250 OP 250 PS 637: Performed by: STUDENT IN AN ORGANIZED HEALTH CARE EDUCATION/TRAINING PROGRAM

## 2025-06-04 PROCEDURE — 250N000013 HC RX MED GY IP 250 OP 250 PS 637: Performed by: INTERNAL MEDICINE

## 2025-06-04 PROCEDURE — 120N000005 HC R&B MS OVERFLOW UMMC

## 2025-06-04 RX ORDER — AMINO ACIDS/PROTEIN HYDROLYS 11G-40/45
1 LIQUID IN PACKET (ML) ORAL DAILY
Status: DISCONTINUED | OUTPATIENT
Start: 2025-06-04 | End: 2025-06-05 | Stop reason: HOSPADM

## 2025-06-04 RX ORDER — DEXTROSE MONOHYDRATE 100 MG/ML
INJECTION, SOLUTION INTRAVENOUS CONTINUOUS PRN
Status: DISCONTINUED | OUTPATIENT
Start: 2025-06-04 | End: 2025-06-05 | Stop reason: HOSPADM

## 2025-06-04 RX ORDER — GUAIFENESIN 600 MG/1
15 TABLET, EXTENDED RELEASE ORAL DAILY
Status: DISCONTINUED | OUTPATIENT
Start: 2025-06-04 | End: 2025-06-05 | Stop reason: HOSPADM

## 2025-06-04 RX ADMIN — CELECOXIB 100 MG: 100 CAPSULE ORAL at 07:38

## 2025-06-04 RX ADMIN — GABAPENTIN 600 MG: 250 SOLUTION ORAL at 13:55

## 2025-06-04 RX ADMIN — Medication 60 ML: at 12:07

## 2025-06-04 RX ADMIN — OXYCODONE HYDROCHLORIDE 5 MG: 5 SOLUTION ORAL at 03:16

## 2025-06-04 RX ADMIN — ACETAMINOPHEN 650 MG: 325 TABLET ORAL at 07:38

## 2025-06-04 RX ADMIN — OXYCODONE HYDROCHLORIDE 5 MG: 5 SOLUTION ORAL at 18:28

## 2025-06-04 RX ADMIN — ASPIRIN 81 MG CHEWABLE TABLET 81 MG: 81 TABLET CHEWABLE at 07:38

## 2025-06-04 RX ADMIN — ACETAMINOPHEN 650 MG: 325 TABLET ORAL at 23:08

## 2025-06-04 RX ADMIN — ENTECAVIR 0.5 MG: 0.05 SOLUTION ORAL at 20:21

## 2025-06-04 RX ADMIN — GABAPENTIN 600 MG: 250 SOLUTION ORAL at 07:39

## 2025-06-04 RX ADMIN — ATORVASTATIN CALCIUM 80 MG: 80 TABLET, FILM COATED ORAL at 20:21

## 2025-06-04 RX ADMIN — ACETAMINOPHEN 650 MG: 325 TABLET ORAL at 18:28

## 2025-06-04 RX ADMIN — GABAPENTIN 600 MG: 250 SOLUTION ORAL at 20:21

## 2025-06-04 RX ADMIN — Medication 15 ML: at 12:07

## 2025-06-04 RX ADMIN — ACETAMINOPHEN 650 MG: 325 TABLET ORAL at 03:16

## 2025-06-04 RX ADMIN — CELECOXIB 100 MG: 100 CAPSULE ORAL at 20:20

## 2025-06-04 RX ADMIN — SODIUM CHLORIDE, SODIUM LACTATE, POTASSIUM CHLORIDE, AND CALCIUM CHLORIDE 500 ML: .6; .31; .03; .02 INJECTION, SOLUTION INTRAVENOUS at 05:36

## 2025-06-04 ASSESSMENT — ACTIVITIES OF DAILY LIVING (ADL)
ADLS_ACUITY_SCORE: 40

## 2025-06-04 NOTE — PROGRESS NOTES
Neuro interventional Progress Notes    24 hour events:  MAPs decreased to 61-62 and SBP of 80, therefore norepinephrine was restarted overnight.  He is sleepy this morning, but wakes up and follows all commands appropriately.    CORBY Herrera is a 65 year old male with SCC of the left maxillary sinus who has had episodes of recurrent epistaxis. He has previously undergone particle embolization in 2023, he also had pre-operative embolization for resection. He has started to have more pinkish discharge/drainage from his nose in April 2025. He has had multiple episodes and has resulted in blood loss anemia. He is on palliative chemotherapy. He underwent left maxillary percutaneous embolization with 25% n-BCA glue yesterday with significant reduction of tumour blush on angiographic imaging.     Exam-  Wakes up to voice, oriented  Speech without aphasia, but slightly dysarthric  CN- EOMI, L facial droop, left> right facial swelling, hearing intact, tongue midline  Motor- antigravity throughout  Sensation- intact to light touch  Coordination- finger to nose without ataxia  Gait- deferred    Assessment and Plan:  -Percutaneous embolization of the left maxillary sinus SCC with epistaxis. With mild hypotension    Plan:  -IV fluid hydration and attempts to wean off pressors with early mobilization to help improve blood pressure  -physical and occupational therapy consults- will re-evaluate again tomorrow AM to see if he is safe for discharge (unstable gait with PT today, largely related to sleepiness and facial swelling)  - will decrease MAP requirement to 60mmHg or higher (instead of 65mmHg or more)  - can transfer to floor    Pt discussed with Dr. Garcia.  Julia Conrad MD  Fellow, Endovascular Surgical Neuroradiology

## 2025-06-04 NOTE — PROGRESS NOTES
06/04/25 1600   Appointment Info   Signing Clinician's Name / Credentials (PT) Brandan Cuellar DPT   Rehab Comments (PT) MAP>60   Living Environment   People in Home child(justice), adult   Current Living Arrangements house   Home Accessibility stairs within home   Number of Stairs, Within Home, Primary two   Stair Railings, Within Home, Primary railings on both sides of stairs   Living Environment Comments Patient reports living with son in house, 2 stairs inside home with railing on both sides. Patient reports son available to assist, will need to verify with son.   Self-Care   Usual Activity Tolerance good   Current Activity Tolerance fair   Equipment Currently Used at Home cane, straight;walker, standard   Fall history within last six months no   Activity/Exercise/Self-Care Comment Patient reports being mod IND in home with cane, also owns FWW. Reports usually able to get to/from bathroom on his own but also says he receives assist from son with ADLs including toileting, bathing, and dressing.   General Information   Onset of Illness/Injury or Date of Surgery 06/02/25   Referring Physician Julia Conrad MD   Patient/Family Therapy Goals Statement (PT) return home   Pertinent History of Current Problem (include personal factors and/or comorbidities that impact the POC) Douglas Herrera is a 65 year old male with SCC of the left maxillary sinus who has had episodes of recurrent epistaxis. He has previously undergone particle embolization in 2023, he also had pre-operative embolization for resection. He has started to have more pinkish discharge/drainage from his nose in April 2025. He has had multiple episodes and has resulted in blood loss anemia. He is on palliative chemotherapy. He underwent left maxillary percutaneous embolization with 25% n-BCA glue yesterday with significant reduction of tumour blush on angiographic imaging.   Existing Precautions/Restrictions fall   Cognition   Affect/Mental Status  (Cognition) low arousal/lethargic   Orientation Status (Cognition) oriented x 3   Follows Commands (Cognition) follows one-step commands   Integumentary/Edema   Integumentary/Edema Comments facial edema, L>R, difficult to open eyes due to swelling   Posture    Posture Protracted shoulders   Range of Motion (ROM)   Range of Motion ROM is WFL   Strength (Manual Muscle Testing)   Strength Comments generalized deconditioning, demonstrates BLE LE strength WFL with mobility   Bed Mobility   Comment, (Bed Mobility) supine>sit  min A   Transfers   Comment, (Transfers) sit<>stand CGA   Gait/Stairs (Locomotion)   Comment, (Gait/Stairs) ambulates SPC and CGA, demonstrates decreased gait speed and step length, mild path deviations   Balance   Balance Comments impaired dynamic balance, SPC for ambulation and up to min A for LOB   Clinical Impression   Criteria for Skilled Therapeutic Intervention Yes, treatment indicated   PT Diagnosis (PT) impaired functional mobility   Influenced by the following impairments edema, pain, balance, strength, activity tolerance   Functional limitations due to impairments bed mobility, transfers, gait, stairs, functional endurance   Clinical Presentation (PT Evaluation Complexity) stable   Clinical Presentation Rationale PMH/comorbidities, clinical judgement   Clinical Decision Making (Complexity) low complexity   Planned Therapy Interventions (PT) bed mobility training;balance training;gait training;patient/family education;stair training;strengthening;transfer training;progressive activity/exercise;risk factor education;home program guidelines   Risk & Benefits of therapy have been explained evaluation/treatment results reviewed;care plan/treatment goals reviewed;risks/benefits reviewed;participants voiced agreement with care plan;participants included;patient   Physical Therapy Goals   PT Frequency Daily   PT Predicted Duration/Target Date for Goal Attainment 06/11/25   PT Goals Bed  Mobility;Transfers;Gait   PT: Bed Mobility Modified independent;Supine to/from sit   PT: Transfers Supervision/stand-by assist;Sit to/from stand   PT: Gait Supervision/stand-by assist;150 feet;Straight cane;Rolling walker   PT Discharge Planning   PT Plan progress gait (FWW vs cane), stairs, verify assist at home from son   PT Discharge Recommendation (DC Rec) home with assist;home with home care physical therapy   PT Rationale for DC Rec Patient below baseline, ambulates wtih cane and CGA-Lucian, mild balance impairments but primarily limited by lethargy and facial edema (difficulty opening eyes). Patient lives with son, anticipate will be able to return home with assist from son when medically ready, will need to verify son is available to assist. May benefit from HH PT/OT to increase safety and independence with mobility and ADLs.   PT Brief overview of current status Ax1, ambulates with cane and gait belt   PT Total Distance Amb During Session (feet) 120   Psychosocial Support   Trust Relationship/Rapport care explained;choices provided;emotional support provided;empathic listening provided;questions answered;questions encouraged;reassurance provided;thoughts/feelings acknowledged

## 2025-06-04 NOTE — PROGRESS NOTES
CLINICAL NUTRITION SERVICES - BRIEF NOTE    Nutrition Prescription    RECOMMENDATIONS FOR MDs/PROVIDERS TO ORDER:  - Total daily fluids/adjustments per MD     Recommendations already ordered by Registered Dietitian (RD):  - Recommend continuous regimen while NPO and inpatient (pt agreeable): Osmolite 1.5 Migue (or equivalent) @ goal of 50ml/hr (1200ml/day) provides: 1800 kcals, 75 g PRO, 914 ml free H20, 244 g CHO, and 0 g fiber daily + 1 [id number] packet = 95 gm PRO (1.6 gm/kg) and 1880 kcals (31 kcals/kg)  - Initiate @ 10 ml/hr and advance by 10 ml q4hr as tolerated  - Do not start or advance until lytes (Mg++,K+) WNL and phos>2.0  - Recommend 30-60 ml q4hr fluid flushes for tube patency. Additional fluids and/or adjustments per MD.    - Order multivitamin/mineral (15 ml/day via FT) to help ensure micronutrient needs being met with suspected hypermetabolic demands and potential interruptions to TF infusions.  - Elevated HOB with gastric feeds     Future/Additional Recommendations:  - EN initiation/tolerance via long-term access      Pt resting soundly upon visit but able to respond to brief questions. Pt endorses TF PTA and reports weight loss but unable to quantify. Pt agreeable to continuous feeds while NPO to ensure adequate PO intakes     MALNUTRITION  % Intake: Unable to assess  % Weight Loss: Weight loss does not meet criteria most recently.  Subcutaneous Fat Loss: None observed - possibly some over time with reported weight decline from baseline  Muscle Loss: Thigh (quadriceps): Moderate and Calf (gastrocnemius): Moderate  Fluid Accumulation/Edema: None noted  Malnutrition Diagnosis: unable to assess full nutrition history   Malnutrition Present on Admission: unable to fully assess    Wt Readings from Last 8 Encounters:   06/02/25 60.3 kg (132 lb 15 oz)   05/30/25 60.3 kg (133 lb)   05/29/25 60.3 kg (133 lb)   05/29/25 61 kg (134 lb 6.4 oz)   05/23/25 60.8 kg (134 lb)   05/15/25 61 kg (134 lb 6.4 oz)  "  05/02/25 60.8 kg (134 lb)   04/30/25 61.7 kg (136 lb)       Nutrition Progress Note - f/u for progress towards previous nutrition POC (see previous reassessment for details)      Nickolas Shaffer RD, LD, [facility]  Neuro ICU Dietitian; 6A Neuro  Vocera \"4E Clinical Dietitian\"  Weekend/holiday \"Weekend Clinical Dietitian\"      "

## 2025-06-04 NOTE — PLAN OF CARE
Major Shift Events: A+Ox4; neuro intact; ongoing facial swelling, struggles to open mouth wide and open eyes wide; VSS; tele NSR; TF started (tolerating); voiding adequately; off pressor currently; able to make needs known    Plan: continue to monitor neuro; transfer to floor    For vital signs and complete assessments, please see documentation flowsheets.      Joaquin Wagner RN, BSN

## 2025-06-04 NOTE — PLAN OF CARE
Goal Outcome Evaluation:      Plan of Care Reviewed With: patient    Overall Patient Progress: improvingOverall Patient Progress: improving     .Neuro: A/Ox4. Calls appropriately. Follows commands. PERRLA. CAM assessment negative. Purposefully moves all extremities. L side facial numbness, L side droop due to pain and swelling.   Cardiac/Tele: SR and VSS. HR 60-70. MAP >65. Afebrile. Denies chest pain   Respiratory: 1L NC. Tolerating well. LS clear/dim  GI/: Adequate UOP. No BM. NPO  Diet/Appetite: TF ordered.  Skin: See Flowsheet  Endocrine: See Results  LDAs: See LDA Avatar  Activity: Generalized weakness, SBA  Pain: C/o headache, PRN tylenol and oxycodone given.      Plan: Continue to monitor and notify team with changes.

## 2025-06-05 ENCOUNTER — HOME INFUSION (OUTPATIENT)
Dept: HOME HEALTH SERVICES | Facility: HOME HEALTH | Age: 65
End: 2025-06-05
Payer: COMMERCIAL

## 2025-06-05 ENCOUNTER — APPOINTMENT (OUTPATIENT)
Dept: OCCUPATIONAL THERAPY | Facility: CLINIC | Age: 65
DRG: 144 | End: 2025-06-05
Attending: STUDENT IN AN ORGANIZED HEALTH CARE EDUCATION/TRAINING PROGRAM
Payer: COMMERCIAL

## 2025-06-05 ENCOUNTER — APPOINTMENT (OUTPATIENT)
Dept: PHYSICAL THERAPY | Facility: CLINIC | Age: 65
End: 2025-06-05
Attending: STUDENT IN AN ORGANIZED HEALTH CARE EDUCATION/TRAINING PROGRAM
Payer: COMMERCIAL

## 2025-06-05 VITALS
HEIGHT: 64 IN | WEIGHT: 138.23 LBS | SYSTOLIC BLOOD PRESSURE: 101 MMHG | OXYGEN SATURATION: 98 % | TEMPERATURE: 99.9 F | DIASTOLIC BLOOD PRESSURE: 54 MMHG | BODY MASS INDEX: 23.6 KG/M2 | HEART RATE: 82 BPM | RESPIRATION RATE: 18 BRPM

## 2025-06-05 LAB
ANION GAP SERPL CALCULATED.3IONS-SCNC: 5 MMOL/L (ref 7–15)
BUN SERPL-MCNC: 16.1 MG/DL (ref 8–23)
CALCIUM SERPL-MCNC: 8.4 MG/DL (ref 8.8–10.4)
CHLORIDE SERPL-SCNC: 103 MMOL/L (ref 98–107)
CREAT SERPL-MCNC: 0.71 MG/DL (ref 0.67–1.17)
EGFRCR SERPLBLD CKD-EPI 2021: >90 ML/MIN/1.73M2
ERYTHROCYTE [DISTWIDTH] IN BLOOD BY AUTOMATED COUNT: 16.3 % (ref 10–15)
GLUCOSE SERPL-MCNC: 149 MG/DL (ref 70–99)
HCO3 SERPL-SCNC: 26 MMOL/L (ref 22–29)
HCT VFR BLD AUTO: 24.1 % (ref 40–53)
HGB BLD-MCNC: 7.5 G/DL (ref 13.3–17.7)
MCH RBC QN AUTO: 27.1 PG (ref 26.5–33)
MCHC RBC AUTO-ENTMCNC: 31.1 G/DL (ref 31.5–36.5)
MCV RBC AUTO: 87 FL (ref 78–100)
PLATELET # BLD AUTO: 240 10E3/UL (ref 150–450)
POTASSIUM SERPL-SCNC: 3.4 MMOL/L (ref 3.4–5.3)
RBC # BLD AUTO: 2.77 10E6/UL (ref 4.4–5.9)
SODIUM SERPL-SCNC: 134 MMOL/L (ref 135–145)
WBC # BLD AUTO: 6.1 10E3/UL (ref 4–11)

## 2025-06-05 PROCEDURE — 85041 AUTOMATED RBC COUNT: CPT

## 2025-06-05 PROCEDURE — 85048 AUTOMATED LEUKOCYTE COUNT: CPT

## 2025-06-05 PROCEDURE — 97165 OT EVAL LOW COMPLEX 30 MIN: CPT | Mod: GO

## 2025-06-05 PROCEDURE — 250N000013 HC RX MED GY IP 250 OP 250 PS 637: Performed by: INTERNAL MEDICINE

## 2025-06-05 PROCEDURE — 97535 SELF CARE MNGMENT TRAINING: CPT | Mod: GO

## 2025-06-05 PROCEDURE — 97530 THERAPEUTIC ACTIVITIES: CPT | Mod: GO

## 2025-06-05 PROCEDURE — 82435 ASSAY OF BLOOD CHLORIDE: CPT

## 2025-06-05 PROCEDURE — 250N000013 HC RX MED GY IP 250 OP 250 PS 637

## 2025-06-05 PROCEDURE — 97116 GAIT TRAINING THERAPY: CPT | Mod: GP

## 2025-06-05 PROCEDURE — 97530 THERAPEUTIC ACTIVITIES: CPT | Mod: GP

## 2025-06-05 PROCEDURE — 250N000011 HC RX IP 250 OP 636: Performed by: STUDENT IN AN ORGANIZED HEALTH CARE EDUCATION/TRAINING PROGRAM

## 2025-06-05 PROCEDURE — 250N000013 HC RX MED GY IP 250 OP 250 PS 637: Performed by: STUDENT IN AN ORGANIZED HEALTH CARE EDUCATION/TRAINING PROGRAM

## 2025-06-05 RX ORDER — HEPARIN SODIUM,PORCINE 10 UNIT/ML
3 VIAL (ML) INTRAVENOUS ONCE
Status: COMPLETED | OUTPATIENT
Start: 2025-06-05 | End: 2025-06-05

## 2025-06-05 RX ORDER — ONDANSETRON 4 MG/1
4 TABLET, ORALLY DISINTEGRATING ORAL EVERY 8 HOURS PRN
Qty: 4 TABLET | Refills: 0 | Status: SHIPPED | OUTPATIENT
Start: 2025-06-05

## 2025-06-05 RX ADMIN — CELECOXIB 100 MG: 100 CAPSULE ORAL at 07:28

## 2025-06-05 RX ADMIN — HEPARIN, PORCINE (PF) 10 UNIT/ML INTRAVENOUS SYRINGE 3 ML: at 10:38

## 2025-06-05 RX ADMIN — ASPIRIN 81 MG CHEWABLE TABLET 81 MG: 81 TABLET CHEWABLE at 07:28

## 2025-06-05 RX ADMIN — Medication 60 ML: at 07:29

## 2025-06-05 RX ADMIN — GABAPENTIN 600 MG: 250 SOLUTION ORAL at 07:29

## 2025-06-05 RX ADMIN — Medication 15 ML: at 07:28

## 2025-06-05 RX ADMIN — OXYCODONE HYDROCHLORIDE 5 MG: 5 SOLUTION ORAL at 06:47

## 2025-06-05 ASSESSMENT — ACTIVITIES OF DAILY LIVING (ADL)
ADLS_ACUITY_SCORE: 38
ADLS_ACUITY_SCORE: 40
ADLS_ACUITY_SCORE: 38
ADLS_ACUITY_SCORE: 40
ADLS_ACUITY_SCORE: 38
DEPENDENT_IADLS:: CLEANING;COOKING;LAUNDRY;SHOPPING;MEAL PREPARATION;MEDICATION MANAGEMENT;TRANSPORTATION
ADLS_ACUITY_SCORE: 40
ADLS_ACUITY_SCORE: 38

## 2025-06-05 NOTE — PROGRESS NOTES
[facility]  Start of Care/Resumption of Care Note    I NOHEMI    DX: Moderate protein-calorie malnutrition; malignant neoplasm of maxillary sinus; hypomagnesemia    Therapy: Enteral    Next Dose Due: 6/5/25    Delivery plan: No delivery needed at this time.  Per son, requesting check in call in two weeks    In-basket sent to \[facility]\"" Scheduling, requesting visit on n/a    Per \[facility]\"" care plan, labs are due on n/a    Nursing plan: Enteral Only.     Teaching Plan: Liaison Teach Done?: No    Other Info: None     Marly Warren, RN 06/05/25

## 2025-06-05 NOTE — PLAN OF CARE
Goal Outcome Evaluation:    Pt is lethargic but oriented x4, pupils are equal and reactive, 4/5 strength on all extremities.  L sided facial swelling. Complains of pain 5/10 on his left cheek/mouth. He requested tylenol. SR, maintained map>60. RA, coughs up blood tinged sputum frequently. PEG tube in place,TF @ 40ml/hr Q 30ml flush. Voids in the urinal.       Problem: Delirium  Goal: Optimal Coping  Intervention: Optimize Psychosocial Adjustment to Delirium  Recent Flowsheet Documentation  Taken 6/5/2025 0000 by Lizbeth Peoples RN  Supportive Measures:   active listening utilized   decision-making supported   positive reinforcement provided   relaxation techniques promoted  Taken 6/4/2025 2000 by Lizbeth Peoples RN  Supportive Measures:   active listening utilized   decision-making supported   positive reinforcement provided   relaxation techniques promoted     Problem: Delirium  Goal: Improved Behavioral Control  Intervention: Prevent and Manage Agitation  Recent Flowsheet Documentation  Taken 6/5/2025 0000 by Lizbeth Peoples RN  Environment Familiarity/Consistency: daily routine followed  Taken 6/4/2025 2000 by Lizbeth Peoples RN  Environment Familiarity/Consistency: daily routine followed     Problem: Adult Inpatient Plan of Care  Goal: Absence of Hospital-Acquired Illness or Injury  Intervention: Prevent Skin Injury  Recent Flowsheet Documentation  Taken 6/5/2025 0000 by Lizbeth Peoples RN  Body Position: position changed independently  Taken 6/4/2025 2000 by Lizbeth Peoples RN

## 2025-06-05 NOTE — PROGRESS NOTES
DISCHARGE DATE: 06/05/25 ([facility])  THERAPY: Enteral only  DRUG/ DELIVERY MODE: Osmolite 1.5 fernando formula  FIRST HOME DOSE DUE: 1600 today   DELIVERY: Deliveries to pt home.  SUPPLIES: No supplies needed at this time per son Tulio  LINE/TUBE: PEGJ  SNV: NA  [facility] FOLLOWING PROVIDER: Dr. Mira Leung  90 DAY ATIF NOTE: Not needed.  OTHER: Spoke to pt's son Tulio who cares for pt in the home regarding supplies that are needed and to go over the TF order and water flushes.  Tulio spoke to pt's provider and it was decided that pt return to the gravity TF's not continuously.   Went over order of Osmolite 1.5 Fernando 2 cartson tid via gravity. And 30-60ml water flushes before and after each feeding. Tulio understood well.  Asked if pt is needing more supplies and he stated he has enough in the home at this time.  Asked him if he had any questions and he said he did not.  Encourage home to call [facility] is has any questions.    Thank you,  Elsa Tellez LPN  Enteral Nurse Liaison  [facility]  [address] [place] [address]  [phone]

## 2025-06-05 NOTE — PLAN OF CARE
Occupational Therapy Discharge Summary    Reason for therapy discharge:    Discharged to home with assist and HH OT/PT.    Progress towards therapy goal(s). See goals on Care Plan in [facility] electronic health record for goal details.  Goals partially met.  Barriers to achieving goals:   discharge from facility.    Therapy recommendation(s):    Continued therapy is recommended.  Rationale/Recommendations:  Recommending home with 24/7 assist from pt sons and home health OT/PT to progress ADL IND/safety, functional mobility, and overall activity tolerance.

## 2025-06-05 NOTE — PROGRESS NOTES
Telephone call placed to pt's son Tulio to set up teaching of change in pt's enteral feeding to continuous.  Tulio is one of pt's son that help to care for him at home and will be assisting with administering pt's TF.  Tulio asked why the provider would like pt change to continuous from gravity.  Explained this writer will speak to RNCC regarding this and he asked for the provider to call him. Asked RNCC to ask provider to reach out to him. Explained that this writer will call Tulio back in a hour or so to discuss teaching at that time and Tulio agreed.    Thank you,  Elsa Tellez LPN  Enteral Nurse Liaison  [facility]  [address] [place] [address]  [phone]

## 2025-06-05 NOTE — PROGRESS NOTES
06/05/25 0931   Appointment Info   Signing Clinician's Name / Credentials (OT) Milena Ho OTR/L       Present yes   Language Hmong via [employer]   Living Environment   People in Home child(justice), adult   Current Living Arrangements house   Home Accessibility stairs within home   Number of Stairs, Within Home, Primary two   Stair Railings, Within Home, Primary railings on both sides of stairs   Transportation Anticipated family or friend will provide   Living Environment Comments Per pt report, lives in house with two adult sons, ~2 KALEIGH, walk-in shower with shower stool present. One son works morning, other son works evenings, pt always has 24/7 assist.   Self-Care   Usual Activity Tolerance good   Current Activity Tolerance fair   Equipment Currently Used at Home cane, straight;walker, standard;shower chair   Fall history within last six months no   Activity/Exercise/Self-Care Comment Pt reports receiving assist for ADL tasks from sons prn including FB bathing, toileting, pt is typically able to complete FB dressing and g/h tasks. Utilizes SPC for all functional mobility and has access to FWW.   Instrumental Activities of Daily Living (IADL)   IADL Comments Pt two sons complete most IADL tasks including med mgmt, home mgmt, transportation, etc.   General Information   Onset of Illness/Injury or Date of Surgery 06/02/25   Referring Physician Julia Conrad MD   Patient/Family Therapy Goal Statement (OT) Return home and to PLOF   Additional Occupational Profile Info/Pertinent History of Current Problem Per EMR, Douglas Herrera is a 65 year old male with SCC of the left maxillary sinus who has had episodes of recurrent epistaxis. He has previously undergone particle embolization in 2023, he also had pre-operative embolization for resection. He has started to have more pinkish discharge/drainage from his nose in April 2025. He has had multiple episodes and has resulted in blood loss anemia. He  is on palliative chemotherapy. He underwent left maxillary percutaneous embolization with 25% n-BCA glue yesterday with significant reduction of tumour blush on angiographic imaging.   Existing Precautions/Restrictions fall   Limitations/Impairments safety/cognitive   Left Upper Extremity (Weight-bearing Status) full weight-bearing (FWB)   Right Upper Extremity (Weight-bearing Status) full weight-bearing (FWB)   Left Lower Extremity (Weight-bearing Status) full weight-bearing (FWB)   Right Lower Extremity (Weight-bearing Status) full weight-bearing (FWB)   General Observations and Info Activity: Up Ad [place]   Cognitive Status Examination   Orientation Status person;place   Cognitive Status Comments Pt oriented to self and place as 'hospital' however confused regarding exact time, stating 'I  thought it was night time,' Th providing re-orientation; will monitor   Visual Perception   Visual Impairment/Limitations blurry vision   Impact of Vision Impairment on Function (Vision) Pt reporting blurred vision this date, however facial swelling improved, will monitor   Sensory   Sensory Quick Adds sensation intact   Sensory Comments Denies UE numbness/tingling this date   Pain Assessment   Patient Currently in Pain No   Posture   Posture forward head position   Posture Comments intermittent in static standing   Range of Motion Comprehensive   General Range of Motion bilateral upper extremity ROM WFL   Strength Comprehensive (MMT)   Comment, General Manual Muscle Testing (MMT) Assessment B UE strength grossly deconditioned, formal MMT not assessed this date; will monitor   Muscle Tone Assessment   Muscle Tone Quick Adds No deficits were identified   Coordination   Coordination Comments B UE GMC and FMC appears WFL during functional reaching and g/h tasks, will monitor   Bed Mobility   Bed Mobility sit-supine   Sit-Supine [place] (Bed Mobility) minimum assist (75% patient effort)   Comment (Bed Mobility) per clinical  judgement   Transfers   Transfers sit-stand transfer;toilet transfer   Sit-Stand Transfer   Sit-Stand [place] (Transfers) contact guard   Sit/Stand Transfer Comments from EOB + SPC   Toilet Transfer   Type (Toilet Transfer) sit-stand   [place] Level (Toilet Transfer) contact guard;minimum assist (75% patient effort)   Toilet Transfer Comments per clinical judgement   Balance   Balance Assessment sitting static balance   Balance Comments SBA at EOB   Activities of Daily Living   BADL Assessment/Intervention upper body dressing;bathing;lower body dressing;toileting   Bathing Assessment/Intervention   [place] Level (Bathing) minimum assist (75% patient effort);moderate assist (50% patient effort);verbal cues;set up   Assistive Devices (Bathing) shower chair   Comment, (Bathing) per clinical judgement   Upper Body Dressing Assessment/Training   [place] Level (Upper Body Dressing) supervision;set up   Comment, (Upper Body Dressing) per clinical judgement   Lower Body Dressing Assessment/Training   [place] Level (Lower Body Dressing) contact guard assist;minimum assist (75% patient effort);set up   Comment, (Lower Body Dressing) per clinical judgement   Toileting   [place] Level (Toileting) contact guard assist;minimum assist (75% patient effort)   Comment, (Toileting) per clinical judgement   Clinical Impression   Criteria for Skilled Therapeutic Interventions Met (OT) Yes, treatment indicated   OT Diagnosis Decreased ADL IND/safety, functional mobility, overall activity tolerance, vision   OT Problem List-Impairments impacting ADL problems related to;activity tolerance impaired;balance;cognition;pain;range of motion (ROM);postural control;vision   Assessment of Occupational Performance 5 or more Performance Deficits   Identified Performance Deficits dressing, bathing, toileting, functional mobility, activity tolerance, vision, higher-level balance   Planned Therapy Interventions (OT) ADL  retraining;IADL retraining;bed mobility training;balance training;cognition;ROM;strengthening;transfer training;progressive activity/exercise   Clinical Decision Making Complexity (OT) problem focused assessment/low complexity   Risk & Benefits of therapy have been explained evaluation/treatment results reviewed;care plan/treatment goals reviewed;risks/benefits reviewed;participants included;patient   OT Total Evaluation Time   OT Eval, Low Complexity Minutes (00339) 5   OT Goals   Therapy Frequency (OT) 5 times/week   OT Predicted Duration/Target Date for Goal Attainment 06/20/25   OT Goals Hygiene/Grooming;Lower Body Dressing;Upper Body Bathing;Lower Body Bathing;Toilet Transfer/Toileting;Cognition   OT: Hygiene/Grooming modified independent;while standing   OT: Lower Body Dressing Modified independent;including set-up/clothing retrieval   OT: Upper Body Bathing Supervision/stand-by assist;using adaptive equipment   OT: Lower Body Bathing Supervision/stand-by assist;using adaptive equipment   OT: Toilet Transfer/Toileting Supervision/stand-by assist;toilet transfer;cleaning and garment management   OT: Cognitive Patient/caregiver will verbalize understanding of cognitive assessment results/recommendations as needed for safe discharge planning   OT Discharge Planning   OT Plan standing ADLs, monitor cog/vision, functional mobility   OT Discharge Recommendation (DC Rec) home with assist;home with home care occupational therapy   OT Rationale for DC Rec Pt near functional baseline for ADL tasks, functional mobility, overall activity tolerance. Currently limited by slight facial swelling impacting vision. Per pt report, two sons can provide 24/7 assist at home. Recommending home with assist from sons and  OT/PT to progress IND/safety and overall fall prevention strategies.   OT Brief overview of current status SBA-CGA + SPC with hallway mobility   OT Total Distance Amb During Session (feet) 120   Total Session Time    Timed Code Treatment Minutes 32   Total Session Time (sum of timed and untimed services) 37

## 2025-06-05 NOTE — PLAN OF CARE
Neuro unchanged; VSS; pt and family competent/compliant on DC instructions;able to make needs known    For vital signs and complete assessments, please see documentation flowsheets.      Joaquin Wagner RN, BSN

## 2025-06-05 NOTE — PLAN OF CARE
Physical Therapy Discharge Summary    Reason for therapy discharge:    Discharged to home with home therapy.    Progress towards therapy goal(s). See goals on Care Plan in [facility] electronic health record for goal details.  Goals met/adequate for discharge    Therapy recommendation(s):    Continued therapy is recommended.  Rationale/Recommendations:  recommend continued HH PT to increase safety and independence with functional mobility.

## 2025-06-05 NOTE — CONSULTS
"Care Management Initial Consult    General Information  Assessment completed with: Tulio Quiroz  Type of CM/SW Visit: Initial Assessment    Primary Care Provider verified and updated as needed: Yes   Readmission within the last 30 days: unable to assess      Reason for Consult: discharge planning  Advance Care Planning: Advance Care Planning Reviewed: no concerns identified          Communication Assessment  Patient's communication style: spoken language (English or Bilingual) (hmong)    Hearing Difficulty or Deaf: no   Wear Glasses or Blind: no    Cognitive  Cognitive/Neuro/Behavioral: .WDL except  Level of Consciousness: lethargic  Arousal Level: opens eyes spontaneously  Orientation: oriented x 4  Mood/Behavior: calm, cooperative  Best Language: 0 - No aphasia  Speech: hoarse    Living Environment:   People in home: child(justice), adult, spouse     Current living Arrangements: house      Able to return to prior arrangements: yes       Family/Social Support:  Care provided by: self, child(justice)  Provides care for: no one, unable/limited ability to care for self     Support system: Children          Description of Support System: Supportive, Involved    Support Assessment: Adequate social supports, Adequate family and caregiver support    Current Resources:   Patient receiving home care services: No        Community Resources: PCA (\"son is PCA for 3.5hr/day\")  Equipment currently used at home: cane, straight, walker, standard, shower chair, hospital bed  Supplies currently used at home: Other, [employer] Supp[employer] (\"glasses\")    Employment/Financial:  Employment Status: disabled        Financial Concerns:             Does the patient's insurance plan have a 3 day qualifying hospital stay waiver?  No    Lifestyle & Psychosocial Needs:  Social Drivers of Health     Food Insecurity: Low Risk  (6/3/2025)    Food Insecurity     Within the past 12 months, did you worry that your food would run out before you got " money to buy more?: No     Within the past 12 months, did the food you bought just not last and you didn t have money to get more?: No   Depression: Not at risk (5/29/2025)    PHQ-2     PHQ-2 Score: 0   Housing Stability: Low Risk  (6/3/2025)    Housing Stability     Do you have housing? : Yes     Are you worried about losing your housing?: No   Tobacco Use: Medium Risk (6/2/2025)    Patient History     Smoking Tobacco Use: Former     Smokeless Tobacco Use: Never     Passive Exposure: Past   Financial Resource Strain: Low Risk  (6/3/2025)    Financial Resource Strain     Within the past 12 months, have you or your family members you live with been unable to get utilities (heat, electricity) when it was really needed?: No   Alcohol Use: Not on file   Transportation Needs: Low Risk  (6/3/2025)    Transportation Needs     Within the past 12 months, has lack of transportation kept you from medical appointments, getting your medicines, non-medical meetings or appointments, work, or from getting things that you need?: No   Physical Activity: Not on file   Interpersonal Safety: Low Risk  (6/2/2025)    Interpersonal Safety     Do you feel physically and emotionally safe where you currently live?: Yes     Within the past 12 months, have you been hit, slapped, kicked or otherwise physically hurt by someone?: No     Within the past 12 months, have you been humiliated or emotionally abused in other ways by your partner or ex-partner?: No   Stress: Not on file   Social Connections: Not on file   Health Literacy: Not on file       Functional Status:  Prior to admission patient needed assistance:   Dependent ADLs:: Ambulation-cane, Ambulation-walker, Bathing, Dressing, Toileting, Grooming  Dependent IADLs:: Cleaning, Cooking, Laundry, Shopping, Meal Preparation, Medication Management, Transportation  Assesssment of Functional Status: At functional baseline    Mental Health Status:  Mental Health Status: No Current Concerns    "    Chemical Dependency Status:  Chemical Dependency Status: No Current Concerns             Values/Beliefs:  Spiritual, Cultural Beliefs, Holiness Practices, Values that affect care:                 Discussed  Partnership in Safe Discharge Planning  document with patient/family: No    Additional Information:  Pt is \"a 65 year old male with 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 He has previously undergone particle embolization in 2023, he also had pre-operative embolization for resection. He has started to have more pinkish discharge/drainage from his nose in April 2025. He has had multiple episodes and has resulted in blood loss anemia. He is on palliative chemotherapy.   On 6/2/25, he underwent percutaneous embolization of left maxillary sinus squamous cell cancer with 70% reduction of tumor blush. He was admitted to ICU and required levophed to keep MAPs above 65mmHg, which was discontinued 6/4/25 11am.  He continues to do well today and notes having slept better. He was evaluated by physical, occupational and speech therapy. He is on tube feeds. He notes having left sided facial pain this morning, rates it 5/10.\" Per discharge summary.     Due to Pt discharging very quickly (due to transportation availability with family), assessment was completed over the phone with Pt's son Tulio. Tulio reports Pt lives with himself and his other son Salvatore. Tulio and Salvatore are Pt's PCA's and provide assist with most ADL's & IADL's (see above). Pt has tube feeds with [facility]) and home care with [facility] for RN and PT. Writer requested resumption orders for home care and home infusion from Provider. Writer also verified with Provider that Pt would be resuming PTA gravity feeds for his TF's. [facility] liaison reported they would reach out to Pt's son Tulio to see if any supplies were needed. Tulio reported [facility] had paused services until Pt was " seen by his PCP but that they would be scheduling this appt ASAP and reaching out to [facility] for resumption of services. Tulio reported no further care management needs or concerns.       Next Steps: None    Duran Sotomayor RNCC  Covering for 4A/4E  Phone [phone]      RN Care Coordinator     Social Work and Care Management Department      SEARCHABLE in [facility] - search CARE COORDINATOR       [place] [facility] [place] ([phone]) Saturday & Sunday; ([phone]) [facility] Recognized Holidays     Units: 5A Onc Vocera & 5C Vocera    Units: 6B Vocera & 6C Vocera    Units: 7A SOT RNCC Vocera, 7B Med Surg Vocera, & 7C Med Surg Vocera    Units: 6A Vocera & 4A CVICU Vocera, 4C MICU Vocera, and 4E SICU Vocera    Units: 5 Ortho Vocera & 5 Med Surg Vocera    Units: 6 Med Surg Vocera & 8 Med Surg Vocera

## 2025-06-05 NOTE — DISCHARGE SUMMARY
[facility] Discharge Summary    Douglas Herrera MRN# [id number]   Age: 65 year old YOB: 1960     Date of Admission:  6/2/2025  Date of Discharge::  6/5/2025  Admitting Physician:  Amanda Garcia MD  Discharge Physician:  Amanda Garcia MD         Admission Diagnoses:   Epistaxis [R04.0]          Discharge Diagnosis:     Patient Active Problem List    Diagnosis Date Noted    Anemia due to chronic blood loss 05/15/2025     Priority: Medium    Hard to intubate 10/11/2024     Priority: Medium    Hepatitis B, chronic (H) 04/04/2024     Priority: Medium    Squamous cell carcinoma of maxillary sinus (H) 12/19/2023     Priority: Medium    Hypomagnesemia 12/19/2023     Priority: Medium    Epistaxis 09/13/2023     Priority: Medium    Mass of sinus 09/13/2023     Priority: Medium    Mixed hyperlipidemia 06/01/2020     Priority: Medium    PE (pulmonary thromboembolism) (H) 05/08/2019     Priority: Medium    Hypoxemia      Priority: Medium    Acute idiopathic gout of right foot      Priority: Medium    Idiopathic hypertension      Priority: Medium    Hospital-acquired pneumonia 04/25/2019     Priority: Medium    Anxiety      Priority: Medium    S/P CABG (coronary artery bypass graft) 04/23/2019     Priority: Medium    ARF (acute respiratory failure) (H) 04/23/2019     Priority: Medium    Coronary artery disease involving native coronary artery 04/23/2019     Priority: Medium    Ascending aorta dilatation 04/23/2019     Priority: Medium    Unstable angina (H) 03/06/2019     Priority: Medium     Added automatically from request for surgery [id number]        Abnormal cardiovascular stress test 01/11/2019     Priority: Medium    Thoracic ascending aortic aneurysm 01/11/2019     Priority: Medium    Precordial pain 01/11/2019     Priority: Medium             Procedures:   Percutaneous left maxillary sinus squamous cell cancer tumor embolization           Medications Prior to Admission:      Medications Prior to Admission   Medication Sig Dispense Refill Last Dose/Taking    acetaminophen (TYLENOL) 325 MG tablet Take 2 tablets (650 mg) by mouth every 4 hours as needed for other or pain 50 tablet 0 Past Week    aspirin 81 MG EC tablet Take 1 tablet (81 mg) by mouth daily 90 tablet 3 More than a month    atorvastatin (LIPITOR) 80 MG tablet TAKE 1 TABLET (80 MG TOTAL) BY MOUTH AT BEDTIME/ TXHUA HMO NOJ 1 LUB TSHUAJ THAUM MUS PW PAB ZOO NTSHAV MUAJ ROJ (Patient taking differently: Take 80 mg by mouth every evening.) 90 tablet 3 6/1/2025    celecoxib (CELEBREX) 5 mg/mL SUSP suspension Take 20 mLs (100 mg) by mouth 2 times daily. 1200 mL 1 5/30/2025    clindamycin (CLEOCIN T) 1 % external lotion Apply topically 2 times daily. 60 mL 3 6/1/2025    entecavir (BARACLUDE) 0.5 MG tablet Take 0.5 mg by mouth every evening.   6/2/2025 at  8:00 AM    gabapentin (NEURONTIN) 250 MG/5ML solution Take 12 mLs (600 mg) by mouth 3 times daily. 1080 mL 1 Past Month    gabapentin (NEURONTIN) 300 MG capsule Take 1 capsule (300 mg) by mouth 3 times daily. 90 capsule 2 6/1/2025 Bedtime    hydrocortisone 2.5 % cream Apply topically 2 times daily as needed for itching. 30 g 0 5/31/2025    ondansetron (ZOFRAN) 8 MG tablet Take 1 tablet (8 mg) by mouth every 8 hours as needed for nausea (vomiting). 30 tablet 2 More than a month    Osmolite 1.5 Migue 237 mL Place 474 mLs into G tube 3 times daily. Infuse via gravity bag. 2 cartons TID spread 3-5 hours apart.   Water flush: 30-60 mL before and after each feeding. + 120 mL 4 times daily for hydration. 74683 mL 11 6/1/2025 Bedtime    oxyCODONE (ROXICODONE) 5 MG/5ML solution Take 5 mLs (5 mg) by mouth 4 times daily as needed for severe pain. 100 mL 0 More than a month    polyethylene glycol (MIRALAX) 17 g packet Take 1 packet by mouth as needed.   More than a month    prochlorperazine (COMPAZINE) 10 MG tablet Take 1 tablet (10 mg) by mouth every 6 hours as needed for nausea or vomiting.  30 tablet 2 More than a month    senna-docusate (SENNA S) 8.6-50 MG tablet Take 1 tablet by mouth 2 times daily as needed for constipation 30 tablet 1 More than a month    sodium chloride (OCEAN) 0.65 % nasal spray Spray 2 sprays in nostril daily as needed for congestion. 88 mL 3 6/2/2025    nitroGLYcerin (NITROSTAT) 0.4 MG sublingual tablet Place 1 tablet (0.4 mg) under the tongue every 5 minutes as needed. 25 tablet 3 Unknown             Discharge Medications:     Current Discharge Medication List        CONTINUE these medications which have NOT CHANGED    Details   acetaminophen (TYLENOL) 325 MG tablet Take 2 tablets (650 mg) by mouth every 4 hours as needed for other or pain  Qty: 50 tablet, Refills: 0    Associated Diagnoses: Mass of sinus      aspirin 81 MG EC tablet Take 1 tablet (81 mg) by mouth daily  Qty: 90 tablet, Refills: 3    Associated Diagnoses: Mass of sinus      atorvastatin (LIPITOR) 80 MG tablet TAKE 1 TABLET (80 MG TOTAL) BY MOUTH AT BEDTIME/ TXHUA O NOJ 1 LUB TSHUAJ THAUM MUS PW PAB ZOO NTSHAV MUAJ ROJ  Qty: 90 tablet, Refills: 3    Associated Diagnoses: Hyperlipidemia      celecoxib (CELEBREX) 5 mg/mL SUSP suspension Take 20 mLs (100 mg) by mouth 2 times daily.  Qty: 1200 mL, Refills: 1    Associated Diagnoses: Neoplasm related pain; Squamous cell carcinoma of maxillary sinus (H)      clindamycin (CLEOCIN T) 1 % external lotion Apply topically 2 times daily.  Qty: 60 mL, Refills: 3    Associated Diagnoses: Acneiform rash      entecavir (BARACLUDE) 0.5 MG tablet Take 0.5 mg by mouth every evening.      gabapentin (NEURONTIN) 250 MG/5ML solution Take 12 mLs (600 mg) by mouth 3 times daily.  Qty: 1080 mL, Refills: 1    Associated Diagnoses: Neoplasm related pain; Squamous cell carcinoma of maxillary sinus (H)      gabapentin (NEURONTIN) 300 MG capsule Take 1 capsule (300 mg) by mouth 3 times daily.  Qty: 90 capsule, Refills: 2    Comments: Ok to fill now. Pt/family had to discard 1 bottle of  liquid gabapentin due to leaving it out > 6 hours  Associated Diagnoses: Squamous cell carcinoma of maxillary sinus (H); Neoplasm related pain      hydrocortisone 2.5 % cream Apply topically 2 times daily as needed for itching.  Qty: 30 g, Refills: 0    Associated Diagnoses: Acneiform rash      ondansetron (ZOFRAN) 8 MG tablet Take 1 tablet (8 mg) by mouth every 8 hours as needed for nausea (vomiting).  Qty: 30 tablet, Refills: 2    Associated Diagnoses: Squamous cell carcinoma of maxillary sinus (H)      Osmolite 1.5 Migue 237 mL Place 474 mLs into G tube 3 times daily. Infuse via gravity bag. 2 cartons TID spread 3-5 hours apart.   Water flush: 30-60 mL before and after each feeding. + 120 mL 4 times daily for hydration.  Qty: 25864 mL, Refills: 11    Associated Diagnoses: Moderate protein-calorie malnutrition; Malignant neoplasm of maxillary sinus (H); Hypomagnesemia      oxyCODONE (ROXICODONE) 5 MG/5ML solution Take 5 mLs (5 mg) by mouth 4 times daily as needed for severe pain.  Qty: 100 mL, Refills: 0    Associated Diagnoses: Neoplasm related pain; Squamous cell carcinoma of maxillary sinus (H)      polyethylene glycol (MIRALAX) 17 g packet Take 1 packet by mouth as needed.      prochlorperazine (COMPAZINE) 10 MG tablet Take 1 tablet (10 mg) by mouth every 6 hours as needed for nausea or vomiting.  Qty: 30 tablet, Refills: 2    Associated Diagnoses: Squamous cell carcinoma of maxillary sinus (H)      senna-docusate (SENNA S) 8.6-50 MG tablet Take 1 tablet by mouth 2 times daily as needed for constipation  Qty: 30 tablet, Refills: 1    Associated Diagnoses: Constipation, unspecified constipation type      sodium chloride (OCEAN) 0.65 % nasal spray Spray 2 sprays in nostril daily as needed for congestion.  Qty: 88 mL, Refills: 3    Associated Diagnoses: Mass of sinus      nitroGLYcerin (NITROSTAT) 0.4 MG sublingual tablet Place 1 tablet (0.4 mg) under the tongue every 5 minutes as needed.  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 He has previously undergone particle embolization in 2023, he also had pre-operative embolization for resection. He has started to have more pinkish discharge/drainage from his nose in April 2025. He has had multiple episodes and has resulted in blood loss anemia. He is on palliative chemotherapy.   On 6/2/25, he underwent percutaneous embolization of left maxillary sinus squamous cell cancer with 70% reduction of tumor blush. He was admitted to ICU and required levophed to keep MAPs above 65mmHg, which was discontinued 6/4/25 11am.  He continues to do well today and notes having slept better. He was evaluated by physical, occupational and speech therapy. He is on tube feeds. He notes having left sided facial pain this morning, rates it 5/10.    Exam  Alert, oriented- able to state age and month  Speech slightly dysarthric  CN- EOMI, L eye edema, L facial edema, L facial droop, hearing intact, tongue midline  Motor- antigravity throughout  Sensation- intact to light touch  Coordination- finger to nose without ataxia  Gait- deferred          Discharge Instructions and Follow-Up:     Discharge diet: Tubefeeds (as mentioned in discharge orders)   Discharge activity: Activity as tolerated   Discharge follow-up: Follow up with primary care provider in 10-14 days   Wound care: N/A           Discharge Disposition:     Discharged to home      Patient discussed with  Dr. Kaur Conrad MD  Fellow, Endovascular Surgical Neuroradiology

## 2025-06-06 ENCOUNTER — NURSE TRIAGE (OUTPATIENT)
Dept: NURSING | Facility: CLINIC | Age: 65
End: 2025-06-06
Payer: COMMERCIAL

## 2025-06-06 ENCOUNTER — TELEPHONE (OUTPATIENT)
Dept: NEUROSURGERY | Facility: CLINIC | Age: 65
End: 2025-06-06
Payer: COMMERCIAL

## 2025-06-06 NOTE — TELEPHONE ENCOUNTER
"Nurse Triage SBAR    Is this a 2nd Level Triage? YES, LICENSED PRACTITIONER REVIEW IS REQUIRED    Situation: red flag triage hx 6/2/25 ANESTHESIA, IN NON-OPERATING ROOM SETTING Cerebral Angiogram with Embolization @1200w Ho . Son calls w concern dad is very  sedated. Drowsy. Breathing ok.  Given oxycodone 5 mg 1 tab 6 am and gabapentin 12 mg  noon via g tube. Please call shelia Antunez .  Routed to provider    Does the patient meet one of the following criteria for ADS visit consideration? No    Additional Information   Negative: Sounds like a life-threatening emergency to the triager    Answer Assessment - Initial Assessment Questions  1. SYMPTOM: \"What's the main symptom you're concerned about?\" (e.g., pain, fever, vomiting)      Very sedated,drowsy  2. ONSET: \"When did sx  start?\"      Discharged yesterday  3. SURGERY: \"What surgery did you have?\"      ANESTHESIA, IN NON-OPERATING ROOM SETTING Cerebral Angiogram with Embolization @1200  4. DATE of SURGERY: \"When was the surgery?\"       6/2/25  5. ANESTHESIA: \"What type of anesthesia did you have?\" (e.g., general, spinal, epidural, local)      Not known  6. DRAINS: \"Were any drains place in or around the wound?\" (e.g., Hemovac, Mani-Motley, Penrose)      unknown  7. PAIN: \"Is there any pain?\" If Yes, ask: \"How bad is it?\"  (Scale 1-10; or mild, moderate, severe)      Not expressed  8. FEVER: \"Do you have a fever?\" If Yes, ask: \"What is your temperature, how was it measured, and when did it start?\"      not checked,feels warm  9. VOMITING: \"Is there any vomiting?\" If Yes, ask: \"How many times?\"      Coughing up dark red sputum.no vomiting  10. BLEEDING: \"Is there any bleeding?\" If Yes, ask: \"How much?\" and \"Where?\"        Dark red sputum  11. OTHER SYMPTOMS: \"Do you have any other symptoms?\" (e.g., drainage from wound, painful urination, constipation)        No obvious  things noticed.    Protocols used: Post-Op Symptoms and [id number]    "

## 2025-06-06 NOTE — PLAN OF CARE
"Speech Language Therapy Discharge Summary    Reason for therapy discharge:    Discharged to home.    Progress towards therapy goal(s). See goals on Care Plan in [facility] electronic health record for goal details.  Goals not met.  Barriers to achieving goals:   discharge from facility.    Therapy recommendation(s):    Pt seen for eval only this admission. Per eval on 6/3/25: \"Given high aspiration risk, recommend NPO with oral cares as tolerated given trismus. Please use PEG for all nutrition/hydration/medication. Pt would benefit from ongoing oropharyngeal and mandibular exercises as tolerated, however oral phase deficits do appear much more severe than documented per SLP session in February. SLP will follow at reduced frequency for dysphagia, however anticipate pt will benefit from OP SLP pending goals of care.\"      "

## 2025-06-06 NOTE — TELEPHONE ENCOUNTER
Received red flag call from [facility] Triage.     Spoke with patients son, Tulio. Oxy last given at 0600, along with 12 ml (600 mg) of gabapentin. Was sedated and and gurgling but reportedly had improved and arouseable by 1120 when writer spoke with Tulio.     States at noon he gave patient gabapentin 600 mg.     Patient is now gurgling, bleeding from the mouth, febrile, and is very sedated - states he can see his eyes twitch.Shakes him and calls his name and patient does not really respond.     Advised Tulio to call 911, which he agreed to.     Dr. Garcia updated.     Mackenzie Kilgore, RN 6/6/2025 3:40 PM

## 2025-06-08 PROCEDURE — S9341 HIT ENTERAL GRAV DIEM: HCPCS

## 2025-06-09 PROCEDURE — S9341 HIT ENTERAL GRAV DIEM: HCPCS

## 2025-06-10 ENCOUNTER — LAB REQUISITION (OUTPATIENT)
Dept: LAB | Facility: CLINIC | Age: 65
End: 2025-06-10
Payer: COMMERCIAL

## 2025-06-10 DIAGNOSIS — D64.9 ANEMIA, UNSPECIFIED: ICD-10-CM

## 2025-06-10 PROCEDURE — S9341 HIT ENTERAL GRAV DIEM: HCPCS

## 2025-06-11 LAB
ANION GAP SERPL CALCULATED.3IONS-SCNC: 10 MMOL/L (ref 7–15)
BUN SERPL-MCNC: 16 MG/DL (ref 8–23)
CALCIUM SERPL-MCNC: 9.7 MG/DL (ref 8.8–10.4)
CHLORIDE SERPL-SCNC: 102 MMOL/L (ref 98–107)
CREAT SERPL-MCNC: 0.8 MG/DL (ref 0.67–1.17)
EGFRCR SERPLBLD CKD-EPI 2021: >90 ML/MIN/1.73M2
ERYTHROCYTE [DISTWIDTH] IN BLOOD BY AUTOMATED COUNT: 17.4 % (ref 10–15)
GLUCOSE SERPL-MCNC: 93 MG/DL (ref 70–99)
HCO3 SERPL-SCNC: 22 MMOL/L (ref 22–29)
HCT VFR BLD AUTO: 26.9 % (ref 40–53)
HGB BLD-MCNC: 8.1 G/DL (ref 13.3–17.7)
MCH RBC QN AUTO: 26.8 PG (ref 26.5–33)
MCHC RBC AUTO-ENTMCNC: 30.1 G/DL (ref 31.5–36.5)
MCV RBC AUTO: 89 FL (ref 78–100)
PLATELET # BLD AUTO: 341 10E3/UL (ref 150–450)
POTASSIUM SERPL-SCNC: 4.3 MMOL/L (ref 3.4–5.3)
RBC # BLD AUTO: 3.02 10E6/UL (ref 4.4–5.9)
SODIUM SERPL-SCNC: 134 MMOL/L (ref 135–145)
WBC # BLD AUTO: 7.3 10E3/UL (ref 4–11)

## 2025-06-11 PROCEDURE — P9603 ONE-WAY ALLOW PRORATED MILES: HCPCS | Performed by: INTERNAL MEDICINE

## 2025-06-11 PROCEDURE — 85027 COMPLETE CBC AUTOMATED: CPT | Performed by: INTERNAL MEDICINE

## 2025-06-11 PROCEDURE — 80048 BASIC METABOLIC PNL TOTAL CA: CPT | Performed by: INTERNAL MEDICINE

## 2025-06-11 PROCEDURE — 36415 COLL VENOUS BLD VENIPUNCTURE: CPT | Performed by: INTERNAL MEDICINE

## 2025-06-11 PROCEDURE — S9341 HIT ENTERAL GRAV DIEM: HCPCS

## 2025-06-12 PROCEDURE — S9341 HIT ENTERAL GRAV DIEM: HCPCS

## 2025-06-13 ENCOUNTER — LAB REQUISITION (OUTPATIENT)
Dept: LAB | Facility: CLINIC | Age: 65
End: 2025-06-13
Payer: COMMERCIAL

## 2025-06-13 DIAGNOSIS — D64.9 ANEMIA, UNSPECIFIED: ICD-10-CM

## 2025-06-13 PROCEDURE — S9341 HIT ENTERAL GRAV DIEM: HCPCS

## 2025-06-14 PROCEDURE — S9341 HIT ENTERAL GRAV DIEM: HCPCS

## 2025-06-15 PROCEDURE — S9341 HIT ENTERAL GRAV DIEM: HCPCS

## 2025-06-16 ENCOUNTER — PATIENT OUTREACH (OUTPATIENT)
Dept: ONCOLOGY | Facility: CLINIC | Age: 65
End: 2025-06-16
Payer: COMMERCIAL

## 2025-06-16 PROCEDURE — S9341 HIT ENTERAL GRAV DIEM: HCPCS

## 2025-06-16 NOTE — PROGRESS NOTES
[facility]: Cancer Care                                                                                          Contacted Douglas's son Tulio to see how Douglas is doing.    Tulio reports that he discharged to [facility] last week.    He reports that Douglas looks pretty good. He is dealing with ongoing weakness (struggling with a flight of stairs). He's working with PT. He's afebrile, breathing ok.    Reviewed plan for follow-up with Nishi NAVA next week as scheduled.    Trudy Shelton, RN, BSN  RN Care Coordinator  [facility]

## 2025-06-17 PROCEDURE — S9341 HIT ENTERAL GRAV DIEM: HCPCS

## 2025-06-18 LAB
FERRITIN SERPL-MCNC: 903 NG/ML (ref 31–409)
FOLATE SERPL-MCNC: 13.9 NG/ML (ref 4.6–34.8)
HGB BLD-MCNC: 8.6 G/DL (ref 13.3–17.7)
IRON BINDING CAPACITY (ROCHE): 215 UG/DL (ref 240–430)
IRON SATN MFR SERPL: 18 % (ref 15–46)
IRON SERPL-MCNC: 38 UG/DL (ref 61–157)
IRON SERPL-MCNC: 38 UG/DL (ref 61–157)
MCV RBC AUTO: 92 FL (ref 78–100)
VIT B12 SERPL-MCNC: 835 PG/ML (ref 232–1245)

## 2025-06-18 PROCEDURE — P9604 ONE-WAY ALLOW PRORATED TRIP: HCPCS | Performed by: INTERNAL MEDICINE

## 2025-06-18 PROCEDURE — 83540 ASSAY OF IRON: CPT | Performed by: INTERNAL MEDICINE

## 2025-06-18 PROCEDURE — 36415 COLL VENOUS BLD VENIPUNCTURE: CPT | Performed by: INTERNAL MEDICINE

## 2025-06-18 PROCEDURE — 82746 ASSAY OF FOLIC ACID SERUM: CPT | Performed by: INTERNAL MEDICINE

## 2025-06-18 PROCEDURE — 85018 HEMOGLOBIN: CPT | Performed by: INTERNAL MEDICINE

## 2025-06-18 PROCEDURE — 83550 IRON BINDING TEST: CPT | Performed by: INTERNAL MEDICINE

## 2025-06-18 PROCEDURE — S9341 HIT ENTERAL GRAV DIEM: HCPCS

## 2025-06-18 PROCEDURE — 82728 ASSAY OF FERRITIN: CPT | Performed by: INTERNAL MEDICINE

## 2025-06-18 PROCEDURE — 82607 VITAMIN B-12: CPT | Performed by: INTERNAL MEDICINE

## 2025-06-19 PROCEDURE — S9341 HIT ENTERAL GRAV DIEM: HCPCS

## 2025-06-20 PROCEDURE — S9341 HIT ENTERAL GRAV DIEM: HCPCS

## 2025-06-21 PROCEDURE — S9341 HIT ENTERAL GRAV DIEM: HCPCS

## 2025-06-22 PROCEDURE — S9341 HIT ENTERAL GRAV DIEM: HCPCS

## 2025-06-23 PROCEDURE — S9341 HIT ENTERAL GRAV DIEM: HCPCS

## 2025-06-24 ENCOUNTER — MEDICAL CORRESPONDENCE (OUTPATIENT)
Dept: HEALTH INFORMATION MANAGEMENT | Facility: CLINIC | Age: 65
End: 2025-06-24
Payer: COMMERCIAL

## 2025-06-24 PROCEDURE — S9341 HIT ENTERAL GRAV DIEM: HCPCS

## 2025-06-25 PROCEDURE — S9341 HIT ENTERAL GRAV DIEM: HCPCS

## 2025-06-26 PROCEDURE — S9341 HIT ENTERAL GRAV DIEM: HCPCS

## 2025-06-27 PROCEDURE — S9341 HIT ENTERAL GRAV DIEM: HCPCS

## 2025-06-28 ENCOUNTER — HEALTH MAINTENANCE LETTER (OUTPATIENT)
Age: 65
End: 2025-06-28

## 2025-06-28 PROCEDURE — S9341 HIT ENTERAL GRAV DIEM: HCPCS

## 2025-06-29 PROCEDURE — S9341 HIT ENTERAL GRAV DIEM: HCPCS

## 2025-06-30 PROCEDURE — S9341 HIT ENTERAL GRAV DIEM: HCPCS

## 2025-07-01 PROCEDURE — S9341 HIT ENTERAL GRAV DIEM: HCPCS

## 2025-07-02 PROCEDURE — S9341 HIT ENTERAL GRAV DIEM: HCPCS

## 2025-07-03 PROCEDURE — S9341 HIT ENTERAL GRAV DIEM: HCPCS

## 2025-07-04 PROCEDURE — S9341 HIT ENTERAL GRAV DIEM: HCPCS

## 2025-07-05 PROCEDURE — S9341 HIT ENTERAL GRAV DIEM: HCPCS

## 2025-07-06 PROCEDURE — S9341 HIT ENTERAL GRAV DIEM: HCPCS

## 2025-07-07 PROCEDURE — S9341 HIT ENTERAL GRAV DIEM: HCPCS

## 2025-07-08 ENCOUNTER — HOME INFUSION BILLING (OUTPATIENT)
Dept: HOME HEALTH SERVICES | Facility: HOME HEALTH | Age: 65
End: 2025-07-08
Payer: COMMERCIAL

## 2025-07-08 PROCEDURE — B4152 EF CALORIE DENSE>/=1.5KCAL: HCPCS

## 2025-07-08 PROCEDURE — S9341 HIT ENTERAL GRAV DIEM: HCPCS

## 2025-07-09 ENCOUNTER — ONCOLOGY VISIT (OUTPATIENT)
Dept: ONCOLOGY | Facility: CLINIC | Age: 65
End: 2025-07-09
Attending: INTERNAL MEDICINE
Payer: COMMERCIAL

## 2025-07-09 ENCOUNTER — APPOINTMENT (OUTPATIENT)
Dept: LAB | Facility: CLINIC | Age: 65
End: 2025-07-09
Attending: INTERNAL MEDICINE
Payer: COMMERCIAL

## 2025-07-09 VITALS
TEMPERATURE: 98.8 F | SYSTOLIC BLOOD PRESSURE: 108 MMHG | WEIGHT: 134.9 LBS | HEART RATE: 87 BPM | RESPIRATION RATE: 16 BRPM | BODY MASS INDEX: 23.16 KG/M2 | DIASTOLIC BLOOD PRESSURE: 67 MMHG | OXYGEN SATURATION: 97 %

## 2025-07-09 DIAGNOSIS — E83.39 HYPOPHOSPHATASIA: ICD-10-CM

## 2025-07-09 DIAGNOSIS — E46 PROTEIN-CALORIE MALNUTRITION, UNSPECIFIED SEVERITY: ICD-10-CM

## 2025-07-09 DIAGNOSIS — E03.4 HYPOTHYROIDISM DUE TO ACQUIRED ATROPHY OF THYROID: ICD-10-CM

## 2025-07-09 DIAGNOSIS — E83.42 HYPOMAGNESEMIA: ICD-10-CM

## 2025-07-09 DIAGNOSIS — E83.52 HYPERCALCEMIA: ICD-10-CM

## 2025-07-09 DIAGNOSIS — E03.9 HYPOTHYROIDISM, UNSPECIFIED TYPE: Primary | ICD-10-CM

## 2025-07-09 DIAGNOSIS — T17.908S ASPIRATION INTO AIRWAY, SEQUELA: ICD-10-CM

## 2025-07-09 DIAGNOSIS — C31.0 SQUAMOUS CELL CARCINOMA OF MAXILLARY SINUS (H): ICD-10-CM

## 2025-07-09 DIAGNOSIS — E83.52 HYPERCALCEMIA: Primary | ICD-10-CM

## 2025-07-09 LAB
ALBUMIN SERPL BCG-MCNC: 3.3 G/DL (ref 3.5–5.2)
ALP SERPL-CCNC: 90 U/L (ref 40–150)
ALT SERPL W P-5'-P-CCNC: 32 U/L (ref 0–70)
ANION GAP SERPL CALCULATED.3IONS-SCNC: 11 MMOL/L (ref 7–15)
AST SERPL W P-5'-P-CCNC: 32 U/L (ref 0–45)
BASOPHILS # BLD AUTO: 0 10E3/UL (ref 0–0.2)
BASOPHILS NFR BLD AUTO: 0 %
BILIRUB SERPL-MCNC: 0.4 MG/DL
BUN SERPL-MCNC: 32.7 MG/DL (ref 8–23)
CALCIUM SERPL-MCNC: 12.6 MG/DL (ref 8.8–10.4)
CHLORIDE SERPL-SCNC: 102 MMOL/L (ref 98–107)
CREAT SERPL-MCNC: 0.83 MG/DL (ref 0.67–1.17)
EGFRCR SERPLBLD CKD-EPI 2021: >90 ML/MIN/1.73M2
EOSINOPHIL # BLD AUTO: 0.1 10E3/UL (ref 0–0.7)
EOSINOPHIL NFR BLD AUTO: 1 %
ERYTHROCYTE [DISTWIDTH] IN BLOOD BY AUTOMATED COUNT: 17.6 % (ref 10–15)
GLUCOSE SERPL-MCNC: 151 MG/DL (ref 70–99)
HCO3 SERPL-SCNC: 26 MMOL/L (ref 22–29)
HCT VFR BLD AUTO: 30.6 % (ref 40–53)
HGB BLD-MCNC: 9.3 G/DL (ref 13.3–17.7)
IMM GRANULOCYTES # BLD: 0.1 10E3/UL
IMM GRANULOCYTES NFR BLD: 1 %
LYMPHOCYTES # BLD AUTO: 2.7 10E3/UL (ref 0.8–5.3)
LYMPHOCYTES NFR BLD AUTO: 23 %
MCH RBC QN AUTO: 26.5 PG (ref 26.5–33)
MCHC RBC AUTO-ENTMCNC: 30.4 G/DL (ref 31.5–36.5)
MCV RBC AUTO: 87 FL (ref 78–100)
MONOCYTES # BLD AUTO: 0.5 10E3/UL (ref 0–1.3)
MONOCYTES NFR BLD AUTO: 4 %
NEUTROPHILS # BLD AUTO: 8.3 10E3/UL (ref 1.6–8.3)
NEUTROPHILS NFR BLD AUTO: 71 %
NRBC # BLD AUTO: 0 10E3/UL
NRBC BLD AUTO-RTO: 0 /100
PLATELET # BLD AUTO: 292 10E3/UL (ref 150–450)
POTASSIUM SERPL-SCNC: 4 MMOL/L (ref 3.4–5.3)
PROT SERPL-MCNC: 8.4 G/DL (ref 6.4–8.3)
RBC # BLD AUTO: 3.51 10E6/UL (ref 4.4–5.9)
SODIUM SERPL-SCNC: 139 MMOL/L (ref 135–145)
T4 FREE SERPL-MCNC: 0.85 NG/DL (ref 0.9–1.7)
TSH SERPL DL<=0.005 MIU/L-ACNC: 6.31 UIU/ML (ref 0.3–4.2)
WBC # BLD AUTO: 11.6 10E3/UL (ref 4–11)

## 2025-07-09 PROCEDURE — G0463 HOSPITAL OUTPT CLINIC VISIT: HCPCS | Performed by: INTERNAL MEDICINE

## 2025-07-09 PROCEDURE — 258N000003 HC RX IP 258 OP 636: Performed by: INTERNAL MEDICINE

## 2025-07-09 PROCEDURE — 250N000011 HC RX IP 250 OP 636: Performed by: INTERNAL MEDICINE

## 2025-07-09 PROCEDURE — S9341 HIT ENTERAL GRAV DIEM: HCPCS

## 2025-07-09 PROCEDURE — 84460 ALANINE AMINO (ALT) (SGPT): CPT

## 2025-07-09 PROCEDURE — 83735 ASSAY OF MAGNESIUM: CPT

## 2025-07-09 PROCEDURE — 85041 AUTOMATED RBC COUNT: CPT

## 2025-07-09 PROCEDURE — 84443 ASSAY THYROID STIM HORMONE: CPT

## 2025-07-09 PROCEDURE — 84100 ASSAY OF PHOSPHORUS: CPT

## 2025-07-09 PROCEDURE — 36591 DRAW BLOOD OFF VENOUS DEVICE: CPT

## 2025-07-09 PROCEDURE — 84439 ASSAY OF FREE THYROXINE: CPT

## 2025-07-09 RX ORDER — ALBUTEROL SULFATE 0.83 MG/ML
2.5 SOLUTION RESPIRATORY (INHALATION)
OUTPATIENT
Start: 2025-07-09

## 2025-07-09 RX ORDER — LEVOTHYROXINE SODIUM 75 UG/1
75 TABLET ORAL
Qty: 30 TABLET | Refills: 11 | Status: SHIPPED | OUTPATIENT
Start: 2025-07-09

## 2025-07-09 RX ORDER — ALBUTEROL SULFATE 90 UG/1
1-2 INHALANT RESPIRATORY (INHALATION)
Start: 2025-07-09

## 2025-07-09 RX ORDER — METHYLPREDNISOLONE SODIUM SUCCINATE 40 MG/ML
40 INJECTION INTRAMUSCULAR; INTRAVENOUS
Start: 2025-07-09

## 2025-07-09 RX ORDER — HEPARIN SODIUM (PORCINE) LOCK FLUSH IV SOLN 100 UNIT/ML 100 UNIT/ML
5 SOLUTION INTRAVENOUS
Status: DISCONTINUED | OUTPATIENT
Start: 2025-07-09 | End: 2025-07-09 | Stop reason: HOSPADM

## 2025-07-09 RX ORDER — ZOLEDRONIC ACID 0.04 MG/ML
4 INJECTION, SOLUTION INTRAVENOUS ONCE
Status: CANCELLED
Start: 2025-07-09

## 2025-07-09 RX ORDER — MEPERIDINE HYDROCHLORIDE 25 MG/ML
25 INJECTION INTRAMUSCULAR; INTRAVENOUS; SUBCUTANEOUS
OUTPATIENT
Start: 2025-07-09

## 2025-07-09 RX ORDER — HEPARIN SODIUM,PORCINE 10 UNIT/ML
5-20 VIAL (ML) INTRAVENOUS DAILY PRN
Status: CANCELLED | OUTPATIENT
Start: 2025-07-09

## 2025-07-09 RX ORDER — HEPARIN SODIUM (PORCINE) LOCK FLUSH IV SOLN 100 UNIT/ML 100 UNIT/ML
5 SOLUTION INTRAVENOUS
OUTPATIENT
Start: 2025-07-09

## 2025-07-09 RX ORDER — EPINEPHRINE 1 MG/ML
0.3 INJECTION, SOLUTION INTRAMUSCULAR; SUBCUTANEOUS EVERY 5 MIN PRN
Status: CANCELLED | OUTPATIENT
Start: 2025-07-09

## 2025-07-09 RX ORDER — ALBUTEROL SULFATE 90 UG/1
1-2 INHALANT RESPIRATORY (INHALATION)
Status: CANCELLED
Start: 2025-07-09

## 2025-07-09 RX ORDER — ALBUTEROL SULFATE 0.83 MG/ML
2.5 SOLUTION RESPIRATORY (INHALATION)
Status: CANCELLED | OUTPATIENT
Start: 2025-07-09

## 2025-07-09 RX ORDER — HEPARIN SODIUM,PORCINE 10 UNIT/ML
5-20 VIAL (ML) INTRAVENOUS DAILY PRN
OUTPATIENT
Start: 2025-07-09

## 2025-07-09 RX ORDER — DIPHENHYDRAMINE HYDROCHLORIDE 50 MG/ML
50 INJECTION, SOLUTION INTRAMUSCULAR; INTRAVENOUS
Status: CANCELLED
Start: 2025-07-09

## 2025-07-09 RX ORDER — EPINEPHRINE 1 MG/ML
0.3 INJECTION, SOLUTION, CONCENTRATE INTRAVENOUS EVERY 5 MIN PRN
OUTPATIENT
Start: 2025-07-09

## 2025-07-09 RX ORDER — DIPHENHYDRAMINE HYDROCHLORIDE 50 MG/ML
25 INJECTION, SOLUTION INTRAMUSCULAR; INTRAVENOUS
Start: 2025-07-09

## 2025-07-09 RX ORDER — DIPHENHYDRAMINE HYDROCHLORIDE 50 MG/ML
25 INJECTION, SOLUTION INTRAMUSCULAR; INTRAVENOUS
Status: CANCELLED
Start: 2025-07-09

## 2025-07-09 RX ORDER — METHYLPREDNISOLONE SODIUM SUCCINATE 40 MG/ML
40 INJECTION INTRAMUSCULAR; INTRAVENOUS
Status: CANCELLED
Start: 2025-07-09

## 2025-07-09 RX ORDER — HEPARIN SODIUM (PORCINE) LOCK FLUSH IV SOLN 100 UNIT/ML 100 UNIT/ML
5 SOLUTION INTRAVENOUS
Status: CANCELLED | OUTPATIENT
Start: 2025-07-09

## 2025-07-09 RX ORDER — ZOLEDRONIC ACID 0.04 MG/ML
4 INJECTION, SOLUTION INTRAVENOUS ONCE
Status: COMPLETED | OUTPATIENT
Start: 2025-07-09 | End: 2025-07-09

## 2025-07-09 RX ORDER — MEPERIDINE HYDROCHLORIDE 25 MG/ML
25 INJECTION INTRAMUSCULAR; INTRAVENOUS; SUBCUTANEOUS
Status: CANCELLED | OUTPATIENT
Start: 2025-07-09

## 2025-07-09 RX ORDER — HEPARIN SODIUM (PORCINE) LOCK FLUSH IV SOLN 100 UNIT/ML 100 UNIT/ML
5 SOLUTION INTRAVENOUS
Status: COMPLETED | OUTPATIENT
Start: 2025-07-09 | End: 2025-07-09

## 2025-07-09 RX ORDER — DIPHENHYDRAMINE HYDROCHLORIDE 50 MG/ML
50 INJECTION, SOLUTION INTRAMUSCULAR; INTRAVENOUS
Start: 2025-07-09

## 2025-07-09 RX ORDER — ZOLEDRONIC ACID 0.04 MG/ML
4 INJECTION, SOLUTION INTRAVENOUS ONCE
Status: CANCELLED
Start: 2025-07-09 | End: 2025-07-09

## 2025-07-09 RX ADMIN — Medication 5 ML: at 12:07

## 2025-07-09 RX ADMIN — ZOLEDRONIC ACID 4 MG: 0.04 INJECTION, SOLUTION INTRAVENOUS at 14:27

## 2025-07-09 RX ADMIN — SODIUM CHLORIDE 1000 ML: 0.9 INJECTION, SOLUTION INTRAVENOUS at 14:26

## 2025-07-09 RX ADMIN — Medication 5 ML: at 15:54

## 2025-07-09 ASSESSMENT — PAIN SCALES - GENERAL: PAINLEVEL_OUTOF10: MODERATE PAIN (6)

## 2025-07-09 NOTE — NURSING NOTE
"Oncology Rooming Note    July 9, 2025 12:32 PM   Douglas Herrera is a 65 year old male who presents for:    Chief Complaint   Patient presents with    Port Draw     Labs drawn from port by rn.  VS taken.    Oncology Clinic Visit     Squamous cell carcinoma of maxillary     Initial Vitals: /67   Pulse 87   Temp 98.8  F (37.1  C) (Tympanic)   Resp 16   Wt 61.2 kg (134 lb 14.4 oz)   SpO2 97%   BMI 23.16 kg/m   Estimated body mass index is 23.16 kg/m  as calculated from the following:    Height as of 6/2/25: 1.626 m (5' 4\").    Weight as of this encounter: 61.2 kg (134 lb 14.4 oz). Body surface area is 1.66 meters squared.  Moderate Pain (6) Comment: Data Unavailable   No LMP for male patient.  Allergies reviewed: Yes  Medications reviewed: Yes    Medications: Medication refills not needed today.  Pharmacy name entered into Zarpo:    [facility] - [place], [place] - [address]  [facility] - [place], [place] - [facility] [address]    Frailty Screening:   Is the patient here for a new oncology consult visit in cancer care? 2. No    PHQ9:  Did this patient require a PHQ9?: No      Clinical concerns: pt has been more altered for the past few days.       Justice Noble            "

## 2025-07-09 NOTE — PROGRESS NOTES
[facility]  [address] [facility] [address]  Phone: [phone]  Fax: [phone]    PATIENT NAME: Douglas Herrera  MRN # [id number]   DATE OF VISIT: July 9, 2025  YOB: 1960     Otolaryngology: Dr. Damon Regan   Radiation Oncology: Dr. Tomasa Akers  Cardiology: Dr. Qamar Hernandez  Hepatologist: [facility]   Palliative Care: Dr. Joshua Banks      CANCER TYPE: SCC sinonasal   STAGE: [id number] (IVB)  ECOG PS: 2-3     PD-L1: TPS 60-70%, CPS >70% on [id number]  NGS: internal panel. Fusion negative. ARID1A S90fs, EGFR exon 20 insertion, [id number], APC [id number], TMB 7.313    ASSESSMENT AND PLAN  SCC L maxillary sinus, [id number] (IVB), ARID1 mutation,  EGFR exon 20 insertion: Requested CT from [facility]. ECOG PS 2-3. Will treat hypercalcemia and hypothyroidism and see where that gets us. Last cetuximab was 5/29, only C1D15, too early to determine efficacy. Reviewed with Douglas and Tulio the importance of performance status on treatment options. Also reviewed the option of choosing to no longer treat the cancer and focus on comfort. Discussed that if we can't get him feeling better over the next couple of weeks, then treatment becomes unrealistically more likely to help than to harm. Fortunately, he's able to do PT and seems to be improving some. Will see him next weekly. If we have a window of opportunity, it might be best to change to amivantamab. I will see him Wed next week. Reinstate home care - needs ongoing nursing assessments    ADDENDUM: Able to review CTs from [facility]. Hard to compare 6/6 CT to MRI brain 5/11/25 as most of the sinus is not included on the brain MRI but I agree, it's probably worse. Await restaging CT and MRI on Monday.     Hypercalcemia of malignancy: Zometa and IVF today, recheck Fri, hopefully can do home care draw; check again on Mon when here for scans.   Tumor hemorrhage: S/p repeat embolization. No further bleeds.      Hypothyroidism: FT4 now low, start levothyroxine 75 mcg daily. Check again in ~ 2 months     Deconditioning: Getting home PT    Blurry vision: Left side only, from prior visit, not discussed or brought up today, will evaluate that area on the MRI Mon    Increasing trismus, aspiration: Definitely aspirating. Drinking thickened liquids. Needs ongoing follow up with speech pathology. Last evaluation while inpt was when he was intermittently confused, etc., and before that was during hospitalization for embolization, so probably not the fairest gauge of how things are going now. Will place a new referral, see if they can evaluate next week when he's here.      Nutrition: TF dependent. Was eating one meal per day as of last visit with me, currently no food, only thickened liquids as above.       Hearing loss: Unchanged - still pretty significant on L. Not discussed today.      Hepatitis B: HBSAby negative, SAg +, cAby +, HBV PCR negative 1/23/24. HCV negative.     H/o PE/DVT: 5/8/2019 CTA report scanned into [facility]. Small PEs, LLE DVT, acute, occlusive. Was on rivaroxaban, completed course.      ASCVD: Asa 81 mg daily, metoprolol per PCP. Will try to continue despite nosebleed but of course stopping is also an option, would require risk benefit discussion with PCP as long as bleeding doesn't       The longitudinal plan of care for the condition(s) below were addressed during this visit. Due to the added complexity in care, I will continue to support Douglas in the subsequent management of this condition(s) and with the ongoing continuity of care of this condition(s): maxillary sinus cancer      60 minutes spent by me on the date of the encounter doing chart review, review of outside records, review of test results, interpretation of tests, patient visit, documentation, orders,    Professional phone  used throughout the visit    Kayy Post MD  Associate Professor of Medicine  Hematology, Oncology  and Transplantation    SUBJECTIVE  Returns for routine follow-up   Really tired. Resting most of the day  Sometimes will come downstairs to see friends/visitors  Coughing after thin liquids. Tulio has been mixing thickener, but doesn't taste good  No significant bleeding  Not eating  TF going ok    CANCER SUMMARY  8/9/23                CT sinus.   8/24/23  MRI sinus. L maxillary sinus mass  9/12/23              ED for epistaxis.   9/13/23              CTA and embolization of L IMAX   10/4/23  Nasal bx. Path: Inverted papilloma with high grade dysplasia  11/10/23 Nasal endoscopy and bx in the OR. C/b severe bleeding. Path: Inverted sinonasal papilloma with severe dysplasia.  11/17/23            MRI. 7.4 x 4.6 x 6.6 cm L sinonasal mass, destruction of nasal turbinates, L maxillary sinus, hard palate, invasion of L  space, involvement of medial and lateral pterygoids and temporalis muscles. Effacement and invasion ipsilateral pterygopalatine fossa, extends and effaces the nasopharynx.   11/30/23            Carotid angiogram. Direct endonasal and transoral embolization L sinonasal tumor, transaterial embolization of the L sphenopalatine artery feeders to the L sinonasal artery tumor   12/1/23  Stealth assisted transnasal endoscopic approach to excise L sinonasal mass. Pre-operative embolization. Path: SCC involving L maxilla.    12/9/23  PET. Hypermetabolic mass centered along the L maxilla, extending superiorly through the floor of the L maxillary sinus, posterior/laterally to the  space, invasion of the pterygoid muscles, extending cranially along the pterygopalatine fossa and pterygoid plates to the skull base level. Erosion of involved bones including L maxilla, maxillary sinus wall and pterygoid plates. Extension medially to the L lateral nasopharyngeal wall and soft palate. 1 mm fat place between carotid and mass. ~4.9 x 4.0 x 5.4 cm (SUV 24.3). Partially resected since 11/17/23 MRI. 8 mm L 2A  node (SUV 5.9), 7 mm L level 2 node (SUV 5.5)  1/2/24  Gtube (IR)  1/4/24  Video swallow. No aspiration  1/8~2/27/24 Chemoradiation with weekly cisplatin.  1/11/24  Port (IR)  8/5/24  PET/CT.  Overall increased FDG uptake centered along the posterior inferior left maxillary sinus involving the  space, left pterygopalatine fossa, left parapharyngeal space, extending inferiorly to left maxilla.  Increased soft tissue component within the space, now broad FDG uptake (SUV 24.97), previously 5.75.  Area measures 3.3 x 2.6 cm. Increased focal hypermetabolic activity posterior pharyngeal wall, extending to the left palatine tonsil, associated mucosal thickening (SUV 17.23).  Non-FDG avid mass, 2.7 x 2.4 cm, previously 2.1 x 2.5 cm, another medially, 1.1 x 1.5 cm, previously 1.1 x 1 cm.  New focal area of FDG uptake right lateral oropharyngeal wall (SUV 9.07).  Few hypermetabolic right cervical nodes, none definitively enlarged, however increased activity compared to prior (SUV 7.29-8.36).  Several sub-6 mm pulmonary nodules.  8/5/24  MRI.  Heterogeneous mass along the base of the left maxillary sinus extending to the maxilla,  space, left pterygopalatine fossa, and parapharyngeal space.  Some areas of treated but some corresponding to FDG avidity on same-day PET, highly suspicious for tumor recurrence.  Question early left V2 involvement, thin dural enhancement along the base of the left temporal lobe     9/20/24  L maxillary, L superior alveolus bx in clinic Path: Fragments of at least SCC in situ.   10/11/24 Transnasal bx L maxillary sinus (Dr. Iglesias). 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  Multiloculated mass at base of L maxilla extending to involve L side of palate, retromaxillary region, extending into the  space, premaxillary region.  Similar to prior MRI 8/5/2024.  Continuous slight abnormal asymmetric thickening L V3 and V2.  Asymmetric thin dural enhancement  along the base of the left temporal lobe.   10/29/2024 CT chest.  Scattered <6 mm lung nodules grossly stable compared to 8/5/2024.  Partially visualized common bile duct mildly dilated, 8 mm, possibly related to prior cholecystectomy, recommend MRCP.   1/20/25  CT neck and CAP.  Overall similar multiloculated L maxillary mass extending into the  and parapharyngeal spaces compared to PET/CT 8/5/2024.  No cervical adenopathy.  Stable small 2-5 mm lung nodules.  Increased RUL nodule, 1 mm --> 4 mm (5:139).  New 5 mm LLL nodule.  New somewhat linear 4 mm lingular nodule.  Attention on follow-up  11/20/24~1/24/25 Carboplatin + paclitaxel + pembrolizumab.   2/13~3/6/25 Maintenance pembrolizumab.   4/26/25  [facility] for L nosebleed. Afrin and pressure  6/2/25  Percutaneous tumor embolization, 70% reduction in tumor blush  6/2~6/5/25 [facility] for hypotension requiring levophed,   6/6~6/9/25 [facility] for sepsis due to aspiration pneumonia, acute hypoxic respiratory failure, AMS.     PAST MEDICAL HISTORY  Sinonasal carcinoma as above  HTN  ASCVD. CABG x 3 2019  PE and LLE DVT after CABG . Treated with rivaroxaban  Dyslipidemia  Hepatitis B   SCC R temple s/p MOHS  Ascending aortic aneurysm repair 2019  Hearing loss L   Gout - feet  Depression  Cholecystectomy    CURRENT OUTPATIENT MEDICATIONS  Reviewed    ALLERGIES  No Known Allergies     PHYSICAL EXAM  /67   Pulse 87   Temp 98.8  F (37.1  C) (Tympanic)   Resp 16   Wt 61.2 kg (134 lb 14.4 oz)   SpO2 97%   BMI 23.16 kg/m    GEN: NAD but looks really tired, having a harder time talking. In wheelchair  HEENT: EOMI, no icterus, injection or pallor  EXT: no edema  NEURO: sleepy    LABORATORY AND IMAGING STUDIES   07/09/25 12:15   Sodium 139   Potassium 4.0   Chloride 102   Carbon Dioxide (CO2) 26   Urea Nitrogen 32.7 (H)   Creatinine 0.83   GFR Estimate >90   Calcium 12.6 (H)   Anion Gap 11   Albumin 3.3 (L)   Protein Total 8.4 (H)   Alkaline Phosphatase 90   ALT 32    AST 32   Bilirubin Total 0.4   Glucose 151 (H)   T4 Free 0.85 (L)   TSH 6.31 (H)   WBC 11.6 (H)   Hemoglobin 9.3 (L)   Hematocrit 30.6 (L)   Platelet Count 292   RBC Count 3.51 (L)   MCV 87   MCH 26.5   MCHC 30.4 (L)   RDW 17.6 (H)   % Neutrophils 71   % Lymphocytes 23   % Monocytes 4   % Eosinophils 1   % Basophils 0   % Immature Granulocytes 1   NRBC/W 0   Absolute Neutrophil 8.3   Absolute Lymphocytes 2.7   Absolute Monocytes 0.5   Absolute Eosinophils 0.1   Absolute Basophils 0.0   Absolute Immature Granulocytes 0.1   Absolute NRBCs 0.0     Labs independently reviewed and interpreted by me as above    CT neck w/contrast, CT head without contrast, CTA from [facility] 6/6 personally reviewed as above. Additionally, definitely has lung consolidations, R>L at the posterior bases, and infiltrate c/w pneumonia.

## 2025-07-09 NOTE — NURSING NOTE
"Chief Complaint   Patient presents with    Port Draw     Labs drawn from port by rn.  VS taken.     Port accessed with 20 gauge 3/4\" Power needle and labs drawn by rn.  Port flushed with NS and heparin.  VS taken.  Pt checked in for next appt.    Add:  Pt unable to speak so  could understand apparently d/t pain with moving his mouth.  Therefore, pt's son was asked to join this appt and assist with communication.  This was successful.    Imani Chilel RN      "

## 2025-07-09 NOTE — PATIENT INSTRUCTIONS
[facility] Triage and after hours / weekends / holidays:  [phone]    Please call the triage or after hours line if you experience a temperature greater than or equal to 100.4, shaking chills, have uncontrolled nausea, vomiting and/or diarrhea, dizziness, shortness of breath, chest pain, bleeding, unexplained bruising, or if you have any other new/concerning symptoms, questions or concerns.      If you are having any concerning symptoms or wish to speak to a provider before your next infusion visit, please call triage to notify them so we can adequately serve you.     If you need a refill on a narcotic prescription or other medication, please call before your infusion appointment.           July 2025 Sunday Monday Tuesday Wednesday Thursday Friday Saturday             1     2     3     4     5       6     7     8     9    [facility]  11:55 AM   (35 min.)   [facility]    Return Patient  12:30 PM   (30 min.)   Kayy Post MD   [facility]    Infusion 120   2:30 PM   (120 min.)   [facility] INFUSION NURSE   [facility] 10     11     12       13     14    CT CHEST/ABDOMEN/PELVIS NECK W CONTRAST   4:30 PM   (40 min.)   [facility] CT 2   [facility] CT    MR BRAIN & ORBITS W/O & W CONTRAST   5:30 PM   (40 min.)   N MR 1   [facility]    MR SINONASAL/ORAL CAVITY/PAROTID WWO CONTRAST   6:10 PM   (40 min.)   [facility] MR 1   [facility] 15     16    Return Patient  12:30 PM   (30 min.)   Kayy Post MD   [facility] 17     18     19       20     21     22     23    [facility]  10:45 AM   (15 min.)    [facility] DRAW   [facility]    Infusion 120  11:30 AM   (120 min.)   [facility] INFUSION NURSE   [facility] 24     25     26       27     28     29     30      31 August 2025 Sunday Monday Tuesday Wednesday Thursday Friday Saturday                            1     2       3     4     5     6     7     8     9       10     11     12     13     14     15     16       17     18     19     20     21     22     23       24     25     26     27     28     29     30       31                                                      Recent Results (from the past 24 hours)   T4 free    Collection Time: 07/09/25 12:15 PM   Result Value Ref Range    Free T4 0.85 (L) 0.90 - 1.70 ng/dL   TSH    Collection Time: 07/09/25 12:15 PM   Result Value Ref Range    TSH 6.31 (H) 0.30 - 4.20 uIU/mL   Comprehensive metabolic panel    Collection Time: 07/09/25 12:15 PM   Result Value Ref Range    Sodium 139 135 - 145 mmol/L    Potassium 4.0 3.4 - 5.3 mmol/L    Carbon Dioxide (CO2) 26 22 - 29 mmol/L    Anion Gap 11 7 - 15 mmol/L    Urea Nitrogen 32.7 (H) 8.0 - 23.0 mg/dL    Creatinine 0.83 0.67 - 1.17 mg/dL    GFR Estimate >90 >60 mL/min/1.73m2    Calcium 12.6 (H) 8.8 - 10.4 mg/dL    Chloride 102 98 - 107 mmol/L    Glucose 151 (H) 70 - 99 mg/dL    Alkaline Phosphatase 90 40 - 150 U/L    AST 32 0 - 45 U/L    ALT 32 0 - 70 U/L    Protein Total 8.4 (H) 6.4 - 8.3 g/dL    Albumin 3.3 (L) 3.5 - 5.2 g/dL    Bilirubin Total 0.4 <=1.2 mg/dL   CBC with platelets and differential    Collection Time: 07/09/25 12:15 PM   Result Value Ref Range    WBC Count 11.6 (H) 4.0 - 11.0 10e3/uL    RBC Count 3.51 (L) 4.40 - 5.90 10e6/uL    Hemoglobin 9.3 (L) 13.3 - 17.7 g/dL    Hematocrit 30.6 (L) 40.0 - 53.0 %    MCV 87 78 - 100 fL    MCH 26.5 26.5 - 33.0 pg    MCHC 30.4 (L) 31.5 - 36.5 g/dL    RDW 17.6 (H) 10.0 - 15.0 %    Platelet Count 292 150 - 450 10e3/uL    % Neutrophils 71 %    % Lymphocytes 23 %    % Monocytes 4 %    % Eosinophils 1 %    % Basophils 0 %    % Immature Granulocytes 1 %    NRBCs per 100 WBC 0 <1 /100    Absolute Neutrophils 8.3 1.6 - 8.3 10e3/uL    Absolute Lymphocytes 2.7  0.8 - 5.3 10e3/uL    Absolute Monocytes 0.5 0.0 - 1.3 10e3/uL    Absolute Eosinophils 0.1 0.0 - 0.7 10e3/uL    Absolute Basophils 0.0 0.0 - 0.2 10e3/uL    Absolute Immature Granulocytes 0.1 <=0.4 10e3/uL    Absolute NRBCs 0.0 10e3/uL

## 2025-07-09 NOTE — PROGRESS NOTES
[facility] 7/9/25 @ 1330 Dr Post-Micki Plascencia, RN  -Hold Erbitux +Nivolumab today  -Give 1 L NS IV for Hypercalcemia  -Give Zometa IV for Hypercalcemia    Infusion Nursing Note:  Douglas Herrera presents today for IV fluids and zometa (chemo HELD).    Patient seen by provider today: Yes: Dr Post   present during visit today: Patient not speaking due to pain today and lethargy. Son at bedside and speaks English    Note: No interventions needed for pain while in infusion today.    Son given education regarding new zometa today including the potential for flu-like symptoms following the infusion.       Intravenous Access:  Implanted Port.    Treatment Conditions:  Lab Results   Component Value Date    HGB 9.3 (L) 07/09/2025    WBC 11.6 (H) 07/09/2025    ANEU 8.3 07/09/2025     07/09/2025        Lab Results   Component Value Date     07/09/2025    POTASSIUM 4.0 07/09/2025    MAG 2.4 (H) 06/03/2025    CR 0.83 07/09/2025    FLORENCE 12.6 (H) 07/09/2025    BILITOTAL 0.4 07/09/2025    ALBUMIN 3.3 (L) 07/09/2025    ALT 32 07/09/2025    AST 32 07/09/2025       Results reviewed, ok to proceed with treatment.  IV fluids and zometa given for hypercalcemia.       Post Infusion Assessment:  Patient tolerated infusion without incident.  Blood return noted pre and post infusion.  Site patent and intact, free from redness, edema or discomfort.  No evidence of extravasations.  Access discontinued per protocol.       Discharge Plan:   Patient declined prescription refills.  Discharge instructions reviewed with: Patient and Family.  Patient and/or family verbalized understanding of discharge instructions and all questions answered.  AVS to patient via [employer].  Patient will return 7/16/25 for next MD appointment.   Patient discharged in stable condition accompanied by: self and son.  Departure Mode: Wheelchair.      Ashley Joel, RN

## 2025-07-09 NOTE — LETTER
7/9/2025      Douglas Herrera  [address] [place] [address]      Dear Colleague,    Thank you for referring your patient, Douglas Herrera, to the [facility]. Please see a copy of my visit note below.        [facility]  [address] [facility] [address]  Phone: [phone]  Fax: [phone]    PATIENT NAME: Douglas Herrera  MRN # [id number]   DATE OF VISIT: July 9, 2025  YOB: 1960     Otolaryngology: Dr. Damon BurksChavira   Radiation Oncology: Dr. Tomasa Akers  Cardiology: Dr. Qamar Hernandez  Hepatologist: [facility]   Palliative Care: Dr. Joshua Banks      CANCER TYPE: SCC sinonasal   STAGE: [id number] (IVB)  ECOG PS: 2-3     PD-L1: TPS 60-70%, CPS >70% on [id number]  NGS: internal panel. Fusion negative. ARID1A S90fs, EGFR exon 20 insertion, [id number], APC [id number], TMB 7.313    ASSESSMENT AND PLAN  SCC L maxillary sinus, [id number] (IVB), ARID1 mutation,  EGFR exon 20 insertion: Requested CT from [facility]. ECOG PS 2-3. Will treat hypercalcemia and hypothyroidism and see where that gets us. Last cetuximab was 5/29, only C1D15, too early to determine efficacy. Reviewed with Douglas and Tulio the importance of performance status on treatment options. Also reviewed the option of choosing to no longer treat the cancer and focus on comfort. Discussed that if we can't get him feeling better over the next couple of weeks, then treatment becomes unrealistically more likely to help than to harm. Fortunately, he's able to do PT and seems to be improving some. Will see him next weekly. If we have a window of opportunity, it might be best to change to amivantamab. I will see him Wed next week. Reinstate home care - needs ongoing nursing assessments    ADDENDUM: Able to review CTs from [facility]. Hard to compare 6/6 CT to MRI brain 5/11/25 as most of the sinus is not included on the brain MRI but I agree, it's probably worse. Await  restaging CT and MRI on Monday.     Hypercalcemia of malignancy: Zometa and IVF today, recheck Fri, hopefully can do home care draw; check again on Mon when here for scans.   Tumor hemorrhage: S/p repeat embolization. No further bleeds.     Hypothyroidism: FT4 now low, start levothyroxine 75 mcg daily. Check again in ~ 2 months     Deconditioning: Getting home PT    Blurry vision: Left side only, from prior visit, not discussed or brought up today, will evaluate that area on the MRI Mon    Increasing trismus, aspiration: Definitely aspirating. Drinking thickened liquids. Needs ongoing follow up with speech pathology. Last evaluation while inpt was when he was intermittently confused, etc., and before that was during hospitalization for embolization, so probably not the fairest gauge of how things are going now. Will place a new referral, see if they can evaluate next week when he's here.      Nutrition: TF dependent. Was eating one meal per day as of last visit with me, currently no food, only thickened liquids as above.       Hearing loss: Unchanged - still pretty significant on L. Not discussed today.      Hepatitis B: HBSAby negative, SAg +, cAby +, HBV PCR negative 1/23/24. HCV negative.     H/o PE/DVT: 5/8/2019 CTA report scanned into [facility]. Small PEs, LLE DVT, acute, occlusive. Was on rivaroxaban, completed course.      ASCVD: Asa 81 mg daily, metoprolol per PCP. Will try to continue despite nosebleed but of course stopping is also an option, would require risk benefit discussion with PCP as long as bleeding doesn't       The longitudinal plan of care for the condition(s) below were addressed during this visit. Due to the added complexity in care, I will continue to support Douglas in the subsequent management of this condition(s) and with the ongoing continuity of care of this condition(s): maxillary sinus cancer      60 minutes spent by me on the date of the encounter doing chart review, review of  outside records, review of test results, interpretation of tests, patient visit, documentation, orders,    Professional phone  used throughout the visit    Kayy Post MD  Associate Professor of Medicine  Hematology, Oncology and Transplantation    SUBJECTIVE  Returns for routine follow-up   Really tired. Resting most of the day  Sometimes will come downstairs to see friends/visitors  Coughing after thin liquids. Tulio has been mixing thickener, but doesn't taste good  No significant bleeding  Not eating  TF going ok    CANCER SUMMARY  8/9/23                CT sinus.   8/24/23  MRI sinus. L maxillary sinus mass  9/12/23              ED for epistaxis.   9/13/23              CTA and embolization of L IMAX   10/4/23  Nasal bx. Path: Inverted papilloma with high grade dysplasia  11/10/23 Nasal endoscopy and bx in the OR. C/b severe bleeding. Path: Inverted sinonasal papilloma with severe dysplasia.  11/17/23            MRI. 7.4 x 4.6 x 6.6 cm L sinonasal mass, destruction of nasal turbinates, L maxillary sinus, hard palate, invasion of L  space, involvement of medial and lateral pterygoids and temporalis muscles. Effacement and invasion ipsilateral pterygopalatine fossa, extends and effaces the nasopharynx.   11/30/23            Carotid angiogram. Direct endonasal and transoral embolization L sinonasal tumor, transaterial embolization of the L sphenopalatine artery feeders to the L sinonasal artery tumor   12/1/23  Stealth assisted transnasal endoscopic approach to excise L sinonasal mass. Pre-operative embolization. Path: SCC involving L maxilla.    12/9/23  PET. Hypermetabolic mass centered along the L maxilla, extending superiorly through the floor of the L maxillary sinus, posterior/laterally to the  space, invasion of the pterygoid muscles, extending cranially along the pterygopalatine fossa and pterygoid plates to the skull base level. Erosion of involved bones including L  maxilla, maxillary sinus wall and pterygoid plates. Extension medially to the L lateral nasopharyngeal wall and soft palate. 1 mm fat place between carotid and mass. ~4.9 x 4.0 x 5.4 cm (SUV 24.3). Partially resected since 11/17/23 MRI. 8 mm L 2A node (SUV 5.9), 7 mm L level 2 node (SUV 5.5)  1/2/24  Gtube (IR)  1/4/24  Video swallow. No aspiration  1/8~2/27/24 Chemoradiation with weekly cisplatin.  1/11/24  Port (IR)  8/5/24  PET/CT.  Overall increased FDG uptake centered along the posterior inferior left maxillary sinus involving the  space, left pterygopalatine fossa, left parapharyngeal space, extending inferiorly to left maxilla.  Increased soft tissue component within the space, now broad FDG uptake (SUV 24.97), previously 5.75.  Area measures 3.3 x 2.6 cm. Increased focal hypermetabolic activity posterior pharyngeal wall, extending to the left palatine tonsil, associated mucosal thickening (SUV 17.23).  Non-FDG avid mass, 2.7 x 2.4 cm, previously 2.1 x 2.5 cm, another medially, 1.1 x 1.5 cm, previously 1.1 x 1 cm.  New focal area of FDG uptake right lateral oropharyngeal wall (SUV 9.07).  Few hypermetabolic right cervical nodes, none definitively enlarged, however increased activity compared to prior (SUV 7.29-8.36).  Several sub-6 mm pulmonary nodules.  8/5/24  MRI.  Heterogeneous mass along the base of the left maxillary sinus extending to the maxilla,  space, left pterygopalatine fossa, and parapharyngeal space.  Some areas of treated but some corresponding to FDG avidity on same-day PET, highly suspicious for tumor recurrence.  Question early left V2 involvement, thin dural enhancement along the base of the left temporal lobe     9/20/24  L maxillary, L superior alveolus bx in clinic Path: Fragments of at least SCC in situ.   10/11/24 Transnasal bx L maxillary sinus (Dr. Iglesias). 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  Multiloculated mass at base of L maxilla  extending to involve L side of palate, retromaxillary region, extending into the  space, premaxillary region.  Similar to prior MRI 8/5/2024.  Continuous slight abnormal asymmetric thickening L V3 and V2.  Asymmetric thin dural enhancement along the base of the left temporal lobe.   10/29/2024 CT chest.  Scattered <6 mm lung nodules grossly stable compared to 8/5/2024.  Partially visualized common bile duct mildly dilated, 8 mm, possibly related to prior cholecystectomy, recommend MRCP.   1/20/25  CT neck and CAP.  Overall similar multiloculated L maxillary mass extending into the  and parapharyngeal spaces compared to PET/CT 8/5/2024.  No cervical adenopathy.  Stable small 2-5 mm lung nodules.  Increased RUL nodule, 1 mm --> 4 mm (5:139).  New 5 mm LLL nodule.  New somewhat linear 4 mm lingular nodule.  Attention on follow-up  11/20/24~1/24/25 Carboplatin + paclitaxel + pembrolizumab.   2/13~3/6/25 Maintenance pembrolizumab.   4/26/25  [facility] for L nosebleed. Afrin and pressure  6/2/25  Percutaneous tumor embolization, 70% reduction in tumor blush  6/2~6/5/25 [facility] for hypotension requiring levophed,   6/6~6/9/25 [facility] for sepsis due to aspiration pneumonia, acute hypoxic respiratory failure, AMS.     PAST MEDICAL HISTORY  Sinonasal carcinoma as above  HTN  ASCVD. CABG x 3 2019  PE and LLE DVT after CABG . Treated with rivaroxaban  Dyslipidemia  Hepatitis B   SCC R temple s/p MOHS  Ascending aortic aneurysm repair 2019  Hearing loss L   Gout - feet  Depression  Cholecystectomy    CURRENT OUTPATIENT MEDICATIONS  Reviewed    ALLERGIES  No Known Allergies     PHYSICAL EXAM  /67   Pulse 87   Temp 98.8  F (37.1  C) (Tympanic)   Resp 16   Wt 61.2 kg (134 lb 14.4 oz)   SpO2 97%   BMI 23.16 kg/m    GEN: NAD but looks really tired, having a harder time talking. In wheelchair  HEENT: EOMI, no icterus, injection or pallor  EXT: no edema  NEURO: sleepy    LABORATORY AND IMAGING STUDIES   07/09/25  12:15   Sodium 139   Potassium 4.0   Chloride 102   Carbon Dioxide (CO2) 26   Urea Nitrogen 32.7 (H)   Creatinine 0.83   GFR Estimate >90   Calcium 12.6 (H)   Anion Gap 11   Albumin 3.3 (L)   Protein Total 8.4 (H)   Alkaline Phosphatase 90   ALT 32   AST 32   Bilirubin Total 0.4   Glucose 151 (H)   T4 Free 0.85 (L)   TSH 6.31 (H)   WBC 11.6 (H)   Hemoglobin 9.3 (L)   Hematocrit 30.6 (L)   Platelet Count 292   RBC Count 3.51 (L)   MCV 87   MCH 26.5   MCHC 30.4 (L)   RDW 17.6 (H)   % Neutrophils 71   % Lymphocytes 23   % Monocytes 4   % Eosinophils 1   % Basophils 0   % Immature Granulocytes 1   NRBC/W 0   Absolute Neutrophil 8.3   Absolute Lymphocytes 2.7   Absolute Monocytes 0.5   Absolute Eosinophils 0.1   Absolute Basophils 0.0   Absolute Immature Granulocytes 0.1   Absolute NRBCs 0.0     Labs independently reviewed and interpreted by me as above    CT neck w/contrast, CT head without contrast, CTA from [facility] 6/6 personally reviewed as above. Additionally, definitely has lung consolidations, R>L at the posterior bases, and infiltrate c/w pneumonia.               Again, thank you for allowing me to participate in the care of your patient.        Sincerely,        Kayy Post MD    Electronically signed

## 2025-07-10 ENCOUNTER — PATIENT OUTREACH (OUTPATIENT)
Dept: ONCOLOGY | Facility: CLINIC | Age: 65
End: 2025-07-10
Payer: COMMERCIAL

## 2025-07-10 LAB
MAGNESIUM SERPL-MCNC: 1.7 MG/DL (ref 1.7–2.3)
PHOSPHATE SERPL-MCNC: 2.3 MG/DL (ref 2.5–4.5)

## 2025-07-10 PROCEDURE — A4450 NON-WATERPROOF TAPE: HCPCS

## 2025-07-10 PROCEDURE — S9341 HIT ENTERAL GRAV DIEM: HCPCS

## 2025-07-10 NOTE — PROGRESS NOTES
[facility]: Cancer Care                                                                                          [facility] Discussed plan for home health lab draw tomorrow for CBC pd & CMP per the request of Dr. Post. They confirmed they are able to accommodate.    Trudy Shelton, RN, BSN  RN Care Coordinator  [facility]

## 2025-07-11 PROCEDURE — S9341 HIT ENTERAL GRAV DIEM: HCPCS

## 2025-07-12 ENCOUNTER — HOSPITAL ENCOUNTER (INPATIENT)
Facility: HOSPITAL | Age: 65
DRG: 871 | End: 2025-07-12
Attending: STUDENT IN AN ORGANIZED HEALTH CARE EDUCATION/TRAINING PROGRAM | Admitting: HOSPITALIST
Payer: COMMERCIAL

## 2025-07-12 ENCOUNTER — APPOINTMENT (OUTPATIENT)
Dept: CT IMAGING | Facility: HOSPITAL | Age: 65
End: 2025-07-12
Attending: STUDENT IN AN ORGANIZED HEALTH CARE EDUCATION/TRAINING PROGRAM
Payer: COMMERCIAL

## 2025-07-12 DIAGNOSIS — R50.9 FEVER, UNSPECIFIED FEVER CAUSE: ICD-10-CM

## 2025-07-12 DIAGNOSIS — Z71.89 OTHER SPECIFIED COUNSELING: Chronic | ICD-10-CM

## 2025-07-12 LAB
ALBUMIN SERPL BCG-MCNC: 2.9 G/DL (ref 3.5–5.2)
ALBUMIN UR-MCNC: NEGATIVE MG/DL
ALP SERPL-CCNC: 78 U/L (ref 40–150)
ALT SERPL W P-5'-P-CCNC: 27 U/L (ref 0–70)
ANION GAP SERPL CALCULATED.3IONS-SCNC: 10 MMOL/L (ref 7–15)
APPEARANCE UR: CLEAR
AST SERPL W P-5'-P-CCNC: 28 U/L (ref 0–45)
BASOPHILS # BLD AUTO: 0 10E3/UL (ref 0–0.2)
BASOPHILS NFR BLD AUTO: 0 %
BILIRUB SERPL-MCNC: 0.4 MG/DL
BILIRUB UR QL STRIP: NEGATIVE
BUN SERPL-MCNC: 22.6 MG/DL (ref 8–23)
CALCIUM SERPL-MCNC: 9.7 MG/DL (ref 8.8–10.4)
CHLORIDE SERPL-SCNC: 96 MMOL/L (ref 98–107)
COLOR UR AUTO: ABNORMAL
CREAT SERPL-MCNC: 0.83 MG/DL (ref 0.67–1.17)
EGFRCR SERPLBLD CKD-EPI 2021: >90 ML/MIN/1.73M2
EOSINOPHIL # BLD AUTO: 0.1 10E3/UL (ref 0–0.7)
EOSINOPHIL NFR BLD AUTO: 1 %
ERYTHROCYTE [DISTWIDTH] IN BLOOD BY AUTOMATED COUNT: 17.3 % (ref 10–15)
GLUCOSE BLDC GLUCOMTR-MCNC: 99 MG/DL (ref 70–99)
GLUCOSE SERPL-MCNC: 117 MG/DL (ref 70–99)
GLUCOSE UR STRIP-MCNC: NEGATIVE MG/DL
HCO3 SERPL-SCNC: 24 MMOL/L (ref 22–29)
HCT VFR BLD AUTO: 28.2 % (ref 40–53)
HGB BLD-MCNC: 8.8 G/DL (ref 13.3–17.7)
HGB UR QL STRIP: NEGATIVE
IMM GRANULOCYTES # BLD: 0.1 10E3/UL
IMM GRANULOCYTES NFR BLD: 1 %
KETONES UR STRIP-MCNC: NEGATIVE MG/DL
LACTATE SERPL-SCNC: 1.3 MMOL/L (ref 0.7–2)
LEUKOCYTE ESTERASE UR QL STRIP: NEGATIVE
LYMPHOCYTES # BLD AUTO: 3 10E3/UL (ref 0.8–5.3)
LYMPHOCYTES NFR BLD AUTO: 23 %
MCH RBC QN AUTO: 26.4 PG (ref 26.5–33)
MCHC RBC AUTO-ENTMCNC: 31.2 G/DL (ref 31.5–36.5)
MCV RBC AUTO: 85 FL (ref 78–100)
MONOCYTES # BLD AUTO: 1.1 10E3/UL (ref 0–1.3)
MONOCYTES NFR BLD AUTO: 9 %
NEUTROPHILS # BLD AUTO: 8.7 10E3/UL (ref 1.6–8.3)
NEUTROPHILS NFR BLD AUTO: 67 %
NITRATE UR QL: NEGATIVE
NRBC # BLD AUTO: 0 10E3/UL
NRBC BLD AUTO-RTO: 0 /100
PH UR STRIP: 7.5 [PH] (ref 5–7)
PLATELET # BLD AUTO: 283 10E3/UL (ref 150–450)
POTASSIUM SERPL-SCNC: 4.3 MMOL/L (ref 3.4–5.3)
PROCALCITONIN SERPL IA-MCNC: 0.05 NG/ML
PROT SERPL-MCNC: 8.2 G/DL (ref 6.4–8.3)
RBC # BLD AUTO: 3.33 10E6/UL (ref 4.4–5.9)
RBC URINE: 2 /HPF
SODIUM SERPL-SCNC: 130 MMOL/L (ref 135–145)
SP GR UR STRIP: 1.03 (ref 1–1.03)
TROPONIN T SERPL HS-MCNC: 13 NG/L
TROPONIN T SERPL HS-MCNC: 15 NG/L
UROBILINOGEN UR STRIP-MCNC: NORMAL MG/DL
WBC # BLD AUTO: 13 10E3/UL (ref 4–11)
WBC URINE: 1 /HPF

## 2025-07-12 PROCEDURE — 36415 COLL VENOUS BLD VENIPUNCTURE: CPT | Performed by: STUDENT IN AN ORGANIZED HEALTH CARE EDUCATION/TRAINING PROGRAM

## 2025-07-12 PROCEDURE — 84145 PROCALCITONIN (PCT): CPT | Performed by: STUDENT IN AN ORGANIZED HEALTH CARE EDUCATION/TRAINING PROGRAM

## 2025-07-12 PROCEDURE — 250N000013 HC RX MED GY IP 250 OP 250 PS 637: Performed by: HOSPITALIST

## 2025-07-12 PROCEDURE — 84484 ASSAY OF TROPONIN QUANT: CPT | Performed by: STUDENT IN AN ORGANIZED HEALTH CARE EDUCATION/TRAINING PROGRAM

## 2025-07-12 PROCEDURE — S9341 HIT ENTERAL GRAV DIEM: HCPCS

## 2025-07-12 PROCEDURE — 99285 EMERGENCY DEPT VISIT HI MDM: CPT | Mod: 25 | Performed by: STUDENT IN AN ORGANIZED HEALTH CARE EDUCATION/TRAINING PROGRAM

## 2025-07-12 PROCEDURE — 258N000003 HC RX IP 258 OP 636: Performed by: HOSPITALIST

## 2025-07-12 PROCEDURE — 250N000011 HC RX IP 250 OP 636: Performed by: STUDENT IN AN ORGANIZED HEALTH CARE EDUCATION/TRAINING PROGRAM

## 2025-07-12 PROCEDURE — 70491 CT SOFT TISSUE NECK W/DYE: CPT

## 2025-07-12 PROCEDURE — 83605 ASSAY OF LACTIC ACID: CPT | Performed by: STUDENT IN AN ORGANIZED HEALTH CARE EDUCATION/TRAINING PROGRAM

## 2025-07-12 PROCEDURE — 80051 ELECTROLYTE PANEL: CPT | Performed by: STUDENT IN AN ORGANIZED HEALTH CARE EDUCATION/TRAINING PROGRAM

## 2025-07-12 PROCEDURE — 120N000001 HC R&B MED SURG/OB

## 2025-07-12 PROCEDURE — 250N000011 HC RX IP 250 OP 636: Performed by: HOSPITALIST

## 2025-07-12 PROCEDURE — 258N000003 HC RX IP 258 OP 636: Performed by: STUDENT IN AN ORGANIZED HEALTH CARE EDUCATION/TRAINING PROGRAM

## 2025-07-12 PROCEDURE — 87040 BLOOD CULTURE FOR BACTERIA: CPT | Performed by: STUDENT IN AN ORGANIZED HEALTH CARE EDUCATION/TRAINING PROGRAM

## 2025-07-12 PROCEDURE — 74177 CT ABD & PELVIS W/CONTRAST: CPT

## 2025-07-12 PROCEDURE — 85025 COMPLETE CBC W/AUTO DIFF WBC: CPT | Performed by: STUDENT IN AN ORGANIZED HEALTH CARE EDUCATION/TRAINING PROGRAM

## 2025-07-12 PROCEDURE — 81001 URINALYSIS AUTO W/SCOPE: CPT | Performed by: STUDENT IN AN ORGANIZED HEALTH CARE EDUCATION/TRAINING PROGRAM

## 2025-07-12 PROCEDURE — 70450 CT HEAD/BRAIN W/O DYE: CPT

## 2025-07-12 PROCEDURE — 99223 1ST HOSP IP/OBS HIGH 75: CPT | Performed by: INTERNAL MEDICINE

## 2025-07-12 PROCEDURE — 99223 1ST HOSP IP/OBS HIGH 75: CPT | Performed by: HOSPITALIST

## 2025-07-12 PROCEDURE — 36591 DRAW BLOOD OFF VENOUS DEVICE: CPT | Performed by: STUDENT IN AN ORGANIZED HEALTH CARE EDUCATION/TRAINING PROGRAM

## 2025-07-12 PROCEDURE — 82962 GLUCOSE BLOOD TEST: CPT

## 2025-07-12 RX ORDER — NALOXONE HYDROCHLORIDE 0.4 MG/ML
0.2 INJECTION, SOLUTION INTRAMUSCULAR; INTRAVENOUS; SUBCUTANEOUS
Status: DISCONTINUED | OUTPATIENT
Start: 2025-07-12 | End: 2025-07-19 | Stop reason: HOSPADM

## 2025-07-12 RX ORDER — ENTECAVIR 0.5 MG/1
0.5 TABLET, FILM COATED ORAL EVERY EVENING
Status: DISCONTINUED | OUTPATIENT
Start: 2025-07-12 | End: 2025-07-12

## 2025-07-12 RX ORDER — SODIUM CHLORIDE 9 MG/ML
INJECTION, SOLUTION INTRAVENOUS CONTINUOUS
Status: DISCONTINUED | OUTPATIENT
Start: 2025-07-12 | End: 2025-07-19 | Stop reason: HOSPADM

## 2025-07-12 RX ORDER — LEVOTHYROXINE SODIUM 75 UG/1
75 TABLET ORAL
Status: DISCONTINUED | OUTPATIENT
Start: 2025-07-12 | End: 2025-07-12

## 2025-07-12 RX ORDER — CALCIUM CARBONATE 500 MG/1
1000 TABLET, CHEWABLE ORAL 4 TIMES DAILY PRN
Status: DISCONTINUED | OUTPATIENT
Start: 2025-07-12 | End: 2025-07-12

## 2025-07-12 RX ORDER — AMOXICILLIN 250 MG
1 CAPSULE ORAL 2 TIMES DAILY PRN
Status: DISCONTINUED | OUTPATIENT
Start: 2025-07-12 | End: 2025-07-19 | Stop reason: HOSPADM

## 2025-07-12 RX ORDER — AMOXICILLIN 250 MG
2 CAPSULE ORAL 2 TIMES DAILY PRN
Status: DISCONTINUED | OUTPATIENT
Start: 2025-07-12 | End: 2025-07-19 | Stop reason: HOSPADM

## 2025-07-12 RX ORDER — LIDOCAINE 40 MG/G
CREAM TOPICAL
Status: DISCONTINUED | OUTPATIENT
Start: 2025-07-12 | End: 2025-07-19 | Stop reason: HOSPADM

## 2025-07-12 RX ORDER — DEXTROSE MONOHYDRATE 100 MG/ML
INJECTION, SOLUTION INTRAVENOUS CONTINUOUS PRN
Status: DISCONTINUED | OUTPATIENT
Start: 2025-07-12 | End: 2025-07-19 | Stop reason: HOSPADM

## 2025-07-12 RX ORDER — AMOXICILLIN 250 MG
1 CAPSULE ORAL 2 TIMES DAILY PRN
Status: DISCONTINUED | OUTPATIENT
Start: 2025-07-12 | End: 2025-07-12

## 2025-07-12 RX ORDER — IOPAMIDOL 755 MG/ML
90 INJECTION, SOLUTION INTRAVASCULAR ONCE
Status: COMPLETED | OUTPATIENT
Start: 2025-07-12 | End: 2025-07-12

## 2025-07-12 RX ORDER — PIPERACILLIN SODIUM, TAZOBACTAM SODIUM 4; .5 G/20ML; G/20ML
4.5 INJECTION, POWDER, LYOPHILIZED, FOR SOLUTION INTRAVENOUS EVERY 6 HOURS
Status: DISCONTINUED | OUTPATIENT
Start: 2025-07-12 | End: 2025-07-16

## 2025-07-12 RX ORDER — AMOXICILLIN 250 MG
2 CAPSULE ORAL 2 TIMES DAILY PRN
Status: DISCONTINUED | OUTPATIENT
Start: 2025-07-12 | End: 2025-07-12

## 2025-07-12 RX ORDER — CEFAZOLIN SODIUM 1 G/50ML
1250 SOLUTION INTRAVENOUS
Status: DISCONTINUED | OUTPATIENT
Start: 2025-07-13 | End: 2025-07-16

## 2025-07-12 RX ORDER — OXYCODONE HCL 5 MG/5 ML
3 SOLUTION, ORAL ORAL EVERY 4 HOURS PRN
Status: DISCONTINUED | OUTPATIENT
Start: 2025-07-12 | End: 2025-07-19 | Stop reason: HOSPADM

## 2025-07-12 RX ORDER — NALOXONE HYDROCHLORIDE 0.4 MG/ML
0.4 INJECTION, SOLUTION INTRAMUSCULAR; INTRAVENOUS; SUBCUTANEOUS
Status: DISCONTINUED | OUTPATIENT
Start: 2025-07-12 | End: 2025-07-19 | Stop reason: HOSPADM

## 2025-07-12 RX ORDER — PIPERACILLIN SODIUM, TAZOBACTAM SODIUM 4; .5 G/20ML; G/20ML
4.5 INJECTION, POWDER, LYOPHILIZED, FOR SOLUTION INTRAVENOUS ONCE
Status: COMPLETED | OUTPATIENT
Start: 2025-07-12 | End: 2025-07-12

## 2025-07-12 RX ORDER — CALCIUM CARBONATE 500 MG/1
1000 TABLET, CHEWABLE ORAL 4 TIMES DAILY PRN
Status: DISCONTINUED | OUTPATIENT
Start: 2025-07-12 | End: 2025-07-19 | Stop reason: HOSPADM

## 2025-07-12 RX ORDER — ACETAMINOPHEN 500 MG
500 TABLET ORAL EVERY 4 HOURS PRN
COMMUNITY

## 2025-07-12 RX ORDER — LEVOTHYROXINE SODIUM 25 UG/1
75 TABLET ORAL
Status: DISCONTINUED | OUTPATIENT
Start: 2025-07-13 | End: 2025-07-19 | Stop reason: HOSPADM

## 2025-07-12 RX ORDER — OXYCODONE HCL 5 MG/5 ML
3 SOLUTION, ORAL ORAL EVERY 4 HOURS PRN
Refills: 0 | Status: DISCONTINUED | OUTPATIENT
Start: 2025-07-12 | End: 2025-07-12

## 2025-07-12 RX ORDER — CEFAZOLIN SODIUM 1 G/50ML
1250 SOLUTION INTRAVENOUS ONCE
Status: COMPLETED | OUTPATIENT
Start: 2025-07-12 | End: 2025-07-12

## 2025-07-12 RX ADMIN — IOPAMIDOL 90 ML: 755 INJECTION, SOLUTION INTRAVENOUS at 07:24

## 2025-07-12 RX ADMIN — SODIUM CHLORIDE: 0.9 INJECTION, SOLUTION INTRAVENOUS at 13:03

## 2025-07-12 RX ADMIN — SODIUM CHLORIDE, SODIUM LACTATE, POTASSIUM CHLORIDE, AND CALCIUM CHLORIDE 1000 ML: .6; .31; .03; .02 INJECTION, SOLUTION INTRAVENOUS at 11:22

## 2025-07-12 RX ADMIN — PIPERACILLIN SODIUM AND TAZOBACTAM SODIUM 4.5 G: 4; .5 INJECTION, POWDER, LYOPHILIZED, FOR SOLUTION INTRAVENOUS at 13:47

## 2025-07-12 RX ADMIN — PIPERACILLIN AND TAZOBACTAM 4.5 G: 4; .5 INJECTION, POWDER, FOR SOLUTION INTRAVENOUS at 08:21

## 2025-07-12 RX ADMIN — OXYCODONE HYDROCHLORIDE 3 MG: 5 SOLUTION ORAL at 13:47

## 2025-07-12 RX ADMIN — SODIUM CHLORIDE 1250 MG: 0.9 INJECTION, SOLUTION INTRAVENOUS at 09:20

## 2025-07-12 RX ADMIN — PIPERACILLIN SODIUM AND TAZOBACTAM SODIUM 4.5 G: 4; .5 INJECTION, POWDER, LYOPHILIZED, FOR SOLUTION INTRAVENOUS at 20:21

## 2025-07-12 ASSESSMENT — ACTIVITIES OF DAILY LIVING (ADL)
ADLS_ACUITY_SCORE: 56
ADLS_ACUITY_SCORE: 58
ADLS_ACUITY_SCORE: 58
ADLS_ACUITY_SCORE: 56
ADLS_ACUITY_SCORE: 58
ADLS_ACUITY_SCORE: 56
ADLS_ACUITY_SCORE: 58
ADLS_ACUITY_SCORE: 56
ADLS_ACUITY_SCORE: 58

## 2025-07-12 NOTE — ED NOTES
[facility]  Expected date:   Expected time:   Means of arrival:   Comments:  ED#6

## 2025-07-12 NOTE — PHARMACY-VANCOMYCIN DOSING SERVICE
Pharmacy Vancomycin Initial Note  Date of Service 2025  Patient's  1960  65 year old, male    Indication: Sepsis    Current estimated CrCl = Estimated Creatinine Clearance: 76.3 mL/min (based on SCr of 0.83 mg/dL).    Creatinine for last 3 days  2025: 12:15 PM Creatinine 0.83 mg/dL  2025:  6:01 AM Creatinine 0.83 mg/dL    Recent Vancomycin Level(s) for last 3 days  No results found for requested labs within last 3 days.      Vancomycin IV Administrations (past 72 hours)                     vancomycin (VANCOCIN) 1,250 mg in sodium chloride 0.9 % 250 mL intermittent infusion (mg) 1,250 mg New Bag 25 0920                    Nephrotoxins and other renal medications (From now, onward)      Start     Dose/Rate Route Frequency Ordered Stop    25 0300  vancomycin (VANCOCIN) 1,250 mg in sodium chloride 0.9 % 250 mL intermittent infusion         1,250 mg  over 90 Minutes Intravenous EVERY 18 HOURS 25 1023      25 1400  piperacillin-tazobactam (ZOSYN) 4.5 g vial to attach to  mL bag         4.5 g  over 30 Minutes Intravenous EVERY 6 HOURS 25 0853      25 0730  vancomycin (VANCOCIN) 1,250 mg in sodium chloride 0.9 % 250 mL intermittent infusion         1,250 mg  over 90 Minutes Intravenous ONCE 25 0711              Contrast Orders - past 72 hours (72h ago, onward)      Start     Dose/Rate Route Frequency Stop    25 0730  iopamidol (ISOVUE-370) solution 90 mL         90 mL Intravenous ONCE 25 0724            [employer] Prediction of Planned Initial Vancomycin Regimen  Loading dose: N/A  Regimen: 1250 mg IV every 18 hours.  Start time: 09:20 on 2025  Exposure target: AUC24 (range) 400-600 mg/L.hr   AUC24,ss: 528 mg/L.hr  Probability of AUC24 > 400: 77 %  Ctrough,ss: 15 mg/L  Probability of Ctrough,ss > 20: 29 %  Probability of nephrotoxicity (Lodise CAROL ): 10 %          Plan:  Start vancomycin  1250 mg IV q18h.   Vancomycin monitoring  method: AUC  Vancomycin therapeutic monitoring goal: 400-600 mg*h/L  Pharmacy will check vancomycin levels as appropriate in 1-3 Days.    Serum creatinine levels will be ordered daily for the first week of therapy and at least twice weekly for subsequent weeks.      MARGARITA MCKEON RPH

## 2025-07-12 NOTE — PHARMACY-VANCOMYCIN DOSING SERVICE
Pharmacy Vancomycin Initial Note  Date of Service 2025  Patient's  1960  65 year old, male    Indication: Sepsis    Current estimated CrCl = Estimated Creatinine Clearance: 76.3 mL/min (based on SCr of 0.83 mg/dL).    Creatinine for last 3 days  2025: 12:15 PM Creatinine 0.83 mg/dL  2025:  6:01 AM Creatinine 0.83 mg/dL    Recent Vancomycin Level(s) for last 3 days  No results found for requested labs within last 3 days.      Vancomycin IV Administrations (past 72 hours)        No vancomycin orders with administrations in past 72 hours.                    Nephrotoxins and other renal medications (From now, onward)      Start     Dose/Rate Route Frequency Ordered Stop    25 0730  piperacillin-tazobactam (ZOSYN) 4.5 g vial to attach to  mL bag         4.5 g  over 30 Minutes Intravenous ONCE 25 0706      25 0730  vancomycin (VANCOCIN) 1,250 mg in sodium chloride 0.9 % 250 mL intermittent infusion         1,250 mg  over 90 Minutes Intravenous ONCE 25 0711              Contrast Orders - past 72 hours (72h ago, onward)      None          Will load vancomycin 1,250 IV once in the ED. Please reconsult pharmacy if vancomycin is to continue.     Tonia Merchant RPH

## 2025-07-12 NOTE — ED PROVIDER NOTES
Emergency Department Encounter         FINAL IMPRESSION:  Fever, weakness          ED COURSE AND MEDICAL DECISION MAKING       ED Course as of 07/12/25 2148   Sat Jul 12, 2025   [id number] I met with the patient, obtained history, performed an initial exam, and discussed options and plan for diagnostics and treatment here in the ED.   0553 65-year-old male with known head neck cancer, here with 3 days of increased confusion now with fever overnight.  Unable to stand due to generalized weakness.  No chest pain or trouble breathing.  No vomiting.  G-tube dependent.  On arrival he is tachycardic and febrile.  Is alert however difficult to determine whether or not he is oriented because of his facial swelling from his cancer.  Normal heart and lungs.  No abdominal pain.  G-tube present.  Posterior examination unremarkable.                          Medical Decision Making  I obtained history from Caregiver  Admission considered. Patient was signed out to the oncoming physician, disposition pending.    MIPS (CTPE, Dental pain, Rico, Sinusitis, Asthma/COPD, Head Trauma): Not Applicable    SEPSIS: None                  EKG  N/A        At the conclusion of the encounter I discussed the results of all the tests and the disposition. The questions were answered. The patient or family acknowledged understanding and was agreeable with the care plan.        MEDICATIONS GIVEN IN THE EMERGENCY DEPARTMENT:  Medications   lidocaine 1 % 0.1-1 mL (has no administration in time range)   lidocaine (LMX4) cream (has no administration in time range)   sodium chloride (PF) 0.9% PF flush 3 mL (3 mLs Intracatheter Not Given 7/12/25 1303)   sodium chloride (PF) 0.9% PF flush 3 mL (has no administration in time range)   menthol-zinc oxide (CALMOSEPTINE) 0.44-20.6 % ointment OINT (has no administration in time range)   miconazole (MICATIN) 2 % powder (has no administration in time range)   piperacillin-tazobactam (ZOSYN) 4.5 g vial to attach to NS  100 mL bag (4.5 g Intravenous $New Bag 7/12/25 2021)   vancomycin (VANCOCIN) 1,250 mg in sodium chloride 0.9 % 250 mL intermittent infusion (has no administration in time range)   sodium chloride 0.9 % infusion ( Intravenous Rate/Dose Verify 7/12/25 2042)   naloxone (NARCAN) injection 0.2 mg (has no administration in time range)     Or   naloxone (NARCAN) injection 0.4 mg (has no administration in time range)     Or   naloxone (NARCAN) injection 0.2 mg (has no administration in time range)     Or   naloxone (NARCAN) injection 0.4 mg (has no administration in time range)   dextrose 10% infusion (has no administration in time range)   entecavir (BARACLUDE) tablet 0.5 mg (has no administration in time range)   levothyroxine (SYNTHROID/LEVOTHROID) tablet 75 mcg (has no administration in time range)   calcium carbonate (TUMS) chewable tablet 1,000 mg (has no administration in time range)   oxyCODONE (ROXICODONE) solution 3 mg (has no administration in time range)   senna-docusate (SENOKOT-S/PERICOLACE) 8.6-50 MG per tablet 1 tablet (has no administration in time range)     Or   senna-docusate (SENOKOT-S/PERICOLACE) 8.6-50 MG per tablet 2 tablet (has no administration in time range)   piperacillin-tazobactam (ZOSYN) 4.5 g vial to attach to  mL bag (0 g Intravenous Stopped 7/12/25 0905)   vancomycin (VANCOCIN) 1,250 mg in sodium chloride 0.9 % 250 mL intermittent infusion (0 mg Intravenous Stopped 7/12/25 1122)   iopamidol (ISOVUE-370) solution 90 mL (90 mLs Intravenous $Given 7/12/25 0724)   lactated ringers BOLUS 1,000 mL (0 mLs Intravenous Stopped 7/12/25 1232)       NEW PRESCRIPTIONS STARTED AT TODAY'S ED VISIT:  New Prescriptions    No medications on file       HPI     Patient information obtained from: The patient's son    Use of : N/A    Douglas Herrera is a 65 year old male with a pertinent history of 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    Per the patient's son, reports that the last time the patient was seen by a provider, there were some discussions of keeping the patient in comfort care. The patient's family were thinking and talking about proceeding with hospice care for the patient. There also has been discussions with the patient's family about palliative care versus just managing the patient's pain. The patient developed a fever tonight (07/11/2025), and did not have fevers prior to tonight. There is some swelling in the patient's left-sided face (primarily under the patient's left eye) that comes and goes, and his son notes that the swelling has been more down than it usually is. Son mainly brought the patient in to the ED due to the patient's increased confusion. The patient was not confused one week ago. The patient had a couple of appointments at home including his physical therapy appointment and home care nurse visits. He had a lot more strength and was able to do a lot of activities during physical therapy, however, when the patient's son got home, the patient could not stand up due to generalized weakness. The patient was \"normal\" three days ago when he was seen by oncology, and there was discussions had regarding the decision of halting the patient's cancer treatment. The patient has been kept off of chemotherapy treatments.    The patient's code status is DNR/DNI, per son. If the patient is found to have an infection, his son reports that their family would like this to be treated. The main complaint that has been bothering the patient the most is the chronic facial pain. He also has a chronic cough as well.    Per the patient, he denies experiencing any trouble breathing or chest pain. He has not been vomiting. The patient is G-tube dependent. No additional " complaints or concerns reported at this time.      MEDICAL HISTORY     Past Medical History:   Diagnosis Date    Ascending aortic aneurysm     Coronary artery disease     Depression     Gout     History of anesthesia complications     Hyperlipemia     Hypertension     Malignant neoplasm of nasal cavities (H)     PONV (postoperative nausea and vomiting)        Past Surgical History:   Procedure Laterality Date    AORTA SURGERY N/A 4/23/2019    Procedure: WITH ASCENDING AORTA REPLACEMENT;  Surgeon: Darlene Graff MD;  Location: [facility] OR;  Service: Cardiovascular    BYPASS GRAFT ARTERY CORONARY      CARDIAC SURGERY      CV CORONARY ANGIOGRAM N/A 3/12/2019    Procedure: Coronary Angiogram;  Surgeon: Hamilton Lucero MD;  Location: [facility] Cath Lab;  Service: Cardiology    ENDOSCOPIC SINUS SURGERY Left 11/10/2023    Procedure: Transnasal endoscopic approach to biopsy left nasal mass;  Surgeon: Damon Regan MD;  Location: [facility] OR    GALLBLADDER SURGERY      INSERT PORT VASCULAR ACCESS N/A 1/11/2024    Procedure: Insert port vascular access;  Surgeon: Tyrel Silvestre MD;  Location: [facility] OR    IR CAROTID CEREBRAL ANGIOGRAM BILATERAL  11/30/2023    IR CAROTID CEREBRAL ANGIOGRAM BILATERAL  6/2/2025    IR CHEST PORT PLACEMENT > 5 YRS OF AGE  1/11/2024    IR FACIAL EMBOLIZATION LEFT  9/13/2023    IR GASTROSTOMY TUBE CHANGE  11/22/2024    IR GASTROSTOMY TUBE PERCUTANEOUS PLCMNT  1/2/2024    OPTICAL TRACKING SYSTEM ENDOSCOPIC SINUS SURGERY Left 12/1/2023    Procedure: Stealth assisted transnasal endoscopic approach to excise left sinonasal mass;  Surgeon: Damon Regan MD;  Location: [facility] OR    RESECT TUMOR SINUS Left 10/11/2024    Procedure: Transnasal Biopsy of Left Maxillary Sinus Tumor,;  Surgeon: Damon Regan MD;  Location: [facility] OR       Social History     Tobacco Use    Smoking status: Former     Types: Cigarettes     Passive exposure: Past     "Smokeless tobacco: Never   Vaping Use    Vaping status: Never Used   Substance Use Topics    Alcohol use: Not Currently     Comment: Occasionally    Drug use: No       acetaminophen (TYLENOL) 500 MG tablet  atorvastatin (LIPITOR) 80 MG tablet  celecoxib (CELEBREX) 5 mg/mL SUSP suspension  entecavir (BARACLUDE) 0.5 MG tablet  gabapentin (NEURONTIN) 250 MG/5ML solution  oxyCODONE (ROXICODONE) 5 MG/5ML solution  polyethylene glycol (MIRALAX) 17 g packet  prochlorperazine (COMPAZINE) 10 MG tablet  sodium chloride (OCEAN) 0.65 % nasal spray  levothyroxine (SYNTHROID/LEVOTHROID) 75 MCG tablet  nitroGLYcerin (NITROSTAT) 0.4 MG sublingual tablet  Osmolite 1.5 Migue 237 mL            PHYSICAL EXAM     /67 (BP Location: Left arm, Patient Position: Semi-Nielsen's, Cuff Size: Adult Regular)   Pulse 74   Temp 97  F (36.1  C) (Oral)   Resp 16   Ht 1.626 m (5' 4\")   Wt 60.8 kg (134 lb)   SpO2 98%   BMI 23.00 kg/m        PHYSICAL EXAM:     General: Patient appears well, nontoxic, comfortable  HEENT: Moist mucous membranes,  No head trauma.    Cardiovascular: Tachycardic, normal rhythm, no extremity edema.  No appreciable murmur.  Respiratory: No signs of respiratory distress, lungs are clear to auscultation bilaterally with no wheezes rhonchi or rales.  Abdominal: Soft, nontender, nondistended, no palpable masses, no guarding, no rebound. No abdominal pain. G-tube present.  Rectal: Posterior examination unremarkable.  Musculoskeletal: Full range of motion of joints, no deformities appreciated.  Neurological: Is alert however difficult to determine whether or not he is oriented because of his facial swelling from his cancer. Grossly neurologically intact.  Psychological: Normal affect and mood.  Integument: No rashes appreciated          RESULTS       Labs Ordered and Resulted from Time of ED Arrival to Time of ED Departure   COMPREHENSIVE METABOLIC PANEL - Abnormal       Result Value    Sodium 130 (*)     Potassium 4.3   "    Carbon Dioxide (CO2) 24      Anion Gap 10      Urea Nitrogen 22.6      Creatinine 0.83      GFR Estimate >90      Calcium 9.7      Chloride 96 (*)     Glucose 117 (*)     Alkaline Phosphatase 78      AST 28      ALT 27      Protein Total 8.2      Albumin 2.9 (*)     Bilirubin Total 0.4     CBC WITH PLATELETS AND DIFFERENTIAL - Abnormal    WBC Count 13.0 (*)     RBC Count 3.33 (*)     Hemoglobin 8.8 (*)     Hematocrit 28.2 (*)     MCV 85      MCH 26.4 (*)     MCHC 31.2 (*)     RDW 17.3 (*)     Platelet Count 283      % Neutrophils 67      % Lymphocytes 23      % Monocytes 9      % Eosinophils 1      % Basophils 0      % Immature Granulocytes 1      NRBCs per 100 WBC 0      Absolute Neutrophils 8.7 (*)     Absolute Lymphocytes 3.0      Absolute Monocytes 1.1      Absolute Eosinophils 0.1      Absolute Basophils 0.0      Absolute Immature Granulocytes 0.1      Absolute NRBCs 0.0     ROUTINE UA WITH MICROSCOPIC REFLEX TO CULTURE - Abnormal    Color Urine Light Yellow      Appearance Urine Clear      Glucose Urine Negative      Bilirubin Urine Negative      Ketones Urine Negative      Specific Gravity Urine 1.026      Blood Urine Negative      pH Urine 7.5 (*)     Protein Albumin Urine Negative      Urobilinogen Urine Normal      Nitrite Urine Negative      Leukocyte Esterase Urine Negative      RBC Urine 2      WBC Urine 1     LACTIC ACID WHOLE BLOOD WITH 1X REPEAT IN 2 HR WHEN >2 - Normal    Lactic Acid, Initial 1.3     PROCALCITONIN - Normal    Procalcitonin 0.05     TROPONIN T, HIGH SENSITIVITY - Normal    Troponin T, High Sensitivity 13     TROPONIN T, HIGH SENSITIVITY - Normal    Troponin T, High Sensitivity 15     GLUCOSE BY METER - Normal    GLUCOSE BY METER POCT 99     GLUCOSE MONITOR NURSING POCT   BLOOD CULTURE   BLOOD CULTURE       Soft tissue neck CT w contrast   Final Result   IMPRESSION:    1.  Redemonstrated transspatial cystic and solid mass centered in the left  space, which appears to have  increased in size and extent since the prior CT from 6/6/2025. There is again noted to be involvement of the surrounding structures, with    extensive associated osseous erosion and suspected intracranial extension, as above. Surrounding inflammatory changes appear decreased from the prior CT, although superimposed infection remains difficult to exclude.   2.  Numerous partially visualized nodules in the visualized lung apices, compatible with metastatic disease.      CT Chest/Abdomen/Pelvis w Contrast   Final Result   IMPRESSION:   1.  Since 06/06/2025, resolved pulmonary consolidative opacities/pneumonia.   2.  Continued increased in size multiple pulmonary nodules/metastases.   3.  No convincing acute process within the abdomen or pelvis.         Head CT w/o contrast   Final Result   IMPRESSION:   1.  No acute intracranial findings.   2.  Mild age-related changes.   3.  Please see separately dictated soft tissue neck CT for further description of the left-sided facial mass.            PROCEDURES:  Procedures:  Procedures       I, Anna Mulligan am serving as a scribe to document services personally performed by Wilmar Slaughter DO, based on my observations and the provider's statements to me.  I, Wilmar Slaughter DO, attest that Anna Mulligan is acting in a scribe capacity, has observed my performance of the services and has documented them in accordance with my direction.    iWlmar Slaughter DO  Emergency Medicine  [facility] EMERGENCY DEPARTMENT      Wilmar Slaughter DO  07/12/25 0015

## 2025-07-12 NOTE — ED TRIAGE NOTES
Increased weakness, fever, AMS, x 3 days, Hx cancer 102 in route     Triage Assessment (Adult)       Row Name 07/12/25 0517          Triage Assessment    Airway WDL WDL        Respiratory WDL    Respiratory WDL WDL        Skin Circulation/Temperature WDL    Skin Circulation/Temperature WDL WDL        Cardiac WDL    Cardiac WDL X;rhythm     Pulse Rate & Regularity tachycardic        Peripheral/Neurovascular WDL    Peripheral Neurovascular WDL WDL        Cognitive/Neuro/Behavioral WDL    Cognitive/Neuro/Behavioral WDL X     Level of Consciousness confused;lethargic     Arousal Level opens eyes spontaneously     Orientation disoriented to;place;time     Speech garbled;hoarse     Mood/Behavior calm;cooperative

## 2025-07-12 NOTE — PHARMACY-ADMISSION MEDICATION HISTORY
Pharmacist Admission Medication History    Admission medication history is complete. The information provided in this note is only as accurate as the sources available at the time of the update.    Information Source(s): Family member     Pertinent Information: Reached Tulio (son) for med rec via phone     Changes made to PTA medication list:  Added: None  Deleted: Clindamycin 1%, Gabapentin Capsule, Hydrocortisone,  Ondansetron odt, ondansetron tab, senna- s   Changed: None    Allergies reviewed with patient and updates made in EHR: yes       Medication History Completed By: Amrit Campos RPH 7/12/2025 2:44 PM    PTA Med List   Medication Sig Last Dose/Taking    acetaminophen (TYLENOL) 500 MG tablet Take 500 mg by mouth every 4 hours as needed for mild pain. Past Month    atorvastatin (LIPITOR) 80 MG tablet TAKE 1 TABLET (80 MG TOTAL) BY MOUTH AT BEDTIME/ TXHUA HMO NOJ 1 LUB TSHUAJ THAUM MUS PW PAB ZOO NTSHAV MUAJ ROJ 7/11/2025 Bedtime    celecoxib (CELEBREX) 5 mg/mL SUSP suspension Take 20 mLs (100 mg) by mouth 2 times daily. 7/11/2025 Bedtime    entecavir (BARACLUDE) 0.5 MG tablet Take 0.5 mg by mouth every evening. 7/11/2025 Evening    gabapentin (NEURONTIN) 250 MG/5ML solution Take 12 mLs (600 mg) by mouth 3 times daily. 7/11/2025 Evening    oxyCODONE (ROXICODONE) 5 MG/5ML solution Take 5 mLs (5 mg) by mouth 4 times daily as needed for severe pain. Past Week    polyethylene glycol (MIRALAX) 17 g packet Take 1 packet by mouth as needed. Past Month    prochlorperazine (COMPAZINE) 10 MG tablet Take 1 tablet (10 mg) by mouth every 6 hours as needed for nausea or vomiting. Past Month    sodium chloride (OCEAN) 0.65 % nasal spray Spray 2 sprays in nostril daily as needed for congestion. Past Month

## 2025-07-12 NOTE — PLAN OF CARE
Goal Outcome Evaluation:      Plan of Care Reviewed With: patient          Outcome Evaluation: pt admitted with left face, eye swelling noted. pt not fully alert to his location and why here. speech muffled, no drooling present. allowed writer to swab right side of mouth and placed warm wash cloth on left eye, swollen & scant eye matter present. gtube site patent, port accessed with fluids running.

## 2025-07-12 NOTE — H&P
[facility]    History and Physical - Hospitalist Service       Date of Admission:  7/12/2025    Assessment & Plan      Douglas Herrera is a 65 year old male admitted on 7/12/2025. He is being admitted for sepsis and acute encephalopathy.    #Sepsis with fever, tachycardia, leukocytosis  - Most likely sources include CNS and surrounding facial tissues, imaging unrevealing for abdominal pelvic pathology, CT head/face showing enlarging mass and surrounding inflammation  - Pro-Migue normal at 0.05, lactate 1.3  - Continue with Vanco and Zosyn pending cultures and response  - Consider ID consult pending course  - Supportive measures with IV fluids    #Acute encephalopathy  - Likely from sepsis and infection of the head/neck, alternatively consider polypharmacy  - Supportive measures, minimize CNS medications  - PT and OT and care management consults    #Hyponatremia  - 130 on admission, usually normal, likely from dehydration and sepsis  - Trend periodically with supportive measures, workup further if downtrending    #Anemia  - Likely from chemo, no apparent clinical blood losses  - Recent iron studies show low iron and low TIBC, normal sat    #SCC head neck cancer, stage IVb  - Follows with [facility] oncology  - Had been on cetuximab, now holding with deconditioning and frailty  - Oncology consulted to review treatment options and discuss prognosis  - Patient has elected to be DNR, not quite ready for hospice discussions, continue to broach    #Hypercalcemia of malignancy  - 7/8 had calcium in the 13's, received Zometa and IV fluids, calcium now normal  - Trend periodically, monitor for hypocalcemia    #Dysphagia and aspiration  - Per review, intermittently taking thickened liquids  - Speech and language pathology consulted to formalize diet  - Dietitian consulted to resume tube feeds    #Chronic hepatitis B  - Surface antigen positive core antibody positive, surface antibody negative, PCR - 1/2024  -  Reconcile home Entecavir if being taken    #Intercrural fungus  - Has typical odor, offer miconazole as needed    #Hypothyroidism  - Resume usual Synthroid              Diet: NPO for Medical/Clinical Reasons Except for: No Exceptions    DVT Prophylaxis: Pneumatic Compression Devices  Rico Catheter: Not present  Lines: PRESENT      Port a Cath 01/11/24 Single Lumen Right Chest wall-Site Assessment: WDL      Cardiac Monitoring: None  Code Status: No CPR- Do NOT Intubate      Clinically Significant Risk Factors Present on Admission         # Hyponatremia: Lowest Na = 130 mmol/L in last 2 days, will monitor as appropriate  # Hypochloremia: Lowest Cl = 96 mmol/L in last 2 days, will monitor as appropriate   # Hypercalcemia: corrected calcium is >10.1, will monitor as appropriate    # Hypoalbuminemia: Lowest albumin = 2.9 g/dL at 7/12/2025  6:01 AM, will monitor as appropriate   # Drug Induced Platelet Defect: home medication list includes an antiplatelet medication   # Hypertension: Noted on problem list      # Anemia: based on hgb <11            # History of CABG: noted on surgical history       Disposition Plan     Medically Ready for Discharge: Anticipated in 2-4 Days           Esequiel Breen MD  Hospitalist Service  [facility]  Securely message with [employer] (more info)  Text page via [facility] Paging/Directory     ______________________________________________________________________    Chief Complaint   Confusion    History is obtained from the patient    History of Present Illness   Douglas Herrera is a 65 year old male who presents to the ER for weakness.  Patient is confused and mumbles, declining  use and asking intermittently about chemotherapy.  He states he feels okay overall, denies any uncontrolled pain.  Admits to some vision and hearing changes on the left which are chronic.  No changes in urine or stool.  Generally feels weak being his main concern.      Past Medical History     Past Medical History:   Diagnosis Date    Ascending aortic aneurysm     Coronary artery disease     Depression     Gout     History of anesthesia complications     Hyperlipemia     Hypertension     Malignant neoplasm of nasal cavities (H)     PONV (postoperative nausea and vomiting)        Past Surgical History   Past Surgical History:   Procedure Laterality Date    AORTA SURGERY N/A 4/23/2019    Procedure: WITH ASCENDING AORTA REPLACEMENT;  Surgeon: Darlene Graff MD;  Location: [facility] OR;  Service: Cardiovascular    BYPASS GRAFT ARTERY CORONARY      CARDIAC SURGERY      CV CORONARY ANGIOGRAM N/A 3/12/2019    Procedure: Coronary Angiogram;  Surgeon: Hamilton Lucero MD;  Location: [facility] Cath Lab;  Service: Cardiology    ENDOSCOPIC SINUS SURGERY Left 11/10/2023    Procedure: Transnasal endoscopic approach to biopsy left nasal mass;  Surgeon: Damon Regan MD;  Location: [facility] OR    GALLBLADDER SURGERY      INSERT PORT VASCULAR ACCESS N/A 1/11/2024    Procedure: Insert port vascular access;  Surgeon: Tyrel Silvestre MD;  Location: [facility] OR    IR CAROTID CEREBRAL ANGIOGRAM BILATERAL  11/30/2023    IR CAROTID CEREBRAL ANGIOGRAM BILATERAL  6/2/2025    IR CHEST PORT PLACEMENT > 5 YRS OF AGE  1/11/2024    IR FACIAL EMBOLIZATION LEFT  9/13/2023    IR GASTROSTOMY TUBE CHANGE  11/22/2024    IR GASTROSTOMY TUBE PERCUTANEOUS PLCMNT  1/2/2024    OPTICAL TRACKING SYSTEM ENDOSCOPIC SINUS SURGERY Left 12/1/2023    Procedure: Stealth assisted transnasal endoscopic approach to excise left sinonasal mass;  Surgeon: Damon Regan MD;  Location: [facility] OR    RESECT TUMOR SINUS Left 10/11/2024    Procedure: Transnasal Biopsy of Left Maxillary Sinus Tumor,;  Surgeon: Damon Regan MD;  Location: [facility] OR       Prior to Admission Medications   Prior to Admission Medications   Prescriptions Last Dose Informant Patient Reported? Taking?   Osmolite 1.5 Migue 237 mL   No  No   Sig: Place 474 mLs into G tube 3 times daily. Infuse via gravity bag. 2 cartons TID spread 3-5 hours apart.   Water flush: 30-60 mL before and after each feeding. + 120 mL 4 times daily for hydration.   acetaminophen (TYLENOL) 325 MG tablet   No No   Sig: Take 2 tablets (650 mg) by mouth every 4 hours as needed for other or pain   aspirin 81 MG EC tablet   No No   Sig: Take 1 tablet (81 mg) by mouth daily   atorvastatin (LIPITOR) 80 MG tablet  Self No No   Sig: TAKE 1 TABLET (80 MG TOTAL) BY MOUTH AT BEDTIME/ TXHUA HMO NOJ 1 LUB TSHUAJ THAUM MUS PW PAB ZOO NTSHAV MUAJ ROJ   celecoxib (CELEBREX) 5 mg/mL SUSP suspension   No No   Sig: Take 20 mLs (100 mg) by mouth 2 times daily.   clindamycin (CLEOCIN T) 1 % external lotion   No No   Sig: Apply topically 2 times daily.   entecavir (BARACLUDE) 0.5 MG tablet   Yes No   Sig: Take 0.5 mg by mouth every evening.   gabapentin (NEURONTIN) 250 MG/5ML solution   No No   Sig: Take 12 mLs (600 mg) by mouth 3 times daily.   gabapentin (NEURONTIN) 300 MG capsule   No No   Sig: Take 1 capsule (300 mg) by mouth 3 times daily.   hydrocortisone 2.5 % cream   No No   Sig: Apply topically 2 times daily as needed for itching.   levothyroxine (SYNTHROID/LEVOTHROID) 75 MCG tablet   No No   Sig: Take 1 tablet (75 mcg) by mouth every morning (before breakfast).   nitroGLYcerin (NITROSTAT) 0.4 MG sublingual tablet   No No   Sig: Place 1 tablet (0.4 mg) under the tongue every 5 minutes as needed.   ondansetron (ZOFRAN ODT) 4 MG ODT tab   No No   Sig: Take 1 tablet (4 mg) by mouth every 8 hours as needed for nausea.   ondansetron (ZOFRAN) 8 MG tablet   No No   Sig: Take 1 tablet (8 mg) by mouth every 8 hours as needed for nausea (vomiting).   oxyCODONE (ROXICODONE) 5 MG/5ML solution   No No   Sig: Take 5 mLs (5 mg) by mouth 4 times daily as needed for severe pain.   polyethylene glycol (MIRALAX) 17 g packet   Yes No   Sig: Take 1 packet by mouth as needed.   prochlorperazine (COMPAZINE) 10  MG tablet   No No   Sig: Take 1 tablet (10 mg) by mouth every 6 hours as needed for nausea or vomiting.   senna-docusate (SENNA S) 8.6-50 MG tablet   No No   Sig: Take 1 tablet by mouth 2 times daily as needed for constipation   sodium chloride (OCEAN) 0.65 % nasal spray   No No   Sig: Spray 2 sprays in nostril daily as needed for congestion.      Facility-Administered Medications: None           Physical Exam   Vital Signs: Temp: (!) 101.5  F (38.6  C) Temp src: Oral BP: 120/60 Pulse: 90   Resp: 16 SpO2: 97 % O2 Device: None (Room air)    Weight: 134 lbs 0 oz    Tired appearing, no distress, large area of induration around his left eye and left maxillary sinus with compression of the surrounding left eye muscles, lungs with fleeting basilar crackles, no wheezes, abdomen soft, no leg edema, fungal odor noted    Medical Decision Making       57 MINUTES SPENT BY ME on the date of service doing chart review, history, exam, documentation & further activities per the note.  NOTE(S)/MEDICAL RECORDS REVIEWED over the past 24 hours: Vitals, labs, new imaging, documentation and medication administrations      Data     I have personally reviewed the following data over the past 24 hrs:    13.0 (H)  \   8.8 (L)   / 283     130 (L) 96 (L) 22.6 /  117 (H)   4.3 24 0.83 \     ALT: 27 AST: 28 AP: 78 TBILI: 0.4   ALB: 2.9 (L) TOT PROTEIN: 8.2 LIPASE: N/A     Trop: 13 BNP: N/A     Procal: 0.05 CRP: N/A Lactic Acid: 1.3         Imaging results reviewed over the past 24 hrs:   Recent Results (from the past 24 hours)   Head CT w/o contrast    Narrative    EXAM: CT HEAD W/O CONTRAST  LOCATION: [facility]  DATE: 7/12/2025    INDICATION: ams  COMPARISON: Head CT 6/6/2025.  TECHNIQUE: Routine CT Head without IV contrast. Multiplanar reformats. Dose reduction techniques were used.    FINDINGS:  INTRACRANIAL CONTENTS: No intracranial hemorrhage, extraaxial collection, or mass effect.  No CT evidence of acute infarct.  Mild presumed chronic small vessel ischemic changes. Mild generalized volume loss. No hydrocephalus.     VISUALIZED ORBITS/SINUSES/MASTOIDS: No intraorbital abnormality. Extensive mucosal thickening throughout the paranasal sinuses. There is also soft tissue mass likely involving the sphenoid sinuses and posterior ethmoid air cells. Bilateral mastoid   effusions.    BONES/SOFT TISSUES: Please see separately dictated soft tissue neck CT for description of soft tissue mass and associated osseous findings.      Impression    IMPRESSION:  1.  No acute intracranial findings.  2.  Mild age-related changes.  3.  Please see separately dictated soft tissue neck CT for further description of the left-sided facial mass.   CT Chest/Abdomen/Pelvis w Contrast    Narrative    EXAM: CT CHEST/ABDOMEN/PELVIS WITH CONTRAST  LOCATION: [facility]  DATE: 07/12/2025    INDICATION: Fever, known head and neck CA.  COMPARISON: CT chest 06/06/2025. CT chest, abdomen and pelvis 03/06/2025.  TECHNIQUE: CT scan of the chest, abdomen, and pelvis was performed following injection of IV contrast. Multiplanar reformats were obtained. Dose reduction techniques were used.   CONTRAST: 90 mL Iso 370.    FINDINGS:   LUNGS AND PLEURA: Bilateral dependent atelectasis. Since 06/06/2025, resolved bilateral consolidative opacities. Interval increased in size multiple pulmonary nodules with reference examples on series 5:  -Left apex, 8 mm, previously 4 mm, image 52  -Right apex, 16 x 11 mm, not definitely visualized previously and possibly obscured, image 60  -Left upper lobe subpleural, 12 mm, previously 8 mm, image 86    Multiple additional pulmonary nodules are noted.    MEDIASTINUM/AXILLAE: Right chest port tip at the right atrium. Similar borderline enlarged right hilar 1 cm lymph node (4/60).    CORONARY ARTERY CALCIFICATION: Severe.    HEPATOBILIARY: Cholecystectomy.    PANCREAS: Normal.    SPLEEN: Normal.    ADRENAL GLANDS:  Normal.    KIDNEYS/BLADDER: Normal.    BOWEL: Gastrostomy tube in appropriate position. No bowel obstruction. Appendix appears normal.    LYMPH NODES: Normal.    VASCULATURE: Mild to moderate aortic atherosclerosis.    PELVIC ORGANS: No pelvic mass.    MUSCULOSKELETAL: No aggressive osseous lesion. Sternotomy.      Impression    IMPRESSION:  1.  Since 06/06/2025, resolved pulmonary consolidative opacities/pneumonia.  2.  Continued increased in size multiple pulmonary nodules/metastases.  3.  No convincing acute process within the abdomen or pelvis.     Soft tissue neck CT w contrast    Narrative    EXAM: CT SOFT TISSUE NECK W CONTRAST  LOCATION: [facility]  DATE: 7/12/2025    INDICATION: Known cancer, fever  COMPARISON: Soft tissue neck CT 6/6/2025.  CONTRAST: 90 ml iso 370 given during CAP  TECHNIQUE: Routine CT Soft Tissue Neck with IV contrast. Multiplanar reformats. Dose reduction techniques were used.    FINDINGS:     AREA OF INTEREST: Redemonstrated transfacial soft tissue mass, centered within the left  space. The mass again appears to extend superiorly through the floor of the left orbit, medially through the hard palate, posteriorly contacting the left   internal carotid artery, laterally past the left mandibular body, anteriorly past the anterior maxillary wall, and caudally to the angle of the mandible. There is again noted to be extension into the paranasal sinuses, oral cavity, and pharynx, which   overall appears slightly more extensive than the prior CT. There is extensive associated osseous erosion involving the left-sided facial bones. There is also noted to be erosion of the floor of the left middle cranial fossa, with suspected intracranial   tumor extension along the floor of the left middle cranial fossa and potentially involving the left cavernous sinus and prepontine cistern. There is again noted to be multicystic changes involving the central aspect of the  mass. Overall the lesion   appears increased from the prior CT, measuring approximately 7.8 x 6.7 cm in similar axial dimensions to the prior exam (series 3 image 44), although difficult to delineate the exact margins of the mass and could likely be better evaluated on MRI. There   is some stranding within the peripheral surrounding subcutaneous tissues, which overall appears decreased since the prior CT, although superimposed infectious process remains difficult to exclude. There is also edema involving the mucosa of the upper   aerodigestive tract, with the every remaining grossly patent.    LYMPH NODES: No pathologic lymph nodes by size or morphology criteria.     SALIVARY GLANDS: Normal parotid and submandibular glands.    THYROID: Subcentimeter calcification in the right thyroid lobe.     VESSELS: Vascular structures of the neck are patent. Partially visualized right chest port.    OTHER: Numerous partially visualized nodules in the visualized lung apices, compatible with metastatic disease. Multilevel cervical spondylosis.      Impression    IMPRESSION:   1.  Redemonstrated transspatial cystic and solid mass centered in the left  space, which appears to have increased in size and extent since the prior CT from 6/6/2025. There is again noted to be involvement of the surrounding structures, with   extensive associated osseous erosion and suspected intracranial extension, as above. Surrounding inflammatory changes appear decreased from the prior CT, although superimposed infection remains difficult to exclude.  2.  Numerous partially visualized nodules in the visualized lung apices, compatible with metastatic disease.

## 2025-07-12 NOTE — CONSULTS
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    Assessment and plan:    Discussed with the patient and family that at this time he is not in a condition to get any treatment.  As he has been spoken to by Dr. Post he needs to consider reasonable options and not just focus on chemotherapy.  I do not think that his overall clinical condition is going to improve to significant degree that we will make him a candidate for palliative chemotherapy of any sort.  I do not believe he has immunotherapy options available to him.  For his severe anemia at this time continue to monitor.  Minimize blood draws.  Transfuse if the hemoglobin drops below 7.  Hyponatremia is most likely secondary to combination of his underlying malignancy and perhaps some other symptoms.  Monitor fluid intake.  Very hard to understand due to garbled speech.  Requested that any family member who can understand him and speak in English be present tomorrow to discuss.    MEDICAL DECISION MAKING:  Reviewed notes from Dr. Post and ENT.  Also reviewed notes from hospitalist.  Reviewed labs including CBC CMP lactic acid troponin.  Personally reviewed the images of the CT scan of the head and sinuses which showed involvement of the left maxillary sinus.  His CT scan of the chest shows large multiple pulmonary metastasis which seems to be increasing in size.  Plan of care discussed with the nursing staff.    Staging History    Cancer Staging   No matching staging information was found for the patient.        History  Mr. 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  He has been treated previously with concurrent chemoradiation and then later on  palliative chemotherapy with carboplatin Taxol and pembrolizumab followed by pembrolizumab maintenance until March 2025 and he was starting to progress.  He recently has not had any therapy.  He has been doing quite poorly and has had multiple hospitalization.    He was last seen by Dr. Post from [facility] for possible evaluation for palliative therapy.  At that time Dr. Post did not feel that he was ready and perhaps needs to consider palliative care/hospice care.  However because of his age and desire to be aggressive she had given him some time to get better and do physical therapy.  The patient is overall quite weak.  Came into the emergency room with some confusion and weakness.    In the emergency room he was found to be hyponatremic and anemic.  His CT scan showed worsening disease in his lungs.    Review of systems.  Review of systems was obtained.  Positive findings noted in the history.  Rest of the review of system is otherwise negative.      Past History  Past Medical History:   Diagnosis Date    Ascending aortic aneurysm     Coronary artery disease     Depression     Gout     History of anesthesia complications     Hyperlipemia     Hypertension     Malignant neoplasm of nasal cavities (H)     PONV (postoperative nausea and vomiting)      Past Surgical History:   Procedure Laterality Date    AORTA SURGERY N/A 4/23/2019    Procedure: WITH ASCENDING AORTA REPLACEMENT;  Surgeon: Darlene Graff MD;  Location: [facility] OR;  Service: Cardiovascular    BYPASS GRAFT ARTERY CORONARY      CARDIAC SURGERY      CV CORONARY ANGIOGRAM N/A 3/12/2019    Procedure: Coronary Angiogram;  Surgeon: Hamilton Lucero MD;  Location: [facility] Cath Lab;  Service: Cardiology    ENDOSCOPIC SINUS SURGERY Left 11/10/2023    Procedure: Transnasal endoscopic approach to biopsy left nasal mass;  Surgeon: Damon Regan MD;  Location: [facility] OR    GALLBLADDER SURGERY      INSERT PORT  VASCULAR ACCESS N/A 1/11/2024    Procedure: Insert port vascular access;  Surgeon: Tyrel Silvestre MD;  Location: [facility] OR    IR CAROTID CEREBRAL ANGIOGRAM BILATERAL  11/30/2023    IR CAROTID CEREBRAL ANGIOGRAM BILATERAL  6/2/2025    IR CHEST PORT PLACEMENT > 5 YRS OF AGE  1/11/2024    IR FACIAL EMBOLIZATION LEFT  9/13/2023    IR GASTROSTOMY TUBE CHANGE  11/22/2024    IR GASTROSTOMY TUBE PERCUTANEOUS PLCMNT  1/2/2024    OPTICAL TRACKING SYSTEM ENDOSCOPIC SINUS SURGERY Left 12/1/2023    Procedure: Stealth assisted transnasal endoscopic approach to excise left sinonasal mass;  Surgeon: Damon Regan MD;  Location: [facility] OR    RESECT TUMOR SINUS Left 10/11/2024    Procedure: Transnasal Biopsy of Left Maxillary Sinus Tumor,;  Surgeon: Damon Regan MD;  Location: [facility] OR     Family History   Problem Relation Age of Onset    Cerebrovascular Disease Mother 90    Acute Myocardial Infarction No family hx of     Anesthesia Reaction No family hx of      Social History     Socioeconomic History    Marital status:    Tobacco Use    Smoking status: Former     Types: Cigarettes     Passive exposure: Past    Smokeless tobacco: Never   Vaping Use    Vaping status: Never Used   Substance and Sexual Activity    Alcohol use: Not Currently     Comment: Occasionally    Drug use: No     Social Drivers of Health     Financial Resource Strain: Low Risk  (6/3/2025)    Financial Resource Strain     Within the past 12 months, have you or your family members you live with been unable to get utilities (heat, electricity) when it was really needed?: No   Food Insecurity: Patient Declined (6/8/2025)    Received from [email or web address]    Hunger Vital Sign     Worried About Running Out of Food in the Last Year: Patient declined     Ran Out of Food in the Last Year: Patient declined   Transportation Needs: Patient Declined (6/8/2025)    Received from [email or web address]    PRAPARE - Transportation     Lack of  "Transportation (Medical): Patient declined     Lack of Transportation (Non-Medical): Patient declined   Interpersonal Safety: Patient Declined (6/8/2025)    Received from [employer]    Humiliation, Afraid, Rape, and Kick questionnaire     Fear of Current or Ex-Partner: Patient declined     Emotionally Abused: Patient declined     Physically Abused: Patient declined     Sexually Abused: Patient declined   Housing Stability: Patient Declined (6/8/2025)    Received from [employer]    Housing Stability Vital Sign     Unable to Pay for Housing in the Last Year: Patient declined     Homeless in the Last Year: Patient declined       Allergies    No Known Allergies       Physical Exam    /72   Pulse 80   Temp (!) 101.5  F (38.6  C) (Oral)   Resp 16   Ht 1.626 m (5' 4\")   Wt 60.8 kg (134 lb)   SpO2 96%   BMI 23.00 kg/m      GENERAL: no acute distress. Cooperative in conversation.   HEENT: Obvious area of radiation and some biopsy changes over the left maxillary area and left eye.  Significant deformation also noted.  Left eye is not fully open. Oral mucosa is moist and intact.  Strong odor of necrotic tissue.  RESP:Chest symmetric. Regular respiratory rate. No stridor.  ABD: Nondistended, soft.  EXTREMITIES: No lower extremity edema.   NEURO: non focal. Alert and oriented x3.   PSYCH: within normal limits. No depression or anxiety.  SKIN: warm dry intact         Lab Results  Recent Results (from the past 24 hours)   Comprehensive metabolic panel    Collection Time: 07/12/25  6:01 AM   Result Value Ref Range    Sodium 130 (L) 135 - 145 mmol/L    Potassium 4.3 3.4 - 5.3 mmol/L    Carbon Dioxide (CO2) 24 22 - 29 mmol/L    Anion Gap 10 7 - 15 mmol/L    Urea Nitrogen 22.6 8.0 - 23.0 mg/dL    Creatinine 0.83 0.67 - 1.17 mg/dL    GFR Estimate >90 >60 mL/min/1.73m2    Calcium 9.7 8.8 - 10.4 mg/dL    Chloride 96 (L) 98 - 107 mmol/L    Glucose 117 (H) 70 - 99 mg/dL    Alkaline Phosphatase 78 40 - 150 U/L    AST 28 " 0 - 45 U/L    ALT 27 0 - 70 U/L    Protein Total 8.2 6.4 - 8.3 g/dL    Albumin 2.9 (L) 3.5 - 5.2 g/dL    Bilirubin Total 0.4 <=1.2 mg/dL   Lactic acid whole blood with 1x repeat in 2 hr when >2    Collection Time: 07/12/25  6:01 AM   Result Value Ref Range    Lactic Acid, Initial 1.3 0.7 - 2.0 mmol/L   Procalcitonin    Collection Time: 07/12/25  6:01 AM   Result Value Ref Range    Procalcitonin 0.05 <0.50 ng/mL   Troponin T, High Sensitivity    Collection Time: 07/12/25  6:01 AM   Result Value Ref Range    Troponin T, High Sensitivity 13 <=22 ng/L   CBC with platelets and differential    Collection Time: 07/12/25  6:01 AM   Result Value Ref Range    WBC Count 13.0 (H) 4.0 - 11.0 10e3/uL    RBC Count 3.33 (L) 4.40 - 5.90 10e6/uL    Hemoglobin 8.8 (L) 13.3 - 17.7 g/dL    Hematocrit 28.2 (L) 40.0 - 53.0 %    MCV 85 78 - 100 fL    MCH 26.4 (L) 26.5 - 33.0 pg    MCHC 31.2 (L) 31.5 - 36.5 g/dL    RDW 17.3 (H) 10.0 - 15.0 %    Platelet Count 283 150 - 450 10e3/uL    % Neutrophils 67 %    % Lymphocytes 23 %    % Monocytes 9 %    % Eosinophils 1 %    % Basophils 0 %    % Immature Granulocytes 1 %    NRBCs per 100 WBC 0 <1 /100    Absolute Neutrophils 8.7 (H) 1.6 - 8.3 10e3/uL    Absolute Lymphocytes 3.0 0.8 - 5.3 10e3/uL    Absolute Monocytes 1.1 0.0 - 1.3 10e3/uL    Absolute Eosinophils 0.1 0.0 - 0.7 10e3/uL    Absolute Basophils 0.0 0.0 - 0.2 10e3/uL    Absolute Immature Granulocytes 0.1 <=0.4 10e3/uL    Absolute NRBCs 0.0 10e3/uL   UA with Microscopic reflex to Culture    Collection Time: 07/12/25  9:01 AM    Specimen: Urine, Clean Catch   Result Value Ref Range    Color Urine Light Yellow Colorless, Straw, Light Yellow, Yellow    Appearance Urine Clear Clear    Glucose Urine Negative Negative mg/dL    Bilirubin Urine Negative Negative    Ketones Urine Negative Negative mg/dL    Specific Gravity Urine 1.026 1.001 - 1.030    Blood Urine Negative Negative    pH Urine 7.5 (H) 5.0 - 7.0    Protein Albumin Urine Negative  Negative mg/dL    Urobilinogen Urine Normal Normal mg/dL    Nitrite Urine Negative Negative    Leukocyte Esterase Urine Negative Negative    RBC Urine 2 <=2 /HPF    WBC Urine 1 <=5 /HPF   Troponin T, High Sensitivity    Collection Time: 07/12/25 10:59 AM   Result Value Ref Range    Troponin T, High Sensitivity 15 <=22 ng/L   Glucose by meter    Collection Time: 07/12/25  2:53 PM   Result Value Ref Range    GLUCOSE BY METER POCT 99 70 - 99 mg/dL        Imaging Results    Soft tissue neck CT w contrast  Result Date: 7/12/2025  EXAM: CT SOFT TISSUE NECK W CONTRAST LOCATION: [facility] DATE: 7/12/2025 INDICATION: Known cancer, fever COMPARISON: Soft tissue neck CT 6/6/2025. CONTRAST: 90 ml iso 370 given during CAP TECHNIQUE: Routine CT Soft Tissue Neck with IV contrast. Multiplanar reformats. Dose reduction techniques were used. FINDINGS: AREA OF INTEREST: Redemonstrated transfacial soft tissue mass, centered within the left  space. The mass again appears to extend superiorly through the floor of the left orbit, medially through the hard palate, posteriorly contacting the left internal carotid artery, laterally past the left mandibular body, anteriorly past the anterior maxillary wall, and caudally to the angle of the mandible. There is again noted to be extension into the paranasal sinuses, oral cavity, and pharynx, which overall appears slightly more extensive than the prior CT. There is extensive associated osseous erosion involving the left-sided facial bones. There is also noted to be erosion of the floor of the left middle cranial fossa, with suspected intracranial tumor extension along the floor of the left middle cranial fossa and potentially involving the left cavernous sinus and prepontine cistern. There is again noted to be multicystic changes involving the central aspect of the mass. Overall the lesion appears increased from the prior CT, measuring approximately 7.8 x 6.7  cm in similar axial dimensions to the prior exam (series 3 image 44), although difficult to delineate the exact margins of the mass and could likely be better evaluated on MRI. There is some stranding within the peripheral surrounding subcutaneous tissues, which overall appears decreased since the prior CT, although superimposed infectious process remains difficult to exclude. There is also edema involving the mucosa of the upper aerodigestive tract, with the every remaining grossly patent. LYMPH NODES: No pathologic lymph nodes by size or morphology criteria. SALIVARY GLANDS: Normal parotid and submandibular glands. THYROID: Subcentimeter calcification in the right thyroid lobe. VESSELS: Vascular structures of the neck are patent. Partially visualized right chest port. OTHER: Numerous partially visualized nodules in the visualized lung apices, compatible with metastatic disease. Multilevel cervical spondylosis.     IMPRESSION: 1.  Redemonstrated transspatial cystic and solid mass centered in the left  space, which appears to have increased in size and extent since the prior CT from 6/6/2025. There is again noted to be involvement of the surrounding structures, with extensive associated osseous erosion and suspected intracranial extension, as above. Surrounding inflammatory changes appear decreased from the prior CT, although superimposed infection remains difficult to exclude. 2.  Numerous partially visualized nodules in the visualized lung apices, compatible with metastatic disease.    CT Chest/Abdomen/Pelvis w Contrast  Result Date: 7/12/2025  EXAM: CT CHEST/ABDOMEN/PELVIS WITH CONTRAST LOCATION: [facility] DATE: 07/12/2025 INDICATION: Fever, known head and neck CA. COMPARISON: CT chest 06/06/2025. CT chest, abdomen and pelvis 03/06/2025. TECHNIQUE: CT scan of the chest, abdomen, and pelvis was performed following injection of IV contrast. Multiplanar reformats were obtained.  Dose reduction techniques were used. CONTRAST: 90 mL Iso 370. FINDINGS: LUNGS AND PLEURA: Bilateral dependent atelectasis. Since 06/06/2025, resolved bilateral consolidative opacities. Interval increased in size multiple pulmonary nodules with reference examples on series 5: -Left apex, 8 mm, previously 4 mm, image 52 -Right apex, 16 x 11 mm, not definitely visualized previously and possibly obscured, image 60 -Left upper lobe subpleural, 12 mm, previously 8 mm, image 86 Multiple additional pulmonary nodules are noted. MEDIASTINUM/AXILLAE: Right chest port tip at the right atrium. Similar borderline enlarged right hilar 1 cm lymph node (4/60). CORONARY ARTERY CALCIFICATION: Severe. HEPATOBILIARY: Cholecystectomy. PANCREAS: Normal. SPLEEN: Normal. ADRENAL GLANDS: Normal. KIDNEYS/BLADDER: Normal. BOWEL: Gastrostomy tube in appropriate position. No bowel obstruction. Appendix appears normal. LYMPH NODES: Normal. VASCULATURE: Mild to moderate aortic atherosclerosis. PELVIC ORGANS: No pelvic mass. MUSCULOSKELETAL: No aggressive osseous lesion. Sternotomy.     IMPRESSION: 1.  Since 06/06/2025, resolved pulmonary consolidative opacities/pneumonia. 2.  Continued increased in size multiple pulmonary nodules/metastases. 3.  No convincing acute process within the abdomen or pelvis.     Head CT w/o contrast  Result Date: 7/12/2025  EXAM: CT HEAD W/O CONTRAST LOCATION: [facility] DATE: 7/12/2025 INDICATION: ams COMPARISON: Head CT 6/6/2025. TECHNIQUE: Routine CT Head without IV contrast. Multiplanar reformats. Dose reduction techniques were used. FINDINGS: INTRACRANIAL CONTENTS: No intracranial hemorrhage, extraaxial collection, or mass effect.  No CT evidence of acute infarct. Mild presumed chronic small vessel ischemic changes. Mild generalized volume loss. No hydrocephalus. VISUALIZED ORBITS/SINUSES/MASTOIDS: No intraorbital abnormality. Extensive mucosal thickening throughout the paranasal sinuses.  There is also soft tissue mass likely involving the sphenoid sinuses and posterior ethmoid air cells. Bilateral mastoid effusions. BONES/SOFT TISSUES: Please see separately dictated soft tissue neck CT for description of soft tissue mass and associated osseous findings.     IMPRESSION: 1.  No acute intracranial findings. 2.  Mild age-related changes. 3.  Please see separately dictated soft tissue neck CT for further description of the left-sided facial mass.       Signed by: Stewart Camacho MD    This note has been dictated using voice recognition software. Any grammatical or context distortions are unintentional and inherent to the software

## 2025-07-12 NOTE — PROGRESS NOTES
"Care Management Note:      Received Care Management consult for Discharge Planning. Chart reviewed and per documentation, Douglas is currently \"not fully alert to his location and why he is here. Speech muffled..\" Douglas does not have any advanced care planning documents on file that designate a health care agent.     Care Management will defer assessment to tomorrow in hopes his mental status has improved.       Carito Thompson RN  [facility] ED Care Manager  Desk Phone #: [phone]  Cell #: [phone]    "

## 2025-07-12 NOTE — CONSULTS
CLINICAL NUTRITION SERVICES - ASSESSMENT NOTE    RECOMMENDATIONS FOR MDs/PROVIDERS TO ORDER:      Registered Dietitian Interventions:  Osmolite 1.5 given as bolus 400 ml 3 times/day at 07-12-17 per PEG, infuse over 1 hr.  Free water flush 60 ml before and after each bolus plus 130 ml given 4 additional times at 03-09-14-20    Future/Additional Recommendations:  On TF at home, [facility] home infusion.     REASON FOR ASSESSMENT  Provider order - Registered Dietitian to order TF per Medical Nutrition Therapy Guidelines    INFORMATION OBTAINED  Assessed patient in room.  Patient admitted with sepsis and encephalopathy.  History of sinonasal cancer now with swollen mouth, face, chemo, deconditioning, dysphagia, hypercalcemia, intercrural fungus, hypothyroid.     NUTRITION HISTORY  Met patient in the ED.  Mouth swollen, some words are not clear.  Patient unable to tell when last had TF.  Patient states his stomach feels ok.  Patient confirms that he does not eat by mouth.   Reviewed RD note of 6/3/25.   Spoke with patient's son Sharon, by phone. Patient had his 3 feedings of Osmolite yesterday.   He has been tolerating TF and maintaining stable wt.    Home nutrition support plan: Osmolite 1.5, 2 cartons 3 times/day per G tube.  Infuse per gravity bag.  Water flush 30-60 ml before and after each feeding and 120 ml every 4 hrs.  DME:  [facility].    CURRENT NUTRITION ORDERS  Diet: NPO    LABS  Nutrition-relevant labs: CMP  Recent Labs   Lab 07/12/25  0601 07/09/25  1215   * 139   POTASSIUM 4.3 4.0   * 151*   BUN 22.6 32.7*   CR 0.83 0.83   FLORENCE 9.7 12.6*   MAG  --  1.7   PHOS  --  2.3*   ALBUMIN 2.9* 3.3*   BILITOTAL 0.4 0.4   ALKPHOS 78 90   AST 28 32   ALT 27 32         MEDICATIONS  Nutrition-relevant medications:   Current Facility-Administered Medications   Medication Dose Route Frequency Provider Last Rate Last Admin    entecavir (BARACLUDE) tablet 0.5 mg  0.5 mg Oral QPM Esequiel Breen MD         levothyroxine (SYNTHROID/LEVOTHROID) tablet 75 mcg  75 mcg Oral QAM AC Esequiel Breen MD        piperacillin-tazobactam (ZOSYN) 4.5 g vial to attach to  mL bag  4.5 g Intravenous Q6H Esequiel Breen MD   4.5 g at 07/12/25 1347    sodium chloride (PF) 0.9% PF flush 3 mL  3 mL Intracatheter Q8H Replaced by [facility] Esequiel Breen MD        [START ON 7/13/2025] vancomycin (VANCOCIN) 1,250 mg in sodium chloride 0.9 % 250 mL intermittent infusion  1,250 mg Intravenous Q18H Esequiel Breen MD          Current Facility-Administered Medications   Medication Dose Route Frequency Provider Last Rate Last Admin    sodium chloride 0.9 % infusion   Intravenous Continuous Esequiel Breen MD 75 mL/hr at 07/12/25 1303 New Bag at 07/12/25 1303      Current Facility-Administered Medications   Medication Dose Route Frequency Provider Last Rate Last Admin    calcium carbonate (TUMS) chewable tablet 1,000 mg  1,000 mg Oral 4x Daily PRN Esequiel Breen MD        lidocaine (LMX4) cream   Topical Q1H PRN Esequiel Breen MD        lidocaine 1 % 0.1-1 mL  0.1-1 mL Other Q1H PRN Esequiel Breen MD        menthol-zinc oxide (CALMOSEPTINE) 0.44-20.6 % ointment OINT   Topical 4x Daily PRN Esequiel Breen MD        miconazole (MICATIN) 2 % powder   Topical BID PRN Esequiel Breen MD        naloxone (NARCAN) injection 0.2 mg  0.2 mg Intravenous Q2 Min PRN Esequiel Breen MD        Or    naloxone (NARCAN) injection 0.4 mg  0.4 mg Intravenous Q2 Min PRN Esequiel Breen MD        Or    naloxone (NARCAN) injection 0.2 mg  0.2 mg Intramuscular Q2 Min PRN Esequiel Breen MD        Or    naloxone (NARCAN) injection 0.4 mg  0.4 mg Intramuscular Q2 Min PREsequiel Wood MD        oxyCODONE (ROXICODONE) solution 3 mg  3 mg Oral Q4H PRN Esequiel Breen MD   3 mg at 07/12/25 1347    senna-docusate (SENOKOT-S/PERICOLACE) 8.6-50 MG per tablet 1 tablet  1 tablet Oral BID PRN Esequiel Breen MD        Or    yusra  "(SENOKOT-S/PERICOLACE) 8.6-50 MG per tablet 2 tablet  2 tablet Oral BID PRN Esequiel Breen MD        sodium chloride (PF) 0.9% PF flush 3 mL  3 mL Intracatheter q1 min prn Esequiel Breen MD              ANTHROPOMETRICS  Height: 162.6 cm (5' 4\")  Admission Weight: 60.8 kg (134 lb) (07/12/25 0516)   Most Recent Weight: 60.8 kg (134 lb) (07/12/25 0516)  BMI: Body mass index is 23 kg/m .   Weight History: 07/12/25 : 60.8 kg (134 lb)   07/09/25 : 61.2 kg (134 lb 14.4 oz)   06/05/25 : 62.7 kg (138 lb 3.7 oz)   05/30/25 : 60.3 kg (133 lb)   05/29/25 : 60.3 kg (133 lb)   05/29/25 : 61 kg (134 lb 6.4 oz)   05/23/25 : 60.8 kg (134 lb)   05/15/25 : 61 kg (134 lb 6.4 oz)   05/02/25 : 60.8 kg (134 lb)   04/30/25 : 61.7 kg (136 lb)   04/25/25 : 62 kg (136 lb 9.6 oz)   04/10/25 : 61.7 kg (136 lb)   03/14/25 : 61.7 kg (136 lb 1.6 oz)   03/06/25 : 61.1 kg (134 lb 9.6 oz)     Dosing Weight: 60.8 kg, based on actual wt    ASSESSED NUTRITION NEEDS  Estimated Energy Needs: 1617-5981+ kcals/day (25 - 30 kcals/kg)  Justification: Increased needs  Estimated Protein Needs: 61-91 grams protein/day (1-1.5 g/kg)  Justification: Increased needs  Estimated Fluid Needs: 1520+ mL/day (1 mL/kcal)  Justification: Maintenance    SYSTEM AND PHYSICAL FINDINGS      GI symptoms: dysphagia.  Mouth/face swollen.  PEG tube originally placed 1/2/24.  Skin/wounds: no open areas    MALNUTRITION  % Intake: Decreased intake does not meet criteria, patient NPO today.    % Weight Loss: None noted  Subcutaneous Fat Loss: None observed  Muscle Loss: None observed  Fluid Accumulation/Edema: None noted  Malnutrition Diagnosis: Patient does not meet two of the established criteria necessary for diagnosing malnutrition but is at risk for malnutrition  Malnutrition Present on Admission: No    NUTRITION DIAGNOSIS  Swallowing difficulty related to chronic illness as evidenced by dysphagia, need enteral nutrition.     INTERVENTIONS  Tube feeding:  Osmolite 1.5 given " as bolus 400 ml 3 times/day at 07-12-17 per PEG, infuse over 1 hr.  Free water flush 60 ml before and after each bolus plus 130 ml given 4 additional times at 03-09-14-20    Osmolite 1.5 Migue  400 ml 3x/day= (1200ml/day) provides: 1800 kcals, 75 g PRO, 914 ml free H20, 244 g CHO, and 0 g fiber daily.     GOALS  Tolerate TF at goal rate   Meet nutrition needs  BG < 180    MONITORING/EVALUATION  Progress toward goals will be monitored and evaluated per policy.

## 2025-07-13 ENCOUNTER — DOCUMENTATION ONLY (OUTPATIENT)
Dept: CARE COORDINATION | Facility: CLINIC | Age: 65
End: 2025-07-13
Payer: COMMERCIAL

## 2025-07-13 ENCOUNTER — APPOINTMENT (OUTPATIENT)
Dept: OCCUPATIONAL THERAPY | Facility: HOSPITAL | Age: 65
End: 2025-07-13
Attending: HOSPITALIST
Payer: COMMERCIAL

## 2025-07-13 ENCOUNTER — APPOINTMENT (OUTPATIENT)
Dept: SPEECH THERAPY | Facility: HOSPITAL | Age: 65
End: 2025-07-13
Attending: HOSPITALIST
Payer: COMMERCIAL

## 2025-07-13 LAB
ALBUMIN SERPL BCG-MCNC: 3 G/DL (ref 3.5–5.2)
ANION GAP SERPL CALCULATED.3IONS-SCNC: 11 MMOL/L (ref 7–15)
BASOPHILS # BLD AUTO: 0 10E3/UL (ref 0–0.2)
BASOPHILS NFR BLD AUTO: 0 %
BUN SERPL-MCNC: 17.6 MG/DL (ref 8–23)
CALCIUM SERPL-MCNC: 9.2 MG/DL (ref 8.8–10.4)
CHLORIDE SERPL-SCNC: 103 MMOL/L (ref 98–107)
CREAT SERPL-MCNC: 0.93 MG/DL (ref 0.67–1.17)
EGFRCR SERPLBLD CKD-EPI 2021: >90 ML/MIN/1.73M2
EOSINOPHIL # BLD AUTO: 0.1 10E3/UL (ref 0–0.7)
EOSINOPHIL NFR BLD AUTO: 1 %
ERYTHROCYTE [DISTWIDTH] IN BLOOD BY AUTOMATED COUNT: 17.4 % (ref 10–15)
GLUCOSE BLDC GLUCOMTR-MCNC: 108 MG/DL (ref 70–99)
GLUCOSE BLDC GLUCOMTR-MCNC: 115 MG/DL (ref 70–99)
GLUCOSE BLDC GLUCOMTR-MCNC: 125 MG/DL (ref 70–99)
GLUCOSE BLDC GLUCOMTR-MCNC: 177 MG/DL (ref 70–99)
GLUCOSE BLDC GLUCOMTR-MCNC: 96 MG/DL (ref 70–99)
GLUCOSE SERPL-MCNC: 117 MG/DL (ref 70–99)
HCO3 SERPL-SCNC: 21 MMOL/L (ref 22–29)
HCT VFR BLD AUTO: 28.6 % (ref 40–53)
HGB BLD-MCNC: 9 G/DL (ref 13.3–17.7)
IMM GRANULOCYTES # BLD: 0.1 10E3/UL
IMM GRANULOCYTES NFR BLD: 1 %
LYMPHOCYTES # BLD AUTO: 3.6 10E3/UL (ref 0.8–5.3)
LYMPHOCYTES NFR BLD AUTO: 30 %
MCH RBC QN AUTO: 26.2 PG (ref 26.5–33)
MCHC RBC AUTO-ENTMCNC: 31.5 G/DL (ref 31.5–36.5)
MCV RBC AUTO: 83 FL (ref 78–100)
MONOCYTES # BLD AUTO: 0.9 10E3/UL (ref 0–1.3)
MONOCYTES NFR BLD AUTO: 7 %
MRSA DNA SPEC QL NAA+PROBE: POSITIVE
NEUTROPHILS # BLD AUTO: 7.4 10E3/UL (ref 1.6–8.3)
NEUTROPHILS NFR BLD AUTO: 61 %
NRBC # BLD AUTO: 0 10E3/UL
NRBC BLD AUTO-RTO: 0 /100
PHOSPHATE SERPL-MCNC: 2.5 MG/DL (ref 2.5–4.5)
PLAT MORPH BLD: NORMAL
PLATELET # BLD AUTO: 304 10E3/UL (ref 150–450)
POTASSIUM SERPL-SCNC: 3.9 MMOL/L (ref 3.4–5.3)
RBC # BLD AUTO: 3.43 10E6/UL (ref 4.4–5.9)
RBC MORPH BLD: NORMAL
SA TARGET DNA: POSITIVE
SODIUM SERPL-SCNC: 135 MMOL/L (ref 135–145)
WBC # BLD AUTO: 12.1 10E3/UL (ref 4–11)

## 2025-07-13 PROCEDURE — 250N000013 HC RX MED GY IP 250 OP 250 PS 637: Performed by: HOSPITALIST

## 2025-07-13 PROCEDURE — 250N000011 HC RX IP 250 OP 636: Performed by: HOSPITALIST

## 2025-07-13 PROCEDURE — 258N000003 HC RX IP 258 OP 636: Performed by: HOSPITALIST

## 2025-07-13 PROCEDURE — 85025 COMPLETE CBC W/AUTO DIFF WBC: CPT | Performed by: HOSPITALIST

## 2025-07-13 PROCEDURE — 99233 SBSQ HOSP IP/OBS HIGH 50: CPT | Performed by: INTERNAL MEDICINE

## 2025-07-13 PROCEDURE — 250N000013 HC RX MED GY IP 250 OP 250 PS 637: Performed by: INTERNAL MEDICINE

## 2025-07-13 PROCEDURE — 92610 EVALUATE SWALLOWING FUNCTION: CPT | Mod: GN

## 2025-07-13 PROCEDURE — 97166 OT EVAL MOD COMPLEX 45 MIN: CPT | Mod: GO

## 2025-07-13 PROCEDURE — 87640 STAPH A DNA AMP PROBE: CPT | Performed by: HOSPITALIST

## 2025-07-13 PROCEDURE — 92526 ORAL FUNCTION THERAPY: CPT | Mod: GN

## 2025-07-13 PROCEDURE — 84132 ASSAY OF SERUM POTASSIUM: CPT | Performed by: HOSPITALIST

## 2025-07-13 PROCEDURE — 99233 SBSQ HOSP IP/OBS HIGH 50: CPT | Performed by: HOSPITALIST

## 2025-07-13 PROCEDURE — 120N000001 HC R&B MED SURG/OB

## 2025-07-13 PROCEDURE — 36415 COLL VENOUS BLD VENIPUNCTURE: CPT | Performed by: HOSPITALIST

## 2025-07-13 PROCEDURE — S9341 HIT ENTERAL GRAV DIEM: HCPCS

## 2025-07-13 PROCEDURE — 82962 GLUCOSE BLOOD TEST: CPT

## 2025-07-13 RX ORDER — ATORVASTATIN CALCIUM 40 MG/1
80 TABLET, FILM COATED ORAL EVERY EVENING
Status: DISCONTINUED | OUTPATIENT
Start: 2025-07-13 | End: 2025-07-19 | Stop reason: HOSPADM

## 2025-07-13 RX ORDER — GABAPENTIN 250 MG/5ML
600 SOLUTION ORAL 3 TIMES DAILY
Status: DISCONTINUED | OUTPATIENT
Start: 2025-07-13 | End: 2025-07-19 | Stop reason: HOSPADM

## 2025-07-13 RX ADMIN — SODIUM CHLORIDE: 0.9 INJECTION, SOLUTION INTRAVENOUS at 18:44

## 2025-07-13 RX ADMIN — ENTECAVIR 0.5 MG: 0.05 SOLUTION ORAL at 20:37

## 2025-07-13 RX ADMIN — PIPERACILLIN SODIUM AND TAZOBACTAM SODIUM 4.5 G: 4; .5 INJECTION, POWDER, LYOPHILIZED, FOR SOLUTION INTRAVENOUS at 01:53

## 2025-07-13 RX ADMIN — LEVOTHYROXINE SODIUM 75 MCG: 0.03 TABLET ORAL at 10:15

## 2025-07-13 RX ADMIN — GABAPENTIN 600 MG: 250 SUSPENSION ORAL at 20:36

## 2025-07-13 RX ADMIN — ATORVASTATIN CALCIUM 80 MG: 40 TABLET, FILM COATED ORAL at 20:37

## 2025-07-13 RX ADMIN — ENTECAVIR 0.5 MG: 0.05 SOLUTION ORAL at 00:59

## 2025-07-13 RX ADMIN — GABAPENTIN 600 MG: 250 SUSPENSION ORAL at 14:33

## 2025-07-13 RX ADMIN — GABAPENTIN 600 MG: 250 SUSPENSION ORAL at 10:16

## 2025-07-13 RX ADMIN — PIPERACILLIN SODIUM AND TAZOBACTAM SODIUM 4.5 G: 4; .5 INJECTION, POWDER, LYOPHILIZED, FOR SOLUTION INTRAVENOUS at 08:19

## 2025-07-13 RX ADMIN — PIPERACILLIN SODIUM AND TAZOBACTAM SODIUM 4.5 G: 4; .5 INJECTION, POWDER, LYOPHILIZED, FOR SOLUTION INTRAVENOUS at 14:33

## 2025-07-13 RX ADMIN — SODIUM CHLORIDE 1250 MG: 0.9 INJECTION, SOLUTION INTRAVENOUS at 03:12

## 2025-07-13 RX ADMIN — SODIUM CHLORIDE: 0.9 INJECTION, SOLUTION INTRAVENOUS at 01:55

## 2025-07-13 RX ADMIN — SODIUM CHLORIDE 1250 MG: 0.9 INJECTION, SOLUTION INTRAVENOUS at 20:37

## 2025-07-13 RX ADMIN — PIPERACILLIN SODIUM AND TAZOBACTAM SODIUM 4.5 G: 4; .5 INJECTION, POWDER, LYOPHILIZED, FOR SOLUTION INTRAVENOUS at 20:37

## 2025-07-13 ASSESSMENT — ACTIVITIES OF DAILY LIVING (ADL)
ADLS_ACUITY_SCORE: 58
ADLS_ACUITY_SCORE: 58
ADLS_ACUITY_SCORE: 68
ADLS_ACUITY_SCORE: 58
ADLS_ACUITY_SCORE: 66
ADLS_ACUITY_SCORE: 60
ADLS_ACUITY_SCORE: 58
ADLS_ACUITY_SCORE: 60
ADLS_ACUITY_SCORE: 60
ADLS_ACUITY_SCORE: 66
ADLS_ACUITY_SCORE: 58
ADLS_ACUITY_SCORE: 68
ADLS_ACUITY_SCORE: 58
ADLS_ACUITY_SCORE: 66
ADLS_ACUITY_SCORE: 68
ADLS_ACUITY_SCORE: 64
ADLS_ACUITY_SCORE: 58
ADLS_ACUITY_SCORE: 66
ADLS_ACUITY_SCORE: 58
ADLS_ACUITY_SCORE: 66

## 2025-07-13 NOTE — PROGRESS NOTES
"   07/13/25 0850   Appointment Info   Signing Clinician's Name / Credentials (OT) Della Ochoa OTR/L       Present yes   Language Hmong   Living Environment   People in Home child(justice), adult  (pt reports living with 2 adult sons)   Current Living Arrangements house   Living Environment Comments Difficult to assess due to significant swelling of pts face/mouth making it difficult for interpeter to understand. Pt has tub shower with seat.   Self-Care   Equipment Currently Used at Home walker, rolling;cane, straight;shower chair   Activity/Exercise/Self-Care Comment Pt reports sons assist with dressing, bathing, toileting. Pt ambulates with RW at home. Difficult to gather PLOF information due to reasons sated above with interpeter.   General Information   Onset of Illness/Injury or Date of Surgery 07/12/25   Referring Physician Esequiel Breen MD   Patient/Family Therapy Goal Statement (OT) to go home   Additional Occupational Profile Info/Pertinent History of Current Problem per chart review \"65 year old male admitted on 7/12/2025. He is being admitted for sepsis and acute encephalopathy\"   Existing Precautions/Restrictions fall  (Gtube and R port)   General Observations and Info session limited due to difficulty with /pt communication from significant facial/oral swelling. Pt also actively bleeding from mouth and high pain levels- RN aware   Cognitive Status Examination   Orientation Status   (unable to assess due to difficulty with , pt followed most commands with incresaed time and repetition)   Pain Assessment   Patient Currently in Pain Yes, see Vital Sign flowsheet  (pt reports HA and facial/maxillary pain 8/10- RN aware)   Range of Motion Comprehensive   General Range of Motion no range of motion deficits identified   Strength Comprehensive (MMT)   General Manual Muscle Testing (MMT) Assessment   (generalized weakness noted)   Bed Mobility   Bed Mobility " supine-sit   Transfers   Transfers toilet transfer;shower transfer;sit-stand transfer   Sit-Stand Transfer   Sit-Stand [place] (Transfers) contact guard   Assistive Device (Sit-Stand Transfers) walker, front-wheeled   Shower Transfer   [place] Level (Shower Transfer) not tested   Shower Transfer Comments per clinical reasoning anticipate difficulty with tub shower transfer due to pain adn weakness   Toilet Transfer   [place] Level (Toilet Transfer) not tested   Toilet Transfer Comments per clinical reasoning anticipate difficulty with toilet transfer due to pain and weakness   Balance   Balance Comments limited assessment durring eval, no overt LOB with standing [facility]   Activities of Daily Living   BADL Assessment/Intervention bathing;grooming;toileting;lower body dressing  (limited ADLs assessed during OT evaluation due to high pain and difficulties with pts ability to communicate with interpeter)   Bathing Assessment/Intervention   [place] Level (Bathing) not tested   Comment, (Bathing) per clinical reasoning anticipate difficulty and assistance required due to pain and weakness   Lower Body Dressing Assessment/Training   [place] Level (Lower Body Dressing) not tested   Comment, (Lower Body Dressing) per clinical reasoning anticipate difficulty and assistance required due to pain and weakness   Grooming Assessment/Training   [place] Level (Grooming) not tested   Comment, (Grooming) per clinical reasoning anticipate difficulty and assistance required due to pain and weakness   Toileting   [place] Level (Toileting) not tested   Comment, (Toileting) per clinical reasoning anticipate difficulty and assistance required due to pain and weakness   Clinical Impression   Criteria for Skilled Therapeutic Interventions Met (OT) Yes, treatment indicated   OT Diagnosis decreased ADLs and functional mobility   Influenced by the following impairments Sepsis   OT Problem List-Impairments  impacting ADL problems related to;activity tolerance impaired;balance;cognition;mobility;strength;pain   Assessment of Occupational Performance 3-5 Performance Deficits   Identified Performance Deficits LE dressing, G/H, toileting, bathing, bed mobility, toilet transfer   Planned Therapy Interventions (OT) ADL retraining;bed mobility training;transfer training;balance training;risk factor education;progressive activity/exercise;home program guidelines   Clinical Decision Making Complexity (OT) detailed assessment/moderate complexity   Risk & Benefits of therapy have been explained evaluation/treatment results reviewed;care plan/treatment goals reviewed;current/potential barriers reviewed;participants included;patient   OT Total Evaluation Time   OT Eval, Moderate Complexity Minutes (79835) 15   OT Goals   Therapy Frequency (OT) 5 times/week   OT Goals Lower Body Dressing;Hygiene/Grooming;Toilet Transfer/Toileting;Aerobic Activity   OT: Hygiene/Grooming supervision/stand-by assist   OT: Lower Body Dressing Minimal assist   OT: Toilet Transfer/Toileting Supervision/stand-by assist   OT: Perform aerobic activity with stable cardiovascular response 10 minutes;continuous activity   OT Discharge Planning   OT Plan progress mobiliyt/transfers, G/H at sink, LE dressing   OT Discharge Recommendation (DC Rec) home with assist;home with home care occupational therapy   OT Rationale for DC Rec Pt has good family support at home who can assist with ADLs as needed, pt demonstrated near baseline transfers but would benefit from follow up OT home care services   OT Brief overview of current status high pain levels, CGA for mobility with RW   OT Total Distance Amb During Session (feet) 5   Total Session Time   Total Session Time (sum of timed and untimed services) 15

## 2025-07-13 NOTE — PROGRESS NOTES
[facility]    Consult Note - [facility]  _________________________________________________________________    [facility] 24[facility]7 Contact Number: [phone]    - Providers: Please contact [facility] with changes in orders or clinical plan of care   - Nursing: Please contact [facility] with significant changes in patient condition    Hospice will notify the care team (including the hospitalist) to confirm date of inpatient hospice (GIP) admission.    New Epic encounter will not be created until hospice completes admission.   ______________________________________________________________________      Plan of Care Discussed with the Following:   - Hospitalist/Rounding Provider:Dr. Esequiel Breen    Summary of Visit (includes assessment, medications and any new orders):   Consult received and chart reviewed. Called son Tulio, voicemail left. Plan is to reach out again tomorrow and try to make contact with family to set up a meeting about hospice.     Thank you for this consult and for providing great care to this patient and his family.       Tiffany Martinez RN and Amber Jarquin RN

## 2025-07-13 NOTE — PROGRESS NOTES
Speech-Language Pathology: Clinical Swallow Evaluation     07/13/25 1000   Appointment Info   Signing Clinician's Name / Credentials (SLP) Luz Andre MA CCC-SLP   General Information   Onset of Illness/Injury or Date of Surgery 07/12/25   Referring Physician Dr. Breen   Pertinent History of Current Problem Per EMR: 65 year old male admitted on 7/12/2025. He is being admitted for sepsis and acute encephalopathy.     #Sepsis with fever, tachycardia, leukocytosis  - Most likely sources include CNS and surrounding facial tissues, imaging unrevealing for abdominal pelvic pathology, CT head/face showing enlarging mass and surrounding inflammation  - Pro-Migue normal at 0.05, lactate 1.3  - Continue with Vanco and Zosyn pending cultures and response  - Consider ID consult pending course  - Supportive measures with IV fluids   #Dysphagia and aspiration  - Per review, intermittently taking thickened liquids  - Speech and language pathology consulted to formalize diet  - Dietitian consulted to resume tube feeds   Type of Evaluation   Type of Evaluation Swallow Evaluation   Oral Motor   Oral Musculature anomalies present  (Left facial swelling along with tumor; significant left eye swelling)   Facial Symmetry (Oral Motor)   Facial Symmetry (Oral Motor) left side impairment  (Due to swelling and tumor)   Lip Function (Oral Motor)   Comment, Lip Function (Oral Motor) Highly reduced oral opening, able to split lips apart on the right side up to 2 fingers maximum   Tongue Function (Oral Motor)   Comment, Tongue Function (Oral Motor) WFL   Jaw Function (Oral Motor)   Comment, Jaw Function (Oral Motor) Appears to have only 1 finger opening at the teeth; records indicate previous ability to open roughly 2 finger with   Vocal Quality/Secretion Management (Oral Motor)   Vocal Quality (Oral Motor) WFL   General Swallowing Observations   Current Diet/Method of Nutritional Intake (General Swallowing Observations, NIS) NPO;gastrostomy  tube (PEG)   Past History of Dysphagia Patient has had oral dysphagia history due to tumor, no aspiration has been noted on swallow studies in the past.  Patient's sons present and reports most recently patient had been having liquids only and in very small quantities.  In recent days patient has had some difficulty tolerating liquids and they trialed thickened liquids at home.  Patient did not like thickened liquids and overall has low ability for oral intake or interest in drinking much.   Swallowing Evaluation Clinical swallow evaluation   Swallowing Recommendations   Diet Consistency Recommendations NPO  (Patient may have oral swabs dipped in warm water or a few drops of warm water via straw pipette as needed for oral mucosal integrity and comfort)   Medication Administration Recommendations, Swallowing (SLP) PEG tube   Instrumental Assessment Recommendations instrumental evaluation not recommended at this time   Comment, Swallowing Recommendations Minimal assessment with actual oral intake.  Patient mostly interested in oral comfort and was able to tolerate a few drops of warm water placed in right cheek by straw pipette.  Recommend good oral cares as able.  Not pursuing full oral intake at this time. SLP to follow for appropriateness of small amounts of liquid for comfort and taste.   General Therapy Interventions   Planned Therapy Interventions Dysphagia Treatment   Clinical Impression   Criteria for Skilled Therapeutic Interventions Met (SLP Eval) Yes, treatment indicated   SLP Diagnosis Dysphagia   Risks & Benefits of therapy have been explained evaluation/treatment results reviewed;care plan/treatment goals reviewed;participants included;patient   SLP Total Evaluation Time   Eval: oral/pharyngeal swallow function, clinical swallow Minutes (18929) 15   SLP Goals   Therapy Frequency (SLP Eval) 4 times/week   SLP Predicted Duration/Target Date for Goal Attainment 07/21/25   SLP Goals Swallow   SLP: Safely  tolerate diet without signs/symptoms of aspiration One P.O. texture   Interventions   Interventions Quick Adds Swallowing Dysfunction   Swallowing Intervention   Treatment of Swallowing Dysfunction &/or Oral Function for Feeding Minutes (73648) 8   Treatment Detail/Skilled Intervention Trained patient, family and staff on small drops of water via straw pipette and how to complete oral cares given high level of limitations with tumor and reduced oral lip and jaw opening.  Provided patient and family with warm water a straw for pipette and toothettes   SLP Discharge Planning   SLP Plan Sun: f/u GOC, consider trials of liquids, continue training on good oral care and means of moistening oral mucosa for comfort   SLP Rationale for DC Rec Ongoing ST not expected at d/c   SLP Brief overview of current status  NPO with exception for drops of water provided by straw pipette. SLP to follow for comfort and safety management given high degree of oral dysphagia related to tumor and infection.   SLP Time and Intention   Total Session Time (sum of timed and untimed services) 23

## 2025-07-13 NOTE — PROGRESS NOTES
[facility]    Medicine Progress Note - Hospitalist Service    Date of Admission:  7/12/2025    Assessment & Plan      Douglas Herrera is a 65 year old male admitted on 7/12/2025. He is being admitted for sepsis and acute encephalopathy.    #Sepsis with fever, tachycardia, leukocytosis  - Most likely sources include CNS and surrounding facial tissues, imaging unrevealing for abdominal pelvic pathology, CT head/face showing enlarging mass and surrounding inflammation  - Follow-up MRI brain and face scheduled for 7/14  - Pro-Migue normal at 0.05, lactate 1.3  - Continue with Vanco and Zosyn pending cultures and response  - MRSA swab ordered 7/13 to de-escalate vancomycin  - Consider ID consult pending course  - Supportive measures with IV fluids    #Acute encephalopathy  - Likely from sepsis and infection of the head/neck, alternatively consider polypharmacy  - Supportive measures, minimize CNS medications  - PT and OT and care management consults    #Hyponatremia  - 130 on admission, usually normal, likely from dehydration and sepsis  - Resolved with IV fluids indicating dehydration    #Anemia  - Likely from chemo, no apparent clinical blood losses  - Recent iron studies show low iron and low TIBC, normal sat    #SCC head neck cancer, stage IVb  - Follows with [facility] oncology  - Had been on cetuximab, now holding with deconditioning and frailty  - Oncology consulted to review treatment options and discuss prognosis  - Patient has elected to be DNR, not quite ready for hospice discussions, continue to broach    #Hypercalcemia of malignancy  - 7/8 had calcium in the 13's, received Zometa and IV fluids, calcium now normal  - Trend periodically, monitor for hypocalcemia    #Dysphagia and aspiration  - Per review, intermittently taking thickened liquids  - Speech and language pathology consulted, n.p.o. until encephalopathy further resolved  - Dietitian consulted to resume tube feeds    #Chronic  hepatitis B  - Surface antigen positive core antibody positive, surface antibody negative, PCR - 1/2024  - Home Entecavir if being taken    #Intercrural fungus  - Has typical odor, offer miconazole as needed    #Hypothyroidism  - Resume usual Synthroid            Diet: NPO for Medical/Clinical Reasons Except for: No Exceptions  Adult Formula Bolus Feeding: Osmolite 1.5; Route: Gastrostomy; 3 times daily (Bolus at 07-12-18.  infuse over 1 hour.); Volume per Bolus: 400; mL(s); Additional free water (mL): 60 ml before and after each bolus. additional flush of 130 ml at 03-0...    DVT Prophylaxis: Pneumatic Compression Devices  Rico Catheter: Not present  Lines: PRESENT      Port a Cath 01/11/24 Single Lumen Right Chest wall-Site Assessment: WDL      Cardiac Monitoring: None  Code Status: No CPR- Do NOT Intubate      Clinically Significant Risk Factors         # Hyponatremia: Lowest Na = 130 mmol/L in last 2 days, will monitor as appropriate  # Hypochloremia: Lowest Cl = 96 mmol/L in last 2 days, will monitor as appropriate   # Hypercalcemia: corrected calcium is >10.1, will monitor as appropriate    # Hypoalbuminemia: Lowest albumin = 2.9 g/dL at 7/12/2025  6:01 AM, will monitor as appropriate     # Hypertension: Noted on problem list                 # History of CABG: noted on surgical history       Social Drivers of Health    Food Insecurity: Patient Declined (6/8/2025)    Received from [employer]    Hunger Vital Sign     Worried About Running Out of Food in the Last Year: Patient declined     Ran Out of Food in the Last Year: Patient declined   Housing Stability: Patient Declined (6/8/2025)    Received from [employer]    Housing Stability Vital Sign     Unable to Pay for Housing in the Last Year: Patient declined     Homeless in the Last Year: Patient declined   Tobacco Use: Low Risk  (6/10/2025)    Received from [employer]    Patient History     Smoking Tobacco Use: Never     Smokeless Tobacco Use:  Never   Recent Concern: Tobacco Use - Medium Risk (6/2/2025)    Patient History     Smoking Tobacco Use: Former     Smokeless Tobacco Use: Never     Passive Exposure: Past   Transportation Needs: Patient Declined (6/8/2025)    [facility]    PRAPARE - Transportation     Lack of Transportation (Medical): Patient declined     Lack of Transportation (Non-Medical): Patient declined   Interpersonal Safety: Patient Declined (6/8/2025)    [facility]    Humiliation, Afraid, Rape, and Kick questionnaire     Fear of Current or Ex-Partner: Patient declined     Emotionally Abused: Patient declined     Physically Abused: Patient declined     Sexually Abused: Patient declined          Disposition Plan     Medically Ready for Discharge: Anticipated in 2-4 Days             Esequiel Breen MD  Hospitalist Service  [facility]  Securely message with [employer] (more info)  Text page via [employer] Paging/Directory   ______________________________________________________________________    Interval History   Feels better this morning, no new complaints.    Physical Exam   Vital Signs: Temp: 99.4  F (37.4  C) Temp src: Axillary BP: 110/55 Pulse: 84   Resp: 18 SpO2: 97 % O2 Device: None (Room air)    Weight: 134 lbs 0 oz    Alert, no distress, stable left facial swelling and redness, heart and lungs normal, abdomen soft, no leg edema, normal affect    Medical Decision Making       51 MINUTES SPENT BY ME on the date of service doing chart review, history, exam, documentation & further activities per the note.  NOTE(S)/MEDICAL RECORDS REVIEWED over the past 24 hours: Vitals, labs, new imaging, documentation and medication administrations      Data     I have personally reviewed the following data over the past 24 hrs:    12.1 (H)  \   9.0 (L)   / 304     135 103 17.6 /  115 (H)   3.9 21 (L) 0.93 \     ALT: N/A AST: N/A AP: N/A TBILI: N/A   ALB: 3.0 (L) TOT PROTEIN: N/A LIPASE: N/A        Imaging results reviewed over the past 24 hrs:   No results found for this or any previous visit (from the past 24 hours).

## 2025-07-13 NOTE — PROGRESS NOTES
[facility]  _________________________________________________________________     [facility] 24/7 Contact Number: [phone]    - Providers: Please contact [facility] with changes in orders or clinical plan of care   - Nursing: Please contact [facility] with significant changes in patient condition     Hospice will notify the care team (including the hospitalist) to confirm date of inpatient hospice (GIP) admission.    New Epic encounter will not be created until hospice completes admission.     Received, thank you for the referral. We will work on sending someone to meet with pt/family

## 2025-07-13 NOTE — PROGRESS NOTES
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  He does not have any good treatment option which can get his disease under control.  He is overall declining.  I have recommended we need to consider hospice care for this gentleman.  This is the right thing to do under the circumstances.  His low-grade temperature can be secondary to his tumor as well.  He has necrotic tissue which can cause low-grade temperature.  He needs some family conferencing so that everybody can get on the same page as far as his family is concerned.    Medical decision Making    Reviewed Labs.  Reviewed notes from hospitalist.  Discussed his care with his nurse.  Recommend hospice care.  Orders placed.    Cancer Staging   No matching staging information was found for the patient.      History of Present Illness    Mr. Douglas Herrera fortunately has advanced relapsed refractory squamous of carcinoma of the maxillary sinus on the left side.  He has progressive pulmonary metastases.  Brought in by his family because he was weak and was having some fever.  He also was somewhat confused.  Clinical diagnosis of some infection/encephalopathy has been made.    The patient  has been treated by [facility] and most recently by Dr. Post at [facility].  He does not look like he is a good candidate for any therapy.  His most recent CT scan done on 12 July 2025 shows progression.  He is quite sleepy and has very difficult to understand speech.    Review of Systems    Pertinent findings noted in history.    Physical Exam    /55 (BP Location: Right arm, Patient Position: Semi-Nielsen's, Cuff Size: Adult Regular)   Pulse 84   Temp  "99.4  F (37.4  C) (Axillary)   Resp 18   Ht 1.626 m (5' 4\")   Wt 60.8 kg (134 lb)   SpO2 97%   BMI 23.00 kg/m      GENERAL: no acute distress.  Sleepy.   HEENT: Asymmetry on his face.  Left-sided maxillary sinus swelling, side has radiation changes etc.  Strong odor of necrotic tissue  RESP:Chest symmetric. Regular respiratory rate. No stridor.  ABD: Nondistended, soft.  EXTREMITIES: No lower extremity edema.   NEURO: non focal. Alert and oriented x3.   PSYCH: within normal limits. No depression or anxiety.  SKIN: warm dry intact     Lab Results Reviewed    Recent Results (from the past 24 hours)   Glucose by meter    Collection Time: 07/13/25  2:01 AM   Result Value Ref Range    GLUCOSE BY METER POCT 108 (H) 70 - 99 mg/dL   Renal panel    Collection Time: 07/13/25  6:47 AM   Result Value Ref Range    Sodium 135 135 - 145 mmol/L    Potassium 3.9 3.4 - 5.3 mmol/L    Chloride 103 98 - 107 mmol/L    Carbon Dioxide (CO2) 21 (L) 22 - 29 mmol/L    Anion Gap 11 7 - 15 mmol/L    Glucose 117 (H) 70 - 99 mg/dL    Urea Nitrogen 17.6 8.0 - 23.0 mg/dL    Creatinine 0.93 0.67 - 1.17 mg/dL    GFR Estimate >90 >60 mL/min/1.73m2    Calcium 9.2 8.8 - 10.4 mg/dL    Albumin 3.0 (L) 3.5 - 5.2 g/dL    Phosphorus 2.5 2.5 - 4.5 mg/dL   CBC with platelets and differential    Collection Time: 07/13/25  6:47 AM   Result Value Ref Range    WBC Count 12.1 (H) 4.0 - 11.0 10e3/uL    RBC Count 3.43 (L) 4.40 - 5.90 10e6/uL    Hemoglobin 9.0 (L) 13.3 - 17.7 g/dL    Hematocrit 28.6 (L) 40.0 - 53.0 %    MCV 83 78 - 100 fL    MCH 26.2 (L) 26.5 - 33.0 pg    MCHC 31.5 31.5 - 36.5 g/dL    RDW 17.4 (H) 10.0 - 15.0 %    Platelet Count 304 150 - 450 10e3/uL    % Neutrophils 61 %    % Lymphocytes 30 %    % Monocytes 7 %    % Eosinophils 1 %    % Basophils 0 %    % Immature Granulocytes 1 %    NRBCs per 100 WBC 0 <1 /100    Absolute Neutrophils 7.4 1.6 - 8.3 10e3/uL    Absolute Lymphocytes 3.6 0.8 - 5.3 10e3/uL    Absolute Monocytes 0.9 0.0 - 1.3 10e3/uL "    Absolute Eosinophils 0.1 0.0 - 0.7 10e3/uL    Absolute Basophils 0.0 0.0 - 0.2 10e3/uL    Absolute Immature Granulocytes 0.1 <=0.4 10e3/uL    Absolute NRBCs 0.0 10e3/uL   RBC and Platelet Morphology    Collection Time: 07/13/25  6:47 AM   Result Value Ref Range    RBC Morphology Confirmed RBC Indices     Platelet Assessment  Automated Count Confirmed. Platelet morphology is normal.     Automated Count Confirmed. Platelet morphology is normal.   Glucose by meter    Collection Time: 07/13/25  7:21 AM   Result Value Ref Range    GLUCOSE BY METER POCT 115 (H) 70 - 99 mg/dL   MRSA MSSA PCR, Nasal Swab    Collection Time: 07/13/25  9:51 AM    Specimen: Nose; Swab   Result Value Ref Range    MRSA Target DNA Positive (A) Negative    SA Target DNA Positive    Glucose by meter    Collection Time: 07/13/25 12:31 PM   Result Value Ref Range    GLUCOSE BY METER POCT 177 (H) 70 - 99 mg/dL        Imaging Reviewed    Soft tissue neck CT w contrast  Result Date: 7/12/2025  EXAM: CT SOFT TISSUE NECK W CONTRAST LOCATION: [facility] DATE: 7/12/2025 INDICATION: Known cancer, fever COMPARISON: Soft tissue neck CT 6/6/2025. CONTRAST: 90 ml iso 370 given during CAP TECHNIQUE: Routine CT Soft Tissue Neck with IV contrast. Multiplanar reformats. Dose reduction techniques were used. FINDINGS: AREA OF INTEREST: Redemonstrated transfacial soft tissue mass, centered within the left  space. The mass again appears to extend superiorly through the floor of the left orbit, medially through the hard palate, posteriorly contacting the left internal carotid artery, laterally past the left mandibular body, anteriorly past the anterior maxillary wall, and caudally to the angle of the mandible. There is again noted to be extension into the paranasal sinuses, oral cavity, and pharynx, which overall appears slightly more extensive than the prior CT. There is extensive associated osseous erosion involving the left-sided  facial bones. There is also noted to be erosion of the floor of the left middle cranial fossa, with suspected intracranial tumor extension along the floor of the left middle cranial fossa and potentially involving the left cavernous sinus and prepontine cistern. There is again noted to be multicystic changes involving the central aspect of the mass. Overall the lesion appears increased from the prior CT, measuring approximately 7.8 x 6.7 cm in similar axial dimensions to the prior exam (series 3 image 44), although difficult to delineate the exact margins of the mass and could likely be better evaluated on MRI. There is some stranding within the peripheral surrounding subcutaneous tissues, which overall appears decreased since the prior CT, although superimposed infectious process remains difficult to exclude. There is also edema involving the mucosa of the upper aerodigestive tract, with the every remaining grossly patent. LYMPH NODES: No pathologic lymph nodes by size or morphology criteria. SALIVARY GLANDS: Normal parotid and submandibular glands. THYROID: Subcentimeter calcification in the right thyroid lobe. VESSELS: Vascular structures of the neck are patent. Partially visualized right chest port. OTHER: Numerous partially visualized nodules in the visualized lung apices, compatible with metastatic disease. Multilevel cervical spondylosis.     IMPRESSION: 1.  Redemonstrated transspatial cystic and solid mass centered in the left  space, which appears to have increased in size and extent since the prior CT from 6/6/2025. There is again noted to be involvement of the surrounding structures, with extensive associated osseous erosion and suspected intracranial extension, as above. Surrounding inflammatory changes appear decreased from the prior CT, although superimposed infection remains difficult to exclude. 2.  Numerous partially visualized nodules in the visualized lung apices, compatible with  metastatic disease.    CT Chest/Abdomen/Pelvis w Contrast  Result Date: 7/12/2025  EXAM: CT CHEST/ABDOMEN/PELVIS WITH CONTRAST LOCATION: [facility] DATE: 07/12/2025 INDICATION: Fever, known head and neck CA. COMPARISON: CT chest 06/06/2025. CT chest, abdomen and pelvis 03/06/2025. TECHNIQUE: CT scan of the chest, abdomen, and pelvis was performed following injection of IV contrast. Multiplanar reformats were obtained. Dose reduction techniques were used. CONTRAST: 90 mL Iso 370. FINDINGS: LUNGS AND PLEURA: Bilateral dependent atelectasis. Since 06/06/2025, resolved bilateral consolidative opacities. Interval increased in size multiple pulmonary nodules with reference examples on series 5: -Left apex, 8 mm, previously 4 mm, image 52 -Right apex, 16 x 11 mm, not definitely visualized previously and possibly obscured, image 60 -Left upper lobe subpleural, 12 mm, previously 8 mm, image 86 Multiple additional pulmonary nodules are noted. MEDIASTINUM/AXILLAE: Right chest port tip at the right atrium. Similar borderline enlarged right hilar 1 cm lymph node (4/60). CORONARY ARTERY CALCIFICATION: Severe. HEPATOBILIARY: Cholecystectomy. PANCREAS: Normal. SPLEEN: Normal. ADRENAL GLANDS: Normal. KIDNEYS/BLADDER: Normal. BOWEL: Gastrostomy tube in appropriate position. No bowel obstruction. Appendix appears normal. LYMPH NODES: Normal. VASCULATURE: Mild to moderate aortic atherosclerosis. PELVIC ORGANS: No pelvic mass. MUSCULOSKELETAL: No aggressive osseous lesion. Sternotomy.     IMPRESSION: 1.  Since 06/06/2025, resolved pulmonary consolidative opacities/pneumonia. 2.  Continued increased in size multiple pulmonary nodules/metastases. 3.  No convincing acute process within the abdomen or pelvis.     Head CT w/o contrast  Result Date: 7/12/2025  EXAM: CT HEAD W/O CONTRAST LOCATION: [facility] DATE: 7/12/2025 INDICATION: ams COMPARISON: Head CT 6/6/2025. TECHNIQUE: Routine CT  Head without IV contrast. Multiplanar reformats. Dose reduction techniques were used. FINDINGS: INTRACRANIAL CONTENTS: No intracranial hemorrhage, extraaxial collection, or mass effect.  No CT evidence of acute infarct. Mild presumed chronic small vessel ischemic changes. Mild generalized volume loss. No hydrocephalus. VISUALIZED ORBITS/SINUSES/MASTOIDS: No intraorbital abnormality. Extensive mucosal thickening throughout the paranasal sinuses. There is also soft tissue mass likely involving the sphenoid sinuses and posterior ethmoid air cells. Bilateral mastoid effusions. BONES/SOFT TISSUES: Please see separately dictated soft tissue neck CT for description of soft tissue mass and associated osseous findings.     IMPRESSION: 1.  No acute intracranial findings. 2.  Mild age-related changes. 3.  Please see separately dictated soft tissue neck CT for further description of the left-sided facial mass.       Signed by: Stewart Camacho MD    This note has been dictated using voice recognition software. Any grammatical or context distortions are unintentional and inherent to the software

## 2025-07-13 NOTE — PLAN OF CARE
Problem: Adult Inpatient Plan of Care  Goal: Plan of Care Review  Outcome: Progressing    Problem: Enteral Nutrition  Goal: Absence of Aspiration Signs and Symptoms  Outcome: Progressing     Problem: Swallowing Impairment  Goal: Optimal Eating/Swallowing without Aspiration  Outcome: Progressing    Pt has been drowsy most of the day. Had some eye discomfort and requested a cold pack which was effected. Pt has a single lumen port with NaCl infusing. Swabbed for MRSA which came back positive. Speech assessed pt. He was able to open mouth 1 finger width. Has had bleeding from mouth, uses suction to clear out. G-tube in place for feeding. Morning feed (400 ml bolus) done at 1030, next feed at 1630. Will resume regular schedule tomorrow at 0700. Dr. Camacho assessed pt and put in an order for Hospice consult.

## 2025-07-13 NOTE — PLAN OF CARE
Problem: Adult Inpatient Plan of Care  Goal: Absence of Hospital-Acquired Illness or Injury  Intervention: Identify and Manage Fall Risk  Recent Flowsheet Documentation  Taken 7/13/2025 0059 by Shane Gee RN  Safety Promotion/Fall Prevention:   activity supervised   mobility aid in reach   nonskid shoes/slippers when out of bed   patient and family education  Intervention: Prevent Skin Injury  Recent Flowsheet Documentation  Taken 7/13/2025 0059 by Shane Gee RN  Body Position: position changed independently  Intervention: Prevent and Manage VTE (Venous Thromboembolism) Risk  Recent Flowsheet Documentation  Taken 7/13/2025 0059 by Shane Gee RN  VTE Prevention/Management: SCDs on (sequential compression devices)  Intervention: Prevent Infection  Recent Flowsheet Documentation  Taken 7/13/2025 0059 by Shane Gee RN  Infection Prevention:   rest/sleep promoted   single patient room provided     Problem: Delirium  Goal: Improved Behavioral Control  Intervention: Prevent and Manage Agitation  Recent Flowsheet Documentation  Taken 7/13/2025 0059 by Shane Gee RN  Environment Familiarity/Consistency: daily routine followed  Intervention: Minimize Safety Risk  Recent Flowsheet Documentation  Taken 7/13/2025 0059 by Shane Gee RN  Enhanced Safety Measures:   room near unit station   pain management  Trust Relationship/Rapport:   care explained   choices provided     Problem: Delirium  Goal: Improved Attention and Thought Clarity  Intervention: Maximize Cognitive Function  Recent Flowsheet Documentation  Taken 7/13/2025 0059 by Shane Gee RN  Sensory Stimulation Regulation: quiet environment promoted  Reorientation Measures:   calendar in view   clock in view     Problem: Swallowing Impairment  Goal: Optimal Eating/Swallowing without Aspiration  Intervention: Optimize Eating and Swallowing  Recent Flowsheet Documentation  Taken 7/13/2025 0059 by  Shane Gee, RN  Feeding/Eating Techniques:   oral mucosa moistened   close supervision provided     Problem: Enteral Nutrition  Goal: Feeding Tolerance  Intervention: Prevent and Manage Feeding Intolerance  Recent Flowsheet Documentation  Taken 7/13/2025 0059 by Shane Gee, RN  Nutrition Support Management: tube feeding initiated   Goal Outcome Evaluation:        Patient A&O X 4, NPO, denied pain, used bedside urinal X 2 and up to the bathroom x 1.    G-tube in place flushed X 2 and clamped;  Port cath R chest infusing with NS 75 ml/hr.    Abx infused this shift.   HOB @ 30 degree.    Shane GUADARRAMA, RN.

## 2025-07-13 NOTE — PLAN OF CARE
Problem: Delirium  Goal: Improved Attention and Thought Clarity  Outcome: Progressing     Problem: Enteral Nutrition  Goal: Feeding Tolerance  Outcome: Progressing  Intervention: Prevent and Manage Feeding Intolerance  Recent Flowsheet Documentation  Taken 7/12/2025 1746 by Gayle Desai, RN  Nutrition Support Management: tube feeding initiated     Problem: Adult Inpatient Plan of Care  Goal: Absence of Hospital-Acquired Illness or Injury  Intervention: Identify and Manage Fall Risk  Recent Flowsheet Documentation  Taken 7/12/2025 1746 by Gayle Desai, RN  Safety Promotion/Fall Prevention:   activity supervised   mobility aid in reach   nonskid shoes/slippers when out of bed   patient and family education   Goal Outcome Evaluation:       Pt got up and ambulated to bathroom x 1 with assist of 1 staff. Pts language much clearer after getting up. Pt has had some bloody drainage from mouth. Mouth was gently swabbed with water x 2. Pt using yankauer to suction oral secretions. Pt tolerated tube feeding without issues.

## 2025-07-14 LAB
ALBUMIN SERPL BCG-MCNC: 2.9 G/DL (ref 3.5–5.2)
ANION GAP SERPL CALCULATED.3IONS-SCNC: 8 MMOL/L (ref 7–15)
BASOPHILS # BLD AUTO: 0 10E3/UL (ref 0–0.2)
BASOPHILS NFR BLD AUTO: 0 %
BUN SERPL-MCNC: 15.2 MG/DL (ref 8–23)
CALCIUM SERPL-MCNC: 8.9 MG/DL (ref 8.8–10.4)
CHLORIDE SERPL-SCNC: 105 MMOL/L (ref 98–107)
CREAT SERPL-MCNC: 0.9 MG/DL (ref 0.67–1.17)
EGFRCR SERPLBLD CKD-EPI 2021: >90 ML/MIN/1.73M2
EOSINOPHIL # BLD AUTO: 0.1 10E3/UL (ref 0–0.7)
EOSINOPHIL NFR BLD AUTO: 1 %
ERYTHROCYTE [DISTWIDTH] IN BLOOD BY AUTOMATED COUNT: 17.4 % (ref 10–15)
GLUCOSE SERPL-MCNC: 102 MG/DL (ref 70–99)
HCO3 SERPL-SCNC: 23 MMOL/L (ref 22–29)
HCT VFR BLD AUTO: 26 % (ref 40–53)
HGB BLD-MCNC: 8.2 G/DL (ref 13.3–17.7)
IMM GRANULOCYTES # BLD: 0.1 10E3/UL
IMM GRANULOCYTES NFR BLD: 1 %
LYMPHOCYTES # BLD AUTO: 2.8 10E3/UL (ref 0.8–5.3)
LYMPHOCYTES NFR BLD AUTO: 28 %
MAGNESIUM SERPL-MCNC: 1.7 MG/DL (ref 1.7–2.3)
MCH RBC QN AUTO: 26.8 PG (ref 26.5–33)
MCHC RBC AUTO-ENTMCNC: 31.5 G/DL (ref 31.5–36.5)
MCV RBC AUTO: 85 FL (ref 78–100)
MONOCYTES # BLD AUTO: 0.8 10E3/UL (ref 0–1.3)
MONOCYTES NFR BLD AUTO: 8 %
NEUTROPHILS # BLD AUTO: 6.2 10E3/UL (ref 1.6–8.3)
NEUTROPHILS NFR BLD AUTO: 62 %
NRBC # BLD AUTO: 0 10E3/UL
NRBC BLD AUTO-RTO: 0 /100
PHOSPHATE SERPL-MCNC: 1.8 MG/DL (ref 2.5–4.5)
PLATELET # BLD AUTO: 274 10E3/UL (ref 150–450)
POTASSIUM SERPL-SCNC: 3.6 MMOL/L (ref 3.4–5.3)
RBC # BLD AUTO: 3.06 10E6/UL (ref 4.4–5.9)
SODIUM SERPL-SCNC: 136 MMOL/L (ref 135–145)
VANCOMYCIN SERPL-MCNC: 17.1 UG/ML (ref ?–25)
WBC # BLD AUTO: 10.1 10E3/UL (ref 4–11)

## 2025-07-14 PROCEDURE — 80202 ASSAY OF VANCOMYCIN: CPT | Performed by: HOSPITALIST

## 2025-07-14 PROCEDURE — 83735 ASSAY OF MAGNESIUM: CPT | Performed by: HOSPITALIST

## 2025-07-14 PROCEDURE — 85004 AUTOMATED DIFF WBC COUNT: CPT | Performed by: HOSPITALIST

## 2025-07-14 PROCEDURE — 250N000013 HC RX MED GY IP 250 OP 250 PS 637: Performed by: INTERNAL MEDICINE

## 2025-07-14 PROCEDURE — S9341 HIT ENTERAL GRAV DIEM: HCPCS

## 2025-07-14 PROCEDURE — 250N000013 HC RX MED GY IP 250 OP 250 PS 637: Performed by: STUDENT IN AN ORGANIZED HEALTH CARE EDUCATION/TRAINING PROGRAM

## 2025-07-14 PROCEDURE — 250N000011 HC RX IP 250 OP 636: Performed by: HOSPITALIST

## 2025-07-14 PROCEDURE — 99233 SBSQ HOSP IP/OBS HIGH 50: CPT | Performed by: HOSPITALIST

## 2025-07-14 PROCEDURE — 36415 COLL VENOUS BLD VENIPUNCTURE: CPT | Performed by: HOSPITALIST

## 2025-07-14 PROCEDURE — 84100 ASSAY OF PHOSPHORUS: CPT | Performed by: HOSPITALIST

## 2025-07-14 PROCEDURE — 250N000013 HC RX MED GY IP 250 OP 250 PS 637: Performed by: HOSPITALIST

## 2025-07-14 PROCEDURE — 120N000001 HC R&B MED SURG/OB

## 2025-07-14 PROCEDURE — 258N000003 HC RX IP 258 OP 636: Performed by: HOSPITALIST

## 2025-07-14 RX ORDER — ACETAMINOPHEN 325 MG/1
975 TABLET ORAL 3 TIMES DAILY PRN
Status: DISCONTINUED | OUTPATIENT
Start: 2025-07-14 | End: 2025-07-14

## 2025-07-14 RX ORDER — ACETAMINOPHEN AND CODEINE PHOSPHATE 120; 12 MG/5ML; MG/5ML
10 SOLUTION ORAL ONCE
Status: DISCONTINUED | OUTPATIENT
Start: 2025-07-14 | End: 2025-07-14

## 2025-07-14 RX ORDER — ACETAMINOPHEN 325 MG/10.15ML
975 LIQUID ORAL 3 TIMES DAILY PRN
Status: DISCONTINUED | OUTPATIENT
Start: 2025-07-14 | End: 2025-07-19 | Stop reason: HOSPADM

## 2025-07-14 RX ADMIN — POTASSIUM & SODIUM PHOSPHATES POWDER PACK 280-160-250 MG 1 PACKET: 280-160-250 PACK at 08:24

## 2025-07-14 RX ADMIN — ENTECAVIR 0.5 MG: 0.05 SOLUTION ORAL at 20:26

## 2025-07-14 RX ADMIN — GABAPENTIN 600 MG: 250 SUSPENSION ORAL at 08:24

## 2025-07-14 RX ADMIN — ATORVASTATIN CALCIUM 80 MG: 40 TABLET, FILM COATED ORAL at 20:26

## 2025-07-14 RX ADMIN — PIPERACILLIN SODIUM AND TAZOBACTAM SODIUM 4.5 G: 4; .5 INJECTION, POWDER, LYOPHILIZED, FOR SOLUTION INTRAVENOUS at 20:11

## 2025-07-14 RX ADMIN — LEVOTHYROXINE SODIUM 75 MCG: 0.03 TABLET ORAL at 05:54

## 2025-07-14 RX ADMIN — SODIUM CHLORIDE: 0.9 INJECTION, SOLUTION INTRAVENOUS at 20:25

## 2025-07-14 RX ADMIN — GABAPENTIN 600 MG: 250 SUSPENSION ORAL at 20:26

## 2025-07-14 RX ADMIN — PIPERACILLIN SODIUM AND TAZOBACTAM SODIUM 4.5 G: 4; .5 INJECTION, POWDER, LYOPHILIZED, FOR SOLUTION INTRAVENOUS at 02:00

## 2025-07-14 RX ADMIN — POTASSIUM & SODIUM PHOSPHATES POWDER PACK 280-160-250 MG 1 PACKET: 280-160-250 PACK at 20:26

## 2025-07-14 RX ADMIN — SODIUM CHLORIDE 1250 MG: 0.9 INJECTION, SOLUTION INTRAVENOUS at 15:01

## 2025-07-14 RX ADMIN — POTASSIUM & SODIUM PHOSPHATES POWDER PACK 280-160-250 MG 1 PACKET: 280-160-250 PACK at 15:00

## 2025-07-14 RX ADMIN — PIPERACILLIN SODIUM AND TAZOBACTAM SODIUM 4.5 G: 4; .5 INJECTION, POWDER, LYOPHILIZED, FOR SOLUTION INTRAVENOUS at 08:24

## 2025-07-14 RX ADMIN — PIPERACILLIN SODIUM AND TAZOBACTAM SODIUM 4.5 G: 4; .5 INJECTION, POWDER, LYOPHILIZED, FOR SOLUTION INTRAVENOUS at 15:00

## 2025-07-14 RX ADMIN — ACETAMINOPHEN 975 MG: 325 SOLUTION ORAL at 22:56

## 2025-07-14 RX ADMIN — GABAPENTIN 600 MG: 250 SUSPENSION ORAL at 15:00

## 2025-07-14 RX ADMIN — OXYCODONE HYDROCHLORIDE 3 MG: 5 SOLUTION ORAL at 17:52

## 2025-07-14 ASSESSMENT — ACTIVITIES OF DAILY LIVING (ADL)
ADLS_ACUITY_SCORE: 64
ADLS_ACUITY_SCORE: 41
ADLS_ACUITY_SCORE: 64
ADLS_ACUITY_SCORE: 41
DEPENDENT_IADLS:: CLEANING;COOKING;LAUNDRY;SHOPPING;MEAL PREPARATION;MEDICATION MANAGEMENT;TRANSPORTATION
ADLS_ACUITY_SCORE: 41
ADLS_ACUITY_SCORE: 64
ADLS_ACUITY_SCORE: 41
ADLS_ACUITY_SCORE: 64

## 2025-07-14 NOTE — PROGRESS NOTES
[facility]    Consult Note - [facility]  _________________________________________________________________    [facility] 24/7 Contact Number: [phone]    - Providers: Please contact [facility] with changes in orders or clinical plan of care   - Nursing: Please contact [facility] with significant changes in patient condition    Hospice will notify the care team (including the hospitalist) to confirm date of inpatient hospice ([facility]IP) admission.    New Epic encounter will not be created until hospice completes admission.   ______________________________________________________________________      GIP hospice team has connected with pt's son over phone this morning. Son requests to reach out to GIP hospice team when family are ready to have discussion about GIP hospice and when it is a good day for this conversation. Writer respects this and gives son reliable 24 hour line to call with details of desired date/time to discuss; [phone]. Son verbalizes appreciation for this information and for understanding.     Flory Melgar RN ([place])

## 2025-07-14 NOTE — PLAN OF CARE
[id number]    Problem: Adult Inpatient Plan of Care  Goal: Plan of Care Review  Description: The Plan of Care Review/Shift note should be completed every shift.  The Outcome Evaluation is a brief statement about your assessment that the patient is improving, declining, or no change.  This information will be displayed automatically on your shift  note.  Outcome: Progressing  Flowsheets (Taken 7/13/2025 2306)  Plan of Care Reviewed With: patient     Problem: Enteral Nutrition  Goal: Absence of Aspiration Signs and Symptoms  Outcome: Progressing  Intervention: Minimize Aspiration Risk  Recent Flowsheet Documentation  Taken 7/13/2025 2035 by Jeanette Mcclellan, RN  Head of Bed (HOB) Positioning: HOB at 30-45 degrees   Goal Outcome Evaluation:      Plan of Care Reviewed With: patient      Writer took over this pt at 1900. Hmong speaking pt but can speak and understand some english. Pt alert and follows commands. Since it was difficult for him to talk due to swelling, unable to assess orientation. Vitals stable. Denied any pain. No s/s of pain noted. Pt has IV fluid running continuously. Gave tube feeding bolus later at 2230 because per report the previous dose was given late only at 1830. Blood sugar was WNL. Pt had large incontinent of urine. Cleaned and changed bed and gown. All cares explained to pt.

## 2025-07-14 NOTE — PLAN OF CARE
Problem: Enteral Nutrition  Goal: Absence of Aspiration Signs and Symptoms  Outcome: Progressing  Intervention: Minimize Aspiration Risk  Recent Flowsheet Documentation  Taken 7/14/2025 0000 by Rosio Sullivan RN  Oral Care: swabbed with sterile water  Head of Bed (HOB) Positioning: HOB at 30-45 degrees  Goal: Safe, Effective Therapy Delivery  Outcome: Progressing  Goal: Feeding Tolerance  Outcome: Progressing   Goal Outcome Evaluation:       Pt is sleeping most of the shift, opens his right eye to acknowledge. Small blood noted in the mouth, oral care done. On NS at 75 ml/hr. TF bolus 400 ml/hr given as scheduled at 0700, free water bolus given 60 ml before and after the bolus. Scheduled additional 130 water flush given at 0300.Pt is incontinent with bladder.

## 2025-07-14 NOTE — PROGRESS NOTES
[facility]    Patient readmitted for fevers, weakness, confusion on 7/12/25.    Patient is currently on service with [facility] for enteral TF's: Osmolite 1.5 Ca; 2 cartons 3x/day via gravity .      Pt's Access Device: Gtube    Agency: [employer]    [facility] Liaison will follow along.    Thank you,     Liliana Oconnor RN, BSN  [facility]ia[facility]  [phone] (Monday-Fri 8:00 am-5:00 pm)  [phone] Office

## 2025-07-14 NOTE — PLAN OF CARE
Problem: Adult Inpatient Plan of Care  Goal: Absence of Hospital-Acquired Illness or Injury  Intervention: Identify and Manage Fall Risk  Recent Flowsheet Documentation  Taken 7/14/2025 1137 by Leigha Chen RN  Safety Promotion/Fall Prevention:   room door open   room near nurse's station  Taken 7/14/2025 0800 by Leigha Chen RN  Safety Promotion/Fall Prevention:   room door open   room near nurse's station  Intervention: Prevent Skin Injury  Recent Flowsheet Documentation  Taken 7/14/2025 1137 by Leigha Chen RN  Body Position: position changed independently  Taken 7/14/2025 0800 by Leigha Chen RN  Body Position: position changed independently  Intervention: Prevent Infection  Recent Flowsheet Documentation  Taken 7/14/2025 1137 by Leigha Chen RN  Infection Prevention: rest/sleep promoted  Taken 7/14/2025 0800 by Leigha Chen RN  Infection Prevention: rest/sleep promoted  Goal: Optimal Comfort and Wellbeing  Intervention: Provide Person-Centered Care  Recent Flowsheet Documentation  Taken 7/14/2025 1137 by Leigha Chen RN  Trust Relationship/Rapport: care explained  Taken 7/14/2025 0800 by Leigha Chen RN  Trust Relationship/Rapport: care explained     Problem: Delirium  Goal: Improved Behavioral Control  Intervention: Minimize Safety Risk  Recent Flowsheet Documentation  Taken 7/14/2025 1137 by Leigha Chen RN  Enhanced Safety Measures:   pain management   room near unit station   review medications for side effects with activity  Trust Relationship/Rapport: care explained  Taken 7/14/2025 0800 by Leigha Chen RN  Enhanced Safety Measures:   pain management   room near unit station   review medications for side effects with activity  Trust Relationship/Rapport: care explained     Problem: Enteral Nutrition  Goal: Absence of Aspiration Signs and Symptoms  Intervention: Minimize Aspiration Risk  Recent Flowsheet Documentation  Taken 7/14/2025 1137 by  Leigha Chen, RN  Head of Bed ([facility]) Positioning: HOB at 30-45 degrees  Taken 7/14/2025 0800 by Leigha Chen, RN  Oral Care: swabbed with sterile water  Head of Bed ([facility]) Positioning: HOB at 30-45 degrees   Goal Outcome Evaluation:    VSS Tolerating tube feed with no adverse effect. Utilizes call light appropriately. Remains NPO with younker bedside. Voiding in urinal without concern. Denies pain.

## 2025-07-14 NOTE — CONSULTS
Care Management Initial Consult    General Information  Assessment completed with: Patient, Children, pt and son Tulio  Type of CM/SW Visit: Initial Assessment    Primary Care Provider verified and updated as needed: Yes   Readmission within the last 30 days: unable to assess      Reason for Consult: discharge planning  Advance Care Planning: Advance Care Planning Reviewed: verified with patient, no concerns identified     General Information Comments: pt lives w/son Tulio and Chris, has PCA svcs and sons are his caregivers, unsure of goals at this time as they will need to meet with Hospice after family discussions/meeting    Communication Assessment  Patient's communication style: spoken language (English or Bilingual)             Cognitive  Cognitive/Neuro/Behavioral: .WDL except  Level of Consciousness: alert  Arousal Level: opens eyes spontaneously (right eye)  Orientation: oriented x 4  Mood/Behavior: calm, cooperative  Best Language: 0 - No aphasia  Speech: other (see comments) (very soft voice)    Living Environment:   People in home: child(justice), adult     Current living Arrangements: house      Able to return to prior arrangements: yes       Family/Social Support:  Care provided by: self, child(justice)  Provides care for: no one, unable/limited ability to care for self     Support system: Children          Description of Support System: Involved, Supportive    Support Assessment: Adequate family and caregiver support, Adequate social supports    Current Resources:   Patient receiving home care services: No        Community Resources: PCA  Equipment currently used at home: walker, rolling, cane, straight, shower chair  Supplies currently used at home: Other, [employer] Supplies    Employment/Financial:  Employment Status: disabled        Financial Concerns: none   Referral to Financial Worker: No       Does the patient's insurance plan have a 3 day qualifying hospital stay waiver?  No    Lifestyle  & Psychosocial Needs:  Social Drivers of Health     Food Insecurity: Patient Declined (6/8/2025)    Received from "[employer]    Hunger Vital Sign     Worried About Running Out of Food in the Last Year: Patient declined     Ran Out of Food in the Last Year: Patient declined   Depression: Not at risk (5/29/2025)    PHQ-2     PHQ-2 Score: 0   Housing Stability: Patient Declined (6/8/2025)    Received from "[employer]    Housing Stability Vital Sign     Unable to Pay for Housing in the Last Year: Patient declined     Number of Times Moved in the Last Year: Not on file     Homeless in the Last Year: Patient declined   Tobacco Use: Low Risk  (6/10/2025)    Received from "[employer]    Patient History     Smoking Tobacco Use: Never     Smokeless Tobacco Use: Never     Passive Exposure: Not on file   Recent Concern: Tobacco Use - Medium Risk (6/2/2025)    Patient History     Smoking Tobacco Use: Former     Smokeless Tobacco Use: Never     Passive Exposure: Past   Financial Resource Strain: Low Risk  (6/3/2025)    Financial Resource Strain     Within the past 12 months, have you or your family members you live with been unable to get utilities (heat, electricity) when it was really needed?: No   Alcohol Use: Not on file   Transportation Needs: Patient Declined (6/8/2025)    Received from "[employer]    PRAPARE - Transportation     Lack of Transportation (Medical): Patient declined     Lack of Transportation (Non-Medical): Patient declined   Physical Activity: Not on file   Interpersonal Safety: Patient Declined (6/8/2025)    Received from "[employer]    Humiliation, Afraid, Rape, and Kick questionnaire     Fear of Current or Ex-Partner: Patient declined     Emotionally Abused: Patient declined     Physically Abused: Patient declined     Sexually Abused: Patient declined   Stress: Not on file   Social Connections: Not on file   Health Literacy: Not on file       Functional Status:  Prior to admission patient  needed assistance:   Dependent ADLs:: Ambulation-cane, Ambulation-walker, Bathing, Dressing, Toileting, Grooming  Dependent IADLs:: Cleaning, Cooking, Laundry, Shopping, Meal Preparation, Medication Management, Transportation  Assesssment of Functional Status: Not at baseline with ADL Functioning    Mental Health Status:  Mental Health Status: No Current Concerns       Chemical Dependency Status:                Values/Beliefs:  Spiritual, Cultural Beliefs, Restoration Practices, Values that affect care:                 Discussed  Partnership in Safe Discharge Planning  document with patient/family: No    Additional Information:  pt lives w/son Rocco, has PCA svcs and sons are his caregivers, unsure of goals at this time as they will need to meet with Hospice after family discussions/meeting. Tulio states he will call hospice for a consult maybe on Wednesday and he has phone #. At this time pt is his own decision maker and has no HCD. Pt is able to answer questions but it is very hard to understand as he is unable to open his mouth like normal.    Next Steps:   Medical stability  Health goals and decision w/family  Hospice consult when after family has been able to have discussion w/each other on goals    Payal Merchant RN

## 2025-07-14 NOTE — PROGRESS NOTES
[facility]    Medicine Progress Note - Hospitalist Service    Date of Admission:  7/12/2025    Assessment & Plan   Douglas Herrera is a 65 year old male admitted on 7/12/2025. He is being admitted for sepsis and acute encephalopathy.    #Sepsis with fever, tachycardia, leukocytosis  - Most likely sources include CNS and surrounding facial tissues, imaging unrevealing for abdominal pelvic pathology, CT head/face showing enlarging mass and surrounding inflammation  - Pro-Migue normal at 0.05, lactate 1.3  - Continue with Vanco and Zosyn pending cultures and response  - MRSA swab positive  - Consider ID consult pending course  - Supportive measures with IV fluids    #Acute encephalopathy  - Likely from sepsis and infection of the head/neck, alternatively consider polypharmacy  - Supportive measures, minimize CNS medications  - PT and OT and care management consults    #Hyponatremia  - 130 on admission, usually normal, likely from dehydration and sepsis  - Resolved with IV fluids indicating dehydration    #Anemia  - Likely from chemo, no apparent clinical blood losses  - Recent iron studies show low iron and low TIBC, normal sat    #SCC head neck cancer, stage IVb  - Follows with [facility] oncology  - Had been on Cetuximab, now holding with deconditioning and frailty  - Oncology consulted to review treatment options and discuss prognosis  - Patient has elected to be DNR, not quite ready for hospice discussions, continue to broach  - Hospice now consulted, updated family 7/14    #Hypercalcemia of malignancy  - 7/8 had calcium in the 13's, received Zometa and IV fluids, calcium now normal  - Trend periodically, monitor for hypocalcemia    #Dysphagia and aspiration  - Per review, intermittently taking thickened liquids  - Speech and language pathology consulted, n.p.o. until encephalopathy further resolved  - Dietitian consulted to resume tube feeds    #Chronic hepatitis B  - Surface antigen positive core  antibody positive, surface antibody negative, PCR - 1/2024  - Home Entecavir if being taken    #Intercrural fungus  - Has typical odor, offer miconazole as needed    #Hypothyroidism  - Resume usual Synthroid          Diet: NPO for Medical/Clinical Reasons Except for: No Exceptions  Adult Formula Bolus Feeding: Osmolite 1.5; Route: Gastrostomy; 3 times daily (Bolus at 07-12-18.  infuse over 1 hour.); Volume per Bolus: 400; mL(s); Additional free water (mL): 60 ml before and after each bolus. additional flush of 130 ml at 03-0...    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       Social Drivers of Health    Food Insecurity: Patient Declined (6/8/2025)    Received from [employer]    Hunger Vital Sign     Worried About Running Out of Food in the Last Year: Patient declined     Ran Out of Food in the Last Year: Patient declined   Housing Stability: Patient Declined (6/8/2025)    Received from [employer]    Housing Stability Vital Sign     Unable to Pay for Housing in the Last Year: Patient declined     Homeless in the Last Year: Patient declined   Tobacco Use: Low Risk  (6/10/2025)    Received from [employer]    Patient History     Smoking Tobacco Use: Never     Smokeless Tobacco Use: Never   Recent Concern: Tobacco Use - Medium Risk (6/2/2025)    Patient History     Smoking Tobacco Use: Former     Smokeless Tobacco Use: Never     Passive Exposure: Past   Transportation Needs: Patient Declined (6/8/2025)    Received from  [facility]    PRAPARE - Transportation     Lack of Transportation (Medical): Patient declined     Lack of Transportation (Non-Medical): Patient declined   Interpersonal Safety: Patient Declined (6/8/2025)    Received from [facility]    Humiliation, Afraid, Rape, and Kick questionnaire     Fear of Current or Ex-Partner: Patient declined     Emotionally Abused: Patient declined     Physically Abused: Patient declined     Sexually Abused: Patient declined          Disposition Plan     Medically Ready for Discharge: Anticipated in 2-4 Days             Esequiel Breen MD  Hospitalist Service  [employer]  Securely message with [employer] (more info)  Text page via [facility] Paging/Directory   ______________________________________________________________________    Interval History   No new complaints, more sedated today.    Physical Exam   Vital Signs: Temp: 98.8  F (37.1  C) Temp src: Oral BP: 126/64 Pulse: 79   Resp: 18 SpO2: 100 % O2 Device: None (Room air)    Weight: 134 lbs 0 oz    Tired appearing, no distress, stable appearing left facial swelling, heart and lungs normal, abdomen nontender, no leg edema    Medical Decision Making       56 MINUTES SPENT BY ME on the date of service doing chart review, history, exam, documentation & further activities per the note.  NOTE(S)/MEDICAL RECORDS REVIEWED over the past 24 hours: Vitals, labs, new imaging, documentation and medication administrations      Data     I have personally reviewed the following data over the past 24 hrs:    10.1  \   8.2 (L)   / 274     136 105 15.2 /  102 (H)   3.6 23 0.90 \     ALT: N/A AST: N/A AP: N/A TBILI: N/A   ALB: 2.9 (L) TOT PROTEIN: N/A LIPASE: N/A       Imaging results reviewed over the past 24 hrs:   No results found for this or any previous visit (from the past 24 hours).

## 2025-07-14 NOTE — PHARMACY-VANCOMYCIN DOSING SERVICE
Pharmacy Vancomycin Note  Date of Service 2025  Patient's  1960   65 year old, male    Indication: Sepsis  Day of Therapy: 3  Current vancomycin regimen:  1250 mg IV q18h  Current vancomycin monitoring method: AUC  Current vancomycin therapeutic monitoring goal: 400-600 mg*h/L    [employer] Prediction of Current Vancomycin Regimen  Regimen: 1250 mg IV every 18 hours.  Start time: 14:37 on 2025  Exposure target: AUC24 (range) 400-600 mg/L.hr   AUC24,ss: 520 mg/L.hr  Probability of AUC24 > 400: 93 %  Ctrough,ss: 14.8 mg/L  Probability of Ctrough,ss > 20: 16 %  Probability of nephrotoxicity (Lodise CAROL ): 10 %      Current estimated CrCl = Estimated Creatinine Clearance: 70.4 mL/min (based on SCr of 0.9 mg/dL).    Creatinine for last 3 days  2025:  6:01 AM Creatinine 0.83 mg/dL  2025:  6:47 AM Creatinine 0.93 mg/dL  2025:  5:52 AM Creatinine 0.90 mg/dL    Recent Vancomycin Levels (past 3 days)  2025:  5:52 AM Vancomycin 17.1 ug/mL    Vancomycin IV Administrations (past 72 hours)                     vancomycin (VANCOCIN) 1,250 mg in sodium chloride 0.9 % 250 mL intermittent infusion (mg) 1,250 mg New Bag 25 203     1,250 mg New Bag  0312    vancomycin (VANCOCIN) 1,250 mg in sodium chloride 0.9 % 250 mL intermittent infusion (mg) 1,250 mg New Bag 25 0920                    Nephrotoxins and other renal medications (From now, onward)      Start     Dose/Rate Route Frequency Ordered Stop    25 0300  vancomycin (VANCOCIN) 1,250 mg in sodium chloride 0.9 % 250 mL intermittent infusion         1,250 mg  over 90 Minutes Intravenous EVERY 18 HOURS 25 1023      25 1400  piperacillin-tazobactam (ZOSYN) 4.5 g vial to attach to  mL bag         4.5 g  over 30 Minutes Intravenous EVERY 6 HOURS 25 0853                 Contrast Orders - past 72 hours (72h ago, onward)      Start     Dose/Rate Route Frequency Stop    25 0730  iopamidol  (ISOVUE-370) solution 90 mL         90 mL Intravenous ONCE 07/12/25 0724            Interpretation of levels and current regimen:  Vancomycin level is reflective of -600    Has serum creatinine changed greater than 50% in last 72 hours: No    Urine output:  unable to determine    Renal Function: Stable    Plan:  Continue Current Dose, pending results of GOC discussions, potential de-escalation  Vancomycin monitoring method: AUC  Vancomycin therapeutic monitoring goal: 400-600 mg*h/L  Pharmacy will check vancomycin levels as appropriate in 1-3 Days.  Serum creatinine levels will be ordered daily x 3 more days, or while on vancomycin.    Afia Boyd RP

## 2025-07-15 ENCOUNTER — APPOINTMENT (OUTPATIENT)
Dept: PHYSICAL THERAPY | Facility: HOSPITAL | Age: 65
End: 2025-07-15
Attending: HOSPITALIST
Payer: COMMERCIAL

## 2025-07-15 ENCOUNTER — APPOINTMENT (OUTPATIENT)
Dept: SPEECH THERAPY | Facility: HOSPITAL | Age: 65
End: 2025-07-15
Payer: COMMERCIAL

## 2025-07-15 LAB
ALBUMIN SERPL BCG-MCNC: 2.7 G/DL (ref 3.5–5.2)
ANION GAP SERPL CALCULATED.3IONS-SCNC: 10 MMOL/L (ref 7–15)
BUN SERPL-MCNC: 11.6 MG/DL (ref 8–23)
CALCIUM SERPL-MCNC: 8.2 MG/DL (ref 8.8–10.4)
CHLORIDE SERPL-SCNC: 107 MMOL/L (ref 98–107)
CREAT SERPL-MCNC: 0.92 MG/DL (ref 0.67–1.17)
EGFRCR SERPLBLD CKD-EPI 2021: >90 ML/MIN/1.73M2
GLUCOSE BLDC GLUCOMTR-MCNC: 70 MG/DL (ref 70–99)
GLUCOSE SERPL-MCNC: 93 MG/DL (ref 70–99)
HCO3 SERPL-SCNC: 20 MMOL/L (ref 22–29)
PHOSPHATE SERPL-MCNC: 2.8 MG/DL (ref 2.5–4.5)
POTASSIUM SERPL-SCNC: 3.3 MMOL/L (ref 3.4–5.3)
SODIUM SERPL-SCNC: 137 MMOL/L (ref 135–145)

## 2025-07-15 PROCEDURE — S9341 HIT ENTERAL GRAV DIEM: HCPCS

## 2025-07-15 PROCEDURE — 250N000011 HC RX IP 250 OP 636: Performed by: HOSPITALIST

## 2025-07-15 PROCEDURE — 97530 THERAPEUTIC ACTIVITIES: CPT | Mod: GP

## 2025-07-15 PROCEDURE — 250N000013 HC RX MED GY IP 250 OP 250 PS 637: Performed by: HOSPITALIST

## 2025-07-15 PROCEDURE — 92526 ORAL FUNCTION THERAPY: CPT | Mod: GN

## 2025-07-15 PROCEDURE — 99233 SBSQ HOSP IP/OBS HIGH 50: CPT | Performed by: HOSPITALIST

## 2025-07-15 PROCEDURE — 250N000013 HC RX MED GY IP 250 OP 250 PS 637: Performed by: INTERNAL MEDICINE

## 2025-07-15 PROCEDURE — 120N000001 HC R&B MED SURG/OB

## 2025-07-15 PROCEDURE — 80069 RENAL FUNCTION PANEL: CPT | Performed by: HOSPITALIST

## 2025-07-15 PROCEDURE — 97162 PT EVAL MOD COMPLEX 30 MIN: CPT | Mod: GP

## 2025-07-15 PROCEDURE — 258N000003 HC RX IP 258 OP 636: Performed by: HOSPITALIST

## 2025-07-15 RX ORDER — LEVETIRACETAM 100 MG/ML
500 SOLUTION ORAL 2 TIMES DAILY
Status: DISCONTINUED | OUTPATIENT
Start: 2025-07-15 | End: 2025-07-19 | Stop reason: HOSPADM

## 2025-07-15 RX ADMIN — PIPERACILLIN SODIUM AND TAZOBACTAM SODIUM 4.5 G: 4; .5 INJECTION, POWDER, LYOPHILIZED, FOR SOLUTION INTRAVENOUS at 09:31

## 2025-07-15 RX ADMIN — SODIUM CHLORIDE: 0.9 INJECTION, SOLUTION INTRAVENOUS at 13:42

## 2025-07-15 RX ADMIN — SODIUM CHLORIDE 1250 MG: 0.9 INJECTION, SOLUTION INTRAVENOUS at 10:26

## 2025-07-15 RX ADMIN — LEVETIRACETAM 500 MG: 100 SOLUTION ORAL at 21:18

## 2025-07-15 RX ADMIN — PIPERACILLIN SODIUM AND TAZOBACTAM SODIUM 4.5 G: 4; .5 INJECTION, POWDER, LYOPHILIZED, FOR SOLUTION INTRAVENOUS at 13:42

## 2025-07-15 RX ADMIN — GABAPENTIN 600 MG: 250 SUSPENSION ORAL at 21:17

## 2025-07-15 RX ADMIN — GABAPENTIN 600 MG: 250 SUSPENSION ORAL at 09:32

## 2025-07-15 RX ADMIN — OXYCODONE HYDROCHLORIDE 3 MG: 5 SOLUTION ORAL at 18:36

## 2025-07-15 RX ADMIN — ATORVASTATIN CALCIUM 80 MG: 40 TABLET, FILM COATED ORAL at 21:17

## 2025-07-15 RX ADMIN — PIPERACILLIN SODIUM AND TAZOBACTAM SODIUM 4.5 G: 4; .5 INJECTION, POWDER, LYOPHILIZED, FOR SOLUTION INTRAVENOUS at 21:23

## 2025-07-15 RX ADMIN — GABAPENTIN 600 MG: 250 SUSPENSION ORAL at 13:42

## 2025-07-15 RX ADMIN — OXYCODONE HYDROCHLORIDE 3 MG: 5 SOLUTION ORAL at 14:10

## 2025-07-15 RX ADMIN — PIPERACILLIN SODIUM AND TAZOBACTAM SODIUM 4.5 G: 4; .5 INJECTION, POWDER, LYOPHILIZED, FOR SOLUTION INTRAVENOUS at 02:32

## 2025-07-15 RX ADMIN — LEVETIRACETAM 500 MG: 100 SOLUTION ORAL at 10:26

## 2025-07-15 RX ADMIN — LEVOTHYROXINE SODIUM 75 MCG: 0.03 TABLET ORAL at 06:00

## 2025-07-15 RX ADMIN — ENTECAVIR 0.5 MG: 0.05 SOLUTION ORAL at 21:18

## 2025-07-15 ASSESSMENT — ACTIVITIES OF DAILY LIVING (ADL)
ADLS_ACUITY_SCORE: 38
ADLS_ACUITY_SCORE: 40
ADLS_ACUITY_SCORE: 38
ADLS_ACUITY_SCORE: 40
ADLS_ACUITY_SCORE: 38

## 2025-07-15 NOTE — PLAN OF CARE
Goal Outcome Evaluation:    Alert and oriented.  Continues on TF boluses.  Iv antibiotics.  Port a cath patent.  Chg completed.

## 2025-07-15 NOTE — PROGRESS NOTES
Care Management Follow Up    Length of Stay (days): 3    Expected Discharge Date: 07/16/2025    Anticipated Discharge Plan:       Transportation: Anticipate Family/friend    PT Recommendations: home with assist, home with home care physical therapy  OT Recommendations:  home with assist, home with home care occupational therapy     Barriers to Discharge: medical stability, IV abx    Prior Living Situation: house with child(justice), adult    Discussed  Partnership in Safe Discharge Planning  document with patient/family: No     Handoff Completed: Yes, [facility] PCP: Internal handoff referral completed    Patient/Spokesperson Updated: No    Additional Information:  Chart Reviewed. Pt is open to services with [facility] for enteral TFs. SW confirmed Pt is currently open to home RN/PT/OT services with [facility].     Not medically ready at this time. Hospice is planning to meet with Pt and family tomorrow for a consultation.    Next Steps: CM to follow Pt's medical progression for care team recommendations and ensure home care orders are completed at discharge.       VICTOR M Stone  Acute Care Management  [facility]  For further questions/concerns you can reach us at Vocera Web Console

## 2025-07-15 NOTE — PROGRESS NOTES
[facility]    Medicine Progress Note - Hospitalist Service    Date of Admission:  7/12/2025    Assessment & Plan   Douglas Herrera is a 65 year old male admitted on 7/12/2025. He is being admitted for sepsis and acute encephalopathy.    #Sepsis with fever, tachycardia, leukocytosis  - Most likely sources include CNS and surrounding facial tissues, imaging unrevealing for abdominal pelvic pathology, CT head/face showing enlarging mass and surrounding inflammation  - Pro-Migue normal at 0.05, lactate 1.3  - Continue with Vanco and Zosyn pending cultures and response  - MRSA swab positive  - Consider ID consult pending course  - Supportive measures with IV fluids  - Depending on goals of care, discussed suppressive antibiotics with hospice    #Acute encephalopathy  - Likely from sepsis and infection of the head/neck, alternatively consider polypharmacy  - Supportive measures, minimize CNS medications  - PT and OT and care management consults    #Hyponatremia  - 130 on admission, usually normal, likely from dehydration and sepsis  - Resolved with IV fluids indicating dehydration    #Anemia  - Likely from chemo, no apparent clinical blood losses  - Recent iron studies show low iron and low TIBC, normal sat    #SCC head neck cancer, stage IVb  - Follows with [facility] oncology  - Had been on Cetuximab, now holding with deconditioning and frailty  - Oncology consulted to review treatment options and discuss prognosis  - Patient has elected to be DNR, not quite ready for hospice discussions, continue to broach  - Hospice now consulted, updated family 7/14    #Hypercalcemia of malignancy  - 7/8 had calcium in the 13's, received Zometa and IV fluids, calcium now normal  - Trend periodically, monitor for hypocalcemia    #Dysphagia and aspiration  - Per review, intermittently taking thickened liquids  - Speech and language pathology consulted, n.p.o. until encephalopathy further resolved  - Dietitian consulted  to resume tube feeds    #Chronic hepatitis B  - Surface antigen positive core antibody positive, surface antibody negative, PCR - 1/2024  - Home Entecavir    #Intercrural fungus  - Has typical odor, offer miconazole as needed    #Hypothyroidism  - Resume usual Synthroid          Diet: NPO for Medical/Clinical Reasons Except for: No Exceptions  Adult Formula Bolus Feeding: Osmolite 1.5; Route: Gastrostomy; 3 times daily (Bolus at 07-12-18.  infuse over 1 hour.); Volume per Bolus: 400; mL(s); Additional free water (mL): 60 ml before and after each bolus. additional flush of 130 ml at 03-0...    DVT Prophylaxis: Pneumatic Compression Devices  Rico Catheter: Not present  Lines: PRESENT      Port a Cath 01/11/24 Single Lumen Right Chest wall-Site Assessment: WDL      Cardiac Monitoring: None  Code Status: No CPR- Do NOT Intubate      Clinically Significant Risk Factors        # Hypokalemia: Lowest K = 3.3 mmol/L in last 2 days, will replace as needed        # Hypoalbuminemia: Lowest albumin = 2.7 g/dL at 7/15/2025  6:00 AM, will monitor as appropriate     # Hypertension: Noted on problem list                # Financial/Environmental Concerns: none   # History of CABG: noted on surgical history       Social Drivers of Health    Food Insecurity: Patient Declined (6/8/2025)    Received from [employer]    Hunger Vital Sign     Worried About Running Out of Food in the Last Year: Patient declined     Ran Out of Food in the Last Year: Patient declined   Housing Stability: Patient Declined (6/8/2025)    Received from [employer]    Housing Stability Vital Sign     Unable to Pay for Housing in the Last Year: Patient declined     Homeless in the Last Year: Patient declined   Tobacco Use: Low Risk  (6/10/2025)    Received from [employer]    Patient History     Smoking Tobacco Use: Never     Smokeless Tobacco Use: Never   Recent Concern: Tobacco Use - Medium Risk (6/2/2025)    Patient History     Smoking Tobacco Use:  Former     Smokeless Tobacco Use: Never     Passive Exposure: Past   Transportation Needs: Patient Declined (6/8/2025)    Received from [facility]    PRAPARE - Transportation     Lack of Transportation (Medical): Patient declined     Lack of Transportation (Non-Medical): Patient declined   Interpersonal Safety: Patient Declined (6/8/2025)    Received from [facility]    Humiliation, Afraid, Rape, and Kick questionnaire     Fear of Current or Ex-Partner: Patient declined     Emotionally Abused: Patient declined     Physically Abused: Patient declined     Sexually Abused: Patient declined          Disposition Plan     Medically Ready for Discharge: Anticipated Tomorrow             Esequiel Breen MD  Hospitalist Service  [facility]  Securely message with [employer] (more info)  Text page via [facility] Paging/Directory   ______________________________________________________________________    Interval History   No new complaints, tired off and on.    Physical Exam   Vital Signs: Temp: 98.4  F (36.9  C) Temp src: Axillary BP: (!) 143/76 Pulse: 76   Resp: 20 SpO2: 96 % O2 Device: None (Room air)    Weight: 134 lbs 1.6 oz    Alert, no distress, stable left facial swelling, breathing nonlabored, abdomen soft, no leg edema    Medical Decision Making       56 MINUTES SPENT BY ME on the date of service doing chart review, history, exam, documentation & further activities per the note.  NOTE(S)/MEDICAL RECORDS REVIEWED over the past 24 hours: Vitals, labs, new imaging, documentation and medication administrations      Data     I have personally reviewed the following data over the past 24 hrs:    N/A  \   N/A   / N/A     137 107 11.6 /  93   3.3 (L) 20 (L) 0.92 \     ALT: N/A AST: N/A AP: N/A TBILI: N/A   ALB: 2.7 (L) TOT PROTEIN: N/A LIPASE: N/A       Imaging results reviewed over the past 24 hrs:   No results found for this or any previous visit (from the past 24 hours).

## 2025-07-15 NOTE — PLAN OF CARE
Problem: Adult Inpatient Plan of Care  Goal: Optimal Comfort and Wellbeing  Outcome: Progressing   Goal Outcome Evaluation:       Drowsy but arouses to voice. Tolerable pain with no prn given. Left facial mass noted. G-tube patent. NS infusing at 75 ml/hr, call light within reach.

## 2025-07-15 NOTE — PROGRESS NOTES
07/15/25 0900   Appointment Info   Signing Clinician's Name / Credentials (PT) Waleska Joyce DPT   Living Environment   People in Home child(justice), adult  (2 adult sons)   Current Living Arrangements house   Living Environment Comments difficult to assess, pt appearing somewhat confused this AM   Self-Care   Usual Activity Tolerance good   Current Activity Tolerance moderate   Equipment Currently Used at Home walker, rolling;cane, straight;shower chair   Fall history within last six months no   Activity/Exercise/Self-Care Comment Pt reports sons assist with dressing, bathing, toileting. Pt ambulates with FWW at home.   General Information   Onset of Illness/Injury or Date of Surgery 07/12/25   Referring Physician Esequiel Breen MD   Patient/Family Therapy Goals Statement (PT) none stated   Pertinent History of Current Problem (include personal factors and/or comorbidities that impact the POC) Douglas Herrera is a 65 year old male admitted on 7/12/2025. He is being admitted for sepsis and acute encephalopathy.   Existing Precautions/Restrictions fall   Cognition   Affect/Mental Status (Cognition) low arousal/lethargic;confused;flat/blunted affect  (appears slightly confused)   Pain Assessment   Patient Currently in Pain Yes, see Vital Sign flowsheet  (pressure on L side of face)   Range of Motion (ROM)   Range of Motion ROM is WFL   Strength (Manual Muscle Testing)   Strength (Manual Muscle Testing) Deficits observed during functional mobility   Bed Mobility   Bed Mobility supine-sit;sit-supine   Supine-Sit [place] (Bed Mobility) supervision;contact guard   Comment, (Bed Mobility) sitting EOB with RN at end of session   Transfers   Transfers sit-stand transfer   Sit-Stand Transfer   Sit-Stand [place] (Transfers) contact guard   Assistive Device (Sit-Stand Transfers)   (none)   Comment, (Sit-Stand Transfer) slightly unsteady   Gait/Stairs (Locomotion)   [place] Level (Gait) contact guard    Assistive Device (Gait)   (none)   Distance in Feet (Gait) 8'   Pattern (Gait) step-through   Comment, (Gait/Stairs) unsteady and confused - unable to amb outside of room d/t confusion   Clinical Impression   Criteria for Skilled Therapeutic Intervention Yes, treatment indicated   PT Diagnosis (PT) impaired functional mobility, gait abnormality   Influenced by the following impairments decreased strength, decreased endurance   Functional limitations due to impairments gait, transfers, bed mob   Clinical Presentation (PT Evaluation Complexity) evolving   Clinical Presentation Rationale pt presents as medically diagnosed   Clinical Decision Making (Complexity) moderate complexity   Planned Therapy Interventions (PT) balance training;bed mobility training;gait training;home exercise program;neuromuscular re-education;patient/family education;strengthening;transfer training;stair training   Risk & Benefits of therapy have been explained evaluation/treatment results reviewed;patient   PT Total Evaluation Time   PT Eval, Moderate Complexity Minutes (86025) 10   Physical Therapy Goals   PT Frequency 5x/week   PT Predicted Duration/Target Date for Goal Attainment 07/22/25   PT Goals Bed Mobility;Transfers;Gait;Stairs   PT: Bed Mobility Independent;Supine to/from sit   PT: Transfers Independent;Sit to/from stand;Bed to/from chair   PT: Gait Supervision/stand-by assist;Assistive device;Rolling walker;100 feet   PT: Stairs Supervision/stand-by assist;4 stairs   Interventions   Interventions Quick Adds Therapeutic Activity   Therapeutic Activity   Therapeutic Activities: dynamic activities to improve functional performance Minutes (39227) 10   Symptoms Noted During/After Treatment Fatigue;Increased pain   Treatment Detail/Skilled Intervention additional sit <> stand from toilet with CGA x 1, slightly unsteady at times. Static standing x 5 minutes at sink with CGA, holding onto sink for support. amb x 8' back to bed with CGA  with inc v/c for safety, unsteady at times.   PT Discharge Planning   PT Plan amb w/ FWW in hallway as able, higher level balance   PT Discharge Recommendation (DC Rec) home with assist;home with home care physical therapy   PT Rationale for DC Rec if pt has 24/7 supervision, okay to return home with family support. moving well however slightly unsteady at times   PT Brief overview of current status amb x 16' total with CGA no AD, bed mob SBA-CGA, sit <> stand CGA, static standing CGA   PT Total Distance Amb During Session (feet) 16   PT Equipment Needed at Discharge walker, rolling  (owns)   Physical Therapy Time and Intention   Timed Code Treatment Minutes 10   Total Session Time (sum of timed and untimed services) 20

## 2025-07-15 NOTE — PLAN OF CARE
"Problem: Enteral Nutrition  Goal: Absence of Aspiration Signs and Symptoms  Outcome: Progressing  Intervention: Minimize Aspiration Risk  Recent Flowsheet Documentation  Taken 7/14/2025 1710 by Andrés Ahumada, RN  Oral Care:   suction provided   mouth wash rinse   swabbed with sterile water   swabbed with antiseptic solution  Goal: Safe, Effective Therapy Delivery  Outcome: Progressing  Goal: Feeding Tolerance  Outcome: Progressing  Intervention: Prevent and Manage Feeding Intolerance  Recent Flowsheet Documentation  Taken 7/14/2025 1710 by Andrés Ahumada, RN  Nutrition Support Management: tube feeding initiated       Goal Outcome Evaluation:    Drowsy upon arrival to  at 1800. Arousal to voice. Decrease hearing on left side d/t mass. Facial swelling noted. Dry blood in mouth. Oral cares done. VSS. Endorsed pain on that left facial. G tube patent. TF bolus of 400mL over 1 hr with 60cc before and after run. Schedule free water flushes of 130mL. NS 75mL/hr cont. SBA. No void this evening. Urinal within reach. Bed alarm on.     Family visited and left.     BP (!) 144/74 (BP Location: Left arm)   Pulse 82   Temp 97.7  F (36.5  C) (Axillary)   Resp 16   Ht 1.626 m (5' 4\")   Wt 60.7 kg (133 lb 13.1 oz)   SpO2 98%   BMI 22.97 kg/m          "

## 2025-07-15 NOTE — PROGRESS NOTES
CLINICAL NUTRITION SERVICES - REASSESSMENT NOTE     RECOMMENDATIONS FOR MDs/PROVIDERS TO ORDER:  Replace K+,  at 3.3    Registered Dietitian Interventions:  Continue current nutrition RX    Future/Additional Recommendations:  Adjust TF pending tolerance, weight, labs, GOC       INFORMATION OBTAINED  Patient not available for interview due to sleeping. Ordered TF formula in room     CURRENT NUTRITION ORDERS  Diet: Orders Placed This Encounter      NPO for Medical/Clinical Reasons Except for: No Exceptions    Nutrition Support:   Peg placed 1/2/24, replaced 11/22/24  Osmolite 1.5 given as bolus 400 ml 3 times/day at 07-12-17 per PEG, infuse over 1 hr.  Free water flush 60 ml before and after each bolus plus 130 ml given 4 additional times at 03-09-14-20     Provides = (1200ml/day) provides: 1800 kcals, 75 g PRO, 914 ml free H20, 244 g CHO, and 0 g fiber daily.   TF meets 100% of estimated nutrition needs    CURRENT INTAKE/TOLERANCE  Tolerating TF at goal    Receiving NS IVF at 75 ml/hr     NEW FINDINGS  GI symptoms:   2 BM today.   1-2 BM q 1-2 days  Skin/wounds:   No concerns   Nutrition-relevant labs:   K+ 3.3 (L), decreased  Phos 2.8 WNL, improved  BG 70 this am. Not checked yesterday  Nutrition-relevant medications:   Lipitor, levothyroxine, iv abx,   Weight: stable  Date/Time Weight Weight Method   07/15/25 0512 60.8 kg (134 lb 1.6 oz) Standing scale   07/14/25 1700 60.7 kg (133 lb 13.1 oz) Bed scale   07/12/25 0516 60.8 kg (134 lb) --     07/09/25 : 61.2 kg (134 lb 14.4 oz)   06/05/25 : 62.7 kg (138 lb 3.7 oz)   05/30/25 : 60.3 kg (133 lb)   05/29/25 : 60.3 kg (133 lb)   05/29/25 : 61 kg (134 lb 6.4 oz)   05/23/25 : 60.8 kg (134 lb)   05/15/25 : 61 kg (134 lb 6.4 oz)   05/02/25 : 60.8 kg (134 lb)   04/30/25 : 61.7 kg (136 lb)   04/25/25 : 62 kg (136 lb 9.6 oz)   04/10/25 : 61.7 kg (136 lb)   03/14/25 : 61.7 kg (136 lb 1.6 oz)   03/06/25 : 61.1 kg (134 lb 9.6 oz)   Dosing Weight: 60.8 kg, based on actual wt      ASSESSED NUTRITION NEEDS  Estimated Energy Needs: 6595-5524+ kcals/day (25 - 30 kcals/kg)  Justification: Increased needs  Estimated Protein Needs: 61-91 grams protein/day (1-1.5 g/kg)  Justification: Increased needs  Estimated Fluid Needs: 1520+ mL/day (1 mL/kcal)  Justification: Maintenance    MALNUTRITION  % Intake: Decreased intake does not meet criteria, patient NPO today.    % Weight Loss: None noted  Subcutaneous Fat Loss: None observed  Muscle Loss: None observed  Fluid Accumulation/Edema: None noted  Malnutrition Diagnosis: Patient does not meet two of the established criteria necessary for diagnosing malnutrition but is at risk for malnutrition  Malnutrition Present on Admission: No    EVALUATION OF THE PROGRESS TOWARD GOALS     NUTRITION DIAGNOSIS  Swallowing difficulty related to chronic illness as evidenced by dysphagia, need enteral nutrition.     GOALS  Tolerate TF at goal rate- met   Meet nutrition needs- met  BG < 180-met     MONITORING/EVALUATION  Progress toward goals will be monitored and evaluated per policy.

## 2025-07-16 ENCOUNTER — APPOINTMENT (OUTPATIENT)
Dept: PHYSICAL THERAPY | Facility: HOSPITAL | Age: 65
End: 2025-07-16
Payer: COMMERCIAL

## 2025-07-16 LAB
ALBUMIN SERPL BCG-MCNC: 2.9 G/DL (ref 3.5–5.2)
ANION GAP SERPL CALCULATED.3IONS-SCNC: 9 MMOL/L (ref 7–15)
BASOPHILS # BLD AUTO: 0 10E3/UL (ref 0–0.2)
BASOPHILS NFR BLD AUTO: 0 %
BUN SERPL-MCNC: 9.8 MG/DL (ref 8–23)
CALCIUM SERPL-MCNC: 8.3 MG/DL (ref 8.8–10.4)
CHLORIDE SERPL-SCNC: 105 MMOL/L (ref 98–107)
CREAT SERPL-MCNC: 0.94 MG/DL (ref 0.67–1.17)
EGFRCR SERPLBLD CKD-EPI 2021: 90 ML/MIN/1.73M2
EOSINOPHIL # BLD AUTO: 0.1 10E3/UL (ref 0–0.7)
EOSINOPHIL NFR BLD AUTO: 1 %
ERYTHROCYTE [DISTWIDTH] IN BLOOD BY AUTOMATED COUNT: 17.3 % (ref 10–15)
GLUCOSE SERPL-MCNC: 99 MG/DL (ref 70–99)
HCO3 SERPL-SCNC: 23 MMOL/L (ref 22–29)
HCT VFR BLD AUTO: 25.2 % (ref 40–53)
HGB BLD-MCNC: 8 G/DL (ref 13.3–17.7)
IMM GRANULOCYTES # BLD: 0 10E3/UL
IMM GRANULOCYTES NFR BLD: 0 %
LYMPHOCYTES # BLD AUTO: 3.1 10E3/UL (ref 0.8–5.3)
LYMPHOCYTES NFR BLD AUTO: 30 %
MCH RBC QN AUTO: 26.8 PG (ref 26.5–33)
MCHC RBC AUTO-ENTMCNC: 31.7 G/DL (ref 31.5–36.5)
MCV RBC AUTO: 84 FL (ref 78–100)
MONOCYTES # BLD AUTO: 0.8 10E3/UL (ref 0–1.3)
MONOCYTES NFR BLD AUTO: 8 %
NEUTROPHILS # BLD AUTO: 6.2 10E3/UL (ref 1.6–8.3)
NEUTROPHILS NFR BLD AUTO: 60 %
NRBC # BLD AUTO: 0 10E3/UL
NRBC BLD AUTO-RTO: 0 /100
PHOSPHATE SERPL-MCNC: 2 MG/DL (ref 2.5–4.5)
PLATELET # BLD AUTO: 292 10E3/UL (ref 150–450)
POTASSIUM SERPL-SCNC: 3.5 MMOL/L (ref 3.4–5.3)
RBC # BLD AUTO: 2.99 10E6/UL (ref 4.4–5.9)
SODIUM SERPL-SCNC: 137 MMOL/L (ref 135–145)
WBC # BLD AUTO: 10.2 10E3/UL (ref 4–11)

## 2025-07-16 PROCEDURE — 85025 COMPLETE CBC W/AUTO DIFF WBC: CPT | Performed by: HOSPITALIST

## 2025-07-16 PROCEDURE — S9341 HIT ENTERAL GRAV DIEM: HCPCS

## 2025-07-16 PROCEDURE — 120N000001 HC R&B MED SURG/OB

## 2025-07-16 PROCEDURE — 258N000003 HC RX IP 258 OP 636: Performed by: HOSPITALIST

## 2025-07-16 PROCEDURE — 99233 SBSQ HOSP IP/OBS HIGH 50: CPT | Performed by: HOSPITALIST

## 2025-07-16 PROCEDURE — 250N000013 HC RX MED GY IP 250 OP 250 PS 637: Performed by: HOSPITALIST

## 2025-07-16 PROCEDURE — 250N000013 HC RX MED GY IP 250 OP 250 PS 637: Performed by: INTERNAL MEDICINE

## 2025-07-16 PROCEDURE — 97530 THERAPEUTIC ACTIVITIES: CPT | Mod: GP

## 2025-07-16 PROCEDURE — 250N000011 HC RX IP 250 OP 636: Performed by: HOSPITALIST

## 2025-07-16 PROCEDURE — 80069 RENAL FUNCTION PANEL: CPT | Performed by: HOSPITALIST

## 2025-07-16 RX ORDER — AMOXICILLIN AND CLAVULANATE POTASSIUM 400; 57 MG/5ML; MG/5ML
875 POWDER, FOR SUSPENSION ORAL 2 TIMES DAILY
Status: DISCONTINUED | OUTPATIENT
Start: 2025-07-16 | End: 2025-07-19 | Stop reason: HOSPADM

## 2025-07-16 RX ADMIN — LEVETIRACETAM 500 MG: 100 SOLUTION ORAL at 21:06

## 2025-07-16 RX ADMIN — PIPERACILLIN SODIUM AND TAZOBACTAM SODIUM 4.5 G: 4; .5 INJECTION, POWDER, LYOPHILIZED, FOR SOLUTION INTRAVENOUS at 02:46

## 2025-07-16 RX ADMIN — ATORVASTATIN CALCIUM 80 MG: 40 TABLET, FILM COATED ORAL at 21:07

## 2025-07-16 RX ADMIN — OXYCODONE HYDROCHLORIDE 3 MG: 5 SOLUTION ORAL at 03:06

## 2025-07-16 RX ADMIN — LEVOTHYROXINE SODIUM 75 MCG: 0.03 TABLET ORAL at 06:29

## 2025-07-16 RX ADMIN — LEVETIRACETAM 500 MG: 100 SOLUTION ORAL at 08:41

## 2025-07-16 RX ADMIN — OXYCODONE HYDROCHLORIDE 3 MG: 5 SOLUTION ORAL at 13:40

## 2025-07-16 RX ADMIN — GABAPENTIN 600 MG: 250 SUSPENSION ORAL at 21:05

## 2025-07-16 RX ADMIN — AMOXICILLIN AND CLAVULANATE POTASSIUM 875 MG: 400; 57 POWDER, FOR SUSPENSION ORAL at 08:41

## 2025-07-16 RX ADMIN — GABAPENTIN 600 MG: 250 SUSPENSION ORAL at 08:42

## 2025-07-16 RX ADMIN — AMOXICILLIN AND CLAVULANATE POTASSIUM 875 MG: 400; 57 POWDER, FOR SUSPENSION ORAL at 21:06

## 2025-07-16 RX ADMIN — SODIUM CHLORIDE 1250 MG: 0.9 INJECTION, SOLUTION INTRAVENOUS at 03:06

## 2025-07-16 RX ADMIN — GABAPENTIN 600 MG: 250 SUSPENSION ORAL at 13:40

## 2025-07-16 RX ADMIN — SODIUM CHLORIDE: 0.9 INJECTION, SOLUTION INTRAVENOUS at 08:42

## 2025-07-16 RX ADMIN — ENTECAVIR 0.5 MG: 0.05 SOLUTION ORAL at 21:06

## 2025-07-16 ASSESSMENT — ACTIVITIES OF DAILY LIVING (ADL)
ADLS_ACUITY_SCORE: 40
ADLS_ACUITY_SCORE: 43
ADLS_ACUITY_SCORE: 40
ADLS_ACUITY_SCORE: 43
ADLS_ACUITY_SCORE: 40
ADLS_ACUITY_SCORE: 43
ADLS_ACUITY_SCORE: 40
ADLS_ACUITY_SCORE: 43
ADLS_ACUITY_SCORE: 40
ADLS_ACUITY_SCORE: 43
ADLS_ACUITY_SCORE: 43
ADLS_ACUITY_SCORE: 40
ADLS_ACUITY_SCORE: 40

## 2025-07-16 NOTE — PLAN OF CARE
Goal Outcome Evaluation:      Plan of Care Reviewed With: patient    Overall Patient Progress: decliningOverall Patient Progress: declining     Bloody drainage from mouth/nose increased from scant to small. Provider notified and came to assess patient. Laura in use. Oxycodone given x1 for pain. Patient no longer answering questions or attempting to speak.

## 2025-07-16 NOTE — PLAN OF CARE
"  Problem: Swallowing Impairment  Goal: Optimal Eating/Swallowing without Aspiration  Outcome: Not Progressing     Problem: Adult Inpatient Plan of Care  Goal: Plan of Care Review  Description: The Plan of Care Review/Shift note should be completed every shift.  The Outcome Evaluation is a brief statement about your assessment that the patient is improving, declining, or no change.  This information will be displayed automatically on your shift  note.  Outcome: Progressing  Flowsheets (Taken 7/16/2025 1626)  Plan of Care Reviewed With:   patient   family  Goal: Patient-Specific Goal (Individualized)  Description: You can add care plan individualizations to a care plan. Examples of Individualization might be:  \"Parent requests to be called daily at 9am for status\", \"I have a hard time hearing out of my right ear\", or \"Do not touch me to wake me up as it startles  me\".  Outcome: Progressing  Goal: Absence of Hospital-Acquired Illness or Injury  Outcome: Progressing  Intervention: Identify and Manage Fall Risk  Recent Flowsheet Documentation  Taken 7/16/2025 1600 by Colin hPan RN  Safety Promotion/Fall Prevention: activity supervised  Intervention: Prevent Skin Injury  Recent Flowsheet Documentation  Taken 7/16/2025 1600 by Colin Phan RN  Body Position: position changed independently  Intervention: Prevent and Manage VTE (Venous Thromboembolism) Risk  Recent Flowsheet Documentation  Taken 7/16/2025 1600 by Colin Phan RN  VTE Prevention/Management: (at edge of bed) SCDs off (sequential compression devices)  Goal: Optimal Comfort and Wellbeing  Outcome: Progressing  Intervention: Provide Person-Centered Care  Recent Flowsheet Documentation  Taken 7/16/2025 1600 by Colin Phan RN  Trust Relationship/Rapport:   care explained   emotional support provided   questions answered   questions encouraged   reassurance provided   thoughts/feelings acknowledged  Goal: Readiness for Transition of Care  Outcome: " Progressing     Problem: Delirium  Goal: Improved Sleep  Outcome: Progressing     Problem: Enteral Nutrition  Goal: Absence of Aspiration Signs and Symptoms  Outcome: Progressing  Intervention: Minimize Aspiration Risk  Recent Flowsheet Documentation  Taken 7/16/2025 1600 by Colin Phan RN  Head of Bed (HOB) Positioning: HOB at 20-30 degrees  Goal: Safe, Effective Therapy Delivery  Outcome: Progressing  Goal: Feeding Tolerance  Outcome: Progressing     Problem: Anemia  Goal: Anemia Symptom Improvement  Outcome: Progressing   Goal Outcome Evaluation:      Plan of Care Reviewed With: patient, family

## 2025-07-16 NOTE — PLAN OF CARE
Goal Outcome Evaluation:    Alert. Drowsy on and off. Does not always interact with staff.  Able to ambulate to the bathroom with assist.  Left facial swelling. Is having bleeding from nose and mouth.  Tolerating bolus tube feedings.  Pain controlled. Administered prn oxy this afternoon.  Family meeting with hospice at 1630.

## 2025-07-16 NOTE — PROGRESS NOTES
[facility]    Medicine Progress Note - Hospitalist Service    Date of Admission:  7/12/2025    Assessment & Plan   Douglas Herrera is a 65 year old male admitted on 7/12/2025. He is being admitted for sepsis and acute encephalopathy.    #Sepsis with fever, tachycardia, leukocytosis  - Most likely sources include CNS and surrounding facial tissues, imaging unrevealing for abdominal pelvic pathology, CT head/face showing enlarging mass and surrounding inflammation  - Pro-Migue normal at 0.05, lactate 1.3  - Initially on Vanco and Zosyn, transition to Augmentin 7/16  - MRSA swab positive  - Supportive measures with IV fluids  - Depending on goals of care, discussed suppressive antibiotics with hospice    #Acute encephalopathy  - Likely from sepsis and infection of the head/neck, alternatively consider polypharmacy  - Supportive measures, minimize CNS medications  - PT and OT and care management consults    #Hyponatremia  - 130 on admission, usually normal, likely from dehydration and sepsis  - Resolved with IV fluids indicating dehydration    #Acute on chronic blood loss anemia  - Likely from chemo and more recently scant bleeding from his mass  - Recent iron studies show low iron and low TIBC, normal sat    #SCC head neck cancer, stage IVb  - Follows with [facility] oncology  - Had been on Cetuximab, now holding with deconditioning and frailty  - Oncology consulted to review treatment options and discuss prognosis  - Patient has elected to be DNR, not quite ready for hospice discussions, continue to broach  - Hospice now consulted, updated family 7/14, planning to meet with family afternoon 7/16 to review decision making and hospice transition  - Adding Keppra for seizure prophylaxis in anticipation of enlarging mass and increased seizure risk    #Hypercalcemia of malignancy  - 7/8 had calcium in the 13's, received Zometa and IV fluids, calcium now normal  - Trend periodically, monitor for  hypocalcemia    #Dysphagia and aspiration  - Per review, intermittently taking thickened liquids, with progression of tenesmus unlikely to safely tolerate p.o. going forward  - Speech and language pathology consulted, n.p.o. until encephalopathy further resolved  - Dietitian consulted to resume tube feeds    #Chronic hepatitis B  - Surface antigen positive core antibody positive, surface antibody negative, PCR - 1/2024  - Home Entecavir    #Intercrural fungus  - Has typical odor, offer miconazole as needed    #Hypothyroidism  - Resume usual Synthroid          Diet: NPO for Medical/Clinical Reasons Except for: No Exceptions  Adult Formula Bolus Feeding: Osmolite 1.5; Route: Gastrostomy; 3 times daily (Bolus at 07-12-18.  infuse over 1 hour.); Volume per Bolus: 400; mL(s); Additional free water (mL): 60 ml before and after each bolus. additional flush of 130 ml at 03-0...    DVT Prophylaxis: Pneumatic Compression Devices  Rico Catheter: Not present  Lines: PRESENT      Port a Cath 01/11/24 Single Lumen Right Chest wall-Site Assessment: WDL      Cardiac Monitoring: None  Code Status: No CPR- Do NOT Intubate      Clinically Significant Risk Factors        # Hypokalemia: Lowest K = 3.3 mmol/L in last 2 days, will replace as needed        # Hypoalbuminemia: Lowest albumin = 2.7 g/dL at 7/15/2025  6:00 AM, will monitor as appropriate     # Hypertension: Noted on problem list                # Financial/Environmental Concerns: none   # History of CABG: noted on surgical history       Social Drivers of Health    Food Insecurity: Unknown (7/15/2025)    Food Insecurity     Within the past 12 months, did you worry that your food would run out before you got money to buy more?: Patient unable to answer     Within the past 12 months, did the food you bought just not last and you didn t have money to get more?: Patient unable to answer   Housing Stability: Unknown (7/15/2025)    Housing Stability     Do you have housing? : Patient  unable to answer     Are you worried about losing your housing?: Patient unable to answer   Tobacco Use: Low Risk  (6/10/2025)    Received from [employer]    Patient History     Smoking Tobacco Use: Never     Smokeless Tobacco Use: Never   Recent Concern: Tobacco Use - Medium Risk (6/2/2025)    Patient History     Smoking Tobacco Use: Former     Smokeless Tobacco Use: Never     Passive Exposure: Past   Financial Resource Strain: Unknown (7/15/2025)    Financial Resource Strain     Within the past 12 months, have you or your family members you live with been unable to get utilities (heat, electricity) when it was really needed?: Patient unable to answer   Transportation Needs: Unknown (7/15/2025)    Transportation Needs     Within the past 12 months, has lack of transportation kept you from medical appointments, getting your medicines, non-medical meetings or appointments, work, or from getting things that you need?: Patient unable to answer   Interpersonal Safety: Unknown (7/15/2025)    Interpersonal Safety     Do you feel physically and emotionally safe where you currently live?: Patient unable to answer     Within the past 12 months, have you been hit, slapped, kicked or otherwise physically hurt by someone?: Patient unable to answer     Within the past 12 months, have you been humiliated or emotionally abused in other ways by your partner or ex-partner?: Patient unable to answer          Disposition Plan     Medically Ready for Discharge: Anticipated Tomorrow             Esequiel Breen MD  Hospitalist Service  [facility]  Securely message with [facility] (more info)  Text page via [employer] Paging/Directory   ______________________________________________________________________    Interval History   No new complaints, increased bleeding from nose and mouth today.    Physical Exam   Vital Signs: Temp: 97.8  F (36.6  C) Temp src: Axillary BP: 138/74 Pulse: 81   Resp: 20 SpO2: 96 % O2 Device:  None (Room air)    Weight: 134 lbs 1.6 oz    Alert, tired appearing, no distress, stable appearing left facial swelling, newly appreciated scant bleeding from left nare and oropharynx, heart rate regular, lungs clear, abdomen nontender, legs without edema    Medical Decision Making       58 MINUTES SPENT BY ME on the date of service doing chart review, history, exam, documentation & further activities per the note.  NOTE(S)/MEDICAL RECORDS REVIEWED over the past 24 hours: Vitals, labs, new imaging, documentation and medication administrations      Data     I have personally reviewed the following data over the past 24 hrs:    10.2  \   8.0 (L)   / 292     137 105 9.8 /  99   3.5 23 0.94 \     ALT: N/A AST: N/A AP: N/A TBILI: N/A   ALB: 2.9 (L) TOT PROTEIN: N/A LIPASE: N/A       Imaging results reviewed over the past 24 hrs:   No results found for this or any previous visit (from the past 24 hours).

## 2025-07-16 NOTE — PLAN OF CARE
Goal Outcome Evaluation:      Plan of Care Reviewed With: patient    Overall Patient Progress: no changeOverall Patient Progress: no change     Gave PRN oxycodone x1 for pain.  unable to understand patient due to facial swelling causing unintelligible speech, so unable to assess orientation. Patient having scant bloody drainage from nose and mouth. Yankauer in use. NS running at 75 mL/hr.

## 2025-07-17 ENCOUNTER — APPOINTMENT (OUTPATIENT)
Dept: SPEECH THERAPY | Facility: HOSPITAL | Age: 65
End: 2025-07-17
Payer: COMMERCIAL

## 2025-07-17 VITALS
WEIGHT: 132.72 LBS | SYSTOLIC BLOOD PRESSURE: 129 MMHG | BODY MASS INDEX: 22.66 KG/M2 | HEIGHT: 64 IN | TEMPERATURE: 98 F | OXYGEN SATURATION: 97 % | RESPIRATION RATE: 18 BRPM | DIASTOLIC BLOOD PRESSURE: 66 MMHG | HEART RATE: 82 BPM

## 2025-07-17 LAB
ABO + RH BLD: NORMAL
ALBUMIN SERPL BCG-MCNC: 2.9 G/DL (ref 3.5–5.2)
ANION GAP SERPL CALCULATED.3IONS-SCNC: 12 MMOL/L (ref 7–15)
BACTERIA SPEC CULT: NO GROWTH
BACTERIA SPEC CULT: NO GROWTH
BASOPHILS # BLD AUTO: 0.1 10E3/UL (ref 0–0.2)
BASOPHILS NFR BLD AUTO: 1 %
BLD GP AB SCN SERPL QL: NEGATIVE
BUN SERPL-MCNC: 9 MG/DL (ref 8–23)
CALCIUM SERPL-MCNC: 8.5 MG/DL (ref 8.8–10.4)
CHLORIDE SERPL-SCNC: 104 MMOL/L (ref 98–107)
CREAT SERPL-MCNC: 0.85 MG/DL (ref 0.67–1.17)
EGFRCR SERPLBLD CKD-EPI 2021: >90 ML/MIN/1.73M2
EOSINOPHIL # BLD AUTO: 0.1 10E3/UL (ref 0–0.7)
EOSINOPHIL NFR BLD AUTO: 1 %
ERYTHROCYTE [DISTWIDTH] IN BLOOD BY AUTOMATED COUNT: 17.2 % (ref 10–15)
GLUCOSE SERPL-MCNC: 101 MG/DL (ref 70–99)
HCO3 SERPL-SCNC: 21 MMOL/L (ref 22–29)
HCT VFR BLD AUTO: 25.7 % (ref 40–53)
HGB BLD-MCNC: 8.2 G/DL (ref 13.3–17.7)
IMM GRANULOCYTES # BLD: 0.1 10E3/UL
IMM GRANULOCYTES NFR BLD: 1 %
LYMPHOCYTES # BLD AUTO: 3.6 10E3/UL (ref 0.8–5.3)
LYMPHOCYTES NFR BLD AUTO: 34 %
MCH RBC QN AUTO: 26.9 PG (ref 26.5–33)
MCHC RBC AUTO-ENTMCNC: 31.9 G/DL (ref 31.5–36.5)
MCV RBC AUTO: 84 FL (ref 78–100)
MONOCYTES # BLD AUTO: 0.7 10E3/UL (ref 0–1.3)
MONOCYTES NFR BLD AUTO: 7 %
NEUTROPHILS # BLD AUTO: 6.1 10E3/UL (ref 1.6–8.3)
NEUTROPHILS NFR BLD AUTO: 57 %
NRBC # BLD AUTO: 0 10E3/UL
NRBC BLD AUTO-RTO: 0 /100
PHOSPHATE SERPL-MCNC: 1.8 MG/DL (ref 2.5–4.5)
PLATELET # BLD AUTO: 324 10E3/UL (ref 150–450)
POTASSIUM SERPL-SCNC: 3.5 MMOL/L (ref 3.4–5.3)
RBC # BLD AUTO: 3.05 10E6/UL (ref 4.4–5.9)
SODIUM SERPL-SCNC: 137 MMOL/L (ref 135–145)
SPECIMEN EXP DATE BLD: NORMAL
WBC # BLD AUTO: 10.6 10E3/UL (ref 4–11)

## 2025-07-17 PROCEDURE — 85014 HEMATOCRIT: CPT | Performed by: HOSPITALIST

## 2025-07-17 PROCEDURE — 86850 RBC ANTIBODY SCREEN: CPT | Performed by: HOSPITALIST

## 2025-07-17 PROCEDURE — 82040 ASSAY OF SERUM ALBUMIN: CPT | Performed by: HOSPITALIST

## 2025-07-17 PROCEDURE — 36415 COLL VENOUS BLD VENIPUNCTURE: CPT | Performed by: HOSPITALIST

## 2025-07-17 PROCEDURE — 250N000013 HC RX MED GY IP 250 OP 250 PS 637: Performed by: HOSPITALIST

## 2025-07-17 PROCEDURE — 120N000001 HC R&B MED SURG/OB

## 2025-07-17 PROCEDURE — 99233 SBSQ HOSP IP/OBS HIGH 50: CPT | Performed by: HOSPITALIST

## 2025-07-17 PROCEDURE — S9341 HIT ENTERAL GRAV DIEM: HCPCS

## 2025-07-17 PROCEDURE — 250N000013 HC RX MED GY IP 250 OP 250 PS 637: Performed by: INTERNAL MEDICINE

## 2025-07-17 PROCEDURE — 258N000003 HC RX IP 258 OP 636: Performed by: HOSPITALIST

## 2025-07-17 RX ADMIN — GABAPENTIN 600 MG: 250 SUSPENSION ORAL at 14:06

## 2025-07-17 RX ADMIN — ENTECAVIR 0.5 MG: 0.05 SOLUTION ORAL at 21:04

## 2025-07-17 RX ADMIN — AMOXICILLIN AND CLAVULANATE POTASSIUM 875 MG: 400; 57 POWDER, FOR SUSPENSION ORAL at 21:07

## 2025-07-17 RX ADMIN — SODIUM CHLORIDE: 0.9 INJECTION, SOLUTION INTRAVENOUS at 23:12

## 2025-07-17 RX ADMIN — LEVOTHYROXINE SODIUM 75 MCG: 0.03 TABLET ORAL at 06:27

## 2025-07-17 RX ADMIN — LEVETIRACETAM 500 MG: 100 SOLUTION ORAL at 21:04

## 2025-07-17 RX ADMIN — AMOXICILLIN AND CLAVULANATE POTASSIUM 875 MG: 400; 57 POWDER, FOR SUSPENSION ORAL at 08:20

## 2025-07-17 RX ADMIN — LEVETIRACETAM 500 MG: 100 SOLUTION ORAL at 08:20

## 2025-07-17 RX ADMIN — OXYCODONE HYDROCHLORIDE 3 MG: 5 SOLUTION ORAL at 09:47

## 2025-07-17 RX ADMIN — ATORVASTATIN CALCIUM 80 MG: 40 TABLET, FILM COATED ORAL at 21:03

## 2025-07-17 RX ADMIN — OXYCODONE HYDROCHLORIDE 3 MG: 5 SOLUTION ORAL at 14:16

## 2025-07-17 RX ADMIN — GABAPENTIN 600 MG: 250 SUSPENSION ORAL at 21:03

## 2025-07-17 RX ADMIN — GABAPENTIN 600 MG: 250 SUSPENSION ORAL at 08:20

## 2025-07-17 RX ADMIN — SODIUM CHLORIDE: 0.9 INJECTION, SOLUTION INTRAVENOUS at 10:35

## 2025-07-17 ASSESSMENT — ACTIVITIES OF DAILY LIVING (ADL)
ADLS_ACUITY_SCORE: 38
ADLS_ACUITY_SCORE: 40
ADLS_ACUITY_SCORE: 41
ADLS_ACUITY_SCORE: 40
ADLS_ACUITY_SCORE: 38
ADLS_ACUITY_SCORE: 40
ADLS_ACUITY_SCORE: 41
ADLS_ACUITY_SCORE: 40
ADLS_ACUITY_SCORE: 40
ADLS_ACUITY_SCORE: 38
ADLS_ACUITY_SCORE: 41
ADLS_ACUITY_SCORE: 40
ADLS_ACUITY_SCORE: 38
ADLS_ACUITY_SCORE: 41
ADLS_ACUITY_SCORE: 40
ADLS_ACUITY_SCORE: 38
ADLS_ACUITY_SCORE: 40
ADLS_ACUITY_SCORE: 40

## 2025-07-17 NOTE — PLAN OF CARE
Goal Outcome Evaluation:      Plan of Care Reviewed With: patient      Patient denies pain. Fluids running per order. Bolus and free water given per order. Feeding tube patent. Drowsy on shift.  Room air. Yonker at bedside for secretions. Contact precautions maintained.

## 2025-07-17 NOTE — PLAN OF CARE
Goal Outcome Evaluation:    Alert.  Family visiting this am.  NPO.  Tolerating TF bolus.  Meds in GT.  Less blood today from his nose and mouth.  Using the yaunker.  Voiding.  Small bowel movement today.   Port a cath infusing IVF.  CHG bath done.  Resting comfortably.  Oxycodone x 1 for pain.

## 2025-07-17 NOTE — PLAN OF CARE
"Goal Outcome Evaluation:    Pt drowsy.  Denies pain.  Is NPO.  Has some blloody drainage from mouth and has yankauer at bedside for oral suction. G-tube flushed with 130 ml of water at 3am and flush with 60 ml of water before/after meds. On continuous fluids with NS running at 75 ml/hr.  Bed alarm on for safety.       Problem: Adult Inpatient Plan of Care  Goal: Plan of Care Review  Description: The Plan of Care Review/Shift note should be completed every shift.  The Outcome Evaluation is a brief statement about your assessment that the patient is improving, declining, or no change.  This information will be displayed automatically on your shift  note.  Outcome: Progressing  Goal: Patient-Specific Goal (Individualized)  Description: You can add care plan individualizations to a care plan. Examples of Individualization might be:  \"Parent requests to be called daily at 9am for status\", \"I have a hard time hearing out of my right ear\", or \"Do not touch me to wake me up as it startles  me\".  Outcome: Progressing  Goal: Absence of Hospital-Acquired Illness or Injury  Outcome: Progressing  Intervention: Identify and Manage Fall Risk  Recent Flowsheet Documentation  Taken 7/17/2025 0016 by Michelle Garner RN  Safety Promotion/Fall Prevention: activity supervised  Intervention: Prevent Skin Injury  Recent Flowsheet Documentation  Taken 7/17/2025 0016 by Michelle Garner RN  Body Position: position changed independently  Intervention: Prevent Infection  Recent Flowsheet Documentation  Taken 7/17/2025 0016 by Michelle Garner, RN  Infection Prevention:   single patient room provided   rest/sleep promoted   hand hygiene promoted  Goal: Optimal Comfort and Wellbeing  Outcome: Progressing  Goal: Readiness for Transition of Care  Outcome: Progressing     Problem: Delirium  Goal: Improved Sleep  Outcome: Progressing     Problem: Swallowing Impairment  Goal: Optimal Eating/Swallowing without Aspiration  Outcome: Progressing     Problem: Enteral " Nutrition  Goal: Absence of Aspiration Signs and Symptoms  Outcome: Progressing  Intervention: Minimize Aspiration Risk  Recent Flowsheet Documentation  Taken 7/17/2025 0016 by Michelle Garner RN  Head of Bed (HOB) Positioning: HOB at 20-30 degrees  Goal: Safe, Effective Therapy Delivery  Outcome: Progressing  Goal: Feeding Tolerance  Outcome: Progressing     Problem: Anemia  Goal: Anemia Symptom Improvement  Outcome: Progressing

## 2025-07-17 NOTE — PLAN OF CARE
Speech Language Therapy Discharge Summary    Reason for therapy discharge:    No further expectations of functional progress.    Progress towards therapy goal(s). See goals on Care Plan in Epic electronic health record for goal details.  Goals not met.  Barriers to achieving goals:   limited tolerance for therapy.    Therapy recommendation(s):    Pt declined ST/sips of thin liquid. Per OP SLP report in June 2025, pt had not been eating much since April 2025 and during her eval in June 2025, it was recommended pt be NPO except PEG TF and to allow for oral cares. Currently pt is taking oral swabs per his comfort level as well as small amounts of warm water from straw piped in to right side of mouth. Apparently there has been Hospice consult, but unable to locate results re: goals of care for nutrition. Recommend continue primary nutrition through PEG and continue to offer oral swabs and small amounts of thin liquids from straw pipette while sitting fully upright. Recommend continue OP ST as appropriate/per Hospice consult. Will dc ST at this time as nutrition is being met via PEG and oral cares are offered/provided.    Goal Outcome Evaluation:

## 2025-07-17 NOTE — PROGRESS NOTES
"[facility]    Consult Note - [facility]  _________________________________________________________________      [facility] 24/7 Contact Number: [phone]      In process of collabating with care mgnt and pt RN.   Left message for Jack Antunez.  Hospice hoping to discuss next steps after transition to comfort cares: Inpatient hospice and Community hospice, hospice philosophy.     Will update Epic when have more information.    Amber \"Isadora\" Britton, RN   [phone]  [place]   "

## 2025-07-17 NOTE — UTILIZATION REVIEW
Admission Status; Secondary Review Determination    Under the authority of the Utilization Management Committee, the utilization review process indicated a secondary review on the above patient. The review outcome is based on review of the medical records, discussions with staff, and applying clinical experience noted on the date of the review.    (x) Inpatient Status Appropriate - This patient's medical care is consistent with medical management for inpatient care and reasonable inpatient medical practice.    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  In March 2025 he began to progress on chemotherapy and thus this was discontinued.  Due to the progressive nature of his cancer there has been consideration for palliative care/hospice care.  Patient now presents with acute confusion, fever at home and 1 to 1.5 degrees in the emergency room with associated leukocytosis of 13,000 and tachycardia.  Patient admitted for acute encephalopathy with sepsis associated with the above-noted fever, tachycardia and leukocytosis with a CNS and surrounding facial tissues worrisome for infection.  Patient and family requested treatment for the acute infection.  The severity of this infection requiring broad-spectrum IV antibiotics is appropriate for inpatient care.    At the time of admission with the information available to the attending physician more than 2 nights Hospital complex care was anticipated, based on patient risk of adverse outcome if treated as outpatient and complex care required. Inpatient admission is appropriate based on the Medicare guidelines.    This document was produced using voice recognition software    The information on this document is developed by the utilization review team in order  for the business office to ensure compliance. This only denotes the appropriateness of proper admission status and does not reflect the quality of care rendered.    The definitions of Inpatient Status and Observation Status used in making the determination above are those provided in the CMS Coverage Manual, Chapter 1 and Chapter 6, section 70.4.    Sincerely,    Dean Lino MD  Utilization Review  Physician Advisor  [facility].

## 2025-07-17 NOTE — PROGRESS NOTES
Outpatient Physical Therapy  [facility]           [x] Phone: [phone]   Fax: [phone]  [facility]           [] Phone: [phone]   Fax: [phone]        Physical Therapy Daily Treatment Note  Date:  2025    Patient Name:  Silvia Lewis    :  1978  MRN: [id number]  Restrictions/Precautions: n/a  Diagnosis:    Diagnosis: Spondylolisthesis of lumbar region  Date of Injury/Surgery: 10/7/24  Treatment Diagnosis:  LBP, BLE weakness  Insurance/Certification information: [employer] [facility]  Referring Physician:   Roxanne Musa PA-C   Plan of care signed (Y/N):  sent   Outcome Measure: Oswestry:   Visit# / total visits:  1 /10  Pain level: 5/10   Goals:     Patient goals: reduce back pain  Short term goals  Time Frame for Short term goals: Refer to [facility]                 Long Term Goals  Time Frame for Long Term Goals: 10 visits  Pt will improve TUG to 24 seconds or less to show improved gait independence  Pt will improve 5x STS to 17 seconds or less to show improved transfer independence  Pt will improve L hip flexion to 4+/5 to aide in ADLs  Pt will improve L hip abduction to 4+/5 to aide in ADLs  Pt will improve Oswestry to 22 or less to show MDC and subjective improvement in condition      Summary of Evaluation:  Assessment: Pt is a 46-year-old female who presents to therapy s/p L4-5 OLIF performed on 10/07/24. Upon assessment, pt does present with impaired lumbar ROM, LBP, LLE pain and sensation deficits, BLE weakness, impaired gait, impaired transfer and reduced independence with ADLs and IADLs. Pt to participate in aquatic therapy per the prescription and return to this clinic after ~8 visits for goal check. Pt would benefit from skilled therapy interventions to address listed impairments, progress toward goal completion, and improve ADL/IADL status. PT also warranted to reduce risk for further injury or decline.        Subjective:  See eval         Any changes in Ambulatory Summary  Care Management Follow Up    Length of Stay (days): 5    Expected Discharge Date: 07/18/2025    Anticipated Discharge Plan:       Transportation: TBD    PT Recommendations: home with assist  OT Recommendations:  home with assist, home with home care occupational therapy     Barriers to Discharge: medical stability, placement    Prior Living Situation: house with child(justice), adult    Discussed  Partnership in Safe Discharge Planning  document with patient/family: No     Handoff Completed: Yes, [facility] PCP: Internal handoff referral completed    Patient/Spokesperson Updated: Yes. Who? Son Tulio    Additional Information:  JASEN called and spoke with Pt's son Tulio about discharge planning and hospice. Tulio confirmed that hospice met with family later yesterday afternoon and provided a brief education on hospice. JASEN discussed different options for hospice placement. Tulio plans to discuss options with his brothers and will call CM back if any family plans to visit the hospital today.     JASEN connected with [facility] who will complete the nursing assessment with Pt and attempt to reach out to family to further discuss GIP as an option for hospice cares. [facility] anticipates Pt still may require hospice placement and discharge planning    Next Steps: CM to follow up with [facility] hospice and family to further coordinate goals and planning for hospice care.      VICTOR M Stone  Acute Care Management  [facility]  For further questions/concerns you can reach us at Vocera Web Console         VICTOR M Stone

## 2025-07-17 NOTE — PLAN OF CARE
Physical Therapy Discharge Summary    Reason for therapy discharge:    Change in medical status.    Progress towards therapy goal(s). See goals on Care Plan in [facility] electronic health record for goal details.  Goals not met.  Barriers to achieving goals:   limited tolerance for therapy.    Therapy recommendation(s):    PT order completed, w/ pt's GOC, will discharge PT. Should status change, please re-order as appropriate.    Waleska Joyce, PT, DPT  7/17/2025

## 2025-07-17 NOTE — PROGRESS NOTES
[facility]    Medicine Progress Note - Hospitalist Service    Date of Admission:  7/12/2025    Assessment & Plan   Douglas Herrera is a 65 year old male admitted on 7/12/2025. He is being admitted for sepsis and acute encephalopathy.    #Sepsis with fever, tachycardia, leukocytosis  - Most likely sources include CNS and surrounding facial tissues, imaging unrevealing for abdominal pelvic pathology, CT head/face showing enlarging mass and surrounding inflammation  - Pro-Migue normal at 0.05, lactate 1.3  - Initially on Vanco and Zosyn, transition to Augmentin 7/16  - MRSA swab positive  - Supportive measures with IV fluids  - Discussed suppressive antibiotics with hospice    #Acute encephalopathy  - Likely from sepsis and infection of the head/neck, alternatively consider polypharmacy  - Supportive measures, minimize CNS medications  - PT and OT and care management consults    #Hyponatremia  - 130 on admission, usually normal, likely from dehydration and sepsis  - Resolved with IV fluids indicating dehydration    #Acute on chronic blood loss anemia  - Likely from chemo and more recently scant bleeding from his mass  - Recent iron studies show low iron and low TIBC, normal sat    #SCC head neck cancer, stage IVb  - Follows with [facility] oncology  - Had been on Cetuximab, now holding with deconditioning and frailty  - Oncology consulted to review treatment options and discuss prognosis, rec hospice  - Patient has elected to be DNR  - Hospice now consulted, family meeting 7/16 formalizing plans for hospice, now determining details around venue and specific cares  - Adding Keppra for seizure prophylaxis in anticipation of enlarging mass and increased seizure risk    #Hypercalcemia of malignancy  - 7/8 had calcium in the 13's, received Zometa and IV fluids, calcium now normal  - Trend periodically, monitor for hypocalcemia    #Dysphagia and aspiration  - Per review, intermittently taking thickened  liquids, with progression of tenesmus unlikely to safely tolerate p.o. going forward  - Speech and language pathology consulted, n.p.o. until encephalopathy further resolved  - Dietitian consulted to resume tube feeds    #Chronic hepatitis B  - Surface antigen positive core antibody positive, surface antibody negative, PCR - 1/2024  - Home Entecavir    #Intercrural fungus  - Has typical odor, offer miconazole as needed    #Hypothyroidism  - Resume usual Synthroid          Diet: NPO for Medical/Clinical Reasons Except for: No Exceptions  Adult Formula Bolus Feeding: Osmolite 1.5; Route: Gastrostomy; 3 times daily (Bolus at 07-12-18.  infuse over 1 hour.); Volume per Bolus: 400; mL(s); Additional free water (mL): 60 ml before and after each bolus. additional flush of 130 ml at 03-0...    DVT Prophylaxis: Pneumatic Compression Devices  Rico Catheter: Not present  Lines: PRESENT      Port a Cath Right Chest wall-Site Assessment: WDL  Port a Cath 01/11/24 Single Lumen Right Chest wall-Site Assessment: WDL      Cardiac Monitoring: None  Code Status: No CPR- Do NOT Intubate      Clinically Significant Risk Factors               # Hypoalbuminemia: Lowest albumin = 2.7 g/dL at 7/15/2025  6:00 AM, will monitor as appropriate     # Hypertension: Noted on problem list                # Financial/Environmental Concerns: none   # History of CABG: noted on surgical history       Social Drivers of Health    Food Insecurity: Unknown (7/15/2025)    Food Insecurity     Within the past 12 months, did you worry that your food would run out before you got money to buy more?: Patient unable to answer     Within the past 12 months, did the food you bought just not last and you didn t have money to get more?: Patient unable to answer   Housing Stability: Unknown (7/15/2025)    Housing Stability     Do you have housing? : Patient unable to answer     Are you worried about losing your housing?: Patient unable to answer   Tobacco Use: Low Risk   (6/10/2025)    Received from [employer]    Patient History     Smoking Tobacco Use: Never     Smokeless Tobacco Use: Never   Recent Concern: Tobacco Use - Medium Risk (6/2/2025)    Patient History     Smoking Tobacco Use: Former     Smokeless Tobacco Use: Never     Passive Exposure: Past   Financial Resource Strain: Unknown (7/15/2025)    Financial Resource Strain     Within the past 12 months, have you or your family members you live with been unable to get utilities (heat, electricity) when it was really needed?: Patient unable to answer   Transportation Needs: Unknown (7/15/2025)    Transportation Needs     Within the past 12 months, has lack of transportation kept you from medical appointments, getting your medicines, non-medical meetings or appointments, work, or from getting things that you need?: Patient unable to answer   Interpersonal Safety: Unknown (7/15/2025)    Interpersonal Safety     Do you feel physically and emotionally safe where you currently live?: Patient unable to answer     Within the past 12 months, have you been hit, slapped, kicked or otherwise physically hurt by someone?: Patient unable to answer     Within the past 12 months, have you been humiliated or emotionally abused in other ways by your partner or ex-partner?: Patient unable to answer          Disposition Plan     Medically Ready for Discharge: Anticipated Tomorrow             Esequiel Breen MD  Hospitalist Service  [facility]  Securely message with [employer] (more info)  Text page via [employer] Paging/Directory   ______________________________________________________________________    Interval History   No new complaints, intermittently sleepy.  Denies pain.    Physical Exam   Vital Signs: Temp: 98.4  F (36.9  C) Temp src: Axillary BP: 125/64 Pulse: 79   Resp: 20 SpO2: 98 % O2 Device: None (Room air)    Weight: 132 lbs 11.47 oz    Tired appearing, stable left facial swelling, no apparent bleeding today,  heart and lungs normal, abdomen soft, legs without edema    Medical Decision Making       57 MINUTES SPENT BY ME on the date of service doing chart review, history, exam, documentation & further activities per the note.  NOTE(S)/MEDICAL RECORDS REVIEWED over the past 24 hours: Vitals, labs, new imaging, documentation and medication administrations      Data     I have personally reviewed the following data over the past 24 hrs:    10.6  \   8.2 (L)   / 324     137 104 9.0 /  101 (H)   3.5 21 (L) 0.85 \     ALT: N/A AST: N/A AP: N/A TBILI: N/A   ALB: 2.9 (L) TOT PROTEIN: N/A LIPASE: N/A       Imaging results reviewed over the past 24 hrs:   No results found for this or any previous visit (from the past 24 hours).

## 2025-07-18 PROCEDURE — 250N000013 HC RX MED GY IP 250 OP 250 PS 637: Performed by: INTERNAL MEDICINE

## 2025-07-18 PROCEDURE — 120N000001 HC R&B MED SURG/OB

## 2025-07-18 PROCEDURE — 99233 SBSQ HOSP IP/OBS HIGH 50: CPT | Performed by: HOSPITALIST

## 2025-07-18 PROCEDURE — S9341 HIT ENTERAL GRAV DIEM: HCPCS

## 2025-07-18 PROCEDURE — 250N000013 HC RX MED GY IP 250 OP 250 PS 637: Performed by: HOSPITALIST

## 2025-07-18 PROCEDURE — 258N000003 HC RX IP 258 OP 636: Performed by: HOSPITALIST

## 2025-07-18 RX ADMIN — LEVOTHYROXINE SODIUM 75 MCG: 0.03 TABLET ORAL at 06:11

## 2025-07-18 RX ADMIN — LEVETIRACETAM 500 MG: 100 SOLUTION ORAL at 09:21

## 2025-07-18 RX ADMIN — ENTECAVIR 0.5 MG: 0.05 SOLUTION ORAL at 21:13

## 2025-07-18 RX ADMIN — OXYCODONE HYDROCHLORIDE 3 MG: 5 SOLUTION ORAL at 19:04

## 2025-07-18 RX ADMIN — SODIUM CHLORIDE: 0.9 INJECTION, SOLUTION INTRAVENOUS at 22:29

## 2025-07-18 RX ADMIN — GABAPENTIN 600 MG: 250 SUSPENSION ORAL at 09:18

## 2025-07-18 RX ADMIN — SODIUM CHLORIDE: 0.9 INJECTION, SOLUTION INTRAVENOUS at 11:36

## 2025-07-18 RX ADMIN — AMOXICILLIN AND CLAVULANATE POTASSIUM 875 MG: 400; 57 POWDER, FOR SUSPENSION ORAL at 09:21

## 2025-07-18 RX ADMIN — ATORVASTATIN CALCIUM 80 MG: 40 TABLET, FILM COATED ORAL at 21:13

## 2025-07-18 RX ADMIN — LEVETIRACETAM 500 MG: 100 SOLUTION ORAL at 22:29

## 2025-07-18 RX ADMIN — GABAPENTIN 600 MG: 250 SUSPENSION ORAL at 14:13

## 2025-07-18 RX ADMIN — OXYCODONE HYDROCHLORIDE 3 MG: 5 SOLUTION ORAL at 09:30

## 2025-07-18 RX ADMIN — GABAPENTIN 600 MG: 250 SUSPENSION ORAL at 21:14

## 2025-07-18 RX ADMIN — AMOXICILLIN AND CLAVULANATE POTASSIUM 875 MG: 400; 57 POWDER, FOR SUSPENSION ORAL at 21:13

## 2025-07-18 ASSESSMENT — ACTIVITIES OF DAILY LIVING (ADL)
ADLS_ACUITY_SCORE: 43
ADLS_ACUITY_SCORE: 38
ADLS_ACUITY_SCORE: 43
ADLS_ACUITY_SCORE: 38
ADLS_ACUITY_SCORE: 45
ADLS_ACUITY_SCORE: 38
ADLS_ACUITY_SCORE: 45
ADLS_ACUITY_SCORE: 38
ADLS_ACUITY_SCORE: 41
ADLS_ACUITY_SCORE: 38
ADLS_ACUITY_SCORE: 45

## 2025-07-18 NOTE — PROGRESS NOTES
Discussed patient's case with hospitalist. Patient is ready for discharge and needs a discharge plan. Yesterday, SW called and spoke with Pt's son Tulio about discharge planning and hospice. Tulio confirmed that hospice met with family and provided a brief education on hospice. SW discussed different options for hospice placement. Tulio indicated he planned to discuss options with his brothers and call CM back with further direction. JASEN then connected with [facility] who will complete the nursing assessment with Pt and attempt to reach out to family to further discuss GIP as an option for hospice cares. [facility] anticipates Pt still may require hospice placement and discharge planning.    Today, this writer went to patient's room in hopes of speaking to family. None present. CM reached out to patient's son Tulio via phone, received voicemail and left message.      Next Steps: CM to follow up with [facility] hosp[facility] and family to further coordinate goals and planning for hospice care.    Naila Hollingsworth, MSW, LGSW

## 2025-07-18 NOTE — PLAN OF CARE
Goal Outcome Evaluation:    Writer took over care at 1900. Pt had no complaints of pain. No nausea noted. Tolerating tube feeds. Bolus feeds TID. Free water flushes QID. Flush given at 2000. Port patent and infusing. IVF. Pt is alert and orientated. Up with a stand by assist with transfers and ambulation. Able to make needs known. Will continue to monitor.            There is no recurrence of intraretinal or subretinal fluid on spectral domain OCT today.

## 2025-07-18 NOTE — PROGRESS NOTES
[facility]    Medicine Progress Note - Hospitalist Service    Date of Admission:  7/12/2025    Assessment & Plan   Douglas Herrera is a 65 year old male admitted on 7/12/2025. He is being admitted for sepsis and acute encephalopathy.    #Sepsis with fever, tachycardia, leukocytosis  - Most likely sources include CNS and surrounding facial tissues, imaging unrevealing for abdominal pelvic pathology, CT head/face showing enlarging mass and surrounding inflammation  - Pro-Migue normal at 0.05, lactate 1.3  - Initially on Vanco and Zosyn, transition to Augmentin 7/16  - MRSA swab positive  - Supportive measures with IV fluids PRN  - Discussed suppressive antibiotics with hospice    #Acute encephalopathy  - Likely from sepsis and infection of the head/neck, alternatively consider polypharmacy  - Supportive measures, minimize CNS medications  - PT and OT and care management consults    #Hyponatremia  - 130 on admission, usually normal, likely from dehydration and sepsis  - Resolved with IV fluids indicating dehydration    #Acute on chronic blood loss anemia  - Likely from chemo and more recently scant bleeding from his mass  - Recent iron studies show low iron and low TIBC, normal sat    #SCC head neck cancer, stage IVb  - Follows with [facility] oncology  - Had been on Cetuximab, now holding with deconditioning and frailty  - Oncology consulted to review treatment options and discuss prognosis, rec hospice  - Patient has elected to be DNR  - Hospice now consulted, family meeting 7/16 formalizing plans for hospice, now determining details around venue and specific cares  - Adding Keppra for seizure prophylaxis in anticipation of enlarging mass and increased seizure risk    #Hypercalcemia of malignancy  - 7/8 had calcium in the 13's, received Zometa and IV fluids, calcium now normal  - Trend periodically, monitor for hypocalcemia    #Dysphagia and aspiration  - Per review, intermittently taking thickened  liquids, with progression of tenesmus unlikely to safely tolerate p.o. going forward  - Speech and language pathology consulted, n.p.o. until encephalopathy further resolved  - Dietitian consulted to resume tube feeds    #Chronic hepatitis B  - Surface antigen positive core antibody positive, surface antibody negative, PCR - 1/2024  - Home Entecavir    #Intercrural fungus  - Has typical odor, offer miconazole as needed    #Hypothyroidism  - Resume usual Synthroid          Diet: NPO for Medical/Clinical Reasons Except for: No Exceptions  Adult Formula Bolus Feeding: Osmolite 1.5; Route: Gastrostomy; 3 times daily (Bolus at 07-12-18.  infuse over 1 hour.); Volume per Bolus: 400; mL(s); Additional free water (mL): 60 ml before and after each bolus. additional flush of 130 ml at 03-0...    DVT Prophylaxis: Pneumatic Compression Devices  Rico Catheter: Not present  Lines: PRESENT      Port a Cath Right Chest wall-Site Assessment: WDL  Port a Cath 07/12/25 Single Lumen Right Chest wall-Site Assessment: WDL      Cardiac Monitoring: None  Code Status: No CPR- Do NOT Intubate      Clinically Significant Risk Factors               # Hypoalbuminemia: Lowest albumin = 2.7 g/dL at 7/15/2025  6:00 AM, will monitor as appropriate     # Hypertension: Noted on problem list                # Financial/Environmental Concerns: none   # History of CABG: noted on surgical history       Social Drivers of Health    Food Insecurity: Unknown (7/15/2025)    Food Insecurity     Within the past 12 months, did you worry that your food would run out before you got money to buy more?: Patient unable to answer     Within the past 12 months, did the food you bought just not last and you didn t have money to get more?: Patient unable to answer   Housing Stability: Unknown (7/15/2025)    Housing Stability     Do you have housing? : Patient unable to answer     Are you worried about losing your housing?: Patient unable to answer   Tobacco Use: Low Risk   (6/10/2025)    Received from [employer]    Patient History     Smoking Tobacco Use: Never     Smokeless Tobacco Use: Never   Recent Concern: Tobacco Use - Medium Risk (6/2/2025)    Patient History     Smoking Tobacco Use: Former     Smokeless Tobacco Use: Never     Passive Exposure: Past   Financial Resource Strain: Unknown (7/15/2025)    Financial Resource Strain     Within the past 12 months, have you or your family members you live with been unable to get utilities (heat, electricity) when it was really needed?: Patient unable to answer   Transportation Needs: Unknown (7/15/2025)    Transportation Needs     Within the past 12 months, has lack of transportation kept you from medical appointments, getting your medicines, non-medical meetings or appointments, work, or from getting things that you need?: Patient unable to answer   Interpersonal Safety: Unknown (7/15/2025)    Interpersonal Safety     Do you feel physically and emotionally safe where you currently live?: Patient unable to answer     Within the past 12 months, have you been hit, slapped, kicked or otherwise physically hurt by someone?: Patient unable to answer     Within the past 12 months, have you been humiliated or emotionally abused in other ways by your partner or ex-partner?: Patient unable to answer          Disposition Plan     Medically Ready for Discharge: Anticipated Tomorrow             Esequiel Breen MD  Hospitalist Service  [facility]  Securely message with [facility] (more info)  Text page via [employer] Paging/Directory   ______________________________________________________________________    Interval History   No new complaints, pain controlled.    Physical Exam   Vital Signs: Temp: 99.3  F (37.4  C) Temp src: Axillary BP: 134/75 Pulse: 82   Resp: 20 SpO2: 97 % O2 Device: None (Room air)    Weight: 133 lbs 6.05 oz    Alert, no distress, stable facial swelling, heart and lungs normal, abdomen soft, no leg  edema    Medical Decision Making       52 MINUTES SPENT BY ME on the date of service doing chart review, history, exam, documentation & further activities per the note.  NOTE(S)/MEDICAL RECORDS REVIEWED over the past 24 hours: Vitals, labs, new imaging, documentation and medication administrations      Data         Imaging results reviewed over the past 24 hrs:   No results found for this or any previous visit (from the past 24 hours).

## 2025-07-18 NOTE — PROGRESS NOTES
CLINICAL NUTRITION SERVICES NOTE    CURRENT NUTRITION ORDERS  Diet: Orders Placed This Encounter      NPO for Medical/Clinical Reasons Except for: No Exceptions     Nutrition Support:   Peg placed 1/2/24, replaced 11/22/24  Osmolite 1.5 given as bolus 400 ml 3 times/day at 07-12-17 per PEG, infuse over 1 hr.  Free water flush 60 ml before and after each bolus plus 130 ml given 4 additional times at 03-09-14-20     Provides = (1200ml/day) provides: 1800 kcals, 75 g PRO, 914 ml free H20, 244 g CHO, and 0 g fiber daily.   TF meets 100% of estimated nutrition needs    RN contacted writer earlier today, pt received an addition 100 mL tube feeding this am so 2 PM feeding will be minus 100 mL.  Pt is tolerating TF.    Labs, reviewed: Phos 1.8 L recommend replacement, if aligns with GOC.    Family has meeting with hospice this afternoon.

## 2025-07-18 NOTE — PROGRESS NOTES
SPIRITUAL HEALTH SERVICES Progress Note  [facility], P1 126      Saw pt Douglas Sharon per Length of Stay.       Summary - Briefly visited Douglas and his son Salvatore. Salvatore helped with some basic translation. The nursing team was in the room, and Douglas was distracted and tired. He stated that he is doing fine. Prayer was provided. I will follow up if Douglas is still in the hospital next week.       Olvin Apodcaa   Intern / Spiritual Health Services     Spiritual Health Services is available 24/7 for emergent requests and consults, either by paging the on-call  or by entering an ASAP/STAT consult in [employer], which will also page the on-call .

## 2025-07-18 NOTE — PROGRESS NOTES
Patient ran approximately 100cc over his 400cc 0900 TF bolus. Spoke with registered dietician who told writer to subtract 100cc from 1400 bolus when it is given.

## 2025-07-18 NOTE — PLAN OF CARE
Goal Outcome Evaluation:      Problem: Adult Inpatient Plan of Care  Goal: Plan of Care Review  Description: The Plan of Care Review/Shift note should be completed every shift.  The Outcome Evaluation is a brief statement about your assessment that the patient is improving, declining, or no change.  This information will be displayed automatically on your shift  note.  Outcome: Progressing     Pt is sleeping in bed at this time . Pt complained of pain and was given oxycodone solution in the morning for pain and reassessment pain had reduced to tolerable.Pt is tolerating tube feedings Port is patent and infusing. Bolus is currently running. Pt is able to make needs known. Family has been translating for him

## 2025-07-18 NOTE — PLAN OF CARE
Problem: Swallowing Impairment  Goal: Optimal Eating/Swallowing without Aspiration  Outcome: Progressing     Problem: Enteral Nutrition  Goal: Safe, Effective Therapy Delivery  Outcome: Progressing  Goal: Feeding Tolerance  Outcome: Progressing     Problem: Pain Acute  Goal: Optimal Pain Control and Function  Outcome: Progressing  Intervention: Prevent or Manage Pain  Recent Flowsheet Documentation  Taken 7/18/2025 0100 by Pranav Salazar RN  Sensory Stimulation Regulation: care clustered  Medication Review/Management: medications reviewed   Goal Outcome Evaluation:       Pt alert and oriented. Up with standby assist to the bathroom. IVF infusing thru porth. G-tube intact and flushed. Face still swollen. Denies pain. Pt suctioned secretions thru mouth using yanker.  VSS, on room air.

## 2025-07-18 NOTE — PLAN OF CARE
Occupational Therapy Discharge Summary    Reason for therapy discharge:    Change in medical status.    Progress towards therapy goal(s). See goals on Care Plan in [facility] electronic health record for goal details.  Goals partially met.  Barriers to achieving goals:   limited tolerance for therapy.    Therapy recommendation(s):    OT order completed, w/ pt's GOC, will discharge OT. Should status change, please re-order as appropriate.

## 2025-07-19 ENCOUNTER — HOSPITAL ENCOUNTER (INPATIENT)
Facility: HOSPITAL | Age: 65
LOS: 3 days | Discharge: HOSPICE | End: 2025-07-22
Attending: HOSPITALIST | Admitting: INTERNAL MEDICINE
Payer: COMMERCIAL

## 2025-07-19 VITALS
TEMPERATURE: 99.3 F | SYSTOLIC BLOOD PRESSURE: 125 MMHG | OXYGEN SATURATION: 99 % | HEIGHT: 64 IN | DIASTOLIC BLOOD PRESSURE: 88 MMHG | HEART RATE: 88 BPM | RESPIRATION RATE: 20 BRPM | BODY MASS INDEX: 22.77 KG/M2 | WEIGHT: 133.38 LBS

## 2025-07-19 DIAGNOSIS — C31.0 SQUAMOUS CELL CARCINOMA OF MAXILLARY SINUS (H): ICD-10-CM

## 2025-07-19 DIAGNOSIS — G89.3 NEOPLASM RELATED PAIN: ICD-10-CM

## 2025-07-19 DIAGNOSIS — Z51.5 HOSPICE CARE PATIENT: Primary | ICD-10-CM

## 2025-07-19 DIAGNOSIS — Z51.5 HOSPICE CARE: ICD-10-CM

## 2025-07-19 PROBLEM — R50.9 FEVER, UNSPECIFIED FEVER CAUSE: Status: RESOLVED | Noted: 2025-07-12 | Resolved: 2025-07-19

## 2025-07-19 PROCEDURE — 120N000001 HC R&B MED SURG/OB

## 2025-07-19 PROCEDURE — 250N000013 HC RX MED GY IP 250 OP 250 PS 637: Performed by: INTERNAL MEDICINE

## 2025-07-19 PROCEDURE — 99221 1ST HOSP IP/OBS SF/LOW 40: CPT | Mod: GV | Performed by: HOSPITALIST

## 2025-07-19 PROCEDURE — 110N000005 HC R&B HOSPICE, ACCENT

## 2025-07-19 PROCEDURE — 99207 PR NO BILLABLE SERVICE THIS VISIT: CPT | Performed by: HOSPITALIST

## 2025-07-19 PROCEDURE — 258N000003 HC RX IP 258 OP 636: Performed by: HOSPITALIST

## 2025-07-19 PROCEDURE — S9341 HIT ENTERAL GRAV DIEM: HCPCS

## 2025-07-19 PROCEDURE — 250N000013 HC RX MED GY IP 250 OP 250 PS 637: Performed by: HOSPITALIST

## 2025-07-19 RX ORDER — GABAPENTIN 250 MG/5ML
600 SOLUTION ORAL 3 TIMES DAILY
Status: DISCONTINUED | OUTPATIENT
Start: 2025-07-19 | End: 2025-07-22 | Stop reason: HOSPADM

## 2025-07-19 RX ORDER — NALOXONE HYDROCHLORIDE 0.4 MG/ML
0.4 INJECTION, SOLUTION INTRAMUSCULAR; INTRAVENOUS; SUBCUTANEOUS
Status: DISCONTINUED | OUTPATIENT
Start: 2025-07-19 | End: 2025-07-20

## 2025-07-19 RX ORDER — ENTECAVIR 0.5 MG/1
0.5 TABLET, FILM COATED ORAL EVERY EVENING
Status: DISCONTINUED | OUTPATIENT
Start: 2025-07-19 | End: 2025-07-19 | Stop reason: ALTCHOICE

## 2025-07-19 RX ORDER — AMOXICILLIN 250 MG
2 CAPSULE ORAL 2 TIMES DAILY PRN
Status: DISCONTINUED | OUTPATIENT
Start: 2025-07-19 | End: 2025-07-22 | Stop reason: HOSPADM

## 2025-07-19 RX ORDER — MORPHINE SULFATE 10 MG/5ML
5 SOLUTION ORAL EVERY 4 HOURS PRN
Status: DISCONTINUED | OUTPATIENT
Start: 2025-07-19 | End: 2025-07-22 | Stop reason: HOSPADM

## 2025-07-19 RX ORDER — NALOXONE HYDROCHLORIDE 0.4 MG/ML
0.2 INJECTION, SOLUTION INTRAMUSCULAR; INTRAVENOUS; SUBCUTANEOUS
Status: DISCONTINUED | OUTPATIENT
Start: 2025-07-19 | End: 2025-07-20

## 2025-07-19 RX ORDER — AMOXICILLIN 250 MG
1 CAPSULE ORAL 2 TIMES DAILY PRN
Status: DISCONTINUED | OUTPATIENT
Start: 2025-07-19 | End: 2025-07-22 | Stop reason: HOSPADM

## 2025-07-19 RX ORDER — ACETAMINOPHEN 500 MG
500 TABLET ORAL EVERY 4 HOURS PRN
Status: DISCONTINUED | OUTPATIENT
Start: 2025-07-19 | End: 2025-07-22 | Stop reason: HOSPADM

## 2025-07-19 RX ORDER — CALCIUM CARBONATE 500 MG/1
1000 TABLET, CHEWABLE ORAL 4 TIMES DAILY PRN
Status: DISCONTINUED | OUTPATIENT
Start: 2025-07-19 | End: 2025-07-22 | Stop reason: HOSPADM

## 2025-07-19 RX ORDER — ELECTROLYTES/DEXTROSE
474 SOLUTION, ORAL ORAL 3 TIMES DAILY
Status: DISCONTINUED | OUTPATIENT
Start: 2025-07-19 | End: 2025-07-19

## 2025-07-19 RX ADMIN — SODIUM CHLORIDE: 0.9 INJECTION, SOLUTION INTRAVENOUS at 11:44

## 2025-07-19 RX ADMIN — LEVETIRACETAM 500 MG: 100 SOLUTION ORAL at 09:11

## 2025-07-19 RX ADMIN — GABAPENTIN 600 MG: 250 SUSPENSION ORAL at 21:43

## 2025-07-19 RX ADMIN — GABAPENTIN 600 MG: 250 SUSPENSION ORAL at 14:49

## 2025-07-19 RX ADMIN — LEVOTHYROXINE SODIUM 75 MCG: 0.03 TABLET ORAL at 06:57

## 2025-07-19 RX ADMIN — AMOXICILLIN AND CLAVULANATE POTASSIUM 875 MG: 400; 57 POWDER, FOR SUSPENSION ORAL at 11:33

## 2025-07-19 RX ADMIN — GABAPENTIN 600 MG: 250 SUSPENSION ORAL at 17:51

## 2025-07-19 RX ADMIN — GABAPENTIN 600 MG: 250 SUSPENSION ORAL at 09:13

## 2025-07-19 ASSESSMENT — ACTIVITIES OF DAILY LIVING (ADL)
ADLS_ACUITY_SCORE: 43
ADLS_ACUITY_SCORE: 59
FALL_HISTORY_WITHIN_LAST_SIX_MONTHS: NO
CHANGE_IN_FUNCTIONAL_STATUS_SINCE_ONSET_OF_CURRENT_ILLNESS/INJURY: NO
ADLS_ACUITY_SCORE: 43
ADLS_ACUITY_SCORE: 43
ADLS_ACUITY_SCORE: 59
PATIENT'S_PREFERRED_MEANS_OF_COMMUNICATION: VERBAL
ADLS_ACUITY_SCORE: 61
ADLS_ACUITY_SCORE: 59
WEAR_GLASSES_OR_BLIND: NO
ADLS_ACUITY_SCORE: 45
ADLS_ACUITY_SCORE: 61
CONCENTRATING,_REMEMBERING_OR_MAKING_DECISIONS_DIFFICULTY: NO
ADLS_ACUITY_SCORE: 43
ADLS_ACUITY_SCORE: 45
ADLS_ACUITY_SCORE: 45
ADLS_ACUITY_SCORE: 43
ADLS_ACUITY_SCORE: 58
DIFFICULTY_EATING/SWALLOWING: NO
DOING_ERRANDS_INDEPENDENTLY_DIFFICULTY: NO
HEARING_DIFFICULTY_OR_DEAF: YES
ADLS_ACUITY_SCORE: 43
WALKING_OR_CLIMBING_STAIRS_DIFFICULTY: NO
ADLS_ACUITY_SCORE: 61
ADLS_ACUITY_SCORE: 43
DIFFICULTY_COMMUNICATING: NO
ADLS_ACUITY_SCORE: 45
ADLS_ACUITY_SCORE: 43
ADLS_ACUITY_SCORE: 43
ADLS_ACUITY_SCORE: 59
WERE_AUXILIARY_AIDS_OFFERED?: NO
DRESSING/BATHING_DIFFICULTY: NO
ADLS_ACUITY_SCORE: 43
TOILETING_ISSUES: NO
DESCRIBE_HEARING_LOSS: HEARING LOSS ON LEFT SIDE
ADLS_ACUITY_SCORE: 43
THE_FOLLOWING_AIDS_WERE_PROVIDED;: PATIENT DECLINED OFFER OF AUXILIARY AIDS

## 2025-07-19 NOTE — PROGRESS NOTES
[facility]    Medicine Progress Note - Hospitalist Service    Date of Admission:  7/12/2025    Assessment & Plan   Douglas Herrera is a 65 year old male admitted on 7/12/2025. He is being admitted for sepsis and acute encephalopathy.    #Sepsis with fever, tachycardia, leukocytosis  - Most likely sources include CNS and surrounding facial tissues, imaging unrevealing for abdominal pelvic pathology, CT head/face showing enlarging mass and surrounding inflammation  - Pro-Migue normal at 0.05, lactate 1.3  - Initially on Vanco and Zosyn, transition to Augmentin 7/16, course to be determined, consider some sort of suppression given high risk for evolving infection of head and neck  - MRSA swab positive  - Supportive measures with IV fluids PRN    #Acute encephalopathy, resolved and intermittently fluctuating  - Likely from sepsis and infection of the head/neck, alternatively consider polypharmacy  - Supportive measures, minimize CNS medications  - Care management following, PT and OT evaluated patient and signed off given goals    #Hyponatremia  - 130 on admission, usually normal, likely from dehydration and sepsis  - Resolved with IV fluids indicating dehydration    #Acute on chronic blood loss anemia  - Likely from chemo and more recently scant bleeding from his mass  - Recent iron studies show low iron and low TIBC, normal sat    #SCC head neck cancer, stage IVb  - Follows with [facility] oncology  - Had been on Cetuximab, now holding with deconditioning and frailty  - Oncology consulted to review treatment options and discuss prognosis, rec hospice  - Patient has elected to be DNR  - Adding Keppra for seizure prophylaxis in anticipation of enlarging mass and increased seizure risk  - Hospice now consulted, family meeting 7/16 formalizing plans for hospice, now determining details around venue and specific cares, medically ready when venue secured    #Hypercalcemia of malignancy  - 7/8 had calcium in  the 13's, received Zometa and IV fluids, calcium now normal  - Trend periodically, monitor for hypocalcemia    #Dysphagia and aspiration  - Per review, intermittently taking thickened liquids, with progression of tenesmus unlikely to safely tolerate p.o. going forward  - Speech and language pathology consulted, n.p.o. until encephalopathy further resolved  - Dietitian consulted to resume tube feeds    #Chronic hepatitis B  - Surface antigen positive core antibody positive, surface antibody negative, PCR - 1/2024  - Home Entecavir    #Intercrural fungus  - Has typical odor, offer miconazole as needed    #Hypothyroidism  - Resume usual Synthroid          Diet: NPO for Medical/Clinical Reasons Except for: No Exceptions  Adult Formula Bolus Feeding: Osmolite 1.5; Route: Gastrostomy; 3 times daily (Bolus at 07-12-18.  infuse over 1 hour.); Volume per Bolus: 400; mL(s); Additional free water (mL): 60 ml before and after each bolus. additional flush of 130 ml at 03-0...    DVT Prophylaxis: Pneumatic Compression Devices  Rico Catheter: Not present  Lines: PRESENT      Port a Cath 07/12/25 Single Lumen Right Chest wall-Site Assessment: WDL      Cardiac Monitoring: None  Code Status: No CPR- Do NOT Intubate      Clinically Significant Risk Factors               # Hypoalbuminemia: Lowest albumin = 2.7 g/dL at 7/15/2025  6:00 AM, will monitor as appropriate     # Hypertension: Noted on problem list                # Financial/Environmental Concerns: none   # History of CABG: noted on surgical history       Social Drivers of Health    Food Insecurity: Unknown (7/15/2025)    Food Insecurity     Within the past 12 months, did you worry that your food would run out before you got money to buy more?: Patient unable to answer     Within the past 12 months, did the food you bought just not last and you didn t have money to get more?: Patient unable to answer   Housing Stability: Unknown (7/15/2025)    Housing Stability     Do you have  housing? : Patient unable to answer     Are you worried about losing your housing?: Patient unable to answer   Tobacco Use: Low Risk  (6/10/2025)    Received from [employer]    Patient History     Smoking Tobacco Use: Never     Smokeless Tobacco Use: Never   Recent Concern: Tobacco Use - Medium Risk (6/2/2025)    Patient History     Smoking Tobacco Use: Former     Smokeless Tobacco Use: Never     Passive Exposure: Past   Financial Resource Strain: Unknown (7/15/2025)    Financial Resource Strain     Within the past 12 months, have you or your family members you live with been unable to get utilities (heat, electricity) when it was really needed?: Patient unable to answer   Transportation Needs: Unknown (7/15/2025)    Transportation Needs     Within the past 12 months, has lack of transportation kept you from medical appointments, getting your medicines, non-medical meetings or appointments, work, or from getting things that you need?: Patient unable to answer   Interpersonal Safety: Unknown (7/15/2025)    Interpersonal Safety     Do you feel physically and emotionally safe where you currently live?: Patient unable to answer     Within the past 12 months, have you been hit, slapped, kicked or otherwise physically hurt by someone?: Patient unable to answer     Within the past 12 months, have you been humiliated or emotionally abused in other ways by your partner or ex-partner?: Patient unable to answer          Disposition Plan     Medically Ready for Discharge: Anticipated Today, awaiting placement arrangements             Esequiel Breen MD  Hospitalist Service  [facility]  Securely message with [employer] (more info)  Text page via [facility] Paging/Directory   ______________________________________________________________________    Interval History   No new complaints, denies uncontrolled pain.    Physical Exam   Vital Signs: Temp: 99.3  F (37.4  C) Temp src: Axillary BP: 125/88 Pulse: 88    Resp: 20 SpO2: 99 % O2 Device: None (Room air)    Weight: 133 lbs 6.05 oz    Alert, no distress, stable left facial swelling, heart and lungs normal, abdomen nontender, legs without edema    Medical Decision Making       52 MINUTES SPENT BY ME on the date of service doing chart review, history, exam, documentation & further activities per the note.  NOTE(S)/MEDICAL RECORDS REVIEWED over the past 24 hours: Vitals, labs, new imaging, documentation and medication administrations      Data         Imaging results reviewed over the past 24 hrs:   No results found for this or any previous visit (from the past 24 hours).

## 2025-07-19 NOTE — PLAN OF CARE
Problem: Adult Inpatient Plan of Care  Goal: Readiness for Transition of Care  Outcome: Progressing     Problem: Pain Acute  Goal: Optimal Pain Control and Function  Outcome: Progressing  Intervention: Prevent or Manage Pain  Recent Flowsheet Documentation  Taken 7/18/2025 1630 by Angie Alfonso RN  Medication Review/Management: medications reviewed   Goal Outcome Evaluation:    Patient responds minimally when asked questions. Able to tell writer that he would like pain medication. Pain medication administered once this shift. Had episode of bowel incontinence this evening. Continues to have blood drainage from nose and mouth. Using yaunker with help and sometimes independently. Up with standby assist of one.

## 2025-07-19 NOTE — CONSULTS
CLINICAL NUTRITION SERVICES     Nutrition Prescription    RECOMMENDATIONS FOR MDs/PROVIDERS TO ORDER:      Recommendations already ordered by Registered Dietitian (RD):  Will order same Tube Feeding as pt received as an Inpatient, which meets 100% of estimated nutrition needs:  Osmolite 1.5 given as bolus 400 ml 3 times/day at 07-12-17 per PEG, infuse over 1 hr.  Free water flush 60 ml before and after each bolus plus 130 ml given 4 additional times at 03-09-14-20     Provides = (1200ml/day) provides: 1800 kcals, 75 g PRO, 914 ml free H20, 244 g CHO, and 0 g fiber daily.     Future/Additional Recommendations:       Received consult to order TF per MNT Guidelines    Pt is now an outpatient with [facility], family wants TF to continue until pt is discharged home.    Pt sleeping, no visitors currently.    CURRENT NUTRITION ORDERS  Diet: Orders Placed This Encounter      NPO for Medical/Clinical Reasons Except for: No Exceptions     Peg placed 1/2/24, replaced 11/22/24    Dosing Weight: 60.5 kg, based on actual wt     ASSESSED NUTRITION NEEDS  Estimated Energy Needs: 4130-3730+ kcals/day (25 - 30 kcals/kg)  Justification: Increased needs  Estimated Protein Needs: 61-91 grams protein/day (1-1.5 g/kg)  Justification: Increased needs  Estimated Fluid Needs: 1515+ mL/day (1 mL/kcal)  Justification: Maintenance    No labs today.  Medications reviewed.  GI: multiple loose stools daily.    NUTRITION DIAGNOSIS  Swallowing difficulty related to chronic illness as evidenced by dysphagia, need enteral nutrition.    GOALS  Tolerate TF at goal rate  Meet nutrition needs    INTERVENTIONS:  Implementation:  Enteral Nutrition - Initiate  Feeding tube flush    Follow up/Monitoring:  Per policy

## 2025-07-19 NOTE — PROGRESS NOTES
Care Management Follow Up    Length of Stay (days): 7    Expected Discharge Date: 07/19/2025     Concerns to be Addressed: Discharge Planning      Patient plan of care discussed at interdisciplinary rounds: Yes    Anticipated Discharge Disposition: [facility] Hospice   Anticipated Discharge Services: [facility]  Anticipated Discharge DME: None    Patient/family educated on Medicare website which has current facility and service quality ratings: N/A  Education Provided on the Discharge Plan: N/A  Patient/Family in Agreement with the Plan: Yes    Referrals Placed by CM/SW: N/A   Private pay costs discussed: Not applicable    Discussed  Partnership in Safe Discharge Planning  document with patient/family: No     Handoff Completed: No, handoff not indicated or clinically appropriate    Additional Information:  [facility] (previous home care provider) called for update, SWCM advised that Pt is not discharging home today and is likely signing onto hospice.    Later in shift, SWCM reviewed chart updates and messaged with Isadora from [facility]. Pt/family have elected to begin [facility] hospice with plan to transition to home hospice with [facility] next week.     Next Steps: CM will continue to collaborate with Pt and [facility] for needs related to transition home next week.    ELY Whitaker  Acute Care Management  [facility]  For further questions/concerns you can reach us at Vocera Web Console

## 2025-07-19 NOTE — PLAN OF CARE
Problem: Adult Inpatient Plan of Care  Goal: Absence of Hospital-Acquired Illness or Injury  Intervention: Identify and Manage Fall Risk  Recent Flowsheet Documentation  Taken 7/18/2025 2310 by Jeremy Easton RN  Safety Promotion/Fall Prevention:   activity supervised   assistive device/personal items within reach   clutter free environment maintained   lighting adjusted   nonskid shoes/slippers when out of bed   room near nurse's station   room organization consistent   safety round/check completed   supervised activity   toileting scheduled  Taken 7/18/2025 2114 by Jeremy Easton RN  Safety Promotion/Fall Prevention:   activity supervised   assistive device/personal items within reach   clutter free environment maintained   lighting adjusted   nonskid shoes/slippers when out of bed   room near nurse's station   room organization consistent   safety round/check completed   supervised activity   toileting scheduled     Problem: Delirium  Goal: Improved Behavioral Control  Intervention: Minimize Safety Risk  Recent Flowsheet Documentation  Taken 7/18/2025 2310 by Jeremy Easton RN  Enhanced Safety Measures:   pain management   review medications for side effects with activity   room near unit station  Trust Relationship/Rapport: care explained  Taken 7/18/2025 2114 by Jeremy Easton RN  Enhanced Safety Measures:   pain management   review medications for side effects with activity   room near unit station  Trust Relationship/Rapport: care explained     Problem: Delirium  Goal: Improved Sleep  Intervention: Promote Sleep  Recent Flowsheet Documentation  Taken 7/18/2025 2310 by Jeremy Easton RN  Sleep/Rest Enhancement:   awakenings minimized   comfort measures   consistent schedule promoted   noise level reduced   regular sleep/rest pattern promoted   room darkened  Taken 7/18/2025 2114 by Jeremy Easton RN  Sleep/Rest Enhancement:   awakenings minimized   comfort measures   consistent schedule  "promoted   noise level reduced   regular sleep/rest pattern promoted   room darkened     Problem: Adult Inpatient Plan of Care  Goal: Patient-Specific Goal (Individualized)  Description: You can add care plan individualizations to a care plan. Examples of Individualization might be:  \"Parent requests to be called daily at 9am for status\", \"I have a hard time hearing out of my right ear\", or \"Do not touch me to wake me up as it startles  me\".  Outcome: Progressing   Goal Outcome Evaluation:    Pt reported minimal pain during night shift, did not request PRN.  Safety was maintained with hourly rounding, standard fall precautions, and bed alarm use. Pt remained noncompliant about calling for assistance before getting out of bed.  Pt slept approx. 50% of night shift with minimal interruptions.  Anticipating possible discharge home w/Hospice Saturday, pending final arrangements.  "

## 2025-07-19 NOTE — PHARMACY-ADMISSION MEDICATION HISTORY
Admission medication history completed at [facility]. Please see Pharmacist Admission Medication History note from 07/12/2025.

## 2025-07-19 NOTE — PLAN OF CARE
"Goal Outcome Evaluation:    Problem: Adult Inpatient Plan of Care  Goal: Plan of Care Review  Description: The Plan of Care Review/Shift note should be completed every shift.  The Outcome Evaluation is a brief statement about your assessment that the patient is improving, declining, or no change.  This information will be displayed automatically on your shift  note.  Outcome: Progressing  Goal: Patient-Specific Goal (Individualized)  Description: You can add care plan individualizations to a care plan. Examples of Individualization might be:  \"Parent requests to be called daily at 9am for status\", \"I have a hard time hearing out of my right ear\", or \"Do not touch me to wake me up as it startles  me\".  Outcome: Progressing  Goal: Absence of Hospital-Acquired Illness or Injury  Outcome: Progressing  Intervention: Identify and Manage Fall Risk  Recent Flowsheet Documentation  Taken 7/19/2025 0900 by Kaity Nova RN  Safety Promotion/Fall Prevention: safety round/check completed  Goal: Optimal Comfort and Wellbeing  Outcome: Progressing  Intervention: Provide Person-Centered Care  Recent Flowsheet Documentation  Taken 7/19/2025 0900 by Kaity Nova RN  Trust Relationship/Rapport: care explained  Goal: Readiness for Transition of Care  Outcome: Progressing   Pt is lethargic but arouses to voice prompts. Vss. Afebrile.  Bolus TF and water flushes given as schedule.   Up with Ax 1.   Had care conference with family and hospice nurse.    "

## 2025-07-19 NOTE — H&P
[facility]    History and Physical - Hospitalist Service       Date of Admission:  7/19/2025    Assessment & Plan      Douglas Herrera is a 65 year old male admitted on 7/19/2025. He is being admitted under [facility] hospice following recent concurrent admission for confusion and fevers in the setting of progressive left nasal cancer.    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  - Recently on chemotherapy, functionally declining with progression on imaging  - Oncology consulted last admission and recommended hospice  - [facility] hospice consulted  - Comfort orders available  - Continuing Keppra for seizure prophylaxis  - Tentatively planning for home disposition 7/22 after DME can be arranged          Diet:  TF  DVT Prophylaxis: NA  Rico Catheter: Not present  Lines: PRESENT             Cardiac Monitoring: None  Code Status:  DNR    Clinically Significant Risk Factors Present on Admission                   # Hypertension: Noted on problem list               # Financial/Environmental Concerns:     # History of CABG: noted on surgical history       Disposition Plan     Medically Ready for Discharge: Anticipated in 2-4 Days           Esequiel Breen MD  Hospitalist Service  [facility]  Securely message with [employer] (more info)  Text page via [employer] Paging/Directory     ______________________________________________________________________    Chief Complaint   Admit for [facility] hospice    History is obtained from the patient    History of Present Illness   Douglas Herrera is a 65 year old male who is being readmitted for [facility] hospice cares.  He was recently admitted concurrently for confusion and fever in the setting of progressive left nasal cancer.  Oncology recommended hospice and after multiple discussions patient and patient's family have agreed to [facility] hospice with tentative plans for discharging home this coming week.  See related notes from preceding admission for  further details.      Past Medical History    Past Medical History:   Diagnosis Date    Ascending aortic aneurysm     Coronary artery disease     Depression     Gout     History of anesthesia complications     Hyperlipemia     Hypertension     Malignant neoplasm of nasal cavities (H)     PONV (postoperative nausea and vomiting)        Past Surgical History   Past Surgical History:   Procedure Laterality Date    AORTA SURGERY N/A 4/23/2019    Procedure: WITH ASCENDING AORTA REPLACEMENT;  Surgeon: Darlene Graff MD;  Location: [facility] OR;  Service: Cardiovascular    BYPASS GRAFT ARTERY CORONARY      CARDIAC SURGERY      CV CORONARY ANGIOGRAM N/A 3/12/2019    Procedure: Coronary Angiogram;  Surgeon: Hamilton Lucero MD;  Location: [facility];  Service: Cardiology    ENDOSCOPIC SINUS SURGERY Left 11/10/2023    Procedure: Transnasal endoscopic approach to biopsy left nasal mass;  Surgeon: Damon Regan MD;  Location: [facility] OR    GALLBLADDER SURGERY      INSERT PORT VASCULAR ACCESS N/A 1/11/2024    Procedure: Insert port vascular access;  Surgeon: Tyrel Silvestre MD;  Location: [facility] OR    IR CAROTID CEREBRAL ANGIOGRAM BILATERAL  11/30/2023    IR CAROTID CEREBRAL ANGIOGRAM BILATERAL  6/2/2025    IR CHEST PORT PLACEMENT > 5 YRS OF AGE  1/11/2024    IR FACIAL EMBOLIZATION LEFT  9/13/2023    IR GASTROSTOMY TUBE CHANGE  11/22/2024    IR GASTROSTOMY TUBE PERCUTANEOUS PLCMNT  1/2/2024    OPTICAL TRACKING SYSTEM ENDOSCOPIC SINUS SURGERY Left 12/1/2023    Procedure: Stealth assisted transnasal endoscopic approach to excise left sinonasal mass;  Surgeon: Damon Regan MD;  Location: [facility] OR    RESECT TUMOR SINUS Left 10/11/2024    Procedure: Transnasal Biopsy of Left Maxillary Sinus Tumor,;  Surgeon: Damon Regan MD;  Location: [facility] OR       Prior to Admission Medications   Prior to Admission Medications   Prescriptions Last Dose Informant Patient  Reported? Taking?   Osmolite 1.5 Migue 237 mL   No No   Sig: Place 474 mLs into G tube 3 times daily. Infuse via gravity bag. 2 cartons TID spread 3-5 hours apart.   Water flush: 30-60 mL before and after each feeding. + 120 mL 4 times daily for hydration.   acetaminophen (TYLENOL) 500 MG tablet   Yes No   Sig: Take 500 mg by mouth every 4 hours as needed for mild pain.   entecavir (BARACLUDE) 0.5 MG tablet   Yes No   Sig: Take 0.5 mg by mouth every evening.   gabapentin (NEURONTIN) 250 MG/5ML solution   No No   Sig: Take 12 mLs (600 mg) by mouth 3 times daily.   oxyCODONE (ROXICODONE) 5 MG/5ML solution   No No   Sig: Take 5 mLs (5 mg) by mouth 4 times daily as needed for severe pain.   prochlorperazine (COMPAZINE) 10 MG tablet   No No   Sig: Take 1 tablet (10 mg) by mouth every 6 hours as needed for nausea or vomiting.   sodium chloride (OCEAN) 0.65 % nasal spray   No No   Sig: Spray 2 sprays in nostril daily as needed for congestion.      Facility-Administered Medications: None           Physical Exam   Vital Signs:                    Weight: 0 lbs 0 oz    See notes from same day    Medical Decision Making       52 MINUTES SPENT BY ME on the date of service doing chart review, history, exam, documentation & further activities per the note.  NOTE(S)/MEDICAL RECORDS REVIEWED over the past 24 hours: Vitals, labs, new imaging, documentation and medication administrations      Data         Imaging results reviewed over the past 24 hrs:   No results found for this or any previous visit (from the past 24 hours).

## 2025-07-19 NOTE — DISCHARGE SUMMARY
[facility]  Hospitalist Discharge Summary      Date of Admission:  7/12/2025  Date of Discharge:  7/19/2025  Discharging Provider: Esequiel Breen MD  Discharge Service: Hospitalist Service    Discharge Diagnoses   Fever and confusion  Left nasal cancer with clinical progression    Clinically Significant Risk Factors          Follow-ups Needed After Discharge   Follow-up Appointments       Follow Up and recommended labs and tests      None needed. [facility] will follow for planned readmission.              N/A, discharging to hospice    Unresulted Labs Ordered in the Past 30 Days of this Admission       No orders found from 6/12/2025 to 7/13/2025.        These results will be followed up by       Discharge Disposition   Discharge and readmission to [facility] hospice  Condition at discharge: Guarded    Hospital Course   See notes for further details.  Briefly patient was admitted for confusion and fevers and was found to have dehydration and possible infection surrounding his left facial cancer.  He showed some interval improvement with IV fluids and antibiotics.  Workup revealed progression of his cancer, oncology was consulted and recommended hospice.  Following multiple long discussions patient and patient's family elected to pursue [facility] hospice with tentative plans to go home when DME available.    Consultations This Hospital Stay   PHARMACY TO DOSE [facility]  PHYSICAL THERAPY ADULT IP CONSULT  OCCUPATIONAL THERAPY ADULT IP CONSULT  CARE MANAGEMENT / SOCIAL WORK IP CONSULT  PHARMACY TO DOSE [facility]  HEMATOLOGY & ONCOLOGY IP CONSULT  NUTRITION SERVICES ADULT IP CONSULT  SPEECH LANGUAGE PATH ADULT IP CONSULT  PHARMACY IP CONSULT  [facility] INPATIENT HOSPICE ADULT CONSULT    Code Status   No CPR- Do NOT Intubate    Time Spent on this Encounter   I, Esequiel Breen MD, personally saw the patient today and spent greater than 30 minutes discharging this patient.       Esequiel Breen MD  [facility]  [address] [address]  Phone: [phone]  Fax: [phone]  ______________________________________________________________________    Physical Exam   Vital Signs: Temp: 99.3  F (37.4  C) Temp src: Axillary BP: 125/88 Pulse: 88   Resp: 20 SpO2: 99 % O2 Device: None (Room air)    Weight: 133 lbs 6.05 oz  See progress note for further details       Primary Care Physician   Mira Leung    Discharge Orders      Primary Care - Care Coordination Referral      Follow Up and recommended labs and tests    None needed. GIP will follow for planned readmission.     Reason for your hospital stay    Confusion and facial swelling.     Activity - Up with assistive device     Activity - Up with nursing assistance     No CPR- Do NOT Intubate     Fall precautions     Diet    Follow this diet upon discharge: Current Diet:Orders Placed This Encounter      Adult Formula Bolus Feeding: Osmolite 1.5; Route: Gastrostomy; 3 times daily (Bolus at 07-12-18.  infuse over 1 hour.); Volume per Bolus: 400; mL(s); Additional free water (mL): 60 ml before and after each bolus. additional flush of 130 ml at 03-0...      NPO for Medical/Clinical Reasons Except for: No Exceptions    Free water order:  Free water route: Gastric   Free       Significant Results and Procedures   Most Recent 3 CBC's:  Recent Labs   Lab Test 07/17/25  0707 07/16/25  0640 07/14/25  0552   WBC 10.6 10.2 10.1   HGB 8.2* 8.0* 8.2*   MCV 84 84 85    292 274     Most Recent 3 BMP's:  Recent Labs   Lab Test 07/17/25  0707 07/16/25  0640 07/15/25  0600    137 137   POTASSIUM 3.5 3.5 3.3*   CHLORIDE 104 105 107   CO2 21* 23 20*   BUN 9.0 9.8 11.6   CR 0.85 0.94 0.92   ANIONGAP 12 9 10   FLORENCE 8.5* 8.3* 8.2*   * 99 93     Most Recent 2 LFT's:  Recent Labs   Lab Test 07/12/25  0601 07/09/25  1215   AST 28 32   ALT 27 32   ALKPHOS 78 90   BILITOTAL 0.4 0.4   ,   Results for orders placed or performed during the hospital encounter  of 07/12/25   Soft tissue neck CT w contrast    Narrative    EXAM: CT SOFT TISSUE NECK W CONTRAST  LOCATION: [facility]  DATE: 7/12/2025    INDICATION: Known cancer, fever  COMPARISON: Soft tissue neck CT 6/6/2025.  CONTRAST: 90 ml iso 370 given during CAP  TECHNIQUE: Routine CT Soft Tissue Neck with IV contrast. Multiplanar reformats. Dose reduction techniques were used.    FINDINGS:     AREA OF INTEREST: Redemonstrated transfacial soft tissue mass, centered within the left  space. The mass again appears to extend superiorly through the floor of the left orbit, medially through the hard palate, posteriorly contacting the left   internal carotid artery, laterally past the left mandibular body, anteriorly past the anterior maxillary wall, and caudally to the angle of the mandible. There is again noted to be extension into the paranasal sinuses, oral cavity, and pharynx, which   overall appears slightly more extensive than the prior CT. There is extensive associated osseous erosion involving the left-sided facial bones. There is also noted to be erosion of the floor of the left middle cranial fossa, with suspected intracranial   tumor extension along the floor of the left middle cranial fossa and potentially involving the left cavernous sinus and prepontine cistern. There is again noted to be multicystic changes involving the central aspect of the mass. Overall the lesion   appears increased from the prior CT, measuring approximately 7.8 x 6.7 cm in similar axial dimensions to the prior exam (series 3 image 44), although difficult to delineate the exact margins of the mass and could likely be better evaluated on MRI. There   is some stranding within the peripheral surrounding subcutaneous tissues, which overall appears decreased since the prior CT, although superimposed infectious process remains difficult to exclude. There is also edema involving the mucosa of the upper    aerodigestive tract, with the every remaining grossly patent.    LYMPH NODES: No pathologic lymph nodes by size or morphology criteria.     SALIVARY GLANDS: Normal parotid and submandibular glands.    THYROID: Subcentimeter calcification in the right thyroid lobe.     VESSELS: Vascular structures of the neck are patent. Partially visualized right chest port.    OTHER: Numerous partially visualized nodules in the visualized lung apices, compatible with metastatic disease. Multilevel cervical spondylosis.      Impression    IMPRESSION:   1.  Redemonstrated transspatial cystic and solid mass centered in the left  space, which appears to have increased in size and extent since the prior CT from 6/6/2025. There is again noted to be involvement of the surrounding structures, with   extensive associated osseous erosion and suspected intracranial extension, as above. Surrounding inflammatory changes appear decreased from the prior CT, although superimposed infection remains difficult to exclude.  2.  Numerous partially visualized nodules in the visualized lung apices, compatible with metastatic disease.   CT Chest/Abdomen/Pelvis w Contrast    Narrative    EXAM: CT CHEST/ABDOMEN/PELVIS WITH CONTRAST  LOCATION: [facility]  DATE: 07/12/2025    INDICATION: Fever, known head and neck CA.  COMPARISON: CT chest 06/06/2025. CT chest, abdomen and pelvis 03/06/2025.  TECHNIQUE: CT scan of the chest, abdomen, and pelvis was performed following injection of IV contrast. Multiplanar reformats were obtained. Dose reduction techniques were used.   CONTRAST: 90 mL Iso 370.    FINDINGS:   LUNGS AND PLEURA: Bilateral dependent atelectasis. Since 06/06/2025, resolved bilateral consolidative opacities. Interval increased in size multiple pulmonary nodules with reference examples on series 5:  -Left apex, 8 mm, previously 4 mm, image 52  -Right apex, 16 x 11 mm, not definitely visualized previously and  possibly obscured, image 60  -Left upper lobe subpleural, 12 mm, previously 8 mm, image 86    Multiple additional pulmonary nodules are noted.    MEDIASTINUM/AXILLAE: Right chest port tip at the right atrium. Similar borderline enlarged right hilar 1 cm lymph node (4/60).    CORONARY ARTERY CALCIFICATION: Severe.    HEPATOBILIARY: Cholecystectomy.    PANCREAS: Normal.    SPLEEN: Normal.    ADRENAL GLANDS: Normal.    KIDNEYS/BLADDER: Normal.    BOWEL: Gastrostomy tube in appropriate position. No bowel obstruction. Appendix appears normal.    LYMPH NODES: Normal.    VASCULATURE: Mild to moderate aortic atherosclerosis.    PELVIC ORGANS: No pelvic mass.    MUSCULOSKELETAL: No aggressive osseous lesion. Sternotomy.      Impression    IMPRESSION:  1.  Since 06/06/2025, resolved pulmonary consolidative opacities/pneumonia.  2.  Continued increased in size multiple pulmonary nodules/metastases.  3.  No convincing acute process within the abdomen or pelvis.     Head CT w/o contrast    Narrative    EXAM: CT HEAD W/O CONTRAST  LOCATION: [facility]  DATE: 7/12/2025    INDICATION: ams  COMPARISON: Head CT 6/6/2025.  TECHNIQUE: Routine CT Head without IV contrast. Multiplanar reformats. Dose reduction techniques were used.    FINDINGS:  INTRACRANIAL CONTENTS: No intracranial hemorrhage, extraaxial collection, or mass effect.  No CT evidence of acute infarct. Mild presumed chronic small vessel ischemic changes. Mild generalized volume loss. No hydrocephalus.     VISUALIZED ORBITS/SINUSES/MASTOIDS: No intraorbital abnormality. Extensive mucosal thickening throughout the paranasal sinuses. There is also soft tissue mass likely involving the sphenoid sinuses and posterior ethmoid air cells. Bilateral mastoid   effusions.    BONES/SOFT TISSUES: Please see separately dictated soft tissue neck CT for description of soft tissue mass and associated osseous findings.      Impression    IMPRESSION:  1.  No acute  intracranial findings.  2.  Mild age-related changes.  3.  Please see separately dictated soft tissue neck CT for further description of the left-sided facial mass.       Discharge Medications      Review of your medicines        CONTINUE these medicines which have NOT CHANGED        Dose / Directions   acetaminophen 500 MG tablet  Commonly known as: TYLENOL      Dose: 500 mg  Take 500 mg by mouth every 4 hours as needed for mild pain.  Refills: 0     entecavir 0.5 MG tablet  Commonly known as: BARACLUDE      Dose: 0.5 mg  Take 0.5 mg by mouth every evening.  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  Quantity: 1080 mL  Refills: 1     Osmolite 1.5 Migue Liqd  Used for: Moderate protein-calorie malnutrition, Malignant neoplasm of maxillary sinus (H), Hypomagnesemia      Dose: 474 mL  Place 474 mLs into G tube 3 times daily. Infuse via gravity bag. 2 cartons TID spread 3-5 hours apart.   Water flush: 30-60 mL before and after each feeding. + 120 mL 4 times daily for hydration.  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  Quantity: 30 tablet  Refills: 2     sodium chloride 0.65 % nasal spray  Commonly known as: OCEAN  Used for: Mass of sinus      Dose: 2 spray  Spray 2 sprays in nostril daily as needed for congestion.  Quantity: 88 mL  Refills: 3            STOP taking      atorvastatin  80 MG tablet  Commonly known as: LIPITOR        celecoxib 5 mg/mL Susp suspension  Commonly known as: celeBREX        levothyroxine 75 MCG tablet  Commonly known as: SYNTHROID/LEVOTHROID        nitroGLYcerin 0.4 MG sublingual tablet  Commonly known as: NITROSTAT        polyethylene glycol 17 g packet  Commonly known as: MIRALAX               Allergies   No Known Allergies

## 2025-07-19 NOTE — PROGRESS NOTES
[facility]    Consult Note - [facility]  _________________________________________________________________    [facility]  Contact Number: [phone]    - Providers: Please contact [facility] with changes in orders or clinical plan of care   - Nursing: Please contact [facility] with significant changes in patient condition    Hospice will notify the care team (including the hospitalist) to confirm date of inpatient hospice (GIP) admission.    New Epic encounter will not be created until hospice completes admission.   ______________________________________________________________________        Hospice Diagnosis: Squamous Cell Carcinoma    Indication for Inpatient Hospice: Seizures, pain    Goals for Hospital Discharge: symptom management - transfer home.     Plan of Care Discussed with the Following:   - Nurse: ЕКАТЕРИНА Mcconnell   - Hospitalist/Rounding Provider: Esequiel Breen MD    - Douglas's Family/Preferred Contact:Tulio son   - Hospice Provider:Zaire Bhardwaj MD    Summary of Visit (includes assessment, medications and any new orders):     Met with pt, family at bedside. Family wants to continue Feed Tubing until pt discharges from the hospital planning on Tuesday.    Family does not have  home- but is working on it.    Rajan Cai- pt appreciated the prayer by the hospital .       Hospice recommendations:   Discontinue IV meds    Transition to Oral (via Gtube) : Keppra,   Continue:Gabapentin, Tylenol, Entecavir and other comfort meds.     Discontinue levothyroxine, atorvastatin.    Add   Morphine 5mg q 4 hrs PRN.     .        Amber Jarquin RN

## 2025-07-20 PROBLEM — Z51.5 HOSPICE CARE: Status: ACTIVE | Noted: 2025-07-20

## 2025-07-20 PROCEDURE — S9341 HIT ENTERAL GRAV DIEM: HCPCS

## 2025-07-20 PROCEDURE — 250N000013 HC RX MED GY IP 250 OP 250 PS 637: Performed by: INTERNAL MEDICINE

## 2025-07-20 PROCEDURE — 250N000013 HC RX MED GY IP 250 OP 250 PS 637: Performed by: HOSPITALIST

## 2025-07-20 PROCEDURE — 99232 SBSQ HOSP IP/OBS MODERATE 35: CPT | Performed by: INTERNAL MEDICINE

## 2025-07-20 PROCEDURE — 110N000005 HC R&B HOSPICE, ACCENT

## 2025-07-20 RX ORDER — NALOXONE HYDROCHLORIDE 0.4 MG/ML
0.2 INJECTION, SOLUTION INTRAMUSCULAR; INTRAVENOUS; SUBCUTANEOUS
Status: DISCONTINUED | OUTPATIENT
Start: 2025-07-20 | End: 2025-07-22 | Stop reason: HOSPADM

## 2025-07-20 RX ORDER — LEVETIRACETAM 100 MG/ML
500 SOLUTION ORAL 2 TIMES DAILY
Status: DISCONTINUED | OUTPATIENT
Start: 2025-07-20 | End: 2025-07-22 | Stop reason: HOSPADM

## 2025-07-20 RX ORDER — SENNOSIDES 8.6 MG
1 TABLET ORAL 2 TIMES DAILY PRN
Status: DISCONTINUED | OUTPATIENT
Start: 2025-07-20 | End: 2025-07-22 | Stop reason: HOSPADM

## 2025-07-20 RX ORDER — NALOXONE HYDROCHLORIDE 0.4 MG/ML
0.1 INJECTION, SOLUTION INTRAMUSCULAR; INTRAVENOUS; SUBCUTANEOUS
Status: DISCONTINUED | OUTPATIENT
Start: 2025-07-20 | End: 2025-07-22 | Stop reason: HOSPADM

## 2025-07-20 RX ORDER — MINERAL OIL/HYDROPHIL PETROLAT
OINTMENT (GRAM) TOPICAL
Status: DISCONTINUED | OUTPATIENT
Start: 2025-07-20 | End: 2025-07-22 | Stop reason: HOSPADM

## 2025-07-20 RX ORDER — CARBOXYMETHYLCELLULOSE SODIUM 5 MG/ML
1-2 SOLUTION/ DROPS OPHTHALMIC
Status: DISCONTINUED | OUTPATIENT
Start: 2025-07-20 | End: 2025-07-22 | Stop reason: HOSPADM

## 2025-07-20 RX ORDER — BISACODYL 10 MG
10 SUPPOSITORY, RECTAL RECTAL
Status: DISCONTINUED | OUTPATIENT
Start: 2025-07-23 | End: 2025-07-22 | Stop reason: HOSPADM

## 2025-07-20 RX ADMIN — ENTECAVIR 0.5 MG: 0.05 SOLUTION ORAL at 20:08

## 2025-07-20 RX ADMIN — GABAPENTIN 600 MG: 250 SUSPENSION ORAL at 13:48

## 2025-07-20 RX ADMIN — GABAPENTIN 600 MG: 250 SUSPENSION ORAL at 08:38

## 2025-07-20 RX ADMIN — LEVETIRACETAM 500 MG: 100 SOLUTION ORAL at 20:09

## 2025-07-20 RX ADMIN — MORPHINE SULFATE 5 MG: 10 SOLUTION ORAL at 22:49

## 2025-07-20 ASSESSMENT — ACTIVITIES OF DAILY LIVING (ADL)
ADLS_ACUITY_SCORE: 38
ADLS_ACUITY_SCORE: 38
ADLS_ACUITY_SCORE: 61
ADLS_ACUITY_SCORE: 38

## 2025-07-20 NOTE — PROGRESS NOTES
[facility]    Consult Note - [facility]  _________________________________________________________________    [facility] 24/7 Contact Number: [phone]    - Providers: Please contact [facility] with changes in orders or clinical plan of care   - Nursing: Please contact [facility] with significant changes in patient condition    Hospice will notify the care team (including the hospitalist) to confirm date of inpatient hospice (GIP) admission.    New Epic encounter will not be created until hospice completes admission.   ______________________________________________________________________      Hospice Diagnosis: Squamous Cell Carcinoma     Indication for Inpatient Hospice: Seizures, pain     Goals for Hospital Discharge: symptom management - transfer home. 7.22     Plan of Care Discussed with the Following:   - Nurse: ЕКАТЕРИНА Carlisle   - Hospitalist/Rounding Provider: Shayy Garcia MD         - Douglas's Family/Preferred Contact:Tulio bass   - Hospice Provider:Zaire Bhardwaj MD      Summary of Visit (includes assessment, medications and any new orders):     Family not at bedside..Confirmed equipment with sons-    Pt continues to look stable from transport. 7/22    DME ordered: hospital bed, bedside table, suction machine, bedside commode.    Amber Jarquin RN

## 2025-07-20 NOTE — PLAN OF CARE
Problem: Adult Inpatient Plan of Care  Goal: Optimal Comfort and Wellbeing  Outcome: Progressing     Problem: Delirium  Goal: Improved Behavioral Control  Outcome: Progressing   Goal Outcome Evaluation:       No significant events. One TF bolus completed this shift. Pt denies pain and does not appear to be in acute distress. Laura at bedside, pt uses independently to clear secretions. Does not use call light, sets off bed alarm when needed to use the bathroom. All meds thru G tube. Oral cares done by pt independently. Strict NPO. Care ongoing.

## 2025-07-20 NOTE — PLAN OF CARE
Problem: Adult Inpatient Plan of Care  Goal: Optimal Comfort and Wellbeing  Outcome: Progressing  Intervention: Monitor Pain and Promote Comfort  Recent Flowsheet Documentation  Taken 7/20/2025 1205 by Maylin Vences RN  Pain Management Interventions: medication (see MAR)   Goal Outcome Evaluation:       Pt was awake and alert in the morning, called for his nasal spray which he got up to the bathroom with standby assist to complete, used the toilet and went back to bed, his tube feed had just been completed, he took his gabapentin solution and had been lethargic ever since. He wakes up with voice but drifts right back to sleep, gabapentin has been discontinued, pt denies pain, looks comfortable, but very lethargic, hospice nurse was in room and plan is for pt to go home on hospice on tuesday.

## 2025-07-20 NOTE — PROGRESS NOTES
"Care Management Follow Up    Length of Stay (days): 1    Expected Discharge Date: 07/22/2025    Anticipated Discharge Plan:   Home with family support 24/7 and [facility] Outpatient hospice.     Transportation: Anticipate Stretcher. Transportation costs discussed? Yes. Discussed with Pt's son's Chris     PT Recommendations:    OT Recommendations:        Barriers to Discharge: Outpatient hospice to deliver pt's equipment to home on Mon 7/20, with discharge from hospital on Tues 7/21.       Prior Living Situation:     Pulled from RNCM Assessment on 7/14 (pt's chart flipped to [facility] hospice).      \"Pt lives w/son Rocco, has PCA svcs and sons are his caregivers. Plans to return home with family support 24/7 and [facility] hosp[facility]. \"      Discussed  Partnership in Safe Discharge Planning  document with patient/family: No     Handoff Completed: No, handoff not indicated or clinically appropriate    Patient/Spokesperson Updated: Yes. Who? Pt's family (many in the room) and son Chris     Additional Information:  Amber Jarquin from [facility] program spoke with writer today and focus will be pt to return home with family support and OP [facility]. She plans to get all equipment to pt's home on Monday 7/21. She asked CM to assist in setting up a ride for pt on Tues 7/21 to return home.       RNCM spoke with pt's family in room and they are in agreement with Tues 7/21 for pt to return home. There is 10 stairs to navigate, which writer told dispatch. Pt's son Tulio will be at home on Tues to get the door. Son Chris agreeable to private cost of stretcher transport.         Final discharge plan is for pt to return home with 24/7 family support and OP [facility] on Tues 7/22. Stretcher transport set up for 11:26am-12:26pm. PCS completed.         Next Steps: Confirm that equipment was delivered to pt's home on Mon. Confirm discharge on Tues 7/22. CM will continue to monitor progression of " care, review team recommendations and provide discharge planning assist as needed.       Ro Portillo RN  Acute Care Management  [facility]  For further questions/concerns you can reach us at Vocera Web Console

## 2025-07-20 NOTE — PLAN OF CARE
"  Problem: Adult Inpatient Plan of Care  Goal: Absence of Hospital-Acquired Illness or Injury  Intervention: Identify and Manage Fall Risk  Recent Flowsheet Documentation  Taken 7/19/2025 2324 by Jeremy Easton RN  Safety Promotion/Fall Prevention:   activity supervised   assistive device/personal items within reach   clutter free environment maintained   lighting adjusted   nonskid shoes/slippers when out of bed   room door open   room near nurse's station   room organization consistent   safety round/check completed   supervised activity     Problem: Delirium  Goal: Improved Sleep  Intervention: Promote Sleep  Recent Flowsheet Documentation  Taken 7/19/2025 2324 by Jeremy Easton, RN  Sleep/Rest Enhancement:   awakenings minimized   comfort measures   consistent schedule promoted   noise level reduced   regular sleep/rest pattern promoted   room darkened     Problem: Adult Inpatient Plan of Care  Goal: Patient-Specific Goal (Individualized)  Description: You can add care plan individualizations to a care plan. Examples of Individualization might be:  \"Parent requests to be called daily at 9am for status\", \"I have a hard time hearing out of my right ear\", or \"Do not touch me to wake me up as it startles  me\".  Outcome: Progressing   Goal Outcome Evaluation:    Safety was maintained with hourly rounding, standard fall precautions, and bed alarm use. Pt remained non-compliant about calling for assistance before getting out of bed.  Pt slept approx. 60% of night shift with minimal interruptions.  Anticipating possible discharge home w/hospice Monday, pending DME arrival.      "

## 2025-07-20 NOTE — PROGRESS NOTES
[facility]    Medicine Progress Note - Hospitalist Service    Date of Admission:  7/19/2025    Assessment & Plan    Douglas Herrera is a 65 year old male admitted on 7/19/2025. He is being admitted under [facility] hospice following recent concurrent admission for confusion and fevers in the setting of progressive left nasal cancer.    On comfort measures and plan for home with hospice on 7/22.      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, stage IVb  - Recently on chemotherapy, functionally declining with progression on imaging  - Oncology consulted last admission and recommended hospice  - [facility] hospice consulted  - Comfort orders available  - Continuing Keppra for seizure prophylaxis  - Tentatively planning for home disposition 7/22 after DME can be arranged    #Acute encephalopathy, resolved   #Hyponatremia resolved with ivf  #chronic anemia  #hypercalcemia  #dysphagia and aspiration, NPO and tube feeding  #Chronic hep B  #hypothyroidism        Diet: NPO for Medical/Clinical Reasons Except for: No Exceptions  Adult Formula Drip Feeding: Continuous Osmolite 1.5; Gastrostomy; Goal Rate: 400 mL; mL/hr; Bolus feed 400 mL over 1 hour using pump 3 times daily at 7-12-7    DVT Prophylaxis: NA  Rico Catheter: Not present  Lines: PRESENT      Port a Cath 07/12/25 Single Lumen Right Chest wall-Site Assessment: WDL      Cardiac Monitoring: None  Code Status: No CPR- Do NOT Intubate      Clinically Significant Risk Factors Present on Admission                   # Hypertension: Noted on problem list               # Financial/Environmental Concerns:     # History of CABG: noted on surgical history       Social Drivers of Health    Food Insecurity: Unknown (7/19/2025)    Food Insecurity     Within the past 12 months, did you worry that your food would run out before you got money to buy more?: Patient unable to answer     Within the past 12 months, did the food you bought just not last and you  didn t have money to get more?: Patient unable to answer   Housing Stability: Unknown (7/19/2025)    Housing Stability     Do you have housing? : Patient unable to answer     Are you worried about losing your housing?: Patient unable to answer   Tobacco Use: Low Risk  (6/10/2025)    Received from [employer]    Patient History     Smoking Tobacco Use: Never     Smokeless Tobacco Use: Never   Recent Concern: Tobacco Use - Medium Risk (6/2/2025)    Patient History     Smoking Tobacco Use: Former     Smokeless Tobacco Use: Never     Passive Exposure: Past   Financial Resource Strain: Unknown (7/19/2025)    Financial Resource Strain     Within the past 12 months, have you or your family members you live with been unable to get utilities (heat, electricity) when it was really needed?: Patient unable to answer   Transportation Needs: Unknown (7/19/2025)    Transportation Needs     Within the past 12 months, has lack of transportation kept you from medical appointments, getting your medicines, non-medical meetings or appointments, work, or from getting things that you need?: Patient unable to answer          Disposition Plan     Medically Ready for Discharge: Anticipated in 2-4 Days    Plan for home with hospice on 7/22 after DME can be arranged          Jose Lucas MD  Hospitalist Service  [facility]  Securely message with [employer] (more info)  Text page via [employer] Paging/Directory   ______________________________________________________________________    Interval History   C/o nose pain, poor historian   No acute event    Physical Exam   Vital Signs: Temp: 98.1  F (36.7  C) Temp src: Axillary BP: 123/70 Pulse: 86   Resp: 20 SpO2: 99 % O2 Device: None (Room air)    Weight: 0 lbs 0 oz    General.  Awake not in acute distress.  HEENT.  Pupils equal round react to light, anicteric, EOM intact.  Facial neck mass  Neck supple no JVD.  CVS regular rhythm no murmur gallops.  Lungs.  no wheezing or  rales.  Abdomen.  Soft nontender bowel sounds present.  Extremities.  No edema  Neurological.  No focal deficit.  Psych. Normal mood.      Medical Decision Making       38 MINUTES SPENT BY ME on the date of service doing chart review, history, exam, documentation & further activities per the note.      Data         Imaging results reviewed over the past 24 hrs:   No results found for this or any previous visit (from the past 24 hours).

## 2025-07-21 ENCOUNTER — VIRTUAL VISIT (OUTPATIENT)
Dept: INTERPRETER SERVICES | Facility: CLINIC | Age: 65
End: 2025-07-21
Payer: COMMERCIAL

## 2025-07-21 VITALS
HEART RATE: 98 BPM | RESPIRATION RATE: 22 BRPM | DIASTOLIC BLOOD PRESSURE: 65 MMHG | SYSTOLIC BLOOD PRESSURE: 111 MMHG | TEMPERATURE: 98.1 F | OXYGEN SATURATION: 98 %

## 2025-07-21 PROCEDURE — 250N000013 HC RX MED GY IP 250 OP 250 PS 637: Performed by: HOSPITALIST

## 2025-07-21 PROCEDURE — 110N000005 HC R&B HOSPICE, ACCENT

## 2025-07-21 PROCEDURE — S9341 HIT ENTERAL GRAV DIEM: HCPCS

## 2025-07-21 PROCEDURE — 250N000013 HC RX MED GY IP 250 OP 250 PS 637: Performed by: STUDENT IN AN ORGANIZED HEALTH CARE EDUCATION/TRAINING PROGRAM

## 2025-07-21 PROCEDURE — 250N000011 HC RX IP 250 OP 636: Performed by: STUDENT IN AN ORGANIZED HEALTH CARE EDUCATION/TRAINING PROGRAM

## 2025-07-21 PROCEDURE — 99231 SBSQ HOSP IP/OBS SF/LOW 25: CPT | Mod: GV | Performed by: INTERNAL MEDICINE

## 2025-07-21 PROCEDURE — 250N000013 HC RX MED GY IP 250 OP 250 PS 637: Performed by: INTERNAL MEDICINE

## 2025-07-21 RX ORDER — HYDROMORPHONE HYDROCHLORIDE 1 MG/ML
0.5 INJECTION, SOLUTION INTRAMUSCULAR; INTRAVENOUS; SUBCUTANEOUS EVERY 4 HOURS PRN
Refills: 0 | Status: DISCONTINUED | OUTPATIENT
Start: 2025-07-21 | End: 2025-07-22 | Stop reason: HOSPADM

## 2025-07-21 RX ADMIN — LEVETIRACETAM 500 MG: 100 SOLUTION ORAL at 08:40

## 2025-07-21 RX ADMIN — MORPHINE SULFATE 5 MG: 10 SOLUTION ORAL at 14:20

## 2025-07-21 RX ADMIN — GABAPENTIN 600 MG: 250 SUSPENSION ORAL at 21:07

## 2025-07-21 RX ADMIN — GABAPENTIN 600 MG: 250 SUSPENSION ORAL at 05:45

## 2025-07-21 RX ADMIN — ENTECAVIR 0.5 MG: 0.05 SOLUTION ORAL at 21:07

## 2025-07-21 RX ADMIN — LEVETIRACETAM 500 MG: 100 SOLUTION ORAL at 21:07

## 2025-07-21 RX ADMIN — HYDROMORPHONE HYDROCHLORIDE 0.5 MG: 1 INJECTION, SOLUTION INTRAMUSCULAR; INTRAVENOUS; SUBCUTANEOUS at 03:39

## 2025-07-21 RX ADMIN — ACETAMINOPHEN 500 MG: 500 TABLET ORAL at 21:19

## 2025-07-21 RX ADMIN — HYDROMORPHONE HYDROCHLORIDE 0.5 MG: 1 INJECTION, SOLUTION INTRAMUSCULAR; INTRAVENOUS; SUBCUTANEOUS at 16:28

## 2025-07-21 RX ADMIN — MORPHINE SULFATE 5 MG: 10 SOLUTION ORAL at 10:25

## 2025-07-21 RX ADMIN — GABAPENTIN 600 MG: 250 SUSPENSION ORAL at 13:27

## 2025-07-21 ASSESSMENT — ACTIVITIES OF DAILY LIVING (ADL)
ADLS_ACUITY_SCORE: 38

## 2025-07-21 NOTE — PLAN OF CARE
Problem: Adult Inpatient Plan of Care  Goal: Absence of Hospital-Acquired Illness or Injury  Intervention: Identify and Manage Fall Risk  Recent Flowsheet Documentation  Taken 7/20/2025 2300 by Jeremy Easton RN  Safety Promotion/Fall Prevention:   activity supervised   assistive device/personal items within reach   clutter free environment maintained   lighting adjusted   nonskid shoes/slippers when out of bed   room door open   room near nurse's station   room organization consistent   safety round/check completed   supervised activity  Taken 7/20/2025 2008 by Jeremy Easton RN  Safety Promotion/Fall Prevention:   activity supervised   assistive device/personal items within reach   clutter free environment maintained   lighting adjusted   nonskid shoes/slippers when out of bed   room door open   room near nurse's station   room organization consistent   safety round/check completed   supervised activity     Problem: End-of-Life Care  Goal: Comfort, Peace and Preserved Dignity  Intervention: Promote Physical Comfort  Recent Flowsheet Documentation  Taken 7/20/2025 2300 by Jeremy Easton RN  Sensory Stimulation Regulation:   care clustered   lighting decreased   quiet environment promoted  Taken 7/20/2025 2008 by Jeremy Easton RN  Sensory Stimulation Regulation:   care clustered   lighting decreased   quiet environment promoted     Problem: Delirium  Goal: Improved Sleep  Intervention: Promote Sleep  Recent Flowsheet Documentation  Taken 7/20/2025 2300 by Jeremy Easton RN  Sleep/Rest Enhancement:   awakenings minimized   comfort measures   consistent schedule promoted   noise level reduced   regular sleep/rest pattern promoted   room darkened  Taken 7/20/2025 2008 by Jeremy Easton RN  Sleep/Rest Enhancement:   awakenings minimized   comfort measures   consistent schedule promoted   noise level reduced   regular sleep/rest pattern promoted   room darkened     Problem: Adult Inpatient Plan of  "Care  Goal: Patient-Specific Goal (Individualized)  Description: You can add care plan individualizations to a care plan. Examples of Individualization might be:  \"Parent requests to be called daily at 9am for status\", \"I have a hard time hearing out of my right ear\", or \"Do not touch me to wake me up as it startles  me\".  Outcome: Progressing   Goal Outcome Evaluation:    Pain was well controlled with PRN x 2 during night shift.   Safety was maintained with hourly rounding, standard fall precautions, and bed alarm use. Pt non-compliant about calling for assistance.  Pt slept approx. 60% of night shift with minimal interruptions.  Anticipating discharge home w/Hospice Tuesday, pending DME arrival.    "

## 2025-07-21 NOTE — PLAN OF CARE
Goal Outcome Evaluation:    Alert and oriented.  Pain controlled with morphine in the GT.  Continues with TF for now.  Rinse mouth frequently.  Son and sister at the bedside.  Plan is home with hospice tomorrow.

## 2025-07-21 NOTE — PROGRESS NOTES
Consult Note - [facility]  _________________________________________________________________     [facility] 24/7 Contact Number: [phone]    - Providers: Please contact [facility] with changes in orders or clinical plan of care   - Nursing: Please contact [facility] with significant changes in patient condition     Hospice Diagnosis: Squamous Cell Carcinoma     Indication for Inpatient Hospice: Seizures, pain     Summary of Visit: Joint visit with [facility] hosp[facility] rn utilizing phone . Via (sister) at bedside from [place], [place]. Patient was lethargic throughout visit. He reported pain in his face and was agreeable to prn morphine. Education provided on pain management side effects and that Patient is also sleeping more due to his cancer. Patient denied any other worries or concerns.  Call placed to Tulio (son). Writer provided an update on the visit. He is grateful Patient will be returning home tomorrow. He has ample support with local family members. He confirmed DME was delivered today. Discussed the need for briefs and wipes. Writer inquired about coping. Tulio shared that the family is accepting of Patients dx and prognosis and is well supported. All questions addressed. Tulio feels prepared for Patient to come home with the support of [facility].      Advance Care Planning: No HCD/POLST in EMR     Final Arrangements: Bhupinder (brother) is working on this process with Patients [facility]     Status: na     Resource Needs: No needs identified at this time     NTodd Dumont, NORMA, LICSW, APHSW-C  Hospice Social Worker

## 2025-07-21 NOTE — PROGRESS NOTES
[facility]    Medicine Progress Note - Hospitalist Service    Date of Admission:  7/19/2025    Assessment & Plan    Douglas Herrera is a 65 year old male admitted on 7/19/2025. He is being admitted under [facility] hospice following recent concurrent admission for confusion and fevers in the setting of progressive left nasal cancer.    On comfort measures and plan for home with hospice on 7/22.      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, stage IVb  - Recently on chemotherapy, functionally declining with progression on imaging  - Oncology consulted last admission and recommended hospice  - [facility] hospice consulted  - Comfort orders available  - Continuing Keppra for seizure prophylaxis  - Tentatively planning for home disposition 7/22 after DME can be arranged    #Acute encephalopathy, resolved   #Hyponatremia resolved with ivf  #chronic anemia  #hypercalcemia  #dysphagia and aspiration, NPO and tube feeding  #Chronic hep B  #hypothyroidism        Diet: NPO for Medical/Clinical Reasons Except for: No Exceptions  Adult Formula Drip Feeding: Continuous Osmolite 1.5; Gastrostomy; Goal Rate: 400 mL; mL/hr; Bolus feed 400 mL over 1 hour using pump 3 times daily at 7-12-7    DVT Prophylaxis: NA  Rico Catheter: Not present  Lines: PRESENT      Port a Cath 07/12/25 Single Lumen Right Chest wall-Site Assessment: WDL  Port a Cath Right Chest wall-Site Assessment: WDL      Cardiac Monitoring: None  Code Status: No CPR- Do NOT Intubate      Clinically Significant Risk Factors                   # Hypertension: Noted on problem list                # Financial/Environmental Concerns:     # History of CABG: noted on surgical history       Social Drivers of Health    Food Insecurity: Unknown (7/19/2025)    Food Insecurity     Within the past 12 months, did you worry that your food would run out before you got money to buy more?: Patient unable to answer     Within the past 12 months, did the food you  bought just not last and you didn t have money to get more?: Patient unable to answer   Housing Stability: Unknown (7/19/2025)    Housing Stability     Do you have housing? : Patient unable to answer     Are you worried about losing your housing?: Patient unable to answer   Tobacco Use: Low Risk  (6/10/2025)    Received from [employer]    Patient History     Smoking Tobacco Use: Never     Smokeless Tobacco Use: Never   Recent Concern: Tobacco Use - Medium Risk (6/2/2025)    Patient History     Smoking Tobacco Use: Former     Smokeless Tobacco Use: Never     Passive Exposure: Past   Financial Resource Strain: Unknown (7/19/2025)    Financial Resource Strain     Within the past 12 months, have you or your family members you live with been unable to get utilities (heat, electricity) when it was really needed?: Patient unable to answer   Transportation Needs: Unknown (7/19/2025)    Transportation Needs     Within the past 12 months, has lack of transportation kept you from medical appointments, getting your medicines, non-medical meetings or appointments, work, or from getting things that you need?: Patient unable to answer          Disposition Plan     Medically Ready for Discharge: Anticipated in 2-4 Days    Plan for home with hospice on 7/22 after DME can be arranged          Bernard Nieves MD  Hospitalist Service  [facility]  Securely message with [employer] (more info)  Text page via [employer] Paging/Directory   ______________________________________________________________________    Interval History   No acute event    Physical Exam   Vital Signs:               O2 Device: None (Room air)    Weight: 0 lbs 0 oz    General.  Awake not in acute distress.  HEENT.  Pupils equal round react to light, anicteric, EOM intact.  Facial neck mass  Neck supple no JVD.  CVS regular rhythm no murmur gallops.  Lungs.  no wheezing or rales.  Abdomen.  Soft nontender bowel sounds present.  Extremities.  No  edema  Neurological.  No focal deficit.  Psych. Normal mood.      Medical Decision Making       38 MINUTES SPENT BY ME on the date of service doing chart review, history, exam, documentation & further activities per the note.      Data         Imaging results reviewed over the past 24 hrs:   No results found for this or any previous visit (from the past 24 hours).

## 2025-07-22 ENCOUNTER — PATIENT OUTREACH (OUTPATIENT)
Dept: ONCOLOGY | Facility: CLINIC | Age: 65
End: 2025-07-22
Payer: COMMERCIAL

## 2025-07-22 PROCEDURE — 250N000013 HC RX MED GY IP 250 OP 250 PS 637: Performed by: INTERNAL MEDICINE

## 2025-07-22 PROCEDURE — 99239 HOSP IP/OBS DSCHRG MGMT >30: CPT | Performed by: INTERNAL MEDICINE

## 2025-07-22 PROCEDURE — S9341 HIT ENTERAL GRAV DIEM: HCPCS

## 2025-07-22 PROCEDURE — 250N000011 HC RX IP 250 OP 636: Performed by: STUDENT IN AN ORGANIZED HEALTH CARE EDUCATION/TRAINING PROGRAM

## 2025-07-22 PROCEDURE — 250N000011 HC RX IP 250 OP 636: Performed by: INTERNAL MEDICINE

## 2025-07-22 PROCEDURE — 250N000013 HC RX MED GY IP 250 OP 250 PS 637: Performed by: HOSPITALIST

## 2025-07-22 PROCEDURE — 250N000013 HC RX MED GY IP 250 OP 250 PS 637: Performed by: STUDENT IN AN ORGANIZED HEALTH CARE EDUCATION/TRAINING PROGRAM

## 2025-07-22 RX ORDER — HEPARIN SODIUM (PORCINE) LOCK FLUSH IV SOLN 100 UNIT/ML 100 UNIT/ML
5-10 SOLUTION INTRAVENOUS
Status: DISCONTINUED | OUTPATIENT
Start: 2025-07-22 | End: 2025-07-22 | Stop reason: HOSPADM

## 2025-07-22 RX ORDER — GABAPENTIN 250 MG/5ML
600 SOLUTION ORAL 3 TIMES DAILY
Qty: 1080 ML | Refills: 1 | Status: SHIPPED | OUTPATIENT
Start: 2025-07-22

## 2025-07-22 RX ORDER — HEPARIN SODIUM,PORCINE 10 UNIT/ML
5-10 VIAL (ML) INTRAVENOUS EVERY 24 HOURS
Status: DISCONTINUED | OUTPATIENT
Start: 2025-07-22 | End: 2025-07-22 | Stop reason: HOSPADM

## 2025-07-22 RX ORDER — LEVETIRACETAM 100 MG/ML
500 SOLUTION ORAL 2 TIMES DAILY
Qty: 473 ML | Refills: 0 | Status: SHIPPED | OUTPATIENT
Start: 2025-07-22

## 2025-07-22 RX ORDER — HEPARIN SODIUM,PORCINE 10 UNIT/ML
5-10 VIAL (ML) INTRAVENOUS
Status: DISCONTINUED | OUTPATIENT
Start: 2025-07-22 | End: 2025-07-22 | Stop reason: HOSPADM

## 2025-07-22 RX ADMIN — LEVETIRACETAM 500 MG: 100 SOLUTION ORAL at 08:02

## 2025-07-22 RX ADMIN — HEPARIN, PORCINE (PF) 10 UNIT/ML INTRAVENOUS SYRINGE 5 ML: at 10:36

## 2025-07-22 RX ADMIN — MORPHINE SULFATE 5 MG: 10 SOLUTION ORAL at 08:11

## 2025-07-22 RX ADMIN — GABAPENTIN 600 MG: 250 SUSPENSION ORAL at 08:02

## 2025-07-22 RX ADMIN — HYDROMORPHONE HYDROCHLORIDE 0.5 MG: 1 INJECTION, SOLUTION INTRAMUSCULAR; INTRAVENOUS; SUBCUTANEOUS at 01:18

## 2025-07-22 ASSESSMENT — ACTIVITIES OF DAILY LIVING (ADL)
ADLS_ACUITY_SCORE: 38
ADLS_ACUITY_SCORE: 38
ADLS_ACUITY_SCORE: 41
ADLS_ACUITY_SCORE: 41
ADLS_ACUITY_SCORE: 38

## 2025-07-22 NOTE — PLAN OF CARE
"Goal Outcome Evaluation:         Pt A&Ox4. VSS on room air. Pt unable to give me a pain level when asked, only saying \"I am in pain\". rFLACC rating utilized instead. PRN morphine & dilaudid offered but pt nodding \"no\" and declined but allowing prn tylenol which was given. Rinsed mouth frequently. Declined to turn on left side prefers to sleep on the right side due to pain on the left side of face, weight shifted instead.  Bed alarm on, call light within reach.                    "

## 2025-07-22 NOTE — PROGRESS NOTES
[facility]    Consult Note - [facility]  _________________________________________________________________    [facility] 24/7 Contact Number: [phone]    - Providers: Please contact [facility] with changes in orders or clinical plan of care   - Nursing: Please contact [facility] with significant changes in patient condition    Hospice will notify the care team (including the hospitalist) to confirm date of inpatient hospice (GIP) admission.    New Epic encounter will not be created until hospice completes admission.   ______________________________________________________________________        Hospice Diagnosis: Squamous Cell Carcinoma    Indication for Inpatient Hospice: Pain and Seizures    Goals for Hospital Discharge: Patient has met GIP goals and will be discharging home today.     Plan of Care Discussed with the Following:   - Nurse: ЕКАТЕРИНА Killian  - Hospitalist/Rounding Provider: Bernard Nieves MD    - Douglas's Family/Preferred Contact: Berhane Santiago  - Hospice Provider: Garfield Mandel MD    Summary of Visit (includes assessment, medications and any new orders):   Hospice RN saw patient this morning to asses for discharge. All VS WNL. Hospice RN cleaned up patient's bedside and face while in room and asked for gown change prior to discharge. No family was at bedside this AM but Berhane santiago was called and updated.      Darryl Snyder RN

## 2025-07-22 NOTE — PROGRESS NOTES
[facility]    Consult Note - [facility]  _________________________________________________________________    [facility] 24/7 Contact Number: [phone]        For Kelsi Meidcations:     Please send 3 days with pt      Keppra 500mg -tid  Gabapentin 600mg tid     Other medications hospice will deliver to house.       Thank you!    Amber Jarquin, RN

## 2025-07-22 NOTE — PROGRESS NOTES
[facility]    Consult Note - [facility]  _________________________________________________________________    [facility] 24/7 Contact Number: [phone]    - Providers: Please contact [facility] with changes in orders or clinical plan of care   - Nursing: Please contact [facility] with significant changes in patient condition    Hospice will notify the care team (including the hospitalist) to confirm date of inpatient hospice (GIP) admission.    New Epic encounter will not be created until hospice completes admission.   ______________________________________________________________________        Hospice Diagnosis: Squamous Cell Carcinoma    Indication for Inpatient Hospice: Pain & Seizures    Goals for Hospital Discharge: Symptoms managed with oral medications for 48 hrs.      Summary of Visit (includes assessment, medications and any new orders):   Hospice RN and Hospice MSW saw patient this morning. Used  Interpretive Services to assist with visit. Patient stated he had pain in his face but could not rate it. Was concerend medication was making him sleepy but Hospice RN explained through int. That it likely was d/t progressing illness as well and that the medication would help alleviate his pain. Patient accepted offer of pain medication and floor RN administered it while Hospice team was still I in the patient's room.     VS: Patient is A&O x4, RR 16 shallow, non-labored, HR 74, swelling of L side of face, small bloody drainage when patient wiped mouth, which has been ongoing.     No med recs made today since the patient will be dishcarging tomorrow.     Thank you for the great care of this patient!        Darryl Snyder, RN

## 2025-07-22 NOTE — PLAN OF CARE
Goal Outcome Evaluation:         Pt A&Ox4. VSS on room air. PRN dilaudid given once overnight for pain, pt reporting relief. No signs or indications of pain after. Mouth and nose suctioned. Mouth rinsed frequently. Declined to turn on left side prefers to sleep on the right side due to pain on the left side of face. Had one BM. Plan for discharge home with homecare stretcher scheduled for 11:26am-12:26pm

## 2025-07-22 NOTE — PROGRESS NOTES
Care Management Discharge Note    Discharge Date: 07/22/2025       Discharge Disposition: Home    Discharge Services:  (hospice)    Discharge DME:  (per hospice team)    Discharge Transportation:      Private pay costs discussed: transportation costs discussed by NICOLE on 7/20    Does the patient's insurance plan have a 3 day qualifying hospital stay waiver?  No    PAS Confirmation Code: NA  Patient/family educated on Medicare website which has current facility and service quality ratings: yes    Education Provided on the Discharge Plan: Yes  Persons Notified of Discharge Plans: sons  Patient/Family in Agreement with the Plan: yes    Handoff Referral Completed: No, handoff not indicated or clinically appropriate    Additional Information:  Patient discharging home with [facility] today via [facility] stretcher transport between [id number]. PCS completed and on chart. Per [facility], equipment delivered on 7/21 and family notified of discharge plan and in agreement.     10:54 AM  Received message from [facility] inquiring if patient will continue tube feedings at discharge. Discussed with MD; he reports plan to continue at discharge and placed order. SW updated [facility].    VICTOR M Sandoval

## 2025-07-23 PROCEDURE — S9341 HIT ENTERAL GRAV DIEM: HCPCS

## 2025-07-23 NOTE — DISCHARGE SUMMARY
[facility]  Hospitalist Discharge Summary      Date of Admission:  7/19/2025  Date of Discharge:  7/22/2025 12:44 PM  Discharging Provider: Bernard Nieves MD  Discharge Service: Hospitalist Service    Discharge Diagnoses   Please see discharge diagnosis and summary of hospital stay below  Assessment & Plan    Douglas Herrera is a 65 year old male admitted on 7/19/2025. He is being admitted under [facility] hospice following recent concurrent admission for confusion and fevers in the setting of progressive left nasal cancer.     On comfort measures and plan for home with hospice on 7/22.      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, stage IVb  - Recently on chemotherapy, functionally declining with progression on imaging  - Oncology consulted last admission and recommended hospice  - [facility] hospice consulted  - Comfort orders available  - Continuing Keppra for seizure prophylaxis  - Tentatively planning for home disposition 7/22 after DME can be arranged     #Acute encephalopathy, resolved   #Hyponatremia resolved with ivf  #chronic anemia  #hypercalcemia  #dysphagia and aspiration, NPO and tube feeding  #Chronic hep B      Clinically Significant Risk Factors          Follow-ups Needed After Discharge   Follow-up Appointments       Follow Up      Follow up with hospice services upon arrival at home            PCP    Unresulted Labs Ordered in the Past 30 Days of this Admission       No orders found from 6/19/2025 to 7/20/2025.        These results will be followed up by hospice    Discharge Disposition   Discharged to home  Condition at discharge: Stable    Hospital Course   See summary above    Consultations This Hospital Stay   NUTR[facility] ADULT IP CONSULT  PHARMACY IP CONSULT  PHARMACY IP CONSULT  [facility] INPATIENT HOSPICE ADULT CONSULT    Code Status   Prior    Time Spent on this Encounter   Bernard GARRISON MD, personally saw the patient today and spent greater than 30  minutes discharging this patient.       Bernard Nieves MD  [facility]  [facility] [facility] [address]  Phone: [phone]  Fax: [phone]  ______________________________________________________________________    Physical Exam   Vital Signs:                    Weight: 0 lbs 0 oz  See progress notes of 7/22       Primary Care Physician   Mira Leung    Discharge Orders      Reason for your hospital stay    Nasopharyngeal cancer on hospice     Activity    Your activity upon discharge: activity as tolerated     Tubes and Drains    Current Tubes and Drains:     CVC Line  Duration           Port a Cath Right Chest wall -- days    Port a Cath 07/12/25 Single Lumen Right Chest wall 9 days        Drain  Duration           Gastrostomy/Enterostomy Gastrostomy LLQ 1 18 fr 18fr [employer]   Y-port G-tube Lot# [id number] Exp: 02/2026 241 days              Discharge Instructions    Patient discharged on hospice, please call hospice for any additional questions     Follow Up    Follow up with hospice services upon arrival at home     Diet    Follow this diet upon discharge: Current Diet:Orders Placed This Encounter      Adult Formula Drip Feeding: Continuous Osmolite 1.5; Gastrostomy; Goal Rate: 400 mL; mL/hr; Bolus feed 400 mL over 1 hour using pump 3 times daily at 7-12-7      NPO for Medical/Clinical Reasons Except for: No Exceptions    Free water order:  Free water route: Gastric   Free water - Feeding Tube Flush Frequency: 60 mL before and after each feeding; and 130 mL addition       Significant Results and Procedures   Results for orders placed or performed during the hospital encounter of 07/12/25   Soft tissue neck CT w contrast    Narrative    EXAM: CT SOFT TISSUE NECK W CONTRAST  LOCATION: [facility]  DATE: 7/12/2025    INDICATION: Known cancer, fever  COMPARISON: Soft tissue neck CT 6/6/2025.  CONTRAST: 90 ml iso 370 given during  CAP  TECHNIQUE: Routine CT Soft Tissue Neck with IV contrast. Multiplanar reformats. Dose reduction techniques were used.    FINDINGS:     AREA OF INTEREST: Redemonstrated transfacial soft tissue mass, centered within the left  space. The mass again appears to extend superiorly through the floor of the left orbit, medially through the hard palate, posteriorly contacting the left   internal carotid artery, laterally past the left mandibular body, anteriorly past the anterior maxillary wall, and caudally to the angle of the mandible. There is again noted to be extension into the paranasal sinuses, oral cavity, and pharynx, which   overall appears slightly more extensive than the prior CT. There is extensive associated osseous erosion involving the left-sided facial bones. There is also noted to be erosion of the floor of the left middle cranial fossa, with suspected intracranial   tumor extension along the floor of the left middle cranial fossa and potentially involving the left cavernous sinus and prepontine cistern. There is again noted to be multicystic changes involving the central aspect of the mass. Overall the lesion   appears increased from the prior CT, measuring approximately 7.8 x 6.7 cm in similar axial dimensions to the prior exam (series 3 image 44), although difficult to delineate the exact margins of the mass and could likely be better evaluated on MRI. There   is some stranding within the peripheral surrounding subcutaneous tissues, which overall appears decreased since the prior CT, although superimposed infectious process remains difficult to exclude. There is also edema involving the mucosa of the upper   aerodigestive tract, with the every remaining grossly patent.    LYMPH NODES: No pathologic lymph nodes by size or morphology criteria.     SALIVARY GLANDS: Normal parotid and submandibular glands.    THYROID: Subcentimeter calcification in the right thyroid lobe.     VESSELS: Vascular  structures of the neck are patent. Partially visualized right chest port.    OTHER: Numerous partially visualized nodules in the visualized lung apices, compatible with metastatic disease. Multilevel cervical spondylosis.      Impression    IMPRESSION:   1.  Redemonstrated transspatial cystic and solid mass centered in the left  space, which appears to have increased in size and extent since the prior CT from 6/6/2025. There is again noted to be involvement of the surrounding structures, with   extensive associated osseous erosion and suspected intracranial extension, as above. Surrounding inflammatory changes appear decreased from the prior CT, although superimposed infection remains difficult to exclude.  2.  Numerous partially visualized nodules in the visualized lung apices, compatible with metastatic disease.   CT Chest/Abdomen/Pelvis w Contrast    Narrative    EXAM: CT CHEST/ABDOMEN/PELVIS WITH CONTRAST  LOCATION: [facility]  DATE: 07/12/2025    INDICATION: Fever, known head and neck CA.  COMPARISON: CT chest 06/06/2025. CT chest, abdomen and pelvis 03/06/2025.  TECHNIQUE: CT scan of the chest, abdomen, and pelvis was performed following injection of IV contrast. Multiplanar reformats were obtained. Dose reduction techniques were used.   CONTRAST: 90 mL Iso 370.    FINDINGS:   LUNGS AND PLEURA: Bilateral dependent atelectasis. Since 06/06/2025, resolved bilateral consolidative opacities. Interval increased in size multiple pulmonary nodules with reference examples on series 5:  -Left apex, 8 mm, previously 4 mm, image 52  -Right apex, 16 x 11 mm, not definitely visualized previously and possibly obscured, image 60  -Left upper lobe subpleural, 12 mm, previously 8 mm, image 86    Multiple additional pulmonary nodules are noted.    MEDIASTINUM/AXILLAE: Right chest port tip at the right atrium. Similar borderline enlarged right hilar 1 cm lymph node (4/60).    CORONARY ARTERY  CALCIFICATION: Severe.    HEPATOBILIARY: Cholecystectomy.    PANCREAS: Normal.    SPLEEN: Normal.    ADRENAL GLANDS: Normal.    KIDNEYS/BLADDER: Normal.    BOWEL: Gastrostomy tube in appropriate position. No bowel obstruction. Appendix appears normal.    LYMPH NODES: Normal.    VASCULATURE: Mild to moderate aortic atherosclerosis.    PELVIC ORGANS: No pelvic mass.    MUSCULOSKELETAL: No aggressive osseous lesion. Sternotomy.      Impression    IMPRESSION:  1.  Since 06/06/2025, resolved pulmonary consolidative opacities/pneumonia.  2.  Continued increased in size multiple pulmonary nodules/metastases.  3.  No convincing acute process within the abdomen or pelvis.     Head CT w/o contrast    Narrative    EXAM: CT HEAD W/O CONTRAST  LOCATION: [facility]  DATE: 7/12/2025    INDICATION: ams  COMPARISON: Head CT 6/6/2025.  TECHNIQUE: Routine CT Head without IV contrast. Multiplanar reformats. Dose reduction techniques were used.    FINDINGS:  INTRACRANIAL CONTENTS: No intracranial hemorrhage, extraaxial collection, or mass effect.  No CT evidence of acute infarct. Mild presumed chronic small vessel ischemic changes. Mild generalized volume loss. No hydrocephalus.     VISUALIZED ORBITS/SINUSES/MASTOIDS: No intraorbital abnormality. Extensive mucosal thickening throughout the paranasal sinuses. There is also soft tissue mass likely involving the sphenoid sinuses and posterior ethmoid air cells. Bilateral mastoid   effusions.    BONES/SOFT TISSUES: Please see separately dictated soft tissue neck CT for description of soft tissue mass and associated osseous findings.      Impression    IMPRESSION:  1.  No acute intracranial findings.  2.  Mild age-related changes.  3.  Please see separately dictated soft tissue neck CT for further description of the left-sided facial mass.       Discharge Medications      Review of your medicines        START taking        Dose / Directions   levETIRAcetam 100 MG/ML  oral solution  Commonly known as: KEPPRA  Used for: Hospice care      Dose: 500 mg  Place 5 mLs (500 mg) into Feeding Tube 2 times daily.  Quantity: 473 mL  Refills: 0            CONTINUE these medicines which have NOT CHANGED        Dose / Directions   acetaminophen 500 MG tablet  Commonly known as: TYLENOL      Dose: 500 mg  Take 500 mg by mouth every 4 hours as needed for mild pain.  Refills: 0     entecavir 0.5 MG tablet  Commonly known as: BARACLUDE      Dose: 0.5 mg  Take 0.5 mg by mouth every evening.  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  Quantity: 1080 mL  Refills: 1     Osmolite 1.5 Migue Liqd  Used for: Moderate protein-calorie malnutrition, Malignant neoplasm of maxillary sinus (H), Hypomagnesemia      Dose: 474 mL  Place 474 mLs into G tube 3 times daily. Infuse via gravity bag. 2 cartons TID spread 3-5 hours apart.   Water flush: 30-60 mL before and after each feeding. + 120 mL 4 times daily for hydration.  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  Quantity: 30 tablet  Refills: 2     sodium chloride 0.65 % nasal spray  Commonly known as: OCEAN  Used for: Mass of sinus      Dose: 2 spray  Spray 2 sprays in nostril daily as needed for congestion.  Quantity: 88 mL  Refills: 3               Where to get your medicines        Some of these will need a paper  prescription and others can be bought over the counter. Ask your nurse if you have questions.    Bring a paper prescription for each of these medications  gabapentin 250 MG/5ML solution  levETIRAcetam 100 MG/ML oral solution       Allergies   No Known Allergies

## 2025-07-24 PROCEDURE — S9341 HIT ENTERAL GRAV DIEM: HCPCS

## 2025-07-25 PROCEDURE — S9341 HIT ENTERAL GRAV DIEM: HCPCS

## 2025-07-26 PROCEDURE — S9341 HIT ENTERAL GRAV DIEM: HCPCS

## 2025-07-27 PROCEDURE — S9341 HIT ENTERAL GRAV DIEM: HCPCS

## 2025-07-28 PROCEDURE — S9341 HIT ENTERAL GRAV DIEM: HCPCS

## 2025-07-29 PROCEDURE — S9341 HIT ENTERAL GRAV DIEM: HCPCS

## 2025-07-30 PROCEDURE — S9341 HIT ENTERAL GRAV DIEM: HCPCS

## 2025-07-31 PROCEDURE — S9341 HIT ENTERAL GRAV DIEM: HCPCS

## 2025-08-01 PROCEDURE — S9341 HIT ENTERAL GRAV DIEM: HCPCS

## 2025-08-02 PROCEDURE — S9341 HIT ENTERAL GRAV DIEM: HCPCS

## 2025-08-03 PROCEDURE — S9341 HIT ENTERAL GRAV DIEM: HCPCS

## 2025-08-04 PROCEDURE — S9341 HIT ENTERAL GRAV DIEM: HCPCS

## 2025-08-05 PROCEDURE — S9341 HIT ENTERAL GRAV DIEM: HCPCS

## 2025-08-06 PROCEDURE — S9341 HIT ENTERAL GRAV DIEM: HCPCS

## (undated) DEVICE — BLADE QUADCUT ROTATABLE FUSION 4.3MMX13CM M4 1884380EM

## (undated) DEVICE — SU DERMABOND ADVANCED .7ML DNX12

## (undated) DEVICE — LINEN GOWN XLG 5407

## (undated) DEVICE — SPONGE COTTONOID 1/2X3" 80-1407

## (undated) DEVICE — DRAPE POUCH INSTRUMENT 1018

## (undated) DEVICE — KNIFE HANDLE W/15 BLADE 371615

## (undated) DEVICE — TUBING SUCTION 10'X3/16" N510

## (undated) DEVICE — DRSG NASOPORE FRAG FIRM 8CM 5400-020-008

## (undated) DEVICE — RX SURGIFLO HEMOSTATIC MATRIX W/THROMBIN 8ML 2994

## (undated) DEVICE — SURGICEL HEMOSTAT 4X8" 1952

## (undated) DEVICE — ANTIFOG SOLUTION W/FOAM PAD 31142527

## (undated) DEVICE — LINEN TOWEL PACK X30 5481

## (undated) DEVICE — ESU GROUND PAD ADULT W/CORD E7507

## (undated) DEVICE — SYR 03ML LL W/O NDL 309657

## (undated) DEVICE — ESU MINI EPIDURAL VEIN SEALER AQUAMANTIS 3.4MM 23-314-1

## (undated) DEVICE — SPECIMEN TRAP MUCOUS 40ML LUKI C30200A

## (undated) DEVICE — LINEN TOWEL PACK X6 WHITE 5487

## (undated) DEVICE — SPONGE COTTONOID 1/2X3" 20-07S

## (undated) DEVICE — GLOVE BIOGEL PI MICRO INDICATOR UNDERGLOVE SZ 8.0 48980

## (undated) DEVICE — CLIP GEM MICRO GEM1521

## (undated) DEVICE — LINEN TOWEL PACK X5 5464

## (undated) DEVICE — EYE PREP BETADINE 5% SOLUTION 30ML 0065-0411-30

## (undated) DEVICE — STRAP UNIVERSAL POSITIONING 2-PIECE 4X47X76" 91-287

## (undated) DEVICE — ESU SUCTION CAUTERY 10FR FOOT CONTROL E2505-10FR

## (undated) DEVICE — PACK NEURO MINOR UMMC SNE32MNMU4

## (undated) DEVICE — ESU ELEC NDL 6" COATED/INSULATED E1465-6

## (undated) DEVICE — GLOVE BIOGEL PI ULTRATOUCH SZ 7.5 41175

## (undated) DEVICE — Device

## (undated) DEVICE — ENDO SHEATH STORZ SHARPSITE ENDOSCRUB 0DEG 4MM 1912000

## (undated) DEVICE — SYRINGE EAR/ULCER STERILE 2 OZ BULB 4172

## (undated) DEVICE — BLADE SHAVER SINUS 3.5MM RAD 40 ROTATE 1883506HRE

## (undated) DEVICE — PACK GOWN 3/PK DISP XL SBA32GPFCB

## (undated) DEVICE — SU MONOCRYL 4-0 P-3 18" UND Y494G

## (undated) DEVICE — SOL WATER IRRIG 1000ML BOTTLE 2F7114

## (undated) DEVICE — SUCTION SLEEVE NEPTUNE 2 125MM 0703-005-125

## (undated) DEVICE — BLADE SINUS RAD40 CVD 4MM FUSION ROTATE 18840006EM

## (undated) DEVICE — CONNECTOR MALE TO MALE LL

## (undated) DEVICE — SOL NACL 0.9% IRRIG 1000ML BOTTLE 2F7124

## (undated) DEVICE — SPONGE COTTONOID NEURO 1/2"X1/2" 30-054

## (undated) DEVICE — DRAPE FLUID WARMING 52"X66" ORS-301

## (undated) DEVICE — SUCTION MANIFOLD NEPTUNE 2 SYS 4 PORT 0702-020-000

## (undated) DEVICE — DECANTER BAG 2002S

## (undated) DEVICE — GOWN XLG DISP 9545

## (undated) DEVICE — KIT INTRODUCER FLUENT MICRO 5FRX10CM ECHO TIP KIT-038-04

## (undated) DEVICE — SYR 30ML LL W/O NDL 302832

## (undated) DEVICE — SPONGE SURGIFOAM 01GM POWDER 1978

## (undated) DEVICE — TUBING IRRIGATOR STRAIGHTSHOT XPS 1895522

## (undated) DEVICE — SPONGE SURGIFOAM 100 1974

## (undated) DEVICE — TRACKER ENT OTS INSTRUMENT FUSION 9733533

## (undated) DEVICE — SU VICRYL 3-0 SH 27" UND J416H

## (undated) DEVICE — DRSG TELFA 3X8" 1238

## (undated) DEVICE — GLOVE BIOGEL PI ULTRATOUCH G SZ 7.5 42175

## (undated) DEVICE — ENDO SHEATH STORZ SHARPSITE ENDOSCRUB 30DEG 4MM 1912010

## (undated) DEVICE — PACK CENTRAL LINE INSERTION SAN32CLFCG

## (undated) DEVICE — SOL NACL 0.9% INJ 1000ML BAG 2B1324X

## (undated) DEVICE — NDL 25GA 2"  8881200441

## (undated) DEVICE — DRAPE SHEET MED 44X70" 9355

## (undated) DEVICE — CLIP ETHICON LIGACLIP SM BLUE LT100

## (undated) DEVICE — TRACKER PATIENT NON-INVASIVE AXIEM 9734887

## (undated) DEVICE — PACK ENT ENDOSCOPY UMMC

## (undated) DEVICE — PROBE COVER INTRAOPERATIVE 5"X96" PC1308

## (undated) RX ORDER — FENTANYL CITRATE 50 UG/ML
INJECTION, SOLUTION INTRAMUSCULAR; INTRAVENOUS
Status: DISPENSED
Start: 2023-11-10

## (undated) RX ORDER — DEXTROSE MONOHYDRATE, SODIUM CHLORIDE, AND POTASSIUM CHLORIDE 50; 1.49; 9 G/1000ML; G/1000ML; G/1000ML
INJECTION, SOLUTION INTRAVENOUS
Status: DISPENSED
Start: 2023-12-01

## (undated) RX ORDER — PROTAMINE SULFATE 10 MG/ML
INJECTION, SOLUTION INTRAVENOUS
Status: DISPENSED
Start: 2025-06-02

## (undated) RX ORDER — PROPOFOL 10 MG/ML
INJECTION, EMULSION INTRAVENOUS
Status: DISPENSED
Start: 2024-10-11

## (undated) RX ORDER — CEFAZOLIN SODIUM 2 G/100ML
INJECTION, SOLUTION INTRAVENOUS
Status: DISPENSED
Start: 2024-01-02

## (undated) RX ORDER — LIDOCAINE HYDROCHLORIDE AND EPINEPHRINE 10; 10 MG/ML; UG/ML
INJECTION, SOLUTION INFILTRATION; PERINEURAL
Status: DISPENSED
Start: 2024-10-11

## (undated) RX ORDER — CEFAZOLIN SODIUM/WATER 2 G/20 ML
SYRINGE (ML) INTRAVENOUS
Status: DISPENSED
Start: 2025-06-02

## (undated) RX ORDER — FENTANYL CITRATE 50 UG/ML
INJECTION, SOLUTION INTRAMUSCULAR; INTRAVENOUS
Status: DISPENSED
Start: 2024-10-11

## (undated) RX ORDER — SODIUM CHLORIDE, SODIUM LACTATE, POTASSIUM CHLORIDE, CALCIUM CHLORIDE 600; 310; 30; 20 MG/100ML; MG/100ML; MG/100ML; MG/100ML
INJECTION, SOLUTION INTRAVENOUS
Status: DISPENSED
Start: 2023-12-01

## (undated) RX ORDER — FENTANYL CITRATE 50 UG/ML
INJECTION, SOLUTION INTRAMUSCULAR; INTRAVENOUS
Status: DISPENSED
Start: 2025-06-02

## (undated) RX ORDER — ELECTROLYTES/DEXTROSE
SOLUTION, ORAL ORAL
Status: DISPENSED

## (undated) RX ORDER — ACETAMINOPHEN 325 MG/1
TABLET ORAL
Status: DISPENSED
Start: 2023-11-10

## (undated) RX ORDER — LIDOCAINE HYDROCHLORIDE 10 MG/ML
INJECTION, SOLUTION EPIDURAL; INFILTRATION; INTRACAUDAL; PERINEURAL
Status: DISPENSED
Start: 2023-11-30

## (undated) RX ORDER — LIDOCAINE HYDROCHLORIDE 20 MG/ML
JELLY TOPICAL
Status: DISPENSED
Start: 2024-01-02

## (undated) RX ORDER — ACETAMINOPHEN 325 MG/10.15ML
LIQUID ORAL
Status: DISPENSED
Start: 2025-06-02

## (undated) RX ORDER — FENTANYL CITRATE 50 UG/ML
INJECTION, SOLUTION INTRAMUSCULAR; INTRAVENOUS
Status: DISPENSED
Start: 2023-09-13

## (undated) RX ORDER — LIDOCAINE HYDROCHLORIDE AND EPINEPHRINE 10; 10 MG/ML; UG/ML
INJECTION, SOLUTION INFILTRATION; PERINEURAL
Status: DISPENSED
Start: 2023-11-10

## (undated) RX ORDER — OXYMETAZOLINE HYDROCHLORIDE 0.05 G/100ML
SPRAY NASAL
Status: DISPENSED
Start: 2024-10-11

## (undated) RX ORDER — HEPARIN SODIUM (PORCINE) LOCK FLUSH IV SOLN 100 UNIT/ML 100 UNIT/ML
SOLUTION INTRAVENOUS
Status: DISPENSED
Start: 2025-01-20

## (undated) RX ORDER — CEFAZOLIN SODIUM/WATER 2 G/20 ML
SYRINGE (ML) INTRAVENOUS
Status: DISPENSED
Start: 2023-11-30

## (undated) RX ORDER — DEXAMETHASONE SODIUM PHOSPHATE 4 MG/ML
INJECTION, SOLUTION INTRA-ARTICULAR; INTRALESIONAL; INTRAMUSCULAR; INTRAVENOUS; SOFT TISSUE
Status: DISPENSED
Start: 2024-10-11

## (undated) RX ORDER — HEPARIN SODIUM 1000 [USP'U]/ML
INJECTION, SOLUTION INTRAVENOUS; SUBCUTANEOUS
Status: DISPENSED
Start: 2023-11-30

## (undated) RX ORDER — SODIUM CHLORIDE 9 MG/ML
INJECTION, SOLUTION INTRAVENOUS
Status: DISPENSED
Start: 2024-01-02

## (undated) RX ORDER — FENTANYL CITRATE 50 UG/ML
INJECTION, SOLUTION INTRAMUSCULAR; INTRAVENOUS
Status: DISPENSED
Start: 2024-01-02

## (undated) RX ORDER — ACETAMINOPHEN 325 MG/1
TABLET ORAL
Status: DISPENSED
Start: 2023-11-30

## (undated) RX ORDER — CEFAZOLIN SODIUM 2 G/50ML
SOLUTION INTRAVENOUS
Status: DISPENSED
Start: 2024-01-11

## (undated) RX ORDER — CEFTRIAXONE 2 G/1
INJECTION, POWDER, FOR SOLUTION INTRAMUSCULAR; INTRAVENOUS
Status: DISPENSED
Start: 2023-12-01

## (undated) RX ORDER — HEPARIN SODIUM (PORCINE) LOCK FLUSH IV SOLN 100 UNIT/ML 100 UNIT/ML
SOLUTION INTRAVENOUS
Status: DISPENSED
Start: 2024-10-11

## (undated) RX ORDER — LIDOCAINE HYDROCHLORIDE 10 MG/ML
INJECTION, SOLUTION EPIDURAL; INFILTRATION; INTRACAUDAL; PERINEURAL
Status: DISPENSED
Start: 2024-01-02

## (undated) RX ORDER — HEPARIN SODIUM (PORCINE) LOCK FLUSH IV SOLN 100 UNIT/ML 100 UNIT/ML
SOLUTION INTRAVENOUS
Status: DISPENSED
Start: 2024-10-29

## (undated) RX ORDER — HEPARIN SODIUM 1000 [USP'U]/ML
INJECTION, SOLUTION INTRAVENOUS; SUBCUTANEOUS
Status: DISPENSED
Start: 2025-06-02

## (undated) RX ORDER — HEPARIN SODIUM (PORCINE) LOCK FLUSH IV SOLN 100 UNIT/ML 100 UNIT/ML
SOLUTION INTRAVENOUS
Status: DISPENSED
Start: 2024-08-05

## (undated) RX ORDER — ACETAMINOPHEN 325 MG/1
TABLET ORAL
Status: DISPENSED
Start: 2024-10-11

## (undated) RX ORDER — FENTANYL CITRATE-0.9 % NACL/PF 10 MCG/ML
PLASTIC BAG, INJECTION (ML) INTRAVENOUS
Status: DISPENSED
Start: 2024-10-11

## (undated) RX ORDER — LIDOCAINE HYDROCHLORIDE 10 MG/ML
INJECTION, SOLUTION EPIDURAL; INFILTRATION; INTRACAUDAL; PERINEURAL
Status: DISPENSED
Start: 2025-06-02

## (undated) RX ORDER — ACETAMINOPHEN 500 MG
TABLET ORAL
Status: DISPENSED
Start: 2023-11-30

## (undated) RX ORDER — FENTANYL CITRATE 50 UG/ML
INJECTION, SOLUTION INTRAMUSCULAR; INTRAVENOUS
Status: DISPENSED
Start: 2023-11-30

## (undated) RX ORDER — ONDANSETRON 2 MG/ML
INJECTION INTRAMUSCULAR; INTRAVENOUS
Status: DISPENSED
Start: 2024-10-11

## (undated) RX ORDER — KETOROLAC TROMETHAMINE 30 MG/ML
INJECTION, SOLUTION INTRAMUSCULAR; INTRAVENOUS
Status: DISPENSED
Start: 2024-01-02

## (undated) RX ORDER — LIDOCAINE HYDROCHLORIDE 10 MG/ML
INJECTION, SOLUTION INFILTRATION; PERINEURAL
Status: DISPENSED
Start: 2023-09-13

## (undated) RX ORDER — SCOLOPAMINE TRANSDERMAL SYSTEM 1 MG/1
PATCH, EXTENDED RELEASE TRANSDERMAL
Status: DISPENSED
Start: 2023-12-01

## (undated) RX ORDER — CEFTRIAXONE 2 G/1
INJECTION, POWDER, FOR SOLUTION INTRAMUSCULAR; INTRAVENOUS
Status: DISPENSED
Start: 2023-11-30

## (undated) RX ORDER — OXYMETAZOLINE HYDROCHLORIDE 0.05 G/100ML
SPRAY NASAL
Status: DISPENSED
Start: 2023-11-10

## (undated) RX ORDER — FENTANYL CITRATE-0.9 % NACL/PF 10 MCG/ML
PLASTIC BAG, INJECTION (ML) INTRAVENOUS
Status: DISPENSED
Start: 2023-11-30

## (undated) RX ORDER — APREPITANT 40 MG/1
CAPSULE ORAL
Status: DISPENSED
Start: 2024-10-11

## (undated) RX ORDER — ONDANSETRON 2 MG/ML
INJECTION INTRAMUSCULAR; INTRAVENOUS
Status: DISPENSED
Start: 2025-06-02